# Patient Record
Sex: FEMALE | Race: WHITE | NOT HISPANIC OR LATINO | Employment: OTHER | URBAN - METROPOLITAN AREA
[De-identification: names, ages, dates, MRNs, and addresses within clinical notes are randomized per-mention and may not be internally consistent; named-entity substitution may affect disease eponyms.]

---

## 2018-08-22 ENCOUNTER — TRANSFERRED RECORDS (OUTPATIENT)
Dept: HEALTH INFORMATION MANAGEMENT | Facility: CLINIC | Age: 63
End: 2018-08-22

## 2019-05-30 ENCOUNTER — TRANSFERRED RECORDS (OUTPATIENT)
Dept: HEALTH INFORMATION MANAGEMENT | Facility: CLINIC | Age: 64
End: 2019-05-30

## 2020-01-06 ENCOUNTER — TRANSFERRED RECORDS (OUTPATIENT)
Dept: HEALTH INFORMATION MANAGEMENT | Facility: CLINIC | Age: 65
End: 2020-01-06

## 2020-01-06 LAB
CREAT SERPL-MCNC: 3.71 MG/DL (ref 0.55–1.02)
GFR SERPL CREATININE-BSD FRML MDRD: 12 ML/MIN/1.73M2
GLUCOSE SERPL-MCNC: 103 MG/DL (ref 70–100)
POTASSIUM SERPL-SCNC: 3.8 MMOL/L (ref 3.5–5.1)

## 2020-01-13 ENCOUNTER — TRANSFERRED RECORDS (OUTPATIENT)
Dept: HEALTH INFORMATION MANAGEMENT | Facility: CLINIC | Age: 65
End: 2020-01-13

## 2020-01-13 ENCOUNTER — MEDICAL CORRESPONDENCE (OUTPATIENT)
Dept: HEALTH INFORMATION MANAGEMENT | Facility: CLINIC | Age: 65
End: 2020-01-13

## 2020-01-22 PROBLEM — D63.1 ANEMIA DUE TO STAGE 5 CHRONIC KIDNEY DISEASE (H): Status: ACTIVE | Noted: 2020-01-22

## 2020-01-22 PROBLEM — Q61.3 POLYCYSTIC KIDNEY: Status: ACTIVE | Noted: 2020-01-22

## 2020-01-22 PROBLEM — I10 HYPERTENSION: Status: ACTIVE | Noted: 2020-01-22

## 2020-01-22 PROBLEM — M81.0 OSTEOPOROSIS: Status: RESOLVED | Noted: 2020-01-22 | Resolved: 2020-01-22

## 2020-01-22 PROBLEM — N18.5 CHRONIC KIDNEY DISEASE, STAGE 5, KIDNEY FAILURE (H): Status: ACTIVE | Noted: 2020-01-22

## 2020-01-22 PROBLEM — N18.5 ANEMIA DUE TO STAGE 5 CHRONIC KIDNEY DISEASE (H): Status: ACTIVE | Noted: 2020-01-22

## 2020-01-22 PROBLEM — M81.0 OSTEOPOROSIS: Status: ACTIVE | Noted: 2020-01-22

## 2020-02-04 PROBLEM — N18.5 CHRONIC KIDNEY DISEASE, STAGE 5, KIDNEY FAILURE (H): Status: RESOLVED | Noted: 2020-01-22 | Resolved: 2020-02-04

## 2020-02-04 PROBLEM — D63.1 ANEMIA DUE TO STAGE 5 CHRONIC KIDNEY DISEASE (H): Status: RESOLVED | Noted: 2020-01-22 | Resolved: 2020-02-04

## 2020-02-04 PROBLEM — N18.5 ANEMIA DUE TO STAGE 5 CHRONIC KIDNEY DISEASE (H): Status: RESOLVED | Noted: 2020-01-22 | Resolved: 2020-02-04

## 2020-04-13 ENCOUNTER — TELEPHONE (OUTPATIENT)
Dept: TRANSPLANT | Facility: CLINIC | Age: 65
End: 2020-04-13

## 2020-04-13 NOTE — TELEPHONE ENCOUNTER
Tried to reach Serene to get her scheduled for PKE and got her voice mail. I did tell her our scheduling team has been trying to reach her and if I do not hear back from her by 4/17/2020 I will assume she does not want a transplant and will close her referral.

## 2020-04-13 NOTE — Clinical Note
This pt had a virtual PKE appointment on 12/8 but was only seen by SW due to technical issues.  She does not have a smart phone and had problems w/ mycelt- might be wise to have her come to clinic to complete her PKE.  She is on dialysis.  Please let me know if you have questions.   Thanks, Kalpana

## 2020-07-02 ENCOUNTER — AMBULATORY - HEALTHEAST (OUTPATIENT)
Dept: PALLIATIVE MEDICINE | Facility: OTHER | Age: 65
End: 2020-07-02

## 2020-07-02 DIAGNOSIS — G89.4 CHRONIC PAIN SYNDROME: ICD-10-CM

## 2020-07-21 ENCOUNTER — TRANSFERRED RECORDS (OUTPATIENT)
Dept: HEALTH INFORMATION MANAGEMENT | Facility: CLINIC | Age: 65
End: 2020-07-21

## 2020-10-26 ENCOUNTER — TELEPHONE (OUTPATIENT)
Dept: TRANSPLANT | Facility: CLINIC | Age: 65
End: 2020-10-26

## 2020-10-26 NOTE — TELEPHONE ENCOUNTER
Patient called she is finally ready to schedule her eval, Connie could you call to offer dates.  Thank you

## 2020-11-07 ENCOUNTER — HEALTH MAINTENANCE LETTER (OUTPATIENT)
Age: 65
End: 2020-11-07

## 2020-11-23 ENCOUNTER — TELEPHONE (OUTPATIENT)
Dept: TRANSPLANT | Facility: CLINIC | Age: 65
End: 2020-11-23

## 2020-11-23 NOTE — TELEPHONE ENCOUNTER
Patient called [facility] to reschedule her kidney eval, please call her at [phone]

## 2020-11-23 NOTE — TELEPHONE ENCOUNTER
Patient called [facility] to reschedule her kidney eval, please call her at [phone]

## 2020-12-04 ENCOUNTER — TRANSFERRED RECORDS (OUTPATIENT)
Dept: HEALTH INFORMATION MANAGEMENT | Facility: CLINIC | Age: 65
End: 2020-12-04

## 2020-12-08 ENCOUNTER — ALLIED HEALTH/NURSE VISIT (OUTPATIENT)
Dept: TRANSPLANT | Facility: CLINIC | Age: 65
End: 2020-12-08
Attending: INTERNAL MEDICINE
Payer: COMMERCIAL

## 2020-12-08 DIAGNOSIS — Z76.82 ORGAN TRANSPLANT CANDIDATE: Primary | ICD-10-CM

## 2020-12-10 NOTE — PROGRESS NOTES
Psychosocial Assessment  Patient Name/ Age: Serene Tipton 65 year old   Medical Record #: [id number]  Duration of Interview:     30  min  Process:   Telephone Interview                (counseling < 50%)   Present at Appointment: Alvarez and her friend Paola        :CLARY Flores, [facility] Date:  2020        Type of transplant: Kidney    Donor type:   Alvarez indicated a friend has expressed interest in being a donor.   Cadaver and friend   Prior Transplants:    No Status of Transplant:       Current Living Situation    Location:   [address] [place] [address] [address]  [facility] [id number]  With Whom: Alone       Family/ Social Support:    Alvarez indicated she has an adopted sister but does not have any contact with her and she is not a part of her support system.  Her other sister is .    Alvarez indicated she has nieces and nephews are of assistance to her and live in [place], [place].   None available          Available, helpful   Committed relationship:   Single   Other supports:   Friends Available, helpful       Activities/ Functional Ability    Current level: Ambulatory and independent with ADL's     Transportation Drives self       Vocational/Employment/Financial     Employment   Disabled   Job Description      Income   SSDI   Insurance  BC/BS Medicare Advantage Plan Choice which has $3900 out-of-pocket max.  As of 2021 she will have BC/BS Medicare Advantage Complete but does not know of what her new OFP will be. Writer learn should be approximately $2700.    At this time, patient can afford medication costs:  Yes  Medicare       Medical Status    Current mode of treatment for ESRD Dialysis   Complications - Non diabetic        Behavioral    Tobacco Use No Chemical Dependency No   Alvarez indicated she quit smoking and her last cigarette was 2020. She was smoking a half a pack a day prior to quitting.   Alvarez indicated she may drink a couple of times a year.     Psychiatric  Impairment No  Alvarez indicated she is on an anti-depressant and has an anti-anxiety medication PRN. Alvarez indicated she is participating in therapy every three months at [facility].     Reading ability Good  Education Level: Bachelors Degree Recent Legal History No              Coping Style/Strategies: Alvarez indicated when under stress she will spend time with your pets, play computer games or read books.   Ability to Adhere to Complex Medical Regime: Yes     Adherence History: Alvarez indicated she will usually follow her physician's recommendations.        Education  _X_ Medicare  _X_ Rehabilitation  _X_ Donor issues  _X_ Community resources  _X_ Post discharge housing  _X_ Financial resources  _X_ Medical insurance options  _X_ Psych adjustment  _X_ Family adjustment  _X_ Health Care Directive - Yes, Will Provide at a later date, she is working on completing a Health Care Directive. Psychosocial Risks of Transplant Reviewed and Discussed:  _X_ Increased stress related to emotional,            family, social, employment or financial           situation  _X_ Affect on work and/or disability benefits  _X_ Affect on future life insurance  _X_ Transplant outcome expectations may           not be met  _X_ Mental Health Risks: anxiety,           depression, PTSD, guilt, grief and           chronic fatigue     Notable Items:   Alvarez indicated her sister  on dialysis on 2020 and she continues to work through her grief with her counselor. She indicated her  sister was going to be her primary support post transplant but her friends have indicated they will be of assistance to her.      Final Evaluation/Assessment   Patient seemed to process information well. Appeared well informed, motivated and able to follow post transplant requirements. Behavior was appropriate during interview. Has adequate income and insurance coverage. Adequate social support. No major contraindications noted for transplant.  At  this time patient appears to understand the risks and benefits of transplant.      Recommendation  Acceptable    Selection Criteria Met:  Plan for support Yes   Chemical Dependence Yes   Smoking Yes   Mental Health Yes   Adequate Finances Yes    Signature: CLARY Flores, [facility]   Title: Clinical

## 2020-12-11 ENCOUNTER — TRANSFERRED RECORDS (OUTPATIENT)
Dept: HEALTH INFORMATION MANAGEMENT | Facility: CLINIC | Age: 65
End: 2020-12-11

## 2020-12-11 ENCOUNTER — TELEPHONE (OUTPATIENT)
Dept: TRANSPLANT | Facility: CLINIC | Age: 65
End: 2020-12-11

## 2020-12-11 NOTE — TELEPHONE ENCOUNTER
Called pt's dialysis center, [facility] ([phone]) and requested pt's dialysis records and 2728 form get faxed to our office, ATTN: Katherine/Kalpana.  Provided our fax number, admin confirmed the records would get sent over.

## 2021-01-20 ENCOUNTER — TELEPHONE (OUTPATIENT)
Dept: TRANSPLANT | Facility: CLINIC | Age: 66
End: 2021-01-20

## 2021-01-20 NOTE — TELEPHONE ENCOUNTER
"Reached pt by phone today and she states that she is quite interested in being evaluated for a kidney transplant. She states that she seems to have been \"playig telephone tag\" with Connie.  She did state that she had one telephone clinic visit but could not complete any video visits because he computer screen went black.  We will contact the  to arrange for her kidney transplant evaluation.     "

## 2021-01-21 ENCOUNTER — MYC MEDICAL ADVICE (OUTPATIENT)
Dept: TRANSPLANT | Facility: CLINIC | Age: 66
End: 2021-01-21

## 2021-01-21 DIAGNOSIS — Z76.82 AWAITING ORGAN TRANSPLANT: Primary | ICD-10-CM

## 2021-01-21 NOTE — Clinical Note
See orders for cardiology, MRI/MRA brain, Ct a/p , pft's , cxr, ekg, echo and labs. Also needs in person surgeon visit for PKD belly assessment.

## 2021-01-25 NOTE — PROGRESS NOTES
Alvarez is a 65 year old who is being evaluated via a billable video visit.      How would you like to obtain your AVS? MyChart  If the video visit is dropped, the invitation should be resent by: Text to cell phone: [phone]  Will anyone else be joining your video visit? No      Video Start Time: 10:30 am   Video-Visit Details    Type of service:  Video Visit    Video End Time:11:!5 am     Originating Location (pt. Location): Home    Distant Location (provider location):  [facility]     Platform used for Video Visit: [facility]    TRANSPLANT NEPHROLOGY RECIPIENT EVALUATION NOTE    Assessment and Plan:  # Kidney Transplant Evaluation: Patient is a good candidate overall. Benefits of a living donor transplant were discussed.    #ESKD secondary to PKD: although doing OK on hemodialysis since 7/2020, she would likely benefit from a kidney transplant.  Her friend is interested in donating.    # PKD: will likely need non-contrast CT abdomen/pelvis given pain issues and to assess vascular targets.  Denies cyst rupture, stones, UTIs.  Will also need MRA brain without contrast given family history of aneurysms.    # Cardiac Risk: no known history of cardiac disease.  Given longstanding risk factors, she will need a cardiac risk assessment.    # Depression/anxiety: appears stable with current medication regime.  Follows regularly with a therapist and psychiatrist.  Appreciate social work input.       # Tobacco abuse: with a 20-30-pack-year history, currently smoking 1/2 ppd.  Strongly encouraged smoking cessation.  Will need low-dose CT chest with PCP and PFTs.    # Spinal stenosis, chronic back pain:  with several orthopedic surgeries including spinal fusion a few years ago. Working with physical therapy and managing with Norco 5-8 tabs daily.  Narcotics managed by PCP but will be starting pain clinic next month.  Recommend titrating down on narcotic use.    # Health Maintenance: Colonoscopy: Not up to  date, Mammogram: Not up to date, PAP: Not up to date and Dental: Not up to date    Discussed the risks and benefits of a transplant, including the risk of surgery and immunosuppression medications.  Patient's overall evaluation will be discussed in the Transplant Program's regular meeting with a final recommendation on the patients suitability for transplant to be made at that time.    Pending completion of the full evaluation, patient presently appears to be enough of an acceptable kidney transplant recipient candidate to have any potential kidney donors start the evaluation process.  Patient was seen in conjunction with Dr. Sung Posada as part of a shared visit.    Evaluation:  Serene Tipton was seen in consultation at the request of Dr. Vinay Leyva for evaluation as a potential kidney transplant recipient.    Reason for Visit:  Serene Tipton is a 65 year old female with ESKD from polycystic kidney disease (PKD), who presents for kidney transplant evaluation.    History of Present Illness:  Serene Tipton is a 65-year-old female with history of ESKD secondary to PKD. Her kidney disease was diagnosed in the late 1990s and she ultimately started HD in 7/2020.  Family history of PKD includes her mother, maternal grandmother and sister.  Her sister was being evaluated for transplant but never listed actively due to BMI issues.  She recently had a sudden death while on dialysis.  Her maternal grandmother did have known history of brain aneurysms. Ms. Tipton complains of some abdominal pain but denies history of cyst rupture, stones or recurrent UTI.  She does have a good friend that is interested in donating.  She lives alone in an apartment in [place].  She has nieces, nephews and friends available to help if needed.              Kidney Disease Hx:        Kidney Disease Dx: Polycystic kidney disease (PKD)       Biopsy Proven: No         On Dialysis: Yes, Date initiated: 7/2020   and [facility]       Primary Nephrologist: Dr. Rhoades        H/o Kidney Stones: No       H/o Recurrent/Frequent UTI: No         Diabetic Hx: None           Cardiac/Vascular Disease Risk Factors:        Cardiac Risk Factors: Hypertension, CKD and Smoking       Known CAD: No       Known PAD/Caludication Symptoms: No       Known Heart Failure: No       Arrhythmia: No       Pulmonary Hypertension: No       Valvular Disease: No       Other: None         Viral Serology Status       CMV IgG Antibody: Unknown       EBV IgG Antibody: Unknown         Volume Status/Weight:        Volume status: Not assessed       Weight:  Acceptable BMI       BMI: There is no height or weight on file to calculate BMI.         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 Active with physical therapy.  Limited to half a block, due to back/hip pain and fatigue.       Fatigue/Decreased Energy: [] No [x] Yes    Chest Pain or SOB with Exertion: [] No [x] Yes SOB    Significant Weight Change: [x] No [] Yes    Nausea, Vomiting or Diarrhea: [] No [x] Yes Occasional nausea, diarrhea once weekly since starting dialysis    Fever, Sweats or Chills:  [x] No [] Yes    Leg Swelling [x] No [] Yes        History of Cancer: None    Other Significant Medical Issues:   - Depression/anxiety:  managing with zoloft and prn ativan, therapist once monthly, psychiatrist once every 3 months.    -Tobacco abuse: with a 20-30-pack-year history currently smoking 1/2 ppd.   - Spinal stenosis, chronic back pain:  with several orthopedic surgeries including spinal fusion a few years ago , working with physical therapy and managing with Norco 5-8 tabs daily.  Narcotics managed by PCP, but will be starting pain clinic next month.  Recommend titrating down on narcotic use.    Review of Systems:  A comprehensive review of systems was obtained and negative, except as noted in the HPI or PMH.    Past Medical History:   Medical record  was reviewed and PMH was discussed with patient and noted below.  Past Medical History:   Diagnosis Date     Chronic low back pain      CKD (chronic kidney disease)      Depressive disorder 2013     GERD (gastroesophageal reflux disease)      Hyperlipidemia      Hypertension 1990's     Osteoporosis      PKD (polycystic kidney disease) 1990's     PTSD (post-traumatic stress disorder)        Past Social History:   Past Surgical History:   Procedure Laterality Date     BACK SURGERY      spinal fusion w/hardware     BIOPSY Left 1990's    Breast     BREAST LUMPECTOMY, RT/LT Left     Lumpectomy     BREAST SURGERY Left     Biopsy     CYSTECTOMY PILONIDAL  1981     EYE SURGERY  2008    lasik     ORTHOPEDIC SURGERY Right 2005    Shoulder     Personal history of bleeding or anesthesia problems: No    Family History:  No family history on file.    Personal History:   Social History     Socioeconomic History     Marital status: Single     Spouse name: Not on file     Number of children: Not on file     Years of education: Not on file     Highest education level: Not on file   Occupational History     Not on file   Social Needs     Financial resource strain: Not on file     Food insecurity     Worry: Not on file     Inability: Not on file     Transportation needs     Medical: Not on file     Non-medical: Not on file   Tobacco Use     Smoking status: Current Every Day Smoker     Smokeless tobacco: Never Used   Substance and Sexual Activity     Alcohol use: Not Currently     Drug use: Never     Sexual activity: Not on file   Lifestyle     Physical activity     Days per week: Not on file     Minutes per session: Not on file     Stress: Not on file   Relationships     Social connections     Talks on phone: Not on file     Gets together: Not on file     Attends Christianity service: Not on file     Active member of club or organization: Not on file     Attends meetings of clubs or organizations: Not on file     Relationship status: Not  on file     Intimate partner violence     Fear of current or ex partner: Not on file     Emotionally abused: Not on file     Physically abused: Not on file     Forced sexual activity: Not on file   Other Topics Concern     Parent/sibling w/ CABG, MI or angioplasty before 65F 55M? Not Asked   Social History Narrative     Not on file       Allergies:  Allergies   Allergen Reactions     Penicillins Other (See Comments) and Shortness Of Breath     Breathing problem.  breathing       Carvedilol GI Disturbance     Nausea and vomiting     Morphine Other (See Comments)     Vomiting     No Clinical Screening - See Comments      PN: LW CM1: Contrast Intercapsular - Nonionic Reaction :     Nsaids Other (See Comments)     Sulfa Drugs Itching       Medications:  Current Outpatient Medications   Medication Sig     amitriptyline (ELAVIL) 100 MG tablet 1 tab(s)     amitriptyline (ELAVIL) 100 MG tablet      calcium carbonate (TUMS) 500 MG chewable tablet Take 2 chew tab by mouth 2 times daily 1000MG WITH AND 500MG WITH SNACK     cloNIDine (CATAPRES-TTS2) 0.2 MG/24HR WK patch 1 tab(s)     HYDROcodone-acetaminophen (NORCO) 5-325 MG tablet 2 tab(s)     Krill Oil 1000 MG CAPS 1     lisinopril (ZESTRIL) 40 MG tablet 1 tab(s)     MELATONIN PO 30 MG @@ H.S     OSCO HIGH POTENCY B COMPLEX/C OR Take 1 tablet by mouth     rosuvastatin (CRESTOR) 10 MG tablet      rosuvastatin (EZALLOR) 10 MG sprinkle capsule 1 tab(s)     sertraline (ZOLOFT) 100 MG tablet Take 200 mg by mouth     vitamin E 400 units TABS Take 800 mg by mouth     zolpidem (AMBIEN) 5 MG tablet Take 5 mg by mouth     No current facility-administered medications for this visit.        Vitals:  There were no vitals taken for this visit.    Exam:  GENERAL APPEARANCE: alert and no distress  HENT: no obvious abnormalities on appearance  RESP: breathing appears unremarkable with normal rate, no audible wheezing or cough and no apparent shortness of breath with conversation  MS:  extremities normal - no gross deformities noted  SKIN: no apparent rash and normal skin tone  NEURO: speech is clear with no obvious neurological deficits  PSYCH: mentation appears normal and affect normal      Results:   No results found for this or any previous visit (from the past 336 hour(s)).

## 2021-01-26 ENCOUNTER — VIRTUAL VISIT (OUTPATIENT)
Dept: TRANSPLANT | Facility: CLINIC | Age: 66
End: 2021-01-26
Attending: PHYSICIAN ASSISTANT
Payer: COMMERCIAL

## 2021-01-26 ENCOUNTER — DOCUMENTATION ONLY (OUTPATIENT)
Dept: TRANSPLANT | Facility: CLINIC | Age: 66
End: 2021-01-26

## 2021-01-26 DIAGNOSIS — Z76.82 ORGAN TRANSPLANT CANDIDATE: Primary | ICD-10-CM

## 2021-01-26 DIAGNOSIS — Z76.82 ORGAN TRANSPLANT CANDIDATE: ICD-10-CM

## 2021-01-26 DIAGNOSIS — E78.2 MIXED HYPERLIPIDEMIA: ICD-10-CM

## 2021-01-26 DIAGNOSIS — Z72.0 TOBACCO ABUSE: ICD-10-CM

## 2021-01-26 DIAGNOSIS — Q61.2 POLYCYSTIC KIDNEY, ADULT TYPE: ICD-10-CM

## 2021-01-26 DIAGNOSIS — N18.6 END STAGE KIDNEY DISEASE (H): Primary | ICD-10-CM

## 2021-01-26 DIAGNOSIS — Q61.3 POLYCYSTIC KIDNEY: ICD-10-CM

## 2021-01-26 DIAGNOSIS — E55.9 VITAMIN D DEFICIENCY: ICD-10-CM

## 2021-01-26 PROCEDURE — 99205 OFFICE O/P NEW HI 60 MIN: CPT | Mod: 95

## 2021-01-26 PROCEDURE — 99214 OFFICE O/P EST MOD 30 MIN: CPT | Mod: 95 | Performed by: TRANSPLANT SURGERY

## 2021-01-26 RX ORDER — LABETALOL 200 MG/1
300 TABLET, FILM COATED ORAL
COMMUNITY
Start: 2020-12-04 | End: 2023-01-17

## 2021-01-26 RX ORDER — LORAZEPAM 1 MG/1
1 TABLET ORAL
Status: ON HOLD | COMMUNITY
Start: 2020-08-16 | End: 2023-07-26

## 2021-01-26 RX ORDER — AMLODIPINE BESYLATE 5 MG/1
5 TABLET ORAL AT BEDTIME
Status: ON HOLD | COMMUNITY
End: 2023-07-19

## 2021-01-26 NOTE — LETTER
1/26/2021      RE: Serene MEDLEY Danuta  [address]  [address]  [facility] [address]       Alvarez is a 65 year old who is being evaluated via a billable video visit.      How would you like to obtain your AVS? MyChart  If the video visit is dropped, the invitation should be resent by: Text to cell phone: [phone]  Will anyone else be joining your video visit? No      Video Start Time: 10:30 am   Video-Visit Details    Type of service:  Video Visit    Video End Time:11:!5 am     Originating Location (pt. Location): Home    Distant Location (provider location):  [facility]     Platform used for Video Visit: [facility]    TRANSPLANT NEPHROLOGY RECIPIENT EVALUATION NOTE    Assessment and Plan:  # Kidney Transplant Evaluation: Patient is a good candidate overall. Benefits of a living donor transplant were discussed.    #ESKD secondary to PKD: although doing OK on hemodialysis since 7/2020, she would likely benefit from a kidney transplant.  Her friend is interested in donating.    # PKD: will likely need non-contrast CT abdomen/pelvis given pain issues and to assess vascular targets.  Denies cyst rupture, stones, UTIs.  Will also need MRA brain without contrast given family history of aneurysms.    # Cardiac Risk: no known history of cardiac disease.  Given longstanding risk factors, she will need a cardiac risk assessment.    # Depression/anxiety: appears stable with current medication regime.  Follows regularly with a therapist and psychiatrist.  Appreciate social work input.       # Tobacco abuse: with a 20-30-pack-year history, currently smoking 1/2 ppd.  Strongly encouraged smoking cessation.  Will need low-dose CT chest with PCP and PFTs.    # Spinal stenosis, chronic back pain:  with several orthopedic surgeries including spinal fusion a few years ago. Working with physical therapy and managing with Norco 5-8 tabs daily.  Narcotics managed by PCP but will be starting pain clinic next  month.  Recommend titrating down on narcotic use.    # Health Maintenance: Colonoscopy: Not up to date, Mammogram: Not up to date, PAP: Not up to date and Dental: Not up to date    Discussed the risks and benefits of a transplant, including the risk of surgery and immunosuppression medications.  Patient's overall evaluation will be discussed in the Transplant Program's regular meeting with a final recommendation on the patients suitability for transplant to be made at that time.    Pending completion of the full evaluation, patient presently appears to be enough of an acceptable kidney transplant recipient candidate to have any potential kidney donors start the evaluation process.  Patient was seen in conjunction with Dr. Sung Posada as part of a shared visit.    Evaluation:  Serene Tipton was seen in consultation at the request of Dr. Vinay Leyva for evaluation as a potential kidney transplant recipient.    Reason for Visit:  Serene Tipton is a 65 year old female with ESKD from polycystic kidney disease (PKD), who presents for kidney transplant evaluation.    History of Present Illness:  Serene Tipton is a 65-year-old female with history of ESKD secondary to PKD. Her kidney disease was diagnosed in the late 1990s and she ultimately started HD in 7/2020.  Family history of PKD includes her mother, maternal grandmother and sister.  Her sister was being evaluated for transplant but never listed actively due to BMI issues.  She recently had a sudden death while on dialysis.  Her maternal grandmother did have known history of brain aneurysms. Ms. Tipton complains of some abdominal pain but denies history of cyst rupture, stones or recurrent UTI.  She does have a good friend that is interested in donating.  She lives alone in an apartment in [place].  She has nieces, nephews and friends available to help if needed.              Kidney Disease Hx:        Kidney Disease Dx: Polycystic kidney disease  (PKD)       Biopsy Proven: No         On Dialysis: Yes, Date initiated: 7/2020   and Dialysis Type: [facility];       Primary Nephrologist: Dr. Rhoades        H/o Kidney Stones: No       H/o Recurrent/Frequent UTI: No         Diabetic Hx: None           Cardiac/Vascular Disease Risk Factors:        Cardiac Risk Factors: Hypertension, CKD and Smoking       Known CAD: No       Known PAD/Caludication Symptoms: No       Known Heart Failure: No       Arrhythmia: No       Pulmonary Hypertension: No       Valvular Disease: No       Other: None         Viral Serology Status       CMV IgG Antibody: Unknown       EBV IgG Antibody: Unknown         Volume Status/Weight:        Volume status: Not assessed       Weight:  Acceptable BMI       BMI: There is no height or weight on file to calculate BMI.         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 Active with physical therapy.  Limited to half a block, due to back/hip pain and fatigue.       Fatigue/Decreased Energy: [] No [x] Yes    Chest Pain or SOB with Exertion: [] No [x] Yes SOB    Significant Weight Change: [x] No [] Yes    Nausea, Vomiting or Diarrhea: [] No [x] Yes Occasional nausea, diarrhea once weekly since starting dialysis    Fever, Sweats or Chills:  [x] No [] Yes    Leg Swelling [x] No [] Yes        History of Cancer: None    Other Significant Medical Issues:   - Depression/anxiety:  managing with zoloft and prn ativan, therapist once monthly, psychiatrist once every 3 months.    -Tobacco abuse: with a 20-30-pack-year history currently smoking 1/2 ppd.   - Spinal stenosis, chronic back pain:  with several orthopedic surgeries including spinal fusion a few years ago , working with physical therapy and managing with Norco 5-8 tabs daily.  Narcotics managed by PCP, but will be starting pain clinic next month.  Recommend titrating down on narcotic use.    Review of Systems:  A comprehensive review of systems was  obtained and negative, except as noted in the HPI or PMH.    Past Medical History:   Medical record was reviewed and PMH was discussed with patient and noted below.  Past Medical History:   Diagnosis Date     Chronic low back pain      CKD (chronic kidney disease)      Depressive disorder 2013     GERD (gastroesophageal reflux disease)      Hyperlipidemia      Hypertension 1990's     Osteoporosis      PKD (polycystic kidney disease) 1990's     PTSD (post-traumatic stress disorder)        Past Social History:   Past Surgical History:   Procedure Laterality Date     BACK SURGERY      spinal fusion w/hardware     BIOPSY Left 1990's    Breast     BREAST LUMPECTOMY, RT/LT Left     Lumpectomy     BREAST SURGERY Left     Biopsy     CYSTECTOMY PILONIDAL  1981     EYE SURGERY  2008    lasik     ORTHOPEDIC SURGERY Right 2005    Shoulder     Personal history of bleeding or anesthesia problems: No    Family History:  No family history on file.    Personal History:   Social History     Socioeconomic History     Marital status: Single     Spouse name: Not on file     Number of children: Not on file     Years of education: Not on file     Highest education level: Not on file   Occupational History     Not on file   Social Needs     Financial resource strain: Not on file     Food insecurity     Worry: Not on file     Inability: Not on file     Transportation needs     Medical: Not on file     Non-medical: Not on file   Tobacco Use     Smoking status: Current Every Day Smoker     Smokeless tobacco: Never Used   Substance and Sexual Activity     Alcohol use: Not Currently     Drug use: Never     Sexual activity: Not on file   Lifestyle     Physical activity     Days per week: Not on file     Minutes per session: Not on file     Stress: Not on file   Relationships     Social connections     Talks on phone: Not on file     Gets together: Not on file     Attends Jehovah's witness service: Not on file     Active member of club or organization:  Not on file     Attends meetings of clubs or organizations: Not on file     Relationship status: Not on file     Intimate partner violence     Fear of current or ex partner: Not on file     Emotionally abused: Not on file     Physically abused: Not on file     Forced sexual activity: Not on file   Other Topics Concern     Parent/sibling w/ CABG, MI or angioplasty before 65F 55M? Not Asked   Social History Narrative     Not on file       Allergies:  Allergies   Allergen Reactions     Penicillins Other (See Comments) and Shortness Of Breath     Breathing problem.  breathing       Carvedilol GI Disturbance     Nausea and vomiting     Morphine Other (See Comments)     Vomiting     No Clinical Screening - See Comments      PN: LW CM1: Contrast Intercapsular - Nonionic Reaction :     Nsaids Other (See Comments)     Sulfa Drugs Itching       Medications:  Current Outpatient Medications   Medication Sig     amitriptyline (ELAVIL) 100 MG tablet 1 tab(s)     amitriptyline (ELAVIL) 100 MG tablet      calcium carbonate (TUMS) 500 MG chewable tablet Take 2 chew tab by mouth 2 times daily 1000MG WITH AND 500MG WITH SNACK     cloNIDine (CATAPRES-TTS2) 0.2 MG/24HR WK patch 1 tab(s)     HYDROcodone-acetaminophen (NORCO) 5-325 MG tablet 2 tab(s)     Krill Oil 1000 MG CAPS 1     lisinopril (ZESTRIL) 40 MG tablet 1 tab(s)     MELATONIN PO 30 MG @@ H.S     OSCO HIGH POTENCY B COMPLEX/C OR Take 1 tablet by mouth     rosuvastatin (CRESTOR) 10 MG tablet      rosuvastatin (EZALLOR) 10 MG sprinkle capsule 1 tab(s)     sertraline (ZOLOFT) 100 MG tablet Take 200 mg by mouth     vitamin E 400 units TABS Take 800 mg by mouth     zolpidem (AMBIEN) 5 MG tablet Take 5 mg by mouth     No current facility-administered medications for this visit.        Vitals:  There were no vitals taken for this visit.    Exam:  GENERAL APPEARANCE: alert and no distress  HENT: no obvious abnormalities on appearance  RESP: breathing appears unremarkable with normal  rate, no audible wheezing or cough and no apparent shortness of breath with conversation  MS: extremities normal - no gross deformities noted  SKIN: no apparent rash and normal skin tone  NEURO: speech is clear with no obvious neurological deficits  PSYCH: mentation appears normal and affect normal      Results:   No results found for this or any previous visit (from the past 336 hour(s)).        Patient was seen by myself, Dr. Sung Posada, in conjunction with Wang Escalera NP as part of a shared visit.    I personally reviewed past medical and surgical history, vital signs, medications and labs.  Present and past medical history, along with significant physical exam findings were all reviewed with RAS.    My key findings:  Serene Tipton is 65 year old, who presents for Kidney transplant evaluation.    Key management decisions made by me and discussed with RAS:  1.  End-stage kidney disease secondary to polycystic kidney disease.  2.  Transplant candidacy evaluation.  3.  Need for cardiovascular risk stratification due to multiple risk factors including smoking, ESRD, family history and dyslipidemia.  4.  Need to update age-appropriate cancer screening patient is overdue.  5.  Nicotine use.  6.  Chronic pain syndrome due to lower back disorder status post fusions will be following with pain clinic starting February 2021.  7.  Formal psychosocial assessment.  8.  Need to screen for brain aneurysms on account of family history and the polycystic kidney disease.  Discussion:  Overall staff is a delightful 65-year-old lady with end-stage kidney disease on chronic dialysis.  She is currently dialyzing via catheter.  She has 1 potential donor.  She appears to be reasonable candidate pending the resolution of the above in the completion of her work-up.  She was highly encouraged to quit smoking and to find alternatives to chronic narcotic for her pain management.  Overall staff appears to be a reasonable candidate  and her case will be discussed during our multidisciplinary meeting for final candidacy decision.  On behalf of the [facility] transplant center would like to thank  for allowing us to participate in the care of Ms. Irisnathalybenny      LEONELA

## 2021-01-26 NOTE — PROGRESS NOTES
"Kidney Transplant Referral - 1/22/2020  Serene Tipton attended the pre-transplant patient education class today. The [facility] website pre-transplant modules were viewed; class participants were educated on using the site.     Content reviewed:    Living Donation and how to access that program    Paired exchange    Kidney Donor Profile Index (KDPI)    Waiting list issues (right to decline without penalty, high PHS risk donors, what to expect when called with an offer)    Hospital experience,  length of stay , need to stay locally post-discharge (2-4 weeks)    Surgical options (with pictures)                             Post-surgery lifting and driving restrictions    Post-transplant routines, frequency of lab work and clinic visits    Need to stay locally post-discharge (2-4 weeks)    Role of Transplant Coordinator    Participants were informed of the benefits of transplant as well as potential risks such as infection, cancer, and death.  The need for total adherence with immunosuppression medications and following transplant regimens was stressed.  The overall evaluation/approval/listing process was reviewed.        The patient was provided with the following documents:  What You Need to Know About a Kidney Transplant  Adult Kidney Transplant - A Guide for Patients  SRTR Data Sheet - Kidney  Brochure - Kidney Allocation  What Every Patient Needs to Know (UNOS)  UNOS Facts and Figures  Finding a Donor  [facility] - Quick Start Guide  KDPI Consent  Receipt of Information form    Serene Tipton signed the  Receipt of Information for Organ Transplant Recipient.\" She was provided Kavita Hernandez's business card and instructed to call with additional questions.      Summary    Team s concerns/comments:   Needs--  -cardiology   - establish with pain clinic   - cont with PT   - MRA brain w/o contrast   - likely needs CT a/p w/o contrast   - encouraged smoking cessation   - PFTs   - low dose CT " chest with PCP.     Candidacy category: Yellow    Action/Plan: continue with evaluation    Expected Selection Meeting Discussion: 2/3/2021    Called patient to review PKE. She stated she had no further questions, and that she had watched the MTP videos. Was encouraged by conversations with surgeon and nephrologist. Patient and writer discussed that her case would be presented at selection committee next week and her coordinator would call with an update after that.

## 2021-01-26 NOTE — PROGRESS NOTES
[facility] Solid Organ Transplant  Outpatient MNT: Kidney Transplant Evaluation    Current BMI: 24.4 (HT 60 in,  lbs/57 kg)- data from 12/8  BMI is within recommendation of <35 for kidney transplant      Time Spent: 15 minutes  Visit Type: Initial   Referring Physician: Autumn   Pt accompanied by: self     History of previous txp: none   Dialysis: yes    Dialysis Info: HD MWF 7/2020 645 am   Protein supplement: no     Nutrition Assessment  Monitoring Na and Phos in diet. Doesn't take binder at night with ice cream, as her binder reportedly interacts with pain meds. Has talked with Pharmacist and Dietitian about this to find another suitable option.     Appetite: 'never the greatest' at baseline     Vitamins, Supplements, Pertinent Meds: tums as binder (new, not taking with everything she eats but taking it more regular now), vit D, super B complex, krill oil, vit E  Herbal Medicines/Supplements: osteo-biflex (boswelia extract)    Edema: MARCELLUS fluctuates     Weight hx: overall stable     Food Security Survey  Question 1.  In the last 12 months: We worried food would run out before we had money to buy more. Never True    Question 2.  In the last 12 months: The food we bought just didn't last and we didn't have money to buy more. Never True    Did the patient answer Sometimes True or Often True to EITHER Question 1 or Question 2? 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  Reviewed wnl K level (pt reports it historically has even been low), with h/o higher phos levels. Discussed taking binders more regularly (pt reports increasing binder compliance). Pt knowledgeable about foods she needs to take binders with that are higher in phos.     Reviewed post txp diet guidelines in brief (will review in further detail post txp):  (1) Review of proper food safety measures d/t immunosuppressant therapy post-op and increased risk for food-borne illness    (2) Avoid the following post txp d/t risk for rejection, unknown effects on the organs, and/or potential interactions with immunosuppressants:  - Herbal, Chinese, holistic, chiropractic, natural, alternative medicines and supplements  - Detoxes and cleanses  - Weight loss pills  - Protein powders or other products with extracts or herbs (ie green tea extract)    (3) Med regimen and possible side effects    Patient Understanding: Pt verbalized understanding of education provided.  Expected Engagement: Good  Follow-Up Plans: PRN     Nutrition Goals  No nutrition goals identified at this time     Poppy Charles, RD, LD, CCTD    Pt evaluated via billable telephone visit d/t COVID-19 restrictions. Time spent: 15 min

## 2021-01-26 NOTE — LETTER
1/26/2021         RE: Serene Tipton  [address]  [address]  [facility] [address]        Dear Colleague,    Thank you for referring your patient, Serene Tipton, to the [facility]. Please see a copy of my visit note below.    Alvarez is a 65 year old who is being evaluated via a billable video visit.      How would you like to obtain your AVS? MyChart  If the video visit is dropped, the invitation should be resent by: Text to cell phone: [phone]  Will anyone else be joining your video visit? No  {If patient encounters technical issues they should call [phone] :[phone]}    Video Start Time: 10:30 am   Video-Visit Details    Type of service:  Video Visit    Video End Time:11:!5 am     Originating Location (pt. Location): Home    Distant Location (provider location):  [facility]     Platform used for Video Visit: [facility]    TRANSPLANT NEPHROLOGY RECIPIENT EVALUATION NOTE    Assessment and Plan:  # Kidney Transplant Evaluation: Patient is a good candidate overall. Benefits of a living donor transplant were discussed.    #ESKD secondary to PKD: although doing OK on hemodialysis since 7/2020, she would likely benefit from a kidney transplant.  Her friend is interested in donating.    # PKD: will likely need non-contrast CT abdomen/pelvis given pain issues and to assess vascular targets.  Denies cyst rupture, stones, UTIs.  Will also need MRA brain without contrast given family history of aneurysms.    # Cardiac Risk: no known history of cardiac disease.  Given longstanding risk factors, she will need a cardiac risk assessment.    # Depression/anxiety: appears stable with current medication regime.  Follows regularly with a therapist and psychiatrist.  Appreciate social work input.       # Tobacco abuse: with a 20-30-pack-year history, currently smoking 1/2 ppd.  Strongly encouraged smoking cessation.  Will need low-dose CT chest with PCP and  PFTs.    # Spinal stenosis, chronic back pain:  with several orthopedic surgeries including spinal fusion a few years ago. Working with physical therapy and managing with Norco 5-8 tabs daily.  Narcotics managed by PCP but will be starting pain clinic next month.  Recommend titrating down on narcotic use.    # Health Maintenance: Colonoscopy: Not up to date, Mammogram: Not up to date, PAP: Not up to date and Dental: Not up to date    Discussed the risks and benefits of a transplant, including the risk of surgery and immunosuppression medications.  Patient's overall evaluation will be discussed in the Transplant Program's regular meeting with a final recommendation on the patients suitability for transplant to be made at that time.    Pending completion of the full evaluation, patient presently appears to be enough of an acceptable kidney transplant recipient candidate to have any potential kidney donors start the evaluation process.  Patient was seen in conjunction with Dr. Sung Posada as part of a shared visit.    Evaluation:  Serene Tipton was seen in consultation at the request of Dr. Vinay Leyva for evaluation as a potential kidney transplant recipient.    Reason for Visit:  Serene Tipton is a 65 year old female with ESKD from polycystic kidney disease (PKD), who presents for kidney transplant evaluation.    History of Present Illness:  Serene Tipton is a 65-year-old female with history of ESKD secondary to PKD. Her kidney disease was diagnosed in the late 1990s and she ultimately started HD in 7/2020.  Family history of PKD includes her mother, maternal grandmother and sister.  Her sister was being evaluated for transplant but never listed actively due to BMI issues.  She recently had a sudden death while on dialysis.  Her maternal grandmother did have known history of brain aneurysms. Ms. Tipton complains of some abdominal pain but denies history of cyst rupture, stones or recurrent UTI.   She does have a good friend that is interested in donating.  She lives alone in an apartment in [place].  She has nieces, nephews and friends available to help if needed.              Kidney Disease Hx:        Kidney Disease Dx: Polycystic kidney disease (PKD)       Biopsy Proven: No         On Dialysis: Yes, Date initiated: 7/2020   and Dialysis Type: [facility];       Primary Nephrologist: Dr. Rhoades        H/o Kidney Stones: No       H/o Recurrent/Frequent UTI: No         Diabetic Hx: None           Cardiac/Vascular Disease Risk Factors:        Cardiac Risk Factors: Hypertension, CKD and Smoking       Known CAD: No       Known PAD/Caludication Symptoms: No       Known Heart Failure: No       Arrhythmia: No       Pulmonary Hypertension: No       Valvular Disease: No       Other: None         Viral Serology Status       CMV IgG Antibody: Unknown       EBV IgG Antibody: Unknown         Volume Status/Weight:        Volume status: Not assessed       Weight:  Acceptable BMI       BMI: There is no height or weight on file to calculate BMI.         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 Active with physical therapy.  Limited to half a block, due to back/hip pain and fatigue.       Fatigue/Decreased Energy: [] No [x] Yes    Chest Pain or SOB with Exertion: [] No [x] Yes SOB    Significant Weight Change: [x] No [] Yes    Nausea, Vomiting or Diarrhea: [] No [x] Yes Occasional nausea, diarrhea once weekly since starting dialysis    Fever, Sweats or Chills:  [x] No [] Yes    Leg Swelling [x] No [] Yes        History of Cancer: None    Other Significant Medical Issues:   - Depression/anxiety:  managing with zoloft and prn ativan, therapist once monthly, psychiatrist once every 3 months.    -Tobacco abuse: with a 20-30-pack-year history currently smoking 1/2 ppd.   - Spinal stenosis, chronic back pain:  with several orthopedic surgeries including spinal fusion a few  years ago , working with physical therapy and managing with Norco 5-8 tabs daily.  Narcotics managed by PCP, but will be starting pain clinic next month.  Recommend titrating down on narcotic use.    Review of Systems:  A comprehensive review of systems was obtained and negative, except as noted in the HPI or PMH.    Past Medical History:   Medical record was reviewed and PMH was discussed with patient and noted below.  Past Medical History:   Diagnosis Date     Chronic low back pain      CKD (chronic kidney disease)      Depressive disorder 2013     GERD (gastroesophageal reflux disease)      Hyperlipidemia      Hypertension 1990's     Osteoporosis      PKD (polycystic kidney disease) 1990's     PTSD (post-traumatic stress disorder)        Past Social History:   Past Surgical History:   Procedure Laterality Date     BACK SURGERY      spinal fusion w/hardware     BIOPSY Left 1990's    Breast     BREAST LUMPECTOMY, RT/LT Left     Lumpectomy     BREAST SURGERY Left     Biopsy     CYSTECTOMY PILONIDAL  1981     EYE SURGERY  2008    lasik     ORTHOPEDIC SURGERY Right 2005    Shoulder     Personal history of bleeding or anesthesia problems: No    Family History:  No family history on file.    Personal History:   Social History     Socioeconomic History     Marital status: Single     Spouse name: Not on file     Number of children: Not on file     Years of education: Not on file     Highest education level: Not on file   Occupational History     Not on file   Social Needs     Financial resource strain: Not on file     Food insecurity     Worry: Not on file     Inability: Not on file     Transportation needs     Medical: Not on file     Non-medical: Not on file   Tobacco Use     Smoking status: Current Every Day Smoker     Smokeless tobacco: Never Used   Substance and Sexual Activity     Alcohol use: Not Currently     Drug use: Never     Sexual activity: Not on file   Lifestyle     Physical activity     Days per week: Not  on file     Minutes per session: Not on file     Stress: Not on file   Relationships     Social connections     Talks on phone: Not on file     Gets together: Not on file     Attends Alevism service: Not on file     Active member of club or organization: Not on file     Attends meetings of clubs or organizations: Not on file     Relationship status: Not on file     Intimate partner violence     Fear of current or ex partner: Not on file     Emotionally abused: Not on file     Physically abused: Not on file     Forced sexual activity: Not on file   Other Topics Concern     Parent/sibling w/ CABG, MI or angioplasty before 65F 55M? Not Asked   Social History Narrative     Not on file       Allergies:  Allergies   Allergen Reactions     Penicillins Other (See Comments) and Shortness Of Breath     Breathing problem.  breathing       Carvedilol GI Disturbance     Nausea and vomiting     Morphine Other (See Comments)     Vomiting     No Clinical Screening - See Comments      PN: LW CM1: Contrast Intercapsular - Nonionic Reaction :     Nsaids Other (See Comments)     Sulfa Drugs Itching       Medications:  Current Outpatient Medications   Medication Sig     amitriptyline (ELAVIL) 100 MG tablet 1 tab(s)     amitriptyline (ELAVIL) 100 MG tablet      calcium carbonate (TUMS) 500 MG chewable tablet Take 2 chew tab by mouth 2 times daily 1000MG WITH AND 500MG WITH SNACK     cloNIDine (CATAPRES-TTS2) 0.2 MG/24HR WK patch 1 tab(s)     HYDROcodone-acetaminophen (NORCO) 5-325 MG tablet 2 tab(s)     Krill Oil 1000 MG CAPS 1     lisinopril (ZESTRIL) 40 MG tablet 1 tab(s)     MELATONIN PO 30 MG @@ H.S     OSCO HIGH POTENCY B COMPLEX/C OR Take 1 tablet by mouth     rosuvastatin (CRESTOR) 10 MG tablet      rosuvastatin (EZALLOR) 10 MG sprinkle capsule 1 tab(s)     sertraline (ZOLOFT) 100 MG tablet Take 200 mg by mouth     vitamin E 400 units TABS Take 800 mg by mouth     zolpidem (AMBIEN) 5 MG tablet Take 5 mg by mouth     No current  "facility-administered medications for this visit.        Vitals:  There were no vitals taken for this visit.    Exam:  GENERAL APPEARANCE: alert and no distress  HENT: no obvious abnormalities on appearance  RESP: breathing appears unremarkable with normal rate, no audible wheezing or cough and no apparent shortness of breath with conversation  MS: extremities normal - no gross deformities noted  SKIN: no apparent rash and normal skin tone  NEURO: speech is clear with no obvious neurological deficits  PSYCH: mentation appears normal and affect normal      Results:   No results found for this or any previous visit (from the past 336 hour(s)).        Alvarez is a 65 year old who is being evaluated via a billable video visit.      How would you like to obtain your AVS? MyChart  If the video visit is dropped, the invitation should be resent by: Text to cell phone: [phone]  Will anyone else be joining your video visit? No  {If patient encounters technical issues they should call [phone] :[phone]}    Video Start Time: {video visit start/end time for provider to select:458635}  Video-Visit Details    Type of service:  Video Visit    Video End Time:{video visit start/end time for provider to select:171289}    Originating Location (pt. Location): {video visit patient location:646218::\"Home\"}    Distant Location (provider location):  [facility]     Platform used for Video Visit: {Virtual Visit Platforms:563624::\"Only-apartments\"}      Patient was seen by myself, Dr. Sung Posada, in conjunction with Wang Escalera NP as part of a shared visit.    I personally reviewed past medical and surgical history, vital signs, medications and labs.  Present and past medical history, along with significant physical exam findings were all reviewed with RAS.    My key findings:  Serene Tipton is 65 year old, who presents for Kidney transplant evaluation.    Key management decisions made by me and discussed with " RAS:  1.  End-stage kidney disease secondary to polycystic kidney disease.  2.  Transplant candidacy evaluation.  3.  Need for cardiovascular risk stratification due to multiple risk factors including smoking, ESRD, family history and dyslipidemia.  4.  Need to update age-appropriate cancer screening patient is overdue.  5.  Nicotine use.  6.  Chronic pain syndrome due to lower back disorder status post fusions will be following with pain clinic starting February 2021.  7.  Formal psychosocial assessment.  8.  Need to screen for brain aneurysms on account of family history and the polycystic kidney disease.  Discussion:  Overall staff is a delightful 65-year-old lady with end-stage kidney disease on chronic dialysis.  She is currently dialyzing via catheter.  She has 1 potential donor.  She appears to be reasonable candidate pending the resolution of the above in the completion of her work-up.  She was highly encouraged to quit smoking and to find alternatives to chronic narcotic for her pain management.  Overall  appears to be a reasonable candidate and her case will be discussed during our multidisciplinary meeting for final candidacy decision.  On behalf of the [facility] transplant center would like to thank  for allowing us to participate in the care of Ms. Tipton      Again, thank you for allowing me to participate in the care of your patient.        Sincerely,        LEONELA

## 2021-01-26 NOTE — LETTER
1/26/2021         RE: Serene Tipton  [address]  [address]  [facility] [address]        Dear Colleague,    Thank you for referring your patient, Serene Tipton, to the [facility]. Please see a copy of my visit note below.    Start Time: 9:10  End Time: 9:40  Originating Location (pt. Location): home     Distant Location (provider location):  [facility]     Platform used for Video Visit: Phone      Transplant Surgery Consult Note     Medical record number: [id number]  YOB: 1955,   Consult requested by Dr. Rhoades for evaluation of kidney transplant candidacy.    Assessment and Recommendations:Ms. Tipton appears to be a good candidate for kidney transplantation and has a good understanding of the risks and benefits of this approach to the management of renal failure. The following issues should be addressed prior to finalizing her transplant candidacy:     Ms. Tipton has Chronic renal failure due to polycystic kidney disease (PKD) whose condition is not expected to resolve, is expected to progress, and is expected to continue to develop related comorbid conditions.  Cardiology consult for cardiac risk stratification to be ordered: Yes  Dietician consult ordered: Yes  Social work consult ordered: Yes  Transplant listing labs ordered to include HLA, ABOx2, Cr, etc.  Obtain past medical records  Up-to-date cancer screening    This was a phone consult for evaluation.  .  Prior to acceptance for transplantation, should be seen in clinic to specifically address: size of native kidney    The majority of our visit was spent in counselling, discussing the medical and surgical risks of kidney transplantation. We talked about the pros and cons of transplantation vs. dialysis.  We discussed the fact that it was important to think about the pros and cons of each treatment option and make an active decision.  We also discussed the fact that the  "two were interconnected and that if the transplant failed, it is possible that dialysis might be necessary before another transplant.       We also discussed the fact that if choosing to have a transplant, a second important decision was a living versus a  donor transplant.  We talked about the pros and cons of each option - the advantage of a  donor transplant being not asking someone to go through the living donor operation, the disadvantage, 5-6 years waiting; the advantage of a living donor transplant, much shorter time to transplant and significantly better long-term outcomes, the disadvantage being the risk to the donor.  Although I didn't express an opinion regarding transplantation or dialysis, I suggested that if opting for a transplant, we would strongly recommend a living donor transplant.       She stated she has a friend interested in donation but was concerned about lost income.  We spent some time discussing the current situation re: financial disincentives.      also discussed the new ( donor) kidney (KDPI) scoring system. We discussed the advantages and disadvantages of accepting an \"expanded criteria\" donor kidney, and the latest data as to who potentially benefits - those >45 and/or having diabetes a cause for ESKD - by receiving an expanded criteria donor kidney versus waiting longer for a standard criteria donor. I recommended she say \"yes\" to a high KDPI kidney.       I attempted to answer any remaining questions.  I also told her that should she have subsequent questions, she should feel free to contact us.  We would be glad to answer any questions either over the phone or at another clinic visit.  His transplant coordinator is Katherine Hernandez and may be reached at [phone].  Thank you for the opportunity to see him.     I spent 30 minutes with this patient.  Over 90% of that time was spent in counseling and coordination of care.      Thank you for the opportunity to " participate in the care of this patient.                                                                                                          Yours truly,                                                                                                       Vinay Leyva MD                                                                                                    Professor of Surgery                                                                                                    ([phone])      Total time: 30 minutes          Vinay Leyva MD  Surgical Director, Kidney Transplantation                                                                                                      ---------------------------------------------------------------------------------------------------    Past medical history includes:  Hypertension  Osteoporosis  GERD  Depression  Chronic back pain (S/P low back surgeries)  L breast lumpectomy in 2004  Rotator cuff surgery  Smoking: off and on  Blood tf: no  Alcohol: no  Recreational drugs: no       Past Medical History:   Diagnosis Date     Anemia in chronic kidney disease      Chronic low back pain      CKD (chronic kidney disease)      Depressive disorder 2013     End stage renal disease (H)      GERD (gastroesophageal reflux disease)      Hyperlipidemia      Hypertension 1990's     Osteoporosis      PKD (polycystic kidney disease) 1990's     PTSD (post-traumatic stress disorder)      Tobacco abuse      Vitamin D deficiency      Past Surgical History:   Procedure Laterality Date     BACK SURGERY      spinal fusion w/hardware     BIOPSY Left 1990's    Breast     BREAST LUMPECTOMY, RT/LT Left     Lumpectomy     BREAST SURGERY Left     Biopsy     CYSTECTOMY PILONIDAL  1981     EYE SURGERY  2008    lasik     ORTHOPEDIC SURGERY Right 2005    Shoulder     Family History   Problem Relation Age of Onset     Polycystic Kidney Diease Mother      Polycystic Kidney Diease  Sister      Cardiac Sudden Death Sister 52     Polycystic Kidney Diease Maternal Grandmother      Heart Disease Maternal Grandfather      Social History     Socioeconomic History     Marital status: Single     Spouse name: Not on file     Number of children: Not on file     Years of education: Not on file     Highest education level: Not on file   Occupational History     Not on file   Social Needs     Financial resource strain: Not on file     Food insecurity     Worry: Not on file     Inability: Not on file     Transportation needs     Medical: Not on file     Non-medical: Not on file   Tobacco Use     Smoking status: Current Every Day Smoker     Smokeless tobacco: Never Used   Substance and Sexual Activity     Alcohol use: Not Currently     Drug use: Never     Sexual activity: Not on file   Lifestyle     Physical activity     Days per week: Not on file     Minutes per session: Not on file     Stress: Not on file   Relationships     Social connections     Talks on phone: Not on file     Gets together: Not on file     Attends [employer] service: Not on file     Active member of club or organization: Not on file     Attends meetings of clubs or organizations: Not on file     Relationship status: Not on file     Intimate partner violence     Fear of current or ex partner: Not on file     Emotionally abused: Not on file     Physically abused: Not on file     Forced sexual activity: Not on file   Other Topics Concern     Parent/sibling w/ CABG, MI or angioplasty before 65F 55M? Not Asked   Social History Narrative     Not on file       ROS:   CONSTITUTIONAL:  No fevers or chills  EYES: negative for icterus  ENT:  negative for hearing loss, tinnitus and sore throat  RESPIRATORY:  negative for cough, sputum, dyspnea  CARDIOVASCULAR:  negative for chest pain  GASTROINTESTINAL:  negative for nausea, vomiting, diarrhea or constipation  GENITOURINARY:  negative for incontinence, dysuria, bladder emptying problems  HEME:  No  easy bruising  INTEGUMENT:  negative for rash and pruritus  NEURO:  Negative for headache, seizure disorder  Allergies:   Allergies   Allergen Reactions     Penicillins Other (See Comments) and Shortness Of Breath     Breathing problem.  breathing       Carvedilol GI Disturbance     Nausea and vomiting     Morphine Other (See Comments)     Vomiting     No Clinical Screening - See Comments      PN: LW CM1: Contrast Intercapsular - Nonionic Reaction :     Nsaids Other (See Comments)     Sulfa Drugs Itching     Medications:  Prescription Medications as of 2021       Rx Number Disp Refills Start End Last Dispensed Date Next Fill Date Owning Pharmacy    amitriptyline (ELAVIL) 100 MG tablet            Si tab(s)    Class: Historical    amitriptyline (ELAVIL) 100 MG tablet            Class: Historical    amLODIPine (NORVASC) 5 MG tablet        [facility]/pharmacy #[id number] - [place], [place] - [address]    Sig: Take 5 mg by mouth daily    Class: Historical    Route: Oral    calcium carbonate (TUMS) 500 MG chewable tablet        [facility]/pharmacy #[id number] - [place], [place] - [address]    Sig: Take 2 chew tab by mouth 2 times daily 1000MG WITH AND 500MG WITH SNACK    Class: Historical    Route: Oral    cholecalciferol (VITAMIN D3) 25 mcg (1000 units) capsule        [facility]/pharmacy #[id number] - [place], [place] - [address]    Sig: Take 1 capsule by mouth daily    Class: Historical    Route: Oral    cloNIDine (CATAPRES-TTS2) 0.2 MG/24HR WK patch            Si tab(s)    Class: Historical    HYDROcodone-acetaminophen (NORCO) 5-325 MG tablet    2020        Si tab(s)    Class: Historical    Earliest Fill Date: 2020    Krill Oil 1000 MG CAPS            Si    Class: Historical    labetalol (NORMODYNE) 200 MG tablet    2020    [facility]/pharmacy #[id number] - [place], [place] - [address]    Sig: Take 300 mg by mouth    Class: Historical    Route: Oral    lisinopril (ZESTRIL) 40 MG tablet             Si tab(s)    Class: Historical    LORazepam (ATIVAN) 1 MG tablet    2020    [id number] - [place], [place] - [address]    Sig: TAKE 0.5 1 TABLET BY MOUTH TWICE DAILY AS NEEDED. MUST LAST 30 DAYS    Class: Historical    MELATONIN PO        [id number] - [place], [place] - [address]    Si MG @@ H.S    Class: Historical    Route: Oral    OSCO HIGH POTENCY B COMPLEX/C OR            Sig: Take 1 tablet by mouth    Class: Historical    Route: Oral    rosuvastatin (CRESTOR) 10 MG tablet            Class: Historical    rosuvastatin (EZALLOR) 10 MG sprinkle capsule            Si tab(s)    Class: Historical    sertraline (ZOLOFT) 100 MG tablet            Sig: Take 200 mg by mouth    Class: Historical    Route: Oral    vitamin E 400 units TABS            Sig: Take 800 mg by mouth    Class: Historical    Route: Oral    zolpidem (AMBIEN) 5 MG tablet    2020        Sig: Take 5 mg by mouth    Class: Historical    Route: Oral        Exam:   Constitutional - A&O in NAD.   Eyes - no redness or discharge.  Sclera anicteric  Respiratory - no cough, no labored breathing  Musculoskeletal - range of motion normal  Skin - no discoloration, no jaundice  Neurological - no tremors.  No facial droop or dysarthria  Psychiatric - normal mood and affect  The rest of a comprehensive physical examination is deferred due to PHE (public health emergency) video visit restrictions     Diagnostics:   No results found for this or any previous visit (from the past 672 hour(s)).  No results found for: CPRA      Again, thank you for allowing me to participate in the care of your patient.        Sincerely,    LEONELA

## 2021-01-26 NOTE — PROGRESS NOTES
Patient was seen by myself, Dr. Sung Posada, in conjunction with Wang Escalera NP as part of a shared visit.    I personally reviewed past medical and surgical history, vital signs, medications and labs.  Present and past medical history, along with significant physical exam findings were all reviewed with RAS.    My key findings:  Serene Tipton is 65 year old, who presents for Kidney transplant evaluation.    Key management decisions made by me and discussed with RAS:  1.  End-stage kidney disease secondary to polycystic kidney disease.  2.  Transplant candidacy evaluation.  3.  Need for cardiovascular risk stratification due to multiple risk factors including smoking, ESRD, family history and dyslipidemia.  4.  Need to update age-appropriate cancer screening patient is overdue.  5.  Nicotine use.  6.  Chronic pain syndrome due to lower back disorder status post fusions will be following with pain clinic starting February 2021.  7.  Formal psychosocial assessment.  8.  Need to screen for brain aneurysms on account of family history and the polycystic kidney disease.  Discussion:  Overall staff is a delightful 65-year-old lady with end-stage kidney disease on chronic dialysis.  She is currently dialyzing via catheter.  She has 1 potential donor.  She appears to be reasonable candidate pending the resolution of the above in the completion of her work-up.  She was highly encouraged to quit smoking and to find alternatives to chronic narcotic for her pain management.  Overall staff appears to be a reasonable candidate and her case will be discussed during our multidisciplinary meeting for final candidacy decision.  On behalf of the [facility] transplant center would like to thank  for allowing us to participate in the care of Ms. Tipton

## 2021-01-26 NOTE — PROGRESS NOTES
"Alvarez is a 65 year old who is being evaluated via a billable video visit.      How would you like to obtain your AVS? MyChart  If the video visit is dropped, the invitation should be resent by: Text to cell phone: [phone]  Will anyone else be joining your video visit? No  {If patient encounters technical issues they should call [phone] :[id number]}    Video Start Time: {video visit start/end time for provider to select:152948}  Video-Visit Details    Type of service:  Video Visit    Video End Time:{video visit start/end time for provider to select:152948}    Originating Location (pt. Location): {video visit patient location:967384::\"Home\"}    Distant Location (provider location):  [facility]     Platform used for Video Visit: {Virtual Visit Platforms:459789::\"MaxxAthlete\"}    "

## 2021-02-01 NOTE — PROGRESS NOTES
Start Time: 9:10  End Time: 9:40  Originating Location (pt. Location): home     Distant Location (provider location):  [facility]     Platform used for Video Visit: Phone      Transplant Surgery Consult Note     Medical record number: [id number]  YOB: 1955,   Consult requested by Dr. Rhoades for evaluation of kidney transplant candidacy.    Assessment and Recommendations:Ms. Tipton appears to be a good candidate for kidney transplantation and has a good understanding of the risks and benefits of this approach to the management of renal failure. The following issues should be addressed prior to finalizing her transplant candidacy:     Ms. Tipton has Chronic renal failure due to polycystic kidney disease (PKD) whose condition is not expected to resolve, is expected to progress, and is expected to continue to develop related comorbid conditions.  Cardiology consult for cardiac risk stratification to be ordered: Yes  Dietician consult ordered: Yes  Social work consult ordered: Yes  Transplant listing labs ordered to include HLA, ABOx2, Cr, etc.  Obtain past medical records  Up-to-date cancer screening    This was a phone consult for evaluation.  .  Prior to acceptance for transplantation, should be seen in clinic to specifically address: size of native kidney    The majority of our visit was spent in counselling, discussing the medical and surgical risks of kidney transplantation. We talked about the pros and cons of transplantation vs. dialysis.  We discussed the fact that it was important to think about the pros and cons of each treatment option and make an active decision.  We also discussed the fact that the two were interconnected and that if the transplant failed, it is possible that dialysis might be necessary before another transplant.       We also discussed the fact that if choosing to have a transplant, a second important decision was a living versus a  donor  "transplant.  We talked about the pros and cons of each option - the advantage of a  donor transplant being not asking someone to go through the living donor operation, the disadvantage, 5-6 years waiting; the advantage of a living donor transplant, much shorter time to transplant and significantly better long-term outcomes, the disadvantage being the risk to the donor.  Although I didn't express an opinion regarding transplantation or dialysis, I suggested that if opting for a transplant, we would strongly recommend a living donor transplant.       She stated she has a friend interested in donation but was concerned about lost income.  We spent some time discussing the current situation re: financial disincentives.      also discussed the new ( donor) kidney (KDPI) scoring system. We discussed the advantages and disadvantages of accepting an \"expanded criteria\" donor kidney, and the latest data as to who potentially benefits - those >45 and/or having diabetes a cause for ESKD - by receiving an expanded criteria donor kidney versus waiting longer for a standard criteria donor. I recommended she say \"yes\" to a high KDPI kidney.       I attempted to answer any remaining questions.  I also told her that should she have subsequent questions, she should feel free to contact us.  We would be glad to answer any questions either over the phone or at another clinic visit.  His transplant coordinator is Katherine Hernandez and may be reached at [phone].  Thank you for the opportunity to see him.     I spent 30 minutes with this patient.  Over 90% of that time was spent in counseling and coordination of care.      Thank you for the opportunity to participate in the care of this patient.                                                                                                          Yours truly,                                                                                                       Vinay CABALLERO" MD [place]                                                                                                    Professor of Surgery                                                                                                    ([phone])      Total time: 30 minutes          Vinay Leyva MD  Surgical Director, Kidney Transplantation                                                                                                      ---------------------------------------------------------------------------------------------------    Past medical history includes:  Hypertension  Osteoporosis  GERD  Depression  Chronic back pain (S/P low back surgeries)  L breast lumpectomy in 2004  Rotator cuff surgery  Smoking: off and on  Blood tf: no  Alcohol: no  Recreational drugs: no       Past Medical History:   Diagnosis Date     Anemia in chronic kidney disease      Chronic low back pain      CKD (chronic kidney disease)      Depressive disorder 2013     End stage renal disease (H)      GERD (gastroesophageal reflux disease)      Hyperlipidemia      Hypertension 1990's     Osteoporosis      PKD (polycystic kidney disease) 1990's     PTSD (post-traumatic stress disorder)      Tobacco abuse      Vitamin D deficiency      Past Surgical History:   Procedure Laterality Date     BACK SURGERY      spinal fusion w/hardware     BIOPSY Left 1990's    Breast     BREAST LUMPECTOMY, RT/LT Left     Lumpectomy     BREAST SURGERY Left     Biopsy     CYSTECTOMY PILONIDAL  1981     EYE SURGERY  2008    lasik     ORTHOPEDIC SURGERY Right 2005    Shoulder     Family History   Problem Relation Age of Onset     Polycystic Kidney Diease Mother      Polycystic Kidney Diease Sister      Cardiac Sudden Death Sister 52     Polycystic Kidney Diease Maternal Grandmother      Heart Disease Maternal Grandfather      Social History     Socioeconomic History     Marital status: Single     Spouse name: Not on file     Number of children: Not on file      Years of education: Not on file     Highest education level: Not on file   Occupational History     Not on file   Social Needs     Financial resource strain: Not on file     Food insecurity     Worry: Not on file     Inability: Not on file     Transportation needs     Medical: Not on file     Non-medical: Not on file   Tobacco Use     Smoking status: Current Every Day Smoker     Smokeless tobacco: Never Used   Substance and Sexual Activity     Alcohol use: Not Currently     Drug use: Never     Sexual activity: Not on file   Lifestyle     Physical activity     Days per week: Not on file     Minutes per session: Not on file     Stress: Not on file   Relationships     Social connections     Talks on phone: Not on file     Gets together: Not on file     Attends Nondenominational service: Not on file     Active member of club or organization: Not on file     Attends meetings of clubs or organizations: Not on file     Relationship status: Not on file     Intimate partner violence     Fear of current or ex partner: Not on file     Emotionally abused: Not on file     Physically abused: Not on file     Forced sexual activity: Not on file   Other Topics Concern     Parent/sibling w/ CABG, MI or angioplasty before 65F 55M? Not Asked   Social History Narrative     Not on file       ROS:   CONSTITUTIONAL:  No fevers or chills  EYES: negative for icterus  ENT:  negative for hearing loss, tinnitus and sore throat  RESPIRATORY:  negative for cough, sputum, dyspnea  CARDIOVASCULAR:  negative for chest pain  GASTROINTESTINAL:  negative for nausea, vomiting, diarrhea or constipation  GENITOURINARY:  negative for incontinence, dysuria, bladder emptying problems  HEME:  No easy bruising  INTEGUMENT:  negative for rash and pruritus  NEURO:  Negative for headache, seizure disorder  Allergies:   Allergies   Allergen Reactions     Penicillins Other (See Comments) and Shortness Of Breath     Breathing problem.  breathing       Carvedilol GI  Disturbance     Nausea and vomiting     Morphine Other (See Comments)     Vomiting     No Clinical Screening - See Comments      PN: LW CM1: Contrast Intercapsular - Nonionic Reaction :     Nsaids Other (See Comments)     Sulfa Drugs Itching     Medications:  Prescription Medications as of 2021       Rx Number Disp Refills Start End Last Dispensed Date Next Fill Date Owning Pharmacy    amitriptyline (ELAVIL) 100 MG tablet            Si tab(s)    Class: Historical    amitriptyline (ELAVIL) 100 MG tablet            Class: Historical    amLODIPine (NORVASC) 5 MG tablet        [id number] - [place], [place] - [address]    Sig: Take 5 mg by mouth daily    Class: Historical    Route: Oral    calcium carbonate (TUMS) 500 MG chewable tablet        [id number] - [place], [place] - [address]    Sig: Take 2 chew tab by mouth 2 times daily 1000MG WITH AND 500MG WITH SNACK    Class: Historical    Route: Oral    cholecalciferol (VITAMIN D3) 25 mcg (1000 units) capsule        [id number] - [place], [place] - [address]    Sig: Take 1 capsule by mouth daily    Class: Historical    Route: Oral    cloNIDine (CATAPRES-TTS2) 0.2 MG/24HR WK patch            Si tab(s)    Class: Historical    HYDROcodone-acetaminophen (NORCO) 5-325 MG tablet    2020        Si tab(s)    Class: Historical    Earliest Fill Date: 2020    Krill Oil 1000 MG CAPS            Si    Class: Historical    labetalol (NORMODYNE) 200 MG tablet    2020    [id number] - [place], [place] - [address]    Sig: Take 300 mg by mouth    Class: Historical    Route: Oral    lisinopril (ZESTRIL) 40 MG tablet            Si tab(s)    Class: Historical    LORazepam (ATIVAN) 1 MG tablet    2020    [id number] - [place], [place] - [address]    Sig: TAKE 0.5 1 TABLET BY MOUTH TWICE DAILY AS NEEDED. MUST LAST 30 DAYS    Class: Historical    MELATONIN  PO        [id number] - [place], [place] - [address]    Si MG @@ H.S    Class: Historical    Route: Oral    OSCO HIGH POTENCY B COMPLEX/C OR            Sig: Take 1 tablet by mouth    Class: Historical    Route: Oral    rosuvastatin (CRESTOR) 10 MG tablet            Class: Historical    rosuvastatin (EZALLOR) 10 MG sprinkle capsule            Si tab(s)    Class: Historical    sertraline (ZOLOFT) 100 MG tablet            Sig: Take 200 mg by mouth    Class: Historical    Route: Oral    vitamin E 400 units TABS            Sig: Take 800 mg by mouth    Class: Historical    Route: Oral    zolpidem (AMBIEN) 5 MG tablet    2020        Sig: Take 5 mg by mouth    Class: Historical    Route: Oral        Exam:   Constitutional - A&O in NAD.   Eyes - no redness or discharge.  Sclera anicteric  Respiratory - no cough, no labored breathing  Musculoskeletal - range of motion normal  Skin - no discoloration, no jaundice  Neurological - no tremors.  No facial droop or dysarthria  Psychiatric - normal mood and affect  The rest of a comprehensive physical examination is deferred due to PHE (public health emergency) video visit restrictions     Diagnostics:   No results found for this or any previous visit (from the past 672 hour(s)).  No results found for: CPRA

## 2021-02-03 ENCOUNTER — COMMITTEE REVIEW (OUTPATIENT)
Dept: TRANSPLANT | Facility: CLINIC | Age: 66
End: 2021-02-03

## 2021-02-03 NOTE — COMMITTEE REVIEW
Abdominal Committee Review Note     Evaluation Date: 1/26/2021  Committee Review Date: 2/3/2021    Organ being evaluated for: Kidney    Transplant Phase: Evaluation  Transplant Status: Active    Transplant Coordinator: Kavita Hernandez  Transplant Surgeon:       Referring Physician:     Primary Diagnosis: Polycystic Kidneys  Secondary Diagnosis:     Committee Review Members:  Nephrology Jaskaran Hein MD, Wang Escalera, APRN CNP, Sung Posada MD   Nutrition Poppy Charles, RD   Pharmacy Laxmi De La Fuente, [facility]    - Clinical Blessing Venegas, [facility], Chen Youngblood, [facility]   Transplant Charity Casanova PA-C, Va Mclean, RN, Deshawn Weldon MD, Kalpana Wayne, RN, Nia Celaya, RN, Elly Camejo, RN, Enma Shaikh, RN, Kavita Hernandez, RN       Transplant Eligibility: Irreversible chronic kidney disease treated w/dialysis or expected need for dialysis    Committee Review Decision: Approved    Relative Contraindications: None    Absolute Contraindications: None    Committee Chair Deshawn Weldon MD verbally attested to the committee's decision.    Committee Discussion Details: Reviewed medical records and evaluation results to date. She is approved as a kidney transplant candidate pending the successful completion of her evaluation. She is on dialysis so will not be listed until ready for active status. She will need the following: cardiology, establish care with a pain clinic, MRI/MRA brain, CT a/p, strongly encouraged to stop smoking, PFT's low dose chest CT with PCP for cancer surveillance and see a surgeon in person for abdominal assessment. Needs cxr, ekg, echo and labs. She will also need PAP, mammogram, dental and colonoscopy.Patient will be called and transplant summary letter will be sent.

## 2021-02-03 NOTE — LETTER
February 3, 2021    Serene M Danuta  [address]  [address]  [facility] [address]      Dear Alvarez,    It was a pleasure to start working with you toward the goal of a kidney transplant. Your pre transplant evaluation began as a virtual visit on January 27, 2021 due to the pandemic. Your evaluation results were discussed at the Multidisciplinary Selection Committee on February 3, 2021. You are approved as a kidney transplant candidate pending the successful completion of your evaluation. We are asking for the following items:    1. We are asking you to see cardiology to make sure your heart is healthy enough to undergo the stress of a transplant. Our  will call you with this appointment.  2. Please establish care with a pain clinic. Let me know where you will be seen.  3. We are asking you to have a brain MRI/MRA to screen for aneurysms. This has been ordered by our providers so the  will call you with this appointment.  4. You will need an abdominal/pelvic CT scan to look at the size of your kidneys. Our  will call you with this appointment.  5. You are strongly encouraged to stop smoking for your overall health. Your primary care provider is a good resource to help stop smoking.  6. You will need PFT's (pulmonary function tests). Our  will call you with this appointment.  7. You will need to see a surgeon in clinic so your abdomen can be examined. Our  will call you with this appointment.  8. You will need a low dose chest CT for lung cancer surveillance. Your primary care provider can order this for you. Call me when complete so I can obtain the records.  9. You will need the following health maintenance items. They can be scheduled through your primary care provider. Call me when complete so I can obtain the records          A. PAP       B. Mammogram       C. Colonoscopy  10. We will need to finish your evaluation items of a chest xray, ekg, echocardiogram and  labs. Our  will call you with these appointments.  11. Please make an appointment with your dentist and have any recommended work done. Call me when complete.    Once the above has been successfully completed we will place you on the ACTIVE list meaning you will be eligible to receive organ offers. You wait time will start with the start date of your dialysis so you will not be disadvantaged. You will be notified by our office when you are placed on ACTIVE status.    Please have any potential live donors register now online with our program to initiate their evaluations at [email or web address] or call [phone]. You will be notified by our office in the event of an approved live donor.    If you have questions please call, e-mail or My Chart message me.             Sincerely,       Katherine Hernandez RN, BSN Transplant Coordinator  [facility] [address]  [address] [place] [id number]  Phone [phone] Fax [phone]  [email or web address]    CC:[facility]

## 2021-02-04 NOTE — TELEPHONE ENCOUNTER
Called Alvarez to discuss the outcome of the Selection Committee from yesterday 2/3/2021. She has been approved as a kidney transplant candidate and will be listed once she finishes her evaluation. She is on dialysis.She will need the following items: cardiology, establish care with a pain clinic, MRI/MRA brain, CT a/p, strongly encouraged to stop smoking, PFT's, low dose chest CT, see surgeon in clinic for abdominal exam, PAP, Mammogram, dental and colonoscopy. CXR,EKG,ECHO and listing labs will need to be done too. Transplant summary letter will be sent.

## 2021-02-16 ENCOUNTER — HOSPITAL ENCOUNTER (OUTPATIENT)
Dept: PALLIATIVE MEDICINE | Facility: OTHER | Age: 66
Discharge: HOME | End: 2021-02-16
Attending: NURSE PRACTITIONER

## 2021-02-16 DIAGNOSIS — M54.16 LUMBAR RADICULOPATHY: ICD-10-CM

## 2021-02-16 DIAGNOSIS — M54.6 MIDLINE THORACIC BACK PAIN, UNSPECIFIED CHRONICITY: ICD-10-CM

## 2021-02-16 DIAGNOSIS — G89.4 CHRONIC PAIN SYNDROME: ICD-10-CM

## 2021-02-21 LAB
6MAM UR QL: NOT DETECTED
7AMINOCLONAZEPAM UR QL: NOT DETECTED
A-OH ALPRAZ UR QL: NOT DETECTED
ALPHA-OH-MIDAZOLAM (CUTOFF 20 NG/ML) - HISTORICAL: NOT DETECTED
ALPRAZ UR QL: NOT DETECTED
AMPHET UR QL SCN: NOT DETECTED
BARBITURATES UR QL: NOT DETECTED
BUPRENORPHINE UR QL: NOT DETECTED
BZE UR QL: NOT DETECTED
CARBOXYTHC UR QL: NOT DETECTED
CARISOPRODOL UR QL: NOT DETECTED
CLONAZEPAM UR QL: NOT DETECTED
CODEINE UR QL: NOT DETECTED
CREAT UR-MCNC: 25.4 MG/DL (ref 20–400)
DIAZEPAM UR QL: NOT DETECTED
ETHYL GLUCURONIDE UR QL: NOT DETECTED
FENTANYL UR QL: NOT DETECTED
GABAPENTIN (CUTOFF 100 NG/ML) - HISTORICAL: NOT DETECTED
HYDROCODONE UR QL: PRESENT
HYDROMORPHONE UR QL: PRESENT
LORAZEPAM UR QL: NOT DETECTED
MDA UR QL: NOT DETECTED
MDEA UR QL: NOT DETECTED
MDMA UR QL: NOT DETECTED
ME-PHENIDATE UR QL: NOT DETECTED
MEPERIDINE UR QL: NOT DETECTED
METHADONE UR QL: NOT DETECTED
METHAMPHET UR QL: NOT DETECTED
MIDAZOLAM UR QL SCN: NOT DETECTED
MORPHINE UR QL: NOT DETECTED
NALOXONE (CUTOFF 100 NG/ML) - HISTORICAL: NOT DETECTED
NORBUPRENORPHINE UR QL CFM: NOT DETECTED
NORDIAZEPAM UR QL: NOT DETECTED
NORFENTANYL UR QL: NOT DETECTED
NORHYDROCODONE UR QL CFM: PRESENT
NOROXYCODONE UR QL CFM: NOT DETECTED
NOROXYMORPHONE UR QL SCN: NOT DETECTED
OXAZEPAM UR QL: NOT DETECTED
OXYCODONE UR QL: NOT DETECTED
OXYMORPHONE UR QL: NOT DETECTED
PATHOLOGY STUDY: NORMAL
PCP UR QL: NOT DETECTED
PHENTERMINE UR QL: NOT DETECTED
PREGABALIN (CUTOFF 100 NG/ML) - HISTORICAL: NOT DETECTED
SERVICE CMNT-IMP: NORMAL
TAPENTADOL UR QL SCN: NOT DETECTED
TAPENTADOL UR QL SCN: NOT DETECTED
TEMAZEPAM UR QL: NOT DETECTED
TRAMADOL UR QL: NOT DETECTED
ZOLPIDEM METABOLITE (CUTOFF 100 NG/ML) - HISTORICAL: NOT DETECTED
ZOLPIDEM UR QL: NOT DETECTED

## 2021-02-23 ENCOUNTER — TELEPHONE (OUTPATIENT)
Dept: TRANSPLANT | Facility: CLINIC | Age: 66
End: 2021-02-23

## 2021-02-23 NOTE — TELEPHONE ENCOUNTER
Spoke with the patient and confirmed Lab Draw, CXR, EKG, Echocardiogram, Pulmonary Function Tests, Transplant Surgeon, Vance MRI, & CT Abdomen and Pelvis appointments on 3/18/21 and Cardiology appointment on 3/22/21.  Informed patient an itinerary can be accessed on [employer].

## 2021-02-25 ENCOUNTER — HOSPITAL ENCOUNTER (OUTPATIENT)
Dept: PHARMACY | Facility: OTHER | Age: 66
Discharge: HOME | End: 2021-02-25
Attending: PHARMACIST

## 2021-02-25 DIAGNOSIS — N18.4 CKD (CHRONIC KIDNEY DISEASE) STAGE 4, GFR 15-29 ML/MIN (H): ICD-10-CM

## 2021-02-25 DIAGNOSIS — G89.29 OTHER CHRONIC PAIN: ICD-10-CM

## 2021-02-25 DIAGNOSIS — E78.5 HYPERLIPIDEMIA, UNSPECIFIED HYPERLIPIDEMIA TYPE: ICD-10-CM

## 2021-02-25 DIAGNOSIS — I15.0 HYPERTENSION, RENOVASCULAR: ICD-10-CM

## 2021-02-25 DIAGNOSIS — F33.9 RECURRENT MAJOR DEPRESSIVE DISORDER, REMISSION STATUS UNSPECIFIED (H): ICD-10-CM

## 2021-02-25 DIAGNOSIS — Z72.0 TOBACCO ABUSE: ICD-10-CM

## 2021-02-25 PROCEDURE — 99605 MTMS BY PHARM NP 15 MIN: CPT | Performed by: PHARMACIST

## 2021-02-25 PROCEDURE — 99607 MTMS BY PHARM ADDL 15 MIN: CPT | Performed by: PHARMACIST

## 2021-02-26 ENCOUNTER — HOSPITAL ENCOUNTER (OUTPATIENT)
Dept: MRI IMAGING | Facility: CLINIC | Age: 66
Discharge: HOME | End: 2021-02-26

## 2021-02-26 ENCOUNTER — HOSPITAL ENCOUNTER (OUTPATIENT)
Dept: RADIOLOGY | Facility: CLINIC | Age: 66
Discharge: HOME | End: 2021-02-26

## 2021-02-26 DIAGNOSIS — G89.4 CHRONIC PAIN SYNDROME: ICD-10-CM

## 2021-03-03 ENCOUNTER — COMMUNICATION - HEALTHEAST (OUTPATIENT)
Dept: PALLIATIVE MEDICINE | Facility: OTHER | Age: 66
End: 2021-03-03

## 2021-03-03 ENCOUNTER — DOCUMENTATION ONLY (OUTPATIENT)
Dept: CARE COORDINATION | Facility: CLINIC | Age: 66
End: 2021-03-03

## 2021-03-04 ENCOUNTER — HOSPITAL ENCOUNTER (OUTPATIENT)
Dept: PHARMACY | Facility: OTHER | Age: 66
Discharge: HOME | End: 2021-03-04
Attending: PHARMACIST

## 2021-03-04 DIAGNOSIS — G89.29 OTHER CHRONIC PAIN: ICD-10-CM

## 2021-03-05 ENCOUNTER — COMMUNICATION - HEALTHEAST (OUTPATIENT)
Dept: PALLIATIVE MEDICINE | Facility: OTHER | Age: 66
End: 2021-03-05

## 2021-03-09 ENCOUNTER — HOSPITAL ENCOUNTER (OUTPATIENT)
Dept: PALLIATIVE MEDICINE | Facility: OTHER | Age: 66
Discharge: HOME | End: 2021-03-09
Attending: PAIN MEDICINE | Admitting: PAIN MEDICINE

## 2021-03-09 DIAGNOSIS — M79.18 MYOFASCIAL PAIN SYNDROME: ICD-10-CM

## 2021-03-11 ENCOUNTER — HOSPITAL ENCOUNTER (OUTPATIENT)
Dept: PHARMACY | Facility: OTHER | Age: 66
Discharge: HOME | End: 2021-03-11
Attending: PHARMACIST

## 2021-03-11 DIAGNOSIS — E78.5 HYPERLIPIDEMIA, UNSPECIFIED HYPERLIPIDEMIA TYPE: ICD-10-CM

## 2021-03-11 DIAGNOSIS — N18.4 CKD (CHRONIC KIDNEY DISEASE) STAGE 4, GFR 15-29 ML/MIN (H): ICD-10-CM

## 2021-03-11 DIAGNOSIS — I15.0 HYPERTENSION, RENOVASCULAR: ICD-10-CM

## 2021-03-11 DIAGNOSIS — F33.9 RECURRENT MAJOR DEPRESSIVE DISORDER, REMISSION STATUS UNSPECIFIED (H): ICD-10-CM

## 2021-03-11 DIAGNOSIS — G89.29 OTHER CHRONIC PAIN: ICD-10-CM

## 2021-03-11 DIAGNOSIS — Z72.0 TOBACCO ABUSE: ICD-10-CM

## 2021-03-11 PROCEDURE — 99606 MTMS BY PHARM EST 15 MIN: CPT | Performed by: PHARMACIST

## 2021-03-11 PROCEDURE — 99607 MTMS BY PHARM ADDL 15 MIN: CPT | Performed by: PHARMACIST

## 2021-03-18 ENCOUNTER — TELEPHONE (OUTPATIENT)
Dept: TRANSPLANT | Facility: CLINIC | Age: 66
End: 2021-03-18

## 2021-03-18 NOTE — TELEPHONE ENCOUNTER
Orin left me a voice message that she was cancelling her appointments for today because she is feeling ill and has plans to see her PCP. I did return her call and got her voice mail. I asked her to call me when she was feeling better so I can help her get rescheduled.

## 2021-03-19 ENCOUNTER — RECORDS - HEALTHEAST (OUTPATIENT)
Dept: LAB | Facility: CLINIC | Age: 66
End: 2021-03-19

## 2021-03-22 ENCOUNTER — PRE VISIT (OUTPATIENT)
Dept: CARDIOLOGY | Facility: CLINIC | Age: 66
End: 2021-03-22

## 2021-03-22 LAB — BACTERIA SPEC CULT: ABNORMAL

## 2021-03-25 ENCOUNTER — HOSPITAL ENCOUNTER (OUTPATIENT)
Dept: PALLIATIVE MEDICINE | Facility: OTHER | Age: 66
Discharge: HOME | End: 2021-03-25
Attending: NURSE PRACTITIONER

## 2021-03-25 DIAGNOSIS — M12.88 OTHER SPECIFIC ARTHROPATHIES, NOT ELSEWHERE CLASSIFIED, OTHER SPECIFIED SITE: ICD-10-CM

## 2021-03-25 DIAGNOSIS — G89.4 CHRONIC PAIN SYNDROME: ICD-10-CM

## 2021-03-30 ENCOUNTER — TELEPHONE (OUTPATIENT)
Dept: TRANSPLANT | Facility: CLINIC | Age: 66
End: 2021-03-30

## 2021-04-21 DIAGNOSIS — Z76.82 AWAITING ORGAN TRANSPLANT: Primary | ICD-10-CM

## 2021-05-19 ENCOUNTER — ANCILLARY PROCEDURE (OUTPATIENT)
Dept: GENERAL RADIOLOGY | Facility: CLINIC | Age: 66
End: 2021-05-19
Payer: COMMERCIAL

## 2021-05-19 ENCOUNTER — ANCILLARY PROCEDURE (OUTPATIENT)
Dept: MRI IMAGING | Facility: CLINIC | Age: 66
End: 2021-05-19
Attending: NURSE PRACTITIONER
Payer: COMMERCIAL

## 2021-05-19 ENCOUNTER — ANCILLARY PROCEDURE (OUTPATIENT)
Dept: CT IMAGING | Facility: CLINIC | Age: 66
End: 2021-05-19
Attending: NURSE PRACTITIONER
Payer: COMMERCIAL

## 2021-05-19 ENCOUNTER — ANCILLARY PROCEDURE (OUTPATIENT)
Dept: CARDIOLOGY | Facility: CLINIC | Age: 66
End: 2021-05-19
Attending: PHYSICIAN ASSISTANT
Payer: COMMERCIAL

## 2021-05-19 DIAGNOSIS — Q61.3 POLYCYSTIC KIDNEY: ICD-10-CM

## 2021-05-19 DIAGNOSIS — N18.9 CHRONIC RENAL FAILURE: ICD-10-CM

## 2021-05-19 DIAGNOSIS — Z76.82 ORGAN TRANSPLANT CANDIDATE: ICD-10-CM

## 2021-05-19 DIAGNOSIS — D63.1 ANEMIA DUE TO STAGE 5 CHRONIC KIDNEY DISEASE (H): ICD-10-CM

## 2021-05-19 DIAGNOSIS — Z87.891 HISTORY OF TOBACCO USE: ICD-10-CM

## 2021-05-19 DIAGNOSIS — N18.5 CHRONIC KIDNEY DISEASE, STAGE 5, KIDNEY FAILURE (H): ICD-10-CM

## 2021-05-19 DIAGNOSIS — I10 ESSENTIAL HYPERTENSION: ICD-10-CM

## 2021-05-19 DIAGNOSIS — I10 HYPERTENSION: ICD-10-CM

## 2021-05-19 DIAGNOSIS — Z01.818 PRE-TRANSPLANT EVALUATION FOR KIDNEY TRANSPLANT: ICD-10-CM

## 2021-05-19 DIAGNOSIS — M81.0 OSTEOPOROSIS: ICD-10-CM

## 2021-05-19 DIAGNOSIS — Q61.2 POLYCYSTIC KIDNEY, ADULT TYPE: ICD-10-CM

## 2021-05-19 DIAGNOSIS — N18.6 END STAGE KIDNEY DISEASE (H): ICD-10-CM

## 2021-05-19 DIAGNOSIS — E78.5 HYPERLIPIDEMIA: ICD-10-CM

## 2021-05-19 DIAGNOSIS — N18.5 ANEMIA DUE TO STAGE 5 CHRONIC KIDNEY DISEASE (H): ICD-10-CM

## 2021-05-19 DIAGNOSIS — N18.6 END STAGE KIDNEY DISEASE (H): Primary | ICD-10-CM

## 2021-05-19 DIAGNOSIS — Z76.82 AWAITING ORGAN TRANSPLANT: ICD-10-CM

## 2021-05-19 LAB
ABO + RH BLD: NORMAL
ALBUMIN SERPL-MCNC: 4.2 G/DL (ref 3.4–5)
ALBUMIN UR-MCNC: 100 MG/DL
ALP SERPL-CCNC: 122 U/L (ref 40–150)
ALT SERPL W P-5'-P-CCNC: 18 U/L (ref 0–50)
ANION GAP SERPL CALCULATED.3IONS-SCNC: 7 MMOL/L (ref 3–14)
APPEARANCE UR: CLEAR
APTT PPP: 30 SEC (ref 22–37)
AST SERPL W P-5'-P-CCNC: 11 U/L (ref 0–45)
BACTERIA #/AREA URNS HPF: ABNORMAL /HPF
BASOPHILS # BLD AUTO: 0.1 10E9/L (ref 0–0.2)
BASOPHILS NFR BLD AUTO: 0.6 %
BILIRUB SERPL-MCNC: 0.5 MG/DL (ref 0.2–1.3)
BILIRUB UR QL STRIP: ABNORMAL
BLD GP AB SCN SERPL QL: NORMAL
BLOOD BANK CMNT PATIENT-IMP: NORMAL
BUN SERPL-MCNC: 27 MG/DL (ref 7–30)
CALCIUM SERPL-MCNC: 9.5 MG/DL (ref 8.5–10.1)
CHLORIDE SERPL-SCNC: 98 MMOL/L (ref 94–109)
CO2 SERPL-SCNC: 31 MMOL/L (ref 20–32)
COLOR UR AUTO: YELLOW
CREAT SERPL-MCNC: 3.43 MG/DL (ref 0.52–1.04)
DIFFERENTIAL METHOD BLD: ABNORMAL
EOSINOPHIL # BLD AUTO: 0.1 10E9/L (ref 0–0.7)
EOSINOPHIL NFR BLD AUTO: 1.1 %
ERYTHROCYTE [DISTWIDTH] IN BLOOD BY AUTOMATED COUNT: 14.7 % (ref 10–15)
GFR SERPL CREATININE-BSD FRML MDRD: 13 ML/MIN/{1.73_M2}
GLUCOSE SERPL-MCNC: 69 MG/DL (ref 70–99)
GLUCOSE UR STRIP-MCNC: NEGATIVE MG/DL
HCT VFR BLD AUTO: 39.6 % (ref 35–47)
HCV AB SERPL QL IA: NONREACTIVE
HGB BLD-MCNC: 13 G/DL (ref 11.7–15.7)
HGB UR QL STRIP: NEGATIVE
IMM GRANULOCYTES # BLD: 0.1 10E9/L (ref 0–0.4)
IMM GRANULOCYTES NFR BLD: 0.7 %
INR PPP: 1.02 (ref 0.86–1.14)
KETONES UR STRIP-MCNC: NEGATIVE MG/DL
LEUKOCYTE ESTERASE UR QL STRIP: ABNORMAL
LYMPHOCYTES # BLD AUTO: 1.5 10E9/L (ref 0.8–5.3)
LYMPHOCYTES NFR BLD AUTO: 14.7 %
MCH RBC QN AUTO: 33.2 PG (ref 26.5–33)
MCHC RBC AUTO-ENTMCNC: 32.8 G/DL (ref 31.5–36.5)
MCV RBC AUTO: 101 FL (ref 78–100)
MONOCYTES # BLD AUTO: 1.1 10E9/L (ref 0–1.3)
MONOCYTES NFR BLD AUTO: 10.3 %
MUCOUS THREADS #/AREA URNS LPF: PRESENT /LPF
NEUTROPHILS # BLD AUTO: 7.6 10E9/L (ref 1.6–8.3)
NEUTROPHILS NFR BLD AUTO: 72.6 %
NITRATE UR QL: NEGATIVE
NRBC # BLD AUTO: 0 10*3/UL
NRBC BLD AUTO-RTO: 0 /100
PH UR STRIP: 6.5 PH (ref 5–7)
PHOSPHATE SERPL-MCNC: 2.8 MG/DL (ref 2.5–4.5)
PLATELET # BLD AUTO: 365 10E9/L (ref 150–450)
POTASSIUM SERPL-SCNC: 3.9 MMOL/L (ref 3.4–5.3)
PROT SERPL-MCNC: 8.3 G/DL (ref 6.8–8.8)
RBC # BLD AUTO: 3.91 10E12/L (ref 3.8–5.2)
RBC #/AREA URNS AUTO: 2 /HPF (ref 0–2)
SODIUM SERPL-SCNC: 136 MMOL/L (ref 133–144)
SOURCE: ABNORMAL
SP GR UR STRIP: 1.02 (ref 1–1.03)
SPECIMEN EXP DATE BLD: NORMAL
SPECIMEN EXP DATE BLD: NORMAL
SQUAMOUS #/AREA URNS AUTO: 9 /HPF (ref 0–1)
T PALLIDUM AB SER QL: NONREACTIVE
UROBILINOGEN UR STRIP-MCNC: 0.2 MG/DL (ref 0–2)
WBC # BLD AUTO: 10.4 10E9/L (ref 4–11)
WBC #/AREA URNS AUTO: 6 /HPF (ref 0–5)

## 2021-05-19 PROCEDURE — 84100 ASSAY OF PHOSPHORUS: CPT | Performed by: PATHOLOGY

## 2021-05-19 PROCEDURE — 85670 THROMBIN TIME PLASMA: CPT | Mod: 90 | Performed by: PATHOLOGY

## 2021-05-19 PROCEDURE — 86481 TB AG RESPONSE T-CELL SUSP: CPT | Mod: 90 | Performed by: PATHOLOGY

## 2021-05-19 PROCEDURE — 71046 X-RAY EXAM CHEST 2 VIEWS: CPT | Mod: GC | Performed by: RADIOLOGY

## 2021-05-19 PROCEDURE — 94375 RESPIRATORY FLOW VOLUME LOOP: CPT | Performed by: INTERNAL MEDICINE

## 2021-05-19 PROCEDURE — 85610 PROTHROMBIN TIME: CPT | Performed by: PATHOLOGY

## 2021-05-19 PROCEDURE — 86900 BLOOD TYPING SEROLOGIC ABO: CPT | Performed by: PATHOLOGY

## 2021-05-19 PROCEDURE — 86704 HEP B CORE ANTIBODY TOTAL: CPT | Mod: 90 | Performed by: PATHOLOGY

## 2021-05-19 PROCEDURE — 85613 RUSSELL VIPER VENOM DILUTED: CPT | Mod: 90 | Performed by: PATHOLOGY

## 2021-05-19 PROCEDURE — 87340 HEPATITIS B SURFACE AG IA: CPT | Mod: 90 | Performed by: PATHOLOGY

## 2021-05-19 PROCEDURE — 36415 COLL VENOUS BLD VENIPUNCTURE: CPT | Performed by: PATHOLOGY

## 2021-05-19 PROCEDURE — 86905 BLOOD TYPING RBC ANTIGENS: CPT | Mod: 90 | Performed by: PATHOLOGY

## 2021-05-19 PROCEDURE — 86886 COOMBS TEST INDIRECT TITER: CPT | Mod: 90 | Performed by: PATHOLOGY

## 2021-05-19 PROCEDURE — 85390 FIBRINOLYSINS SCREEN I&R: CPT | Mod: 90 | Performed by: PATHOLOGY

## 2021-05-19 PROCEDURE — 86644 CMV ANTIBODY: CPT | Mod: 90 | Performed by: PATHOLOGY

## 2021-05-19 PROCEDURE — 74176 CT ABD & PELVIS W/O CONTRAST: CPT | Mod: GC | Performed by: RADIOLOGY

## 2021-05-19 PROCEDURE — 94726 PLETHYSMOGRAPHY LUNG VOLUMES: CPT | Performed by: INTERNAL MEDICINE

## 2021-05-19 PROCEDURE — 86147 CARDIOLIPIN ANTIBODY EA IG: CPT | Mod: 90 | Performed by: PATHOLOGY

## 2021-05-19 PROCEDURE — 86901 BLOOD TYPING SEROLOGIC RH(D): CPT | Performed by: PATHOLOGY

## 2021-05-19 PROCEDURE — 85025 COMPLETE CBC W/AUTO DIFF WBC: CPT | Performed by: PATHOLOGY

## 2021-05-19 PROCEDURE — 94729 DIFFUSING CAPACITY: CPT | Performed by: INTERNAL MEDICINE

## 2021-05-19 PROCEDURE — 70544 MR ANGIOGRAPHY HEAD W/O DYE: CPT | Performed by: STUDENT IN AN ORGANIZED HEALTH CARE EDUCATION/TRAINING PROGRAM

## 2021-05-19 PROCEDURE — 86850 RBC ANTIBODY SCREEN: CPT | Performed by: PATHOLOGY

## 2021-05-19 PROCEDURE — 81001 URINALYSIS AUTO W/SCOPE: CPT | Performed by: PATHOLOGY

## 2021-05-19 PROCEDURE — 85730 THROMBOPLASTIN TIME PARTIAL: CPT | Mod: 90 | Performed by: PATHOLOGY

## 2021-05-19 PROCEDURE — G0452 MOLECULAR PATHOLOGY INTERPR: HCPCS | Performed by: PATHOLOGY

## 2021-05-19 PROCEDURE — 86787 VARICELLA-ZOSTER ANTIBODY: CPT | Mod: 90 | Performed by: PATHOLOGY

## 2021-05-19 PROCEDURE — 93306 TTE W/DOPPLER COMPLETE: CPT | Performed by: INTERNAL MEDICINE

## 2021-05-19 PROCEDURE — 86803 HEPATITIS C AB TEST: CPT | Mod: 90 | Performed by: PATHOLOGY

## 2021-05-19 PROCEDURE — 86706 HEP B SURFACE ANTIBODY: CPT | Mod: 90 | Performed by: PATHOLOGY

## 2021-05-19 PROCEDURE — 99000 SPECIMEN HANDLING OFFICE-LAB: CPT | Performed by: PATHOLOGY

## 2021-05-19 PROCEDURE — 81241 F5 GENE: CPT | Performed by: PATHOLOGY

## 2021-05-19 PROCEDURE — 81240 F2 GENE: CPT | Mod: 90 | Performed by: PATHOLOGY

## 2021-05-19 PROCEDURE — 86780 TREPONEMA PALLIDUM: CPT | Mod: 90 | Performed by: PATHOLOGY

## 2021-05-19 PROCEDURE — 80053 COMPREHEN METABOLIC PANEL: CPT | Performed by: PATHOLOGY

## 2021-05-20 ENCOUNTER — VIRTUAL VISIT (OUTPATIENT)
Dept: CARDIOLOGY | Facility: CLINIC | Age: 66
End: 2021-05-20
Attending: NURSE PRACTITIONER
Payer: COMMERCIAL

## 2021-05-20 DIAGNOSIS — Q61.3 POLYCYSTIC KIDNEY: ICD-10-CM

## 2021-05-20 DIAGNOSIS — Z72.0 TOBACCO ABUSE: ICD-10-CM

## 2021-05-20 DIAGNOSIS — Z76.82 AWAITING ORGAN TRANSPLANT: Primary | ICD-10-CM

## 2021-05-20 DIAGNOSIS — I15.0 RENOVASCULAR HYPERTENSION: ICD-10-CM

## 2021-05-20 DIAGNOSIS — N18.6 END STAGE KIDNEY DISEASE (H): ICD-10-CM

## 2021-05-20 DIAGNOSIS — Z01.810 PRE-OPERATIVE CARDIOVASCULAR EXAMINATION: Primary | ICD-10-CM

## 2021-05-20 LAB
BLD GP AB SCN TITR SERPL: NORMAL {TITER}
BLOOD BANK CMNT PATIENT-IMP: NORMAL
CARDIOLIPIN ANTIBODY IGG: <1.6 GPL-U/ML (ref 0–19.9)
CARDIOLIPIN ANTIBODY IGM: <0.2 MPL-U/ML (ref 0–19.9)
CMV IGG SERPL QL IA: >8 AI (ref 0–0.8)
HBV SURFACE AB SERPL IA-ACNC: 83.38 M[IU]/ML
HBV SURFACE AG SERPL QL IA: NONREACTIVE
HIV 1+2 AB+HIV1 P24 AG SERPL QL IA: NONREACTIVE
INTERPRETATION ECG - MUSE: NORMAL
THROMBIN TIME: 16.4 SEC (ref 13–19)
VZV IGG SER QL IA: 4.3 AI (ref 0–0.8)

## 2021-05-20 PROCEDURE — 99204 OFFICE O/P NEW MOD 45 MIN: CPT | Mod: 95 | Performed by: INTERNAL MEDICINE

## 2021-05-20 RX ORDER — CLONIDINE HYDROCHLORIDE 0.1 MG/1
0.2 TABLET ORAL AT BEDTIME
COMMUNITY
End: 2023-07-13

## 2021-05-20 NOTE — PROGRESS NOTES
Alvarez is a 65 year old who is being evaluated via a billable video visit.      How would you like to obtain your AVS? MyChart  If the video visit is dropped, the invitation should be resent by: Send to e-mail at: [email or web address]  Will anyone else be joining your video visit? No    Vitals - Patient Reported  Weight (Patient Reported): 53.5 kg (118 lb)  Height (Patient Reported): 152.4 cm (5')  BMI (Based on Pt Reported Ht/Wt): 23.05  Pain Score: No Pain (0)  Pain Loc: Chest

## 2021-05-20 NOTE — PROGRESS NOTES
"Electrophysiology Clinic Video Virtual Visit    Serene Tipton is a 65 year old female who is being evaluated via a billable video visit.      The patient has been notified of following:     \"This video visit will be conducted via a call between you and your physician/provider. We have found that certain health care needs can be provided without the need for an in-person physical exam.  This service lets us provide the care you need with a video conversation.  If a prescription is necessary we can send it directly to your pharmacy.  If lab work is needed we can place an order for that and you can then stop by our lab to have the test done at a later time.    If during the course of the call the physician/provider feels a video visit is not appropriate, you will not be charged for this service.\"     Physician has received verbal consent for a Video Visit from the patient? Yes    Patient would like the video invitation sent by: Dapper    Video Start Time: 2:30 pm    Video Stop Time: 2:55 pm    Mode of Communication:  Video Conference via [employer]    Originating Location (pt. Location): Home    Distant Location (provider location): [facility]    Mode of Communication:  Video Conference via [employer]aloo      HPI:   64 yo referred for cardiovascular evaluation prior to possible kidney transplant.     She has polycystic kidney disease and has been on dialysis since July 2020.   She is single, lives alone in an apartment building. Activities limited by chronic back pain. Able to do her ADLs without chest discomfort or significant dyspnea. She has no orthopnea/PND, no palpitations or syncope.    Cardiac risk factors: +HTN, +HL, -DM, +smoking half a pk a day ongoing, no family h/o premature CAD       PAST MEDICAL HISTORY:  1. ESRD due to polycystic kidney disease, on dialysis since July 2020, MWF, left AV fistula. Still makes urine  2. Hypertension  3. Hyperlipidemia  4. Depression  5. GERD  6. Back " surgeries/fusion       CURRENT MEDICATIONS:  Amlodipine 5 mg every day  Clonidine 0.2 mg po every bedtime  Labetalol 300 mg bid  Lisinopril 20 bid  Rosuvastatin 10 every day    Sertraline 200 every day  Lorazepam prn  Hydrocodone prn  Vitamins  Amitriptyline    ALLERGIES:     Allergies   Allergen Reactions     Penicillins Other (See Comments) and Shortness Of Breath     Breathing problem.  breathing       Carvedilol GI Disturbance     Nausea and vomiting     Morphine Other (See Comments)     Vomiting     No Clinical Screening - See Comments      PN: LW CM1: Contrast Intercapsular - Nonionic Reaction :     Nsaids Other (See Comments)     Sulfa Drugs Itching       FAMILY HISTORY:  Family History   Problem Relation Age of Onset     Polycystic Kidney Diease Mother      Polycystic Kidney Diease Sister      Cardiac Sudden Death Sister 52     Polycystic Kidney Diease Maternal Grandmother      Heart Disease Maternal Grandfather        SOCIAL HISTORY:  Social History     Tobacco Use     Smoking status: Current Every Day Smoker     Smokeless tobacco: Never Used   Substance Use Topics     Alcohol use: Not Currently     Drug use: Never       ROS:  10 point ROS neg other than the symptoms noted above in the HPI.    Labs:  5/19/2021: K 3.9, cr 3.43, hgb 13, plt 365K, INR 1.02    Testing/Procedures:  ECHOCARDIOGRAM 5/19/2021: EF 60-65%, mild LVH, normal RV / IVC, VICKY 42.5    EKG 5/19/2021: sinus, normal intervals      Exam:  The rest of a comprehensive physical examination is deferred due to public health emergency video visit restrictions.  CONSITUTIONAL: no acute distress  HEENT: no icterus, no redness or discharge, neck supple  CV: no visible edema of visualized extremities. No JVD.   RESPIRATORY: respirations nonlabored, no cough  NEURO: AA&Ox3, speech fluent/appropriate, motor grossly nonfocal  PSYCH: cooperative, affect appropriate  DERM: no rashes on visualized face/neck/upper extremities      Assessment and Plan:    Cardiovascular evaluation prior to possible kidney transplant.  She has no prior cardiac history, no ischemia symptoms although activity is limited by orthopedic issues.  She does have risk factors of HTN, HL, and ongoing tobacco use. Dobutamine stress echo may not reach target heart rate with her meds and may cause very high BP.  Recommend nuclear lexiscan stress test.      In addition to video time documented above, I spent an additional 15 minutes on data review and documentation.    I have reviewed the note as documented above.  This accurately captures the substance of my virtual visit with the patient. The patient states understanding and is agreeable with the plan.     Juhi Samuel MD  Cardiology        CC  [facility]        Addendum:  Nuclear lexiscan stress test 6/7/2021: normal EF 70% at baseline, no inducible ischemia.  She is at acceptable cardiovascular risk to proceed with surgery.  YAAKOV  6/8/2021

## 2021-05-20 NOTE — PATIENT INSTRUCTIONS
"You were evaluated today in the Cardiovascular Telemedicine Clinic at the [facility].     Cardiology Provider during your visit: Dr. Juhi Samuel    Diagnosis: kidney transplant evaluation     Results: discussed with patient, EKG and echo normal    Orders:   None    Medication Changes:   None    Recommendations:   Nuclear stress test    Follow-up:   As needed  Please follow up with primary care provider for medication refills         Please feel free to call me with any questions or concerns.       Polina Benitez RN     Questions and [phone].   First press #1 for the Chat& (ChatAnd) and then press #3 for \"Medical Questions\" to reach us Cardiology Nurses.      On Call Cardiologist for after hours or on weekends: [phone]   option #4 and ask to speak to the on-call Cardiologist.          If you need a medication refill please contact your pharmacy.  Please allow 3 business days for your refill to be completed.     Prep for hillary-nuc stress test: Report to the  [facility] Waiting Room at the [facility]. The hospital is located at [address] [place] [address] [place]    You do not have to hold your beta blocker medication     This test can take up to 3 hours.    NPO 3 hours prior, Water is ok and encouraged  NO alcohol, smoking, caffeine, or decaf products 12 hours prior  TAKE ALL medications as prescribed, hold the following medications if they pertain to you:   If you take Aggrenox or dipyridamole (Vasodilator and blood thinners) (Persantine, Permole), stop taking it 48 hours before your test.   If you take Viagra, Cialis or Levitra (Vasodilator) , stop taking it 48 hours before your test.   If you take theophylline or aminophylline (Bronchodilator) , stop taking it 12 hours before your test.      For patients with diabetes:If you take insulin, call your diabetes care team. Ask if you should take a 1/2 dose the morning of your test.      If you take diabetes medicine by mouth, don t " take it on the morning of your test. Bring it with you to take after the test. (If you have questions, call your diabetes care team.)      Do not take nitrates on the day of your test. Do not wear your Nitro-Patch    If you have further questions, please utilize PowerPlan to contact us.   If your question concerns the above instructions, contact:  Gris Collins LPN  Nurse Care Coordinator- Heart Care    If your question concerns the schedule/appointment times, contact:  [phone]

## 2021-05-20 NOTE — LETTER
"5/20/2021      RE: Serene Tipton  [address]  [address]  [facility] [address]       Dear Colleague,    Thank you for the opportunity to participate in the care of your patient, Serene Tipton, at the [facility] [place] [facility]. Please see a copy of my visit note below.    Electrophysiology Clinic Video Virtual Visit    Serene Tipton is a 65 year old female who is being evaluated via a billable video visit.      The patient has been notified of following:     \"This video visit will be conducted via a call between you and your physician/provider. We have found that certain health care needs can be provided without the need for an in-person physical exam.  This service lets us provide the care you need with a video conversation.  If a prescription is necessary we can send it directly to your pharmacy.  If lab work is needed we can place an order for that and you can then stop by our lab to have the test done at a later time.    If during the course of the call the physician/provider feels a video visit is not appropriate, you will not be charged for this service.\"     Physician has received verbal consent for a Video Visit from the patient? Yes    Patient would like the video invitation sent by: [employer]    Video Start Time: 2:30 pm    Video Stop Time: 2:55 pm    Mode of Communication:  Video Conference via SocialWire    Originating Location (pt. Location): Home    Distant Location (provider location): [facility]    Mode of Communication:  Video Conference via SocialWire      HPI:   66 yo referred for cardiovascular evaluation prior to possible kidney transplant.     She has polycystic kidney disease and has been on dialysis since July 2020.   She is single, lives alone in an apartment building. Activities limited by chronic back pain. Able to do her ADLs without chest discomfort or significant dyspnea. She has no " orthopnea/PND, no palpitations or syncope.    Cardiac risk factors: +HTN, +HL, -DM, +smoking half a pk a day ongoing, no family h/o premature CAD       PAST MEDICAL HISTORY:  1. ESRD due to polycystic kidney disease, on dialysis since July 2020, MWF, left AV fistula. Still makes urine  2. Hypertension  3. Hyperlipidemia  4. Depression  5. GERD  6. Back surgeries/fusion       CURRENT MEDICATIONS:  Amlodipine 5 mg every day  Clonidine 0.2 mg po every bedtime  Labetalol 300 mg bid  Lisinopril 20 bid  Rosuvastatin 10 every day    Sertraline 200 every day  Lorazepam prn  Hydrocodone prn  Vitamins  Amitriptyline    ALLERGIES:     Allergies   Allergen Reactions     Penicillins Other (See Comments) and Shortness Of Breath     Breathing problem.  breathing       Carvedilol GI Disturbance     Nausea and vomiting     Morphine Other (See Comments)     Vomiting     No Clinical Screening - See Comments      PN: LW CM1: Contrast Intercapsular - Nonionic Reaction :     Nsaids Other (See Comments)     Sulfa Drugs Itching       FAMILY HISTORY:  Family History   Problem Relation Age of Onset     Polycystic Kidney Diease Mother      Polycystic Kidney Diease Sister      Cardiac Sudden Death Sister 52     Polycystic Kidney Diease Maternal Grandmother      Heart Disease Maternal Grandfather        SOCIAL HISTORY:  Social History     Tobacco Use     Smoking status: Current Every Day Smoker     Smokeless tobacco: Never Used   Substance Use Topics     Alcohol use: Not Currently     Drug use: Never       ROS:  10 point ROS neg other than the symptoms noted above in the HPI.    Labs:  5/19/2021: K 3.9, cr 3.43, hgb 13, plt 365K, INR 1.02    Testing/Procedures:  ECHOCARDIOGRAM 5/19/2021: EF 60-65%, mild LVH, normal RV / IVC, VICKY 42.5    EKG 5/19/2021: sinus, normal intervals      Exam:  The rest of a comprehensive physical examination is deferred due to public health emergency video visit restrictions.  CONSITUTIONAL: no acute distress  HEENT:  no icterus, no redness or discharge, neck supple  CV: no visible edema of visualized extremities. No JVD.   RESPIRATORY: respirations nonlabored, no cough  NEURO: AA&Ox3, speech fluent/appropriate, motor grossly nonfocal  PSYCH: cooperative, affect appropriate  DERM: no rashes on visualized face/neck/upper extremities      Assessment and Plan:   Cardiovascular evaluation prior to possible kidney transplant.  She has no prior cardiac history, no ischemia symptoms although activity is limited by orthopedic issues.  She does have risk factors of HTN, HL, and ongoing tobacco use. Dobutamine stress echo may not reach target heart rate with her meds and may cause very high BP.  Recommend nuclear lexiscan stress test.      In addition to video time documented above, I spent an additional 15 minutes on data review and documentation.    I have reviewed the note as documented above.  This accurately captures the substance of my virtual visit with the patient. The patient states understanding and is agreeable with the plan.     Juhi Samuel MD  Cardiology        CC  [facility]

## 2021-05-21 ENCOUNTER — TELEPHONE (OUTPATIENT)
Dept: TRANSPLANT | Facility: CLINIC | Age: 66
End: 2021-05-21

## 2021-05-21 ENCOUNTER — TELEPHONE (OUTPATIENT)
Dept: NEUROSURGERY | Facility: CLINIC | Age: 66
End: 2021-05-21

## 2021-05-21 ENCOUNTER — CARE COORDINATION (OUTPATIENT)
Dept: CARDIOLOGY | Facility: CLINIC | Age: 66
End: 2021-05-21

## 2021-05-21 DIAGNOSIS — Z76.82 AWAITING ORGAN TRANSPLANT: Primary | ICD-10-CM

## 2021-05-21 LAB
DLCOCOR-%PRED-PRE: 83 %
DLCOCOR-PRE: 14.51 ML/MIN/MMHG
DLCOUNC-%PRED-PRE: 82 %
DLCOUNC-PRE: 14.33 ML/MIN/MMHG
DLCOUNC-PRED: 17.37 ML/MIN/MMHG
ERV-%PRED-PRE: 102 %
ERV-PRE: 0.78 L
ERV-PRED: 0.76 L
EXPTIME-PRE: 7.09 SEC
FEF2575-%PRED-PRE: 69 %
FEF2575-PRE: 1.35 L/SEC
FEF2575-PRED: 1.96 L/SEC
FEFMAX-%PRED-PRE: 99 %
FEFMAX-PRE: 5.5 L/SEC
FEFMAX-PRED: 5.53 L/SEC
FEV1-%PRED-PRE: 92 %
FEV1-PRE: 1.94 L
FEV1FEV6-PRE: 74 %
FEV1FEV6-PRED: 80 %
FEV1FVC-PRE: 73 %
FEV1FVC-PRED: 80 %
FEV1SVC-PRE: 75 %
FEV1SVC-PRED: 78 %
FIFMAX-PRE: 4.01 L/SEC
FRCPLETH-%PRED-PRE: 101 %
FRCPLETH-PRE: 2.52 L
FRCPLETH-PRED: 2.48 L
FVC-%PRED-PRE: 100 %
FVC-PRE: 2.67 L
FVC-PRED: 2.66 L
GAMMA INTERFERON BACKGROUND BLD IA-ACNC: 0 IU/ML
HBV CORE AB SERPL QL IA: REACTIVE
IC-%PRED-PRE: 92 %
IC-PRE: 1.79 L
IC-PRED: 1.94 L
LA PPP-IMP: NEGATIVE
M TB IFN-G CD4+ BCKGRND COR BLD-ACNC: 10 IU/ML
M TB TUBERC IFN-G BLD QL: NEGATIVE
MITOGEN IGNF BCKGRD COR BLD-ACNC: 0.02 IU/ML
MITOGEN IGNF BCKGRD COR BLD-ACNC: 0.03 IU/ML
RVPLETH-%PRED-PRE: 98 %
RVPLETH-PRE: 1.74 L
RVPLETH-PRED: 1.77 L
TLCPLETH-%PRED-PRE: 101 %
TLCPLETH-PRE: 4.32 L
TLCPLETH-PRED: 4.27 L
VA-%PRED-PRE: 87 %
VA-PRE: 3.53 L
VC-%PRED-PRE: 95 %
VC-PRE: 2.58 L
VC-PRED: 2.7 L

## 2021-05-21 NOTE — TELEPHONE ENCOUNTER
Health Call Center    Phone Message    May a detailed message be left on voicemail: yes     Reason for Call: Appointment Intake    Referring Provider Name: Sung Posada MD in  [facility]  Diagnosis and/or Symptoms: Awaiting organ transplant [Z76.82]    Ronit calling from the Transplant office to schedule patient for Neurosurgery appointment.     Please review. Ronit asking for clinic to reach out to patient to schedule appointment    Action Taken: Other: [facility] NEUROSURGERY     Travel Screening: Not Applicable

## 2021-05-21 NOTE — TELEPHONE ENCOUNTER
Called [facility] to review the results of her brain MRA.   Impression: Two tiny saccular outpouching in the bilateral cavernous  internal carotid arteries, may represent aneurysms versus dilated  Infundibulum.    She will need to see neurosurgery.

## 2021-05-24 ENCOUNTER — OFFICE VISIT (OUTPATIENT)
Dept: TRANSPLANT | Facility: CLINIC | Age: 66
End: 2021-05-24
Attending: NURSE PRACTITIONER
Payer: COMMERCIAL

## 2021-05-24 VITALS
SYSTOLIC BLOOD PRESSURE: 140 MMHG | DIASTOLIC BLOOD PRESSURE: 75 MMHG | HEIGHT: 60 IN | BODY MASS INDEX: 23.34 KG/M2 | OXYGEN SATURATION: 97 % | WEIGHT: 118.9 LBS | HEART RATE: 92 BPM

## 2021-05-24 DIAGNOSIS — N18.6 END STAGE KIDNEY DISEASE (H): Primary | ICD-10-CM

## 2021-05-24 PROCEDURE — 99215 OFFICE O/P EST HI 40 MIN: CPT | Performed by: SURGERY

## 2021-05-24 ASSESSMENT — MIFFLIN-ST. JEOR: SCORE: 1013.34

## 2021-05-24 NOTE — PROGRESS NOTES
Transplant Surgery Consult Note     Medical record number: [id number]  YOB: 1955,   Consult requested  for evaluation of kidney transplant candidacy.    Assessment and Recommendations:Ms. Tipton appears to be a good candidate for kidney transplantation and has a good understanding of the risks and benefits of this approach to the management of renal failure. The following issues should be addressed prior to finalizing her transplant candidacy:     64 yo with PKD  Is working on getting a live donor  Has one in workup  Size of kidneys assessed  Definite room on left, maybe even on right  However, she has significant compression sx imcluding reflux, constipation, dull pain etc.   WIll plan on doing bilat nephrectomy 6-12 weeks prior to LRD  She is already on dialysis    Risks of the surgical procedure including but not limited to the rare risk of mortality discussed in detail. Patient verbalized good understanding and had several pertinent questions which were answered satisfactorily.     Immunosuppressive regimen, management and long term risks discussed in detail.       Transplant order: Ms. Tipton has End stage renal failure due to polycystic kidney disease (PKD) whose condition is not expected to resolve, is expected to progress, and is expected to continue to develop related comorbid conditions.  Recommend he be considered as a candidate for kidney transplant.  Cardiology consult for cardiac risk stratification to be ordered: No  CT abdomen and pelvis without contrast to be ordered for assessment of vascular targets: Yes  Transplant listing labs ordered to include HLA, ABOx2, Cr, etc.  Dietician consult ordered: Yes  Social work consult ordered: Yes  Imaging reports reviewed:  Yes  Radiology images reviewed:Yes  Recipient suitable to move forward with work up of living donors:  Yes      Needs pain clinic eval and treatment  Needs lexiscan-per cardiology.   Needs CT of chest due to smoking   Room  for kidney on left side.  Polycystic kidney on left ends at level of bifurcation. Patient has pressure symptoms from PKD.  Will plan to remove kidneys 6 weeks prior to living donor transplant. If she chooses to defer than also acceptable.        The majority of our visit was spent in counselling, discussing the medical and surgical risks of kidney transplantation. We discussed approximate wait time and how that is influenced by issues such as blood type and sensitization (PRA) and access to a living donor. I contrasted potential waiting time for living vs  donor kidneys from  normal (0-85%) or higher (%) kidney donor profile index (KDPI) donors and their associated outcomes. I would not recommend this individual to consider kidneys from high KDPI donors. The reason for this decision is best summarized as: multiple potential living donors. Potential surgical complications of kidney transplantation include bleeding, superficial or deep wound complications (infection, hernia, lymphocele), ureteral anastomotic failure (leak or stenosis), graft thrombosis, need for reoperation and other issues such as cardiac complications, pneumonia, deep venous thrombosis, pulmonary embolism, post transplant diabetes and death. The potential for recurrent disease or need for retransplantation was also addressed. We discussed the possible need for ureteral stent (and subsequent removal), and the utility of protocol biopsy and laboratory studies to evaluate for rejection or recurrent disease. We discussed the risk of graft rejection, our center's average graft and patient survival rates, immunosuppression protocols, as well as the potential opportunity to participate in clinical trials.  We also discussed the average length of stay, recovery process, and posttransplant lab and monitoring protocol.  I emphasized the need for strict immunosuppression medication adherence and the potential for complications of  immunosuppression such as skin cancer or lymphoma, as well as a very low but not zero risk of donor-derived disease transmission risks (infection, cancer). Ms. Tipton asked good questions and her candidacy will be reviewed at our Multidisciplinary Selection Committee. Thank you for the opportunity to participate in Ms. Tipton's care.      Mary Dowling CNP      Total time: 40 minutes  Counselling time: 25 minutes    .    ---------------------------------------------------------------------------------------------------    HPI: Ms. Tipton has End stage renal failure due to polycystic kidney disease (PKD). The patient is non-diabetic.       The patient is on dialysis.    Has potential kidney donors:  Yes .  Interested in participation in paired exchange if a donor is willing: Yes     The patient has the following pertinent history:       No    Yes  Dialysis:    []      [x] Via:7/2020       Blood Transfusion                  [x]      []  Number of units:   Most recently:  Pregnancy:    []      [x] Number:  one     Previous Transplant:  [x]      [] Details:    Cancer    [x]      [] Comment:   Kidney stones   []      [x] Comment:      Recurrent infections  [x]      []  Type:                  Bladder dysfunction  [x]      [] Cause:    Claudication   [x]      [] Distance:    Previous Amputation  [x]      [] Cause:     Chronic anticoagulation  [x]      [] Indication:   Alevism  [x]      []      Past Medical History:   Diagnosis Date     Anemia in chronic kidney disease      Chronic low back pain      CKD (chronic kidney disease)      Depressive disorder 2013     End stage renal disease (H)      GERD (gastroesophageal reflux disease)      Hyperlipidemia      Hypertension 1990's     Osteoporosis      PKD (polycystic kidney disease) 1990's     PTSD (post-traumatic stress disorder)      Tobacco abuse      Vitamin D deficiency      Past Surgical History:   Procedure Laterality Date     BACK SURGERY      spinal  fusion w/hardware     BIOPSY Left 1990's    Breast     BREAST LUMPECTOMY, RT/LT Left     Lumpectomy     BREAST SURGERY Left     Biopsy     CYSTECTOMY PILONIDAL  1981     EYE SURGERY  2008    lasik     ORTHOPEDIC SURGERY Right 2005    Shoulder     Family History   Problem Relation Age of Onset     Polycystic Kidney Diease Mother      Polycystic Kidney Diease Sister      Cardiac Sudden Death Sister 52     Polycystic Kidney Diease Maternal Grandmother      Heart Disease Maternal Grandfather      Social History     Socioeconomic History     Marital status: Single     Spouse name: Not on file     Number of children: Not on file     Years of education: Not on file     Highest education level: Not on file   Occupational History     Not on file   Social Needs     Financial resource strain: Not on file     Food insecurity     Worry: Not on file     Inability: Not on file     Transportation needs     Medical: Not on file     Non-medical: Not on file   Tobacco Use     Smoking status: Current Every Day Smoker     Smokeless tobacco: Never Used   Substance and Sexual Activity     Alcohol use: Not Currently     Drug use: Never     Sexual activity: Not on file   Lifestyle     Physical activity     Days per week: Not on file     Minutes per session: Not on file     Stress: Not on file   Relationships     Social connections     Talks on phone: Not on file     Gets together: Not on file     Attends Restoration service: Not on file     Active member of club or organization: Not on file     Attends meetings of clubs or organizations: Not on file     Relationship status: Not on file     Intimate partner violence     Fear of current or ex partner: Not on file     Emotionally abused: Not on file     Physically abused: Not on file     Forced sexual activity: Not on file   Other Topics Concern     Parent/sibling w/ CABG, MI or angioplasty before 65F 55M? Not Asked   Social History Narrative     Not on file       ROS:   CONSTITUTIONAL:  No  fevers or chills  EYES: negative for icterus  ENT:  negative for hearing loss, tinnitus and sore throat  RESPIRATORY:  negative for cough, sputum, dyspnea  CARDIOVASCULAR:  negative for chest pain. postive Fatigue  Renal Insufficiency  GASTROINTESTINAL:  negative for nausea, vomiting, diarrhea or constipation  GENITOURINARY:  negative for incontinence, dysuria, bladder emptying problems  HEME:  No easy bruising  INTEGUMENT:  negative for rash and pruritus  NEURO:  Negative for headache, seizure disorder  Allergies:   Allergies   Allergen Reactions     Penicillins Other (See Comments) and Shortness Of Breath     Breathing problem.  breathing       Carvedilol GI Disturbance     Nausea and vomiting     Ciprofloxacin Muscle Pain (Myalgia)     Morphine Other (See Comments)     Vomiting     No Clinical Screening - See Comments      PN: LW CM1: Contrast Intercapsular - Nonionic Reaction :     Nsaids Other (See Comments)     Sulfa Drugs Itching     Levofloxacin Muscle Pain (Myalgia) and Rash     Medications:  Prescription Medications as of 5/24/2021       Rx Number Disp Refills Start End Last Dispensed Date Next Fill Date Owning Pharmacy    amitriptyline (ELAVIL) 100 MG tablet            Class: Historical    amLODIPine (NORVASC) 5 MG tablet        [id number] - [place], [place] - [address]    Sig: Take 5 mg by mouth daily    Class: Historical    Route: Oral    calcium carbonate (TUMS) 500 MG chewable tablet        [id number] - [place], [place] - [address]    Sig: Take 2 chew tab by mouth 2 times daily 1000MG WITH AND 500MG WITH SNACK    Class: Historical    Route: Oral    cholecalciferol (VITAMIN D3) 25 mcg (1000 units) capsule        [id number] - [place], [place] - [address]    Sig: Take 1 capsule by mouth daily    Class: Historical    Route: Oral    cloNIDine (CATAPRES) 0.1 MG tablet        [id number] - [place], [place] - [address]    Sig: Take  "0.2 mg by mouth At Bedtime    Class: Historical    Route: Oral    HYDROcodone-acetaminophen (NORCO) 5-325 MG tablet    2020        Si tab(s)    Class: Historical    Earliest Fill Date: 2020    Krill Oil 1000 MG CAPS            Si    Class: Historical    labetalol (NORMODYNE) 200 MG tablet    2020    [id number] - [place], [place] - [address]    Sig: Take 300 mg by mouth    Class: Historical    Route: Oral    lisinopril (ZESTRIL) 40 MG tablet            Si tab(s)    Class: Historical    LORazepam (ATIVAN) 1 MG tablet    2020    [id number] - [place], [place] - [address]    Sig: TAKE 0.5 1 TABLET BY MOUTH TWICE DAILY AS NEEDED. MUST LAST 30 DAYS    Class: Historical    MELATONIN PO        [id number] - [place], [place] - [address]    Si MG @@ H.S    Class: Historical    Route: Oral    OSCO HIGH POTENCY B COMPLEX/C OR            Sig: Take 1 tablet by mouth    Class: Historical    Route: Oral    rosuvastatin (EZALLOR) 10 MG sprinkle capsule            Si tab(s)    Class: Historical    sertraline (ZOLOFT) 100 MG tablet            Sig: Take 200 mg by mouth    Class: Historical    Route: Oral    vitamin E 400 units TABS            Sig: Take 800 mg by mouth    Class: Historical    Route: Oral    amitriptyline (ELAVIL) 100 MG tablet            Si tab(s)    Class: Historical    rosuvastatin (CRESTOR) 10 MG tablet            Class: Historical    zolpidem (AMBIEN) 5 MG tablet    2020        Sig: Take 5 mg by mouth    Class: Historical    Route: Oral        Exam:     BP (!) 140/75   Pulse 92   Ht 1.52 m (4' 11.84\")   Wt 53.9 kg (118 lb 14.4 oz)   SpO2 97%   BMI 23.34 kg/m    Appearance: in no apparent distress.   Skin: normal  Eyes:  no redness or discharge.  Sclera anicteric  Head and Neck: Normal, no rashes or jaundice  Respiratory: easy respirations, no audible wheezing.  Abdomen: rounded, No distention "   Extremeties: femoral 2+/2+, Edema, trace  Neuro: without deficit   Psychiatric: Normal mood and affect    Diagnostics:   Recent Results (from the past 672 hour(s))   EKG 12-lead, tracing only [EKG1]    Collection Time: 05/19/21  1:37 PM   Result Value Ref Range    Interpretation ECG Click View Image link to view waveform and result    ABO Subtyping [[id number]]    Collection Time: 05/19/21  2:26 PM   Result Value Ref Range    Antigen Type A1 Positive     ABO type [OCN6555]    Collection Time: 05/19/21  2:26 PM   Result Value Ref Range    ABO A     RH(D) Neg     Specimen Expires 05/22/2021    Varicella Zoster Virus Antibody IgG [[id number]]    Collection Time: 05/19/21  3:07 PM   Result Value Ref Range    Varicella Zoster Virus Antibody IgG 4.3 (H) 0.0 - 0.8 AI   Treponema Abs w Reflex to RPR and Titer [[id number]]    Collection Time: 05/19/21  3:07 PM   Result Value Ref Range    Treponema Antibodies Nonreactive NR^Nonreactive   HIV Antigen Antibody Combo Pretransplant    Collection Time: 05/19/21  3:07 PM   Result Value Ref Range    HIV Antigen Antibody Combo Pretransplant Nonreactive NR^Nonreactive   Hepatitis C Screen Reflex to HCV RNA Quant and Genotype    Collection Time: 05/19/21  3:07 PM   Result Value Ref Range    Hepatitis C Antibody Nonreactive NR^Nonreactive   Hepatitis B surface antigen [HHV471]    Collection Time: 05/19/21  3:07 PM   Result Value Ref Range    Hep B Surface Agn Nonreactive NR^Nonreactive   Hepatitis B Surface Antibody [[id number]]    Collection Time: 05/19/21  3:07 PM   Result Value Ref Range    Hepatitis B Surface Antibody 83.38 (H) <8.00 m[IU]/mL   Hepatitis B core antibody [[id number]]    Collection Time: 05/19/21  3:07 PM   Result Value Ref Range    Hepatitis B Core Nguyen Reactive (AA) NR^Nonreactive   CMV Antibody IgG [MSX0100]    Collection Time: 05/19/21  3:07 PM   Result Value Ref Range    CMV Antibody IgG >8.0 (H) 0.0 - 0.8 AI   CBC with platelets differential [[id number]]    Collection Time:  05/19/21  3:07 PM   Result Value Ref Range    WBC 10.4 4.0 - 11.0 10e9/L    RBC Count 3.91 3.8 - 5.2 10e12/L    Hemoglobin 13.0 11.7 - 15.7 g/dL    Hematocrit 39.6 35.0 - 47.0 %     (H) 78 - 100 fl    MCH 33.2 (H) 26.5 - 33.0 pg    MCHC 32.8 31.5 - 36.5 g/dL    RDW 14.7 10.0 - 15.0 %    Platelet Count 365 150 - 450 10e9/L    Diff Method Automated Method     % Neutrophils 72.6 %    % Lymphocytes 14.7 %    % Monocytes 10.3 %    % Eosinophils 1.1 %    % Basophils 0.6 %    % Immature Granulocytes 0.7 %    Nucleated RBCs 0 0 /100    Absolute Neutrophil 7.6 1.6 - 8.3 10e9/L    Absolute Lymphocytes 1.5 0.8 - 5.3 10e9/L    Absolute Monocytes 1.1 0.0 - 1.3 10e9/L    Absolute Eosinophils 0.1 0.0 - 0.7 10e9/L    Absolute Basophils 0.1 0.0 - 0.2 10e9/L    Abs Immature Granulocytes 0.1 0 - 0.4 10e9/L    Absolute Nucleated RBC 0.0    Lupus Anticoagulant Panel (ECQ3402)    Collection Time: 05/19/21  3:07 PM   Result Value Ref Range    Lupus Result Negative NEG^Negative   Thrombin time [YOV889]    Collection Time: 05/19/21  3:07 PM   Result Value Ref Range    Thrombin Time 16.4 13.0 - 19.0 sec   Partial thromboplastin time [LAB56]    Collection Time: 05/19/21  3:07 PM   Result Value Ref Range    PTT 30 22 - 37 sec   INR [[id number]]    Collection Time: 05/19/21  3:07 PM   Result Value Ref Range    INR 1.02 0.86 - 1.14   Quantiferon TB Gold Plus    Collection Time: 05/19/21  3:07 PM    Specimen: Blood   Result Value Ref Range    MTB Quantiferon Result Negative NEG^Negative    TB1 Ag minus Nil 0.02 IU/mL    TB2 Ag minus Nil 0.03 IU/mL    Mitogen minus Nil 10.00 IU/mL    NIL Result 0.00 IU/mL   Cardiolipin Nguyen IgG and IgM [[id number]]    Collection Time: 05/19/21  3:07 PM   Result Value Ref Range    Cardiolipin Antibody IgG <1.6 0.0 - 19.9 GPL-U/mL    Cardiolipin Antibody IgM <0.2 0.0 - 19.9 MPL-U/mL   Phosphorus    Collection Time: 05/19/21  3:07 PM   Result Value Ref Range    Phosphorus 2.8 2.5 - 4.5 mg/dL   Comprehensive metabolic  panel [LAB17]    Collection Time: 05/19/21  3:07 PM   Result Value Ref Range    Sodium 136 133 - 144 mmol/L    Potassium 3.9 3.4 - 5.3 mmol/L    Chloride 98 94 - 109 mmol/L    Carbon Dioxide 31 20 - 32 mmol/L    Anion Gap 7 3 - 14 mmol/L    Glucose 69 (L) 70 - 99 mg/dL    Urea Nitrogen 27 7 - 30 mg/dL    Creatinine 3.43 (H) 0.52 - 1.04 mg/dL    GFR Estimate 13 (L) >60 mL/min/[1.73_m2]    GFR Estimate If Black 16 (L) >60 mL/min/[1.73_m2]    Calcium 9.5 8.5 - 10.1 mg/dL    Bilirubin Total 0.5 0.2 - 1.3 mg/dL    Albumin 4.2 3.4 - 5.0 g/dL    Protein Total 8.3 6.8 - 8.8 g/dL    Alkaline Phosphatase 122 40 - 150 U/L    ALT 18 0 - 50 U/L    AST 11 0 - 45 U/L   Antibody titer red cell [[id number]]    Collection Time: 05/19/21  3:08 PM   Result Value Ref Range    Antibody Titer ANTI B TITER IgM 4, IgG 4.    ABO/Rh type and screen [[id number]]    Collection Time: 05/19/21  3:08 PM   Result Value Ref Range    ABO A     RH(D) Neg     Antibody Screen Neg     Test Valid Only At          [facility]    Specimen Expires 05/22/2021    UA with Microscopic reflex to Culture ([place] [facility])    Collection Time: 05/19/21  3:20 PM    Specimen: Midstream Urine   Result Value Ref Range    Color Urine Yellow     Appearance Urine Clear     Glucose Urine Negative NEG^Negative mg/dL    Bilirubin Urine Small (A) NEG^Negative    Ketones Urine Negative NEG^Negative mg/dL    Specific Gravity Urine 1.020 1.003 - 1.035    Blood Urine Negative NEG^Negative    pH Urine 6.5 5.0 - 7.0 pH    Protein Albumin Urine 100 (A) NEG^Negative mg/dL    Urobilinogen mg/dL 0.2 0.0 - 2.0 mg/dL    Nitrite Urine Negative NEG^Negative    Leukocyte Esterase Urine Trace (A) NEG^Negative    Source Midstream Urine     WBC Urine 6 (H) 0 - 5 /HPF    RBC Urine 2 0 - 2 /HPF    Bacteria Urine Few (A) NEG^Negative /HPF    Squamous Epithelial /HPF Urine 9 (H) 0 - 1 /HPF    Mucous Urine Present (A) NEG^Negative /LPF   General PFT Lab  (Please always keep checked)    Collection Time: 05/19/21  4:06 PM   Result Value Ref Range    FVC-Pred 2.66 L    FVC-Pre 2.67 L    FVC-%Pred-Pre 100 %    FEV1-Pre 1.94 L    FEV1-%Pred-Pre 92 %    FEV1FVC-Pred 80 %    FEV1FVC-Pre 73 %    FEFMax-Pred 5.53 L/sec    FEFMax-Pre 5.50 L/sec    FEFMax-%Pred-Pre 99 %    FEF2575-Pred 1.96 L/sec    FEF2575-Pre 1.35 L/sec    DSX5297-%Pred-Pre 69 %    ExpTime-Pre 7.09 sec    FIFMax-Pre 4.01 L/sec    VC-Pred 2.70 L    VC-Pre 2.58 L    VC-%Pred-Pre 95 %    IC-Pred 1.94 L    IC-Pre 1.79 L    IC-%Pred-Pre 92 %    ERV-Pred 0.76 L    ERV-Pre 0.78 L    ERV-%Pred-Pre 102 %    FEV1FEV6-Pred 80 %    FEV1FEV6-Pre 74 %    FRCPleth-Pred 2.48 L    FRCPleth-Pre 2.52 L    FRCPleth-%Pred-Pre 101 %    RVPleth-Pred 1.77 L    RVPleth-Pre 1.74 L    RVPleth-%Pred-Pre 98 %    TLCPleth-Pred 4.27 L    TLCPleth-Pre 4.32 L    TLCPleth-%Pred-Pre 101 %    DLCOunc-Pred 17.37 ml/min/mmHg    DLCOunc-Pre 14.33 ml/min/mmHg    DLCOunc-%Pred-Pre 82 %    DLCOcor-Pre 14.51 ml/min/mmHg    DLCOcor-%Pred-Pre 83 %    VA-Pre 3.53 L    VA-%Pred-Pre 87 %    FEV1SVC-Pred 78 %    FEV1SVC-Pre 75 %     No results found for: CPRA

## 2021-05-24 NOTE — NURSING NOTE
"Chief Complaint   Patient presents with     Consult     PKE Eval     Blood pressure (!) 140/75, pulse 92, height 1.52 m (4' 11.84\"), weight 53.9 kg (118 lb 14.4 oz), SpO2 97 %, not currently breastfeeding.    Leah Huber on 5/24/2021 at 1:50 PM    "

## 2021-05-24 NOTE — LETTER
5/24/2021      RE: Serene Tipton  [address]  [place] [address]       Transplant Surgery Consult Note     Medical record number: [id number]  YOB: 1955,   Consult requested  for evaluation of kidney transplant candidacy.    Assessment and Recommendations:Ms. Tipton appears to be a good candidate for kidney transplantation and has a good understanding of the risks and benefits of this approach to the management of renal failure. The following issues should be addressed prior to finalizing her transplant candidacy:     64 yo with PKD  Is working on getting a live donor  Has one in workup  Size of kidneys assessed  Definite room on left, maybe even on right  However, she has significant compression sx imcluding reflux, constipation, dull pain etc.   WIll plan on doing bilat nephrectomy 6-12 weeks prior to LRD  She is already on dialysis    Risks of the surgical procedure including but not limited to the rare risk of mortality discussed in detail. Patient verbalized good understanding and had several pertinent questions which were answered satisfactorily.     Immunosuppressive regimen, management and long term risks discussed in detail.       Transplant order: Ms. Tipton has End stage renal failure due to polycystic kidney disease (PKD) whose condition is not expected to resolve, is expected to progress, and is expected to continue to develop related comorbid conditions.  Recommend he be considered as a candidate for kidney transplant.  Cardiology consult for cardiac risk stratification to be ordered: No  CT abdomen and pelvis without contrast to be ordered for assessment of vascular targets: Yes  Transplant listing labs ordered to include HLA, ABOx2, Cr, etc.  Dietician consult ordered: Yes  Social work consult ordered: Yes  Imaging reports reviewed:  Yes  Radiology images reviewed:Yes  Recipient suitable to move forward with work up of living donors:  Yes      Needs pain clinic  eval and treatment  Needs lexiscan-per cardiology.   Needs CT of chest due to smoking   Room for kidney on left side.  Polycystic kidney on left ends at level of bifurcation. Patient has pressure symptoms from PKD.  Will plan to remove kidneys 6 weeks prior to living donor transplant. If she chooses to defer than also acceptable.        The majority of our visit was spent in counselling, discussing the medical and surgical risks of kidney transplantation. We discussed approximate wait time and how that is influenced by issues such as blood type and sensitization (PRA) and access to a living donor. I contrasted potential waiting time for living vs  donor kidneys from  normal (0-85%) or higher (%) kidney donor profile index (KDPI) donors and their associated outcomes. I would not recommend this individual to consider kidneys from high KDPI donors. The reason for this decision is best summarized as: multiple potential living donors. Potential surgical complications of kidney transplantation include bleeding, superficial or deep wound complications (infection, hernia, lymphocele), ureteral anastomotic failure (leak or stenosis), graft thrombosis, need for reoperation and other issues such as cardiac complications, pneumonia, deep venous thrombosis, pulmonary embolism, post transplant diabetes and death. The potential for recurrent disease or need for retransplantation was also addressed. We discussed the possible need for ureteral stent (and subsequent removal), and the utility of protocol biopsy and laboratory studies to evaluate for rejection or recurrent disease. We discussed the risk of graft rejection, our center's average graft and patient survival rates, immunosuppression protocols, as well as the potential opportunity to participate in clinical trials.  We also discussed the average length of stay, recovery process, and posttransplant lab and monitoring protocol.  I emphasized the need for strict  immunosuppression medication adherence and the potential for complications of immunosuppression such as skin cancer or lymphoma, as well as a very low but not zero risk of donor-derived disease transmission risks (infection, cancer). Ms. Tipton asked good questions and her candidacy will be reviewed at our Multidisciplinary Selection Committee. Thank you for the opportunity to participate in Ms. Tipton's care.      Mary Dowling CNP      Total time: 40 minutes  Counselling time: 25 minutes    .    ---------------------------------------------------------------------------------------------------    HPI: Ms. Tipton has End stage renal failure due to polycystic kidney disease (PKD). The patient is non-diabetic.       The patient is on dialysis.    Has potential kidney donors:  Yes .  Interested in participation in paired exchange if a donor is willing: Yes     The patient has the following pertinent history:       No    Yes  Dialysis:    []      [x] Via:7/2020       Blood Transfusion                  [x]      []  Number of units:   Most recently:  Pregnancy:    []      [x] Number:  one     Previous Transplant:  [x]      [] Details:    Cancer    [x]      [] Comment:   Kidney stones   []      [x] Comment:      Recurrent infections  [x]      []  Type:                  Bladder dysfunction  [x]      [] Cause:    Claudication   [x]      [] Distance:    Previous Amputation  [x]      [] Cause:     Chronic anticoagulation  [x]      [] Indication:   Sikhism  [x]      []      Past Medical History:   Diagnosis Date     Anemia in chronic kidney disease      Chronic low back pain      CKD (chronic kidney disease)      Depressive disorder 2013     End stage renal disease (H)      GERD (gastroesophageal reflux disease)      Hyperlipidemia      Hypertension 1990's     Osteoporosis      PKD (polycystic kidney disease) 1990's     PTSD (post-traumatic stress disorder)      Tobacco abuse      Vitamin D deficiency      Past  Surgical History:   Procedure Laterality Date     BACK SURGERY      spinal fusion w/hardware     BIOPSY Left 1990's    Breast     BREAST LUMPECTOMY, RT/LT Left     Lumpectomy     BREAST SURGERY Left     Biopsy     CYSTECTOMY PILONIDAL  1981     EYE SURGERY  2008    lasik     ORTHOPEDIC SURGERY Right 2005    Shoulder     Family History   Problem Relation Age of Onset     Polycystic Kidney Diease Mother      Polycystic Kidney Diease Sister      Cardiac Sudden Death Sister 52     Polycystic Kidney Diease Maternal Grandmother      Heart Disease Maternal Grandfather      Social History     Socioeconomic History     Marital status: Single     Spouse name: Not on file     Number of children: Not on file     Years of education: Not on file     Highest education level: Not on file   Occupational History     Not on file   Social Needs     Financial resource strain: Not on file     Food insecurity     Worry: Not on file     Inability: Not on file     Transportation needs     Medical: Not on file     Non-medical: Not on file   Tobacco Use     Smoking status: Current Every Day Smoker     Smokeless tobacco: Never Used   Substance and Sexual Activity     Alcohol use: Not Currently     Drug use: Never     Sexual activity: Not on file   Lifestyle     Physical activity     Days per week: Not on file     Minutes per session: Not on file     Stress: Not on file   Relationships     Social connections     Talks on phone: Not on file     Gets together: Not on file     Attends Jain service: Not on file     Active member of club or organization: Not on file     Attends meetings of clubs or organizations: Not on file     Relationship status: Not on file     Intimate partner violence     Fear of current or ex partner: Not on file     Emotionally abused: Not on file     Physically abused: Not on file     Forced sexual activity: Not on file   Other Topics Concern     Parent/sibling w/ CABG, MI or angioplasty before 65F 55M? Not Asked    Social History Narrative     Not on file       ROS:   CONSTITUTIONAL:  No fevers or chills  EYES: negative for icterus  ENT:  negative for hearing loss, tinnitus and sore throat  RESPIRATORY:  negative for cough, sputum, dyspnea  CARDIOVASCULAR:  negative for chest pain. postive Fatigue  Renal Insufficiency  GASTROINTESTINAL:  negative for nausea, vomiting, diarrhea or constipation  GENITOURINARY:  negative for incontinence, dysuria, bladder emptying problems  HEME:  No easy bruising  INTEGUMENT:  negative for rash and pruritus  NEURO:  Negative for headache, seizure disorder  Allergies:   Allergies   Allergen Reactions     Penicillins Other (See Comments) and Shortness Of Breath     Breathing problem.  breathing       Carvedilol GI Disturbance     Nausea and vomiting     Ciprofloxacin Muscle Pain (Myalgia)     Morphine Other (See Comments)     Vomiting     No Clinical Screening - See Comments      PN: LW CM1: Contrast Intercapsular - Nonionic Reaction :     Nsaids Other (See Comments)     Sulfa Drugs Itching     Levofloxacin Muscle Pain (Myalgia) and Rash     Medications:  Prescription Medications as of 5/24/2021       Rx Number Disp Refills Start End Last Dispensed Date Next Fill Date Owning Pharmacy    amitriptyline (ELAVIL) 100 MG tablet            Class: Historical    amLODIPine (NORVASC) 5 MG tablet        [facility]/pharmacy #[id number] - [place], [place] - [address]    Sig: Take 5 mg by mouth daily    Class: Historical    Route: Oral    calcium carbonate (TUMS) 500 MG chewable tablet        [facility]/pharmacy #[id number] - [place], [place] - [address]    Sig: Take 2 chew tab by mouth 2 times daily 1000MG WITH AND 500MG WITH SNACK    Class: Historical    Route: Oral    cholecalciferol (VITAMIN D3) 25 mcg (1000 units) capsule        [facility]/pharmacy #[id number] - [place], [place] - [address]    Sig: Take 1 capsule by mouth daily    Class: Historical    Route: Oral    cloNIDine (CATAPRES) 0.1 MG tablet        " [facility]/pharmacy #[id number] - [place], [place] - [address]    Sig: Take 0.2 mg by mouth At Bedtime    Class: Historical    Route: Oral    HYDROcodone-acetaminophen (NORCO) 5-325 MG tablet    2020        Si tab(s)    Class: Historical    Earliest Fill Date: 2020    Krill Oil 1000 MG CAPS            Si    Class: Historical    labetalol (NORMODYNE) 200 MG tablet    2020    [facility]/pharmacy #[id number] - [place], [place] - [address]    Sig: Take 300 mg by mouth    Class: Historical    Route: Oral    lisinopril (ZESTRIL) 40 MG tablet            Si tab(s)    Class: Historical    LORazepam (ATIVAN) 1 MG tablet    2020    [facility]/pharmacy #[id number] - [place], [place] - [address]    Sig: TAKE 0.5 1 TABLET BY MOUTH TWICE DAILY AS NEEDED. MUST LAST 30 DAYS    Class: Historical    MELATONIN PO        [facility]/pharmacy #[id number] - [place], [place] - [address]    Si MG @@ H.S    Class: Historical    Route: Oral    OSCO HIGH POTENCY B COMPLEX/C OR            Sig: Take 1 tablet by mouth    Class: Historical    Route: Oral    rosuvastatin (EZALLOR) 10 MG sprinkle capsule            Si tab(s)    Class: Historical    sertraline (ZOLOFT) 100 MG tablet            Sig: Take 200 mg by mouth    Class: Historical    Route: Oral    vitamin E 400 units TABS            Sig: Take 800 mg by mouth    Class: Historical    Route: Oral    amitriptyline (ELAVIL) 100 MG tablet            Si tab(s)    Class: Historical    rosuvastatin (CRESTOR) 10 MG tablet            Class: Historical    zolpidem (AMBIEN) 5 MG tablet    2020        Sig: Take 5 mg by mouth    Class: Historical    Route: Oral        Exam:     BP (!) 140/75   Pulse 92   Ht 1.52 m (4' 11.84\")   Wt 53.9 kg (118 lb 14.4 oz)   SpO2 97%   BMI 23.34 kg/m    Appearance: in no apparent distress.   Skin: normal  Eyes:  no redness or discharge.  Sclera anicteric  Head and Neck: Normal, no rashes or jaundice  Respiratory: easy " respirations, no audible wheezing.  Abdomen: rounded, No distention   Extremeties: femoral 2+/2+, Edema, trace  Neuro: without deficit   Psychiatric: Normal mood and affect    Diagnostics:   Recent Results (from the past 672 hour(s))   EKG 12-lead, tracing only [EKG1]    Collection Time: 05/19/21  1:37 PM   Result Value Ref Range    Interpretation ECG Click View Image link to view waveform and result    ABO Subtyping [[id number]]    Collection Time: 05/19/21  2:26 PM   Result Value Ref Range    Antigen Type A1 Positive     ABO type [[id number]]    Collection Time: 05/19/21  2:26 PM   Result Value Ref Range    ABO A     RH(D) Neg     Specimen Expires 05/22/2021    Varicella Zoster Virus Antibody IgG [[id number]]    Collection Time: 05/19/21  3:07 PM   Result Value Ref Range    Varicella Zoster Virus Antibody IgG 4.3 (H) 0.0 - 0.8 AI   Treponema Abs w Reflex to RPR and Titer [[id number]]    Collection Time: 05/19/21  3:07 PM   Result Value Ref Range    Treponema Antibodies Nonreactive NR^Nonreactive   HIV Antigen Antibody Combo Pretransplant    Collection Time: 05/19/21  3:07 PM   Result Value Ref Range    HIV Antigen Antibody Combo Pretransplant Nonreactive NR^Nonreactive   Hepatitis C Screen Reflex to HCV RNA Quant and Genotype    Collection Time: 05/19/21  3:07 PM   Result Value Ref Range    Hepatitis C Antibody Nonreactive NR^Nonreactive   Hepatitis B surface antigen [[id number]]    Collection Time: 05/19/21  3:07 PM   Result Value Ref Range    Hep B Surface Agn Nonreactive NR^Nonreactive   Hepatitis B Surface Antibody [[id number]]    Collection Time: 05/19/21  3:07 PM   Result Value Ref Range    Hepatitis B Surface Antibody 83.38 (H) <8.00 m[IU]/mL   Hepatitis B core antibody [[id number]]    Collection Time: 05/19/21  3:07 PM   Result Value Ref Range    Hepatitis B Core Nguyen Reactive (AA) NR^Nonreactive   CMV Antibody IgG [[id number]]    Collection Time: 05/19/21  3:07 PM   Result Value Ref Range    CMV Antibody IgG >8.0 (H) 0.0 - 0.8 AI    CBC with platelets differential [[id number]]    Collection Time: 05/19/21  3:07 PM   Result Value Ref Range    WBC 10.4 4.0 - 11.0 10e9/L    RBC Count 3.91 3.8 - 5.2 10e12/L    Hemoglobin 13.0 11.7 - 15.7 g/dL    Hematocrit 39.6 35.0 - 47.0 %     (H) 78 - 100 fl    MCH 33.2 (H) 26.5 - 33.0 pg    MCHC 32.8 31.5 - 36.5 g/dL    RDW 14.7 10.0 - 15.0 %    Platelet Count 365 150 - 450 10e9/L    Diff Method Automated Method     % Neutrophils 72.6 %    % Lymphocytes 14.7 %    % Monocytes 10.3 %    % Eosinophils 1.1 %    % Basophils 0.6 %    % Immature Granulocytes 0.7 %    Nucleated RBCs 0 0 /100    Absolute Neutrophil 7.6 1.6 - 8.3 10e9/L    Absolute Lymphocytes 1.5 0.8 - 5.3 10e9/L    Absolute Monocytes 1.1 0.0 - 1.3 10e9/L    Absolute Eosinophils 0.1 0.0 - 0.7 10e9/L    Absolute Basophils 0.1 0.0 - 0.2 10e9/L    Abs Immature Granulocytes 0.1 0 - 0.4 10e9/L    Absolute Nucleated RBC 0.0    Lupus Anticoagulant Panel ([id number])    Collection Time: 05/19/21  3:07 PM   Result Value Ref Range    Lupus Result Negative NEG^Negative   Thrombin time [[id number]]    Collection Time: 05/19/21  3:07 PM   Result Value Ref Range    Thrombin Time 16.4 13.0 - 19.0 sec   Partial thromboplastin time [LAB56]    Collection Time: 05/19/21  3:07 PM   Result Value Ref Range    PTT 30 22 - 37 sec   INR [[id number]]    Collection Time: 05/19/21  3:07 PM   Result Value Ref Range    INR 1.02 0.86 - 1.14   Quantiferon TB Gold Plus    Collection Time: 05/19/21  3:07 PM    Specimen: Blood   Result Value Ref Range    MTB Quantiferon Result Negative NEG^Negative    TB1 Ag minus Nil 0.02 IU/mL    TB2 Ag minus Nil 0.03 IU/mL    Mitogen minus Nil 10.00 IU/mL    NIL Result 0.00 IU/mL   Cardiolipin Nguyen IgG and IgM [[id number]]    Collection Time: 05/19/21  3:07 PM   Result Value Ref Range    Cardiolipin Antibody IgG <1.6 0.0 - 19.9 GPL-U/mL    Cardiolipin Antibody IgM <0.2 0.0 - 19.9 MPL-U/mL   Phosphorus    Collection Time: 05/19/21  3:07 PM   Result Value Ref  Range    Phosphorus 2.8 2.5 - 4.5 mg/dL   Comprehensive metabolic panel [LAB17]    Collection Time: 05/19/21  3:07 PM   Result Value Ref Range    Sodium 136 133 - 144 mmol/L    Potassium 3.9 3.4 - 5.3 mmol/L    Chloride 98 94 - 109 mmol/L    Carbon Dioxide 31 20 - 32 mmol/L    Anion Gap 7 3 - 14 mmol/L    Glucose 69 (L) 70 - 99 mg/dL    Urea Nitrogen 27 7 - 30 mg/dL    Creatinine 3.43 (H) 0.52 - 1.04 mg/dL    GFR Estimate 13 (L) >60 mL/min/[1.73_m2]    GFR Estimate If Black 16 (L) >60 mL/min/[1.73_m2]    Calcium 9.5 8.5 - 10.1 mg/dL    Bilirubin Total 0.5 0.2 - 1.3 mg/dL    Albumin 4.2 3.4 - 5.0 g/dL    Protein Total 8.3 6.8 - 8.8 g/dL    Alkaline Phosphatase 122 40 - 150 U/L    ALT 18 0 - 50 U/L    AST 11 0 - 45 U/L   Antibody titer red cell [[id number]]    Collection Time: 05/19/21  3:08 PM   Result Value Ref Range    Antibody Titer ANTI B TITER IgM 4, IgG 4.    ABO/Rh type and screen [[id number]]    Collection Time: 05/19/21  3:08 PM   Result Value Ref Range    ABO A     RH(D) Neg     Antibody Screen Neg     Test Valid Only At          [facility]    Specimen Expires 05/22/2021    UA with Microscopic reflex to Culture ([facility])    Collection Time: 05/19/21  3:20 PM    Specimen: Midstream Urine   Result Value Ref Range    Color Urine Yellow     Appearance Urine Clear     Glucose Urine Negative NEG^Negative mg/dL    Bilirubin Urine Small (A) NEG^Negative    Ketones Urine Negative NEG^Negative mg/dL    Specific Gravity Urine 1.020 1.003 - 1.035    Blood Urine Negative NEG^Negative    pH Urine 6.5 5.0 - 7.0 pH    Protein Albumin Urine 100 (A) NEG^Negative mg/dL    Urobilinogen mg/dL 0.2 0.0 - 2.0 mg/dL    Nitrite Urine Negative NEG^Negative    Leukocyte Esterase Urine Trace (A) NEG^Negative    Source Midstream Urine     WBC Urine 6 (H) 0 - 5 /HPF    RBC Urine 2 0 - 2 /HPF    Bacteria Urine Few (A) NEG^Negative /HPF    Squamous Epithelial /HPF Urine 9 (H) 0 - 1 /HPF     Mucous Urine Present (A) NEG^Negative /LPF   General PFT Lab (Please always keep checked)    Collection Time: 05/19/21  4:06 PM   Result Value Ref Range    FVC-Pred 2.66 L    FVC-Pre 2.67 L    FVC-%Pred-Pre 100 %    FEV1-Pre 1.94 L    FEV1-%Pred-Pre 92 %    FEV1FVC-Pred 80 %    FEV1FVC-Pre 73 %    FEFMax-Pred 5.53 L/sec    FEFMax-Pre 5.50 L/sec    FEFMax-%Pred-Pre 99 %    FEF2575-Pred 1.96 L/sec    FEF2575-Pre 1.35 L/sec    YGW4098-%Pred-Pre 69 %    ExpTime-Pre 7.09 sec    FIFMax-Pre 4.01 L/sec    VC-Pred 2.70 L    VC-Pre 2.58 L    VC-%Pred-Pre 95 %    IC-Pred 1.94 L    IC-Pre 1.79 L    IC-%Pred-Pre 92 %    ERV-Pred 0.76 L    ERV-Pre 0.78 L    ERV-%Pred-Pre 102 %    FEV1FEV6-Pred 80 %    FEV1FEV6-Pre 74 %    FRCPleth-Pred 2.48 L    FRCPleth-Pre 2.52 L    FRCPleth-%Pred-Pre 101 %    RVPleth-Pred 1.77 L    RVPleth-Pre 1.74 L    RVPleth-%Pred-Pre 98 %    TLCPleth-Pred 4.27 L    TLCPleth-Pre 4.32 L    TLCPleth-%Pred-Pre 101 %    DLCOunc-Pred 17.37 ml/min/mmHg    DLCOunc-Pre 14.33 ml/min/mmHg    DLCOunc-%Pred-Pre 82 %    DLCOcor-Pre 14.51 ml/min/mmHg    DLCOcor-%Pred-Pre 83 %    VA-Pre 3.53 L    VA-%Pred-Pre 87 %    FEV1SVC-Pred 78 %    FEV1SVC-Pre 75 %     No results found for: CINDY Giang MD

## 2021-05-24 NOTE — LETTER
5/24/2021         RE: Serene Tipton  [address]  [address]  [facility] [address]        Dear Colleague,    Thank you for referring your patient, Serene Tipton, to the [facility]. Please see a copy of my visit note below.    Transplant Surgery Consult Note     Medical record number: [id number]  YOB: 1955,   Consult requested  for evaluation of kidney transplant candidacy.    Assessment and Recommendations:Ms. Tipton appears to be a good candidate for kidney transplantation and has a good understanding of the risks and benefits of this approach to the management of renal failure. The following issues should be addressed prior to finalizing her transplant candidacy:     64 yo with PKD  Is working on getting a live donor  Has one in workup  Size of kidneys assessed  Definite room on left, maybe even on right  However, she has significant compression sx imcluding reflux, constipation, dull pain etc.   WIll plan on doing bilat nephrectomy 6-12 weeks prior to LRD  She is already on dialysis    Risks of the surgical procedure including but not limited to the rare risk of mortality discussed in detail. Patient verbalized good understanding and had several pertinent questions which were answered satisfactorily.     Immunosuppressive regimen, management and long term risks discussed in detail.       Transplant order: Ms. Tipton has End stage renal failure due to polycystic kidney disease (PKD) whose condition is not expected to resolve, is expected to progress, and is expected to continue to develop related comorbid conditions.  Recommend he be considered as a candidate for kidney transplant.  Cardiology consult for cardiac risk stratification to be ordered: No  CT abdomen and pelvis without contrast to be ordered for assessment of vascular targets: Yes  Transplant listing labs ordered to include HLA, ABOx2, Cr, etc.  Dietician consult ordered: Yes  Social work  consult ordered: Yes  Imaging reports reviewed:  Yes  Radiology images reviewed:Yes  Recipient suitable to move forward with work up of living donors:  Yes      Needs pain clinic eval and treatment  Needs lexiscan-per cardiology.   Needs CT of chest due to smoking   Room for kidney on left side.  Polycystic kidney on left ends at level of bifurcation. Patient has pressure symptoms from PKD.  Will plan to remove kidneys 6 weeks prior to living donor transplant. If she chooses to defer than also acceptable.        The majority of our visit was spent in counselling, discussing the medical and surgical risks of kidney transplantation. We discussed approximate wait time and how that is influenced by issues such as blood type and sensitization (PRA) and access to a living donor. I contrasted potential waiting time for living vs  donor kidneys from  normal (0-85%) or higher (%) kidney donor profile index (KDPI) donors and their associated outcomes. I would not recommend this individual to consider kidneys from high KDPI donors. The reason for this decision is best summarized as: multiple potential living donors. Potential surgical complications of kidney transplantation include bleeding, superficial or deep wound complications (infection, hernia, lymphocele), ureteral anastomotic failure (leak or stenosis), graft thrombosis, need for reoperation and other issues such as cardiac complications, pneumonia, deep venous thrombosis, pulmonary embolism, post transplant diabetes and death. The potential for recurrent disease or need for retransplantation was also addressed. We discussed the possible need for ureteral stent (and subsequent removal), and the utility of protocol biopsy and laboratory studies to evaluate for rejection or recurrent disease. We discussed the risk of graft rejection, our center's average graft and patient survival rates, immunosuppression protocols, as well as the potential opportunity to  participate in clinical trials.  We also discussed the average length of stay, recovery process, and posttransplant lab and monitoring protocol.  I emphasized the need for strict immunosuppression medication adherence and the potential for complications of immunosuppression such as skin cancer or lymphoma, as well as a very low but not zero risk of donor-derived disease transmission risks (infection, cancer). Ms. Tipton asked good questions and her candidacy will be reviewed at our Multidisciplinary Selection Committee. Thank you for the opportunity to participate in Ms. Tipton's care.      Mary Dowling CNP      Total time: 40 minutes  Counselling time: 25 minutes    .    ---------------------------------------------------------------------------------------------------    HPI: Ms. Tipton has End stage renal failure due to polycystic kidney disease (PKD). The patient is non-diabetic.       The patient is on dialysis.    Has potential kidney donors:  Yes .  Interested in participation in paired exchange if a donor is willing: Yes     The patient has the following pertinent history:       No    Yes  Dialysis:    []      [x] Via:7/2020       Blood Transfusion                  [x]      []  Number of units:   Most recently:  Pregnancy:    []      [x] Number:  one     Previous Transplant:  [x]      [] Details:    Cancer    [x]      [] Comment:   Kidney stones   []      [x] Comment:      Recurrent infections  [x]      []  Type:                  Bladder dysfunction  [x]      [] Cause:    Claudication   [x]      [] Distance:    Previous Amputation  [x]      [] Cause:     Chronic anticoagulation  [x]      [] Indication:   Anglican  [x]      []      Past Medical History:   Diagnosis Date     Anemia in chronic kidney disease      Chronic low back pain      CKD (chronic kidney disease)      Depressive disorder 2013     End stage renal disease (H)      GERD (gastroesophageal reflux disease)      Hyperlipidemia       Hypertension 1990's     Osteoporosis      PKD (polycystic kidney disease) 1990's     PTSD (post-traumatic stress disorder)      Tobacco abuse      Vitamin D deficiency      Past Surgical History:   Procedure Laterality Date     BACK SURGERY      spinal fusion w/hardware     BIOPSY Left 1990's    Breast     BREAST LUMPECTOMY, RT/LT Left     Lumpectomy     BREAST SURGERY Left     Biopsy     CYSTECTOMY PILONIDAL  1981     EYE SURGERY  2008    lasik     ORTHOPEDIC SURGERY Right 2005    Shoulder     Family History   Problem Relation Age of Onset     Polycystic Kidney Diease Mother      Polycystic Kidney Diease Sister      Cardiac Sudden Death Sister 52     Polycystic Kidney Diease Maternal Grandmother      Heart Disease Maternal Grandfather      Social History     Socioeconomic History     Marital status: Single     Spouse name: Not on file     Number of children: Not on file     Years of education: Not on file     Highest education level: Not on file   Occupational History     Not on file   Social Needs     Financial resource strain: Not on file     Food insecurity     Worry: Not on file     Inability: Not on file     Transportation needs     Medical: Not on file     Non-medical: Not on file   Tobacco Use     Smoking status: Current Every Day Smoker     Smokeless tobacco: Never Used   Substance and Sexual Activity     Alcohol use: Not Currently     Drug use: Never     Sexual activity: Not on file   Lifestyle     Physical activity     Days per week: Not on file     Minutes per session: Not on file     Stress: Not on file   Relationships     Social connections     Talks on phone: Not on file     Gets together: Not on file     Attends Protestant service: Not on file     Active member of club or organization: Not on file     Attends meetings of clubs or organizations: Not on file     Relationship status: Not on file     Intimate partner violence     Fear of current or ex partner: Not on file     Emotionally abused: Not on  file     Physically abused: Not on file     Forced sexual activity: Not on file   Other Topics Concern     Parent/sibling w/ CABG, MI or angioplasty before 65F 55M? Not Asked   Social History Narrative     Not on file       ROS:   CONSTITUTIONAL:  No fevers or chills  EYES: negative for icterus  ENT:  negative for hearing loss, tinnitus and sore throat  RESPIRATORY:  negative for cough, sputum, dyspnea  CARDIOVASCULAR:  negative for chest pain. postive Fatigue  Renal Insufficiency  GASTROINTESTINAL:  negative for nausea, vomiting, diarrhea or constipation  GENITOURINARY:  negative for incontinence, dysuria, bladder emptying problems  HEME:  No easy bruising  INTEGUMENT:  negative for rash and pruritus  NEURO:  Negative for headache, seizure disorder  Allergies:   Allergies   Allergen Reactions     Penicillins Other (See Comments) and Shortness Of Breath     Breathing problem.  breathing       Carvedilol GI Disturbance     Nausea and vomiting     Ciprofloxacin Muscle Pain (Myalgia)     Morphine Other (See Comments)     Vomiting     No Clinical Screening - See Comments      PN: LW CM1: Contrast Intercapsular - Nonionic Reaction :     Nsaids Other (See Comments)     Sulfa Drugs Itching     Levofloxacin Muscle Pain (Myalgia) and Rash     Medications:  Prescription Medications as of 5/24/2021       Rx Number Disp Refills Start End Last Dispensed Date Next Fill Date Owning Pharmacy    amitriptyline (ELAVIL) 100 MG tablet            Class: Historical    amLODIPine (NORVASC) 5 MG tablet        [id number] - [place], [place] - [address]    Sig: Take 5 mg by mouth daily    Class: Historical    Route: Oral    calcium carbonate (TUMS) 500 MG chewable tablet        [id number] - [place], [place] - [address]    Sig: Take 2 chew tab by mouth 2 times daily 1000MG WITH AND 500MG WITH SNACK    Class: Historical    Route: Oral    cholecalciferol (VITAMIN D3) 25 mcg (1000 units) capsule         "[id number] - [place], [place] - [address]    Sig: Take 1 capsule by mouth daily    Class: Historical    Route: Oral    cloNIDine (CATAPRES) 0.1 MG tablet        [id number] - [place], [place] - [address]    Sig: Take 0.2 mg by mouth At Bedtime    Class: Historical    Route: Oral    HYDROcodone-acetaminophen (NORCO) 5-325 MG tablet    2020        Si tab(s)    Class: Historical    Earliest Fill Date: 2020    Krill Oil 1000 MG CAPS            Si    Class: Historical    labetalol (NORMODYNE) 200 MG tablet    2020    [id number] - [place], [place] - [address]    Sig: Take 300 mg by mouth    Class: Historical    Route: Oral    lisinopril (ZESTRIL) 40 MG tablet            Si tab(s)    Class: Historical    LORazepam (ATIVAN) 1 MG tablet    2020    [id number] - [place], [place] - [address]    Sig: TAKE 0.5 1 TABLET BY MOUTH TWICE DAILY AS NEEDED. MUST LAST 30 DAYS    Class: Historical    MELATONIN PO        [id number] - [place], [place] - [address]    Si MG @@ H.S    Class: Historical    Route: Oral    OSCO HIGH POTENCY B COMPLEX/C OR            Sig: Take 1 tablet by mouth    Class: Historical    Route: Oral    rosuvastatin (EZALLOR) 10 MG sprinkle capsule            Si tab(s)    Class: Historical    sertraline (ZOLOFT) 100 MG tablet            Sig: Take 200 mg by mouth    Class: Historical    Route: Oral    vitamin E 400 units TABS            Sig: Take 800 mg by mouth    Class: Historical    Route: Oral    amitriptyline (ELAVIL) 100 MG tablet            Si tab(s)    Class: Historical    rosuvastatin (CRESTOR) 10 MG tablet            Class: Historical    zolpidem (AMBIEN) 5 MG tablet    2020        Sig: Take 5 mg by mouth    Class: Historical    Route: Oral        Exam:     BP (!) 140/75   Pulse 92   Ht 1.52 m (4' 11.84\")   Wt 53.9 kg (118 lb 14.4 oz)   SpO2 97%   BMI " 23.34 kg/m    Appearance: in no apparent distress.   Skin: normal  Eyes:  no redness or discharge.  Sclera anicteric  Head and Neck: Normal, no rashes or jaundice  Respiratory: easy respirations, no audible wheezing.  Abdomen: rounded, No distention   Extremeties: femoral 2+/2+, Edema, trace  Neuro: without deficit   Psychiatric: Normal mood and affect    Diagnostics:   Recent Results (from the past 672 hour(s))   EKG 12-lead, tracing only [EKG1]    Collection Time: 05/19/21  1:37 PM   Result Value Ref Range    Interpretation ECG Click View Image link to view waveform and result    ABO Subtyping [FTD4479]    Collection Time: 05/19/21  2:26 PM   Result Value Ref Range    Antigen Type A1 Positive     ABO type [FOA6534]    Collection Time: 05/19/21  2:26 PM   Result Value Ref Range    ABO A     RH(D) Neg     Specimen Expires 05/22/2021    Varicella Zoster Virus Antibody IgG [[id number]]    Collection Time: 05/19/21  3:07 PM   Result Value Ref Range    Varicella Zoster Virus Antibody IgG 4.3 (H) 0.0 - 0.8 AI   Treponema Abs w Reflex to RPR and Titer [[id number]]    Collection Time: 05/19/21  3:07 PM   Result Value Ref Range    Treponema Antibodies Nonreactive NR^Nonreactive   HIV Antigen Antibody Combo Pretransplant    Collection Time: 05/19/21  3:07 PM   Result Value Ref Range    HIV Antigen Antibody Combo Pretransplant Nonreactive NR^Nonreactive   Hepatitis C Screen Reflex to HCV RNA Quant and Genotype    Collection Time: 05/19/21  3:07 PM   Result Value Ref Range    Hepatitis C Antibody Nonreactive NR^Nonreactive   Hepatitis B surface antigen [XOQ039]    Collection Time: 05/19/21  3:07 PM   Result Value Ref Range    Hep B Surface Agn Nonreactive NR^Nonreactive   Hepatitis B Surface Antibody [MIG7067]    Collection Time: 05/19/21  3:07 PM   Result Value Ref Range    Hepatitis B Surface Antibody 83.38 (H) <8.00 m[IU]/mL   Hepatitis B core antibody [[id number]]    Collection Time: 05/19/21  3:07 PM   Result Value Ref Range     Hepatitis B Core Nguyen Reactive (AA) NR^Nonreactive   CMV Antibody IgG [[id number]]    Collection Time: 05/19/21  3:07 PM   Result Value Ref Range    CMV Antibody IgG >8.0 (H) 0.0 - 0.8 AI   CBC with platelets differential [TXC286]    Collection Time: 05/19/21  3:07 PM   Result Value Ref Range    WBC 10.4 4.0 - 11.0 10e9/L    RBC Count 3.91 3.8 - 5.2 10e12/L    Hemoglobin 13.0 11.7 - 15.7 g/dL    Hematocrit 39.6 35.0 - 47.0 %     (H) 78 - 100 fl    MCH 33.2 (H) 26.5 - 33.0 pg    MCHC 32.8 31.5 - 36.5 g/dL    RDW 14.7 10.0 - 15.0 %    Platelet Count 365 150 - 450 10e9/L    Diff Method Automated Method     % Neutrophils 72.6 %    % Lymphocytes 14.7 %    % Monocytes 10.3 %    % Eosinophils 1.1 %    % Basophils 0.6 %    % Immature Granulocytes 0.7 %    Nucleated RBCs 0 0 /100    Absolute Neutrophil 7.6 1.6 - 8.3 10e9/L    Absolute Lymphocytes 1.5 0.8 - 5.3 10e9/L    Absolute Monocytes 1.1 0.0 - 1.3 10e9/L    Absolute Eosinophils 0.1 0.0 - 0.7 10e9/L    Absolute Basophils 0.1 0.0 - 0.2 10e9/L    Abs Immature Granulocytes 0.1 0 - 0.4 10e9/L    Absolute Nucleated RBC 0.0    Lupus Anticoagulant Panel (VGV6657)    Collection Time: 05/19/21  3:07 PM   Result Value Ref Range    Lupus Result Negative NEG^Negative   Thrombin time [SBT095]    Collection Time: 05/19/21  3:07 PM   Result Value Ref Range    Thrombin Time 16.4 13.0 - 19.0 sec   Partial thromboplastin time [LAB56]    Collection Time: 05/19/21  3:07 PM   Result Value Ref Range    PTT 30 22 - 37 sec   INR [[id number]]    Collection Time: 05/19/21  3:07 PM   Result Value Ref Range    INR 1.02 0.86 - 1.14   Quantiferon TB Gold Plus    Collection Time: 05/19/21  3:07 PM    Specimen: Blood   Result Value Ref Range    MTB Quantiferon Result Negative NEG^Negative    TB1 Ag minus Nil 0.02 IU/mL    TB2 Ag minus Nil 0.03 IU/mL    Mitogen minus Nil 10.00 IU/mL    NIL Result 0.00 IU/mL   Cardiolipin Nguyen IgG and IgM [[id number]]    Collection Time: 05/19/21  3:07 PM   Result Value Ref  Range    Cardiolipin Antibody IgG <1.6 0.0 - 19.9 GPL-U/mL    Cardiolipin Antibody IgM <0.2 0.0 - 19.9 MPL-U/mL   Phosphorus    Collection Time: 05/19/21  3:07 PM   Result Value Ref Range    Phosphorus 2.8 2.5 - 4.5 mg/dL   Comprehensive metabolic panel [LAB17]    Collection Time: 05/19/21  3:07 PM   Result Value Ref Range    Sodium 136 133 - 144 mmol/L    Potassium 3.9 3.4 - 5.3 mmol/L    Chloride 98 94 - 109 mmol/L    Carbon Dioxide 31 20 - 32 mmol/L    Anion Gap 7 3 - 14 mmol/L    Glucose 69 (L) 70 - 99 mg/dL    Urea Nitrogen 27 7 - 30 mg/dL    Creatinine 3.43 (H) 0.52 - 1.04 mg/dL    GFR Estimate 13 (L) >60 mL/min/[1.73_m2]    GFR Estimate If Black 16 (L) >60 mL/min/[1.73_m2]    Calcium 9.5 8.5 - 10.1 mg/dL    Bilirubin Total 0.5 0.2 - 1.3 mg/dL    Albumin 4.2 3.4 - 5.0 g/dL    Protein Total 8.3 6.8 - 8.8 g/dL    Alkaline Phosphatase 122 40 - 150 U/L    ALT 18 0 - 50 U/L    AST 11 0 - 45 U/L   Antibody titer red cell [[id number]]    Collection Time: 05/19/21  3:08 PM   Result Value Ref Range    Antibody Titer ANTI B TITER IgM 4, IgG 4.    ABO/Rh type and screen [WOC001]    Collection Time: 05/19/21  3:08 PM   Result Value Ref Range    ABO A     RH(D) Neg     Antibody Screen Neg     Test Valid Only At          [facility]    Specimen Expires 05/22/2021    UA with Microscopic reflex to Culture ([place] [facility])    Collection Time: 05/19/21  3:20 PM    Specimen: Midstream Urine   Result Value Ref Range    Color Urine Yellow     Appearance Urine Clear     Glucose Urine Negative NEG^Negative mg/dL    Bilirubin Urine Small (A) NEG^Negative    Ketones Urine Negative NEG^Negative mg/dL    Specific Gravity Urine 1.020 1.003 - 1.035    Blood Urine Negative NEG^Negative    pH Urine 6.5 5.0 - 7.0 pH    Protein Albumin Urine 100 (A) NEG^Negative mg/dL    Urobilinogen mg/dL 0.2 0.0 - 2.0 mg/dL    Nitrite Urine Negative NEG^Negative    Leukocyte Esterase Urine Trace (A)  NEG^Negative    Source Midstream Urine     WBC Urine 6 (H) 0 - 5 /HPF    RBC Urine 2 0 - 2 /HPF    Bacteria Urine Few (A) NEG^Negative /HPF    Squamous Epithelial /HPF Urine 9 (H) 0 - 1 /HPF    Mucous Urine Present (A) NEG^Negative /LPF   General PFT Lab (Please always keep checked)    Collection Time: 05/19/21  4:06 PM   Result Value Ref Range    FVC-Pred 2.66 L    FVC-Pre 2.67 L    FVC-%Pred-Pre 100 %    FEV1-Pre 1.94 L    FEV1-%Pred-Pre 92 %    FEV1FVC-Pred 80 %    FEV1FVC-Pre 73 %    FEFMax-Pred 5.53 L/sec    FEFMax-Pre 5.50 L/sec    FEFMax-%Pred-Pre 99 %    FEF2575-Pred 1.96 L/sec    FEF2575-Pre 1.35 L/sec    XWA3647-%Pred-Pre 69 %    ExpTime-Pre 7.09 sec    FIFMax-Pre 4.01 L/sec    VC-Pred 2.70 L    VC-Pre 2.58 L    VC-%Pred-Pre 95 %    IC-Pred 1.94 L    IC-Pre 1.79 L    IC-%Pred-Pre 92 %    ERV-Pred 0.76 L    ERV-Pre 0.78 L    ERV-%Pred-Pre 102 %    FEV1FEV6-Pred 80 %    FEV1FEV6-Pre 74 %    FRCPleth-Pred 2.48 L    FRCPleth-Pre 2.52 L    FRCPleth-%Pred-Pre 101 %    RVPleth-Pred 1.77 L    RVPleth-Pre 1.74 L    RVPleth-%Pred-Pre 98 %    TLCPleth-Pred 4.27 L    TLCPleth-Pre 4.32 L    TLCPleth-%Pred-Pre 101 %    DLCOunc-Pred 17.37 ml/min/mmHg    DLCOunc-Pre 14.33 ml/min/mmHg    DLCOunc-%Pred-Pre 82 %    DLCOcor-Pre 14.51 ml/min/mmHg    DLCOcor-%Pred-Pre 83 %    VA-Pre 3.53 L    VA-%Pred-Pre 87 %    FEV1SVC-Pred 78 %    FEV1SVC-Pre 75 %     No results found for: CINDY      Again, thank you for allowing me to participate in the care of your patient.        Sincerely,        Alana Giang MD

## 2021-05-25 LAB — COPATH REPORT: NORMAL

## 2021-05-27 LAB
A*: NORMAL
A*LOCUS SEROLOGIC EQUIVALENT: 3
A*LOCUS: NORMAL
A*SEROLOGIC EQUIVALENT: 29
AB TEST METHOD: NORMAL
B*: NORMAL
B*LOCUS SEROLOGIC EQUIVALENT: 7
B*LOCUS: NORMAL
B*SEROLOGIC EQUIVALENT: 44
BW-1: NORMAL
BW-2: NORMAL
C*: NORMAL
C*LOCUS SEROLOGIC EQUIVALENT: 7
C*LOCUS: NORMAL
C*SEROLOGIC EQUIVALENT: 16
DPA1*: NORMAL
DPA1*LOCUS: NORMAL
DPB1*: NORMAL
DPB1*LOCUS: NORMAL
DQA1*: NORMAL
DQA1*LOCUS: NORMAL
DQB1*: NORMAL
DQB1*LOCUS SEROLOGIC EQUIVALENT: 2
DQB1*LOCUS: NORMAL
DQB1*SEROLOGIC EQUIVALENT: 6
DRB1*: NORMAL
DRB1*LOCUS SEROLOGIC EQUIVALENT: 15
DRB1*LOCUS: NORMAL
DRB1*SEROLOGIC EQUIVALENT: 7
DRB4*LOCUS SEROLOGIC EQUIVALENT: 53
DRB4*LOCUS: NORMAL
DRB5*: NORMAL
DRB5*SEROLOGIC EQUIVALENT: 51
DRSSO TEST METHOD: NORMAL

## 2021-05-28 LAB
PROTOCOL CUTOFF: NORMAL
SA1 CELL: NORMAL
SA1 COMMENTS: NORMAL
SA1 HI RISK ABY: NORMAL
SA1 MOD RISK ABY: NORMAL
SA1 TEST METHOD: NORMAL
SA2 CELL: NORMAL
SA2 COMMENTS: NORMAL
SA2 HI RISK ABY UA: NORMAL
SA2 MOD RISK ABY: NORMAL
SA2 TEST METHOD: NORMAL
UNACCEPTABLE ANTIGEN: NORMAL
UNOS CPRA: 39

## 2021-06-01 ENCOUNTER — COMMUNICATION - HEALTHEAST (OUTPATIENT)
Dept: PALLIATIVE MEDICINE | Facility: OTHER | Age: 66
End: 2021-06-01

## 2021-06-01 DIAGNOSIS — M47.16 LUMBAR SPONDYLOSIS WITH MYELOPATHY: ICD-10-CM

## 2021-06-01 DIAGNOSIS — M47.816 LUMBAR FACET ARTHROPATHY: ICD-10-CM

## 2021-06-03 ENCOUNTER — TELEPHONE (OUTPATIENT)
Dept: NEUROSURGERY | Facility: CLINIC | Age: 66
End: 2021-06-03

## 2021-06-05 VITALS — BODY MASS INDEX: 25.39 KG/M2 | WEIGHT: 130 LBS

## 2021-06-07 ENCOUNTER — HOSPITAL ENCOUNTER (OUTPATIENT)
Dept: NUCLEAR MEDICINE | Facility: CLINIC | Age: 66
Setting detail: NUCLEAR MEDICINE
End: 2021-06-07
Attending: INTERNAL MEDICINE
Payer: COMMERCIAL

## 2021-06-07 ENCOUNTER — HOSPITAL ENCOUNTER (OUTPATIENT)
Dept: CARDIOLOGY | Facility: CLINIC | Age: 66
End: 2021-06-07
Attending: INTERNAL MEDICINE
Payer: COMMERCIAL

## 2021-06-07 VITALS — DIASTOLIC BLOOD PRESSURE: 78 MMHG | HEART RATE: 78 BPM | SYSTOLIC BLOOD PRESSURE: 152 MMHG

## 2021-06-07 DIAGNOSIS — I15.0 RENOVASCULAR HYPERTENSION: ICD-10-CM

## 2021-06-07 DIAGNOSIS — Z01.810 PRE-OPERATIVE CARDIOVASCULAR EXAMINATION: ICD-10-CM

## 2021-06-07 LAB
CV STRESS MAX HR HE: 92
RATE PRESSURE PRODUCT: NORMAL
STRESS ECHO BASELINE DIASTOLIC HE: 78
STRESS ECHO BASELINE HR: 78
STRESS ECHO BASELINE SYSTOLIC BP: 152
STRESS ECHO CALCULATED PERCENT HR: 59 %
STRESS ECHO LAST STRESS DIASTOLIC BP: 63
STRESS ECHO LAST STRESS SYSTOLIC BP: 122
STRESS ECHO TARGET HR: 157

## 2021-06-07 PROCEDURE — A9502 TC99M TETROFOSMIN: HCPCS | Performed by: INTERNAL MEDICINE

## 2021-06-07 PROCEDURE — 78452 HT MUSCLE IMAGE SPECT MULT: CPT | Mod: 26 | Performed by: INTERNAL MEDICINE

## 2021-06-07 PROCEDURE — 250N000011 HC RX IP 250 OP 636: Performed by: INTERNAL MEDICINE

## 2021-06-07 PROCEDURE — 93016 CV STRESS TEST SUPVJ ONLY: CPT | Performed by: STUDENT IN AN ORGANIZED HEALTH CARE EDUCATION/TRAINING PROGRAM

## 2021-06-07 PROCEDURE — 93018 CV STRESS TEST I&R ONLY: CPT | Performed by: INTERNAL MEDICINE

## 2021-06-07 PROCEDURE — 93017 CV STRESS TEST TRACING ONLY: CPT

## 2021-06-07 PROCEDURE — 343N000001 HC RX 343: Performed by: INTERNAL MEDICINE

## 2021-06-07 PROCEDURE — 78452 HT MUSCLE IMAGE SPECT MULT: CPT

## 2021-06-07 RX ORDER — ALBUTEROL SULFATE 90 UG/1
2 AEROSOL, METERED RESPIRATORY (INHALATION) EVERY 5 MIN PRN
Status: DISCONTINUED | OUTPATIENT
Start: 2021-06-07 | End: 2021-06-08 | Stop reason: HOSPADM

## 2021-06-07 RX ORDER — CAFFEINE CITRATE 20 MG/ML
60 SOLUTION INTRAVENOUS
Status: DISCONTINUED | OUTPATIENT
Start: 2021-06-07 | End: 2021-06-08 | Stop reason: HOSPADM

## 2021-06-07 RX ORDER — ACYCLOVIR 200 MG/1
0-1 CAPSULE ORAL
Status: DISCONTINUED | OUTPATIENT
Start: 2021-06-07 | End: 2021-06-08 | Stop reason: HOSPADM

## 2021-06-07 RX ORDER — REGADENOSON 0.08 MG/ML
0.4 INJECTION, SOLUTION INTRAVENOUS ONCE
Status: COMPLETED | OUTPATIENT
Start: 2021-06-07 | End: 2021-06-07

## 2021-06-07 RX ORDER — AMINOPHYLLINE 25 MG/ML
50-100 INJECTION, SOLUTION INTRAVENOUS
Status: COMPLETED | OUTPATIENT
Start: 2021-06-07 | End: 2021-06-07

## 2021-06-07 RX ADMIN — AMINOPHYLLINE 50 MG: 25 INJECTION, SOLUTION INTRAVENOUS at 16:33

## 2021-06-07 RX ADMIN — TETROFOSMIN 10.5 MCI.: 1.38 INJECTION, POWDER, LYOPHILIZED, FOR SOLUTION INTRAVENOUS at 14:03

## 2021-06-07 RX ADMIN — REGADENOSON 0.4 MG: 0.08 INJECTION, SOLUTION INTRAVENOUS at 15:29

## 2021-06-07 RX ADMIN — TETROFOSMIN 35.7 MCI.: 1.38 INJECTION, POWDER, LYOPHILIZED, FOR SOLUTION INTRAVENOUS at 15:30

## 2021-06-07 NOTE — PROGRESS NOTES
"Call received from Nuclear Medicine staff reporting that Alvarez was not feeling well yet post Lexiscan injection. Pt reported nausea, headache, and just feeling \"off.\" Per Dr. Quiros, ok to give 50mg IV Aminophylline and within minutes pt reported feeling much better, back to normal. Discharged to home.   "

## 2021-06-07 NOTE — PROGRESS NOTES
Pt here for Lexiscan nuclear stress test.  Medication and side effects reviewed with patient. Lung sounds clear to auscultation bilaterally.  Denied caffeine use.  Patient tolerated Lexiscan dose without any adverse reactions.  VSS.  Monitored post injection and then taken to  nuclear medicine for follow up imaging.

## 2021-06-10 ENCOUNTER — PRE VISIT (OUTPATIENT)
Dept: NEUROSURGERY | Facility: CLINIC | Age: 66
End: 2021-06-10

## 2021-06-10 NOTE — TELEPHONE ENCOUNTER
FUTURE VISIT INFORMATION      FUTURE VISIT INFORMATION:    Date: 6/16/2021    Time: 4pm    [place] [facility] [place]  REFERRAL INFORMATION:    Referring provider:      Referring providers clinic:      Reason for visit/diagnosis  Aneurysm    RECORDS REQUESTED FROM:       Clinic name Comments Records Status Imaging Status   Internal [facility] Brain-5/19/2021 Epic PACS

## 2021-06-15 ENCOUNTER — COMMUNICATION - HEALTHEAST (OUTPATIENT)
Dept: PALLIATIVE MEDICINE | Facility: OTHER | Age: 66
End: 2021-06-15

## 2021-06-15 NOTE — PROGRESS NOTES
New pt is being seen today by Janie Ulrich CNP, for consultation of back and lt hip pain.    Pain score 1   intermittent   What does your pain like feel during a flare? shooting  Does the pain interfere with:  Work ----- NA  Walking ------ yes  Sleep ------- yes  Daily activities ------yes  Relationships -------yes  F=7

## 2021-06-15 NOTE — PATIENT INSTRUCTIONS - HE
Plan Interventions recommended today:  Assessment tool-        Follow up in 3-4 weeks, we will discuss a pain treatment plan at that time, which may include narcotics and alternative therapies. OVL at the next visit.       Continue your current regimen of alleviating factors- recommend that Dr. Lowry continue cares for opioid management or consider transitioning to alternatives such as medical cannabis, buprenorphine or dilaudid due to kidney function. Patient reports that her PCP is comfortable prescribing.       Please see your current provider for any continued prescription, they may choose to provide you prescriptions of your narcotics until we have your urine screen back. They will continue to manage your general health and have requested you see the pain center for pain management. Please discuss any health concerns with your PCP       [place] Precautions are taken with every patient which includes a  report and drug screen. A UA will be taken today for baseline screening.       Behavioral Therapy- continue cares as you are.      Physical and Occupational Therapy- Referral to PT for kinetic pt for postural restoration- noted torsion in the thoracic spine or lateral bend.       Imaging- xray of the lumbar spine and MRI due to the ongoing pain in your low back and radicular symptoms.  Please go to radiology to get- they should call you for appointment, will review at the next appointment for possible injections.       Acupuncture- this is an option if you want to try and is available here at the pain center      Schedule trigger points for the right sacrum as needed with Dr. White here at the pain center       Discussed SCS- patient declines       MTM visit with the pharmacist      Non-opoid medical management includes-     Some folks want to use CBD oil for pain control- it is made from the Hemp plant, that is ok, however it is difficult to find a reputable source, currently [employer]  online is a good resource. If you are employed check with your employer prior to starting.     Education was provided to the patient today in regards to their specific diagnosis.    As a reminder- chronic pain is generally stable, if you experience a new injury or new different pain it is expected that you present to the ED or urgent care for evaluation. DO NOT use your chronic pain medications to treat any new or different pain other then what it is intended for. We do not replace any lost or stolen prescriptions or provide early refills for overtaking your medications.         Orders placed today   Orders Placed This Encounter   Procedures     MR Lumbar Spine Without Contrast     Standing Status:   Future     Standing Expiration Date:   2/16/2022     Order Specific Question:   Can the procedure be changed per Radiologist protocol?     Answer:   Yes     Order Specific Question:   If this is a diagnostic procedure, have the patient's age and recent imaging history been considered?     Answer:   Yes     Order Specific Question:   Is the patient claustrophobic and in need of sedation to complete their MR scan?     Answer:   No     XR Lumbar Spine 4 or More VWS     AP, Lateral, Flexion, Extension views     Standing Status:   Future     Standing Expiration Date:   2/16/2022     Order Specific Question:   Reason for Exam (Describe Symptoms):     Answer:   ongoing low back pain     Order Specific Question:   Can the procedure be changed per Radiologist protocol?     Answer:   Yes     Ambulatory referral to Physical Therapy     Referral Priority:   Routine     Referral Type:   Physical Therapy     Referral Reason:   Evaluation and Treatment     Requested Specialty:   Physical Therapy     Number of Visits Requested:   1       Patient reminders:   Diagnostics: UDT/SWAB collected 2/16/2021 and results are pending.  UDT/SWAB:  Patient required a random Urine Drug Testing, due to the need to comply with [facility] Model Policy  Guidelines and CDC Guideline for the use of any controlled substances. This is to ensure that patient is compliant with treatment, and monitor for risks such as diversion, abuse, or any other aberrant behaviors. Patient is either being considered for or taking a controlled substance. Unexpected findings will be discussed and treatment decision may be adjusted. Testing is being implemented across the board randomly w/o bias related to age, race, gender, socioeconomic status or Rastafari affiliation.     SAFETY REMINDERS  No alcohol while taking controlled substances. Alcohol is not an illegal substance, it is unsafe to use in combination. It is a build up of substances in the body that can be extremely hazardous and may cause respirations to slow to a dangerous rate resulting in hospitalization, brain damage, or death.    Opioid medications have been associated with sharp rise in unintentional overdose and death.  Overdose is a condition characterized by the consumption in excess of a particular drug causing adverse effects. This can happen b/c you are sick, accidentally or intentionally took an extra dose, are on multiple medication that can interact. Someone took your medication and they are not use to the medication.  Symptoms of overdose include:   !breathing slow and shallow, erratic or not at all  !pinpoint pupils, hallucinations  !confusion  !muscle jerks, slack muscles   !extreme sleepiness or loss of alertness   !awake but not able to talk   !face pale or clammy, vomiting, for lighter skinned people, the skin tone turns bluish purple, for darker skinned people, it turns grayish or ashen   If in a situation where overdose is a concern engage the emergency response system (dial 911).    In one study it was noted that 80% of unintentional overdoses occurred in people who were taking a combination of opioids and benzodiazepines.    Do not sell, loan, borrow or share your opioid medication with anyone. Deaths  have occurred as a result of this practice. It is illegal and patients are being prosecuted.     Prevent unexpected access/loss of medication: Keep medication locked. Only carry what you need with you.    The patient agrees to the plan and has no further questions, if questions arise the patient knows to call [phone].     11:12 AM        Thank you for this consult and opportunity to assist with this patients care.    Janie Ulrich, [facility]  [address]  [place] [address]  Ph: [phone]  Fax: [phone]

## 2021-06-15 NOTE — PROGRESS NOTES
Medication Therapy Management (MTM) Encounter                                                            Pt was called for MTM follow-up visit. Reports she had a difficult dialysis session yesterday and requested appt be rescheduled.     Appt rescheduled for next Thursday, 3/11.       Tanesha ShaferD  Medication Therapy Management (MTM) Pharmacist  [facility]

## 2021-06-15 NOTE — PATIENT INSTRUCTIONS - HE
POST PROCEDURE INSTRUCTIONS  PROCEDURE DONE TODAY: TRIGGER POINT INJECTIONS    Rest today. Resume activity tomorrow as able.  Patient demonstrates the ability to ambulate independently with a steady gait at the time of discharge.      It is not unusual to have a temporary increase in your pain after a procedure. You can ice the area 24-48 hrs. 15 min at a time.      You received a steroid injection. This medication can take 2-7 days to take effect. Some temporary side effects include:    Heartburn    Flushed-red face    Insomnia-trouble sleeping-jitters    Diabetics may see a rise in blood sugar for a few days    Low back or SI joint (sacroiliac) injection: you may experience numbness in one or both legs. Please walk with help. This is temporary and will wear off in a few hours. Do not drive if you are tingling, numbness or weakness in your hands/arms or legs/feet.    Headache:  If you experience a headache after a lumbar epidural injection, lie down and drink fluids (especially caffeine). The headache will go away gradually. If it persists notify our clinic.    Fever: call if fever 100 or over, redness/warmth/swelling over the injection site.    No public hot tubs/pools for a couple days    Physical therapy: check with pain center provider regarding resuming therapy.    Monitor your response to the injection and report this at your next visit.    If you have been referred for a procedure only, please call your referring provider for a follow up.    IF YOU PLAN TO GET A FLU SHOT YOU MUST WAIT 2 WEEKS AFTER THIS INJECTION.      CALL IF ANY QUESTIONS [phone]

## 2021-06-15 NOTE — PROGRESS NOTES
Injection - Other    Date/Time: 3/9/2021 2:10 PM  Performed by: Gucci White MD  Authorized by: Gucci White MD   Comments:     TRIGGER POINT INJECTION  Performed on: 3/9/2021    Ms. Tipton is a 65 year old woman with pain over the sacrum and coccyx area in the midline.  It is felt there is a significant component of myofascial pain and she is referred for a trial of trigger point injection.    Pre Procedure Diagnosis:  Myofascial pain  Vital Signs:  As per intra-procedure documentation    The procedure was discussed with Serene Tipton in detail along with the attendant risks, including but not limited to: nerve injury, infection, bleeding, allergic reaction, or worsening of pain.  Informed consent was obtained and patient elected to proceed.    After informed consent was obtained, the patient was seated in the examination chair.  The lower sacral area was prepped sterilely with alcohol.  A 1 1/4 inch #27 gauge needle was used.  There were injections made on either side of the coccyx and over the midline lower sacrum.  A total of 8 mL of 0.25% Marcaine with 10 mg of decadron was used in divided doses.     The patient tolerated the procedure well.  There were no complications.      Pre Procedure Pain Scale: 4  Pain Score:   4(right side sacrum)    If Serene Tipton has any questions or concerns after discharge, she was instructed to call us.

## 2021-06-15 NOTE — PATIENT INSTRUCTIONS - HE
Recommendations from today's MTM visit:                                                    MTM (medication therapy management) is a service provided by a clinical pharmacist designed to help you get the most of out of your medicines. and Today we reviewed what your medicines are for, how to know if they are working, that your medicines are safe and how to make your medicine regimen as easy as possible.     1. Recommend starting  Curcumin with Boswellia 400-500 mg tabs three times a day     2. I sent in a prescription for Nicotine 21 mg patch. Use 1 patch once daily.     3. Amitriptyline can cause dry mouth in older adults and those with lower kidney function. Recommend talking to your psychiatrist about switching to a similar medication called Nortriptyline.       It was great to speak with you today.  I value your experience and would be very thankful for your time with providing feedback on our clinic survey. You may receive a survey via email or text message in the next few days.     Next MTM visit: March 4 at 12:30 - by phone.     To schedule another MTM appointment, please call the clinic directly or you may call the MTM scheduling line at [phone] or toll-free at [phone].     My Clinical Pharmacist's contact information:                                                      It was a pleasure seeing you today!  Please feel free to contact me with any questions or concerns you have.      Chucky Damon, PharmD  Medication Therapy Management (MTM) Pharmacist  [facility]

## 2021-06-16 ENCOUNTER — RECORDS - HEALTHEAST (OUTPATIENT)
Dept: ADMINISTRATIVE | Facility: OTHER | Age: 66
End: 2021-06-16

## 2021-06-16 ENCOUNTER — VIRTUAL VISIT (OUTPATIENT)
Dept: NEUROSURGERY | Facility: CLINIC | Age: 66
End: 2021-06-16
Payer: COMMERCIAL

## 2021-06-16 ENCOUNTER — TELEPHONE (OUTPATIENT)
Dept: TRANSPLANT | Facility: CLINIC | Age: 66
End: 2021-06-16

## 2021-06-16 ENCOUNTER — HOSPITAL ENCOUNTER (OUTPATIENT)
Dept: PALLIATIVE MEDICINE | Facility: OTHER | Age: 66
Discharge: HOME | End: 2021-06-16
Attending: PAIN MEDICINE | Admitting: PAIN MEDICINE

## 2021-06-16 DIAGNOSIS — M47.16 LUMBAR SPONDYLOSIS WITH MYELOPATHY: ICD-10-CM

## 2021-06-16 DIAGNOSIS — M12.88 OTHER SPECIFIC ARTHROPATHIES, NOT ELSEWHERE CLASSIFIED, OTHER SPECIFIED SITE: ICD-10-CM

## 2021-06-16 DIAGNOSIS — M47.816 LUMBAR FACET ARTHROPATHY: ICD-10-CM

## 2021-06-16 DIAGNOSIS — I67.1 BRAIN ANEURYSM: Primary | ICD-10-CM

## 2021-06-16 PROCEDURE — 99203 OFFICE O/P NEW LOW 30 MIN: CPT | Mod: 95 | Performed by: NEUROLOGICAL SURGERY

## 2021-06-16 ASSESSMENT — MIFFLIN-ST. JEOR: SCORE: 1066.18

## 2021-06-16 NOTE — PROGRESS NOTES
"Serene Tipton is a 65 y.o. female who is being evaluated via a billable telephone visit.      What phone number would you like to be contacted at? [phone]  How would you like to obtain your AVS? AVS Preference: MyChart.    Pain score 5  intermittent   What does your pain like feel during a flare? \"it hurts, like electricity\"  Does the pain interfere with:  Work ----- NA  Walking ------ yes  Sleep ------- yes  Daily activities ------yes, cleaning  Relationships -------no  F=6    UDT 2/2021      "

## 2021-06-16 NOTE — LETTER
6/16/2021       RE: Serene Tipton  [address]  [place] [address]     Dear Colleague,    Thank you for referring your patient, Serene Tipton, to the [facility] NEUROSURGERY [facility]INIC [place] at [facility]. Please see a copy of my visit note below.    Unable to reach pt., LVM - June 16, 2021 - BB.        Alvarez is a 65 year old who is being evaluated via a billable video visit.        Video-Visit Details    Type of service:  Video Visit    Video Time:20 min        Distant Location (provider location):  [facility] NEUROSURGERY [facility]           Please see dictation for visit details.      Again, thank you for allowing me to participate in the care of your patient.      Sincerely,    Catalino Nice MD

## 2021-06-16 NOTE — PROGRESS NOTES
Service Date: 2021    Ramos Lowry MD  [facility]  [address]  [place] [address]    RE:      Serene Tipton  MRN:  [id number]  :   1955    Dear Dr. Lowry:    I had the pleasure to talk to Orin Tipton today during a neurosurgical video clinic visit.  Orin gave consent for this visit.    Briefly, Orin is a 65-year-old woman who lives in [place] and previously worked for 15 years at [employer].  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    Orin had an MR angiogram that was performed on 2021, which demonstrated a right and left aneurysm, both measuring about 1.5 mm in the cavernous segments of the right and left carotid arteries.  She was asymptomatic of these aneurysms and has been referred to me for evaluation and treatment.    I had a very enjoyable conversation with Orin.  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  We spent a lot of time talking about her brain aneurysms, natural history of aneurysms.  In particular, the location of the aneurysms is important in her case as these are located in the cavernous sinus.  As such, even if these were to rupture, the blood would be contained in the cavernous sinus and would not bleed into and around the brain.  There would be a risk of a CC fistula.  The aneurysms are 1.5 mm in diameter and at that size, there is very little risk that these would rupture and again if they were, they would be contained in the cavernous sinus.  As such, I have told her that I do not believe that there is any need for further evaluation or followup of these aneurysms, and she needs to only follow up with me as needed.  She was very relieved by this and agreeable to the plan.    Sincerely,    Catalino Nice MD        D:  2021   T: 2021   MT: al    Name:     MJ GODINEZ  MRN:      [id number]        Account:      [id number]   :      1955           Service Date: 2021       Document: [id number]

## 2021-06-16 NOTE — TELEPHONE ENCOUNTER
Returned Alvarez's call. She wanted to update me on her progress. She stopped smoking on 6/13/2021 and has started using the patches. She previously quit for 9 years. She is working with a pain clinic and will be getting steroid injections today. She also had questions about grants for a potential live donor. I told her I would message SW and ask them to call her.

## 2021-06-16 NOTE — PROGRESS NOTES
Unable to reach pt., LVM - June 16, 2021 - BB.        Alvarez is a 65 year old who is being evaluated via a billable video visit.        Video-Visit Details    Type of service:  Video Visit    Video Time:20 min        Distant Location (provider location):  [facility] NEUROSURGERY [facility]           Please see dictation for visit details.

## 2021-06-16 NOTE — PROGRESS NOTES
"Serene Tipton is a 65 y.o. female who is being evaluated with [facility] or [facility] services- via video       Start time- 10:22 am   End time- 10:57 am     Subjective-    I have reviewed and updated the patient's Past Medical History, Social History, Family History and Medication List as well as the Precharting workup by the [facility].     PAIN CLINIC FOLLOW UP PROGRESS NOTE    CC:  Chief Complaint   Patient presents with     Follow-up     back and lt leg pain       HPI  Serene Tipton is a 65 y.o. female who presents for evaluation   Chief Complaint   Patient presents with     Follow-up     back and lt leg pain    that is causing continued pain. Specific questions indicated the patient wanted addressed today include: ongoing chronic pain evalauation        Objective-  Major issues:  1. Chronic pain syndrome    2. Other specific arthropathies, not elsewhere classified, other specified site         Pain score 5  intermittent   What does your pain like feel during a flare? \"it hurts, like electricity\"  Does the pain interfere with:  Work ----- NA  Walking ------ yes  Sleep ------- yes  Daily activities ------yes, cleaning  Relationships -------no  F=6     UDT 2/2021       ALLERGIES  Morphine, Nsaids (non-steroidal anti-inflammatory drug), Penicillins, and Sulfa (sulfonamide antibiotics)    Previous Medical History  Social History     Substance and Sexual Activity   Alcohol Use None     Social History     Substance and Sexual Activity   Drug Use Not on file     Social History     Tobacco Use   Smoking Status Current Every Day Smoker     Packs/day: 0.50     Years: 14.00     Pack years: 7.00     Types: Cigarettes   Smokeless Tobacco Never Used       Pertinent Pain Medications/interventions:    She currently takes Hydrocodone 5/325 mg up to 8 per day    Historically she also used Darvocet.     Previously tried medications:    NSAIDs: none- CKD     Acetaminophen: in the norco     Antidepressants: elavil, clonidine, " lorazepam     Gabapentinoids: none at this time     Topicals: none at this time previously tried lyrica and gabapentin.     Supplements: B vitmains    Muscle Relaxants: previously tried cyclobenzaprine, tried lidocaine patches which didn't help either.       Medications    Current Outpatient Medications:      amitriptyline (ELAVIL) 100 MG tablet, TAKE ONE AND ONE HALF (1.5) TABLETS BY MOUTH DAILY, Disp: , Rfl:      amLODIPine (NORVASC) 5 MG tablet, Take 5 mg by mouth daily., Disp: , Rfl:      calcium, as carbonate, (TUMS) 200 mg calcium (500 mg) chewable tablet, 1000 mg with meals and 500 mg with snacks po., Disp: , Rfl:      cholecalciferol, vitamin D3, 25 mcg (1,000 unit) capsule, Take 1 capsule by mouth., Disp: , Rfl:      cloNIDine HCL (CATAPRES) 0.1 MG tablet, TAKE 2 TABLETS BY MOUTH DAILY AT BEDTIME. 1 HR PRIOR TO BEDTIME FOR SLEEP., Disp: , Rfl:      furosemide (LASIX) 20 MG tablet, TAKE 1 2 TABS BY MOUTH TWICE DAILY TO CONTROL LEG SWELLING., Disp: , Rfl:      HYDROcodone-acetaminophen (NORCO )  mg per tablet, , Disp: , Rfl:      labetaloL (NORMODYNE) 200 MG tablet, TAKE 1.5 TABLETS BY MOUTH TWO TIMES A DAY., Disp: , Rfl:      lisinopriL (PRINIVIL,ZESTRIL) 40 MG tablet, 1 tab(s), Disp: , Rfl:      rosuvastatin (CRESTOR) 10 MG tablet, Take 10 mg by mouth daily., Disp: , Rfl:      sertraline (ZOLOFT) 100 MG tablet, Take 200 mg by mouth daily., Disp: , Rfl:      vitamin E acid succinate (VITAMIN E SUCCINATE) 400 unit Tab, Take 800 mg by mouth., Disp: , Rfl:      sevelamer carbonate (RENVELA) 800 mg tablet, TAKE 1 TABLET BY MOUTH WITH EACH MEAL, Disp: , Rfl:       Lab:  Last UDS on 2/16/2021 had expected results. Last UDT on file was reviewed.    Imaging:  MRI of the low back   IMPRESSION:   1.  Moderate to severe degenerative facet changes at L4-L5 are progressed from previous examination. There are bilateral facet effusions and worsening moderate right and mild left neural foraminal stenoses.  2.   Marrow edema involving the bilateral L4 and L5 facets and bilateral L5 pedicles, favored to be degenerative/reactive. This may be a source of pain.    Xray of low back   Osteopenia. 5 nonrib-bearing lumbar type vertebral bodies. Mild rightward convex curvature of the thoracolumbar junction with the apex at L1. Exaggerated lumbar spine lordosis. Vertebral body heights are maintained. Grade 2 anterolisthesis of   L5 on S1 measuring 9 mm. Anterior fusion changes at L5-S1 with solid fusion across the disc space. Moderate degenerative facet changes at L4-L5. Mild degenerative facet changes at L3-L4. No dynamic instability with flexion or extension. Atherosclerotic   vascular calcifications in the abdomen.      Recent   Dated 3/25/2021 was reviewed.       Assessment:     Serene Tipton is a 65 y.o. female seen in clinic today for   Chief Complaint   Patient presents with     Follow-up     back and lt leg pain       New concerns today-ongoing chronic pain in the low back     Plan of care going forward for ongoing pain control- she did have the trigger point injections which did not provide her any relief, sometimes seams of clothes hurt if it is putting pressure on it. She did have the L4-5 foraminal stenosis bilaterally with radicular pain mostly on the left. She does have a L4-5 facet joint pain that is the worst level, however she also has facet hypertrophy at levels L2-3, L3-4, L4-5- patient would like to discuss with Dr. Lowry prior to getting.     Patient is very pleased with the postural restoration and feels that it is very beneficial- will recommend continuation    Education provided to patient in regards to the ongoing use of curcumin.     Patients current MME is 62- her pcp will continue to manage this.     Plan:   Interventions-    Follow up in 8 weeks or PRN  MTM referral recommendations-  -Recommend Curcumin with Boswellia three times a day - education was discussed with patient today. Smarter  nutrition is a good brand that is potent. 400 mg up to 3 times a day.     -Recommend switch from Amitriptyline to Nortriptyline (pt to discuss with psychiatry)     Continue the Kinetic PT as you are.     Follow up with PCP for calcifications noted in Aorta.     Will have staff call the patient to provide an IT number for My chart access.     Call the pain center if you would like to trial the lumbar MBB- medial branch blocks to proceed to the RFA if you are a candidate. Levels L2-L5- bilaterally       The patient understand todays plan and has their questions answered in regards to expectations and current treatment plan.     Prevent unexpected access/loss of medication: Keep medication locked. Only carry what you need with you    The plan of care will be adjusted to accommodate the issues discussed, discussing management of their care and follow up that is recommended. 3/25/2021         Janie Ulrich [facility]  [address]  [place] [address]  Ph: [phone]  Fax: [phone]

## 2021-06-16 NOTE — PATIENT INSTRUCTIONS - HE
Plan:   Interventions-    Follow up in 8 weeks or PRN  MTM referral recommendations-  -Recommend Curcumin with Boswellia three times a day - education was discussed with patient today. Smarter nutrition is a good brand that is potent. 400 mg up to 3 times a day.     -Recommend switch from Amitriptyline to Nortriptyline (pt to discuss with psychiatry)     Continue the Kinetic PT as you are.     Follow up with PCP for calcifications noted in Aorta.     Will have staff call the patient to provide an IT number for My chart access.     Call the pain center if you would like to trial the lumbar MBB- medial branch blocks to proceed to the RFA if you are a candidate. Levels L2-L5- bilaterally       The patient understand todays plan and has their questions answered in regards to expectations and current treatment plan.     Prevent unexpected access/loss of medication: Keep medication locked. Only carry what you need with you    The plan of care will be adjusted to accommodate the issues discussed, discussing management of their care and follow up that is recommended. 3/25/2021         Janie Ulrich CNP  [facility]  [address]  [place] [address]  Ph: [phone]  Fax: [phone]

## 2021-06-17 ENCOUNTER — COMMUNICATION - HEALTHEAST (OUTPATIENT)
Dept: PALLIATIVE MEDICINE | Facility: OTHER | Age: 66
End: 2021-06-17

## 2021-06-18 ENCOUNTER — TRANSFERRED RECORDS (OUTPATIENT)
Dept: HEALTH INFORMATION MANAGEMENT | Facility: CLINIC | Age: 66
End: 2021-06-18

## 2021-06-21 NOTE — LETTER
Letter by Chucky Damon PharmD at      Author: Chucky Damon, [place] Service: -- Author Type: --    Filed:  Date of Service:  Status: (Other)           2021    Serene Danuta  [address]   [place] [address]        Dear Serene Tipton     Thank you for talking with me on 2021 about your health and medications. [facility] MTM (Medication Therapy Management) program helps you understand your medications and use them safely.      This letter includes an action plan (Medication Action Plan) and medication list (Personal Medication List). The action plan has steps you should take to help you get the best results from your medications. The medication list will help you keep track of your medications and how to use them the right way.       Have your action plan and medication list with you when you talk with your doctors, pharmacists, and other healthcare providers in your care team.     Ask your doctors, pharmacists, and other healthcare providers to update the action plan and medication list at every visit.     Take your medication list with you if you go to the hospital or emergency room.     Give a copy of the action plan and medication list to your family or caregivers.     If you want to talk about this letter or any of the papers with it, please call   [phone].   We look forward to working with you, your doctors, and other healthcare providers to help you stay healthy through the [employer] [place] MTM program.    Sincerely,    Chucky Damon    Enclosed: Medication Action Plan and Personal Medication List  _  Medication Action Plan For Serene Tipton, : 1955     This action plan will help you get the best results from your medications if you:     1. Read What we talked about.   2. Take the steps listed in the What I need to do boxes.   3. Fill in What I did and when I did it.   4. Fill in My follow-up plan and Questions I want to ask.     Have this  action plan with you when you talk with your doctors, pharmacists, and other healthcare providers in your care team. Share this with your family or caregivers too.    Date Prepared: 2021      What we talked about: You cannot use NSAIDs due to your kidney function.  You may find a turmeric supplement beneficial to help reduce inflammation.                                                  What I need to do: I recommend trying turmeric with Boswellia 400-500 mg three times a day.  What I did and when I did it:                                              What we talked about: Your transplant team wants you to quit smoking to qualify for a kidney transplant.                                                  What I need to do: Start nicotine 21 mg patch once daily. What I did and when I did it:                                              What we talked about: Medicines like amitriptyline can cause more side effects, like dry mouth, and older adults and those with reduced kidney function.  You may benefit from switching to a similar medicine called nortriptyline.                                                  What I need to do: Talk to your psychiatrist about switching from amitriptyline to nortriptyline. What I did and when I did it:                                                My follow-up plan:                 Questions I want to ask:              If you have any questions about your action plan, call Chucky Damon, Phone: [phone] , Monday-Friday 8-4:30pm.           PERSONAL MEDICATION LIST FOR Serene MEDLEY Irisnathalybenny, ISAAC 1955     This medication list was made for you after we talked. We also used information from your doctor's chart.      Use blank rows to add new medications. Then fill in the dates you started using them.    Cross out medications when you no longer use them. Then write the date and why you stopped using them.    Ask your doctors, pharmacists, and other healthcare providers to update  this list at every visit. Keep this list up-to-date with:       Prescription medications    Over the counter drugs     Herbals    Vitamins    Minerals      If you go to the hospital or emergency room, take this list with you. Share this with your family or caregivers too.     DATE PREPARED: 2/25/2021    Allergies or side effects: morphine; nsaids (non-steroidal anti-inflammatory drug); penicillins; sulfa (sulfonamide antibiotics)      Medication: amitriptyline (ELAVIL) 100 MG tablet      How I use it: TAKE ONE AND ONE HALF (1.5) TABLETS BY MOUTH DAILY      Why I use it:     Prescriber: PROVIDER, HISTORICAL      Date I started using it:     Date I stopped using it:       Why I stopped using it:          Medication: amLODIPine (NORVASC) 5 MG tablet      How I use it: Take 5 mg by mouth daily.      Why I use it:     Prescriber: PROVIDER, HISTORICAL      Date I started using it:     Date I stopped using it:       Why I stopped using it:          Medication: calcium, as carbonate, (TUMS) 200 mg calcium (500 mg) chewable tablet      How I use it: 1000 mg with meals and 500 mg with snacks po.      Why I use it:     Prescriber: PROVIDER, HISTORICAL      Date I started using it:     Date I stopped using it:       Why I stopped using it:          Medication: cholecalciferol, vitamin D3, 25 mcg (1,000 unit) capsule      How I use it: Take 1 capsule by mouth.      Why I use it:     Prescriber: PROVIDER, HISTORICAL      Date I started using it:     Date I stopped using it:       Why I stopped using it:          Medication: cloNIDine HCL (CATAPRES) 0.1 MG tablet      How I use it: TAKE 2 TABLETS BY MOUTH DAILY AT BEDTIME. 1 HR PRIOR TO BEDTIME FOR SLEEP.      Why I use it:     Prescriber: PROVIDER, HISTORICAL      Date I started using it:     Date I stopped using it:       Why I stopped using it:          Medication: furosemide (LASIX) 20 MG tablet      How I use it: TAKE 1 2 TABS BY MOUTH TWICE DAILY TO CONTROL LEG SWELLING.       Why I use it:     Prescriber: PROVIDER, HISTORICAL      Date I started using it:     Date I stopped using it:       Why I stopped using it:          Medication: HYDROcodone-acetaminophen 5-325 mg per tablet      How I use it: TAKE 2 TABLETS BY MOUTH 4 TIMES A DAY      Why I use it:     Prescriber: PROVIDER, HISTORICAL      Date I started using it:     Date I stopped using it:       Why I stopped using it:          Medication: hydrOXYzine HCL (ATARAX) 25 MG tablet      How I use it:       Why I use it:     Prescriber: PROVIDER, HISTORICAL      Date I started using it:     Date I stopped using it:       Why I stopped using it:          Medication: labetalol (TRANDATE; NORMODYNE) 100 MG tablet      How I use it: Take 1 tablet (100 mg total) by mouth 2 (two) times a day.      Why I use it: Hypertension    Prescriber: Vance Mota MD      Date I started using it:     Date I stopped using it:       Why I stopped using it:          Medication: lisinopriL (PRINIVIL,ZESTRIL) 40 MG tablet      How I use it: 1 tab(s)      Why I use it:     Prescriber: PROVIDER, HISTORICAL      Date I started using it:     Date I stopped using it:       Why I stopped using it:          Medication: nicotine (NICODERM CQ) 21 mg/24 hr      How I use it: Place 1 patch on the skin daily.      Why I use it: Tobacco Abuse    Prescriber: Janie Ulrich CNP      Date I started using it:     Date I stopped using it:       Why I stopped using it:          Medication: rosuvastatin (CRESTOR) 10 MG tablet      How I use it: Take 10 mg by mouth daily.      Why I use it:     Prescriber: PROVIDER, HISTORICAL      Date I started using it:     Date I stopped using it:       Why I stopped using it:          Medication: sertraline (ZOLOFT) 100 MG tablet      How I use it: Take 200 mg by mouth daily.      Why I use it:     Prescriber: PROVIDER, HISTORICAL      Date I started using it:     Date I stopped using it:       Why I stopped using it:           Medication: vitamin E acid succinate (VITAMIN E SUCCINATE) 400 unit Tab      How I use it: Take 800 mg by mouth.      Why I use it:     Prescriber: PROVIDER, HISTORICAL      Date I started using it:     Date I stopped using it:       Why I stopped using it:          Medication:       How I use it:       Why I use it:     Prescriber:       Date I started using it:     Date I stopped using it:       Why I stopped using it:          Medication:       How I use it:       Why I use it:     Prescriber:       Date I started using it:     Date I stopped using it:       Why I stopped using it:          Medication:       How I use it:       Why I use it:     Prescriber:       Date I started using it:     Date I stopped using it:       Why I stopped using it:          Other Information:           If you have any questions about your medication list, call Chucky Damon, Phone: [phone] , Monday-Friday 8-4:30pm.    According to the Paperwork Reduction Act of 1995, no persons are required to respond to a collection of information unless it displays a valid OMB control number. The valid OMB number for this information collection is [id number]. The time required to complete this information collection is estimated to average 40 minutes per response, including the time to review instructions, searching existing data resources, gather the data needed, and complete and review the information collection. If you have any comments concerning the accuracy of the time estimate(s) or suggestions for improving this form, please write to: CMS, Attn: ANDREY Reports Clearance Officer, [address] [place] [phone].

## 2021-06-25 ENCOUNTER — TELEPHONE (OUTPATIENT)
Dept: TRANSPLANT | Facility: CLINIC | Age: 66
End: 2021-06-25

## 2021-06-25 NOTE — TELEPHONE ENCOUNTER
Transplant Social Work Services Phone Call      Data: Informed by transplant coordinator patient had questions regarding donor.  Intervention: Patient indicated friend of hers has offered to be a donor but is still working and wanted to know if there is any assistance with lost wages.    Assessment: Writer indicated there was and assistance would be explained to donor during their evaluation. Writer informed not knowing all the specific as only works with recipients.  Did explain even if donor is in perfect health and perfect match, would be declined if the donor indicated they could not afford to take off work for recovery if unable to receive assistance.  Patient indicated understanding and will have donor contact the transplant office to start the process.  Plan:  SW will remain available if further issues are identified.

## 2021-06-26 NOTE — PATIENT INSTRUCTIONS - HE
POST PROCEDURE          INITIAL BILATERAL L3 AND L4  MEDIAL BRANCH BLOCK        Please continue your regular activity and keep track of your pain for the next 5 hours on the pain sheet that is given to you. Please return this to Pain Center tomorrow or asap via fax, mail, or drop off at clinic.    This is a diagnostic test to determine if these are the correct nerves causing your pain.    The objective of this procedure is to have your pain decrease. However it will return again that same day or a few days later. Once your pain returns it may stay the same or get worse.    You may feel some numbness or tingling, which is from the local anesthetic and may last up to several hours.    If pain returns please document on the pain analog sheet when you took your pain medication.    Fever: call if fever 100 or over, redness/warmth/swelling over injection site.    No public hot tubs/pools for a couple of days.    Monitor your response to the injection and report this at your next visit.    IF YOUR PAIN SHEET DOES NOT DEMONSTRATE ENOUGH RELIEF FOR PROCEDURE WE WILL CALL YOU.      IF ANY QUESTIONS CALL  PAIN CENTER  [phone]

## 2021-06-29 ENCOUNTER — TRANSFERRED RECORDS (OUTPATIENT)
Dept: HEALTH INFORMATION MANAGEMENT | Facility: CLINIC | Age: 66
End: 2021-06-29

## 2021-06-30 NOTE — PROGRESS NOTES
Progress Notes by Chucky Damon, [place] at 3/11/2021 12:30 PM     Author: Chucky Damon PharmD Service: -- Author Type: Pharmacist    Filed: 3/11/2021  1:23 PM Date of Service: 3/11/2021 12:30 PM Status: Signed    : Chucky Damon PharmD (Pharmacist)       Medication Therapy Management (MTM) Encounter    Assessment:                                                        Medication Adherence/Access: Med list reconciled. Not able to address adherence due to time. Will review at follow-up.     Chronic Pain: Per chart review, consideration for butrans. Would likely be an appropriate option. Reviewed recommended dosing of Turmeric which was sent out to patient via AVS last time. Still okay to try supplementation. Will hold on other further changes to better assess affects of recent trigger points.        Depression/Insomnia: Has not yet followed-up with psychiatry to discuss previous recommendation to try nortriptyline in place of amitriptyline given dry mouth.       Hypertension/CKD: Last BP improved from prevoius but just above goal <130/80. Following with transplant team. Discussed working on smoking cessation (see below). Edema well managed.       Smoking Cessation: Motivated to quit so she can qualify for transplant but not yet ready to quit. Continue to ask and offer assistance.     Low Vitamin D: Last levels WNL       Hyperlipidemia: Last LDL at goal <100, but this was nearly 2 years ago. Pt was unable to find her recent recheck at time of call. Consider updating at follow-up.       Plan:                                                       -Recommend Curcumin with Boswellia three times a day   -Recommend switch from Amitriptyline to Nortriptyline (pt to discuss with psychiatry)     Follow Up  3/25 with Janie Ulrich, CNP    Schedule with PharmD as needed       Subjective & Objective                                                     Serene Tipton is a 65 y.o. female called for a follow up  "visit for Medication Therapy Management. She was referred to me from  Janie Ulrich CNP        Chief Complaint: Medication Management      Medication Adherence/Access: Med list reconciled. Sets up pills at night for the next day. Doesn't use a pill box. Once in a while will forget a morning dose of medications. Especially on dialysis days - puts meds on kitchen table so that she doesn't forget which has helped.       Chronic Pain: Prescribed amitriptyline 100 mg 1.5 tabs daily, hydrocodone-acetaminophen  mg 1 tab Q4H as needed. Established with Janie Ulrich CNP on 2/16/21.     Notes that PCP increased Yermo dose per tablet, so she would be taking less Acetaminophen. (Previously was using 2 tabs of 5-325 mg four times a day)      Also using Using Osteo BiFlex.     Fairly stable. Depends on activity levels. Pain usually at the end of the day and overnight.     Had Trigger points on 3/9 - that was one of the easiest injections she had. Area is very tender right now.     L5-S1- when if flares, runs down the side and back of the leg. At the fusion sight, it's like an electrocution.     Typically not using Norco during the day. 15-40 mg in a 24 hour period.     Does have quite a bit of dry mouth. No constipation.     Notes that NSAID were previously helpful, but cannot currently use do to CKD. \"Advil use to be my best friend\" Discussed trying Turmeric at last visit. Looked at getting online, noticed different strengths. Wondering what the dose would be.     Intake notes that gabapentin cause excessive sedation, but pt clarifies that it caused a mental fog. Also wasn't very helpful for pain.       Depression/Insomnia: Prescribed amitriptyline 100 mg 1.5 tabs daily, clonidine 0.1 mg 2 tabs at bedtime, hydroxyzine 25 mg 4 tabs at bedtime., lorazepam 0.5 mg twice daily as needed, sertraline 100 mg 2 tabs daily    Following with psychiatry at [facility] in [place].     Reports she's no longer " "using Lorazepam. Had a dose the morning pt saw Janie - she didn't know what to expect was anxious about the appointment.     Mood goes up and down and all around.   Still having occasional anxiety.     Overall sleep is \"crummy\" since last summer. Doesn't have trouble falling asleep. Has trouble staying asleep, but waking up every 1-3 hours. Not sure what's causing her to wake up. Not anxious, just wakes up. Sometimes it's from pain. Lately not waking up from pain as often. \"Feel like a zombie half the time\"     Using Hydroxyzine for sleep. It helped for about 4 hours.     Sees psychiatrist every 2-3 months, sooner as needed.   Follows with therapist every 4-6 weeks.     Also using Clonidine for sleep.       Hypertension/CKD: Prescribed amlodipine 5 mg daily, furosemide 20 mg 1-2 tabs twice daily, labetalol 100 mg 1.5 tabs twice daily, lisinopril 40 mg daily.  Being evaluated for kidney transplant.    Typically going to dialysis M/W/F.     BP Readings from Last 3 Encounters:   03/09/21 (!) 165/97   02/16/21 137/86   03/23/19 (!) 173/93     Pulse Readings from Last 3 Encounters:   03/09/21 (!) 107   02/16/21 98   03/23/19 75      Denies dizziness/lightheadedness. Does have edema. Using Furosemide maybe once or twice per month. Usually related to eating extra salt.     1/26 visit with transplant team:           Smoking Cessation: Prescribed Nicotine 21 mg patch. Insurance wouldn't cover brand name, so pt declined. Smoking half-pack per day. Quit fo 9 years, but started when she was \"having a bad spot in her life.\" Used the patch to quit before.     Patch didn't leave her with cravings in the past.     Previously declined the gum.     Doesn't feel like she's quite ready to quit yet. Knows that she needs to. Has the motivation, \"Just a matter of getting myself geared up\"       Low Vitamin D: Prescribed Vitamin D3 1000 units daily .           Hyperlipidemia: Prescribed rosuvastatin 10 mg daily    Cholesterol was " rechecked with nephrology office.             PMH: reviewed in EPIC   Allergies/ADRs: reviewed in EPIC   Alcohol:   Social History     Substance and Sexual Activity   Alcohol Use None        Tobacco:   Social History     Tobacco Use   Smoking Status Current Every Day Smoker   ? Packs/day: 0.50   ? Years: 14.00   ? Pack years: 7.00   ? Types: Cigarettes   Smokeless Tobacco Never Used     Today's Vitals: There were no vitals filed for this visit.  ----------------  The patient was given a CMS standardized format medication action plan    I spent 25 minutes with this patient today.   All changes were made via collaborative practice agreement with   Janie Ulrich CNP. A copy of the visit note was provided to the patient's provider.     Chucky Damon PharmD  Medication Therapy Management (MTM) Pharmacist  [facility]      Telemedicine Visit Details    Type of service:  Telephone     Start Time: 12:38  End Time: 1:03 PM     Originating Location (pt. Location): Home    Distant Location (provider location):  [facility]    Mode of Communication: Telephone      Current Outpatient Medications   Medication Sig Dispense Refill   ? amitriptyline (ELAVIL) 100 MG tablet TAKE ONE AND ONE HALF (1.5) TABLETS BY MOUTH DAILY     ? amLODIPine (NORVASC) 5 MG tablet Take 5 mg by mouth daily.     ? calcium, as carbonate, (TUMS) 200 mg calcium (500 mg) chewable tablet 1000 mg with meals and 500 mg with snacks po.     ? cholecalciferol, vitamin D3, 25 mcg (1,000 unit) capsule Take 1 capsule by mouth.     ? cloNIDine HCL (CATAPRES) 0.1 MG tablet TAKE 2 TABLETS BY MOUTH DAILY AT BEDTIME. 1 HR PRIOR TO BEDTIME FOR SLEEP.     ? furosemide (LASIX) 20 MG tablet TAKE 1 2 TABS BY MOUTH TWICE DAILY TO CONTROL LEG SWELLING.     ? HYDROcodone-acetaminophen 5-325 mg per tablet TAKE 2 TABLETS BY MOUTH 4 TIMES A DAY     ? hydrOXYzine HCL (ATARAX) 25 MG tablet      ? labetalol (TRANDATE;  NORMODYNE) 100 MG tablet Take 1 tablet (100 mg total) by mouth 2 (two) times a day. (Patient taking differently: Take 150 mg by mouth 2 (two) times a day. ) 10 tablet 0   ? lisinopriL (PRINIVIL,ZESTRIL) 40 MG tablet 1 tab(s)     ? nicotine (NICODERM CQ) 21 mg/24 hr Place 1 patch on the skin daily. 28 patch 1   ? rosuvastatin (CRESTOR) 10 MG tablet Take 10 mg by mouth daily.     ? sertraline (ZOLOFT) 100 MG tablet Take 200 mg by mouth daily.     ? vitamin E acid succinate (VITAMIN E SUCCINATE) 400 unit Tab Take 800 mg by mouth.       No current facility-administered medications for this encounter.         Medication Therapy Recommendations  No medication therapy recommendations to display

## 2021-06-30 NOTE — PROGRESS NOTES
Progress Notes by Chucky Damon PharmD at 2/25/2021  1:00 PM     Author: Chucky Damon PharmD Service: -- Author Type: Pharmacist    Filed: 2/25/2021  2:11 PM Date of Service: 2/25/2021  1:00 PM Status: Signed    : Chucky Damon PharmD (Pharmacist)       Medication Therapy Management (MTM) Encounter    Assessment:                                                        Medication Adherence/Access: Med list reconciled. Not able to address adherence due to time. Will review at follow-up.     Chronic Pain: Per chart review, consideration for butrans. Would likely be an appropriate option. Scheduled for injections next week. As pt unable to take NSAIDs but has found them helpful in the past, may benefit from curcumin supplements, which would be safe with current renal function.     Not discussed today, but per intake forms, tried gabapentin in the past but had excessive sedation. With current renal function, could use 200 mg at bedtime, with an additional 200 mg after each dialysis session. Could also be helpful for anxiety, and with once daily dosing at bedtime, could help with sleep which has not been good. Consider discussing at follow-up.       Depression/Insomnia: Mood has been improving. Pt reports she's no longer using Lorazepam, but just used on the 16th - will need to clarify use at follow-up. Reviewed that though amitriptyline doesn't typically require renal dose adjustments, can tend to have more adverse effects with renal impairment. Consider switch to Nortriptyline, which may reduce dry mouth. As noted above, addition of gabapentin for chronic pain, could improve poor sleep and reduce anxiety.       Hypertension/CKD: Last BP just above goal <130/80. Following with transplant team. Discussed working on smoking cessation (see below). Edema well managed. If BP continues to be above goal, could consider increased dose of Amlodipine. Review at follow-up.     Smoking Cessation: Motivated to quit so she  can qualify for transplant. Would like to use patch alone. Declines short acting agents for cravings. Given pt smoking ~10 cpd, will start with high dose patch use.       Low Vitamin D: Last levels WNL       Hyperlipidemia: Last LDL at goal <100, but this was nearly 2 years ago. Pt insists she had a more recent check, but not available in our records or Care Everywhere. She will look for records, otherwise would recommend repeat lipids.       Plan:                                                       -Recommend Curcumin with Boswellia three times a day   -Nicoderm 21 mg patch daily   -Recommend switch from Amitriptyline to Nortriptyline (pt to discuss with psychiatry)     Follow Up  Return in about 1 week (around 3/4/2021) for Medication Management Pharmacist, Via Phone.      Subjective & Objective                                                     Serene Tipton is a 65 y.o. female called for an initial visit for Medication Therapy Management. She was referred to me from  Janie Ulrich CNP      Of note, short visit today. Pt normally goes to dialysis on M/W/F, but had to reschedule yesterday's run to today and was not able to complete the full appt today.  Scheduled to complete the visit next week.     Chief Complaint: Medication Management      Medication Adherence/Access: Med list reconciled. Not able to address adherence due to time.     Chronic Pain: Prescribed amitriptyline 100 mg 1.5 tabs daily, hydrocodone-acetaminophen 5-325 mg 2 tabs 4 times daily. Established with Janie Ulrich CNP on 2/16/21.     Fairly stable. Depends on activity levels. Pain usually at the end of the day and overnight.     L5-S1- when if flares, runs down the side and back of the leg. At the fusion sight, it's like an electrocution.     Typically not using Norco during the day. 15-40 mg in a 24 hour period.     Does have quite a bit of dry mouth. No constipation.     Steroid injections, acupuncture, massage, rehab.  "    Notes that NSAID were previously helpful, but cannot currently use do to CKD. \"Advil use to be my best friend\" Hasn't tried turmeric.     Using Osteo BiFlex     Depression/Insomnia: Prescribed amitriptyline 100 mg 1.5 tabs daily, clonidine 0.1 mg 2 tabs at bedtime, hydroxyzine 25 mg 4 tabs at bedtime., lorazepam 0.5 mg twice daily as needed, sertraline 100 mg 2 tabs daily    Following with psychiatry at [facility] in [place].     Reports she's no longer using Lorazepam. Had a dose the morning pt saw Crystal - she didn't know what to expect was anxious about the appointment.     Overall mood is \"improving\"     Sees psychiatrist every 2-3 months, sooner as needed.   Follows with therapist every 4-6 weeks.     Younger sister passed in September.     Using Hydroxyzine for sleep. It worked for a few nights, then stopped working.     Also using Clonidine for sleep.     Overall sleep is \"crummy\" since last summer. Doesn't have trouble falling asleep. Has trouble staying asleep, but waking up every 1-3 hours. Not sure what's causing her to wake up. Not anxious, just wakes up. Sometimes it's from pain. Lately not waking up from pain as often.       Hypertension/CKD: Prescribed amlodipine 5 mg daily, furosemide 20 mg 1-2 tabs twice daily, labetalol 100 mg 1.5 tabs twice daily, lisinopril 40 mg daily.  Being evaluated for kidney transplant.    Typically going to dialysis M/W/F.     BP Readings from Last 3 Encounters:   02/16/21 137/86   03/23/19 (!) 173/93     Pulse Readings from Last 3 Encounters:   02/16/21 98   03/23/19 75      Denies dizziness/lightheadedness. Does have edema. Using Furosemide maybe once or twice per month. Usually related to eating extra salt.     1/26 visit with transplant team:           Smoking Cessation: Smoking half-pack per day. Quit fo 9 years, but started when she was \"having a bad spot in her life.\" Used the patch to quit before. Notes the generic patches don't stick as well, so " requesting the brand name.       Patch didn't leave her with cravings in the past. Declines short acting therapies.        Low Vitamin D: Prescribed Vitamin D3 1000 units daily .           Hyperlipidemia: Prescribed rosuvastatin 10 mg daily          PMH: reviewed in EPIC   Allergies/ADRs: reviewed in EPIC   Alcohol:   Social History     Substance and Sexual Activity   Alcohol Use None        Tobacco:   Social History     Tobacco Use   Smoking Status Current Every Day Smoker   ? Packs/day: 0.50   ? Years: 14.00   ? Pack years: 7.00   ? Types: Cigarettes   Smokeless Tobacco Never Used     Today's Vitals: There were no vitals filed for this visit.  ----------------  The patient was given a CMS standardized format medication action plan    I spent 26 minutes with this patient today.   All changes were made via collaborative practice agreement with   Janie Ulrich CNP. A copy of the visit note was provided to the patient's provider.     Chucky Damon PharmD  Medication Therapy Management (MTM) Pharmacist  [facility]      Telemedicine Visit Details    Type of service:  Telephone     Start Time: 1:07 PM  End Time: 1:33 PM    Originating Location (pt. Location): Home    Distant Location (provider location):  [facility]    Mode of Communication: Telephone      Current Outpatient Medications   Medication Sig Dispense Refill   ? amitriptyline (ELAVIL) 100 MG tablet TAKE ONE AND ONE HALF (1.5) TABLETS BY MOUTH DAILY     ? amLODIPine (NORVASC) 5 MG tablet Take 5 mg by mouth daily.     ? cholecalciferol, vitamin D3, 25 mcg (1,000 unit) capsule Take 1 capsule by mouth.     ? cloNIDine HCL (CATAPRES) 0.1 MG tablet TAKE 2 TABLETS BY MOUTH DAILY AT BEDTIME. 1 HR PRIOR TO BEDTIME FOR SLEEP.     ? furosemide (LASIX) 20 MG tablet TAKE 1 2 TABS BY MOUTH TWICE DAILY TO CONTROL LEG SWELLING.     ? HYDROcodone-acetaminophen 5-325 mg per tablet TAKE 2 TABLETS BY MOUTH 4  TIMES A DAY     ? hydrOXYzine HCL (ATARAX) 25 MG tablet      ? labetalol (TRANDATE; NORMODYNE) 100 MG tablet Take 1 tablet (100 mg total) by mouth 2 (two) times a day. (Patient taking differently: Take 150 mg by mouth 2 (two) times a day. ) 10 tablet 0   ? lisinopriL (PRINIVIL,ZESTRIL) 40 MG tablet 1 tab(s)     ? rosuvastatin (CRESTOR) 10 MG tablet Take 10 mg by mouth daily.     ? sertraline (ZOLOFT) 100 MG tablet Take 200 mg by mouth daily.     ? vitamin E acid succinate (VITAMIN E SUCCINATE) 400 unit Tab Take 800 mg by mouth.     ? calcium, as carbonate, (TUMS) 200 mg calcium (500 mg) chewable tablet 1000 mg with meals and 500 mg with snacks po.     ? nicotine (NICODERM CQ) 21 mg/24 hr Place 1 patch on the skin daily. 28 patch 1     No current facility-administered medications for this encounter.         Medication Therapy Recommendations  Major depression, recurrent (H)    Current Medication: amitriptyline (ELAVIL) 100 MG tablet   Rationale: Undesirable effect - Adverse medication event - Safety   Recommendation: Change Medication - nortriptyline 75 MG capsule   Status: Contact Provider - Awaiting Response         Other chronic pain    Current Medication: HYDROcodone-acetaminophen 5-325 mg per tablet   Rationale: Synergistic therapy - Needs additional medication therapy - Indication   Recommendation: Start Medication - Turmeric with boswellia 400 mg TID   Status: Accepted per CPA         Tobacco abuse    Rationale: Untreated condition - Needs additional medication therapy - Indication   Recommendation: Start Medication - nicotine 21 mg/24 hr - daily   Status: Accepted per CPA

## 2021-07-04 NOTE — ADDENDUM NOTE
Addendum Note by Karey Hernandes at 3/25/2021 10:00 AM     Author: Karey Hernandes Service: -- Author Type: --    Filed: 3/29/2021  5:59 AM Date of Service: 3/25/2021 10:00 AM Status: Signed    : Karey Hernandes    Encounter addended by: Karey Hernandes on: 3/29/2021  5:59 AM      Actions taken: Charge Capture section accepted

## 2021-07-06 ENCOUNTER — COMMUNICATION - HEALTHEAST (OUTPATIENT)
Dept: PALLIATIVE MEDICINE | Facility: OTHER | Age: 66
End: 2021-07-06

## 2021-07-06 VITALS — HEIGHT: 60 IN | WEIGHT: 130 LBS | BODY MASS INDEX: 25.52 KG/M2

## 2021-07-06 NOTE — TELEPHONE ENCOUNTER
"Telephone Encounter by Ibis Lundberg RN at 7/6/2021  9:41 AM     Author: Ibis Lundberg RN Service: -- Author Type: Registered Nurse    Filed: 7/6/2021  9:45 AM Encounter Date: 7/6/2021 Status: Signed    : Ibis Lundberg RN (Registered Nurse)       Pain Analog returned for initial bilateral L3 and L4 MBB and results were determined to be \"good\" by Dr. White. Results will be scanned into pt's EHR.       "

## 2021-07-08 ENCOUNTER — TRANSFERRED RECORDS (OUTPATIENT)
Dept: HEALTH INFORMATION MANAGEMENT | Facility: CLINIC | Age: 66
End: 2021-07-08

## 2021-07-09 ENCOUNTER — RECORDS - HEALTHEAST (OUTPATIENT)
Dept: LAB | Facility: CLINIC | Age: 66
End: 2021-07-09

## 2021-07-09 LAB
HPV SOURCE: ABNORMAL
HUMAN PAPILLOMA VIRUS 16 DNA: NEGATIVE
HUMAN PAPILLOMA VIRUS 18 DNA: NEGATIVE
HUMAN PAPILLOMA VIRUS FINAL DIAGNOSIS: ABNORMAL
HUMAN PAPILLOMA VIRUS OTHER HR: POSITIVE
SPECIMEN DESCRIPTION: ABNORMAL

## 2021-07-15 ENCOUNTER — ANCILLARY PROCEDURE (OUTPATIENT)
Dept: PALLIATIVE MEDICINE | Facility: OTHER | Age: 66
End: 2021-07-15
Payer: COMMERCIAL

## 2021-07-15 VITALS
DIASTOLIC BLOOD PRESSURE: 79 MMHG | TEMPERATURE: 97.3 F | SYSTOLIC BLOOD PRESSURE: 156 MMHG | BODY MASS INDEX: 25.13 KG/M2 | HEIGHT: 60 IN | WEIGHT: 128 LBS | RESPIRATION RATE: 16 BRPM | HEART RATE: 80 BPM

## 2021-07-15 DIAGNOSIS — M12.88 OTHER SPECIFIC ARTHROPATHIES, NOT ELSEWHERE CLASSIFIED, OTHER SPECIFIED SITE: ICD-10-CM

## 2021-07-15 DIAGNOSIS — M47.816 LUMBAR FACET ARTHROPATHY: ICD-10-CM

## 2021-07-15 LAB
BKR LAB AP ABNORMAL BLEEDING: NO
BKR LAB AP BIRTH CONTROL/HORMONES: NORMAL
BKR LAB AP CERVICAL APPEARANCE: NORMAL
BKR LAB AP GYN ADEQUACY: NORMAL
BKR LAB AP GYN INTERPRETATION: NORMAL
BKR LAB AP HPV REFLEX: NORMAL
BKR LAB AP LMP: 2006
BKR LAB AP PATIENT STATUS: NORMAL
BKR LAB AP PREVIOUS ABNORMAL: NORMAL
BKR LAB AP PREVIOUS NORMAL: 2016
HIGH RISK?: NO
PATH REPORT.COMMENTS IMP SPEC: NORMAL
RESULT FLAG (HE HISTORICAL CONVERSION): NORMAL

## 2021-07-15 PROCEDURE — 64493 INJ PARAVERT F JNT L/S 1 LEV: CPT | Mod: 50 | Performed by: PAIN MEDICINE

## 2021-07-15 PROCEDURE — 250N000009 HC RX 250: Performed by: PAIN MEDICINE

## 2021-07-15 RX ORDER — LIDOCAINE HYDROCHLORIDE 10 MG/ML
INJECTION, SOLUTION EPIDURAL; INFILTRATION; INTRACAUDAL; PERINEURAL
Status: COMPLETED | OUTPATIENT
Start: 2021-07-15 | End: 2021-07-15

## 2021-07-15 RX ORDER — FAMOTIDINE 20 MG/1
TABLET, FILM COATED ORAL
COMMUNITY
End: 2021-12-01

## 2021-07-15 RX ORDER — PREDNISONE 20 MG/1
TABLET ORAL
COMMUNITY
Start: 2020-10-23 | End: 2021-07-15

## 2021-07-15 RX ORDER — LIDOCAINE HYDROCHLORIDE 20 MG/ML
INJECTION, SOLUTION EPIDURAL; INFILTRATION; INTRACAUDAL; PERINEURAL
Status: COMPLETED | OUTPATIENT
Start: 2021-07-15 | End: 2021-07-15

## 2021-07-15 RX ORDER — SEVELAMER CARBONATE 800 MG/1
800-2400 TABLET, FILM COATED ORAL 3 TIMES DAILY
COMMUNITY
Start: 2021-07-01 | End: 2024-01-01

## 2021-07-15 RX ORDER — CITALOPRAM HYDROBROMIDE 20 MG/1
TABLET ORAL
COMMUNITY
End: 2023-01-17

## 2021-07-15 RX ORDER — FUROSEMIDE 20 MG
40 TABLET ORAL DAILY
Status: ON HOLD | COMMUNITY
Start: 2021-06-07 | End: 2023-02-05

## 2021-07-15 RX ADMIN — LIDOCAINE HYDROCHLORIDE 5 ML: 10 INJECTION, SOLUTION EPIDURAL; INFILTRATION; INTRACAUDAL; PERINEURAL at 15:09

## 2021-07-15 RX ADMIN — LIDOCAINE HYDROCHLORIDE 1 ML: 20 INJECTION, SOLUTION EPIDURAL; INFILTRATION; INTRACAUDAL; PERINEURAL at 15:11

## 2021-07-15 ASSESSMENT — MIFFLIN-ST. JEOR: SCORE: 1057.1

## 2021-07-15 ASSESSMENT — PAIN SCALES - GENERAL
PAINLEVEL: MODERATE PAIN (4)
PAINLEVEL: MODERATE PAIN (5)

## 2021-07-16 ENCOUNTER — TRANSFERRED RECORDS (OUTPATIENT)
Dept: HEALTH INFORMATION MANAGEMENT | Facility: CLINIC | Age: 66
End: 2021-07-16

## 2021-07-20 ENCOUNTER — TELEPHONE (OUTPATIENT)
Dept: PALLIATIVE MEDICINE | Facility: OTHER | Age: 66
End: 2021-07-20

## 2021-07-20 NOTE — TELEPHONE ENCOUNTER
"Pain Analog was returned for Confirmatory Bilateral L3 and L4 MBB and was determined \"not good\" by Dr White. Plan is for the pt to follow-up with Crystal. Nurse called and left  msg for pt stating information as above and asked that she call back to schedule the follow-up.   "

## 2021-08-13 ENCOUNTER — VIRTUAL VISIT (OUTPATIENT)
Dept: PALLIATIVE MEDICINE | Facility: OTHER | Age: 66
End: 2021-08-13
Payer: COMMERCIAL

## 2021-08-13 DIAGNOSIS — M47.819 ARTHROPATHY OF SPINAL FACET JOINT CONCURRENT WITH AND DUE TO EFFUSION: ICD-10-CM

## 2021-08-13 DIAGNOSIS — M47.816 FACET SYNDROME, LUMBAR: ICD-10-CM

## 2021-08-13 DIAGNOSIS — M47.897 OTHER SPONDYLOSIS, LUMBOSACRAL REGION: ICD-10-CM

## 2021-08-13 DIAGNOSIS — G89.4 CHRONIC PAIN SYNDROME: Primary | ICD-10-CM

## 2021-08-13 DIAGNOSIS — M25.40 ARTHROPATHY OF SPINAL FACET JOINT CONCURRENT WITH AND DUE TO EFFUSION: ICD-10-CM

## 2021-08-13 DIAGNOSIS — M70.62 TROCHANTERIC BURSITIS OF LEFT HIP: ICD-10-CM

## 2021-08-13 DIAGNOSIS — M46.1 SACROILIITIS (H): ICD-10-CM

## 2021-08-13 DIAGNOSIS — M47.816 FACET ARTHROPATHY, LUMBAR: ICD-10-CM

## 2021-08-13 PROCEDURE — 999N001193 HC VIDEO/TELEPHONE VISIT; NO CHARGE: Performed by: NURSE PRACTITIONER

## 2021-08-13 PROCEDURE — 99215 OFFICE O/P EST HI 40 MIN: CPT | Mod: 95 | Performed by: NURSE PRACTITIONER

## 2021-08-13 ASSESSMENT — PAIN SCALES - GENERAL: PAINLEVEL: NO PAIN (1)

## 2021-08-13 NOTE — PROGRESS NOTES
"Alvarez is a 66 year old who is being evaluated via a billable video visit.      How would you like to obtain your AVS? My chart  If the video visit is dropped, the invitation should be resent by: [phone] HUSSAIN  Will anyone else be joining your video visit? No    Pain score:   intermittent   What does your pain feel like: \"it hurts, like electricity\"  Does the pain interfere with:  Work ----- NA  Walking ------ yes  Sleep ------- yes  Daily activities ------yes, cleaning  Relationships -------no  F=6        "

## 2021-08-13 NOTE — PROGRESS NOTES
"Serene Tipton is a 66 year old female who is [facility] or [employer] services- via video       Start time- 10:59 pm   End time- 11:46 pm   Patient location- of site- home  Provider location- off site- virtual     Subjective-    I have reviewed and updated the patient's Past Medical History, Social History, Family History and Medication List as well as the Precharting workup by the [facility].     PAIN CLINIC FOLLOW UP PROGRESS NOTE    CC:chief complaint    HPI  Serene Tipton is a 66 year old female who presents for evaluation chief complaint that is causing continued pain. Specific questions indicated the patient wanted addressed today include: to follow up with her ongoing chronic pain as well as discuss her plan of care, she feels that her pain gets worse in the evening. In the low back around the fusion area- 7-9 pm is the most pain, laying down makes it her pain worse- seems like when she relaxes that is when it is the most painful, however during the day she is able to distracted, the pain medications helps with this during the day.  She is also getting dialyzed weekly.      Objective-  Major issues:  1. Chronic pain syndrome    2. Sacroiliitis (H)    3. Arthropathy of spinal facet joint concurrent with and due to effusion    4. Trochanteric bursitis of left hip    5. Other spondylosis, lumbosacral region     6. Facet arthropathy, lumbar    7. Facet syndrome, lumbar        Pain score: 1.5/10 but she has been taking opioids today.  Intermittent- worsens throughout the day.  What does your pain feel like: \"it hurts, like electricity\"  Does the pain interfere with:  Work ----- NA  Walking ------ yes  Sleep ------- yes  Daily activities ------yes, cleaning  Relationships -------no  F=6    ALLERGIES  Penicillins, Carvedilol, Ciprofloxacin, Morphine, No clinical screening - see comments, Nsaids, Sulfa drugs, Sulfamethoxazole-trimethoprim, and Levofloxacin    Previous Medical History  Social " History    Substance and Sexual Activity      Alcohol use: Not Currently    History   Drug Use Unknown     History   Smoking Status     Former Smoker     Packs/day: 0.50     Years: 14.00     Types: Cigarettes, Cigarettes     Quit date: 6/13/2021   Smokeless Tobacco     Never Used       Pertinent Pain Medications/interventions:    Patient notes that she is awaiting a steriod pack from her PCP.     She currently takes Hydrocodone 5/325 mg up to 8 per day    Historically she also used Darvocet.     Previously tried medications:    NSAIDs: none- CKD     Acetaminophen: in the norco     Antidepressants: elavil, clonidine, lorazepam     Gabapentinoids: none at this time     Topicals: none at this time previously tried lyrica and gabapentin.     Supplements: B vitmains    Muscle Relaxants: previously tried cyclobenzaprine, tried lidocaine patches which didn't help either.      Medications    Current Outpatient Medications:      amitriptyline (ELAVIL) 100 MG tablet, , Disp: , Rfl:      amLODIPine (NORVASC) 5 MG tablet, Take 5 mg by mouth daily, Disp: , Rfl:      calcium carbonate (TUMS) 500 MG chewable tablet, Take 2 chew tab by mouth 2 times daily 1000MG WITH AND 500MG WITH SNACK, Disp: , Rfl:      cholecalciferol (VITAMIN D3) 25 mcg (1000 units) capsule, Take 1 capsule by mouth daily, Disp: , Rfl:      citalopram (CELEXA) 20 MG tablet, 1 tablet, Disp: , Rfl:      cloNIDine (CATAPRES) 0.1 MG tablet, Take 0.2 mg by mouth At Bedtime, Disp: , Rfl:      famotidine (PEPCID) 20 MG tablet, 1 tablet at bedtime as needed, Disp: , Rfl:      furosemide (LASIX) 20 MG tablet, TAKE 2 TABLETS BY MOUTH EVERY DAY, Disp: , Rfl:      HYDROcodone-acetaminophen (NORCO) 5-325 MG tablet, 2 tab(s), Disp: , Rfl:      Krill Oil 1000 MG CAPS, 1, Disp: , Rfl:      labetalol (NORMODYNE) 200 MG tablet, Take 300 mg by mouth, Disp: , Rfl:      lisinopril (ZESTRIL) 40 MG tablet, 1 tab(s), Disp: , Rfl:      LORazepam (ATIVAN) 1 MG tablet, TAKE 0.5 1 TABLET  BY MOUTH TWICE DAILY AS NEEDED. MUST LAST 30 DAYS, Disp: , Rfl:      MELATONIN PO, 30 MG @@ H.S, Disp: , Rfl:      Misc Natural Products (GLUCOSAMINE CHOND MSM FORMULA) TABS, as directed, Disp: , Rfl:      OSCO HIGH POTENCY B COMPLEX/C OR, Take 1 tablet by mouth, Disp: , Rfl:      rosuvastatin (CRESTOR) 10 MG tablet, , Disp: , Rfl:      sertraline (ZOLOFT) 100 MG tablet, Take 200 mg by mouth, Disp: , Rfl:      sevelamer carbonate (RENVELA) 800 MG tablet, TAKE 1 TABLET BY MOUTH WITH EACH MEAL, Disp: , Rfl:      vitamin E 400 units TABS, Take 800 mg by mouth, Disp: , Rfl:       Lab:  Last UDS on 2/16/2021 had expected results. Last UDT on file was reviewed.    Imaging:  No new imaging reviewed with patient over the phone, no new orders placed       Recent   Dated 8/13/2021 was reviewed.       Assessment:     Serene Tipton is a 66 year old female seen in clinic today for chief complaint    New concerns today- ongoing low back pain that gets worse at night time. The MBB did not work.     Plan of care going forward for ongoing pain control- she is currently [facility] the [facility] for a kidney transplant. She is also joining a kidney support group. She is also struggling with depression and is working with a a new anti depressant she is working with her psychiatrist on this.      She notes that she has had facet joint injections which were helpful. L4-5 and L5-S1 facet joints with effusions on the last MRI    Trigger points did not seem to really help where they were completed she feels that the pinpointed pain spot was missed, she would like to repeat this as she has to sit in a chair for dialysis and  It hurts to sit there for 3 hours, she would like to repeat this as she reports it is worth a try.     Left hip bursitis that has flared up, will order a left bursa injection to assist with flare up of bursitis.     SI joint pain- left sided sacroiliitis is ongoing she reports that this is another  cause of pain for her and she has had injections in the past which is helpful, this can be ordered but will be utilized after the facet joints and the bursa injection.     Piraformis and muscle atrophy- weakness noted she is now able to walk up to 2 blocks and cross her legs. She will resume PT once her pain is reduced.     Patients current MME is 60 from her PCP.     Plan:   Interventions-    Follow up in 8-12 weeks prn      Agree with the plan of care with the care team- psychiatry, dialysis, PCP    Facet joint injections at L4-5, L5-S1 bilaterally with Dr. White will order L4-5 at this time. Will update the patient, currently she is symptomatic but it appears her insurance does not cover this procedure.     Left bursa injection- with Dr. White when able.     Trigger points when needed for myalgia type pain     Left SI joint injection after the bursa injection and facet joint injection.     Continue the cares with PCP for ongoing opioid managment      Orders placed today  Medications that were ordered today   No prescriptions requested or ordered in this encounter    Orders Placed This Encounter   Procedures     PAIN US Large Joint Injection Unilateral     XR Sacroiliac Therapeutic Injection Left         The patient understand todays plan and has their questions answered in regards to expectations and current treatment plan.     Prevent unexpected access/loss of medication: Keep medication locked. Only carry what you need with you    The plan of care will be adjusted to accommodate the issues discussed, discussing management of their care and follow up that is recommended. 8/13/2021         LIDIA [facility]  [address]  [place] [address]  Ph: [phone]  Fax: [phone]

## 2021-08-13 NOTE — LETTER
"    8/13/2021         RE: Serene Tipton  [address]  [address] [place] [address]        Dear Colleague,    Thank you for referring your patient, Serene Tipton, to the [facility]. Please see a copy of my visit note below.    Alvarez is a 66 year old who is being evaluated via a billable video visit.      How would you like to obtain your AVS? My chart  If the video visit is dropped, the invitation should be resent by: [phone] HUSSAIN  Will anyone else be joining your video visit? No    Pain score:   intermittent   What does your pain feel like: \"it hurts, like electricity\"  Does the pain interfere with:  Work ----- NA  Walking ------ yes  Sleep ------- yes  Daily activities ------yes, cleaning  Relationships -------no  F=6          Serene Tipton is a 66 year old female who is being evaluated with [employer] or [employer] services- via video       Start time- 10:59 pm   End time- 11:46 pm   Patient location- of site- home  Provider location- off site- virtual     Subjective-    I have reviewed and updated the patient's Past Medical History, Social History, Family History and Medication List as well as the Precharting workup by the [facility].     PAIN CLINIC FOLLOW UP PROGRESS NOTE    CC:chief complaint    HPI  Serene Tipton is a 66 year old female who presents for evaluation chief complaint that is causing continued pain. Specific questions indicated the patient wanted addressed today include: to follow up with her ongoing chronic pain as well as discuss her plan of care, she feels that her pain gets worse in the evening. In the low back around the fusion area- 7-9 pm is the most pain, laying down makes it her pain worse- seems like when she relaxes that is when it is the most painful, however during the day she is able to distracted, the pain medications helps with this during the day.  She is also getting dialyzed weekly.      Objective-  Major issues:  1. Chronic pain " "syndrome    2. Sacroiliitis (H)    3. Arthropathy of spinal facet joint concurrent with and due to effusion    4. Trochanteric bursitis of left hip    5. Other spondylosis, lumbosacral region     6. Facet arthropathy, lumbar    7. Facet syndrome, lumbar        Pain score: 1.5/10 but she has been taking opioids today.  Intermittent- worsens throughout the day.  What does your pain feel like: \"it hurts, like electricity\"  Does the pain interfere with:  Work ----- NA  Walking ------ yes  Sleep ------- yes  Daily activities ------yes, cleaning  Relationships -------no  F=6    ALLERGIES  Penicillins, Carvedilol, Ciprofloxacin, Morphine, No clinical screening - see comments, Nsaids, Sulfa drugs, Sulfamethoxazole-trimethoprim, and Levofloxacin    Previous Medical History  Social History    Substance and Sexual Activity      Alcohol use: Not Currently    History   Drug Use Unknown     History   Smoking Status     Former Smoker     Packs/day: 0.50     Years: 14.00     Types: Cigarettes, Cigarettes     Quit date: 6/13/2021   Smokeless Tobacco     Never Used       Pertinent Pain Medications/interventions:    Patient notes that she is awaiting a steriod pack from her PCP.     She currently takes Hydrocodone 5/325 mg up to 8 per day    Historically she also used Darvocet.     Previously tried medications:    NSAIDs: none- CKD     Acetaminophen: in the norco     Antidepressants: elavil, clonidine, lorazepam     Gabapentinoids: none at this time     Topicals: none at this time previously tried lyrica and gabapentin.     Supplements: B vitmains    Muscle Relaxants: previously tried cyclobenzaprine, tried lidocaine patches which didn't help either.      Medications    Current Outpatient Medications:      amitriptyline (ELAVIL) 100 MG tablet, , Disp: , Rfl:      amLODIPine (NORVASC) 5 MG tablet, Take 5 mg by mouth daily, Disp: , Rfl:      calcium carbonate (TUMS) 500 MG chewable tablet, Take 2 chew tab by mouth 2 times daily 1000MG " WITH AND 500MG WITH SNACK, Disp: , Rfl:      cholecalciferol (VITAMIN D3) 25 mcg (1000 units) capsule, Take 1 capsule by mouth daily, Disp: , Rfl:      citalopram (CELEXA) 20 MG tablet, 1 tablet, Disp: , Rfl:      cloNIDine (CATAPRES) 0.1 MG tablet, Take 0.2 mg by mouth At Bedtime, Disp: , Rfl:      famotidine (PEPCID) 20 MG tablet, 1 tablet at bedtime as needed, Disp: , Rfl:      furosemide (LASIX) 20 MG tablet, TAKE 2 TABLETS BY MOUTH EVERY DAY, Disp: , Rfl:      HYDROcodone-acetaminophen (NORCO) 5-325 MG tablet, 2 tab(s), Disp: , Rfl:      Krill Oil 1000 MG CAPS, 1, Disp: , Rfl:      labetalol (NORMODYNE) 200 MG tablet, Take 300 mg by mouth, Disp: , Rfl:      lisinopril (ZESTRIL) 40 MG tablet, 1 tab(s), Disp: , Rfl:      LORazepam (ATIVAN) 1 MG tablet, TAKE 0.5 1 TABLET BY MOUTH TWICE DAILY AS NEEDED. MUST LAST 30 DAYS, Disp: , Rfl:      MELATONIN PO, 30 MG @@ H.S, Disp: , Rfl:      Misc Natural Products (GLUCOSAMINE CHOND MSM FORMULA) TABS, as directed, Disp: , Rfl:      OSCO HIGH POTENCY B COMPLEX/C OR, Take 1 tablet by mouth, Disp: , Rfl:      rosuvastatin (CRESTOR) 10 MG tablet, , Disp: , Rfl:      sertraline (ZOLOFT) 100 MG tablet, Take 200 mg by mouth, Disp: , Rfl:      sevelamer carbonate (RENVELA) 800 MG tablet, TAKE 1 TABLET BY MOUTH WITH EACH MEAL, Disp: , Rfl:      vitamin E 400 units TABS, Take 800 mg by mouth, Disp: , Rfl:       Lab:  Last UDS on 2/16/2021 had expected results. Last UDT on file was reviewed.    Imaging:  No new imaging reviewed with patient over the phone, no new orders placed       Recent   Dated 8/13/2021 was reviewed.       Assessment:     Serene Tipton is a 66 year old female seen in clinic today for chief complaint    New concerns today- ongoing low back pain that gets worse at night time. The MBB did not work.     Plan of care going forward for ongoing pain control- she is currently on the transplant list at the [facility] for a kidney transplant. She is also joining a  kidney support group. She is also struggling with depression and is working with a a new anti depressant she is working with her psychiatrist on this.      She notes that she has had facet joint injections which were helpful. L4-5 and L5-S1 facet joints with effusions on the last MRI    Trigger points did not seem to really help where they were completed she feels that the pinpointed pain spot was missed, she would like to repeat this as she has to sit in a chair for dialysis and  It hurts to sit there for 3 hours, she would like to repeat this as she reports it is worth a try.     Left hip bursitis that has flared up, will order a left bursa injection to assist with flare up of bursitis.     SI joint pain- left sided sacroiliitis is ongoing she reports that this is another cause of pain for her and she has had injections in the past which is helpful, this can be ordered but will be utilized after the facet joints and the bursa injection.     Piraformis and muscle atrophy- weakness noted she is now able to walk up to 2 blocks and cross her legs. She will resume PT once her pain is reduced.     Patients current MME is 60 from her PCP.     Plan:   Interventions-    Follow up in 8-12 weeks prn      Agree with the plan of care with the care team- psychiatry, dialysis, PCP    Facet joint injections at L4-5, L5-S1 bilaterally with Dr. White will order L4-5 at this time. Will update the patient, currently she is symptomatic but it appears her insurance does not cover this procedure.     Left bursa injection- with Dr. White when able.     Trigger points when needed for myalgia type pain     Left SI joint injection after the bursa injection and facet joint injection.     Continue the cares with PCP for ongoing opioid managment      Orders placed today  Medications that were ordered today   No prescriptions requested or ordered in this encounter    Orders Placed This Encounter   Procedures     PAIN US Large Joint  Injection Unilateral     XR Sacroiliac Therapeutic Injection Left         The patient understand todays plan and has their questions answered in regards to expectations and current treatment plan.     Prevent unexpected access/loss of medication: Keep medication locked. Only carry what you need with you    The plan of care will be adjusted to accommodate the issues discussed, discussing management of their care and follow up that is recommended. 8/13/2021         LIDIA Moore CNP  [facility]  [address]  [place] [address]  Ph: [phone]  Fax: [phone]            Again, thank you for allowing me to participate in the care of your patient.        Sincerely,        LIDIA Moore CNP

## 2021-08-13 NOTE — PATIENT INSTRUCTIONS
Plan:   Interventions-    Follow up in 8-12 weeks prn      Agree with the plan of care with the care team- psychiatry, dialysis, PCP    Facet joint injections at L4-5, L5-S1 bilaterally with Dr. White will order L4-5 at this time.     Left bursa injection- with Dr. White when able.     Trigger points when needed for myalgia type pain     Left SI joint injection after the bursa injection and facet joint injection.     Continue the cares with PCP for ongoing opioid managment      Orders placed today  Medications that were ordered today   No prescriptions requested or ordered in this encounter    No orders of the defined types were placed in this encounter.        The patient understand todays plan and has their questions answered in regards to expectations and current treatment plan.     Prevent unexpected access/loss of medication: Keep medication locked. Only carry what you need with you    The plan of care will be adjusted to accommodate the issues discussed, discussing management of their care and follow up that is recommended. 8/13/2021         LIDIA Moore [facility]  [address]  [place] [address]  Ph: [phone]  Fax: [phone]

## 2021-08-24 ENCOUNTER — TRANSFERRED RECORDS (OUTPATIENT)
Dept: HEALTH INFORMATION MANAGEMENT | Facility: CLINIC | Age: 66
End: 2021-08-24

## 2021-09-03 ENCOUNTER — TELEPHONE (OUTPATIENT)
Dept: PALLIATIVE MEDICINE | Facility: OTHER | Age: 66
End: 2021-09-03

## 2021-09-03 DIAGNOSIS — M46.1 SACROILIITIS (H): ICD-10-CM

## 2021-09-03 DIAGNOSIS — M19.90 OSTEOARTHRITIS: ICD-10-CM

## 2021-09-03 DIAGNOSIS — M47.816 LUMBAR FACET ARTHROPATHY: ICD-10-CM

## 2021-09-03 DIAGNOSIS — M47.816 LUMBAR FACET JOINT SYNDROME: Primary | ICD-10-CM

## 2021-09-05 ENCOUNTER — HEALTH MAINTENANCE LETTER (OUTPATIENT)
Age: 66
End: 2021-09-05

## 2021-09-07 DIAGNOSIS — M46.1 SACROILIITIS (H): Primary | ICD-10-CM

## 2021-09-07 DIAGNOSIS — M47.816 ARTHROPATHY OF LUMBAR FACET JOINT: ICD-10-CM

## 2021-09-13 ENCOUNTER — TELEPHONE (OUTPATIENT)
Dept: PALLIATIVE MEDICINE | Facility: OTHER | Age: 66
End: 2021-09-13

## 2021-09-13 DIAGNOSIS — M16.9 OSTEOARTHRITIS OF HIP: Primary | ICD-10-CM

## 2021-09-17 ENCOUNTER — TRANSFERRED RECORDS (OUTPATIENT)
Dept: HEALTH INFORMATION MANAGEMENT | Facility: CLINIC | Age: 66
End: 2021-09-17

## 2021-09-22 DIAGNOSIS — M46.1 SACROILIITIS (H): Primary | ICD-10-CM

## 2021-09-23 ENCOUNTER — ANCILLARY PROCEDURE (OUTPATIENT)
Dept: PALLIATIVE MEDICINE | Facility: OTHER | Age: 66
End: 2021-09-23
Attending: PAIN MEDICINE
Payer: COMMERCIAL

## 2021-09-23 VITALS
DIASTOLIC BLOOD PRESSURE: 79 MMHG | TEMPERATURE: 96.9 F | SYSTOLIC BLOOD PRESSURE: 139 MMHG | RESPIRATION RATE: 16 BRPM | WEIGHT: 128 LBS | BODY MASS INDEX: 25 KG/M2 | HEART RATE: 86 BPM

## 2021-09-23 DIAGNOSIS — M46.1 SACROILIITIS (H): ICD-10-CM

## 2021-09-23 PROCEDURE — 27096 INJECT SACROILIAC JOINT: CPT | Mod: 50 | Performed by: PAIN MEDICINE

## 2021-09-23 PROCEDURE — 255N000002 HC RX 255 OP 636: Performed by: PAIN MEDICINE

## 2021-09-23 PROCEDURE — 250N000011 HC RX IP 250 OP 636: Performed by: PAIN MEDICINE

## 2021-09-23 PROCEDURE — 250N000009 HC RX 250: Performed by: PAIN MEDICINE

## 2021-09-23 RX ORDER — METHYLPREDNISOLONE ACETATE 80 MG/ML
INJECTION, SUSPENSION INTRA-ARTICULAR; INTRALESIONAL; INTRAMUSCULAR; SOFT TISSUE
Status: COMPLETED | OUTPATIENT
Start: 2021-09-23 | End: 2021-09-23

## 2021-09-23 RX ORDER — BUPIVACAINE HYDROCHLORIDE 2.5 MG/ML
INJECTION, SOLUTION EPIDURAL; INFILTRATION; INTRACAUDAL
Status: COMPLETED | OUTPATIENT
Start: 2021-09-23 | End: 2021-09-23

## 2021-09-23 RX ORDER — LIDOCAINE HYDROCHLORIDE 10 MG/ML
INJECTION, SOLUTION EPIDURAL; INFILTRATION; INTRACAUDAL; PERINEURAL
Status: COMPLETED | OUTPATIENT
Start: 2021-09-23 | End: 2021-09-23

## 2021-09-23 RX ADMIN — IOHEXOL 8 ML: 300 INJECTION, SOLUTION INTRAVENOUS at 15:13

## 2021-09-23 RX ADMIN — METHYLPREDNISOLONE ACETATE 80 MG: 80 INJECTION, SUSPENSION INTRA-ARTICULAR; INTRALESIONAL; INTRAMUSCULAR; SOFT TISSUE at 14:57

## 2021-09-23 RX ADMIN — BUPIVACAINE HYDROCHLORIDE 6 ML: 2.5 INJECTION, SOLUTION EPIDURAL; INFILTRATION; INTRACAUDAL at 14:56

## 2021-09-23 RX ADMIN — LIDOCAINE HYDROCHLORIDE 5 ML: 10 INJECTION, SOLUTION EPIDURAL; INFILTRATION; INTRACAUDAL; PERINEURAL at 14:55

## 2021-09-23 ASSESSMENT — PAIN SCALES - GENERAL
PAINLEVEL: NO PAIN (0)
PAINLEVEL: MODERATE PAIN (5)

## 2021-09-23 NOTE — PATIENT INSTRUCTIONS
POST PROCEDURE INSTRUCTIONS  PROCEDURE DONE TODAY: SACROILIAC JOINT INJECTION    Rest today. Resume activity tomorrow as able.  Patient demonstrates the ability to ambulate independently with a steady gait at the time of discharge.      It is not unusual to have a temporary increase in your pain after a procedure. You can ice the area 24-48 hrs. 15 min at a time.      You received a steroid injection. This medication can take 2-7 days to take effect. Some temporary side effects include:    Heartburn    Flushed-red face    Insomnia-trouble sleeping-jitters    Diabetics may see a rise in blood sugar for a few days    Low back or SI joint (sacroiliac) injection: you may experience numbness in one or both legs. Please walk with help. This is temporary and will wear off in a few hours. Do not drive if you are tingling, numbness or weakness in your hands/arms or legs/feet.    Headache:  If you experience a headache after a lumbar epidural injection, lie down and drink fluids (especially caffeine). The headache will go away gradually. If it persists notify our clinic.    Fever: call if fever 100 or over, redness/warmth/swelling over the injection site.    No public hot tubs/pools for a couple days    Physical therapy: check with pain center provider regarding resuming therapy.    Monitor your response to the injection and report this at your next visit.    If you have been referred for a procedure only, please call your referring provider for a follow up.    IF YOU PLAN TO GET A FLU SHOT YOU MUST WAIT 2 WEEKS AFTER THIS INJECTION.      CALL IF ANY QUESTIONS [phone]

## 2021-09-24 ENCOUNTER — TELEPHONE (OUTPATIENT)
Dept: PALLIATIVE MEDICINE | Facility: OTHER | Age: 66
End: 2021-09-24

## 2021-09-24 NOTE — TELEPHONE ENCOUNTER
Pt left vm msg stating insurance is being difficult and she's wondering what can be done to get injections approved.  Left [facility] for the pt stating if procedures are denied we can determine the next step, otherwise we have to wait for authorization. Asked that she call back if she has more questions or if clarification is needed.

## 2021-10-06 ENCOUNTER — TELEPHONE (OUTPATIENT)
Dept: TRANSPLANT | Facility: CLINIC | Age: 66
End: 2021-10-06

## 2021-10-06 NOTE — TELEPHONE ENCOUNTER
Patient Call: Voicemail  Date/Time: 10/5/2021  7115  Reason for call: Pt feels like she has completed all tests needs to get listed for transplant  Needs a call back

## 2021-10-07 NOTE — TELEPHONE ENCOUNTER
Returned call to Orin to review what is remaining. She had her mammogram,pap,colonoscopy and chest CT done. Will request images. She is working with her dentist and will let me know when complete.

## 2021-10-21 ENCOUNTER — ANCILLARY PROCEDURE (OUTPATIENT)
Dept: PALLIATIVE MEDICINE | Facility: OTHER | Age: 66
End: 2021-10-21
Attending: PAIN MEDICINE
Payer: COMMERCIAL

## 2021-10-21 VITALS — SYSTOLIC BLOOD PRESSURE: 127 MMHG | DIASTOLIC BLOOD PRESSURE: 75 MMHG | TEMPERATURE: 97.5 F | HEART RATE: 93 BPM

## 2021-10-21 DIAGNOSIS — M19.90 OSTEOARTHRITIS: ICD-10-CM

## 2021-10-21 DIAGNOSIS — M47.816 LUMBAR FACET ARTHROPATHY: Primary | ICD-10-CM

## 2021-10-21 PROCEDURE — 250N000011 HC RX IP 250 OP 636: Performed by: PAIN MEDICINE

## 2021-10-21 PROCEDURE — 77002 NEEDLE LOCALIZATION BY XRAY: CPT | Mod: 26 | Performed by: PAIN MEDICINE

## 2021-10-21 PROCEDURE — 20610 DRAIN/INJ JOINT/BURSA W/O US: CPT | Mod: LT | Performed by: PAIN MEDICINE

## 2021-10-21 PROCEDURE — 250N000009 HC RX 250: Performed by: PAIN MEDICINE

## 2021-10-21 PROCEDURE — 255N000002 HC RX 255 OP 636: Performed by: PAIN MEDICINE

## 2021-10-21 RX ORDER — BETAMETHASONE SODIUM PHOSPHATE AND BETAMETHASONE ACETATE 3; 3 MG/ML; MG/ML
INJECTION, SUSPENSION INTRA-ARTICULAR; INTRALESIONAL; INTRAMUSCULAR; SOFT TISSUE
Status: COMPLETED | OUTPATIENT
Start: 2021-10-21 | End: 2021-10-21

## 2021-10-21 RX ORDER — BUPIVACAINE HYDROCHLORIDE 2.5 MG/ML
INJECTION, SOLUTION EPIDURAL; INFILTRATION; INTRACAUDAL
Status: COMPLETED | OUTPATIENT
Start: 2021-10-21 | End: 2021-10-21

## 2021-10-21 RX ORDER — LIDOCAINE HYDROCHLORIDE 20 MG/ML
INJECTION, SOLUTION EPIDURAL; INFILTRATION; INTRACAUDAL; PERINEURAL
Status: COMPLETED | OUTPATIENT
Start: 2021-10-21 | End: 2021-10-21

## 2021-10-21 RX ADMIN — BUPIVACAINE HYDROCHLORIDE 3 ML: 2.5 INJECTION, SOLUTION EPIDURAL; INFILTRATION; INTRACAUDAL at 13:49

## 2021-10-21 RX ADMIN — BETAMETHASONE SODIUM PHOSPHATE AND BETAMETHASONE ACETATE 6 MG: 3; 3 INJECTION, SUSPENSION INTRA-ARTICULAR; INTRALESIONAL; INTRAMUSCULAR at 13:50

## 2021-10-21 RX ADMIN — IOHEXOL 4 ML: 300 INJECTION, SOLUTION INTRAVENOUS at 13:52

## 2021-10-21 RX ADMIN — LIDOCAINE HYDROCHLORIDE 3 ML: 20 INJECTION, SOLUTION EPIDURAL; INFILTRATION; INTRACAUDAL; PERINEURAL at 13:48

## 2021-10-21 ASSESSMENT — PAIN SCALES - GENERAL
PAINLEVEL: MILD PAIN (3)
PAINLEVEL: MILD PAIN (2)

## 2021-10-21 NOTE — PROGRESS NOTES
Patient here for left hip injection with Dr White, patient rating her p[ain a 3 at present with pain level increasing as the day progresses.

## 2021-10-21 NOTE — PATIENT INSTRUCTIONS
POST PROCEDURE INSTRUCTIONS  PROCEDURE DONE TODAY: LEFT HIP INJECTION    Rest today. Resume activity tomorrow as able.  Patient demonstrates the ability to ambulate independently with a steady gait at the time of discharge.      It is not unusual to have a temporary increase in your pain after a procedure. You can ice the area 24-48 hrs. 15 min at a time.      You received a steroid injection. This medication can take 2-7 days to take effect. Some temporary side effects include:    Heartburn    Flushed-red face    Insomnia-trouble sleeping-jitters    Diabetics may see a rise in blood sugar for a few days    Low back or SI joint (sacroiliac) injection: you may experience numbness in one or both legs. Please walk with help. This is temporary and will wear off in a few hours. Do not drive if you are tingling, numbness or weakness in your hands/arms or legs/feet.    Headache:  If you experience a headache after a lumbar epidural injection, lie down and drink fluids (especially caffeine). The headache will go away gradually. If it persists notify our clinic.    Fever: call if fever 100 or over, redness/warmth/swelling over the injection site.    No public hot tubs/pools for a couple days    Physical therapy: check with pain center provider regarding resuming therapy.    Monitor your response to the injection and report this at your next visit.    If you have been referred for a procedure only, please call your referring provider for a follow up.    IF YOU PLAN TO GET A FLU SHOT YOU MUST WAIT 2 WEEKS AFTER THIS INJECTION.      CALL IF ANY QUESTIONS [phone]

## 2021-10-27 ENCOUNTER — DOCUMENTATION ONLY (OUTPATIENT)
Dept: TRANSPLANT | Facility: CLINIC | Age: 66
End: 2021-10-27

## 2021-10-27 NOTE — PROGRESS NOTES
"Serene Tipton mailed in [employer] and [employer] signed forms from [employer].  I cosigned forms and sent to [employer] to be scanned.     [employer] is signed \" I am willing to accept organ offers >85%\"  [employer] educational materials SRTR date range 1/1/2018 to March 12, 2020.     madyson Hernandez and Payal Uribe   "

## 2021-11-03 ENCOUNTER — TELEPHONE (OUTPATIENT)
Dept: PALLIATIVE MEDICINE | Facility: OTHER | Age: 66
End: 2021-11-03

## 2021-11-16 ENCOUNTER — TELEPHONE (OUTPATIENT)
Dept: PALLIATIVE MEDICINE | Facility: OTHER | Age: 66
End: 2021-11-16
Payer: COMMERCIAL

## 2021-11-17 ENCOUNTER — ANCILLARY PROCEDURE (OUTPATIENT)
Dept: PALLIATIVE MEDICINE | Facility: OTHER | Age: 66
End: 2021-11-17
Attending: PAIN MEDICINE
Payer: COMMERCIAL

## 2021-11-17 VITALS
HEART RATE: 105 BPM | RESPIRATION RATE: 16 BRPM | DIASTOLIC BLOOD PRESSURE: 97 MMHG | SYSTOLIC BLOOD PRESSURE: 121 MMHG | HEIGHT: 60 IN | WEIGHT: 120 LBS | BODY MASS INDEX: 23.56 KG/M2 | TEMPERATURE: 96.6 F

## 2021-11-17 DIAGNOSIS — M47.816 LUMBAR FACET ARTHROPATHY: Primary | ICD-10-CM

## 2021-11-17 PROCEDURE — 64494 INJ PARAVERT F JNT L/S 2 LEV: CPT | Mod: 50 | Performed by: PAIN MEDICINE

## 2021-11-17 PROCEDURE — 64493 INJ PARAVERT F JNT L/S 1 LEV: CPT | Mod: 50 | Performed by: PAIN MEDICINE

## 2021-11-17 PROCEDURE — 250N000009 HC RX 250: Performed by: PAIN MEDICINE

## 2021-11-17 PROCEDURE — 250N000011 HC RX IP 250 OP 636: Performed by: PAIN MEDICINE

## 2021-11-17 RX ORDER — BUPIVACAINE HYDROCHLORIDE 7.5 MG/ML
INJECTION, SOLUTION EPIDURAL; RETROBULBAR
Status: COMPLETED | OUTPATIENT
Start: 2021-11-17 | End: 2021-11-17

## 2021-11-17 RX ORDER — LIDOCAINE HYDROCHLORIDE 10 MG/ML
INJECTION, SOLUTION EPIDURAL; INFILTRATION; INTRACAUDAL; PERINEURAL
Status: COMPLETED | OUTPATIENT
Start: 2021-11-17 | End: 2021-11-17

## 2021-11-17 RX ADMIN — BUPIVACAINE HYDROCHLORIDE 3 ML: 7.5 INJECTION, SOLUTION EPIDURAL; RETROBULBAR at 15:45

## 2021-11-17 RX ADMIN — LIDOCAINE HYDROCHLORIDE 5 ML: 10 INJECTION, SOLUTION EPIDURAL; INFILTRATION; INTRACAUDAL; PERINEURAL at 15:44

## 2021-11-17 ASSESSMENT — MIFFLIN-ST. JEOR: SCORE: 1015.82

## 2021-11-17 ASSESSMENT — PAIN SCALES - GENERAL: PAINLEVEL: SEVERE PAIN (6)

## 2021-11-17 NOTE — PATIENT INSTRUCTIONS
POST PROCEDURE       INITIAL L2, L3, L4 MEDIAL BRANCH BLOCK        Please continue your regular activity and keep track of your pain for the next 5 hours on the pain sheet that is given to you. Please return this to Pain Center tomorrow or asap via fax, mail, or drop off at clinic.    This is a diagnostic test to determine if these are the correct nerves causing your pain.    The objective of this procedure is to have your pain decrease. However it will return again that same day or a few days later. Once your pain returns it may stay the same or get worse.    You may feel some numbness or tingling, which is from the local anesthetic and may last up to several hours.    If pain returns please document on the pain analog sheet when you took your pain medication.    Fever: call if fever 100 or over, redness/warmth/swelling over injection site.    No public hot tubs/pools for a couple of days.    Monitor your response to the injection and report this at your next visit.    IF YOUR PAIN SHEET DOES NOT DEMONSTRATE ENOUGH RELIEF FOR PROCEDURE WE WILL CALL YOU TO HAVE YOU RETURN FOR REPEAT  MEDIAL BRANCH BLOCK.       IF ANY QUESTIONS CALL  PAIN CENTER  [phone]

## 2021-11-17 NOTE — LETTER
11/17/2021         RE: Serene Tipton  [address]  [place] [address]        Dear Colleague,    Thank you for referring your patient, Serene Tipton, to the [facility]. Please see a copy of my visit note below.    Pt. Here for lumbar MBBs      Again, thank you for allowing me to participate in the care of your patient.        Sincerely,        Gucci TURPIN MD

## 2021-11-30 ENCOUNTER — TELEPHONE (OUTPATIENT)
Dept: PALLIATIVE MEDICINE | Facility: OTHER | Age: 66
End: 2021-11-30
Payer: COMMERCIAL

## 2021-11-30 NOTE — TELEPHONE ENCOUNTER
"Pain Diary was returned for initial bilateral L2, L3, L4 MBB and results were determined \"good\" by Dr White. Pt will return for confirmatory bilateral LMBB.  "

## 2021-12-01 ENCOUNTER — ANCILLARY PROCEDURE (OUTPATIENT)
Dept: PALLIATIVE MEDICINE | Facility: OTHER | Age: 66
End: 2021-12-01
Attending: PAIN MEDICINE
Payer: COMMERCIAL

## 2021-12-01 VITALS — HEART RATE: 92 BPM | DIASTOLIC BLOOD PRESSURE: 77 MMHG | OXYGEN SATURATION: 99 % | SYSTOLIC BLOOD PRESSURE: 147 MMHG

## 2021-12-01 DIAGNOSIS — M47.816 LUMBAR FACET ARTHROPATHY: ICD-10-CM

## 2021-12-01 PROCEDURE — 250N000009 HC RX 250: Performed by: PAIN MEDICINE

## 2021-12-01 PROCEDURE — 64494 INJ PARAVERT F JNT L/S 2 LEV: CPT | Mod: 50 | Performed by: PAIN MEDICINE

## 2021-12-01 PROCEDURE — 64493 INJ PARAVERT F JNT L/S 1 LEV: CPT | Mod: 50 | Performed by: PAIN MEDICINE

## 2021-12-01 RX ORDER — LIDOCAINE HYDROCHLORIDE 10 MG/ML
INJECTION, SOLUTION EPIDURAL; INFILTRATION; INTRACAUDAL; PERINEURAL
Status: COMPLETED | OUTPATIENT
Start: 2021-12-01 | End: 2021-12-01

## 2021-12-01 RX ORDER — LIDOCAINE HYDROCHLORIDE 20 MG/ML
INJECTION, SOLUTION EPIDURAL; INFILTRATION; INTRACAUDAL; PERINEURAL
Status: COMPLETED | OUTPATIENT
Start: 2021-12-01 | End: 2021-12-01

## 2021-12-01 RX ADMIN — LIDOCAINE HYDROCHLORIDE 3 ML: 20 INJECTION, SOLUTION EPIDURAL; INFILTRATION; INTRACAUDAL; PERINEURAL at 13:38

## 2021-12-01 RX ADMIN — LIDOCAINE HYDROCHLORIDE 5 ML: 10 INJECTION, SOLUTION EPIDURAL; INFILTRATION; INTRACAUDAL; PERINEURAL at 13:37

## 2021-12-01 ASSESSMENT — PAIN SCALES - GENERAL: PAINLEVEL: NO PAIN (0)

## 2021-12-01 NOTE — PROGRESS NOTES
Pre-procedure Intake  If YES to any questions or NO to having a   Please complete laminated checklist and leave on the computer keyboard for Provider, verbally inform provider if able.    For SCS Trial, RFA's or any sedation procedure:  Have you been fasting? No     If yes, for how long?     Are you taking any any blood thinners such as Coumadin, Warfarin, Jantoven, Pradaxa Xarelto, Eliquis, Edoxaban, Enoxaparin, Lovenox, Heparin, Arixtra, Fondaparinux, or Fragmin? OR Antiplatelet medication such as Plavix, Brilinta, or Effient?   No     If yes, when did you take your last dose?     Do you take aspirin?  No    If cervical procedure, have you held aspirin for 6 days?   No     Do you have any allergies to contrast dye, iodine, steroid and/or numbing medications?  NO    Are you currently taking antibiotics or have an active infection?  NO    Have you had a fever/elevated temperature within the past week? NO    Are you currently taking oral steroids? NO    Do you have a ? No     Are you pregnant or breastfeeding?  NO    Have you received the COVID-19 vaccine? Yes    If yes, was it your 1st, 2nd or only dose needed? 02/22/2021,01/25/2021    Date of most recent vaccine: 02/22/2021    Notify provider and RNs if systolic BP >170, diastolic BP >100, P >100 or O2 sats < 90%

## 2021-12-01 NOTE — PATIENT INSTRUCTIONS
POST PROCEDURE        CONFIRMATORY BILATERAL L2, L3, L4 MEDIAL BRANCH BLOCK        Please continue your regular activity and keep track of your pain for the next 5 hours on the pain sheet that is given to you. Please return this to Pain Center tomorrow or asap via fax, mail, or drop off at clinic.    This is a diagnostic test to determine if these are the correct nerves causing your pain.    The objective of this procedure is to have your pain decrease. However it will return again that same day or a few days later. Once your pain returns it may stay the same or get worse.    You may feel some numbness or tingling, which is from the local anesthetic and may last up to several hours.    If pain returns please document on the pain analog sheet when you took your pain medication.    Fever: call if fever 100 or over, redness/warmth/swelling over injection site.    No public hot tubs/pools for a couple of days.    Monitor your response to the injection and report this at your next visit.    IF YOUR PAIN SHEET DOES NOT DEMONSTRATE ENOUGH RELIEF FOR PROCEDURE WE WILL CALL YOU.  IF YOU HAVE ENOUGH RELIEF WE WILL SUBMIT TO YOUR INSURANCE COMPANY FOR APPROVAL. ONCE APPROVED BY YOUR INSURANCE COMPANY THE STAFF WILL CALL YOU TO SCHEDULE RADIOFREQUENCY          IF ANY QUESTIONS CALL  PAIN CENTER  [phone]

## 2021-12-01 NOTE — LETTER
12/1/2021         RE: Serene Tipton  [address]  [place] [address]        Dear Colleague,    Thank you for referring your patient, Serene Tipton, to the [facility]. Please see a copy of my visit note below.    Pre-procedure Intake  If YES to any questions or NO to having a   Please complete laminated checklist and leave on the computer keyboard for Provider, verbally inform provider if able.    For SCS Trial, RFA's or any sedation procedure:  Have you been fasting? No     If yes, for how long?     Are you taking any any blood thinners such as Coumadin, Warfarin, Jantoven, Pradaxa Xarelto, Eliquis, Edoxaban, Enoxaparin, Lovenox, Heparin, Arixtra, Fondaparinux, or Fragmin? OR Antiplatelet medication such as Plavix, Brilinta, or Effient?   No     If yes, when did you take your last dose?     Do you take aspirin?  No    If cervical procedure, have you held aspirin for 6 days?   No     Do you have any allergies to contrast dye, iodine, steroid and/or numbing medications?  NO    Are you currently taking antibiotics or have an active infection?  NO    Have you had a fever/elevated temperature within the past week? NO    Are you currently taking oral steroids? NO    Do you have a ? No     Are you pregnant or breastfeeding?  NO    Have you received the COVID-19 vaccine? Yes    If yes, was it your 1st, 2nd or only dose needed? 02/22/2021,01/25/2021    Date of most recent vaccine: 02/22/2021    Notify provider and RNs if systolic BP >170, diastolic BP >100, P >100 or O2 sats < 90%            Again, thank you for allowing me to participate in the care of your patient.        Sincerely,        Gucci TURPIN MD

## 2021-12-07 ENCOUNTER — TELEPHONE (OUTPATIENT)
Dept: PALLIATIVE MEDICINE | Facility: OTHER | Age: 66
End: 2021-12-07
Payer: COMMERCIAL

## 2021-12-07 DIAGNOSIS — M47.816 LUMBAR FACET ARTHROPATHY: Primary | ICD-10-CM

## 2021-12-13 ENCOUNTER — TELEPHONE (OUTPATIENT)
Dept: PALLIATIVE MEDICINE | Facility: OTHER | Age: 66
End: 2021-12-13
Payer: COMMERCIAL

## 2021-12-13 NOTE — TELEPHONE ENCOUNTER
Patient is scheduled for a procedure on 12/30/21 and is requesting Valium for the procedure. RN does not see where this was ordered in the past for a procedure so will defer to provider.

## 2021-12-13 NOTE — TELEPHONE ENCOUNTER
Pt scheduled Bilateral Lumbar RF for 12-30-21 at 2:00pm.  Will need RX for Valium sent to the [facility] [address].

## 2021-12-26 ENCOUNTER — HEALTH MAINTENANCE LETTER (OUTPATIENT)
Age: 66
End: 2021-12-26

## 2021-12-27 ENCOUNTER — TELEPHONE (OUTPATIENT)
Dept: PALLIATIVE MEDICINE | Facility: OTHER | Age: 66
End: 2021-12-27
Payer: COMMERCIAL

## 2021-12-27 DIAGNOSIS — M47.816 LUMBAR FACET ARTHROPATHY: Primary | ICD-10-CM

## 2021-12-28 ENCOUNTER — TELEPHONE (OUTPATIENT)
Dept: PALLIATIVE MEDICINE | Facility: OTHER | Age: 66
End: 2021-12-28
Payer: COMMERCIAL

## 2021-12-28 DIAGNOSIS — M47.816 LUMBAR FACET ARTHROPATHY: Primary | ICD-10-CM

## 2021-12-28 RX ORDER — DIAZEPAM 5 MG
TABLET ORAL
Qty: 2 TABLET | Refills: 0 | Status: SHIPPED | OUTPATIENT
Start: 2021-12-28 | End: 2021-12-29

## 2021-12-28 NOTE — TELEPHONE ENCOUNTER
PA Initiation 12/28//2021    Medication: diazepam 5 mg tablet is non formulary  Insurance Company:  [facility]  Pharmacy Filling the Rx: [facility]     Filling Pharmacy Phone:  [phone]  Filling Pharmacy Fax:  [phone]  Start Date:  12/28/2021

## 2021-12-28 NOTE — TELEPHONE ENCOUNTER
Central Prior Authorization Team   Phone: [phone]      PA Initiation    Medication: diazepam 5 mg tablet is non formulary  Insurance Company: [employer] [place] - Phone [phone] Fax [phone]  Pharmacy Filling the Rx: [id number] [place], [place] - [address] [place] [address]  Filling Pharmacy Phone: [phone]  Filling Pharmacy Fax:    Start Date: 12/28/2021

## 2021-12-29 RX ORDER — DIAZEPAM 5 MG
5 TABLET ORAL EVERY 6 HOURS PRN
Qty: 2 TABLET | Refills: 0 | Status: SHIPPED | OUTPATIENT
Start: 2021-12-29 | End: 2023-01-17

## 2021-12-29 NOTE — TELEPHONE ENCOUNTER
Patient asking for this to be resent to new pharmacy    Pending Prescriptions:                       Disp   Refills    diazepam (VALIUM) 5 MG tablet             2 tabl*0            Sig: Take 1 tablet (5 mg) by mouth every 6 hours as           needed for anxiety    [facility]

## 2021-12-29 NOTE — TELEPHONE ENCOUNTER
PRIOR AUTHORIZATION DENIED    Medication: diazepam 5 mg tablet is non formulary-Denied     Denial Date: 12/28/2021    Denial Rational: Your Prescriber needs to tell us that it is appropriate for you to be taking an opioid along with a benzodiazepine. I called the patient's insurance and the request for confirmation was mailed instead of faxed, but chart notes can be sent in support that the provider is aware that the patient is taking both drug classes.        Appeal Information: If the provider would like to appeal this denial, please provide a letter of medical necessity. Please also include any therapies that the patient has tried and their outcomes. The patient's insurance company will also require the provider to address why the insurance preferred options are not appropriate in the patient's therapy.  The reason could be that the preferred options will harm the patient; either physically or mentally. They are contraindicated to the patient; or the patient has already been taking the requested medication and changing the therapy would change the outcome of their therapy.    Once it has been completed and placed in the patient's chart, notify the Central PA Team ([employer]) and the appeal can be initiated on behalf of the patient and provider.

## 2021-12-29 NOTE — TELEPHONE ENCOUNTER
Spoke to patient, she is aware this medication has been denied and is willing to pay out of pocket for it. Patient also states she would like this prescription re-sent to [facility] [address]

## 2021-12-30 ENCOUNTER — ANCILLARY PROCEDURE (OUTPATIENT)
Dept: PALLIATIVE MEDICINE | Facility: OTHER | Age: 66
End: 2021-12-30
Attending: PAIN MEDICINE
Payer: COMMERCIAL

## 2021-12-30 VITALS
DIASTOLIC BLOOD PRESSURE: 78 MMHG | SYSTOLIC BLOOD PRESSURE: 170 MMHG | TEMPERATURE: 97.3 F | HEIGHT: 60 IN | HEART RATE: 88 BPM | BODY MASS INDEX: 24.81 KG/M2 | WEIGHT: 126.4 LBS

## 2021-12-30 DIAGNOSIS — M47.816 LUMBAR FACET ARTHROPATHY: ICD-10-CM

## 2021-12-30 PROCEDURE — 64635 DESTROY LUMB/SAC FACET JNT: CPT | Mod: 50 | Performed by: PAIN MEDICINE

## 2021-12-30 PROCEDURE — 64636 DESTROY L/S FACET JNT ADDL: CPT | Mod: 50 | Performed by: PAIN MEDICINE

## 2021-12-30 PROCEDURE — 250N000009 HC RX 250: Performed by: PAIN MEDICINE

## 2021-12-30 RX ORDER — LIDOCAINE HYDROCHLORIDE 10 MG/ML
INJECTION, SOLUTION EPIDURAL; INFILTRATION; INTRACAUDAL; PERINEURAL
Status: COMPLETED | OUTPATIENT
Start: 2021-12-30 | End: 2021-12-30

## 2021-12-30 RX ADMIN — LIDOCAINE HYDROCHLORIDE 30 ML: 10 INJECTION, SOLUTION EPIDURAL; INFILTRATION; INTRACAUDAL; PERINEURAL at 14:50

## 2021-12-30 ASSESSMENT — PAIN SCALES - GENERAL
PAINLEVEL: MODERATE PAIN (5)
PAINLEVEL: MODERATE PAIN (5)

## 2021-12-30 ASSESSMENT — MIFFLIN-ST. JEOR: SCORE: 1044.85

## 2021-12-30 NOTE — PATIENT INSTRUCTIONS
[facility] [place]  Radiofrequency Ablation (RFA) Discharge Instructions     Today you saw:    Dr. White      You should anticipate procedural pain for up to 2 weeks.       You may receive a prescription for pain medication and you should take this as directed.       It may take up to 8 weeks to receive relief from the RFA       Follow up with your provider in 2 weeks.       If you received sedation before, during or after your procedure, for the next 24 hours you shall NOT:    -Drive    -Operate machinery    -Drink alcohol    -Sign any legal documents       You may resume your normal diet       You may resume your regular medications after the procedure       If you were holding your blood thinning medication, please restart taking it: N/A      Be cautious with walking. Numbness and/or weakness in the lower extremities may occur for up to 6-8 hours due to effect of local anesthetic      Avoid strenuous activity for the first 24 hours       You may resume your regular activities after 24 hours as tolerated      You may shower, however no swimming, tub baths or hot tubs for 24 hours following your procedure       You may use ice packs 10-15 minutes three to four times a day at the injection site for comfort       Do not use heat to painful areas for 6 to 8 hours. This will give the local anesthetic time to wear off and prevent you from accidentally burning your skin.       Unless you have been directed to avoid the use of anti-inflammatory medications (NSAIDS), you may use medications such as ibuprofen, Aleve or Tylenol for pain control if needed.       If you experience any of the following, call the Pain Clinic at [phone]:   -Fever over 100 degree F    -Swelling, bleeding, redness, drainage, warmth at the injection site    -Progressive weakness or numbness in your legs or arms    -Loss of bowel or bladder function    -Unusual headache that is not relieved by Tylenol     -Unusual new onset of pain that is not improving

## 2021-12-30 NOTE — LETTER
12/30/2021         RE: Serene Tipton  [address]  [address] [place] [address]        Dear Colleague,    Thank you for referring your patient, Serene Tipton, to the [facility]. Please see a copy of my visit note below.    Pre-procedure Intake  If YES to any questions or NO to having a   Please complete laminated checklist and leave on the computer keyboard for Provider, verbally inform provider if able.    For SCS Trial, RFA's or any sedation procedure:  Have you been fasting? No     If yes, for how long? NA    Are you taking any any blood thinners such as Coumadin, Warfarin, Jantoven, Pradaxa Xarelto, Eliquis, Edoxaban, Enoxaparin, Lovenox, Heparin, Arixtra, Fondaparinux, or Fragmin? OR Antiplatelet medication such as Plavix, Brilinta, or Effient?   No     If yes, when did you take your last dose? NA    Do you take aspirin?  No    If cervical procedure, have you held aspirin for 6 days?   NA    Do you have any allergies to contrast dye, iodine, steroid and/or numbing medications?  NO    Are you currently taking antibiotics or have an active infection?  NO    Have you had a fever/elevated temperature within the past week? NO    Are you currently taking oral steroids? NO    Do you have a ? Yes    Are you pregnant or breastfeeding?  Not Applicable    Have you received the COVID-19 vaccine? Yes    If yes, was it your 1st, 2nd or only dose needed? Second dose 2/2021    Date of most recent vaccine:     Booster 12/2021    Notify provider and RNs if systolic BP >170, diastolic BP >100, P >100 or O2 sats < 90%          Again, thank you for allowing me to participate in the care of your patient.        Sincerely,        Gucci TURPIN MD

## 2021-12-31 NOTE — TELEPHONE ENCOUNTER
MEDICATION APPEAL APPROVED    Medication: diazepam 5 mg tablet is non formulary-Denied -Appeal Approved   Authorization Effective Date: 10/1/2021  Authorization Expiration Date: 12/30/2022  Approved Dose/Quantity: Opioid-Benzodiazepine Safety Edit   Reference #:     Insurance Company: [employer] [place] - Phone [phone] Fax [phone]  Expected CoPay:       CoPay Card Available: No    Foundation Assistance Needed:    Which Pharmacy is filling the prescription (Not needed for infusion/clinic administered): [id number] - [place], [place] - [address] [place] [address]

## 2021-12-31 NOTE — TELEPHONE ENCOUNTER
Medication Appeal Initiation    We have initiated an appeal for the requested medication:  Medication: diazepam 5 mg tablet is non formulary-Denied -Appeal Approved   Appeal Start Date:  12/29/2021  Insurance Company: [employer] - Phone [phone] Fax [phone]  Comments:  for her upcomming MRI.    Faxed on 12/29/2021 to patient insurance with supplemental insurance since I did get the fax requesting that additional information be provided that the provider is aware that the patient is taking opioid.

## 2022-01-20 ENCOUNTER — TELEPHONE (OUTPATIENT)
Dept: PALLIATIVE MEDICINE | Facility: OTHER | Age: 67
End: 2022-01-20
Payer: COMMERCIAL

## 2022-01-20 DIAGNOSIS — M54.16 LUMBAR RADICULOPATHY: Primary | ICD-10-CM

## 2022-01-24 ENCOUNTER — TELEPHONE (OUTPATIENT)
Dept: PALLIATIVE MEDICINE | Facility: OTHER | Age: 67
End: 2022-01-24
Payer: COMMERCIAL

## 2022-01-24 NOTE — TELEPHONE ENCOUNTER
Patient is need to have injection reschedule on 1/2722 pt would like to go to the [place] location, please contact patient.  Thanks

## 2022-01-24 NOTE — TELEPHONE ENCOUNTER
Screening Questions for Radiology Injections:    Injection to be done at which interventional clinic site? [facility]    Remind [place] Pt's that Covid test is no longer required except for sedation procedure Pt's.    Instruct patient to arrive as directed prior to the scheduled appointment time:    [facility]: 30 minutes before      [place]: 30 minutes before; if IV needed 1 hour before     Procedure ordered by Laury    Procedure ordered? INTERLAMINAR ROBER      Transforaminal Cervical ROBER -  [place] does NOT have providers that do these- [facility] and [facility] do have providers that do    As a reminder, receiving steroids can decrease your body's ability to fight infection.   Would you still like to move forward with scheduling the injection?  Yes    What insurance would patient like us to bill for this procedure? BCBS Medicare      Worker's comp or MVA (motor vehicle accident) -Any injection DO NOT SCHEDULE and route to Megan Ly.      [employer] insurance - For SI joint injections, DO NOT SCHEDULE and route Megan Ly.       ALL BCBS, [employer] and [employer]-Route to Megan for review DO NOT SCHEDULE      IF SCHEDULING IN [place] AND NEEDS A PA, IT IS OKAY TO SCHEDULE. [place] HANDLES THEIR OWN PA'S AFTER THE PATIENT IS SCHEDULED. PLEASE SCHEDULE AT LEAST 1 WEEK OUT SO A PA CAN BE OBTAINED.    Any chance of pregnancy? NO   If YES, do NOT schedule and route to RN pool    Is an  needed? No     Patient has a drive home? (mandatory) YES: INFORMED    Is patient taking any blood thinners (That is: Coumadin, Warfarin, Jantoven, Pradaxa Xarelto, Eliquis, Edoxaban, Enoxaparin, Lovenox, Heparin, Arixtra, Fondaparinux, or Fragmin? OR Antiplatelet medication such as Plavix, Brilinta, or Effient? )? No   If hold needed, do NOT schedule, route to RN pool     Is patient taking any aspirin products (includes Excedrin and Fiorinal)? No     If more than 325mg/day, OK to schedule; Instruct pt to decrease  to less than 325 mg for 7 days AND route to RN pool    For CERVICAL procedures, hold all aspirin products for 6 days.     Tell pt that if aspirin product is not held for 6 days, the procedure WILL BE cancelled.      Does the patient have a bleeding or clotting disorder? No     If YES, okay to schedule AND route to RN nurse pool    For any patients with platelet count <100, must be forwarded to provider    Any allergies to contrast dye, iodine, shellfish, or numbing and steroid medications? Yes - CONTRAST DYE (pt has dialysis and is not supposed to have)    If YES, add allergy information to appointment notes AND route to the RN pool     If ROBER and Contrast Dye / Iodine Allergy? DO NOT SCHEDULE, route to RN pool    Allergies: Penicillins, Carvedilol, Ciprofloxacin, Morphine, No clinical screening - see comments, Nsaids, Sulfa drugs, Sulfamethoxazole-trimethoprim, and Levofloxacin     Is patient diabetic?  No  If YES, instruct them to bring their glucometer.    Does patient have an active infection or treated for one within the past week? No     Is patient currently taking any antibiotics?  No     For patients on chronic, preventative, or prophylactic antibiotics, procedures may be scheduled.     For patients on antibiotics for active or recent infection:antibiotic course must have been completed for 4 days    Is patient currently taking any steroid medications? (i.e. Prednisone, Medrol)  No     For patients on steroid medications, course must have been completed for 4 days    Is patient actively being treated for cancer or immunocompromised? No  If YES, do NOT schedule and route to RN pool     Are you able to get on and off an exam table with minimal or no assistance? Yes  If NO, do NOT schedule and route to RN pool    Are you able to roll over and lay on your stomach with minimal or no assistance? Yes  If NO, do NOT schedule and route to RN pool     Has the patient had a flu shot or any other vaccinations within 7  days before or after the procedure.  No     Have you recently had a COVID vaccine or have plans to get it in the near future? No    If yes, explain that for the vaccine to work best they need to:       wait 1 week before and 1 week after getting Vaccine #1    wait 1 week before and 2 weeks after getting Vaccine #2    wait 1 week before and 2 weeks after getting Vaccine BOOSTER    If patient has concerns about the timing, send to RN pool     Does patient have an MRI/CT?  YES: 2021  Check Procedure Scheduling Grid to see if required.      Was the MRI done within the last 3 years?  Yes    If yes, where was the MRI done i.e.[facility], [facility], [facility], [facility] etc? [facility]      If no, do not schedule and route to RN pool    If MRI was not done at [facility], [facility] or [facility] do NOT schedule and route to RN pool.      If pt has an imaging disc, the injection MAY be scheduled but pt has to bring disc to appt.     If they show up without the disc the injection cannot be done    Procedure Specific Instructions:      If celiac plexus block, informed patient NPO for 6 hours and that it is okay to take medications with sips of water, especially blood pressure medications  Not Applicable         If this is for a cervical procedure, informed patient that aspirin needs to be held for 6 days.   Not Applicable      If IV needed:    Do not schedule procedures requiring IV placement in the first appointment of the day or first appointment after lunch. Do NOT schedule at 0745, 0815 or 1245.     Instructed pt to arrive 30 minutes early for IV start if required. (Check Procedure Scheduling Grid)  Not Applicable    Reminders:      If you are started on any steroids or antibiotics between now and your appointment, you must contact us because the procedure may need to be cancelled.  Yes      For all procedures except radiofrequency ablations (RFAs) and spinal cord stimulator (SCS) trials, informed patient:    IV sedation is  not provided for this procedure.  If you feel that an oral anti-anxiety medication is needed, you can discuss this further with your referring provider or primary care provider.  The Pain Clinic provider will discuss specifics of what the procedure includes at your appointment.  Most procedures last 10-20 minutes.  We use numbing medications to help with any discomfort during the procedure.  Not Applicable      For patients 85 or older we recommend having an adult stay w/ them for the remainder of the day.       Does the patient have any questions?  NO  Enma Hanna  [facility]

## 2022-01-26 NOTE — TELEPHONE ENCOUNTER
PA pending additional information - please specify exact level        Megan BONILLA    [place] [facility]

## 2022-01-27 NOTE — TELEPHONE ENCOUNTER
Please call the patient and see what type of symptoms they are having that Dr. White ordered this procedure. I am not seeing any documentation in the records.    Of note they had lumbar RFA on 12/30/21 by Dr. White. If the epidural was ordered for back pain, then I would recommend waiting at least 6 weeks post RFA to see how their back pain responds.    Jonathan Orona,   [facility]

## 2022-01-27 NOTE — TELEPHONE ENCOUNTER
Received message from Ibis Lundberg stating the following:   Ibis Lundberg RN  Yeyo, MD Dr. Yeyo Das      Serene decided to have the L-ROBER on 2/23 at the [facility] instead of TPI that is scheduled as she is having difficulty finding a  who will drive to [place]. She reports that her pain radiates from the lower back into her left buttock and leg to the sole of her left foot. States she occasionally has left lateral leg pain that radiates to the top of her left foot.   Ibis Lundberg RN     Recommend a L4-5 lumbar interlaminar epidural steroid injection.    Jonathan Orona DO  [facility]

## 2022-01-28 NOTE — TELEPHONE ENCOUNTER
Per note below, it looks like patient is going to [place] for injection.      Megan BONILLA    [place] [facility]

## 2022-02-01 NOTE — TELEPHONE ENCOUNTER
Per the message I received from Ibis timmons, patient elected to go with an lumbar epidural steroid injection (L4-5 as recommended after imaging was reviewed) and is scheduled for 2/23 at [place]. This appointment should be for an lumbar epidural steroid injection instead of a TPI.     Will route to Ibis Timmons as well to confirm.      Jonathan Orona,   [facility]

## 2022-02-01 NOTE — TELEPHONE ENCOUNTER
That is correct, L-ROBER on 2/23 at the [facility].  Thanks,  Ibis Lundberg RN

## 2022-02-08 ENCOUNTER — TELEPHONE (OUTPATIENT)
Dept: PALLIATIVE MEDICINE | Facility: OTHER | Age: 67
End: 2022-02-08
Payer: COMMERCIAL

## 2022-02-09 NOTE — PROGRESS NOTES
[facility] - Procedure Note    Date of Visit: 2/9/2022    Procedure performed: Lumbar 3-4 interlaminar epidural steroid injection  Diagnosis: Lumbar spondylosis; Lumbar radiculitis/radiculopathy  : Jonathan Orona DO   Anesthesia: none    Indications: Serene Tipton is a 66 year old female who is seen at the request of Dr. White for a lumbar epidural steroid injection. The patient describes pain in the low back that radiates into the left lower extremity in an L4-5 distribution. The patient has been exhibiting symptoms consistent with lumbar intraspinal inflammation and radiculopathy. Symptoms have been persistent, disabling, and intermittently severe. The patient reports minimal improvement with conservative treatment, including medications and PT.    Lumbar MRI was done on 2/26/21 which showed      L3-L4: Normal disc height and signal. No herniation. Normal facets. No spinal canal or neural foraminal stenosis.     L4-L5: Disc desiccation and minimal loss of disc space height. Shallow left central disc protrusion. Moderate facet arthropathy with facet effusions. No spinal canal stenosis. Moderate right and mild left neural foraminal stenosis.     L5-S1: Solid interbody fusion. Mild hypertrophic changes of the facet joints. No spinal canal stenosis. Mild left down foraminal stenosis. No right neural foraminal stenosis. The degree of foraminal narrowing is unchanged from previous exam.     IMPRESSION:  1.  Moderate to severe degenerative facet changes at L4-L5 are progressed from previous examination. There are bilateral facet effusions and worsening moderate right and mild left neural foraminal stenoses.  2.  Marrow edema involving the bilateral L4 and L5 facets and bilateral L5 pedicles, favored to be degenerative/reactive. This may be a source of pain.    Allergies:      Allergies   Allergen Reactions     Penicillins Other (See Comments) and Shortness Of Breath      Breathing problem.  breathing       Carvedilol GI Disturbance     Nausea and vomiting     Ciprofloxacin Muscle Pain (Myalgia)     Morphine Other (See Comments)     Vomiting     No Clinical Screening - See Comments      PN: LW CM1: Contrast Intercapsular - Nonionic Reaction :     Nsaids Other (See Comments)     Sulfa Drugs Itching     Sulfamethoxazole-Trimethoprim      Other reaction(s): itching     Levofloxacin Muscle Pain (Myalgia) and Rash        Vitals:  There were no vitals taken for this visit.    Review of Systems: The patient denies recent fever, chills, illness, use of antibiotics or anticoagulants. All other 10-point review of systems negative.     Procedure: The procedure and risks were explained, and informed written consent was obtained from the patient. Risks include but are not limited to: infection, bleeding, increased pain, and damage to soft tissue, nerve, muscle, and vasculature structures. After getting informed consent, patient was brought into the procedure suite and was placed in a prone position on the procedure table. A Pause for the Cause was performed. Patient was prepped and draped in sterile fashion.     The L3-4 interspace was identified with use of fluoroscopy in AP view. A 25-gauge, 1.5 inch needle was used to anesthetize the skin and subcutaneous tissue entry site with a total of 2 ml of 1% lidocaine. Under fluoroscopic visualization, a 22-gauge, 4.5 inch Tuohy epidural needle was slowly advanced towards the epidural space a few millimeters left-sided of midline. The latter part of the needle advancement was guided with fluoroscopy in the lateral view. The epidural space was identified using loss of resistance technique. After negative aspiration for heme and cerebrospinal fluid, a total of 1 mL of non-ionic contrast was injected to confirm needle placement with 9 mL of contrast wasted. Epidurogram confirmed spread within the posterior epidural space. 1 ml of 40mg/ml of triamcinolone,  1 ml of 1% lidocaine, and 3 ml of preservative free saline was injected. The needle was removed.  Images were saved to PACS.    The patient tolerated the procedure well, and there was no evidence of procedural complications. No new sensory or motor deficits were noted following the procedure. The patient was stable and able to ambulate on discharge home. Post-procedure instructions were provided.     Pre-procedure pain score: 6/10 in the back, 6/10 in the leg  Post-procedure pain score: 2/10 in the back, 2/10 in the leg    Assessment/Plan: Serene Tipton is a 66 year old female s/p lumbar interlaminar epidural steroid injection today for lumbar spondylosis and radiculitis/radiculopathy.     1. Following today's procedure, the patient was advised to contact the [facility] for any of the following:   Fever, chills, or night sweats   New onset of pain, numbness, or weakness   Any questions/concerns regarding the procedure  If unable to contact the Pain Center, the patient was instructed to go to a local Emergency Room for any complications.   2. Follow-up with the referring provider in 2-4 weeks for post-procedure evaluation.        Jonathan Orona DO  [facility] [place]

## 2022-02-10 ENCOUNTER — RADIOLOGY INJECTION OFFICE VISIT (OUTPATIENT)
Dept: PALLIATIVE MEDICINE | Facility: OTHER | Age: 67
End: 2022-02-10
Attending: PAIN MEDICINE
Payer: COMMERCIAL

## 2022-02-10 VITALS — DIASTOLIC BLOOD PRESSURE: 86 MMHG | SYSTOLIC BLOOD PRESSURE: 157 MMHG | RESPIRATION RATE: 16 BRPM | HEART RATE: 89 BPM

## 2022-02-10 DIAGNOSIS — M54.16 LUMBAR RADICULOPATHY: ICD-10-CM

## 2022-02-10 PROCEDURE — 62323 NJX INTERLAMINAR LMBR/SAC: CPT | Performed by: PHYSICAL MEDICINE & REHABILITATION

## 2022-02-10 PROCEDURE — 250N000011 HC RX IP 250 OP 636: Performed by: PHYSICAL MEDICINE & REHABILITATION

## 2022-02-10 RX ORDER — TRIAMCINOLONE ACETONIDE 40 MG/ML
40 INJECTION, SUSPENSION INTRA-ARTICULAR; INTRAMUSCULAR ONCE
Status: COMPLETED | OUTPATIENT
Start: 2022-02-10 | End: 2022-02-10

## 2022-02-10 RX ADMIN — TRIAMCINOLONE ACETONIDE 40 MG: 40 INJECTION, SUSPENSION INTRA-ARTICULAR; INTRAMUSCULAR at 10:43

## 2022-02-10 ASSESSMENT — PAIN SCALES - GENERAL: PAINLEVEL: MILD PAIN (2)

## 2022-02-10 NOTE — PATIENT INSTRUCTIONS
[facility] [place]   Procedure Discharge Instructions    Today you saw:  Dr. Orona    You had an:  Lumbar epidural steroid injection      Medications used:  Lidocaine Omnipaque  Kenalog Normal saline          If you have received sedation before, during, or after your procedure, for the next 24 hours you shall NOT:   -Drive  -Operate machinery  -Drink alcohol  -Sign any legal documents        If you were holding your blood thinning medication, please restart taking it: N /A    Be cautious when walking. Numbness and/or weakness in the lower extremities may occur for up to 6-8 hours after the procedure due to effect of the local anesthetic    Do not drive for 6 hours. The effect of the local anesthetic could slow your reflexes.     You may resume your regular activities after 24 hours    Avoid strenuous activity for the first 24 hours    You may shower, however avoid swimming, tub baths or hot tubs for 24 hours following your procedure    You may have a mild to moderate increase in pain for several days following the injection.    It may take up to 14 days for the steroid medication to start working although you may feel the effect as early as a few days after the procedure.       You may use ice packs for 10-15 minutes, 3 to 4 times a day at the injection site for comfort    Do not use heat to painful areas for 6 to 8 hours. This will give the local anesthetic time to wear off and prevent you from accidentally burning your skin.     Unless you have been directed to avoid the use of anti-inflammatory medications (NSAIDS), you may use medications such as ibuprofen, Aleve or Tylenol for pain control if needed.     If you were fasting, you may resume your normal diet and medications after the procedure    If you have diabetes, check your blood sugar more frequently than usual as your blood sugar may be higher than normal for 10-14 days following a steroid injection. Contact your doctor  who manages your diabetes if your blood sugar is higher than usual    Possible side effects of steroids that you may experience include flushing, elevated blood pressure, increased appetite, mild headaches and restlessness.  All of these symptoms will get better with time.    If you experience any of the following, call the Pain Clinic at [phone]:  -Fever over 100 degree F  -Swelling, bleeding, redness, drainage, warmth at the injection site  -Progressive weakness or numbness in your legs or arms  -Loss of bowel or bladder function  -Unusual headache that is not relieved by Tylenol or other pain reliever  -Unusual new onset of pain that is not improving

## 2022-02-22 ENCOUNTER — TELEPHONE (OUTPATIENT)
Dept: PALLIATIVE MEDICINE | Facility: OTHER | Age: 67
End: 2022-02-22
Payer: COMMERCIAL

## 2022-02-22 NOTE — TELEPHONE ENCOUNTER
Pt calls requesting bilateral SI Joint Injection. States she had 100% pain relief after her SI  joint injection that lasted until January, 2022. She states she regularly does exercises at home that she learned in PT.

## 2022-02-25 NOTE — TELEPHONE ENCOUNTER
Discussed with Dr Orona. Pt should get a referral from her PMD for the bilateral SI Joint Injection. Called to advise the pt. She agrees with the plan.

## 2022-03-03 ENCOUNTER — TELEPHONE (OUTPATIENT)
Dept: PALLIATIVE MEDICINE | Facility: CLINIC | Age: 67
End: 2022-03-03
Payer: COMMERCIAL

## 2022-03-03 DIAGNOSIS — M46.1 SACROILIITIS (H): Primary | ICD-10-CM

## 2022-03-03 NOTE — TELEPHONE ENCOUNTER
Pre-screening Questions for Clinic Based Injections, Shoulder/Knee, ONB, TPI Injections and Bursa Injection:    Botox- no questions needed  *of note, it is possible to do occipital nerve blocks and trigger point injections without steroid, so if they feel they need to schedule, we can do that and leave out steroid.      Location:    [place]:     Only schedule on Wednesdays (ultrasound machine NOT available Tuesday and Thursday)    If need to double book, use the 3:30 pm slot     [place]:  Ultrasound appointments NOT available at this time    [place]:      [place]:  Please send to RN pool after scheduling to verify U/S availability     Does patient have an active infection or treated for one within the past week? No  (If YES, do NOT schedule and route to RN)     Is patient currently taking any antibiotics?  No  (If YES, do NOT schedule and route to RN)  For patients on chronic, preventative or prophylactic antibiotics, procedures can be scheduled.    Torrey Andino Snitzer-antibiotic course must have been completed for 4 days    Is patient taking any aspirin products? No     No need to hold non-aspirin anticoagulants.     If taking > 325mg/day of aspirin, limit aspirin to 81-325mg/day x 1 week.     No hold required day of procedure.    Has the patient had a flu shot or any other vaccinations within 7 days before or after the procedure.  No     Have you recently had a COVID vaccine or have plans to get it in the near future? No    If yes, explain that for the vaccine to work best they need to:       wait 1 week before and 1 week after getting Vaccine #1    wait 1 week before and 2 weeks after getting Vaccine #2    If patient has concerns about the timing, send to CEDRIC JOHN    [facility]

## 2022-03-04 NOTE — TELEPHONE ENCOUNTER
How Severe Is Your Skin Irritation?: mild LVM to go over pre screening questions for bilateral sacroiliac joint injections until we get the order.      Rikki Ceja    [facility] [place]

## 2022-03-04 NOTE — TELEPHONE ENCOUNTER
Patient requesting bilateral sacroiliac joint injections. Please facilitate scheduling. Will route to RN pool as well as they may need an order from their PCP.    Jonathan Orona DO  [facility]       Airway patent, nasal mucosa clear, R ear has crusting at the edge and exudate in the canal- can't see TM.

## 2022-03-06 NOTE — TELEPHONE ENCOUNTER
Patient noted at our prior visit they had improvement with sacroiliac joint injections previously. Sacroiliac joint injections were ordered. I would highly recommend they establish with one of our pain providers for ongoing care or follow-up with their PCP for future injection orders.    They are currently scheduled for TPI on 3/14, please change this to sacroiliac joint injections and review questions with Arianna Orona [facility]

## 2022-03-07 NOTE — TELEPHONE ENCOUNTER
Screening Questions for Radiology Injections:    Injection to be done at which interventional clinic site? [facility]    Remind [place] Pt's that Covid test is no longer required except for sedation procedure Pt's.    Instruct patient to arrive as directed prior to the scheduled appointment time:    [facility]      [place]: 30 minutes before; if IV needed 1 hour before     Procedure ordered by     Procedure ordered? SI joint injection       Transforaminal Cervical ROBER -  [place] does NOT have providers that do these- [facility] and [facility] do have providers that do    As a reminder, receiving steroids can decrease your body's ability to fight infection.   Would you still like to move forward with scheduling the injection?  Yes    What insurance would patient like us to bill for this procedure? BC       Worker's comp or MVA (motor vehicle accident) -Any injection DO NOT SCHEDULE and route to Rikki Ceja.      [employer] insurance - For SI joint injections, DO NOT SCHEDULE and route Megan Ly.       ALL BCBS, [employer] and [employer]-Route to Megan for review DO NOT SCHEDULE      IF SCHEDULING IN [place] AND NEEDS A PA, IT IS OKAY TO SCHEDULE. [place] HANDLES THEIR OWN PA'S AFTER THE PATIENT IS SCHEDULED. PLEASE SCHEDULE AT LEAST 1 WEEK OUT SO A PA CAN BE OBTAINED.    Any chance of pregnancy? NO   If YES, do NOT schedule and route to RN pool    Is an  needed? No     Patient has a drive home? (mandatory) YES: Informed     Is patient taking any blood thinners (That is: Coumadin, Warfarin, Jantoven, Pradaxa Xarelto, Eliquis, Edoxaban, Enoxaparin, Lovenox, Heparin, Arixtra, Fondaparinux, or Fragmin? OR Antiplatelet medication such as Plavix, Brilinta, or Effient? )? No   If hold needed, do NOT schedule, route to RN pool     Is patient taking any aspirin products (includes Excedrin and Fiorinal)? No     If more than 325mg/day, OK to schedule; Instruct pt to decrease to less  than 325 mg for 7 days AND route to RN pool    For CERVICAL procedures, hold all aspirin products for 6 days.     Tell pt that if aspirin product is not held for 6 days, the procedure WILL BE cancelled.      Does the patient have a bleeding or clotting disorder? No     If YES, okay to schedule AND route to RN nurse pool    For any patients with platelet count <100, must be forwarded to provider    Any allergies to contrast dye, iodine, shellfish, or numbing and steroid medications? No    If YES, add allergy information to appointment notes AND route to the RN pool     If ROBER and Contrast Dye / Iodine Allergy? DO NOT SCHEDULE, route to RN pool    Allergies: Penicillins, Carvedilol, Ciprofloxacin, Morphine, No clinical screening - see comments, Nsaids, Sulfa drugs, Sulfamethoxazole-trimethoprim, and Levofloxacin     Is patient diabetic?  No  If YES, instruct them to bring their glucometer.    Does patient have an active infection or treated for one within the past week? No     Is patient currently taking any antibiotics?  No     For patients on chronic, preventative, or prophylactic antibiotics, procedures may be scheduled.     For patients on antibiotics for active or recent infection:antibiotic course must have been completed for 4 days    Is patient currently taking any steroid medications? (i.e. Prednisone, Medrol)  No     For patients on steroid medications, course must have been completed for 4 days    Is patient actively being treated for cancer or immunocompromised? No  If YES, do NOT schedule and route to RN pool     Are you able to get on and off an exam table with minimal or no assistance? Yes  If NO, do NOT schedule and route to RN pool    Are you able to roll over and lay on your stomach with minimal or no assistance? Yes  If NO, do NOT schedule and route to RN pool     Has the patient had a flu shot or any other vaccinations within 7 days before or after the procedure.  No     Have you recently had a  COVID vaccine or have plans to get it in the near future? No    If yes, explain that for the vaccine to work best they need to:       wait 1 week before and 1 week after getting Vaccine #1    wait 1 week before and 2 weeks after getting Vaccine #2    wait 1 week before and 2 weeks after getting Vaccine BOOSTER    If patient has concerns about the timing, send to RN pool     Does patient have an MRI/CT?  Not Applicable  Check Procedure Scheduling Grid to see if required.      Was the MRI done within the last 3 years?  NA    If yes, where was the MRI done i.e.[facility], [facility], [facility], [facility] etc?       If no, do not schedule and route to RN pool    If MRI was not done at [place], [facility] or [facility] do NOT schedule and route to RN pool.      If pt has an imaging disc, the injection MAY be scheduled but pt has to bring disc to appt.     If they show up without the disc the injection cannot be done    Procedure Specific Instructions:      If celiac plexus block, informed patient NPO for 6 hours and that it is okay to take medications with sips of water, especially blood pressure medications  Not Applicable         If this is for a cervical procedure, informed patient that aspirin needs to be held for 6 days.   Not Applicable      If IV needed:    Do not schedule procedures requiring IV placement in the first appointment of the day or first appointment after lunch. Do NOT schedule at 0745, 0815 or 1245.     Instructed pt to arrive 30 minutes early for IV start if required. (Check Procedure Scheduling Grid)  Not Applicable    Reminders:      If you are started on any steroids or antibiotics between now and your appointment, you must contact us because the procedure may need to be cancelled.  No      For all procedures except radiofrequency ablations (RFAs) and spinal cord stimulator (SCS) trials, informed patient:    IV sedation is not provided for this procedure.  If you feel that an oral  anti-anxiety medication is needed, you can discuss this further with your referring provider or primary care provider.  The Pain Clinic provider will discuss specifics of what the procedure includes at your appointment.  Most procedures last 10-20 minutes.  We use numbing medications to help with any discomfort during the procedure.  Not Applicable      For patients 85 or older we recommend having an adult stay w/ them for the remainder of the day.       Does the patient have any questions?  NO  Jocelyn Auguste  [facility]

## 2022-03-09 NOTE — TELEPHONE ENCOUNTER
Authorization Number: [id number]  Review Date: 3/9/2022 10:59:43 AM  Expiration Date: 9/5/2022  Status: Your case has been Approved.      Okay to schedule Bilateral SI joint injections with Dr Yeyo BONILLA    [facility]

## 2022-04-06 ENCOUNTER — TELEPHONE (OUTPATIENT)
Dept: PALLIATIVE MEDICINE | Facility: CLINIC | Age: 67
End: 2022-04-06
Payer: COMMERCIAL

## 2022-04-06 NOTE — TELEPHONE ENCOUNTER
LVM, reminded patient of date, time and location of appointment.     Asked patient if they have received anti-biotics or vaccines in the past 7 days?     Reminded patient to eat and drink as usual.    Remained to hold any blood thinners or pain medications that they were asked to hold per nurse.     Reminded patient they will need a  for this appointment.      Reminded patient that both patient and  must wear a mask during their visit.        Stephanie Arreola MA  [facility]

## 2022-04-07 ENCOUNTER — RADIOLOGY INJECTION OFFICE VISIT (OUTPATIENT)
Dept: PALLIATIVE MEDICINE | Facility: CLINIC | Age: 67
End: 2022-04-07
Payer: COMMERCIAL

## 2022-04-07 ENCOUNTER — LAB REQUISITION (OUTPATIENT)
Dept: LAB | Facility: CLINIC | Age: 67
End: 2022-04-07

## 2022-04-07 VITALS — DIASTOLIC BLOOD PRESSURE: 86 MMHG | OXYGEN SATURATION: 98 % | HEART RATE: 89 BPM | SYSTOLIC BLOOD PRESSURE: 172 MMHG

## 2022-04-07 DIAGNOSIS — M53.3 SACROILIAC JOINT PAIN: ICD-10-CM

## 2022-04-07 DIAGNOSIS — F32.4 MAJOR DEPRESSIVE DISORDER, SINGLE EPISODE, IN PARTIAL REMISSION (H): ICD-10-CM

## 2022-04-07 PROCEDURE — 80335 ANTIDEPRESSANT TRICYCLIC 1/2: CPT | Performed by: FAMILY MEDICINE

## 2022-04-07 PROCEDURE — 27096 INJECT SACROILIAC JOINT: CPT | Mod: 50 | Performed by: PHYSICAL MEDICINE & REHABILITATION

## 2022-04-07 RX ORDER — TRIAMCINOLONE ACETONIDE 40 MG/ML
60 INJECTION, SUSPENSION INTRA-ARTICULAR; INTRAMUSCULAR ONCE
Status: COMPLETED | OUTPATIENT
Start: 2022-04-07 | End: 2022-04-07

## 2022-04-07 RX ADMIN — TRIAMCINOLONE ACETONIDE 60 MG: 40 INJECTION, SUSPENSION INTRA-ARTICULAR; INTRAMUSCULAR at 09:14

## 2022-04-07 NOTE — NURSING NOTE
Pre-procedure Intake  If YES to any questions or NO to having a   Please complete laminated checklist and leave on the computer keyboard for Provider, verbally inform provider if able.    For SCS Trial, RFA's or any sedation procedure:  Have you been fasting? NA    If yes, for how long?     Are you taking any any blood thinners such as Coumadin, Warfarin, Jantoven, Pradaxa Xarelto, Eliquis, Edoxaban, Enoxaparin, Lovenox, Heparin, Arixtra, Fondaparinux, or Fragmin? OR Antiplatelet medication such as Plavix, Brilinta, or Effient?   No     If yes, when did you take your last dose?     Do you take aspirin?  No    If cervical procedure, have you held aspirin for 6 days?   NA    Do you have any allergies to contrast dye, iodine, steroid and/or numbing medications?  NO    Are you currently taking antibiotics or have an active infection?  NO    Have you had a fever/elevated temperature within the past week? NO    Are you currently taking oral steroids? NO    Do you have a ? Yes    Are you pregnant or breastfeeding?  Not Applicable    Have you received the COVID-19 vaccine? Yes    If yes, was it your 1st, 2nd or only dose needed? 3rd    Date of most recent vaccine: 12/20/21    Vitals: B/P 174/85    Notify provider and RNs if systolic BP >170, diastolic BP >100, P >100 or O2 sats < 90%      Stephanie Arreola MA  [facility] [place]

## 2022-04-07 NOTE — NURSING NOTE
Discharge Information    IV Discontiued Time:  NA    Amount of Fluid Infused:  NA    Discharge Criteria = When patient returns to baseline or as per MD order    Consciousness:  Pt is fully awake    Circulation:  BP +/- 20% of pre-procedure level    Respiration:  Patient is able to breathe deeply    O2 Sat:  Patient is able to maintain O2 Sat >92% on room air    Activity:  Moves 4 extremities on command    Ambulation:  Patient is able to stand and walk or stand and pivot into wheelchair    Dressing:  Clean/dry or No Dressing    Notes:   Discharge instructions and AVS given to patient    Patient meets criteria for discharge?  YES    Admitted to PCU?  No    Responsible adult present to accompany patient home?  Yes    Signature/Title:    Faith Tom RN  RN Care Coordinator  [facility] [place]

## 2022-04-07 NOTE — PATIENT INSTRUCTIONS
[facility] Procedure Discharge Instructions    Today you saw:    Dr. Jonathan Orona    Your procedure:   Sacro-iliac joint injection       Medications used:  Lidocaine (anesthetic)  Bupivacaine (anesthetic)  Kenalog (steroid)  Omnipaque (contrast)         Be cautious when walking as numbness and/or weakness in the legs may occur up to 6-8 hours after the procedure due to effect of the local anesthetic    Do not drive for 6 hours. The effect of the local anesthetic could slow your reflexes.     Avoid strenuous activity for the first 24 hours. You may resume your regular activities after that.     You may shower, however avoid swimming, tub baths or hot tubs for 24 hours following your procedure    You may have a mild to moderate increase in pain for several days following the injection.      You may use ice packs for 10-15 minutes, 3 to 4 times a day at the injection site for comfort    Do not use heat to painful areas for 6 to 8 hours. This will give the local anesthetic time to wear off and prevent you from accidentally burning your skin.    Unless you have been directed to avoid the use of anti-inflammatory medications (NSAIDS-ibuprofen, Aleve, Motrin), you may use these medications or Tylenol for pain control if needed.     With diabetes, check your blood sugar more frequently than usual as your blood sugar may be higher than normal for 10-14 days following a steroid injection. Contact your doctor who manages your diabetes if your blood sugar is higher than usual    Possible side effects of steroids that you may experience include flushing, elevated blood pressure, increased appetite, mild headaches and restlessness.  All of these symptoms will get better with time.    It may take up to 14 days for the steroid medication to start working although you may feel the effect as early as a few days after the procedure.     Follow up with your referring provider in 2-3 weeks      If you experience  any of the following, call the pain center line during work hours at [phone] or on-call physician after hours at [phone]:  -Fever over 100 degree F  -Swelling, bleeding, redness, drainage, warmth at the injection site  -Progressive weakness or numbness in your legs  -Loss of bowel or bladder function  -Unusual headache that is not relieved by Tylenol or your regular headache medication  -Unusual new onset of pain that is not improving

## 2022-04-07 NOTE — PROGRESS NOTES
[facility] - Procedure Note    Date of Service: 4/7/2022  Procedure performed: Bilateral sacroiliac joint injection  Diagnosis: Sacroiliac joint pain  : Jonathan Orona DO   Anesthesia: none    Indications: Serene Tipton is a 66 year old female who is seen for bilatearl sacroiliac joint injections. The patient describes chronic bilateral low back and buttock pain with pain radiating down the left leg. Exam shows full strength and sensation in both lower extremities, tenderness of the sacroiliac (SI) joint bilaterally. The patient reports minimal improvement with conservative treatment, including medications and PT. They had sacroiliac joint injections by Dr. White in September 2021 with significant pain relief.      Allergies:      Allergies   Allergen Reactions     Penicillins Other (See Comments) and Shortness Of Breath     Breathing problem.  breathing       Carvedilol GI Disturbance     Nausea and vomiting     Ciprofloxacin Muscle Pain (Myalgia)     Morphine Other (See Comments)     Vomiting     No Clinical Screening - See Comments      PN: LW CM1: Contrast Intercapsular - Nonionic Reaction :     Nsaids Other (See Comments)     Sulfa Drugs Itching     Sulfamethoxazole-Trimethoprim      Other reaction(s): itching     Levofloxacin Muscle Pain (Myalgia) and Rash        Vitals:  BP (!) 172/86   Pulse 89   SpO2 98%     Options/alternatives, benefits and risks were discussed with the patient including bleeding, infection, tissue trauma, exposure to radiation, reaction to medications including seizure, nerve injury, weakness, and numbness.  Questions were answered to her satisfaction and she agrees to proceed. Voluntary informed consent was obtained and signed.     Procedure:  After getting informed consent, patient was brought into the procedure suite and was placed in a prone position on the procedure table.   A Pause for the Cause was performed.  Patient was  prepped and draped in sterile fashion.     After identifying the right SI joint, the C-arm was rotated to a obliquely to obtain the best view of the inferior angle of the joint.  A total of 2 ml of Lidocaine 1%  was used to anesthetize the skin at a skin entry site coaxial with the fluoroscopy beam at this location.  A 22 gauge 3.5 inch needle was advanced under intermittent fluoroscopy until it was felt to enter the SI joint.    A total of 1 ml of Omnipaque-300 was injected, confirming appropriate position, with spread into the intraarticular space, with no intravascular uptake noted.  9 ml of Omnipaque-300 was wasted. Location was verified in lateral view.    2 ml of 0.5% bupivacaine with 30 mg of kenalog was injected.  The needle was removed. Hemostasis was achieved, the area was cleaned, and bandaids were placed when appropriate.    The exact procedure was repeated on the left.    The patient tolerated the procedure well, and was taken to the recovery room.    Images were saved to PACS.    Pre-procedure pain score: 6/10  Post-procedure pain score: 6/10    Assessment/Plan: Serene Tipton is a 66 year old female s/p Bilatearl SIJ injection due to low back/buttock pain.    1. Following today's procedure, the patient was advised to contact the [facility] for any of the following:   Fever, chills, or night sweats   New onset of pain, numbness, or weakness   Any questions/concerns regarding the procedure  If unable to contact the Pain Center, the patient was instructed to go to a local Emergency Room for any complications.   2. The patient should follow-up with the referring provider in 2-4 weeks for post-procedure evaluation.        Jonathan Orona DO  [facility] [place]

## 2022-04-12 LAB
AMITRIP SERPL-MCNC: 56 NG/ML
AMITRIP+NOR SERPL-MCNC: 169 NG/ML
NORTRIP SERPL-MCNC: 113 NG/ML

## 2022-06-17 ENCOUNTER — TRANSFERRED RECORDS (OUTPATIENT)
Dept: HEALTH INFORMATION MANAGEMENT | Facility: CLINIC | Age: 67
End: 2022-06-17

## 2022-06-17 LAB — PHQ9 SCORE: 16

## 2022-06-20 ENCOUNTER — MEDICAL CORRESPONDENCE (OUTPATIENT)
Dept: HEALTH INFORMATION MANAGEMENT | Facility: CLINIC | Age: 67
End: 2022-06-20

## 2022-06-21 ENCOUNTER — TRANSCRIBE ORDERS (OUTPATIENT)
Dept: OTHER | Age: 67
End: 2022-06-21
Payer: COMMERCIAL

## 2022-06-21 DIAGNOSIS — R22.2 SUBCUTANEOUS NODULE OF BACK: Primary | ICD-10-CM

## 2022-08-18 ENCOUNTER — LAB REQUISITION (OUTPATIENT)
Dept: LAB | Facility: CLINIC | Age: 67
End: 2022-08-18

## 2022-08-18 DIAGNOSIS — R63.4 ABNORMAL WEIGHT LOSS: ICD-10-CM

## 2022-08-18 PROCEDURE — 84443 ASSAY THYROID STIM HORMONE: CPT | Performed by: STUDENT IN AN ORGANIZED HEALTH CARE EDUCATION/TRAINING PROGRAM

## 2022-08-18 PROCEDURE — 82607 VITAMIN B-12: CPT | Performed by: STUDENT IN AN ORGANIZED HEALTH CARE EDUCATION/TRAINING PROGRAM

## 2022-08-18 PROCEDURE — 80076 HEPATIC FUNCTION PANEL: CPT | Performed by: STUDENT IN AN ORGANIZED HEALTH CARE EDUCATION/TRAINING PROGRAM

## 2022-08-18 PROCEDURE — 85004 AUTOMATED DIFF WBC COUNT: CPT | Performed by: STUDENT IN AN ORGANIZED HEALTH CARE EDUCATION/TRAINING PROGRAM

## 2022-08-18 PROCEDURE — 86140 C-REACTIVE PROTEIN: CPT | Performed by: STUDENT IN AN ORGANIZED HEALTH CARE EDUCATION/TRAINING PROGRAM

## 2022-08-18 PROCEDURE — 84439 ASSAY OF FREE THYROXINE: CPT | Performed by: STUDENT IN AN ORGANIZED HEALTH CARE EDUCATION/TRAINING PROGRAM

## 2022-08-18 PROCEDURE — 82465 ASSAY BLD/SERUM CHOLESTEROL: CPT | Performed by: STUDENT IN AN ORGANIZED HEALTH CARE EDUCATION/TRAINING PROGRAM

## 2022-08-19 LAB
ALBUMIN SERPL BCG-MCNC: 4.5 G/DL (ref 3.5–5.2)
ALP SERPL-CCNC: 128 U/L (ref 35–104)
ALT SERPL W P-5'-P-CCNC: 11 U/L (ref 10–35)
AST SERPL W P-5'-P-CCNC: 16 U/L (ref 10–35)
BASOPHILS # BLD AUTO: 0 10E3/UL (ref 0–0.2)
BASOPHILS NFR BLD AUTO: 1 %
BILIRUB DIRECT SERPL-MCNC: <0.2 MG/DL (ref 0–0.3)
BILIRUB SERPL-MCNC: 0.3 MG/DL
CHOLEST SERPL-MCNC: 144 MG/DL
CRP SERPL-MCNC: <3 MG/L
EOSINOPHIL # BLD AUTO: 0.2 10E3/UL (ref 0–0.7)
EOSINOPHIL NFR BLD AUTO: 2 %
ERYTHROCYTE [DISTWIDTH] IN BLOOD BY AUTOMATED COUNT: 13.2 % (ref 10–15)
HCT VFR BLD AUTO: 35.6 % (ref 35–47)
HGB BLD-MCNC: 11.2 G/DL (ref 11.7–15.7)
IMM GRANULOCYTES # BLD: 0 10E3/UL
IMM GRANULOCYTES NFR BLD: 0 %
LYMPHOCYTES # BLD AUTO: 1.5 10E3/UL (ref 0.8–5.3)
LYMPHOCYTES NFR BLD AUTO: 17 %
MCH RBC QN AUTO: 33.8 PG (ref 26.5–33)
MCHC RBC AUTO-ENTMCNC: 31.5 G/DL (ref 31.5–36.5)
MCV RBC AUTO: 108 FL (ref 78–100)
MONOCYTES # BLD AUTO: 1.1 10E3/UL (ref 0–1.3)
MONOCYTES NFR BLD AUTO: 13 %
NEUTROPHILS # BLD AUTO: 5.7 10E3/UL (ref 1.6–8.3)
NEUTROPHILS NFR BLD AUTO: 67 %
NRBC # BLD AUTO: 0 10E3/UL
NRBC BLD AUTO-RTO: 0 /100
PLATELET # BLD AUTO: 287 10E3/UL (ref 150–450)
PROT SERPL-MCNC: 6.4 G/DL (ref 6.4–8.3)
RBC # BLD AUTO: 3.31 10E6/UL (ref 3.8–5.2)
T4 FREE SERPL-MCNC: 0.72 NG/DL (ref 0.9–1.7)
TSH SERPL DL<=0.005 MIU/L-ACNC: 4.49 UIU/ML (ref 0.3–4.2)
VIT B12 SERPL-MCNC: 785 PG/ML (ref 232–1245)
WBC # BLD AUTO: 8.5 10E3/UL (ref 4–11)

## 2022-08-30 ENCOUNTER — TRANSFERRED RECORDS (OUTPATIENT)
Dept: HEALTH INFORMATION MANAGEMENT | Facility: CLINIC | Age: 67
End: 2022-08-30

## 2022-10-04 ENCOUNTER — LAB REQUISITION (OUTPATIENT)
Dept: LAB | Facility: CLINIC | Age: 67
End: 2022-10-04

## 2022-10-04 DIAGNOSIS — E03.9 HYPOTHYROIDISM, UNSPECIFIED: ICD-10-CM

## 2022-10-04 LAB — TSH SERPL DL<=0.005 MIU/L-ACNC: 3.28 UIU/ML (ref 0.3–4.2)

## 2022-10-04 PROCEDURE — 84443 ASSAY THYROID STIM HORMONE: CPT | Performed by: FAMILY MEDICINE

## 2022-10-06 ENCOUNTER — TELEPHONE (OUTPATIENT)
Dept: PALLIATIVE MEDICINE | Facility: OTHER | Age: 67
End: 2022-10-06

## 2022-10-06 DIAGNOSIS — M47.817 SPONDYLOSIS OF LUMBOSACRAL REGION WITHOUT MYELOPATHY OR RADICULOPATHY: Primary | ICD-10-CM

## 2022-10-23 ENCOUNTER — HEALTH MAINTENANCE LETTER (OUTPATIENT)
Age: 67
End: 2022-10-23

## 2022-10-24 ENCOUNTER — TRANSCRIBE ORDERS (OUTPATIENT)
Dept: PALLIATIVE MEDICINE | Facility: CLINIC | Age: 67
End: 2022-10-24

## 2022-10-25 ENCOUNTER — TELEPHONE (OUTPATIENT)
Dept: PALLIATIVE MEDICINE | Facility: OTHER | Age: 67
End: 2022-10-25

## 2022-10-25 NOTE — TELEPHONE ENCOUNTER
Called pt for insurance authorization about her response to the Lumbar RF done 12/2021.      Pt reports 99% pain relief for 9 months post- RFA. States it was easier to shop and clean her house after the procedure. Any activity that involved pushing and pulling was easier to accomplish. Symptoms now are a shooting pain, especially down the left lower back with intermittent shock-type pains.  Pt's response routed to PA team.

## 2022-11-21 ENCOUNTER — TELEPHONE (OUTPATIENT)
Dept: TRANSPLANT | Facility: CLINIC | Age: 67
End: 2022-11-21

## 2022-11-21 NOTE — TELEPHONE ENCOUNTER
Per Alvarez Rhoades would like to discuss about possible nephrectomy.   Stated her kidneys' are enlarged and protruding    Please call Alvarez

## 2022-11-28 NOTE — TELEPHONE ENCOUNTER
Spoke to Alvarez. She is unable to eat solids, very uncomfortable bulging kidney. Her and Dr Rhoades are concerned that she may need a nephrectomy now rather than wait for a LD then plan nephrectomy.     CT was done at [facility], will request image and report to review with Dr. Giang. Appt made with Dr Giang 12/19.    Alvarez has my direct phone number and will reach out with any questions. She is still finishing up Dental.

## 2022-12-14 ENCOUNTER — TELEPHONE (OUTPATIENT)
Dept: PALLIATIVE MEDICINE | Facility: OTHER | Age: 67
End: 2022-12-14

## 2022-12-14 DIAGNOSIS — M47.817 SPONDYLOSIS OF LUMBOSACRAL REGION WITHOUT MYELOPATHY OR RADICULOPATHY: Primary | ICD-10-CM

## 2022-12-19 ENCOUNTER — OFFICE VISIT (OUTPATIENT)
Dept: TRANSPLANT | Facility: CLINIC | Age: 67
End: 2022-12-19
Attending: SURGERY
Payer: COMMERCIAL

## 2022-12-19 VITALS
HEART RATE: 94 BPM | WEIGHT: 103.7 LBS | TEMPERATURE: 98.5 F | SYSTOLIC BLOOD PRESSURE: 159 MMHG | BODY MASS INDEX: 20.91 KG/M2 | DIASTOLIC BLOOD PRESSURE: 89 MMHG | HEIGHT: 59 IN | OXYGEN SATURATION: 97 %

## 2022-12-19 DIAGNOSIS — Q61.3 POLYCYSTIC KIDNEY: Primary | ICD-10-CM

## 2022-12-19 PROCEDURE — 99214 OFFICE O/P EST MOD 30 MIN: CPT | Mod: GC | Performed by: SURGERY

## 2022-12-19 PROCEDURE — G0463 HOSPITAL OUTPT CLINIC VISIT: HCPCS

## 2022-12-19 RX ORDER — LEVOTHYROXINE SODIUM 50 UG/1
50 TABLET ORAL AT BEDTIME
COMMUNITY
Start: 2022-11-15 | End: 2024-01-01

## 2022-12-19 RX ORDER — OXYCODONE HYDROCHLORIDE 10 MG/1
10 TABLET ORAL EVERY 4 HOURS PRN
Status: ON HOLD | COMMUNITY
Start: 2022-11-22 | End: 2023-02-05

## 2022-12-19 RX ORDER — NORTRIPTYLINE HYDROCHLORIDE 75 MG/1
75 CAPSULE ORAL AT BEDTIME
COMMUNITY
Start: 2022-11-21

## 2022-12-19 ASSESSMENT — PAIN SCALES - GENERAL: PAINLEVEL: MODERATE PAIN (5)

## 2022-12-19 NOTE — LETTER
12/19/2022         RE: Serene Tipton  [address]  [place] [address]        Dear Colleague,    Thank you for referring your patient, Serene Tipton, to the [facility]. Please see a copy of my visit note below.    Transplant Surgery Consult Note     Medical record number: [id number]  YOB: 1955,       Assessment and Recommendations:  Ms. 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 She is in the process of kidney evaluation. However, she continues to have progressive symptoms from PKD (bilateral abdominal pain, intolerance of solid food, issues with constipation). She would like to pursue bilateral nephrectomies.   - We will evaluate her recent CT A/P from [facility].   - We discussed the risks and benefits of bilateral nephrectomies. Since she is currently on dialysis and she has progressive PKD-related symptoms, we recommend for bilateral nephrectomies.   - Cardiac clearance: her last nuc med stress test 2021 - negative for any ischemia  - She will need to be seen by Pre-anesthesia before surgery    Herve Rojas MD   Transplant Surgery Fellow     I have reviewed history, examined patient and discussed plan with the fellow/resident/RAS.  I concur with the findings in this note.    Risks of the surgical procedure including but not limited to the rare risk of mortality discussed in detail. Patient verbalized good understanding and had several pertinent questions which were answered satisfactorily.     Total time: 30 min  Counseling time: 20 min                 ---------------------------------------------------------------------------------------------------    HPI: Ms. Tipton has End stage renal failure due to polycystic kidney disease (PKD). The patient is non-diabetic.     Since the last visit, patient had intermittent abdominal pain (bilateral, with compression). She admits to some issues with tolerating solid food. She feels like the  kidneys have grown in size.     The patient is on dialysis.   Unfortunately, she does not have option for living donor.    The patient has the following pertinent history:       No    Yes  Dialysis:    []      [x] Via:7/2020       Blood Transfusion                  [x]      []  Number of units:   Most recently:  Pregnancy:    []      [x] Number:  one     Previous Transplant:  [x]      [] Details:    Cancer    [x]      [] Comment:   Kidney stones   []      [x] Comment:      Recurrent infections  [x]      []  Type:                  Bladder dysfunction  [x]      [] Cause:    Claudication   [x]      [] Distance:    Previous Amputation  [x]      [] Cause:     Chronic anticoagulation  [x]      [] Indication:   Sikh  [x]      []      Past Medical History:   Diagnosis Date     Anemia in chronic kidney disease      Anxiety      Arthritis      Chronic kidney disease      Chronic low back pain      CKD (chronic kidney disease)      Depression      Depressive disorder 2013     Disease of thyroid gland      End stage renal disease (H)      GERD (gastroesophageal reflux disease)      GERD (gastroesophageal reflux disease)      Hyperlipidemia      Hyperlipidemia      Hypertension 1990's     Hypertension      Neuromuscular disorder (H)      Osteoporosis      Osteoporosis      PKD (polycystic kidney disease) 1990's     PTSD (post-traumatic stress disorder)      Tobacco abuse      Vitamin D deficiency      Past Surgical History:   Procedure Laterality Date     BACK SURGERY      spinal fusion w/hardware     BIOPSY Left 1990's    Breast     BREAST LUMPECTOMY, RT/LT Left     Lumpectomy     BREAST SURGERY Left     Biopsy     BREAST SURGERY       CYST REMOVAL Right     rt wrist     CYSTECTOMY PILONIDAL  1981     EYE SURGERY  2008    lasik     EYE SURGERY       FRACTURE SURGERY       ORTHOPEDIC SURGERY Right 2005    Shoulder     OTHER SURGICAL HISTORY      carpal tunnel repair     PILONIDAL CYST / SINUS EXCISION       SPINE  SURGERY       Family History   Problem Relation Age of Onset     Polycystic Kidney Diease Mother      Polycystic Kidney Diease Sister      Cardiac Sudden Death Sister 52     Polycystic Kidney Diease Maternal Grandmother      Heart Disease Maternal Grandfather      Hypertension Mother      Kidney Disease Mother      Cerebrovascular Disease Mother      Chronic Obstructive Pulmonary Disease Father      Multiple Sclerosis Father      Hypertension Sister      Kidney Disease Sister      Hypertension Maternal Grandmother      Kidney Disease Maternal Grandmother      Cerebrovascular Disease Maternal Grandmother      Hyperlipidemia Paternal Grandmother      Cerebrovascular Disease Paternal Grandmother      Coronary Artery Disease Paternal Grandfather      Social History     Socioeconomic History     Marital status: Single     Spouse name: Not on file     Number of children: Not on file     Years of education: Not on file     Highest education level: Not on file   Occupational History     Not on file   Tobacco Use     Smoking status: Former     Packs/day: 0.50     Years: 14.00     Pack years: 7.00     Types: Cigarettes     Quit date: 2021     Years since quittin.5     Smokeless tobacco: Never   Substance and Sexual Activity     Alcohol use: Not Currently     Drug use: Never     Sexual activity: Not on file   Other Topics Concern     Parent/sibling w/ CABG, MI or angioplasty before 65F 55M? Not Asked   Social History Narrative     Not on file     Social Determinants of Health     Financial Resource Strain: Not on file   Food Insecurity: Not on file   Transportation Needs: Not on file   Physical Activity: Not on file   Stress: Not on file   Social Connections: Not on file   Intimate Partner Violence: Not on file   Housing Stability: Not on file       Allergies:   Allergies   Allergen Reactions     Penicillins Other (See Comments) and Shortness Of Breath     Breathing problem.  breathing       Carvedilol GI Disturbance      Nausea and vomiting     Ciprofloxacin Muscle Pain (Myalgia)     Morphine Other (See Comments)     Vomiting     No Clinical Screening - See Comments      PN: ISELA CM1: Contrast Intercapsular - Nonionic Reaction :     Nsaids Other (See Comments)     Sulfa Drugs Itching     Sulfamethoxazole-Trimethoprim      Other reaction(s): itching     Levofloxacin Muscle Pain (Myalgia) and Rash     Medications:  Prescription Medications as of 2022       Rx Number Disp Refills Start End Last Dispensed Date Next Fill Date Owning Pharmacy    amitriptyline (ELAVIL) 100 MG tablet            Class: Historical    amLODIPine (NORVASC) 5 MG tablet        [id number] [place], [place] - [address]    Sig: Take 5 mg by mouth daily    Class: Historical    Route: Oral    cholecalciferol (VITAMIN D3) 25 mcg (1000 units) capsule        [id number] - [place], [place] - [address]    Sig: Take 1 capsule by mouth daily    Class: Historical    Route: Oral    citalopram (CELEXA) 20 MG tablet            Si tablet    Class: Historical    cloNIDine (CATAPRES) 0.1 MG tablet        [id number] - [place], [place] - [address]    Sig: Take 0.2 mg by mouth At Bedtime    Class: Historical    Route: Oral    diazepam (VALIUM) 5 MG tablet  2 tablet 0 2021    [facility] [address] [place] [address] AT [facility]    Sig: Take 1 tablet (5 mg) by mouth every 6 hours as needed for anxiety    Class: E-Prescribe    Notes to Pharmacy: For procedure 21    Route: Oral    furosemide (LASIX) 20 MG tablet    2021        Sig: TAKE 2 TABLETS BY MOUTH EVERY DAY    Class: Historical    HYDROcodone-acetaminophen (NORCO) 5-325 MG tablet    2020        Si tab(s)    Class: Historical    Earliest Fill Date: 2020    Krill Oil 1000 MG CAPS            Si    Class: Historical    labetalol (NORMODYNE) 200 MG tablet    2020    [id number]  "- [place], [address]    Sig: Take 300 mg by mouth    Class: Historical    Route: Oral    levothyroxine (SYNTHROID/LEVOTHROID) 50 MCG tablet    11/15/2022        Si mcg    Class: Historical    lisinopril (ZESTRIL) 40 MG tablet            Si tab(s)    Class: Historical    LORazepam (ATIVAN) 1 MG tablet    2020    [facility]/pharmacy [id number] - [place], [address]    Sig: TAKE 0.5 1 TABLET BY MOUTH TWICE DAILY AS NEEDED. MUST LAST 30 DAYS    Class: Historical    MELATONIN PO        [id number] - [place], [address]    Si MG @@ H.S    Class: Historical    Route: Oral    Misc Natural Products (GLUCOSAMINE CHOND MSM FORMULA) TABS            Sig: as directed    Class: Historical    nortriptyline (PAMELOR) 75 MG capsule    2022        Sig: Take 75 mg by mouth daily    Class: Historical    Route: Oral    omeprazole (PRILOSEC) 20 MG DR capsule        [id number] - [place], [address]    Si capsule 30 minutes before morning meal    Class: Historical    OSCO HIGH POTENCY B COMPLEX/C OR            Sig: Take 1 tablet by mouth    Class: Historical    Route: Oral    oxyCODONE IR (ROXICODONE) 10 MG tablet    2022        Sig: 10 mg    Class: Historical    rosuvastatin (CRESTOR) 10 MG tablet            Class: Historical    sertraline (ZOLOFT) 100 MG tablet            Sig: Take 200 mg by mouth    Class: Historical    Route: Oral    sevelamer carbonate (RENVELA) 800 MG tablet    2021        Sig: TAKE 1 TABLET BY MOUTH WITH EACH MEAL    Class: Historical    vitamin E 400 units TABS            Sig: Take 800 mg by mouth    Class: Historical    Route: Oral        Exam:     BP (!) 159/89   Pulse 94   Temp 98.5  F (36.9  C)   Ht 1.499 m (4' 11\")   Wt 47 kg (103 lb 11.2 oz)   SpO2 97%   BMI 20.94 kg/m    Appearance: in no apparent distress.   Skin: normal  Eyes:  no redness or discharge.  Sclera anicteric  Head and " Neck: Normal, no rashes or jaundice  Respiratory: easy respirations, no audible wheezing.  Abdomen: rounded, No distention; with palpation the right kidney extends down to the top of pelvis  Extremeties: femoral 2+/2+, Edema, trace  Neuro: without deficit   Psychiatric: Normal mood and affect    Diagnostics:   No results found for this or any previous visit (from the past 672 hour(s)).  UNOS cPRA   Date Value Ref Range Status   05/19/2021 39  Final       Again, thank you for allowing me to participate in the care of your patient.        Sincerely,        Alana Giang MD     Hemostasis: Electrocautery

## 2022-12-19 NOTE — NURSING NOTE
"Chief Complaint   Patient presents with     RECHECK     Return visit.     Blood pressure (!) 159/89, pulse 94, temperature 98.5  F (36.9  C), height 1.499 m (4' 11\"), weight 47 kg (103 lb 11.2 oz), SpO2 97 %, not currently breastfeeding.    YONI BETHEA    "

## 2022-12-19 NOTE — PROGRESS NOTES
Transplant Surgery Consult Note     Medical record number: [id number]  YOB: 1955,       Assessment and Recommendations:  Ms. 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 She is in the process of kidney evaluation. However, she continues to have progressive symptoms from PKD (bilateral abdominal pain, intolerance of solid food, issues with constipation). She would like to pursue bilateral nephrectomies.   - We will evaluate her recent CT A/P from [facility].   - We discussed the risks and benefits of bilateral nephrectomies. Since she is currently on dialysis and she has progressive PKD-related symptoms, we recommend for bilateral nephrectomies.   - Cardiac clearance: her last nuc med stress test 2021 - negative for any ischemia  - She will need to be seen by Pre-anesthesia before surgery    Herve Rojas MD   Transplant Surgery Fellow     I have reviewed history, examined patient and discussed plan with the fellow/resident/RAS.  I concur with the findings in this note.    Risks of the surgical procedure including but not limited to the rare risk of mortality discussed in detail. Patient verbalized good understanding and had several pertinent questions which were answered satisfactorily.     Total time: 30 min  Counseling time: 20 min                 ---------------------------------------------------------------------------------------------------    HPI: Ms. Tipton has End stage renal failure due to polycystic kidney disease (PKD). The patient is non-diabetic.     Since the last visit, patient had intermittent abdominal pain (bilateral, with compression). She admits to some issues with tolerating solid food. She feels like the kidneys have grown in size.     The patient is on dialysis.   Unfortunately, she does not have option for living donor.    The patient has the following pertinent history:       No    Yes  Dialysis:    []      [x] Via:7/2020       Blood Transfusion                  [x]       []  Number of units:   Most recently:  Pregnancy:    []      [x] Number:  one     Previous Transplant:  [x]      [] Details:    Cancer    [x]      [] Comment:   Kidney stones   []      [x] Comment:      Recurrent infections  [x]      []  Type:                  Bladder dysfunction  [x]      [] Cause:    Claudication   [x]      [] Distance:    Previous Amputation  [x]      [] Cause:     Chronic anticoagulation  [x]      [] Indication:   Protestant  [x]      []      Past Medical History:   Diagnosis Date     Anemia in chronic kidney disease      Anxiety      Arthritis      Chronic kidney disease      Chronic low back pain      CKD (chronic kidney disease)      Depression      Depressive disorder 2013     Disease of thyroid gland      End stage renal disease (H)      GERD (gastroesophageal reflux disease)      GERD (gastroesophageal reflux disease)      Hyperlipidemia      Hyperlipidemia      Hypertension 1990's     Hypertension      Neuromuscular disorder (H)      Osteoporosis      Osteoporosis      PKD (polycystic kidney disease) 1990's     PTSD (post-traumatic stress disorder)      Tobacco abuse      Vitamin D deficiency      Past Surgical History:   Procedure Laterality Date     BACK SURGERY      spinal fusion w/hardware     BIOPSY Left 1990's    Breast     BREAST LUMPECTOMY, RT/LT Left     Lumpectomy     BREAST SURGERY Left     Biopsy     BREAST SURGERY       CYST REMOVAL Right     rt wrist     CYSTECTOMY PILONIDAL  1981     EYE SURGERY  2008    lasik     EYE SURGERY       FRACTURE SURGERY       ORTHOPEDIC SURGERY Right 2005    Shoulder     OTHER SURGICAL HISTORY      carpal tunnel repair     PILONIDAL CYST / SINUS EXCISION       SPINE SURGERY       Family History   Problem Relation Age of Onset     Polycystic Kidney Diease Mother      Polycystic Kidney Diease Sister      Cardiac Sudden Death Sister 52     Polycystic Kidney Diease Maternal Grandmother      Heart Disease Maternal Grandfather      Hypertension  Mother      Kidney Disease Mother      Cerebrovascular Disease Mother      Chronic Obstructive Pulmonary Disease Father      Multiple Sclerosis Father      Hypertension Sister      Kidney Disease Sister      Hypertension Maternal Grandmother      Kidney Disease Maternal Grandmother      Cerebrovascular Disease Maternal Grandmother      Hyperlipidemia Paternal Grandmother      Cerebrovascular Disease Paternal Grandmother      Coronary Artery Disease Paternal Grandfather      Social History     Socioeconomic History     Marital status: Single     Spouse name: Not on file     Number of children: Not on file     Years of education: Not on file     Highest education level: Not on file   Occupational History     Not on file   Tobacco Use     Smoking status: Former     Packs/day: 0.50     Years: 14.00     Pack years: 7.00     Types: Cigarettes     Quit date: 2021     Years since quittin.5     Smokeless tobacco: Never   Substance and Sexual Activity     Alcohol use: Not Currently     Drug use: Never     Sexual activity: Not on file   Other Topics Concern     Parent/sibling w/ CABG, MI or angioplasty before 65F 55M? Not Asked   Social History Narrative     Not on file     Social Determinants of Health     Financial Resource Strain: Not on file   Food Insecurity: Not on file   Transportation Needs: Not on file   Physical Activity: Not on file   Stress: Not on file   Social Connections: Not on file   Intimate Partner Violence: Not on file   Housing Stability: Not on file       Allergies:   Allergies   Allergen Reactions     Penicillins Other (See Comments) and Shortness Of Breath     Breathing problem.  breathing       Carvedilol GI Disturbance     Nausea and vomiting     Ciprofloxacin Muscle Pain (Myalgia)     Morphine Other (See Comments)     Vomiting     No Clinical Screening - See Comments      PN: LW CM1: Contrast Intercapsular - Nonionic Reaction :     Nsaids Other (See Comments)     Sulfa Drugs Itching      Sulfamethoxazole-Trimethoprim      Other reaction(s): itching     Levofloxacin Muscle Pain (Myalgia) and Rash     Medications:  Prescription Medications as of 2022       Rx Number Disp Refills Start End Last Dispensed Date Next Fill Date Owning Pharmacy    amitriptyline (ELAVIL) 100 MG tablet            Class: Historical    amLODIPine (NORVASC) 5 MG tablet        [id number] - [place], [place] - [address]    Sig: Take 5 mg by mouth daily    Class: Historical    Route: Oral    cholecalciferol (VITAMIN D3) 25 mcg (1000 units) capsule        [id number] [place], [place] - [address]    Sig: Take 1 capsule by mouth daily    Class: Historical    Route: Oral    citalopram (CELEXA) 20 MG tablet            Si tablet    Class: Historical    cloNIDine (CATAPRES) 0.1 MG tablet        [facility]pharmacy [id number] - [place], [place] - [address]    Sig: Take 0.2 mg by mouth At Bedtime    Class: Historical    Route: Oral    diazepam (VALIUM) 5 MG tablet  2 tablet 0 2021    [id number] - [place] [id number] [facility] AT [facility]    Sig: Take 1 tablet (5 mg) by mouth every 6 hours as needed for anxiety    Class: E-Prescribe    Notes to Pharmacy: For procedure 21    Route: Oral    furosemide (LASIX) 20 MG tablet    2021        Sig: TAKE 2 TABLETS BY MOUTH EVERY DAY    Class: Historical    HYDROcodone-acetaminophen (NORCO) 5-325 MG tablet    2020        Si tab(s)    Class: Historical    Earliest Fill Date: 2020    Krill Oil 1000 MG CAPS            Si    Class: Historical    labetalol (NORMODYNE) 200 MG tablet    2020    [id number] - [place], [place] - [address]    Sig: Take 300 mg by mouth    Class: Historical    Route: Oral    levothyroxine (SYNTHROID/LEVOTHROID) 50 MCG tablet    11/15/2022        Si mcg    Class: Historical    lisinopril (ZESTRIL) 40 MG tablet            Si  "tab(s)    Class: Historical    LORazepam (ATIVAN) 1 MG tablet    2020    [id number] - [place], [place] - [address]    Sig: TAKE 0.5 1 TABLET BY MOUTH TWICE DAILY AS NEEDED. MUST LAST 30 DAYS    Class: Historical    MELATONIN PO        [facility]/pharmacy #[id number] - [place], [place] - [address]    Si MG @@ H.S    Class: Historical    Route: Oral    Misc Natural Products (GLUCOSAMINE CHOND MSM FORMULA) TABS            Sig: as directed    Class: Historical    nortriptyline (PAMELOR) 75 MG capsule    2022        Sig: Take 75 mg by mouth daily    Class: Historical    Route: Oral    omeprazole (PRILOSEC) 20 MG DR capsule        [id number] - [place] - [address]    Si capsule 30 minutes before morning meal    Class: Historical    OSCO HIGH POTENCY B COMPLEX/C OR            Sig: Take 1 tablet by mouth    Class: Historical    Route: Oral    oxyCODONE IR (ROXICODONE) 10 MG tablet    2022        Sig: 10 mg    Class: Historical    rosuvastatin (CRESTOR) 10 MG tablet            Class: Historical    sertraline (ZOLOFT) 100 MG tablet            Sig: Take 200 mg by mouth    Class: Historical    Route: Oral    sevelamer carbonate (RENVELA) 800 MG tablet    2021        Sig: TAKE 1 TABLET BY MOUTH WITH EACH MEAL    Class: Historical    vitamin E 400 units TABS            Sig: Take 800 mg by mouth    Class: Historical    Route: Oral        Exam:     BP (!) 159/89   Pulse 94   Temp 98.5  F (36.9  C)   Ht 1.499 m (4' 11\")   Wt 47 kg (103 lb 11.2 oz)   SpO2 97%   BMI 20.94 kg/m    Appearance: in no apparent distress.   Skin: normal  Eyes:  no redness or discharge.  Sclera anicteric  Head and Neck: Normal, no rashes or jaundice  Respiratory: easy respirations, no audible wheezing.  Abdomen: rounded, No distention; with palpation the right kidney extends down to the top of pelvis  Extremeties: femoral 2+/2+, Edema, trace  Neuro: without deficit   Psychiatric: " Normal mood and affect    Diagnostics:   No results found for this or any previous visit (from the past 672 hour(s)).  UNOS cPRA   Date Value Ref Range Status   05/19/2021 39  Final

## 2023-01-01 ENCOUNTER — TELEPHONE (OUTPATIENT)
Dept: TRANSPLANT | Facility: CLINIC | Age: 68
End: 2023-01-01
Payer: COMMERCIAL

## 2023-01-01 ENCOUNTER — ALLIED HEALTH/NURSE VISIT (OUTPATIENT)
Dept: TRANSPLANT | Facility: CLINIC | Age: 68
End: 2023-01-01
Attending: SURGERY
Payer: COMMERCIAL

## 2023-01-01 ENCOUNTER — APPOINTMENT (OUTPATIENT)
Dept: RADIOLOGY | Facility: CLINIC | Age: 68
DRG: 521 | End: 2023-01-01
Attending: PHYSICIAN ASSISTANT
Payer: COMMERCIAL

## 2023-01-01 ENCOUNTER — APPOINTMENT (OUTPATIENT)
Dept: OCCUPATIONAL THERAPY | Facility: CLINIC | Age: 68
DRG: 521 | End: 2023-01-01
Payer: COMMERCIAL

## 2023-01-01 ENCOUNTER — ANESTHESIA (OUTPATIENT)
Dept: SURGERY | Facility: CLINIC | Age: 68
DRG: 521 | End: 2023-01-01
Payer: COMMERCIAL

## 2023-01-01 ENCOUNTER — APPOINTMENT (OUTPATIENT)
Dept: PHYSICAL THERAPY | Facility: CLINIC | Age: 68
DRG: 521 | End: 2023-01-01
Attending: PHYSICIAN ASSISTANT
Payer: COMMERCIAL

## 2023-01-01 ENCOUNTER — TRANSFERRED RECORDS (OUTPATIENT)
Dept: HEALTH INFORMATION MANAGEMENT | Facility: CLINIC | Age: 68
End: 2023-01-01

## 2023-01-01 ENCOUNTER — LAB REQUISITION (OUTPATIENT)
Dept: LAB | Facility: CLINIC | Age: 68
End: 2023-01-01
Payer: COMMERCIAL

## 2023-01-01 ENCOUNTER — TELEPHONE (OUTPATIENT)
Dept: TRANSPLANT | Facility: CLINIC | Age: 68
End: 2023-01-01

## 2023-01-01 ENCOUNTER — APPOINTMENT (OUTPATIENT)
Dept: RADIOLOGY | Facility: CLINIC | Age: 68
DRG: 521 | End: 2023-01-01
Attending: EMERGENCY MEDICINE
Payer: COMMERCIAL

## 2023-01-01 ENCOUNTER — ANESTHESIA EVENT (OUTPATIENT)
Dept: SURGERY | Facility: CLINIC | Age: 68
DRG: 521 | End: 2023-01-01
Payer: COMMERCIAL

## 2023-01-01 ENCOUNTER — HEALTH MAINTENANCE LETTER (OUTPATIENT)
Age: 68
End: 2023-01-01

## 2023-01-01 ENCOUNTER — APPOINTMENT (OUTPATIENT)
Dept: PHYSICAL THERAPY | Facility: CLINIC | Age: 68
DRG: 521 | End: 2023-01-01
Payer: COMMERCIAL

## 2023-01-01 ENCOUNTER — LAB REQUISITION (OUTPATIENT)
Dept: LAB | Facility: CLINIC | Age: 68
End: 2023-01-01

## 2023-01-01 ENCOUNTER — APPOINTMENT (OUTPATIENT)
Dept: CT IMAGING | Facility: CLINIC | Age: 68
DRG: 521 | End: 2023-01-01
Attending: EMERGENCY MEDICINE
Payer: COMMERCIAL

## 2023-01-01 ENCOUNTER — APPOINTMENT (OUTPATIENT)
Dept: OCCUPATIONAL THERAPY | Facility: CLINIC | Age: 68
DRG: 521 | End: 2023-01-01
Attending: PHYSICIAN ASSISTANT
Payer: COMMERCIAL

## 2023-01-01 ENCOUNTER — OFFICE VISIT (OUTPATIENT)
Dept: PODIATRY | Facility: CLINIC | Age: 68
End: 2023-01-01
Payer: COMMERCIAL

## 2023-01-01 ENCOUNTER — LAB (OUTPATIENT)
Dept: LAB | Facility: CLINIC | Age: 68
End: 2023-01-01
Payer: COMMERCIAL

## 2023-01-01 ENCOUNTER — HOSPITAL ENCOUNTER (INPATIENT)
Facility: CLINIC | Age: 68
LOS: 5 days | Discharge: NURSING, REHAB OR LONG-TERM CARE | DRG: 521 | End: 2023-11-14
Attending: EMERGENCY MEDICINE | Admitting: INTERNAL MEDICINE
Payer: COMMERCIAL

## 2023-01-01 ENCOUNTER — OFFICE VISIT (OUTPATIENT)
Dept: TRANSPLANT | Facility: CLINIC | Age: 68
End: 2023-01-01
Attending: PHYSICIAN ASSISTANT
Payer: COMMERCIAL

## 2023-01-01 VITALS
HEIGHT: 59 IN | DIASTOLIC BLOOD PRESSURE: 98 MMHG | HEART RATE: 106 BPM | SYSTOLIC BLOOD PRESSURE: 179 MMHG | DIASTOLIC BLOOD PRESSURE: 98 MMHG | WEIGHT: 73.6 LBS | BODY MASS INDEX: 14.26 KG/M2 | TEMPERATURE: 97.8 F | HEART RATE: 106 BPM | BODY MASS INDEX: 14.84 KG/M2 | WEIGHT: 73 LBS | SYSTOLIC BLOOD PRESSURE: 176 MMHG

## 2023-01-01 VITALS
BODY MASS INDEX: 16.13 KG/M2 | OXYGEN SATURATION: 98 % | HEART RATE: 101 BPM | HEIGHT: 59 IN | SYSTOLIC BLOOD PRESSURE: 141 MMHG | DIASTOLIC BLOOD PRESSURE: 83 MMHG | RESPIRATION RATE: 16 BRPM | WEIGHT: 80 LBS | TEMPERATURE: 98.1 F

## 2023-01-01 VITALS — DIASTOLIC BLOOD PRESSURE: 78 MMHG | SYSTOLIC BLOOD PRESSURE: 122 MMHG | BODY MASS INDEX: 14.84 KG/M2 | WEIGHT: 76 LBS

## 2023-01-01 DIAGNOSIS — I25.10 CARDIOVASCULAR DISEASE: ICD-10-CM

## 2023-01-01 DIAGNOSIS — Z13.220 ENCOUNTER FOR SCREENING FOR LIPOID DISORDERS: ICD-10-CM

## 2023-01-01 DIAGNOSIS — M25.552 PAIN OF LEFT HIP: Primary | ICD-10-CM

## 2023-01-01 DIAGNOSIS — Z01.818 PRE-TRANSPLANT EVALUATION FOR KIDNEY TRANSPLANT: ICD-10-CM

## 2023-01-01 DIAGNOSIS — I10 ESSENTIAL HYPERTENSION: ICD-10-CM

## 2023-01-01 DIAGNOSIS — Z76.82 ORGAN TRANSPLANT CANDIDATE: ICD-10-CM

## 2023-01-01 DIAGNOSIS — Z01.818 PRE-TRANSPLANT EVALUATION FOR KIDNEY TRANSPLANT: Primary | ICD-10-CM

## 2023-01-01 DIAGNOSIS — N18.6 END STAGE RENAL DISEASE (H): ICD-10-CM

## 2023-01-01 DIAGNOSIS — Q61.3 POLYCYSTIC KIDNEY DISEASE: ICD-10-CM

## 2023-01-01 DIAGNOSIS — L84 CALLUS: Primary | ICD-10-CM

## 2023-01-01 DIAGNOSIS — S72.002A HIP FRACTURE, LEFT, CLOSED, INITIAL ENCOUNTER (H): ICD-10-CM

## 2023-01-01 DIAGNOSIS — F41.9 ANXIETY: ICD-10-CM

## 2023-01-01 DIAGNOSIS — Z87.891 HISTORY OF TOBACCO USE: ICD-10-CM

## 2023-01-01 DIAGNOSIS — S92.301A CLOSED NONDISPLACED FRACTURE OF METATARSAL BONE OF RIGHT FOOT, UNSPECIFIED METATARSAL, INITIAL ENCOUNTER: ICD-10-CM

## 2023-01-01 DIAGNOSIS — I15.0 RENOVASCULAR HYPERTENSION: ICD-10-CM

## 2023-01-01 DIAGNOSIS — E03.9 HYPOTHYROIDISM, UNSPECIFIED: ICD-10-CM

## 2023-01-01 DIAGNOSIS — E78.5 HYPERLIPIDEMIA: ICD-10-CM

## 2023-01-01 LAB
ANION GAP SERPL CALCULATED.3IONS-SCNC: 13 MMOL/L (ref 7–15)
ANION GAP SERPL CALCULATED.3IONS-SCNC: 14 MMOL/L (ref 7–15)
ANION GAP SERPL CALCULATED.3IONS-SCNC: 15 MMOL/L (ref 7–15)
ANION GAP SERPL CALCULATED.3IONS-SCNC: 9 MMOL/L (ref 7–15)
ATRIAL RATE - MUSE: 105 BPM
ATRIAL RATE - MUSE: 97 BPM
BUN SERPL-MCNC: 21.1 MG/DL (ref 8–23)
BUN SERPL-MCNC: 27.3 MG/DL (ref 8–23)
BUN SERPL-MCNC: 36.3 MG/DL (ref 8–23)
BUN SERPL-MCNC: 43.9 MG/DL (ref 8–23)
BUN SERPL-MCNC: 48.5 MG/DL (ref 8–23)
BUN SERPL-MCNC: 50.9 MG/DL (ref 8–23)
CALCIUM SERPL-MCNC: 8.8 MG/DL (ref 8.8–10.2)
CALCIUM SERPL-MCNC: 9.1 MG/DL (ref 8.8–10.2)
CALCIUM SERPL-MCNC: 9.1 MG/DL (ref 8.8–10.2)
CALCIUM SERPL-MCNC: 9.5 MG/DL (ref 8.8–10.2)
CALCIUM SERPL-MCNC: 9.7 MG/DL (ref 8.8–10.2)
CALCIUM SERPL-MCNC: 9.8 MG/DL (ref 8.8–10.2)
CHLORIDE SERPL-SCNC: 91 MMOL/L (ref 98–107)
CHLORIDE SERPL-SCNC: 91 MMOL/L (ref 98–107)
CHLORIDE SERPL-SCNC: 94 MMOL/L (ref 98–107)
CHLORIDE SERPL-SCNC: 94 MMOL/L (ref 98–107)
CHLORIDE SERPL-SCNC: 96 MMOL/L (ref 98–107)
CHLORIDE SERPL-SCNC: 97 MMOL/L (ref 98–107)
CHOLEST SERPL-MCNC: 162 MG/DL
CREAT SERPL-MCNC: 3.59 MG/DL (ref 0.51–0.95)
CREAT SERPL-MCNC: 4.8 MG/DL (ref 0.51–0.95)
CREAT SERPL-MCNC: 4.88 MG/DL (ref 0.51–0.95)
CREAT SERPL-MCNC: 6.26 MG/DL (ref 0.51–0.95)
CREAT SERPL-MCNC: 6.86 MG/DL (ref 0.51–0.95)
CREAT SERPL-MCNC: 7.6 MG/DL (ref 0.51–0.95)
DEPRECATED HCO3 PLAS-SCNC: 21 MMOL/L (ref 22–29)
DEPRECATED HCO3 PLAS-SCNC: 25 MMOL/L (ref 22–29)
DEPRECATED HCO3 PLAS-SCNC: 26 MMOL/L (ref 22–29)
DEPRECATED HCO3 PLAS-SCNC: 28 MMOL/L (ref 22–29)
DIASTOLIC BLOOD PRESSURE - MUSE: 100 MMHG
DIASTOLIC BLOOD PRESSURE - MUSE: 75 MMHG
EGFRCR SERPLBLD CKD-EPI 2021: 13 ML/MIN/1.73M2
EGFRCR SERPLBLD CKD-EPI 2021: 5 ML/MIN/1.73M2
EGFRCR SERPLBLD CKD-EPI 2021: 6 ML/MIN/1.73M2
EGFRCR SERPLBLD CKD-EPI 2021: 7 ML/MIN/1.73M2
EGFRCR SERPLBLD CKD-EPI 2021: 9 ML/MIN/1.73M2
EGFRCR SERPLBLD CKD-EPI 2021: 9 ML/MIN/1.73M2
ERYTHROCYTE [DISTWIDTH] IN BLOOD BY AUTOMATED COUNT: 13.2 % (ref 10–15)
GLUCOSE SERPL-MCNC: 125 MG/DL (ref 70–99)
GLUCOSE SERPL-MCNC: 126 MG/DL (ref 70–99)
GLUCOSE SERPL-MCNC: 133 MG/DL (ref 70–99)
GLUCOSE SERPL-MCNC: 89 MG/DL (ref 70–99)
GLUCOSE SERPL-MCNC: 92 MG/DL (ref 70–99)
GLUCOSE SERPL-MCNC: 93 MG/DL (ref 70–99)
GLUCOSE SERPL-MCNC: 94 MG/DL (ref 70–99)
HCT VFR BLD AUTO: 34.2 % (ref 35–47)
HDLC SERPL-MCNC: 75 MG/DL
HGB BLD-MCNC: 11 G/DL (ref 11.7–15.7)
HGB BLD-MCNC: 11.6 G/DL (ref 11.7–15.7)
HGB BLD-MCNC: 9.9 G/DL (ref 11.7–15.7)
HOLD SPECIMEN: NORMAL
INTERPRETATION ECG - MUSE: NORMAL
INTERPRETATION ECG - MUSE: NORMAL
LDLC SERPL CALC-MCNC: 65 MG/DL
MCH RBC QN AUTO: 33.2 PG (ref 26.5–33)
MCHC RBC AUTO-ENTMCNC: 32.2 G/DL (ref 31.5–36.5)
MCV RBC AUTO: 103 FL (ref 78–100)
NONHDLC SERPL-MCNC: 87 MG/DL
P AXIS - MUSE: 71 DEGREES
P AXIS - MUSE: 76 DEGREES
PLATELET # BLD AUTO: 290 10E3/UL (ref 150–450)
POTASSIUM SERPL-SCNC: 4 MMOL/L (ref 3.4–5.3)
POTASSIUM SERPL-SCNC: 4.7 MMOL/L (ref 3.4–5.3)
POTASSIUM SERPL-SCNC: 4.8 MMOL/L (ref 3.4–5.3)
POTASSIUM SERPL-SCNC: 5 MMOL/L (ref 3.4–5.3)
POTASSIUM SERPL-SCNC: 5.1 MMOL/L (ref 3.4–5.3)
POTASSIUM SERPL-SCNC: 5.1 MMOL/L (ref 3.4–5.3)
POTASSIUM SERPL-SCNC: 6 MMOL/L (ref 3.4–5.3)
PR INTERVAL - MUSE: 188 MS
PR INTERVAL - MUSE: 192 MS
QRS DURATION - MUSE: 84 MS
QRS DURATION - MUSE: 90 MS
QT - MUSE: 378 MS
QT - MUSE: 414 MS
QTC - MUSE: 499 MS
QTC - MUSE: 525 MS
R AXIS - MUSE: 49 DEGREES
R AXIS - MUSE: 58 DEGREES
RBC # BLD AUTO: 3.31 10E6/UL (ref 3.8–5.2)
SODIUM SERPL-SCNC: 130 MMOL/L (ref 135–145)
SODIUM SERPL-SCNC: 131 MMOL/L (ref 135–145)
SODIUM SERPL-SCNC: 132 MMOL/L (ref 135–145)
SODIUM SERPL-SCNC: 132 MMOL/L (ref 135–145)
SODIUM SERPL-SCNC: 134 MMOL/L (ref 135–145)
SODIUM SERPL-SCNC: 135 MMOL/L (ref 135–145)
SYSTOLIC BLOOD PRESSURE - MUSE: 149 MMHG
SYSTOLIC BLOOD PRESSURE - MUSE: 197 MMHG
T AXIS - MUSE: 16 DEGREES
T AXIS - MUSE: 36 DEGREES
TRIGL SERPL-MCNC: 112 MG/DL
TSH SERPL DL<=0.005 MIU/L-ACNC: 3.96 UIU/ML (ref 0.3–4.2)
VENTRICULAR RATE- MUSE: 105 BPM
VENTRICULAR RATE- MUSE: 97 BPM
WBC # BLD AUTO: 8.8 10E3/UL (ref 4–11)

## 2023-01-01 PROCEDURE — 258N000003 HC RX IP 258 OP 636: Performed by: NURSE PRACTITIONER

## 2023-01-01 PROCEDURE — C1713 ANCHOR/SCREW BN/BN,TIS/BN: HCPCS | Performed by: ORTHOPAEDIC SURGERY

## 2023-01-01 PROCEDURE — 250N000011 HC RX IP 250 OP 636: Mod: JZ | Performed by: INTERNAL MEDICINE

## 2023-01-01 PROCEDURE — 370N000017 HC ANESTHESIA TECHNICAL FEE, PER MIN: Performed by: ORTHOPAEDIC SURGERY

## 2023-01-01 PROCEDURE — 36415 COLL VENOUS BLD VENIPUNCTURE: CPT | Performed by: PHYSICIAN ASSISTANT

## 2023-01-01 PROCEDURE — 97116 GAIT TRAINING THERAPY: CPT | Mod: GP

## 2023-01-01 PROCEDURE — 99232 SBSQ HOSP IP/OBS MODERATE 35: CPT

## 2023-01-01 PROCEDURE — 250N000011 HC RX IP 250 OP 636: Performed by: NURSE PRACTITIONER

## 2023-01-01 PROCEDURE — 250N000011 HC RX IP 250 OP 636: Mod: JZ | Performed by: NURSE ANESTHETIST, CERTIFIED REGISTERED

## 2023-01-01 PROCEDURE — 250N000013 HC RX MED GY IP 250 OP 250 PS 637: Performed by: EMERGENCY MEDICINE

## 2023-01-01 PROCEDURE — 250N000011 HC RX IP 250 OP 636: Performed by: ANESTHESIOLOGY

## 2023-01-01 PROCEDURE — 250N000013 HC RX MED GY IP 250 OP 250 PS 637: Performed by: HOSPITALIST

## 2023-01-01 PROCEDURE — 99232 SBSQ HOSP IP/OBS MODERATE 35: CPT | Performed by: EMERGENCY MEDICINE

## 2023-01-01 PROCEDURE — 99214 OFFICE O/P EST MOD 30 MIN: CPT | Performed by: SURGERY

## 2023-01-01 PROCEDURE — 250N000011 HC RX IP 250 OP 636: Performed by: HOSPITALIST

## 2023-01-01 PROCEDURE — 97530 THERAPEUTIC ACTIVITIES: CPT | Mod: GP

## 2023-01-01 PROCEDURE — 73700 CT LOWER EXTREMITY W/O DYE: CPT | Mod: LT

## 2023-01-01 PROCEDURE — 120N000001 HC R&B MED SURG/OB

## 2023-01-01 PROCEDURE — 82947 ASSAY GLUCOSE BLOOD QUANT: CPT | Performed by: PHYSICIAN ASSISTANT

## 2023-01-01 PROCEDURE — 99285 EMERGENCY DEPT VISIT HI MDM: CPT | Mod: 25

## 2023-01-01 PROCEDURE — 90935 HEMODIALYSIS ONE EVALUATION: CPT

## 2023-01-01 PROCEDURE — 73630 X-RAY EXAM OF FOOT: CPT | Mod: RT

## 2023-01-01 PROCEDURE — 99207 PR NO BILLABLE SERVICE THIS VISIT: CPT | Performed by: EMERGENCY MEDICINE

## 2023-01-01 PROCEDURE — 250N000011 HC RX IP 250 OP 636: Performed by: EMERGENCY MEDICINE

## 2023-01-01 PROCEDURE — 36415 COLL VENOUS BLD VENIPUNCTURE: CPT | Performed by: EMERGENCY MEDICINE

## 2023-01-01 PROCEDURE — 82310 ASSAY OF CALCIUM: CPT | Performed by: EMERGENCY MEDICINE

## 2023-01-01 PROCEDURE — 80048 BASIC METABOLIC PNL TOTAL CA: CPT | Performed by: EMERGENCY MEDICINE

## 2023-01-01 PROCEDURE — 250N000013 HC RX MED GY IP 250 OP 250 PS 637: Performed by: NURSE PRACTITIONER

## 2023-01-01 PROCEDURE — 360N000077 HC SURGERY LEVEL 4, PER MIN: Performed by: ORTHOPAEDIC SURGERY

## 2023-01-01 PROCEDURE — 250N000011 HC RX IP 250 OP 636: Performed by: NURSE ANESTHETIST, CERTIFIED REGISTERED

## 2023-01-01 PROCEDURE — 99203 OFFICE O/P NEW LOW 30 MIN: CPT | Performed by: PODIATRIST

## 2023-01-01 PROCEDURE — 84132 ASSAY OF SERUM POTASSIUM: CPT | Performed by: INTERNAL MEDICINE

## 2023-01-01 PROCEDURE — 250N000011 HC RX IP 250 OP 636: Mod: JZ | Performed by: PHYSICIAN ASSISTANT

## 2023-01-01 PROCEDURE — 84443 ASSAY THYROID STIM HORMONE: CPT | Performed by: NURSE PRACTITIONER

## 2023-01-01 PROCEDURE — 82947 ASSAY GLUCOSE BLOOD QUANT: CPT | Performed by: EMERGENCY MEDICINE

## 2023-01-01 PROCEDURE — 93005 ELECTROCARDIOGRAM TRACING: CPT | Performed by: EMERGENCY MEDICINE

## 2023-01-01 PROCEDURE — 73562 X-RAY EXAM OF KNEE 3: CPT | Mod: LT

## 2023-01-01 PROCEDURE — 86832 HLA CLASS I HIGH DEFIN QUAL: CPT

## 2023-01-01 PROCEDURE — 250N000013 HC RX MED GY IP 250 OP 250 PS 637: Performed by: PHYSICIAN ASSISTANT

## 2023-01-01 PROCEDURE — 250N000011 HC RX IP 250 OP 636: Mod: JZ | Performed by: EMERGENCY MEDICINE

## 2023-01-01 PROCEDURE — 258N000003 HC RX IP 258 OP 636: Performed by: PHYSICIAN ASSISTANT

## 2023-01-01 PROCEDURE — 258N000001 HC RX 258: Performed by: ORTHOPAEDIC SURGERY

## 2023-01-01 PROCEDURE — 250N000013 HC RX MED GY IP 250 OP 250 PS 637: Performed by: INTERNAL MEDICINE

## 2023-01-01 PROCEDURE — 99239 HOSP IP/OBS DSCHRG MGMT >30: CPT | Performed by: INTERNAL MEDICINE

## 2023-01-01 PROCEDURE — 73502 X-RAY EXAM HIP UNI 2-3 VIEWS: CPT

## 2023-01-01 PROCEDURE — 99222 1ST HOSP IP/OBS MODERATE 55: CPT | Performed by: EMERGENCY MEDICINE

## 2023-01-01 PROCEDURE — G0463 HOSPITAL OUTPT CLINIC VISIT: HCPCS | Performed by: SURGERY

## 2023-01-01 PROCEDURE — 80061 LIPID PANEL: CPT | Performed by: NURSE PRACTITIONER

## 2023-01-01 PROCEDURE — 86833 HLA CLASS II HIGH DEFIN QUAL: CPT

## 2023-01-01 PROCEDURE — 99233 SBSQ HOSP IP/OBS HIGH 50: CPT | Performed by: INTERNAL MEDICINE

## 2023-01-01 PROCEDURE — 5A1D70Z PERFORMANCE OF URINARY FILTRATION, INTERMITTENT, LESS THAN 6 HOURS PER DAY: ICD-10-PCS | Performed by: NURSE PRACTITIONER

## 2023-01-01 PROCEDURE — 99222 1ST HOSP IP/OBS MODERATE 55: CPT | Performed by: NURSE PRACTITIONER

## 2023-01-01 PROCEDURE — 99232 SBSQ HOSP IP/OBS MODERATE 35: CPT | Performed by: PHYSICIAN ASSISTANT

## 2023-01-01 PROCEDURE — 97162 PT EVAL MOD COMPLEX 30 MIN: CPT | Mod: GP

## 2023-01-01 PROCEDURE — 71101 X-RAY EXAM UNILAT RIBS/CHEST: CPT | Mod: LT

## 2023-01-01 PROCEDURE — 250N000009 HC RX 250: Performed by: NURSE PRACTITIONER

## 2023-01-01 PROCEDURE — 250N000009 HC RX 250: Performed by: NURSE ANESTHETIST, CERTIFIED REGISTERED

## 2023-01-01 PROCEDURE — 97110 THERAPEUTIC EXERCISES: CPT | Mod: GP

## 2023-01-01 PROCEDURE — 85018 HEMOGLOBIN: CPT | Performed by: PHYSICIAN ASSISTANT

## 2023-01-01 PROCEDURE — 0SRS019 REPLACEMENT OF LEFT HIP JOINT, FEMORAL SURFACE WITH METAL SYNTHETIC SUBSTITUTE, CEMENTED, OPEN APPROACH: ICD-10-PCS | Performed by: ORTHOPAEDIC SURGERY

## 2023-01-01 PROCEDURE — 258N000003 HC RX IP 258 OP 636: Performed by: NURSE ANESTHETIST, CERTIFIED REGISTERED

## 2023-01-01 PROCEDURE — C1776 JOINT DEVICE (IMPLANTABLE): HCPCS | Performed by: ORTHOPAEDIC SURGERY

## 2023-01-01 PROCEDURE — 250N000011 HC RX IP 250 OP 636: Performed by: ORTHOPAEDIC SURGERY

## 2023-01-01 PROCEDURE — 710N000010 HC RECOVERY PHASE 1, LEVEL 2, PER MIN: Performed by: ORTHOPAEDIC SURGERY

## 2023-01-01 PROCEDURE — 272N000001 HC OR GENERAL SUPPLY STERILE: Performed by: ORTHOPAEDIC SURGERY

## 2023-01-01 PROCEDURE — 97535 SELF CARE MNGMENT TRAINING: CPT | Mod: GO

## 2023-01-01 PROCEDURE — 97166 OT EVAL MOD COMPLEX 45 MIN: CPT | Mod: GO

## 2023-01-01 PROCEDURE — 36415 COLL VENOUS BLD VENIPUNCTURE: CPT | Performed by: INTERNAL MEDICINE

## 2023-01-01 PROCEDURE — 999N000141 HC STATISTIC PRE-PROCEDURE NURSING ASSESSMENT: Performed by: ORTHOPAEDIC SURGERY

## 2023-01-01 PROCEDURE — 250N000011 HC RX IP 250 OP 636: Mod: JW | Performed by: ANESTHESIOLOGY

## 2023-01-01 PROCEDURE — 99232 SBSQ HOSP IP/OBS MODERATE 35: CPT | Performed by: INTERNAL MEDICINE

## 2023-01-01 PROCEDURE — 96374 THER/PROPH/DIAG INJ IV PUSH: CPT

## 2023-01-01 PROCEDURE — 85014 HEMATOCRIT: CPT | Performed by: EMERGENCY MEDICINE

## 2023-01-01 DEVICE — FULL DOSE BONE CEMENT, 10 PACK CATALOG NUMBER IS 6191-1-010
Type: IMPLANTABLE DEVICE | Site: HIP | Status: FUNCTIONAL
Brand: SIMPLEX

## 2023-01-01 DEVICE — SUMMIT FEMORAL STEM 12/14 TAPER CEMENTED SIZE 3 STD 108MM
Type: IMPLANTABLE DEVICE | Site: HIP | Status: FUNCTIONAL
Brand: SUMMIT

## 2023-01-01 DEVICE — 25MM BUCK CEMENT PLUG WITH                                    DISPOSABLE INSERTER
Type: IMPLANTABLE DEVICE | Site: HIP | Status: FUNCTIONAL
Brand: BUCK

## 2023-01-01 DEVICE — ARTICUL/EZE FEMORAL HEAD DIAMETER 28MM +1.5 12/14 TAPER
Type: IMPLANTABLE DEVICE | Site: HIP | Status: FUNCTIONAL
Brand: ARTICUL/EZE

## 2023-01-01 DEVICE — CEMENTRALIZER STEM CENTRALIZER 8.5MM CEMENTED
Type: IMPLANTABLE DEVICE | Site: HIP | Status: FUNCTIONAL
Brand: CEMENTRALIZER

## 2023-01-01 DEVICE — SELF CENTERING BI-POLAR HEAD 28MM ID 42MM OD
Type: IMPLANTABLE DEVICE | Site: HIP | Status: FUNCTIONAL
Brand: SELF CENTERING

## 2023-01-01 RX ORDER — CARVEDILOL 3.12 MG/1
3.12 TABLET ORAL 2 TIMES DAILY WITH MEALS
Status: DISCONTINUED | OUTPATIENT
Start: 2023-01-01 | End: 2023-01-01

## 2023-01-01 RX ORDER — LACTULOSE 10 G/15ML
10 SOLUTION ORAL 2 TIMES DAILY PRN
Status: DISCONTINUED | OUTPATIENT
Start: 2023-01-01 | End: 2023-01-01

## 2023-01-01 RX ORDER — HYDROMORPHONE HCL IN WATER/PF 6 MG/30 ML
0.2 PATIENT CONTROLLED ANALGESIA SYRINGE INTRAVENOUS
Status: DISCONTINUED | OUTPATIENT
Start: 2023-01-01 | End: 2023-01-01

## 2023-01-01 RX ORDER — HYDROMORPHONE HYDROCHLORIDE 1 MG/ML
0.5 INJECTION, SOLUTION INTRAMUSCULAR; INTRAVENOUS; SUBCUTANEOUS EVERY 4 HOURS PRN
Status: DISCONTINUED | OUTPATIENT
Start: 2023-01-01 | End: 2023-01-01 | Stop reason: HOSPADM

## 2023-01-01 RX ORDER — CEFAZOLIN SODIUM 2 G/100ML
2 INJECTION, SOLUTION INTRAVENOUS
Status: CANCELLED | OUTPATIENT
Start: 2023-01-01

## 2023-01-01 RX ORDER — PROPOFOL 10 MG/ML
INJECTION, EMULSION INTRAVENOUS CONTINUOUS PRN
Status: DISCONTINUED | OUTPATIENT
Start: 2023-01-01 | End: 2023-01-01

## 2023-01-01 RX ORDER — HYDROMORPHONE HYDROCHLORIDE 1 MG/ML
0.5 INJECTION, SOLUTION INTRAMUSCULAR; INTRAVENOUS; SUBCUTANEOUS
Status: DISCONTINUED | OUTPATIENT
Start: 2023-01-01 | End: 2023-01-01

## 2023-01-01 RX ORDER — HYDRALAZINE HYDROCHLORIDE 50 MG/1
50 TABLET, FILM COATED ORAL EVERY 8 HOURS
Qty: 90 TABLET | Refills: 0 | Status: SHIPPED | OUTPATIENT
Start: 2023-01-01 | End: 2024-01-01

## 2023-01-01 RX ORDER — ROSUVASTATIN CALCIUM 10 MG/1
10 TABLET, COATED ORAL AT BEDTIME
Status: DISCONTINUED | OUTPATIENT
Start: 2023-01-01 | End: 2023-01-01 | Stop reason: HOSPADM

## 2023-01-01 RX ORDER — HYDROMORPHONE HCL IN WATER/PF 6 MG/30 ML
0.4 PATIENT CONTROLLED ANALGESIA SYRINGE INTRAVENOUS EVERY 5 MIN PRN
Status: CANCELLED | OUTPATIENT
Start: 2023-01-01

## 2023-01-01 RX ORDER — LACTULOSE 10 G/15ML
10 SOLUTION ORAL 2 TIMES DAILY
Status: DISCONTINUED | OUTPATIENT
Start: 2023-01-01 | End: 2023-01-01 | Stop reason: HOSPADM

## 2023-01-01 RX ORDER — SODIUM CHLORIDE 9 MG/ML
INJECTION, SOLUTION INTRAVENOUS CONTINUOUS PRN
Status: DISCONTINUED | OUTPATIENT
Start: 2023-01-01 | End: 2023-01-01

## 2023-01-01 RX ORDER — SODIUM CHLORIDE, SODIUM LACTATE, POTASSIUM CHLORIDE, CALCIUM CHLORIDE 600; 310; 30; 20 MG/100ML; MG/100ML; MG/100ML; MG/100ML
INJECTION, SOLUTION INTRAVENOUS CONTINUOUS
Status: CANCELLED | OUTPATIENT
Start: 2023-01-01

## 2023-01-01 RX ORDER — SALIVA STIMULANT COMB. NO.3
2 SPRAY, NON-AEROSOL (ML) MUCOUS MEMBRANE
Status: DISCONTINUED | OUTPATIENT
Start: 2023-01-01 | End: 2023-01-01

## 2023-01-01 RX ORDER — PROCHLORPERAZINE MALEATE 5 MG
5 TABLET ORAL EVERY 6 HOURS PRN
Status: DISCONTINUED | OUTPATIENT
Start: 2023-01-01 | End: 2023-01-01 | Stop reason: HOSPADM

## 2023-01-01 RX ORDER — OXYCODONE HYDROCHLORIDE 5 MG/1
10 TABLET ORAL EVERY 4 HOURS PRN
Status: DISCONTINUED | OUTPATIENT
Start: 2023-01-01 | End: 2023-01-01

## 2023-01-01 RX ORDER — SODIUM CHLORIDE, SODIUM LACTATE, POTASSIUM CHLORIDE, CALCIUM CHLORIDE 600; 310; 30; 20 MG/100ML; MG/100ML; MG/100ML; MG/100ML
INJECTION, SOLUTION INTRAVENOUS CONTINUOUS
Status: DISCONTINUED | OUTPATIENT
Start: 2023-01-01 | End: 2023-01-01 | Stop reason: HOSPADM

## 2023-01-01 RX ORDER — AMOXICILLIN 250 MG
2 CAPSULE ORAL 2 TIMES DAILY
Status: DISCONTINUED | OUTPATIENT
Start: 2023-01-01 | End: 2023-01-01 | Stop reason: HOSPADM

## 2023-01-01 RX ORDER — CEFAZOLIN SODIUM 1 G/3ML
1 INJECTION, POWDER, FOR SOLUTION INTRAMUSCULAR; INTRAVENOUS ONCE
Status: COMPLETED | OUTPATIENT
Start: 2023-01-01 | End: 2023-01-01

## 2023-01-01 RX ORDER — HYDROMORPHONE HCL IN WATER/PF 6 MG/30 ML
0.4 PATIENT CONTROLLED ANALGESIA SYRINGE INTRAVENOUS EVERY 5 MIN PRN
Status: DISCONTINUED | OUTPATIENT
Start: 2023-01-01 | End: 2023-01-01 | Stop reason: HOSPADM

## 2023-01-01 RX ORDER — HYDROMORPHONE HCL IN WATER/PF 6 MG/30 ML
0.2 PATIENT CONTROLLED ANALGESIA SYRINGE INTRAVENOUS EVERY 5 MIN PRN
Status: DISCONTINUED | OUTPATIENT
Start: 2023-01-01 | End: 2023-01-01 | Stop reason: HOSPADM

## 2023-01-01 RX ORDER — ONDANSETRON 4 MG/1
4 TABLET, ORALLY DISINTEGRATING ORAL EVERY 6 HOURS PRN
Status: DISCONTINUED | OUTPATIENT
Start: 2023-01-01 | End: 2023-01-01

## 2023-01-01 RX ORDER — NALOXONE HYDROCHLORIDE 0.4 MG/ML
0.2 INJECTION, SOLUTION INTRAMUSCULAR; INTRAVENOUS; SUBCUTANEOUS
Status: DISCONTINUED | OUTPATIENT
Start: 2023-01-01 | End: 2023-01-01 | Stop reason: HOSPADM

## 2023-01-01 RX ORDER — ONDANSETRON 4 MG/1
4 TABLET, ORALLY DISINTEGRATING ORAL EVERY 6 HOURS PRN
Status: DISCONTINUED | OUTPATIENT
Start: 2023-01-01 | End: 2023-01-01 | Stop reason: HOSPADM

## 2023-01-01 RX ORDER — AMOXICILLIN 250 MG
1 CAPSULE ORAL 2 TIMES DAILY
Status: DISCONTINUED | OUTPATIENT
Start: 2023-01-01 | End: 2023-01-01

## 2023-01-01 RX ORDER — OXYCODONE HYDROCHLORIDE 5 MG/1
5-10 TABLET ORAL EVERY 4 HOURS PRN
Qty: 30 TABLET | Refills: 0 | Status: SHIPPED | OUTPATIENT
Start: 2023-01-01 | End: 2024-01-01

## 2023-01-01 RX ORDER — ASPIRIN 81 MG/1
81 TABLET ORAL DAILY
Status: DISCONTINUED | OUTPATIENT
Start: 2023-01-01 | End: 2023-01-01

## 2023-01-01 RX ORDER — HYDROMORPHONE HCL IN WATER/PF 6 MG/30 ML
0.2 PATIENT CONTROLLED ANALGESIA SYRINGE INTRAVENOUS EVERY 5 MIN PRN
Status: CANCELLED | OUTPATIENT
Start: 2023-01-01

## 2023-01-01 RX ORDER — POLYETHYLENE GLYCOL 3350 17 G/17G
17 POWDER, FOR SOLUTION ORAL 2 TIMES DAILY
Status: DISCONTINUED | OUTPATIENT
Start: 2023-01-01 | End: 2023-01-01 | Stop reason: HOSPADM

## 2023-01-01 RX ORDER — CEFAZOLIN SODIUM/WATER 2 G/20 ML
2 SYRINGE (ML) INTRAVENOUS
Status: COMPLETED | OUTPATIENT
Start: 2023-01-01 | End: 2023-01-01

## 2023-01-01 RX ORDER — FENTANYL CITRATE 50 UG/ML
50 INJECTION, SOLUTION INTRAMUSCULAR; INTRAVENOUS ONCE
Status: COMPLETED | OUTPATIENT
Start: 2023-01-01 | End: 2023-01-01

## 2023-01-01 RX ORDER — HYDROMORPHONE HYDROCHLORIDE 1 MG/ML
.3-.5 INJECTION, SOLUTION INTRAMUSCULAR; INTRAVENOUS; SUBCUTANEOUS
Status: DISCONTINUED | OUTPATIENT
Start: 2023-01-01 | End: 2023-01-01

## 2023-01-01 RX ORDER — GABAPENTIN 100 MG/1
200 CAPSULE ORAL
Status: DISCONTINUED | OUTPATIENT
Start: 2023-01-01 | End: 2023-01-01

## 2023-01-01 RX ORDER — CEFAZOLIN SODIUM 2 G/100ML
2 INJECTION, SOLUTION INTRAVENOUS SEE ADMIN INSTRUCTIONS
Status: CANCELLED | OUTPATIENT
Start: 2023-01-01

## 2023-01-01 RX ORDER — HYDRALAZINE HYDROCHLORIDE 10 MG/1
10 TABLET, FILM COATED ORAL 3 TIMES DAILY
Status: DISCONTINUED | OUTPATIENT
Start: 2023-01-01 | End: 2023-01-01

## 2023-01-01 RX ORDER — SALIVA STIMULANT COMB. NO.3
2 SPRAY, NON-AEROSOL (ML) MUCOUS MEMBRANE
Status: DISCONTINUED | OUTPATIENT
Start: 2023-01-01 | End: 2023-01-01 | Stop reason: HOSPADM

## 2023-01-01 RX ORDER — LABETALOL 100 MG/1
100 TABLET, FILM COATED ORAL 2 TIMES DAILY
COMMUNITY
End: 2024-01-01

## 2023-01-01 RX ORDER — OXYCODONE HYDROCHLORIDE 5 MG/1
5 TABLET ORAL ONCE
Status: COMPLETED | OUTPATIENT
Start: 2023-01-01 | End: 2023-01-01

## 2023-01-01 RX ORDER — LIDOCAINE HYDROCHLORIDE 10 MG/ML
INJECTION, SOLUTION INFILTRATION; PERINEURAL PRN
Status: DISCONTINUED | OUTPATIENT
Start: 2023-01-01 | End: 2023-01-01

## 2023-01-01 RX ORDER — ONDANSETRON 4 MG/1
4 TABLET, ORALLY DISINTEGRATING ORAL EVERY 30 MIN PRN
Status: DISCONTINUED | OUTPATIENT
Start: 2023-01-01 | End: 2023-01-01 | Stop reason: HOSPADM

## 2023-01-01 RX ORDER — OXYCODONE HYDROCHLORIDE 5 MG/1
5 TABLET ORAL
Status: CANCELLED | OUTPATIENT
Start: 2023-01-01

## 2023-01-01 RX ORDER — POLYETHYLENE GLYCOL 3350 17 G/17G
17 POWDER, FOR SOLUTION ORAL DAILY
Status: DISCONTINUED | OUTPATIENT
Start: 2023-01-01 | End: 2023-01-01

## 2023-01-01 RX ORDER — VITAMIN B COMPLEX
25 TABLET ORAL DAILY
Status: DISCONTINUED | OUTPATIENT
Start: 2023-01-01 | End: 2023-01-01 | Stop reason: HOSPADM

## 2023-01-01 RX ORDER — BUPIVACAINE HYDROCHLORIDE 5 MG/ML
INJECTION, SOLUTION EPIDURAL; INTRACAUDAL PRN
Status: DISCONTINUED | OUTPATIENT
Start: 2023-01-01 | End: 2023-01-01

## 2023-01-01 RX ORDER — TRANEXAMIC ACID 650 MG/1
1950 TABLET ORAL ONCE
Status: DISCONTINUED | OUTPATIENT
Start: 2023-01-01 | End: 2023-01-01 | Stop reason: HOSPADM

## 2023-01-01 RX ORDER — NORTRIPTYLINE HCL 25 MG
75 CAPSULE ORAL AT BEDTIME
Status: DISCONTINUED | OUTPATIENT
Start: 2023-01-01 | End: 2023-01-01 | Stop reason: HOSPADM

## 2023-01-01 RX ORDER — DEXAMETHASONE SODIUM PHOSPHATE 4 MG/ML
INJECTION, SOLUTION INTRA-ARTICULAR; INTRALESIONAL; INTRAMUSCULAR; INTRAVENOUS; SOFT TISSUE PRN
Status: DISCONTINUED | OUTPATIENT
Start: 2023-01-01 | End: 2023-01-01

## 2023-01-01 RX ORDER — LABETALOL 100 MG/1
100 TABLET, FILM COATED ORAL AT BEDTIME
Qty: 30 TABLET | Refills: 0 | OUTPATIENT
Start: 2023-01-01

## 2023-01-01 RX ORDER — NALOXONE HYDROCHLORIDE 0.4 MG/ML
0.4 INJECTION, SOLUTION INTRAMUSCULAR; INTRAVENOUS; SUBCUTANEOUS
Status: DISCONTINUED | OUTPATIENT
Start: 2023-01-01 | End: 2023-01-01 | Stop reason: HOSPADM

## 2023-01-01 RX ORDER — SALIVA STIMULANT COMB. NO.3
2 SPRAY, NON-AEROSOL (ML) MUCOUS MEMBRANE PRN
COMMUNITY

## 2023-01-01 RX ORDER — LIDOCAINE 40 MG/G
CREAM TOPICAL
Status: DISCONTINUED | OUTPATIENT
Start: 2023-01-01 | End: 2023-01-01 | Stop reason: HOSPADM

## 2023-01-01 RX ORDER — OXYCODONE HYDROCHLORIDE 5 MG/1
5 TABLET ORAL EVERY 4 HOURS PRN
Status: DISCONTINUED | OUTPATIENT
Start: 2023-01-01 | End: 2023-01-01

## 2023-01-01 RX ORDER — LORAZEPAM 2 MG/ML
0.5 INJECTION INTRAMUSCULAR EVERY 4 HOURS PRN
Status: DISCONTINUED | OUTPATIENT
Start: 2023-01-01 | End: 2023-01-01 | Stop reason: HOSPADM

## 2023-01-01 RX ORDER — LIDOCAINE 4 G/G
2 PATCH TOPICAL
Status: DISCONTINUED | OUTPATIENT
Start: 2023-01-01 | End: 2023-01-01 | Stop reason: HOSPADM

## 2023-01-01 RX ORDER — PROPOFOL 10 MG/ML
INJECTION, EMULSION INTRAVENOUS PRN
Status: DISCONTINUED | OUTPATIENT
Start: 2023-01-01 | End: 2023-01-01

## 2023-01-01 RX ORDER — ONDANSETRON 2 MG/ML
4 INJECTION INTRAMUSCULAR; INTRAVENOUS EVERY 30 MIN PRN
Status: CANCELLED | OUTPATIENT
Start: 2023-01-01

## 2023-01-01 RX ORDER — LEVOTHYROXINE SODIUM 50 UG/1
50 TABLET ORAL AT BEDTIME
Status: DISCONTINUED | OUTPATIENT
Start: 2023-01-01 | End: 2023-01-01 | Stop reason: HOSPADM

## 2023-01-01 RX ORDER — LORAZEPAM 1 MG/1
1 TABLET ORAL
Status: DISCONTINUED | OUTPATIENT
Start: 2023-01-01 | End: 2023-01-01 | Stop reason: HOSPADM

## 2023-01-01 RX ORDER — LANOLIN ALCOHOL/MO/W.PET/CERES
3 CREAM (GRAM) TOPICAL
Status: DISCONTINUED | OUTPATIENT
Start: 2023-01-01 | End: 2023-01-01 | Stop reason: HOSPADM

## 2023-01-01 RX ORDER — FENTANYL CITRATE 50 UG/ML
50 INJECTION, SOLUTION INTRAMUSCULAR; INTRAVENOUS EVERY 5 MIN PRN
Status: DISCONTINUED | OUTPATIENT
Start: 2023-01-01 | End: 2023-01-01 | Stop reason: HOSPADM

## 2023-01-01 RX ORDER — CYCLOBENZAPRINE HCL 5 MG
5 TABLET ORAL 2 TIMES DAILY PRN
COMMUNITY

## 2023-01-01 RX ORDER — LORAZEPAM 1 MG/1
1 TABLET ORAL
Qty: 3 TABLET | Refills: 0 | Status: SHIPPED | OUTPATIENT
Start: 2023-01-01 | End: 2024-01-01

## 2023-01-01 RX ORDER — ASPIRIN 81 MG/1
81 TABLET ORAL DAILY
COMMUNITY
Start: 2023-01-01 | End: 2024-01-01

## 2023-01-01 RX ORDER — OXYCODONE HYDROCHLORIDE 5 MG/1
5-10 TABLET ORAL EVERY 4 HOURS PRN
Status: DISCONTINUED | OUTPATIENT
Start: 2023-01-01 | End: 2023-01-01 | Stop reason: HOSPADM

## 2023-01-01 RX ORDER — HYDROMORPHONE HCL IN WATER/PF 6 MG/30 ML
0.1 PATIENT CONTROLLED ANALGESIA SYRINGE INTRAVENOUS
Status: DISCONTINUED | OUTPATIENT
Start: 2023-01-01 | End: 2023-01-01

## 2023-01-01 RX ORDER — SODIUM PHOSPHATE,MONO-DIBASIC 19G-7G/118
2 ENEMA (ML) RECTAL
COMMUNITY
End: 2024-01-01

## 2023-01-01 RX ORDER — PANTOPRAZOLE SODIUM 40 MG/1
40 TABLET, DELAYED RELEASE ORAL DAILY
Status: DISCONTINUED | OUTPATIENT
Start: 2023-01-01 | End: 2023-01-01 | Stop reason: HOSPADM

## 2023-01-01 RX ORDER — ACETAMINOPHEN 325 MG/1
975 TABLET ORAL EVERY 8 HOURS
COMMUNITY
Start: 2023-01-01 | End: 2024-01-01

## 2023-01-01 RX ORDER — ONDANSETRON 2 MG/ML
4 INJECTION INTRAMUSCULAR; INTRAVENOUS EVERY 6 HOURS PRN
Status: DISCONTINUED | OUTPATIENT
Start: 2023-01-01 | End: 2023-01-01

## 2023-01-01 RX ORDER — KETAMINE HYDROCHLORIDE 10 MG/ML
INJECTION INTRAMUSCULAR; INTRAVENOUS PRN
Status: DISCONTINUED | OUTPATIENT
Start: 2023-01-01 | End: 2023-01-01

## 2023-01-01 RX ORDER — ONDANSETRON 4 MG/1
4 TABLET, ORALLY DISINTEGRATING ORAL EVERY 30 MIN PRN
Status: CANCELLED | OUTPATIENT
Start: 2023-01-01

## 2023-01-01 RX ORDER — LIDOCAINE 50 MG/G
OINTMENT TOPICAL 4 TIMES DAILY
Status: DISCONTINUED | OUTPATIENT
Start: 2023-01-01 | End: 2023-01-01 | Stop reason: HOSPADM

## 2023-01-01 RX ORDER — ACETAMINOPHEN 325 MG/1
650 TABLET ORAL EVERY 4 HOURS PRN
Status: DISCONTINUED | OUTPATIENT
Start: 2023-01-01 | End: 2023-01-01 | Stop reason: HOSPADM

## 2023-01-01 RX ORDER — LABETALOL 100 MG/1
100 TABLET, FILM COATED ORAL 2 TIMES DAILY
Status: DISCONTINUED | OUTPATIENT
Start: 2023-01-01 | End: 2023-01-01 | Stop reason: HOSPADM

## 2023-01-01 RX ORDER — FENTANYL CITRATE 50 UG/ML
25 INJECTION, SOLUTION INTRAMUSCULAR; INTRAVENOUS EVERY 5 MIN PRN
Status: DISCONTINUED | OUTPATIENT
Start: 2023-01-01 | End: 2023-01-01 | Stop reason: HOSPADM

## 2023-01-01 RX ORDER — SEVELAMER CARBONATE 800 MG/1
800-2400 TABLET, FILM COATED ORAL
Status: DISCONTINUED | OUTPATIENT
Start: 2023-01-01 | End: 2023-01-01 | Stop reason: HOSPADM

## 2023-01-01 RX ORDER — ONDANSETRON 2 MG/ML
4 INJECTION INTRAMUSCULAR; INTRAVENOUS EVERY 30 MIN PRN
Status: DISCONTINUED | OUTPATIENT
Start: 2023-01-01 | End: 2023-01-01 | Stop reason: HOSPADM

## 2023-01-01 RX ORDER — NORTRIPTYLINE HCL 25 MG
75 CAPSULE ORAL AT BEDTIME
Status: DISCONTINUED | OUTPATIENT
Start: 2023-01-01 | End: 2023-01-01

## 2023-01-01 RX ORDER — CALCIUM CARBONATE 500 MG/1
1000 TABLET, CHEWABLE ORAL 3 TIMES DAILY PRN
Status: DISCONTINUED | OUTPATIENT
Start: 2023-01-01 | End: 2023-01-01 | Stop reason: HOSPADM

## 2023-01-01 RX ORDER — CYCLOBENZAPRINE HCL 5 MG
5 TABLET ORAL 2 TIMES DAILY PRN
Status: DISCONTINUED | OUTPATIENT
Start: 2023-01-01 | End: 2023-01-01 | Stop reason: HOSPADM

## 2023-01-01 RX ORDER — ONDANSETRON 2 MG/ML
INJECTION INTRAMUSCULAR; INTRAVENOUS PRN
Status: DISCONTINUED | OUTPATIENT
Start: 2023-01-01 | End: 2023-01-01

## 2023-01-01 RX ORDER — TRANEXAMIC ACID 650 MG/1
1950 TABLET ORAL ONCE
Status: CANCELLED | OUTPATIENT
Start: 2023-01-01 | End: 2023-01-01

## 2023-01-01 RX ORDER — ACETAMINOPHEN 325 MG/1
975 TABLET ORAL EVERY 8 HOURS
Status: COMPLETED | OUTPATIENT
Start: 2023-01-01 | End: 2023-01-01

## 2023-01-01 RX ORDER — CYCLOBENZAPRINE HCL 10 MG
10 TABLET ORAL ONCE
Status: COMPLETED | OUTPATIENT
Start: 2023-01-01 | End: 2023-01-01

## 2023-01-01 RX ORDER — BUPIVACAINE HYDROCHLORIDE 2.5 MG/ML
INJECTION, SOLUTION EPIDURAL; INFILTRATION; INTRACAUDAL PRN
Status: DISCONTINUED | OUTPATIENT
Start: 2023-01-01 | End: 2023-01-01

## 2023-01-01 RX ORDER — HYDRALAZINE HYDROCHLORIDE 25 MG/1
25 TABLET, FILM COATED ORAL EVERY 8 HOURS SCHEDULED
Status: DISCONTINUED | OUTPATIENT
Start: 2023-01-01 | End: 2023-01-01

## 2023-01-01 RX ORDER — FENTANYL CITRATE 50 UG/ML
100 INJECTION, SOLUTION INTRAMUSCULAR; INTRAVENOUS
Status: COMPLETED | OUTPATIENT
Start: 2023-01-01 | End: 2023-01-01

## 2023-01-01 RX ORDER — CEFAZOLIN SODIUM 1 G/3ML
1 INJECTION, POWDER, FOR SOLUTION INTRAMUSCULAR; INTRAVENOUS ONCE
Qty: 5 ML | Refills: 0 | Status: DISCONTINUED | OUTPATIENT
Start: 2023-01-01 | End: 2023-01-01

## 2023-01-01 RX ORDER — POLYETHYLENE GLYCOL 3350 17 G/17G
17 POWDER, FOR SOLUTION ORAL DAILY
COMMUNITY
Start: 2023-01-01 | End: 2024-01-01

## 2023-01-01 RX ORDER — HYDRALAZINE HYDROCHLORIDE 25 MG/1
50 TABLET, FILM COATED ORAL EVERY 8 HOURS SCHEDULED
Status: DISCONTINUED | OUTPATIENT
Start: 2023-01-01 | End: 2023-01-01 | Stop reason: HOSPADM

## 2023-01-01 RX ORDER — BISACODYL 10 MG
10 SUPPOSITORY, RECTAL RECTAL DAILY PRN
Status: DISCONTINUED | OUTPATIENT
Start: 2023-01-01 | End: 2023-01-01 | Stop reason: HOSPADM

## 2023-01-01 RX ORDER — HEPARIN SODIUM 5000 [USP'U]/.5ML
5000 INJECTION, SOLUTION INTRAVENOUS; SUBCUTANEOUS EVERY 12 HOURS
Status: DISCONTINUED | OUTPATIENT
Start: 2023-01-01 | End: 2023-01-01 | Stop reason: HOSPADM

## 2023-01-01 RX ORDER — HYDRALAZINE HYDROCHLORIDE 20 MG/ML
10 INJECTION INTRAMUSCULAR; INTRAVENOUS EVERY 6 HOURS PRN
Status: DISCONTINUED | OUTPATIENT
Start: 2023-01-01 | End: 2023-01-01 | Stop reason: HOSPADM

## 2023-01-01 RX ORDER — CEFAZOLIN SODIUM/WATER 2 G/20 ML
2 SYRINGE (ML) INTRAVENOUS SEE ADMIN INSTRUCTIONS
Status: DISCONTINUED | OUTPATIENT
Start: 2023-01-01 | End: 2023-01-01 | Stop reason: HOSPADM

## 2023-01-01 RX ORDER — SODIUM CHLORIDE, SODIUM LACTATE, POTASSIUM CHLORIDE, CALCIUM CHLORIDE 600; 310; 30; 20 MG/100ML; MG/100ML; MG/100ML; MG/100ML
INJECTION, SOLUTION INTRAVENOUS CONTINUOUS
Status: DISCONTINUED | OUTPATIENT
Start: 2023-01-01 | End: 2023-01-01

## 2023-01-01 RX ORDER — ONDANSETRON 2 MG/ML
4 INJECTION INTRAMUSCULAR; INTRAVENOUS EVERY 6 HOURS PRN
Status: DISCONTINUED | OUTPATIENT
Start: 2023-01-01 | End: 2023-01-01 | Stop reason: HOSPADM

## 2023-01-01 RX ORDER — OXYCODONE HYDROCHLORIDE 5 MG/1
10 TABLET ORAL
Status: CANCELLED | OUTPATIENT
Start: 2023-01-01

## 2023-01-01 RX ADMIN — SENNOSIDES AND DOCUSATE SODIUM 2 TABLET: 8.6; 5 TABLET ORAL at 08:28

## 2023-01-01 RX ADMIN — FENTANYL CITRATE 50 MCG: 50 INJECTION, SOLUTION INTRAMUSCULAR; INTRAVENOUS at 18:47

## 2023-01-01 RX ADMIN — Medication 2 SPRAY: at 04:22

## 2023-01-01 RX ADMIN — SENNOSIDES AND DOCUSATE SODIUM 2 TABLET: 8.6; 5 TABLET ORAL at 09:29

## 2023-01-01 RX ADMIN — HYDROMORPHONE HYDROCHLORIDE 0.5 MG: 1 INJECTION, SOLUTION INTRAMUSCULAR; INTRAVENOUS; SUBCUTANEOUS at 08:34

## 2023-01-01 RX ADMIN — LIDOCAINE HYDROCHLORIDE 10 MG: 10 INJECTION, SOLUTION INFILTRATION; PERINEURAL at 19:34

## 2023-01-01 RX ADMIN — SODIUM CHLORIDE 150 ML: 9 INJECTION, SOLUTION INTRAVENOUS at 11:56

## 2023-01-01 RX ADMIN — LABETALOL HYDROCHLORIDE 100 MG: 100 TABLET, FILM COATED ORAL at 21:32

## 2023-01-01 RX ADMIN — OXYCODONE HYDROCHLORIDE 10 MG: 5 TABLET ORAL at 08:37

## 2023-01-01 RX ADMIN — TRANEXAMIC ACID 1 G: 1 INJECTION, SOLUTION INTRAVENOUS at 19:41

## 2023-01-01 RX ADMIN — HEPARIN SODIUM 5000 UNITS: 5000 INJECTION, SOLUTION INTRAVENOUS; SUBCUTANEOUS at 22:38

## 2023-01-01 RX ADMIN — HYDROMORPHONE HYDROCHLORIDE 0.2 MG: 0.2 INJECTION, SOLUTION INTRAMUSCULAR; INTRAVENOUS; SUBCUTANEOUS at 00:54

## 2023-01-01 RX ADMIN — LORAZEPAM 1 MG: 1 TABLET ORAL at 12:10

## 2023-01-01 RX ADMIN — HYDROMORPHONE HYDROCHLORIDE 0.5 MG: 1 INJECTION, SOLUTION INTRAMUSCULAR; INTRAVENOUS; SUBCUTANEOUS at 00:41

## 2023-01-01 RX ADMIN — OXYCODONE HYDROCHLORIDE 10 MG: 5 TABLET ORAL at 20:09

## 2023-01-01 RX ADMIN — HYDROMORPHONE HYDROCHLORIDE 0.5 MG: 1 INJECTION, SOLUTION INTRAMUSCULAR; INTRAVENOUS; SUBCUTANEOUS at 06:02

## 2023-01-01 RX ADMIN — LACTULOSE 10 G: 10 SOLUTION ORAL at 00:51

## 2023-01-01 RX ADMIN — LABETALOL HYDROCHLORIDE 100 MG: 100 TABLET, FILM COATED ORAL at 09:38

## 2023-01-01 RX ADMIN — ROSUVASTATIN CALCIUM 10 MG: 10 TABLET, FILM COATED ORAL at 22:14

## 2023-01-01 RX ADMIN — SODIUM CHLORIDE: 9 INJECTION, SOLUTION INTRAVENOUS at 19:33

## 2023-01-01 RX ADMIN — HYDROMORPHONE HYDROCHLORIDE 0.5 MG: 1 INJECTION, SOLUTION INTRAMUSCULAR; INTRAVENOUS; SUBCUTANEOUS at 10:41

## 2023-01-01 RX ADMIN — CEFAZOLIN 1 G: 1 INJECTION, POWDER, FOR SOLUTION INTRAMUSCULAR; INTRAVENOUS at 16:34

## 2023-01-01 RX ADMIN — SERTRALINE 150 MG: 100 TABLET, FILM COATED ORAL at 22:38

## 2023-01-01 RX ADMIN — SEVELAMER CARBONATE 800 MG: 800 TABLET, FILM COATED ORAL at 08:38

## 2023-01-01 RX ADMIN — LACTULOSE 10 G: 10 SOLUTION ORAL at 20:41

## 2023-01-01 RX ADMIN — Medication 25 MCG: at 09:24

## 2023-01-01 RX ADMIN — SODIUM CHLORIDE 300 ML: 9 INJECTION, SOLUTION INTRAVENOUS at 11:25

## 2023-01-01 RX ADMIN — LABETALOL HYDROCHLORIDE 100 MG: 100 TABLET, FILM COATED ORAL at 20:41

## 2023-01-01 RX ADMIN — SERTRALINE 150 MG: 100 TABLET, FILM COATED ORAL at 21:32

## 2023-01-01 RX ADMIN — LIDOCAINE: 50 OINTMENT TOPICAL at 18:25

## 2023-01-01 RX ADMIN — SENNOSIDES AND DOCUSATE SODIUM 2 TABLET: 8.6; 5 TABLET ORAL at 20:40

## 2023-01-01 RX ADMIN — HYDROMORPHONE HYDROCHLORIDE 0.5 MG: 1 INJECTION, SOLUTION INTRAMUSCULAR; INTRAVENOUS; SUBCUTANEOUS at 22:52

## 2023-01-01 RX ADMIN — SEVELAMER CARBONATE 2400 MG: 800 TABLET, FILM COATED ORAL at 18:05

## 2023-01-01 RX ADMIN — LORAZEPAM 1 MG: 1 TABLET ORAL at 13:05

## 2023-01-01 RX ADMIN — HYDROMORPHONE HYDROCHLORIDE 0.5 MG: 1 INJECTION, SOLUTION INTRAMUSCULAR; INTRAVENOUS; SUBCUTANEOUS at 11:34

## 2023-01-01 RX ADMIN — OXYCODONE HYDROCHLORIDE 10 MG: 5 TABLET ORAL at 05:21

## 2023-01-01 RX ADMIN — HYDRALAZINE HYDROCHLORIDE 10 MG: 10 TABLET ORAL at 09:37

## 2023-01-01 RX ADMIN — CYCLOBENZAPRINE HYDROCHLORIDE 5 MG: 5 TABLET, FILM COATED ORAL at 14:49

## 2023-01-01 RX ADMIN — LORAZEPAM 0.5 MG: 2 INJECTION INTRAMUSCULAR; INTRAVENOUS at 08:28

## 2023-01-01 RX ADMIN — CYCLOBENZAPRINE HYDROCHLORIDE 5 MG: 5 TABLET, FILM COATED ORAL at 17:03

## 2023-01-01 RX ADMIN — HYDRALAZINE HYDROCHLORIDE 10 MG: 20 INJECTION, SOLUTION INTRAMUSCULAR; INTRAVENOUS at 22:59

## 2023-01-01 RX ADMIN — HYDROMORPHONE HYDROCHLORIDE 0.5 MG: 1 INJECTION, SOLUTION INTRAMUSCULAR; INTRAVENOUS; SUBCUTANEOUS at 03:03

## 2023-01-01 RX ADMIN — ASPIRIN 81 MG: 81 TABLET, COATED ORAL at 09:26

## 2023-01-01 RX ADMIN — HYDRALAZINE HYDROCHLORIDE 10 MG: 20 INJECTION, SOLUTION INTRAMUSCULAR; INTRAVENOUS at 00:47

## 2023-01-01 RX ADMIN — KETAMINE HYDROCHLORIDE 20 MG: 10 INJECTION INTRAMUSCULAR; INTRAVENOUS at 19:34

## 2023-01-01 RX ADMIN — SENNOSIDES AND DOCUSATE SODIUM 2 TABLET: 8.6; 5 TABLET ORAL at 22:13

## 2023-01-01 RX ADMIN — HYDROMORPHONE HYDROCHLORIDE 0.5 MG: 1 INJECTION, SOLUTION INTRAMUSCULAR; INTRAVENOUS; SUBCUTANEOUS at 15:00

## 2023-01-01 RX ADMIN — CYCLOBENZAPRINE HYDROCHLORIDE 5 MG: 5 TABLET, FILM COATED ORAL at 01:21

## 2023-01-01 RX ADMIN — HYDRALAZINE HYDROCHLORIDE 25 MG: 25 TABLET ORAL at 05:02

## 2023-01-01 RX ADMIN — LIDOCAINE: 50 OINTMENT TOPICAL at 20:10

## 2023-01-01 RX ADMIN — CALCIUM CARBONATE (ANTACID) CHEW TAB 500 MG 1000 MG: 500 CHEW TAB at 08:49

## 2023-01-01 RX ADMIN — PROPOFOL 30 MCG/KG/MIN: 10 INJECTION, EMULSION INTRAVENOUS at 19:41

## 2023-01-01 RX ADMIN — HYDRALAZINE HYDROCHLORIDE 10 MG: 20 INJECTION, SOLUTION INTRAMUSCULAR; INTRAVENOUS at 00:05

## 2023-01-01 RX ADMIN — LIDOCAINE 2 PATCH: 4 PATCH TOPICAL at 14:51

## 2023-01-01 RX ADMIN — OXYCODONE HYDROCHLORIDE 10 MG: 5 TABLET ORAL at 15:04

## 2023-01-01 RX ADMIN — HEPARIN SODIUM 5000 UNITS: 5000 INJECTION, SOLUTION INTRAVENOUS; SUBCUTANEOUS at 22:14

## 2023-01-01 RX ADMIN — LORAZEPAM 0.5 MG: 2 INJECTION INTRAMUSCULAR; INTRAVENOUS at 07:54

## 2023-01-01 RX ADMIN — OXYCODONE HYDROCHLORIDE 10 MG: 5 TABLET ORAL at 08:28

## 2023-01-01 RX ADMIN — SEVELAMER CARBONATE 2400 MG: 800 TABLET, FILM COATED ORAL at 09:36

## 2023-01-01 RX ADMIN — OXYCODONE HYDROCHLORIDE 10 MG: 5 TABLET ORAL at 09:35

## 2023-01-01 RX ADMIN — LABETALOL HYDROCHLORIDE 100 MG: 100 TABLET, FILM COATED ORAL at 20:09

## 2023-01-01 RX ADMIN — OXYCODONE HYDROCHLORIDE 10 MG: 5 TABLET ORAL at 00:13

## 2023-01-01 RX ADMIN — LIDOCAINE: 50 OINTMENT TOPICAL at 14:51

## 2023-01-01 RX ADMIN — PANTOPRAZOLE SODIUM 40 MG: 40 TABLET, DELAYED RELEASE ORAL at 09:37

## 2023-01-01 RX ADMIN — OXYCODONE HYDROCHLORIDE 10 MG: 5 TABLET ORAL at 11:41

## 2023-01-01 RX ADMIN — SERTRALINE 150 MG: 100 TABLET, FILM COATED ORAL at 22:13

## 2023-01-01 RX ADMIN — NORTRIPTYLINE HYDROCHLORIDE 75 MG: 25 CAPSULE ORAL at 21:32

## 2023-01-01 RX ADMIN — LABETALOL HYDROCHLORIDE 100 MG: 100 TABLET, FILM COATED ORAL at 08:38

## 2023-01-01 RX ADMIN — SERTRALINE 150 MG: 100 TABLET, FILM COATED ORAL at 22:50

## 2023-01-01 RX ADMIN — LABETALOL HYDROCHLORIDE 100 MG: 100 TABLET, FILM COATED ORAL at 15:31

## 2023-01-01 RX ADMIN — LIDOCAINE: 50 OINTMENT TOPICAL at 08:41

## 2023-01-01 RX ADMIN — NORTRIPTYLINE HYDROCHLORIDE 75 MG: 25 CAPSULE ORAL at 20:42

## 2023-01-01 RX ADMIN — SENNOSIDES AND DOCUSATE SODIUM 2 TABLET: 8.6; 5 TABLET ORAL at 21:33

## 2023-01-01 RX ADMIN — CYCLOBENZAPRINE 10 MG: 10 TABLET, FILM COATED ORAL at 16:43

## 2023-01-01 RX ADMIN — NORTRIPTYLINE HYDROCHLORIDE 75 MG: 25 CAPSULE ORAL at 00:54

## 2023-01-01 RX ADMIN — LEVOTHYROXINE SODIUM 50 MCG: 0.05 TABLET ORAL at 22:38

## 2023-01-01 RX ADMIN — PANTOPRAZOLE SODIUM 40 MG: 40 TABLET, DELAYED RELEASE ORAL at 09:24

## 2023-01-01 RX ADMIN — HYDROMORPHONE HYDROCHLORIDE 0.5 MG: 1 INJECTION, SOLUTION INTRAMUSCULAR; INTRAVENOUS; SUBCUTANEOUS at 06:17

## 2023-01-01 RX ADMIN — PANTOPRAZOLE SODIUM 40 MG: 40 TABLET, DELAYED RELEASE ORAL at 08:37

## 2023-01-01 RX ADMIN — SODIUM CHLORIDE, POTASSIUM CHLORIDE, SODIUM LACTATE AND CALCIUM CHLORIDE: 600; 310; 30; 20 INJECTION, SOLUTION INTRAVENOUS at 22:45

## 2023-01-01 RX ADMIN — LACTULOSE 10 G: 10 SOLUTION ORAL at 14:49

## 2023-01-01 RX ADMIN — Medication 25 MCG: at 08:37

## 2023-01-01 RX ADMIN — LEVOTHYROXINE SODIUM 50 MCG: 0.05 TABLET ORAL at 22:12

## 2023-01-01 RX ADMIN — HYDROMORPHONE HYDROCHLORIDE 0.5 MG: 1 INJECTION, SOLUTION INTRAMUSCULAR; INTRAVENOUS; SUBCUTANEOUS at 12:49

## 2023-01-01 RX ADMIN — SENNOSIDES AND DOCUSATE SODIUM 2 TABLET: 8.6; 5 TABLET ORAL at 20:09

## 2023-01-01 RX ADMIN — Medication 2 G: at 19:32

## 2023-01-01 RX ADMIN — LIDOCAINE 2 PATCH: 4 PATCH TOPICAL at 09:39

## 2023-01-01 RX ADMIN — LABETALOL HYDROCHLORIDE 100 MG: 100 TABLET, FILM COATED ORAL at 22:13

## 2023-01-01 RX ADMIN — FENTANYL CITRATE 50 MCG: 50 INJECTION, SOLUTION INTRAMUSCULAR; INTRAVENOUS at 19:11

## 2023-01-01 RX ADMIN — Medication 25 MCG: at 09:37

## 2023-01-01 RX ADMIN — Medication 25 MCG: at 08:29

## 2023-01-01 RX ADMIN — CYCLOBENZAPRINE HYDROCHLORIDE 5 MG: 5 TABLET, FILM COATED ORAL at 02:28

## 2023-01-01 RX ADMIN — BUPIVACAINE HYDROCHLORIDE 2.5 ML: 5 INJECTION, SOLUTION EPIDURAL; INTRACAUDAL; PERINEURAL at 19:45

## 2023-01-01 RX ADMIN — HEPARIN SODIUM 5000 UNITS: 5000 INJECTION, SOLUTION INTRAVENOUS; SUBCUTANEOUS at 13:05

## 2023-01-01 RX ADMIN — LORAZEPAM 0.5 MG: 2 INJECTION INTRAMUSCULAR; INTRAVENOUS at 22:38

## 2023-01-01 RX ADMIN — LEVOTHYROXINE SODIUM 50 MCG: 0.05 TABLET ORAL at 21:33

## 2023-01-01 RX ADMIN — SEVELAMER CARBONATE 2400 MG: 800 TABLET, FILM COATED ORAL at 09:29

## 2023-01-01 RX ADMIN — SEVELAMER CARBONATE 2400 MG: 800 TABLET, FILM COATED ORAL at 16:34

## 2023-01-01 RX ADMIN — NORTRIPTYLINE HYDROCHLORIDE 75 MG: 25 CAPSULE ORAL at 20:09

## 2023-01-01 RX ADMIN — LABETALOL HYDROCHLORIDE 100 MG: 100 TABLET, FILM COATED ORAL at 09:24

## 2023-01-01 RX ADMIN — LABETALOL HYDROCHLORIDE 100 MG: 100 TABLET, FILM COATED ORAL at 08:28

## 2023-01-01 RX ADMIN — SEVELAMER CARBONATE 2400 MG: 800 TABLET, FILM COATED ORAL at 18:25

## 2023-01-01 RX ADMIN — OXYCODONE HYDROCHLORIDE 10 MG: 5 TABLET ORAL at 15:39

## 2023-01-01 RX ADMIN — SERTRALINE 150 MG: 100 TABLET, FILM COATED ORAL at 00:27

## 2023-01-01 RX ADMIN — HYDROMORPHONE HYDROCHLORIDE 0.5 MG: 1 INJECTION, SOLUTION INTRAMUSCULAR; INTRAVENOUS; SUBCUTANEOUS at 02:42

## 2023-01-01 RX ADMIN — LEVOTHYROXINE SODIUM 50 MCG: 0.05 TABLET ORAL at 22:50

## 2023-01-01 RX ADMIN — SENNOSIDES AND DOCUSATE SODIUM 2 TABLET: 8.6; 5 TABLET ORAL at 08:37

## 2023-01-01 RX ADMIN — ONDANSETRON 4 MG: 2 INJECTION INTRAMUSCULAR; INTRAVENOUS at 19:34

## 2023-01-01 RX ADMIN — CALCIUM CARBONATE (ANTACID) CHEW TAB 500 MG 1000 MG: 500 CHEW TAB at 00:39

## 2023-01-01 RX ADMIN — HYDROMORPHONE HYDROCHLORIDE 0.5 MG: 1 INJECTION, SOLUTION INTRAMUSCULAR; INTRAVENOUS; SUBCUTANEOUS at 08:13

## 2023-01-01 RX ADMIN — SENNOSIDES AND DOCUSATE SODIUM 2 TABLET: 8.6; 5 TABLET ORAL at 09:37

## 2023-01-01 RX ADMIN — SEVELAMER CARBONATE 2400 MG: 800 TABLET, FILM COATED ORAL at 08:28

## 2023-01-01 RX ADMIN — ACETAMINOPHEN 975 MG: 325 TABLET ORAL at 13:05

## 2023-01-01 RX ADMIN — HYDROMORPHONE HYDROCHLORIDE 0.5 MG: 1 INJECTION, SOLUTION INTRAMUSCULAR; INTRAVENOUS; SUBCUTANEOUS at 12:41

## 2023-01-01 RX ADMIN — ROSUVASTATIN CALCIUM 10 MG: 10 TABLET, FILM COATED ORAL at 20:40

## 2023-01-01 RX ADMIN — OXYCODONE HYDROCHLORIDE 5 MG: 5 TABLET ORAL at 22:38

## 2023-01-01 RX ADMIN — LORAZEPAM 1 MG: 1 TABLET ORAL at 12:49

## 2023-01-01 RX ADMIN — LIDOCAINE: 50 OINTMENT TOPICAL at 09:42

## 2023-01-01 RX ADMIN — HYDROMORPHONE HYDROCHLORIDE 0.5 MG: 1 INJECTION, SOLUTION INTRAMUSCULAR; INTRAVENOUS; SUBCUTANEOUS at 03:59

## 2023-01-01 RX ADMIN — HYDRALAZINE HYDROCHLORIDE 50 MG: 50 TABLET, FILM COATED ORAL at 05:47

## 2023-01-01 RX ADMIN — HYDROMORPHONE HYDROCHLORIDE 0.5 MG: 1 INJECTION, SOLUTION INTRAMUSCULAR; INTRAVENOUS; SUBCUTANEOUS at 05:47

## 2023-01-01 RX ADMIN — NORTRIPTYLINE HYDROCHLORIDE 75 MG: 25 CAPSULE ORAL at 22:13

## 2023-01-01 RX ADMIN — OXYCODONE HYDROCHLORIDE 5 MG: 5 TABLET ORAL at 21:38

## 2023-01-01 RX ADMIN — SODIUM CHLORIDE 250 ML: 9 INJECTION, SOLUTION INTRAVENOUS at 11:25

## 2023-01-01 RX ADMIN — PANTOPRAZOLE SODIUM 40 MG: 40 TABLET, DELAYED RELEASE ORAL at 08:28

## 2023-01-01 RX ADMIN — OXYCODONE HYDROCHLORIDE 10 MG: 5 TABLET ORAL at 00:55

## 2023-01-01 RX ADMIN — OXYCODONE HYDROCHLORIDE 10 MG: 5 TABLET ORAL at 03:22

## 2023-01-01 RX ADMIN — LIDOCAINE: 50 OINTMENT TOPICAL at 08:29

## 2023-01-01 RX ADMIN — HYDRALAZINE HYDROCHLORIDE 50 MG: 50 TABLET, FILM COATED ORAL at 13:08

## 2023-01-01 RX ADMIN — HYDRALAZINE HYDROCHLORIDE 10 MG: 10 TABLET ORAL at 15:43

## 2023-01-01 RX ADMIN — HYDRALAZINE HYDROCHLORIDE 50 MG: 50 TABLET, FILM COATED ORAL at 22:38

## 2023-01-01 RX ADMIN — OXYCODONE HYDROCHLORIDE 10 MG: 5 TABLET ORAL at 18:25

## 2023-01-01 RX ADMIN — HYDROMORPHONE HYDROCHLORIDE 0.5 MG: 1 INJECTION, SOLUTION INTRAMUSCULAR; INTRAVENOUS; SUBCUTANEOUS at 17:59

## 2023-01-01 RX ADMIN — PROPOFOL 20 MG: 10 INJECTION, EMULSION INTRAVENOUS at 19:34

## 2023-01-01 RX ADMIN — DEXAMETHASONE SODIUM PHOSPHATE 4 MG: 4 INJECTION, SOLUTION INTRA-ARTICULAR; INTRALESIONAL; INTRAMUSCULAR; INTRAVENOUS; SOFT TISSUE at 19:47

## 2023-01-01 RX ADMIN — OXYCODONE HYDROCHLORIDE 5 MG: 5 TABLET ORAL at 16:43

## 2023-01-01 RX ADMIN — MIDAZOLAM HYDROCHLORIDE 1 MG: 1 INJECTION, SOLUTION INTRAMUSCULAR; INTRAVENOUS at 19:12

## 2023-01-01 RX ADMIN — HEPARIN SODIUM 5000 UNITS: 5000 INJECTION, SOLUTION INTRAVENOUS; SUBCUTANEOUS at 11:36

## 2023-01-01 RX ADMIN — LORAZEPAM 1 MG: 1 TABLET ORAL at 15:31

## 2023-01-01 RX ADMIN — LIDOCAINE: 50 OINTMENT TOPICAL at 17:05

## 2023-01-01 RX ADMIN — HYDRALAZINE HYDROCHLORIDE 10 MG: 10 TABLET ORAL at 20:09

## 2023-01-01 RX ADMIN — LIDOCAINE: 50 OINTMENT TOPICAL at 20:41

## 2023-01-01 RX ADMIN — HYDRALAZINE HYDROCHLORIDE 10 MG: 10 TABLET ORAL at 14:49

## 2023-01-01 RX ADMIN — LORAZEPAM 1 MG: 1 TABLET ORAL at 11:36

## 2023-01-01 RX ADMIN — SODIUM CHLORIDE: 9 INJECTION, SOLUTION INTRAVENOUS at 19:20

## 2023-01-01 RX ADMIN — BUPIVACAINE HYDROCHLORIDE 25 ML: 2.5 INJECTION, SOLUTION EPIDURAL; INFILTRATION; INTRACAUDAL at 19:13

## 2023-01-01 RX ADMIN — LORAZEPAM 0.5 MG: 2 INJECTION INTRAMUSCULAR; INTRAVENOUS at 03:59

## 2023-01-01 RX ADMIN — SEVELAMER CARBONATE 2400 MG: 800 TABLET, FILM COATED ORAL at 13:06

## 2023-01-01 RX ADMIN — CYCLOBENZAPRINE HYDROCHLORIDE 5 MG: 5 TABLET, FILM COATED ORAL at 08:39

## 2023-01-01 RX ADMIN — HYDROMORPHONE HYDROCHLORIDE 0.5 MG: 1 INJECTION, SOLUTION INTRAMUSCULAR; INTRAVENOUS; SUBCUTANEOUS at 13:58

## 2023-01-01 RX ADMIN — SEVELAMER CARBONATE 2400 MG: 800 TABLET, FILM COATED ORAL at 16:14

## 2023-01-01 RX ADMIN — LIDOCAINE: 50 OINTMENT TOPICAL at 12:45

## 2023-01-01 RX ADMIN — LACTULOSE 10 G: 10 SOLUTION ORAL at 20:19

## 2023-01-01 RX ADMIN — HYDROMORPHONE HYDROCHLORIDE 0.5 MG: 1 INJECTION, SOLUTION INTRAMUSCULAR; INTRAVENOUS; SUBCUTANEOUS at 06:54

## 2023-01-01 ASSESSMENT — ACTIVITIES OF DAILY LIVING (ADL)
ADLS_ACUITY_SCORE: 22
ADLS_ACUITY_SCORE: 39
ADLS_ACUITY_SCORE: 25
ADLS_ACUITY_SCORE: 19
ADLS_ACUITY_SCORE: 25
ADLS_ACUITY_SCORE: 22
ADLS_ACUITY_SCORE: 39
ADLS_ACUITY_SCORE: 25
ADLS_ACUITY_SCORE: 25
ADLS_ACUITY_SCORE: 22
ADLS_ACUITY_SCORE: 21
ADLS_ACUITY_SCORE: 19
ADLS_ACUITY_SCORE: 39
ADLS_ACUITY_SCORE: 25
DEPENDENT_IADLS:: INDEPENDENT
ADLS_ACUITY_SCORE: 39
ADLS_ACUITY_SCORE: 19
ADLS_ACUITY_SCORE: 19
ADLS_ACUITY_SCORE: 39
ADLS_ACUITY_SCORE: 19
ADLS_ACUITY_SCORE: 21
ADLS_ACUITY_SCORE: 19
ADLS_ACUITY_SCORE: 25
ADLS_ACUITY_SCORE: 39
ADLS_ACUITY_SCORE: 25
ADLS_ACUITY_SCORE: 19
ADLS_ACUITY_SCORE: 39
ADLS_ACUITY_SCORE: 21
ADLS_ACUITY_SCORE: 19
ADLS_ACUITY_SCORE: 19
ADLS_ACUITY_SCORE: 21
ADLS_ACUITY_SCORE: 19
ADLS_ACUITY_SCORE: 25
ADLS_ACUITY_SCORE: 39
ADLS_ACUITY_SCORE: 35
ADLS_ACUITY_SCORE: 25
ADLS_ACUITY_SCORE: 19
ADLS_ACUITY_SCORE: 19
ADLS_ACUITY_SCORE: 22
ADLS_ACUITY_SCORE: 39
ADLS_ACUITY_SCORE: 19
ADLS_ACUITY_SCORE: 39
ADLS_ACUITY_SCORE: 25
ADLS_ACUITY_SCORE: 35
ADLS_ACUITY_SCORE: 25
ADLS_ACUITY_SCORE: 25
ADLS_ACUITY_SCORE: 39
ADLS_ACUITY_SCORE: 21
ADLS_ACUITY_SCORE: 39
ADLS_ACUITY_SCORE: 21
ADLS_ACUITY_SCORE: 25
ADLS_ACUITY_SCORE: 25
ADLS_ACUITY_SCORE: 39
ADLS_ACUITY_SCORE: 22
ADLS_ACUITY_SCORE: 19
ADLS_ACUITY_SCORE: 22
ADLS_ACUITY_SCORE: 19
ADLS_ACUITY_SCORE: 19
ADLS_ACUITY_SCORE: 35
ADLS_ACUITY_SCORE: 19

## 2023-01-01 ASSESSMENT — PAIN SCALES - GENERAL: PAINLEVEL: NO PAIN (0)

## 2023-01-06 NOTE — TELEPHONE ENCOUNTER
FUTURE VISIT INFORMATION      SURGERY INFORMATION:    Date: TBD- Bilateral Nephrectomy    Consult: ov     RECORDS REQUESTED FROM:       Primary Care Provider: [facility]    Pertinent Medical History: hypertension    Most recent EKG+ Tracin21    Most recent ECHO: 21    Most recent Cardiac Stress Test: 21    Most recent Coronary Angiogram:    Most recent PFT's: 21

## 2023-01-11 ENCOUNTER — DOCUMENTATION ONLY (OUTPATIENT)
Dept: TRANSPLANT | Facility: CLINIC | Age: 68
End: 2023-01-11

## 2023-01-11 ENCOUNTER — PREP FOR PROCEDURE (OUTPATIENT)
Dept: TRANSPLANT | Facility: CLINIC | Age: 68
End: 2023-01-11

## 2023-01-11 DIAGNOSIS — Q61.3 PKD (POLYCYSTIC KIDNEY DISEASE): Primary | ICD-10-CM

## 2023-01-11 DIAGNOSIS — N18.6 ESRD (END STAGE RENAL DISEASE) (H): ICD-10-CM

## 2023-01-11 DIAGNOSIS — Z76.82 ORGAN TRANSPLANT CANDIDATE: ICD-10-CM

## 2023-01-17 RX ORDER — LABETALOL 300 MG/1
600 TABLET, FILM COATED ORAL AT BEDTIME
Status: ON HOLD | COMMUNITY
End: 2023-07-19

## 2023-01-17 RX ORDER — CALCITRIOL 0.5 UG/1
0.5 CAPSULE, LIQUID FILLED ORAL
COMMUNITY
End: 2023-08-02

## 2023-01-18 ENCOUNTER — PRE VISIT (OUTPATIENT)
Dept: SURGERY | Facility: CLINIC | Age: 68
End: 2023-01-18

## 2023-01-18 ENCOUNTER — VIRTUAL VISIT (OUTPATIENT)
Dept: SURGERY | Facility: CLINIC | Age: 68
End: 2023-01-18
Payer: COMMERCIAL

## 2023-01-18 ENCOUNTER — ANESTHESIA EVENT (OUTPATIENT)
Dept: SURGERY | Facility: CLINIC | Age: 68
DRG: 660 | End: 2023-01-18
Payer: COMMERCIAL

## 2023-01-18 DIAGNOSIS — Q61.3 PKD (POLYCYSTIC KIDNEY DISEASE): ICD-10-CM

## 2023-01-18 DIAGNOSIS — Z01.818 PRE-OP EXAMINATION: Primary | ICD-10-CM

## 2023-01-18 PROCEDURE — 99204 OFFICE O/P NEW MOD 45 MIN: CPT | Mod: 95 | Performed by: PHYSICIAN ASSISTANT

## 2023-01-18 ASSESSMENT — ENCOUNTER SYMPTOMS: SEIZURES: 0

## 2023-01-18 ASSESSMENT — LIFESTYLE VARIABLES: TOBACCO_USE: 1

## 2023-01-18 NOTE — PROGRESS NOTES
Alvarez is a 67 year old who is being evaluated via a billable video visit.      How would you like to obtain your AVS? Kassy      Subjective   Alvarez is a 67 year old; presenting for the following health issues:  Pre-Op Exam (/)      HPI         Review of Systems     Physical Exam         EMILIANA Heller LPN

## 2023-01-18 NOTE — H&P
Pre-Operative H & P     CC:  Preoperative exam to assess for increased cardiopulmonary risk while undergoing surgery and anesthesia.    Date of Encounter: 1/18/2023  Primary Care Physician:  Ramos Lowry     Reason for visit:   Encounter Diagnoses   Name Primary?     Pre-op examination Yes     PKD (polycystic kidney disease)        HPI  Serene Tipton is a 67 year old female who presents for pre-operative H & P in preparation for  Procedure Information     Case: [id number] Date/Time: 02/01/23 0730    Procedure: bilateral native nephrectomy (Bilateral: Flank)    Anesthesia type: General    Diagnosis: PKD (polycystic kidney disease) [Q61.3]    Pre-op diagnosis: PKD (polycystic kidney disease) [Q61.3]    Location:  OR [address] [place] [address] OR    Providers: Alana Giang MD          The patient is a 67-year-old woman with a past medical history significant for hypertension, hyperlipidemia, former smoker, anemia, 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  The patient is being worked up to be listed for transplant but is continue to have abdominal constipation secondary to her polycystic kidneys.  She was seen by Dr. Giang on 12/19/2022 and counseled for the procedure as above    History is obtained from the patient and chart review    Hx of abnormal bleeding or anti-platelet use: none    Menstrual history: No LMP recorded. Patient is postmenopausal.:      Past Medical History  Past Medical History:   Diagnosis Date     Anemia in chronic kidney disease      Anxiety      Arthritis      Chronic low back pain      CKD (chronic kidney disease)      Depressive disorder 2013     Disease of thyroid gland      End stage renal disease (H)      GERD (gastroesophageal reflux disease)      Hepatic lesion      Hyperlipidemia      Hypertension       Neuromuscular disorder (H)      Osteoporosis      PKD (polycystic kidney disease) 09/01/1990     PTSD (post-traumatic stress disorder)      Tobacco abuse      Vitamin D deficiency        Past Surgical History  Past Surgical History:   Procedure Laterality Date     BACK SURGERY      spinal fusion w/hardware     BIOPSY Left 09/01/1990    Breast     BREAST LUMPECTOMY, RT/LT Left     Lumpectomy     CYST REMOVAL Right     rt wrist     CYSTECTOMY PILONIDAL  1981     EYE SURGERY  2008    lasik     FORMATION ARTERIOVENOUS FISTULA    07/28/2020     FRACTURE SURGERY       ORTHOPEDIC SURGERY Right 2005    Shoulder     OTHER SURGICAL HISTORY      carpal tunnel repair     SPINE SURGERY         Prior to Admission Medications  Current Outpatient Medications   Medication Sig Dispense Refill     amLODIPine (NORVASC) 5 MG tablet Take 5 mg by mouth At Bedtime       B Complex-C (SUPER B COMPLEX PO) Take 1 tablet by mouth daily       calcitRIOL (ROCALTROL) 0.5 MCG capsule Take 0.5 mcg by mouth three times a week Given at HD.       cholecalciferol (VITAMIN D3) 25 mcg (1000 units) capsule Take 1 capsule by mouth daily       cloNIDine (CATAPRES) 0.1 MG tablet Take 0.2 mg by mouth At Bedtime       Epoetin Adam (EPOGEN IJ) Inject 1,000 Units into the vein three times a week At dialysis.       furosemide (LASIX) 20 MG tablet Take 40 mg by mouth daily       Iron Sucrose (VENOFER IV) Inject 100 mg into the vein once a week At dialysis       labetalol (NORMODYNE) 300 MG tablet Take 450 mg (1.5 tablet) in the mid morning and take 600 mg (2 tablets) at bedtime.       levothyroxine (SYNTHROID/LEVOTHROID) 50 MCG tablet Take 50 mcg by mouth At Bedtime Takes in the middle of the night.       lisinopril (ZESTRIL) 40 MG tablet Take 20 mg by mouth 2 times daily       LORazepam (ATIVAN) 1 MG tablet Take 0.5-1 mg by mouth daily as needed       nortriptyline (PAMELOR) 75 MG capsule Take 75 mg by mouth daily       omeprazole (PRILOSEC) 20 MG DR capsule Take  20 mg by mouth daily       oxyCODONE IR (ROXICODONE) 10 MG tablet Take 10 mg by mouth every 4 hours as needed       rosuvastatin (CRESTOR) 10 MG tablet Take 10 mg by mouth At Bedtime       sertraline (ZOLOFT) 100 MG tablet Take 200 mg by mouth At Bedtime       sevelamer carbonate (RENVELA) 800 MG tablet Take 800 mg by mouth 3 times daily as needed (with meals when eating) When eating       Krill Oil 1000 MG CAPS 1 (Patient not taking: Reported on 2023)       Misc Natural Products (GLUCOSAMINE CHOND MSM FORMULA) TABS as directed (Patient not taking: Reported on 2023)       vitamin E 400 units TABS Take 800 mg by mouth (Patient not taking: Reported on 2023)         Allergies  Allergies   Allergen Reactions     Penicillins Other (See Comments) and Shortness Of Breath     Breathing problem.  breathing       Carvedilol GI Disturbance     Nausea and vomiting     Ciprofloxacin Muscle Pain (Myalgia)     Morphine Other (See Comments)     Vomiting     No Clinical Screening - See Comments      PN: LW CM1: Contrast Intercapsular - Nonionic Reaction :     Nsaids Other (See Comments)     Sulfa Drugs Itching     Sulfamethoxazole-Trimethoprim      Other reaction(s): itching     Levofloxacin Muscle Pain (Myalgia) and Rash       Social History  Social History     Socioeconomic History     Marital status: Single     Spouse name: Not on file     Number of children: Not on file     Years of education: Not on file     Highest education level: Not on file   Occupational History     Not on file   Tobacco Use     Smoking status: Former     Packs/day: 0.50     Years: 14.00     Pack years: 7.00     Types: Cigarettes     Quit date: 2021     Years since quittin.6     Smokeless tobacco: Never   Substance and Sexual Activity     Alcohol use: Not Currently     Drug use: Never     Sexual activity: Not on file   Other Topics Concern     Parent/sibling w/ CABG, MI or angioplasty before 65F 55M? Not Asked   Social History  Narrative     Not on file     Social Determinants of Health     Financial Resource Strain: Not on file   Food Insecurity: Not on file   Transportation Needs: Not on file   Physical Activity: Not on file   Stress: Not on file   Social Connections: Not on file   Intimate Partner Violence: Not on file   Housing Stability: Not on file       Family History  Family History   Problem Relation Age of Onset     Polycystic Kidney Diease Mother      Polycystic Kidney Diease Sister      Cardiac Sudden Death Sister 52     Polycystic Kidney Diease Maternal Grandmother      Heart Disease Maternal Grandfather      Hypertension Mother      Kidney Disease Mother      Cerebrovascular Disease Mother      Chronic Obstructive Pulmonary Disease Father      Multiple Sclerosis Father      Hypertension Sister      Kidney Disease Sister      Hypertension Maternal Grandmother      Kidney Disease Maternal Grandmother      Cerebrovascular Disease Maternal Grandmother      Hyperlipidemia Paternal Grandmother      Cerebrovascular Disease Paternal Grandmother      Coronary Artery Disease Paternal Grandfather        Review of Systems  The complete review of systems is negative other than noted in the HPI or here.   Anesthesia Evaluation   Pt has had prior anesthetic. Type: General.        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  o LVEDv 126ml. LVESv 38ml. LV EF70%.    ECG Reviewed:  Date: 7/28/20 Results:  Sinus rhythm, possible left atrial enlargement   Cath:  Date: Results:      METS/Exercise Tolerance: 3 - Able to walk 1-2  blocks without stopping    Hematologic:     (+) anemia,     Musculoskeletal:       GI/Hepatic: Comment: Hepatic lesion    Abdominal pain/ constipation     (+) GERD, Asymptomatic on medication,     Renal/Genitourinary:     (+) renal disease, type: ESRD, Pt requires dialysis, type: Hemodialysis,     Endo:     (+) thyroid problem, hypothyroidism,     Psychiatric/Substance Use:     (+) psychiatric history anxiety and other (comment) (PTSD) H/O chronic opiod use .     Infectious Disease:  - neg infectious disease ROS     Malignancy:  - neg malignancy ROS     Other:  - neg other ROS    (+) , H/O Chronic Pain,        Virtual visit -  No vitals were obtained    Physical Exam  Constitutional: Awake, alert, cooperative, no apparent distress, and appears stated age.  Eyes: Pupils equal  HENT: Normocephalic  Respiratory: non labored breathing   Neurologic: Awake, alert, oriented to name, place and time.   Neuropsychiatric: Calm, cooperative. Normal affect.      Prior Labs/Diagnostic Studies   All labs and imaging personally reviewed    Latest Reference Range & Units 08/18/22 17:31   WBC 4.0 - 11.0 10e3/uL 8.5   Hemoglobin 11.7 - 15.7 g/dL 11.2 (L)   Hematocrit 35.0 - 47.0 % 35.6   Platelet Count 150 - 450 10e3/uL 287   RBC Count 3.80 - 5.20 10e6/uL 3.31 (L)   MCV 78 - 100 fL 108 (H)   MCH 26.5 - 33.0 pg 33.8 (H)   MCHC 31.5 - 36.5 g/dL 31.5   RDW 10.0 - 15.0 % 13.2   % Neutrophils % 67   % Lymphocytes % 17   % Monocytes % 13   % Eosinophils % 2   % Basophils % 1   Absolute Basophils 0.0 - 0.2 10e3/uL 0.0   Absolute Eosinophils 0.0 - 0.7 10e3/uL 0.2   Absolute Immature Granulocytes <=0.4 10e3/uL 0.0   Absolute Lymphocytes 0.8 - 5.3 10e3/uL 1.5   Absolute Monocytes 0.0 - 1.3 10e3/uL 1.1   % Immature Granulocytes % 0   Absolute Neutrophils 1.6 - 8.3 10e3/uL 5.7   Absolute NRBCs 10e3/uL 0.0   NRBCs per 100 WBC <1 /100 0      Latest Reference Range & Units 05/19/21 15:07   Sodium 133 - 144 mmol/L 136   Potassium 3.4 - 5.3 mmol/L 3.9    Chloride 94 - 109 mmol/L 98   Carbon Dioxide 20 - 32 mmol/L 31   Urea Nitrogen 7 - 30 mg/dL 27   Creatinine 0.52 - 1.04 mg/dL 3.43 (H)   GFR Estimate >60 mL/min/1.73_m2 13 (L)   GFR Estimate If Black >60 mL/min/1.73_m2 16 (L)   Calcium 8.5 - 10.1 mg/dL 9.5   Anion Gap 3 - 14 mmol/L 7   Phosphorus 2.5 - 4.5 mg/dL 2.8   Albumin 3.4 - 5.0 g/dL 4.2   Protein Total 6.8 - 8.8 g/dL 8.3   Alkaline Phosphatase 40 - 150 U/L 122   ALT 0 - 50 U/L 18   AST 0 - 45 U/L 11   Bilirubin Total 0.2 - 1.3 mg/dL 0.5       EKG 5/19/21  Sinus rhythm, possible left atrial enlargement     Echo 5/19/21  Interpretation Summary  Global and regional left ventricular function is normal with an EF of 60-65%.  Mild concentric wall thickening consistent with left ventricular hypertrophy  is present.  Right ventricular function, chamber size, wall motion, and thickness are  normal.  No significant valvular abnormalities were noted.  Previous study not available for comparison.    NM stress test 6/7/21  Result Text        The nuclear stress test is negative for inducible myocardial ischemia or infarction.     LVEDv 126ml. LVESv 38ml. LV EF70%.    Brain MRA 5/19/21  Impression: Two tiny saccular outpouching in the bilateral cavernous  internal carotid arteries, may represent aneurysms versus dilated  infundibulum.      PFT Latest Ref Rng & Units 5/19/2021   FVC L 2.67   FEV1 L 1.94   FVC% % 100   FEV1% % 92         The patient's records and results personally reviewed by this provider.     Outside records reviewed from: Care Everywhere      Assessment      Serene Tipton is a 67 year old female seen as a PAC referral for risk assessment and optimization for anesthesia.    Plan/Recommendations  Pt will be optimized for the proposed procedure.  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  - Chronic Pain  On chronic opiates, morphine equivilant = 90 MME/Day   Please access the PAC pharmacist's note for full details.  -Post Op delirium risk factors:  High co-morbid index    ENT  - No current airway concerns.  Will need to be reassessed day of surgery.  Mallampati: Unable to assess  TM: Unable to assess    CARDIAC  - Hypertension  Well controlled  - Hyperlipidemia  Well controlled on home regimen  - METS (Metabolic Equivalents)  Patient CANNOT perform 4 METS exercise without symptoms            Total Score: 1    Functional Capacity: Unable to complete 4 METS      RCRI-Moderate risk: Class 3  6.6% complication rate            Total Score: 2    RCRI: High Risk Surgery    RCRI: Elevated Creatinine    ~The patient is not active due to her chronic pain and was seen by cardiology on 5/20/2021.  As the patient had no symptoms but did have some cardiac risk factors she underwent a stress test on 6/7/2021 which was negative for ischemia.  Dr. Samuel felt that the patient was fine to proceed with future transplant.   ~The patient does report that since her previous cardiac testing she has undergone some radiofrequency ablation which has significantly helped her pain and she is more active than she was prior.  She denies any new cardiac symptoms.    PULMONARY  - Obstructive Sleep Apnea  No current risk of obstructive sleep apnea   INDIGO Low Risk            Total Score: 2    INDIGO: Hypertension    INDIGO: Over 50 ys old      - Denies asthma or inhaler use  - Tobacco History      History   Smoking Status     Former     Packs/day: 0.50     Years: 14.00     Types: Cigarettes, Cigarettes     Quit date: 6/13/2021   Smokeless Tobacco     Never       GI  - GERD  Controlled on medications: Proton Pump Inhibitor  PONV High Risk  Total Score: 3           1 AN PONV: Pt is Female    1 AN PONV: Patient is not  "a current smoker    1 AN PONV: Intended Post Op Opioids    ~Abdominal pain and constipation  ~Hepatic lesion    /RENAL  - Baseline Creatinine  3.34  ~ ESRD 2/2 PKD on HD through [facility].  The patient does hemodialysis Monday Wednesday and Friday and surgery is currently scheduled for Wednesday.  We discussed in the week prior she is going to switch for dialysis today to do dialysis on Saturday and the Tuesday before surgery.  ~ PKD -procedure as above    ENDOCRINE    - BMI: Estimated body mass index is 20.94 kg/m  as calculated from the following:    Height as of 12/19/22: 1.499 m (4' 11\").    Weight as of 12/19/22: 47 kg (103 lb 11.2 oz).  Healthy Weight (BMI 18.5-24.9)  - Thyroid disorder  Continue home replacement while hospitalized.    HEME  VTE Low Risk 0.26%            Total Score: 1    VTE: Greater than 59 yrs old      - No history of abnormal bleeding or antiplatelet use.  - Chronic anemia 2/2 ESRD -the patient receives epoetin with dialysis and venofer once a week. The patient will check a type and screen and CBC before surgery.   Recommend perioperative use of blood conservation techniques intraoperatively and close monitoring for postoperative bleeding.  A type and screen has been ordered for this patient    MSK  ~ The patient has had multiple prior orthopedic surgeries which is a cause of her chronic pain. She is followed by pain and palliative care. Consideration for careful positioning to minimize discomfort.     PSYCH  -PTSD\anxiety -continue medications    ANESTHESIA  ~ The patient reports she hasn't had any issues with anesthesia but after her orthopedic surgeries due to pain medications did develop nausea and vomiting.     Different anesthesia methods/types have been discussed with the patient, but they are aware that the final plan will be decided by the assigned anesthesia provider on the date of service.    The patient is optimized for their procedure. AVS with information on surgery time/arrival " time, meds and NPO status given by nursing staff. No further diagnostic testing indicated.    Please refer to the physical examination documented by the anesthesiologist in the anesthesia record on the day of surgery.    Video-Visit Details    Type of service:  Video Visit    Provider received verbal consent for a Video Visit from the patient? Yes   Video Start Time: 3:05  Video End Time:3:21    Originating Location (pt. Location): Home    Distant Location (provider location):  Off-site  Mode of Communication:  Video Conference via [employer]  On the day of service:     Prep time: 21 minutes  Visit time: 17 minutes  Documentation time: 13 minutes  ------------------------------------------  Total time: 51 minutes      Briana Polanco PA-C  Preoperative Assessment Center  [facility]  Clinic and Surgery Center  Phone: [phone]  Fax: [phone]

## 2023-01-18 NOTE — PATIENT INSTRUCTIONS
Preparing for Your Surgery      Name:  Serene Tipton   MRN:  [id number]   :  1955   Today's Date:  2023       Arriving for surgery:  Surgery date:  23  Arrival time:  5:30AM     Surgeries and procedures: Adult patients can have 2 visitors all through the surgery process.     Visiting hours: 8 a.m. to 8:30 p.m.     Hospital: Adult patients and children under age 18 can have 4 visitor at a time     No visitors under the age of 5 are allowed for hospital patients.  Double occupancy rooms: Patients can have only two visitors at a time.     Patients with disabilities: Can have a support person with them (family member, service provider     Or someone well informed about their needs) plus the allowed number of visitors     Patients confirmed or suspected to have symptoms of COVID 19 or flu:     No visitors allowed for adult patients.   Children (under age 18) can have 1 named visitor.     People who are sick or showing symptoms of COVID 19 or flu:    Are not allowed to visit patients--we can only make exceptions in special situations.       Please follow these guidelines for your visit:   Arrive wearing a mask over your mouth and nose; we will give you a medical mask to wear    If you arrive wearing a cloth mask.   Keep it on during your entire visit, even when in patient's room.   If you don't wear a mask we'll ask you to leave.     Clean your hands with alcohol hand . Do this when you arrive at and leave the building and patient room,    And again after you touch your mask or anything in the room.     You can t visit if you have a fever, cough, shortness of breath, muscle aches, headaches, sore throat    Or diarrhea      Stay 6 feet away from others during your visit and between visits     Go directly to and from the room you are visiting.     Stay in the patient s room during your visit. Limit going to other places in the hospital as much as possible     Leave bags and jackets at home or  in the car.     For everyone s health, please don t come and go during your visit. That includes for smoking   during your visit.     Please come to:     [facility] [address]  [address] [place]  [address]    - ? parking is available in front of the hospital      -   Parking is available in the Patient Visitor Ramp on [address] and [facility].     -   When entering the hospital you will be asked COVID screening questions, you will then be directed to Registration.  Registration will direct you to the 3rd floor Surgery waiting room.     -   Please ask if you need an escort or a wheelchair to the Surgery Waiting Room.  Preop number- [phone] ?     What can I eat or drink?  -  You may eat and drink normally up to 8 hours prior to arrival time. (Until 1/31/23, 9:30PM)  -  You may have clear liquids until 2 hours prior to arrival time. (Until 2/1/23, 3:30AM)    Examples of clear liquids:  Water  Clear broth  Juices (apple, white grape, white cranberry  and cider) without pulp  Noncarbonated, powder based beverages  (lemonade and Maurice-Aid)  Sodas (Sprite, 7-Up, ginger ale and seltzer)  Coffee or tea (without milk or cream)  Gatorade    -  No Alcohol for at least 24 hours before surgery.     Which medicines can I take?    Hold Aspirin for 7 days before surgery.   Hold Multivitamins for 7 days before surgery.  Hold Supplements, Krill Oil and Vitamin E for 7 days before surgery.  Hold Ibuprofen (Advil, Motrin) for 1 day before surgery--unless otherwise directed by surgeon.  Hold Naproxen (Aleve) for 4 days before surgery.    -  DO NOT take these medications the day of surgery:    B Complex   Calcitriol    Vitamin D   Furosemide(Lasix)    Lisinopril   Sevelamer(Renvela)    -  PLEASE TAKE these medications the day of surgery:    Labetalol(Normodyne)    Lorazepam(Ativan) as  needed    Nortriptyline(Pamelor)    Omeprazole(Prilosec)    Oxycodone(Roxicodone) as needed    How do I prepare myself?  - Please take 2 showers before surgery using Scrubcare or Hibiclens soap.    Use this soap only from the neck to your toes.     Leave the soap on your skin for one minute--then rinse thoroughly.      You may use your own shampoo and conditioner. No other hair products.   - Please remove all jewelry and body piercings.  - No lotions, deodorants or fragrance.  - No makeup or fingernail polish.   - Bring your ID and insurance card.    -If you have a Deep Brain Stimulator, Spinal Cord Stimulator, or any Neuro Stimulator device---you must bring the remote control to the hospital.      Covid testing policy as of 12/06/2022  Your surgeon will notify and schedule you for a COVID test if one is needed before surgery--please direct any questions or COVID symptoms to your surgeon      Questions or Concerns:    - For any questions regarding the day of surgery or your hospital stay, please contact the Pre Admission Nursing Office at [phone].       - If you have health changes between today and your surgery, please call your surgeon.       - For questions after surgery, please call your surgeons office.

## 2023-01-25 LAB
ABO/RH(D): NORMAL
ANTIBODY SCREEN: NEGATIVE
SPECIMEN EXPIRATION DATE: NORMAL

## 2023-01-26 ENCOUNTER — LAB (OUTPATIENT)
Dept: LAB | Facility: CLINIC | Age: 68
End: 2023-01-26
Payer: COMMERCIAL

## 2023-01-26 DIAGNOSIS — Z01.818 PRE-OP EXAMINATION: ICD-10-CM

## 2023-01-26 DIAGNOSIS — Q61.3 PKD (POLYCYSTIC KIDNEY DISEASE): ICD-10-CM

## 2023-01-26 LAB
ERYTHROCYTE [DISTWIDTH] IN BLOOD BY AUTOMATED COUNT: 14.4 % (ref 10–15)
FERRITIN SERPL-MCNC: 1083 NG/ML (ref 11–328)
HCT VFR BLD AUTO: 36 % (ref 35–47)
HGB BLD-MCNC: 11.5 G/DL (ref 11.7–15.7)
HOLD SPECIMEN: NORMAL
IRON BINDING CAPACITY (ROCHE): 249 UG/DL (ref 240–430)
IRON SATN MFR SERPL: 12 % (ref 15–46)
IRON SERPL-MCNC: 30 UG/DL (ref 37–145)
MCH RBC QN AUTO: 34.2 PG (ref 26.5–33)
MCHC RBC AUTO-ENTMCNC: 31.9 G/DL (ref 31.5–36.5)
MCV RBC AUTO: 107 FL (ref 78–100)
PLATELET # BLD AUTO: 229 10E3/UL (ref 150–450)
RBC # BLD AUTO: 3.36 10E6/UL (ref 3.8–5.2)
WBC # BLD AUTO: 9 10E3/UL (ref 4–11)

## 2023-01-26 PROCEDURE — 36415 COLL VENOUS BLD VENIPUNCTURE: CPT

## 2023-01-26 PROCEDURE — 83540 ASSAY OF IRON: CPT

## 2023-01-26 PROCEDURE — 86901 BLOOD TYPING SEROLOGIC RH(D): CPT

## 2023-01-26 PROCEDURE — 83550 IRON BINDING TEST: CPT

## 2023-01-26 PROCEDURE — 86900 BLOOD TYPING SEROLOGIC ABO: CPT

## 2023-01-26 PROCEDURE — 86850 RBC ANTIBODY SCREEN: CPT

## 2023-01-26 PROCEDURE — 82728 ASSAY OF FERRITIN: CPT

## 2023-01-26 PROCEDURE — 85027 COMPLETE CBC AUTOMATED: CPT

## 2023-01-31 ASSESSMENT — ENCOUNTER SYMPTOMS: SEIZURES: 0

## 2023-01-31 ASSESSMENT — LIFESTYLE VARIABLES: TOBACCO_USE: 1

## 2023-01-31 NOTE — ANESTHESIA PREPROCEDURE EVALUATION
Anesthesia Pre-Procedure Evaluation    Patient: Serene Tipton   MRN: [id number] : 1955        Procedure : Procedure(s):  bilateral native nephrectomy          Past Medical History:   Diagnosis Date     Anemia in chronic kidney disease      Anxiety      Arthritis      Chronic low back pain      CKD (chronic kidney disease)      Depressive disorder      Disease of thyroid gland      End stage renal disease (H)      GERD (gastroesophageal reflux disease)      Hepatic lesion      Hyperlipidemia      Hypertension      Neuromuscular disorder (H)      Osteoporosis      PKD (polycystic kidney disease) 1990     PTSD (post-traumatic stress disorder)      Tobacco abuse      Vitamin D deficiency       Past Surgical History:   Procedure Laterality Date     BACK SURGERY      spinal fusion w/hardware     BIOPSY Left 1990    Breast     BREAST LUMPECTOMY, RT/LT Left     Lumpectomy     CYST REMOVAL Right     rt wrist     CYSTECTOMY PILONIDAL  1981     EYE SURGERY  2008    lasik     FORMATION ARTERIOVENOUS FISTULA    2020     FRACTURE SURGERY       ORTHOPEDIC SURGERY Right 2005    Shoulder     OTHER SURGICAL HISTORY      carpal tunnel repair     SPINE SURGERY        Allergies   Allergen Reactions     Penicillins Other (See Comments) and Shortness Of Breath     Breathing problem.  breathing       Carvedilol GI Disturbance     Nausea and vomiting     Ciprofloxacin Muscle Pain (Myalgia)     Morphine Other (See Comments)     Vomiting     No Clinical Screening - See Comments      PN: LW CM1: Contrast Intercapsular - Nonionic Reaction :     Nsaids Other (See Comments)     Sulfa Drugs Itching     Sulfamethoxazole-Trimethoprim      Other reaction(s): itching     Levofloxacin Muscle Pain (Myalgia) and Rash      Social History     Tobacco Use     Smoking status: Former     Packs/day: 0.50     Years: 14.00     Pack years: 7.00     Types: Cigarettes     Quit date: 2021     Years since quittin.6      Smokeless tobacco: Never   Substance Use Topics     Alcohol use: Not Currently      Wt Readings from Last 1 Encounters:   12/19/22 47 kg (103 lb 11.2 oz)        Anesthesia Evaluation   Pt has had prior anesthetic. Type: General.        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  o LVEDv 126ml. LVESv 38ml. LV EF70%.    ECG Reviewed:  Date: 7/28/20 Results:  Sinus rhythm, possible left atrial enlargement   Cath:  Date: Results:      METS/Exercise Tolerance: 3 - Able to walk 1-2 blocks without stopping    Hematologic:     (+) anemia,     Musculoskeletal:       GI/Hepatic: Comment: Hepatic lesion    Abdominal pain/ constipation     (+) GERD,     Renal/Genitourinary:     (+) renal disease, type: ESRD, Pt requires dialysis, type: Hemodialysis,     Endo:     (+) thyroid problem, hypothyroidism,     Psychiatric/Substance Use:     (+) psychiatric history anxiety and other (comment) (PTSD) H/O chronic opiod use .     Infectious Disease:  - neg infectious disease ROS     Malignancy:  - neg malignancy ROS     Other:  - neg other ROS    (+) , H/O Chronic Pain,           OUTSIDE LABS:  CBC:   Lab Results   Component Value Date    WBC 9.0 01/26/2023    WBC 8.5 08/18/2022    HGB 11.5 (L) 01/26/2023    HGB 11.2 (L) 08/18/2022    HCT 36.0 01/26/2023    HCT 35.6 08/18/2022     01/26/2023     08/18/2022     BMP:   Lab Results   Component Value Date     05/19/2021     (L) 03/23/2019    POTASSIUM 3.9 05/19/2021    POTASSIUM 3.8 01/06/2020    CHLORIDE 98 05/19/2021    CHLORIDE 101 03/23/2019     CO2 31 05/19/2021    CO2 21 (L) 03/23/2019    BUN 27 05/19/2021    BUN 38 (H) 03/23/2019    CR 3.43 (H) 05/19/2021    CR 3.71 (A) 01/06/2020    GLC 69 (L) 05/19/2021     (A) 01/06/2020     COAGS:   Lab Results   Component Value Date    PTT 30 05/19/2021    INR 1.02 05/19/2021     POC: No results found for: BGM, HCG, HCGS  HEPATIC:   Lab Results   Component Value Date    ALBUMIN 4.5 08/18/2022    PROTTOTAL 6.4 08/18/2022    ALT 11 08/18/2022    AST 16 08/18/2022    ALKPHOS 128 (H) 08/18/2022    BILITOTAL 0.3 08/18/2022     OTHER:   Lab Results   Component Value Date    GAVIN 9.5 05/19/2021    PHOS 2.8 05/19/2021    TSH 3.28 10/04/2022    T4 0.72 (L) 08/18/2022       Anesthesia Plan    ASA Status:  3   NPO Status:  ELEVATED Aspiration Risk/Unknown    Anesthesia Type: General.     - Airway: ETT   Induction: Intravenous, Propofol, RSI.   Maintenance: Balanced.   Techniques and Equipment:     - Lines/Monitors: 2nd IV ([place])     Consents    Anesthesia Plan(s) and associated risks, benefits, and realistic alternatives discussed. Questions answered and patient/representative(s) expressed understanding.    - Discussed:     - Discussed with:  Patient         Postoperative Care    Pain management: IV analgesics, Multi-modal analgesia, Oral pain medications, Neuraxial analgesia.   PONV prophylaxis: Ondansetron (or other 5HT-3), Dexamethasone or Solumedrol     Comments:    Other Comments: - Clear sight for goal-directed fluid management and hemodynamic monitoring             Damien Diaz MD

## 2023-02-01 ENCOUNTER — HOSPITAL ENCOUNTER (INPATIENT)
Facility: CLINIC | Age: 68
LOS: 5 days | Discharge: HOME | DRG: 660 | End: 2023-02-06
Attending: SURGERY | Admitting: SURGERY
Payer: COMMERCIAL

## 2023-02-01 ENCOUNTER — ANESTHESIA (OUTPATIENT)
Dept: SURGERY | Facility: CLINIC | Age: 68
DRG: 660 | End: 2023-02-01
Payer: COMMERCIAL

## 2023-02-01 DIAGNOSIS — G89.18 ACUTE POST-OPERATIVE PAIN: Primary | ICD-10-CM

## 2023-02-01 PROBLEM — N28.81: Status: ACTIVE | Noted: 2023-02-01

## 2023-02-01 PROBLEM — D62 POSTOPERATIVE ANEMIA DUE TO ACUTE BLOOD LOSS: Status: ACTIVE | Noted: 2023-02-01

## 2023-02-01 PROBLEM — Q61.3 POLYCYSTIC KIDNEY DISEASE: Status: ACTIVE | Noted: 2023-02-01

## 2023-02-01 LAB
ABO/RH(D): NORMAL
ANION GAP SERPL CALCULATED.3IONS-SCNC: 12 MMOL/L (ref 7–15)
ANTIBODY SCREEN: NEGATIVE
BASE EXCESS BLDV CALC-SCNC: -1.9 MMOL/L (ref -8.1–1.9)
BASOPHILS # BLD AUTO: 0 10E3/UL (ref 0–0.2)
BASOPHILS NFR BLD AUTO: 0 %
BUN SERPL-MCNC: 27.9 MG/DL (ref 8–23)
CA-I BLD-MCNC: 3.9 MG/DL (ref 4.4–5.2)
CALCIUM SERPL-MCNC: 8.4 MG/DL (ref 8.8–10.2)
CHLORIDE SERPL-SCNC: 98 MMOL/L (ref 98–107)
CREAT SERPL-MCNC: 3.88 MG/DL (ref 0.51–0.95)
DEPRECATED HCO3 PLAS-SCNC: 25 MMOL/L (ref 22–29)
EOSINOPHIL # BLD AUTO: 0 10E3/UL (ref 0–0.7)
EOSINOPHIL NFR BLD AUTO: 0 %
ERYTHROCYTE [DISTWIDTH] IN BLOOD BY AUTOMATED COUNT: 15.1 % (ref 10–15)
GFR SERPL CREATININE-BSD FRML MDRD: 12 ML/MIN/1.73M2
GLUCOSE BLD-MCNC: 128 MG/DL (ref 70–99)
GLUCOSE BLDC GLUCOMTR-MCNC: 106 MG/DL (ref 70–99)
GLUCOSE SERPL-MCNC: 162 MG/DL (ref 70–99)
HCO3 BLDV-SCNC: 23 MMOL/L (ref 21–28)
HCT VFR BLD AUTO: 32.6 % (ref 35–47)
HGB BLD-MCNC: 10.1 G/DL (ref 11.7–15.7)
HGB BLD-MCNC: 8.8 G/DL (ref 11.7–15.7)
IMM GRANULOCYTES # BLD: 0 10E3/UL
IMM GRANULOCYTES NFR BLD: 0 %
LACTATE BLD-SCNC: 1.7 MMOL/L
LYMPHOCYTES # BLD AUTO: 0.5 10E3/UL (ref 0.8–5.3)
LYMPHOCYTES NFR BLD AUTO: 5 %
MAGNESIUM SERPL-MCNC: 1.9 MG/DL (ref 1.7–2.3)
MCH RBC QN AUTO: 34.5 PG (ref 26.5–33)
MCHC RBC AUTO-ENTMCNC: 31 G/DL (ref 31.5–36.5)
MCV RBC AUTO: 111 FL (ref 78–100)
MONOCYTES # BLD AUTO: 0.9 10E3/UL (ref 0–1.3)
MONOCYTES NFR BLD AUTO: 8 %
NEUTROPHILS # BLD AUTO: 9.4 10E3/UL (ref 1.6–8.3)
NEUTROPHILS NFR BLD AUTO: 87 %
NRBC # BLD AUTO: 0 10E3/UL
NRBC BLD AUTO-RTO: 0 /100
O2/TOTAL GAS SETTING VFR VENT: 63 %
PCO2 BLDV: 38 MM HG (ref 40–50)
PH BLDV: 7.39 [PH] (ref 7.32–7.43)
PHOSPHATE SERPL-MCNC: 3.1 MG/DL (ref 2.5–4.5)
PLATELET # BLD AUTO: 272 10E3/UL (ref 150–450)
PO2 BLDV: 39 MM HG (ref 25–47)
POTASSIUM BLD-SCNC: 4.4 MMOL/L (ref 3.5–5)
POTASSIUM SERPL-SCNC: 4.8 MMOL/L (ref 3.4–5.3)
RBC # BLD AUTO: 2.93 10E6/UL (ref 3.8–5.2)
SARS-COV-2 RNA RESP QL NAA+PROBE: NEGATIVE
SODIUM BLD-SCNC: 130 MMOL/L (ref 133–144)
SODIUM SERPL-SCNC: 135 MMOL/L (ref 136–145)
SPECIMEN EXPIRATION DATE: NORMAL
WBC # BLD AUTO: 10.8 10E3/UL (ref 4–11)

## 2023-02-01 PROCEDURE — 250N000011 HC RX IP 250 OP 636: Performed by: STUDENT IN AN ORGANIZED HEALTH CARE EDUCATION/TRAINING PROGRAM

## 2023-02-01 PROCEDURE — 36415 COLL VENOUS BLD VENIPUNCTURE: CPT | Performed by: STUDENT IN AN ORGANIZED HEALTH CARE EDUCATION/TRAINING PROGRAM

## 2023-02-01 PROCEDURE — 258N000003 HC RX IP 258 OP 636: Performed by: STUDENT IN AN ORGANIZED HEALTH CARE EDUCATION/TRAINING PROGRAM

## 2023-02-01 PROCEDURE — 85025 COMPLETE CBC W/AUTO DIFF WBC: CPT | Performed by: SURGERY

## 2023-02-01 PROCEDURE — 50340 RECIPIENT NEPHRECTOMY: CPT | Mod: 50 | Performed by: SURGERY

## 2023-02-01 PROCEDURE — 86901 BLOOD TYPING SEROLOGIC RH(D): CPT | Performed by: STUDENT IN AN ORGANIZED HEALTH CARE EDUCATION/TRAINING PROGRAM

## 2023-02-01 PROCEDURE — 88307 TISSUE EXAM BY PATHOLOGIST: CPT | Mod: TC | Performed by: SURGERY

## 2023-02-01 PROCEDURE — 360N000077 HC SURGERY LEVEL 4, PER MIN: Performed by: SURGERY

## 2023-02-01 PROCEDURE — 258N000003 HC RX IP 258 OP 636: Performed by: ANESTHESIOLOGY

## 2023-02-01 PROCEDURE — 272N000001 HC OR GENERAL SUPPLY STERILE: Performed by: SURGERY

## 2023-02-01 PROCEDURE — 250N000009 HC RX 250: Performed by: NURSE PRACTITIONER

## 2023-02-01 PROCEDURE — 83735 ASSAY OF MAGNESIUM: CPT | Performed by: SURGERY

## 2023-02-01 PROCEDURE — 0TT20ZZ RESECTION OF BILATERAL KIDNEYS, OPEN APPROACH: ICD-10-PCS | Performed by: SURGERY

## 2023-02-01 PROCEDURE — 88307 TISSUE EXAM BY PATHOLOGIST: CPT | Mod: 26 | Performed by: PATHOLOGY

## 2023-02-01 PROCEDURE — 250N000011 HC RX IP 250 OP 636: Performed by: SURGERY

## 2023-02-01 PROCEDURE — 250N000013 HC RX MED GY IP 250 OP 250 PS 637: Performed by: SURGERY

## 2023-02-01 PROCEDURE — 120N000011 HC R&B TRANSPLANT UMMC

## 2023-02-01 PROCEDURE — 710N000010 HC RECOVERY PHASE 1, LEVEL 2, PER MIN: Performed by: SURGERY

## 2023-02-01 PROCEDURE — 86850 RBC ANTIBODY SCREEN: CPT | Performed by: STUDENT IN AN ORGANIZED HEALTH CARE EDUCATION/TRAINING PROGRAM

## 2023-02-01 PROCEDURE — 36415 COLL VENOUS BLD VENIPUNCTURE: CPT | Performed by: SURGERY

## 2023-02-01 PROCEDURE — 84132 ASSAY OF SERUM POTASSIUM: CPT | Performed by: SURGERY

## 2023-02-01 PROCEDURE — 250N000009 HC RX 250: Performed by: STUDENT IN AN ORGANIZED HEALTH CARE EDUCATION/TRAINING PROGRAM

## 2023-02-01 PROCEDURE — 999N000141 HC STATISTIC PRE-PROCEDURE NURSING ASSESSMENT: Performed by: SURGERY

## 2023-02-01 PROCEDURE — 250N000011 HC RX IP 250 OP 636: Performed by: ANESTHESIOLOGY

## 2023-02-01 PROCEDURE — 370N000017 HC ANESTHESIA TECHNICAL FEE, PER MIN: Performed by: SURGERY

## 2023-02-01 PROCEDURE — 250N000013 HC RX MED GY IP 250 OP 250 PS 637: Performed by: STUDENT IN AN ORGANIZED HEALTH CARE EDUCATION/TRAINING PROGRAM

## 2023-02-01 PROCEDURE — 99223 1ST HOSP IP/OBS HIGH 75: CPT | Performed by: INTERNAL MEDICINE

## 2023-02-01 PROCEDURE — 84100 ASSAY OF PHOSPHORUS: CPT | Performed by: SURGERY

## 2023-02-01 PROCEDURE — 84132 ASSAY OF SERUM POTASSIUM: CPT

## 2023-02-01 PROCEDURE — 82330 ASSAY OF CALCIUM: CPT

## 2023-02-01 PROCEDURE — U0005 INFEC AGEN DETEC AMPLI PROBE: HCPCS | Performed by: ANESTHESIOLOGY

## 2023-02-01 PROCEDURE — 82803 BLOOD GASES ANY COMBINATION: CPT

## 2023-02-01 PROCEDURE — 250N000025 HC SEVOFLURANE, PER MIN: Performed by: SURGERY

## 2023-02-01 RX ORDER — PROPOFOL 10 MG/ML
INJECTION, EMULSION INTRAVENOUS CONTINUOUS PRN
Status: DISCONTINUED | OUTPATIENT
Start: 2023-02-01 | End: 2023-02-01

## 2023-02-01 RX ORDER — VASOPRESSIN IN 0.9 % NACL 2 UNIT/2ML
SYRINGE (ML) INTRAVENOUS PRN
Status: DISCONTINUED | OUTPATIENT
Start: 2023-02-01 | End: 2023-02-01

## 2023-02-01 RX ORDER — ONDANSETRON 4 MG/1
4 TABLET, ORALLY DISINTEGRATING ORAL EVERY 6 HOURS PRN
Status: DISCONTINUED | OUTPATIENT
Start: 2023-02-01 | End: 2023-02-06 | Stop reason: HOSPADM

## 2023-02-01 RX ORDER — NALBUPHINE HYDROCHLORIDE 10 MG/ML
2.5-5 INJECTION, SOLUTION INTRAMUSCULAR; INTRAVENOUS; SUBCUTANEOUS EVERY 6 HOURS PRN
Status: DISCONTINUED | OUTPATIENT
Start: 2023-02-01 | End: 2023-02-01

## 2023-02-01 RX ORDER — SODIUM CHLORIDE, SODIUM GLUCONATE, SODIUM ACETATE, POTASSIUM CHLORIDE AND MAGNESIUM CHLORIDE 526; 502; 368; 37; 30 MG/100ML; MG/100ML; MG/100ML; MG/100ML; MG/100ML
INJECTION, SOLUTION INTRAVENOUS CONTINUOUS PRN
Status: DISCONTINUED | OUTPATIENT
Start: 2023-02-01 | End: 2023-02-01

## 2023-02-01 RX ORDER — ONDANSETRON 2 MG/ML
4 INJECTION INTRAMUSCULAR; INTRAVENOUS EVERY 30 MIN PRN
Status: DISCONTINUED | OUTPATIENT
Start: 2023-02-01 | End: 2023-02-01 | Stop reason: HOSPADM

## 2023-02-01 RX ORDER — HYDROMORPHONE HCL IN WATER/PF 6 MG/30 ML
0.2 PATIENT CONTROLLED ANALGESIA SYRINGE INTRAVENOUS EVERY 5 MIN PRN
Status: DISCONTINUED | OUTPATIENT
Start: 2023-02-01 | End: 2023-02-01 | Stop reason: HOSPADM

## 2023-02-01 RX ORDER — DEXTROSE, SODIUM CHLORIDE, SODIUM LACTATE, POTASSIUM CHLORIDE, AND CALCIUM CHLORIDE 5; .6; .31; .03; .02 G/100ML; G/100ML; G/100ML; G/100ML; G/100ML
INJECTION, SOLUTION INTRAVENOUS CONTINUOUS
Status: DISCONTINUED | OUTPATIENT
Start: 2023-02-01 | End: 2023-02-03

## 2023-02-01 RX ORDER — ACETAMINOPHEN 325 MG/1
975 TABLET ORAL EVERY 8 HOURS
Status: DISCONTINUED | OUTPATIENT
Start: 2023-02-01 | End: 2023-02-01

## 2023-02-01 RX ORDER — FENTANYL CITRATE 50 UG/ML
50 INJECTION, SOLUTION INTRAMUSCULAR; INTRAVENOUS EVERY 5 MIN PRN
Status: DISCONTINUED | OUTPATIENT
Start: 2023-02-01 | End: 2023-02-01 | Stop reason: HOSPADM

## 2023-02-01 RX ORDER — OXYCODONE HYDROCHLORIDE 5 MG/1
5 TABLET ORAL EVERY 4 HOURS PRN
Status: DISCONTINUED | OUTPATIENT
Start: 2023-02-01 | End: 2023-02-02

## 2023-02-01 RX ORDER — BISACODYL 10 MG
10 SUPPOSITORY, RECTAL RECTAL DAILY PRN
Status: DISCONTINUED | OUTPATIENT
Start: 2023-02-01 | End: 2023-02-06 | Stop reason: HOSPADM

## 2023-02-01 RX ORDER — HYDROMORPHONE HCL IN WATER/PF 6 MG/30 ML
0.4 PATIENT CONTROLLED ANALGESIA SYRINGE INTRAVENOUS EVERY 5 MIN PRN
Status: DISCONTINUED | OUTPATIENT
Start: 2023-02-01 | End: 2023-02-01 | Stop reason: HOSPADM

## 2023-02-01 RX ORDER — ONDANSETRON 2 MG/ML
INJECTION INTRAMUSCULAR; INTRAVENOUS PRN
Status: DISCONTINUED | OUTPATIENT
Start: 2023-02-01 | End: 2023-02-01

## 2023-02-01 RX ORDER — OXYCODONE HYDROCHLORIDE 5 MG/1
5 TABLET ORAL EVERY 4 HOURS PRN
Status: DISCONTINUED | OUTPATIENT
Start: 2023-02-01 | End: 2023-02-01 | Stop reason: HOSPADM

## 2023-02-01 RX ORDER — FLUMAZENIL 0.1 MG/ML
0.2 INJECTION, SOLUTION INTRAVENOUS
Status: DISCONTINUED | OUTPATIENT
Start: 2023-02-01 | End: 2023-02-01 | Stop reason: HOSPADM

## 2023-02-01 RX ORDER — FENTANYL CITRATE 50 UG/ML
25-50 INJECTION, SOLUTION INTRAMUSCULAR; INTRAVENOUS
Status: DISCONTINUED | OUTPATIENT
Start: 2023-02-01 | End: 2023-02-01 | Stop reason: HOSPADM

## 2023-02-01 RX ORDER — OXYCODONE HYDROCHLORIDE 10 MG/1
10 TABLET ORAL EVERY 4 HOURS PRN
Status: DISCONTINUED | OUTPATIENT
Start: 2023-02-01 | End: 2023-02-01 | Stop reason: HOSPADM

## 2023-02-01 RX ORDER — NALOXONE HYDROCHLORIDE 0.4 MG/ML
0.2 INJECTION, SOLUTION INTRAMUSCULAR; INTRAVENOUS; SUBCUTANEOUS
Status: DISCONTINUED | OUTPATIENT
Start: 2023-02-01 | End: 2023-02-06 | Stop reason: HOSPADM

## 2023-02-01 RX ORDER — LIDOCAINE 40 MG/G
CREAM TOPICAL
Status: DISCONTINUED | OUTPATIENT
Start: 2023-02-01 | End: 2023-02-01 | Stop reason: HOSPADM

## 2023-02-01 RX ORDER — ACETAMINOPHEN 325 MG/1
650 TABLET ORAL EVERY 4 HOURS PRN
Status: DISCONTINUED | OUTPATIENT
Start: 2023-02-04 | End: 2023-02-01

## 2023-02-01 RX ORDER — NALOXONE HYDROCHLORIDE 0.4 MG/ML
0.4 INJECTION, SOLUTION INTRAMUSCULAR; INTRAVENOUS; SUBCUTANEOUS
Status: DISCONTINUED | OUTPATIENT
Start: 2023-02-01 | End: 2023-02-06 | Stop reason: HOSPADM

## 2023-02-01 RX ORDER — AMOXICILLIN 250 MG
1 CAPSULE ORAL 2 TIMES DAILY
Status: DISCONTINUED | OUTPATIENT
Start: 2023-02-01 | End: 2023-02-01

## 2023-02-01 RX ORDER — HYDRALAZINE HYDROCHLORIDE 20 MG/ML
2.5-5 INJECTION INTRAMUSCULAR; INTRAVENOUS EVERY 10 MIN PRN
Status: DISCONTINUED | OUTPATIENT
Start: 2023-02-01 | End: 2023-02-01 | Stop reason: HOSPADM

## 2023-02-01 RX ORDER — LABETALOL HYDROCHLORIDE 5 MG/ML
10 INJECTION, SOLUTION INTRAVENOUS
Status: DISCONTINUED | OUTPATIENT
Start: 2023-02-01 | End: 2023-02-01 | Stop reason: HOSPADM

## 2023-02-01 RX ORDER — ACETAMINOPHEN 325 MG/1
975 TABLET ORAL ONCE
Status: COMPLETED | OUTPATIENT
Start: 2023-02-01 | End: 2023-02-01

## 2023-02-01 RX ORDER — FENTANYL CITRATE 50 UG/ML
25 INJECTION, SOLUTION INTRAMUSCULAR; INTRAVENOUS EVERY 5 MIN PRN
Status: DISCONTINUED | OUTPATIENT
Start: 2023-02-01 | End: 2023-02-01 | Stop reason: HOSPADM

## 2023-02-01 RX ORDER — CEFUROXIME SODIUM 1.5 G/16ML
INJECTION, POWDER, FOR SOLUTION INTRAVENOUS PRN
Status: DISCONTINUED | OUTPATIENT
Start: 2023-02-01 | End: 2023-02-01

## 2023-02-01 RX ORDER — GLYCOPYRROLATE 0.2 MG/ML
INJECTION, SOLUTION INTRAMUSCULAR; INTRAVENOUS PRN
Status: DISCONTINUED | OUTPATIENT
Start: 2023-02-01 | End: 2023-02-01

## 2023-02-01 RX ORDER — HYDROMORPHONE HCL IN WATER/PF 6 MG/30 ML
0.4 PATIENT CONTROLLED ANALGESIA SYRINGE INTRAVENOUS
Status: DISCONTINUED | OUTPATIENT
Start: 2023-02-01 | End: 2023-02-02

## 2023-02-01 RX ORDER — KETAMINE HYDROCHLORIDE 10 MG/ML
2.5 INJECTION, SOLUTION INTRAMUSCULAR; INTRAVENOUS EVERY 4 HOURS PRN
Status: DISCONTINUED | OUTPATIENT
Start: 2023-02-01 | End: 2023-02-02

## 2023-02-01 RX ORDER — ONDANSETRON 4 MG/1
4 TABLET, ORALLY DISINTEGRATING ORAL EVERY 30 MIN PRN
Status: DISCONTINUED | OUTPATIENT
Start: 2023-02-01 | End: 2023-02-01 | Stop reason: HOSPADM

## 2023-02-01 RX ORDER — NEOSTIGMINE METHYLSULFATE 1 MG/ML
VIAL (ML) INJECTION PRN
Status: DISCONTINUED | OUTPATIENT
Start: 2023-02-01 | End: 2023-02-01

## 2023-02-01 RX ORDER — HALOPERIDOL 5 MG/ML
1 INJECTION INTRAMUSCULAR
Status: DISCONTINUED | OUTPATIENT
Start: 2023-02-01 | End: 2023-02-01 | Stop reason: HOSPADM

## 2023-02-01 RX ORDER — POLYETHYLENE GLYCOL 3350 17 G/17G
17 POWDER, FOR SOLUTION ORAL DAILY
Status: DISCONTINUED | OUTPATIENT
Start: 2023-02-02 | End: 2023-02-06 | Stop reason: HOSPADM

## 2023-02-01 RX ORDER — OXYCODONE HYDROCHLORIDE 10 MG/1
10 TABLET ORAL EVERY 4 HOURS PRN
Status: DISCONTINUED | OUTPATIENT
Start: 2023-02-01 | End: 2023-02-02

## 2023-02-01 RX ORDER — ONDANSETRON 2 MG/ML
4 INJECTION INTRAMUSCULAR; INTRAVENOUS EVERY 6 HOURS PRN
Status: DISCONTINUED | OUTPATIENT
Start: 2023-02-01 | End: 2023-02-06 | Stop reason: HOSPADM

## 2023-02-01 RX ORDER — ACETAMINOPHEN 325 MG/10.15ML
650 LIQUID ORAL EVERY 4 HOURS PRN
Status: DISCONTINUED | OUTPATIENT
Start: 2023-02-04 | End: 2023-02-06 | Stop reason: HOSPADM

## 2023-02-01 RX ORDER — SODIUM CHLORIDE, SODIUM LACTATE, POTASSIUM CHLORIDE, CALCIUM CHLORIDE 600; 310; 30; 20 MG/100ML; MG/100ML; MG/100ML; MG/100ML
INJECTION, SOLUTION INTRAVENOUS CONTINUOUS
Status: DISCONTINUED | OUTPATIENT
Start: 2023-02-01 | End: 2023-02-01 | Stop reason: HOSPADM

## 2023-02-01 RX ORDER — ACETAMINOPHEN 325 MG/10.15ML
975 LIQUID ORAL EVERY 8 HOURS
Status: COMPLETED | OUTPATIENT
Start: 2023-02-01 | End: 2023-02-04

## 2023-02-01 RX ORDER — PROCHLORPERAZINE MALEATE 5 MG
5 TABLET ORAL EVERY 6 HOURS PRN
Status: DISCONTINUED | OUTPATIENT
Start: 2023-02-01 | End: 2023-02-06 | Stop reason: HOSPADM

## 2023-02-01 RX ORDER — SODIUM CHLORIDE, SODIUM LACTATE, POTASSIUM CHLORIDE, CALCIUM CHLORIDE 600; 310; 30; 20 MG/100ML; MG/100ML; MG/100ML; MG/100ML
INJECTION, SOLUTION INTRAVENOUS CONTINUOUS PRN
Status: DISCONTINUED | OUTPATIENT
Start: 2023-02-01 | End: 2023-02-01

## 2023-02-01 RX ORDER — HYDROMORPHONE HCL IN WATER/PF 6 MG/30 ML
0.2 PATIENT CONTROLLED ANALGESIA SYRINGE INTRAVENOUS
Status: DISCONTINUED | OUTPATIENT
Start: 2023-02-01 | End: 2023-02-02

## 2023-02-01 RX ORDER — DEXAMETHASONE SODIUM PHOSPHATE 4 MG/ML
INJECTION, SOLUTION INTRA-ARTICULAR; INTRALESIONAL; INTRAMUSCULAR; INTRAVENOUS; SOFT TISSUE PRN
Status: DISCONTINUED | OUTPATIENT
Start: 2023-02-01 | End: 2023-02-01

## 2023-02-01 RX ORDER — LIDOCAINE HYDROCHLORIDE 20 MG/ML
INJECTION, SOLUTION INFILTRATION; PERINEURAL PRN
Status: DISCONTINUED | OUTPATIENT
Start: 2023-02-01 | End: 2023-02-01

## 2023-02-01 RX ADMIN — PHENYLEPHRINE HYDROCHLORIDE 100 MCG: 10 INJECTION INTRAVENOUS at 10:50

## 2023-02-01 RX ADMIN — BUPIVACAINE HYDROCHLORIDE 2 ML/HR: 7.5 INJECTION, SOLUTION EPIDURAL; RETROBULBAR at 09:45

## 2023-02-01 RX ADMIN — SODIUM CHLORIDE, SODIUM GLUCONATE, SODIUM ACETATE, POTASSIUM CHLORIDE AND MAGNESIUM CHLORIDE: 526; 502; 368; 37; 30 INJECTION, SOLUTION INTRAVENOUS at 07:45

## 2023-02-01 RX ADMIN — PHENYLEPHRINE HYDROCHLORIDE 100 MCG: 10 INJECTION INTRAVENOUS at 10:38

## 2023-02-01 RX ADMIN — Medication 50 MG: at 07:38

## 2023-02-01 RX ADMIN — FENTANYL CITRATE 50 MCG: 50 INJECTION, SOLUTION INTRAMUSCULAR; INTRAVENOUS at 07:01

## 2023-02-01 RX ADMIN — CEFUROXIME 1.5 G: 1.5 INJECTION, POWDER, FOR SOLUTION INTRAVENOUS at 08:08

## 2023-02-01 RX ADMIN — DEXAMETHASONE SODIUM PHOSPHATE 4 MG: 4 INJECTION, SOLUTION INTRA-ARTICULAR; INTRALESIONAL; INTRAMUSCULAR; INTRAVENOUS; SOFT TISSUE at 07:39

## 2023-02-01 RX ADMIN — FENTANYL CITRATE 50 MCG: 50 INJECTION, SOLUTION INTRAMUSCULAR; INTRAVENOUS at 13:18

## 2023-02-01 RX ADMIN — FENTANYL CITRATE 50 MCG: 50 INJECTION, SOLUTION INTRAMUSCULAR; INTRAVENOUS at 07:38

## 2023-02-01 RX ADMIN — SODIUM CHLORIDE, POTASSIUM CHLORIDE, SODIUM LACTATE AND CALCIUM CHLORIDE: 600; 310; 30; 20 INJECTION, SOLUTION INTRAVENOUS at 11:43

## 2023-02-01 RX ADMIN — SODIUM CHLORIDE, POTASSIUM CHLORIDE, SODIUM LACTATE AND CALCIUM CHLORIDE: 600; 310; 30; 20 INJECTION, SOLUTION INTRAVENOUS at 07:33

## 2023-02-01 RX ADMIN — KETAMINE HYDROCHLORIDE 2.5 MG: 10 INJECTION INTRAMUSCULAR; INTRAVENOUS at 20:58

## 2023-02-01 RX ADMIN — PHENYLEPHRINE HYDROCHLORIDE 100 MCG: 10 INJECTION INTRAVENOUS at 10:52

## 2023-02-01 RX ADMIN — Medication 3 ML: at 08:23

## 2023-02-01 RX ADMIN — PROPOFOL 15 MCG/KG/MIN: 10 INJECTION, EMULSION INTRAVENOUS at 08:09

## 2023-02-01 RX ADMIN — PROPOFOL 100 MCG/KG/MIN: 10 INJECTION, EMULSION INTRAVENOUS at 07:38

## 2023-02-01 RX ADMIN — HYDROMORPHONE HYDROCHLORIDE 0.2 MG: 0.2 INJECTION, SOLUTION INTRAMUSCULAR; INTRAVENOUS; SUBCUTANEOUS at 19:26

## 2023-02-01 RX ADMIN — SENNOSIDES 5 ML: 8.8 LIQUID ORAL at 19:33

## 2023-02-01 RX ADMIN — PHENYLEPHRINE HYDROCHLORIDE 100 MCG: 10 INJECTION INTRAVENOUS at 10:37

## 2023-02-01 RX ADMIN — PHENYLEPHRINE HYDROCHLORIDE 100 MCG: 10 INJECTION INTRAVENOUS at 10:27

## 2023-02-01 RX ADMIN — CISATRACURIUM BESYLATE 1 MG: 2 INJECTION INTRAVENOUS at 08:55

## 2023-02-01 RX ADMIN — PHENYLEPHRINE HYDROCHLORIDE 200 MCG: 10 INJECTION INTRAVENOUS at 10:39

## 2023-02-01 RX ADMIN — CISATRACURIUM BESYLATE 1 MG: 2 INJECTION INTRAVENOUS at 09:45

## 2023-02-01 RX ADMIN — ACETAMINOPHEN 975 MG: 325 TABLET, FILM COATED ORAL at 06:39

## 2023-02-01 RX ADMIN — SODIUM CHLORIDE, SODIUM LACTATE, POTASSIUM CHLORIDE, CALCIUM CHLORIDE AND DEXTROSE MONOHYDRATE: 5; 600; 310; 30; 20 INJECTION, SOLUTION INTRAVENOUS at 12:42

## 2023-02-01 RX ADMIN — HYDROMORPHONE HYDROCHLORIDE 0.4 MG: 0.2 INJECTION, SOLUTION INTRAMUSCULAR; INTRAVENOUS; SUBCUTANEOUS at 22:19

## 2023-02-01 RX ADMIN — DOCUSATE SODIUM LIQUID 50 MG: 100 LIQUID ORAL at 19:31

## 2023-02-01 RX ADMIN — Medication 1 UNITS: at 10:56

## 2023-02-01 RX ADMIN — CISATRACURIUM BESYLATE 1 MG: 2 INJECTION INTRAVENOUS at 10:22

## 2023-02-01 RX ADMIN — NEOSTIGMINE METHYLSULFATE 5 MG: 1 INJECTION, SOLUTION INTRAVENOUS at 11:35

## 2023-02-01 RX ADMIN — PHENYLEPHRINE HYDROCHLORIDE 100 MCG: 10 INJECTION INTRAVENOUS at 10:47

## 2023-02-01 RX ADMIN — GLYCOPYRROLATE 0.8 MG: 0.2 INJECTION, SOLUTION INTRAMUSCULAR; INTRAVENOUS at 11:35

## 2023-02-01 RX ADMIN — LIDOCAINE HYDROCHLORIDE 60 MG: 20 INJECTION, SOLUTION INFILTRATION; PERINEURAL at 07:38

## 2023-02-01 RX ADMIN — ONDANSETRON 4 MG: 2 INJECTION INTRAMUSCULAR; INTRAVENOUS at 11:21

## 2023-02-01 RX ADMIN — ACETAMINOPHEN 975 MG: 325 SUSPENSION ORAL at 17:05

## 2023-02-01 RX ADMIN — HYDROMORPHONE HYDROCHLORIDE 0.2 MG: 0.2 INJECTION, SOLUTION INTRAMUSCULAR; INTRAVENOUS; SUBCUTANEOUS at 16:58

## 2023-02-01 RX ADMIN — HYDROMORPHONE HYDROCHLORIDE 0.2 MG: 0.2 INJECTION, SOLUTION INTRAMUSCULAR; INTRAVENOUS; SUBCUTANEOUS at 14:28

## 2023-02-01 RX ADMIN — FENTANYL CITRATE 25 MCG: 50 INJECTION, SOLUTION INTRAMUSCULAR; INTRAVENOUS at 12:25

## 2023-02-01 RX ADMIN — PHENYLEPHRINE HYDROCHLORIDE 100 MCG: 10 INJECTION INTRAVENOUS at 10:44

## 2023-02-01 RX ADMIN — PHENYLEPHRINE HYDROCHLORIDE 100 MCG: 10 INJECTION INTRAVENOUS at 10:41

## 2023-02-01 ASSESSMENT — ACTIVITIES OF DAILY LIVING (ADL)
ADLS_ACUITY_SCORE: 18

## 2023-02-01 NOTE — ANESTHESIA CARE TRANSFER NOTE
Patient: Serene Tipton    Procedure: Procedure(s):  bilateral native nephrectomy       Diagnosis: PKD (polycystic kidney disease) [Q61.3]  Diagnosis Additional Information: No value filed.    Anesthesia Type:   General     Note:    Oropharynx: oropharynx clear of all foreign objects  Level of Consciousness: awake  Oxygen Supplementation: face mask  Level of Supplemental Oxygen (L/min / FiO2): 5  Independent Airway: airway patency satisfactory and stable  Dentition: dentition unchanged  Vital Signs Stable: post-procedure vital signs reviewed and stable  Report to RN Given: handoff report given  Patient transferred to: PACU  Comments: VSS. Breathing spontaneously at a regular rate with adequate tidal volumes and maintaining O2 sats. Denies nausea or pain. No apparent complications from anesthesia. Good level of analgesia from epidural.    Damien Diaz MD   Anesthesia CA-2    Handoff Report: Identifed the Patient, Identified the Reponsible Provider, Reviewed the pertinent medical history, Discussed the surgical course, Reviewed Intra-OP anesthesia mangement and issues during anesthesia, Set expectations for post-procedure period and Allowed opportunity for questions and acknowledgement of understanding      Vitals:  Vitals Value Taken Time   /73 02/01/23 1200   Temp     Pulse 79 02/01/23 1201   Resp     SpO2 100 % 02/01/23 1202   Vitals shown include unvalidated device data.    Electronically Signed By: Damien Diaz MD  February 1, 2023  12:04 PM

## 2023-02-01 NOTE — ANESTHESIA PROCEDURE NOTES
"Epidural catheter Procedure Note    Pre-Procedure   Staff -        Anesthesiologist:  Yang Fuentes MD       Resident/Fellow: Vitaliy Painting MD       Performed By: anesthesiologist and resident       Location: pre-op       Procedure Start/Stop Times: 2/1/2023 7:20 AM and 2/1/2023 7:25 AM       Pre-Anesthestic Checklist: patient identified, IV checked, risks and benefits discussed, informed consent, monitors and equipment checked, pre-op evaluation, at physician/surgeon's request and post-op pain management  Timeout:       Correct Patient: Yes        Correct Procedure: Yes        Correct Site: Yes        Correct Position: Yes   Procedure Documentation  Procedure: epidural catheter       Diagnosis: acute postoperative pain       Patient Position: sitting       Skin prep: Chloraprep       Insertion Site: T8-9. (left paramedian approach).       Technique: LORT saline        NATALIA at 4 cm.       Needle Type: Emairy needle       Needle Gauge: 17.        Needle Length (Inches): 5        Catheter: 19 G.          Catheter threaded easily.             # of attempts: 2 and  # of redirects:  2    Assessment/Narrative         Paresthesias: No.       Test dose of 2 mL lidocaine 1.5% w/ 1:200,000 epinephrine at.         Test dose negative, 3 minutes after injection, for signs of intravascular, subdural, or intrathecal injection.       Insertion/Infusion Method: LORT saline       Aspiration negative for Heme or CSF via Epidural Catheter.    Medication(s) Administered   Medication Administration Time: 2/1/2023 7:20 AM     Comments:  Initial L paramedian approach at T7/8 complicated by dural puncture. Moved 1 level down to T8/9 and successful epidural placement using L paramedian approach. Negative aspiration for CSF and heme. Test dose negative. Patient tolerated well. No further complications or concerns.      FOR [facility] ([facility]) ONLY:   Pain Team Contact information: please page the Pain Team Via [employer]. Search \"Pain\". " During daytime hours, please page the attending first. At night please page the resident first.

## 2023-02-01 NOTE — CONSULTS
Nephrology Initial Consult  February 1, 2023    Serene Tipton MRN: [id number] YOB: 1955  Date of Admission: 2/1/2023  Primary care provider: Ramos Lowry  Requesting physician: Alaan Giang MD    ASSESSMENT AND RECOMMENDATIONS:  ESRD on HD  ESRD 2/2 PKD, has been on dialysis since July 2020. Dialyzes at [facility] under the care of Aaliyah Rhoades for 180 min via LUE AVF. Patient still makes some urine per chart.  - No acute indication for HD today, will plan for tomorrow    Electrolytes/Acid-base - acceptable    Recommendations were communicated to primary team via note    Discussed with Dr. Cesia Tavears MD  Division of Renal Disease and Hypertension  [facility]  myairmail  Vocera Web Console    REASON FOR CONSULT: ESKD    HISTORY OF PRESENT ILLNESS:  Admitting provider and nursing notes reviewed  Serene Tipton is a 67 year old with PMH ESRD 2/2 PKD, HTN, HLD, MDD, GERD who is admitted after bilateral nephrectomy due to significant abdominal symptoms from PKD. Patient reporting abdominal pain during interview, interaction otherwise limited. No nausea, SOB, chest pain.    PAST MEDICAL HISTORY:  Reviewed with patient on 02/01/2023   Past Medical History:   Diagnosis Date     Anemia in chronic kidney disease      Anxiety      Arthritis      Chronic low back pain      CKD (chronic kidney disease)      Depressive disorder 2013     Disease of thyroid gland      End stage renal disease (H)      GERD (gastroesophageal reflux disease)      Hepatic lesion      Hyperlipidemia      Hypertension      Neuromuscular disorder (H)      Osteoporosis      PKD (polycystic kidney disease) 09/01/1990     PTSD (post-traumatic stress disorder)      Tobacco abuse      Vitamin D deficiency        Past Surgical History:   Procedure Laterality Date     BACK SURGERY      spinal fusion w/hardware     BIOPSY Left 09/01/1990    Breast     BREAST LUMPECTOMY, RT/LT Left     Lumpectomy     CYST  REMOVAL Right     rt wrist     CYSTECTOMY PILONIDAL  1981     EYE SURGERY  2008    lasik     FORMATION ARTERIOVENOUS FISTULA    07/28/2020     FRACTURE SURGERY       ORTHOPEDIC SURGERY Right 2005    Shoulder     OTHER SURGICAL HISTORY      carpal tunnel repair     SPINE SURGERY        MEDICATIONS:  PTA Meds  Prior to Admission medications    Medication Sig Last Dose Taking? Auth Provider Long Term End Date   amLODIPine (NORVASC) 5 MG tablet Take 5 mg by mouth At Bedtime 1/31/2023 at 2300 Yes Reported, Patient Yes    B Complex-C (SUPER B COMPLEX PO) Take 1 tablet by mouth daily 1/31/2023 at 0900 Yes Unknown, Entered By History     calcitRIOL (ROCALTROL) 0.5 MCG capsule Take 0.5 mcg by mouth three times a week Given at HD. 1/31/2023 at 0900 Yes Unknown, Entered By History Yes    cholecalciferol (VITAMIN D3) 25 mcg (1000 units) capsule Take 1 capsule by mouth daily Past Week Yes Reported, Patient     cloNIDine (CATAPRES) 0.1 MG tablet Take 0.2 mg by mouth At Bedtime 1/31/2023 at 2300 Yes Reported, Patient Yes    Epoetin Adam (EPOGEN IJ) Inject 1,000 Units into the vein three times a week At dialysis. 1/31/2023 Yes Unknown, Entered By History     furosemide (LASIX) 20 MG tablet Take 40 mg by mouth daily 1/31/2023 Yes Reported, Patient Yes    Iron Sucrose (VENOFER IV) Inject 100 mg into the vein once a week At dialysis 1/31/2023 Yes Unknown, Entered By History     labetalol (NORMODYNE) 300 MG tablet Take 450 mg (1.5 tablet) in the mid morning and take 600 mg (2 tablets) at bedtime. 2/1/2023 at 0400 Yes Unknown, Entered By History Yes    levothyroxine (SYNTHROID/LEVOTHROID) 50 MCG tablet Take 50 mcg by mouth At Bedtime Takes in the middle of the night. 2/1/2023 at 0100 Yes Reported, Patient Yes    lisinopril (ZESTRIL) 40 MG tablet Take 20 mg by mouth 2 times daily 1/31/2023 at 0900 Yes Reported, Patient Yes    LORazepam (ATIVAN) 1 MG tablet Take 0.5-1 mg by mouth daily as needed 2/1/2023 at 0400 Yes Reported, Patient      nortriptyline (PAMELOR) 75 MG capsule Take 75 mg by mouth daily 2/1/2023 Yes Reported, Patient Yes    omeprazole (PRILOSEC) 20 MG DR capsule Take 20 mg by mouth daily 2/1/2023 at 0400 Yes Reported, Patient     oxyCODONE IR (ROXICODONE) 10 MG tablet Take 10 mg by mouth every 4 hours as needed 2/1/2023 Yes Reported, Patient     rosuvastatin (CRESTOR) 10 MG tablet Take 10 mg by mouth At Bedtime 1/31/2023 at 2100 Yes Reported, Patient Yes    sertraline (ZOLOFT) 100 MG tablet Take 200 mg by mouth At Bedtime 2/1/2023 at 0400 Yes Reported, Patient Yes    sevelamer carbonate (RENVELA) 800 MG tablet Take 800 mg by mouth 3 times daily as needed (with meals when eating) When eating 1/31/2023 at 0600 Yes Reported, Patient        Current Meds    acetaminophen  975 mg Oral Q8H     sennosides  5 mL Oral BID    And     docusate  50 mg Oral BID     [START ON 2/2/2023] polyethylene glycol  17 g Oral Daily     sodium chloride (PF)  3 mL Intravenous Q8H     Infusion Meds    EPIDURAL INFUSION       dextrose 5% lactated ringers 50 mL/hr at 02/01/23 1242     ALLERGIES:  Allergies   Allergen Reactions     Penicillins Other (See Comments) and Shortness Of Breath     Breathing problem.  breathing       Carvedilol GI Disturbance     Nausea and vomiting     Ciprofloxacin Muscle Pain (Myalgia)     Morphine Other (See Comments)     Vomiting     No Clinical Screening - See Comments      PN: LW CM1: Contrast Intercapsular - Nonionic Reaction :     Nsaids Other (See Comments)     Sulfa Drugs Itching     Sulfamethoxazole-Trimethoprim      Other reaction(s): itching     Levofloxacin Muscle Pain (Myalgia) and Rash     REVIEW OF SYSTEMS:  Limited ROS performed due to patient condition    SOCIAL HISTORY:   Social History     Socioeconomic History     Marital status: Single     Spouse name: Not on file     Number of children: Not on file     Years of education: Not on file     Highest education level: Not on file   Occupational History     Not on file    Tobacco Use     Smoking status: Former     Packs/day: 0.50     Years: 14.00     Pack years: 7.00     Types: Cigarettes     Quit date: 2021     Years since quittin.6     Smokeless tobacco: Never   Substance and Sexual Activity     Alcohol use: Not Currently     Drug use: Never     Sexual activity: Not on file   Other Topics Concern     Parent/sibling w/ CABG, MI or angioplasty before 65F 55M? Not Asked   Social History Narrative     Not on file     Social Determinants of Health     Financial Resource Strain: Not on file   Food Insecurity: Not on file   Transportation Needs: Not on file   Physical Activity: Not on file   Stress: Not on file   Social Connections: Not on file   Intimate Partner Violence: Not on file   Housing Stability: Not on file     FAMILY MEDICAL HISTORY:   Family History   Problem Relation Age of Onset     Polycystic Kidney Diease Mother      Hypertension Mother      Kidney Disease Mother      Cerebrovascular Disease Mother      Chronic Obstructive Pulmonary Disease Father      Multiple Sclerosis Father      Polycystic Kidney Diease Sister      Cardiac Sudden Death Sister 52     Hypertension Sister      Kidney Disease Sister      Polycystic Kidney Diease Maternal Grandmother      Hypertension Maternal Grandmother      Kidney Disease Maternal Grandmother      Cerebrovascular Disease Maternal Grandmother      Heart Disease Maternal Grandfather      Hyperlipidemia Paternal Grandmother      Cerebrovascular Disease Paternal Grandmother      Coronary Artery Disease Paternal Grandfather      Pulmonary Embolism Paternal Grandfather      Anesthesia Reaction No family hx of      PHYSICAL EXAM:   Temp  Av  F (36.7  C)  Min: 97.5  F (36.4  C)  Max: 98.4  F (36.9  C)      Pulse  Av.6  Min: 72  Max: 86 Resp  Av.9  Min: 16  Max: 27  SpO2  Av.4 %  Min: 94 %  Max: 100 %       BP (!) 154/76   Pulse 82   Temp 97.5  F (36.4  C) (Oral)   Resp 22   Ht 1.524 m (5')   Wt 48.3 kg (106 lb  7.7 oz)   SpO2 95%   BMI 20.80 kg/m     Date 02/01/23 0700 - 02/02/23 0659   Shift 0363-6741 3257-5639 4252-5505 24 Hour Total   INTAKE   I.V. 2200   2200   IV Piggyback 16.67   16.67   Shift Total(mL/kg) 2216.67(45.89)   2216.67(45.89)   OUTPUT   Urine 30   30   Blood 200   200   Shift Total(mL/kg) 230(4.76)   230(4.76)   Weight (kg) 48.3 48.3 48.3 48.3     Admit Weight: 48.3 kg (106 lb 7.7 oz)    GENERAL APPEARANCE: mild distress, awake  EYES: no scleral icterus  Pulmonary: breathing non-labored  CV: regular rhythm, normal rate   - Edema trace  GI: soft, diffusely tender  MS: no overt evidence of inflammation in joints  : no keith  SKIN: no rash on exposed surfaces  NEURO: face symmetric, following commands    LABS:  I have reviewed the following labs:  CMP  Recent Labs   Lab 02/01/23  1320 02/01/23  1103 02/01/23  0622   * 130*  --    POTASSIUM 4.8 4.4  --    CHLORIDE 98  --   --    CO2 25  --   --    ANIONGAP 12  --   --    * 128* 106*   BUN 27.9*  --   --    CR 3.88*  --   --    GFRESTIMATED 12*  --   --    GAVIN 8.4*  --   --    MAG 1.9  --   --    PHOS 3.1  --   --      CBC  Recent Labs   Lab 02/01/23  1320 02/01/23  1103 01/26/23  1422   HGB 10.1* 8.8* 11.5*   WBC 10.8  --  9.0   RBC 2.93*  --  3.36*   HCT 32.6*  --  36.0   *  --  107*   MCH 34.5*  --  34.2*   MCHC 31.0*  --  31.9   RDW 15.1*  --  14.4     --  229     INRNo lab results found in last 7 days.  ABG  Recent Labs   Lab 02/01/23  1103   O2PER 63.0     URINE STUDIES  Recent Labs   Lab Test 05/19/21  1520   COLOR Yellow   APPEARANCE Clear   URINEGLC Negative   URINEBILI Small*   URINEKETONE Negative   SG 1.020   UBLD Negative   URINEPH 6.5   PROTEIN 100*   NITRITE Negative   LEUKEST Trace*   RBCU 2   WBCU 6*     No lab results found.  PTH  No lab results found.  IRON STUDIES  Recent Labs   Lab Test 01/26/23  1422   IRON 30*      IRONSAT 12*   WILMER 1,083*     IMAGING:  All imaging studies reviewed by me.     Maria Victoria  MD [place]

## 2023-02-01 NOTE — ANESTHESIA PROCEDURE NOTES
Airway       Patient location during procedure: OR       Procedure Start/Stop Times: 2/1/2023 7:41 AM  Staff -        Anesthesiologist:  Leonardo Sheikh MD       Resident/Fellow: Damien Diaz MD       Performed By: resident  Consent for Airway        Urgency: elective  Indications and Patient Condition       Indications for airway management: abdiaziz-procedural       Induction type:intravenous       Mask difficulty assessment: 1 - vent by mask    Final Airway Details       Final airway type: endotracheal airway       Successful airway: ETT - single and Oral  Endotracheal Airway Details        ETT size (mm): 7.0       Cuffed: yes       Successful intubation technique: direct laryngoscopy       DL Blade Type: MAC 3       Grade View of Cords: 1       Adjucts: stylet       Position: Right       Measured from: lips       Secured at (cm): 20       Bite block used: None    Post intubation assessment        Placement verified by: capnometry, equal breath sounds and chest rise        Number of attempts at approach: 1       Number of other approaches attempted: 0       Secured with: pink tape       Ease of procedure: easy       Dentition: Intact (minor lower lip abrasion. No bleeding or hematoma.)    Medication(s) Administered   Medication Administration Time: 2/1/2023 7:41 AM

## 2023-02-01 NOTE — OP NOTE
Transplant Surgery  Operative Note    Preop Dx:  PKD  Postop Dx: same  Procedure: Bilateral native nephrectomy  Surgeon: Alana Giang M.D.  ASSISTANT:  Jaskaran Moreland fellow. Herve Miranda fellow. There was no qualified general surgery resident available to assist during this procedure.   Anesthesia: General  EBL: 200 ml  Fluids: 1800 mL  UO: 30 mL  Drains: none  Specimen: bilateral polycystic kidneys.  Complications: None  Findings:  See specimen  Complications: None.    Indication: The patient has PKD with cystic kidneys causing compressive symptoms.  After discussing the risks and benefits of surgery and potential complications, the patient provided informed consent.     DETAILS OF PROCEDURE:  The patient was brought to the operating room, placed in a supine position.  Perioperative prophylactic IV antibiotics were given.  Anesthesia was adminisitered. The abdomen was prepped and draped in the usual sterile fashion.  Time out was performed.    A midline abdiaziz-umbilical laparotomy incision was made and fascia entered with electrocautery. The right colon was mobilized from its lateral attachments and Kocherization of the duodenum performed to expose the IVC and renal vasculature and ureter. An omni retractor was placed to help with exposure. The ureter was exposed and ligated with clips. The gonadal vein was spared. The renal vein and artery were exposed and staple ligated independently. The adrenal gland was then dissected off the superior pole of the kidney and the kidney was dissected free of all lateral and medial attachments with Ligasure and electrocautery. The right kidney was passed from the field. We then turned our attention to the left kidney and in similar fashion mobilized the sigmoid colon, spleen, and tail of the pancreas to expose our renal structures. The ureter was again clip ligated. The renal vein and artery were separately divided with a stapler (vascular load) as above. The adrenal and  gonadal veins were spared. The kidney was then dissected free and passed from the field. Hemostasis was attained with combination electrocautery, Vistaseal, and hemostatic agents for both renal fossa. A small splenic decapsulization was treated with vistaseal and snow hemastatic agent. Comfortable with hemostasis, the placement of the NG tube was confirmed in the stomach and the abdomen closed with running 0 looped PDS. The subcutaneous tissue and skin were then closed with 3-0 vicryl and 4-0 monocryl respectively. Dermabond was applied to the incision.    All needle, sponge, and instrument counts were accurate.  The patient tolerated the procedure well without apparent complications and was trasfered to the PACU in good condition.  Faculty was present for critical portions of the procedure.    I was present during the key portions of the procedure, and I was immediately available for the entire procedure. There was no qualified resident available to assist with the entire procedure. The Gretel Moreland  noted above participated as the first assistant in all parts of the dictated procedure and Dr Maier was the primary in the opening and closure with assistance from me as needed.

## 2023-02-01 NOTE — BRIEF OP NOTE
[facility]    Brief Operative Note    Pre-operative diagnosis: PKD (polycystic kidney disease) [Q61.3]  Post-operative diagnosis Same as pre-operative diagnosis    Procedure: Procedure(s):  bilateral native nephrectomy  Surgeon: Surgeon(s) and Role:     * Alana Giang MD - Primary     * Jaskaran Moreland MD - Fellow - Assisting     * Herve Rojas MD - Fellow - Assisting  Anesthesia: General with Block   Estimated Blood Loss: 200 ml    Drains: None  Specimens:   ID Type Source Tests Collected by Time Destination   1 : Left kidney Tissue Kidney, Left SURGICAL PATHOLOGY EXAM, RESEARCH SPECIMEN FOR BIONET TESTING Alana Giang MD 2/1/2023 10:48 AM    2 : Right kidney Tissue Kidney, Right SURGICAL PATHOLOGY EXAM, RESEARCH SPECIMEN FOR BIONET TESTING Alana Giang MD 2/1/2023 10:50 AM      Findings:   bilateral polycystic kidneys.  Complications: None.  Implants: * No implants in log *

## 2023-02-01 NOTE — PROGRESS NOTES
Transplant Surgery Post-Op Check    S: Abdominal pain 6/10, no nausea, no dyspnea    O: Vital signs:  Temp: 97.5  F (36.4  C) Temp src: Oral BP: (!) 154/76 Pulse: 82       Gen: Uncomfortable  Skin: Pink  CV: Well perfused  Resp: Unlabored, 1L O2  GI: Soft, flat, incision intact  : No keith  Ext: No edema  Neuro: Wakes easily to voice.     A/P: POD #0 bilateral native nephrectomy    -VS, capnography, oximetry  -Epidural, requested bolus from Anesthesia  -Add ketamine 2.5mg q4H PRN  -NG to sxn  -OOB later today  -IS    Sarah Betancourt NP

## 2023-02-01 NOTE — PHARMACY-ADMISSION MEDICATION HISTORY
Admission Medication History Completed by Pharmacy    See [facility] Admission Navigator for allergy information, preferred outpatient pharmacy, prior to admission medications and immunization status.     Medication History Sources:     Patient reported last doses to RN. Confirmed dosing with dispense history and chart review    Changes made to PTA medication list (reason):    Added:  All medications added this admission    Deleted: None    Changed: None    Additional Information:    None    Prior to Admission medications    Medication Sig Last Dose Taking? Auth Provider Long Term End Date   amLODIPine (NORVASC) 5 MG tablet Take 5 mg by mouth At Bedtime 1/31/2023 at 2300 Yes Reported, Patient Yes    B Complex-C (SUPER B COMPLEX PO) Take 1 tablet by mouth daily 1/31/2023 at 0900 Yes Unknown, Entered By History     calcitRIOL (ROCALTROL) 0.5 MCG capsule Take 0.5 mcg by mouth three times a week Given at HD. 1/31/2023 at 0900 Yes Unknown, Entered By History Yes    cholecalciferol (VITAMIN D3) 25 mcg (1000 units) capsule Take 1 capsule by mouth daily Past Week Yes Reported, Patient     cloNIDine (CATAPRES) 0.1 MG tablet Take 0.2 mg by mouth At Bedtime 1/31/2023 at 2300 Yes Reported, Patient Yes    Epoetin Adam (EPOGEN IJ) Inject 1,000 Units into the vein three times a week At dialysis. 1/31/2023 Yes Unknown, Entered By History     furosemide (LASIX) 20 MG tablet Take 40 mg by mouth daily 1/31/2023 Yes Reported, Patient Yes    Iron Sucrose (VENOFER IV) Inject 100 mg into the vein once a week At dialysis 1/31/2023 Yes Unknown, Entered By History     labetalol (NORMODYNE) 300 MG tablet Take 450 mg (1.5 tablet) in the mid morning and take 600 mg (2 tablets) at bedtime. 2/1/2023 at 0400 Yes Unknown, Entered By History Yes    levothyroxine (SYNTHROID/LEVOTHROID) 50 MCG tablet Take 50 mcg by mouth At Bedtime Takes in the middle of the night. 2/1/2023 at 0100 Yes Reported, Patient Yes    lisinopril (ZESTRIL) 40 MG tablet Take 20 mg  by mouth 2 times daily 1/31/2023 at 0900 Yes Reported, Patient Yes    LORazepam (ATIVAN) 1 MG tablet Take 0.5-1 mg by mouth daily as needed 2/1/2023 at 0400 Yes Reported, Patient     nortriptyline (PAMELOR) 75 MG capsule Take 75 mg by mouth daily 2/1/2023 Yes Reported, Patient Yes    omeprazole (PRILOSEC) 20 MG DR capsule Take 20 mg by mouth daily 2/1/2023 at 0400 Yes Reported, Patient     oxyCODONE IR (ROXICODONE) 10 MG tablet Take 10 mg by mouth every 4 hours as needed 2/1/2023 Yes Reported, Patient     rosuvastatin (CRESTOR) 10 MG tablet Take 10 mg by mouth At Bedtime 1/31/2023 at 2100 Yes Reported, Patient Yes    sertraline (ZOLOFT) 100 MG tablet Take 200 mg by mouth At Bedtime 2/1/2023 at 0400 Yes Reported, Patient Yes    sevelamer carbonate (RENVELA) 800 MG tablet Take 800 mg by mouth 3 times daily as needed (with meals when eating) When eating 1/31/2023 at 0600 Yes Reported, Patient         Date completed: 02/01/23    Medication history completed by: Garrison Sawyer [facility]

## 2023-02-01 NOTE — ANESTHESIA POSTPROCEDURE EVALUATION
Patient: Serene Tipton    Procedure: Procedure(s):  bilateral native nephrectomy       Anesthesia Type:  General    Note:  Disposition: Admission   Postop Pain Control: Uneventful            Sign Out: Well controlled pain   PONV: No   Neuro/Psych: Uneventful            Sign Out: Acceptable/Baseline neuro status   Airway/Respiratory: Uneventful            Sign Out: Acceptable/Baseline resp. status   CV/Hemodynamics: Uneventful            Sign Out: Acceptable CV status; No obvious hypovolemia; No obvious fluid overload   Other NRE: NONE   DID A NON-ROUTINE EVENT OCCUR? No           Last vitals:  Vitals Value Taken Time   /68 02/01/23 1340   Temp 36.9  C (98.4  F) 02/01/23 1155   Pulse 84 02/01/23 1342   Resp 20 02/01/23 1342   SpO2 95 % 02/01/23 1342   Vitals shown include unvalidated device data.    Electronically Signed By: Efren Welch MD  February 1, 2023  1:44 PM

## 2023-02-02 PROBLEM — G89.18 ACUTE POST-OPERATIVE PAIN: Status: ACTIVE | Noted: 2023-02-02

## 2023-02-02 PROBLEM — F11.90 CHRONIC, CONTINUOUS USE OF OPIOIDS: Status: ACTIVE | Noted: 2023-02-02

## 2023-02-02 LAB
ANION GAP SERPL CALCULATED.3IONS-SCNC: 14 MMOL/L (ref 7–15)
BUN SERPL-MCNC: 30.1 MG/DL (ref 8–23)
CALCIUM SERPL-MCNC: 8.8 MG/DL (ref 8.8–10.2)
CHLORIDE SERPL-SCNC: 100 MMOL/L (ref 98–107)
CREAT SERPL-MCNC: 4.35 MG/DL (ref 0.51–0.95)
DEPRECATED HCO3 PLAS-SCNC: 22 MMOL/L (ref 22–29)
ERYTHROCYTE [DISTWIDTH] IN BLOOD BY AUTOMATED COUNT: 15.3 % (ref 10–15)
GFR SERPL CREATININE-BSD FRML MDRD: 11 ML/MIN/1.73M2
GLUCOSE BLDC GLUCOMTR-MCNC: 110 MG/DL (ref 70–99)
GLUCOSE SERPL-MCNC: 654 MG/DL (ref 70–99)
HBV SURFACE AB SERPL IA-ACNC: 36.19 M[IU]/ML
HBV SURFACE AB SERPL IA-ACNC: REACTIVE M[IU]/ML
HBV SURFACE AG SERPL QL IA: NONREACTIVE
HCT VFR BLD AUTO: 33.3 % (ref 35–47)
HGB BLD-MCNC: 10.4 G/DL (ref 11.7–15.7)
MAGNESIUM SERPL-MCNC: 1.7 MG/DL (ref 1.7–2.3)
MCH RBC QN AUTO: 34.7 PG (ref 26.5–33)
MCHC RBC AUTO-ENTMCNC: 31.2 G/DL (ref 31.5–36.5)
MCV RBC AUTO: 111 FL (ref 78–100)
PHOSPHATE SERPL-MCNC: 4.2 MG/DL (ref 2.5–4.5)
PLATELET # BLD AUTO: 275 10E3/UL (ref 150–450)
POTASSIUM SERPL-SCNC: 4.9 MMOL/L (ref 3.4–5.3)
RBC # BLD AUTO: 3 10E6/UL (ref 3.8–5.2)
SODIUM SERPL-SCNC: 136 MMOL/L (ref 136–145)
WBC # BLD AUTO: 9.7 10E3/UL (ref 4–11)

## 2023-02-02 PROCEDURE — 84100 ASSAY OF PHOSPHORUS: CPT | Performed by: SURGERY

## 2023-02-02 PROCEDURE — 250N000013 HC RX MED GY IP 250 OP 250 PS 637: Performed by: SURGERY

## 2023-02-02 PROCEDURE — 250N000011 HC RX IP 250 OP 636: Performed by: SURGERY

## 2023-02-02 PROCEDURE — 36415 COLL VENOUS BLD VENIPUNCTURE: CPT | Performed by: SURGERY

## 2023-02-02 PROCEDURE — 120N000011 HC R&B TRANSPLANT UMMC

## 2023-02-02 PROCEDURE — 250N000013 HC RX MED GY IP 250 OP 250 PS 637: Performed by: NURSE PRACTITIONER

## 2023-02-02 PROCEDURE — 250N000011 HC RX IP 250 OP 636: Performed by: NURSE PRACTITIONER

## 2023-02-02 PROCEDURE — 90937 HEMODIALYSIS REPEATED EVAL: CPT

## 2023-02-02 PROCEDURE — 250N000009 HC RX 250: Performed by: NURSE PRACTITIONER

## 2023-02-02 PROCEDURE — 258N000003 HC RX IP 258 OP 636: Performed by: INTERNAL MEDICINE

## 2023-02-02 PROCEDURE — 86706 HEP B SURFACE ANTIBODY: CPT | Performed by: INTERNAL MEDICINE

## 2023-02-02 PROCEDURE — 5A1D70Z PERFORMANCE OF URINARY FILTRATION, INTERMITTENT, LESS THAN 6 HOURS PER DAY: ICD-10-PCS | Performed by: INTERNAL MEDICINE

## 2023-02-02 PROCEDURE — 87340 HEPATITIS B SURFACE AG IA: CPT | Performed by: INTERNAL MEDICINE

## 2023-02-02 PROCEDURE — 85027 COMPLETE CBC AUTOMATED: CPT | Performed by: SURGERY

## 2023-02-02 PROCEDURE — 83735 ASSAY OF MAGNESIUM: CPT | Performed by: SURGERY

## 2023-02-02 PROCEDURE — 99233 SBSQ HOSP IP/OBS HIGH 50: CPT | Mod: FS | Performed by: CLINICAL NURSE SPECIALIST

## 2023-02-02 PROCEDURE — 01996 DLY HOSP MGMT EDRL RX ADMIN: CPT | Mod: AA | Performed by: NURSE PRACTITIONER

## 2023-02-02 PROCEDURE — 258N000003 HC RX IP 258 OP 636: Performed by: NURSE PRACTITIONER

## 2023-02-02 PROCEDURE — 80048 BASIC METABOLIC PNL TOTAL CA: CPT | Performed by: SURGERY

## 2023-02-02 RX ORDER — OXYCODONE HCL 5 MG/5 ML
10 SOLUTION, ORAL ORAL EVERY 4 HOURS PRN
Status: DISCONTINUED | OUTPATIENT
Start: 2023-02-02 | End: 2023-02-02

## 2023-02-02 RX ORDER — CLONIDINE HYDROCHLORIDE 0.1 MG/1
0.1 TABLET ORAL EVERY 8 HOURS PRN
Status: DISCONTINUED | OUTPATIENT
Start: 2023-02-02 | End: 2023-02-03

## 2023-02-02 RX ORDER — LEVOTHYROXINE SODIUM 50 UG/1
50 TABLET ORAL AT BEDTIME
Status: DISCONTINUED | OUTPATIENT
Start: 2023-02-02 | End: 2023-02-06 | Stop reason: HOSPADM

## 2023-02-02 RX ORDER — LABETALOL HYDROCHLORIDE 5 MG/ML
10 INJECTION, SOLUTION INTRAVENOUS EVERY 4 HOURS PRN
Status: DISCONTINUED | OUTPATIENT
Start: 2023-02-02 | End: 2023-02-02

## 2023-02-02 RX ORDER — OXYCODONE HCL 5 MG/5 ML
5 SOLUTION, ORAL ORAL EVERY 4 HOURS PRN
Status: DISCONTINUED | OUTPATIENT
Start: 2023-02-02 | End: 2023-02-02

## 2023-02-02 RX ORDER — SERTRALINE HYDROCHLORIDE 20 MG/ML
200 SOLUTION ORAL AT BEDTIME
Status: DISCONTINUED | OUTPATIENT
Start: 2023-02-02 | End: 2023-02-05

## 2023-02-02 RX ADMIN — Medication 40 MG: at 11:21

## 2023-02-02 RX ADMIN — KETAMINE HYDROCHLORIDE 2.5 MG: 10 INJECTION INTRAMUSCULAR; INTRAVENOUS at 03:30

## 2023-02-02 RX ADMIN — SENNOSIDES 5 ML: 8.8 LIQUID ORAL at 07:38

## 2023-02-02 RX ADMIN — OXYCODONE HYDROCHLORIDE 5 MG: 5 SOLUTION ORAL at 07:38

## 2023-02-02 RX ADMIN — HYDROMORPHONE HYDROCHLORIDE 0.4 MG: 0.2 INJECTION, SOLUTION INTRAMUSCULAR; INTRAVENOUS; SUBCUTANEOUS at 01:51

## 2023-02-02 RX ADMIN — KETAMINE HYDROCHLORIDE 3 MG/HR: 100 INJECTION, SOLUTION, CONCENTRATE INTRAMUSCULAR; INTRAVENOUS at 11:18

## 2023-02-02 RX ADMIN — AMLODIPINE 5 MG: 1 SUSPENSION ORAL at 17:16

## 2023-02-02 RX ADMIN — ACETAMINOPHEN 975 MG: 325 SUSPENSION ORAL at 03:18

## 2023-02-02 RX ADMIN — POLYETHYLENE GLYCOL 3350 17 G: 17 POWDER, FOR SOLUTION ORAL at 07:38

## 2023-02-02 RX ADMIN — Medication: at 11:05

## 2023-02-02 RX ADMIN — CLONIDINE HYDROCHLORIDE 0.1 MG: 0.1 TABLET ORAL at 19:26

## 2023-02-02 RX ADMIN — ACETAMINOPHEN 975 MG: 325 SUSPENSION ORAL at 11:23

## 2023-02-02 RX ADMIN — ACETAMINOPHEN 975 MG: 325 SUSPENSION ORAL at 19:26

## 2023-02-02 RX ADMIN — SODIUM CHLORIDE 250 ML: 9 INJECTION, SOLUTION INTRAVENOUS at 08:38

## 2023-02-02 RX ADMIN — HYDROMORPHONE HYDROCHLORIDE 0.4 MG: 0.2 INJECTION, SOLUTION INTRAMUSCULAR; INTRAVENOUS; SUBCUTANEOUS at 08:23

## 2023-02-02 RX ADMIN — LEVOTHYROXINE SODIUM 50 MCG: 50 TABLET ORAL at 23:12

## 2023-02-02 RX ADMIN — OXYCODONE HYDROCHLORIDE 5 MG: 5 SOLUTION ORAL at 07:39

## 2023-02-02 RX ADMIN — SENNOSIDES 10 ML: 8.8 LIQUID ORAL at 19:26

## 2023-02-02 RX ADMIN — DOCUSATE SODIUM LIQUID 50 MG: 100 LIQUID ORAL at 19:27

## 2023-02-02 RX ADMIN — SERTRALINE HYDROCHLORIDE 200 MG: 20 SOLUTION ORAL at 23:12

## 2023-02-02 RX ADMIN — SODIUM CHLORIDE 300 ML: 9 INJECTION, SOLUTION INTRAVENOUS at 08:30

## 2023-02-02 RX ADMIN — DOCUSATE SODIUM LIQUID 50 MG: 100 LIQUID ORAL at 07:38

## 2023-02-02 ASSESSMENT — ACTIVITIES OF DAILY LIVING (ADL)
ADLS_ACUITY_SCORE: 24
ADLS_ACUITY_SCORE: 18
ADLS_ACUITY_SCORE: 24
ADLS_ACUITY_SCORE: 18
ADLS_ACUITY_SCORE: 24

## 2023-02-02 NOTE — PROGRESS NOTES
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 Patient reporting abdominal pain during interview, interaction otherwise limited. No nausea, SOB, chest pain.    Interval History :   Mrs Tipton was seen on HD this am, pulled 1L of UF without issue.  Main issue is pain control POD#1, starting PCA and ketamine gtt.  Would restart her home clonidine (0.2mg daily) so she doesn't have rebound HTN, can restart amlodipine today (5mg) and if still hypertensive with better pain control can add back her home dose of lisinopril tomorrow am.  Long term planning on pursuing kidney transplant so needed nephrectomy to accommodate transplanted kidney.      Assessment & Recommendations:   ESRD-Due to PKD, on HD since 2020 at [facility] under the care of Dr Rhoades.  3h runs, LUE AVF.  Had bilateral nephrectomy B/O ongoing symptoms including weight loss from difficulty eating.  Listed since  for  donor.    -HD today, likely every other day while recovering from surgery.     -Working on pain control    HBP- Hypertensive now.  Home BP meds include clonidine, amlodipine , losartan.  Difficult to predict what she will need post op   -  Will need IV meds for BP control until she can take po   -  would use labelolol to avoid clonidine withdrawal     Volume status-No edema, pulled 1L on run today.     Electrolytes/pH-K 4.9, bicarb 22, Na 136, no issues.     Ca/phos/pth-Ca 8.8, Mg and Phos WNL.      Anemia-Hgb 10.4, on epo 1k with runs.     Nutrition-will be evaluated.    Polycystic Disease: pt has ESKD from PCKD, as well as numerous cysts in the liver.  She also has bilateral carotid aneurysms not amenable to repair.    Time spent: 30 minutes on this date of encounter for chart review, physical exam, medical decision making and co-ordination of care.      Seen and discussed with Dr Deluna    Recommendations were communicated to primary team via verbal communication.     LIDIA Whittaker CNS  Clinical Nurse Specialist  [phone]      Review of Systems:   I reviewed the following systems:  Gen: + incisional pain, starting IV pain meds.   CV: No CP at rest  Resp: No SOB at rest  GI: No N/V      Physical Exam:   I/O last 3 completed shifts:  In: 3130.84 [I.V.:3114.17; IV Piggyback:16.67]  Out: 530 [Urine:30; Emesis/NG output:300; Blood:200]   BP (!) 165/82 (BP Location: Right arm)   Pulse 96   Temp 98  F (36.7  C) (Oral)   Resp 16   Ht 1.524 m (5')   Wt 50.3 kg (110 lb 14.3 oz)   SpO2 94%   BMI 21.66 kg/m       GENERAL APPEARANCE: mild distress, +pain, awake  EYES: no scleral icterus  Pulmonary: breathing non-labored  CV: regular rhythm, normal rate   - Edema trace  GI: soft, diffusely tender. NG tube in place  MS: no overt evidence of inflammation in joints  : no keith  SKIN: no rash on exposed surfaces  NEURO: face symmetric, following commands    Labs:   All labs reviewed by me  Electrolytes/Renal -   Recent Labs   Lab Test 02/02/23  0723 02/02/23  0612 02/01/23  1320 02/01/23  1103 02/01/23  0622 05/19/21  1507   NA  --  136 135* 130*  --  136   POTASSIUM  --  4.9 4.8 4.4  --  3.9   CHLORIDE  --  100 98  --   --  98   CO2  --  22 25  --   --  31   BUN  --  30.1* 27.9*  --   --  27   CR  --  4.35* 3.88*  --   --  3.43*   * 654* 162* 128*   < > 69*   GAVIN  --  8.8 8.4*  --   --  9.5   MAG  --  1.7 1.9  --   --   --    PHOS  --  4.2 3.1  --   --  2.8    < > = values in this interval not displayed.       CBC -   Recent Labs   Lab Test 02/02/23  0612 02/01/23  1320 02/01/23  1103 01/26/23  1422   WBC 9.7 10.8  --  9.0   HGB 10.4* 10.1* 8.8* 11.5*    272  --  229       LFTs -   Recent Labs   Lab Test 08/18/22  1731 05/19/21  1507   ALKPHOS 128* 122   BILITOTAL 0.3 0.5   ALT 11 18   AST 16 11   PROTTOTAL 6.4 8.3   ALBUMIN 4.5 4.2       Iron  Panel -   Recent Labs   Lab Test 01/26/23  1422   IRON 30*   IRONSAT 12*   WILMER 1,083*           Current Medications:    acetaminophen  975 mg Oral Q8H     amlodipine  5 mg Oral Daily     sennosides  10 mL Oral BID    And     docusate  50 mg Oral BID     levothyroxine  50 mcg Oral or NG Tube At Bedtime     pantoprazole  40 mg Oral or NG Tube QAM AC     polyethylene glycol  17 g Oral Daily     sertraline  200 mg Oral or NG Tube At Bedtime     sodium chloride (PF)  3 mL Intravenous Q8H       EPIDURAL INFUSION 6 mL/hr at 02/01/23 1732     dextrose 5% lactated ringers 10 mL/hr at 02/02/23 0957     HYDROmorphone       ketamine 2 mg/mL ADULT 3 mg/hr (02/02/23 1118)       I have seen and evaluated this patient with the RAS as part of a shared visit.  I personally reviewed the vital signs, medications and labs, and interviewed the patient.  I have participated in the medical decision making as outlined in our joint note.    Assessment and recommendations:      68 yo F with ESKD from PCKD adm for nephrectomy.  Stable post op but has severe pain    Pt seen on HD run.     ESKD: plan HD q o d while recovering from surgery  Volume: appears euvolemic  HBP: will need IV meds until ileus resolves    Beth Deluna MD

## 2023-02-02 NOTE — DISCHARGE SUMMARY
[facility]    Discharge Summary  Transplant Surgery    Date of Admission:  2/1/2023  Date of Discharge:  2/6/2023  Discharging Provider: Deshawn Weldon MD / Sarah Betancourt NP     Discharge Diagnoses   Principal Problem:    Polycystic kidney disease  Active Problems:    Hypertension    Chronic low back pain    Postoperative anemia due to acute blood loss    Enlarged kidney as indication for native nephrectomy    Acute post-operative pain    Chronic, continuous use of opioids      History of Present Illness   Serene Tipton is an 67 year old female with history of PKD with ESRD on hemodialysis, HTN, GERD, PTSD, anxiety, and chronic low back pain on long-term opioids. S/p bilateral native nephrectomy on 2/1/2023.    Hospital Course   Bilateral nephrectomy for PKD: 2/1/23. Able to ambulate and tolerate oral diet prior to discharge. Bowel function returned.    Neuro:  Acute on chronic pain: Uses ~50mg oxycodone daily at home. Pain Management consulted. Patient received an epidural for shellie-op pain control. Controlled post-op with a ketamine gtt and hydromorphone PCA. Will discharge on acetaminophen, lidoderm, and oxycodone 10mg every 3 hours as needed for 2 days and then return to home dose of oxycodone 10mg every 4 hours as needed. Last oxycodone fill #150 10mg tabs on 1/27/23. Due to time hospital, patient should have adequate supply at home to cover 2 days of higher dosing.    Renal:  ESRD: Nephrology consulted for dialysis. Resume normal dialysis schedule on discharge.  Hyponatremia, mild: Na 133. Likely secondary to volume.    Hematology:  Acute blood loss anemia, anemia of ESRD: Shellie-op blood loss. Hgb 8.7, stable at time of discharge.      Significant Results and Procedures   2/1/23  Preop Dx:  PKD  Postop Dx: same  Procedure: Bilateral native nephrectomy  Surgeon: Alana Giang M.D.    Pending Results   Unresulted Labs Ordered in the Past 30 Days of this Admission      Date and Time Order Name Status Description    2/1/2023 10:50 AM Surgical Pathology Exam In process           Code Status   Full    Primary Care Physician   Ramos Lowry    Physical Exam   Temp: 98  F (36.7  C) Temp src: Oral BP: (!) 142/72 Pulse: 84   Resp: 18 SpO2: 96 % O2 Device: None (Room air)    Vitals:    02/04/23 0306 02/05/23 0600 02/06/23 0550   Weight: 44.9 kg (99 lb) 43.4 kg (95 lb 11.2 oz) 44.9 kg (99 lb)     Vital Signs with Ranges  Temp:  [98  F (36.7  C)-98.7  F (37.1  C)] 98  F (36.7  C)  Pulse:  [84-95] 84  Resp:  [16-18] 18  BP: (131-160)/() 142/72  SpO2:  [95 %-96 %] 96 %  I/O last 3 completed shifts:  In: 481.17 [P.O.:356; I.V.:125.17]  Out: -     General Appearance: in no apparent distress.   Skin: Warm, perfused  Heart: Perfused  Lungs: Room air, unlabored  Abdomen: The abdomen is appropriately tender, ND, soft. Midline incision is present c/d/i with glue in place.  Extremities: edema: trace, strength 5/5  Neurologic: alert and oriented x4.     Time Spent on this Encounter   ISarah APRN CNP, personally saw the patient today and spent 35 minutes discharging this patient.    Discharge Disposition   Discharged to home  Condition at discharge: Stable    Consultations This Hospital Stay   NEPHROLOGY KIDNEY/PANCREAS TRANSPLANT ADULT IP CONSULT  OCCUPATIONAL THERAPY ADULT IP CONSULT    Discharge Orders       Reason for your hospital stay    Patient underwent bilateral nephrectomy.     Activity    Walk at least four times a day, lift no greater than 10 pounds for 6-8 weeks from the time of surgery.  No driving while taking narcotics or 3 weeks after surgery.     Monitor and record    Blood pressure daily  Monitor for fever     Wound care and dressings    Instructions to care for your wound at home:   OK to shower. Gently wash around your incisions with soap and water.   Don't bathe/submerge incisions until follow up with surgeon.  Wear loose-fitting clothes. This will help  you be more comfortable and cause less irritation around your incisions.     Follow Up ([facility])    Follow up with Dr. Giang in Transplant Clinic in ~2 weeks.     When to contact your care team    WHEN TO CONTACT YOUR SURGEON:   Notify your surgeon if you have increased abdominal pain, increased redness or drainage from your incision, fever greater than 100.5F.   Please call the hospital switchboard at [phone] and ask to have the kidney transplant surgery fellow paged for urgent medical questions, or present to the emergency department.     Diet    Follow this diet upon discharge: Regular diet, follow up with nephrologist for any diet restrictions     Discharge Medications    Current Discharge Medication List      START taking these medications    Details   acetaminophen (TYLENOL) 325 MG tablet Take 1-2 tablets (325-650 mg) by mouth every 4 hours as needed for pain  Qty: 100 tablet, Refills: 0    Associated Diagnoses: Acute post-operative pain      Lidocaine (LIDOCARE) 4 % Patch Place 2 patches onto the skin every 24 hours Place on either side of incision for 12 hours per day. To prevent lidocaine toxicity, patient should be patch free for 12 hrs daily.  Qty: 10 patch, Refills: 0    Associated Diagnoses: Acute post-operative pain      polyethylene glycol (MIRALAX) 17 GM/Dose powder Take 17 g by mouth daily  Qty: 510 g, Refills: 3    Associated Diagnoses: Acute post-operative pain      senna-docusate (SENOKOT-S/PERICOLACE) 8.6-50 MG tablet Take 1-2 tablets by mouth daily  Qty: 60 tablet, Refills: 3    Associated Diagnoses: Acute post-operative pain         CONTINUE these medications which have CHANGED    Details   oxyCODONE (ROXICODONE) 10 MG tablet Use oxycodone 10mg every 3 hours as needed for pain on 2/6 & 2/7. Then return to usual home dose.  Refills: 0    Associated Diagnoses: Acute post-operative pain         CONTINUE these medications which have NOT CHANGED    Details   amLODIPine (NORVASC) 5 MG  tablet Take 5 mg by mouth At Bedtime      B Complex-C (SUPER B COMPLEX PO) Take 1 tablet by mouth daily      calcitRIOL (ROCALTROL) 0.5 MCG capsule Take 0.5 mcg by mouth three times a week Given at HD.      cholecalciferol (VITAMIN D3) 25 mcg (1000 units) capsule Take 1 capsule by mouth daily      cloNIDine (CATAPRES) 0.1 MG tablet Take 0.2 mg by mouth At Bedtime      Epoetin Adam (EPOGEN IJ) Inject 1,000 Units into the vein three times a week At dialysis.      Iron Sucrose (VENOFER IV) Inject 100 mg into the vein once a week At dialysis      labetalol (NORMODYNE) 300 MG tablet Take 450 mg (1.5 tablet) in the mid morning and take 600 mg (2 tablets) at bedtime.      levothyroxine (SYNTHROID/LEVOTHROID) 50 MCG tablet Take 50 mcg by mouth At Bedtime Takes in the middle of the night.      lisinopril (ZESTRIL) 40 MG tablet Take 20 mg by mouth 2 times daily      LORazepam (ATIVAN) 1 MG tablet Take 0.5-1 mg by mouth daily as needed      nortriptyline (PAMELOR) 75 MG capsule Take 75 mg by mouth daily      omeprazole (PRILOSEC) 20 MG DR capsule Take 20 mg by mouth daily      rosuvastatin (CRESTOR) 10 MG tablet Take 10 mg by mouth At Bedtime      sertraline (ZOLOFT) 100 MG tablet Take 200 mg by mouth At Bedtime      sevelamer carbonate (RENVELA) 800 MG tablet Take 800 mg by mouth 3 times daily as needed (with meals when eating) When eating         STOP taking these medications       furosemide (LASIX) 20 MG tablet Comments:   Reason for Stopping:             Allergies   Allergies   Allergen Reactions     Penicillins Other (See Comments) and Shortness Of Breath     Breathing problem.  breathing       Carvedilol GI Disturbance     Nausea and vomiting     Ciprofloxacin Muscle Pain (Myalgia)     Morphine Other (See Comments)     Vomiting     No Clinical Screening - See Comments      PN: LW CM1: Contrast Intercapsular - Nonionic Reaction :     Nsaids Other (See Comments)     Sulfa Drugs Itching     Sulfamethoxazole-Trimethoprim       Other reaction(s): itching     Levofloxacin Muscle Pain (Myalgia) and Rash     Data   Most Recent 3 CBC's:Recent Labs   Lab Test 02/06/23  0542 02/05/23  0439 02/04/23  0538   WBC 10.3 9.9 10.7   HGB 8.7* 9.1* 8.6*   * 111* 110*    275 239     Most Recent 3 BMP's:Recent Labs   Lab Test 02/06/23  0542 02/05/23  0439 02/04/23  0538   * 135* 136   POTASSIUM 4.1 4.1 3.9   CHLORIDE 95* 96* 96*   CO2 25 26 25   BUN 30.1* 17.0 32.2*   CR 4.56* 3.20* 4.96*   ANIONGAP 13 13 15   GAVIN 8.7* 8.6* 9.1   GLC 94 88 90

## 2023-02-02 NOTE — PROGRESS NOTES
BP (!) 144/67 (BP Location: Right arm)   Pulse 86   Temp 97.8  F (36.6  C) (Oral)   Resp 20   Ht 1.524 m (5')   Wt 48.3 kg (106 lb 7.7 oz)   SpO2 95%   BMI 20.80 kg/m      Shift: 0240-9436  Isolation Status: none  VS: slightly hypertensive on 1L NC, afebrile  Neuro: Aox4, sleepy  Behaviors: calm  BG: none  Labs: reviewed  Respiratory: On 1L NC, denies SOB  Pain/Nausea: 8/9 pain all evening, denies nausea  PRN: PRN dilaudid x3, Ketamine x1  Diet: NPO except sips with meds  IV Access: 2 right PIV, one saline locked, the other is infusing D5 in LR at 50 mL/hr  Infusion(s): D5 in LR at 50 mL/hr  GI/: No BM, pt unsure if she was passing flatus  Skin: Midline incision with liquid bandage, open to air, some redness around site. Marked the redness.  Mobility: Not out of bed yet due to pain  Events/Education: Pain has been difficult to manage this shift. Anesthesia gave her a bolus for her epidural at the beginning of the shift. Her pain level did not go below an 8, despite PRNs.  Plan: Continue plan of care

## 2023-02-02 NOTE — PROVIDER NOTIFICATION
Dr. Louis Pan notified that pt's epidural site is leaking and that pt is requesting a bolus. Pt is to be seen by anesthesiology this morning.

## 2023-02-02 NOTE — PLAN OF CARE
BP (!) 146/72 (BP Location: Right arm)   Pulse 90   Temp 98.1  F (36.7  C) (Oral)   Resp 16   Ht 1.524 m (5')   Wt 48.3 kg (106 lb 7.7 oz)   SpO2 95%   BMI 20.80 kg/m   AVSS on 1L/NC. Patient continues to have incisional & back pain. Epidural running. Epidural site appears to be leaking. Dr. Louis Pan notified. Also asked for bolus. Pt is to be seen by anesthesiology this morning. Prn IV Dilaudid 0.4 mg x 1 and prn Ketamine 2.5 mg IV x 1 with some relief.  Patient denies nausea. Urine Output - anuric. Bowel Function - BS +. NGT to LCS. NGT with 300 ml out. Nutrition - sips with meds only. Activity - has not been OOB since surgery d/t pain.

## 2023-02-02 NOTE — PROGRESS NOTES
Admitted/transferred from: PACU  Time of arrival on unit 1630  2 RN full  skin assessment completed by Ashia Hancock RN, Carly ESPINO RN  Skin assessment finding: issues found scar on right leg, incision open to air   Interventions/actions: other no interventions needed     Will continue to monitor.

## 2023-02-02 NOTE — PLAN OF CARE
Goal Outcome Evaluation: [id number] - Alvarez has been afebrile w BPs 170-160s systolic this shift. Waiting for scheduled and PRN meds from pharmacy to treat, on 1L nasal canula w 02 sats around 95%. Given oxycodone and IV dilaudid this morning before her regimen changed to continuous ketamine infusing and dilaudid PCA at 0.2mg q 10mins. Epidural continues at 6mL/hr. BG this morning came back 650, however bedside recheck was 110. Pt went to dialysis. Anuric, no gas or BM this shift. NG tube to low continuous suction, clamped throughout dialysis without any nausea. Pt refused OOB activity due to her pain but has orders for PT evaluation for tomorrow morning. Pt given IS and instructed on use and importance, pt verbalized understanding. Will continue to monitor and notify team w concerns.

## 2023-02-02 NOTE — PROGRESS NOTES
HEMODIALYSIS TREATMENT NOTE    Date: 2/2/2023  Time: 10:21 AM    Data:  Pre Wt: 50.3 kg    Desired Wt: less than 0.5 kg - 1 kg   Post Wt:  49.3 kg  Weight change:  1 kg  Ultrafiltration - Post Run Net Total Removed (mL):  1000 ml  Vascular Access Status: patent  Dialyzer Rinse:  Light streaked  Total Blood Volume Processed: 75.29 Liters  Total Dialysis (Treatment) Time:  3 Hours  Run with K2Ca2.5 bath via AVF, left upper arm, used 15g. , .  Both lines are functioning well, no issues.     Lab:   Yes    Assessment:/ Interventions:  Received pt A & O x 4,able to make her needs known.     Meds given: none    See MAR and flowsheet for more info.     Completed and tolerated the tx well.     Bleeding time: arterial for 5 mins and venous line for 5 mins.     Handoff report given to ASHLEY Brito RN. Sent back the pt to her room with VSS.         Plan:    Per renal team

## 2023-02-02 NOTE — PROGRESS NOTES
"CLINICAL NUTRITION SERVICES - ASSESSMENT NOTE     Nutrition Prescription    RECOMMENDATIONS FOR MDs/PROVIDERS TO ORDER:  - Recommend ordering a renal MVI  - Order vanilla Nepro BID  - Daily fluid adjustments per MD    Malnutrition Status:    - Unable to determine due to pt requested to see RD at different time    Recommendations already ordered by Registered Dietitian (RD):  - None at this time    Future/Additional Recommendations:  - Monitor tolerance of supplement, possible need to change flavor  - Monitor PO intakes, labs, wt trends     REASON FOR ASSESSMENT  Serene Tipton is a/an 67 year old female assessed by the dietitian for Admission Nutrition Risk Screen for recently losing weight without trying and decreased appetite.    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 HTN, HLD, osteoporosis, Vitamin D deficiency, MDD, GERD, and admitted after bilateral nephrectomy d/t significant abdominal symptoms from PKD.    NUTRITION HISTORY  Was seen by RD on 1/26/2021 for a kidney transplant evaluation. RD provided the following education, \"Discussed sodium intake (low sodium foods and drinks, seasoning food without salt and tips for low sodium diet). Reviewed wnl K level (pt reports it historically has even been low), with h/o higher phos levels. Discussed taking binders more regularly (pt reports increasing binder compliance). Pt knowledgeable about foods she needs to take binders with that are higher in phos.\"    Unable to receive further nutrition history at this time d/t pt requesting to see RD at a different time.    CURRENT NUTRITION ORDERS  Diet: NPO  Intake/Tolerance:    Per flowsheet, pt is consuming Nepro nutritional supplements every day    LABS  BUN 30.1 (H), Cr 4.35 (H), GFR 11 (L)    MEDICATIONS  Miralax, Senokot, Colace, D5W in lactated ringers infusion, levothyroxine, protonix    ANTHROPOMETRICS  Height: 152.4 cm (5' 0\")  Most Recent Weight: 48.3 kg (106 lb 7.7 oz)    IBW: 45.5 kg  BMI: " Normal BMI  Weight History: wt fluctuations likely d/t fluid status changes. 15.7% wt loss in 1 year.  Wt Readings from Last 10 Encounters:   02/02/23 50.3 kg (110 lb 14.3 oz)   02/01/23 48.3 kg (106 lb 7.7 oz)   01/10/23 46.8 kg (103 lbs)   12/19/22 47 kg (103 lb 11.2 oz)   11/09/22 48.1 kg per Care Everywhere   08/31/22 48.2 kg per Care Everywhere   12/30/21 57.3 kg (126 lb 6.4 oz)   11/17/21 54.4 kg (120 lb)   09/23/21 58.1 kg (128 lb)   07/15/21 58.1 kg (128 lb)   05/24/21 53.9 kg (118 lb 14.4 oz)   12/08/20 56.6 kg (124 lb 12.5 oz)   01/24/20 59 kg (130 lb)     Dosing Weight: 48.3 kg - lowest wt upon admission    ASSESSED NUTRITION NEEDS  Estimated Energy Needs: 9312-9510 kcals/day (30 - 35 kcals/kg )  Justification: Increased needs s/p surgery, dialysis  Estimated Protein Needs: 63-87 grams protein/day (1.3 - 1.8 grams of pro/kg)  Justification: Increased needs s/p surgery, dialysis  Estimated Fluid Needs: Per provider pending fluid status    PHYSICAL FINDINGS  See malnutrition section below.    MALNUTRITION  % Intake: Unable to assess d/t limited data/unable to verbally get nutrition hx  % Weight Loss: Wt trending down, however weight loss does not meet criteria. Suspect fluid status shifts.  Subcutaneous Fat Loss: unable to assess - pt requested to see RD at different time  Muscle Loss: unable to assess - pt requested to see RD at different time  Fluid Accumulation/Edema: None noted  Malnutrition Diagnosis: Unable to determine due to pt requested to see RD at different time    NUTRITION DIAGNOSIS  Inadequate oral intake related to increased needs d/t dialysis as evidenced by current NPO status.    INTERVENTIONS  Implementation  Nutrition Education: Patient declined   Collaboration with other providers - discussed pt in rounds    Goals  Patient to consume % of nutritionally adequate meal trays TID, or the equivalent with supplements/snacks.     Monitoring/Evaluation  Progress toward goals will be  monitored and evaluated per protocol.    Irma Abad  Dietetic Intern    I have read the above note by the dietetic intern and agree with the assessment  Minna Panchal, MS/RD/LD  7A/Obs RD pager: [phone]  Weekend/Holiday RD pager: [phone]

## 2023-02-02 NOTE — PROGRESS NOTES
"Pain Service Progress Note  [facility]  Date: 02/02/2023       Patient Name: Serene Tipton  MRN: [id number]  Age: 67 year old  Sex: female      Assessment:  Serene Tipton is a 67 year old female with PMH significant for HTN, HLD, former smoker, anemia of chronic kidney disease, 2 tiny out-pouchings of the bilateral cavernous internal carotid arteries, hypothyroidsim, GERD, hepatic lesion, chronic pain, history of spinal fusion with hardware, anxiety, and polycystic kidney disease causing end-stage renal disease on HD,     Acute postoperative pain    History of chronic low back pain, opioid tolerance. Outpatient takes oxycodone 10 mg tab. Rx for max 5 tabs per day, patient averages between 0-5 tabs per day.      Procedure: s/p bilateral nephrectomy    Date of Surgery: 2/1/23     Date of Catheter Placement: 2/1/23     Plan/Recommendations:  1. Regional Anesthesia/Analgesia  -Continuous Catheter Type/Site:  Epidural T8-9  Infusate: Bupivacaine 0.125%  Continuous Infusion at 6 mL/hr  Plan to maintain catheter, max of 5 days    2. Anticoagulation  None ordered  -Please contact Inpatient Pain Service before ordering or making any anticoagulation changes       3. Multimodal Analgesia  Discussed with primary service:   - Recommendation stop oxycodone since NG in place  - Start Dilaudid PCA doses 0.2-0.3 mg Q 10 min lockout interval. NO BASAL RATE  - Switch ketamine from IV bolus PRN to infusion 3mg/hr intial and based on effect and side effect may increase to 5mg/hr based on weight based dosing 0.1mg/kg/hr    When NG removed and tolerating oral intake:  Stop PCA and resume PRN oxycodone  Then discontinue ketamine infusion when tolerating oral oxycodone  Pain service will evaluate when to pause epidural infusion.        Pain Service will continue to follow.    Discussed with attending anesthesiologist    Please Page the Pain Team Via [employer]: \"PAIN MANAGEMENT ACUTE INPATIENT/ [facility] " "BANK\"    Adina Lane, APRN CNP  02/02/2023     Overnight Events: No acute events    Tubes/Drains: Yes  NG tube    Subjective:  I'm in a lot of pain right now, I'd say that it's about 8 of 10.   Patient reports awake with pain overnight, ketamine IV prn seemed to help, but then worsened pain when analgesic meds weren't available because of order changes made this morning.  She is now feeling better than she has since surgery, diffuse aching abdominal pain, occassional twinges. Declined sensory assessment during today's visit.     Serene reports history of chronic low back pain with sciatica, average daily pain (ADP) after spine surgery x 2 was 7/10, then much better following radiofrequency ablation last year, ADP 4/10, and continues to have sciatica. Discussed with patient who agrees with plan is to utilize IV Dilaudid PCA and ketamine infusion while NPO with NG, and when tolerating oral intake will transition back to oxycodone PRN, then off ketamine and epidural prior to discharge    Nausea: No  Symptoms of LAST: No    Pain Location:  abdomen    Pain Intensity:    Pain at Rest: 7/10   Comfort Goal: 4/10   Baseline Pain: 4/10   Satisfied with your level of pain control: Yes    Diet: NPO for Medical/Clinical Reasons Except for: Meds    Relevant Labs:  Recent Labs   Lab Test 02/02/23  0612 08/18/22  1731 05/19/21  1507   INR  --   --  1.02      < > 365   PTT  --   --  30   BUN 30.1*   < > 27    < > = values in this interval not displayed.       Physical Exam:  Vitals: BP (!) 160/81   Pulse 87   Temp 97.8  F (36.6  C) (Axillary)   Resp 16   Ht 1.524 m (5')   Wt 50.3 kg (110 lb 14.3 oz)   SpO2 98%   BMI 21.66 kg/m      Physical Exam:   Orientation:  Alert, oriented, and in no acute distress: Yes  Sedation: No    Motor Examination:  5/5 Strength in lower extremities: Yes    Sensory Level:   Decrease in sensation: Not assessed, patient declined    Catheter Site:   Catheter entry site is " clean/dry/intact: Yes    Tender: No      Relevant Medications:  Current Pain Medications:  Medications related to Pain Management (From now, onward)    Start     Dose/Rate Route Frequency Ordered Stop    02/04/23 0000  acetaminophen (TYLENOL) solution 650 mg        Note to Pharmacy: Pharmacist change per scope of practice: switch to liquid    650 mg Oral EVERY 4 HOURS PRN 02/01/23 1646      02/02/23 0930  ketamine (KETALAR) 2 mg/mL in sodium chloride 0.9 % 50 mL ANALGESIA infusion         3 mg/hr  1.5 mL/hr  Intravenous CONTINUOUS 02/02/23 0907      02/02/23 0930  HYDROmorphone (DILAUDID) PCA 0.2 mg/mL OPIOID NAIVE          Intravenous CONTINUOUS 02/02/23 0909      02/02/23 0800  polyethylene glycol (MIRALAX) Packet 17 g         17 g Oral DAILY 02/01/23 1526      02/02/23 0621  lidocaine 1 % 0.5 mL         0.5 mL Intradermal ONCE PRN 02/02/23 0621      02/02/23 0621  lidocaine 1 % 0.5 mL         0.5 mL Intradermal ONCE PRN 02/02/23 0621      02/01/23 2000  sennosides (SENOKOT) syrup 5 mL        Note to Pharmacy: Pharmacist change per scope of practice: switch to liquid from senna/docusate 1 tab BID   See Hyperspace for full Linked Orders Report.    5 mL Oral 2 TIMES DAILY 02/01/23 1650      02/01/23 2000  docusate (COLACE) 50 MG/5ML liquid 50 mg        Note to Pharmacy: Pharmacist change per scope of practice: switch to liquid from senna/docusate 1 tab BID   See Hyperspace for full Linked Orders Report.    50 mg Oral 2 TIMES DAILY 02/01/23 1650      02/01/23 1700  acetaminophen (TYLENOL) solution 975 mg        Note to Pharmacy: Pharmacist change per scope of practice: switch to liquid    975 mg Oral EVERY 8 HOURS 02/01/23 1646 02/04/23 1959    02/01/23 1600  bupivacaine (MARCAINE) 0.125 % in sodium chloride 0.9 % 250 mL EPIDURAL Infusion         6 mL/hr  EPIDURAL CONTINUOUS 02/01/23 1539 02/05/23 1559    02/01/23 1526  magnesium hydroxide (MILK OF MAGNESIA) suspension 30 mL         30 mL Oral DAILY PRN 02/01/23 1521       02/01/23 1526  bisacodyl (DULCOLAX) suppository 10 mg         10 mg Rectal DAILY PRN 02/01/23 1526            Primary Service Contacted with Recommendations? Yes      Please see A&P for additional details of medical decision making.

## 2023-02-03 ENCOUNTER — APPOINTMENT (OUTPATIENT)
Dept: OCCUPATIONAL THERAPY | Facility: CLINIC | Age: 68
DRG: 660 | End: 2023-02-03
Attending: SURGERY
Payer: COMMERCIAL

## 2023-02-03 ENCOUNTER — APPOINTMENT (OUTPATIENT)
Dept: GENERAL RADIOLOGY | Facility: CLINIC | Age: 68
DRG: 660 | End: 2023-02-03
Attending: NURSE PRACTITIONER
Payer: COMMERCIAL

## 2023-02-03 LAB
ANION GAP SERPL CALCULATED.3IONS-SCNC: 14 MMOL/L (ref 7–15)
BUN SERPL-MCNC: 20.6 MG/DL (ref 8–23)
CALCIUM SERPL-MCNC: 9.1 MG/DL (ref 8.8–10.2)
CHLORIDE SERPL-SCNC: 95 MMOL/L (ref 98–107)
CREAT SERPL-MCNC: 3.4 MG/DL (ref 0.51–0.95)
DEPRECATED HCO3 PLAS-SCNC: 27 MMOL/L (ref 22–29)
ERYTHROCYTE [DISTWIDTH] IN BLOOD BY AUTOMATED COUNT: 15.4 % (ref 10–15)
GFR SERPL CREATININE-BSD FRML MDRD: 14 ML/MIN/1.73M2
GLUCOSE SERPL-MCNC: 116 MG/DL (ref 70–99)
HCT VFR BLD AUTO: 31.7 % (ref 35–47)
HGB BLD-MCNC: 9.9 G/DL (ref 11.7–15.7)
MAGNESIUM SERPL-MCNC: 1.8 MG/DL (ref 1.7–2.3)
MCH RBC QN AUTO: 34.1 PG (ref 26.5–33)
MCHC RBC AUTO-ENTMCNC: 31.2 G/DL (ref 31.5–36.5)
MCV RBC AUTO: 109 FL (ref 78–100)
PHOSPHATE SERPL-MCNC: 3.7 MG/DL (ref 2.5–4.5)
PLATELET # BLD AUTO: 269 10E3/UL (ref 150–450)
POTASSIUM SERPL-SCNC: 3.9 MMOL/L (ref 3.4–5.3)
RBC # BLD AUTO: 2.9 10E6/UL (ref 3.8–5.2)
SODIUM SERPL-SCNC: 136 MMOL/L (ref 136–145)
WBC # BLD AUTO: 14.7 10E3/UL (ref 4–11)

## 2023-02-03 PROCEDURE — 97535 SELF CARE MNGMENT TRAINING: CPT | Mod: GO | Performed by: OCCUPATIONAL THERAPIST

## 2023-02-03 PROCEDURE — 99024 POSTOP FOLLOW-UP VISIT: CPT | Performed by: NURSE PRACTITIONER

## 2023-02-03 PROCEDURE — 250N000013 HC RX MED GY IP 250 OP 250 PS 637: Performed by: SURGERY

## 2023-02-03 PROCEDURE — 85014 HEMATOCRIT: CPT | Performed by: SURGERY

## 2023-02-03 PROCEDURE — 120N000011 HC R&B TRANSPLANT UMMC

## 2023-02-03 PROCEDURE — 250N000013 HC RX MED GY IP 250 OP 250 PS 637: Performed by: NURSE PRACTITIONER

## 2023-02-03 PROCEDURE — 74018 RADEX ABDOMEN 1 VIEW: CPT | Mod: 26 | Performed by: RADIOLOGY

## 2023-02-03 PROCEDURE — 999N000065 XR ABDOMEN PORT 1 VIEW

## 2023-02-03 PROCEDURE — 250N000009 HC RX 250: Performed by: NURSE PRACTITIONER

## 2023-02-03 PROCEDURE — 74018 RADEX ABDOMEN 1 VIEW: CPT

## 2023-02-03 PROCEDURE — 01996 DLY HOSP MGMT EDRL RX ADMIN: CPT | Performed by: PHYSICIAN ASSISTANT

## 2023-02-03 PROCEDURE — 258N000003 HC RX IP 258 OP 636

## 2023-02-03 PROCEDURE — 258N000003 HC RX IP 258 OP 636: Performed by: NURSE PRACTITIONER

## 2023-02-03 PROCEDURE — 83735 ASSAY OF MAGNESIUM: CPT | Performed by: SURGERY

## 2023-02-03 PROCEDURE — 97165 OT EVAL LOW COMPLEX 30 MIN: CPT | Mod: GO | Performed by: OCCUPATIONAL THERAPIST

## 2023-02-03 PROCEDURE — 84100 ASSAY OF PHOSPHORUS: CPT | Performed by: SURGERY

## 2023-02-03 PROCEDURE — 99232 SBSQ HOSP IP/OBS MODERATE 35: CPT | Mod: FS | Performed by: CLINICAL NURSE SPECIALIST

## 2023-02-03 PROCEDURE — 250N000011 HC RX IP 250 OP 636

## 2023-02-03 PROCEDURE — 36415 COLL VENOUS BLD VENIPUNCTURE: CPT | Performed by: SURGERY

## 2023-02-03 PROCEDURE — 999N000127 HC STATISTIC PERIPHERAL IV START W US GUIDANCE

## 2023-02-03 PROCEDURE — 250N000011 HC RX IP 250 OP 636: Performed by: NURSE PRACTITIONER

## 2023-02-03 PROCEDURE — 97530 THERAPEUTIC ACTIVITIES: CPT | Mod: GO | Performed by: OCCUPATIONAL THERAPIST

## 2023-02-03 PROCEDURE — 82310 ASSAY OF CALCIUM: CPT | Performed by: SURGERY

## 2023-02-03 RX ORDER — LISINOPRIL 10 MG/1
20 TABLET ORAL 2 TIMES DAILY
Status: DISCONTINUED | OUTPATIENT
Start: 2023-02-03 | End: 2023-02-06 | Stop reason: HOSPADM

## 2023-02-03 RX ORDER — LABETALOL 300 MG/1
600 TABLET, FILM COATED ORAL EVERY EVENING
Status: DISCONTINUED | OUTPATIENT
Start: 2023-02-03 | End: 2023-02-06 | Stop reason: HOSPADM

## 2023-02-03 RX ORDER — SODIUM CHLORIDE 9 MG/ML
INJECTION, SOLUTION INTRAVENOUS CONTINUOUS
Status: DISCONTINUED | OUTPATIENT
Start: 2023-02-03 | End: 2023-02-04

## 2023-02-03 RX ORDER — LORAZEPAM 0.5 MG/1
0.5 TABLET ORAL DAILY PRN
Status: DISCONTINUED | OUTPATIENT
Start: 2023-02-03 | End: 2023-02-06 | Stop reason: HOSPADM

## 2023-02-03 RX ORDER — CLONIDINE HYDROCHLORIDE 0.2 MG/1
0.2 TABLET ORAL EVERY EVENING
Status: DISCONTINUED | OUTPATIENT
Start: 2023-02-03 | End: 2023-02-06 | Stop reason: HOSPADM

## 2023-02-03 RX ORDER — BISACODYL 10 MG
10 SUPPOSITORY, RECTAL RECTAL ONCE
Status: COMPLETED | OUTPATIENT
Start: 2023-02-03 | End: 2023-02-03

## 2023-02-03 RX ADMIN — Medication 450 MG: at 13:36

## 2023-02-03 RX ADMIN — Medication: at 02:31

## 2023-02-03 RX ADMIN — LORAZEPAM 0.5 MG: 0.5 TABLET ORAL at 13:41

## 2023-02-03 RX ADMIN — SODIUM CHLORIDE: 9 INJECTION, SOLUTION INTRAVENOUS at 13:54

## 2023-02-03 RX ADMIN — SERTRALINE HYDROCHLORIDE 200 MG: 20 SOLUTION ORAL at 22:25

## 2023-02-03 RX ADMIN — AMLODIPINE 5 MG: 1 SUSPENSION ORAL at 22:25

## 2023-02-03 RX ADMIN — ACETAMINOPHEN 975 MG: 325 SUSPENSION ORAL at 13:37

## 2023-02-03 RX ADMIN — LISINOPRIL 20 MG: 10 TABLET ORAL at 13:36

## 2023-02-03 RX ADMIN — KETAMINE HYDROCHLORIDE 5 MG/HR: 100 INJECTION, SOLUTION, CONCENTRATE INTRAMUSCULAR; INTRAVENOUS at 20:02

## 2023-02-03 RX ADMIN — LABETALOL HYDROCHLORIDE 600 MG: 300 TABLET ORAL at 20:16

## 2023-02-03 RX ADMIN — ACETAMINOPHEN 975 MG: 325 SUSPENSION ORAL at 20:15

## 2023-02-03 RX ADMIN — POLYETHYLENE GLYCOL 3350 17 G: 17 POWDER, FOR SOLUTION ORAL at 09:28

## 2023-02-03 RX ADMIN — SENNOSIDES 10 ML: 8.8 LIQUID ORAL at 20:16

## 2023-02-03 RX ADMIN — DOCUSATE SODIUM LIQUID 50 MG: 100 LIQUID ORAL at 20:15

## 2023-02-03 RX ADMIN — Medication 40 MG: at 09:30

## 2023-02-03 RX ADMIN — BUPIVACAINE HYDROCHLORIDE: 7.5 INJECTION, SOLUTION EPIDURAL; RETROBULBAR at 02:59

## 2023-02-03 RX ADMIN — LISINOPRIL 20 MG: 10 TABLET ORAL at 20:17

## 2023-02-03 RX ADMIN — SENNOSIDES 10 ML: 8.8 LIQUID ORAL at 09:28

## 2023-02-03 RX ADMIN — Medication 10 MG: at 16:40

## 2023-02-03 RX ADMIN — CLONIDINE HYDROCHLORIDE 0.2 MG: 0.2 TABLET ORAL at 20:16

## 2023-02-03 RX ADMIN — LEVOTHYROXINE SODIUM 50 MCG: 50 TABLET ORAL at 22:25

## 2023-02-03 RX ADMIN — DOCUSATE SODIUM LIQUID 50 MG: 100 LIQUID ORAL at 09:28

## 2023-02-03 ASSESSMENT — ACTIVITIES OF DAILY LIVING (ADL)
ADLS_ACUITY_SCORE: 28
ADLS_ACUITY_SCORE: 23
ADLS_ACUITY_SCORE: 24
ADLS_ACUITY_SCORE: 28
PREVIOUS_RESPONSIBILITIES: MEAL PREP;HOUSEKEEPING;LAUNDRY;SHOPPING;MEDICATION MANAGEMENT;FINANCES
ADLS_ACUITY_SCORE: 28
ADLS_ACUITY_SCORE: 24

## 2023-02-03 NOTE — PLAN OF CARE
VS: BP (!) 156/82 (BP Location: Right arm, Patient Position: Right side, Cuff Size: Adult Regular)   Pulse 95   Temp 97.9  F (36.6  C) (Oral)   Resp 12   Ht 1.524 m (5')   Wt 50.3 kg (110 lb 14.3 oz)   SpO2 92%   BMI 21.66 kg/m      Cares: 2300 - 0730     Neuro: AOX4   Cardio: hypertensive 150/80s   Respiratory: on 2L via NC (capno)  GI/: anuric (on HD), no BM since surgery   Skin: abd incision - dermabond SERGIO  Diet: NPO except with meds   Labs: waiting on morning lab results   BG: none   LDA: NG - LIS, x2 R PIVs, epidural  Mobility: Ax1/2  Pain/Nausea: denies nausea, 6/10 pain  PRN medications: none given   Plan of Care: continue with current POC and update MD with any changes

## 2023-02-03 NOTE — PROGRESS NOTES
02/03/23 1400   Appointment Info   Signing Clinician's Name / Credentials (OT) leatha nayla ot/l   Living Environment   People in Home alone   Current Living Arrangements apartment   Home Accessibility no concerns   Transportation Anticipated family or friend will provide   Self-Care   Usual Activity Tolerance moderate   Current Activity Tolerance fair   Regular Exercise No   Equipment Currently Used at Home none   Instrumental Activities of Daily Living (IADL)   Previous Responsibilities meal prep;housekeeping;laundry;shopping;medication management;finances   IADL Comments pt cares for her pet bird   General Information   Referring Physician Jaskaran Moreland   Patient/Family Therapy Goal Statement (OT) return to PLOF   Additional Occupational Profile Info/Pertinent History of Current Problem Serene Tipton is a 67 year old with PMH ESRD 2/2 PKD, HTN, HLD, MDD, GERD who is admitted after bilateral nephrectomy due to significant abdominal symptoms from PKD. Patient reporting abdominal pain during interview, interaction otherwise limited. No nausea, SOB, chest pain   Existing Precautions/Restrictions abdominal   General Observations and Info pt with hisptroy of spinal surgery and back pain, knows her body well along with her limitations. pt motivated in therapy.   Cognitive Status Examination   Orientation Status orientation to person, place and time   Cognitive Status Comments no concerns.   Visual Perception   Visual Impairment/Limitations WFL   Sensory   Sensory Quick Adds sensation intact   Range of Motion Comprehensive   General Range of Motion other (see comments)   Comment, General Range of Motion pt at baseline, WFL   Strength Comprehensive (MMT)   General Manual Muscle Testing (MMT) Assessment other (see comments)   Comment, General Manual Muscle Testing (MMT) Assessment overall deconditioning   Coordination   Upper Extremity Coordination No deficits were identified   Bed Mobility   Bed Mobility other (see  comments)   Comment (Bed Mobility) education required.   Transfers   Transfers sit-stand transfer;bed-chair transfer   Transfer Skill: Bed to Chair/Chair to Bed   Bed-Chair [place] (Transfers) minimum assist (75% patient effort)   Sit-Stand Transfer   Sit-Stand [place] (Transfers) contact guard   Balance   Balance Assessment standing balance: dynamic   Balance Comments good   Activities of Daily Living   BADL Assessment/Intervention lower body dressing   Lower Body Dressing Assessment/Training   Comment, (Lower Body Dressing) education required.   [place] Level (Lower Body Dressing) minimum assist (75% patient effort)   Clinical Impression   Criteria for Skilled Therapeutic Interventions Met (OT) Yes, treatment indicated   OT Diagnosis decreased ADL I   Assessment of Occupational Performance 5 or more Performance Deficits   Identified Performance Deficits dressing, bathing, toileting, G/H, home making, leisure.   Planned Therapy Interventions (OT) ADL retraining;IADL retraining;bed mobility training;strengthening;transfer training;home program guidelines;progressive activity/exercise;risk factor education   Clinical Decision Making Complexity (OT) low complexity   Risk & Benefits of therapy have been explained evaluation/treatment results reviewed;care plan/treatment goals reviewed;risks/benefits reviewed;current/potential barriers reviewed;participants voiced agreement with care plan;participants included;patient   Clinical Impression Comments pt presents to OT with post surgical precautions and overall deconditioning leading  to decreased ADL I.   OT Total Evaluation Time   OT Eval, Low Complexity Minutes (65525) 10   OT Goals   Therapy Frequency (OT) 5 times/wk   OT Predicted Duration/Target Date for Goal Attainment 02/15/23   OT Goals Upper Body Dressing;Lower Body Dressing;Bed Mobility;Toilet Transfer/Toileting;Hygiene/Grooming   OT: Hygiene/Grooming modified independent;while standing   OT:  Upper Body Dressing Modified independent;within precautions;including set-up/clothing retrieval   OT: Lower Body Dressing Modified independent;within precautions;including set-up/clothing retrieval   OT: Bed Mobility Modified independent;supine to/from sitting;within precautions   OT: Toilet Transfer/Toileting Modified independent;toilet transfer;cleaning and garment management;within precautions   OT Discharge Planning   OT Plan ADL at sink, dressing, bathing as able.   OT Discharge Recommendation (DC Rec) home with assist;home with home care occupational therapy   OT Rationale for DC Rec pt progressing well today, pt may benefit from home safety evaluation for potential AE needs   OT Brief overview of current status Pt very slow moving today, progressing to CGA bed mobility and ambualting in room.

## 2023-02-03 NOTE — PROGRESS NOTES
Transplant Surgery  Inpatient Daily Progress Note  02/03/2023    Assessment & Plan: Serene is a 67 year old female who has a past medical history of Anemia in chronic kidney disease, Anxiety, Arthritis, Brain aneurysm, Chronic low back pain, Depressive disorder (2013), Disease of thyroid gland, ESRD (end stage renal disease) on dialysis (H) (07/2020), GERD (gastroesophageal reflux disease), Hepatic lesion, Hyperlipidemia, Hypertension, Nephrolithiasis, Neuromuscular disorder (H), Osteoporosis, PKD (polycystic kidney disease) (09/01/1990), PTSD (post-traumatic stress disorder), Tobacco abuse, and Vitamin D deficiency. He is now s/p bilateral native nephrectomy with Dr. Alana Giang.      Hematology:  Anemia of chronic disease: Last Hgb: 2/3/2023: 9.9 g/dL. Continue to monitor with daily labs.  Leukocytosis: WBC 15 (from 10), afebrile, monitor.     Neurology:  Chronic and Acute pain management: Epidural, Ketamine analgesia drip (increased), APAP Q8H + Q4H PRN, Hydromorphone PCA. PTA Ativan PRN restarted. Management per pain team, appreciate recs.     Cardiac:  Hypertension: Restart home antihypertensives as able.    Respiratory:  Hypoxia: 2 LPM / Device: Nasal cannula   - Wean O2 as able  - Encourage good pulmonary hygiene: IS use Q1H, Deep breathing and coughing    GI/Nutrition:  Diet: NPO with NGT to LIS with 500 mL green output overnight. AXR with NGT in stomach.   Bowel regimen: Senna BID, PEG daily, suppository today    Endocrine:   Hypothyroidism: PTA levothyroxine.     Fluid/Electrolytes/Neph: MIVF: NS 50 mL/hr  ESRD 2/2 PKD: Nephrology consulted. Last HD 2/2 via LUE AVF.     Infectious disease: No issues.     Prophylaxis: Fall, DVT (mechanical), GI (PPI)    Disposition: OT consulted. Plan for discharge home once bowel function has returned and pain is well controlled on PO regimen.     Medical Decision Making: Medium  Subsequent visit 82200 (moderate level decision making)    RAS/Fellow/Resident  "Provider: Dilcia Torres, NP [id number]    Faculty: Catalino Bishop MD PhD  _________________________________________________________________  Transplant History:   N/A, Postoperative day:      Interval History: \"History is obtained from the patient\"  Overnight events: No acute events overnight. Pain is better controlled. She feels hungry but NG output remains bile colored with 500 mL output overnight.     ROS:   A 10-point review of systems was negative except as noted above.    Meds:    acetaminophen  975 mg Oral Q8H     amlodipine  5 mg Oral Daily     sennosides  10 mL Oral BID    And     docusate  50 mg Oral BID     levothyroxine  50 mcg Oral or NG Tube At Bedtime     pantoprazole  40 mg Oral or NG Tube QAM AC     polyethylene glycol  17 g Oral Daily     sertraline  200 mg Oral or NG Tube At Bedtime     sodium chloride (PF)  3 mL Intravenous Q8H       Physical Exam:     Admit Weight: 48.3 kg (106 lb 7.7 oz)    Current vitals:   BP (!) 156/82 (BP Location: Right arm, Patient Position: Right side, Cuff Size: Adult Regular)   Pulse 95   Temp 97.9  F (36.6  C) (Oral)   Resp 12   Ht 1.524 m (5')   Wt 50.3 kg (110 lb 14.3 oz)   SpO2 92%   BMI 21.66 kg/m      Vital sign ranges:    Temp:  [97.9  F (36.6  C)-98.2  F (36.8  C)] 97.9  F (36.6  C)  Pulse:  [] 95  Resp:  [12-16] 12  BP: (150-179)/(79-93) 156/82  SpO2:  [92 %-99 %] 92 %    General Appearance: in no apparent distress.   Skin: Warm, perfused  Heart: Slightly Hypertensive, Normocardic.  Lungs: NWOB on 2 LPM / Device: Nasal cannula  Abdomen: The abdomen is appropriately tender, ND, soft. Midline incision is present c/d/i with glue in place.  Extremities: edema: trace, strength 5/5  Neurologic: alert and oriented. Tremor absent.    Data:   CMP  Recent Labs   Lab 02/03/23  0442 02/02/23  0723 02/02/23  0612 02/01/23  1320 02/01/23  1103     --  136   < > 130*   POTASSIUM 3.9  --  4.9   < > 4.4   CHLORIDE 95*  --  100   < >  --    CO2 27  --  22   < >  --  "   * 110* 654*   < > 128*   BUN 20.6  --  30.1*   < >  --    CR 3.40*  --  4.35*   < >  --    GFRESTIMATED 14*  --  11*   < >  --    GAVIN 9.1  --  8.8   < >  --    ICAW  --   --   --   --  3.9*   MAG 1.8  --  1.7   < >  --    PHOS 3.7  --  4.2   < >  --     < > = values in this interval not displayed.     CBC  Recent Labs   Lab 02/03/23  0442 02/02/23  0612   HGB 9.9* 10.4*   WBC 14.7* 9.7    961

## 2023-02-03 NOTE — PROGRESS NOTES
BP (!) 158/80 (BP Location: Right arm)   Pulse 95   Temp 97.9  F (36.6  C) (Oral)   Resp 16   Ht 1.524 m (5')   Wt 50.3 kg (110 lb 14.3 oz)   SpO2 97%   BMI 21.66 kg/m      Shift: 3737-9996  Isolation Status: none  VS: hypertensive on 1L NC, afebrile  Neuro: Aox4  Behaviors: calm and pleasent  BG: none  Labs: reviewed  Respiratory: On 1 L NC  Pain/Nausea: Reported 6/10 pain throughout shift  PRN: Clonadine x1 for 168/81 BP  Diet: NPO  IV Access: 2 Right PIV infusing  Infusion(s): Continuous Ketamine 2mg/mL, diluadid PCA at 0.2mg q 10 mins, Epidural 6mL/hr  Lines/Drains: NG to low continuous suction  GI/: No BM this shift, Anuric  Mobility: Turn and pivot to the commode with assist of two  Events/Education: Pain better managed on infusions  Plan: Continue Plan of care

## 2023-02-03 NOTE — PROGRESS NOTES
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 Patient reporting abdominal pain during interview, interaction otherwise limited. No nausea, SOB, chest pain.    Interval History :   Mrs Tipton had HD yesterday without issue, labs reasonable today, working with PT and pain is better controlled.  Plan for run tomorrow, can restart her oral meds for HTN via G-tube.      Assessment & Recommendations:   ESRD-Due to PKD, on HD since 2020 at [facility] under the care of Dr Rhoades.  3h runs, LUE AVF.  Had bilateral nephrectomy B/O ongoing symptoms including weight loss from difficulty eating.  Listed since  for  donor.    -Holding on run today, will order run for tomorrow.  Euvolemic on exam but BP's stable so will try to UF a bit to challenge.    -Working on pain control, improved today.    -No need for new consent as she has been on HD as outpt since .      HBP- Hypertensive now.  Home BP meds include clonidine, amlodipine , losartan.  Difficult to predict what she will need post op   -  Will need IV meds for BP control until she can take po   -  would use labelolol to avoid clonidine withdrawal     Volume status-No edema, pulled 1L on run today.     Electrolytes/pH-K 3.9, bicarb 27, Na 136, no issues.     Ca/phos/pth-Ca 9.1, Mg and Phos WNL.      Anemia-Hgb 9.9, on epo 1k with runs.     Nutrition-will be evaluated.    Polycystic Disease: pt has ESKD from PCKD, as well as numerous cysts in the liver.  She also has bilateral carotid aneurysms not amenable to repair.    Time spent: 30 minutes on this date of encounter for chart review, physical exam, medical decision making and co-ordination of care.     Seen and discussed with Dr Deluna    Recommendations were communicated to primary team via verbal communication.     Taiwo Marks,  LIDIA CNS  Clinical Nurse Specialist  [phone]      Review of Systems:   I reviewed the following systems:  Gen: + incisional pain, starting IV pain meds.   CV: No CP at rest  Resp: No SOB at rest  GI: No N/V      Physical Exam:   I/O last 3 completed shifts:  In: 447 [P.O.:360; I.V.:87]  Out: 1500 [Emesis/NG output:500; Other:1000]   BP (!) 163/85   Pulse 99   Temp 98.6  F (37  C) (Oral)   Resp 17   Ht 1.524 m (5')   Wt 50.3 kg (110 lb 14.3 oz)   SpO2 92%   BMI 21.66 kg/m       GENERAL APPEARANCE: mild distress, +pain, awake  EYES: no scleral icterus  Pulmonary: breathing non-labored  CV: regular rhythm, normal rate   - Edema trace  GI: soft, diffusely tender S/P nephrectomy. NG tube in place  MS: no overt evidence of inflammation in joints  : no keith  SKIN: no rash on exposed surfaces  NEURO: face symmetric, following commands    Labs:   All labs reviewed by me  Electrolytes/Renal -   Recent Labs   Lab Test 02/03/23  0442 02/02/23  0723 02/02/23  0612 02/01/23  1320     --  136 135*   POTASSIUM 3.9  --  4.9 4.8   CHLORIDE 95*  --  100 98   CO2 27  --  22 25   BUN 20.6  --  30.1* 27.9*   CR 3.40*  --  4.35* 3.88*   * 110* 654* 162*   GAVIN 9.1  --  8.8 8.4*   MAG 1.8  --  1.7 1.9   PHOS 3.7  --  4.2 3.1       CBC -   Recent Labs   Lab Test 02/03/23  0442 02/02/23  0612 02/01/23  1320   WBC 14.7* 9.7 10.8   HGB 9.9* 10.4* 10.1*    275 272       LFTs -   Recent Labs   Lab Test 08/18/22  1731 05/19/21  1507   ALKPHOS 128* 122   BILITOTAL 0.3 0.5   ALT 11 18   AST 16 11   PROTTOTAL 6.4 8.3   ALBUMIN 4.5 4.2       Iron Panel -   Recent Labs   Lab Test 01/26/23  1422   IRON 30*   IRONSAT 12*   WILMER 1,083*           Current Medications:   acetaminophen  975 mg Oral Q8H    amlodipine  5 mg Oral Daily    bisacodyl  10 mg Rectal Once    sennosides  10 mL Oral BID    And    docusate  50 mg Oral BID    levothyroxine  50 mcg Oral or NG Tube At Bedtime    pantoprazole  40 mg Oral or NG Tube [facility] AC     polyethylene glycol  17 g Oral Daily    sertraline  200 mg Oral or NG Tube At Bedtime    sodium chloride (PF)  3 mL Intravenous Q8H      EPIDURAL INFUSION 6 mL/hr at 02/03/23 0655    dextrose 5% lactated ringers 10 mL/hr at 02/02/23 1557    HYDROmorphone      ketamine 2 mg/mL ADULT 3 mg/hr (02/02/23 3200)       I have seen and evaluated this patient with the RAS as part of a shared visit.  I personally reviewed the vital signs, medications and labs, and interviewed the patient.  I have participated in the medical decision making as outlined in our joint note.    68 yo F with ESKD from PCKD adm for nephrectomy.  Stable post op but has severe pain.  Not passing gas and has vomited meds    Assessment and recommendations:      # ESKD: plan HD q o d while recovering from surgery  # Volume: appears euvolemic  # HBP: will need IV meds until ileus resolves.  Would use IV labetolol to avoid clonidine withdrawal.    Beth Deluna MD

## 2023-02-03 NOTE — PROGRESS NOTES
"Pain Service Progress Note  [facility]  Date: 02/03/2023       Patient Name: Serene Tipton  MRN: [id number]  Age: 67 year old  Sex: female      Assessment:  Serene Tipton is a 67 year old female with PMH significant for HTN, HLD, former smoker, anemia of chronic kidney disease, 2 tiny out-pouchings of the bilateral cavernous internal carotid arteries, hypothyroidsim, GERD, hepatic lesion, chronic pain, history of spinal fusion with hardware, anxiety, and polycystic kidney disease causing end-stage renal disease on HD,      Acute postoperative pain     History of chronic low back pain, opioid tolerance. Outpatient takes oxycodone 10 mg tab. Rx for max 5 tabs per day, patient averages between 0-5 tabs per day.       Procedure: s/p bilateral nephrectomy     Date of Surgery: 2/1/23      Date of Catheter Placement: 2/1/23      Plan/Recommendations:  1. Regional Anesthesia/Analgesia  -Continuous Catheter Type/Site:  Epidural T8-9  Infusate: Bupivacaine 0.125%  Continuous Infusion at 6 mL/hr  Plan to maintain catheter, max of 5 days     2. Anticoagulation  None ordered  -Please contact Inpatient Pain Service before ordering or making any anticoagulation changes        3. Multimodal Analgesia  Discussed with primary service:     - continue Dilaudid PCA doses 0.2mg Q 10 min lockout interval. NO BASAL RATE.  -  ketamine infusion, may increase to 5mg/hr. 50kg, discussed with pharmacy, okay to proceed.     When NG removed and tolerating oral intake:  Stop PCA and resume PRN oxycodone (would recommend oxycdone 5-10mg PO Q 3 hours PRN and IV dilaudid 0.2-0.4mg IV Q 2 hours PRN).    Then discontinue ketamine infusion when tolerating oral oxycodone.    Pain service will evaluate when to pause epidural infusion.          Pain Service will continue to follow.     Discussed with attending anesthesiologist     Please Page the Pain Team Via [employer]: \"PAIN MANAGEMENT ACUTE INPATIENT/ [facility] " "BANK\"  Clarice Almanza PA-C  02/03/2023     Overnight Events: felt she had good pain control overnight when she was \"left alone.\"    Tubes/Drains: Yes      Subjective: Feels pain is better today than yesterday 6/10 vs 8.5/10    Symptoms of LAST: No    Pain Location:  Whole abdomen.    Pain Intensity:    Pain at Rest: 6/10   Pain with Activity: 7/10  Comfort Goal: 3/10   Baseline Pain: 0-5/10 from chronic lower back for which she takes 0-50mg/day of oxycodone.        Diet: NPO for Medical/Clinical Reasons Except for: Meds    Relevant Labs:  Recent Labs   Lab Test 02/03/23  0442 08/18/22  1731 05/19/21  1507   INR  --   --  1.02      < > 365   PTT  --   --  30   BUN 20.6   < > 27    < > = values in this interval not displayed.       Physical Exam:  Vitals: BP (!) 158/80 (BP Location: Right arm, Patient Position: Supine, Cuff Size: Adult Small)   Pulse 107   Temp 97.6  F (36.4  C) (Oral)   Resp 16   Ht 1.524 m (5')   Wt 44.6 kg (98 lb 5.2 oz)   SpO2 93%   BMI 19.20 kg/m      Physical Exam:   Orientation:  Alert, oriented, and in no acute distress: Yes  Sedation: No    Motor Examination:  5/5 Strength in lower extremities: Yes. Walked to bathroom.  Sensory Level:   Decrease in sensation: Yes especially on the right side as she is lying on the right side.    Catheter Site:   Catheter entry site is clean/dry/intact: Yes    Tender: No      Relevant Medications:  Current Pain Medications:  Medications related to Pain Management (From now, onward)    Start     Dose/Rate Route Frequency Ordered Stop    02/04/23 0000  acetaminophen (TYLENOL) solution 650 mg        Note to Pharmacy: Pharmacist change per scope of practice: switch to liquid    650 mg Oral EVERY 4 HOURS PRN 02/01/23 1646      02/03/23 1223  LORazepam (ATIVAN) tablet 0.5 mg         0.5 mg Oral DAILY PRN 02/03/23 1224      02/03/23 0930  bisacodyl (DULCOLAX) suppository 10 mg         10 mg Rectal ONCE 02/03/23 0914      02/02/23 2000  sennosides " (SENOKOT) syrup 10 mL        Note to Pharmacy: Pharmacist change per scope of practice: switch to liquid from senna/docusate 1 tab BID   See Hyperspace for full Linked Orders Report.    10 mL Oral 2 TIMES DAILY 02/02/23 1303      02/02/23 2000  docusate (COLACE) 50 MG/5ML liquid 50 mg        Note to Pharmacy: Pharmacist change per scope of practice: switch to liquid from senna/docusate 1 tab BID   See Hyperspace for full Linked Orders Report.    50 mg Oral 2 TIMES DAILY 02/02/23 1303      02/02/23 0930  ketamine (KETALAR) 2 mg/mL in sodium chloride 0.9 % 50 mL ANALGESIA infusion         5 mg/hr  2.5 mL/hr  Intravenous CONTINUOUS 02/02/23 0907      02/02/23 0930  HYDROmorphone (DILAUDID) PCA 0.2 mg/mL OPIOID NAIVE          Intravenous CONTINUOUS 02/02/23 0909      02/02/23 0800  polyethylene glycol (MIRALAX) Packet 17 g         17 g Oral DAILY 02/01/23 1526      02/01/23 1700  acetaminophen (TYLENOL) solution 975 mg        Note to Pharmacy: Pharmacist change per scope of practice: switch to liquid    975 mg Oral EVERY 8 HOURS 02/01/23 1646 02/04/23 1959    02/01/23 1600  bupivacaine (MARCAINE) 0.125 % in sodium chloride 0.9 % 250 mL EPIDURAL Infusion         6 mL/hr  EPIDURAL CONTINUOUS 02/01/23 1539 02/05/23 1559    02/01/23 1526  magnesium hydroxide (MILK OF MAGNESIA) suspension 30 mL         30 mL Oral DAILY PRN 02/01/23 1526      02/01/23 1526  bisacodyl (DULCOLAX) suppository 10 mg         10 mg Rectal DAILY PRN 02/01/23 1526            Primary Service Contacted with Recommendations? Yes

## 2023-02-04 LAB
ANION GAP SERPL CALCULATED.3IONS-SCNC: 15 MMOL/L (ref 7–15)
BUN SERPL-MCNC: 32.2 MG/DL (ref 8–23)
CALCIUM SERPL-MCNC: 9.1 MG/DL (ref 8.8–10.2)
CHLORIDE SERPL-SCNC: 96 MMOL/L (ref 98–107)
CREAT SERPL-MCNC: 4.96 MG/DL (ref 0.51–0.95)
DEPRECATED HCO3 PLAS-SCNC: 25 MMOL/L (ref 22–29)
ERYTHROCYTE [DISTWIDTH] IN BLOOD BY AUTOMATED COUNT: 15.2 % (ref 10–15)
GFR SERPL CREATININE-BSD FRML MDRD: 9 ML/MIN/1.73M2
GLUCOSE SERPL-MCNC: 90 MG/DL (ref 70–99)
HCT VFR BLD AUTO: 27.9 % (ref 35–47)
HGB BLD-MCNC: 8.6 G/DL (ref 11.7–15.7)
MAGNESIUM SERPL-MCNC: 1.8 MG/DL (ref 1.7–2.3)
MCH RBC QN AUTO: 34 PG (ref 26.5–33)
MCHC RBC AUTO-ENTMCNC: 30.8 G/DL (ref 31.5–36.5)
MCV RBC AUTO: 110 FL (ref 78–100)
PHOSPHATE SERPL-MCNC: 4.7 MG/DL (ref 2.5–4.5)
PLATELET # BLD AUTO: 239 10E3/UL (ref 150–450)
POTASSIUM SERPL-SCNC: 3.9 MMOL/L (ref 3.4–5.3)
RBC # BLD AUTO: 2.53 10E6/UL (ref 3.8–5.2)
SODIUM SERPL-SCNC: 136 MMOL/L (ref 136–145)
WBC # BLD AUTO: 10.7 10E3/UL (ref 4–11)

## 2023-02-04 PROCEDURE — 250N000011 HC RX IP 250 OP 636: Performed by: PHYSICIAN ASSISTANT

## 2023-02-04 PROCEDURE — 120N000011 HC R&B TRANSPLANT UMMC

## 2023-02-04 PROCEDURE — 250N000013 HC RX MED GY IP 250 OP 250 PS 637: Performed by: NURSE PRACTITIONER

## 2023-02-04 PROCEDURE — 258N000003 HC RX IP 258 OP 636: Performed by: PHYSICIAN ASSISTANT

## 2023-02-04 PROCEDURE — 634N000001 HC RX 634: Performed by: CLINICAL NURSE SPECIALIST

## 2023-02-04 PROCEDURE — 36415 COLL VENOUS BLD VENIPUNCTURE: CPT | Performed by: SURGERY

## 2023-02-04 PROCEDURE — 85027 COMPLETE CBC AUTOMATED: CPT | Performed by: SURGERY

## 2023-02-04 PROCEDURE — 258N000003 HC RX IP 258 OP 636: Performed by: CLINICAL NURSE SPECIALIST

## 2023-02-04 PROCEDURE — 83735 ASSAY OF MAGNESIUM: CPT | Performed by: SURGERY

## 2023-02-04 PROCEDURE — 258N000003 HC RX IP 258 OP 636: Performed by: NURSE PRACTITIONER

## 2023-02-04 PROCEDURE — 250N000013 HC RX MED GY IP 250 OP 250 PS 637: Performed by: PHYSICIAN ASSISTANT

## 2023-02-04 PROCEDURE — 90937 HEMODIALYSIS REPEATED EVAL: CPT

## 2023-02-04 PROCEDURE — 90935 HEMODIALYSIS ONE EVALUATION: CPT | Performed by: INTERNAL MEDICINE

## 2023-02-04 PROCEDURE — 84100 ASSAY OF PHOSPHORUS: CPT | Performed by: SURGERY

## 2023-02-04 PROCEDURE — 250N000013 HC RX MED GY IP 250 OP 250 PS 637: Performed by: STUDENT IN AN ORGANIZED HEALTH CARE EDUCATION/TRAINING PROGRAM

## 2023-02-04 PROCEDURE — 250N000013 HC RX MED GY IP 250 OP 250 PS 637: Performed by: SURGERY

## 2023-02-04 PROCEDURE — 250N000011 HC RX IP 250 OP 636: Performed by: SURGERY

## 2023-02-04 PROCEDURE — 250N000009 HC RX 250: Performed by: NURSE PRACTITIONER

## 2023-02-04 PROCEDURE — 90935 HEMODIALYSIS ONE EVALUATION: CPT

## 2023-02-04 PROCEDURE — 80048 BASIC METABOLIC PNL TOTAL CA: CPT | Performed by: SURGERY

## 2023-02-04 RX ORDER — SODIUM CHLORIDE 9 MG/ML
INJECTION, SOLUTION INTRAVENOUS CONTINUOUS
Status: DISCONTINUED | OUTPATIENT
Start: 2023-02-04 | End: 2023-02-05

## 2023-02-04 RX ORDER — CALCIUM CARBONATE 500 MG/1
500 TABLET, CHEWABLE ORAL DAILY PRN
Status: DISCONTINUED | OUTPATIENT
Start: 2023-02-04 | End: 2023-02-06 | Stop reason: HOSPADM

## 2023-02-04 RX ORDER — NORTRIPTYLINE HYDROCHLORIDE 75 MG/1
75 CAPSULE ORAL EVERY MORNING
Status: DISCONTINUED | OUTPATIENT
Start: 2023-02-05 | End: 2023-02-06 | Stop reason: HOSPADM

## 2023-02-04 RX ORDER — HYDROMORPHONE HCL IN WATER/PF 6 MG/30 ML
.2-.4 PATIENT CONTROLLED ANALGESIA SYRINGE INTRAVENOUS
Status: DISPENSED | OUTPATIENT
Start: 2023-02-04 | End: 2023-02-05

## 2023-02-04 RX ORDER — OXYCODONE HYDROCHLORIDE 5 MG/1
5-10 TABLET ORAL
Status: DISCONTINUED | OUTPATIENT
Start: 2023-02-04 | End: 2023-02-05

## 2023-02-04 RX ADMIN — CLONIDINE HYDROCHLORIDE 0.2 MG: 0.2 TABLET ORAL at 19:54

## 2023-02-04 RX ADMIN — DOCUSATE SODIUM LIQUID 50 MG: 100 LIQUID ORAL at 19:55

## 2023-02-04 RX ADMIN — LISINOPRIL 20 MG: 10 TABLET ORAL at 19:54

## 2023-02-04 RX ADMIN — HYDROMORPHONE HYDROCHLORIDE 0.4 MG: 0.2 INJECTION, SOLUTION INTRAMUSCULAR; INTRAVENOUS; SUBCUTANEOUS at 22:46

## 2023-02-04 RX ADMIN — AMLODIPINE 5 MG: 1 SUSPENSION ORAL at 21:35

## 2023-02-04 RX ADMIN — DOCUSATE SODIUM LIQUID 50 MG: 100 LIQUID ORAL at 12:48

## 2023-02-04 RX ADMIN — OXYCODONE HYDROCHLORIDE 10 MG: 5 TABLET ORAL at 12:44

## 2023-02-04 RX ADMIN — POLYETHYLENE GLYCOL 3350 17 G: 17 POWDER, FOR SOLUTION ORAL at 12:48

## 2023-02-04 RX ADMIN — ONDANSETRON 4 MG: 2 INJECTION INTRAMUSCULAR; INTRAVENOUS at 22:46

## 2023-02-04 RX ADMIN — SODIUM CHLORIDE: 9 INJECTION, SOLUTION INTRAVENOUS at 17:13

## 2023-02-04 RX ADMIN — KETAMINE HYDROCHLORIDE 5 MG/HR: 100 INJECTION, SOLUTION, CONCENTRATE INTRAMUSCULAR; INTRAVENOUS at 17:07

## 2023-02-04 RX ADMIN — HYDROMORPHONE HYDROCHLORIDE 0.4 MG: 0.2 INJECTION, SOLUTION INTRAMUSCULAR; INTRAVENOUS; SUBCUTANEOUS at 20:36

## 2023-02-04 RX ADMIN — LABETALOL HYDROCHLORIDE 600 MG: 300 TABLET ORAL at 19:54

## 2023-02-04 RX ADMIN — OXYCODONE HYDROCHLORIDE 10 MG: 5 TABLET ORAL at 19:54

## 2023-02-04 RX ADMIN — LISINOPRIL 20 MG: 10 TABLET ORAL at 12:48

## 2023-02-04 RX ADMIN — LEVOTHYROXINE SODIUM 50 MCG: 50 TABLET ORAL at 21:35

## 2023-02-04 RX ADMIN — EPOETIN ALFA-EPBX 4000 UNITS: 10000 INJECTION, SOLUTION INTRAVENOUS; SUBCUTANEOUS at 10:05

## 2023-02-04 RX ADMIN — SENNOSIDES 10 ML: 8.8 LIQUID ORAL at 19:54

## 2023-02-04 RX ADMIN — Medication 450 MG: at 12:47

## 2023-02-04 RX ADMIN — HYDROMORPHONE HYDROCHLORIDE 0.2 MG: 0.2 INJECTION, SOLUTION INTRAMUSCULAR; INTRAVENOUS; SUBCUTANEOUS at 11:24

## 2023-02-04 RX ADMIN — SODIUM CHLORIDE: 9 INJECTION, SOLUTION INTRAVENOUS at 13:55

## 2023-02-04 RX ADMIN — SENNOSIDES 10 ML: 8.8 LIQUID ORAL at 12:48

## 2023-02-04 RX ADMIN — SODIUM CHLORIDE 300 ML: 9 INJECTION, SOLUTION INTRAVENOUS at 08:33

## 2023-02-04 RX ADMIN — HYDROMORPHONE HYDROCHLORIDE 0.4 MG: 0.2 INJECTION, SOLUTION INTRAMUSCULAR; INTRAVENOUS; SUBCUTANEOUS at 18:19

## 2023-02-04 RX ADMIN — HYDROMORPHONE HYDROCHLORIDE 0.4 MG: 0.2 INJECTION, SOLUTION INTRAMUSCULAR; INTRAVENOUS; SUBCUTANEOUS at 15:42

## 2023-02-04 RX ADMIN — SODIUM CHLORIDE 250 ML: 9 INJECTION, SOLUTION INTRAVENOUS at 08:33

## 2023-02-04 RX ADMIN — OXYCODONE HYDROCHLORIDE 10 MG: 5 TABLET ORAL at 15:42

## 2023-02-04 RX ADMIN — SERTRALINE HYDROCHLORIDE 200 MG: 20 SOLUTION ORAL at 21:35

## 2023-02-04 RX ADMIN — CALCIUM CARBONATE (ANTACID) CHEW TAB 500 MG 500 MG: 500 CHEW TAB at 18:19

## 2023-02-04 RX ADMIN — Medication: at 08:34

## 2023-02-04 RX ADMIN — HYDROMORPHONE HYDROCHLORIDE 0.4 MG: 0.2 INJECTION, SOLUTION INTRAMUSCULAR; INTRAVENOUS; SUBCUTANEOUS at 13:40

## 2023-02-04 ASSESSMENT — ACTIVITIES OF DAILY LIVING (ADL)
ADLS_ACUITY_SCORE: 23

## 2023-02-04 NOTE — PROGRESS NOTES
HEMODIALYSIS TREATMENT NOTE    Date: 2/4/2023  Time: 11:48 AM    Data:  Pre Wt:   44.9 kg   Desired Wt:   kg   Post Wt:    Weight change:   kg  Ultrafiltration - Post Run Net Total Removed (mL): 2000 mL  Vascular Access Status: patent  Dialyzer Rinse: Streaked  Total Blood Volume Processed: 70.62 L Liters  Total Dialysis (Treatment) Time: 3.0 Hours    Lab:   no    Interventions:  Repositioned x2, pt otherwise independent with moving. Pt had clear liquid diet tray.    Assessment:  Pt alert and oriented. She tolerates dialysis 3 hr treatment without any issue in VS or discomfort related to dialysis. Had UF removal of 2L. Left fistula patent and reached hemostasis with 10 min and dressings applied. Report to Evelina.      Plan:    Per renal team

## 2023-02-04 NOTE — PROGRESS NOTES
Transplant Surgery  Inpatient Daily Progress Note  02/04/2023    Assessment & Plan: Serene is a 67 year old female who has a past medical history of Anemia in chronic kidney disease, Anxiety, Arthritis, Brain aneurysm, Chronic low back pain, Depressive disorder (2013), Disease of thyroid gland, ESRD (end stage renal disease) on dialysis (H) (07/2020), GERD (gastroesophageal reflux disease), Hepatic lesion, Hyperlipidemia, Hypertension, Nephrolithiasis, Neuromuscular disorder (H), Osteoporosis, PKD (polycystic kidney disease) (09/01/1990), PTSD (post-traumatic stress disorder), Tobacco abuse, and Vitamin D deficiency. He is now s/p bilateral native nephrectomy with Dr. Alana Giang.      Hematology:  Stable  Anemia of chronic disease: Last Hgb: 2/4/2023: 8.6 g/dL. Continue to monitor with daily labs.  Leukocytosis: WBC 15 (from 10), afebrile, monitor.     Neurology:   Epidural out, weaning PCA and ketamine today  Chronic and Acute pain management: Epidural, Ketamine analgesia drip (increased), APAP Q8H + Q4H PRN, Hydromorphone PCA. PTA Ativan PRN restarted. Management per pain team, appreciate recs.     Cardiac:  Stable  Hypertension: Restart home antihypertensives as able.    Respiratory:  Stable, wean O2 by cannula  Hypoxia: 2.5 LPM / Device: Nasal cannula   - Wean O2 as able  - Encourage good pulmonary hygiene: IS use Q1H, Deep breathing and coughing    GI/Nutrition:  NG out, clear liquid advance as tolerated  Diet: NPO with NGT to LIS with 500 mL green output overnight. AXR with NGT in stomach.   Bowel regimen: Senna BID, PEG daily, suppository today    Endocrine:   Continue  Hypothyroidism: PTA levothyroxine.     Fluid/Electrolytes/Neph:   SLIV    MIVF: NS 50 mL/hr  ESRD 2/2 PKD: Nephrology consulted. Last HD 2/2 via LUE AVF.     Infectious disease: No issues.     Prophylaxis: Fall, DVT (mechanical), GI (PPI)    Disposition: OT consulted. Plan for discharge home once bowel function has returned and pain is  "well controlled on PO regimen.     Plan for discharge Sunday/Monday pending PO intake and narcotic wean    Medical Decision Making: Medium  Subsequent visit 20892 (moderate level decision making)    RAS/Fellow/Resident Provider: Jaskaran Moreland MD [id number]    Faculty: Catalino Bishop MD PhD  _________________________________________________________________  Transplant History:   N/A, Postoperative day:      Interval History: \"History is obtained from the patient\"  Overnight events: No issues overnight  ROS:   A 10-point review of systems was negative except as noted above.    Meds:    sodium chloride 0.9%  250 mL Intravenous Once in dialysis/CRRT     sodium chloride 0.9%  300 mL Hemodialysis Machine Once     acetaminophen  975 mg Oral Q8H     amlodipine  5 mg Oral At Bedtime     cloNIDine  0.2 mg Oral QPM     sennosides  10 mL Oral BID    And     docusate  50 mg Oral BID     epoetin deysi-epbx  4,000 Units Intravenous Once in dialysis/CRRT     labetalol  450 mg Oral QAM     labetalol  600 mg Oral QPM     levothyroxine  50 mcg Oral or NG Tube At Bedtime     lisinopril  20 mg Oral BID     - MEDICATION INSTRUCTIONS -   Does not apply Once     pantoprazole  40 mg Oral or NG Tube QAM AC     polyethylene glycol  17 g Oral Daily     sertraline  200 mg Oral or NG Tube At Bedtime     sodium chloride (PF)  3 mL Intravenous Q8H       Physical Exam:     Admit Weight: 48.3 kg (106 lb 7.7 oz)    Current vitals:   /65 (BP Location: Right arm)   Pulse 85   Temp 98.2  F (36.8  C) (Oral)   Resp 18   Ht 1.524 m (5')   Wt 44.9 kg (99 lb)   SpO2 95%   BMI 19.33 kg/m      Vital sign ranges:    Temp:  [97.6  F (36.4  C)-98.6  F (37  C)] 98.2  F (36.8  C)  Pulse:  [] 85  Resp:  [16-18] 18  BP: (114-197)/(63-85) 138/65  SpO2:  [93 %-95 %] 95 %    General Appearance: in no apparent distress.   HEENT: NG with gastric appearing output in tubing  Skin: Warm, perfused  Heart: RRR  Lungs: NWOB on 2.5 LPM / Device: Nasal cannula  Abdomen: " The abdomen is appropriately tender, ND, soft. Midline incision is present c/d/i with glue in place.  Extremities: edema: trace, strength 5/5  Neurologic: alert and oriented. Tremor absent.    Data:   CMP  Recent Labs   Lab 02/04/23  0538 02/03/23  0442 02/01/23  1320 02/01/23  1103    136   < > 130*   POTASSIUM 3.9 3.9   < > 4.4   CHLORIDE 96* 95*   < >  --    CO2 25 27   < >  --    GLC 90 116*   < > 128*   BUN 32.2* 20.6   < >  --    CR 4.96* 3.40*   < >  --    GFRESTIMATED 9* 14*   < >  --    GAVIN 9.1 9.1   < >  --    ICAW  --   --   --  3.9*   MAG 1.8 1.8   < >  --    PHOS 4.7* 3.7   < >  --     < > = values in this interval not displayed.     CBC  Recent Labs   Lab 02/04/23 0538 02/03/23 0442   HGB 8.6* 9.9*   WBC 10.7 14.7*    269

## 2023-02-04 NOTE — PLAN OF CARE
BP (!) 153/84 (BP Location: Right arm)   Pulse 97   Temp 98.3  F (36.8  C) (Oral)   Resp 18   Ht 1.524 m (5')   Wt 44.6 kg (98 lb 5.2 oz)   SpO2 94%   BMI 19.20 kg/m      Shift: 8433-2826  Isolation Status: none  VS: stable on 2.5L O2 NC, afebrile  Neuro: Aox4  Behaviors: calm, friendly, resistant to interventions/education such as: walking, inclining HOB, IS use. Pt refused these interventions and highest incline was approximately 10 degrees.   BG: none  Labs:-  Respiratory: WDL  Cardiac: WDL  Pain/Nausea: high pain levels, denies nausea.  PRN: refused tylenol and aqua-pad  Diet: NPO  IV Access: PIV x2  Infusion(s): bupivacaine, ketamine, hydromorphone,  NS@50  Lines/Drains: NG tube to low intermittent suction, epidural, PIVx2, oxygen, capno, pulse ox  GI/: no BM today. Miralax, senna, colace and suppository given. anuric  Skin: midline abd incision, durmabonded, SERGIO  Mobility: SBA  Events/Education: encouraged movement and sitting up and explained importance.   Plan:  continue POC

## 2023-02-04 NOTE — PROGRESS NOTES
Obtained verbal order from Daysi TELLO to discontinue IV fluids NS. Plan to keep PCA meds until pt done with dialysis. Reduced NS rate from 50ml/hr to 10ml per hour during dialysis. Report given to CARMEN Hoyt.

## 2023-02-04 NOTE — PLAN OF CARE
/63 (BP Location: Right arm)   Pulse 88   Temp 98  F (36.7  C) (Oral)   Resp 18   Ht 1.524 m (5')   Wt 44.6 kg (98 lb 5.2 oz)   SpO2 94%   BMI 19.20 kg/m      Shift: 2792-3908  VS: Stable on 2.5L via NC d/t desatting, afebrile  Cardiac: WDL  Neuro: Aox4, Calm and cooperative w/ cares   Respiratory: infrequent cough, clear lung sounds   Pain/Nausea/PRN: denies nausea. Pt took a shower, epidural dressing was partly undone. Noted some drainage from dressing, anesthesia came to assess at bedside stated it was completely undone and removed it. Stated will reassess with team in morning. Ketamine infusing into R PIV along with PCA dilaudid and NS @ 50 ml/hr.  Diet: npo   LDA: NG -LIS, R PIV X2, L AVF  GI/: 1 bm, anuric   Skin: dermabonded midline incision SERGIO no drainage   Mobility: SBA  Plan: continue plan of care     Will give report to oncoming nurse. Pt left in stable condition, care relinquished at this time.

## 2023-02-04 NOTE — PROGRESS NOTES
Nephrology dialysis note    This patient was seen and examined while on dialysis. Laboratory results and nurses' notes were reviewed.    No changes to management of volume, anemia, BMD, acidosis, or electrolytes.    Diagnosis - ESKD s/p nephrectomy. Next HD likely Tuesday.    Leonides Rodriguez MD  [phone]

## 2023-02-05 ENCOUNTER — APPOINTMENT (OUTPATIENT)
Dept: OCCUPATIONAL THERAPY | Facility: CLINIC | Age: 68
DRG: 660 | End: 2023-02-05
Attending: SURGERY
Payer: COMMERCIAL

## 2023-02-05 LAB
ANION GAP SERPL CALCULATED.3IONS-SCNC: 13 MMOL/L (ref 7–15)
BUN SERPL-MCNC: 17 MG/DL (ref 8–23)
CALCIUM SERPL-MCNC: 8.6 MG/DL (ref 8.8–10.2)
CHLORIDE SERPL-SCNC: 96 MMOL/L (ref 98–107)
CREAT SERPL-MCNC: 3.2 MG/DL (ref 0.51–0.95)
DEPRECATED HCO3 PLAS-SCNC: 26 MMOL/L (ref 22–29)
ERYTHROCYTE [DISTWIDTH] IN BLOOD BY AUTOMATED COUNT: 15.1 % (ref 10–15)
GFR SERPL CREATININE-BSD FRML MDRD: 15 ML/MIN/1.73M2
GLUCOSE SERPL-MCNC: 88 MG/DL (ref 70–99)
HCT VFR BLD AUTO: 29.9 % (ref 35–47)
HGB BLD-MCNC: 9.1 G/DL (ref 11.7–15.7)
MAGNESIUM SERPL-MCNC: 1.8 MG/DL (ref 1.7–2.3)
MCH RBC QN AUTO: 33.8 PG (ref 26.5–33)
MCHC RBC AUTO-ENTMCNC: 30.4 G/DL (ref 31.5–36.5)
MCV RBC AUTO: 111 FL (ref 78–100)
PHOSPHATE SERPL-MCNC: 3.2 MG/DL (ref 2.5–4.5)
PLATELET # BLD AUTO: 275 10E3/UL (ref 150–450)
POTASSIUM SERPL-SCNC: 4.1 MMOL/L (ref 3.4–5.3)
RBC # BLD AUTO: 2.69 10E6/UL (ref 3.8–5.2)
SODIUM SERPL-SCNC: 135 MMOL/L (ref 136–145)
WBC # BLD AUTO: 9.9 10E3/UL (ref 4–11)

## 2023-02-05 PROCEDURE — 250N000013 HC RX MED GY IP 250 OP 250 PS 637: Performed by: NURSE PRACTITIONER

## 2023-02-05 PROCEDURE — 250N000011 HC RX IP 250 OP 636: Performed by: PHYSICIAN ASSISTANT

## 2023-02-05 PROCEDURE — 120N000011 HC R&B TRANSPLANT UMMC

## 2023-02-05 PROCEDURE — 84100 ASSAY OF PHOSPHORUS: CPT | Performed by: SURGERY

## 2023-02-05 PROCEDURE — 250N000013 HC RX MED GY IP 250 OP 250 PS 637: Performed by: STUDENT IN AN ORGANIZED HEALTH CARE EDUCATION/TRAINING PROGRAM

## 2023-02-05 PROCEDURE — 250N000013 HC RX MED GY IP 250 OP 250 PS 637: Performed by: SURGERY

## 2023-02-05 PROCEDURE — 250N000013 HC RX MED GY IP 250 OP 250 PS 637: Performed by: PHYSICIAN ASSISTANT

## 2023-02-05 PROCEDURE — 97535 SELF CARE MNGMENT TRAINING: CPT | Mod: GO | Performed by: OCCUPATIONAL THERAPIST

## 2023-02-05 PROCEDURE — 82310 ASSAY OF CALCIUM: CPT | Performed by: SURGERY

## 2023-02-05 PROCEDURE — 36415 COLL VENOUS BLD VENIPUNCTURE: CPT | Performed by: SURGERY

## 2023-02-05 PROCEDURE — 97530 THERAPEUTIC ACTIVITIES: CPT | Mod: GO | Performed by: OCCUPATIONAL THERAPIST

## 2023-02-05 PROCEDURE — 85027 COMPLETE CBC AUTOMATED: CPT | Performed by: SURGERY

## 2023-02-05 PROCEDURE — 83735 ASSAY OF MAGNESIUM: CPT | Performed by: SURGERY

## 2023-02-05 RX ORDER — AMOXICILLIN 250 MG
1 CAPSULE ORAL 2 TIMES DAILY
Status: DISCONTINUED | OUTPATIENT
Start: 2023-02-05 | End: 2023-02-06 | Stop reason: HOSPADM

## 2023-02-05 RX ORDER — PANTOPRAZOLE SODIUM 40 MG/1
40 TABLET, DELAYED RELEASE ORAL
Status: DISCONTINUED | OUTPATIENT
Start: 2023-02-05 | End: 2023-02-06 | Stop reason: HOSPADM

## 2023-02-05 RX ORDER — ACETAMINOPHEN 325 MG/1
975 TABLET ORAL EVERY 8 HOURS
Status: DISCONTINUED | OUTPATIENT
Start: 2023-02-05 | End: 2023-02-06 | Stop reason: HOSPADM

## 2023-02-05 RX ORDER — ACETAMINOPHEN 325 MG/10.15ML
975 LIQUID ORAL EVERY 8 HOURS
Status: DISCONTINUED | OUTPATIENT
Start: 2023-02-05 | End: 2023-02-05

## 2023-02-05 RX ORDER — ACETAMINOPHEN 325 MG/10.15ML
650 LIQUID ORAL EVERY 4 HOURS PRN
COMMUNITY
Start: 2023-02-05 | End: 2023-02-06

## 2023-02-05 RX ORDER — OXYCODONE HYDROCHLORIDE 5 MG/1
5 TABLET ORAL ONCE
Status: COMPLETED | OUTPATIENT
Start: 2023-02-05 | End: 2023-02-05

## 2023-02-05 RX ORDER — AMLODIPINE BESYLATE 5 MG/1
5 TABLET ORAL DAILY
Status: DISCONTINUED | OUTPATIENT
Start: 2023-02-05 | End: 2023-02-06 | Stop reason: HOSPADM

## 2023-02-05 RX ORDER — POLYETHYLENE GLYCOL 3350 17 G/17G
17 POWDER, FOR SOLUTION ORAL DAILY
Qty: 510 G | Refills: 3 | Status: SHIPPED | OUTPATIENT
Start: 2023-02-05 | End: 2023-02-25

## 2023-02-05 RX ORDER — AMOXICILLIN 250 MG
1-2 CAPSULE ORAL DAILY
Qty: 60 TABLET | Refills: 3 | Status: ON HOLD | OUTPATIENT
Start: 2023-02-05 | End: 2023-07-05 | Stop reason: DRUGHIGH

## 2023-02-05 RX ORDER — OXYCODONE HYDROCHLORIDE 5 MG/1
10-15 TABLET ORAL
Status: DISCONTINUED | OUTPATIENT
Start: 2023-02-05 | End: 2023-02-06 | Stop reason: HOSPADM

## 2023-02-05 RX ADMIN — OXYCODONE HYDROCHLORIDE 15 MG: 5 TABLET ORAL at 19:53

## 2023-02-05 RX ADMIN — OXYCODONE HYDROCHLORIDE 5 MG: 5 TABLET ORAL at 04:14

## 2023-02-05 RX ADMIN — OXYCODONE HYDROCHLORIDE 15 MG: 5 TABLET ORAL at 23:20

## 2023-02-05 RX ADMIN — OXYCODONE HYDROCHLORIDE 10 MG: 5 TABLET ORAL at 15:57

## 2023-02-05 RX ADMIN — LISINOPRIL 20 MG: 10 TABLET ORAL at 19:56

## 2023-02-05 RX ADMIN — LABETALOL HYDROCHLORIDE 600 MG: 300 TABLET ORAL at 19:55

## 2023-02-05 RX ADMIN — OXYCODONE HYDROCHLORIDE 10 MG: 5 TABLET ORAL at 11:21

## 2023-02-05 RX ADMIN — AMLODIPINE BESYLATE 5 MG: 5 TABLET ORAL at 20:00

## 2023-02-05 RX ADMIN — OXYCODONE HYDROCHLORIDE 5 MG: 5 TABLET ORAL at 18:07

## 2023-02-05 RX ADMIN — LORAZEPAM 0.5 MG: 0.5 TABLET ORAL at 07:37

## 2023-02-05 RX ADMIN — LEVOTHYROXINE SODIUM 50 MCG: 50 TABLET ORAL at 21:14

## 2023-02-05 RX ADMIN — OXYCODONE HYDROCHLORIDE 10 MG: 5 TABLET ORAL at 00:30

## 2023-02-05 RX ADMIN — Medication 450 MG: at 07:38

## 2023-02-05 RX ADMIN — LISINOPRIL 20 MG: 10 TABLET ORAL at 07:38

## 2023-02-05 RX ADMIN — CLONIDINE HYDROCHLORIDE 0.2 MG: 0.2 TABLET ORAL at 19:55

## 2023-02-05 RX ADMIN — PANTOPRAZOLE SODIUM 40 MG: 40 TABLET, DELAYED RELEASE ORAL at 09:02

## 2023-02-05 RX ADMIN — ACETAMINOPHEN 975 MG: 325 TABLET ORAL at 23:20

## 2023-02-05 RX ADMIN — SERTRALINE HYDROCHLORIDE 200 MG: 50 TABLET ORAL at 19:55

## 2023-02-05 RX ADMIN — HYDROMORPHONE HYDROCHLORIDE 0.4 MG: 0.2 INJECTION, SOLUTION INTRAMUSCULAR; INTRAVENOUS; SUBCUTANEOUS at 02:09

## 2023-02-05 RX ADMIN — OXYCODONE HYDROCHLORIDE 10 MG: 5 TABLET ORAL at 07:37

## 2023-02-05 RX ADMIN — NORTRIPTYLINE HYDROCHLORIDE 75 MG: 75 CAPSULE ORAL at 07:38

## 2023-02-05 ASSESSMENT — ACTIVITIES OF DAILY LIVING (ADL)
ADLS_ACUITY_SCORE: 23

## 2023-02-05 NOTE — PLAN OF CARE
BP (!) 158/73 (BP Location: Right arm)   Pulse 94   Temp 99  F (37.2  C) (Oral)   Resp 18   Ht 1.524 m (5')   Wt 44.9 kg (99 lb)   SpO2 95%   BMI 19.33 kg/m      Shift: 1388-7134  Isolation Status: none  VS: stable on room air, afebrile  Neuro: Aox4, forgetful  Behaviors: calm, friendly, resistant to interventions such as: walking, inclining HOB, IS use.   BG: none  Labs: creat=4.96  Respiratory: WDL  Cardiac: WDL  Pain/Nausea: high pain levels, denies nausea.  PRN: refused tylenol and aqua-pad. IV dilaudid Q2, PO oxycodone Q3  Diet: regular  IV Access: PIV x2  Infusion(s): ketamine, NS@10  Lines/Drains: NG tube removed today  GI/: no BM today. Miralax, senna, colace. anuric  Skin: midline abd incision, durmabonded, SERGIO  Mobility: SBA  Events/Education: encouraged movement and sitting up  Plan:  Possible discharge tomorrow or Monday. Ketamine gtt through night with PRN oxy and IV dilaudid. Pain team to evaluate plan tomorrow.

## 2023-02-05 NOTE — PROGRESS NOTES
Transplant Surgery  Inpatient Daily Progress Note  02/05/2023    Assessment & Plan: Serene is a 67 year old female who has a past medical history of Anemia in chronic kidney disease, Anxiety, Arthritis, Brain aneurysm, Chronic low back pain, Depressive disorder (2013), Disease of thyroid gland, ESRD (end stage renal disease) on dialysis (H) (07/2020), GERD (gastroesophageal reflux disease), Hepatic lesion, Hyperlipidemia, Hypertension, Nephrolithiasis, Neuromuscular disorder (H), Osteoporosis, PKD (polycystic kidney disease) (09/01/1990), PTSD (post-traumatic stress disorder), Tobacco abuse, and Vitamin D deficiency. He is now s/p bilateral native nephrectomy with Dr. Alana Giang.      Hematology:  Stable  Anemia of chronic disease: Last Hgb: 2/5/2023: 9.1 g/dL. Continue to monitor with daily labs.  Leukocytosis: WBC 15 (from 10), afebrile, monitor.     Neurology:   Discontinue ketamine and IV dilaudid, Pain management consult, robaxin today, oxy  Chronic and Acute pain management: Epidural, Ketamine analgesia drip (increased), APAP Q8H + Q4H PRN, Hydromorphone PCA. PTA Ativan PRN restarted. Management per pain team, appreciate recs.     Cardiac:  Stable  Hypertension: Restart home antihypertensives as able.    Respiratory:  Stable, off O2  Hypoxia: 2 LPM / Device: None (Room air)   - Wean O2 as able  - Encourage good pulmonary hygiene: IS use Q1H, Deep breathing and coughing    GI/Nutrition:  RD  Diet: NPO with NGT to LIS with 500 mL green output overnight. AXR with NGT in stomach.   Bowel regimen: Senna BID, PEG daily, suppository today    Endocrine:   Continue  Hypothyroidism: PTA levothyroxine.     Fluid/Electrolytes/Neph:   SLIV    MIVF: NS 50 mL/hr  ESRD 2/2 PKD: Nephrology consulted. Last HD 2/2 via LUE AVF.     Infectious disease: No issues.     Prophylaxis: Fall, DVT (mechanical), GI (PPI)    Disposition: OT consulted. Plan for discharge home once bowel function has returned and pain is well  "controlled on PO regimen.     Plan for discharge Monday    Medical Decision Making: Medium  Subsequent visit 54389 (moderate level decision making)    RAS/Fellow/Resident Provider: Jaskaran Moreland MD [id number]    Faculty: Catalino Bishop MD PhD  _________________________________________________________________  Transplant History:   N/A, Postoperative day:      Interval History: \"History is obtained from the patient\"  Overnight events: No issues overnight, IV dilaudid overnight  ROS:   A 10-point review of systems was negative except as noted above.    Meds:    acetaminophen  975 mg Oral Q8H     amLODIPine  5 mg Oral Daily     cloNIDine  0.2 mg Oral QPM     labetalol  450 mg Oral QAM     labetalol  600 mg Oral QPM     levothyroxine  50 mcg Oral or NG Tube At Bedtime     lisinopril  20 mg Oral BID     nortriptyline  75 mg Oral QAM     pantoprazole  40 mg Oral QAM AC     polyethylene glycol  17 g Oral Daily     senna-docusate  1 tablet Oral BID     sertraline  200 mg Oral Daily     sodium chloride (PF)  3 mL Intravenous Q8H       Physical Exam:     Admit Weight: 48.3 kg (106 lb 7.7 oz)    Current vitals:   BP (!) 149/70 (BP Location: Right arm)   Pulse 90   Temp 98.2  F (36.8  C) (Oral)   Resp 16   Ht 1.524 m (5')   Wt 43.4 kg (95 lb 11.2 oz)   SpO2 92%   BMI 18.69 kg/m      Vital sign ranges:    Temp:  [98  F (36.7  C)-99  F (37.2  C)] 98.2  F (36.8  C)  Pulse:  [85-96] 90  Resp:  [11-32] 16  BP: (126-190)/() 149/70  Cuff Mean (mmHg):  [112-115] 112  SpO2:  [90 %-97 %] 92 %    General Appearance: in no apparent distress.   HEENT: NG with gastric appearing output in tubing  Skin: Warm, perfused  Heart: RRR  Lungs: Room air  Abdomen: The abdomen is appropriately tender, ND, soft. Midline incision is present c/d/i with glue in place.  Extremities: edema: trace, strength 5/5  Neurologic: alert and oriented. Tremor absent.        Data:   CMP  Recent Labs   Lab 02/05/23  0439 02/04/23  0538 02/01/23  1320 02/01/23  1103 "   * 136   < > 130*   POTASSIUM 4.1 3.9   < > 4.4   CHLORIDE 96* 96*   < >  --    CO2 26 25   < >  --    GLC 88 90   < > 128*   BUN 17.0 32.2*   < >  --    CR 3.20* 4.96*   < >  --    GFRESTIMATED 15* 9*   < >  --    GAVIN 8.6* 9.1   < >  --    ICAW  --   --   --  3.9*   MAG 1.8 1.8   < >  --    PHOS 3.2 4.7*   < >  --     < > = values in this interval not displayed.     CBC  Recent Labs   Lab 02/05/23  0439 02/04/23  0538   HGB 9.1* 8.6*   WBC 9.9 10.7    239

## 2023-02-05 NOTE — PROGRESS NOTES
[id number]    Reason for Admission: Bilat nephrectomy s/t polycystic kidney disease  Neuro:A/Ox4; makes needs know  Behavior: appropriate  Activity: SBA  Vital: BP (!) 140/72 (BP Location: Right arm)   Pulse 89   Temp 98.1  F (36.7  C) (Oral)   Resp 16   Ht 1.524 m (5')   Wt 44.9 kg (99 lb)   SpO2 90%   BMI 19.33 kg/m    LDAs: 2 PIV  Cardiac: hx HTN  Respiratory: WDL  GI/: commode at bedside; loose BM 2/4; anuric   Skin: midline abdominal incision SERGIO   Pain: high pain levels. PCA, PRN oxy and dilaudid  Diet: Regular diet, decreased appetite    Plan: Ketamine gtt through the night, discharge when pain is controlled, able to compete ADLs/ambulate independently.

## 2023-02-06 VITALS
OXYGEN SATURATION: 96 % | BODY MASS INDEX: 19.44 KG/M2 | RESPIRATION RATE: 18 BRPM | HEART RATE: 84 BPM | HEIGHT: 60 IN | WEIGHT: 99 LBS | DIASTOLIC BLOOD PRESSURE: 72 MMHG | SYSTOLIC BLOOD PRESSURE: 142 MMHG | TEMPERATURE: 98 F

## 2023-02-06 LAB
ANION GAP SERPL CALCULATED.3IONS-SCNC: 13 MMOL/L (ref 7–15)
BUN SERPL-MCNC: 30.1 MG/DL (ref 8–23)
CALCIUM SERPL-MCNC: 8.7 MG/DL (ref 8.8–10.2)
CHLORIDE SERPL-SCNC: 95 MMOL/L (ref 98–107)
CREAT SERPL-MCNC: 4.56 MG/DL (ref 0.51–0.95)
DEPRECATED HCO3 PLAS-SCNC: 25 MMOL/L (ref 22–29)
ERYTHROCYTE [DISTWIDTH] IN BLOOD BY AUTOMATED COUNT: 14.6 % (ref 10–15)
GFR SERPL CREATININE-BSD FRML MDRD: 10 ML/MIN/1.73M2
GLUCOSE SERPL-MCNC: 94 MG/DL (ref 70–99)
HCT VFR BLD AUTO: 27.8 % (ref 35–47)
HGB BLD-MCNC: 8.7 G/DL (ref 11.7–15.7)
MAGNESIUM SERPL-MCNC: 1.8 MG/DL (ref 1.7–2.3)
MCH RBC QN AUTO: 33.9 PG (ref 26.5–33)
MCHC RBC AUTO-ENTMCNC: 31.3 G/DL (ref 31.5–36.5)
MCV RBC AUTO: 108 FL (ref 78–100)
PATH REPORT.COMMENTS IMP SPEC: NORMAL
PATH REPORT.FINAL DX SPEC: NORMAL
PATH REPORT.GROSS SPEC: NORMAL
PATH REPORT.MICROSCOPIC SPEC OTHER STN: NORMAL
PATH REPORT.RELEVANT HX SPEC: NORMAL
PHOSPHATE SERPL-MCNC: 3.6 MG/DL (ref 2.5–4.5)
PHOTO IMAGE: NORMAL
PLATELET # BLD AUTO: 278 10E3/UL (ref 150–450)
POTASSIUM SERPL-SCNC: 4.1 MMOL/L (ref 3.4–5.3)
RBC # BLD AUTO: 2.57 10E6/UL (ref 3.8–5.2)
SODIUM SERPL-SCNC: 133 MMOL/L (ref 136–145)
WBC # BLD AUTO: 10.3 10E3/UL (ref 4–11)

## 2023-02-06 PROCEDURE — 250N000013 HC RX MED GY IP 250 OP 250 PS 637: Performed by: PHYSICIAN ASSISTANT

## 2023-02-06 PROCEDURE — 250N000013 HC RX MED GY IP 250 OP 250 PS 637: Performed by: NURSE PRACTITIONER

## 2023-02-06 PROCEDURE — 85027 COMPLETE CBC AUTOMATED: CPT | Performed by: SURGERY

## 2023-02-06 PROCEDURE — 36415 COLL VENOUS BLD VENIPUNCTURE: CPT | Performed by: SURGERY

## 2023-02-06 PROCEDURE — 82310 ASSAY OF CALCIUM: CPT | Performed by: SURGERY

## 2023-02-06 PROCEDURE — 83735 ASSAY OF MAGNESIUM: CPT | Performed by: SURGERY

## 2023-02-06 PROCEDURE — 84100 ASSAY OF PHOSPHORUS: CPT | Performed by: SURGERY

## 2023-02-06 RX ORDER — OXYCODONE HYDROCHLORIDE 10 MG/1
TABLET ORAL
Refills: 0 | Status: ON HOLD
Start: 2023-02-06 | End: 2023-07-22

## 2023-02-06 RX ORDER — ACETAMINOPHEN 325 MG/1
325-650 TABLET ORAL EVERY 4 HOURS PRN
Qty: 100 TABLET | Refills: 0 | Status: SHIPPED | OUTPATIENT
Start: 2023-02-06 | End: 2023-02-25

## 2023-02-06 RX ORDER — SEVELAMER CARBONATE 800 MG/1
800 TABLET, FILM COATED ORAL 3 TIMES DAILY PRN
Status: DISCONTINUED | OUTPATIENT
Start: 2023-02-06 | End: 2023-02-06 | Stop reason: HOSPADM

## 2023-02-06 RX ORDER — LIDOCAINE 4 G/G
2 PATCH TOPICAL EVERY 24 HOURS
Qty: 10 PATCH | Refills: 0 | Status: SHIPPED | OUTPATIENT
Start: 2023-02-06 | End: 2023-02-25

## 2023-02-06 RX ADMIN — ACETAMINOPHEN 975 MG: 325 TABLET ORAL at 07:48

## 2023-02-06 RX ADMIN — NORTRIPTYLINE HYDROCHLORIDE 75 MG: 75 CAPSULE ORAL at 07:48

## 2023-02-06 RX ADMIN — LISINOPRIL 20 MG: 10 TABLET ORAL at 07:48

## 2023-02-06 RX ADMIN — Medication 450 MG: at 07:47

## 2023-02-06 RX ADMIN — OXYCODONE HYDROCHLORIDE 10 MG: 5 TABLET ORAL at 07:48

## 2023-02-06 RX ADMIN — PANTOPRAZOLE SODIUM 40 MG: 40 TABLET, DELAYED RELEASE ORAL at 07:48

## 2023-02-06 RX ADMIN — OXYCODONE HYDROCHLORIDE 15 MG: 5 TABLET ORAL at 02:23

## 2023-02-06 ASSESSMENT — ACTIVITIES OF DAILY LIVING (ADL)
ADLS_ACUITY_SCORE: 23

## 2023-02-06 NOTE — PLAN OF CARE
VS: BP (!) 142/72 (BP Location: Right arm)   Pulse 84   Temp 98  F (36.7  C) (Oral)   Resp 18   Ht 1.524 m (5')   Wt 44.9 kg (99 lb)   SpO2 96%   BMI 19.33 kg/m      Cares: 1900 - 0730     Neuro: Aox4   Cardio: hypertensive   Respiratory: WDL on RA   GI/: anuric on HD, LBM 2-4   Skin: abdominal incision - dermabond SERGIO   Diet: Regular   Labs: waiting on morning lab results   BG: none  LDA: Left AV fistula. Right PIV SL   Mobility: Ind.   Pain/Nausea: abdominal pain, denies nausea    PRN medications: oxy Q3H  Plan of Care: plan for discharge today, continue with current POC and update MD with any changes

## 2023-02-06 NOTE — PLAN OF CARE
Vital signs:  Temp: 98.3  F (36.8  C) Temp src: Oral BP: (!) 149/76 Pulse: 93   Resp: 18 SpO2: 95 % O2 Device: None (Room air) Oxygen Delivery: 2 LPM Height: 152.4 cm (5') Weight: 43.4 kg (95 lb 11.2 oz)  Estimated body mass index is 18.69 kg/m  as calculated from the following:    Height as of this encounter: 1.524 m (5').    Weight as of this encounter: 43.4 kg (95 lb 11.2 oz).    7a-7p: VSS, IV pain meds discontinued. Oxy increased. Pain controlled with current regimen. Tolerating small amount of PO intake (juice, cracker, toast). No BM this shift, no void, pt is on HD.     Problem: Plan of Care - These are the overarching goals to be used throughout the patient stay.    Goal: Plan of Care Review  Description: The Plan of Care Review/Shift note should be completed every shift.  The Outcome Evaluation is a brief statement about your assessment that the patient is improving, declining, or no change.  This information will be displayed automatically on your shift note.  Outcome: Progressing  Flowsheets (Taken 2/5/2023 1809)  Plan of Care Reviewed With: patient  Overall Patient Progress: improving   Goal Outcome Evaluation:      Plan of Care Reviewed With: patient    Overall Patient Progress: improvingOverall Patient Progress: improving

## 2023-02-06 NOTE — PROGRESS NOTES
"Pain Service Progress Note  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 Outpatient takes oxycodone 10 mg tab. Rx for max 5 tabs per day, patient averages between 0-5 tabs per day.       Procedure: s/p bilateral nephrectomy     Date of Surgery: 2/1/23      Date of Catheter Placement: 2/1/23      Plan/Recommendations:  1. Regional Anesthesia/Analgesia  -Catheter Type/Site:  Epidural T8-9 s/p removal     2. Multimodal Analgesia  Discussed with primary service:   - APAP 975 mg TID  - Start Oxycodone 10-15 mg q3h prn  - Change IV dilaudid to 0.2 q4-6h prn and attempt to avoid  - Pain team will evaluate pain after increase in oxycodone and determine whether patient will need additional pain medication requirements above her chronic regimen at discharge (likely 2/6)    Pain Service will continue to follow.     Discussed with attending anesthesiologist     Please Page the Pain Team Via [employer]: \"PAIN MANAGEMENT ACUTE INPATIENT/ [facility]\"  Alex Bolanos MD  PGY-3 Anesthesia  02/05/2023     Overnight Events: No acute events overnig    Subjective: Feels pain is okay but would like a little better control.     Symptoms of LAST: No    Pain Location:  Entire abdomen.    Pain Intensity:    Pain at Rest: 7/10   Pain with Activity: 10/10  Comfort Goal: 3/10   Baseline Pain: 0-5/10 from chronic lower back for which she takes 0-50mg/day of oxycodone. States " understands will continue to have chronic low back pain and is okay with that but would like better control of her abdominal pain.         Diet: Advance Diet as Tolerated: Regular Diet Adult; Regular Diet Adult  Diet    Relevant Labs:  Recent Labs   Lab Test 02/03/23  0442 08/18/22  1731 05/19/21  1507   INR  --   --  1.02      < > 365   PTT  --   --  30   BUN 20.6   < > 27    < > = values in this interval not displayed.       Physical Exam:  Vitals: BP (!) 149/76 (BP Location: Right arm)   Pulse 93   Temp 36.8  C (98.3  F) (Oral)   Resp 18   Ht 1.524 m (5')   Wt 43.4 kg (95 lb 11.2 oz)   SpO2 95%   BMI 18.69 kg/m      Physical Exam:   Orientation:  Alert and oriented. Answering questions appropriately.  Room air, non-labored breathing  Abdomen ttp, no peritoneal signs  Spontaneous movement of extremities    Relevant Medications:  Current Pain Medications:  Medications related to Pain Management (From now, onward)    Start     Dose/Rate Route Frequency Ordered Stop    02/04/23 0000  acetaminophen (TYLENOL) solution 650 mg        Note to Pharmacy: Pharmacist change per scope of practice: switch to liquid    650 mg Oral EVERY 4 HOURS PRN 02/01/23 1646      02/03/23 1223  LORazepam (ATIVAN) tablet 0.5 mg         0.5 mg Oral DAILY PRN 02/03/23 1224      02/03/23 0930  bisacodyl (DULCOLAX) suppository 10 mg         10 mg Rectal ONCE 02/03/23 0914      02/02/23 2000  sennosides (SENOKOT) syrup 10 mL        Note to Pharmacy: Pharmacist change per scope of practice: switch to liquid from senna/docusate 1 tab BID   See Hyperspace for full Linked Orders Report.    10 mL Oral 2 TIMES DAILY 02/02/23 1303      02/02/23 2000  docusate (COLACE) 50 MG/5ML liquid 50 mg        Note to Pharmacy: Pharmacist change per scope of practice: switch to liquid from senna/docusate 1 tab BID   See Hyperspace for full Linked Orders Report.    50 mg Oral 2 TIMES DAILY 02/02/23 1303      02/02/23 0930  ketamine (KETALAR) 2 mg/mL in  sodium chloride 0.9 % 50 mL ANALGESIA infusion         5 mg/hr  2.5 mL/hr  Intravenous CONTINUOUS 02/02/23 0907      02/02/23 0930  HYDROmorphone (DILAUDID) PCA 0.2 mg/mL OPIOID NAIVE          Intravenous CONTINUOUS 02/02/23 0909      02/02/23 0800  polyethylene glycol (MIRALAX) Packet 17 g         17 g Oral DAILY 02/01/23 1526      02/01/23 1700  acetaminophen (TYLENOL) solution 975 mg        Note to Pharmacy: Pharmacist change per scope of practice: switch to liquid    975 mg Oral EVERY 8 HOURS 02/01/23 1646 02/04/23 1959    02/01/23 1600  bupivacaine (MARCAINE) 0.125 % in sodium chloride 0.9 % 250 mL EPIDURAL Infusion         6 mL/hr  EPIDURAL CONTINUOUS 02/01/23 1539 02/05/23 1559    02/01/23 1526  magnesium hydroxide (MILK OF MAGNESIA) suspension 30 mL         30 mL Oral DAILY PRN 02/01/23 1526      02/01/23 1526  bisacodyl (DULCOLAX) suppository 10 mg         10 mg Rectal DAILY PRN 02/01/23 1526            Primary Service Contacted with Recommendations? Yes

## 2023-02-06 NOTE — PLAN OF CARE
Occupational Therapy Discharge Summary    Reason for therapy discharge:    Discharged to home.    Progress towards therapy goal(s). See goals on Care Plan in [facility] electronic health record for goal details.  Goals partially met.  Barriers to achieving goals:   discharge from facility.    Therapy recommendation(s):    No further therapy is recommended.

## 2023-02-08 ENCOUNTER — TELEPHONE (OUTPATIENT)
Dept: TRANSPLANT | Facility: CLINIC | Age: 68
End: 2023-02-08

## 2023-02-20 ENCOUNTER — TELEPHONE (OUTPATIENT)
Dept: TRANSPLANT | Facility: CLINIC | Age: 68
End: 2023-02-20

## 2023-02-25 ENCOUNTER — HOSPITAL ENCOUNTER (INPATIENT)
Facility: CLINIC | Age: 68
LOS: 1 days | Discharge: HOME | DRG: 682 | End: 2023-02-28
Attending: FAMILY MEDICINE | Admitting: INTERNAL MEDICINE
Payer: COMMERCIAL

## 2023-02-25 ENCOUNTER — NURSE TRIAGE (OUTPATIENT)
Dept: NURSING | Facility: CLINIC | Age: 68
End: 2023-02-25
Payer: COMMERCIAL

## 2023-02-25 ENCOUNTER — APPOINTMENT (OUTPATIENT)
Dept: RADIOLOGY | Facility: CLINIC | Age: 68
DRG: 682 | End: 2023-02-25
Attending: FAMILY MEDICINE
Payer: COMMERCIAL

## 2023-02-25 DIAGNOSIS — Z90.5 S/P NEPHRECTOMY: ICD-10-CM

## 2023-02-25 DIAGNOSIS — R06.02 SHORTNESS OF BREATH: ICD-10-CM

## 2023-02-25 DIAGNOSIS — Z99.2 DIALYSIS PATIENT (H): ICD-10-CM

## 2023-02-25 DIAGNOSIS — J81.1 PULMONARY EDEMA, NONCARDIAC: ICD-10-CM

## 2023-02-25 LAB
ANION GAP SERPL CALCULATED.3IONS-SCNC: 14 MMOL/L (ref 5–18)
ATRIAL RATE - MUSE: 89 BPM
BASE EXCESS BLDV CALC-SCNC: 9.7 MMOL/L
BASOPHILS # BLD AUTO: 0.1 10E3/UL (ref 0–0.2)
BASOPHILS NFR BLD AUTO: 1 %
BNP SERPL-MCNC: >5000 PG/ML (ref 0–106)
BUN SERPL-MCNC: 24 MG/DL (ref 8–22)
CALCIUM SERPL-MCNC: 9.5 MG/DL (ref 8.5–10.5)
CHLORIDE BLD-SCNC: 94 MMOL/L (ref 98–107)
CO2 SERPL-SCNC: 28 MMOL/L (ref 22–31)
CREAT SERPL-MCNC: 4.86 MG/DL (ref 0.6–1.1)
DIASTOLIC BLOOD PRESSURE - MUSE: 73 MMHG
EOSINOPHIL # BLD AUTO: 0.1 10E3/UL (ref 0–0.7)
EOSINOPHIL NFR BLD AUTO: 1 %
ERYTHROCYTE [DISTWIDTH] IN BLOOD BY AUTOMATED COUNT: 14.8 % (ref 10–15)
GFR SERPL CREATININE-BSD FRML MDRD: 9 ML/MIN/1.73M2
GLUCOSE BLD-MCNC: 112 MG/DL (ref 70–125)
HCO3 BLDV-SCNC: 33 MMOL/L (ref 24–30)
HCT VFR BLD AUTO: 31 % (ref 35–47)
HGB BLD-MCNC: 9.8 G/DL (ref 11.7–15.7)
IMM GRANULOCYTES # BLD: 0 10E3/UL
IMM GRANULOCYTES NFR BLD: 1 %
INTERPRETATION ECG - MUSE: NORMAL
LYMPHOCYTES # BLD AUTO: 0.9 10E3/UL (ref 0.8–5.3)
LYMPHOCYTES NFR BLD AUTO: 10 %
MAGNESIUM SERPL-MCNC: 2.1 MG/DL (ref 1.8–2.6)
MCH RBC QN AUTO: 33.4 PG (ref 26.5–33)
MCHC RBC AUTO-ENTMCNC: 31.6 G/DL (ref 31.5–36.5)
MCV RBC AUTO: 106 FL (ref 78–100)
MONOCYTES # BLD AUTO: 0.8 10E3/UL (ref 0–1.3)
MONOCYTES NFR BLD AUTO: 9 %
NEUTROPHILS # BLD AUTO: 6.8 10E3/UL (ref 1.6–8.3)
NEUTROPHILS NFR BLD AUTO: 78 %
NRBC # BLD AUTO: 0 10E3/UL
NRBC BLD AUTO-RTO: 0 /100
OXYHGB MFR BLDV: 76.5 % (ref 70–75)
P AXIS - MUSE: 56 DEGREES
PCO2 BLDV: 46 MM HG (ref 35–50)
PH BLDV: 7.47 [PH] (ref 7.35–7.45)
PHOSPHATE SERPL-MCNC: 3.3 MG/DL (ref 2.5–4.5)
PLATELET # BLD AUTO: 260 10E3/UL (ref 150–450)
PO2 BLDV: 43 MM HG (ref 25–47)
POTASSIUM BLD-SCNC: 5.5 MMOL/L (ref 3.5–5)
PR INTERVAL - MUSE: 196 MS
PROCALCITONIN SERPL IA-MCNC: 0.21 NG/ML
QRS DURATION - MUSE: 86 MS
QT - MUSE: 394 MS
QTC - MUSE: 479 MS
R AXIS - MUSE: 21 DEGREES
RBC # BLD AUTO: 2.93 10E6/UL (ref 3.8–5.2)
SAO2 % BLDV: 82.2 % (ref 70–75)
SODIUM SERPL-SCNC: 136 MMOL/L (ref 136–145)
SYSTOLIC BLOOD PRESSURE - MUSE: 147 MMHG
T AXIS - MUSE: -85 DEGREES
TROPONIN I SERPL-MCNC: 0.04 NG/ML (ref 0–0.29)
TSH SERPL DL<=0.005 MIU/L-ACNC: 4.88 UIU/ML (ref 0.3–5)
VENTRICULAR RATE- MUSE: 89 BPM
WBC # BLD AUTO: 8.5 10E3/UL (ref 4–11)

## 2023-02-25 PROCEDURE — 82805 BLOOD GASES W/O2 SATURATION: CPT | Performed by: FAMILY MEDICINE

## 2023-02-25 PROCEDURE — G0378 HOSPITAL OBSERVATION PER HR: HCPCS

## 2023-02-25 PROCEDURE — 71046 X-RAY EXAM CHEST 2 VIEWS: CPT

## 2023-02-25 PROCEDURE — 80048 BASIC METABOLIC PNL TOTAL CA: CPT | Performed by: FAMILY MEDICINE

## 2023-02-25 PROCEDURE — 250N000013 HC RX MED GY IP 250 OP 250 PS 637: Performed by: INTERNAL MEDICINE

## 2023-02-25 PROCEDURE — 84484 ASSAY OF TROPONIN QUANT: CPT | Performed by: FAMILY MEDICINE

## 2023-02-25 PROCEDURE — 99285 EMERGENCY DEPT VISIT HI MDM: CPT | Mod: 25

## 2023-02-25 PROCEDURE — 96372 THER/PROPH/DIAG INJ SC/IM: CPT | Performed by: INTERNAL MEDICINE

## 2023-02-25 PROCEDURE — 84100 ASSAY OF PHOSPHORUS: CPT | Performed by: FAMILY MEDICINE

## 2023-02-25 PROCEDURE — 99222 1ST HOSP IP/OBS MODERATE 55: CPT | Performed by: INTERNAL MEDICINE

## 2023-02-25 PROCEDURE — 36415 COLL VENOUS BLD VENIPUNCTURE: CPT | Performed by: FAMILY MEDICINE

## 2023-02-25 PROCEDURE — 90935 HEMODIALYSIS ONE EVALUATION: CPT | Performed by: INTERNAL MEDICINE

## 2023-02-25 PROCEDURE — 83880 ASSAY OF NATRIURETIC PEPTIDE: CPT | Performed by: FAMILY MEDICINE

## 2023-02-25 PROCEDURE — 93005 ELECTROCARDIOGRAM TRACING: CPT | Performed by: EMERGENCY MEDICINE

## 2023-02-25 PROCEDURE — 84145 PROCALCITONIN (PCT): CPT | Performed by: INTERNAL MEDICINE

## 2023-02-25 PROCEDURE — 85014 HEMATOCRIT: CPT | Performed by: FAMILY MEDICINE

## 2023-02-25 PROCEDURE — 250N000011 HC RX IP 250 OP 636: Performed by: INTERNAL MEDICINE

## 2023-02-25 PROCEDURE — 99222 1ST HOSP IP/OBS MODERATE 55: CPT | Mod: 25

## 2023-02-25 PROCEDURE — 90937 HEMODIALYSIS REPEATED EVAL: CPT

## 2023-02-25 PROCEDURE — 5A1D70Z PERFORMANCE OF URINARY FILTRATION, INTERMITTENT, LESS THAN 6 HOURS PER DAY: ICD-10-PCS | Performed by: INTERNAL MEDICINE

## 2023-02-25 PROCEDURE — 84443 ASSAY THYROID STIM HORMONE: CPT | Performed by: INTERNAL MEDICINE

## 2023-02-25 PROCEDURE — 258N000003 HC RX IP 258 OP 636: Performed by: INTERNAL MEDICINE

## 2023-02-25 PROCEDURE — 83735 ASSAY OF MAGNESIUM: CPT | Performed by: FAMILY MEDICINE

## 2023-02-25 RX ORDER — AMOXICILLIN 250 MG
1 CAPSULE ORAL 2 TIMES DAILY
Status: DISCONTINUED | OUTPATIENT
Start: 2023-02-25 | End: 2023-02-28 | Stop reason: HOSPADM

## 2023-02-25 RX ORDER — SEVELAMER CARBONATE 800 MG/1
800 TABLET, FILM COATED ORAL 3 TIMES DAILY PRN
Status: DISCONTINUED | OUTPATIENT
Start: 2023-02-25 | End: 2023-02-28 | Stop reason: HOSPADM

## 2023-02-25 RX ORDER — NORTRIPTYLINE HCL 25 MG
75 CAPSULE ORAL ONCE
Status: COMPLETED | OUTPATIENT
Start: 2023-02-25 | End: 2023-02-25

## 2023-02-25 RX ORDER — ACETAMINOPHEN 325 MG/1
650 TABLET ORAL EVERY 6 HOURS PRN
Status: DISCONTINUED | OUTPATIENT
Start: 2023-02-25 | End: 2023-02-28 | Stop reason: HOSPADM

## 2023-02-25 RX ORDER — ONDANSETRON 2 MG/ML
4 INJECTION INTRAMUSCULAR; INTRAVENOUS EVERY 6 HOURS PRN
Status: DISCONTINUED | OUTPATIENT
Start: 2023-02-25 | End: 2023-02-28 | Stop reason: HOSPADM

## 2023-02-25 RX ORDER — SERTRALINE HYDROCHLORIDE 100 MG/1
200 TABLET, FILM COATED ORAL AT BEDTIME
Status: DISCONTINUED | OUTPATIENT
Start: 2023-02-25 | End: 2023-02-28 | Stop reason: HOSPADM

## 2023-02-25 RX ORDER — AMOXICILLIN 250 MG
2 CAPSULE ORAL 2 TIMES DAILY
Status: DISCONTINUED | OUTPATIENT
Start: 2023-02-25 | End: 2023-02-28 | Stop reason: HOSPADM

## 2023-02-25 RX ORDER — LIDOCAINE 4 G/G
1 PATCH TOPICAL
Status: DISCONTINUED | OUTPATIENT
Start: 2023-02-25 | End: 2023-02-28 | Stop reason: HOSPADM

## 2023-02-25 RX ORDER — NORTRIPTYLINE HCL 25 MG
75 CAPSULE ORAL DAILY
Status: DISCONTINUED | OUTPATIENT
Start: 2023-02-25 | End: 2023-02-28 | Stop reason: HOSPADM

## 2023-02-25 RX ORDER — AMLODIPINE BESYLATE 5 MG/1
5 TABLET ORAL AT BEDTIME
Status: DISCONTINUED | OUTPATIENT
Start: 2023-02-25 | End: 2023-02-28 | Stop reason: HOSPADM

## 2023-02-25 RX ORDER — HEPARIN SODIUM 5000 [USP'U]/.5ML
5000 INJECTION, SOLUTION INTRAVENOUS; SUBCUTANEOUS EVERY 12 HOURS
Status: DISCONTINUED | OUTPATIENT
Start: 2023-02-25 | End: 2023-02-28 | Stop reason: HOSPADM

## 2023-02-25 RX ORDER — LEVOTHYROXINE SODIUM 50 UG/1
50 TABLET ORAL AT BEDTIME
Status: DISCONTINUED | OUTPATIENT
Start: 2023-02-25 | End: 2023-02-28 | Stop reason: HOSPADM

## 2023-02-25 RX ORDER — ACETAMINOPHEN 650 MG/1
650 SUPPOSITORY RECTAL EVERY 6 HOURS PRN
Status: DISCONTINUED | OUTPATIENT
Start: 2023-02-25 | End: 2023-02-28 | Stop reason: HOSPADM

## 2023-02-25 RX ORDER — ROSUVASTATIN CALCIUM 10 MG/1
10 TABLET, COATED ORAL AT BEDTIME
Status: DISCONTINUED | OUTPATIENT
Start: 2023-02-25 | End: 2023-02-28 | Stop reason: HOSPADM

## 2023-02-25 RX ORDER — CLONIDINE HYDROCHLORIDE 0.1 MG/1
0.2 TABLET ORAL AT BEDTIME
Status: DISCONTINUED | OUTPATIENT
Start: 2023-02-25 | End: 2023-02-25

## 2023-02-25 RX ORDER — ALBUMIN (HUMAN) 12.5 G/50ML
50 SOLUTION INTRAVENOUS
Status: DISCONTINUED | OUTPATIENT
Start: 2023-02-25 | End: 2023-02-28 | Stop reason: HOSPADM

## 2023-02-25 RX ORDER — OXYCODONE HYDROCHLORIDE 5 MG/1
10 TABLET ORAL EVERY 6 HOURS PRN
Status: DISCONTINUED | OUTPATIENT
Start: 2023-02-25 | End: 2023-02-28 | Stop reason: HOSPADM

## 2023-02-25 RX ORDER — LORAZEPAM 1 MG/1
1 TABLET ORAL DAILY PRN
Status: DISCONTINUED | OUTPATIENT
Start: 2023-02-25 | End: 2023-02-28 | Stop reason: HOSPADM

## 2023-02-25 RX ORDER — ONDANSETRON 4 MG/1
4 TABLET, ORALLY DISINTEGRATING ORAL EVERY 6 HOURS PRN
Status: DISCONTINUED | OUTPATIENT
Start: 2023-02-25 | End: 2023-02-28 | Stop reason: HOSPADM

## 2023-02-25 RX ORDER — CALCITRIOL 0.25 UG/1
0.5 CAPSULE, LIQUID FILLED ORAL
Status: DISCONTINUED | OUTPATIENT
Start: 2023-02-27 | End: 2023-02-28 | Stop reason: HOSPADM

## 2023-02-25 RX ADMIN — SERTRALINE 200 MG: 100 TABLET, FILM COATED ORAL at 20:56

## 2023-02-25 RX ADMIN — SODIUM CHLORIDE 250 ML: 9 INJECTION, SOLUTION INTRAVENOUS at 17:29

## 2023-02-25 RX ADMIN — NORTRIPTYLINE HYDROCHLORIDE 75 MG: 25 CAPSULE ORAL at 20:32

## 2023-02-25 RX ADMIN — SENNOSIDES AND DOCUSATE SODIUM 2 TABLET: 50; 8.6 TABLET ORAL at 21:02

## 2023-02-25 RX ADMIN — SODIUM CHLORIDE 300 ML: 9 INJECTION, SOLUTION INTRAVENOUS at 17:29

## 2023-02-25 RX ADMIN — HEPARIN SODIUM 5000 UNITS: 5000 INJECTION, SOLUTION INTRAVENOUS; SUBCUTANEOUS at 12:48

## 2023-02-25 RX ADMIN — OXYCODONE HYDROCHLORIDE 10 MG: 5 TABLET ORAL at 20:55

## 2023-02-25 RX ADMIN — LEVOTHYROXINE SODIUM 50 MCG: 0.05 TABLET ORAL at 20:57

## 2023-02-25 RX ADMIN — AMLODIPINE BESYLATE 5 MG: 5 TABLET ORAL at 20:57

## 2023-02-25 RX ADMIN — ROSUVASTATIN CALCIUM 10 MG: 10 TABLET, FILM COATED ORAL at 20:57

## 2023-02-25 RX ADMIN — Medication: at 17:30

## 2023-02-25 ASSESSMENT — ACTIVITIES OF DAILY LIVING (ADL)
ADLS_ACUITY_SCORE: 33
ADLS_ACUITY_SCORE: 35
ADLS_ACUITY_SCORE: 35
DEPENDENT_IADLS:: INDEPENDENT
ADLS_ACUITY_SCORE: 35
ADLS_ACUITY_SCORE: 33

## 2023-02-25 ASSESSMENT — ENCOUNTER SYMPTOMS
VOMITING: 0
SHORTNESS OF BREATH: 1
COUGH: 0
DIARRHEA: 0
FEVER: 0

## 2023-02-25 NOTE — PROCEDURES
HEMODIALYSIS NOTE: Dr. Warren here and discussed goals of treatment today.  Will attempt to UF 4 L with treatment    ASSESSMENT: Patient is alert and orientated x4, SOB on exertion and at rest.Periorbital edema present.  Patient changed HOB to sit  Up more with treatment to aid in air exchange    TREATMENT TIME:3 hours    DIALYZER : 160   RINSE:   clear     UF TOTAL(net) :  4500  ml    BVP: 49.8    Pre weight       44.9        kgs   Post wt               kgs     ACCESS PRE:  Well healed left upper arm AVF with good thrill and bruit present.  16 g needles placed without difficulty    HEPATITIS STATUS: immune 2/7/23  Chronic Unit: [facility]  Hbsag:neg  HBSab:+ 66      RUN SUMMARY: Dr. Warren was here at bedside just as setting up. Attempted 4  Kg of fluid removal with treatment.  Half way through treatment, patient stated that  She was breathing easier. Treatment was interrupted for 7 min for patient to use the rest room.  Patient was rinsed back and then 300 ml was added to goal when she restarted. Goal was now 4.5 kg and was able to remove without any complications.  Patient stated that she was breathing better and even felt hungry.  Treatment ended without complications.  Blood pressures remained slightly higher than ideal but patient requested blood pressures meds from Primary RN.,  Weight was bed scale and Dr. Schroeder was questioning if it was correct.  Requested floor to please weigh post treatment, if possible by standing scale.  Report given to CEDRIC Cox          ACCESS POST: hemostasis achieved at bedside by direct pressure in 15 min.  Clean gauze and tape applied.    INTERVENTIONS:Monitor VS Q 15 minutes and PRNGoal adjustment per critline and hemodynamics.Critline used for fluid monitoring/management.    PLAN:  Per Renal Team

## 2023-02-25 NOTE — PHARMACY-ADMISSION MEDICATION HISTORY
Pharmacy Note - Admission Medication History    Pertinent Provider Information: none     ______________________________________________________________________    Prior To Admission (PTA) med list completed and updated in EMR.       PTA Med List   Medication Sig Last Dose     amLODIPine (NORVASC) 5 MG tablet Take 5 mg by mouth At Bedtime 2/24/2023     B Complex-C (SUPER B COMPLEX PO) Take 1 tablet by mouth daily 2/24/2023     calcitRIOL (ROCALTROL) 0.5 MCG capsule Take 0.5 mcg by mouth three times a week Given at HD. Past Week     cholecalciferol (VITAMIN D3) 25 mcg (1000 units) capsule Take 1 capsule by mouth daily 2/24/2023     cloNIDine (CATAPRES) 0.1 MG tablet Take 0.2 mg by mouth At Bedtime 2/24/2023     Epoetin Adam (EPOGEN IJ) Inject 1,000 Units into the vein three times a week At dialysis. Past Week     Iron Sucrose (VENOFER IV) Inject 100 mg into the vein once a week At dialysis Past Week     labetalol (NORMODYNE) 300 MG tablet Take 450 mg (1.5 tablet) in the mid morning and take 600 mg (2 tablets) at bedtime. 2/24/2023     levothyroxine (SYNTHROID/LEVOTHROID) 50 MCG tablet Take 50 mcg by mouth At Bedtime Takes in the middle of the night. 2/24/2023     lisinopril (ZESTRIL) 40 MG tablet Take 20 mg by mouth 2 times daily 2/24/2023     LORazepam (ATIVAN) 1 MG tablet Take 1 mg by mouth daily as needed 2/24/2023     nortriptyline (PAMELOR) 75 MG capsule Take 75 mg by mouth daily 2/24/2023     oxyCODONE (ROXICODONE) 10 MG tablet Use oxycodone 10mg every 3 hours as needed for pain on 2/6 & 2/7. Then return to usual home dose. 2/25/2023 at 0600     rosuvastatin (CRESTOR) 10 MG tablet Take 10 mg by mouth At Bedtime 2/24/2023     senna-docusate (SENOKOT-S/PERICOLACE) 8.6-50 MG tablet Take 1-2 tablets by mouth daily 2/24/2023     sertraline (ZOLOFT) 100 MG tablet Take 200 mg by mouth At Bedtime 2/24/2023     sevelamer carbonate (RENVELA) 800 MG tablet Take 800 mg by mouth 3 times daily as needed (with meals when  eating) When eating Past Week       Information source(s): [facility] and [facility]  Method of interview communication: in-person    Summary of Changes to PTA Med List  New: none  Discontinued: Tylenol, lidocaine patch, omeprazole, Miralax  Changed: none    Patient was asked about OTC/herbal products specifically.  PTA med list reflects this.    In the past week, patient estimated taking medication this percent of the time:  greater than 90%.    Medication Affordability:  Not including over the counter (OTC) medications, was there a time in the past 12 months when you did not take your medications as prescribed because of cost?: No    Allergies were reviewed, assessed, and updated with the patient.      Patient does not use any multi-dose medications prior to admission.    The information provided in this note is only as accurate as the sources available at the time of the update(s).    Thank you for the opportunity to participate in the care of this patient.    Lynn Ngo  2/25/2023 11:55 AM

## 2023-02-25 NOTE — CONSULTS
"Care Management Initial Consult    General Information  Assessment completed with: Patient, Family, en Quispe via phone  Type of CM/SW Visit: Initial Assessment    Primary Care Provider verified and updated as needed: Yes   Readmission within the last 30 days: unable to assess (2/1-2/6 at [facility])         Advance Care Planning: Advance Care Planning Reviewed:  (has HCD she is waiting to finalize at home; verbally states would want \"my en Quispe\" to make medical decisions for her IF she were not able to make them for herself.)          Communication Assessment  Patient's communication style: spoken language (English or Bilingual)    Hearing Difficulty or Deaf: no      Cognitive  Cognitive/Neuro/Behavioral: WDL                      Living Environment:   People in home: alone     Current living Arrangements: apartment      Able to return to prior arrangements: yes     Family/Social Support:  Care provided by: self  Provides care for: no one  Marital Status: Single (never )   (nieces, nephew, neighbors. Never . No children.)          Description of Support System: Supportive, Involved       Current Resources:   Patient receiving home care services: No  Community Resources:  (Transplant MD at [facility], OP Dialysis [facility] T/Th/Sa, OP )  Equipment currently used at home: none  Supplies currently used at home:      Employment/Financial:  Employment Status: retired, disabled     Employment/ Comments: no  or [employer]  Financial Concerns: No concerns identified   Referral to Financial Worker: No     Lifestyle & Psychosocial Needs:  Social Determinants of Health     Tobacco Use: Medium Risk     Smoking Tobacco Use: Former     Smokeless Tobacco Use: Never     Passive Exposure: Not on file   Alcohol Use: Not on file   Financial Resource Strain: Not on file   Food Insecurity: Not on file   Transportation Needs: Not on file   Physical Activity: Not on file   Stress: Not on file   Social " Connections: Not on file   Intimate Partner Violence: Not on file   Depression: Not at risk     PHQ-2 Score: 2   Housing Stability: Not on file     Functional Status:  Prior to admission patient needed assistance:   Dependent ADLs:: Independent  Dependent IADLs:: Independent     Mental Health Status:  Mental Health Status: No Current Concerns (hx of depression and anxiety)  Mental Health Management: Medication, Psychiatrist (had therapist but recently left)    Chemical Dependency Status:  Chemical Dependency Status: No Current Concerns           Values/Beliefs:  Spiritual, Cultural Beliefs, Gnosticist Practices, Values that affect care: no             Additional Information:  Chart reviewed. Discharged from [facility] on 2/6.     CM provided PEREZ notice and education to patient (also reviewed with en Quispe via phone per patient request). Has BCBS Medicare Advantage Plan. She verbalized understanding and signed. Original in chart and copy given to patient.     Patient lives alone. She gets OP HD at [facility] in [place] on T/Th/Sa schedule (had been going to [facility] location prior to recent hospital admit). Has transplant MD at [facility]. Has OP MD providers. PCP confirmed. No HC services.     Anticipate return home and resume OP HD; possible 02 needs (currently requiring and not on at home/baseline). Niece or neighbor to transport.     Patient would like en Quispe to included/updated on her care and discharge plans.     Evelina Luna RN

## 2023-02-25 NOTE — ED TRIAGE NOTES
Pt arrives to ED via EMS with c/o shortness of breath, bilateral edema, ascites and overall fluid retention for the past couple of days. Upon arrival EMS states oxygen was at 90% on room air with lower lobe crackles. Applied 2L of nasal cannula. Pt now on 3L. Pt is a dialysis pt (last run on Thursday) with fistula on left side. Pt had a bilateral nephrectomy at the  on 2/1/23 due to PKD.      Triage Assessment       Row Name 02/25/23 1010       Triage Assessment (Adult)    Airway WDL WDL       Respiratory WDL    Respiratory WDL X;rhythm/pattern    Rhythm/Pattern, Respiratory shortness of breath;dyspnea on exertion       Skin Circulation/Temperature WDL    Skin Circulation/Temperature WDL WDL       Cardiac WDL    Cardiac WDL WDL       Peripheral/Neurovascular WDL    Peripheral Neurovascular WDL WDL       Cognitive/Neuro/Behavioral WDL    Cognitive/Neuro/Behavioral WDL WDL

## 2023-02-25 NOTE — H&P
Admission History and Physical   Serene Tipton,  1955, MRN [id number]    Dialysis patient (H) [Z99.2]    PCP: Ramos Lowry, [address] [place]  [place] [address], [phone]   Code status:  Prior       Extended Emergency Contact Information  Primary Emergency Contact: Brittaney Brunson  Home Phone: [phone]  Mobile Phone: [phone]  Relation: Niece  Secondary Emergency Contact: Bing Cristhiansotero  Mobile Phone: [phone]  Relation: Niece   needed? No       Assessment and Plan   Dyspnea.  I suspect need for HD.  -Presenting with complaints of SOB in the setting of weight gain, orthopnea, PND and bilateral lower extremity edema.  -Clinical fluid overload.  Currently on 2L O2 NC with sats noted at 95%.  Will wean as tolerated.  -Chest x-ray concerning for minimal effusions.  BNP greater than 5K.  Check procalcitonin.  -Check 2D echocardiogram. ? Heart failure, in the setting of elevated BNP  -Supplemental O2 therapy/respiratory updrafts as needed  -Fluid restriction/daily weight  -Nephrology consulted.  Hopefully will undergo HD.  -Monitor closely    End-stage renal disease on HD ()  -Presenting with the need for HD, as stated above.  -Creatinine noted at 4.86.   -Last HD on Thursday (2023)  -Nephrology on consult.  Awaiting HD  -Resume home calcitriol/sevelamer    Hyperkalemia  -Potassium noted at 5.5, with no EKG changes.  -We will give x1 Lokelma, pending dialysis  DVT Prophylaxis: Subcutaneous Lovenox    Elevated BNP.  This is in the setting of dyspnea  -BNP recorded as greater than 5K  -Check echocardiogram    Hypertension  -BP noted at 149/87  -We will resume home amlodipine.  Hold order BP medications given the patient will undergo HD.  -Anticipate gradually restarting order home antihypertensives (lisinopril/clonidine)    Hypothyroidism  -Check TSH with reflex to free T4  -Resume home levothyroxine    Mood disorder  -Resume home  Pamelor/Ativan    Chronic pain syndrome  -Home oxycodone    Recent history of bilateral nephrectomy 2/2 PKD  -This appears to be stable.  -Midline scar inspected, healing appropriately.    DVT: Subcutaneous heparin  Disposition: Admit to observation.  Follow nephrology.  CODE STATUS: Full code.  Medical proxy is patient's niece (Brittaney Brunson [phone])       Chief Complaint:  Shortness of breath     HPI:    Serene Tipton is a 67 year old old female with past medical history significant for chronic pain issues, hyperlipidemia, hypertension, end-stage renal disease on hemodialysis Tuesdays, Thursdays and Saturdays which she has been doing for the last 3 years, recent bilateral nephrectomy (2/23) 2/2PKD Mom BIBEMS on account of progressively worsening shortness of breath over the last couple of days.  She also endorsed progressive weight gain, associated with bilateral lower extremity pedal edema.  Dyspnea is at rest, worse with exertion, associated with orthopnea and paroxysmal nocturnal dyspnea.  Lab work notable for creatinine of 4.86, potassium of 5.5 and BNP of >5000.  Chest imaging showing bibasilar atelectasis and/or infiltrates concerning for minimal effusions.  Otherwise she denies any fever or chills, cough or sputum production, chest pain, nausea or vomiting, abdominal pains, palpitations, headaches, dizziness, diarrhea or any other constitutional symptoms.   History is provided by patient and review of EMR       Medical History  Past Medical History:   Diagnosis Date     Anemia in chronic kidney disease      Anxiety      Arthritis      Brain aneurysm     Cavernous segment of the right & left carotid arteries. See Neurosurg note 6/16/21.     Chronic low back pain     Managed by Pain Clinic     Depressive disorder 2013     Disease of thyroid gland      ESRD (end stage renal disease) on dialysis (H) 07/2020     GERD (gastroesophageal reflux disease)      Hepatic lesion      Hyperlipidemia       Hypertension      Nephrolithiasis      Neuromuscular disorder (H)      Osteoporosis      PKD (polycystic kidney disease) 1990     PTSD (post-traumatic stress disorder)      Tobacco abuse      Vitamin D deficiency         Surgical History  She  has a past surgical history that includes back surgery; biopsy (Left, 1990); Eye surgery (); orthopedic surgery (Right, ); breast lumpectomy, rt/lt (Left); Cystectomy pilonidal (); fracture surgery; Spine surgery; other surgical history; Cyst Removal (Right); FORMATION ARTERIOVENOUS FISTULA   (2020); and Nephrectomy bilateral (Bilateral, 2023).       Social History  Reviewed, and she  reports that she quit smoking about 20 months ago. Her smoking use included cigarettes and cigarettes. She has a 7.00 pack-year smoking history. She has never used smokeless tobacco. She reports that she does not currently use alcohol. She reports that she does not use drugs.   Social History     Tobacco Use     Smoking status: Former     Packs/day: 0.50     Years: 14.00     Pack years: 7.00     Types: Cigarettes     Quit date: 2021     Years since quittin.7     Smokeless tobacco: Never   Substance Use Topics     Alcohol use: Not Currently          Allergies  Allergies   Allergen Reactions     Penicillins Other (See Comments) and Shortness Of Breath     Breathing problem.  breathing       Carvedilol GI Disturbance     Nausea and vomiting     Ciprofloxacin Muscle Pain (Myalgia)     Morphine Other (See Comments)     Vomiting     No Clinical Screening - See Comments      PN: LW CM1: Contrast Intercapsular - Nonionic Reaction :     Nsaids Other (See Comments)     Sulfa Drugs Itching     Sulfamethoxazole-Trimethoprim      Other reaction(s): itching     Levofloxacin Muscle Pain (Myalgia) and Rash    Family History  Reviewed, and   Family History   Problem Relation Age of Onset     Polycystic Kidney Diease Mother      Hypertension Mother      Kidney Disease  Mother      Cerebrovascular Disease Mother      Chronic Obstructive Pulmonary Disease Father      Multiple Sclerosis Father      Polycystic Kidney Diease Sister      Cardiac Sudden Death Sister 52     Hypertension Sister      Kidney Disease Sister      Polycystic Kidney Diease Maternal Grandmother      Hypertension Maternal Grandmother      Kidney Disease Maternal Grandmother      Cerebrovascular Disease Maternal Grandmother      Heart Disease Maternal Grandfather      Hyperlipidemia Paternal Grandmother      Cerebrovascular Disease Paternal Grandmother      Coronary Artery Disease Paternal Grandfather      Pulmonary Embolism Paternal Grandfather      Anesthesia Reaction No family hx of              Prior to Admission Medications   (Not in a hospital admission)         Review of Systems:  A 12 point comprehensive review of systems was negative except as noted in the HPI. Physical Exam:  Temp:  [99.1  F (37.3  C)] 99.1  F (37.3  C)  Pulse:  [86-88] 86  Resp:  [19-23] 22  BP: (143-150)/(73-82) 144/82  SpO2:  [95 %-97 %] 97 %    BP (!) 144/82   Pulse 86   Temp 99.1  F (37.3  C) (Oral)   Resp 22   SpO2 97%     General Appearance:   Awake, alert and oriented x3.  No obvious distress   Head:    Normocephalic, without obvious abnormality, atraumatic   Eyes:    PERRL, conjunctiva/corneas clear, EOM's intact,both eyes    Ears:    Normal external ear canals no drainage or erythema bilat.   Nose:   Nares normal by gross inspection,  mucosa normal, no drainage or sinus tenderness   Throat:   Lips, mucosa, and tongue normal; teeth and gums normal   Neck:   Supple, symmetrical, trachea midline, no adenopathy;        thyroid:  No enlargement/tenderness/nodules   Back:     Symmetric, no curvature, ROM normal, no CVA tenderness   Lungs:    Poor air movement.  Bibasilar crackles.   Chest wall:    No tenderness or deformity   Heart:    Regular rate and rhythm, S1 and S2 normal, I/VI systolic murmur, no rubs, no JVD, no edema    Abdomen:     Soft, non-tender, bowel sounds active all four quadrants,     no masses, no hepatosplenomegaly   Musculoskeletal:   Extremities are warm and non-tender, atraumatic, no joint swelling or tenderness   Pulses:   2+ and symmetric all extremities   Skin:   Skin color, texture, turgor normal, no rashes or lesions on exposed areas, please see nursing assessment for full skin assessment   Neurologic:  CN II to XII WNL.  No focal neurodeficits.        Pertinent Labs  Lab Results: personally reviewed.   Recent Labs   Lab 02/25/23  1022      CO2 28   BUN 24*     Recent Labs   Lab 02/25/23  1022   WBC 8.5   HGB 9.8*   HCT 31.0*        Recent Labs   Lab 02/25/23  1022   TROPONINI 0.04       MOST RECENT A1c, Iron, TIBC, Coags, TFTs  Lab Results   Component Value Date    INR 1.02 05/19/2021    PTT 30 05/19/2021     Lab Results   Component Value Date    IRON 30 (L) 01/26/2023     Lab Results   Component Value Date    TSH 3.28 10/04/2022         Pertinent Radiology  Radiology Results: I personally reviewed.  Results for orders placed or performed during the hospital encounter of 02/25/23   Chest XR,  PA & LAT    Impression    IMPRESSION: Bilateral lower lobe atelectasis and/or infiltrate best seen on lateral view. Probable minimal effusions.       EKG Results: Sinus rhythm at 89    Advanced Care Planning  Treatment and discharge Planning discussed with patient  Anticipated Length of Stay in midnights (including a midnight in the Emergency Department after triage if applicable): Less than 2 midnight stay for evaluation and treatment of dyspnea    I spent a total of 58 minutes for this encounter with history, physical exam, home medication review and reconciliation.    Keanu Miller DO  Internal Medicine Hospitalist  2/25/2023

## 2023-02-25 NOTE — CONSULTS
RENAL CONSULT NOTE    I have independently seen and examined the patient today.    I agree with the assessment and plan.  Plan for HD today with UF, may need additional UF on Monday if still inhouse       Flaco Warren MD  [facility]  [phone]      REQUESTING PHYSICIAN: Keanu Miller, DO    REASON FOR CONSULT: ESRD on HD/Hypervolemia    ASSESSMENT/PLAN:  ESRD on HD: 2/2 PKD, on HD sine 2020 at [facility] under care of Dr. Rhoades, tells me today she transferred to [facility] upon [facility] discharge to be closer to home. Average UF PTA with dialysis 2.5-2.7. With TTS schedule.  3hr runs, JESSI  AVF. Listed on transplant list since 2021 for  donor. S/P bilateral nephrectomy 2023 at [facility] 23 ongoing difficulty eating/wt loss/PKD. Plan for HD today. TTS shceule while here. Will assess need for more UF/HD tomorrow. Will challenge fluid removal with HD. Fluid restriction 1500 ml.    HTN: resume PTA meds(on clonidine, amlodipine, labetolol, lisinopril    Hyperkalemia: monitor with HD.     Volume:SOB on admit, 2L NC, challenge UF with HD.     CKD MBD: Resume calcitriol, and renvela  800 TID with meals    Anemia: PTA on PETE 1K three times weekly with HD.       HPI: Alvarez is a 68 y/o F with a PMH of HTN, HLD, MDD, GERD ESRD 2/2 PKD (s/p Bilateral Nephrectomy 2023 at [facility]) on dialysis. Presented to ED with SOB/Dypnia via EMS for C/O SOB, wt gain, edema and elevated BNP, Renal consulted for ESRD status.     Last HD Thursday  Hypervolemic on exam, SOB, MCBRIDE, +edema, CXR minimal effusions  02 helping with SOB  Echo pending, BNP elevated  Discussed plan for HD today, answered all questions.       REVIEW OF SYSTEMS:  Complete 12 point review of systems was negative other than those noted in the HPI      Past Medical History:   Diagnosis Date     Anemia in chronic kidney disease      Anxiety      Arthritis      Brain aneurysm     Cavernous segment of the right & left carotid arteries.  See Neurosurg note 6/16/21.     Chronic low back pain     Managed by Pain Clinic     Depressive disorder 2013     Disease of thyroid gland      ESRD (end stage renal disease) on dialysis (H) 07/2020     GERD (gastroesophageal reflux disease)      Hepatic lesion      Hyperlipidemia      Hypertension      Nephrolithiasis      Neuromuscular disorder (H)      Osteoporosis      PKD (polycystic kidney disease) 09/01/1990     PTSD (post-traumatic stress disorder)      Tobacco abuse      Vitamin D deficiency        No current facility-administered medications on file prior to encounter.  amLODIPine (NORVASC) 5 MG tablet, Take 5 mg by mouth At Bedtime  B Complex-C (SUPER B COMPLEX PO), Take 1 tablet by mouth daily  calcitRIOL (ROCALTROL) 0.5 MCG capsule, Take 0.5 mcg by mouth three times a week Given at HD.  cholecalciferol (VITAMIN D3) 25 mcg (1000 units) capsule, Take 1 capsule by mouth daily  cloNIDine (CATAPRES) 0.1 MG tablet, Take 0.2 mg by mouth At Bedtime  Epoetin Adam (EPOGEN IJ), Inject 1,000 Units into the vein three times a week At dialysis.  Iron Sucrose (VENOFER IV), Inject 100 mg into the vein once a week At dialysis  labetalol (NORMODYNE) 300 MG tablet, Take 450 mg (1.5 tablet) in the mid morning and take 600 mg (2 tablets) at bedtime.  levothyroxine (SYNTHROID/LEVOTHROID) 50 MCG tablet, Take 50 mcg by mouth At Bedtime Takes in the middle of the night.  lisinopril (ZESTRIL) 40 MG tablet, Take 20 mg by mouth 2 times daily  LORazepam (ATIVAN) 1 MG tablet, Take 1 mg by mouth daily as needed  nortriptyline (PAMELOR) 75 MG capsule, Take 75 mg by mouth daily  oxyCODONE (ROXICODONE) 10 MG tablet, Use oxycodone 10mg every 3 hours as needed for pain on 2/6 & 2/7. Then return to usual home dose.  rosuvastatin (CRESTOR) 10 MG tablet, Take 10 mg by mouth At Bedtime  senna-docusate (SENOKOT-S/PERICOLACE) 8.6-50 MG tablet, Take 1-2 tablets by mouth daily  sertraline (ZOLOFT) 100 MG tablet, Take 200 mg by mouth At  Bedtime  sevelamer carbonate (RENVELA) 800 MG tablet, Take 800 mg by mouth 3 times daily as needed (with meals when eating) When eating        amLODIPine (NORVASC) 5 MG tablet  B Complex-C (SUPER B COMPLEX PO)  calcitRIOL (ROCALTROL) 0.5 MCG capsule  cholecalciferol (VITAMIN D3) 25 mcg (1000 units) capsule  cloNIDine (CATAPRES) 0.1 MG tablet  Epoetin Adam (EPOGEN IJ)  Iron Sucrose (VENOFER IV)  labetalol (NORMODYNE) 300 MG tablet  levothyroxine (SYNTHROID/LEVOTHROID) 50 MCG tablet  lisinopril (ZESTRIL) 40 MG tablet  LORazepam (ATIVAN) 1 MG tablet  nortriptyline (PAMELOR) 75 MG capsule  oxyCODONE (ROXICODONE) 10 MG tablet  rosuvastatin (CRESTOR) 10 MG tablet  senna-docusate (SENOKOT-S/PERICOLACE) 8.6-50 MG tablet  sertraline (ZOLOFT) 100 MG tablet  sevelamer carbonate (RENVELA) 800 MG tablet        ALLERGIES/SENSITIVITIES:  Allergies   Allergen Reactions     Penicillins Other (See Comments) and Shortness Of Breath     Breathing problem.  breathing       Carvedilol GI Disturbance     Nausea and vomiting     Ciprofloxacin Muscle Pain (Myalgia)     Morphine Other (See Comments)     Vomiting     No Clinical Screening - See Comments      PN: LW CM1: Contrast Intercapsular - Nonionic Reaction :     Nsaids Other (See Comments)     Sulfa Drugs Itching     Sulfamethoxazole-Trimethoprim      Other reaction(s): itching     Levofloxacin Muscle Pain (Myalgia) and Rash     Social History     Tobacco Use     Smoking status: Former     Packs/day: 0.50     Years: 14.00     Pack years: 7.00     Types: Cigarettes     Quit date: 2021     Years since quittin.7     Smokeless tobacco: Never   Substance Use Topics     Alcohol use: Not Currently     Drug use: Never     I have reviewed this patient's family history and updated it with pertinent information if needed.  Family History   Problem Relation Age of Onset     Polycystic Kidney Diease Mother      Hypertension Mother      Kidney Disease Mother      Cerebrovascular Disease  Mother      Chronic Obstructive Pulmonary Disease Father      Multiple Sclerosis Father      Polycystic Kidney Diease Sister      Cardiac Sudden Death Sister 52     Hypertension Sister      Kidney Disease Sister      Polycystic Kidney Diease Maternal Grandmother      Hypertension Maternal Grandmother      Kidney Disease Maternal Grandmother      Cerebrovascular Disease Maternal Grandmother      Heart Disease Maternal Grandfather      Hyperlipidemia Paternal Grandmother      Cerebrovascular Disease Paternal Grandmother      Coronary Artery Disease Paternal Grandfather      Pulmonary Embolism Paternal Grandfather      Anesthesia Reaction No family hx of          PHYSICAL EXAM:  Physical Exam   Temp: 99.1  F (37.3  C) Temp src: Oral BP: (!) 149/87 Pulse: 87   Resp: 24 SpO2: 95 % O2 Device: Nasal cannula Oxygen Delivery: 2 LPM  There were no vitals filed for this visit.  Vital Signs with Ranges  Temp:  [99.1  F (37.3  C)] 99.1  F (37.3  C)  Pulse:  [86-88] 87  Resp:  [19-24] 24  BP: (143-150)/(73-87) 149/87  SpO2:  [95 %-97 %] 95 %  No intake/output data recorded.      No data found.    GEN: NAD  CV: RRR  Lung: crackels bases bilat, on 3L NC, SOB  Ab: soft and NT  Ext: ++ LLE and well perfused  Skin: no rash  Access: L AVF    Laboratory:     Recent Labs   Lab 02/25/23  1022   WBC 8.5   RBC 2.93*   HGB 9.8*   HCT 31.0*          Basic Metabolic Panel:  Recent Labs   Lab 02/25/23  1022      POTASSIUM 5.5*   CHLORIDE 94*   CO2 28   BUN 24*   CR 4.86*      GAVIN 9.5       INRNo lab results found in last 7 days.    Recent Labs   Lab Test 02/25/23  1022 02/06/23  0542   POTASSIUM 5.5* 4.1   CHLORIDE 94* 95*   BUN 24* 30.1*      Recent Labs   Lab Test 08/18/22  1731 05/19/21  1520 05/19/21  1507   ALBUMIN 4.5  --  4.2   BILITOTAL 0.3  --  0.5   ALT 11  --  18   AST 16  --  11   PROTEIN  --  100*  --        Personally reviewed today's laboratory studies      Thank you for involving us in the care of this  patient. We will continue to follow along with you.      Mitzi Boogie [facility]  [facility]  [phone]

## 2023-02-25 NOTE — PLAN OF CARE
Problem: Plan of Care - These are the overarching goals to be used throughout the patient stay.    Goal: Optimal Comfort and Wellbeing  Outcome: Progressing   Goal Outcome Evaluation:  OUTPATIENT/OBSERVATION GOALS TO BE MET BEFORE DISCHARGE:  1. ADLs back to baseline: No needs assist, dyspneic on exertion    2. Activity and level of assistance: Up with standby assistance.    3. Pain status: Pain free.     4. Return to near baseline physical activity: Yes ambulates SBA   5.   Pt on 2-3L 02 via NC , maintaining oxygen saturations but feels SOB with activity, denies chest pain. Dialysis taking place with pt currently. SBP's 160's on dialysis run, edematous and anuric. PIV saline locked. Fistula LUE. Alarms on for safety, calls with needs.

## 2023-02-25 NOTE — TELEPHONE ENCOUNTER
Pt is on dialysis and states that she is filling up with water and is having trouble breathing      Pt has had both of her kidneys removed     Per disposition: Go to ED Now    Care advice given per protocol and when to call back. Pt verbalized understanding and agrees to plan of care.    Sofia Arias RN  [facility] Nurse Advisor  8:54 AM 2/25/2023        Reason for Disposition    [1] MODERATE difficulty breathing (e.g., speaks in phrases, SOB even at rest, pulse 100-120) AND [2] NEW-onset or WORSE than normal    Additional Information    Negative: SEVERE difficulty breathing (e.g., struggling for each breath, speaks in single words)    Negative: [1] Breathing stopped AND [2] hasn't returned    Negative: Choking on something    Negative: Bluish (or gray) lips or face now    Negative: Difficult to awaken or acting confused (e.g., disoriented, slurred speech)    Negative: Passed out (i.e., lost consciousness, collapsed and was not responding)    Negative: Wheezing started suddenly after medicine, an allergic food or bee sting    Negative: Stridor    Negative: Slow, shallow and weak breathing    Negative: Sounds like a life-threatening emergency to the triager    Negative: Chest pain    Negative: [1] Wheezing (high pitched whistling sound) AND [2] previous asthma attacks or use of asthma medicines    Negative: [1] Difficulty breathing AND [2] only present when coughing    Negative: [1] Difficulty breathing AND [2] only from stuffy or runny nose    Negative: [1] Difficulty breathing AND [2] within 14 days of COVID-19 Exposure    Protocols used: BREATHING DIFFICULTY-A-AH

## 2023-02-25 NOTE — PROGRESS NOTES
Seen at beginning of HD, discussed with RN to set for 4L UF and will reattempt UF on Monday to challenge wt down , significant work of breathing and edema  Flaco Warren MD  [facility]  [phone]

## 2023-02-25 NOTE — ED PROVIDER NOTES
EMERGENCY DEPARTMENT ENCOUNTER      NAME: Serene Tipton  AGE: 67 year old female  YOB: 1955  MRN: [id number]  EVALUATION DATE & TIME: 2/25/2023 10:08 AM    PCP: Ramos Lowry    ED PROVIDER: Vance Malin M.D.    Chief Complaint   Patient presents with     Shortness of Breath       FINAL IMPRESSION:  1. Shortness of breath    2. Pulmonary edema, noncardiac    3. S/p nephrectomy    4. Dialysis patient (H)        ED COURSE & MEDICAL DECISION MAKING:    Pertinent Labs & Imaging studies independently interpreted by me. (See chart for details)  10:09 AM Patient seen and examined, including recent admission for bilateral nephrectomy on February 1.  Differential diagnosis includes but not limited to COPD, asthma, CHF, pneumonia, bronchitis, pneumothorax, myocardial infarction, pulmonary embolism, anxiety.  Patient presents today with increased shortness of breath for the last couple of days, last dialysis was 2 days ago.  She denies cough, fever, chills, does say her breathing is worse laying down.  On exam here, oxygen saturation 97% on 3 L, bilateral crackles but no respiratory distress.  Consider CT PE protocol but patient has no chest pain and considering progressive shortness of breath and crackles on exam, symptoms are more likely due to pulmonary edema and fluid overload, pneumonia also possible but less likely.  If chest x-ray does not reveal fluid or infiltrate, will reconsider PE study at that time  10:42 AM chest x-ray independently interpreted by me demonstrates bibasilar infiltrates and small effusions.  10:50 AM I spoke with [facility], Dr. Mary Gomez. Recommends dialysis but feels the patient does not need to be transferred [facility].  11:12 AM I spoke with Nephrology, Dr. Warren who will arrange for dialysis.  11:27 AM I spoke with the hospitalist, Dr. Miller, who accepts the patient for admission.    At the conclusion of the encounter I discussed the results  of all of the tests and the disposition. The questions were answered. The patient or family acknowledged understanding and was agreeable with the care plan.     Medical Decision Making    History:    Supplemental history from: EMS    External Record(s) reviewed: Documented in chart, if applicable.    Work Up:    Chart documentation includes differential considered and any EKGs or imaging independently interpreted by provider, where specified.    In additional to work up documented, I considered the following work up: Documented in chart, if applicable.    External consultation:    Discussion of management with another provider: Documented in chart, if applicable    Complicating factors:    Care impacted by chronic illness: Chronic Kidney Disease    Care affected by social determinants of health: N/A    Disposition considerations: Admit.        PROCEDURES:       MEDICATIONS GIVEN IN THE EMERGENCY:  Medications - No data to display    NEW PRESCRIPTIONS STARTED AT TODAY'S ER VISIT  New Prescriptions    No medications on file       =================================================================    HPI    Patient information was obtained from: patient and EMS      Serene Tipton is a 67 year old female with a pertinent history of bilateral nephrectomy (on dialysis), spine surgery, hypertension, and hyperlipidemia who presents to this ED via EMS for evaluation of shortness of breath.     Per chart review, patient presented to [facility] on 2/1/23 and discharged 2/6/23 for evaluation of polycystic kidney disease. Patient underwent a bilateral nephrectomy for PKD on 2/1/2023. She was able to ambulate and tolerate an oral diet prior to discharge. Patient to resume normal dialysis schedule. Patient with abdiaziz-op pain. Discharged on acetaminophen, Lidoderm, and oxycodone 10 mg every 3 hours as needed for 2 days and then return to 10 mg every 4 hours as needed. Sustained abdiaziz-op blood loss, Hgb  "8.7. Stable at time of discharge.    Per EMS, patient presents with increased shortness of breath. Upon arrival to see patient, she was noted to be 90% on room air. Crackles at lower base were heard, bilateral pitting edema and ascites was noted. Patient has had both kidney's removed and is on dialysis. Her last dialysis was on 2/23/2023.     Patient reports of increased shortness of breath that has been present for the last several days as well as \"fluid filling her up,\" referring to her bilateral leg swelling. Her shortness of breath provoked with lying down. She is not on supplemental O2 at home.     Patient has been on dialysis for the past 3 years. She had both kidneys removed (at the [facility]) on 2/1/2023 due to polycystic kidneys. Prior to removal, patient was unable to eat or drink anything. Future plan is for kidney transplant. Patient reports her target weight was 47 kg. Patient denies cough, fever, diarrhea, or vomiting. Patient does not report of any other medical concerns or complaints at this time.     REVIEW OF SYSTEMS   Review of Systems   Constitutional: Negative for fever.   Respiratory: Positive for shortness of breath. Negative for cough.    Cardiovascular: Positive for leg swelling (bilateral).   Gastrointestinal: Negative for diarrhea and vomiting.      All other systems reviewed and negative    PAST MEDICAL HISTORY:  Past Medical History:   Diagnosis Date     Anemia in chronic kidney disease      Anxiety      Arthritis      Brain aneurysm     Cavernous segment of the right & left carotid arteries. See Neurosurg note 6/16/21.     Chronic low back pain     Managed by Pain Clinic     Depressive disorder 2013     Disease of thyroid gland      ESRD (end stage renal disease) on dialysis (H) 07/2020     GERD (gastroesophageal reflux disease)      Hepatic lesion      Hyperlipidemia      Hypertension      Nephrolithiasis      Neuromuscular disorder (H)      Osteoporosis      PKD " (polycystic kidney disease) 09/01/1990     PTSD (post-traumatic stress disorder)      Tobacco abuse      Vitamin D deficiency        PAST SURGICAL HISTORY:  Past Surgical History:   Procedure Laterality Date     BACK SURGERY      spinal fusion w/hardware     BIOPSY Left 09/01/1990    Breast     BREAST LUMPECTOMY, RT/LT Left     Lumpectomy     CYST REMOVAL Right     rt wrist     CYSTECTOMY PILONIDAL  1981     EYE SURGERY  2008    lasik     FORMATION ARTERIOVENOUS FISTULA    07/28/2020     FRACTURE SURGERY       NEPHRECTOMY BILATERAL Bilateral 2/1/2023    Procedure: bilateral native nephrectomy;  Surgeon: Alana Giang MD;  Location: [facility] OR     ORTHOPEDIC SURGERY Right 2005    Shoulder     OTHER SURGICAL HISTORY      carpal tunnel repair     SPINE SURGERY         CURRENT MEDICATIONS:    No current facility-administered medications for this encounter.     Current Outpatient Medications   Medication     amLODIPine (NORVASC) 5 MG tablet     B Complex-C (SUPER B COMPLEX PO)     calcitRIOL (ROCALTROL) 0.5 MCG capsule     cholecalciferol (VITAMIN D3) 25 mcg (1000 units) capsule     cloNIDine (CATAPRES) 0.1 MG tablet     Epoetin Adam (EPOGEN IJ)     Iron Sucrose (VENOFER IV)     labetalol (NORMODYNE) 300 MG tablet     levothyroxine (SYNTHROID/LEVOTHROID) 50 MCG tablet     lisinopril (ZESTRIL) 40 MG tablet     LORazepam (ATIVAN) 1 MG tablet     nortriptyline (PAMELOR) 75 MG capsule     oxyCODONE (ROXICODONE) 10 MG tablet     rosuvastatin (CRESTOR) 10 MG tablet     senna-docusate (SENOKOT-S/PERICOLACE) 8.6-50 MG tablet     sertraline (ZOLOFT) 100 MG tablet     sevelamer carbonate (RENVELA) 800 MG tablet       ALLERGIES:  Allergies   Allergen Reactions     Penicillins Other (See Comments) and Shortness Of Breath     Breathing problem.  breathing       Carvedilol GI Disturbance     Nausea and vomiting     Ciprofloxacin Muscle Pain (Myalgia)     Morphine Other (See Comments)     Vomiting     No Clinical Screening - See  Comments      PN: LW CM1: Contrast Intercapsular - Nonionic Reaction :     Nsaids Other (See Comments)     Sulfa Drugs Itching     Sulfamethoxazole-Trimethoprim      Other reaction(s): itching     Levofloxacin Muscle Pain (Myalgia) and Rash       FAMILY HISTORY:  Family History   Problem Relation Age of Onset     Polycystic Kidney Diease Mother      Hypertension Mother      Kidney Disease Mother      Cerebrovascular Disease Mother      Chronic Obstructive Pulmonary Disease Father      Multiple Sclerosis Father      Polycystic Kidney Diease Sister      Cardiac Sudden Death Sister 52     Hypertension Sister      Kidney Disease Sister      Polycystic Kidney Diease Maternal Grandmother      Hypertension Maternal Grandmother      Kidney Disease Maternal Grandmother      Cerebrovascular Disease Maternal Grandmother      Heart Disease Maternal Grandfather      Hyperlipidemia Paternal Grandmother      Cerebrovascular Disease Paternal Grandmother      Coronary Artery Disease Paternal Grandfather      Pulmonary Embolism Paternal Grandfather      Anesthesia Reaction No family hx of        SOCIAL HISTORY:   Social History     Socioeconomic History     Marital status: Single   Tobacco Use     Smoking status: Former     Packs/day: 0.50     Years: 14.00     Pack years: 7.00     Types: Cigarettes     Quit date: 2021     Years since quittin.7     Smokeless tobacco: Never   Substance and Sexual Activity     Alcohol use: Not Currently     Drug use: Never       VITALS:  BP (!) 144/82   Pulse 86   Temp 99.1  F (37.3  C) (Oral)   Resp 22   SpO2 97%     PHYSICAL EXAM:  Physical Exam  Vitals and nursing note reviewed.   Constitutional:       Appearance: Normal appearance.   HENT:      Head: Normocephalic and atraumatic.      Right Ear: External ear normal.      Left Ear: External ear normal.      Nose: Nose normal.      Mouth/Throat:      Mouth: Mucous membranes are moist.   Eyes:      Extraocular Movements: Extraocular  movements intact.      Conjunctiva/sclera: Conjunctivae normal.      Pupils: Pupils are equal, round, and reactive to light.   Cardiovascular:      Rate and Rhythm: Normal rate and regular rhythm.      Comments: Moderate lower extremity edema bilaterally.  Pulmonary:      Effort: Pulmonary effort is normal.      Breath sounds: No wheezing or rales.      Comments: Bilateral crackles.  Abdominal:      General: Abdomen is flat. There is no distension.      Palpations: Abdomen is soft.      Tenderness: There is no abdominal tenderness. There is no guarding.   Musculoskeletal:         General: Normal range of motion.      Cervical back: Normal range of motion and neck supple.      Comments: Fistula on left with palpable thrill.   Lymphadenopathy:      Cervical: No cervical adenopathy.   Skin:     General: Skin is warm and dry.   Neurological:      General: No focal deficit present.      Mental Status: She is alert and oriented to person, place, and time. Mental status is at baseline.      Comments: No gross focal neurologic deficits   Psychiatric:         Mood and Affect: Mood normal.         Behavior: Behavior normal.         Thought Content: Thought content normal.        LAB:  All pertinent labs reviewed and interpreted.  Results for orders placed or performed during the hospital encounter of 02/25/23   Chest XR,  PA & LAT    Impression    IMPRESSION: Bilateral lower lobe atelectasis and/or infiltrate best seen on lateral view. Probable minimal effusions.   Basic metabolic panel   Result Value Ref Range    Sodium 136 136 - 145 mmol/L    Potassium 5.5 (H) 3.5 - 5.0 mmol/L    Chloride 94 (L) 98 - 107 mmol/L    Carbon Dioxide (CO2) 28 22 - 31 mmol/L    Anion Gap 14 5 - 18 mmol/L    Urea Nitrogen 24 (H) 8 - 22 mg/dL    Creatinine 4.86 (H) 0.60 - 1.10 mg/dL    Calcium 9.5 8.5 - 10.5 mg/dL    Glucose 112 70 - 125 mg/dL    GFR Estimate 9 (L) >60 mL/min/1.73m2   Result Value Ref Range    Magnesium 2.1 1.8 - 2.6 mg/dL   Result  Value Ref Range    Troponin I 0.04 0.00 - 0.29 ng/mL   Blood gas venous   Result Value Ref Range    pH Venous 7.47 (H) 7.35 - 7.45    pCO2 Venous 46 35 - 50 mm Hg    pO2 Venous 43 25 - 47 mm Hg    Bicarbonate Venous 33 (H) 24 - 30 mmol/L    Base Excess/Deficit (+/-) 9.7   mmol/L    Oxyhemoglobin Venous 76.5 (H) 70.0 - 75.0 %    O2 Sat, Venous 82.2 (H) 70.0 - 75.0 %   B-Type Natriuretic Peptide ([facility] Only)   Result Value Ref Range    BNP >5,000 (H) 0 - 106 pg/mL   Result Value Ref Range    Phosphorus 3.3 2.5 - 4.5 mg/dL   CBC with platelets and differential   Result Value Ref Range    WBC Count 8.5 4.0 - 11.0 10e3/uL    RBC Count 2.93 (L) 3.80 - 5.20 10e6/uL    Hemoglobin 9.8 (L) 11.7 - 15.7 g/dL    Hematocrit 31.0 (L) 35.0 - 47.0 %     (H) 78 - 100 fL    MCH 33.4 (H) 26.5 - 33.0 pg    MCHC 31.6 31.5 - 36.5 g/dL    RDW 14.8 10.0 - 15.0 %    Platelet Count 260 150 - 450 10e3/uL    % Neutrophils 78 %    % Lymphocytes 10 %    % Monocytes 9 %    % Eosinophils 1 %    % Basophils 1 %    % Immature Granulocytes 1 %    NRBCs per 100 WBC 0 <1 /100    Absolute Neutrophils 6.8 1.6 - 8.3 10e3/uL    Absolute Lymphocytes 0.9 0.8 - 5.3 10e3/uL    Absolute Monocytes 0.8 0.0 - 1.3 10e3/uL    Absolute Eosinophils 0.1 0.0 - 0.7 10e3/uL    Absolute Basophils 0.1 0.0 - 0.2 10e3/uL    Absolute Immature Granulocytes 0.0 <=0.4 10e3/uL    Absolute NRBCs 0.0 10e3/uL   ECG 12-LEAD WITH MUSE ([facility])   Result Value Ref Range    Systolic Blood Pressure 147 mmHg    Diastolic Blood Pressure 73 mmHg    Ventricular Rate 89 BPM    Atrial Rate 89 BPM    AR Interval 196 ms    QRS Duration 86 ms     ms    QTc 479 ms    P Axis 56 degrees    R AXIS 21 degrees    T Axis -85 degrees    Interpretation ECG       Sinus rhythm  Possible Left atrial enlargement  Left ventricular hypertrophy with repolarization abnormality  Abnormal ECG  When compared with ECG of 19-MAY-2021 13:37,  Nonspecific T wave abnormality, worse in Inferior leads  T wave  inversion now evident in Lateral leads  Confirmed by SEE ED PROVIDER NOTE FOR, ECG INTERPRETATION ([id number]),  IRWIN STUBBS ([id number]) on 2/25/2023 11:45:00 AM         EKG:  10:40 AM independently interpreted by me demonstrates sinus rhythm rate 89, no acute ST elevations or depressions, normal axis, normal intervals, ST depression in V5 and V6.  Compared to prior of May 2021, ST depressions are new.    RADIOLOGY:  Reviewed all pertinent imaging. Please see official radiology report.  Chest XR,  PA & LAT   Final Result   IMPRESSION: Bilateral lower lobe atelectasis and/or infiltrate best seen on lateral view. Probable minimal effusions.          I, Shaun Kerns, am serving as a scribe to document services personally performed by Dr. Malin based on my observation and the provider's statements to me. I, Vance Malin MD attest that Shaun Kerns is acting in a scribe capacity, has observed my performance of the services and has documented them in accordance with my direction.    Vance Malin M.D.  Emergency Medicine  [facility] EMERGENCY ROOM  [address] [facility] [id number] [phone]  Dept: [phone]     Vance Malin MD  02/25/23 [id number]

## 2023-02-25 NOTE — ED NOTES
PT ENDORSES TO INCREASED SHORTNESS OF BREATH SINCE EATING LUNCH. OXYGEN STILL AT 2L AND PT ABLE TO MAINTAIN 95%.

## 2023-02-26 ENCOUNTER — APPOINTMENT (OUTPATIENT)
Dept: CT IMAGING | Facility: CLINIC | Age: 68
DRG: 682 | End: 2023-02-26
Attending: INTERNAL MEDICINE
Payer: COMMERCIAL

## 2023-02-26 LAB
ALBUMIN SERPL-MCNC: 2.9 G/DL (ref 3.5–5)
ALP SERPL-CCNC: 111 U/L (ref 45–120)
ALT SERPL W P-5'-P-CCNC: <9 U/L (ref 0–45)
ANION GAP SERPL CALCULATED.3IONS-SCNC: 12 MMOL/L (ref 5–18)
AST SERPL W P-5'-P-CCNC: 14 U/L (ref 0–40)
BASOPHILS # BLD AUTO: 0 10E3/UL (ref 0–0.2)
BASOPHILS NFR BLD AUTO: 0 %
BILIRUB SERPL-MCNC: 0.6 MG/DL (ref 0–1)
BUN SERPL-MCNC: 13 MG/DL (ref 8–22)
CALCIUM SERPL-MCNC: 9.5 MG/DL (ref 8.5–10.5)
CHLORIDE BLD-SCNC: 93 MMOL/L (ref 98–107)
CO2 SERPL-SCNC: 30 MMOL/L (ref 22–31)
CREAT SERPL-MCNC: 3.7 MG/DL (ref 0.6–1.1)
EOSINOPHIL # BLD AUTO: 0.1 10E3/UL (ref 0–0.7)
EOSINOPHIL NFR BLD AUTO: 1 %
ERYTHROCYTE [DISTWIDTH] IN BLOOD BY AUTOMATED COUNT: 14.8 % (ref 10–15)
GFR SERPL CREATININE-BSD FRML MDRD: 13 ML/MIN/1.73M2
GLUCOSE BLD-MCNC: 106 MG/DL (ref 70–125)
HCT VFR BLD AUTO: 37.1 % (ref 35–47)
HGB BLD-MCNC: 11.6 G/DL (ref 11.7–15.7)
IMM GRANULOCYTES # BLD: 0 10E3/UL
IMM GRANULOCYTES NFR BLD: 1 %
LYMPHOCYTES # BLD AUTO: 0.9 10E3/UL (ref 0.8–5.3)
LYMPHOCYTES NFR BLD AUTO: 11 %
MCH RBC QN AUTO: 33.5 PG (ref 26.5–33)
MCHC RBC AUTO-ENTMCNC: 31.3 G/DL (ref 31.5–36.5)
MCV RBC AUTO: 107 FL (ref 78–100)
MONOCYTES # BLD AUTO: 0.7 10E3/UL (ref 0–1.3)
MONOCYTES NFR BLD AUTO: 9 %
NEUTROPHILS # BLD AUTO: 6.6 10E3/UL (ref 1.6–8.3)
NEUTROPHILS NFR BLD AUTO: 78 %
NRBC # BLD AUTO: 0 10E3/UL
NRBC BLD AUTO-RTO: 0 /100
PLATELET # BLD AUTO: 296 10E3/UL (ref 150–450)
POTASSIUM BLD-SCNC: 4.4 MMOL/L (ref 3.5–5)
PROT SERPL-MCNC: 6.1 G/DL (ref 6–8)
RBC # BLD AUTO: 3.46 10E6/UL (ref 3.8–5.2)
SODIUM SERPL-SCNC: 135 MMOL/L (ref 136–145)
WBC # BLD AUTO: 8.4 10E3/UL (ref 4–11)

## 2023-02-26 PROCEDURE — 99233 SBSQ HOSP IP/OBS HIGH 50: CPT | Performed by: INTERNAL MEDICINE

## 2023-02-26 PROCEDURE — 99232 SBSQ HOSP IP/OBS MODERATE 35: CPT

## 2023-02-26 PROCEDURE — 250N000011 HC RX IP 250 OP 636: Performed by: INTERNAL MEDICINE

## 2023-02-26 PROCEDURE — 85004 AUTOMATED DIFF WBC COUNT: CPT | Performed by: INTERNAL MEDICINE

## 2023-02-26 PROCEDURE — 71250 CT THORAX DX C-: CPT

## 2023-02-26 PROCEDURE — G0378 HOSPITAL OBSERVATION PER HR: HCPCS

## 2023-02-26 PROCEDURE — 80053 COMPREHEN METABOLIC PANEL: CPT | Performed by: INTERNAL MEDICINE

## 2023-02-26 PROCEDURE — 96372 THER/PROPH/DIAG INJ SC/IM: CPT | Performed by: INTERNAL MEDICINE

## 2023-02-26 PROCEDURE — 250N000013 HC RX MED GY IP 250 OP 250 PS 637: Performed by: INTERNAL MEDICINE

## 2023-02-26 PROCEDURE — 36415 COLL VENOUS BLD VENIPUNCTURE: CPT | Performed by: INTERNAL MEDICINE

## 2023-02-26 RX ORDER — LISINOPRIL 20 MG/1
20 TABLET ORAL 2 TIMES DAILY
Status: DISCONTINUED | OUTPATIENT
Start: 2023-02-26 | End: 2023-02-28 | Stop reason: HOSPADM

## 2023-02-26 RX ADMIN — OXYCODONE HYDROCHLORIDE 10 MG: 5 TABLET ORAL at 10:16

## 2023-02-26 RX ADMIN — HEPARIN SODIUM 5000 UNITS: 5000 INJECTION, SOLUTION INTRAVENOUS; SUBCUTANEOUS at 00:55

## 2023-02-26 RX ADMIN — LIDOCAINE 1 PATCH: 246 PATCH TOPICAL at 20:14

## 2023-02-26 RX ADMIN — LEVOTHYROXINE SODIUM 50 MCG: 0.05 TABLET ORAL at 22:02

## 2023-02-26 RX ADMIN — AMLODIPINE BESYLATE 5 MG: 5 TABLET ORAL at 22:01

## 2023-02-26 RX ADMIN — LISINOPRIL 20 MG: 20 TABLET ORAL at 08:34

## 2023-02-26 RX ADMIN — SERTRALINE 200 MG: 100 TABLET, FILM COATED ORAL at 22:08

## 2023-02-26 RX ADMIN — LIDOCAINE 1 PATCH: 246 PATCH TOPICAL at 00:52

## 2023-02-26 RX ADMIN — ACETAMINOPHEN 650 MG: 325 TABLET ORAL at 08:24

## 2023-02-26 RX ADMIN — HEPARIN SODIUM 5000 UNITS: 5000 INJECTION, SOLUTION INTRAVENOUS; SUBCUTANEOUS at 12:28

## 2023-02-26 RX ADMIN — OXYCODONE HYDROCHLORIDE 10 MG: 5 TABLET ORAL at 22:01

## 2023-02-26 RX ADMIN — LISINOPRIL 20 MG: 20 TABLET ORAL at 20:14

## 2023-02-26 RX ADMIN — SENNOSIDES AND DOCUSATE SODIUM 2 TABLET: 50; 8.6 TABLET ORAL at 08:24

## 2023-02-26 RX ADMIN — ROSUVASTATIN CALCIUM 10 MG: 10 TABLET, FILM COATED ORAL at 22:02

## 2023-02-26 RX ADMIN — LORAZEPAM 1 MG: 1 TABLET ORAL at 12:28

## 2023-02-26 RX ADMIN — NORTRIPTYLINE HYDROCHLORIDE 75 MG: 25 CAPSULE ORAL at 08:24

## 2023-02-26 ASSESSMENT — ACTIVITIES OF DAILY LIVING (ADL)
ADLS_ACUITY_SCORE: 33

## 2023-02-26 NOTE — PLAN OF CARE
Problem: Plan of Care - These are the overarching goals to be used throughout the patient stay.    Goal: Optimal Comfort and Wellbeing  Outcome: Progressing    OUTPATIENT/OBSERVATION GOALS TO BE MET BEFORE DISCHARGE:  1. ADLs back to baseline: Yes    2. Activity and level of assistance: Up with standby assistance.    3. Pain status: Improved-controlled with oral pain medications.    4. Return to near baseline physical activity: Yes     Goal Outcome Evaluation:  A/O x4; SOB persists; declines chest pain. attempts to titrate oxygen; pt requests to stay at 2.5 NC for comfort. PRN PO tylenol for generalized pain, PRN PO oxycodone admin prior to CT scan premedicated activity. Compliant with 1500mL fluid restriction. AVF WDL, bruit/thrill present. PIV saline locked. Alarms on for safety, calls with needs.

## 2023-02-26 NOTE — PROGRESS NOTES
RENAL PROGRESS NOTE    CC:  ESRD on HD.Hypervolemia    ASSESSMENT & PLAN:     ESRD on HD:  PKD, on HD sine 2020 at [facility] under care of Dr. Rhoades, tells me today she transferred to [facility] upon [facility] discharge to be closer to home. Average [facility] PTA with dialysis 2.5-2.7. With TTS schedule.  3hr runs, JESSI  AVF. Listed on transplant list since 2021 for  donor. S/P bilateral nephrectomy 2023 at [facility] 23 ongoing difficulty eating/wt loss/PKD. S/p HD , resp improved. Fluid restriction 1500 ml. Will Plan for [facility] run , then TTS schedule thereafter.    HTN: resume PTA meds(on clonidine, amlodipine, labetolol, lisinopril), fluid removal with HD.     Hyperkalemia: monitor with HD.     Volume:SOB on admit, 2L NC, challenge UF with HD.     CKD MBD: Resume calcitriol, and renvela  800 TID with meals    Anemia: PTA on PETE 1K three times weekly with HD.     SUBJECTIVE:     NC 4Ldown to 2.5 L s/p HD  HD yesterday, able to UF 4L, breathing improved s/p Tx  ADLs back to baseline  Pt reports ongoing edema and SOB, but better after HD yesterday  UF run on Monday is pt still here  HD RN aware.    OBJECTIVE:  Physical Exam   Temp: 98  F (36.7  C) Temp src: Oral BP: (!) 156/81 Pulse: 89   Resp: 18 SpO2: 95 % O2 Device: Nasal cannula Oxygen Delivery: 2.5 LPM  There were no vitals filed for this visit.  Vital Signs with Ranges  Temp:  [97.1  F (36.2  C)-98.4  F (36.9  C)] 98  F (36.7  C)  Pulse:  [86-99] 89  Resp:  [18-24] 18  BP: (143-171)/(73-88) 156/81  Cuff Mean (mmHg):  [109-119] 116  SpO2:  [93 %-98 %] 95 %  I/O last 3 completed shifts:  In: 360 [P.O.:360]  Out: -     No data found.    Intake/Output Summary (Last 24 hours) at 2023 1011  Last data filed at 2023 0500  Gross per 24 hour   Intake 360 ml   Output --   Net 360 ml       PHYSICAL EXAM:  GEN: NAD  CV: RRR  Lung: crackels bases bilat, on 2.5L NC  Ab: soft and NT  Ext: ++ LLE and well perfused  Skin: no rash  Access: L  AVF    LABORATORY STUDIES:     Recent Labs   Lab 02/26/23  0630 02/25/23  1022   WBC 8.4 8.5   RBC 3.46* 2.93*   HGB 11.6* 9.8*   HCT 37.1 31.0*    260       Basic Metabolic Panel:  Recent Labs   Lab 02/26/23  0630 02/25/23  1022   * 136   POTASSIUM 4.4 5.5*   CHLORIDE 93* 94*   CO2 30 28   BUN 13 24*   CR 3.70* 4.86*    112   GAVIN 9.5 9.5       INRNo lab results found in last 7 days.     Recent Labs   Lab Test 02/26/23  0630 02/25/23  1022 08/18/22  1731 05/19/21  1507   INR  --   --   --  1.02   WBC 8.4 8.5   < > 10.4   HGB 11.6* 9.8*   < > 13.0    260   < > 365    < > = values in this interval not displayed.       Personally reviewed current labs    Mitzi PALMA-BC  [facility]  [phone]

## 2023-02-26 NOTE — PLAN OF CARE
Problem: Plan of Care - These are the overarching goals to be used throughout the patient stay.    Goal: Optimal Comfort and Wellbeing  Outcome: Progressing    OUTPATIENT/OBSERVATION GOALS TO BE MET BEFORE DISCHARGE:  1. ADLs back to baseline: Yes    2. Activity and level of assistance: Up with standby assistance.    3. Pain status: Improved-controlled with oral pain medications.    4. Return to near baseline physical activity: Yes     Goal Outcome Evaluation:  A/O x4; SOB persists; declines chest pain. attempts to titrate oxygen; pt requests to stay at 2.5 NC for comfort. PRN PO tylenol for generalized pain, PRN PO oxycodone admin prior to CT scan premedicated activity; provider updated with results. Compliant with 1500mL fluid restriction. Ativan admin by request for anxious tendencies. AVF WDL, bruit/thrill present. PIV saline locked. Alarms on for safety, calls with needs.

## 2023-02-26 NOTE — PLAN OF CARE
"PRIMARY DIAGNOSIS: \"GENERIC\" NURSING  OUTPATIENT/OBSERVATION GOALS TO BE MET BEFORE DISCHARGE:  ADLs back to baseline: Yes    Activity and level of assistance: Up with standby assistance.    Pain status: Pain free.    Return to near baseline physical activity: Yes     Discharge Planner Nurse   Safe discharge environment identified: Yes  Barriers to discharge: No       Entered by: Brooke Hart RN 02/25/2023 10:47 PM     Please review provider order for any additional goals.   Nurse to notify provider when observation goals have been met and patient is ready for discharge.Goal Outcome Evaluation:  Patient tolerated HD well, vitally stable. Pain medication administered for chronic back pain per request with effective results.   Alert and oriented.   Brooke Hart RN                          "

## 2023-02-26 NOTE — PLAN OF CARE
"PRIMARY DIAGNOSIS: \"GENERIC\" NURSING  OUTPATIENT/OBSERVATION GOALS TO BE MET BEFORE DISCHARGE:  1. ADLs back to baseline: Yes    2. Activity and level of assistance: Up with standby assistance.    3. Pain status: Improved with use of alternative comfort measures i.e.: positioning    4. Return to near baseline physical activity: Yes     Discharge Planner Nurse   Safe discharge environment identified: Yes  Barriers to discharge: Yes, plan per nephrology       Entered by: Shivani Germain RN 02/26/2023 5:48 AM     Please review provider order for any additional goals.   Nurse to notify provider when observation goals have been met and patient is ready for discharge.      Pt A&Ox 4. VSS on 4 L overnight, attempted to wean but patient requested to stay at 4 L for comfort, education provided. CMS intact except baseline neuropathy. Dressing to fistula c/d/i. Bruit and thrill present. Up with sba. Pain managed with repositioning. IV SL. 1500 fluid restriction. +2 edema to bilateral feet/ankles. Fine crackles BLL. Discharge pending.    "

## 2023-02-26 NOTE — PROGRESS NOTES
Hospitalist Progress Note    Assessment/Plan  Dyspnea.  I suspect need for HD.  -Presenting with complaints of SOB in the setting of weight gain, orthopnea, PND and bilateral lower extremity edema.  -Clinical fluid overload.  Currently on 2L O2 NC with sats noted at 95%.  Will wean as tolerated.  -Chest x-ray concerning for minimal effusions.  BNP greater than 5K.  Check procalcitonin.  -Check 2D echocardiogram. ? Heart failure, in the setting of elevated BNP  -Supplemental O2 therapy/respiratory updrafts as needed  -Fluid restriction/daily weight  -Nephrology consulted.  Hopefully will undergo HD.  -Monitor closely  2/26  -Per nephrology notes, anticipating repeat HD Raman (2/27)  -Wean O2 as tolerated.  -Echo noted with EF of 60 to 65% with normal global and regional left ventricular function.  Overall echo normal.  -Check CT chest.  TSH normal.    2:10 pm  -CT chest results reviewed:.  Small to moderate bilateral pleural effusions with associated basilar volume loss and atelectasis. Septal thickening and groundglass suggests pulmonary edema. Moderate airway thickening, likely from edema. Mild cardiomegaly. Moderate to severe coronary calcifications.  -We will allow for volume control with HD.  -Other management as stated above.     End-stage renal disease on HD (T/TH/SA)  -Presenting with the need for HD, as stated above.  -Creatinine noted at 4.86.   -Last HD on Thursday (2/23/2023)  -Nephrology on consult.  Awaiting HD  -Resume home calcitriol/sevelamer  2/26  -Creatinine improved to 3.7 from 4.8  -Had HD yesterday (2/25).  Anticipating repeat tomorrow (2/26)  -Continue calcitriol/sevelamer     Hyperkalemia  -Potassium noted at 5.5, with no EKG changes.  -We will give x1 Lokelma, pending dialysis  2/26  -Potassium noted at 4.4  -Monitor and replete as needed    Elevated BNP.  This is in the setting of dyspnea  -BNP recorded as greater than 5K  -echocardiogram as stated above: Normal     Hypertension  -BP noted  at 149/87  -We will resume home amlodipine.  Hold order BP medications given the patient will undergo HD.  -Anticipate gradually restarting order home antihypertensives (lisinopril/clonidine)  2/26  -Slight improvement in BP, however not at goal.  -Add home lisinopril.  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  -Midline scar inspected, healing appropriately.     DVT: Subcutaneous heparin  Disposition: Continue to monitor closely.  Follow CT chest.  Hopefully will discharge tomorrow post HD.      Subjective  Continues to endorse shortness of breath.  Noted on 4L O2 NC with sats at 97%.  Had HD yesterday (2/25).  Otherwise no new issues.  No acute events overnight.    Objective    Vital signs in last 24 hours  Temp:  [97.1  F (36.2  C)-99.1  F (37.3  C)] 98.2  F (36.8  C)  Pulse:  [86-99] 88  Resp:  [18-24] 18  BP: (143-171)/(73-88) 165/79  Cuff Mean (mmHg):  [109-119] 116  SpO2:  [93 %-98 %] 95 % @LASTSAO2(12)@ O2 Device: Nasal cannula    Weight:   Wt Readings from Last 3 Encounters:   02/06/23 44.9 kg (99 lb)   12/19/22 47 kg (103 lb 11.2 oz)   12/30/21 57.3 kg (126 lb 6.4 oz)      Weight change:     Intake/Output last 3 shifts  I/O last 3 completed shifts:  In: 360 [P.O.:360]  Out: -   There is no height or weight on file to calculate BMI.    Physical Exam    General Appearance:    Alert, cooperative, no distress, appears stated age   Lungs:     Clear bilaterally    Cardiovascular:    Regular rate ands rhythm.  Normal S1, S2.  No murmur, rub or gallop.  No edema   Abdomen:     Soft, non-tender, bowel sounds active all four quadrants,     no masses, no organomegaly   Neurologic:   Awake, alert, oriented x 3.  Grossly nonfocal      Pertinent Labs   Lab  Results: personally reviewed.   Recent Labs   Lab 02/26/23  0630 02/25/23  1022   * 136   CO2 30 28   BUN 13 24*   ALBUMIN 2.9*  --    ALKPHOS 111  --    ALT <9  --    AST 14  --      Recent Labs   Lab 02/26/23  0630 02/25/23  1022   WBC 8.4 8.5   HGB 11.6* 9.8*   HCT 37.1 31.0*    260     Recent Labs   Lab 02/25/23  1022   TROPONINI 0.04     Invalid input(s): POCGLUFGR    Medications  Current Facility-Administered Medications   Medication     0.9% sodium chloride BOLUS     acetaminophen (TYLENOL) tablet 650 mg    Or     acetaminophen (TYLENOL) Suppository 650 mg     albumin human 25 % injection 50 mL     amLODIPine (NORVASC) tablet 5 mg     [START ON 2/27/2023] calcitRIOL (ROCALTROL) capsule 0.5 mcg     heparin ANTICOAGULANT injection 5,000 Units     levothyroxine (SYNTHROID/LEVOTHROID) tablet 50 mcg     Lidocaine (LIDOCARE) 4 % Patch 1 patch    And     lidocaine patch in PLACE     LORazepam (ATIVAN) tablet 1 mg     melatonin tablet 1 mg     nortriptyline (PAMELOR) capsule 75 mg     ondansetron (ZOFRAN ODT) ODT tab 4 mg    Or     ondansetron (ZOFRAN) injection 4 mg     oxyCODONE (ROXICODONE) tablet 10 mg     rosuvastatin (CRESTOR) tablet 10 mg     senna-docusate (SENOKOT-S/PERICOLACE) 8.6-50 MG per tablet 1 tablet    Or     senna-docusate (SENOKOT-S/PERICOLACE) 8.6-50 MG per tablet 2 tablet     sertraline (ZOLOFT) tablet 200 mg     sevelamer carbonate (RENVELA) tablet 800 mg     sodium zirconium cyclosilicate (LOKELMA) packet 10 g       Pertinent Radiology   Radiology Results: Personally reviewed   Results for orders placed or performed during the hospital encounter of 02/25/23   Chest XR,  PA & LAT    Impression    IMPRESSION: Bilateral lower lobe atelectasis and/or infiltrate best seen on lateral view. Probable minimal effusions.       I spent a total of 44 minutes for this encounter with interval history, physical exam, in patient medication review and reconciliation.      Keanu Miller DO  Internal  Medicine [facility]  2/26/2023

## 2023-02-27 PROBLEM — Z90.5 S/P NEPHRECTOMY: Status: ACTIVE | Noted: 2023-02-27

## 2023-02-27 PROBLEM — Z99.2 DIALYSIS PATIENT (H): Status: ACTIVE | Noted: 2023-02-27

## 2023-02-27 PROBLEM — J81.1 PULMONARY EDEMA, NONCARDIAC: Status: ACTIVE | Noted: 2023-02-27

## 2023-02-27 PROBLEM — R06.02 SHORTNESS OF BREATH: Status: ACTIVE | Noted: 2023-02-27

## 2023-02-27 LAB
ANION GAP SERPL CALCULATED.3IONS-SCNC: 16 MMOL/L (ref 5–18)
BUN SERPL-MCNC: 31 MG/DL (ref 8–22)
CALCIUM SERPL-MCNC: 10.1 MG/DL (ref 8.5–10.5)
CHLORIDE BLD-SCNC: 95 MMOL/L (ref 98–107)
CO2 SERPL-SCNC: 23 MMOL/L (ref 22–31)
CREAT SERPL-MCNC: 5.03 MG/DL (ref 0.6–1.1)
GFR SERPL CREATININE-BSD FRML MDRD: 9 ML/MIN/1.73M2
GLUCOSE BLD-MCNC: 118 MG/DL (ref 70–125)
HOLD SPECIMEN: NORMAL
MAGNESIUM SERPL-MCNC: 2.4 MG/DL (ref 1.8–2.6)
PLATELET # BLD AUTO: 328 10E3/UL (ref 150–450)
POTASSIUM BLD-SCNC: 5.4 MMOL/L (ref 3.5–5)
SODIUM SERPL-SCNC: 134 MMOL/L (ref 136–145)

## 2023-02-27 PROCEDURE — 99232 SBSQ HOSP IP/OBS MODERATE 35: CPT | Performed by: NURSE PRACTITIONER

## 2023-02-27 PROCEDURE — 250N000013 HC RX MED GY IP 250 OP 250 PS 637: Performed by: INTERNAL MEDICINE

## 2023-02-27 PROCEDURE — 80048 BASIC METABOLIC PNL TOTAL CA: CPT | Performed by: INTERNAL MEDICINE

## 2023-02-27 PROCEDURE — 120N000001 HC R&B MED SURG/OB

## 2023-02-27 PROCEDURE — G0378 HOSPITAL OBSERVATION PER HR: HCPCS

## 2023-02-27 PROCEDURE — 99232 SBSQ HOSP IP/OBS MODERATE 35: CPT | Performed by: INTERNAL MEDICINE

## 2023-02-27 PROCEDURE — 250N000011 HC RX IP 250 OP 636: Performed by: INTERNAL MEDICINE

## 2023-02-27 PROCEDURE — 85049 AUTOMATED PLATELET COUNT: CPT | Performed by: INTERNAL MEDICINE

## 2023-02-27 PROCEDURE — 90935 HEMODIALYSIS ONE EVALUATION: CPT

## 2023-02-27 PROCEDURE — 83735 ASSAY OF MAGNESIUM: CPT | Performed by: INTERNAL MEDICINE

## 2023-02-27 PROCEDURE — 250N000013 HC RX MED GY IP 250 OP 250 PS 637: Performed by: NURSE PRACTITIONER

## 2023-02-27 PROCEDURE — 96372 THER/PROPH/DIAG INJ SC/IM: CPT | Performed by: INTERNAL MEDICINE

## 2023-02-27 PROCEDURE — 36415 COLL VENOUS BLD VENIPUNCTURE: CPT | Performed by: INTERNAL MEDICINE

## 2023-02-27 RX ORDER — CLONIDINE HYDROCHLORIDE 0.1 MG/1
0.2 TABLET ORAL AT BEDTIME
Status: DISCONTINUED | OUTPATIENT
Start: 2023-02-27 | End: 2023-02-28 | Stop reason: HOSPADM

## 2023-02-27 RX ORDER — SIMETHICONE 80 MG
80 TABLET,CHEWABLE ORAL EVERY 6 HOURS PRN
Status: DISCONTINUED | OUTPATIENT
Start: 2023-02-27 | End: 2023-02-28 | Stop reason: HOSPADM

## 2023-02-27 RX ADMIN — SENNOSIDES AND DOCUSATE SODIUM 1 TABLET: 50; 8.6 TABLET ORAL at 20:27

## 2023-02-27 RX ADMIN — ROSUVASTATIN CALCIUM 10 MG: 10 TABLET, FILM COATED ORAL at 21:12

## 2023-02-27 RX ADMIN — LIDOCAINE 1 PATCH: 246 PATCH TOPICAL at 20:27

## 2023-02-27 RX ADMIN — NORTRIPTYLINE HYDROCHLORIDE 75 MG: 25 CAPSULE ORAL at 13:10

## 2023-02-27 RX ADMIN — LISINOPRIL 20 MG: 20 TABLET ORAL at 20:26

## 2023-02-27 RX ADMIN — SIMETHICONE 80 MG: 80 TABLET, CHEWABLE ORAL at 18:20

## 2023-02-27 RX ADMIN — HEPARIN SODIUM 5000 UNITS: 5000 INJECTION, SOLUTION INTRAVENOUS; SUBCUTANEOUS at 18:20

## 2023-02-27 RX ADMIN — LEVOTHYROXINE SODIUM 50 MCG: 0.05 TABLET ORAL at 21:12

## 2023-02-27 RX ADMIN — AMLODIPINE BESYLATE 5 MG: 5 TABLET ORAL at 21:11

## 2023-02-27 RX ADMIN — SERTRALINE 200 MG: 100 TABLET, FILM COATED ORAL at 21:12

## 2023-02-27 RX ADMIN — SODIUM ZIRCONIUM CYCLOSILICATE 5 G: 5 POWDER, FOR SUSPENSION ORAL at 13:10

## 2023-02-27 RX ADMIN — SIMETHICONE 80 MG: 80 TABLET, CHEWABLE ORAL at 08:49

## 2023-02-27 RX ADMIN — CALCITRIOL 0.5 MCG: 0.25 CAPSULE, LIQUID FILLED ORAL at 13:09

## 2023-02-27 RX ADMIN — CLONIDINE HYDROCHLORIDE 0.2 MG: 0.1 TABLET ORAL at 20:26

## 2023-02-27 RX ADMIN — LISINOPRIL 20 MG: 20 TABLET ORAL at 13:10

## 2023-02-27 RX ADMIN — SENNOSIDES AND DOCUSATE SODIUM 1 TABLET: 50; 8.6 TABLET ORAL at 13:10

## 2023-02-27 RX ADMIN — HEPARIN SODIUM 5000 UNITS: 5000 INJECTION, SOLUTION INTRAVENOUS; SUBCUTANEOUS at 03:37

## 2023-02-27 RX ADMIN — LORAZEPAM 1 MG: 1 TABLET ORAL at 08:27

## 2023-02-27 RX ADMIN — OXYCODONE HYDROCHLORIDE 10 MG: 5 TABLET ORAL at 13:24

## 2023-02-27 ASSESSMENT — ACTIVITIES OF DAILY LIVING (ADL)
ADLS_ACUITY_SCORE: 33

## 2023-02-27 NOTE — PROGRESS NOTES
"PRIMARY DIAGNOSIS: \"GENERIC\" NURSING  OUTPATIENT/OBSERVATION GOALS TO BE MET BEFORE DISCHARGE:  1. ADLs back to baseline: Yes    2. Activity and level of assistance: Up with standby assistance.    3. Pain status: Improved-controlled with oral pain medications.    4. Return to near baseline physical activity: Yes     Discharge Planner Nurse   Safe discharge environment identified: Yes  Barriers to discharge: Yes       Entered by: Haylee Jones RN 02/26/2023 10:27 PM     Please review provider order for any additional goals.   Nurse to notify provider when observation goals have been met and patient is ready for discharge.  "

## 2023-02-27 NOTE — PROGRESS NOTES
Care Management Follow Up    Length of Stay (days): 0    Expected Discharge Date: 03/01/2023     Concerns to be Addressed:     Discharge dispo    Additional Information:  CM following along. Patient requiring oxygen, not on at baseline. Still sob with mobility. Is a HD patient, and had dialysis today. Cm will continue to be avail if needs arise. Anticipate she will go home at discharge. Family or friend to give ride at discharge.       Stephanie Dixon RN

## 2023-02-27 NOTE — PROCEDURES
HEMODIALYSIS NOTE:    ASSESSMENT:    LOC: A/O x 3. Denies chest pain.   Lungs:Pt sitting up on side of bed, O2 @ 4 lpm via Mask.C/O slight SOB this am- states she was SOB during the night. Crackles Posteriorly in mid to lower lobes.   Edema/fluid status: 2+ non pitting edema in lower extremities..right>L.     TREATMENT TIME: 3  Hrs- UF only    DIALYZER :[id number]    RINSE: Clear       UF TOTAL(net) : 4500   ml    BVP: 0.1  ( UF only run)     Pre weight        46.8 kgs   Post wt - estimated         42.6     kgs     ACCESS PRE:  SHANA AVF with + thrill/bruitAccessed with  15  ga  Needles.  [id number]    HEPATITIS STATUS:  Chronic Unit: [facility]  Hbsag:Negative on 2/7/23  HBSab: Positive/Immune  (36.1) on 2/ 7//23      RUN SUMMARY:  3 hr UF only run via LAVF. Pt.  c/o SOB at onset of run and did not tolerate lying down. Pt. Sitting up on side of bed, with writer present and  in reach. UF goal set at max UF rate of 2000ml/hr for first 60 minutes.  After 45 minutes, pt stated she was able to breathe better, and was assisted to lie down in bed. Goal reduced to 4.8L after first hour. Reduced to 4.5L during last hour for decreasing BP  Seen by Serene Ga NP on dialysis- agrees with set goal.   See HD flow sheet for all data.  Report given to Yue Gregory RN.     ACCESS POST:  Needles d/c'd. Dressing applied and secured once hemostasis obtained-  10 Minutes.     INTERVENTIONS:  Monitor VS Q 15 minutes and PRN  Goal adjustment per critline and hemodynamics.    PLAN:  HD tomorrow : Tu Thurs Sat. Or per renal team.

## 2023-02-27 NOTE — PROGRESS NOTES
"PRIMARY DIAGNOSIS: \"GENERIC\" NURSING  OUTPATIENT/OBSERVATION GOALS TO BE MET BEFORE DISCHARGE:  1. ADLs back to baseline: Yes    2. Activity and level of assistance: Up with standby assistance.    3. Pain status: Improved-controlled with oral pain medications.    4. Return to near baseline physical activity: Yes     Discharge Planner Nurse   Safe discharge environment identified: Yes  Barriers to discharge: No       Entered by: Haylee Jones RN 02/27/2023 1:31 AM     Please review provider order for any additional goals.   Nurse to notify provider when observation goals have been met and patient is ready for discharge.  "

## 2023-02-27 NOTE — UTILIZATION REVIEW
Admission Status; Secondary Review Determination   Under the authority of the Utilization Management Committee, the utilization review process indicated a secondary review on Serene Tipton. The review outcome is based on review of the medical records, discussions with staff, and applying clinical experience noted on the date of the review.   (x) Inpatient Status Appropriate - This patient's medical care is consistent with medical management for inpatient care and reasonable inpatient medical practice.     RATIONALE FOR DETERMINATION   Serene Tipton is a 67 yr old female with ESRD on HD T/TH/SA who presented on 2/25/23 with progressive dyspnea.  She had not missed any sessions of HD.  She received treatment on 2/25/23 with additional UF in attempt to get dry weight down and lessen the significant work of breathing and edema.  Extra UF run today on 2/27/23.  Was requiring 4L NC down now to 3L NC.  Failed trial 2L NC with drop to 87%.  Not medically stable with hypoxia oxygen needs as not typically oxygen dependent at baseline.      At the time of admission with the information available to the attending physician more than 2 nights Hospital complex care was anticipated, based on patient risk of adverse outcome if treated as outpatient and complex care required. Inpatient admission is appropriate based on the Medicare guidelines.   The information on this document is developed by the utilization review team in order for the business office to ensure compliance. This only denotes the appropriateness of proper admission status and does not reflect the quality of care rendered.   The definitions of Inpatient Status and Observation Status used in making the determination above are those provided in the CMS Coverage Manual, Chapter 1 and Chapter 6, section 70.4.   Sincerely,   Megan Urias MD  Utilization Review  Physician Advisor  [facility]

## 2023-02-27 NOTE — PLAN OF CARE
"PRIMARY DIAGNOSIS: \"GENERIC\" NURSING  OUTPATIENT/OBSERVATION GOALS TO BE MET BEFORE DISCHARGE:  ADLs back to baseline: Yes    Activity and level of assistance: Up with standby assistance.    Pain status: Pain free.    Return to near baseline physical activity: Yes     Discharge Planner Nurse   Safe discharge environment identified: Yes  Barriers to discharge: Yes pending clinical progression       Entered by: Yue Valentino RN 02/27/2023 12:06 PM  Pt A/O x 4. Denies pain. VSS on 4L oxymask. Pt states feeling anxious using prn meds with good relief. Pt receiving dialysis this morning. Resting comfortably in bed with dialysis nurse at bedside.       Please review provider order for any additional goals.   Nurse to notify provider when observation goals have been met and patient is ready for discharge.  "

## 2023-02-27 NOTE — PROGRESS NOTES
Hospitalist Progress Note    Assessment/Plan  Dyspnea.  I suspect need for HD.  -Presenting with complaints of SOB in the setting of weight gain, orthopnea, PND and bilateral lower extremity edema.  -Clinical fluid overload.  Currently on 2L O2 NC with sats noted at 95%.  Will wean as tolerated.  -Chest x-ray concerning for minimal effusions.  BNP greater than 5K.  Check procalcitonin.  -Check 2D echocardiogram. ? Heart failure, in the setting of elevated BNP  -Supplemental O2 therapy/respiratory updrafts as needed  -Fluid restriction/daily weight  -Nephrology consulted.  Hopefully will undergo HD.  -Monitor closely  2/26  -Per nephrology notes, anticipating repeat HD Raman (2/27)  -Wean O2 as tolerated.  -Echo noted with EF of 60 to 65% with normal global and regional left ventricular function.  Overall echo normal.  -Check CT chest.  TSH normal.    2:10 pm  -CT chest results reviewed:.  Small to moderate bilateral pleural effusions with associated basilar volume loss and atelectasis. Septal thickening and groundglass suggests pulmonary edema. Moderate airway thickening, likely from edema. Mild cardiomegaly. Moderate to severe coronary calcifications.  -We will allow for volume control with HD.  -Other management as stated above.  2/27  -Had HD today.  -Still requiring O2.  Noted at 3 L NC with sats at 94%.  Continue to wean as tolerated.     End-stage renal disease on HD (T/TH/SA)  -Presenting with the need for HD, as stated above.  -Creatinine noted at 4.86.   -Last HD on Thursday (2/23/2023)  -Nephrology on consult.  Awaiting HD  -Resume home calcitriol/sevelamer  2/26  -Creatinine improved to 3.7 from 4.8  -Had HD yesterday (2/25).  Anticipating repeat tomorrow (2/26)  -Continue calcitriol/sevelamer  2/27  -We will start regular routine HD (T/TH/SA) from tomorrow.     Hyperkalemia  -Potassium noted at 5.5, with no EKG changes.  -We will give x1 Lokelma, pending dialysis  2/26  -Potassium noted at 4.4  -Monitor  and replete as needed    Elevated BNP.  This is in the setting of dyspnea  -BNP recorded as greater than 5K  -echocardiogram as stated above: Normal     Hypertension  -BP noted at 149/87  -We will resume home amlodipine.  Hold order BP medications given the patient will undergo HD.  -Anticipate gradually restarting order home antihypertensives (lisinopril/clonidine)  2/26  -Slight improvement in BP, however not at goal.  -Add home lisinopril.  Will continue clonidine if indicated  2/27  -BP still not at goal  -Start home clonidine therapy.  Continue lisinopril/amlodipine.  I will hold off on labetalol for now and watch progress.     Hypothyroidism  -Check TSH with reflex to free T4  -Resume home levothyroxine   2/26  -Normal TSH     Mood disorder  -Resume home Pamelor/Ativan     Chronic pain syndrome  -Home oxycodone     Recent history of bilateral nephrectomy 2/2 PKD  -This appears to be stable.  -Midline scar inspected, healing appropriately.     DVT: Subcutaneous heparin  Disposition: Wean O2.      Subjective  Currently undergoing HD.  Denies any new complaints.  Still on oxygen.  No acute events overnight.    Objective    Vital signs in last 24 hours  Temp:  [97.5  F (36.4  C)-98.3  F (36.8  C)] 98.3  F (36.8  C)  Pulse:  [] 94  Resp:  [16-20] 18  BP: (129-179)/(72-94) 177/84  Cuff Mean (mmHg):  [] 119  SpO2:  [87 %-94 %] 94 % @LASTSAO2(12)@ O2 Device: Nasal cannula    Weight:   Wt Readings from Last 3 Encounters:   02/27/23 42.6 kg (93 lb 14.7 oz)   02/06/23 44.9 kg (99 lb)   12/19/22 47 kg (103 lb 11.2 oz)      Weight change:     Intake/Output last 3 shifts  I/O last 3 completed shifts:  In: 240 [P.O.:240]  Out: 4200 [Other:4200]  Body mass index is 18.34 kg/m .    Physical Exam    General Appearance:    Alert, cooperative, no distress, appears stated age   Lungs:     Clear bilaterally    Cardiovascular:    Regular rate ands rhythm.  Normal S1, S2.  No murmur, rub or gallop.  No edema   Abdomen:      Soft, non-tender, bowel sounds active all four quadrants,     no masses, no organomegaly   Neurologic:   Awake, alert, oriented x 3.  Grossly nonfocal      Pertinent Labs   Lab Results: personally reviewed.   Recent Labs   Lab 02/27/23  0846 02/26/23  0630 02/25/23  1022   * 135* 136   CO2 23 30 28   BUN 31* 13 24*   ALBUMIN  --  2.9*  --    ALKPHOS  --  111  --    ALT  --  <9  --    AST  --  14  --      Recent Labs   Lab 02/27/23  0846 02/26/23  0630 02/25/23  1022   WBC  --  8.4 8.5   HGB  --  11.6* 9.8*   HCT  --  37.1 31.0*    296 260     Recent Labs   Lab 02/25/23  1022   TROPONINI 0.04     Invalid input(s): POCGLUFGR    Medications  Current Facility-Administered Medications   Medication     0.9% sodium chloride BOLUS     0.9% sodium chloride BOLUS     0.9% sodium chloride BOLUS     0.9% sodium chloride BOLUS     acetaminophen (TYLENOL) tablet 650 mg    Or     acetaminophen (TYLENOL) Suppository 650 mg     albumin human 25 % injection 50 mL     amLODIPine (NORVASC) tablet 5 mg     calcitRIOL (ROCALTROL) capsule 0.5 mcg     cloNIDine (CATAPRES) tablet 0.2 mg     heparin ANTICOAGULANT injection 5,000 Units     levothyroxine (SYNTHROID/LEVOTHROID) tablet 50 mcg     Lidocaine (LIDOCARE) 4 % Patch 1 patch    And     lidocaine patch in PLACE     lisinopril (ZESTRIL) tablet 20 mg     LORazepam (ATIVAN) tablet 1 mg     melatonin tablet 1 mg     No heparin via hemodialysis machine     nortriptyline (PAMELOR) capsule 75 mg     ondansetron (ZOFRAN ODT) ODT tab 4 mg    Or     ondansetron (ZOFRAN) injection 4 mg     oxyCODONE (ROXICODONE) tablet 10 mg     rosuvastatin (CRESTOR) tablet 10 mg     senna-docusate (SENOKOT-S/PERICOLACE) 8.6-50 MG per tablet 1 tablet    Or     senna-docusate (SENOKOT-S/PERICOLACE) 8.6-50 MG per tablet 2 tablet     sertraline (ZOLOFT) tablet 200 mg     sevelamer carbonate (RENVELA) tablet 800 mg     simethicone (MYLICON) chewable tablet 80 mg     sodium zirconium cyclosilicate  (LOKELMA) packet 10 g       Pertinent Radiology   Radiology Results: Personally reviewed   Results for orders placed or performed during the hospital encounter of 02/25/23   Chest XR,  PA & LAT    Impression    IMPRESSION: Bilateral lower lobe atelectasis and/or infiltrate best seen on lateral view. Probable minimal effusions.       I spent a total of 44 minutes for this encounter with interval history, physical exam, in patient medication review and reconciliation.      Keanu Miller DO  Internal Medicine Hospitalist  2/26/2023

## 2023-02-27 NOTE — PROGRESS NOTES
RENAL PROGRESS NOTE    CC:  ESRD on HD.Hypervolemia    ASSESSMENT & PLAN:     ESRD on HD:  PKD, on HD sine 2020 at [facility] under care of Dr. Rhoades, tells me today she transferred to [facility] upon [facility] discharge to be closer to home.   Outpt schedule TTS.  3hr runs, JESSI  AVF. Listed on transplant list since 2021 for  donor. S/P bilateral nephrectomy 2023 at [facility] 23 ongoing difficulty eating/wt loss/PKD.   S/p HD   Remains volume up.  --Plan for UF only treatment today  --Regularly scheduled treatment tomorrow  --Fluid restriction 1500 ml.     HTN: resume PTA meds(on clonidine, amlodipine, labetolol, lisinopril), fluid removal with HD.     Hyperkalemia: K mildly elevated again today at 5.4.  Briefly discussed need to monitor this closely following bilateral nephrectomy.  May need to tighten up potassium restriction.  -Lokelma x 1 today  -low potassium diet  -dialysis tomorrow.    Volume: Ongoing SOB, already improving with fluid removal on dialysis this morning per Alvarez.  -UF only treatment today.  -Continue FR.  Discussed need to strictly follow FR now that s/p bilateral nephrectomy.  -UF with dialysis again tomorrow.    CKD MBD: calcitriol and renvela  800 TID with meals as outpt.  Phos here 3.3.  Hold renvela for now.    Anemia: hgb stable 11.8.  PETE at outpatient HD unit    SUBJECTIVE:   Seen on dialysis.  Very SOB this morning.  Already feeling better with dialysis initiation.  No CP.  No dizziness.  No pain.  Appetite starting to get a bit better.  Reviewed high K and advised to watch potassium intake.  No access issues.       OBJECTIVE:  Physical Exam   Temp: 97.5  F (36.4  C) Temp src: Oral BP: (!) 171/87 Pulse: 97   Resp: 20 SpO2: 93 % O2 Device: Oxymask Oxygen Delivery: 3 LPM  Vitals:    23 0333   Weight: 46.8 kg (103 lb 3.2 oz)     Vital Signs with Ranges  Temp:  [97.5  F (36.4  C)-98.2  F (36.8  C)] 97.5  F (36.4  C)  Pulse:  [] 97  Resp:  [18-20]  20  BP: (163-179)/(81-88) 171/87  Cuff Mean (mmHg):  [1221] 1221  SpO2:  [87 %-96 %] 93 %  I/O last 3 completed shifts:  In: 240 [P.O.:240]  Out: -     Patient Vitals for the past 72 hrs:   Weight   02/27/23 0333 46.8 kg (103 lb 3.2 oz)       Intake/Output Summary (Last 24 hours) at 2/26/2023 1011  Last data filed at 2/26/2023 0500  Gross per 24 hour   Intake 360 ml   Output --   Net 360 ml       PHYSICAL EXAM:  GEN: NAD  CV: RRR  Lung: Clear ant, mild dyspnea, O2 per oxymask  Ab: soft   Ext: mild lower leg edema  Skin: no rash  Psych: calm, cooperative.  A&OX3.  Access: L AVF functional      LABORATORY STUDIES:     Recent Labs   Lab 02/26/23  0630 02/25/23  1022   WBC 8.4 8.5   RBC 3.46* 2.93*   HGB 11.6* 9.8*   HCT 37.1 31.0*    260       Basic Metabolic Panel:  Recent Labs   Lab 02/26/23  0630 02/25/23  1022   * 136   POTASSIUM 4.4 5.5*   CHLORIDE 93* 94*   CO2 30 28   BUN 13 24*   CR 3.70* 4.86*    112   GAVIN 9.5 9.5       INRNo lab results found in last 7 days.     Recent Labs   Lab Test 02/26/23  0630 02/25/23  1022 08/18/22  1731 05/19/21  1507   INR  --   --   --  1.02   WBC 8.4 8.5   < > 10.4   HGB 11.6* 9.8*   < > 13.0    260   < > 365    < > = values in this interval not displayed.       Personally reviewed current labs    Serene Ga NP  [facility]  [phone]

## 2023-02-27 NOTE — PLAN OF CARE
Problem: Plan of Care - These are the overarching goals to be used throughout the patient stay.    Goal: Optimal Comfort and Wellbeing  Intervention: Monitor Pain and Promote Comfort  Recent Flowsheet Documentation  Taken 2/26/2023 2246 by Haylee Jones RN  Pain Management Interventions: medication (see MAR)  Taken 2/26/2023 2201 by Haylee Jones RN  Pain Management Interventions: medication (see MAR)   Goal Outcome Evaluation:    Pt A&O and VSS except O2 sats sit at 90 w/ 4L of O2 via O2 mask. Pt. Has crackles in the lungs and a congestive cough. Pt used incentive spirometer and mariee coughing showing little improvement. Pt. On dialysis and will have it today. Discharge home pending on status improvement.

## 2023-02-28 VITALS
WEIGHT: 89.2 LBS | HEART RATE: 96 BPM | RESPIRATION RATE: 18 BRPM | OXYGEN SATURATION: 95 % | TEMPERATURE: 99.1 F | SYSTOLIC BLOOD PRESSURE: 155 MMHG | DIASTOLIC BLOOD PRESSURE: 93 MMHG | BODY MASS INDEX: 17.42 KG/M2

## 2023-02-28 PROCEDURE — 90935 HEMODIALYSIS ONE EVALUATION: CPT

## 2023-02-28 PROCEDURE — 99232 SBSQ HOSP IP/OBS MODERATE 35: CPT | Performed by: NURSE PRACTITIONER

## 2023-02-28 PROCEDURE — 99239 HOSP IP/OBS DSCHRG MGMT >30: CPT | Performed by: INTERNAL MEDICINE

## 2023-02-28 PROCEDURE — 250N000013 HC RX MED GY IP 250 OP 250 PS 637: Performed by: INTERNAL MEDICINE

## 2023-02-28 PROCEDURE — 250N000011 HC RX IP 250 OP 636: Performed by: INTERNAL MEDICINE

## 2023-02-28 PROCEDURE — 99207 PR APP CREDIT; MD BILLING SHARED VISIT: CPT | Performed by: INTERNAL MEDICINE

## 2023-02-28 RX ORDER — LABETALOL 100 MG/1
100 TABLET, FILM COATED ORAL EVERY 12 HOURS SCHEDULED
Status: CANCELLED | OUTPATIENT
Start: 2023-02-28

## 2023-02-28 RX ADMIN — SENNOSIDES AND DOCUSATE SODIUM 2 TABLET: 50; 8.6 TABLET ORAL at 11:23

## 2023-02-28 RX ADMIN — NORTRIPTYLINE HYDROCHLORIDE 75 MG: 25 CAPSULE ORAL at 11:23

## 2023-02-28 RX ADMIN — OXYCODONE HYDROCHLORIDE 10 MG: 5 TABLET ORAL at 08:26

## 2023-02-28 RX ADMIN — OXYCODONE HYDROCHLORIDE 10 MG: 5 TABLET ORAL at 00:17

## 2023-02-28 RX ADMIN — OXYCODONE HYDROCHLORIDE 10 MG: 5 TABLET ORAL at 16:29

## 2023-02-28 RX ADMIN — SEVELAMER CARBONATE 800 MG: 800 TABLET, FILM COATED ORAL at 03:38

## 2023-02-28 RX ADMIN — SIMETHICONE 80 MG: 80 TABLET, CHEWABLE ORAL at 16:29

## 2023-02-28 RX ADMIN — LISINOPRIL 20 MG: 20 TABLET ORAL at 11:23

## 2023-02-28 RX ADMIN — HEPARIN SODIUM 5000 UNITS: 5000 INJECTION, SOLUTION INTRAVENOUS; SUBCUTANEOUS at 14:49

## 2023-02-28 RX ADMIN — HEPARIN SODIUM 5000 UNITS: 5000 INJECTION, SOLUTION INTRAVENOUS; SUBCUTANEOUS at 03:36

## 2023-02-28 ASSESSMENT — ACTIVITIES OF DAILY LIVING (ADL)
ADLS_ACUITY_SCORE: 33

## 2023-02-28 NOTE — PROGRESS NOTES
Patient has been assessed for Home Oxygen needs.     Pulse oximetry (SpO2) and Oxygen flow readings:    SpO2 = 95% on room air at rest while awake.    SpO2 improved to  % on   liters/minute at rest.     SpO2 = 91 (with 50 ft. walk in the chou)% on room air during activity/with exercise.    *SpO2 improved to  % on   liters/minute during activity/with exercise.    Pt did not require any supplemental oxygen while ambulating or while at rest.     Result was paged to Dr. Miller.

## 2023-02-28 NOTE — PROGRESS NOTES
Hemodialysis Treatment Note      Fluid Removed: 3L    Vascular Access Status:  Left arm AVF, +bruit/thrill, easily cannulated with 15g needles, no bfr issues. Post HD needles removed 10 minutesmanual pressure to obtain hemostasis. Gauze bandage applied and secured with tape.       Dialyzer Rinse: Good    Total Blood Volume Processed: 61.9L    Total Dialysis Treatment Time: 3hrs    Run Summary  Post HD wt 40.5Kg. Pt. was hemodynamically stable during dialysis.    Interventions  Vital signs Q15 minutes and PRN. Critline used for fluid monitoring/management.    Plan:  per renal team    Ethan Schaefer, RN  [facility]

## 2023-02-28 NOTE — PROVIDER NOTIFICATION
Pt maintaining O2 Stats above 93% on RA. Pt has made statements that she would like to go home today.     Text page sent to Dr. Miller stating:  [phone] - [phone]  S.F.   Room 356  Pt maintaining O2 Above 93% on RA. Would pt be able to discharge today?     Received call back from Dr. Miller. Ordered home O2 on pt. Will text page once complete and results are in epic

## 2023-02-28 NOTE — PLAN OF CARE
Problem: Plan of Care - These are the overarching goals to be used throughout the patient stay.    Goal: Optimal Comfort and Wellbeing  2/27/2023 1944 by Yue Valentino, RN  Outcome: Progressing  2/27/2023 1205 by Yue Valentino, RN  Outcome: Progressing     Pt A/O x 4. VSS on 3L oxymask with attempts to wean down. Hypertensive scheduled meds given report given to oncoming nurse will continue to monitor. Pt did complain of back pain prn meds given with good relief. Pt tolerated Dialysis today will have another session again tomorrow as pt regular schedule is Tues, Thurs, Sat. Pt reports feeling better than this morning before dialysis but feeling fatigue. Discharge pending clinical improvement.

## 2023-02-28 NOTE — PLAN OF CARE
Problem: Plan of Care - These are the overarching goals to be used throughout the patient stay.    Goal: Optimal Comfort and Wellbeing  Intervention: Monitor Pain and Promote Comfort  Recent Flowsheet Documentation  Taken 2/28/2023 0102 by Haylee Jones RN  Pain Management Interventions: medication (see MAR)  Taken 2/28/2023 0017 by Haylee Jones RN  Pain Management Interventions: medication (see MAR)  Taken 2/28/2023 0000 by Haylee Jones RN  Pain Management Interventions: medication (see MAR)   Goal Outcome Evaluation:  A&OX4. VSS except slightly elevated. PIV SL. Fine crackles in lower lungs. +1 edema in feet. Fluid restriction 1500 ml. SBA. Antacids given for upset stomach. Daily weight. Q4 vitals. Dialysis today. Pt on 1L O2 w/ 95 sats and weaning off. Pt. discharge pending.

## 2023-02-28 NOTE — PROGRESS NOTES
Hospitalist Progress Note    Assessment/Plan  Dyspnea.  I suspect need for HD.  -Presenting with complaints of SOB in the setting of weight gain, orthopnea, PND and bilateral lower extremity edema.  -Clinical fluid overload.  Currently on 2L O2 NC with sats noted at 95%.  Will wean as tolerated.  -Chest x-ray concerning for minimal effusions.  BNP greater than 5K.  Check procalcitonin.  -Check 2D echocardiogram. ? Heart failure, in the setting of elevated BNP  -Supplemental O2 therapy/respiratory updrafts as needed  -Fluid restriction/daily weight  -Nephrology consulted.  Hopefully will undergo HD.  -Monitor closely  2/26  -Per nephrology notes, anticipating repeat HD Raman (2/27)  -Wean O2 as tolerated.  -Echo noted with EF of 60 to 65% with normal global and regional left ventricular function.  Overall echo normal.  -Check CT chest.  TSH normal.    2:10 pm  -CT chest results reviewed:.  Small to moderate bilateral pleural effusions with associated basilar volume loss and atelectasis. Septal thickening and groundglass suggests pulmonary edema. Moderate airway thickening, likely from edema. Mild cardiomegaly. Moderate to severe coronary calcifications.  -We will allow for volume control with HD.  -Other management as stated above.  2/27  -Had HD today.  -Still requiring O2.  Noted at 3 L NC with sats at 94%.  Continue to wean as tolerated.  28  -Undergoing HD today.  -Noted on 4L O2 NC with sats at 92%.  Hopefully HD will improve O2 requirements.  To wean as tolerated.  -If able to wean, will do home O2 assessment test.     End-stage renal disease on HD (T/TH/SA)  -Presenting with the need for HD, as stated above.  -Creatinine noted at 4.86.   -Last HD on Thursday (2/23/2023)  -Nephrology on consult.  Awaiting HD  -Resume home calcitriol/sevelamer  2/26  -Creatinine improved to 3.7 from 4.8  -Had HD yesterday (2/25).  Anticipating repeat tomorrow (2/26)  -Continue calcitriol/sevelamer  2/27  -We will start regular  routine HD (T/TH/SA) from tomorrow.     Hyperkalemia  -Potassium noted at 5.5, with no EKG changes.  -We will give x1 Lokelma, pending dialysis  2/26  -Potassium noted at 4.4  -Monitor and replete as needed    Elevated BNP.  This is in the setting of dyspnea  -BNP recorded as greater than 5K  -echocardiogram as stated above: Normal     Hypertension  -BP noted at 149/87  -We will resume home amlodipine.  Hold order BP medications given the patient will undergo HD.  -Anticipate gradually restarting order home antihypertensives (lisinopril/clonidine)  2/26  -Slight improvement in BP, however not at goal.  -Add home lisinopril.  Will continue clonidine if indicated  2/27  -BP still not at goal  -Start home clonidine therapy.  Continue lisinopril/amlodipine.  I will hold off on labetalol for now and watch progress.  2/28  -Resume home labetalol, in addition to lisinopril/amlodipine/clonidine.  Adjust dose to optimize control.     Hypothyroidism  -Check TSH with reflex to free T4  -Resume home levothyroxine   2/26  -Normal TSH     Mood disorder  -Resume home Pamelor/Ativan     Chronic pain syndrome  -Home oxycodone     Recent history of bilateral nephrectomy 2/2 PKD  -This appears to be stable.  -Midline scar inspected, healing appropriately.     DVT: Subcutaneous heparin  Disposition: Wean O2.  Hopefully should discharge tomorrow, if able to wean O2 and pass walk test.  Optimize BP control.      Subjective  Currently undergoing HD.  Denies any new complaints.  Still on oxygen.  No acute events overnight.    Objective    Vital signs in last 24 hours  Temp:  [97.4  F (36.3  C)-98.3  F (36.8  C)] 98.3  F (36.8  C)  Pulse:  [] 89  Resp:  [16-20] 16  BP: (129-177)/(70-98) 160/86  Cuff Mean (mmHg):  [] 118  SpO2:  [87 %-96 %] 93 % @LASTSAO2(12)@ O2 Device: Nasal cannula    Weight:   Wt Readings from Last 3 Encounters:   02/28/23 44.5 kg (98 lb 1.7 oz)   02/06/23 44.9 kg (99 lb)   12/19/22 47 kg (103 lb 11.2 oz)       Weight change: -4.211 kg (-9 lb 4.5 oz)    Intake/Output last 3 shifts  I/O last 3 completed shifts:  In: 280 [P.O.:280]  Out: 4200 [Other:4200]  Body mass index is 19.16 kg/m .    Physical Exam    General Appearance:    Alert, cooperative, no distress, appears stated age   Lungs:     Clear bilaterally    Cardiovascular:    Regular rate ands rhythm.  Normal S1, S2.  No murmur, rub or gallop.  No edema   Abdomen:     Soft, non-tender, bowel sounds active all four quadrants,     no masses, no organomegaly   Neurologic:   Awake, alert, oriented x 3.  Grossly nonfocal      Pertinent Labs   Lab Results: personally reviewed.   Recent Labs   Lab 02/27/23  0846 02/26/23  0630 02/25/23  1022   * 135* 136   CO2 23 30 28   BUN 31* 13 24*   ALBUMIN  --  2.9*  --    ALKPHOS  --  111  --    ALT  --  <9  --    AST  --  14  --      Recent Labs   Lab 02/27/23  0846 02/26/23  0630 02/25/23  1022   WBC  --  8.4 8.5   HGB  --  11.6* 9.8*   HCT  --  37.1 31.0*    296 260     Recent Labs   Lab 02/25/23  1022   TROPONINI 0.04     Invalid input(s): POCGLUFGR    Medications  Current Facility-Administered Medications   Medication     0.9% sodium chloride BOLUS     0.9% sodium chloride BOLUS     0.9% sodium chloride BOLUS     0.9% sodium chloride BOLUS     acetaminophen (TYLENOL) tablet 650 mg    Or     acetaminophen (TYLENOL) Suppository 650 mg     albumin human 25 % injection 50 mL     amLODIPine (NORVASC) tablet 5 mg     calcitRIOL (ROCALTROL) capsule 0.5 mcg     cloNIDine (CATAPRES) tablet 0.2 mg     heparin ANTICOAGULANT injection 5,000 Units     levothyroxine (SYNTHROID/LEVOTHROID) tablet 50 mcg     Lidocaine (LIDOCARE) 4 % Patch 1 patch    And     lidocaine patch in PLACE     lisinopril (ZESTRIL) tablet 20 mg     LORazepam (ATIVAN) tablet 1 mg     melatonin tablet 1 mg     No heparin via hemodialysis machine     nortriptyline (PAMELOR) capsule 75 mg     ondansetron (ZOFRAN ODT) ODT tab 4 mg    Or     ondansetron  (ZOFRAN) injection 4 mg     oxyCODONE (ROXICODONE) tablet 10 mg     rosuvastatin (CRESTOR) tablet 10 mg     senna-docusate (SENOKOT-S/PERICOLACE) 8.6-50 MG per tablet 1 tablet    Or     senna-docusate (SENOKOT-S/PERICOLACE) 8.6-50 MG per tablet 2 tablet     sertraline (ZOLOFT) tablet 200 mg     sevelamer carbonate (RENVELA) tablet 800 mg     simethicone (MYLICON) chewable tablet 80 mg     sodium zirconium cyclosilicate (LOKELMA) packet 10 g       Pertinent Radiology   Radiology Results: Personally reviewed   Results for orders placed or performed during the hospital encounter of 02/25/23   Chest XR,  PA & LAT    Impression    IMPRESSION: Bilateral lower lobe atelectasis and/or infiltrate best seen on lateral view. Probable minimal effusions.       I spent a total of 44 minutes for this encounter with interval history, physical exam, in patient medication review and reconciliation.      Keanu Miller DO  Internal Medicine Hospitalist  2/26/2023

## 2023-02-28 NOTE — PLAN OF CARE
Problem: Plan of Care - These are the overarching goals to be used throughout the patient stay.    Goal: Optimal Comfort and Wellbeing  Outcome: Met  Intervention: Monitor Pain and Promote Comfort    Problem: Plan of Care - These are the overarching goals to be used throughout the patient stay.    Goal: Absence of Hospital-Acquired Illness or Injury  Outcome: Met  Intervention: Identify and Manage Fall Risk       A&OX4. VSS, maintaining stats above 93% on RA. Home O2 Evaluation complete.  PIV SL. Diminished lung sounds. +1 edema in feet. Fluid restriction 1500 ml drank 480 this shift. SBA. Pt received dialysis this morning. Pain managed with PRN Oxycodone. Discharge hopefully this afternoon.

## 2023-02-28 NOTE — DISCHARGE SUMMARY
[facility] MEDICINE  DISCHARGE SUMMARY     Primary Care Physician: Ramos Lowry  Admission Date: 2/25/2023   Discharge Provider: Alissa Lane MD Discharge Date: 2/28/2023   Diet: Orders Placed This Encounter      Regular Diet Adult      Diet      Code Status: Full Code   Activity: activity as tolerated   [facility]      Condition at Discharge: Stable      REASON FOR PRESENTATION(See Admission Note for Details)   Shortness of breath    PRINCIPAL & ACTIVE DISCHARGE DIAGNOSES     Principal Problem:  Volume overload/acute pulmonary edema  ESRD on Hemodialysis   Dyspnea secondary to above  Acute hypoxemic respiratory failure resolved  Hyperkalemia resolved  Hypertension  History of bilateral nephrectomy 2/1/2023 due to polycystic kidney disease      SIGNIFICANT FINDINGS (Imaging, labs):     Most Recent 3 CBC's:Recent Labs   Lab Test 02/27/23  0846 02/26/23  0630 02/25/23  1022 02/06/23  0542   WBC  --  8.4 8.5 10.3   HGB  --  11.6* 9.8* 8.7*   MCV  --  107* 106* 108*    296 260 278      Most Recent 3 BMP's:  Recent Labs   Lab Test 02/27/23  0846 02/26/23  0630 02/25/23  1022   * 135* 136   POTASSIUM 5.4* 4.4 5.5*   CHLORIDE 95* 93* 94*   CO2 23 30 28   BUN 31* 13 24*   CR 5.03* 3.70* 4.86*   ANIONGAP 16 12 14   GAVIN 10.1 9.5 9.5    106 112     Most Recent 2 LFT's:  Recent Labs   Lab Test 02/26/23  0630 08/18/22  1731   AST 14 16   ALT <9 11   ALKPHOS 111 128*   BILITOTAL 0.6 0.3     Most Recent TSH, T4 and A1c Labs:  Recent Labs   Lab Test 02/25/23  1022 10/04/22  1414 08/18/22  1731   TSH 4.88   < > 4.49*   T4  --   --  0.72*    < > = values in this interval not displayed.     Results for orders placed or performed during the hospital encounter of 02/25/23   Chest XR,  PA & LAT    Narrative    EXAM: XR CHEST 2 VIEWS  LOCATION: [facility]  DATE/TIME: 2/25/2023 10:32 AM    INDICATION: Dyspnea  COMPARISON: 5/19/2021      Impression     IMPRESSION: Bilateral lower lobe atelectasis and/or infiltrate best seen on lateral view. Probable minimal effusions.   CT Chest w/o Contrast    Narrative    EXAM: CT CHEST W/O CONTRAST  LOCATION: [facility]  DATE/TIME: 2/26/2023 10:35 AM    INDICATION: Dyspnea  COMPARISON: Chest radiograph 02/25/2023. CT abdomen and pelvis 11/11/2022.  TECHNIQUE: CT chest without IV contrast. Multiplanar reformats were obtained. Dose reduction techniques were used.  CONTRAST: None.    FINDINGS:   LUNGS AND PLEURA: Small to moderate bilateral pleural effusions with associated basilar volume loss and atelectasis. Mild septal thickening. Moderate airway thickening. Mild emphysema. No pneumothorax.    MEDIASTINUM/AXILLAE: Upper normal nodes, likely reactive. Mild cardiomegaly. No pericardial effusion. Normal caliber aorta and pulmonary trunk.    CORONARY ARTERY CALCIFICATION: Moderate to severe, pronounced in the RCA.    UPPER ABDOMEN: Hepatic hypodensities again identified, incompletely characterized. Small amount of ascites.    MUSCULOSKELETAL: Nothing acute.      Impression    IMPRESSION:     1.  Small to moderate bilateral pleural effusions with associated basilar volume loss and atelectasis.    2.  Septal thickening and groundglass suggests pulmonary edema.    3.  Moderate airway thickening, likely from edema.    4.  Mild cardiomegaly. Moderate to severe coronary calcifications.             PENDING LABS         PROCEDURES ( this hospitalization only)          RECOMMENDATION FOR F/U VISIT       DISPOSITION     Home    SUMMARY OF HOSPITAL COURSE:    67-year-old lady with past medical history significant for recent bilateral nephrectomy for polycystic kidney disease on 2/1/2023, ESRD on hemodialysis TTS schedule, hypertension dyslipidemia presented to the emergency room with complaints of shortness of breath.  She was found to be volume overloaded.  Imaging showed pulmonary edema.  Nephrology consulted.  She  underwent dialysis with extra ultrafiltration.  Initially was requiring oxygen but later on weaned off of it.  She is discharging home in stable condition.  She will follow-up with nephrology for her regular dialysis.    Discharge Medications with Med changes:        Review of your medicines      CONTINUE these medicines which have NOT CHANGED      Dose / Directions   amLODIPine 5 MG tablet  Commonly known as: NORVASC      Dose: 5 mg  Take 5 mg by mouth At Bedtime  Refills: 0     calcitRIOL 0.5 MCG capsule  Commonly known as: ROCALTROL      Dose: 0.5 mcg  Take 0.5 mcg by mouth three times a week Given at HD.  Refills: 0     cholecalciferol 25 mcg (1000 units) capsule  Commonly known as: VITAMIN D3      Dose: 1 capsule  Take 1 capsule by mouth daily  Refills: 0     cloNIDine 0.1 MG tablet  Commonly known as: CATAPRES      Dose: 0.2 mg  Take 0.2 mg by mouth At Bedtime  Refills: 0     EPOGEN IJ      Dose: 1,000 Units  Inject 1,000 Units into the vein three times a week At dialysis.  Refills: 0     labetalol 300 MG tablet  Commonly known as: NORMODYNE      Take 450 mg (1.5 tablet) in the mid morning and take 600 mg (2 tablets) at bedtime.  Refills: 0     levothyroxine 50 MCG tablet  Commonly known as: SYNTHROID/LEVOTHROID      Dose: 50 mcg  Take 50 mcg by mouth At Bedtime Takes in the middle of the night.  Refills: 0     lisinopril 40 MG tablet  Commonly known as: ZESTRIL      Dose: 20 mg  Take 20 mg by mouth 2 times daily  Refills: 0     LORazepam 1 MG tablet  Commonly known as: ATIVAN      Dose: 1 mg  Take 1 mg by mouth daily as needed  Refills: 0     nortriptyline 75 MG capsule  Commonly known as: PAMELOR      Dose: 75 mg  Take 75 mg by mouth daily  Refills: 0     oxyCODONE IR 10 MG tablet  Commonly known as: ROXICODONE  Used for: Acute post-operative pain      Use oxycodone 10mg every 3 hours as needed for pain on 2/6 & 2/7. Then return to usual home dose.  Refills: 0     rosuvastatin 10 MG tablet  Commonly known  as: CRESTOR      Dose: 10 mg  Take 10 mg by mouth At Bedtime  Refills: 0     senna-docusate 8.6-50 MG tablet  Commonly known as: SENOKOT-S/PERICOLACE  Used for: Acute post-operative pain      Dose: 1-2 tablet  Take 1-2 tablets by mouth daily  Quantity: 60 tablet  Refills: 3     sertraline 100 MG tablet  Commonly known as: ZOLOFT      Dose: 200 mg  Take 200 mg by mouth At Bedtime  Refills: 0     sevelamer carbonate 800 MG tablet  Commonly known as: RENVELA      Dose: 800 mg  Take 800 mg by mouth 3 times daily as needed (with meals when eating) When eating  Refills: 0     SUPER B COMPLEX PO      Dose: 1 tablet  Take 1 tablet by mouth daily  Refills: 0     VENOFER IV      Dose: 100 mg  Inject 100 mg into the vein once a week At dialysis  Refills: 0                 Rationale for medication changes:            Consults   Nephrology      Immunizations given this encounter         Discharge Procedure Orders   Reason for your hospital stay   Order Comments: ESRD on dilaysis, fluid retention     Follow-up and recommended labs and tests   Order Comments: Follow up with primary care provider, Ramos Lowry, within 7 days for hospital follow- up.  Follow up with nephrology for dialysis on 3/2/23     Activity   Order Comments: Your activity upon discharge: activity as tolerated     Order Specific Question Answer Comments   Is discharge order? Yes      Diet   Order Comments: Follow this diet upon discharge: Orders Placed This Encounter      Fluid restriction 1500 ML FLUID      Regular Diet Adult     Order Specific Question Answer Comments   Is discharge order? Yes      Examination     Vital Signs in last 24 hours:   Vital signs:  Temp: 99.1  F (37.3  C) Temp src: Oral BP: (!) 155/93 Pulse: 96   Resp: 18 SpO2: 95 % O2 Device: None (Room air) Oxygen Delivery: 3 LPM   Weight: 40.5 kg (89 lb 3.2 oz) (Taken immediately after dialysis)  Estimated body mass index is 17.42 kg/m  as calculated from the following:    Height as of 2/1/23:  1.524 m (5').    Weight as of this encounter: 40.5 kg (89 lb 3.2 oz).       Pertinent positives on exam:  Heart S1-S2 heard regular rate and rhythm  Lungs: Clear to auscultation bilaterally.    Please see EMR for more detailed significant labs, imaging, consultant notes etc.  Total time spent on discharge: > 35 minutes    Alissa Lane MD   [facility] Hospitalist Service: Ph:[phone]    CC:Ramos Lowry

## 2023-02-28 NOTE — PROGRESS NOTES
RENAL PROGRESS NOTE    CC:  ESRD on HD.Hypervolemia    ASSESSMENT & PLAN:     ESRD on HD: 2/ PKD, on HD sine 2020 at [facility] under care of Dr. Rhoades, reportedly transferred to [facility] upon [facility] discharge to be closer to home.   Outpt schedule TTS.  3hr runs, JESSI  AVF. Listed on transplant list since 2021 for  donor. S/P bilateral nephrectomy 2023 at [facility] 23 ongoing difficulty eating/wt loss/PKD.   UF only treatment  with UF 4.2L  --Dialysis today to keep on TTS schedule->another 3L removed.  Post-treatment weight today 40.5kg.  Called new TW of 40kg to [facility].  --Fluid restriction 1500 ml reviewed again today.  --daily weight  --No objection to discharge today.     HTN: BP on the high side, but improving with UF.  --continue home meds  --fluid removal with HD.     Hyperkalemia: K mildly elevated.  Discussed need to monitor this closely following bilateral nephrectomy.  May need to tighten up potassium restriction.  Lokelma x 1   -low potassium diet  -dialysis today    Volume: Volume up.  UF only treatment  with UF 4.2L  -Continue FR.  Discussed need to strictly follow FR now that s/p bilateral nephrectomy.  -UF with dialysis again today.  Call new TW to outpatient HD unit. .    CKD MBD: calcitriol and renvela  800 TID with meals as outpt.  Phos here 3.3.  Hold renvela for now.    Anemia: hgb stable 11.8.  PETE at outpatient HD unit    SUBJECTIVE:   Breathing continues to improve with dialysis.  Anxious to go home.  Asking appropriate questions on how to avoid fluid overload again in the future.  Discussed fluid restriction and adjusting TW.        OBJECTIVE:  Physical Exam   Temp: 98.3  F (36.8  C) Temp src: Oral BP: (!) 160/90 Pulse: 89   Resp: 16 SpO2: 93 % O2 Device: Oxymask Oxygen Delivery: 3 LPM  Vitals:    23 0333 23 1218 23 0440   Weight: 46.8 kg (103 lb 3.2 oz) 42.6 kg (93 lb 14.7 oz) 44.5 kg (98 lb 1.7 oz)     Vital Signs with Ranges  Temp:   [97.4  F (36.3  C)-98.3  F (36.8  C)] 98.3  F (36.8  C)  Pulse:  [] 89  Resp:  [16-18] 16  BP: (129-177)/(70-98) 160/90  Cuff Mean (mmHg):  [] 116  SpO2:  [87 %-96 %] 93 %  I/O last 3 completed shifts:  In: 280 [P.O.:280]  Out: 4200 [Other:4200]    Patient Vitals for the past 72 hrs:   Weight   02/28/23 0440 44.5 kg (98 lb 1.7 oz)   02/27/23 1218 42.6 kg (93 lb 14.7 oz)   02/27/23 0333 46.8 kg (103 lb 3.2 oz)       Intake/Output Summary (Last 24 hours) at 2/26/2023 1011  Last data filed at 2/26/2023 0500  Gross per 24 hour   Intake 360 ml   Output --   Net 360 ml       PHYSICAL EXAM:  GEN: NAD  CV: RRR  Lung: Clear, normal effort, room air sats 95%  Ab: soft   Ext: no leg edema  Skin: no rash  Psych: calm, cooperative.  A&OX3.  Access: L AVF functional    LABORATORY STUDIES:     Recent Labs   Lab 02/27/23  0846 02/26/23  0630 02/25/23  1022   WBC  --  8.4 8.5   RBC  --  3.46* 2.93*   HGB  --  11.6* 9.8*   HCT  --  37.1 31.0*    296 260       Basic Metabolic Panel:  Recent Labs   Lab 02/27/23  0846 02/26/23  0630 02/25/23  1022   * 135* 136   POTASSIUM 5.4* 4.4 5.5*   CHLORIDE 95* 93* 94*   CO2 23 30 28   BUN 31* 13 24*   CR 5.03* 3.70* 4.86*    106 112   GAVIN 10.1 9.5 9.5       INRNo lab results found in last 7 days.     Recent Labs   Lab Test 02/27/23  0846 02/26/23  0630 02/25/23  1022 08/18/22  1731 05/19/21  1507   INR  --   --   --   --  1.02   WBC  --  8.4 8.5   < > 10.4   HGB  --  11.6* 9.8*   < > 13.0    296 260   < > 365    < > = values in this interval not displayed.       Personally reviewed current labs    Serene Ga NP  [facility]  [phone]

## 2023-03-01 DIAGNOSIS — N18.6 ESRD (END STAGE RENAL DISEASE) (H): ICD-10-CM

## 2023-03-01 DIAGNOSIS — Z76.82 ORGAN TRANSPLANT CANDIDATE: ICD-10-CM

## 2023-03-01 NOTE — DISCHARGE SUMMARY
Pt requested to go home after passing home O2 eval and being off oxygen all day. Provider was paged by AM RN, ALAN RN x2, and Charge RN with no response. Lead MD paged and was advised to page Dr. Lane, which then came and discharged Pt. Pt discharged home with friend for transport. Discharge instructions given. Pt was brought down to front by wheelchair, pt requested to walk remainder of way to vehicle.

## 2023-04-28 ENCOUNTER — TRANSFERRED RECORDS (OUTPATIENT)
Dept: HEALTH INFORMATION MANAGEMENT | Facility: CLINIC | Age: 68
End: 2023-04-28

## 2023-05-01 ENCOUNTER — OFFICE VISIT (OUTPATIENT)
Dept: TRANSPLANT | Facility: CLINIC | Age: 68
End: 2023-05-01
Attending: SURGERY
Payer: COMMERCIAL

## 2023-05-01 VITALS
HEART RATE: 90 BPM | BODY MASS INDEX: 18.49 KG/M2 | SYSTOLIC BLOOD PRESSURE: 156 MMHG | DIASTOLIC BLOOD PRESSURE: 77 MMHG | OXYGEN SATURATION: 95 % | WEIGHT: 94.7 LBS

## 2023-05-01 DIAGNOSIS — Q61.3 PKD (POLYCYSTIC KIDNEY DISEASE): Primary | ICD-10-CM

## 2023-05-01 PROCEDURE — 99024 POSTOP FOLLOW-UP VISIT: CPT | Performed by: SURGERY

## 2023-05-01 PROCEDURE — G0463 HOSPITAL OUTPT CLINIC VISIT: HCPCS | Performed by: SURGERY

## 2023-05-01 ASSESSMENT — PAIN SCALES - GENERAL: PAINLEVEL: NO PAIN (0)

## 2023-05-01 NOTE — PROGRESS NOTES
S/P bilat nephrectomy Feb 1st  In prep for tx  Sx improved  Incision healed well, no hernia    Can be active on list once rest of eval complete    Patient was counseled on complications of incision and short and long term care to minimize infection/hernia risk.    Total time: 20 min  Counseling time: 10 min

## 2023-05-01 NOTE — LETTER
5/1/2023         RE: Serene Tipton  [address]  [place] [address]        Dear Colleague,    Thank you for referring your patient, Serene Tipton, to the [facility]. Please see a copy of my visit note below.    S/P bilat nephrectomy Feb 1st  In prep for tx  Sx improved  Incision healed well, no hernia    Can be active on list once rest of eval complete    Patient was counseled on complications of incision and short and long term care to minimize infection/hernia risk.    Total time: 20 min  Counseling time: 10 min

## 2023-05-05 ENCOUNTER — TELEPHONE (OUTPATIENT)
Dept: TRANSPLANT | Facility: CLINIC | Age: 68
End: 2023-05-05

## 2023-05-05 ENCOUNTER — TRANSFERRED RECORDS (OUTPATIENT)
Dept: HEALTH INFORMATION MANAGEMENT | Facility: CLINIC | Age: 68
End: 2023-05-05

## 2023-05-05 NOTE — TELEPHONE ENCOUNTER
Pt unaware of Coordinator change  She has left messages on Katherine's line for past few months   Just wonders what she needs to complet2 to get listed

## 2023-05-08 ENCOUNTER — TELEPHONE (OUTPATIENT)
Dept: TRANSPLANT | Facility: CLINIC | Age: 68
End: 2023-05-08
Payer: COMMERCIAL

## 2023-05-08 NOTE — TELEPHONE ENCOUNTER
Received patient call stating that patient is wondering what she has left to do for her evaluation.  She has apparently been trying to call Katherine and leaving messages on her line for the past couple of months.  Unable to reach patient but did leave a message with my direct line for call back.

## 2023-05-12 ENCOUNTER — TRANSFERRED RECORDS (OUTPATIENT)
Dept: HEALTH INFORMATION MANAGEMENT | Facility: CLINIC | Age: 68
End: 2023-05-12

## 2023-05-18 ENCOUNTER — TELEPHONE (OUTPATIENT)
Dept: TRANSPLANT | Facility: CLINIC | Age: 68
End: 2023-05-18
Payer: COMMERCIAL

## 2023-05-18 NOTE — TELEPHONE ENCOUNTER
Returned patient call.  Discussed what is left of her evaluation.  She needs to see the dentist.  She states that she was having a hard time eating prior to her nephrectomy and feels she is still under weight. Per Dr. Giang's post-op note, patient ok to be active on the list once eval items are complete. She has missed dental appointments in the past due to feeling ill with her PKD and other medical appointments, so her dental office will not let her schedule an apt in the future. For now, she is needing to call every day to try and get a cancellation spot.  I offered to call them and let them know this is the last thing she needs for a transplant.  She states she has already told them this and they still will not let her schedule ahead of time.  Told her to let me know when she gets an appointment and I will get the clearance letter from them. She expressed good understanding of this.

## 2023-05-18 NOTE — TELEPHONE ENCOUNTER
Called Serene to discuss the issue of a new coordinator since my [facility] and return as casual coordinator. I was only able to leave her a message. I told her she has been reassigned to a full time coordinator Trinidad Devine [phone]. I will be working on a limited basis and she may call me back at [phone]. Trinidad is aware Serene has been leaving me messages on my voice mail that was not answered in my absence.

## 2023-05-25 ENCOUNTER — TELEPHONE (OUTPATIENT)
Dept: TRANSPLANT | Facility: CLINIC | Age: 68
End: 2023-05-25
Payer: COMMERCIAL

## 2023-05-25 NOTE — TELEPHONE ENCOUNTER
Returned patient call.  She is requesting my help getting her dental records requested because her dental office is not getting back to he or calling her back.  I told her I would call them and ask about the process regarding how to get the records to either myself at the  or to the dentist she chooses to go and see.  Mansi, the  was able to help me.  She sent a HIPAA/SANTO form to her email and instructed me to let Alvarez know to fill that in with whoever she wants the records to be released to and that they will be sent.  Will call Alvarez with this info.

## 2023-06-16 ENCOUNTER — TRANSFERRED RECORDS (OUTPATIENT)
Dept: HEALTH INFORMATION MANAGEMENT | Facility: CLINIC | Age: 68
End: 2023-06-16

## 2023-06-27 ENCOUNTER — TELEPHONE (OUTPATIENT)
Dept: TRANSPLANT | Facility: CLINIC | Age: 68
End: 2023-06-27
Payer: COMMERCIAL

## 2023-06-28 ENCOUNTER — TELEPHONE (OUTPATIENT)
Dept: TRANSPLANT | Facility: CLINIC | Age: 68
End: 2023-06-28
Payer: COMMERCIAL

## 2023-06-28 ENCOUNTER — COMMITTEE REVIEW (OUTPATIENT)
Dept: TRANSPLANT | Facility: CLINIC | Age: 68
End: 2023-06-28
Payer: COMMERCIAL

## 2023-06-28 DIAGNOSIS — Q61.3 POLYCYSTIC KIDNEY DISEASE: ICD-10-CM

## 2023-06-28 DIAGNOSIS — Z01.818 PRE-TRANSPLANT EVALUATION FOR KIDNEY TRANSPLANT: ICD-10-CM

## 2023-06-28 DIAGNOSIS — Z87.891 HISTORY OF TOBACCO USE: ICD-10-CM

## 2023-06-28 DIAGNOSIS — I10 ESSENTIAL HYPERTENSION: ICD-10-CM

## 2023-06-28 DIAGNOSIS — Z76.82 ORGAN TRANSPLANT CANDIDATE: ICD-10-CM

## 2023-06-28 DIAGNOSIS — E78.5 HYPERLIPIDEMIA: ICD-10-CM

## 2023-06-28 DIAGNOSIS — N18.9 CHRONIC RENAL FAILURE: ICD-10-CM

## 2023-06-28 DIAGNOSIS — I25.10 CARDIOVASCULAR DISEASE: ICD-10-CM

## 2023-06-28 NOTE — LETTER
Serene Tipton  [address]  [place] [address]                June 28, 2023    Dear Dental Provider,     You may or may not know that the above patient in your care is in evaluation for an organ transplant. In order to be a candidate for transplant, our center requests that the patient provide a letter of clearance from their dentist stating that they are free from disease or infections. At your earliest convenience, please fax this information to [phone]. Your help is greatly appreciated.    Sincerely,     Trinidad GATES RN   Pre-Kidney/Pancreas Transplant Coordinator  Direct line: [phone]

## 2023-06-28 NOTE — COMMITTEE REVIEW
Abdominal Committee Review Note     Evaluation Date: 1/26/2021  Committee Review Date: 6/28/2023    Organ being evaluated for: Kidney    Transplant Phase: Evaluation  Transplant Status: Active    Transplant Coordinator: Trinidad Devine  Transplant Surgeon: Dr. Giang       Referring Physician:     Primary Diagnosis: Polycystic Kidneys  Secondary Diagnosis:     Committee Review Members:  Nephrology Charity Casanova PA-C, Wang Escalera, APRN CNP, Derrell Calvert MD   Nutrition Poppy Charles,    Pharmacist Juan Maldonado, [facility], Lynn Daugherty, [facility]   Pharmacy Eyad Angulo, [facility]    - Clinical Chen Rita Youngblood, [facility], Amirah Marin, [facility]   Transplant JD TALAMANTES, RN, Va Mclean, CEDRIC, Deshawn Weldon MD, Kalpana Wayne, RN, Amirah Salazar, RN, Mary Dowling, NP, Mary Brito, RN, Trinidad Resendez, RN, Elly Camejo RN       Transplant Eligibility: Irreversible chronic kidney disease treated w/dialysis or expected need for dialysis    Committee Review Decision: Approved    Relative Contraindications: None    Absolute Contraindications: None    Committee Chair Deshawn Weldon MD verbally attested to the committee's decision.    Committee Discussion Details: Reviewed patient's medical history and evaluation results to date.  Team wants patient to repeat CT chest due to pleural effusions found on last CT A/P. Per physicians, if CT is stable or shows improvement, patient ok to be listed active status. Patient is on dialysis so she will not be listed at this time.

## 2023-06-28 NOTE — LETTER
06/28/23        Serene Tipton  [address]  [place] [address]        Dear Serene,    It was a pleasure to see you recently for consideration of kidney transplantation. Your pre-transplant evaluation results were reviewed at our Multidisciplinary Selection Committee on 06/28/2023. The committee is requesting the following items are completed as part of your evaluation:    1. The transplant team is requesting a repeat CT scan of your abdomen and pelvis to assess for previous pleural effusions and ensure that is no longer fluid around your lungs. As we discussed on the phone earlier, the imaging can be scheduled at [facility].    For any questions, please contact the Transplant Office at [phone].      Sincerely,  CEDRIC Abdi   Pre-Kidney/Pancreas Transplant Coordinator  Direct line: [phone]    Solid Organ Transplant  [facility], [facility]

## 2023-06-28 NOTE — TELEPHONE ENCOUNTER
Called patient regarding the results of the Selection Committee today. Let her know that the team wants her to have a follow up CT scan due to there being a pleural effusion noted on her last CT A/P.  She expressed good understanding of this.  She would prefer that this be done at [facility] as this is easier for her to get to.  Let her know that I can send the orders there. She also informed me that she has an interested live donor who is not a direct match to her but who would be interested in paired exchange.  Will alert donor team.

## 2023-06-29 ENCOUNTER — TRANSFERRED RECORDS (OUTPATIENT)
Dept: HEALTH INFORMATION MANAGEMENT | Facility: CLINIC | Age: 68
End: 2023-06-29

## 2023-06-29 ENCOUNTER — MEDICAL CORRESPONDENCE (OUTPATIENT)
Dept: HEALTH INFORMATION MANAGEMENT | Facility: CLINIC | Age: 68
End: 2023-06-29
Payer: COMMERCIAL

## 2023-06-30 ENCOUNTER — LAB REQUISITION (OUTPATIENT)
Dept: LAB | Facility: CLINIC | Age: 68
End: 2023-06-30

## 2023-06-30 DIAGNOSIS — R10.13 EPIGASTRIC PAIN: ICD-10-CM

## 2023-06-30 PROCEDURE — 82248 BILIRUBIN DIRECT: CPT | Performed by: PHYSICIAN ASSISTANT

## 2023-06-30 PROCEDURE — 82310 ASSAY OF CALCIUM: CPT | Performed by: PHYSICIAN ASSISTANT

## 2023-07-01 LAB
ALBUMIN SERPL BCG-MCNC: 3.4 G/DL (ref 3.5–5.2)
ALP SERPL-CCNC: 104 U/L (ref 35–104)
ALT SERPL W P-5'-P-CCNC: 9 U/L (ref 0–50)
ANION GAP SERPL CALCULATED.3IONS-SCNC: 16 MMOL/L (ref 7–15)
AST SERPL W P-5'-P-CCNC: 15 U/L (ref 0–45)
BILIRUB DIRECT SERPL-MCNC: 0.22 MG/DL (ref 0–0.3)
BILIRUB SERPL-MCNC: 0.4 MG/DL
BUN SERPL-MCNC: 54.9 MG/DL (ref 8–23)
CALCIUM SERPL-MCNC: 9.3 MG/DL (ref 8.8–10.2)
CHLORIDE SERPL-SCNC: 90 MMOL/L (ref 98–107)
CREAT SERPL-MCNC: 5.86 MG/DL (ref 0.51–0.95)
DEPRECATED HCO3 PLAS-SCNC: 24 MMOL/L (ref 22–29)
GFR SERPL CREATININE-BSD FRML MDRD: 7 ML/MIN/1.73M2
GLUCOSE SERPL-MCNC: 110 MG/DL (ref 70–99)
POTASSIUM SERPL-SCNC: 4.6 MMOL/L (ref 3.4–5.3)
PROT SERPL-MCNC: 5.3 G/DL (ref 6.4–8.3)
SODIUM SERPL-SCNC: 130 MMOL/L (ref 136–145)

## 2023-07-03 ENCOUNTER — TELEPHONE (OUTPATIENT)
Dept: TRANSPLANT | Facility: CLINIC | Age: 68
End: 2023-07-03
Payer: COMMERCIAL

## 2023-07-03 NOTE — LETTER
PHYSICIAN ORDERS      DATE & TIME ISSUED: July 3, 2023 12:47 PM  PATIENT NAME: Serene Tipton   : 1955     [facility] MR# [if applicable]: [id number]     DIAGNOSIS:  Awaiting organ transplant   ICD-10 CODE: Z76.82    As part of this patient's transplant evaluation, we are requesting that the following be completed:   CT chest abdomen and pelvis without contrast  Aorto/Iliac/IVC duplex ultrasound     Any questions please call: CEDRIC Abdi [phone]    Please fax results to: [phone].    .  Gena Sanders in Immunology and Transplantation  Surgical Director, Kidney & Pancreas Transplant Programs  Medical Director, Solid Organ Transplant Unit

## 2023-07-03 NOTE — TELEPHONE ENCOUNTER
Alvarez returning my phone call.  She wants the images to be done through [facility] as this is close to her home.  I faxed orders for both CT A/P without contrast and aorto/iliac/IVC duplex complete.  Verified it was received by [facility].

## 2023-07-05 ENCOUNTER — APPOINTMENT (OUTPATIENT)
Dept: CT IMAGING | Facility: CLINIC | Age: 68
End: 2023-07-05
Attending: EMERGENCY MEDICINE
Payer: COMMERCIAL

## 2023-07-05 ENCOUNTER — APPOINTMENT (OUTPATIENT)
Dept: RADIOLOGY | Facility: CLINIC | Age: 68
End: 2023-07-05
Attending: EMERGENCY MEDICINE
Payer: COMMERCIAL

## 2023-07-05 ENCOUNTER — HOSPITAL ENCOUNTER (OUTPATIENT)
Facility: CLINIC | Age: 68
Setting detail: OBSERVATION
Discharge: HOME | End: 2023-07-06
Attending: EMERGENCY MEDICINE | Admitting: FAMILY MEDICINE
Payer: COMMERCIAL

## 2023-07-05 DIAGNOSIS — K56.609 SBO (SMALL BOWEL OBSTRUCTION) (H): ICD-10-CM

## 2023-07-05 DIAGNOSIS — K59.00 CONSTIPATION, UNSPECIFIED CONSTIPATION TYPE: Primary | ICD-10-CM

## 2023-07-05 DIAGNOSIS — E87.5 HYPERKALEMIA: ICD-10-CM

## 2023-07-05 DIAGNOSIS — N18.6 ESRD (END STAGE RENAL DISEASE) ON DIALYSIS (H): ICD-10-CM

## 2023-07-05 DIAGNOSIS — Z99.2 ESRD (END STAGE RENAL DISEASE) ON DIALYSIS (H): ICD-10-CM

## 2023-07-05 LAB
ALBUMIN SERPL-MCNC: 2.6 G/DL (ref 3.5–5)
ALP SERPL-CCNC: 99 U/L (ref 45–120)
ALT SERPL W P-5'-P-CCNC: 17 U/L (ref 0–45)
ANION GAP SERPL CALCULATED.3IONS-SCNC: 19 MMOL/L (ref 5–18)
ANION GAP SERPL CALCULATED.3IONS-SCNC: 21 MMOL/L (ref 5–18)
AST SERPL W P-5'-P-CCNC: 35 U/L (ref 0–40)
ATRIAL RATE - MUSE: 88 BPM
BASOPHILS # BLD AUTO: 0.1 10E3/UL (ref 0–0.2)
BASOPHILS NFR BLD AUTO: 0 %
BILIRUB SERPL-MCNC: 0.8 MG/DL (ref 0–1)
BUN SERPL-MCNC: 95 MG/DL (ref 8–22)
BUN SERPL-MCNC: 99 MG/DL (ref 8–22)
CALCIUM SERPL-MCNC: 8.7 MG/DL (ref 8.5–10.5)
CALCIUM SERPL-MCNC: 9 MG/DL (ref 8.5–10.5)
CHLORIDE BLD-SCNC: 86 MMOL/L (ref 98–107)
CHLORIDE BLD-SCNC: 86 MMOL/L (ref 98–107)
CO2 SERPL-SCNC: 21 MMOL/L (ref 22–31)
CO2 SERPL-SCNC: 23 MMOL/L (ref 22–31)
CREAT SERPL-MCNC: 8.04 MG/DL (ref 0.6–1.1)
CREAT SERPL-MCNC: 8.07 MG/DL (ref 0.6–1.1)
DIASTOLIC BLOOD PRESSURE - MUSE: NORMAL MMHG
EOSINOPHIL # BLD AUTO: 0 10E3/UL (ref 0–0.7)
EOSINOPHIL NFR BLD AUTO: 0 %
ERYTHROCYTE [DISTWIDTH] IN BLOOD BY AUTOMATED COUNT: 14 % (ref 10–15)
ERYTHROCYTE [DISTWIDTH] IN BLOOD BY AUTOMATED COUNT: 14.1 % (ref 10–15)
ERYTHROCYTE [DISTWIDTH] IN BLOOD BY AUTOMATED COUNT: 14.1 % (ref 10–15)
GFR SERPL CREATININE-BSD FRML MDRD: 5 ML/MIN/1.73M2
GFR SERPL CREATININE-BSD FRML MDRD: 5 ML/MIN/1.73M2
GLUCOSE BLD-MCNC: 57 MG/DL (ref 70–125)
GLUCOSE BLD-MCNC: 74 MG/DL (ref 70–125)
GLUCOSE BLDC GLUCOMTR-MCNC: 120 MG/DL (ref 70–99)
GLUCOSE BLDC GLUCOMTR-MCNC: 60 MG/DL (ref 70–99)
GLUCOSE BLDC GLUCOMTR-MCNC: 96 MG/DL (ref 70–99)
HCT VFR BLD AUTO: 32.8 % (ref 35–47)
HCT VFR BLD AUTO: 33 % (ref 35–47)
HCT VFR BLD AUTO: 33.4 % (ref 35–47)
HGB BLD-MCNC: 11 G/DL (ref 11.7–15.7)
HGB BLD-MCNC: 11 G/DL (ref 11.7–15.7)
HGB BLD-MCNC: 11.1 G/DL (ref 11.7–15.7)
IMM GRANULOCYTES # BLD: 0 10E3/UL
IMM GRANULOCYTES NFR BLD: 0 %
INTERPRETATION ECG - MUSE: NORMAL
LACTATE SERPL-SCNC: 0.7 MMOL/L (ref 0.7–2)
LIPASE SERPL-CCNC: <9 U/L (ref 0–52)
LYMPHOCYTES # BLD AUTO: 1 10E3/UL (ref 0.8–5.3)
LYMPHOCYTES NFR BLD AUTO: 8 %
MAGNESIUM SERPL-MCNC: 1.7 MG/DL (ref 1.8–2.6)
MCH RBC QN AUTO: 33.4 PG (ref 26.5–33)
MCH RBC QN AUTO: 33.6 PG (ref 26.5–33)
MCH RBC QN AUTO: 33.8 PG (ref 26.5–33)
MCHC RBC AUTO-ENTMCNC: 33.2 G/DL (ref 31.5–36.5)
MCHC RBC AUTO-ENTMCNC: 33.3 G/DL (ref 31.5–36.5)
MCHC RBC AUTO-ENTMCNC: 33.5 G/DL (ref 31.5–36.5)
MCV RBC AUTO: 100 FL (ref 78–100)
MCV RBC AUTO: 101 FL (ref 78–100)
MCV RBC AUTO: 101 FL (ref 78–100)
MONOCYTES # BLD AUTO: 1.3 10E3/UL (ref 0–1.3)
MONOCYTES NFR BLD AUTO: 10 %
NEUTROPHILS # BLD AUTO: 10.2 10E3/UL (ref 1.6–8.3)
NEUTROPHILS NFR BLD AUTO: 82 %
NRBC # BLD AUTO: 0 10E3/UL
NRBC BLD AUTO-RTO: 0 /100
P AXIS - MUSE: 70 DEGREES
PHOSPHATE SERPL-MCNC: 6.8 MG/DL (ref 2.5–4.5)
PLATELET # BLD AUTO: 311 10E3/UL (ref 150–450)
PLATELET # BLD AUTO: 328 10E3/UL (ref 150–450)
PLATELET # BLD AUTO: 328 10E3/UL (ref 150–450)
POTASSIUM BLD-SCNC: 6.3 MMOL/L (ref 3.5–5)
POTASSIUM BLD-SCNC: 6.5 MMOL/L (ref 3.5–5)
PR INTERVAL - MUSE: 210 MS
PROT SERPL-MCNC: 5.4 G/DL (ref 6–8)
QRS DURATION - MUSE: 114 MS
QT - MUSE: 428 MS
QTC - MUSE: 517 MS
R AXIS - MUSE: 1 DEGREES
RBC # BLD AUTO: 3.25 10E6/UL (ref 3.8–5.2)
RBC # BLD AUTO: 3.29 10E6/UL (ref 3.8–5.2)
RBC # BLD AUTO: 3.3 10E6/UL (ref 3.8–5.2)
SODIUM SERPL-SCNC: 128 MMOL/L (ref 136–145)
SODIUM SERPL-SCNC: 128 MMOL/L (ref 136–145)
SYSTOLIC BLOOD PRESSURE - MUSE: NORMAL MMHG
T AXIS - MUSE: 82 DEGREES
VENTRICULAR RATE- MUSE: 88 BPM
WBC # BLD AUTO: 11.8 10E3/UL (ref 4–11)
WBC # BLD AUTO: 12.6 10E3/UL (ref 4–11)
WBC # BLD AUTO: 13.6 10E3/UL (ref 4–11)

## 2023-07-05 PROCEDURE — 93005 ELECTROCARDIOGRAM TRACING: CPT | Performed by: EMERGENCY MEDICINE

## 2023-07-05 PROCEDURE — 83735 ASSAY OF MAGNESIUM: CPT | Performed by: STUDENT IN AN ORGANIZED HEALTH CARE EDUCATION/TRAINING PROGRAM

## 2023-07-05 PROCEDURE — 258N000001 HC RX 258: Performed by: STUDENT IN AN ORGANIZED HEALTH CARE EDUCATION/TRAINING PROGRAM

## 2023-07-05 PROCEDURE — 96375 TX/PRO/DX INJ NEW DRUG ADDON: CPT

## 2023-07-05 PROCEDURE — 999N000157 HC STATISTIC RCP TIME EA 10 MIN

## 2023-07-05 PROCEDURE — 250N000013 HC RX MED GY IP 250 OP 250 PS 637

## 2023-07-05 PROCEDURE — 250N000011 HC RX IP 250 OP 636: Mod: JZ | Performed by: STUDENT IN AN ORGANIZED HEALTH CARE EDUCATION/TRAINING PROGRAM

## 2023-07-05 PROCEDURE — 250N000011 HC RX IP 250 OP 636: Performed by: INTERNAL MEDICINE

## 2023-07-05 PROCEDURE — 85027 COMPLETE CBC AUTOMATED: CPT | Mod: 91

## 2023-07-05 PROCEDURE — 36415 COLL VENOUS BLD VENIPUNCTURE: CPT | Performed by: EMERGENCY MEDICINE

## 2023-07-05 PROCEDURE — 258N000003 HC RX IP 258 OP 636: Performed by: INTERNAL MEDICINE

## 2023-07-05 PROCEDURE — 84100 ASSAY OF PHOSPHORUS: CPT | Performed by: STUDENT IN AN ORGANIZED HEALTH CARE EDUCATION/TRAINING PROGRAM

## 2023-07-05 PROCEDURE — 85027 COMPLETE CBC AUTOMATED: CPT | Mod: 91 | Performed by: STUDENT IN AN ORGANIZED HEALTH CARE EDUCATION/TRAINING PROGRAM

## 2023-07-05 PROCEDURE — 83690 ASSAY OF LIPASE: CPT | Performed by: EMERGENCY MEDICINE

## 2023-07-05 PROCEDURE — 99222 1ST HOSP IP/OBS MODERATE 55: CPT | Mod: GC | Performed by: STUDENT IN AN ORGANIZED HEALTH CARE EDUCATION/TRAINING PROGRAM

## 2023-07-05 PROCEDURE — 83605 ASSAY OF LACTIC ACID: CPT | Performed by: EMERGENCY MEDICINE

## 2023-07-05 PROCEDURE — 36415 COLL VENOUS BLD VENIPUNCTURE: CPT | Performed by: STUDENT IN AN ORGANIZED HEALTH CARE EDUCATION/TRAINING PROGRAM

## 2023-07-05 PROCEDURE — 96375 TX/PRO/DX INJ NEW DRUG ADDON: CPT | Mod: XU

## 2023-07-05 PROCEDURE — 250N000011 HC RX IP 250 OP 636: Mod: JZ | Performed by: EMERGENCY MEDICINE

## 2023-07-05 PROCEDURE — 96376 TX/PRO/DX INJ SAME DRUG ADON: CPT

## 2023-07-05 PROCEDURE — 80053 COMPREHEN METABOLIC PANEL: CPT | Performed by: EMERGENCY MEDICINE

## 2023-07-05 PROCEDURE — 99285 EMERGENCY DEPT VISIT HI MDM: CPT | Mod: 25

## 2023-07-05 PROCEDURE — P9047 ALBUMIN (HUMAN), 25%, 50ML: HCPCS | Performed by: INTERNAL MEDICINE

## 2023-07-05 PROCEDURE — 36415 COLL VENOUS BLD VENIPUNCTURE: CPT

## 2023-07-05 PROCEDURE — 71046 X-RAY EXAM CHEST 2 VIEWS: CPT

## 2023-07-05 PROCEDURE — 250N000009 HC RX 250: Performed by: STUDENT IN AN ORGANIZED HEALTH CARE EDUCATION/TRAINING PROGRAM

## 2023-07-05 PROCEDURE — 96376 TX/PRO/DX INJ SAME DRUG ADON: CPT | Mod: XU

## 2023-07-05 PROCEDURE — 85014 HEMATOCRIT: CPT | Performed by: EMERGENCY MEDICINE

## 2023-07-05 PROCEDURE — 96374 THER/PROPH/DIAG INJ IV PUSH: CPT

## 2023-07-05 PROCEDURE — 90935 HEMODIALYSIS ONE EVALUATION: CPT

## 2023-07-05 PROCEDURE — 74176 CT ABD & PELVIS W/O CONTRAST: CPT

## 2023-07-05 PROCEDURE — 99222 1ST HOSP IP/OBS MODERATE 55: CPT | Performed by: PHYSICIAN ASSISTANT

## 2023-07-05 PROCEDURE — 250N000013 HC RX MED GY IP 250 OP 250 PS 637: Performed by: STUDENT IN AN ORGANIZED HEALTH CARE EDUCATION/TRAINING PROGRAM

## 2023-07-05 PROCEDURE — 120N000004 HC R&B MS OVERFLOW

## 2023-07-05 PROCEDURE — 96372 THER/PROPH/DIAG INJ SC/IM: CPT | Mod: XU | Performed by: STUDENT IN AN ORGANIZED HEALTH CARE EDUCATION/TRAINING PROGRAM

## 2023-07-05 PROCEDURE — 250N000011 HC RX IP 250 OP 636: Mod: JZ

## 2023-07-05 PROCEDURE — 94640 AIRWAY INHALATION TREATMENT: CPT

## 2023-07-05 RX ORDER — ALBUMIN (HUMAN) 12.5 G/50ML
50 SOLUTION INTRAVENOUS
Status: DISCONTINUED | OUTPATIENT
Start: 2023-07-05 | End: 2023-07-05

## 2023-07-05 RX ORDER — NALOXONE HYDROCHLORIDE 0.4 MG/ML
0.2 INJECTION, SOLUTION INTRAMUSCULAR; INTRAVENOUS; SUBCUTANEOUS
Status: DISCONTINUED | OUTPATIENT
Start: 2023-07-05 | End: 2023-07-06 | Stop reason: HOSPADM

## 2023-07-05 RX ORDER — CLONIDINE HYDROCHLORIDE 0.1 MG/1
0.2 TABLET ORAL AT BEDTIME
Status: DISCONTINUED | OUTPATIENT
Start: 2023-07-05 | End: 2023-07-06 | Stop reason: HOSPADM

## 2023-07-05 RX ORDER — AMOXICILLIN 250 MG
2 CAPSULE ORAL 2 TIMES DAILY
Status: ON HOLD | COMMUNITY
End: 2023-07-26

## 2023-07-05 RX ORDER — ACETAMINOPHEN 325 MG/1
650 TABLET ORAL EVERY 4 HOURS PRN
Status: DISCONTINUED | OUTPATIENT
Start: 2023-07-05 | End: 2023-07-06 | Stop reason: HOSPADM

## 2023-07-05 RX ORDER — AMLODIPINE BESYLATE 5 MG/1
5 TABLET ORAL AT BEDTIME
Status: DISCONTINUED | OUTPATIENT
Start: 2023-07-05 | End: 2023-07-06 | Stop reason: HOSPADM

## 2023-07-05 RX ORDER — LIDOCAINE 40 MG/G
CREAM TOPICAL
Status: DISCONTINUED | OUTPATIENT
Start: 2023-07-05 | End: 2023-07-06 | Stop reason: HOSPADM

## 2023-07-05 RX ORDER — OXYCODONE HYDROCHLORIDE 5 MG/1
5 TABLET ORAL EVERY 4 HOURS PRN
Status: DISCONTINUED | OUTPATIENT
Start: 2023-07-05 | End: 2023-07-06 | Stop reason: HOSPADM

## 2023-07-05 RX ORDER — HYDROMORPHONE HCL IN WATER/PF 6 MG/30 ML
0.2 PATIENT CONTROLLED ANALGESIA SYRINGE INTRAVENOUS
Status: DISCONTINUED | OUTPATIENT
Start: 2023-07-05 | End: 2023-07-06 | Stop reason: HOSPADM

## 2023-07-05 RX ORDER — POLYETHYLENE GLYCOL 3350 17 G/17G
17 POWDER, FOR SOLUTION ORAL DAILY
Status: DISCONTINUED | OUTPATIENT
Start: 2023-07-05 | End: 2023-07-05

## 2023-07-05 RX ORDER — LABETALOL HYDROCHLORIDE 300 MG/1
450 TABLET, FILM COATED ORAL AT BEDTIME
Status: DISCONTINUED | OUTPATIENT
Start: 2023-07-05 | End: 2023-07-05

## 2023-07-05 RX ORDER — ONDANSETRON 2 MG/ML
4 INJECTION INTRAMUSCULAR; INTRAVENOUS EVERY 30 MIN PRN
Status: DISCONTINUED | OUTPATIENT
Start: 2023-07-05 | End: 2023-07-06 | Stop reason: HOSPADM

## 2023-07-05 RX ORDER — LABETALOL 100 MG/1
600 TABLET, FILM COATED ORAL EVERY MORNING
Status: DISCONTINUED | OUTPATIENT
Start: 2023-07-05 | End: 2023-07-06 | Stop reason: HOSPADM

## 2023-07-05 RX ORDER — LABETALOL 300 MG/1
450 TABLET, FILM COATED ORAL EVERY MORNING
Status: ON HOLD | COMMUNITY
End: 2023-07-19

## 2023-07-05 RX ORDER — LABETALOL 100 MG/1
400 TABLET, FILM COATED ORAL AT BEDTIME
Status: DISCONTINUED | OUTPATIENT
Start: 2023-07-05 | End: 2023-07-06 | Stop reason: HOSPADM

## 2023-07-05 RX ORDER — ONDANSETRON 2 MG/ML
4 INJECTION INTRAMUSCULAR; INTRAVENOUS EVERY 6 HOURS PRN
Status: DISCONTINUED | OUTPATIENT
Start: 2023-07-05 | End: 2023-07-06 | Stop reason: HOSPADM

## 2023-07-05 RX ORDER — NALOXONE HYDROCHLORIDE 0.4 MG/ML
0.4 INJECTION, SOLUTION INTRAMUSCULAR; INTRAVENOUS; SUBCUTANEOUS
Status: DISCONTINUED | OUTPATIENT
Start: 2023-07-05 | End: 2023-07-06 | Stop reason: HOSPADM

## 2023-07-05 RX ORDER — HEPARIN SODIUM 5000 [USP'U]/.5ML
5000 INJECTION, SOLUTION INTRAVENOUS; SUBCUTANEOUS EVERY 12 HOURS
Status: DISCONTINUED | OUTPATIENT
Start: 2023-07-05 | End: 2023-07-06 | Stop reason: HOSPADM

## 2023-07-05 RX ORDER — NORTRIPTYLINE HCL 25 MG
75 CAPSULE ORAL AT BEDTIME
Status: DISCONTINUED | OUTPATIENT
Start: 2023-07-05 | End: 2023-07-06 | Stop reason: HOSPADM

## 2023-07-05 RX ORDER — LORAZEPAM 1 MG/1
1 TABLET ORAL
Status: DISCONTINUED | OUTPATIENT
Start: 2023-07-06 | End: 2023-07-06 | Stop reason: HOSPADM

## 2023-07-05 RX ORDER — DEXTROSE MONOHYDRATE 25 G/50ML
25-50 INJECTION, SOLUTION INTRAVENOUS
Status: DISCONTINUED | OUTPATIENT
Start: 2023-07-05 | End: 2023-07-06 | Stop reason: HOSPADM

## 2023-07-05 RX ORDER — AMOXICILLIN 250 MG
2 CAPSULE ORAL 2 TIMES DAILY
Status: DISCONTINUED | OUTPATIENT
Start: 2023-07-05 | End: 2023-07-06 | Stop reason: HOSPADM

## 2023-07-05 RX ORDER — HEPARIN SODIUM 1000 [USP'U]/ML
500 INJECTION, SOLUTION INTRAVENOUS; SUBCUTANEOUS CONTINUOUS
Status: DISCONTINUED | OUTPATIENT
Start: 2023-07-05 | End: 2023-07-06 | Stop reason: HOSPADM

## 2023-07-05 RX ORDER — HYDROMORPHONE HYDROCHLORIDE 1 MG/ML
0.5 INJECTION, SOLUTION INTRAMUSCULAR; INTRAVENOUS; SUBCUTANEOUS EVERY 30 MIN PRN
Status: COMPLETED | OUTPATIENT
Start: 2023-07-05 | End: 2023-07-05

## 2023-07-05 RX ORDER — CALCITRIOL 0.25 UG/1
0.5 CAPSULE, LIQUID FILLED ORAL
Status: DISCONTINUED | OUTPATIENT
Start: 2023-07-06 | End: 2023-07-06 | Stop reason: HOSPADM

## 2023-07-05 RX ORDER — LEVOTHYROXINE SODIUM 50 UG/1
50 TABLET ORAL AT BEDTIME
Status: DISCONTINUED | OUTPATIENT
Start: 2023-07-05 | End: 2023-07-06 | Stop reason: HOSPADM

## 2023-07-05 RX ORDER — ONDANSETRON 4 MG/1
4 TABLET, ORALLY DISINTEGRATING ORAL EVERY 6 HOURS PRN
Status: DISCONTINUED | OUTPATIENT
Start: 2023-07-05 | End: 2023-07-06 | Stop reason: HOSPADM

## 2023-07-05 RX ORDER — NICOTINE POLACRILEX 4 MG
15-30 LOZENGE BUCCAL
Status: DISCONTINUED | OUTPATIENT
Start: 2023-07-05 | End: 2023-07-06 | Stop reason: HOSPADM

## 2023-07-05 RX ORDER — VITAMIN B COMPLEX
25 TABLET ORAL DAILY
Status: DISCONTINUED | OUTPATIENT
Start: 2023-07-06 | End: 2023-07-06 | Stop reason: HOSPADM

## 2023-07-05 RX ORDER — SEVELAMER CARBONATE 800 MG/1
800-2400 TABLET, FILM COATED ORAL 3 TIMES DAILY
Status: DISCONTINUED | OUTPATIENT
Start: 2023-07-05 | End: 2023-07-06 | Stop reason: HOSPADM

## 2023-07-05 RX ORDER — ALBUTEROL SULFATE 5 MG/ML
20 SOLUTION RESPIRATORY (INHALATION) ONCE
Status: COMPLETED | OUTPATIENT
Start: 2023-07-05 | End: 2023-07-05

## 2023-07-05 RX ORDER — POLYETHYLENE GLYCOL 3350 17 G/17G
17 POWDER, FOR SOLUTION ORAL 2 TIMES DAILY
Status: DISCONTINUED | OUTPATIENT
Start: 2023-07-05 | End: 2023-07-06 | Stop reason: HOSPADM

## 2023-07-05 RX ORDER — ROSUVASTATIN CALCIUM 10 MG/1
10 TABLET, COATED ORAL AT BEDTIME
Status: DISCONTINUED | OUTPATIENT
Start: 2023-07-05 | End: 2023-07-06 | Stop reason: HOSPADM

## 2023-07-05 RX ADMIN — OXYCODONE HYDROCHLORIDE 5 MG: 5 TABLET ORAL at 15:09

## 2023-07-05 RX ADMIN — ALBUTEROL SULFATE 20 MG: 2.5 SOLUTION RESPIRATORY (INHALATION) at 04:59

## 2023-07-05 RX ADMIN — LEVOTHYROXINE SODIUM 50 MCG: 0.05 TABLET ORAL at 21:02

## 2023-07-05 RX ADMIN — HEPARIN SODIUM 5000 UNITS: 5000 INJECTION, SOLUTION INTRAVENOUS; SUBCUTANEOUS at 05:22

## 2023-07-05 RX ADMIN — LABETALOL HYDROCHLORIDE 600 MG: 100 TABLET, FILM COATED ORAL at 16:00

## 2023-07-05 RX ADMIN — ONDANSETRON 4 MG: 2 INJECTION INTRAMUSCULAR; INTRAVENOUS at 01:48

## 2023-07-05 RX ADMIN — HYDROMORPHONE HYDROCHLORIDE 0.5 MG: 1 INJECTION, SOLUTION INTRAMUSCULAR; INTRAVENOUS; SUBCUTANEOUS at 01:48

## 2023-07-05 RX ADMIN — ROSUVASTATIN CALCIUM 10 MG: 10 TABLET, FILM COATED ORAL at 21:01

## 2023-07-05 RX ADMIN — ONDANSETRON 4 MG: 2 INJECTION INTRAMUSCULAR; INTRAVENOUS at 00:56

## 2023-07-05 RX ADMIN — SEVELAMER CARBONATE 800 MG: 800 TABLET, FILM COATED ORAL at 21:02

## 2023-07-05 RX ADMIN — SERTRALINE HYDROCHLORIDE 150 MG: 50 TABLET, FILM COATED ORAL at 20:59

## 2023-07-05 RX ADMIN — SODIUM CHLORIDE 300 ML: 9 INJECTION, SOLUTION INTRAVENOUS at 11:37

## 2023-07-05 RX ADMIN — SENNOSIDES AND DOCUSATE SODIUM 2 TABLET: 50; 8.6 TABLET ORAL at 20:58

## 2023-07-05 RX ADMIN — NORTRIPTYLINE HYDROCHLORIDE 75 MG: 25 CAPSULE ORAL at 21:19

## 2023-07-05 RX ADMIN — HEPARIN SODIUM 5000 UNITS: 5000 INJECTION, SOLUTION INTRAVENOUS; SUBCUTANEOUS at 16:01

## 2023-07-05 RX ADMIN — ALBUMIN HUMAN 50 ML: 0.25 SOLUTION INTRAVENOUS at 12:04

## 2023-07-05 RX ADMIN — AMLODIPINE BESYLATE 5 MG: 5 TABLET ORAL at 21:02

## 2023-07-05 RX ADMIN — POLYETHYLENE GLYCOL 3350 17 G: 17 POWDER, FOR SOLUTION ORAL at 08:02

## 2023-07-05 RX ADMIN — HYDROMORPHONE HYDROCHLORIDE 0.5 MG: 1 INJECTION, SOLUTION INTRAMUSCULAR; INTRAVENOUS; SUBCUTANEOUS at 05:41

## 2023-07-05 RX ADMIN — HYDROMORPHONE HYDROCHLORIDE 0.5 MG: 1 INJECTION, SOLUTION INTRAMUSCULAR; INTRAVENOUS; SUBCUTANEOUS at 00:56

## 2023-07-05 RX ADMIN — POLYETHYLENE GLYCOL 3350 17 G: 17 POWDER, FOR SOLUTION ORAL at 20:58

## 2023-07-05 RX ADMIN — OXYCODONE HYDROCHLORIDE 5 MG: 5 TABLET ORAL at 21:03

## 2023-07-05 RX ADMIN — Medication: at 11:38

## 2023-07-05 RX ADMIN — SODIUM CHLORIDE 250 ML: 9 INJECTION, SOLUTION INTRAVENOUS at 11:37

## 2023-07-05 RX ADMIN — HYDROMORPHONE HYDROCHLORIDE 0.2 MG: 0.2 INJECTION, SOLUTION INTRAMUSCULAR; INTRAVENOUS; SUBCUTANEOUS at 13:05

## 2023-07-05 RX ADMIN — DEXTROSE MONOHYDRATE 25 ML: 25 INJECTION, SOLUTION INTRAVENOUS at 06:56

## 2023-07-05 RX ADMIN — CLONIDINE HYDROCHLORIDE 0.2 MG: 0.1 TABLET ORAL at 20:59

## 2023-07-05 ASSESSMENT — ENCOUNTER SYMPTOMS
FEVER: 0
ABDOMINAL PAIN: 1
VOMITING: 1
COUGH: 1
NAUSEA: 1

## 2023-07-05 ASSESSMENT — ACTIVITIES OF DAILY LIVING (ADL)
ADLS_ACUITY_SCORE: 20
DRESSING/BATHING_DIFFICULTY: NO
DIFFICULTY_EATING/SWALLOWING: NO
FALL_HISTORY_WITHIN_LAST_SIX_MONTHS: NO
TOILETING_ISSUES: NO
CHANGE_IN_FUNCTIONAL_STATUS_SINCE_ONSET_OF_CURRENT_ILLNESS/INJURY: NO
ADLS_ACUITY_SCORE: 20
ADLS_ACUITY_SCORE: 33
ADLS_ACUITY_SCORE: 37
ADLS_ACUITY_SCORE: 35
ADLS_ACUITY_SCORE: 20
CONCENTRATING,_REMEMBERING_OR_MAKING_DECISIONS_DIFFICULTY: NO
ADLS_ACUITY_SCORE: 20
WALKING_OR_CLIMBING_STAIRS_DIFFICULTY: NO
ADLS_ACUITY_SCORE: 20
ADLS_ACUITY_SCORE: 20
WEAR_GLASSES_OR_BLIND: NO
ADLS_ACUITY_SCORE: 37
DOING_ERRANDS_INDEPENDENTLY_DIFFICULTY: NO
ADLS_ACUITY_SCORE: 33
DEPENDENT_IADLS:: INDEPENDENT
ADLS_ACUITY_SCORE: 20

## 2023-07-05 NOTE — H&P
[facility]    History and Physical - Hospitalist Service       Date of Admission:  7/5/2023    Assessment & Plan      Serene Tipton is a 67 year old female who has a history of PKD s/p bilateral nephrectomy, ESRD on HD TTS schedule, active transplant evaluation, Anemia,  HTN, HLD, Hypothyroidism, active tobacco use, chronic pain who presented due to abdominal pain, N/V found to have partial distal SBO, hyperkalemia and worsening kidney function.     Pending medication reconciliation to initiate meds     Partial small bowel obstruction  Patient coming in after 2 weeks of intermittent abdominal pain, nausea and vomiting.  She had prior abdominal surgery secondary to PKD.  Vitals on arrival to the ED stable.  Lactic acid, lipase unremarkable.  CT abdomen pelvis showed multiple loops of mildly dilated/fluid-filled loops of small bowel suggestive of partial small bowel bowel obstruction, adynamic ileus also possible and moderate volume of stool throughout the colon.    Keep NPO for now    Bowel regimen with MiraLAX . Can consider enema if not having adequate bowel movements with MiraLAX.    Consider surgery consult if not improving.    Avoid IV fluids due to concern for fluid overload due to missing HD session    Pain: Tylenol, minimize narcotics    ESRD on HD  Hyperkalemia  Hyponatremia   Patient missed HD session yesterday due to being weak secondary to N/V and abdominal pain.  Creatinine on admission 8.04 (baseline 3-5), potassium 6.3. and Na 128 No EKG changes.     Nephrology consulted, plan for HD this morning      Cardiac tele     Avoided insulin due to low blood sugars since not eating much    Avoided Kayexalate and Lokelma due to partial SBO and constipation     Albuterol 20mg Neb x1    Follow up BMP, mag and phos     If K continues to increase will obtain repeat EKG, give Ca gluconate and insulin with dextrose     Chronic conditions  Hypertension: PTA amlodipine, labetalol,  clonidine, hold PTA lisinopril given worsening kidney function  Hypothyroidism: PTA Synthroid  Anxiety/depression: PTA sertraline, PRN ativan   Chronic pain: PTA nortriptyline, hold PTA Oxycodone  Hyperlipidemia: PTA rosuvastatin  Anemia: PTA EPO and Venofer with HD  Tobacco use: Patient currently declines nicotine replacement       Diet: NPO for Medical/Clinical Reasons Except for: Meds  DVT Prophylaxis: Heparin SQ  Pool Catheter: Not present  Fluids: No IVF  Lines: None     Cardiac Monitoring: ACTIVE order. Indication: Electrolyte Imbalance (24 hours)- Magnesium <1.3 mg/ml; Potassium < =2.8 or > 5.5 mg/ml  Code Status: Full Code    Clinically Significant Risk Factors Present on Admission        # Hyperkalemia: Highest K = 6.3 mmol/L in last 2 days, will monitor as appropriate  # Hyponatremia: Lowest Na = 128 mmol/L in last 2 days, will monitor as appropriate   # Hypercalcemia: corrected calcium is >10.1, will monitor as appropriate   # Anion Gap Metabolic Acidosis: Highest Anion Gap = 19 mmol/L in last 2 days, will monitor and treat as appropriate  # Hypoalbuminemia: Lowest albumin = 2.6 g/dL at 7/5/2023 12:58 AM, will monitor as appropriate     # Hypertension: Noted on problem list      # Cachexia: Estimated body mass index is 16.8 kg/m  as calculated from the following:    Height as of this encounter: 1.524 m (5').    Weight as of this encounter: 39 kg (86 lb).            Disposition Plan      Expected Discharge Date: 07/07/2023                The patient's care was discussed with the Attending Physician, Dr. Garcia. Patient to be seen by Dr. Earl .      Rosmery Aj MD  Hospitalist Service  [facility]  Securely message with [employer] (more info)  Text page via [facility] Paging/Directory   ______________________________________________________________________    Chief Complaint   Abdominal pain, N/V and fatigue     History is obtained from the patient    History of Present Illness    Serene Tipton is a 67 year old female who has a history of PKD s/p bilateral nephrectomy, ESRD on HD TTS schedule, active transplant evaluation, Anemia HTN, HLD, Hypothyroidism, active tobacco use, chronic pain who presented due to abdominal pain, N/V found to have partial distal SBO, hyperkalemia and worsening kidney function.     Patient reports that she has been having intermittent abdominal pain for the past 2 weeks associated with nausea and vomiting.  She states that since her nephrectomy surgery in February 2023 she has been having some GI issues.  However the past 2 weeks she has been noticing increased abdominal pain.  She has been having small and hard bowel movements for which she is started to take some stool softeners.  Patient denies any sick contacts, fevers or chills, difficulty breathing, chest pain or palpitations, urinary symptoms.  She felt weak secondary to the nausea and vomiting.  Patient states that this morning as she was getting up she fell and landed on her left side.  Denied loss of consciousness or hitting her head.  Does have chronic bilateral shoulder pain that is unchanged after the fall.  She missed her dialysis yesterday.    She had a recent admission in February at [facility] secondary to volume overload and pulmonary edema requiring dialysis.    In the ED. Vitals were stable. She later developed hypoxia to 89% and placed on 2L NC. Labs were notable for Na 128, K6.3, BUN 95, Cr 8.4, WBC 12.6, Hgb 11.1. Otherwise lactic acid and lipase were within normal. CXR was unremarkable. CTAP showed multiple loops of mildly dilated air/fluid-filled loops of small bowel most suggestive of partial distal SBO. Adynamic ileus also possible. Moderate volume of stool throughout colon but colon otherwise unremarkable. EKG did not show T wave changes. She received diluadid 0.5mg x2, zofran 4mg x2. Dr. Marquez spoke with nephrology with plans to do HD today.         Past Medical History    Past  Medical History:   Diagnosis Date     Anemia in chronic kidney disease      Anxiety      Arthritis      Brain aneurysm     Cavernous segment of the right & left carotid arteries. See Neurosurg note 6/16/21.     Chronic low back pain     Managed by Pain Clinic     Depressive disorder 2013     Disease of thyroid gland      ESRD (end stage renal disease) on dialysis (H) 07/2020     GERD (gastroesophageal reflux disease)      Hepatic lesion      Hyperlipidemia      Hypertension      Nephrolithiasis      Neuromuscular disorder (H)      Osteoporosis      PKD (polycystic kidney disease) 09/01/1990     PTSD (post-traumatic stress disorder)      Tobacco abuse      Vitamin D deficiency        Past Surgical History   Past Surgical History:   Procedure Laterality Date     BACK SURGERY      spinal fusion w/hardware     BIOPSY Left 09/01/1990    Breast     BREAST LUMPECTOMY, RT/LT Left     Lumpectomy     CYST REMOVAL Right     rt wrist     CYSTECTOMY PILONIDAL  1981     EYE SURGERY  2008    lasik     FORMATION ARTERIOVENOUS FISTULA    07/28/2020     FRACTURE SURGERY       NEPHRECTOMY BILATERAL Bilateral 2/1/2023    Procedure: bilateral native nephrectomy;  Surgeon: Alana Giang MD;  Location: [facility] OR     ORTHOPEDIC SURGERY Right 2005    Shoulder     OTHER SURGICAL HISTORY      carpal tunnel repair     SPINE SURGERY         Prior to Admission Medications   Prior to Admission Medications   Prescriptions Last Dose Informant Patient Reported? Taking?   B Complex-C (SUPER B COMPLEX PO)   Yes No   Sig: Take 1 tablet by mouth daily   Epoetin Adam (EPOGEN IJ)   Yes No   Sig: Inject 1,000 Units into the vein three times a week At dialysis.   Iron Sucrose (VENOFER IV)   Yes No   Sig: Inject 100 mg into the vein once a week At dialysis   LORazepam (ATIVAN) 1 MG tablet   Yes No   Sig: Take 1 mg by mouth daily as needed   amLODIPine (NORVASC) 5 MG tablet   Yes No   Sig: Take 5 mg by mouth At Bedtime   calcitRIOL (ROCALTROL) 0.5 MCG capsule    Yes No   Sig: Take 0.5 mcg by mouth three times a week Given at HD.   cholecalciferol (VITAMIN D3) 25 mcg (1000 units) capsule   Yes No   Sig: Take 1 capsule by mouth daily   cloNIDine (CATAPRES) 0.1 MG tablet   Yes No   Sig: Take 0.2 mg by mouth At Bedtime   labetalol (NORMODYNE) 300 MG tablet   Yes No   Sig: Take 450 mg (1.5 tablet) in the mid morning and take 600 mg (2 tablets) at bedtime.   levothyroxine (SYNTHROID/LEVOTHROID) 50 MCG tablet   Yes No   Sig: Take 50 mcg by mouth At Bedtime Takes in the middle of the night.   lisinopril (ZESTRIL) 40 MG tablet   Yes No   Sig: Take 20 mg by mouth 2 times daily   nortriptyline (PAMELOR) 75 MG capsule   Yes No   Sig: Take 75 mg by mouth daily   oxyCODONE (ROXICODONE) 10 MG tablet   No No   Sig: Use oxycodone 10mg every 3 hours as needed for pain on 2/6 & 2/7. Then return to usual home dose.   rosuvastatin (CRESTOR) 10 MG tablet   Yes No   Sig: Take 10 mg by mouth At Bedtime   senna-docusate (SENOKOT-S/PERICOLACE) 8.6-50 MG tablet   No No   Sig: Take 1-2 tablets by mouth daily   sertraline (ZOLOFT) 100 MG tablet   Yes No   Sig: Take 200 mg by mouth At Bedtime   sevelamer carbonate (RENVELA) 800 MG tablet   Yes No   Sig: Take 800 mg by mouth 3 times daily as needed (with meals when eating) When eating      Facility-Administered Medications: None        Review of Systems    The 10 point Review of Systems is negative other than noted in the HPI or here.      Physical Exam   Vital Signs: Temp: 98.1  F (36.7  C) Temp src: Oral BP: 137/66 Pulse: 88   Resp: 20 SpO2: 95 % O2 Device: None (Room air) Oxygen Delivery: 2 LPM  Weight: 86 lbs 0 oz    Constitutional: awake, alert, cooperative, no apparent distress, and appears stated age  Respiratory:  On 2L NC, no increased work of breathing, good air exchange, clear to auscultation bilaterally, no crackles or wheezing  Cardiovascular: Systolic murmur, impulse, regular rate and rhythm, normal S1 and S2, no S3 or S4  GI: Old  vertical incision scar, normal bowel sounds, mildly firm, mildly-distended, tender to light palpation  Ext trace bilateral lower extremity edema.  LUE fistula with palpable thrill and pulsating   Neurologic: Awake, alert, oriented to name, place and time.    Skin: Scraping over the left shoulder.    Medical Decision Making       Please see A&P for additional details of medical decision making.      Data     I have personally reviewed the following data over the past 24 hrs:    12.6 (H)  \   11.1 (L)   / 328     128 (L) 86 (L) 95 (H) /  74   6.3 (HH) 23 8.04 (HH) \       ALT: 17 AST: 35 AP: 99 TBILI: 0.8   ALB: 2.6 (L) TOT PROTEIN: 5.4 (L) LIPASE: <9       Procal: N/A CRP: N/A Lactic Acid: 0.7         Imaging results reviewed over the past 24 hrs:   Recent Results (from the past 24 hour(s))   CT Abdomen Pelvis w/o Contrast    Narrative    EXAM: CT ABDOMEN PELVIS W/O CONTRAST  LOCATION: [facility]  DATE: 7/5/2023    INDICATION: abd pain.  COMPARISON: 11/11/2022. Chest CT 02/26/2023  TECHNIQUE: CT scan of the abdomen and pelvis was performed without IV contrast. Multiplanar reformats were obtained. Dose reduction techniques were used.  CONTRAST: None.    FINDINGS:   LOWER CHEST: Resolution of pleural effusions that were present on most recent exam. Severe atelectasis right middle lobe partially visualized on this exam.    HEPATOBILIARY: Multiple hepatic cysts again noted. Distended gallbladder now but no stones or inflammatory changes.    PANCREAS: Mild duct dilatation unchanged.    SPLEEN: Normal size. Benign calcification.    ADRENAL GLANDS: No adrenal lesions evident.    KIDNEYS/BLADDER: Interval bilateral nephrectomies.    BOWEL: Multiple loops of mildly dilated air and fluid-filled small bowel are present new compared to previous study and most suggestive of partial distal small bowel obstruction. Transition point not clearly identified. Moderate amount of stool seen   throughout colon.  Trace volume ascites.    LYMPH NODES: Normal.    VASCULATURE: Moderate diffuse atherosclerotic disease.    PELVIC ORGANS: Minimal ascites. No pelvic mass.    MUSCULOSKELETAL: Anterior fusion at lumbosacral junction unchanged. L5 pars defects.      Impression    IMPRESSION:   1.  Multiple loops of mildly dilated air/fluid-filled loops of small bowel most suggestive of partial distal SBO. Adynamic ileus also possible.  2.  Moderate volume of stool throughout colon but colon otherwise unremarkable.  3.  Interval bilateral nephrectomies.  4.  Tiny volume ascites.  5.  Significant atelectasis right middle lobe.     XR Chest 2 Views    Narrative    EXAM: XR CHEST 2 VIEWS  LOCATION: [facility]  DATE: 7/5/2023    INDICATION: Cough.  COMPARISON: Chest x-ray 2 views 02/25/2023 at 1030 hours.      Impression    IMPRESSION: Platelike atelectasis right base, more apparent today. Resolved left basilar opacities seen previously. Resolved small bilateral pleural effusions seen on prior study. Mild cardiac enlargement. Venous hypertension has resolved.   Atherosclerotic thoracic aorta. Degenerative changes both shoulders and the spine. Cholecystectomy clips. Previously seen monitoring leads have been removed.

## 2023-07-05 NOTE — PROGRESS NOTES
Consult request received.   I reviewed patient's chart including VS, labs and imaging.  This is a 67 year old female with ESRD on HD TTS scheduled presented with abdominal pain found to have SBO. She missed HD on 07/04. Moderate hyperkalemia of 6.3 on labs.  We will plan HD this am.     Discussed with .    Kathy Hernandez MD  [facility]  [address] [place] [address], [address]  [place] [address]  Phone# [phone]  Fax# [phone]

## 2023-07-05 NOTE — PHARMACY-ADMISSION MEDICATION HISTORY
Pharmacist Admission Medication History    Admission medication history is complete. The information provided in this note is only as accurate as the sources available at the time of the update.    Medication reconciliation/reorder completed by provider prior to medication history? No    Information Source(s): Patient and CareEverywhere/SureScripts via in-person    Pertinent Information: dialysis Tuesdays, Thursdays, Saturdays    Changes made to PTA medication list:    Added: None    Deleted: None    Changed: sertraline dosing, renvela dosing, senna dosing, labetalol dosing, ativan to scheduled    Medication Affordability:  Not including over the counter (OTC) medications, was there a time in the past 3 months when you did not take your medications as prescribed because of cost?: No    Allergies reviewed with patient and updates made in EHR: yes    Medication History Completed By: Connie Lombardo [facility] 7/5/2023 8:22 AM    Prior to Admission medications    Medication Sig Last Dose Taking? Auth Provider Long Term End Date   amLODIPine (NORVASC) 5 MG tablet Take 5 mg by mouth At Bedtime 7/3/2023 at pm Yes Reported, Patient Yes    B Complex-C (SUPER B COMPLEX PO) Take 1 tablet by mouth daily Past Week at am Yes Unknown, Entered By History     calcitRIOL (ROCALTROL) 0.5 MCG capsule Take 0.5 mcg by mouth three times a week Given at HD. Tues, Thurs, Sat Past Week at am Yes Unknown, Entered By History Yes    cholecalciferol (VITAMIN D3) 25 mcg (1000 units) capsule Take 1 capsule by mouth daily Past Week at am Yes Reported, Patient     cloNIDine (CATAPRES) 0.1 MG tablet Take 0.2 mg by mouth At Bedtime 7/3/2023 at hs Yes Reported, Patient Yes    Epoetin Adam (EPOGEN IJ) Inject 1,000 Units into the vein three times a week At dialysis. Past Week Yes Unknown, Entered By History     Iron Sucrose (VENOFER IV) Inject 100 mg into the vein once a week At dialysis Unknown Yes Unknown, Entered By History     labetalol (NORMODYNE) 300 MG  tablet Take 450 mg by mouth At Bedtime 7/4/2023 at hs Yes Unknown, Entered By History Yes    labetalol (NORMODYNE) 300 MG tablet Take 600 mg by mouth every morning 7/4/2023 at am Yes Unknown, Entered By History Yes    levothyroxine (SYNTHROID/LEVOTHROID) 50 MCG tablet Take 50 mcg by mouth At Bedtime Takes in the middle of the night. 7/3/2023 at hs Yes Reported, Patient Yes    lisinopril (ZESTRIL) 40 MG tablet Take 20 mg by mouth 2 times daily 7/4/2023 at hs Yes Reported, Patient Yes    LORazepam (ATIVAN) 1 MG tablet Take 1 mg by mouth daily (before lunch) 7/4/2023 at am Yes Reported, Patient     nortriptyline (PAMELOR) 75 MG capsule Take 75 mg by mouth At Bedtime 7/4/2023 at hs Yes Reported, Patient Yes    oxyCODONE (ROXICODONE) 10 MG tablet Use oxycodone 10mg every 3 hours as needed for pain on 2/6 & 2/7. Then return to usual home dose. 7/3/2023 at prn Yes Serafin, aSrah Haro, APRN CNP No    rosuvastatin (CRESTOR) 10 MG tablet Take 10 mg by mouth At Bedtime 7/3/2023 at hs Yes Reported, Patient Yes    senna-docusate (SENOKOT-S/PERICOLACE) 8.6-50 MG tablet Take 2 tablets by mouth 2 times daily 7/4/2023 at pm Yes Unknown, Entered By History     sertraline (ZOLOFT) 100 MG tablet Take 150 mg by mouth At Bedtime 7/4/2023 at hs Yes Reported, Patient Yes    sevelamer carbonate (RENVELA) 800 MG tablet Take 800-2,400 mg by mouth 3 times daily When eating; range of 1-3 tabs depending on meal size 7/4/2023 at am Yes Reported, Patient

## 2023-07-05 NOTE — ED TRIAGE NOTES
Patient presents to Ed via EMS for evaluation of generalized weakness, fatigue, and N/V. Pt states these S/s started after her bilateral nephrectomy surgery on February 1st and has recently significantly become worse. Denies chest pain, SOB, or headaches. Pain 4/10 in her abdomen. Vitally stable during triage.      Triage Assessment     Row Name 07/05/23 0029       Triage Assessment (Adult)    Airway WDL WDL       Respiratory WDL    Respiratory WDL WDL       Skin Circulation/Temperature WDL    Skin Circulation/Temperature WDL WDL       Cardiac WDL    Cardiac WDL WDL       Peripheral/Neurovascular WDL    Peripheral Neurovascular WDL WDL       Cognitive/Neuro/Behavioral WDL    Cognitive/Neuro/Behavioral WDL WDL

## 2023-07-05 NOTE — ED PROVIDER NOTES
EMERGENCY DEPARTMENT ENCOUNTER      NAME: Serene Tipton  AGE: 67 year old female  YOB: 1955  MRN: [id number]  EVALUATION DATE & TIME: 7/5/2023 12:25 AM    PCP: Ramos Lowry    ED PROVIDER: Leonarod Marquez M.D.      Chief Complaint   Patient presents with     Fatigue     Nausea & Vomiting     Abdominal Pain         FINAL IMPRESSION:  1. SBO (small bowel obstruction) (H)    2. ESRD (end stage renal disease) on dialysis (H)    3. Hyperkalemia          ED COURSE & MEDICAL DECISION MAKING:    Pertinent Labs & Imaging studies reviewed. (See chart for details)  67 year old female presents to the Emergency Department for evaluation of presents for abdominal pain.  Patient has complex history including end-stage renal disease on dialysis due to polycystic kidney disease status post bilateral nephrectomy who presents with worsening abdominal pain.  Has been having nausea and vomiting.  Has not been having bowel movements.  Did do a CT scan.  Does show an obstruction.  No signs of perforation.  Will admit for this.  Potassium is mildly elevated 6.2 but no EKG changes.  Discussed with nephrology and they will dialyze this morning.  Discussed findings with the patient.  States understanding.  Discussed with resident excepted the patient.    12:26 AM I met with the patient to gather history and to perform my initial exam. I discussed the plan for care while in the Emergency Department. PPE: gloves.  2:54 AM I spoke with Dr. Hernandez, [facility].   3:03 AM I discussed case with Dr. Aj, resident, who accepts the patient for admission.    At the conclusion of the encounter I discussed the results of all of the tests and the disposition. The questions were answered. The patient or family acknowledged understanding and was agreeable with the care plan.     Medical Decision Making    History:    Supplemental history from: Documented in chart, if applicable    External Record(s) reviewed:  Documented in chart, if applicable. and Inpatient Record: [facility] Admission on 2/25-2/28/23 and [facility] Admission on 2/1-2/6/23    Work Up:    Chart documentation includes differential considered and any EKGs or imaging independently interpreted by provider, where specified.    In additional to work up documented, I considered the following work up: Documented in chart, if applicable.    External consultation:    Discussion of management with another provider: Documented in chart, if applicable    Complicating factors:    Care impacted by chronic illness: Chronic Kidney Disease, Hyperlipidemia, Hypertension, Smoking / Nicotine Use and Other: Polycystic Kidney Disease and bilateral nephrectomy    Care affected by social determinants of health: Access to Medical Care    Disposition considerations: Admit.        0 minutes of critical care time     MEDICATIONS GIVEN IN THE EMERGENCY:  Medications   ondansetron (ZOFRAN) injection 4 mg (4 mg Intravenous $Given 7/5/23 6701)   lidocaine 1 % 0.1-1 mL (has no administration in time range)   lidocaine (LMX4) cream (has no administration in time range)   sodium chloride (PF) 0.9% PF flush 3 mL (3 mLs Intracatheter $Given 7/5/23 0580)   sodium chloride (PF) 0.9% PF flush 3 mL (has no administration in time range)   melatonin tablet 1 mg (has no administration in time range)   heparin ANTICOAGULANT injection 5,000 Units (5,000 Units Subcutaneous $Given 7/5/23 0594)   ondansetron (ZOFRAN ODT) ODT tab 4 mg (has no administration in time range)     Or   ondansetron (ZOFRAN) injection 4 mg (has no administration in time range)   polyethylene glycol (MIRALAX) Packet 17 g (has no administration in time range)   acetaminophen (TYLENOL) tablet 650 mg (has no administration in time range)   HYDROmorphone (PF) (DILAUDID) injection 0.5 mg (0.5 mg Intravenous $Given 7/5/23 0541)   albuterol (PROVENTIL) neb solution 20 mg (20 mg Nebulization $Given  7/5/23 0459)       NEW PRESCRIPTIONS STARTED AT TODAY'S ER VISIT  Current Discharge Medication List             =================================================================    HPI    Patient information was obtained from: Patient    Use of : N/A         Serene Tipton is a 67 year old female with a pertinent history of HTN, hyperlipidemia, GERD, CKD, ESRD on hemodialysis, PKD, s/p nephrectomy, hypothyroidism, nicotine dependence, uncomplicated opioid dependence, tobacco use disorder, who presents to this ED via EMS for evaluation of abdominal pain, nausea, and vomiting.    Patient reports she had her kidney removed for polycystic kidney disease and was placed on the transplant list afterwards. Over the last 1.5 days, she states she has felt nauseous and vomited with eating. She also notes post-tussive emesis as well. Patient reports a cough but attributes this to her quitting smoking recently. Patient also reports associating abdominal pain and states that symptoms slightly improve with passing gas. She has been taking Oxycodone, 10 mg, every few hours. Of note, patient reports she dialyzes every Tuesday, Thursday, and Saturday. She missed her last dialysis session secondary to her symptoms. She was rescheduled for dialysis in five hours. Patient has been having bowel movements. She mentions she had a small one yesterday. Otherwise, she denies any fevers. No recent sick contacts. Patient denies any history of liver issues. Of note, patient mentions she got a lipid panel done recently but states that the results are pending. No other complaints at this time.    Per chart review, patient was seen at [facility] on 2/25-2/28/23 for volume overload/acute pulmonary edema. Patient presented to the ED with complaints of shortness of breath. She was found to be volume overloaded. Imaging showed pulmonary edema. Nephrology was consulted. Patient underwent dialysis with extra ultrafiltration.  Initially, patient required oxygen, but later weaned off of it. Patient discharged home and advised to follow up with nephrology for regular dialysis.     Per chart review, patient was seen at [facility] on 2/1-2/6/23 for polycystic kidney disease. Patient underwent bilateral native nephrectomy on 2/1/23. Nephrology consulted for dialysis. Patient to resume normal dialysis schedule on discharge. Pain management consulted. Patient received an epidural for abdiaziz-op pain control. Controlled post-op with a ketamine gtt and hydromorphone PCA. Patient discharged on acetaminophen, Lidoderm, and oxycodone 10 mg.     REVIEW OF SYSTEMS   Review of Systems   Constitutional: Negative for fever.   Respiratory: Positive for cough.    Gastrointestinal: Positive for abdominal pain, nausea and vomiting.   All other systems reviewed and are negative.     PAST MEDICAL HISTORY:  Past Medical History:   Diagnosis Date     Anemia in chronic kidney disease      Anxiety      Arthritis      Brain aneurysm     Cavernous segment of the right & left carotid arteries. See Neurosurg note 6/16/21.     Chronic low back pain     Managed by Pain Clinic     Depressive disorder 2013     Disease of thyroid gland      ESRD (end stage renal disease) on dialysis (H) 07/2020     GERD (gastroesophageal reflux disease)      Hepatic lesion      Hyperlipidemia      Hypertension      Nephrolithiasis      Neuromuscular disorder (H)      Osteoporosis      PKD (polycystic kidney disease) 09/01/1990     PTSD (post-traumatic stress disorder)      Tobacco abuse      Vitamin D deficiency        PAST SURGICAL HISTORY:  Past Surgical History:   Procedure Laterality Date     BACK SURGERY      spinal fusion w/hardware     BIOPSY Left 09/01/1990    Breast     BREAST LUMPECTOMY, RT/LT Left     Lumpectomy     CYST REMOVAL Right     rt wrist     CYSTECTOMY PILONIDAL  1981     EYE SURGERY  2008    lasik     FORMATION ARTERIOVENOUS FISTULA     07/28/2020     FRACTURE SURGERY       NEPHRECTOMY BILATERAL Bilateral 2/1/2023    Procedure: bilateral native nephrectomy;  Surgeon: Alana Giang MD;  Location: [facility] OR     ORTHOPEDIC SURGERY Right 2005    Shoulder     OTHER SURGICAL HISTORY      carpal tunnel repair     SPINE SURGERY             CURRENT MEDICATIONS:    Current Facility-Administered Medications   Medication     acetaminophen (TYLENOL) tablet 650 mg     heparin ANTICOAGULANT injection 5,000 Units     lidocaine (LMX4) cream     lidocaine 1 % 0.1-1 mL     melatonin tablet 1 mg     ondansetron (ZOFRAN ODT) ODT tab 4 mg    Or     ondansetron (ZOFRAN) injection 4 mg     ondansetron (ZOFRAN) injection 4 mg     polyethylene glycol (MIRALAX) Packet 17 g     sodium chloride (PF) 0.9% PF flush 3 mL     sodium chloride (PF) 0.9% PF flush 3 mL         ALLERGIES:  Allergies   Allergen Reactions     Penicillins Other (See Comments) and Shortness Of Breath     Breathing problem.  breathing       Carvedilol GI Disturbance     Nausea and vomiting     Ciprofloxacin Muscle Pain (Myalgia)     Morphine Other (See Comments)     Vomiting     No Clinical Screening - See Comments      PN: LW CM1: Contrast Intercapsular - Nonionic Reaction :     Nsaids Other (See Comments)     Sulfa Antibiotics Itching     Sulfamethoxazole-Trimethoprim      Other reaction(s): itching     Levofloxacin Muscle Pain (Myalgia) and Rash       FAMILY HISTORY:  Family History   Problem Relation Age of Onset     Polycystic Kidney Diease Mother      Hypertension Mother      Kidney Disease Mother      Cerebrovascular Disease Mother      Chronic Obstructive Pulmonary Disease Father      Multiple Sclerosis Father      Polycystic Kidney Diease Sister      Cardiac Sudden Death Sister 52     Hypertension Sister      Kidney Disease Sister      Polycystic Kidney Diease Maternal Grandmother      Hypertension Maternal Grandmother      Kidney Disease Maternal Grandmother      Cerebrovascular Disease Maternal  Grandmother      Heart Disease Maternal Grandfather      Hyperlipidemia Paternal Grandmother      Cerebrovascular Disease Paternal Grandmother      Coronary Artery Disease Paternal Grandfather      Pulmonary Embolism Paternal Grandfather      Anesthesia Reaction No family hx of        SOCIAL HISTORY:   Social History     Socioeconomic History     Marital status: Single   Tobacco Use     Smoking status: Former     Packs/day: 0.50     Years: 14.00     Pack years: 7.00     Types: Cigarettes     Quit date: 2021     Years since quittin.0     Smokeless tobacco: Never   Substance and Sexual Activity     Alcohol use: Not Currently     Drug use: Never       VITALS:  /66 (BP Location: Right arm)   Pulse 88   Temp 98.1  F (36.7  C) (Oral)   Resp 20   Ht 1.524 m (5')   Wt 42.4 kg (93 lb 8 oz)   SpO2 95%   BMI 18.26 kg/m      PHYSICAL EXAM    Physical Exam  Vitals and nursing note reviewed.   Constitutional:       General: She is not in acute distress.     Appearance: She is not diaphoretic.   HENT:      Head: Atraumatic.      Mouth/Throat:      Pharynx: No oropharyngeal exudate.   Eyes:      General: No scleral icterus.     Pupils: Pupils are equal, round, and reactive to light.   Cardiovascular:      Rate and Rhythm: Normal rate and regular rhythm.      Heart sounds: Normal heart sounds.   Pulmonary:      Effort: No respiratory distress.      Breath sounds: Normal breath sounds.   Abdominal:      Palpations: Abdomen is soft.      Tenderness: There is generalized abdominal tenderness. There is no guarding or rebound. Negative signs include Maradiaga's sign.   Musculoskeletal:         General: No tenderness.   Skin:     General: Skin is warm.      Findings: No rash.   Neurological:      General: No focal deficit present.      Mental Status: She is alert.           LAB:  All pertinent labs reviewed and interpreted.  Labs Ordered and Resulted from Time of ED Arrival to Time of ED Departure   COMPREHENSIVE  METABOLIC PANEL - Abnormal       Result Value    Sodium 128 (*)     Potassium 6.3 (*)     Chloride 86 (*)     Carbon Dioxide (CO2) 23      Anion Gap 19 (*)     Urea Nitrogen 95 (*)     Creatinine 8.04 (*)     Calcium 9.0      Glucose 74      Alkaline Phosphatase 99      AST 35      ALT 17      Protein Total 5.4 (*)     Albumin 2.6 (*)     Bilirubin Total 0.8      GFR Estimate 5 (*)    CBC WITH PLATELETS AND DIFFERENTIAL - Abnormal    WBC Count 12.6 (*)     RBC Count 3.30 (*)     Hemoglobin 11.1 (*)     Hematocrit 33.4 (*)      (*)     MCH 33.6 (*)     MCHC 33.2      RDW 14.1      Platelet Count 328      % Neutrophils 82      % Lymphocytes 8      % Monocytes 10      % Eosinophils 0      % Basophils 0      % Immature Granulocytes 0      NRBCs per 100 WBC 0      Absolute Neutrophils 10.2 (*)     Absolute Lymphocytes 1.0      Absolute Monocytes 1.3      Absolute Eosinophils 0.0      Absolute Basophils 0.1      Absolute Immature Granulocytes 0.0      Absolute NRBCs 0.0     LIPASE - Normal    Lipase <9     LACTIC ACID WHOLE BLOOD - Normal    Lactic Acid 0.7     BASIC METABOLIC PANEL       RADIOLOGY:  Reviewed all pertinent imaging. Please see official radiology report.  XR Chest 2 Views   Final Result   IMPRESSION: Platelike atelectasis right base, more apparent today. Resolved left basilar opacities seen previously. Resolved small bilateral pleural effusions seen on prior study. Mild cardiac enlargement. Venous hypertension has resolved.    Atherosclerotic thoracic aorta. Degenerative changes both shoulders and the spine. Cholecystectomy clips. Previously seen monitoring leads have been removed.      CT Abdomen Pelvis w/o Contrast   Final Result   IMPRESSION:    1.  Multiple loops of mildly dilated air/fluid-filled loops of small bowel most suggestive of partial distal SBO. Adynamic ileus also possible.   2.  Moderate volume of stool throughout colon but colon otherwise unremarkable.   3.  Interval bilateral  nephrectomies.   4.  Tiny volume ascites.   5.  Significant atelectasis right middle lobe.             EKG:   Time:  148  Impression: Sinus rhythm with first-degree AV block.  Nonstick ST changes.  When compared to previous dated February 25, 2023 no significant change.  Sinus rhythm with a rate 88.  .  .  QTc 517.  Above EKG is reviewed interpreted by myself      I, Dilcia Gee, am serving as a scribe to document services personally performed by Dr. Leonardo Marquez, based on my observation and the provider's statements to me. I, Leonardo Marquez MD attest that Dilcia Gee is acting in a scribe capacity, has observed my performance of the services and has documented them in accordance with my direction.    Leonardo Marquez M.D.  Emergency Medicine  [facility] EMERGENCY ROOM  [address] [address]  [phone]  Dept: [phone]     Leonardo Marquez MD  07/05/23 0606

## 2023-07-05 NOTE — PROGRESS NOTES
HEMODIALYSIS TREATMENT NOTE    Date: 7/5/2023  Time: 11:43 AM    Data:  Pre Wt:  42.5 kg  Desired Wt:   To be established  Post Wt:  40.5 kg (estimated)  Weight change: -2 kg  Ultrafiltration - Post Run Net Total Removed (mL): 2000 ml  Vascular Access Status: Fistula patent  Dialyzer Rinse:  Light  Total Blood Volume Processed: 73.2 L   Total Dialysis (Treatment) Time:   3 Hrs  Dialysate Bath: K 2, Ca 2.5  Heparin: Heparin: None     Lab:   No     Interventions:  Dialysis done through left AV Fistula using 15 gauge needles  UF set to 2 Liters, accommodating  priming and rinse back volumes  ,   Albumin administered per MAR  CritLine showed a stable Profile B throughout the run, tolerating UF pull  Glucose checks managed by PCN  Decannulation done post HD, hemostasis is achieved in 10 minutes  Pressure dressing is applied, to be removed after 4-6 hours  Report given to PCN, left in 302 room in stable condition     Assessment:  A/O x 4, calm and cooperative, c/o abdomen pain, last BM yesterday morning. No obvious respiratory distress on RA. BP normotensive to start, BP decreased 1/3 of way into tx, albumin administered, normotensive throughout.  Left arm AV Fistula has good thrill and bruit, cannulated without difficulty. Pt tolerated UF goal well.                Plan:    Per Renal team        MARQUES ElizaldeN, RN  Acute Dialysis RN  [facility]

## 2023-07-05 NOTE — PLAN OF CARE
Problem: Electrolyte Imbalance  Goal: Electrolyte Imbalance: Plan of Care  Outcome: Progressing     Problem: Pain Acute  Goal: Optimal Pain Control and Function  Outcome: Progressing    Pt A&Ox4, able to make needs known. Pt reporting pain 4/10, pt requesting dilaudid and reported she normally takes oxycodone at home. PRN dilaudid given with relief. Pt SBA to toilet, no BM this shift. Blood sugar critically low (57), Potassium critically high (6.5), Creatinine critically high (8.07), resident notified, hypoglycemic protocol ordered and D50% 25ml injection given per protocol. No other acute changes noted this shift.     Ngoc Landis, RN  [phone]

## 2023-07-05 NOTE — PROVIDER NOTIFICATION
7383: Paged senior resident re: BG 57 on morning labs. Fingerstick BG to be obtained. Asked resident for PRN D50 order as pt is NPO. Awaiting response. Primary RN Ngoc notified.

## 2023-07-05 NOTE — PROGRESS NOTES
[facility]    Progress Note - Hospitalist Service       Date of Admission:  7/5/2023    Assessment & Plan   Serene Tipton is a 67 year old female admitted on 7/5/2023. She has a history of PKD s/p bilateral nephrectomy in 2/20/2023, ESRD on HD TTS schedule, active transplant evaluation, anemia, HTN, HLD, hypothyroidism, active tobacco use, chronic pain and is admitted for partial SBO, N/V, hyperkalemia and worsening kidney function.    Partial small bowel obstruction  Patient coming in after 2 weeks of intermittent abdominal pain, nausea and vomiting.  She had prior abdominal surgery secondary to PKD.  Vitals on arrival to the ED stable.  Lactic acid, lipase unremarkable.  CT abdomen pelvis showed multiple loops of mildly dilated/fluid-filled loops of small bowel suggestive of partial small bowel bowel obstruction, adynamic ileus also possible and moderate volume of stool throughout the colon.    Bowel regimen with MiraLAX . Can consider enema if not having adequate bowel movements with MiraLAX.    Avoid IV fluids due to concern for fluid overload due to missing HD session    Pain: Tylenol 650 mg Q4 PRN, Oxycodone 5 mg Q4 PRN. Low threshold to increase dose of Oxycodone given pt's home dosage.    Surgery consulted, appreciate recommendations.    ESRD on HD  Hyperkalemia  Hyponatremia   Patient missed HD session yesterday due to being weak secondary to N/V and abdominal pain.  Creatinine on admission 8.04 (baseline 3-5), potassium 6.3. and Na 128 No EKG changes.     Nephrology consulted, plan for HD afternoon 7/5/2023     Cardiac tele     Avoided insulin due to low blood sugars since not eating much    Avoided Kayexalate and Lokelma due to partial SBO and constipation     Follow up BMP, mag and phos     If K continues to increase will obtain repeat EKG, give Ca gluconate and insulin with dextrose      Chronic conditions  Hypertension: PTA amlodipine, labetalol, clonidine, hold PTA  lisinopril given worsening kidney function  Hypothyroidism: PTA Synthroid  Anxiety/depression: PTA sertraline, PRN ativan   Chronic pain: PTA nortriptyline, Oxycodone as above  Hyperlipidemia: PTA rosuvastatin  Anemia: PTA EPO and Venofer with HD  Tobacco use: Patient currently declines nicotine replacement      Diet: NPO for Medical/Clinical Reasons Except for: Meds    DVT Prophylaxis: Pneumatic Compression Devices  Pool Catheter: Not present  Fluids: Will re-evaluate post dialysis, possibly PO  Lines: None     Cardiac Monitoring: ACTIVE order. Indication: Electrolyte Imbalance (24 hours)- Magnesium <1.3 mg/ml; Potassium < =2.8 or > 5.5 mg/ml  Code Status: Full Code      Clinically Significant Risk Factors Present on Admission        # Hyperkalemia: Highest K = 6.5 mmol/L in last 2 days, will monitor as appropriate  # Hyponatremia: Lowest Na = 128 mmol/L in last 2 days, will monitor as appropriate   # Hypercalcemia: corrected calcium is >10.1, will monitor as appropriate   # Anion Gap Metabolic Acidosis: Highest Anion Gap = 21 mmol/L in last 2 days, will monitor and treat as appropriate  # Hypoalbuminemia: Lowest albumin = 2.6 g/dL at 7/5/2023 12:58 AM, will monitor as appropriate     # Hypertension: Noted on problem list               Disposition Plan      Expected Discharge Date: 07/07/2023      Destination: home          The patient's care was discussed with the Attending Physician, Dr. Kolton Earl.    Nicola Cavazos DO  Hospitalist Service  [facility]  Securely message with [employer] (more info)  Text page via [employer] Paging/Directory   ______________________________________________________________________    Interval History   Patient evaluated at bedside, reporting abdominal pain.  Patient endorses passing gas and a small bowel movement yesterday.  Patient states that she is hungry and would like to eat if possible. Scheduled for dialysis afternoon 7/5/2023.    Physical Exam   Vital  Signs: Temp: 98.1  F (36.7  C) Temp src: Oral BP: 136/70 Pulse: 88   Resp: 20 SpO2: 95 % O2 Device: None (Room air) Oxygen Delivery: 2 LPM  Weight: 93 lbs 8 oz    Constitutional: awake, cooperative, uncomfortable lying on her side in bed  Eyes: Lids and lashes normal, pupils equal, sclera clear, conjunctiva normal  ENT: Normocephalic, without obvious abnormality, atraumatic, external ears without lesions, oral pharynx with moist mucous membranes.  Respiratory: No increased work of breathing, good air exchange, clear to auscultation bilaterally, no crackles or wheezing  Cardiovascular: Regular rate and rhythm. Left arm AV fistula present with good thrill.  GI: No scars, normal bowel sounds, soft, non-distended, non-tender, no masses palpated, no hepatosplenomegaly. Bowel sounds present.  Skin: no bruising or bleeding and normal skin color, texture, turgor.  Psych: Appropriate mood and affect.      Medical Decision Making     Please see A&P for additional details of medical decision making.      Data     I have personally reviewed the following data over the past 24 hrs:    13.6 (H)  \   11.0 (L)   / 328     128 (L) 86 (L) 99 (H) /  120 (H)   6.5 (HH) 21 (L) 8.07 (HH) \       ALT: 17 AST: 35 AP: 99 TBILI: 0.8   ALB: 2.6 (L) TOT PROTEIN: 5.4 (L) LIPASE: <9       Procal: N/A CRP: N/A Lactic Acid: 0.7         Imaging results reviewed over the past 24 hrs:   Recent Results (from the past 24 hour(s))   CT Abdomen Pelvis w/o Contrast    Narrative    EXAM: CT ABDOMEN PELVIS W/O CONTRAST  LOCATION: [facility]  DATE: 7/5/2023    INDICATION: abd pain.  COMPARISON: 11/11/2022. Chest CT 02/26/2023  TECHNIQUE: CT scan of the abdomen and pelvis was performed without IV contrast. Multiplanar reformats were obtained. Dose reduction techniques were used.  CONTRAST: None.    FINDINGS:   LOWER CHEST: Resolution of pleural effusions that were present on most recent exam. Severe atelectasis right middle lobe  partially visualized on this exam.    HEPATOBILIARY: Multiple hepatic cysts again noted. Distended gallbladder now but no stones or inflammatory changes.    PANCREAS: Mild duct dilatation unchanged.    SPLEEN: Normal size. Benign calcification.    ADRENAL GLANDS: No adrenal lesions evident.    KIDNEYS/BLADDER: Interval bilateral nephrectomies.    BOWEL: Multiple loops of mildly dilated air and fluid-filled small bowel are present new compared to previous study and most suggestive of partial distal small bowel obstruction. Transition point not clearly identified. Moderate amount of stool seen   throughout colon. Trace volume ascites.    LYMPH NODES: Normal.    VASCULATURE: Moderate diffuse atherosclerotic disease.    PELVIC ORGANS: Minimal ascites. No pelvic mass.    MUSCULOSKELETAL: Anterior fusion at lumbosacral junction unchanged. L5 pars defects.      Impression    IMPRESSION:   1.  Multiple loops of mildly dilated air/fluid-filled loops of small bowel most suggestive of partial distal SBO. Adynamic ileus also possible.  2.  Moderate volume of stool throughout colon but colon otherwise unremarkable.  3.  Interval bilateral nephrectomies.  4.  Tiny volume ascites.  5.  Significant atelectasis right middle lobe.     XR Chest 2 Views    Narrative    EXAM: XR CHEST 2 VIEWS  LOCATION: [facility]  DATE: 7/5/2023    INDICATION: Cough.  COMPARISON: Chest x-ray 2 views 02/25/2023 at 1030 hours.      Impression    IMPRESSION: Platelike atelectasis right base, more apparent today. Resolved left basilar opacities seen previously. Resolved small bilateral pleural effusions seen on prior study. Mild cardiac enlargement. Venous hypertension has resolved.   Atherosclerotic thoracic aorta. Degenerative changes both shoulders and the spine. Cholecystectomy clips. Previously seen monitoring leads have been removed.

## 2023-07-05 NOTE — CONSULTS
Care Management Initial Consult    General Information  Assessment completed with: Patient,    Type of CM/SW Visit: Initial Assessment    Primary Care Provider verified and updated as needed: Yes   Readmission within the last 30 days:        Reason for Consult: discharge planning    Communication Assessment  Patient's communication style: spoken language (English or Bilingual)    Hearing Difficulty or Deaf: no   Wear Glasses or Blind: no    Cognitive  Cognitive/Neuro/Behavioral: WDL                      Living Environment:   People in home: alone     Current living Arrangements: apartment      Able to return to prior arrangements:         Family/Social Support:  Care provided by: self  Provides care for: no one     Other (specify) (Family/friends)          Description of Support System: Supportive, Involved         Current Resources:   Patient receiving home care services: No     Community Resources: Dialysis Services  Equipment currently used at home: none  Supplies currently used at home: None      Lifestyle & Psychosocial Needs:  Social Determinants of Health     Tobacco Use: Medium Risk (7/5/2023)    Patient History      Smoking Tobacco Use: Former      Smokeless Tobacco Use: Never      Passive Exposure: Not on file   Alcohol Use: Not on file   Financial Resource Strain: Not on file   Food Insecurity: Not on file   Transportation Needs: Not on file   Physical Activity: Not on file   Stress: Not on file   Social Connections: Not on file   Intimate Partner Violence: Not on file   Depression: Not at risk (1/26/2021)    PHQ-2      PHQ-2 Score: 2   Housing Stability: Not on file       Functional Status:  Prior to admission patient needed assistance:   Dependent ADLs:: Independent  Dependent IADLs:: Independent         Additional Information:  11:16 AM  Assessed.  Lives in an apartment alone.  Independent at baseline.  Attends Dialysis at [facility] in [facility] on T, TH, S.  Friend to transport at discharge.      Enma ISLAS  ANNE Hood

## 2023-07-05 NOTE — PLAN OF CARE
Goal Outcome Evaluation: Pt to complete 3 hour HD and 2-3 liter UF as tolerated, SBP>90.

## 2023-07-05 NOTE — PLAN OF CARE
"Pt A&Ox4, able to make needs known. RA, denies SOB. Pt reports abdominal pain at beginning of shift, PRN dilaudid given x1, PRN oxy given x1, see MAR. Pt received HD at bedside this shift. Ax1 with ambulation. Clear liquid diet. No BM this shift, pt reports passing flatus. Abdomen remains firm but per pt \"not nearly as tender as it was\". Pt appears to be sleeping in room at end of shift.    Problem: Pain Acute  Goal: Optimal Pain Control and Function  Outcome: Progressing  Intervention: Develop Pain Management Plan  Recent Flowsheet Documentation  Taken 7/5/2023 1602 by Spencer Oliva RN  Pain Management Interventions: medication (see MAR)  Taken 7/5/2023 1331 by Spencer Oliva RN  Pain Management Interventions: medication (see MAR)  Intervention: Prevent or Manage Pain  Recent Flowsheet Documentation  Taken 7/5/2023 1530 by Spencer Oliva, RN  Medication Review/Management: medications reviewed  Taken 7/5/2023 0800 by Spencer Oliva RN  Medication Review/Management: medications reviewed     Spencer Oliva RN on 7/5/2023 at 6:27 PM    "

## 2023-07-05 NOTE — ED NOTES
Bed: [facility]ED-07  Expected date: 7/5/23  Expected time: 12:17 AM  Means of arrival: Ambulance  Comments:  [facility]

## 2023-07-05 NOTE — PROGRESS NOTES
Surgery    Consult received will try to see later today or AM  This patient has a partial sbo or more likely obstipation/constipation. One should treat with bowel decompression and small bowel obstruction protocol with Gastrogaffin.         Ulisses Guzman MD  General Surgery [phone]  Vascular Surgery [phone]

## 2023-07-05 NOTE — CONSULTS
RENAL CONSULT NOTE    REQUESTING PHYSICIAN: Dr. Rosmery Aj    REASON FOR CONSULT: Patient with ESKD presents with partial SBO, missed dialysis, hyperkalemia    ASSESSMENT/PLAN:  ESKD - On chronic in-center hemodialysis. Dialyzes as outpatient at [facility] [place] on TTS schedule (3 hour runs, JESSI AVF). Underlying disease is polycystic kidney disease and she is s/p bilateral nephrectomy. Will dialyze today then resume usual schedule tomorrow    Hyperkalemia - In the setting of ESKD, missed dialysis. Unable to give Kayexalate or Loelma due to SBO. Will dialyze today then resume usual schedule tomorrow    Partial small bowel obstruction - CT abdomen/pelvis showed multiple loops of mildly dilated/fluid-filled loops of small bowel suggestive of partial small bowel bowel obstruction, adynamic ileus also possible and moderate volume of stool throughout the colon. NPO and receiving bowel regimen. Surgery consult if no improvement    Hypertension - Prior to admission on amlodipine, labetalol, clonidine, lisinopril. Lisinopril currently on hold    Hypothyroidism - On replacement    Anxiety/depression - On sertraline, uses ativan    Chronic pain - On nortriptyline, oxycodone (on hold with SBO, constipation)    Hyperlipidemia - On statin    Anemia - Receives PETE and iron with dialysis    Tobacco use           HPI:   The patient is a 67 year old woman with a past medical history significant for polycystic kidney disease s/p bilateral nephrectomy, ESKD on chronic hemodialysis on TTS schedule, anemia, hypertension, hyperlipidemia, hypothyroidism and chronic pain. She presented to the emergency department with complaint of abdominal pain, nausea and vomiting. She was found to have a partial distal small bowel obstruction with hyperkalemia.     She notes that she has been feeling poorly for the past 2 weeks. She continues to have abdominal pain and some nausea. She denies significant shortness of breath but does feels she  has extra fluid on board. She last dialyzed on Saturday and she reports that she cut her treatment short by 30min due to machine issues prolonging her run. She denies issues with her fistula. She denies muscle cramping.    REVIEW OF SYSTEMS:  Comprehensive review of systems negative except as noted in HPI      Past Medical History:   Diagnosis Date     Anemia in chronic kidney disease      Anxiety      Arthritis      Brain aneurysm     Cavernous segment of the right & left carotid arteries. See Neurosurg note 6/16/21.     Chronic low back pain     Managed by Pain Clinic     Depressive disorder 2013     Disease of thyroid gland      ESRD (end stage renal disease) on dialysis (H) 07/2020     GERD (gastroesophageal reflux disease)      Hepatic lesion      Hyperlipidemia      Hypertension      Nephrolithiasis      Neuromuscular disorder (H)      Osteoporosis      PKD (polycystic kidney disease) 09/01/1990     PTSD (post-traumatic stress disorder)      Tobacco abuse      Vitamin D deficiency        No current facility-administered medications on file prior to encounter.  amLODIPine (NORVASC) 5 MG tablet, Take 5 mg by mouth At Bedtime  B Complex-C (SUPER B COMPLEX PO), Take 1 tablet by mouth daily  calcitRIOL (ROCALTROL) 0.5 MCG capsule, Take 0.5 mcg by mouth three times a week Given at HD. Tues, Thurs, Sat  cholecalciferol (VITAMIN D3) 25 mcg (1000 units) capsule, Take 1 capsule by mouth daily  cloNIDine (CATAPRES) 0.1 MG tablet, Take 0.2 mg by mouth At Bedtime  Epoetin Adam (EPOGEN IJ), Inject 1,000 Units into the vein three times a week At dialysis.  Iron Sucrose (VENOFER IV), Inject 100 mg into the vein once a week At dialysis  labetalol (NORMODYNE) 300 MG tablet, Take 450 mg by mouth At Bedtime  labetalol (NORMODYNE) 300 MG tablet, Take 600 mg by mouth every morning  levothyroxine (SYNTHROID/LEVOTHROID) 50 MCG tablet, Take 50 mcg by mouth At Bedtime Takes in the middle of the night.  lisinopril (ZESTRIL) 40 MG  tablet, Take 20 mg by mouth 2 times daily  LORazepam (ATIVAN) 1 MG tablet, Take 1 mg by mouth daily (before lunch)  nortriptyline (PAMELOR) 75 MG capsule, Take 75 mg by mouth At Bedtime  oxyCODONE (ROXICODONE) 10 MG tablet, Use oxycodone 10mg every 3 hours as needed for pain on  & . Then return to usual home dose.  rosuvastatin (CRESTOR) 10 MG tablet, Take 10 mg by mouth At Bedtime  senna-docusate (SENOKOT-S/PERICOLACE) 8.6-50 MG tablet, Take 2 tablets by mouth 2 times daily  sertraline (ZOLOFT) 100 MG tablet, Take 150 mg by mouth At Bedtime  sevelamer carbonate (RENVELA) 800 MG tablet, Take 800-2,400 mg by mouth 3 times daily When eating; range of 1-3 tabs depending on meal size        No current outpatient medications on file.      ALLERGIES/SENSITIVITIES:  Allergies   Allergen Reactions     Penicillins Other (See Comments) and Shortness Of Breath     Breathing problem.  breathing       Carvedilol GI Disturbance     Nausea and vomiting     Ciprofloxacin Muscle Pain (Myalgia)     Morphine Other (See Comments)     Vomiting     No Clinical Screening - See Comments      PN: LW CM1: Contrast Intercapsular - Nonionic Reaction :     Nsaids Other (See Comments)     Sulfa Antibiotics Itching     Sulfamethoxazole-Trimethoprim      Other reaction(s): itching     Levofloxacin Muscle Pain (Myalgia) and Rash     Social History     Tobacco Use     Smoking status: Former     Packs/day: 0.50     Years: 14.00     Pack years: 7.00     Types: Cigarettes     Quit date: 2021     Years since quittin.0     Smokeless tobacco: Never   Substance Use Topics     Alcohol use: Not Currently     Drug use: Never     I have reviewed this patient's family history and updated it with pertinent information if needed.  Family History   Problem Relation Age of Onset     Polycystic Kidney Diease Mother      Hypertension Mother      Kidney Disease Mother      Cerebrovascular Disease Mother      Chronic Obstructive Pulmonary Disease Father       Multiple Sclerosis Father      Polycystic Kidney Diease Sister      Cardiac Sudden Death Sister 52     Hypertension Sister      Kidney Disease Sister      Polycystic Kidney Diease Maternal Grandmother      Hypertension Maternal Grandmother      Kidney Disease Maternal Grandmother      Cerebrovascular Disease Maternal Grandmother      Heart Disease Maternal Grandfather      Hyperlipidemia Paternal Grandmother      Cerebrovascular Disease Paternal Grandmother      Coronary Artery Disease Paternal Grandfather      Pulmonary Embolism Paternal Grandfather      Anesthesia Reaction No family hx of          PHYSICAL EXAM:  Physical Exam   Temp: 98.1  F (36.7  C) Temp src: Oral BP: 138/63 Pulse: 86   Resp: 20 SpO2: 95 % O2 Device: None (Room air) Oxygen Delivery: 2 LPM  Vitals:    07/05/23 0028 07/05/23 0424   Weight: 39 kg (86 lb) 42.4 kg (93 lb 8 oz)     Vital Signs with Ranges  Temp:  [98.1  F (36.7  C)-98.7  F (37.1  C)] 98.1  F (36.7  C)  Pulse:  [86-93] 86  Resp:  [19-35] 20  BP: (127-141)/(62-70) 138/63  SpO2:  [89 %-99 %] 95 %  No intake/output data recorded.      Patient Vitals for the past 72 hrs:   Weight   07/05/23 0424 42.4 kg (93 lb 8 oz)   07/05/23 0028 39 kg (86 lb)       General - Alert and no apparent distress. Resting flat in bed  HEENT - Normocephalic, atraumatic  Neck - Supple. No JVD  Respiratory - Clear to auscultation bilaterally, no crackles or wheezes  Cardiovascular - Normal S1S2, no rub  Abdomen - Mildly firm and distended, tenderness to light palpation  Extremities - Well perfused, trace lower extremity edema. Left upper arm AVF  Integumentary - Warm, dry, no rash  Neurologic - Grossly intact; no focal deficits noted    Laboratory:     Recent Labs   Lab 07/05/23 0539 07/05/23 0058   WBC 13.6* 12.6*   RBC 3.29* 3.30*   HGB 11.0* 11.1*   HCT 33.0* 33.4*    328       Basic Metabolic Panel:  Recent Labs   Lab 07/05/23  0717 07/05/23  0644 07/05/23  0539 07/05/23 0058 06/30/23 2014   NA   --   --  128* 128* 130*   POTASSIUM  --   --  6.5* 6.3* 4.6   CHLORIDE  --   --  86* 86* 90*   CO2  --   --  21* 23 24   BUN  --   --  99* 95* 54.9*   CR  --   --  8.07* 8.04* 5.86*   * 60* 57* 74 110*   GAVIN  --   --  8.7 9.0 9.3       INRNo lab results found in last 7 days.    Recent Labs   Lab Test 07/05/23  0539 07/05/23 0058   POTASSIUM 6.5* 6.3*   CHLORIDE 86* 86*   BUN 99* 95*      Recent Labs   Lab Test 07/05/23  0058 06/30/23 2014 08/18/22  1731 05/19/21  1520   ALBUMIN 2.6* 3.4*   < >  --    BILITOTAL 0.8 0.4   < >  --    ALT 17 9   < >  --    AST 35 15   < >  --    PROTEIN  --   --   --  100*    < > = values in this interval not displayed.       Personally reviewed today's laboratory studies            Thank you for involving us in the care of this patient. We will continue to follow along with you.      Stephanie Brito PA-C  [facility]  [phone]

## 2023-07-06 ENCOUNTER — DOCUMENTATION ONLY (OUTPATIENT)
Dept: OTHER | Facility: CLINIC | Age: 68
End: 2023-07-06
Payer: COMMERCIAL

## 2023-07-06 ENCOUNTER — APPOINTMENT (OUTPATIENT)
Dept: PHYSICAL THERAPY | Facility: CLINIC | Age: 68
End: 2023-07-06
Attending: STUDENT IN AN ORGANIZED HEALTH CARE EDUCATION/TRAINING PROGRAM
Payer: COMMERCIAL

## 2023-07-06 VITALS
DIASTOLIC BLOOD PRESSURE: 76 MMHG | TEMPERATURE: 98.2 F | HEIGHT: 60 IN | HEART RATE: 102 BPM | SYSTOLIC BLOOD PRESSURE: 158 MMHG | RESPIRATION RATE: 16 BRPM | OXYGEN SATURATION: 98 % | BODY MASS INDEX: 17.18 KG/M2 | WEIGHT: 87.5 LBS

## 2023-07-06 PROBLEM — K59.00 CONSTIPATION: Status: ACTIVE | Noted: 2023-07-06

## 2023-07-06 PROBLEM — K56.609 SBO (SMALL BOWEL OBSTRUCTION) (H): Status: RESOLVED | Noted: 2023-07-05 | Resolved: 2023-07-06

## 2023-07-06 LAB
ANION GAP SERPL CALCULATED.3IONS-SCNC: 14 MMOL/L (ref 5–18)
BUN SERPL-MCNC: 38 MG/DL (ref 8–22)
CALCIUM SERPL-MCNC: 8.9 MG/DL (ref 8.5–10.5)
CHLORIDE BLD-SCNC: 95 MMOL/L (ref 98–107)
CO2 SERPL-SCNC: 27 MMOL/L (ref 22–31)
CREAT SERPL-MCNC: 4.46 MG/DL (ref 0.6–1.1)
GFR SERPL CREATININE-BSD FRML MDRD: 10 ML/MIN/1.73M2
GLUCOSE BLD-MCNC: 90 MG/DL (ref 70–125)
GLUCOSE BLDC GLUCOMTR-MCNC: 87 MG/DL (ref 70–99)
MAGNESIUM SERPL-MCNC: 1.6 MG/DL (ref 1.8–2.6)
PHOSPHATE SERPL-MCNC: 4.9 MG/DL (ref 2.5–4.5)
POTASSIUM BLD-SCNC: 4.5 MMOL/L (ref 3.5–5)
SODIUM SERPL-SCNC: 136 MMOL/L (ref 136–145)

## 2023-07-06 PROCEDURE — 250N000011 HC RX IP 250 OP 636: Performed by: PHYSICIAN ASSISTANT

## 2023-07-06 PROCEDURE — 99213 OFFICE O/P EST LOW 20 MIN: CPT | Performed by: NURSE PRACTITIONER

## 2023-07-06 PROCEDURE — 83735 ASSAY OF MAGNESIUM: CPT

## 2023-07-06 PROCEDURE — G0378 HOSPITAL OBSERVATION PER HR: HCPCS

## 2023-07-06 PROCEDURE — 84100 ASSAY OF PHOSPHORUS: CPT

## 2023-07-06 PROCEDURE — 90935 HEMODIALYSIS ONE EVALUATION: CPT

## 2023-07-06 PROCEDURE — 99232 SBSQ HOSP IP/OBS MODERATE 35: CPT | Mod: GC

## 2023-07-06 PROCEDURE — 99213 OFFICE O/P EST LOW 20 MIN: CPT | Performed by: PHYSICIAN ASSISTANT

## 2023-07-06 PROCEDURE — 96375 TX/PRO/DX INJ NEW DRUG ADDON: CPT

## 2023-07-06 PROCEDURE — 97161 PT EVAL LOW COMPLEX 20 MIN: CPT | Mod: GP

## 2023-07-06 PROCEDURE — 250N000011 HC RX IP 250 OP 636: Mod: JZ | Performed by: STUDENT IN AN ORGANIZED HEALTH CARE EDUCATION/TRAINING PROGRAM

## 2023-07-06 PROCEDURE — 82947 ASSAY GLUCOSE BLOOD QUANT: CPT

## 2023-07-06 PROCEDURE — 97530 THERAPEUTIC ACTIVITIES: CPT | Mod: GP

## 2023-07-06 PROCEDURE — 250N000013 HC RX MED GY IP 250 OP 250 PS 637

## 2023-07-06 PROCEDURE — 96372 THER/PROPH/DIAG INJ SC/IM: CPT | Performed by: STUDENT IN AN ORGANIZED HEALTH CARE EDUCATION/TRAINING PROGRAM

## 2023-07-06 PROCEDURE — 36415 COLL VENOUS BLD VENIPUNCTURE: CPT

## 2023-07-06 RX ORDER — AMLODIPINE BESYLATE 5 MG/1
5 TABLET ORAL AT BEDTIME
Status: CANCELLED | OUTPATIENT
Start: 2023-07-06

## 2023-07-06 RX ORDER — LABETALOL 100 MG/1
400 TABLET, FILM COATED ORAL AT BEDTIME
Status: CANCELLED | OUTPATIENT
Start: 2023-07-06

## 2023-07-06 RX ADMIN — HEPARIN SODIUM 5000 UNITS: 5000 INJECTION, SOLUTION INTRAVENOUS; SUBCUTANEOUS at 04:53

## 2023-07-06 RX ADMIN — HEPARIN SODIUM 500 UNITS: 1000 INJECTION INTRAVENOUS; SUBCUTANEOUS at 09:42

## 2023-07-06 RX ADMIN — LORAZEPAM 1 MG: 1 TABLET ORAL at 12:59

## 2023-07-06 RX ADMIN — OXYCODONE HYDROCHLORIDE 5 MG: 5 TABLET ORAL at 12:59

## 2023-07-06 RX ADMIN — HEPARIN SODIUM 500 UNITS/HR: 1000 INJECTION INTRAVENOUS; SUBCUTANEOUS at 09:42

## 2023-07-06 RX ADMIN — Medication 25 MCG: at 12:59

## 2023-07-06 RX ADMIN — SEVELAMER CARBONATE 1600 MG: 800 TABLET, FILM COATED ORAL at 15:01

## 2023-07-06 RX ADMIN — OXYCODONE HYDROCHLORIDE 5 MG: 5 TABLET ORAL at 08:12

## 2023-07-06 RX ADMIN — CALCITRIOL 0.5 MCG: 0.25 CAPSULE ORAL at 12:59

## 2023-07-06 ASSESSMENT — ACTIVITIES OF DAILY LIVING (ADL)
ADLS_ACUITY_SCORE: 20
ADLS_ACUITY_SCORE: 20
ADLS_ACUITY_SCORE: 22
ADLS_ACUITY_SCORE: 20
ADLS_ACUITY_SCORE: 22
ADLS_ACUITY_SCORE: 20
ADLS_ACUITY_SCORE: 22

## 2023-07-06 NOTE — PLAN OF CARE
Problem: Malnutrition  Goal: Improved Nutritional Intake  Outcome: Progressing  Intervention: Promote and Optimize Oral Intake  Recent Flowsheet Documentation  Taken 7/6/2023 1215 by Rivera Barbour RD  Nutrition Interventions: food preferences provided   Goal Outcome Evaluation:  Pt's diet advanced to renal. See 7/6 RD note for detailed assessment.

## 2023-07-06 NOTE — PROGRESS NOTES
"CLINICAL NUTRITION SERVICES - ASSESSMENT NOTE     Nutrition Prescription    RECOMMENDATIONS FOR MDs/PROVIDERS TO ORDER:  None at this time    Malnutrition Status:    Severe malnutrition in the context of acute on chronic illness    Future/Additional Recommendations:  Monitor po intake/tolerance, BMs, wts, labs     REASON FOR ASSESSMENT  Serene Tipton is a/an 67 year old female assessed by the dietitian for Admission Nutrition Risk Screen for positive - Lost 14-23 lbs, decreased appetite    NUTRITION HISTORY  Dx: Partial SBO and possible adynamic ileus, hyperkalemia, 2 weeks of intermittent abdominal pain  PMH: bilateral nephrectomy this winter, on HD (TTS), tobacco use, anemia, HTN, HLD, hyperthyroidism    Met with pt in room this afternoon. Pt confirmed significant abdominal pain for 2 weeks, and reports she'd been eating <50% normal intake during that time. Pt also reports her intake has been low for at least the past year 2/2 GI issues related to her bilateral nephrectomy. Pt reports drinking Nepro at home, which she mixes with chocolate syrup to make it palatable. Pt declined getting ONS while in hospital, as she believe she's going home tomorrow, and will only drink them if she can mix chocolate syrup with them.     Pt's diet advanced this afternoon. Writer encouraged her to order room service when she's ready, and instructed her on which items would be appropriate for her to order per diet guidelines.    HD: Pt dialyzed yesterday, missed Tuesday HD 2/2 N/V    CURRENT NUTRITION ORDERS  Diet: Clear Liquid  Intake/Tolerance: Pt ordered several trays of clear liquid items, consumed 25% of them    LABS  Labs reviewed  BUN: 38 (H)  Cr: 4.46 (H)  M.6 (L)  Phos: 4.9 (H)    MEDICATIONS  Medications reviewed  Senna   Renvela  Zofran    GI:   1x BM on   2x BM on     ANTHROPOMETRICS  Height: 152.4 cm (5' 0\")  Most Recent Weight: 39.7 kg (87 lb 8 oz)    IBW: 45.4 kg (100 lb)  BMI: Underweight BMI " Discharge Call Back    Attempted to contact patient with no answer. Message left.     <18.5  Weight History:   07/06/23  39.7 kg (87 lb 8 oz) Standing scale  07/05/23  42.4 kg (93 lb 8 oz) Standing scale  05/01/23 43 kg (94 lb 11.2 oz)  02/28/23 40.5 kg (89 lb 3.2 oz)  02/06/23 44.9 kg (99 lb)  12/19/22 47 kg (103 lb 11.2 oz)  18% wt loss in 7 months  8% wt loss in 2 months  Pt down 2 L from HD on 7/5 - this indicates that pt's wt likely fluctuates ~2 kg regularly 2/2 HD treatments.    Dosing Weight: 39.7 kg actual body wt based on driest admit wt    ASSESSED NUTRITION NEEDS  Estimated Energy Needs: 4903-9979 kcals/day (30 - 35 kcals/kg )  Justification: Underweight  Estimated Protein Needs: 40-48 grams protein/day (1 - 1.2 grams of pro/kg)  Justification: Repletion  Estimated Fluid Needs: mL/day (25 - 30 mL/kg)   Justification: Per provider pending fluid status    PHYSICAL FINDINGS  See malnutrition section below.    MALNUTRITION  % Intake: </=75% for >/= 1 month (severe)  % Weight Loss: > 10% in 6 months (severe)  Subcutaneous Fat Loss: Facial region:  moderate, Upper arm:  moderate and Thoracic/intercostal:  moderate  Muscle Loss: Temporal:  moderate, Scapular bone:  severe, Upper arm (bicep, tricep):  moderate, Lower arm  (forearm):  moderate, Dorsal hand:  severe and Posterior calf:  moderate  Fluid Accumulation/Edema: None noted  Malnutrition Diagnosis: Severe malnutrition in the context of acute on chronic illness    NUTRITION DIAGNOSIS  Malnutrition related to poor appetite secondary to bilateral nephrectomy as evidenced by > 10% wt loss in 6 months, moderate-severe muscle/fat loss    Malnutrition related to altered GI function as evidenced by > 10% wt loss in 6 months, moderate-severe muscle/fat loss, </=75% for >/= 1 month (severe)    INTERVENTIONS  Implementation  Nutrition Education: instruction for renal diet guidelines  Encourage PO intake    Goals  Patient to consume % of nutritionally adequate meal trays TID, or the equivalent with supplements/snacks.      Monitoring/Evaluation  Progress toward goals will be monitored and evaluated per protocol.

## 2023-07-06 NOTE — UTILIZATION REVIEW
"Inpatient to Observation note:    Admission Status; Secondary Review Determination     Under the authority of the Utilization Management Committee, the utilization review process indicated a secondary review on the above patient.  The review outcome is based on review of the medical records, discussions with staff, and applying clinical experience noted on the date of the review.          (x) Observation Status Appropriate - This patient does not meet hospital inpatient criteria and is placed in observation status. If this patient's primary payer is Medicare and was admitted as an inpatient, Condition Code 44 should be used and patient status changed to \"observation\".     RATIONALE FOR DETERMINATION     67 year old female history of PKD s/p bilateral nephrectomy in 2/20/2023, ESRD on HD TTS schedule, active transplant evaluation, anemia, HTN, HLD, hypothyroidism, active tobacco use, chronic pain and is admitted for partial SBO, N/V, hyperkalemia and worsening kidney function.  Patient missed her dialysis session.  Nephrology is consulted and resume patient dialysis as scheduled.  Surgery evaluated patient and does not think patient has small bowel obstruction.  Symptoms likely from obstipation/constipation.       The severity of illness, intensity of service provided, expected LOS and risk for adverse outcome make the care appropriate for further observation; however, doesn't meet criteria for hospital inpatient admission. Dr Cavazos  notified of this determination.        The information on this document is developed by the utilization review team in order for the business office to ensure compliance.  This only denotes the appropriateness of proper admission status and does not reflect the quality of care rendered.         The definitions of Inpatient Status and Observation Status used in making the determination above are those provided in the CMS Coverage Manual, Chapter 1 and Chapter 6, section 70.4.    "   Sincerely,  Pola Saenz MD  Utilization Review  Physician Advisor  [facility].

## 2023-07-06 NOTE — PROGRESS NOTES
Care Management Discharge Note    Discharge Date: 07/06/2023       Discharge Disposition: Home    Discharge Services: None    Discharge DME:  none    Discharge Transportation: family or friend will provide    Private pay costs discussed: Not applicable    Education Provided on the Discharge Plan:  Yes, per bedside RN  Persons Notified of Discharge Plans: pt  Patient/Family in Agreement with the Plan: yes    Handoff Referral Completed: Yes    Additional Information:  2:29 PM  Pt medically ready for discharge home via family transport.  No discharge needs.    Advanced health care directive received and emailed to hyun simmons.  Hard copy placed in pt's chart.        ANNE Lomax         [General Appearance - In No Acute Distress] : in no acute distress [] : no respiratory distress [Respiration, Rhythm And Depth] : normal respiratory rhythm and effort [Auscultation Breath Sounds / Voice Sounds] : lungs were clear to auscultation bilaterally [Heart Rate And Rhythm] : heart rate was normal and rhythm regular [Affect] : the affect was normal [Mood] : the mood was normal

## 2023-07-06 NOTE — PLAN OF CARE
Pt A&Ox4, able to make needs known. RA, denies SOB. Pt reports generalized pain, PRN oxy given x2 this shift, effective. Pt denies N/V, CP. Advanced to Renal Diet, medications whole. NS on telle. Pt plan to discharge home today via family transport. Left unit with belognings around 1815 with all belongings.    Problem: Plan of Care - These are the overarching goals to be used throughout the patient stay.    Goal: Readiness for Transition of Care  Outcome: Progressing     Spencer Oliva RN on 7/6/2023 at 6:08 PM

## 2023-07-06 NOTE — PLAN OF CARE
Problem: Pain Acute  Goal: Optimal Pain Control and Function  Outcome: Progressing     Problem: Electrolyte Imbalance  Goal: Electrolyte Imbalance: Plan of Care  Outcome: Progressing     Pt A&O4, able to make needs known. Pt reported some pain 4/10 at the beginning of shift, prn oxycodone given and pt has denied pain the remainder of the shift. Pt had multiple soft/loose BM's this shift after receiving schedule stool softeners. Potassium this shift 4.5, phos 4.9, other labs improving. Pt also had an episode of emesis this shift, PRN antiemetic offered, but pt declined. Pt sleeping between cares. NSR on tele. Pt up SBA to toilet.    Ngoc Landis, RN  [phone]

## 2023-07-06 NOTE — PROGRESS NOTES
RENAL PROGRESS NOTE    HPI: This is a 66yo F with PMH significant for polycystic kidney disease s/p bilateral nephrectomy, ESKD on chronic hemodialysis on TTS schedule, anemia, HTN, HLD, hypothyroidism and chronic pain. Presented to the emergency department with complaint of abdominal pain, nausea and vomiting. She was found to have a partial distal small bowel obstruction with hyperkalemia.  Resolved.     ASSESSMENT/PLAN:  ESKD - On chronic in-center hemodialysis. Dialyzes as outpatient at [facility] on TTS schedule (3 hour runs, JESSI AVF). Underlying disease is polycystic kidney disease and she is s/p bilateral nephrectomy. Will dialyze on normal schedule today. Pt had HD run yesterday, Wed 6/5 and per RN note tolerated well.     Hyperkalemia - At presentation to ER K at 6.3; had missed HD session. Was unable to give Kayexalate or Loelma due to SBO. HD session lat evening, and morning labs show K to 4.5.    Partial small bowel obstruction -   CT abdomen/pelvis showed multiple loops of mildly dilated/fluid-filled loops of small bowel suggestive of partial small bowel bowel obstruction, adynamic ileus also possible and moderate volume of stool throughout the colon.   On clear liquids and receiving bowel regimen.   improving    Hypertension - Prior to admission on amlodipine, labetalol, clonidine,  Lisinopril has been on hold with HoTN on HD    Hypothyroidism - On replacement    Anxiety/depression - On sertraline, uses ativan    Chronic pain - On nortriptyline, oxycodone (on hold with SBO, constipation)    Hyperlipidemia - On statin    Anemia - Receives PETE and iron with dialysis  HGB in 11s    Tobacco use         Subjective:  Pt doing ok on tx, BP trnding a bit low, asymptomatic, so backing off UF today.  No abd pain.  Hopin to go home today.     REVIEW OF SYSTEMS:  Comprehensive review of systems negative except as noted in HPI      Past Medical History:   Diagnosis Date     Anemia in chronic kidney  disease      Anxiety      Arthritis      Brain aneurysm     Cavernous segment of the right & left carotid arteries. See Neurosurg note 6/16/21.     Chronic low back pain     Managed by Pain Clinic     Depressive disorder 2013     Disease of thyroid gland      ESRD (end stage renal disease) on dialysis (H) 07/2020     GERD (gastroesophageal reflux disease)      Hepatic lesion      Hyperlipidemia      Hypertension      Nephrolithiasis      Neuromuscular disorder (H)      Osteoporosis      PKD (polycystic kidney disease) 09/01/1990     PTSD (post-traumatic stress disorder)      Tobacco abuse      Vitamin D deficiency        No current facility-administered medications on file prior to encounter.  amLODIPine (NORVASC) 5 MG tablet, Take 5 mg by mouth At Bedtime  B Complex-C (SUPER B COMPLEX PO), Take 1 tablet by mouth daily  calcitRIOL (ROCALTROL) 0.5 MCG capsule, Take 0.5 mcg by mouth three times a week Given at HD. Abbi Noland, Sat  cholecalciferol (VITAMIN D3) 25 mcg (1000 units) capsule, Take 1 capsule by mouth daily  cloNIDine (CATAPRES) 0.1 MG tablet, Take 0.2 mg by mouth At Bedtime  Epoetin Adam (EPOGEN IJ), Inject 1,000 Units into the vein three times a week At dialysis.  Iron Sucrose (VENOFER IV), Inject 100 mg into the vein once a week At dialysis  labetalol (NORMODYNE) 300 MG tablet, Take 450 mg by mouth At Bedtime  labetalol (NORMODYNE) 300 MG tablet, Take 600 mg by mouth every morning  levothyroxine (SYNTHROID/LEVOTHROID) 50 MCG tablet, Take 50 mcg by mouth At Bedtime Takes in the middle of the night.  lisinopril (ZESTRIL) 40 MG tablet, Take 20 mg by mouth 2 times daily  LORazepam (ATIVAN) 1 MG tablet, Take 1 mg by mouth daily (before lunch)  nortriptyline (PAMELOR) 75 MG capsule, Take 75 mg by mouth At Bedtime  oxyCODONE (ROXICODONE) 10 MG tablet, Use oxycodone 10mg every 3 hours as needed for pain on 2/6 & 2/7. Then return to usual home dose.  rosuvastatin (CRESTOR) 10 MG tablet, Take 10 mg by mouth At  Bedtime  senna-docusate (SENOKOT-S/PERICOLACE) 8.6-50 MG tablet, Take 2 tablets by mouth 2 times daily  sertraline (ZOLOFT) 100 MG tablet, Take 150 mg by mouth At Bedtime  sevelamer carbonate (RENVELA) 800 MG tablet, Take 800-2,400 mg by mouth 3 times daily When eating; range of 1-3 tabs depending on meal size        No current outpatient medications on file.      ALLERGIES/SENSITIVITIES:  Allergies   Allergen Reactions     Penicillins Other (See Comments) and Shortness Of Breath     Breathing problem.  breathing       Carvedilol GI Disturbance     Nausea and vomiting     Ciprofloxacin Muscle Pain (Myalgia)     Morphine Other (See Comments)     Vomiting     No Clinical Screening - See Comments      PN: LW CM1: Contrast Intercapsular - Nonionic Reaction :     Nsaids Other (See Comments)     Sulfa Antibiotics Itching     Sulfamethoxazole-Trimethoprim      Other reaction(s): itching     Levofloxacin Muscle Pain (Myalgia) and Rash     Social History     Tobacco Use     Smoking status: Former     Packs/day: 0.50     Years: 14.00     Pack years: 7.00     Types: Cigarettes     Quit date: 2021     Years since quittin.0     Smokeless tobacco: Never   Substance Use Topics     Alcohol use: Not Currently     Drug use: Never     I have reviewed this patient's family history and updated it with pertinent information if needed.  Family History   Problem Relation Age of Onset     Polycystic Kidney Diease Mother      Hypertension Mother      Kidney Disease Mother      Cerebrovascular Disease Mother      Chronic Obstructive Pulmonary Disease Father      Multiple Sclerosis Father      Polycystic Kidney Diease Sister      Cardiac Sudden Death Sister 52     Hypertension Sister      Kidney Disease Sister      Polycystic Kidney Diease Maternal Grandmother      Hypertension Maternal Grandmother      Kidney Disease Maternal Grandmother      Cerebrovascular Disease Maternal Grandmother      Heart Disease Maternal Grandfather       Hyperlipidemia Paternal Grandmother      Cerebrovascular Disease Paternal Grandmother      Coronary Artery Disease Paternal Grandfather      Pulmonary Embolism Paternal Grandfather      Anesthesia Reaction No family hx of          PHYSICAL EXAM:  Physical Exam   Temp: 99  F (37.2  C) Temp src: Oral BP: (!) 140/67 Pulse: 87   Resp: 13 SpO2: 93 % O2 Device: None (Room air)    Vitals:    07/05/23 0028 07/05/23 0424 07/06/23 0016   Weight: 39 kg (86 lb) 42.4 kg (93 lb 8 oz) 39.7 kg (87 lb 8 oz)     Vital Signs with Ranges  Temp:  [98.1  F (36.7  C)-99.6  F (37.6  C)] 99  F (37.2  C)  Pulse:  [86-96] 87  Resp:  [13-20] 13  BP: (109-170)/(60-82) 140/67  SpO2:  [93 %-98 %] 93 %  I/O last 3 completed shifts:  In: 480 [P.O.:480]  Out: 2000 [Other:2000]      Patient Vitals for the past 72 hrs:   Weight   07/06/23 0016 39.7 kg (87 lb 8 oz)   07/05/23 0424 42.4 kg (93 lb 8 oz)   07/05/23 0028 39 kg (86 lb)       General - Alert and no apparent distress. Resting flat in bed, seen while on HD  HEENT - Normocephalic, atraumatic  Respiratory - sats good on RA  Cardiovascular - BP soft 80's ,backing off UF   Extremities - Well perfused, trace lower extremity edema. Left upper arm AVF  Integumentary - Warm, dry, no rash  Neurologic - Grossly intact; no focal deficits noted    Laboratory:     Recent Labs   Lab 07/05/23  1557 07/05/23  0539 07/05/23  0058   WBC 11.8* 13.6* 12.6*   RBC 3.25* 3.29* 3.30*   HGB 11.0* 11.0* 11.1*   HCT 32.8* 33.0* 33.4*    328 328       Basic Metabolic Panel:  Recent Labs   Lab 07/06/23  0521 07/06/23  0336 07/05/23  2152 07/05/23  0717 07/05/23  0644 07/05/23  0539 07/05/23  0058 07/05/23  0058 06/30/23 2014     --   --   --   --  128*  --  128* 130*   POTASSIUM 4.5  --   --   --   --  6.5*  --  6.3* 4.6   CHLORIDE 95*  --   --   --   --  86*  --  86* 90*   CO2 27  --   --   --   --  21*  --  23 24   BUN 38*  --   --   --   --  99*  --  95* 54.9*   CR 4.46*  --   --   --   --  8.07*  --   8.04* 5.86*   GLC 90 87 96 120* 60* 57*   < > 74 110*   GAVIN 8.9  --   --   --   --  8.7  --  9.0 9.3    < > = values in this interval not displayed.       INRNo lab results found in last 7 days.    Recent Labs   Lab Test 07/05/23  0539 07/05/23  0058   POTASSIUM 6.5* 6.3*   CHLORIDE 86* 86*   BUN 99* 95*      Recent Labs   Lab Test 07/05/23  0058 06/30/23 2014 08/18/22  1731 05/19/21  1520   ALBUMIN 2.6* 3.4*   < >  --    BILITOTAL 0.8 0.4   < >  --    ALT 17 9   < >  --    AST 35 15   < >  --    PROTEIN  --   --   --  100*    < > = values in this interval not displayed.       Personally reviewed today's laboratory studies            Thank you for involving us in the care of this patient. We will continue to follow along with you.      Savanah Dixon PA-C  [facility]  [phone]

## 2023-07-06 NOTE — DISCHARGE SUMMARY
[facility]  Discharge Summary - Medicine & Pediatrics       Date of Admission:  7/5/2023  Date of Discharge:  7/6/2023  Discharging Provider: Dr. Kolton Earl  Discharge Service: Hospitalist Service    Discharge Diagnoses   Principal Problem:    Constipation  Active Problems:    Hyperkalemia    ESRD (end stage renal disease) on dialysis (H)      Clinically Significant Risk Factors     # Cachexia: Estimated body mass index is 17.09 kg/m  as calculated from the following:    Height as of this encounter: 1.524 m (5').    Weight as of this encounter: 39.7 kg (87 lb 8 oz).       Discharge Disposition   Discharged to home  Condition at discharge: Stable    Hospital Course   Serene Tipton is a 67 year old female with a past medical history of history of PKD s/p bilateral nephrectomy in 2/20/2023, ESRD on HD TTS schedule, active transplant evaluation, anemia, HTN, HLD, hypothyroidism, active tobacco use, chronic pain and is admitted for partial SBO, N/V, hyperkalemia and worsening kidney function was admitted on 7/5/2023 for constipation and hyperkalemia. Patient arrived after 2 weeks of intermittent abdominal pain, nausea and vomiting. Vitals on arrival to the ED stable.  Lactic acid, lipase unremarkable. Patient missed HD session 7/4/2023 due to being weak secondary to N/V and abdominal pain. HD session arranged inpatient on 7/5/2023 as creatinine on admission 8.04 (baseline 3-5), potassium 6.3, and Na 128. There were no EKG changes. CT abdomen pelvis showed multiple loops of mildly dilated/fluid-filled loops of small bowel suggestive of partial small bowel bowel obstruction vs adynamic ileus and moderate volume of stool throughout the colon. Due to surgical history and concern for SBO, surgery was consulted, believed constipation to be more likely. MiraLAX bowel regimen started, patient began passing gas and shortly thereafter had multiple loose bowel movements.  Patient dialyzed 7/6/2023  prior to discharge to get her back on TTS schedule.    Consultations This Hospital Stay   PHYSICAL THERAPY ADULT IP CONSULT  OCCUPATIONAL THERAPY ADULT IP CONSULT  NEPHROLOGY IP CONSULT  CARE MANAGEMENT / SOCIAL WORK IP CONSULT  SURGERY GENERAL IP CONSULT    Code Status   Full Code       The patient was discussed with Dr. Kolton Earl.    Nicola Cavazos [facility]  [address] [address]  Phone: [phone]  Fax: [phone]  ______________________________________________________________________    Physical Exam   Vital Signs: Temp: 97.7  F (36.5  C) Temp src: Oral BP: (!) 150/72 Pulse: 91   Resp: 16 SpO2: 99 % O2 Device: None (Room air)    Weight: 87 lbs 8 oz  Constitutional: awake, alert, cooperative, no apparent distress, and appears stated age. More animated  Eyes: Lids and lashes normal, pupils equal, sclera clear, conjunctiva normal  ENT: Normocephalic, without obvious abnormality, atraumatic, external ears without lesions, oral pharynx with moist mucous membranes.  Respiratory: No increased work of breathing, good air exchange, clear to auscultation bilaterally, no crackles or wheezing  Cardiovascular: Regular rate and rhythm, normal S1 and S2.  GI: No scars, hyperactive bowel sounds, soft, non-distended, non-tender, no masses palpated, no hepatosplenomegaly. Mild tenderness to palpation on left and right sides.  Skin: no bruising or bleeding and normal skin color, texture, turgor.  Psych: Appropriate mood and affect. Notably more cheerful today.        Primary Care Physician   Ramos Lowry    Discharge Orders   No discharge procedures on file.    Significant Results and Procedures   Most Recent 3 CBC's:Recent Labs   Lab Test 07/05/23  1557 07/05/23  0539 07/05/23  0058   WBC 11.8* 13.6* 12.6*   HGB 11.0* 11.0* 11.1*   * 100 101*    328 328     Most Recent 3 BMP's:Recent Labs   Lab Test 07/06/23  0521 07/06/23  0337  07/05/23  2152 07/05/23  0644 07/05/23  0539 07/05/23  0058     --   --   --  128* 128*   POTASSIUM 4.5  --   --   --  6.5* 6.3*   CHLORIDE 95*  --   --   --  86* 86*   CO2 27  --   --   --  21* 23   BUN 38*  --   --   --  99* 95*   CR 4.46*  --   --   --  8.07* 8.04*   ANIONGAP 14  --   --   --  21* 19*   GAVIN 8.9  --   --   --  8.7 9.0   GLC 90 87 96   < > 57* 74    < > = values in this interval not displayed.   ,   Results for orders placed or performed during the hospital encounter of 07/05/23   CT Abdomen Pelvis w/o Contrast    Narrative    EXAM: CT ABDOMEN PELVIS W/O CONTRAST  LOCATION: [facility]  DATE: 7/5/2023    INDICATION: abd pain.  COMPARISON: 11/11/2022. Chest CT 02/26/2023  TECHNIQUE: CT scan of the abdomen and pelvis was performed without IV contrast. Multiplanar reformats were obtained. Dose reduction techniques were used.  CONTRAST: None.    FINDINGS:   LOWER CHEST: Resolution of pleural effusions that were present on most recent exam. Severe atelectasis right middle lobe partially visualized on this exam.    HEPATOBILIARY: Multiple hepatic cysts again noted. Distended gallbladder now but no stones or inflammatory changes.    PANCREAS: Mild duct dilatation unchanged.    SPLEEN: Normal size. Benign calcification.    ADRENAL GLANDS: No adrenal lesions evident.    KIDNEYS/BLADDER: Interval bilateral nephrectomies.    BOWEL: Multiple loops of mildly dilated air and fluid-filled small bowel are present new compared to previous study and most suggestive of partial distal small bowel obstruction. Transition point not clearly identified. Moderate amount of stool seen   throughout colon. Trace volume ascites.    LYMPH NODES: Normal.    VASCULATURE: Moderate diffuse atherosclerotic disease.    PELVIC ORGANS: Minimal ascites. No pelvic mass.    MUSCULOSKELETAL: Anterior fusion at lumbosacral junction unchanged. L5 pars defects.      Impression    IMPRESSION:   1.  Multiple loops  of mildly dilated air/fluid-filled loops of small bowel most suggestive of partial distal SBO. Adynamic ileus also possible.  2.  Moderate volume of stool throughout colon but colon otherwise unremarkable.  3.  Interval bilateral nephrectomies.  4.  Tiny volume ascites.  5.  Significant atelectasis right middle lobe.     XR Chest 2 Views    Narrative    EXAM: XR CHEST 2 VIEWS  LOCATION: [facility]  DATE: 7/5/2023    INDICATION: Cough.  COMPARISON: Chest x-ray 2 views 02/25/2023 at 1030 hours.      Impression    IMPRESSION: Platelike atelectasis right base, more apparent today. Resolved left basilar opacities seen previously. Resolved small bilateral pleural effusions seen on prior study. Mild cardiac enlargement. Venous hypertension has resolved.   Atherosclerotic thoracic aorta. Degenerative changes both shoulders and the spine. Cholecystectomy clips. Previously seen monitoring leads have been removed.       Discharge Medications   Current Discharge Medication List      CONTINUE these medications which have NOT CHANGED    Details   amLODIPine (NORVASC) 5 MG tablet Take 5 mg by mouth At Bedtime      B Complex-C (SUPER B COMPLEX PO) Take 1 tablet by mouth daily      calcitRIOL (ROCALTROL) 0.5 MCG capsule Take 0.5 mcg by mouth three times a week Given at HD. Tues, Thurs, Sat      cholecalciferol (VITAMIN D3) 25 mcg (1000 units) capsule Take 1 capsule by mouth daily      cloNIDine (CATAPRES) 0.1 MG tablet Take 0.2 mg by mouth At Bedtime      Epoetin Adam (EPOGEN IJ) Inject 1,000 Units into the vein three times a week At dialysis.      Iron Sucrose (VENOFER IV) Inject 100 mg into the vein once a week At dialysis      !! labetalol (NORMODYNE) 300 MG tablet Take 450 mg by mouth At Bedtime      !! labetalol (NORMODYNE) 300 MG tablet Take 600 mg by mouth every morning      levothyroxine (SYNTHROID/LEVOTHROID) 50 MCG tablet Take 50 mcg by mouth At Bedtime Takes in the middle of the night.       lisinopril (ZESTRIL) 40 MG tablet Take 20 mg by mouth 2 times daily      LORazepam (ATIVAN) 1 MG tablet Take 1 mg by mouth daily (before lunch)      nortriptyline (PAMELOR) 75 MG capsule Take 75 mg by mouth At Bedtime      oxyCODONE (ROXICODONE) 10 MG tablet Use oxycodone 10mg every 3 hours as needed for pain on 2/6 & 2/7. Then return to usual home dose.  Refills: 0    Associated Diagnoses: Acute post-operative pain      rosuvastatin (CRESTOR) 10 MG tablet Take 10 mg by mouth At Bedtime      senna-docusate (SENOKOT-S/PERICOLACE) 8.6-50 MG tablet Take 2 tablets by mouth 2 times daily      sertraline (ZOLOFT) 100 MG tablet Take 150 mg by mouth At Bedtime      sevelamer carbonate (RENVELA) 800 MG tablet Take 800-2,400 mg by mouth 3 times daily When eating; range of 1-3 tabs depending on meal size       !! - Potential duplicate medications found. Please discuss with provider.        Allergies   Allergies   Allergen Reactions     Penicillins Other (See Comments) and Shortness Of Breath     Breathing problem.  breathing       Carvedilol GI Disturbance     Nausea and vomiting     Ciprofloxacin Muscle Pain (Myalgia)     Morphine Other (See Comments)     Vomiting     No Clinical Screening - See Comments      PN: LW CM1: Contrast Intercapsular - Nonionic Reaction :     Nsaids Other (See Comments)     Sulfa Antibiotics Itching     Sulfamethoxazole-Trimethoprim      Other reaction(s): itching     Levofloxacin Muscle Pain (Myalgia) and Rash

## 2023-07-06 NOTE — CONSULTS
General Surgery Consultation  Serene Tipton MRN# [id number]   Age/Sex: 67 year old female YOB: 1955     Reason for consult: 1. SBO (small bowel obstruction) (H)    2. ESRD (end stage renal disease) on dialysis (H)    3. Hyperkalemia                                Assessment and Plan:   Assessment:  Abdominal pain with nausea and vomiting with most likely obstipation/constipation and less likely small bowel obstruction- now having bowel movements and minimal pain    Plan:  -Okay advance his diet as tolerated  -We will sign off.  Please call if there is any questions concerns.          Chief Complaint:     Chief Complaint   Patient presents with     Fatigue     Nausea & Vomiting     Abdominal Pain        History is obtained from the patient    HPI:   Serene Tipton is a 67 year old female with history of bilateral nephrectomy, anterior approach spinal surgery, ESRD on HD, hypertension, chronic pain who presents to the emergency room with worsening abdominal pain associated nausea and vomiting over the past couple of weeks.  Since being admitted she has had multiple bowel movements and feeling much better.  Has very minimal pain.  Tolerating clear liquid diet and is very hungry..          Past Medical History:     Past Medical History:   Diagnosis Date     Anemia in chronic kidney disease      Anxiety      Arthritis      Brain aneurysm     Cavernous segment of the right & left carotid arteries. See Neurosurg note 6/16/21.     Chronic low back pain     Managed by Pain Clinic     Depressive disorder 2013     Disease of thyroid gland      ESRD (end stage renal disease) on dialysis (H) 07/2020     GERD (gastroesophageal reflux disease)      Hepatic lesion      Hyperlipidemia      Hypertension      Nephrolithiasis      Neuromuscular disorder (H)      Osteoporosis      PKD (polycystic kidney disease) 09/01/1990     PTSD (post-traumatic stress disorder)      Tobacco abuse      Vitamin D deficiency                Past Surgical History:     Past Surgical History:   Procedure Laterality Date     BACK SURGERY      spinal fusion w/hardware     BIOPSY Left 09/01/1990    Breast     BREAST LUMPECTOMY, RT/LT Left     Lumpectomy     CYST REMOVAL Right     rt wrist     CYSTECTOMY PILONIDAL  1981     EYE SURGERY  2008    lasik     FORMATION ARTERIOVENOUS FISTULA    07/28/2020     FRACTURE SURGERY       NEPHRECTOMY BILATERAL Bilateral 2/1/2023    Procedure: bilateral native nephrectomy;  Surgeon: Alana Giang MD;  Location: [facility] OR     ORTHOPEDIC SURGERY Right 2005    Shoulder     OTHER SURGICAL HISTORY      carpal tunnel repair     SPINE SURGERY               Social History:    reports that she quit smoking about 2 years ago. Her smoking use included cigarettes. She has a 7.00 pack-year smoking history. She has never used smokeless tobacco. She reports that she does not currently use alcohol. She reports that she does not use drugs.           Family History:     Family History   Problem Relation Age of Onset     Polycystic Kidney Diease Mother      Hypertension Mother      Kidney Disease Mother      Cerebrovascular Disease Mother      Chronic Obstructive Pulmonary Disease Father      Multiple Sclerosis Father      Polycystic Kidney Diease Sister      Cardiac Sudden Death Sister 52     Hypertension Sister      Kidney Disease Sister      Polycystic Kidney Diease Maternal Grandmother      Hypertension Maternal Grandmother      Kidney Disease Maternal Grandmother      Cerebrovascular Disease Maternal Grandmother      Heart Disease Maternal Grandfather      Hyperlipidemia Paternal Grandmother      Cerebrovascular Disease Paternal Grandmother      Coronary Artery Disease Paternal Grandfather      Pulmonary Embolism Paternal Grandfather      Anesthesia Reaction No family hx of               Allergies:     Allergies   Allergen Reactions     Penicillins Other (See Comments) and Shortness Of Breath     Breathing  problem.  breathing       Carvedilol GI Disturbance     Nausea and vomiting     Ciprofloxacin Muscle Pain (Myalgia)     Morphine Other (See Comments)     Vomiting     No Clinical Screening - See Comments      PN: LW CM1: Contrast Intercapsular - Nonionic Reaction :     Nsaids Other (See Comments)     Sulfa Antibiotics Itching     Sulfamethoxazole-Trimethoprim      Other reaction(s): itching     Levofloxacin Muscle Pain (Myalgia) and Rash              Medications:     Prior to Admission medications    Medication Sig Start Date End Date Taking? Authorizing Provider   amLODIPine (NORVASC) 5 MG tablet Take 5 mg by mouth At Bedtime   Yes Reported, Patient   B Complex-C (SUPER B COMPLEX PO) Take 1 tablet by mouth daily   Yes Unknown, Entered By History   calcitRIOL (ROCALTROL) 0.5 MCG capsule Take 0.5 mcg by mouth three times a week Given at HD. Tues, Thurs, Sat   Yes Unknown, Entered By History   cholecalciferol (VITAMIN D3) 25 mcg (1000 units) capsule Take 1 capsule by mouth daily   Yes Reported, Patient   cloNIDine (CATAPRES) 0.1 MG tablet Take 0.2 mg by mouth At Bedtime   Yes Reported, Patient   Epoetin Adam (EPOGEN IJ) Inject 1,000 Units into the vein three times a week At dialysis.   Yes Unknown, Entered By History   Iron Sucrose (VENOFER IV) Inject 100 mg into the vein once a week At dialysis   Yes Unknown, Entered By History   labetalol (NORMODYNE) 300 MG tablet Take 450 mg by mouth At Bedtime   Yes Unknown, Entered By History   labetalol (NORMODYNE) 300 MG tablet Take 600 mg by mouth every morning   Yes Unknown, Entered By History   levothyroxine (SYNTHROID/LEVOTHROID) 50 MCG tablet Take 50 mcg by mouth At Bedtime Takes in the middle of the night. 11/15/22  Yes Reported, Patient   lisinopril (ZESTRIL) 40 MG tablet Take 20 mg by mouth 2 times daily   Yes Reported, Patient   LORazepam (ATIVAN) 1 MG tablet Take 1 mg by mouth daily (before lunch) 8/16/20  Yes Reported, Patient   nortriptyline (PAMELOR) 75 MG capsule  Take 75 mg by mouth At Bedtime 11/21/22  Yes Reported, Patient   oxyCODONE (ROXICODONE) 10 MG tablet Use oxycodone 10mg every 3 hours as needed for pain on 2/6 & 2/7. Then return to usual home dose. 2/6/23  Yes Sarah Betancourt APRN CNP   rosuvastatin (CRESTOR) 10 MG tablet Take 10 mg by mouth At Bedtime   Yes Reported, Patient   senna-docusate (SENOKOT-S/PERICOLACE) 8.6-50 MG tablet Take 2 tablets by mouth 2 times daily   Yes Unknown, Entered By History   sertraline (ZOLOFT) 100 MG tablet Take 150 mg by mouth At Bedtime   Yes Reported, Patient   sevelamer carbonate (RENVELA) 800 MG tablet Take 800-2,400 mg by mouth 3 times daily When eating; range of 1-3 tabs depending on meal size 7/1/21  Yes Reported, Patient              Review of Systems:   The Review of Systems is negative other than noted in the HPI            Physical Exam:     Patient Vitals for the past 24 hrs:   BP Temp Temp src Pulse Resp SpO2 Weight   07/06/23 1015 130/66 -- -- 92 -- 98 % --   07/06/23 0942 (!) 144/70 98.2  F (36.8  C) Oral 94 16 96 % --   07/06/23 0720 (!) 140/67 99  F (37.2  C) Oral 87 13 93 % --   07/06/23 0333 (!) 143/68 99.6  F (37.6  C) Oral 89 18 95 % --   07/06/23 0016 -- -- -- -- -- -- 39.7 kg (87 lb 8 oz)   07/05/23 2353 124/60 99.3  F (37.4  C) Oral 88 20 96 % --   07/05/23 2102 131/66 -- -- -- -- -- --   07/05/23 2100 131/66 98.5  F (36.9  C) Oral 86 -- 98 % --   07/05/23 2059 131/66 -- -- -- -- -- --   07/05/23 1524 (!) 162/74 99.2  F (37.3  C) Oral 96 14 95 % --   07/05/23 1500 -- -- -- 95 -- -- --   07/05/23 1423 (!) 167/80 98.4  F (36.9  C) -- 94 -- -- --   07/05/23 1415 (!) 170/82 -- -- 93 -- -- --   07/05/23 1400 (!) 160/77 -- -- 93 -- -- --   07/05/23 1345 (!) 154/72 -- -- 91 -- -- --   07/05/23 1330 (!) 164/81 -- -- 91 -- -- --   07/05/23 1315 (!) 166/79 -- -- 90 -- -- --   07/05/23 1300 (!) 155/80 -- -- 88 -- -- --   07/05/23 1245 136/70 -- -- 88 -- -- --   07/05/23 1230 137/73 -- -- 90 -- -- --   07/05/23 1215  (!) 140/71 -- -- 89 -- -- --   07/05/23 1200 109/68 -- -- 88 -- -- --   07/05/23 1145 (!) 142/71 -- -- 90 -- -- --   07/05/23 1130 (!) 145/72 -- -- 88 -- -- --   07/05/23 1121 137/74 98.1  F (36.7  C) -- 89 -- -- --   07/05/23 1100 139/69 -- -- 89 -- -- --          Intake/Output Summary (Last 24 hours) at 7/6/2023 1028  Last data filed at 7/6/2023 0800  Gross per 24 hour   Intake 480 ml   Output 2000 ml   Net -1520 ml      Constitutional:   awake, alert, cooperative, no apparent distress, and appears stated age             Abdomen:   Soft and minimal tenderness no rebound or peritoneal signs present.                   Data:         All imaging studies reviewed by me.  Reviewed images as well as radiology report    Results for orders placed or performed during the hospital encounter of 07/05/23 (from the past 24 hour(s))   CBC with platelets   Result Value Ref Range    WBC Count 11.8 (H) 4.0 - 11.0 10e3/uL    RBC Count 3.25 (L) 3.80 - 5.20 10e6/uL    Hemoglobin 11.0 (L) 11.7 - 15.7 g/dL    Hematocrit 32.8 (L) 35.0 - 47.0 %     (H) 78 - 100 fL    MCH 33.8 (H) 26.5 - 33.0 pg    MCHC 33.5 31.5 - 36.5 g/dL    RDW 14.0 10.0 - 15.0 %    Platelet Count 311 150 - 450 10e3/uL   Glucose by meter   Result Value Ref Range    GLUCOSE BY METER POCT 96 70 - 99 mg/dL   Glucose by meter   Result Value Ref Range    GLUCOSE BY METER POCT 87 70 - 99 mg/dL   Basic metabolic panel   Result Value Ref Range    Sodium 136 136 - 145 mmol/L    Potassium 4.5 3.5 - 5.0 mmol/L    Chloride 95 (L) 98 - 107 mmol/L    Carbon Dioxide (CO2) 27 22 - 31 mmol/L    Anion Gap 14 5 - 18 mmol/L    Urea Nitrogen 38 (H) 8 - 22 mg/dL    Creatinine 4.46 (H) 0.60 - 1.10 mg/dL    Calcium 8.9 8.5 - 10.5 mg/dL    Glucose 90 70 - 125 mg/dL    GFR Estimate 10 (L) >60 mL/min/1.73m2   Magnesium   Result Value Ref Range    Magnesium 1.6 (L) 1.8 - 2.6 mg/dL   Phosphorus   Result Value Ref Range    Phosphorus 4.9 (H) 2.5 - 4.5 mg/dL        Dane Bullock PA-C          Dane MONTIEL  [facility]  [address] [address]  Phone- [phone]  Fax- [phone]

## 2023-07-06 NOTE — PROGRESS NOTES
HEMODIALYSIS TREATMENT NOTE    Date: 7/6/2023  Time: 12:36 AM    Data:  Pre Wt: 39.7kg (7/6)  Desired Wt:  To be determined  Post Wt: Not weighed post - scale not available  Weight change: 2  kg  Ultrafiltration - Post Run Net Total Removed (mL): 2000 mL  Vascular Access Status: patent  Dialyzer Rinse: Streaked  Total Blood Volume Processed:  Liters  Total Dialysis (Treatment) Time: 3 Hours    Lab:   No labs drawn during HD     PRE HD Interventions/Assessment:  Arrived to pts room 302 to initiate 3hr HD Tx. Report received from CARMEN Palmer. VSS pre. Denying any pain/discomfort. Aiming to remove 3kg as tolerated (Tx plan orders 1-3kg). +2 edema to bilat feet/ankles - pt reports she hasn't been as strict with fluid intake since hospitalization. AVF cannulated with no difficulty. HD initiated with no issues. Heparin bolus via HD machine given at start. Continuous Heparin dose started. No complaints/concerns voiced.     INTRA HD:  Tolerating HD well. x1 episode of asymptomatic hypotension - resolved with UF off for 15 mins. Had first meal. Family at bedside visiting.    UF off with 30 mins remaining of HD tx per FNP Jie Gaviria. SBP 89/58, pt asymptomatic.    POST HD:   Tolerated HD well. Tx stopped 7 mins early as pt needed bathroom for BM. VSS post. Denying any pain/discomfort.  kg removed. AVF de-accessed, pressure applied for 10 mins for both sites, new dressings applied. No complaints/concerns voiced by pt. Report given to CARMEN Palmer.     Plan:    As per Nephrology team.

## 2023-07-06 NOTE — PROGRESS NOTES
"PRIMARY DIAGNOSIS: \"GENERIC\" NURSING  OUTPATIENT/OBSERVATION GOALS TO BE MET BEFORE DISCHARGE:  1. ADLs back to baseline: Yes    2. Activity and level of assistance: Up with standby assistance.    3. Pain status: Improved-controlled with oral pain medications.    4. Return to near baseline physical activity: Yes     Discharge Planner Nurse   Safe discharge environment identified: Yes  Barriers to discharge: No       Entered by: Spencer Oliva RN 07/06/2023 12:15 PM     Please review provider order for any additional goals.   Nurse to notify provider when observation goals have been met and patient is ready for discharge.    Spencer Oliva RN on 7/6/2023 at 12:15 PM    "

## 2023-07-06 NOTE — PROGRESS NOTES
07/06/23 1635   Appointment Info   Signing Clinician's Name / Credentials (PT) Polo Keating, PT, DPT   Quick Adds   Quick Adds Certification   Living Environment   People in Home alone   Current Living Arrangements apartment   Home Accessibility no concerns   Self-Care   Usual Activity Tolerance good   Current Activity Tolerance moderate   Equipment Currently Used at Home none   Fall history within last six months yes   Number of times patient has fallen within last six months 5   Activity/Exercise/Self-Care Comment Pt reports falling 5 times this past Tuesday before coming in to the hospital   General Information   Onset of Illness/Injury or Date of Surgery 07/05/23   Referring Physician Dr. Earl   Patient/Family Therapy Goals Statement (PT) Increase strength   Pertinent History of Current Problem (include personal factors and/or comorbidities that impact the POC) Hyperkalemia, ESRD, Constipation   Existing Precautions/Restrictions fall   Weight-Bearing Status - LLE full weight-bearing   Weight-Bearing Status - RLE full weight-bearing   Range of Motion (ROM)   ROM Comment WFL   Strength (Manual Muscle Testing)   Strength Comments WFL   Bed Mobility   Bed Mobility supine-sit   Supine-Sit [place] (Bed Mobility) supervision   Transfers   Transfers sit-stand transfer   Sit-Stand Transfer   Sit-Stand [place] (Transfers) supervision   Gait/Stairs (Locomotion)   [place] Level (Gait) supervision   Distance in Feet 10'   Distance in Feet (Gait) 200'   Pattern (Gait) step-through   Deviations/Abnormal Patterns (Gait) [place] decreased;stride length decreased   Clinical Impression   Criteria for Skilled Therapeutic Intervention Yes, treatment indicated   PT Diagnosis (PT) Impaired functional mobility   Influenced by the following impairments Weakness   Functional limitations due to impairments Transfers, gait   Clinical Presentation (PT Evaluation Complexity) Stable/Uncomplicated   Clinical Presentation  Rationale Pt presents as medically diagnosed   Clinical Decision Making (Complexity) low complexity   Planned Therapy Interventions (PT) gait training;patient/family education;strengthening;transfer training   Risk & Benefits of therapy have been explained care plan/treatment goals reviewed;patient   PT Total Evaluation Time   PT Eval, Low Complexity Minutes (70308) 10   Therapy Certification   Start of care date 07/06/23   Certification date from 07/06/23   Certification date to 08/06/23   Medical Diagnosis ESRD, SBO   Physical Therapy Goals   PT Frequency Daily   PT Predicted Duration/Target Date for Goal Attainment 07/07/23   PT Goals Transfers;Gait;PT Goal 1   PT: Transfers Supervision/stand-by assist;Sit to/from stand   PT: Gait Supervision/stand-by assist;Greater than 200 feet   PT: Goal 1 I with LE strengthening program   Interventions   Interventions Quick Adds Gait Training;Therapeutic Activity   Therapeutic Activity   Therapeutic Activities: dynamic activities to improve functional performance Minutes (80366) 10   Symptoms Noted During/After Treatment None   Treatment Detail/Skilled Intervention Supine to sit Mod I with HOB elevated, SBA at EOB, left here at end of session. Increase time for set up. Sit<>stand SBA. No concerns in this area, pt does not feel she has any OT concerns at this time.   Gait Training   Gait Training Minutes (87040) 6   Symptoms Noted During/After Treatment (Gait Training) fatigue   Treatment Detail/Skilled Intervention Pt amb moderately well in the halls SBA with no AD, moving slowly but no LOB or adverse s/s, reports feeling more fatigued and weak than normal, educated on importance of amb and to do this with nursing staff as able until d/c. Cues for navigation and safety.   [place] Level (Gait Training) contact guard   Physical Assistance Level (Gait Training) verbal cues;supervision   Weight Bearing (Gait Training) full weight-bearing   Pattern Analysis (Gait Training)  swing-through gait   Gait Analysis Deviations decreased [place];decreased step length   PT Discharge Planning   PT Plan Amb, LE strength program   PT Discharge Recommendation (DC Rec) (S)  home with assist   PT Rationale for DC Rec Pt amb well household distances, no stairs, transfers SBA. talked about setting up services at home to help with IADLs and other activities as needed.   PT Brief overview of current status amb no ' and SBA for mobility   [facility]  OUTPATIENT PHYSICAL THERAPY EVALUATION  PLAN OF TREATMENT FOR OUTPATIENT REHABILITATION  (COMPLETE FOR INITIAL CLAIMS ONLY)  Patient's Last Name, First Name, M.I.  YOB: 1955  Serene Tipton                        Provider's Name  [facility] Medical Record No.  [id number]                             Onset Date:  (P) 07/05/23   Start of Care Date:  (P) 07/06/23   Type:     _X_PT   ___OT   ___SLP Medical Diagnosis:  (P) ESRD, SBO              PT Diagnosis:  (P) Impaired functional mobility Visits from SOC:  1     See note for plan of treatment, functional goals and certification details    I CERTIFY THE NEED FOR THESE SERVICES FURNISHED UNDER        THIS PLAN OF TREATMENT AND WHILE UNDER MY CARE     (Physician co-signature of this document indicates review and certification of the therapy plan).

## 2023-07-10 ENCOUNTER — TELEPHONE (OUTPATIENT)
Dept: PHARMACY | Facility: OTHER | Age: 68
End: 2023-07-10
Payer: COMMERCIAL

## 2023-07-10 ENCOUNTER — TELEPHONE (OUTPATIENT)
Dept: TRANSPLANT | Facility: CLINIC | Age: 68
End: 2023-07-10
Payer: COMMERCIAL

## 2023-07-10 NOTE — TELEPHONE ENCOUNTER
MTM referral from: Transitions of Care (recent hospital discharge or ED visit)    MT referral outreach attempt #2 on July 10, 2023 at 10:01 AM      Outcome: Patient not reachable after several attempts, will route to [facility] Pharmacist/Provider as an FYI.  [facility] scheduling number is [phone].  Thank you for the referral.    Use [employer] for the carrier/Plan on the flowsheet    Raj Syed [facility]

## 2023-07-10 NOTE — TELEPHONE ENCOUNTER
Returned patient's call.  Let her know that the images that [facility] is calling to scheduling were what we talked about on the phone after selection committee. They will have to be done prior to being brought back to the committee.  She was recently hospitalized for SBO/significant stool burden and did not remember having a chest xray or ct abdomen.  She felt that these were uploaded to her chart in error although her name is on them and the images match the dianosis of SBO and show her history of bilateral nephrectomies.  Patient states she may have just been exhausted.  She will call [facility] back and get her imaging scheduled so we can move forward with transplant. She will call me when these are complete so I can ensure I have access to the records.

## 2023-07-10 NOTE — TELEPHONE ENCOUNTER
Returned patient call.  Unable to leave a VM as it was not set up and continued to ring.  Will try at a later time.

## 2023-07-11 ENCOUNTER — APPOINTMENT (OUTPATIENT)
Dept: CT IMAGING | Facility: CLINIC | Age: 68
End: 2023-07-11
Attending: EMERGENCY MEDICINE
Payer: COMMERCIAL

## 2023-07-11 ENCOUNTER — HOSPITAL ENCOUNTER (EMERGENCY)
Facility: CLINIC | Age: 68
Discharge: HOME | End: 2023-07-11
Attending: EMERGENCY MEDICINE | Admitting: EMERGENCY MEDICINE
Payer: COMMERCIAL

## 2023-07-11 VITALS
RESPIRATION RATE: 23 BRPM | HEIGHT: 60 IN | DIASTOLIC BLOOD PRESSURE: 56 MMHG | BODY MASS INDEX: 17.18 KG/M2 | WEIGHT: 87.52 LBS | HEART RATE: 88 BPM | SYSTOLIC BLOOD PRESSURE: 112 MMHG | OXYGEN SATURATION: 96 % | TEMPERATURE: 98.3 F

## 2023-07-11 DIAGNOSIS — S09.90XA CLOSED HEAD INJURY, INITIAL ENCOUNTER: ICD-10-CM

## 2023-07-11 DIAGNOSIS — K59.00 CONSTIPATION, UNSPECIFIED CONSTIPATION TYPE: ICD-10-CM
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
## 2023-07-11 LAB
ABO/RH(D): NORMAL
ALBUMIN SERPL-MCNC: 2.3 G/DL (ref 3.5–5)
ALP SERPL-CCNC: 86 U/L (ref 45–120)
ALT SERPL W P-5'-P-CCNC: 26 U/L (ref 0–45)
ANION GAP SERPL CALCULATED.3IONS-SCNC: 12 MMOL/L (ref 5–18)
ANTIBODY SCREEN: NEGATIVE
APTT PPP: 37 SECONDS (ref 22–38)
AST SERPL W P-5'-P-CCNC: 34 U/L (ref 0–40)
ATRIAL RATE - MUSE: 76 BPM
BASOPHILS # BLD AUTO: 0.1 10E3/UL (ref 0–0.2)
BASOPHILS NFR BLD AUTO: 1 %
BILIRUB SERPL-MCNC: 0.8 MG/DL (ref 0–1)
BUN SERPL-MCNC: 57 MG/DL (ref 8–22)
CALCIUM SERPL-MCNC: 8.3 MG/DL (ref 8.5–10.5)
CHLORIDE BLD-SCNC: 92 MMOL/L (ref 98–107)
CO2 SERPL-SCNC: 26 MMOL/L (ref 22–31)
CREAT SERPL-MCNC: 4.48 MG/DL (ref 0.6–1.1)
DIASTOLIC BLOOD PRESSURE - MUSE: 59 MMHG
EOSINOPHIL # BLD AUTO: 0 10E3/UL (ref 0–0.7)
EOSINOPHIL NFR BLD AUTO: 0 %
ERYTHROCYTE [DISTWIDTH] IN BLOOD BY AUTOMATED COUNT: 14.5 % (ref 10–15)
GFR SERPL CREATININE-BSD FRML MDRD: 10 ML/MIN/1.73M2
GLUCOSE BLD-MCNC: 80 MG/DL (ref 70–125)
HCT VFR BLD AUTO: 26.9 % (ref 35–47)
HGB BLD-MCNC: 9 G/DL (ref 11.7–15.7)
IMM GRANULOCYTES # BLD: 0.1 10E3/UL
IMM GRANULOCYTES NFR BLD: 1 %
INR PPP: 1.4 (ref 0.85–1.15)
INTERPRETATION ECG - MUSE: NORMAL
LYMPHOCYTES # BLD AUTO: 0.8 10E3/UL (ref 0.8–5.3)
LYMPHOCYTES NFR BLD AUTO: 7 %
MCH RBC QN AUTO: 34.4 PG (ref 26.5–33)
MCHC RBC AUTO-ENTMCNC: 33.5 G/DL (ref 31.5–36.5)
MCV RBC AUTO: 103 FL (ref 78–100)
MONOCYTES # BLD AUTO: 1.2 10E3/UL (ref 0–1.3)
MONOCYTES NFR BLD AUTO: 11 %
NEUTROPHILS # BLD AUTO: 9.4 10E3/UL (ref 1.6–8.3)
NEUTROPHILS NFR BLD AUTO: 80 %
NRBC # BLD AUTO: 0 10E3/UL
NRBC BLD AUTO-RTO: 0 /100
P AXIS - MUSE: 57 DEGREES
PLATELET # BLD AUTO: 288 10E3/UL (ref 150–450)
POTASSIUM BLD-SCNC: 4.4 MMOL/L (ref 3.5–5)
PR INTERVAL - MUSE: 182 MS
PROT SERPL-MCNC: 4.8 G/DL (ref 6–8)
QRS DURATION - MUSE: 92 MS
QT - MUSE: 416 MS
QTC - MUSE: 468 MS
R AXIS - MUSE: 20 DEGREES
RBC # BLD AUTO: 2.62 10E6/UL (ref 3.8–5.2)
SODIUM SERPL-SCNC: 130 MMOL/L (ref 136–145)
SPECIMEN EXPIRATION DATE: NORMAL
SYSTOLIC BLOOD PRESSURE - MUSE: 109 MMHG
T AXIS - MUSE: 51 DEGREES
VENTRICULAR RATE- MUSE: 76 BPM
WBC # BLD AUTO: 11.5 10E3/UL (ref 4–11)

## 2023-07-11 PROCEDURE — 80053 COMPREHEN METABOLIC PANEL: CPT | Performed by: EMERGENCY MEDICINE

## 2023-07-11 PROCEDURE — 86850 RBC ANTIBODY SCREEN: CPT | Performed by: EMERGENCY MEDICINE

## 2023-07-11 PROCEDURE — 86901 BLOOD TYPING SEROLOGIC RH(D): CPT | Performed by: EMERGENCY MEDICINE

## 2023-07-11 PROCEDURE — 99285 EMERGENCY DEPT VISIT HI MDM: CPT | Mod: 25

## 2023-07-11 PROCEDURE — 36415 COLL VENOUS BLD VENIPUNCTURE: CPT | Performed by: EMERGENCY MEDICINE

## 2023-07-11 PROCEDURE — 85610 PROTHROMBIN TIME: CPT | Performed by: EMERGENCY MEDICINE

## 2023-07-11 PROCEDURE — 93005 ELECTROCARDIOGRAM TRACING: CPT | Performed by: EMERGENCY MEDICINE

## 2023-07-11 PROCEDURE — 70450 CT HEAD/BRAIN W/O DYE: CPT

## 2023-07-11 PROCEDURE — 85730 THROMBOPLASTIN TIME PARTIAL: CPT | Performed by: EMERGENCY MEDICINE

## 2023-07-11 PROCEDURE — 74176 CT ABD & PELVIS W/O CONTRAST: CPT

## 2023-07-11 PROCEDURE — 72125 CT NECK SPINE W/O DYE: CPT

## 2023-07-11 PROCEDURE — 85014 HEMATOCRIT: CPT | Performed by: EMERGENCY MEDICINE

## 2023-07-11 ASSESSMENT — ACTIVITIES OF DAILY LIVING (ADL)
ADLS_ACUITY_SCORE: 35
ADLS_ACUITY_SCORE: 35
ADLS_ACUITY_SCORE: 33

## 2023-07-11 NOTE — ED PROVIDER NOTES
EMERGENCY DEPARTMENT ENCOUNTER      NAME: Serene Tipton  AGE: 67 year old female  YOB: 1955  MRN: [id number]  EVALUATION DATE & TIME: 7/11/2023 11:00 AM    PCP: Ramos Lowry    ED PROVIDER: Jenn Mariano MD      Chief Complaint   Patient presents with     Fatigue     Fall     Head Injury         FINAL IMPRESSION:  1. Fall, initial encounter    2. Closed head injury, initial encounter    3. Constipation, unspecified constipation type          ED COURSE & MEDICAL DECISION MAKING:    Pertinent Labs & Imaging studies reviewed. (See chart for details)    11:30 AM I introduced myself to the patient, obtained patient history, performed a physical exam, and discussed plan for ED workup including potential diagnostic laboratory/imaging studies and interventions.  1:25 PM I rechecked on patient and spoke with them about the plan going forward.     67 year old female with a pertinent history of polycystic kidney disease s/p bilateral nephrectomy, hypertension, ESRD on dialysis T, Th, Sat, hyperlipidemia, tobacco abuse who presents to this ED for evaluation after a fall at dialysis.     Presents from dialysis after getting up to use the restroom and tripping on a rug and falling and striking her head.  She has a small area of swelling with slight ecchymosis near her right temple periorbital area.  She has no vision changes or eye pain.  She is not significantly tender over this.  No overlying laceration.  She denies any significant pain to the head or neck pain.  She states that dialysis insisted that she come be evaluated Due to striking her head.  She is not anticoagulated.  She is on dialysis due to polycystic kidney disease and having previous bilateral nephrectomies.  She only received about 20 minutes of her dialysis run.  She states she is already spoken to [facility] and they plan to do her run tomorrow.  We will obtain CT of the head as well as the cervical spine to evaluate for any signs of  injury.  She otherwise has no complaints of pain from the fall.  She did not lose consciousness.  She has no focal neuro deficits.  She does report she has been having diarrhea since her admission where she had constipation noticed with partial small bowel obstruction.  She has been using MiraLAX twice a day.  She states she would not of come to the ER to be evaluated for this as she was expecting this with the MiraLAX she was using.  She does states she stopped the MiraLAX over the last day due to the diarrhea.  On exam of her abdomen she does have tenderness that is generalized and is slightly distended so did discuss with the patient obtaining a repeat CT scan without contrast of the abdomen to ensure no sign of recurrent obstruction or worsening.  She is in agreement with this.  She does reiterate that she would not of presented for this complaint and really only came because dialysis wanted her evaluated after the fall.  On presentation her initial blood pressure was 78/50 however do question the validity of this as a repeat was completely normal and she has remained normal here throughout multiple hours of her stay.  She denies any lightheadedness or prodromal symptoms prior to the fall or any loss of consciousness.  Possible this blood pressure was an error.  Also possible she could be slightly volume down related to the diarrhea however at the same time she does have evidence of volume overload with leg swelling and did not get her full dialysis run today thus I do not want to give her fluids at this point as her blood pressure has now normalized and stayed normal for multiple hours.  She denies any infectious symptoms other than the diarrhea which would be expected with the MiraLAX.  Overall very low suspicion for infectious etiology and it is possible this was just slightly low after dialysis and improved spontaneously.  Did obtain an EKG due to the lack of full dialysis which does not show any signs of  acute ischemia or signs of hyperkalemic changes.  Obtain laboratory studies as well.  CMP reveals a creatinine of 4.48 with a BUN of 57 which is near her baseline.  Potassium is normal at 4.4.  She does question if she is potentially slightly dehydrated overall with the diarrhea.  She has mild leg swelling bilaterally that she states is baseline.  Her INR is 1.4.  White blood cell count 11.5 which is near her recent baseline.  Hemoglobin of 9.  She is not having any melena, hematochezia, or hematemesis.  At this point she does not have any indication for emergent dialysis and has plans to have her dialysis made up tomorrow already.  She declines any need for pain medication.  CT of the head and cervical spine  did not reveal any acute pathology.    CT of the abdomen and pelvis without contrast revealed mild air and fluid dilatation of the small intestine with possible decompression of the distalmost small bowel and no visible transition point are all unchanged from prior.  Moderate to large amount of stool seen throughout the colon unchanged.  Trace ascites and generalized mesenteric edema unchanged.  Overall no significant change.  Patient is feeling well on reevaluation.  Discussed that she should continue the MiraLAX but could potentially try just 1 capful a day to reduce the diarrhea but to continue cleaning out her colon as she does still have constipation.  She was in agreement with this plan.  She very much would like to go home and was requesting discharge.  Discussed importance of following up tomorrow as planned for make-up dialysis.  Also discussed following up with her primary care doctor.  She voiced understanding.  She was given indications for return emergency department.  She was in agreement.  She discharged home in stable condition.         At the conclusion of the encounter I discussed the results of all of the tests and the disposition. The questions were answered. The patient or family  acknowledged understanding and was agreeable with the care plan.         Medical Decision Making    History:    Supplemental history from: Documented in chart, if applicable    External Record(s) reviewed: Other: 7/5/23 [facility] admission    Work Up:    Chart documentation includes differential considered and any EKGs or imaging independently interpreted by provider.    In additional to work up documented, I considered the following work up: Documented in chart, if applicable.    External consultation:    Discussion of management with another provider: Documented in chart, if applicable    Complicating factors:    Care impacted by chronic illness: Chronic Kidney Disease, Hyperlipidemia, Hypertension and Other: ESRD    Care affected by social determinants of health: Access to Medical Care    Disposition considerations: Discharge. I recommended the patient continue their current prescription strength medication(s): miralax. See documentation for any additional details.      MEDICATIONS GIVEN IN THE EMERGENCY:  Medications - No data to display    NEW PRESCRIPTIONS STARTED AT TODAY'S ER VISIT  Discharge Medication List as of 7/11/2023  4:18 PM             =================================================================    HPI    Patient information was obtained from: Patient    Use of : N/A      Serene Tipton is a 67 year old female with a pertinent history of polycystic kidney disease s/p bilateral nephrectomy, hypertension, ESRD on dialysis T, Th, Sat, hyperlipidemia, tobacco abuse who presents to this ED for evaluation after a fall at dialysis.     Per Chart Review: Patient was admitted at [facility] on 7/5/23-7/6/23 for evaluation of a partial SBO and constipation. CT abdomen and pelvis showed multiple loops of mildly dilated/fluid filled loops of small bowel suggestive of partial SBO. Surgery consulted, said it was constipation. Discharged on MiraLax.     Patient was at dialysis today (runs are  "Tue/Thurs/Sat), when she had a witnessed fall and did hit her head. Patient has a \"goose egg\" on her right forehead with a slight headache. She reports that she was trying to get to use the bathroom when she tripped on a rug. No preceding light headedness or dizziness. No loss of consciousness. She denies any extremity pain, neck pain, back pain, chest pain, shortness of breath. She is not on any anticoagulation. She only received 20 minutes of her dialysis. Last dialysis was Saturday. Is planning on returning to [facility] tomorrow to finish dialysis.     Around a week ago, patient states that she was hospitalized for a partial SBO and was discharged on MiraLax 3x daily and is also on fluid restrictions. She reports she feels slightly dehydrated and stopped the miralax yesterday due to diarrhea. She states she is having mild abdominal pain due to the diarrhea but would not have come to the ER for evaluation of this. She states the diarrhea is due to her miralax regimen. She denies any melena, hematochezia, hematemesis.     Denies any loss of consciousness, dizziness, vertigo, numbness, tingling, weakness, nausea, vomiting, fever, or other complaints.       REVIEW OF SYSTEMS   Pertinent positives and negatives are documented in the HPI. All other systems reviewed and are negative.      PAST MEDICAL HISTORY:  Past Medical History:   Diagnosis Date     Anemia in chronic kidney disease      Anxiety      Arthritis      Brain aneurysm     Cavernous segment of the right & left carotid arteries. See Neurosurg note 6/16/21.     Chronic low back pain     Managed by Pain Clinic     Depressive disorder 2013     Disease of thyroid gland      ESRD (end stage renal disease) on dialysis (H) 07/2020     GERD (gastroesophageal reflux disease)      Hepatic lesion      Hyperlipidemia      Hypertension      Nephrolithiasis      Neuromuscular disorder (H)      Osteoporosis      PKD (polycystic kidney disease) 09/01/1990     PTSD " (post-traumatic stress disorder)      Tobacco abuse      Vitamin D deficiency        PAST SURGICAL HISTORY:  Past Surgical History:   Procedure Laterality Date     BACK SURGERY      spinal fusion w/hardware     BIOPSY Left 09/01/1990    Breast     BREAST LUMPECTOMY, RT/LT Left     Lumpectomy     CYST REMOVAL Right     rt wrist     CYSTECTOMY PILONIDAL  1981     EYE SURGERY  2008    lasik     FORMATION ARTERIOVENOUS FISTULA    07/28/2020     FRACTURE SURGERY       NEPHRECTOMY BILATERAL Bilateral 2/1/2023    Procedure: bilateral native nephrectomy;  Surgeon: Alana Giang MD;  Location: [facility] OR     ORTHOPEDIC SURGERY Right 2005    Shoulder     OTHER SURGICAL HISTORY      carpal tunnel repair     SPINE SURGERY             CURRENT MEDICATIONS:    amLODIPine (NORVASC) 5 MG tablet  B Complex-C (SUPER B COMPLEX PO)  calcitRIOL (ROCALTROL) 0.5 MCG capsule  cholecalciferol (VITAMIN D3) 25 mcg (1000 units) capsule  cloNIDine (CATAPRES) 0.1 MG tablet  Epoetin Adam (EPOGEN IJ)  Iron Sucrose (VENOFER IV)  labetalol (NORMODYNE) 300 MG tablet  labetalol (NORMODYNE) 300 MG tablet  levothyroxine (SYNTHROID/LEVOTHROID) 50 MCG tablet  lisinopril (ZESTRIL) 40 MG tablet  LORazepam (ATIVAN) 1 MG tablet  nortriptyline (PAMELOR) 75 MG capsule  oxyCODONE (ROXICODONE) 10 MG tablet  rosuvastatin (CRESTOR) 10 MG tablet  senna-docusate (SENOKOT-S/PERICOLACE) 8.6-50 MG tablet  sertraline (ZOLOFT) 100 MG tablet  sevelamer carbonate (RENVELA) 800 MG tablet        ALLERGIES:  Allergies   Allergen Reactions     Penicillins Other (See Comments) and Shortness Of Breath     Breathing problem.  breathing       Carvedilol GI Disturbance     Nausea and vomiting     Ciprofloxacin Muscle Pain (Myalgia)     Morphine Other (See Comments)     Vomiting     No Clinical Screening - See Comments      PN: LW CM1: Contrast Intercapsular - Nonionic Reaction :     Nsaids Other (See Comments)     Sulfa Antibiotics Itching     Sulfamethoxazole-Trimethoprim      Other  reaction(s): itching     Levofloxacin Muscle Pain (Myalgia) and Rash       FAMILY HISTORY:  Family History   Problem Relation Age of Onset     Polycystic Kidney Diease Mother      Hypertension Mother      Kidney Disease Mother      Cerebrovascular Disease Mother      Chronic Obstructive Pulmonary Disease Father      Multiple Sclerosis Father      Polycystic Kidney Diease Sister      Cardiac Sudden Death Sister 52     Hypertension Sister      Kidney Disease Sister      Polycystic Kidney Diease Maternal Grandmother      Hypertension Maternal Grandmother      Kidney Disease Maternal Grandmother      Cerebrovascular Disease Maternal Grandmother      Heart Disease Maternal Grandfather      Hyperlipidemia Paternal Grandmother      Cerebrovascular Disease Paternal Grandmother      Coronary Artery Disease Paternal Grandfather      Pulmonary Embolism Paternal Grandfather      Anesthesia Reaction No family hx of        SOCIAL HISTORY:   Social History     Socioeconomic History     Marital status: Single   Tobacco Use     Smoking status: Former     Packs/day: 0.50     Years: 14.00     Pack years: 7.00     Types: Cigarettes     Quit date: 2021     Years since quittin.0     Smokeless tobacco: Never   Substance and Sexual Activity     Alcohol use: Not Currently     Drug use: Never       VITALS:  /56   Pulse 88   Temp 98.3  F (36.8  C) (Temporal)   Resp 23   Ht 1.524 m (5')   Wt 39.7 kg (87 lb 8.4 oz)   SpO2 96%   BMI 17.09 kg/m      PHYSICAL EXAM    Physical Exam  Constitutional: Well developed, Well nourished, NAD, GCS 15  HENT: Normocephalic, normal hematoma to the right temple area just lateral to the right periorbital area with some mild ecchymosis.  No overlying laceration.  No significant tenderness over this.  She has full extraocular movements of her eyes.  Midface is stable and no tenderness palpation of the facial bones, no otorrhea or rhinorrhea, no septal hematoma or hemotympanum bilaterally,  Bilateral external ears normal, Oropharynx normal, mucous membranes moist, Nose normal. Neck- Normal range of motion, No midline cervical tenderness, Supple, No stridor.    Eyes: PERRL, EOMI, Conjunctiva normal, No discharge.   Respiratory: Normal breath sounds, No respiratory distress, No wheezing or crackles, Speaks in full sentences easily.   Cardiovascular: Normal heart rate, Regular rhythm, No murmurs, No rubs, No gallops. 2+ radial pulses bilaterally. No reproducible chest tenderness.    GI: Bowel sounds normal, Soft, mild generalized abdominal tenderness, slightly distended, no masses, No rebound or guarding.   Musculoskeletal: 2+ DP pulses.  1+ bilateral lower extremity edema.  Good range of motion in all major joints. No tenderness to palpation or major deformities noted. No midline tenderness of the CTLS spine.    Integument: Warm, Dry  Neurologic: Alert & oriented x 3, 5/5 strength in all 4 extremities bilaterally. Sensation intact to light touch in all 4 extremities and the face bilaterally. No focal deficits noted. Normal gait.  Cranial nerves II through XII are intact.  Visual fields intact and vision grossly normal.  Psychiatric: Affect normal, Judgment normal, Mood normal. Cooperative.      LAB:  All pertinent labs reviewed and interpreted.  Results for orders placed or performed during the hospital encounter of 07/11/23   Head CT w/o contrast    Impression    IMPRESSION:    1.  No evidence of acute intracranial hemorrhage or mass effect.  2.  Mild nonspecific white matter changes.  3.  Mild brain parenchymal volume loss.   Cervical spine CT w/o contrast    Impression    IMPRESSION:  1.  No evidence of acute fracture or subluxation of the cervical spine by CT imaging.  2.  Degenerative cervical spondylosis as described above.   CT Abdomen Pelvis w/o Contrast    Impression    IMPRESSION:   1.  Mild air and fluid dilatation of the small intestine with possible decompression of the distalmost small  bowel and no visible transition point are all unchanged.   2.  Moderate to large amount of stool seen throughout the colon unchanged.   3.  Trace ascites and generalized mesenteric edema unchanged.  4.  Overall no significant change.  5.  Multiple stable incidental findings as detailed above.     Comprehensive metabolic panel   Result Value Ref Range    Sodium 130 (L) 136 - 145 mmol/L    Potassium 4.4 3.5 - 5.0 mmol/L    Chloride 92 (L) 98 - 107 mmol/L    Carbon Dioxide (CO2) 26 22 - 31 mmol/L    Anion Gap 12 5 - 18 mmol/L    Urea Nitrogen 57 (H) 8 - 22 mg/dL    Creatinine 4.48 (H) 0.60 - 1.10 mg/dL    Calcium 8.3 (L) 8.5 - 10.5 mg/dL    Glucose 80 70 - 125 mg/dL    Alkaline Phosphatase 86 45 - 120 U/L    AST 34 0 - 40 U/L    ALT 26 0 - 45 U/L    Protein Total 4.8 (L) 6.0 - 8.0 g/dL    Albumin 2.3 (L) 3.5 - 5.0 g/dL    Bilirubin Total 0.8 0.0 - 1.0 mg/dL    GFR Estimate 10 (L) >60 mL/min/1.73m2   Result Value Ref Range    INR 1.40 (H) 0.85 - 1.15   Partial thromboplastin time   Result Value Ref Range    aPTT 37 22 - 38 Seconds   CBC with platelets and differential   Result Value Ref Range    WBC Count 11.5 (H) 4.0 - 11.0 10e3/uL    RBC Count 2.62 (L) 3.80 - 5.20 10e6/uL    Hemoglobin 9.0 (L) 11.7 - 15.7 g/dL    Hematocrit 26.9 (L) 35.0 - 47.0 %     (H) 78 - 100 fL    MCH 34.4 (H) 26.5 - 33.0 pg    MCHC 33.5 31.5 - 36.5 g/dL    RDW 14.5 10.0 - 15.0 %    Platelet Count 288 150 - 450 10e3/uL    % Neutrophils 80 %    % Lymphocytes 7 %    % Monocytes 11 %    % Eosinophils 0 %    % Basophils 1 %    % Immature Granulocytes 1 %    NRBCs per 100 WBC 0 <1 /100    Absolute Neutrophils 9.4 (H) 1.6 - 8.3 10e3/uL    Absolute Lymphocytes 0.8 0.8 - 5.3 10e3/uL    Absolute Monocytes 1.2 0.0 - 1.3 10e3/uL    Absolute Eosinophils 0.0 0.0 - 0.7 10e3/uL    Absolute Basophils 0.1 0.0 - 0.2 10e3/uL    Absolute Immature Granulocytes 0.1 <=0.4 10e3/uL    Absolute NRBCs 0.0 10e3/uL   ECG 12-LEAD WITH MUSE (LHE)   Result Value Ref  Range    Systolic Blood Pressure 109 mmHg    Diastolic Blood Pressure 59 mmHg    Ventricular Rate 76 BPM    Atrial Rate 76 BPM    DC Interval 182 ms    QRS Duration 92 ms     ms    QTc 468 ms    P Axis 57 degrees    R AXIS 20 degrees    T Axis 51 degrees    Interpretation ECG       Sinus rhythm with Premature atrial complexes  ST & T wave abnormality, consider anterolateral ischemia  Abnormal ECG  When compared with ECG of 05-JUL-2023 01:48,  Premature atrial complexes are now Present  QRS duration has decreased  Confirmed by SEE ED PROVIDER NOTE FOR, ECG INTERPRETATION ([id number]),  Charline Cruz ([id number]) on 7/11/2023 4:44:16 PM     Adult Type and Screen   Result Value Ref Range    ABO/RH(D) A NEG     Antibody Screen Negative Negative    SPECIMEN EXPIRATION DATE [id number]        RADIOLOGY:  Reviewed all pertinent imaging. Please see official radiology report.  CT Abdomen Pelvis w/o Contrast   Final Result   IMPRESSION:    1.  Mild air and fluid dilatation of the small intestine with possible decompression of the distalmost small bowel and no visible transition point are all unchanged.    2.  Moderate to large amount of stool seen throughout the colon unchanged.    3.  Trace ascites and generalized mesenteric edema unchanged.   4.  Overall no significant change.   5.  Multiple stable incidental findings as detailed above.         Cervical spine CT w/o contrast   Final Result   IMPRESSION:   1.  No evidence of acute fracture or subluxation of the cervical spine by CT imaging.   2.  Degenerative cervical spondylosis as described above.      Head CT w/o contrast   Final Result   IMPRESSION:     1.  No evidence of acute intracranial hemorrhage or mass effect.   2.  Mild nonspecific white matter changes.   3.  Mild brain parenchymal volume loss.          EKG:    Performed at: 11:22 am    Impression: Sinus rhythm with PACs. No sign of acute ischemia or hyperkalemic changes.     Rate: 76  Rhythm: Sinus  with PAC  Axis: Normal  CO Interval: 182  QRS Interval: 92  QTc Interval: 468  ST Changes: Non specific  Comparison: Unchanged from 7/5/23    I have independently reviewed and interpreted the EKG(s) documented above.    PROCEDURES:   None      [employer] Documentation:   CMS Diagnoses:               I, Estrellita Coyle, am serving as a scribe to document services personally performed by Jenn Mariano MD based on my observation and the provider's statements to me. I, Jenn Mariano MD, attest that Estrellita Coyle is acting in a scribe capacity, has observed my performance of the services and has documented them in accordance with my direction.    Jenn Mariano MD  [facility] EMERGENCY ROOM  Counts include 234 beds at the [facility] [id number] [phone]       Jenn Mariano MD  07/13/23 1789

## 2023-07-11 NOTE — ED TRIAGE NOTES
Pt presents via EMS from dialysis where patient had a fall after trying to ambulate to the restroom to hafe a bowel movement. Pt tripped on a rug and hit her head. Was only 20 minutes into her run. Has no kidneys and gets dialysis 3x weekly. Pt had partial bowel obstruction earlier in the week and was placed on Senna and Miralax.      Triage Assessment     Row Name 07/11/23 1043       Triage Assessment (Adult)    Airway WDL WDL       Respiratory WDL    Respiratory WDL WDL       Skin Circulation/Temperature WDL    Skin Circulation/Temperature WDL WDL       Cardiac WDL    Cardiac WDL WDL       Peripheral/Neurovascular WDL    Peripheral Neurovascular WDL WDL       Cognitive/Neuro/Behavioral WDL    Cognitive/Neuro/Behavioral WDL WDL

## 2023-07-12 ENCOUNTER — TRANSFERRED RECORDS (OUTPATIENT)
Dept: HEALTH INFORMATION MANAGEMENT | Facility: CLINIC | Age: 68
End: 2023-07-12
Payer: COMMERCIAL

## 2023-07-12 ENCOUNTER — TEAM CONFERENCE (OUTPATIENT)
Dept: TRANSPLANT | Facility: CLINIC | Age: 68
End: 2023-07-12
Payer: COMMERCIAL

## 2023-07-12 ENCOUNTER — TELEPHONE (OUTPATIENT)
Dept: TRANSPLANT | Facility: CLINIC | Age: 68
End: 2023-07-12
Payer: COMMERCIAL

## 2023-07-12 DIAGNOSIS — Z76.82 ORGAN TRANSPLANT CANDIDATE: ICD-10-CM

## 2023-07-12 DIAGNOSIS — Z01.818 PRE-TRANSPLANT EVALUATION FOR KIDNEY TRANSPLANT: ICD-10-CM

## 2023-07-12 NOTE — LETTER
PHYSICIAN ORDER   ALA/PRA BLOOD    DATE & TIME ISSUED: 2023 9:52 AM  PATIENT NAME: Serene Tipton   : 1955     [facility] MR# [if applicable]: [id number]     DIAGNOSIS/ICD-10 CODE: Awaiting Organ transplant [Z76.82}  EXPIRES: (1 YEAR AFTER DATE ISSUED)  Every 3 months: First draw due upon receipt of this order and PRA kit, please.     Please draw 20ml of blood in red top (plain) tube for Antileukocyte Antibody (ALA or PRA).  Label tubes with the patient s name, complete lab slip.    Mailers, lab slips with instructions are sent to patient separately.  Call the Outreach Lab at [phone] to reorder mailers.  Mail blood to (this address is also on the mailers):    IMMUNOLOGY LABORATORY   [facility]    [address]  [place] [address]      .  Gena Sanders in Immunology and Transplantation  Surgical Director, Kidney & Pancreas Transplant Programs  Medical Director, Solid Organ Transplant Unit

## 2023-07-12 NOTE — TELEPHONE ENCOUNTER
Discussed with the team today whether or not the patient will need frailty testing. She told me in a phone conversation that she now only weighs 85 lbs which puts her at a BMI of 17. Had been seen by dietician virtually and no documented frailty testing has been done in the past, but patient did undergo bilateral native nephrectomy with Dr. Giang on February 1, 2023.  For this reason, team requesting Dr. Giang's opinion on her need for this testing. Will follow up with him. Of note, her repeat imaging was not back yet at the time of the Selection Committee so unable to review this with the team and will bring images to imaging review on Tuesday.

## 2023-07-12 NOTE — TELEPHONE ENCOUNTER
Patient called to update me on her scans.  She has done her iliacs and he CT A/P at [facility] [place] and is just leaving the appointment.  I let her know that I will keep an eye out for these scans to come through today before Committee otherwise I will bring them to the team next week.  She expressed good understanding of this.

## 2023-07-13 ENCOUNTER — APPOINTMENT (OUTPATIENT)
Dept: CT IMAGING | Facility: CLINIC | Age: 68
DRG: 388 | End: 2023-07-13
Payer: COMMERCIAL

## 2023-07-13 ENCOUNTER — HOSPITAL ENCOUNTER (INPATIENT)
Facility: CLINIC | Age: 68
LOS: 6 days | Discharge: HOME | DRG: 388 | End: 2023-07-19
Attending: EMERGENCY MEDICINE | Admitting: FAMILY MEDICINE
Payer: COMMERCIAL

## 2023-07-13 ENCOUNTER — APPOINTMENT (OUTPATIENT)
Dept: CT IMAGING | Facility: CLINIC | Age: 68
DRG: 388 | End: 2023-07-13
Attending: EMERGENCY MEDICINE
Payer: COMMERCIAL

## 2023-07-13 ENCOUNTER — APPOINTMENT (OUTPATIENT)
Dept: RADIOLOGY | Facility: CLINIC | Age: 68
DRG: 388 | End: 2023-07-13
Attending: EMERGENCY MEDICINE
Payer: COMMERCIAL
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
## 2023-07-13 DIAGNOSIS — E16.2 HYPOGLYCEMIA: ICD-10-CM

## 2023-07-13 DIAGNOSIS — I15.0 RENOVASCULAR HYPERTENSION: Primary | ICD-10-CM

## 2023-07-13 DIAGNOSIS — E87.5 HYPERKALEMIA: ICD-10-CM

## 2023-07-13 DIAGNOSIS — K56.609 SMALL BOWEL OBSTRUCTION (H): ICD-10-CM

## 2023-07-13 LAB
ALBUMIN SERPL-MCNC: 2.2 G/DL (ref 3.5–5)
ALP SERPL-CCNC: 90 U/L (ref 45–120)
ALT SERPL W P-5'-P-CCNC: 28 U/L (ref 0–45)
ANION GAP SERPL CALCULATED.3IONS-SCNC: 16 MMOL/L (ref 5–18)
ANION GAP SERPL CALCULATED.3IONS-SCNC: 20 MMOL/L (ref 5–18)
AST SERPL W P-5'-P-CCNC: 31 U/L (ref 0–40)
ATRIAL RATE - MUSE: 78 BPM
BASOPHILS # BLD AUTO: 0.1 10E3/UL (ref 0–0.2)
BASOPHILS NFR BLD AUTO: 1 %
BILIRUB SERPL-MCNC: 1.1 MG/DL (ref 0–1)
BUN SERPL-MCNC: 113 MG/DL (ref 8–22)
BUN SERPL-MCNC: 68 MG/DL (ref 8–22)
CALCIUM SERPL-MCNC: 7.3 MG/DL (ref 8.5–10.5)
CALCIUM SERPL-MCNC: 8.3 MG/DL (ref 8.5–10.5)
CHLORIDE BLD-SCNC: 91 MMOL/L (ref 98–107)
CHLORIDE BLD-SCNC: 96 MMOL/L (ref 98–107)
CO2 SERPL-SCNC: 19 MMOL/L (ref 22–31)
CO2 SERPL-SCNC: 23 MMOL/L (ref 22–31)
CREAT SERPL-MCNC: 4.11 MG/DL (ref 0.6–1.1)
CREAT SERPL-MCNC: 6.44 MG/DL (ref 0.6–1.1)
DIASTOLIC BLOOD PRESSURE - MUSE: 58 MMHG
EOSINOPHIL # BLD AUTO: 0 10E3/UL (ref 0–0.7)
EOSINOPHIL NFR BLD AUTO: 0 %
ERYTHROCYTE [DISTWIDTH] IN BLOOD BY AUTOMATED COUNT: 14.9 % (ref 10–15)
FERRITIN SERPL-MCNC: 1003 NG/ML (ref 11–328)
GFR SERPL CREATININE-BSD FRML MDRD: 11 ML/MIN/1.73M2
GFR SERPL CREATININE-BSD FRML MDRD: 7 ML/MIN/1.73M2
GLUCOSE BLD-MCNC: 120 MG/DL (ref 70–125)
GLUCOSE BLD-MCNC: 60 MG/DL (ref 70–125)
GLUCOSE BLDC GLUCOMTR-MCNC: 169 MG/DL (ref 70–99)
GLUCOSE BLDC GLUCOMTR-MCNC: 72 MG/DL (ref 70–99)
GLUCOSE BLDC GLUCOMTR-MCNC: 84 MG/DL (ref 70–99)
GLUCOSE BLDC GLUCOMTR-MCNC: 94 MG/DL (ref 70–99)
GLUCOSE BLDC GLUCOMTR-MCNC: 97 MG/DL (ref 70–99)
HCT VFR BLD AUTO: 25.8 % (ref 35–47)
HGB BLD-MCNC: 8.4 G/DL (ref 11.7–15.7)
IMM GRANULOCYTES # BLD: 0.1 10E3/UL
IMM GRANULOCYTES NFR BLD: 1 %
INR PPP: 1.7 (ref 0.85–1.15)
INTERPRETATION ECG - MUSE: NORMAL
IRON BINDING CAPACITY (ROCHE): 101 UG/DL (ref 240–430)
IRON SATN MFR SERPL: 43 % (ref 15–46)
IRON SERPL-MCNC: 43 UG/DL (ref 37–145)
LYMPHOCYTES # BLD AUTO: 0.6 10E3/UL (ref 0.8–5.3)
LYMPHOCYTES NFR BLD AUTO: 5 %
MAGNESIUM SERPL-MCNC: 1.4 MG/DL (ref 1.8–2.6)
MCH RBC QN AUTO: 33.5 PG (ref 26.5–33)
MCHC RBC AUTO-ENTMCNC: 32.6 G/DL (ref 31.5–36.5)
MCV RBC AUTO: 103 FL (ref 78–100)
MONOCYTES # BLD AUTO: 1.1 10E3/UL (ref 0–1.3)
MONOCYTES NFR BLD AUTO: 8 %
NEUTROPHILS # BLD AUTO: 11.1 10E3/UL (ref 1.6–8.3)
NEUTROPHILS NFR BLD AUTO: 85 %
NRBC # BLD AUTO: 0 10E3/UL
NRBC BLD AUTO-RTO: 0 /100
P AXIS - MUSE: 66 DEGREES
PHOSPHATE SERPL-MCNC: 3.4 MG/DL (ref 2.5–4.5)
PLATELET # BLD AUTO: 320 10E3/UL (ref 150–450)
POTASSIUM BLD-SCNC: 4.1 MMOL/L (ref 3.5–5)
POTASSIUM BLD-SCNC: 6.1 MMOL/L (ref 3.5–5)
PR INTERVAL - MUSE: 214 MS
PROT SERPL-MCNC: 4.5 G/DL (ref 6–8)
QRS DURATION - MUSE: 114 MS
QT - MUSE: 458 MS
QTC - MUSE: 522 MS
R AXIS - MUSE: 16 DEGREES
RBC # BLD AUTO: 2.51 10E6/UL (ref 3.8–5.2)
SODIUM SERPL-SCNC: 130 MMOL/L (ref 136–145)
SODIUM SERPL-SCNC: 135 MMOL/L (ref 136–145)
SYSTOLIC BLOOD PRESSURE - MUSE: 115 MMHG
T AXIS - MUSE: 18 DEGREES
TSH SERPL DL<=0.005 MIU/L-ACNC: 3.9 UIU/ML (ref 0.3–5)
VENTRICULAR RATE- MUSE: 78 BPM
WBC # BLD AUTO: 13 10E3/UL (ref 4–11)

## 2023-07-13 PROCEDURE — 634N000001 HC RX 634: Performed by: PHYSICIAN ASSISTANT

## 2023-07-13 PROCEDURE — 85610 PROTHROMBIN TIME: CPT | Performed by: EMERGENCY MEDICINE

## 2023-07-13 PROCEDURE — 250N000011 HC RX IP 250 OP 636: Mod: JZ

## 2023-07-13 PROCEDURE — 82728 ASSAY OF FERRITIN: CPT | Performed by: PHYSICIAN ASSISTANT

## 2023-07-13 PROCEDURE — 84100 ASSAY OF PHOSPHORUS: CPT

## 2023-07-13 PROCEDURE — 85025 COMPLETE CBC W/AUTO DIFF WBC: CPT | Performed by: EMERGENCY MEDICINE

## 2023-07-13 PROCEDURE — 83735 ASSAY OF MAGNESIUM: CPT

## 2023-07-13 PROCEDURE — 71046 X-RAY EXAM CHEST 2 VIEWS: CPT

## 2023-07-13 PROCEDURE — 96374 THER/PROPH/DIAG INJ IV PUSH: CPT

## 2023-07-13 PROCEDURE — 83550 IRON BINDING TEST: CPT | Performed by: PHYSICIAN ASSISTANT

## 2023-07-13 PROCEDURE — 36415 COLL VENOUS BLD VENIPUNCTURE: CPT

## 2023-07-13 PROCEDURE — 82962 GLUCOSE BLOOD TEST: CPT

## 2023-07-13 PROCEDURE — 84443 ASSAY THYROID STIM HORMONE: CPT

## 2023-07-13 PROCEDURE — 99223 1ST HOSP IP/OBS HIGH 75: CPT | Mod: AI | Performed by: FAMILY MEDICINE

## 2023-07-13 PROCEDURE — 120N000004 HC R&B MS OVERFLOW

## 2023-07-13 PROCEDURE — 250N000013 HC RX MED GY IP 250 OP 250 PS 637

## 2023-07-13 PROCEDURE — 74019 RADEX ABDOMEN 2 VIEWS: CPT

## 2023-07-13 PROCEDURE — 258N000003 HC RX IP 258 OP 636: Performed by: PHYSICIAN ASSISTANT

## 2023-07-13 PROCEDURE — 5A1D70Z PERFORMANCE OF URINARY FILTRATION, INTERMITTENT, LESS THAN 6 HOURS PER DAY: ICD-10-PCS | Performed by: PHYSICIAN ASSISTANT

## 2023-07-13 PROCEDURE — 90935 HEMODIALYSIS ONE EVALUATION: CPT

## 2023-07-13 PROCEDURE — 36415 COLL VENOUS BLD VENIPUNCTURE: CPT | Performed by: EMERGENCY MEDICINE

## 2023-07-13 PROCEDURE — 93005 ELECTROCARDIOGRAM TRACING: CPT | Performed by: EMERGENCY MEDICINE

## 2023-07-13 PROCEDURE — 258N000001 HC RX 258

## 2023-07-13 PROCEDURE — 70450 CT HEAD/BRAIN W/O DYE: CPT

## 2023-07-13 PROCEDURE — 99223 1ST HOSP IP/OBS HIGH 75: CPT | Performed by: PHYSICIAN ASSISTANT

## 2023-07-13 PROCEDURE — 99285 EMERGENCY DEPT VISIT HI MDM: CPT | Mod: 25

## 2023-07-13 PROCEDURE — 80053 COMPREHEN METABOLIC PANEL: CPT | Performed by: EMERGENCY MEDICINE

## 2023-07-13 PROCEDURE — 258N000003 HC RX IP 258 OP 636

## 2023-07-13 PROCEDURE — 74176 CT ABD & PELVIS W/O CONTRAST: CPT

## 2023-07-13 RX ORDER — NALOXONE HYDROCHLORIDE 0.4 MG/ML
0.2 INJECTION, SOLUTION INTRAMUSCULAR; INTRAVENOUS; SUBCUTANEOUS
Status: DISCONTINUED | OUTPATIENT
Start: 2023-07-13 | End: 2023-07-19 | Stop reason: HOSPADM

## 2023-07-13 RX ORDER — NALOXONE HYDROCHLORIDE 0.4 MG/ML
0.4 INJECTION, SOLUTION INTRAMUSCULAR; INTRAVENOUS; SUBCUTANEOUS
Status: DISCONTINUED | OUTPATIENT
Start: 2023-07-13 | End: 2023-07-19 | Stop reason: HOSPADM

## 2023-07-13 RX ORDER — DEXTROSE MONOHYDRATE 25 G/50ML
25-50 INJECTION, SOLUTION INTRAVENOUS
Status: DISCONTINUED | OUTPATIENT
Start: 2023-07-13 | End: 2023-07-19 | Stop reason: HOSPADM

## 2023-07-13 RX ORDER — MIDODRINE HYDROCHLORIDE 5 MG/1
5 TABLET ORAL
Status: DISCONTINUED | OUTPATIENT
Start: 2023-07-13 | End: 2023-07-19 | Stop reason: HOSPADM

## 2023-07-13 RX ORDER — NICOTINE POLACRILEX 4 MG
15-30 LOZENGE BUCCAL
Status: DISCONTINUED | OUTPATIENT
Start: 2023-07-13 | End: 2023-07-19 | Stop reason: HOSPADM

## 2023-07-13 RX ORDER — ALBUMIN (HUMAN) 12.5 G/50ML
50 SOLUTION INTRAVENOUS
Status: DISCONTINUED | OUTPATIENT
Start: 2023-07-13 | End: 2023-07-19 | Stop reason: HOSPADM

## 2023-07-13 RX ORDER — ROSUVASTATIN CALCIUM 10 MG/1
10 TABLET, COATED ORAL AT BEDTIME
Status: DISCONTINUED | OUTPATIENT
Start: 2023-07-13 | End: 2023-07-19 | Stop reason: HOSPADM

## 2023-07-13 RX ORDER — HYDROMORPHONE HCL IN WATER/PF 6 MG/30 ML
0.2 PATIENT CONTROLLED ANALGESIA SYRINGE INTRAVENOUS EVERY 4 HOURS PRN
Status: DISCONTINUED | OUTPATIENT
Start: 2023-07-13 | End: 2023-07-14

## 2023-07-13 RX ORDER — SEVELAMER CARBONATE 800 MG/1
800-2400 TABLET, FILM COATED ORAL
Status: DISCONTINUED | OUTPATIENT
Start: 2023-07-13 | End: 2023-07-19 | Stop reason: HOSPADM

## 2023-07-13 RX ORDER — HEPARIN SODIUM 5000 [USP'U]/.5ML
5000 INJECTION, SOLUTION INTRAVENOUS; SUBCUTANEOUS EVERY 12 HOURS SCHEDULED
Status: DISCONTINUED | OUTPATIENT
Start: 2023-07-13 | End: 2023-07-19 | Stop reason: HOSPADM

## 2023-07-13 RX ORDER — ACETAMINOPHEN 325 MG/1
650 TABLET ORAL EVERY 6 HOURS PRN
Status: DISCONTINUED | OUTPATIENT
Start: 2023-07-13 | End: 2023-07-19 | Stop reason: HOSPADM

## 2023-07-13 RX ORDER — LEVOTHYROXINE SODIUM 50 UG/1
50 TABLET ORAL AT BEDTIME
Status: DISCONTINUED | OUTPATIENT
Start: 2023-07-13 | End: 2023-07-19 | Stop reason: HOSPADM

## 2023-07-13 RX ORDER — PROCHLORPERAZINE MALEATE 5 MG
5 TABLET ORAL EVERY 6 HOURS PRN
Status: DISCONTINUED | OUTPATIENT
Start: 2023-07-13 | End: 2023-07-19 | Stop reason: HOSPADM

## 2023-07-13 RX ORDER — AMOXICILLIN 250 MG
2 CAPSULE ORAL 2 TIMES DAILY
Status: DISCONTINUED | OUTPATIENT
Start: 2023-07-13 | End: 2023-07-19 | Stop reason: HOSPADM

## 2023-07-13 RX ORDER — ACETAMINOPHEN 650 MG/1
650 SUPPOSITORY RECTAL EVERY 6 HOURS PRN
Status: DISCONTINUED | OUTPATIENT
Start: 2023-07-13 | End: 2023-07-19 | Stop reason: HOSPADM

## 2023-07-13 RX ORDER — LORAZEPAM 1 MG/1
1 TABLET ORAL
Status: DISCONTINUED | OUTPATIENT
Start: 2023-07-14 | End: 2023-07-19 | Stop reason: HOSPADM

## 2023-07-13 RX ORDER — PROCHLORPERAZINE 25 MG
12.5 SUPPOSITORY, RECTAL RECTAL EVERY 12 HOURS PRN
Status: DISCONTINUED | OUTPATIENT
Start: 2023-07-13 | End: 2023-07-19 | Stop reason: HOSPADM

## 2023-07-13 RX ORDER — VITAMIN B COMPLEX
25 TABLET ORAL AT BEDTIME
Status: DISCONTINUED | OUTPATIENT
Start: 2023-07-13 | End: 2023-07-19 | Stop reason: HOSPADM

## 2023-07-13 RX ORDER — NORTRIPTYLINE HCL 25 MG
75 CAPSULE ORAL AT BEDTIME
Status: DISCONTINUED | OUTPATIENT
Start: 2023-07-13 | End: 2023-07-19 | Stop reason: HOSPADM

## 2023-07-13 RX ORDER — LIDOCAINE 40 MG/G
CREAM TOPICAL
Status: DISCONTINUED | OUTPATIENT
Start: 2023-07-13 | End: 2023-07-19 | Stop reason: HOSPADM

## 2023-07-13 RX ADMIN — HEPARIN SODIUM 5000 UNITS: 5000 INJECTION, SOLUTION INTRAVENOUS; SUBCUTANEOUS at 21:00

## 2023-07-13 RX ADMIN — ROSUVASTATIN CALCIUM 10 MG: 10 TABLET, FILM COATED ORAL at 23:48

## 2023-07-13 RX ADMIN — SODIUM CHLORIDE 250 ML: 9 INJECTION, SOLUTION INTRAVENOUS at 16:44

## 2023-07-13 RX ADMIN — LEVOTHYROXINE SODIUM 50 MCG: 0.05 TABLET ORAL at 23:47

## 2023-07-13 RX ADMIN — NORTRIPTYLINE HYDROCHLORIDE 75 MG: 25 CAPSULE ORAL at 23:47

## 2023-07-13 RX ADMIN — DEXTROSE AND SODIUM CHLORIDE: 5; .9 INJECTION, SOLUTION INTRAVENOUS at 18:57

## 2023-07-13 RX ADMIN — DEXTROSE MONOHYDRATE 25 ML: 25 INJECTION, SOLUTION INTRAVENOUS at 15:48

## 2023-07-13 RX ADMIN — SERTRALINE 150 MG: 100 TABLET, FILM COATED ORAL at 23:47

## 2023-07-13 RX ADMIN — SODIUM CHLORIDE 300 ML: 9 INJECTION, SOLUTION INTRAVENOUS at 16:44

## 2023-07-13 RX ADMIN — PROCHLORPERAZINE EDISYLATE 5 MG: 5 INJECTION INTRAMUSCULAR; INTRAVENOUS at 21:01

## 2023-07-13 RX ADMIN — Medication 25 MCG: at 23:48

## 2023-07-13 RX ADMIN — EPOETIN ALFA-EPBX 20000 UNITS: 20000 INJECTION, SOLUTION INTRAVENOUS; SUBCUTANEOUS at 21:01

## 2023-07-13 RX ADMIN — HYDROMORPHONE HYDROCHLORIDE 0.2 MG: 0.2 INJECTION, SOLUTION INTRAMUSCULAR; INTRAVENOUS; SUBCUTANEOUS at 22:36

## 2023-07-13 ASSESSMENT — ENCOUNTER SYMPTOMS
COUGH: 0
SHORTNESS OF BREATH: 0
BACK PAIN: 0
WEAKNESS: 1
NAUSEA: 0
FEVER: 0
NECK PAIN: 0
HEADACHES: 1
VOMITING: 0
CHILLS: 0
ABDOMINAL PAIN: 1
DIARRHEA: 0

## 2023-07-13 ASSESSMENT — ACTIVITIES OF DAILY LIVING (ADL)
ADLS_ACUITY_SCORE: 39
ADLS_ACUITY_SCORE: 39
ADLS_ACUITY_SCORE: 43
ADLS_ACUITY_SCORE: 37
ADLS_ACUITY_SCORE: 35
ADLS_ACUITY_SCORE: 35

## 2023-07-13 NOTE — PROGRESS NOTES
Physician Attestation   I personally examined and evaluated this patient.  I discussed the patient with the resident/fellow and care team, and agree with the assessment and plan of care as documented in the note.     Key findings: Vital signs reviewed.  Patient generally appears pale, frail, and cachectic.  Chest clear to auscultation cardiac regular rate and rhythm with a soft systolic murmur.  Abdomen mildly distended infrequent bowel sounds soft and diffusely tender.  Extremities have 1+ pitting edema in the ankles.  Mouth is dry.    Please see A&P for additional details of medical decision making.    I have personally reviewed the following data over the past 24 hrs:    13.0 (H)  \   8.4 (L)   / 320     130 (L) 91 (L) 113 (H) /  169 (H)   6.1 (HH) 19 (L) 6.44 (HH) \       ALT: 28 AST: 31 AP: 90 TBILI: 1.1 (H)   ALB: 2.2 (L) TOT PROTEIN: 4.5 (L) LIPASE: N/A       INR:  1.70 (H) PTT:  N/A   D-dimer:  N/A Fibrinogen:  N/A             Andrea Albright MD  Date of Service (when I saw the patient): 07/13/23

## 2023-07-13 NOTE — PHARMACY-ADMISSION MEDICATION HISTORY
Pharmacist Admission Medication History    Admission medication history is complete. The information provided in this note is only as accurate as the sources available at the time of the update.    Medication reconciliation/reorder completed by provider prior to medication history? No    Information Source(s): Patient, Hospital records and CareEverywhere/SureScripts via in-person    Pertinent Information:     Changes made to PTA medication list:    Added: None    Deleted: clonidine (ineffective for insomnia)    Changed: None    Medication Affordability:       Allergies reviewed with patient and updates made in EHR: yes    Medication History Completed By: Abebe Douglass RPH 7/13/2023 3:18 PM    Prior to Admission medications    Medication Sig Last Dose Taking? Auth Provider Long Term End Date   amLODIPine (NORVASC) 5 MG tablet Take 5 mg by mouth At Bedtime 7/12/2023 Yes Reported, Patient Yes    B Complex-C (SUPER B COMPLEX PO) Take 1 tablet by mouth At Bedtime 7/12/2023 Yes Unknown, Entered By History     calcitRIOL (ROCALTROL) 0.5 MCG capsule Take 0.5 mcg by mouth three times a week Given at HD. Abbi Noland, Sat 7/12/2023 at given at HD Yes Unknown, Entered By History Yes    cholecalciferol (VITAMIN D3) 25 mcg (1000 units) capsule Take 1 capsule by mouth At Bedtime 7/12/2023 Yes Reported, Patient     Epoetin Adam (EPOGEN IJ) Inject 1,000 Units into the vein three times a week At dialysis. Past Week Yes Unknown, Entered By History     Iron Sucrose (VENOFER IV) Inject 100 mg into the vein once a week At dialysis Past Week Yes Unknown, Entered By History     labetalol (NORMODYNE) 300 MG tablet Take 450 mg by mouth every morning 7/13/2023 Yes Unknown, Entered By History Yes    labetalol (NORMODYNE) 300 MG tablet Take 600 mg by mouth At Bedtime 7/12/2023 Yes Unknown, Entered By History Yes    levothyroxine (SYNTHROID/LEVOTHROID) 50 MCG tablet Take 50 mcg by mouth At Bedtime Takes in the middle of the night.  7/12/2023 Yes Reported, Patient Yes    lisinopril (ZESTRIL) 40 MG tablet Take 20 mg by mouth 2 times daily 7/13/2023 at am dose Yes Reported, Patient Yes    LORazepam (ATIVAN) 1 MG tablet Take 1 mg by mouth daily (before lunch) 7/13/2023 Yes Reported, Patient     nortriptyline (PAMELOR) 75 MG capsule Take 75 mg by mouth At Bedtime 7/12/2023 Yes Reported, Patient Yes    oxyCODONE (ROXICODONE) 10 MG tablet Use oxycodone 10mg every 3 hours as needed for pain on 2/6 & 2/7. Then return to usual home dose. 7/12/2023 Yes Sarah Betancourt, APRN CNP No    rosuvastatin (CRESTOR) 10 MG tablet Take 10 mg by mouth At Bedtime 7/12/2023 Yes Reported, Patient Yes    senna-docusate (SENOKOT-S/PERICOLACE) 8.6-50 MG tablet Take 2 tablets by mouth 2 times daily 7/12/2023 Yes Unknown, Entered By History     sertraline (ZOLOFT) 100 MG tablet Take 150 mg by mouth At Bedtime 7/12/2023 Yes Reported, Patient Yes    sevelamer carbonate (RENVELA) 800 MG tablet Take 800-2,400 mg by mouth 3 times daily When eating; range of 1-3 tabs depending on meal size 7/13/2023 Yes Reported, Patient

## 2023-07-13 NOTE — ED TRIAGE NOTES
Pt presenting via EMS with c/o fall at 1130 this morning. Per pt she reports she was going to sit on a stool and she didn't sit completely on the stool causing her to fall. Pt reports she fell on her buttock and hit the back of her head on the floor after. Per EMS pts initial BP: 87/60 500 mL of NS given. B EMS gave PO glucose. 20g in right AC. Pt denies pain at this time. BG recheck 97. BP: 115/58.     Of note pt was seen this past Tuesday related to a fall. Hx: CKD stage 5 last run of dialysis was on Tuesday and they were only able to do one run. She was scheduled to have dialysis again today.

## 2023-07-13 NOTE — PLAN OF CARE
Major Shift Events:  Arrived to  from ED approx 1615. Pt A/Ox4. VSS. RA. Denies N/V/ pain. Large BM.Dilaysis run tonight.  Plan: Treatment for SBO/dilaysis  For vital signs and complete assessments, please see documentation flowsheets.   Goal Outcome Evaluation:

## 2023-07-13 NOTE — ED PROVIDER NOTES
EMERGENCY DEPARTMENT ENCOUNTER      NAME: Serene Tipton  AGE: 67 year old female  YOB: 1955  MRN: [id number]  EVALUATION DATE & TIME: 7/13/2023 12:00 PM    PCP: Ramos Lowry    ED PROVIDER: Adina Bunn DO      Chief Complaint   Patient presents with     Fall         FINAL IMPRESSION:  1. Fall, initial encounter    2. Hypoglycemia    3. Hyperkalemia    4. Small bowel obstruction (H)          ED COURSE & MEDICAL DECISION MAKING:    Pertinent Labs & Imaging studies reviewed. (See chart for details)  12:23 PM I met the patient and performed my initial interview and exam.  1:04 PM Spoke with lab who reports that patient has Potassium of 6.1 and creatinine of 6.44.   2:21 PM Spoke with Nurse Practitioner of Nephorology who reccomends admission for the patient to get dialysis.   2:37 PM Updated the patient about imaging and lab results. We discussed plans for admission.   3:07 PM Discussed admission with Residency Service    67 year old female presents to the Emergency Department for evaluation of a fall.  She tells me that she was going to go sit on a chair and sat down awkwardly, landing on the floor.  She admits to a mild hit to her head.  She was hypoglycemic per EMS and given oral glucose.  Patient with end-stage kidney disease status post bilateral nephrectomy, on Tuesday Thursday Saturday dialysis schedule.  She did not do a full run on Tuesday after a fall in dialysis and hypotension.  Of note, patient admitted about a week and a half ago after similar fall.  Noted to be constipated and had an ileus.  She was sent home on MiraLAX.  She attributes this to not getting good dialysis runs.  She reports it gave her diarrhea and has since stopped that.  She denies any fevers or chills.  She is chronically ill-appearing.  She continues to have abdominal pain to the left lower quadrant, she has had 2 CTs over the past 2 weeks which were fairly unchanged.  Her exam seems similar to prior.  Given  that, will obtain an x-ray to evaluate for any signs of small bowel obstruction or free air.  EKG obtained shows nonspecific ST abnormalities, mild depressions laterally.  Worse than prior EKG.  QTc is mildly prolonged.  Without any chest pain currently or history of recent chest pain to suggest ACS.  Labs do show hyperkalemia and elevated creatinine consistent with her end-stage renal disease.  Head CT without evidence of intracranial hemorrhage or fracture.  Her glucose improved with some juice.  Nephrology did stop down and recommend admission for dialysis this evening.  Patient is agreeable.  Chest x-ray with small bilateral pleural effusions which I am guessing is also causing the edema in her right side.  X-ray of the abdomen fortunately does show concerns for small bowel obstruction recommending a CT which I will order as well.  Patient admitted to the residency service.    At the conclusion of the encounter I discussed the results of all of the tests and the disposition. The questions were answered. The patient or family acknowledged understanding and was agreeable with the care plan.     Medical Decision Making    History:    Supplemental history from: Documented in chart, if applicable    External Record(s) reviewed: Documented in chart, if applicable. and Other: Review Admission at [facility] 7/5/23-7/6/23 and  ED visit on 7/11/23    Work Up:    Chart documentation includes differential considered and any EKGs or imaging independently interpreted by provider, where specified.    In additional to work up documented, I considered the following work up: Documented in chart, if applicable.    External consultation:    Discussion of management with another provider: Documented in chart, if applicable and Hospitalist and Other: Nephrology    Complicating factors:    Care impacted by chronic illness: Other: polycystic kidney, hypertension, ESRD, hyperlipidemia, tobacco abuse, nephrectomy, and dialysis     Care  affected by social determinants of health: N/A    Disposition considerations: Admit.      MEDICATIONS GIVEN IN THE EMERGENCY:  Medications   0.9% sodium chloride BOLUS (has no administration in time range)   0.9% sodium chloride BOLUS (has no administration in time range)   0.9% sodium chloride BOLUS (has no administration in time range)   No heparin via hemodialysis machine (has no administration in time range)   albumin human 25 % injection 50 mL (has no administration in time range)   sodium chloride (PF) 0.9% PF flush 9 mL (has no administration in time range)   sodium chloride (PF) 0.9% PF flush 9 mL (has no administration in time range)   sodium chloride (PF) 0.9% PF flush 10 mL (has no administration in time range)   sodium chloride (PF) 0.9% PF flush 10 mL (has no administration in time range)   alteplase (CATHFLO ACTIVASE) injection 2 mg (has no administration in time range)   alteplase (CATHFLO ACTIVASE) injection 2 mg (has no administration in time range)   sodium chloride 0.9% DIALYSIS Cath LOCK - RED Lumen (has no administration in time range)     Followed by   heparin 1000 unit/mL DIALYSIS Cath LOCK - RED Lumen (has no administration in time range)   sodium chloride 0.9% DIALYSIS Cath LOCK - BLUE Lumen (has no administration in time range)     Followed by   heparin 1000 unit/mL DIALYSIS Cath LOCK -BLUE Lumen (has no administration in time range)   epoetin deysi-epbx (RETACRIT) injection 20,000 Units (has no administration in time range)   midodrine (PROAMATINE) tablet 5 mg (has no administration in time range)       NEW PRESCRIPTIONS STARTED AT TODAY'S ER VISIT  New Prescriptions    No medications on file          =================================================================    HPI    Patient information was obtained from: EMS and Patient     Use of : N/A       Serene Tipton is a 67 year old female with a pertinent history of polycystic kidney, hypertension, ESRD, hyperlipidemia,  "tobacco abuse, nephrectomy, and dialysis who presents to this ED via EMS for evaluation of fall.     Per Chart Review: Patient was admitted at [facility] on 7/5/23-7/6/23 for evaluation of a SBO. CT abdoment pelvis showed multiple loops of mildly dilated/fluid filled loops of small bowel suggestive of partial SBO. Surgery consulted, said it was constipation. Discharged on MiraLax.   On 7/13/23, the patient presented to [facility] ED for fall and fatigue. She was at dialysis on 7/11/23 where she had a witnessed fall and did hit her head. Slight head to right forehead.     Per EMS, Around 11:30 today 7/13/23, the patient had a fall when she was at diaysis (runs Tuesday, Thursday, and Saturday). She was sitting on a stool chair and did not completely sit on the stool causing her to fall. She did hit her head but no loss of conscious. She reports landing on her buttock. Her initial blood pressure en route was 87/60, she received 500 mL of NS en route. Blood sugar was 97 in ED. Repeat blood pressure was 115/58.     Per patient, she states that she \"miscalculated where she sat\" on the stool and fell. She did hit the back of her head. Currently, she endorses generalized weakness, and left lower quadrant abdominal pain, and a headache. She notes swelling to right upper and lower extremity because she stand more on her right side. She denies back pain, neck pain, nausea, vomiting, diarrhea, shortness of breath, fever, chills, or any other medical concerns.       REVIEW OF SYSTEMS   Review of Systems   Constitutional: Negative for chills and fever.   Respiratory: Negative for cough and shortness of breath.    Cardiovascular: Negative for chest pain.   Gastrointestinal: Positive for abdominal pain (LLQ). Negative for diarrhea, nausea and vomiting.   Musculoskeletal: Negative for back pain and neck pain.   Neurological: Positive for weakness (generalized) and headaches.   All other systems reviewed and are negative.       PAST MEDICAL " HISTORY:  Past Medical History:   Diagnosis Date     Anemia in chronic kidney disease      Anxiety      Arthritis      Brain aneurysm     Cavernous segment of the right & left carotid arteries. See Neurosurg note 6/16/21.     Chronic low back pain     Managed by Pain Clinic     Depressive disorder 2013     Disease of thyroid gland      ESRD (end stage renal disease) on dialysis (H) 07/2020     GERD (gastroesophageal reflux disease)      Hepatic lesion      Hyperlipidemia      Hypertension      Nephrolithiasis      Neuromuscular disorder (H)      Osteoporosis      PKD (polycystic kidney disease) 09/01/1990     PTSD (post-traumatic stress disorder)      Tobacco abuse      Vitamin D deficiency        PAST SURGICAL HISTORY:  Past Surgical History:   Procedure Laterality Date     BACK SURGERY      spinal fusion w/hardware     BIOPSY Left 09/01/1990    Breast     BREAST LUMPECTOMY, RT/LT Left     Lumpectomy     CYST REMOVAL Right     rt wrist     CYSTECTOMY PILONIDAL  1981     EYE SURGERY  2008    lasik     FORMATION ARTERIOVENOUS FISTULA    07/28/2020     FRACTURE SURGERY       NEPHRECTOMY BILATERAL Bilateral 2/1/2023    Procedure: bilateral native nephrectomy;  Surgeon: Alana Giang MD;  Location: [facility] OR     ORTHOPEDIC SURGERY Right 2005    Shoulder     OTHER SURGICAL HISTORY      carpal tunnel repair     SPINE SURGERY             CURRENT MEDICATIONS:    amLODIPine (NORVASC) 5 MG tablet  B Complex-C (SUPER B COMPLEX PO)  calcitRIOL (ROCALTROL) 0.5 MCG capsule  cholecalciferol (VITAMIN D3) 25 mcg (1000 units) capsule  cloNIDine (CATAPRES) 0.1 MG tablet  Epoetin Adam (EPOGEN IJ)  Iron Sucrose (VENOFER IV)  labetalol (NORMODYNE) 300 MG tablet  labetalol (NORMODYNE) 300 MG tablet  levothyroxine (SYNTHROID/LEVOTHROID) 50 MCG tablet  lisinopril (ZESTRIL) 40 MG tablet  LORazepam (ATIVAN) 1 MG tablet  nortriptyline (PAMELOR) 75 MG capsule  oxyCODONE (ROXICODONE) 10 MG tablet  rosuvastatin (CRESTOR) 10 MG  tablet  senna-docusate (SENOKOT-S/PERICOLACE) 8.6-50 MG tablet  sertraline (ZOLOFT) 100 MG tablet  sevelamer carbonate (RENVELA) 800 MG tablet         ALLERGIES:  Allergies   Allergen Reactions     Penicillins Other (See Comments) and Shortness Of Breath     Breathing problem.  breathing       Carvedilol GI Disturbance     Nausea and vomiting     Ciprofloxacin Muscle Pain (Myalgia)     Morphine Other (See Comments)     Vomiting     No Clinical Screening - See Comments      PN: LW CM1: Contrast Intercapsular - Nonionic Reaction :     Nsaids Other (See Comments)     Sulfa Antibiotics Itching     Sulfamethoxazole-Trimethoprim      Other reaction(s): itching     Levofloxacin Muscle Pain (Myalgia) and Rash       FAMILY HISTORY:  Family History   Problem Relation Age of Onset     Polycystic Kidney Diease Mother      Hypertension Mother      Kidney Disease Mother      Cerebrovascular Disease Mother      Chronic Obstructive Pulmonary Disease Father      Multiple Sclerosis Father      Polycystic Kidney Diease Sister      Cardiac Sudden Death Sister 52     Hypertension Sister      Kidney Disease Sister      Polycystic Kidney Diease Maternal Grandmother      Hypertension Maternal Grandmother      Kidney Disease Maternal Grandmother      Cerebrovascular Disease Maternal Grandmother      Heart Disease Maternal Grandfather      Hyperlipidemia Paternal Grandmother      Cerebrovascular Disease Paternal Grandmother      Coronary Artery Disease Paternal Grandfather      Pulmonary Embolism Paternal Grandfather      Anesthesia Reaction No family hx of        SOCIAL HISTORY:   Social History     Socioeconomic History     Marital status: Single   Tobacco Use     Smoking status: Former     Packs/day: 0.50     Years: 14.00     Pack years: 7.00     Types: Cigarettes     Quit date: 2021     Years since quittin.0     Smokeless tobacco: Never   Substance and Sexual Activity     Alcohol use: Not Currently     Drug use: Never        VITALS:  /59   Pulse 78   Temp 98.1  F (36.7  C) (Oral)   Resp 19   Ht 1.524 m (5')   Wt 41.7 kg (92 lb)   SpO2 96%   BMI 17.97 kg/m      PHYSICAL EXAM    Physical Exam  Constitutional:       General: She is not in acute distress.     Appearance: She is ill-appearing.   HENT:      Head: Normocephalic and atraumatic.      Mouth/Throat:      Pharynx: Oropharynx is clear.   Eyes:      Pupils: Pupils are equal, round, and reactive to light.   Cardiovascular:      Rate and Rhythm: Normal rate and regular rhythm.      Pulses: Normal pulses.      Heart sounds: Normal heart sounds.   Pulmonary:      Effort: Pulmonary effort is normal.      Breath sounds: Normal breath sounds.   Abdominal:      General: Abdomen is flat. Bowel sounds are decreased.      Palpations: Abdomen is soft.      Tenderness: There is abdominal tenderness in the left lower quadrant.   Musculoskeletal:         General: Normal range of motion.      Right lower leg: Pitting Edema (and right upper extremity) present.   Skin:     General: Skin is warm and dry.      Capillary Refill: Capillary refill takes less than 2 seconds.      Comments: Fistula to left upper extremity   Neurological:      General: No focal deficit present.      Mental Status: She is alert and oriented to person, place, and time.          LAB:  All pertinent labs reviewed and interpreted.  Labs Ordered and Resulted from Time of ED Arrival to Time of ED Departure   COMPREHENSIVE METABOLIC PANEL - Abnormal       Result Value    Sodium 130 (*)     Potassium 6.1 (*)     Chloride 91 (*)     Carbon Dioxide (CO2) 19 (*)     Anion Gap 20 (*)     Urea Nitrogen 113 (*)     Creatinine 6.44 (*)     Calcium 7.3 (*)     Glucose 60 (*)     Alkaline Phosphatase 90      AST 31      ALT 28      Protein Total 4.5 (*)     Albumin 2.2 (*)     Bilirubin Total 1.1 (*)     GFR Estimate 7 (*)    INR - Abnormal    INR 1.70 (*)    CBC WITH PLATELETS AND DIFFERENTIAL - Abnormal    WBC Count 13.0  (*)     RBC Count 2.51 (*)     Hemoglobin 8.4 (*)     Hematocrit 25.8 (*)      (*)     MCH 33.5 (*)     MCHC 32.6      RDW 14.9      Platelet Count 320      % Neutrophils 85      % Lymphocytes 5      % Monocytes 8      % Eosinophils 0      % Basophils 1      % Immature Granulocytes 1      NRBCs per 100 WBC 0      Absolute Neutrophils 11.1 (*)     Absolute Lymphocytes 0.6 (*)     Absolute Monocytes 1.1      Absolute Eosinophils 0.0      Absolute Basophils 0.1      Absolute Immature Granulocytes 0.1      Absolute NRBCs 0.0     GLUCOSE BY METER - Normal    GLUCOSE BY METER POCT 97     GLUCOSE BY METER - Normal    GLUCOSE BY METER POCT 84     IRON AND IRON BINDING CAPACITY   FERRITIN       RADIOLOGY:  Reviewed all pertinent imaging. Please see official radiology report.  Abdomen XR, 2 vw, flat and upright   Final Result   IMPRESSION:       Chest:   No focal consolidation. Bilateral small pleural effusion.      ABDOMEN:   Multiple air-fluid levels are seen throughout the abdomen, predominantly in the left hemiabdomen. Differential includes small bowel obstruction. Further evaluation with CT is recommended.      Chest XR,  PA & LAT   Final Result   IMPRESSION:       Chest:   No focal consolidation. Bilateral small pleural effusion.      ABDOMEN:   Multiple air-fluid levels are seen throughout the abdomen, predominantly in the left hemiabdomen. Differential includes small bowel obstruction. Further evaluation with CT is recommended.      Head CT w/o contrast   Preliminary Result   IMPRESSION: No acute intracranial abnormality.          EKG:    Performed at: 13-JUL-2023 11:59    Impression: Sinus rhythm with 1st degree A-V block. Incomplete left bundle branch.     Rate: 78 BPM  Rhythm: Normal sinus  Axis: 16   KS Interval: 214 ms  QRS Interval: 114 ms  QTc Interval: 522 ms  ST Changes: Nonspecific ST and T wave abnormality.   Comparison: When compared to ECG of 11-JUL-2023 11:22, significant changes has occur.     I  have independently reviewed and interpreted the EKG(s) documented above.    I, Jaimie Caba, am serving as a scribe to document services personally performed by Dr. Adina Bunn based on my observation and the provider's statements to me. I, Adina Bunn, DO attest that Jaimie Caba is acting in a scribe capacity, has observed my performance of the services and has documented them in accordance with my direction.    Adina Bunn DO  Emergency Medicine  [facility] EMERGENCY ROOM  [address] [facility] [address]  [phone]  Dept: [phone]        Adina Bunn DO  07/13/23 1505

## 2023-07-13 NOTE — CONSULTS
RENAL CONSULT NOTE    REQUESTING PHYSICIAN: ED provider     REASON FOR CONSULT: ESRD, hyperkalemia     ASSESSMENT/PLAN:    ESKD - On chronic in-center hemodialysis. Dialyzes as outpatient at [facility] [place] on TTS schedule (3 hour runs, JESSI AVF) under the care of Dr. Cheung. Underlying disease is polycystic kidney disease and she is s/p bilateral nephrectomy. Planning to dialyze today in the ED given hyperkalemia.      Hyperkalemia - Some intermittent hyperkalemia with missed dialysis treatments; today K+ 6.1 and avoiding potassium binders with recent SBO. Planning to dialyze urgently today.      History of hypertension - Has been persistently hypotensive on HD recently and hypotensive with recent ED visits as well; hypotension has responded to fluid boluses. BP 87/60 upon arrival to ED and would hold all PTA antihypertensives at this time. Will make midodrine/albumin available with HD.      Hypothyroidism - On replacement     Anxiety/depression - On sertraline, uses ativan     Chronic pain - On nortriptyline, oxycodone (on hold with SBO, constipation)     Hyperlipidemia - On statin     Anemia - Receives PETE and iron with dialysis. Hgb 8.4 here. Will check iron studies and given EPO x 1.     Secondary renal hyperparathyroidism - on sevelamer for phos binders, calcium low here, check albumin for correction      Tobacco use     FTT - Patient reports doing poorly at home since her recent discharge; very weak and unable to care for herself. Today she was not open to the idea of TCU or LTC facility but is open to meeting with SW to discuss possible option of HHC. SW consult requested.     HPI: Serene Tipton is a 66 yo F with PMH significant for polycystic kidney disease s/p bilateral nephrectomy, ESKD on chronic hemodialysis on TTS schedule, anemia, HTN, HLD, hypothyroidism and chronic pain. Presented to the emergency department after sustaining a fall yesterday afternoon trying to close her  and  reportedly took her 5 hours to get up from the fall; then today when trying to make her way to dialysis she fell down the stairs and so was brought to the ED for further evaluation. She last dialyzed 7/11/2023 and today K+ is up to 6.1. I discussed with charge RN at her CDU who notes dialysis staff have had concerns regarding patient's safety at home and ability to care for herself there; she does live independently.     Today Alvarez denies any trouble with her breathing but overall feels extremely fatigued with malaise and some nausea. She has not been eating well since she was admitted with SBO earlier this month. She does notice her ankles and arms are puffy. She says she can tell that she is late for receiving her dialysis. She overall reports not doing well at home since she was discharged earlier this month for SBO. Having a hard time getting around her home independently and has not been able to eat regularly though unclear to me if this is lack of food or inability to prepare food for herself of lack of appetite. She denies any dizziness or lightheadedness at the time of her falls but says dialysis staff have been noticing lower BP's during treatments. She does not feel comfortable with the idea of TCU but would be open to C. Discussed plan to dialyze today with patient and Dr. Bunn as well as HD charge RN.     REVIEW OF SYSTEMS:  Comprehensive ROS negative except per HPI     ALLERGIES/SENSITIVITIES:  Allergies   Allergen Reactions     Penicillins Other (See Comments) and Shortness Of Breath     Breathing problem.  breathing       Carvedilol GI Disturbance     Nausea and vomiting     Ciprofloxacin Muscle Pain (Myalgia)     Morphine Other (See Comments)     Vomiting     No Clinical Screening - See Comments      PN: LW CM1: Contrast Intercapsular - Nonionic Reaction :     Nsaids Other (See Comments)     Sulfa Antibiotics Itching     Sulfamethoxazole-Trimethoprim      Other reaction(s): itching     Levofloxacin  Muscle Pain (Myalgia) and Rash     Social History     Tobacco Use     Smoking status: Former     Packs/day: 0.50     Years: 14.00     Pack years: 7.00     Types: Cigarettes     Quit date: 2021     Years since quittin.0     Smokeless tobacco: Never   Substance Use Topics     Alcohol use: Not Currently     Drug use: Never     I have reviewed this patient's family history and updated it with pertinent information if needed.  Family History   Problem Relation Age of Onset     Polycystic Kidney Diease Mother      Hypertension Mother      Kidney Disease Mother      Cerebrovascular Disease Mother      Chronic Obstructive Pulmonary Disease Father      Multiple Sclerosis Father      Polycystic Kidney Diease Sister      Cardiac Sudden Death Sister 52     Hypertension Sister      Kidney Disease Sister      Polycystic Kidney Diease Maternal Grandmother      Hypertension Maternal Grandmother      Kidney Disease Maternal Grandmother      Cerebrovascular Disease Maternal Grandmother      Heart Disease Maternal Grandfather      Hyperlipidemia Paternal Grandmother      Cerebrovascular Disease Paternal Grandmother      Coronary Artery Disease Paternal Grandfather      Pulmonary Embolism Paternal Grandfather      Anesthesia Reaction No family hx of          PHYSICAL EXAM:  Physical Exam   Temp: 98.1  F (36.7  C) Temp src: Oral BP: 119/59 Pulse: 78   Resp: 19 SpO2: 96 % O2 Device: None (Room air)    Vitals:    23 1202   Weight: 41.7 kg (92 lb)     Vital Signs with Ranges  Temp:  [98.1  F (36.7  C)] 98.1  F (36.7  C)  Pulse:  [76-78] 78  Resp:  [19-25] 19  BP: (115-119)/(58-59) 119/59  SpO2:  [96 %-100 %] 96 %  No intake/output data recorded.    @TMAXR(24)@    Patient Vitals for the past 72 hrs:   Weight   23 1202 41.7 kg (92 lb)       General - A & O x 3, NAD, frail and appears older than stated age   HEENT - PERRLA, no scleral icterus  Neck - no carotid bruits, no JVD  Respiratory - Lungs CTA bilat without wheeze,  rhonchi, rales  Cardiovascular - AP RRR with murmur  Abdomen - soft, BS present, no bruits, no fluid wave  Extremities - well perfused, ++ ankle edema bilaterally   Integumentary - intact, good turgor, no rash/lesions  Neurologic - grossly intact  Psych:  Judgement intact, affect WNL  Access: LUE AVF, + bruit and thrill     Laboratory:     Recent Labs   Lab 07/13/23  1239 07/11/23  1131   WBC 13.0* 11.5*   RBC 2.51* 2.62*   HGB 8.4* 9.0*   HCT 25.8* 26.9*    288       Basic Metabolic Panel:  Recent Labs   Lab 07/13/23  1325 07/13/23  1239 07/13/23  1203 07/11/23  1131   NA  --  130*  --  130*   POTASSIUM  --  6.1*  --  4.4   CHLORIDE  --  91*  --  92*   CO2  --  19*  --  26   BUN  --  113*  --  57*   CR  --  6.44*  --  4.48*   GLC 84 60* 97 80   GAVIN  --  7.3*  --  8.3*       INR  Recent Labs   Lab 07/13/23  1239 07/11/23  1131   INR 1.70* 1.40*       Recent Labs   Lab Test 07/13/23  1239 07/11/23  1131   POTASSIUM 6.1* 4.4   CHLORIDE 91* 92*   * 57*      Recent Labs   Lab Test 07/13/23  1239 07/11/23  1131 08/18/22  1731 05/19/21  1520   ALBUMIN 2.2* 2.3*   < >  --    BILITOTAL 1.1* 0.8   < >  --    ALT 28 26   < >  --    AST 31 34   < >  --    PROTEIN  --   --   --  100*    < > = values in this interval not displayed.       Personally reviewed today's laboratory studies      Thank you for involving us in the care of this patient. We will continue to follow along with you.    Yue Mcfarland PA-C   [facility]  [phone]

## 2023-07-14 ENCOUNTER — APPOINTMENT (OUTPATIENT)
Dept: PHYSICAL THERAPY | Facility: CLINIC | Age: 68
DRG: 388 | End: 2023-07-14
Payer: COMMERCIAL

## 2023-07-14 LAB
ALBUMIN SERPL-MCNC: 2.1 G/DL (ref 3.5–5)
ANION GAP SERPL CALCULATED.3IONS-SCNC: 14 MMOL/L (ref 5–18)
BUN SERPL-MCNC: 42 MG/DL (ref 8–22)
CALCIUM SERPL-MCNC: 8.3 MG/DL (ref 8.5–10.5)
CHLORIDE BLD-SCNC: 99 MMOL/L (ref 98–107)
CO2 SERPL-SCNC: 26 MMOL/L (ref 22–31)
CORTIS SERPL-MCNC: 0.2 UG/DL
CREAT SERPL-MCNC: 3.56 MG/DL (ref 0.6–1.1)
ERYTHROCYTE [DISTWIDTH] IN BLOOD BY AUTOMATED COUNT: 15 % (ref 10–15)
GFR SERPL CREATININE-BSD FRML MDRD: 14 ML/MIN/1.73M2
GLUCOSE BLD-MCNC: 90 MG/DL (ref 70–125)
GLUCOSE BLDC GLUCOMTR-MCNC: 89 MG/DL (ref 70–99)
GLUCOSE BLDC GLUCOMTR-MCNC: 96 MG/DL (ref 70–99)
HCT VFR BLD AUTO: 27.4 % (ref 35–47)
HGB BLD-MCNC: 9 G/DL (ref 11.7–15.7)
MCH RBC QN AUTO: 34 PG (ref 26.5–33)
MCHC RBC AUTO-ENTMCNC: 32.8 G/DL (ref 31.5–36.5)
MCV RBC AUTO: 103 FL (ref 78–100)
PHOSPHATE SERPL-MCNC: 3.4 MG/DL (ref 2.5–4.5)
PLATELET # BLD AUTO: 322 10E3/UL (ref 150–450)
POTASSIUM BLD-SCNC: 3.7 MMOL/L (ref 3.5–5)
RBC # BLD AUTO: 2.65 10E6/UL (ref 3.8–5.2)
SODIUM SERPL-SCNC: 139 MMOL/L (ref 136–145)
WBC # BLD AUTO: 13.4 10E3/UL (ref 4–11)

## 2023-07-14 PROCEDURE — 99232 SBSQ HOSP IP/OBS MODERATE 35: CPT | Mod: GC | Performed by: FAMILY MEDICINE

## 2023-07-14 PROCEDURE — 82310 ASSAY OF CALCIUM: CPT

## 2023-07-14 PROCEDURE — 250N000013 HC RX MED GY IP 250 OP 250 PS 637

## 2023-07-14 PROCEDURE — 82533 TOTAL CORTISOL: CPT

## 2023-07-14 PROCEDURE — 85027 COMPLETE CBC AUTOMATED: CPT

## 2023-07-14 PROCEDURE — 120N000004 HC R&B MS OVERFLOW

## 2023-07-14 PROCEDURE — 99232 SBSQ HOSP IP/OBS MODERATE 35: CPT | Performed by: PHYSICIAN ASSISTANT

## 2023-07-14 PROCEDURE — 250N000013 HC RX MED GY IP 250 OP 250 PS 637: Performed by: FAMILY MEDICINE

## 2023-07-14 PROCEDURE — 250N000011 HC RX IP 250 OP 636: Mod: JZ

## 2023-07-14 PROCEDURE — 36415 COLL VENOUS BLD VENIPUNCTURE: CPT

## 2023-07-14 PROCEDURE — 97161 PT EVAL LOW COMPLEX 20 MIN: CPT | Mod: GP

## 2023-07-14 RX ORDER — OXYCODONE HYDROCHLORIDE 5 MG/1
10 TABLET ORAL EVERY 6 HOURS PRN
Status: DISCONTINUED | OUTPATIENT
Start: 2023-07-14 | End: 2023-07-19 | Stop reason: HOSPADM

## 2023-07-14 RX ORDER — OXYCODONE HYDROCHLORIDE 5 MG/1
5 TABLET ORAL EVERY 6 HOURS PRN
Status: DISCONTINUED | OUTPATIENT
Start: 2023-07-14 | End: 2023-07-14

## 2023-07-14 RX ORDER — COSYNTROPIN 0.25 MG/ML
250 INJECTION, POWDER, FOR SOLUTION INTRAMUSCULAR; INTRAVENOUS ONCE
Status: COMPLETED | OUTPATIENT
Start: 2023-07-15 | End: 2023-07-16

## 2023-07-14 RX ORDER — VIT B COMP NO.3/FOLIC/C/BIOTIN 1 MG-60 MG
1 TABLET ORAL DAILY
Status: DISCONTINUED | OUTPATIENT
Start: 2023-07-14 | End: 2023-07-19 | Stop reason: HOSPADM

## 2023-07-14 RX ORDER — OXYCODONE HYDROCHLORIDE 5 MG/1
5 TABLET ORAL
Status: COMPLETED | OUTPATIENT
Start: 2023-07-14 | End: 2023-07-14

## 2023-07-14 RX ADMIN — HYDROMORPHONE HYDROCHLORIDE 0.2 MG: 0.2 INJECTION, SOLUTION INTRAMUSCULAR; INTRAVENOUS; SUBCUTANEOUS at 11:39

## 2023-07-14 RX ADMIN — OXYCODONE HYDROCHLORIDE 5 MG: 5 TABLET ORAL at 20:40

## 2023-07-14 RX ADMIN — B-COMPLEX W/ C & FOLIC ACID TAB 1 MG 1 TABLET: 1 TAB at 17:32

## 2023-07-14 RX ADMIN — OXYCODONE HYDROCHLORIDE 5 MG: 5 TABLET ORAL at 21:36

## 2023-07-14 RX ADMIN — ROSUVASTATIN CALCIUM 10 MG: 10 TABLET, FILM COATED ORAL at 20:40

## 2023-07-14 RX ADMIN — SEVELAMER CARBONATE 800 MG: 800 TABLET, FILM COATED ORAL at 17:32

## 2023-07-14 RX ADMIN — LORAZEPAM 1 MG: 1 TABLET ORAL at 11:38

## 2023-07-14 RX ADMIN — HEPARIN SODIUM 5000 UNITS: 5000 INJECTION, SOLUTION INTRAVENOUS; SUBCUTANEOUS at 20:39

## 2023-07-14 RX ADMIN — HEPARIN SODIUM 5000 UNITS: 5000 INJECTION, SOLUTION INTRAVENOUS; SUBCUTANEOUS at 08:32

## 2023-07-14 RX ADMIN — NORTRIPTYLINE HYDROCHLORIDE 75 MG: 25 CAPSULE ORAL at 20:39

## 2023-07-14 RX ADMIN — SERTRALINE 150 MG: 100 TABLET, FILM COATED ORAL at 20:39

## 2023-07-14 RX ADMIN — SEVELAMER CARBONATE 800 MG: 800 TABLET, FILM COATED ORAL at 11:37

## 2023-07-14 RX ADMIN — Medication 25 MCG: at 20:40

## 2023-07-14 ASSESSMENT — ACTIVITIES OF DAILY LIVING (ADL)
ADLS_ACUITY_SCORE: 26
ADLS_ACUITY_SCORE: 22
ADLS_ACUITY_SCORE: 26
ADLS_ACUITY_SCORE: 26
ADLS_ACUITY_SCORE: 20
ADLS_ACUITY_SCORE: 26

## 2023-07-14 NOTE — PROGRESS NOTES
[facility]    Progress Note - Hospitalist Service       Date of Admission:  7/13/2023    Assessment & Plan   Serene Tipton is a 67 year old female admitted on 7/13/2023, after presenting with abdominal pain, recent history of falls, and found to have hyperkalemia 2/2 ESRD & small bowel obstruction. She has a PMHx of PKD s/p bilateral nephrectomy, ESRD on HD TTS schedule, active transplant evaluation, anemia, HTN, HLD, hypothyroidism, active tobacco use, and chronic pain.     ESRD on HD  Hyperkalemia, RESOLVED  Hyponatremia, RESOLVED  On Tuesday, pt experienced hypotension & a fall during dialysis, cutting HD session short. Cr reduced to 3.56 from 6.44 (baseline 3-5) on admission as well as reduced K+ 6.1 and Na+ 130. No ECG changes specific for hyperkalemia.   - Nephrology consulted, appreciate recommendations   - Dialysis occurred on 7/13  - Avoiding potassium binders with SBO  - Monitor BMP and electrolytes   - Discontinue telemetry   - Discontinue IV-fluids to reduce risk of fluid overload  - If K+ increases, repeat ECG, give Ca gluconate & insulin with dextrose   - Avoid nephrotoxic medications    Hypoglycemia, RESOLVED  Poor appetite  Failure to thrive  Received dextrose from EMS due to hypoglycemia, 60. Experiences weight loss, fatigue, poor appetite, & lives alone in apartment. Appetite has improved overnight and may have been further influenced by SBO. Cortisol significantly decreased at 0.2, raising concern for adrenal insuffiency.   - PT/OT  - Nutrition consult  - ACTH testing in AM 7/15  - Defer on mirtazapine   - Maintain hypoglycemia protocol with dextrose    Small bowel obstruction  Constipation  Nausea  Abdominal pain  L-sided abdominal pain remains. Last admission (7/5-7/6) evaluated concern for SBP & surgery consulted with constipation identified as the likely source. MiraLAX bowel regimen initiated in response, resulting in multiple loose bowel movements. Recent  imaging showed moderate to large amount of stool throughout the colon, thus recommended to continue bowel regimen. 7/14 abdominal XR showed multiple air-fluid levels throughout the abdomen. 7/14 abdominal CT showed increased fluid distention of small bowel loops with decompressed distal small bowel, compatible with small bowel obstruction. TSH normal at 3.9. Bowel rest and compazine overnight. On 7/14, pt expressed improvement and requested food.   - Diet advanced as tolerated  - Compazine PRN  - Discontinue IV dilaudid   - Restart oxycodone 5 mg PRN    Hypotension  Hx of hypertension  Initial BP 87/60 and rec'd 500 mL NS with improvement to SBP 110s. Pt has multiple anti-hypertensives; however, pt has had increased hypotensive episodes, including during dialysis. Pt also has recent history of mechanical fall. Decreased cortisol may also be contributing to these episodes.   - Hold PTA amlodipine, labetalol, clonidine, lisinopril  - Midodrine during dialysis per nephrology  - ACTH testing as noted above    Small bilateral pleural effusions  LE edema   Likely secondary to fluid overload status due to need for dialysis. Hypoalbuminemia also likely contributing with albumin at 2.1.   - Maintain dialysis schedule  - Discontinue IV fluids     Non-specific ST changes, unchanged  Prolonged QT  Mild non-specific ST-depressions that previously appeared on EKGs. No chest pain. QTc 522.  - Avoid QT prolonging meds  - Troponin & EKG if chest pain develops    Chronic Conditions:  Hypothyroidism: PTA synthroid  Anxiety/depression: PTA sertraline, PRN ativan  Chronic pain: PTA nortriptyline, reinitiated oxycodone 5 mg   Hyperlipidemia: PTA rosuvastatin  Anemia: PTA EPO & Venofer with HD  Tobacco use: declines nicotine replacement     Diet: Renal Diet (dialysis)    DVT Prophylaxis: Heparin SQ  Pool Catheter: Not present  Fluids: None  Lines: None     Cardiac Monitoring: ACTIVE order. Indication: Electrolyte Imbalance (24 hours)-  Magnesium <1.3 mg/ml; Potassium < =2.8 or > 5.5 mg/ml  Code Status: Full Code      Clinically Significant Risk Factors Present on Admission        # Hyperkalemia: Highest K = 6.1 mmol/L in last 2 days, will monitor as appropriate     # Hypomagnesemia: Lowest Mg = 1.4 mg/dL in last 2 days, will replace as needed  # Anion Gap Metabolic Acidosis: Highest Anion Gap = 20 mmol/L in last 2 days, will monitor and treat as appropriate  # Hypoalbuminemia: Lowest albumin = 2.1 g/dL at 7/14/2023  5:03 AM, will monitor as appropriate  # Coagulation Defect: INR = 1.70 (Ref range: 0.85 - 1.15) and/or PTT = 37 Seconds (Ref range: 22 - 38 Seconds), will monitor for bleeding    # Hypertension: Noted on problem list      # Cachexia: Estimated body mass index is 17.15 kg/m  as calculated from the following:    Height as of this encounter: 1.524 m (5').    Weight as of this encounter: 39.8 kg (87 lb 12.8 oz).            Disposition Plan      Expected Discharge Date: 07/15/2023      Destination: home with help/services          The patient's care was discussed with the Attending Physician, Dr. Andrea Albright.    NURYS BANSAL MD  Hospitalist Service  [facility]  Securely message with [employer] (more info)  Text page via [facility] Paging/Directory   ______________________________________________________________________    Interval History   Admitted late afternoon 7/13 and substantially improved with dialysis yesterday. Pt having decreased discomfort from SBO and thus, wanted to advance diet.     Physical Exam   Vital Signs: Temp: 98.4  F (36.9  C) Temp src: Oral BP: 136/70 Pulse: 91   Resp: 16 SpO2: 98 % O2 Device: None (Room air)    Weight: 87 lbs 12.8 oz    Constitutional: awake, alert, cooperative, no apparent distress, and appears stated age  Respiratory: No increased work of breathing, good air exchange, clear to auscultation bilaterally, no crackles or wheezing  Cardiovascular: Regular rate and  rhythm and no murmur noted  GI: Soft, non-distended, non-tender, no masses palpated, no hepatosplenomegally  Skin: Normal skin color, texture, turgor, no rashes and no lesions on visible skin  Musculoskeletal: Slight lower extremity pitting edema (+1) present  Neurologic: Awake, alert, oriented to name, place and time.   Neuropsychiatric: General: normal, calm and normal eye contact    Medical Decision Making       Please see A&P for additional details of medical decision making.      Data     I have personally reviewed the following data over the past 24 hrs:    13.4 (H)  \   9.0 (L)   / 322     139 99 42 (H) /  96   3.7 26 3.56 (H) \       ALT: N/A AST: N/A AP: N/A TBILI: N/A   ALB: 2.1 (L) TOT PROTEIN: N/A LIPASE: N/A       TSH: 3.90 T4: N/A A1C: N/A       Ferritin:  N/A % Retic:  N/A LDH:  N/A       Imaging results reviewed over the past 24 hrs:   Recent Results (from the past 24 hour(s))   Chest XR,  PA & LAT    Narrative    EXAM: XR CHEST 2 VIEWS, XR ABDOMEN 2 VIEWS  LOCATION: [facility]  DATE: 7/13/2023    INDICATION: Chest pain, abdominal pain  COMPARISON: 07/05/2023      Impression    IMPRESSION:     Chest:  No focal consolidation. Bilateral small pleural effusion.    ABDOMEN:  Multiple air-fluid levels are seen throughout the abdomen, predominantly in the left hemiabdomen. Differential includes small bowel obstruction. Further evaluation with CT is recommended.   Abdomen XR, 2 vw, flat and upright    Narrative    EXAM: XR CHEST 2 VIEWS, XR ABDOMEN 2 VIEWS  LOCATION: [facility]  DATE: 7/13/2023    INDICATION: Chest pain, abdominal pain  COMPARISON: 07/05/2023      Impression    IMPRESSION:     Chest:  No focal consolidation. Bilateral small pleural effusion.    ABDOMEN:  Multiple air-fluid levels are seen throughout the abdomen, predominantly in the left hemiabdomen. Differential includes small bowel obstruction. Further evaluation with CT is recommended.    CT Abdomen Pelvis w/o Contrast    Narrative    EXAM: CT ABDOMEN PELVIS W/O CONTRAST  LOCATION: [facility]  DATE: 7/13/2023    INDICATION: concern for SBO on CT  COMPARISON: 07/11/2023  TECHNIQUE: CT scan of the abdomen and pelvis was performed without IV contrast. Multiplanar reformats were obtained. Dose reduction techniques were used.  CONTRAST: None.    FINDINGS: Evaluation is limited due to lack of intravenous contrast and generalized decreased contrast resolution due to diffuse body wall edema.    LOWER CHEST: Redemonstration of moderate cardiomegaly with trace bilateral pleural effusions. Mild atelectasis again seen in the lung bases. No confluent consolidations. Cardiac blood pool is again decreased.    HEPATOBILIARY: Multiple cysts again seen within the liver in keeping with disease spectrum of polycystic kidney disease. Gallbladder is normal.    PANCREAS: Normal.    SPLEEN: Normal.    ADRENAL GLANDS: Normal.    KIDNEYS/BLADDER: Bilateral kidneys appear removed. Urinary bladder is decompressed.    BOWEL: Moderate fluid distention of multiple small bowel loops in the abdomen has increased from the prior study. The most distal small bowel is decompressed. Small amount of stool is seen in the colon which is mostly decompressed as well. A transition   point is difficult to locate due to decreased contrast resolution but may be located in the midabdomen on series 3 image 42. Trace free fluid in the abdomen. No free air.    LYMPH NODES: Normal.    VASCULATURE: The iliac atherosclerotic calcifications with mild ectasia of the infrarenal abdominal aorta measuring 2.4 cm, unchanged. No abdominal aortic aneurysm.    PELVIC ORGANS: Uterus is not seen, likely removed.    MUSCULOSKELETAL: Status post anterior fusion documentation at L5/S1 with intervertebral disc spacer. Mild anterolisthesis of L5 on S1 is unchanged. No suspicious osseous lesions or acute fractures.        Impression     IMPRESSION:     1.  Increased fluid distention of small bowel loops with decompressed distal small bowel, compatible with small bowel obstruction. Transition point is difficult to pinpoint due to lack of intravenous contrast and generalized decreased contrast   resolution, but may be within the mid abdominal mesentery and likely secondary to adhesion.    2.  Moderate cardiomegaly with trace bilateral pleural effusion, small volume ascites and generalized body wall edema.    3.  Anemia.

## 2023-07-14 NOTE — CONSULTS
Care Management Initial Consult      General Information  Assessment completed with: Patient,    Type of CM/SW Visit: Initial Assessment  Primary Care Provider verified and updated as needed: Yes   Readmission within the last 30 days: current reason for admission unrelated to previous admission   Return Category: New Diagnosis  Reason for Consult: discharge planning, health care directive, transportation  Advance Care Planning: Advance Care Planning Reviewed: no concerns identified, present on chart        Communication Assessment  Patient's communication style: spoken language (English or Bilingual)    Hearing Difficulty or Deaf: no   Wear Glasses or Blind: no    Cognitive  Cognitive/Neuro/Behavioral: WDL  Level of Consciousness: alert                   Living Environment:   People in home: alone     Current living Arrangements: apartment      Able to return to prior arrangements: yes     Family/Social Support:  Care provided by: friend, other (see comments) (Friends and family assist as needed.)  Provides care for: no one, unable/limited ability to care for self  Marital Status: Single  Other (specify) (Friends and extended family - [place] and a Nephew.)          Description of Support System: Supportive, Involved (As needed/able.)    Support Assessment: Caregiver difficulty providing physical care/safety, Adequate social supports (Pt acknowledges she would benefit from in-home services.)    Current Resources:   Patient receiving home care services: No  Community Resources: Dialysis Services ([facility] on Tu, Thurs, and Sat.)  Equipment currently used at home: none (None identified.)  Supplies currently used at home: None (None identified.)    Employment/Financial:  Employment Status: retired     Financial Concerns: No concerns identified   Referral to Financial Worker: No     Does the patient's insurance plan have a 3 day qualifying hospital stay waiver?  No    Lifestyle & Psychosocial Needs:  Social  "Determinants of Health     Tobacco Use: Medium Risk (7/5/2023)    Patient History      Smoking Tobacco Use: Former      Smokeless Tobacco Use: Never      Passive Exposure: Not on file   Alcohol Use: Not on file   Financial Resource Strain: Not on file   Food Insecurity: Not on file   Transportation Needs: Not on file   Physical Activity: Not on file   Stress: Not on file   Social Connections: Not on file   Intimate Partner Violence: Not on file   Depression: Not at risk (1/26/2021)    PHQ-2      PHQ-2 Score: 2   Housing Stability: Not on file     Functional Status:  Prior to admission patient needed assistance:   Dependent ADLs::  (Pt reports independence yet struggling at baseline.)  Dependent IADLs::  (Pt reports independence yet struggling at baseline.)  Assessment of Functional Status: Not at  functional baseline    Mental Health Status:  Mental Health Status: No Current Concerns  Mental Health Management: Medication    Chemical Dependency Status:  Chemical Dependency Status: No Current Concerns           Values/Beliefs:  Spiritual, Cultural Beliefs, Roman [employer] Practices, Values that affect care: no (None identified)             Additional Information:  Writer met with pt to introduce Care Management(CM) and obtain an initial assessment. Pt resides in an apartment and states typical independence with I/ADL when at baseline. Friends and niece/nephew support as needed/desired. Pt is requesting a referral to In-home care services - PT/OT consults pend. Pt/family was provided with the Medicare Compare list for Transitional Care services. Discussed associated Medicare star ratings to assist with choice for referrals/discharge planning. Education was given to pt/family that star ratings are updated/maintained by Medicare and can be reviewed by visiting www.medicare.gov - Yes. Pt declined the list/website and states having no preferences in vendor.    7/13 per ASHLEY Gautam-\"67 year old female presents to the Emergency " "Department for evaluation of a fall.  She tells me that she was going to go sit on a chair and sat down awkwardly, landing on the floor.  She admits to a mild hit to her head.  She was hypoglycemic per EMS and given oral glucose.  Patient with end-stage kidney disease status post bilateral nephrectomy, on Tuesday Thursday Saturday dialysis schedule.  She did not do a full run on Tuesday after a fall in dialysis and hypotension.  Of note, patient admitted about a week and a half ago after similar fall.  Noted to be constipated and had an ileus.  She was sent home on MiraLAX.  She attributes this to not getting good dialysis runs.  She reports it gave her diarrhea and has since stopped that.  She denies any fevers or chills.  She is chronically ill-appearing.  She continues to have abdominal pain to the left lower quadrant, she has had 2 CTs over the past 2 weeks which were fairly unchanged.  Her exam seems similar to prior.  Given that, will obtain an x-ray to evaluate for any signs of small bowel obstruction or free air.  EKG obtained shows nonspecific ST abnormalities, mild depressions laterally.  Worse than prior EKG.  QTc is mildly prolonged.  Without any chest pain currently or history of recent chest pain to suggest ACS.  Labs do show hyperkalemia and elevated creatinine consistent with her end-stage renal disease.  Head CT without evidence of intracranial hemorrhage or fracture.  Her glucose improved with some juice.  Nephrology did stop down and recommend admission for dialysis this evening.  Patient is agreeable.  Chest x-ray with small bilateral pleural effusions which I am guessing is also causing the edema in her right side.  X-ray of the abdomen fortunately does show concerns for small bowel obstruction recommending a CT which I will order as well.\"    PT&OT Consults pending. Anticipate return to home with the addition of in-home services at time of discharge. CM to follow-up on consults and assist with " service coordination needs as indicated. Friends/family to transport.    Sushant Miller RN

## 2023-07-14 NOTE — PROVIDER NOTIFICATION
Notified Resident at 2215 PM regarding new orders.      Spoke with: Resident    Orders were not obtained. No new orders. Continue with PRN dilaudid for pain.     Comments: Pt requesting PTA prn oxycodone.

## 2023-07-14 NOTE — PROGRESS NOTES
HEMODIALYSIS TREATMENT NOTE    Date: 7/13/2023  Time: 7:54 PM    Data:  Pre Wt:   43.6 kg  Desired Wt: 41.6  kg   Post Wt:  41.6 kg  Ultrafiltration - Post Run Net Total Removed (mL):  2000ml  Vascular Access Status: Fistula  patent  Dialyzer Rinse:  clear  Total Blood Volume Processed:   68.1L  Total Dialysis (Treatment) Time:   3hrs  Dialysate Bath: K 2, Ca 3   Heparin: None  Medication: None    Lab:   No    Assessment/Interventions:  HD treatment completed via AVF on right upper arm with blood flow rate 400ml/min. Patient is cannulated with 15g needle without difficult. Net fluid removal 2kg. Patient is tolerated HD run well. No issue related to dialysis. No prolonged bleeding at post dialysis. See flow sheet for  Further information. Post dialysis hand off report to primary floor RN.      Plan:     Next HD run per renal team.

## 2023-07-14 NOTE — PROGRESS NOTES
07/14/23 1350   Appointment Info   Signing Clinician's Name / Credentials (PT) Estefania Glass, PT, DPT   Living Environment   People in Home alone   Current Living Arrangements apartment   Home Accessibility no concerns   Transportation Anticipated family or friend will provide   Self-Care   Usual Activity Tolerance good   Current Activity Tolerance moderate   Equipment Currently Used at Home   (Has FWW and cane at home)   Fall history within last six months yes   Number of times patient has fallen within last six months 3  (Within the last few days)   Activity/Exercise/Self-Care Comment Patient reports previous independence with mobility, ADLs, cooking, cleaning, and laundry.   General Information   Onset of Illness/Injury or Date of Surgery 07/13/23   Referring Physician Nigel Awad MD   Patient/Family Therapy Goals Statement (PT) To go home   Pertinent History of Current Problem (include personal factors and/or comorbidities that impact the POC) Serene Tipton is a 67 year old female who has a history of PKD s/p bilateral nephrectomy, ESRD on HD TTS schedule, active transplant evaluation, Anemia,  HTN, HLD, Hypothyroidism, active tobacco use, chronic pain who presented due to abdominal pain and fall and was found to have hyperkalemia 2/2 ESRD as well as SBO.   Existing Precautions/Restrictions fall   Weight-Bearing Status - LLE full weight-bearing   Weight-Bearing Status - RLE full weight-bearing   Cognition   Affect/Mental Status (Cognition) WNL   Orientation Status (Cognition) oriented x 3   Follows Commands (Cognition) WNL   Range of Motion (ROM)   Range of Motion ROM is WFL   Strength (Manual Muscle Testing)   Strength (Manual Muscle Testing) strength is WFL   Bed Mobility   Bed Mobility supine-sit   Supine-Sit [place] (Bed Mobility) supervision   Assistive Device (Bed Mobility) bed rails   Transfers   Transfers sit-stand transfer   Sit-Stand Transfer   Sit-Stand [place] (Transfers)  contact guard;1 person to manage equipment   Assistive Device (Sit-Stand Transfers) walker, front-wheeled   Gait/Stairs (Locomotion)   Comment, (Gait/Stairs) Patient declined gait due to fatigue   Clinical Impression   Criteria for Skilled Therapeutic Intervention Yes, treatment indicated   PT Diagnosis (PT) Impaired functional mobility   Influenced by the following impairments Weakness   Functional limitations due to impairments Bed mobility, transfers, gait   Clinical Presentation (PT Evaluation Complexity) Evolving/Changing   Clinical Presentation Rationale Patient presents as medically diagnosed   Clinical Decision Making (Complexity) low complexity   Planned Therapy Interventions (PT) bed mobility training;gait training;home exercise program;neuromuscular re-education;patient/family education;strengthening;transfer training   Anticipated Equipment Needs at Discharge (PT) walker, rolling   Risk & Benefits of therapy have been explained evaluation/treatment results reviewed;care plan/treatment goals reviewed;participants voiced agreement with care plan;participants included;patient   PT Total Evaluation Time   PT Eval, Low Complexity Minutes (23998) 13   Therapy Certification   Start of care date 07/14/23   Certification date from 07/14/23   Certification date to 07/21/23   Medical Diagnosis Small bowl obstruction   Physical Therapy Goals   PT Frequency Daily   PT Predicted Duration/Target Date for Goal Attainment 07/21/23   PT Goals Transfers   PT: Transfers Supervision/stand-by assist;Sit to/from stand   PT Discharge Planning   PT Plan Bed mobility, transfers, gait, LE ex   PT Discharge Recommendation (DC Rec) home;home with home care physical therapy   PT Rationale for DC Rec Patient currently requires assist of 1 for bed mobility and transfers. She lives alone in an apartment and reports that she has friends who would be able to stay with her and assist her if needed. She has access to a walker and cane at  home but does not use them at baseline. Anticipate patient will be safe to discharge home once medically cleared to do so. Pending progress with therapy, patient may benefit from home PT in order to improve strength and overall independence with mobility.   PT Brief overview of current status Assist of 1 for bed mobility and transfers, declined gait today   Total Session Time   Total Session Time (sum of timed and untimed services) 13     Estefania Glass, PT, DPT

## 2023-07-14 NOTE — PROVIDER NOTIFICATION
Notified Resident at 2145 PM regarding order clarification.      Spoke with: Resident    Orders were not obtained. No  Ne orders. Renal panel was drawn during dialysis, potassium came down. Okay to recheck in the AM    Comments: Per Dr. Awad note, redraw renal panel once dialysis run completed.

## 2023-07-14 NOTE — CONSULTS
"CLINICAL NUTRITION SERVICES - ASSESSMENT NOTE     Nutrition Prescription    RECOMMENDATIONS FOR MDs/PROVIDERS TO ORDER:  None at this time    Malnutrition Status:    Severe malnutrition in the context of acute on chronic illness    Recommendations already ordered by Registered Dietitian (RD):  - Medical food supplement therapy - Ensure Clear daily between meals at 10am (berry flavor)  - Multi-trace element supplement therapy - Renal MVI  - Order metabolic panel    Future/Additional Recommendations:  Monitor po intake, ONS intake/preference, wts, labs     REASON FOR ASSESSMENT  Serene Tipton is a/an 67 year old female assessed by the dietitian for Provider Order - poor appetite, weight loss    NUTRITION HISTORY  Dx: admitted for   PMH: Bilateral nephrectomy, ESKD on HD, recent SBO, constipation    Met with pt in room today. Pt reports that her intake has been variable 2/2 frequent hospital stays (pt was here a week ago), and recovering from illness. Pt reports drinking Nepro at home, but doesn't want it here unless it's mixed with Dumas's chocolate syrup. Her friends had brought her meals when she discharged last week, things like [place] rice and sausage. When she eats, she reports feeling full quickly. Writer recommended trying to eat small frequent meals to boost intake, and to not shy away from ordering more than 3 meals/day from room service. Pt requested trying Ensure Clear ONS, and see how she likes it.    - Pt dialyzed yesterday.    CURRENT NUTRITION ORDERS  Diet: Clear liquid, advance as tolerated  Intake/Tolerance: Pt ate all of her oatmeal and a few bites of eggs earlier today.    LABS  Labs reviewed  BUN: 42 (H)  Cr: 3.56 (H)  Ma.4 (L) on     MEDICATIONS  Medications reviewed  Vitamin D3 25 mcg  D5 in NS @50 mL/hr  Renvela    GI:  No BM yet since admit    ANTHROPOMETRICS  Height: 152.4 cm (5' 0\")  Most Recent Weight: 39.8 kg (87 lb 12.8 oz)    IBW: 45 kg (100 lb)  BMI: Underweight BMI " <18.5  Weight History:   07/14/23          39.8 kg (87 lb 12.8 oz) - post dialysis wt  07/13/23  43.6 kg (96 lb 1.6 oz) - predialysis wt  07/11/23          39.7 kg (87 lb 8.4 oz)  07/06/23          39.7 kg (87 lb 8 oz)    Standing scale  07/05/23          42.4 kg (93 lb 8 oz)     Standing scale  05/01/23          43 kg (94 lb 11.2 oz)  02/28/23          40.5 kg (89 lb 3.2 oz)  02/06/23          44.9 kg (99 lb)  12/19/22          47 kg (103 lb 11.2 oz)  Unable to assess wt loss 2/2 frequent HD causing wt fluctuations    Dosing Weight: 39.7 kg actual body wt based on driest admit wt    ASSESSED NUTRITION NEEDS  Estimated Energy Needs: 3154-2077 kcals/day (30 - 35 kcals/kg )  Justification: Repletion  Estimated Protein Needs: 44-56 grams protein/day (1.1 - 1.4 grams of pro/kg)  Justification: Repletion  Estimated Fluid Needs: mL/day (25 - 30 mL/kg)   Justification: Per provider pending fluid status    PHYSICAL FINDINGS  See malnutrition section below.  Bilateral pleural effusions    MALNUTRITION  % Intake: </=75% for >/= 1 month (severe)  % Weight Loss: Unable to assess  Subcutaneous Fat Loss: Facial region:  moderate, Upper arm:  moderate and Thoracic/intercostal:  moderate  Muscle Loss: Temporal:  moderate, Scapular bone:  severe, Upper arm (bicep, tricep):  moderate, Lower arm  (forearm):  moderate, Dorsal hand:  severe and Posterior calf:  moderate  Fluid Accumulation/Edema: Mild in both ankles, both feet  Malnutrition Diagnosis: Severe malnutrition in the context of acute on chronic illness    NUTRITION DIAGNOSIS  Malnutrition related to poor appetite secondary to bilateral nephrectomy as evidenced by moderate-severe muscle/fat loss    INTERVENTIONS  Implementation  - Nutrition education for nutrition relationship to health/disease   - Encourage po intake and liberal use of room service  - Medical food supplement therapy - Ensure Clear daily between meals at 10am (berry flavor)  - Multi-trace element supplement  therapy - Renal MVI    Goals  - Patient to consume % of nutritionally adequate meal trays TID, or the equivalent with supplements/snacks.  - Drink 1 Ensure/day     Monitoring/Evaluation  Progress toward goals will be monitored and evaluated per protocol.

## 2023-07-14 NOTE — PROGRESS NOTES
RENAL PROGRESS NOTE    CC:  Fall     ASSESSMENT & PLAN:     ESKD - On chronic in-center hemodialysis. Dialyzes as outpatient at [facility] on TTS schedule (3 hour runs, JESSI AVF) under the care of Dr. Cheung. Underlying disease is polycystic kidney disease and she is s/p bilateral nephrectomy. Underwent urgent HD 7/13 in ED for hyperkalemia and planning to follow her typical TTS schedule going forward.      Hyperkalemia - Resolved with HD     Volume - evidence of volume overload on imaging and exam. Patient is anephric, anuric. Will discontinue IVF as she is not currently NPO.     SBO - Admitted earlier this month with partial SBO and evidence of ongoing obstruction on CT this admit.      History of hypertension - Has been persistently hypotensive on HD recently and hypotensive with recent ED visits as well; hypotension has responded to fluid boluses. BP 87/60 upon arrival to ED and would hold all PTA antihypertensives at this time. Will make midodrine/albumin available with HD.      Hypothyroidism - On replacement     Anxiety/depression - On sertraline, uses ativan     Chronic pain - Management per primary service      Hyperlipidemia - On statin     Anemia - Receives PETE and iron with dialysis. Hgb 8.4 here. Iron levels replete and received EPO 20K x 1 7/13/23.      Secondary renal hyperparathyroidism - on sevelamer for phos binders, calcium low here, check albumin for correction      Tobacco use      FTT - Patient reports doing poorly at home since her recent discharge; very weak and unable to care for herself. Today she was not open to the idea of TCU or LTC facility but is open to meeting with SW to discuss possible option of HHC. SW consult requested. PT/OT to see.     SUBJECTIVE:  HD yesterday uneventful, 2L UF. Alvarez reports feeling much better today. No breathing concerns. Was able to eat a good breakfast and is having loose stools.     OBJECTIVE:  Physical Exam   Temp: 97.6  F (36.4  C) Temp src:  Oral BP: 134/64 Pulse: 83   Resp: 16 SpO2: 95 % O2 Device: None (Room air)    Vitals:    07/13/23 1202 07/13/23 1620 07/14/23 0557   Weight: 41.7 kg (92 lb) 43.6 kg (96 lb 1.6 oz) 39.8 kg (87 lb 12.8 oz)     Vital Signs with Ranges  Temp:  [97.6  F (36.4  C)-98.3  F (36.8  C)] 97.6  F (36.4  C)  Pulse:  [] 83  Resp:  [16-44] 16  BP: (115-158)/(57-80) 134/64  SpO2:  [92 %-100 %] 95 %  I/O last 3 completed shifts:  In: 552.5 [I.V.:552.5]  Out: 2200 [Other:2000; Stool:200]    @TMAXR(24)@    Patient Vitals for the past 72 hrs:   Weight   07/14/23 0557 39.8 kg (87 lb 12.8 oz)   07/13/23 1620 43.6 kg (96 lb 1.6 oz)   07/13/23 1202 41.7 kg (92 lb)       Intake/Output Summary (Last 24 hours) at 7/14/2023 1116  Last data filed at 7/14/2023 1000  Gross per 24 hour   Intake 602.5 ml   Output 2200 ml   Net -1597.5 ml       PHYSICAL EXAM:  General - Alert and oriented x3, appears comfortable, NAD, frail appearing   Cardiovascular - Regular rate and rhythm, no rub  Respiratory - Clear to auscultation bilaterally, no crackles or wheezes  Abd: BS present, no guarding or pain with palpation, no ascites  Extremities - + pedal and ankle edema   Skin: dry, intact, no rash, good turgor  Neuro:  Grossly intact, no focal deficits  MSK:  Grossly intact  Psych:  Normal affect  Access: LUE AVF, + bruit and thrill     LABORATORY STUDIES:     Recent Labs   Lab 07/14/23  0503 07/13/23  1239 07/11/23  1131   WBC 13.4* 13.0* 11.5*   RBC 2.65* 2.51* 2.62*   HGB 9.0* 8.4* 9.0*   HCT 27.4* 25.8* 26.9*    320 288       Basic Metabolic Panel:  Recent Labs   Lab 07/14/23  0841 07/14/23  0503 07/13/23  2146 07/13/23  1718 07/13/23  1652 07/13/23  1538 07/13/23  1325 07/13/23  1239 07/13/23  1203 07/11/23  1131   NA  --  139  --  135*  --   --   --  130*  --  130*   POTASSIUM  --  3.7  --  4.1  --   --   --  6.1*  --  4.4   CHLORIDE  --  99  --  96*  --   --   --  91*  --  92*   CO2  --  26  --  23  --   --   --  19*  --  26   BUN  --  42*   --  68*  --   --   --  113*  --  57*   CR  --  3.56*  --  4.11*  --   --   --  6.44*  --  4.48*   GLC 96 90 94 120 169* 72   < > 60*   < > 80   GAVIN  --  8.3*  --  8.3*  --   --   --  7.3*  --  8.3*    < > = values in this interval not displayed.       INR  Recent Labs   Lab 07/13/23  1239 07/11/23  1131   INR 1.70* 1.40*        Recent Labs   Lab Test 07/14/23  0503 07/13/23  1239 07/11/23  1131   INR  --  1.70* 1.40*   WBC 13.4* 13.0* 11.5*   HGB 9.0* 8.4* 9.0*    320 288       Personally reviewed current labs      Yue Mcfarland PA-C   [facility]  [phone]

## 2023-07-14 NOTE — PLAN OF CARE
8034-3096: A/ox4. VSS on RA. C/o back pain-gave prn dilaudid x1-effective. Had dialysis run during the evening. C/o of mild nausea during evening-gave prn compazine x1. Pt had five loose stools total on shift.  Turn/repo as pt tolerates to promote skin integrity. Pt makes needs known. No acute events on shift.      Problem: Plan of Care - These are the overarching goals to be used throughout the patient stay.    Goal: Optimal Comfort and Wellbeing  Outcome: Progressing     Problem: Pain Acute  Goal: Optimal Pain Control and Function  Outcome: Progressing       Problem: Chronic Kidney Disease  Goal: Electrolyte Balance  Outcome: Progressing

## 2023-07-14 NOTE — PLAN OF CARE
Patient call light appropriate and vitally stable during shift. Up to commode and bathroom in room multiple times today and had watery stools. Patient has started to eat and is tolerating diet increase. Educated completed regarding possibility of patient having a partial bowel obstruction which causes only liquid to get through as she was confused how she could still have a bowel obstruction with so much diarrhea. Plans are for patient to run dialysis tomorrow before potential discharge home. Family and friends supporting patient and ordering needs such as a shower chair and walker for when she gets home. Patient open to home care services moving forward.     Problem: Plan of Care - These are the overarching goals to be used throughout the patient stay.    Goal: Optimal Comfort and Wellbeing  Intervention: Monitor Pain and Promote Comfort  Recent Flowsheet Documentation  Taken 7/14/2023 1600 by Dilcia Larson RN  Pain Management Interventions: repositioned  Taken 7/14/2023 1200 by Dilcia Larson RN  Pain Management Interventions: repositioned  Taken 7/14/2023 0800 by Dilcia Larson RN  Pain Management Interventions: repositioned

## 2023-07-15 ENCOUNTER — APPOINTMENT (OUTPATIENT)
Dept: OCCUPATIONAL THERAPY | Facility: CLINIC | Age: 68
DRG: 388 | End: 2023-07-15
Payer: COMMERCIAL

## 2023-07-15 ENCOUNTER — APPOINTMENT (OUTPATIENT)
Dept: CT IMAGING | Facility: CLINIC | Age: 68
DRG: 388 | End: 2023-07-15
Payer: COMMERCIAL

## 2023-07-15 LAB
ANION GAP SERPL CALCULATED.3IONS-SCNC: 14 MMOL/L (ref 5–18)
BUN SERPL-MCNC: 55 MG/DL (ref 8–22)
CALCIUM SERPL-MCNC: 8 MG/DL (ref 8.5–10.5)
CHLORIDE BLD-SCNC: 96 MMOL/L (ref 98–107)
CO2 SERPL-SCNC: 26 MMOL/L (ref 22–31)
CREAT SERPL-MCNC: 4.71 MG/DL (ref 0.6–1.1)
ERYTHROCYTE [DISTWIDTH] IN BLOOD BY AUTOMATED COUNT: 14.7 % (ref 10–15)
GFR SERPL CREATININE-BSD FRML MDRD: 10 ML/MIN/1.73M2
GLUCOSE BLD-MCNC: 107 MG/DL (ref 70–125)
GLUCOSE BLDC GLUCOMTR-MCNC: 102 MG/DL (ref 70–99)
GLUCOSE BLDC GLUCOMTR-MCNC: 104 MG/DL (ref 70–99)
GLUCOSE BLDC GLUCOMTR-MCNC: 76 MG/DL (ref 70–99)
GLUCOSE BLDC GLUCOMTR-MCNC: 79 MG/DL (ref 70–99)
GLUCOSE BLDC GLUCOMTR-MCNC: 84 MG/DL (ref 70–99)
HCT VFR BLD AUTO: 29.4 % (ref 35–47)
HGB BLD-MCNC: 9.5 G/DL (ref 11.7–15.7)
MCH RBC QN AUTO: 33.3 PG (ref 26.5–33)
MCHC RBC AUTO-ENTMCNC: 32.3 G/DL (ref 31.5–36.5)
MCV RBC AUTO: 103 FL (ref 78–100)
PLATELET # BLD AUTO: 365 10E3/UL (ref 150–450)
POTASSIUM BLD-SCNC: 3.7 MMOL/L (ref 3.5–5)
RBC # BLD AUTO: 2.85 10E6/UL (ref 3.8–5.2)
SODIUM SERPL-SCNC: 136 MMOL/L (ref 136–145)
WBC # BLD AUTO: 11.9 10E3/UL (ref 4–11)

## 2023-07-15 PROCEDURE — 97166 OT EVAL MOD COMPLEX 45 MIN: CPT | Mod: GO

## 2023-07-15 PROCEDURE — 97535 SELF CARE MNGMENT TRAINING: CPT | Mod: GO

## 2023-07-15 PROCEDURE — 99231 SBSQ HOSP IP/OBS SF/LOW 25: CPT

## 2023-07-15 PROCEDURE — 250N000013 HC RX MED GY IP 250 OP 250 PS 637

## 2023-07-15 PROCEDURE — 99232 SBSQ HOSP IP/OBS MODERATE 35: CPT | Mod: GC | Performed by: FAMILY MEDICINE

## 2023-07-15 PROCEDURE — 120N000004 HC R&B MS OVERFLOW

## 2023-07-15 PROCEDURE — 80048 BASIC METABOLIC PNL TOTAL CA: CPT

## 2023-07-15 PROCEDURE — 210N000002 HC R&B HEART CARE

## 2023-07-15 PROCEDURE — 90935 HEMODIALYSIS ONE EVALUATION: CPT

## 2023-07-15 PROCEDURE — 36415 COLL VENOUS BLD VENIPUNCTURE: CPT

## 2023-07-15 PROCEDURE — 85014 HEMATOCRIT: CPT

## 2023-07-15 PROCEDURE — 250N000011 HC RX IP 250 OP 636: Mod: JZ

## 2023-07-15 PROCEDURE — 74176 CT ABD & PELVIS W/O CONTRAST: CPT

## 2023-07-15 RX ORDER — HYDROMORPHONE HCL IN WATER/PF 6 MG/30 ML
0.2 PATIENT CONTROLLED ANALGESIA SYRINGE INTRAVENOUS EVERY 4 HOURS PRN
Status: DISCONTINUED | OUTPATIENT
Start: 2023-07-15 | End: 2023-07-19 | Stop reason: HOSPADM

## 2023-07-15 RX ORDER — HYDROMORPHONE HCL IN WATER/PF 6 MG/30 ML
0.2 PATIENT CONTROLLED ANALGESIA SYRINGE INTRAVENOUS EVERY 4 HOURS PRN
Status: DISCONTINUED | OUTPATIENT
Start: 2023-07-15 | End: 2023-07-15

## 2023-07-15 RX ADMIN — SERTRALINE 150 MG: 100 TABLET, FILM COATED ORAL at 21:05

## 2023-07-15 RX ADMIN — HEPARIN SODIUM 5000 UNITS: 5000 INJECTION, SOLUTION INTRAVENOUS; SUBCUTANEOUS at 21:05

## 2023-07-15 RX ADMIN — HYDROMORPHONE HYDROCHLORIDE 0.2 MG: 0.2 INJECTION, SOLUTION INTRAMUSCULAR; INTRAVENOUS; SUBCUTANEOUS at 23:33

## 2023-07-15 RX ADMIN — LEVOTHYROXINE SODIUM 50 MCG: 0.05 TABLET ORAL at 00:24

## 2023-07-15 RX ADMIN — HYDROMORPHONE HYDROCHLORIDE 0.2 MG: 0.2 INJECTION, SOLUTION INTRAMUSCULAR; INTRAVENOUS; SUBCUTANEOUS at 13:08

## 2023-07-15 RX ADMIN — SENNOSIDES AND DOCUSATE SODIUM 2 TABLET: 50; 8.6 TABLET ORAL at 21:06

## 2023-07-15 RX ADMIN — NORTRIPTYLINE HYDROCHLORIDE 75 MG: 25 CAPSULE ORAL at 21:06

## 2023-07-15 RX ADMIN — Medication 25 MCG: at 21:06

## 2023-07-15 RX ADMIN — LEVOTHYROXINE SODIUM 50 MCG: 0.05 TABLET ORAL at 23:33

## 2023-07-15 RX ADMIN — OXYCODONE HYDROCHLORIDE 10 MG: 5 TABLET ORAL at 20:11

## 2023-07-15 RX ADMIN — LORAZEPAM 1 MG: 1 TABLET ORAL at 12:55

## 2023-07-15 RX ADMIN — OXYCODONE HYDROCHLORIDE 10 MG: 5 TABLET ORAL at 03:39

## 2023-07-15 RX ADMIN — PROCHLORPERAZINE EDISYLATE 5 MG: 5 INJECTION INTRAMUSCULAR; INTRAVENOUS at 06:04

## 2023-07-15 RX ADMIN — OXYCODONE HYDROCHLORIDE 10 MG: 5 TABLET ORAL at 10:45

## 2023-07-15 RX ADMIN — ROSUVASTATIN CALCIUM 10 MG: 10 TABLET, FILM COATED ORAL at 21:06

## 2023-07-15 ASSESSMENT — ACTIVITIES OF DAILY LIVING (ADL)
ADLS_ACUITY_SCORE: 20
ADLS_ACUITY_SCORE: 21
ADLS_ACUITY_SCORE: 20
ADLS_ACUITY_SCORE: 20
ADLS_ACUITY_SCORE: 21
PREVIOUS_RESPONSIBILITIES: MEAL PREP;HOUSEKEEPING;SHOPPING;MEDICATION MANAGEMENT;FINANCES;DRIVING
ADLS_ACUITY_SCORE: 20

## 2023-07-15 NOTE — PROGRESS NOTES
[facility]    Progress Note - Hospitalist Service       Date of Admission:  7/13/2023    Assessment & Plan   Serene Tipton is a 67 year old female with a PMHx of PKD s/p bilateral nephrectomy, ESRD on HD TTS schedule, active transplant evaluation, anemia, HTN, HLD, hypothyroidism, active tobacco use, and chronic pain. She was admitted on 7/13/2023, after presenting with abdominal pain, recent history of falls, and found to have hyperkalemia 2/2 ESRD & small bowel obstruction. Abdominal pain worsening after advancing diet, plan to re-image abdomen today.    Small bowel obstruction  Constipation  Nausea  Abdominal pain  L-sided abdominal pain remains. Last admission (7/5-7/6) evaluated concern for SBO & surgery consulted with constipation identified as the likely source. MiraLAX bowel regimen initiated in response, resulting in multiple loose bowel movements. Recent imaging showed moderate to large amount of stool throughout the colon, thus recommended to continue bowel regimen. Abdominal CT on admission showed findings compatible with small bowel obstruction. On 7/14, abdominal pain improved and patient had multiple loose stools therefore diet advanced. Today, episode of emesis with worsening abdominal pain.   - NPO  - Repeat CT abd/pelvis non-contrast  - Compazine PRN  - Continue PTA oxycodone 10 mg PRN  - Consider restarting IV dilaudid if needed     ESRD on HD  Hyperkalemia, RESOLVED  Hyponatremia, RESOLVED  On 7/11, pt experienced hypotension & a fall during dialysis, cutting HD session short. Cr reduced to 3.56 from 6.44 (baseline 3-5) on admission as well as reduced K+ 6.1 and Na+ 130. No ECG changes specific for hyperkalemia. Electrolytes have since been stable. Dialysis TTS.  - Nephrology consulted, appreciate recommendations   - Dialysis today  - Avoiding potassium binders with SBO  - Monitor BMP and electrolytes   - Fluid restriction   - If K+ increases, repeat ECG, give  Ca gluconate & insulin with dextrose   - Avoid nephrotoxic medications    Hypoglycemia, RESOLVED  Poor appetite  Failure to thrive  Received dextrose from EMS due to hypoglycemia, 60. Experiences weight loss, fatigue, poor appetite, & lives alone in apartment. Cortisol significantly decreased at 0.2, raising concern for adrenal insuffiency.   - PT/OT  - Nutrition consult  - ACTH testing tomorrow due to dialysis this morning  - Continue hypoglycemia protocol with dextrose    Hypotension  Hx of hypertension  Initial BP 87/60 and rec'd 500 mL NS with improvement to SBP 110s. Pt has multiple anti-hypertensives; however, pt has had increased hypotensive episodes, including during dialysis. Pt also has recent history of mechanical fall. Decreased cortisol may also be contributing to hypotension  - Hold PTA amlodipine, labetalol, clonidine, lisinopril  - Midodrine during dialysis per nephrology  - ACTH testing as noted above    Small bilateral pleural effusions  LE edema   Likely secondary to fluid overload status due to need for dialysis. Hypoalbuminemia also likely contributing with albumin at 2.1.   - Maintain dialysis schedule  - limit IVF/fluid restriction    Non-specific ST changes, unchanged  Prolonged QT  Mild non-specific ST-depressions that previously appeared on EKGs. No chest pain. QTc 522.  - Avoid QT prolonging meds  - Troponin & EKG if chest pain develops    Chronic Conditions:  Hypothyroidism: PTA synthroid  Anxiety/depression: PTA sertraline, PRN ativan  Chronic pain: PTA nortriptyline, reinitiated oxycodone 10 mg   Hyperlipidemia: PTA rosuvastatin  Anemia: PTA EPO & Venofer with HD  Tobacco use: declines nicotine replacement     Diet: Snacks/Supplements Adult: Ensure Clear; Between Meals  NPO for Medical/Clinical Reasons Except for: Meds, Ice Chips    DVT Prophylaxis: Heparin SQ  Pool Catheter: Not present  Fluids: None  Lines: None     Cardiac Monitoring: ACTIVE order. Indication: Electrolyte Imbalance  (24 hours)- Magnesium <1.3 mg/ml; Potassium < =2.8 or > 5.5 mg/ml  Code Status: Full Code      Clinically Significant Risk Factors            # Hypomagnesemia: Lowest Mg = 1.4 mg/dL in last 2 days, will replace as needed   # Hypoalbuminemia: Lowest albumin = 2.1 g/dL at 7/14/2023  5:03 AM, will monitor as appropriate  # Coagulation Defect: INR = 1.70 (Ref range: 0.85 - 1.15) and/or PTT = 37 Seconds (Ref range: 22 - 38 Seconds), will monitor for bleeding    # Hypertension: Noted on problem list        # Cachexia: Estimated body mass index is 17.57 kg/m  as calculated from the following:    Height as of this encounter: 1.524 m (5').    Weight as of this encounter: 40.8 kg (89 lb 15.2 oz)., PRESENT ON ADMISSION  # Severe Malnutrition: based on nutrition assessment, PRESENT ON ADMISSION        Disposition Plan      Expected Discharge Date: 07/16/2023,  3:00 PM    Destination: home with help/services  Discharge Comments: ? Home with home services after Dialysis 7/15  7/15 vomiting, no DC        The patient's care was discussed with the Attending Physician, Dr. Andrea Albright.    Nigel Awad MD PGY3  Hospitalist Service  [facility]  Securely message with [employer] (more info)  Text page via [facility] Paging/Directory   ______________________________________________________________________    Interval History   No acute events overnight. One episode of NBNB emesis this morning, 800 ml. Reports worsening abdominal pain and distention despite oxycodone. She advanced her diet yesterday and that is when the abdominal pain re-started. She reports no BM since yesterday. She is currently undergoing dialysis. She feels tired.     She is open to home PT/OT.     Physical Exam   Vital Signs: Temp: 97.5  F (36.4  C) Temp src: Oral BP: 139/66 Pulse: 100   Resp: 16 SpO2: 97 % O2 Device: None (Room air)    Weight: 89 lbs 15.16 oz    Constitutional: awake, alert, cooperative, no apparent distress, pale,  thin  Respiratory: No increased work of breathing, good air exchange, clear to auscultation bilaterally, no crackles or wheezing  Cardiovascular: Regular rate and rhythm and systolic murmur noted  GI: Soft, mild distention, left sided abdominal tenderness to moderate palpation, no rebound or guarding, bowel sounds present though slightly hypoactive, no masses palpated  Skin: Grey skin color, dry.   Musculoskeletal: Lower extremity pitting edema (+1) present to ankles  Neurologic: Awake, alert, oriented to name, place and time.   Neuropsychiatric: General: normal, calm and normal eye contact      Data     I have personally reviewed the following data over the past 24 hrs:    11.9 (H)  \   9.5 (L)   / 365     136 96 (L) 55 (H) /  104 (H)   3.7 26 4.71 (H) \       Imaging results reviewed over the past 24 hrs:   No results found for this or any previous visit (from the past 24 hour(s)).

## 2023-07-15 NOTE — PLAN OF CARE
Goal Outcome Evaluation: ongoing.       Plan of Care Reviewed With: patient    Overall Patient Progress: improvingOverall Patient Progress: improving    Pt alert and oriented. Denies chest pain or SOB. Able to make needs known. Tele reads NSR around . Pt up to bedside commode for loose stools. BLE edema- pt will possibly have dialysis run today then possibly discharge today. Had BM last night per other nurse, this am given compazine and had 800mL emesis. Will continue to monitor.       Problem: Malnutrition  Goal: Improved Nutritional Intake  Outcome: Progressing     Problem: Chronic Kidney Disease  Goal: Optimal Coping with Chronic Illness  Outcome: Progressing  Goal: Electrolyte Balance  Outcome: Progressing  Goal: Fluid Balance  Outcome: Progressing  Goal: Optimal Functional Ability  Outcome: Progressing  Intervention: Optimize Functional Ability  Recent Flowsheet Documentation  Taken 7/15/2023 0347 by Bing Kwan RN  Activity Management: activity adjusted per tolerance  Taken 7/15/2023 0200 by Bing Kwan RN  Activity Management: activity adjusted per tolerance  Goal: Absence of Anemia Signs and Symptoms  Outcome: Progressing  Goal: Optimal Oral Intake  Outcome: Progressing  Goal: Acceptable Pain Control  Outcome: Progressing  Intervention: Prevent or Manage Pain  Recent Flowsheet Documentation  Taken 7/15/2023 0418 by Bing Kwan RN  Pain Management Interventions: rest  Taken 7/15/2023 0339 by Bing Kwan RN  Pain Management Interventions: medication (see MAR)  Taken 7/15/2023 0200 by Bing Kwan RN  Pain Management Interventions: heat applied  Goal: Minimize Renal Failure Effects  Outcome: Progressing  Intervention: Monitor and Support Renal Function  Recent Flowsheet Documentation  Taken 7/15/2023 0347 by Bing Kwan RN  Medication Review/Management: medications reviewed  Taken 7/15/2023 0200 by Bing Kwan RN  Medication Review/Management:  medications reviewed     Problem: Pain Acute  Goal: Optimal Pain Control and Function  Outcome: Progressing  Intervention: Develop Pain Management Plan  Recent Flowsheet Documentation  Taken 7/15/2023 0418 by Bing Kwan RN  Pain Management Interventions: rest  Taken 7/15/2023 0339 by Bing Kwan, RN  Pain Management Interventions: medication (see MAR)  Taken 7/15/2023 0200 by Bing Kwan, RN  Pain Management Interventions: heat applied  Intervention: Prevent or Manage Pain  Recent Flowsheet Documentation  Taken 7/15/2023 0347 by Bing Kwan, RN  Medication Review/Management: medications reviewed  Taken 7/15/2023 0200 by Bing Kwan, RN  Medication Review/Management: medications reviewed

## 2023-07-15 NOTE — PROGRESS NOTES
07/15/23 1010   Appointment Info   Signing Clinician's Name / Credentials (OT) Jazz Quintero OTR/L   Living Environment   People in Home alone   Current Living Arrangements apartment   Home Accessibility no concerns   Transportation Anticipated family or friend will provide   Living Environment Comments Pt reported standard set up at home and independent at baseline.   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   General Information   Onset of Illness/Injury or Date of Surgery 07/13/23   Referring Physician Dr. Andrea Albright   Patient/Family Therapy Goal Statement (OT) return home   Additional Occupational Profile Info/Pertinent History of Current Problem Pt is s/p fall and SBO; PMH: end-stage kidney disease status post bilateral nephrectomy, on Tuesday Thursday Saturday dialysis schedule.   Existing Precautions/Restrictions no known precautions/restrictions   Cognitive Status Examination   Orientation Status orientation to person, place and time   Visual Perception   Visual Impairment/Limitations WFL   Sensory   Sensory Quick Adds sensation intact   Pain Assessment   Patient Currently in Pain Yes, see Vital Sign flowsheet   Range of Motion Comprehensive   General Range of Motion bilateral upper extremity ROM WFL   Comment, General Range of Motion Note B shoulder flexion is a difficult   Strength Comprehensive (MMT)   General Manual Muscle Testing (MMT) Assessment upper extremity strength deficits identified   Upper Extremity (Manual Muscle Testing)   Upper Extremity: Manual Muscle Testing (MMT) left shoulder strength deficit;right shoulder strength deficit   Left Shoulder (Manual Muscle  Testing)   Left Shoulder Manual Muscle Testing (MMT) flexion   Comment, MMT: Left Shoulder 3/5   Right Shoulder (Manual Muscle Testing)   Comment, MMT: Right Shoulder 3/5   Right Shoulder Manual Muscle Testing (MMT) flexion   Muscle Tone Assessment   Muscle Tone Quick Adds No deficits were identified   Coordination   Upper Extremity Coordination No deficits were identified   Bed Mobility   Bed Mobility scooting/bridging   Scooting/Bridging [place] (Bed Mobility) supervision   Assistive Device (Bed Mobility) bed rails   Transfers   Transfer Comments DNT at this time d/t dialysis   Balance   Balance Comments DNT at this time d/t dialysis   Activities of Daily Living   BADL Assessment/Intervention lower body dressing   Lower Body Dressing Assessment/Training   Position (Lower Body Dressing) long sitting   [place] Level (Lower Body Dressing) moderate assist (50% patient effort)   Clinical Impression   Criteria for Skilled Therapeutic Interventions Met (OT) Yes, treatment indicated   OT Diagnosis decreased fxl ind   OT Problem List-Impairments impacting ADL activity tolerance impaired;strength;pain   Assessment of Occupational Performance 3-5 Performance Deficits   Identified Performance Deficits dressing, bathing, toileting, fxl mob   Planned Therapy Interventions (OT) ADL retraining;strengthening;transfer training   Clinical Decision Making Complexity (OT) moderate complexity   Anticipated Equipment Needs Upon Discharge (OT)   (grab bars near toilet)   Risk & Benefits of therapy have been explained evaluation/treatment results reviewed;care plan/treatment goals reviewed;current/potential barriers reviewed;participants voiced agreement with care plan;participants included;patient   OT Total Evaluation Time   OT Eval, Moderate Complexity Minutes (06714) 10   OT Goals   Therapy Frequency (OT) Daily   OT Predicted Duration/Target Date for Goal Attainment 07/20/23   OT Goals Lower Body Dressing;Toilet  Transfer/Toileting   OT: Lower Body Dressing Modified independent   OT: Toilet Transfer/Toileting Modified independent   Interventions   Interventions Quick Adds Self-Care/Home Management   Self-Care/Home Management   Self-Care/Home Mgmt/ADL, Compensatory, Meal Prep Minutes (02954) 13   Symptoms Noted During/After Treatment (Meal Preparation/Planning Training) fatigue   Treatment Detail/Skilled Intervention Pt completed bed mob and LB dressing with S - MOD A; and verbalized understanding of OT edu regarding safe home set up. Pt in dialysis so fxl tasks were limited this session.   OT Discharge Planning   OT Plan 7/20: adls, fxl txr, act mark anthony   OT Discharge Recommendation (DC Rec) (S)  home with home care occupational therapy   OT Rationale for DC Rec Pt is below baseline and would benefit from home OT to promote a safe home set up and d/c   Total Session Time   Timed Code Treatment Minutes 13   Total Session Time (sum of timed and untimed services) 23

## 2023-07-15 NOTE — PROGRESS NOTES
HEMODIALYSIS TREATMENT NOTE    Date: 7/15/2023  Time: 12:40 PM    Data:  Pre Wt:   40.8 kg  Desired Wt: 38.8 kg   Post Wt:  49.3 kg  Weight change:   kg  Ultrafiltration - Post Run Net Total Removed (mL): 1500 mL  Vascular Access Status: CVC  patent  Dialyzer Rinse: Clear  Total Blood Volume Processed: 68.8 L   Total Dialysis (Treatment) Time: 3hrs   Dialysate Bath: K 3, Ca 3  Heparin: None  Medications: None  Lab:   No    Assessment/Interventions:  Patient ran through AVF on left upper with 15g needle and BFR of 400ml/min. UF goal reduced due to  Asymptomatic low BP episodes. Net fluid removal 1.5L. Nephrologist is notified the net fluid removal. No other issue related to dialysis noted. Post dialysis hand off report to primary floor RN.      Plan:    Next HD run per renal team.

## 2023-07-15 NOTE — PROGRESS NOTES
RENAL PROGRESS NOTE      ASSESSMENT & PLAN:     ESKD - On chronic in-center hemodialysis. Dialyzes as outpatient at [facility] on TTS schedule (3 hour runs, JESSI AVF) under the care of Dr. Cheung. Underlying disease is polycystic kidney disease and she is s/p bilateral nephrectomy. Underwent urgent HD 7/13 in ED for hyperkalemia and planning to follow her typical TTS schedule going forward.      Hyperkalemia - Resolved with HD      Volume - evidence of volume overload on imaging and exam. Patient is anephric, anuric. Will discontinue IVF as she is not currently NPO.      SBO - Admitted earlier this month with partial SBO and evidence of ongoing obstruction on CT this admit.      History of HTN - Has been persistently hypotensive on HD recently and hypotensive with recent ED visits as well; hypotension has responded to fluid boluses. BP 87/60 upon arrival to ED and would hold all PTA antihypertensives at this time. Will make midodrine/albumin available with HD.      Hypothyroidism - On replacement     Anxiety/depression - On sertraline, uses ativan     Chronic pain - Management per primary service      HLD - On statin     Anemia - Receives PETE and iron with dialysis. Hgb 8.4 here. Iron levels replete and received EPO 20K x 1 7/13/23.      Secondary renal hyperparathyroidism - on sevelamer for phos binders, calcium low here, check albumin for correction      Tobacco use      FTT - Patient reports doing poorly at home since her recent discharge; very weak and unable to care for herself. Today she was not open to the idea of TCU or LTC facility but is open to meeting with SW to discuss possible option of HHC. SW consult requested. PT/OT to see.     SUBJECTIVE:    HD today went well  Tolerating HD therapy well. 2L UF thursday  Pt tells me that she is wanting to switch to an ANC Nephrologist from her Nephrology for continuity of care and will discuss this with her out-patient [facility] Dialysis staff I let her  know that Dr. Armstrong rounds at the [facility].   Pain controlled.     OBJECTIVE:  Physical Exam   Temp: 97.5  F (36.4  C) Temp src: Oral BP: 139/66 Pulse: 100   Resp: 16 SpO2: 97 % O2 Device: None (Room air)    Vitals:    07/13/23 1620 07/14/23 0557 07/15/23 0449   Weight: 43.6 kg (96 lb 1.6 oz) 39.8 kg (87 lb 12.8 oz) 40.8 kg (89 lb 15.2 oz)     Vital Signs with Ranges  Temp:  [97.5  F (36.4  C)-98.2  F (36.8  C)] 97.5  F (36.4  C)  Pulse:  [] 100  Resp:  [16-18] 16  BP: ()/(56-84) 139/66  SpO2:  [93 %-98 %] 97 %  I/O last 3 completed shifts:  In: 610 [P.O.:460; I.V.:150]  Out: 800 [Emesis/NG output:800]    Patient Vitals for the past 72 hrs:   Weight   07/15/23 0449 40.8 kg (89 lb 15.2 oz)   07/14/23 0557 39.8 kg (87 lb 12.8 oz)   07/13/23 1620 43.6 kg (96 lb 1.6 oz)   07/13/23 1202 41.7 kg (92 lb)       Intake/Output Summary (Last 24 hours) at 7/15/2023 1325  Last data filed at 7/15/2023 1220  Gross per 24 hour   Intake 360 ml   Output 2300 ml   Net -1940 ml       PHYSICAL EXAM:  GEN: NAD, frail  CV: RRR   Lung: clear and equal  Ab: soft and NT  Ext: + Pedal edema BL and well perfused  Skin: no rash  Access : JESSI aVF +t/b      LABORATORY STUDIES:     Recent Labs   Lab 07/15/23  0459 07/14/23  0503 07/13/23  1239 07/11/23  1131   WBC 11.9* 13.4* 13.0* 11.5*   RBC 2.85* 2.65* 2.51* 2.62*   HGB 9.5* 9.0* 8.4* 9.0*   HCT 29.4* 27.4* 25.8* 26.9*    322 320 288       Basic Metabolic Panel:  Recent Labs   Lab 07/15/23  0814 07/15/23  0459 07/15/23  0055 07/14/23  1749 07/14/23  0841 07/14/23  0503 07/13/23  2146 07/13/23  1718 07/13/23  1325 07/13/23  1239 07/13/23  1203 07/11/23  1131   NA  --  136  --   --   --  139  --  135*  --  130*  --  130*   POTASSIUM  --  3.7  --   --   --  3.7  --  4.1  --  6.1*  --  4.4   CHLORIDE  --  96*  --   --   --  99  --  96*  --  91*  --  92*   CO2  --  26  --   --   --  26  --  23  --  19*  --  26   BUN  --  55*  --   --   --  42*  --  68*  --  113*   --  57*   CR  --  4.71*  --   --   --  3.56*  --  4.11*  --  6.44*  --  4.48*   * 107 102* 89 96 90   < > 120   < > 60*   < > 80   GAVIN  --  8.0*  --   --   --  8.3*  --  8.3*  --  7.3*  --  8.3*    < > = values in this interval not displayed.       INR  Recent Labs   Lab 07/13/23  1239 07/11/23  1131   INR 1.70* 1.40*        Recent Labs   Lab Test 07/15/23  0459 07/14/23  0503 07/13/23  1239 07/11/23  1131   INR  --   --  1.70* 1.40*   WBC 11.9* 13.4* 13.0* 11.5*   HGB 9.5* 9.0* 8.4* 9.0*    322 320 288       Personally reviewed current labs    Mitzi PALMA-BC  [facility]  [phone]

## 2023-07-15 NOTE — PROGRESS NOTES
Patient received dialysis during shift and tolerated well. Reports increased cramping, abdominal pain, and firmness in abdomen. Notified resident team and treated with PRN pain medication. Patient went down for CT scan which showed a paralytic ileus. Spoke with Dr. Awad regarding the patient concerning management moving forward and requested a GI consult be placed to assist with diagnosis and treatment, no GI consult placed. At this time planning bowel rest with only an exception to ice chips. Patient up in chair for afternoon and walking to the bathroom. One bowel movement today-watery. Vital signs stable. Blood sugar assessed Q4H when placed NPO.

## 2023-07-16 ENCOUNTER — APPOINTMENT (OUTPATIENT)
Dept: PHYSICAL THERAPY | Facility: CLINIC | Age: 68
DRG: 388 | End: 2023-07-16
Payer: COMMERCIAL

## 2023-07-16 LAB
ANION GAP SERPL CALCULATED.3IONS-SCNC: 11 MMOL/L (ref 5–18)
BUN SERPL-MCNC: 33 MG/DL (ref 8–22)
CALCIUM SERPL-MCNC: 8.5 MG/DL (ref 8.5–10.5)
CHLORIDE BLD-SCNC: 97 MMOL/L (ref 98–107)
CO2 SERPL-SCNC: 25 MMOL/L (ref 22–31)
CREAT SERPL-MCNC: 3.35 MG/DL (ref 0.6–1.1)
ERYTHROCYTE [DISTWIDTH] IN BLOOD BY AUTOMATED COUNT: 14.8 % (ref 10–15)
GFR SERPL CREATININE-BSD FRML MDRD: 15 ML/MIN/1.73M2
GLUCOSE BLD-MCNC: 79 MG/DL (ref 70–125)
GLUCOSE BLDC GLUCOMTR-MCNC: 74 MG/DL (ref 70–99)
GLUCOSE BLDC GLUCOMTR-MCNC: 81 MG/DL (ref 70–99)
GLUCOSE BLDC GLUCOMTR-MCNC: 83 MG/DL (ref 70–99)
GLUCOSE BLDC GLUCOMTR-MCNC: 88 MG/DL (ref 70–99)
GLUCOSE BLDC GLUCOMTR-MCNC: 90 MG/DL (ref 70–99)
HBV SURFACE AB SERPL IA-ACNC: 25.55 M[IU]/ML
HBV SURFACE AB SERPL IA-ACNC: REACTIVE M[IU]/ML
HBV SURFACE AG SERPL QL IA: NONREACTIVE
HCT VFR BLD AUTO: 33.1 % (ref 35–47)
HGB BLD-MCNC: 10.2 G/DL (ref 11.7–15.7)
MCH RBC QN AUTO: 32.7 PG (ref 26.5–33)
MCHC RBC AUTO-ENTMCNC: 30.8 G/DL (ref 31.5–36.5)
MCV RBC AUTO: 106 FL (ref 78–100)
PLATELET # BLD AUTO: 348 10E3/UL (ref 150–450)
POTASSIUM BLD-SCNC: 3.8 MMOL/L (ref 3.5–5)
RBC # BLD AUTO: 3.12 10E6/UL (ref 3.8–5.2)
SODIUM SERPL-SCNC: 133 MMOL/L (ref 136–145)
WBC # BLD AUTO: 12.1 10E3/UL (ref 4–11)

## 2023-07-16 PROCEDURE — 85027 COMPLETE CBC AUTOMATED: CPT

## 2023-07-16 PROCEDURE — 250N000013 HC RX MED GY IP 250 OP 250 PS 637

## 2023-07-16 PROCEDURE — 82533 TOTAL CORTISOL: CPT | Performed by: FAMILY MEDICINE

## 2023-07-16 PROCEDURE — 80048 BASIC METABOLIC PNL TOTAL CA: CPT

## 2023-07-16 PROCEDURE — 210N000002 HC R&B HEART CARE

## 2023-07-16 PROCEDURE — 36415 COLL VENOUS BLD VENIPUNCTURE: CPT

## 2023-07-16 PROCEDURE — 97530 THERAPEUTIC ACTIVITIES: CPT | Mod: GP

## 2023-07-16 PROCEDURE — 82024 ASSAY OF ACTH: CPT

## 2023-07-16 PROCEDURE — 36415 COLL VENOUS BLD VENIPUNCTURE: CPT | Performed by: FAMILY MEDICINE

## 2023-07-16 PROCEDURE — 250N000011 HC RX IP 250 OP 636: Mod: JZ

## 2023-07-16 PROCEDURE — 99232 SBSQ HOSP IP/OBS MODERATE 35: CPT | Mod: GC | Performed by: FAMILY MEDICINE

## 2023-07-16 PROCEDURE — 97116 GAIT TRAINING THERAPY: CPT | Mod: GP

## 2023-07-16 PROCEDURE — 87340 HEPATITIS B SURFACE AG IA: CPT | Performed by: PHYSICIAN ASSISTANT

## 2023-07-16 PROCEDURE — 86706 HEP B SURFACE ANTIBODY: CPT | Performed by: PHYSICIAN ASSISTANT

## 2023-07-16 PROCEDURE — 99232 SBSQ HOSP IP/OBS MODERATE 35: CPT

## 2023-07-16 PROCEDURE — 120N000004 HC R&B MS OVERFLOW

## 2023-07-16 RX ORDER — HYDROMORPHONE HCL IN WATER/PF 6 MG/30 ML
0.2 PATIENT CONTROLLED ANALGESIA SYRINGE INTRAVENOUS
Status: COMPLETED | OUTPATIENT
Start: 2023-07-16 | End: 2023-07-16

## 2023-07-16 RX ORDER — COSYNTROPIN 0.25 MG/ML
250 INJECTION, POWDER, FOR SOLUTION INTRAMUSCULAR; INTRAVENOUS ONCE
Status: COMPLETED | OUTPATIENT
Start: 2023-07-17 | End: 2023-07-17

## 2023-07-16 RX ADMIN — OXYCODONE HYDROCHLORIDE 10 MG: 5 TABLET ORAL at 23:26

## 2023-07-16 RX ADMIN — OXYCODONE HYDROCHLORIDE 10 MG: 5 TABLET ORAL at 02:11

## 2023-07-16 RX ADMIN — COSYNTROPIN 250 MCG: 0.25 INJECTION, POWDER, LYOPHILIZED, FOR SOLUTION INTRAVENOUS at 08:28

## 2023-07-16 RX ADMIN — HYDROMORPHONE HYDROCHLORIDE 0.2 MG: 0.2 INJECTION, SOLUTION INTRAMUSCULAR; INTRAVENOUS; SUBCUTANEOUS at 03:42

## 2023-07-16 RX ADMIN — HEPARIN SODIUM 5000 UNITS: 5000 INJECTION, SOLUTION INTRAVENOUS; SUBCUTANEOUS at 20:39

## 2023-07-16 RX ADMIN — SERTRALINE 150 MG: 100 TABLET, FILM COATED ORAL at 20:38

## 2023-07-16 RX ADMIN — Medication 25 MCG: at 20:38

## 2023-07-16 RX ADMIN — ROSUVASTATIN CALCIUM 10 MG: 10 TABLET, FILM COATED ORAL at 20:38

## 2023-07-16 RX ADMIN — SENNOSIDES AND DOCUSATE SODIUM 2 TABLET: 50; 8.6 TABLET ORAL at 20:38

## 2023-07-16 RX ADMIN — LORAZEPAM 1 MG: 1 TABLET ORAL at 11:53

## 2023-07-16 RX ADMIN — SENNOSIDES AND DOCUSATE SODIUM 2 TABLET: 50; 8.6 TABLET ORAL at 08:28

## 2023-07-16 RX ADMIN — HYDROMORPHONE HYDROCHLORIDE 0.2 MG: 0.2 INJECTION, SOLUTION INTRAMUSCULAR; INTRAVENOUS; SUBCUTANEOUS at 06:07

## 2023-07-16 RX ADMIN — HEPARIN SODIUM 5000 UNITS: 5000 INJECTION, SOLUTION INTRAVENOUS; SUBCUTANEOUS at 08:28

## 2023-07-16 RX ADMIN — LEVOTHYROXINE SODIUM 50 MCG: 0.05 TABLET ORAL at 23:26

## 2023-07-16 RX ADMIN — NORTRIPTYLINE HYDROCHLORIDE 75 MG: 25 CAPSULE ORAL at 20:38

## 2023-07-16 ASSESSMENT — ACTIVITIES OF DAILY LIVING (ADL)
ADLS_ACUITY_SCORE: 23
ADLS_ACUITY_SCORE: 21
ADLS_ACUITY_SCORE: 23
ADLS_ACUITY_SCORE: 21
ADLS_ACUITY_SCORE: 23
ADLS_ACUITY_SCORE: 21

## 2023-07-16 NOTE — PROGRESS NOTES
[facility]    Progress Note - Hospitalist Service       Date of Admission:  7/13/2023    Assessment & Plan   Serene Tipton is a 67 year old female with a PMHx of PKD s/p bilateral nephrectomy, ESRD on HD TTS schedule, active transplant evaluation, anemia, HTN, HLD, hypothyroidism, active tobacco use, and chronic pain. She was admitted on 7/13/2023, after presenting with abdominal pain, recent history of falls, and found to have hyperkalemia 2/2 ESRD & small bowel obstruction. Abdominal pain worsening after advancing diet on 7/14/2023, with updated imaging showing a likely adynamic ileus.    Adynamic ileus  Small bowel obstruction  Constipation  Nausea  Abdominal pain  L-sided abdominal pain remains. Last admission (7/5-7/6) evaluated by surgery for SBO that identified constipation as the likely cause. MiraLAX bowel regimen produced multiple loose bowel movements. Continued bowel regimen recommended after imaging showed moderate to large amount of stool throughout the colon. Abdominal CT on admission consistent with small bowel obstruction. On 7/14, abdominal pain improved & pt passed multiple loose stools; thus, diet advanced. On 7/15, episode of emesis with worsening abdominal pain with imaging showing a likely adynamic ileus, 3rd spacing of fluids w/ trace ascites, & diffuse subcutaneous edema. Bowel rest and pain control reinitiated. On 7/16, diet advanced to clear fluids due to active bowel sounds & passing gas.  - Advance to clear fluids, as tolerated  - Compazine PRN  - Continue PTA oxycodone 10 mg PRN  - PRN dilaudid 0.2 mg for severe pain  - Defer on GI consult      ESRD on HD  Hyperkalemia, RESOLVED  Hyponatremia, Improved  On 7/11, hypotensive episode & fall during dialysis, cutting HD session short. As of 7/16, Cr reduced to 3.35 (from 6.44; baseline 3-5), K+ at 3.8, and Na+ 133--electrolytes remain stable. No ECG changes specific for hyperkalemia. Dialysis TTS.   -  Nephrology consulted, appreciate recommendations   - Avoiding potassium binders with SBO  - Monitor BMP and electrolytes   - Fluid restriction   - If K+ increases, repeat ECG, give Ca gluconate & insulin with dextrose   - Avoid nephrotoxic medications    Hypoglycemia, RESOLVED  Poor appetite  Failure to thrive  Received dextrose from EMS due to hypoglycemia, 60. Experiences weight loss, fatigue, poor appetite, & lives alone in apartment. Cortisol significantly decreased at 0.2, raising concern for adrenal insuffiency. ACTH testing initiated.   - PT/OT  - Nutrition consult  - ACTH testing, 7/17 AM  - Continue hypoglycemia protocol with dextrose    Hypotension  Hx of hypertension  Initial BP 87/60 and rec'd 500 mL NS with improvement to SBP 110s. Pt has multiple anti-hypertensives; however, increased hypotensive episodes, including during dialysis and recent mechanical falls. Decreased cortisol may also be contributing to hypotension  - Hold PTA amlodipine, labetalol, clonidine, lisinopril  - Midodrine during dialysis per nephrology  - Restart labetalol if tachycardic   - ACTH testing as noted above    Small bilateral pleural effusions  LE edema   Likely secondary to fluid overload status due to need for dialysis. Hypoalbuminemia also likely contributing with albumin at 2.1.   - Maintain dialysis schedule  - Limit IVF/fluid restriction    Non-specific ST changes, unchanged  Prolonged QT  Mild non-specific ST-depressions that previously appeared on EKGs. No chest pain. QTc 522.  - Avoid QT prolonging meds  - Troponin & EKG if chest pain develops    Chronic Conditions:  Hypothyroidism: PTA synthroid  Anxiety/depression: PTA sertraline, PRN ativan  Chronic pain: PTA nortriptyline, reinitiated oxycodone 10 mg   Hyperlipidemia: PTA rosuvastatin  Anemia: PTA EPO & Venofer with HD  Tobacco use: declines nicotine replacement     Diet: Snacks/Supplements Adult: Ensure Clear; Between Meals  Clear Liquid Diet    DVT Prophylaxis:  Heparin SQ  Pool Catheter: Not present  Fluids: None  Lines: None     Cardiac Monitoring: ACTIVE order. Indication: Electrolyte Imbalance (24 hours)- Magnesium <1.3 mg/ml; Potassium < =2.8 or > 5.5 mg/ml  Code Status: Full Code      Clinically Significant Risk Factors              # Hypoalbuminemia: Lowest albumin = 2.1 g/dL at 7/14/2023  5:03 AM, will monitor as appropriate     # Hypertension: Noted on problem list        # Cachexia: Estimated body mass index is 16.37 kg/m  as calculated from the following:    Height as of this encounter: 1.524 m (5').    Weight as of this encounter: 38 kg (83 lb 12.8 oz)., PRESENT ON ADMISSION  # Severe Malnutrition: based on nutrition assessment, PRESENT ON ADMISSION        Disposition Plan      Expected Discharge Date: 07/17/2023,  3:00 PM    Destination: home with help/services  Discharge Comments: pending ileus resolution and advancement of diet  Home with home PT/OT        The patient's care was discussed with the Attending Physician, Dr. Andrea Albright.    NURYS BANSAL MD, ROGER  Resident Physician, PGY-1   Hospitalist Service  [facility]  Securely message with [employer] (more info)  Text page via [facility] Paging/Directory   ______________________________________________________________________    Interval History   Increased abdominal pain overnight and given Dilaudid. Diet advanced to clear liquids.     Physical Exam   Vital Signs: Temp: 97.9  F (36.6  C) Temp src: Oral BP: (!) 150/73 (RN Notified) Pulse: 98   Resp: 15 SpO2: 95 % O2 Device: None (Room air)    Weight: 83 lbs 12.8 oz    Constitutional: Awake, alert, cooperative, no apparent distress, pale, thin  Respiratory: No increased work of breathing, good air exchange, clear to auscultation bilaterally, no crackles or wheezing  Cardiovascular: Regular rate and rhythm and systolic murmur noted  GI: Soft, mild distention, left-sided abdominal tenderness to moderate palpation, no rebound  or guarding, faint bowel sounds, no masses palpated  Skin: Grey skin color, dry.   Musculoskeletal: Lower extremity pitting edema (+1) present to ankles  Neurologic: Awake, alert, oriented to name, place and time.   Neuropsychiatric: General: normal, calm and normal eye contact      Data     I have personally reviewed the following data over the past 24 hrs:    12.1 (H)  \   10.2 (L)   / 348     133 (L) 97 (L) 33 (H) /  81   3.8 25 3.35 (H) \       Imaging results reviewed over the past 24 hrs:   Recent Results (from the past 24 hour(s))   CT Abdomen Pelvis w/o Contrast    Narrative    EXAM: CT ABDOMEN AND PELVIS WITHOUT CONTRAST  LOCATION: [facility]  DATE: 07/15/2023    INDICATION: Small bowel obstruction, increasing pain.  COMPARISON: 07/13/2023.  TECHNIQUE: CT scan of the abdomen and pelvis was performed without IV contrast. Multiplanar reformats were obtained. Dose reduction techniques were used.  CONTRAST: None.    FINDINGS:   LOWER CHEST: Mild infiltrate or atelectasis in both lung bases, similar to previous.    HEPATOBILIARY: Multiple hepatic cysts. Stable mild bile duct dilation.    PANCREAS: Normal.    SPLEEN: Normal.    ADRENAL GLANDS: Normal.    KIDNEYS/BLADDER: Bilateral nephrectomies.    BOWEL: Fluid-filled mildly dilated loops of small bowel again seen without decompressed distal loops. The colon is decompressed.    LYMPH NODES: Normal.    VASCULATURE: Diffuse atherosclerotic calcification.    PELVIC ORGANS: Normal.    MUSCULOSKELETAL: Diffuse soft tissue edema. Trace ascites.      Impression    IMPRESSION:   1.  Small bowel is diffusely dilated without a transition point to decompressed distal small bowel. Findings most likely represent an adynamic ileus.  2.  Third spacing of fluids with trace ascites and diffuse subcutaneous edema.

## 2023-07-16 NOTE — PROGRESS NOTES
RENAL PROGRESS NOTE      ASSESSMENT & PLAN:     ESKD - On chronic in-center hemodialysis. Dialyzes as outpatient at [facility] on TTS schedule (3 hour runs, JESSI AVF) under the care of Dr. Cheung. Underlying disease is polycystic kidney disease and she is s/p bilateral nephrectomy. Underwent urgent HD 7/13 in ED for hyperkalemia and planning to follow her typical TTS schedule going forward. Will plan for 2 hour UF ONLY run tomorrow with ++LLE/hypervolemic     Hyperkalemia - Resolved with HD      Volume - evidence of volume overload on imaging and exam. Patient is anephric, anuric. Will discontinue IVF as she is not currently NPO.      SBO - Admitted earlier this month with partial SBO and evidence of ongoing obstruction on CT this admit.      History of HTN - Has been persistently hypotensive on HD recently and hypotensive with recent ED visits as well; hypotension has responded to fluid boluses. BP 87/60 upon arrival to ED and would hold all PTA antihypertensives at this time. Will make midodrine/albumin available with HD.      Hypothyroidism - On replacement     Anxiety/depression - On sertraline, uses ativan     Chronic pain - Management per primary service      HLD - On statin     Anemia - Receives PETE and iron with dialysis. Hgb 8.4 here. Iron levels replete and received EPO 20K x 1 7/13/23.      Secondary renal hyperparathyroidism - on sevelamer for phos binders, calcium low here, check albumin for correction      Tobacco use      FTT - Patient reports doing poorly at home since her recent discharge; very weak and unable to care for herself. Today she was not open to the idea of TCU or LTC facility but is open to meeting with SW to discuss possible option of HHC. SW consult requested. PT/OT to see.     SUBJECTIVE:    HD today went well yesterday  Tolerating HD therapy well. 2L UF Thursday  Pt c/o edema/fluid overload, will plan for extra 2 hour UF run tomorrow  Pt tells me that she is wanting to  switch to an ANC Nephrologist from [facility] for continuity of care and will discuss this with her out-patient [facility] staff I let her know that Dr. Armstrong rounds at the [facility].   Ongoing abdominal pain controlled with meds, no BM yet, passing gas.     OBJECTIVE:  Physical Exam   Temp: 97.9  F (36.6  C) Temp src: Oral BP: (!) 150/73 (RN Notified) Pulse: 98   Resp: 15 SpO2: 95 % O2 Device: None (Room air)    Vitals:    07/14/23 0557 07/15/23 0449 07/16/23 0007   Weight: 39.8 kg (87 lb 12.8 oz) 40.8 kg (89 lb 15.2 oz) 38 kg (83 lb 12.8 oz)     Vital Signs with Ranges  Temp:  [97.5  F (36.4  C)-98.7  F (37.1  C)] 97.9  F (36.6  C)  Pulse:  [] 98  Resp:  [15-20] 15  BP: (104-150)/(64-85) 150/73  SpO2:  [95 %-100 %] 95 %  I/O last 3 completed shifts:  In: 0   Out: 1500 [Other:1500]    Patient Vitals for the past 72 hrs:   Weight   07/16/23 0007 38 kg (83 lb 12.8 oz)   07/15/23 0449 40.8 kg (89 lb 15.2 oz)   07/14/23 0557 39.8 kg (87 lb 12.8 oz)   07/13/23 1620 43.6 kg (96 lb 1.6 oz)   07/13/23 1202 41.7 kg (92 lb)       Intake/Output Summary (Last 24 hours) at 7/15/2023 1325  Last data filed at 7/15/2023 1220  Gross per 24 hour   Intake 360 ml   Output 2300 ml   Net -1940 ml       PHYSICAL EXAM:  GEN: NAD, frail  CV: RRR   Lung: clear and equal  Ab: soft and NT  Ext: + Pedal edema BL and well perfused  Skin: no rash  Access : JESSI aVF +t/b      LABORATORY STUDIES:     Recent Labs   Lab 07/16/23  0655 07/15/23  0459 07/14/23  0503 07/13/23  1239 07/11/23  1131   WBC 12.1* 11.9* 13.4* 13.0* 11.5*   RBC 3.12* 2.85* 2.65* 2.51* 2.62*   HGB 10.2* 9.5* 9.0* 8.4* 9.0*   HCT 33.1* 29.4* 27.4* 25.8* 26.9*    365 322 320 288       Basic Metabolic Panel:  Recent Labs   Lab 07/16/23  0744 07/16/23  0655 07/16/23  0342 07/15/23  2358 07/15/23  2211 07/15/23  1755 07/15/23  0814 07/15/23  0459 07/14/23  0841 07/14/23  0503 07/13/23  2146 07/13/23  1718 07/13/23  1325 07/13/23  1239  07/13/23  1203 07/11/23  1131   NA  --  133*  --   --   --   --   --  136  --  139  --  135*  --  130*  --  130*   POTASSIUM  --  3.8  --   --   --   --   --  3.7  --  3.7  --  4.1  --  6.1*  --  4.4   CHLORIDE  --  97*  --   --   --   --   --  96*  --  99  --  96*  --  91*  --  92*   CO2  --  25  --   --   --   --   --  26  --  26  --  23  --  19*  --  26   BUN  --  33*  --   --   --   --   --  55*  --  42*  --  68*  --  113*  --  57*   CR  --  3.35*  --   --   --   --   --  4.71*  --  3.56*  --  4.11*  --  6.44*  --  4.48*   GLC 81 79 83 90 76 84   < > 107   < > 90   < > 120   < > 60*   < > 80   GAVIN  --  8.5  --   --   --   --   --  8.0*  --  8.3*  --  8.3*  --  7.3*  --  8.3*    < > = values in this interval not displayed.       INR  Recent Labs   Lab 07/13/23  1239 07/11/23  1131   INR 1.70* 1.40*        Recent Labs   Lab Test 07/16/23  0655 07/15/23  0459 07/14/23  0503 07/13/23  1239 07/11/23  1131   INR  --   --   --  1.70* 1.40*   WBC 12.1* 11.9*   < > 13.0* 11.5*   HGB 10.2* 9.5*   < > 8.4* 9.0*    365   < > 320 288    < > = values in this interval not displayed.       Personally reviewed current labs    Mitzi PALMA-BC  [facility]  [phone]

## 2023-07-16 NOTE — PROGRESS NOTES
Care Management Follow Up    Length of Stay (days): 3    Expected Discharge Date: 07/17/2023     Concerns to be Addressed: care coordination/care conferences, discharge planning     Patient plan of care discussed at interdisciplinary rounds: Yes    Anticipated Discharge Disposition: Home Care, Dialysis Services     Anticipated Discharge Services: None  Anticipated Discharge DME: None    Patient/family educated on Medicare website which has current facility and service quality ratings: yes  Education Provided on the Discharge Plan: Yes (Assessment and service coordination process discussed.)  Patient/Family in Agreement with the Plan: unable to assess    Referrals Placed by CM/SW:  (Not yet indicated.)  Private pay costs discussed: Not applicable    Additional Information:  SW met with pt to discuss PT/OT recommendations for home care. Pt notes uncertainty and asks what that would provide. SW provided initial education when a visitor came to pts room. Pt requested SW leave and come back at a later time to discuss home care.      Montse Alegre [facility]    Addendum: SW revisited with the pt. Discussed home care PT/OT again. After discussion about this, pt declines to have SW arrange home care. She reports that she feels she is well set up at home. She reports that her support system including her nieces and friends are all working on providing additional support to her after this hospitalization. She reports that they have a group chat so they can figure out what her needs are and assist. Pt reports that a friend is going to stay with her after discharge until her friend feels she is managing well enough on her own. She reports that others have been preparing food for her and are available to assist with cleaning. Discussed that pt can reach out to her PCP for home care orders if she decides they are needed after discharge. Also discussed that SW remains available while pt is hospitalized for further needs or  questions. Pt reports understanding and agreement.  3:22 PM

## 2023-07-16 NOTE — PLAN OF CARE
Goal Outcome Evaluation: ongoing.       Plan of Care Reviewed With: patient    Overall Patient Progress: improvingOverall Patient Progress: improving     Pt alert and oriented. Denies chest pain or SOB. Able to make needs known. Continues to have abd pain- prn medication given. Tele reads NSR to ST. Pt NPO but okay for meds and ice chips. BS checks every 4 hours. Pt upset about not being able to eat, explained that she is on bowel rest. Pt continues to rate pain high around 6-7- no additional prn medication available- paged [facility] and got a one time dose of dilaudid, pt is passing gas no BM for my shift. Will continue to monitor.       Problem: Malnutrition  Goal: Improved Nutritional Intake  Outcome: Progressing     Problem: Chronic Kidney Disease  Goal: Optimal Coping with Chronic Illness  Outcome: Progressing  Goal: Electrolyte Balance  Outcome: Progressing  Goal: Fluid Balance  Outcome: Progressing  Goal: Optimal Functional Ability  Outcome: Progressing  Intervention: Optimize Functional Ability  Recent Flowsheet Documentation  Taken 7/15/2023 2333 by Bing Kwan RN  Activity Management: activity adjusted per tolerance  Goal: Absence of Anemia Signs and Symptoms  Outcome: Progressing  Goal: Optimal Oral Intake  Outcome: Progressing  Goal: Acceptable Pain Control  Outcome: Progressing  Intervention: Prevent or Manage Pain  Recent Flowsheet Documentation  Taken 7/16/2023 0255 by Bing Kwan RN  Pain Management Interventions: rest  Taken 7/16/2023 0211 by Bing Kwan RN  Pain Management Interventions: medication (see MAR)  Taken 7/15/2023 2348 by Bing Kwan RN  Pain Management Interventions: rest  Taken 7/15/2023 2333 by Bing Kwan RN  Pain Management Interventions: medication (see MAR)  Goal: Minimize Renal Failure Effects  Outcome: Progressing  Intervention: Monitor and Support Renal Function  Recent Flowsheet Documentation  Taken 7/15/2023 2333 by Bing Kwan  RN  Medication Review/Management: medications reviewed     Problem: Pain Acute  Goal: Optimal Pain Control and Function  Outcome: Progressing  Intervention: Develop Pain Management Plan  Recent Flowsheet Documentation  Taken 7/16/2023 0255 by Bing Kwan RN  Pain Management Interventions: rest  Taken 7/16/2023 0211 by Bing Kwan, RN  Pain Management Interventions: medication (see MAR)  Taken 7/15/2023 2348 by Bing Kwan RN  Pain Management Interventions: rest  Taken 7/15/2023 2333 by Bing Kwan, RN  Pain Management Interventions: medication (see MAR)  Intervention: Prevent or Manage Pain  Recent Flowsheet Documentation  Taken 7/15/2023 2333 by Bing Kwan, RN  Medication Review/Management: medications reviewed

## 2023-07-17 ENCOUNTER — APPOINTMENT (OUTPATIENT)
Dept: OCCUPATIONAL THERAPY | Facility: CLINIC | Age: 68
DRG: 388 | End: 2023-07-17
Payer: COMMERCIAL

## 2023-07-17 LAB
ANION GAP SERPL CALCULATED.3IONS-SCNC: 14 MMOL/L (ref 5–18)
BUN SERPL-MCNC: 48 MG/DL (ref 8–22)
CALCIUM SERPL-MCNC: 8.5 MG/DL (ref 8.5–10.5)
CHLORIDE BLD-SCNC: 96 MMOL/L (ref 98–107)
CO2 SERPL-SCNC: 24 MMOL/L (ref 22–31)
CORTICOSTER 1H P 250 UG ACTH SERPL-SCNC: 60.3 UG/DL
CORTICOSTER 30M P 250 UG ACTH SERPL-SCNC: 39.5 UG/DL
CORTICOSTER 30M P 250 UG ACTH SERPL-SCNC: 45.4 UG/DL
CORTICOSTER SERPL-MCNC: 31.8 UG/DL (ref 4–22)
CORTIS SERPL-MCNC: 27.6 UG/DL
CREAT SERPL-MCNC: 4.41 MG/DL (ref 0.6–1.1)
ERYTHROCYTE [DISTWIDTH] IN BLOOD BY AUTOMATED COUNT: 15 % (ref 10–15)
GFR SERPL CREATININE-BSD FRML MDRD: 10 ML/MIN/1.73M2
GLUCOSE BLD-MCNC: 78 MG/DL (ref 70–125)
GLUCOSE BLDC GLUCOMTR-MCNC: 67 MG/DL (ref 70–99)
GLUCOSE BLDC GLUCOMTR-MCNC: 83 MG/DL (ref 70–99)
GLUCOSE BLDC GLUCOMTR-MCNC: 84 MG/DL (ref 70–99)
GLUCOSE BLDC GLUCOMTR-MCNC: 93 MG/DL (ref 70–99)
GLUCOSE BLDC GLUCOMTR-MCNC: 97 MG/DL (ref 70–99)
HCT VFR BLD AUTO: 32.8 % (ref 35–47)
HGB BLD-MCNC: 10.4 G/DL (ref 11.7–15.7)
MCH RBC QN AUTO: 33.4 PG (ref 26.5–33)
MCHC RBC AUTO-ENTMCNC: 31.7 G/DL (ref 31.5–36.5)
MCV RBC AUTO: 106 FL (ref 78–100)
PLATELET # BLD AUTO: 400 10E3/UL (ref 150–450)
POTASSIUM BLD-SCNC: 4 MMOL/L (ref 3.5–5)
RBC # BLD AUTO: 3.11 10E6/UL (ref 3.8–5.2)
SODIUM SERPL-SCNC: 134 MMOL/L (ref 136–145)
TIME OF COSYNTROPIN INJECTION: NORMAL
WBC # BLD AUTO: 12.8 10E3/UL (ref 4–11)

## 2023-07-17 PROCEDURE — 99232 SBSQ HOSP IP/OBS MODERATE 35: CPT | Performed by: NURSE PRACTITIONER

## 2023-07-17 PROCEDURE — 36415 COLL VENOUS BLD VENIPUNCTURE: CPT | Performed by: FAMILY MEDICINE

## 2023-07-17 PROCEDURE — 210N000002 HC R&B HEART CARE

## 2023-07-17 PROCEDURE — 99232 SBSQ HOSP IP/OBS MODERATE 35: CPT | Mod: GC

## 2023-07-17 PROCEDURE — 85027 COMPLETE CBC AUTOMATED: CPT

## 2023-07-17 PROCEDURE — 120N000004 HC R&B MS OVERFLOW

## 2023-07-17 PROCEDURE — 36415 COLL VENOUS BLD VENIPUNCTURE: CPT

## 2023-07-17 PROCEDURE — 82533 TOTAL CORTISOL: CPT

## 2023-07-17 PROCEDURE — 250N000011 HC RX IP 250 OP 636: Mod: JZ

## 2023-07-17 PROCEDURE — 258N000003 HC RX IP 258 OP 636

## 2023-07-17 PROCEDURE — 82533 TOTAL CORTISOL: CPT | Performed by: FAMILY MEDICINE

## 2023-07-17 PROCEDURE — 80048 BASIC METABOLIC PNL TOTAL CA: CPT

## 2023-07-17 PROCEDURE — 250N000013 HC RX MED GY IP 250 OP 250 PS 637

## 2023-07-17 PROCEDURE — 250N000013 HC RX MED GY IP 250 OP 250 PS 637: Performed by: FAMILY MEDICINE

## 2023-07-17 PROCEDURE — 90935 HEMODIALYSIS ONE EVALUATION: CPT

## 2023-07-17 PROCEDURE — 97535 SELF CARE MNGMENT TRAINING: CPT | Mod: GO

## 2023-07-17 RX ORDER — CALCIUM CARBONATE 500 MG/1
500 TABLET, CHEWABLE ORAL DAILY PRN
Status: DISCONTINUED | OUTPATIENT
Start: 2023-07-17 | End: 2023-07-19 | Stop reason: HOSPADM

## 2023-07-17 RX ADMIN — Medication: at 11:21

## 2023-07-17 RX ADMIN — SENNOSIDES AND DOCUSATE SODIUM 2 TABLET: 50; 8.6 TABLET ORAL at 09:48

## 2023-07-17 RX ADMIN — SODIUM CHLORIDE 300 ML: 9 INJECTION, SOLUTION INTRAVENOUS at 11:21

## 2023-07-17 RX ADMIN — LORAZEPAM 1 MG: 1 TABLET ORAL at 16:34

## 2023-07-17 RX ADMIN — HYDROMORPHONE HYDROCHLORIDE 0.2 MG: 0.2 INJECTION, SOLUTION INTRAMUSCULAR; INTRAVENOUS; SUBCUTANEOUS at 04:38

## 2023-07-17 RX ADMIN — HEPARIN SODIUM 5000 UNITS: 5000 INJECTION, SOLUTION INTRAVENOUS; SUBCUTANEOUS at 09:48

## 2023-07-17 RX ADMIN — HYDROMORPHONE HYDROCHLORIDE 0.2 MG: 0.2 INJECTION, SOLUTION INTRAMUSCULAR; INTRAVENOUS; SUBCUTANEOUS at 16:34

## 2023-07-17 RX ADMIN — COSYNTROPIN 250 MCG: 0.25 INJECTION, POWDER, LYOPHILIZED, FOR SOLUTION INTRAVENOUS at 05:07

## 2023-07-17 RX ADMIN — HYDROMORPHONE HYDROCHLORIDE 0.2 MG: 0.2 INJECTION, SOLUTION INTRAMUSCULAR; INTRAVENOUS; SUBCUTANEOUS at 11:52

## 2023-07-17 RX ADMIN — CALCIUM CARBONATE (ANTACID) CHEW TAB 500 MG 500 MG: 500 CHEW TAB at 16:34

## 2023-07-17 RX ADMIN — SODIUM CHLORIDE 250 ML: 9 INJECTION, SOLUTION INTRAVENOUS at 11:21

## 2023-07-17 RX ADMIN — SEVELAMER CARBONATE 800 MG: 800 TABLET, FILM COATED ORAL at 09:51

## 2023-07-17 RX ADMIN — HEPARIN SODIUM 5000 UNITS: 5000 INJECTION, SOLUTION INTRAVENOUS; SUBCUTANEOUS at 20:44

## 2023-07-17 RX ADMIN — B-COMPLEX W/ C & FOLIC ACID TAB 1 MG 1 TABLET: 1 TAB at 09:48

## 2023-07-17 ASSESSMENT — ACTIVITIES OF DAILY LIVING (ADL)
ADLS_ACUITY_SCORE: 23

## 2023-07-17 NOTE — PLAN OF CARE
Goal Outcome Evaluation:         Pt getting Ensure Clear daily, but not taking due to her being told from dialysis she can't have more than 48oz fluid daily. I encouraged her to take supplements first as it has protein in addition to calories. She said it was ok to keep sending, but she is on the fence if she will drink it. She is also wanting a rotation of Nepro and Ensure Clear. Diet now advances to FL (see todays progress note for more details)

## 2023-07-17 NOTE — PROGRESS NOTES
[facility]    Progress Note - Hospitalist Service       Date of Admission:  7/13/2023    Assessment & Plan   Serene Tipton is a 67 year old female with a PMHx of PKD s/p bilateral nephrectomy, ESRD on HD TTS schedule, active transplant evaluation, anemia, HTN, HLD, hypothyroidism, active tobacco use, and chronic pain. She was admitted on 7/13/2023, after presenting with abdominal pain, recent history of falls, and found to have hyperkalemia 2/2 ESRD & small bowel obstruction. Abdominal pain worsened after advancing diet on 7/14/2023, with updated imaging showing a likely adynamic ileus.     Adynamic ileus  Small bowel obstruction  Constipation  Nausea  Abdominal pain  L-sided abdominal pain improving but increased at night. Last admission (7/5-7/6) evaluated by surgery for SBO that identified constipation as the likely cause. MiraLAX bowel regimen produced multiple loose bowel movements and recommended to continue after imaging showed moderate to large amount of stool in the colon. Abdominal CT on admission consistent with small bowel obstruction. On 7/14, abdominal pain improved & pt passed multiple loose stools; thus, diet advanced. On 7/15, episode of emesis with worsening abdominal pain with imaging showing a likely adynamic ileus, 3rd spacing of fluids w/ trace ascites, & diffuse subcutaneous edema. Bowel rest and pain control reinitiated. Due to active bowel sounds & passing gas, diet advanced to clear fluids and tolerated overnight. In past 24 hrs, pt passed 2 small bowel movements, continues to pass gas, & maintains active bowel sounds   - Advance to full liquid diet, as tolerated  - Compazine PRN  - Continue PTA oxycodone 10 mg PRN  - PRN dilaudid 0.2 mg for severe pain  - Defer on GI consult      ESRD on HD  Hyponatremia, Improved  Hyperkalemia, RESOLVED  On 7/11, hypotensive episode & fall during dialysis, cutting HD session short. Cr remains within baseline of 3-5 and  electrolytes stable. No ECG changes specific for hyperkalemia. Dialysis TTS.   - Nephrology consulted, appreciate recommendations   - Avoiding potassium binders with SBO  - Monitor BMP and electrolytes   - Fluid restriction   - If K+ increases, repeat ECG, give Ca gluconate & insulin with dextrose   - Avoid nephrotoxic medications    Hypoglycemia, RESOLVED  Poor appetite  Failure to thrive  Received dextrose from EMS due to hypoglycemia, 60. Experiences weight loss, fatigue, poor appetite, & lives alone in apartment. Cortisol significantly decreased at 0.2, raising concern for adrenal insuffiency. Normal ACTH stimulation test ruled out primary adrenal insufficiency.   - PT/OT  - Nutrition consult, appreciate recs  - Continue hypoglycemia protocol with dextrose    Hypotension  Hx of hypertension  Initial BP 87/60 and rec'd 500 mL NS with improvement to SBP 110s. Pt has multiple anti-hypertensives; however, increased hypotensive episodes, including during dialysis and recent mechanical falls. Overnight BP readings become elevated, coinciding with increased pain, with return to mildly elevated readings by morning. Normal ACTH stimulation test ruled out primary adrenal insufficiency as a contributory cause to hypotensive events; however, chronic illness combined with present GI problems may have led to a transient adrenal suppression.    - Hold PTA amlodipine, labetalol, clonidine, lisinopril  - Midodrine during dialysis per nephrology  - Restart labetalol if tachycardic     Small bilateral pleural effusions  LE edema   Likely secondary to fluid overload status due to need for dialysis. Hypoalbuminemia also likely contributing with albumin at 2.1.   - Additional dialysis session on 7/17  - Maintain dialysis schedule  - Limit IVF/fluid restriction    Non-specific ST changes, unchanged  Prolonged QT  Mild non-specific ST-depressions that previously appeared on EKGs. No chest pain. QTc 522.  - Avoid QT prolonging meds  -  Troponin & EKG if chest pain develops    Chronic Conditions:  Hypothyroidism: PTA synthroid  Anxiety/depression: PTA sertraline, PRN ativan  Chronic pain: PTA nortriptyline, reinitiated oxycodone 10 mg   Hyperlipidemia: PTA rosuvastatin  Anemia: PTA EPO & Venofer with HD  Tobacco use: declines nicotine replacement     Diet: Snacks/Supplements Adult: Ensure Clear; Between Meals  Clear Liquid Diet    DVT Prophylaxis: Heparin SQ  Pool Catheter: Not present  Fluids: None  Lines: None     Cardiac Monitoring: None  Code Status: Full Code      Clinically Significant Risk Factors              # Hypoalbuminemia: Lowest albumin = 2.1 g/dL at 7/14/2023  5:03 AM, will monitor as appropriate     # Hypertension: Noted on problem list        # Cachexia: Estimated body mass index is 16.7 kg/m  as calculated from the following:    Height as of this encounter: 1.524 m (5').    Weight as of this encounter: 38.8 kg (85 lb 8 oz)., PRESENT ON ADMISSION  # Severe Malnutrition: based on nutrition assessment, PRESENT ON ADMISSION        Disposition Plan      Expected Discharge Date: 07/18/2023,  3:00 PM    Destination: home with help/services  Discharge Comments: pending ileus resolution and advancement of diet  Dialysis run 7/17  Home with assist from friends/family        The patient's care was discussed with the Attending Physician, Dr. Estrellita Quiroz.    NURYS BANSAL MD, ROGER  Resident Physician, PGY-1   Hospitalist Service  [facility]  Securely message with [employer] (more info)  Text page via [employer] Paging/Directory   ______________________________________________________________________    Interval History   Continues to have increased abdominal pain overnight, requiring PRN dose of dilaudid. Diet advanced to full liquid diet, as clear liquids tolerated overnight.    Physical Exam   Vital Signs: Temp: 98.6  F (37  C) Temp src: Oral BP: (!) 146/72 Pulse: 98   Resp: 18 SpO2: 96 % O2 Device: None (Room  air)    Weight: 85 lbs 8 oz    Constitutional: Awake, alert, cooperative, no apparent distress, pale, thin  Respiratory: No increased work of breathing, good air exchange, clear to auscultation bilaterally, no crackles or wheezing  Cardiovascular: Regular rate and rhythm and systolic murmur noted  GI: Soft, mild distention, left-sided abdominal tenderness to moderate palpation, no rebound or guarding, faint bowel sounds, no masses palpated  Skin: Grey skin color, dry.   Musculoskeletal: Lower extremity pitting edema (1+) present to mid-calf bilaterally  Neurologic: Awake, alert, oriented to name, place and time.   Neuropsychiatric: General: normal, calm and normal eye contact      Data     I have personally reviewed the following data over the past 24 hrs:    12.8 (H)  \   10.4 (L)   / 400     134 (L) 96 (L) 48 (H) /  78   4.0 24 4.41 (H) \       Imaging results reviewed over the past 24 hrs:   No results found for this or any previous visit (from the past 24 hour(s)).

## 2023-07-17 NOTE — PROGRESS NOTES
Date: 7/17/2023  Time:1300     Data:  Pre Wt: 38.8 kg  Desired Wt:  To be established  Post Wt: 36.5 kg (estimated)  Weight change: 2.3 kg  Ultrafiltration - Post Run Net Total Removed (mL): 2300 ml  Vascular Access Status: Fistula patent  Dialyzer Rinse:  Light  Total Blood Volume Processed: 0 L   Total Dialysis (Treatment) Time:  2 Hrs  Dialysate Bath: SEQ UF  Heparin: Heparin: None     Lab:   No     Interventions:  Pt.dialyzed for 2 hours via  left AV Fistula using 15 gauge needles  UF set to 2.5 Liters, UF only,  accommodating  priming and rinse back volumes  , No lab drawn  Decannulation done post HD, hemostasis is achieved in 10 minutes  Pressure dressing is applied, to be removed after 4-6 hours  Report given to PCN in stable condition     Assessment:  A/O x 4, calm and cooperative, denies pain  (+2) Bipedal edema   left arm AV Fistula has good thrill and bruit                Plan:    Per Renal team    Jens Landis BSN, RN  Acute Dialysis RN  [facility]

## 2023-07-17 NOTE — PROGRESS NOTES
RENAL PROGRESS NOTE    PLAN:  UF only tx today for volume  Dialysis tomorrow on TTS schedule, possible discharge after? Challenge UF as able   Swap to Nepro protein supp (high protein count/volume)    ASSESSMENT:     ESKD - On chronic in-center hemodialysis. Dialyzes as outpatient at [facility] on TTS schedule (3 hour runs, JESSI AVF) under the care of Dr. Cheung. Underlying disease is polycystic kidney disease and she is s/p bilateral nephrectomy. issues with volume overload and hyperK, ongoing education on the need to control both in diet     Hyperkalemia - Resolved with HD      Volume - ongoing hypervol issues, again educated on the need to reduce fluid intake, Patient is anephric, anuric.       SBO - Admitted earlier this month with partial SBO and evidence of ongoing obstruction on CT this admit.      History of HTN - Has been persistently hypotensive on HD recently and hypotensive with recent ED, had isolated low cortisol level on 7/14, but normal to high on repeat, stim test in progress with [facility] team,  PTA antihypertensives at this time midodrine/albumin available with HD.       Hypothyroidism - On replacement     Anxiety/depression - On sertraline, uses ativan     Chronic pain - Management per primary service      HLD - On statin     Anemia - Receives PETE and iron with dialysis. Hgb 8.4 here. Iron levels replete and received EPO 20K x 1 7/13/23.      Secondary renal hyperparathyroidism - on sevelamer for phos binders, calcium low here, check albumin for correction      Tobacco use      FTT - Patient reports doing poorly at home since her recent discharge; very weak and unable to care for herself. Today she was not open to the idea of TCU or LTC facility but is open to meeting with SW to discuss possible option of HHC. SW consult requested. PT/OT    SUBJECTIVE:  No new issues today, asking many q's about supplements, BP meds, wanting new nephrologist outpt d/t personality issues and perceived care  problems.  Enc pt to comply with FR and lower K diet in addition to protein supp daily.      OBJECTIVE:  Physical Exam   Temp: 98  F (36.7  C) Temp src: Oral BP: (!) 151/74 Pulse: 97   Resp: 16 SpO2: 96 % O2 Device: None (Room air)    Vitals:    07/15/23 0449 07/16/23 0007 07/17/23 0602   Weight: 40.8 kg (89 lb 15.2 oz) 38 kg (83 lb 12.8 oz) 38.8 kg (85 lb 8 oz)     Vital Signs with Ranges  Temp:  [98  F (36.7  C)-98.6  F (37  C)] 98  F (36.7  C)  Pulse:  [] 97  Resp:  [16-18] 16  BP: (138-173)/() 151/74  SpO2:  [95 %-97 %] 96 %  I/O last 3 completed shifts:  In: 540 [P.O.:540]  Out: -     Patient Vitals for the past 72 hrs:   Weight   07/17/23 0602 38.8 kg (85 lb 8 oz)   07/16/23 0007 38 kg (83 lb 12.8 oz)   07/15/23 0449 40.8 kg (89 lb 15.2 oz)       Intake/Output Summary (Last 24 hours) at 7/15/2023 1325  Last data filed at 7/15/2023 1220  Gross per 24 hour   Intake 360 ml   Output 2300 ml   Net -1940 ml       PHYSICAL EXAM:  GEN: NAD, frail, supine in bed, HD RN in room setting up UF run   CV:SBP and rate controlled   Lung: sats good on RA   Ext: +++  Pedal edema BL to thighs  Skin: no rash  Access : JESSI aVF +t/b      LABORATORY STUDIES:     Recent Labs   Lab 07/17/23  0507 07/16/23  0655 07/15/23  0459 07/14/23  0503 07/13/23  1239 07/11/23  1131   WBC 12.8* 12.1* 11.9* 13.4* 13.0* 11.5*   RBC 3.11* 3.12* 2.85* 2.65* 2.51* 2.62*   HGB 10.4* 10.2* 9.5* 9.0* 8.4* 9.0*   HCT 32.8* 33.1* 29.4* 27.4* 25.8* 26.9*    348 365 322 320 288       Basic Metabolic Panel:  Recent Labs   Lab 07/17/23  0955 07/17/23  0507 07/17/23  0506 07/17/23  0431 07/16/23 2020 07/16/23  1139 07/16/23  0744 07/16/23  0655 07/15/23  0814 07/15/23  0459 07/14/23  0841 07/14/23  0503 07/13/23  2146 07/13/23  1718 07/13/23  1325 07/13/23  1239   NA  --  134*  --   --   --   --   --  133*  --  136  --  139  --  135*  --  130*   POTASSIUM  --  4.0  --   --   --   --   --  3.8  --  3.7  --  3.7  --  4.1  --  6.1*   CHLORIDE  --   96*  --   --   --   --   --  97*  --  96*  --  99  --  96*  --  91*   CO2  --  24  --   --   --   --   --  25  --  26  --  26  --  23  --  19*   BUN  --  48*  --   --   --   --   --  33*  --  55*  --  42*  --  68*  --  113*   CR  --  4.41*  --   --   --   --   --  3.35*  --  4.71*  --  3.56*  --  4.11*  --  6.44*   GLC 93 78 83 67* 88 74   < > 79   < > 107   < > 90   < > 120   < > 60*   GAVIN  --  8.5  --   --   --   --   --  8.5  --  8.0*  --  8.3*  --  8.3*  --  7.3*    < > = values in this interval not displayed.       INR  Recent Labs   Lab 07/13/23  1239 07/11/23  1131   INR 1.70* 1.40*        Recent Labs   Lab Test 07/17/23  0507 07/16/23  0655 07/14/23  0503 07/13/23  1239 07/11/23  1131   INR  --   --   --  1.70* 1.40*   WBC 12.8* 12.1*   < > 13.0* 11.5*   HGB 10.4* 10.2*   < > 8.4* 9.0*    348   < > 320 288    < > = values in this interval not displayed.       Personally reviewed current labs    PAMELA Almonte  [facility]  [address]  [place] [address]    Office:  [phone]  Fax:  [phone]

## 2023-07-17 NOTE — PLAN OF CARE
Goal Outcome Evaluation: ongoing.       Plan of Care Reviewed With: patient    Overall Patient Progress: improvingOverall Patient Progress: improving       Pt alert and oriented. Denies chest pain or SOB. Able to make needs known. Continues to have abd pain- prn medication given oxy given x1 and dilaudid given x1 overnight. Tele reads NSR to ST. Tolerated clears overnight.  Pt had loose BM overnight. Will possibly have dialysis run today. ACTH blood work completed this am. Will continue to monitor.         Problem: Malnutrition  Goal: Improved Nutritional Intake  Outcome: Progressing     Problem: Chronic Kidney Disease  Goal: Optimal Coping with Chronic Illness  Outcome: Progressing  Goal: Electrolyte Balance  Outcome: Progressing  Goal: Fluid Balance  Outcome: Progressing  Goal: Optimal Functional Ability  Outcome: Progressing  Intervention: Optimize Functional Ability  Recent Flowsheet Documentation  Taken 7/17/2023 0417 by Bing Kwan RN  Activity Management: activity adjusted per tolerance  Taken 7/17/2023 0018 by Bing Kwan RN  Activity Management: activity adjusted per tolerance  Taken 7/16/2023 2010 by Bing Kwan RN  Activity Management: activity adjusted per tolerance  Goal: Absence of Anemia Signs and Symptoms  Outcome: Progressing  Goal: Optimal Oral Intake  Outcome: Progressing  Goal: Acceptable Pain Control  Outcome: Progressing  Intervention: Prevent or Manage Pain  Recent Flowsheet Documentation  Taken 7/17/2023 0450 by Bing Kwan RN  Pain Management Interventions: rest  Taken 7/17/2023 0011 by Bing Kwan, RN  Pain Management Interventions: rest  Taken 7/16/2023 2331 by Bing Kwan, RN  Pain Management Interventions:    repositioned    rest  Goal: Minimize Renal Failure Effects  Outcome: Progressing  Intervention: Monitor and Support Renal Function  Recent Flowsheet Documentation  Taken 7/17/2023 0417 by Bing Kwan, CEDRIC  Medication  Review/Management: medications reviewed  Taken 7/17/2023 0018 by Bing Kwan, RN  Medication Review/Management: medications reviewed  Taken 7/16/2023 2010 by Bing Kwan, RN  Medication Review/Management: medications reviewed

## 2023-07-17 NOTE — PROGRESS NOTES
CLINICAL NUTRITION SERVICES - REASSESSMENT NOTE     Nutrition Prescription    RECOMMENDATIONS FOR MDs/PROVIDERS TO ORDER:  Per Pt she is on a 48oz fluid restriction at home. Not sure if you want to add that to the diet order. This is approx 1400ml   Also recommend a renal (dialysis) diet as diet advances     Malnutrition Status:    Severe malnutrition previously noted     Recommendations already ordered by Registered Dietitian (RD):  Offer Ensure Clear or Nepro daily, she is unsure if she will drink it daily      Future/Additional Recommendations:  Will monitor po, wt, labs      EVALUATION OF THE PROGRESS TOWARD GOALS   Diet: Clear Liquid with Ensure Clear daily -diet now advanced to FL   Intake: 100% of broth and jello yesterday noted      NEW FINDINGS   She is concerned about her home 48oz fluid restriction and supplements. She says she probably won't drink the supplements due to this, but to send them. I let her know she was not on the fluid restriction here and she was bothered by this.     She is open to having a rotation of nepro one day and Ensure Clar the other day, she is just unsure if she will always drink it depending on her fluid intake     ANTHROPOMETRICS  07/17/23 0602 38.8 kg (85 lb 8 oz) Standing scale   07/16/23 0007 38 kg (83 lb 12.8 oz) Standing scale   07/15/23 0449 40.8 kg (89 lb 15.2 oz) Bed scale   07/14/23 0557 39.8 kg (87 lb 12.8 oz) --   07/13/23 1620 43.6 kg (96 lb 1.6 oz) Bed scale   07/13/23 1202 41.7 kg (92 lb) --   WT is down, no dry wt       PHYSICAL FINDINGS  Per flowsheet:   Edema- +1-+2 bilateral LE   GI- hypoactive BS, last BM on 7/16  Skin- no skin breakdown noted     LABS  Reviewed, Na-134 (improving), BUN-48 (up), Cr-4.41 (up)    MEDICATIONS  Reviewed, Synthroid, renal MVI, senokot, Vitamin D3      NUTRITION DIAGNOSIS  Malnutrition related to poor appetite secondary to bilateral nephrectomy as evidenced by moderate-severe muscle/fat loss  -evaluation: ongoing    Goals:  -  Patient to consume % of nutritionally adequate meal trays TID, or the equivalent with supplements/snacks. -not met due to pt back on CL   - Drink 1 Ensure Clear/day - not yet drinking, she is concerned about her fluid volume daily   -Promote appropriate wt gain - new     INTERVENTIONS  Implementation  Ok to offer Ensure Clear daily or Nepro daily depending on what she would like. I encouraged her to drink supplements before other liquids as it has added protein.   Recommend a renal (dialysis) diet as diet advances     Monitoring/Evaluation  Will monitor po, wt, labs

## 2023-07-17 NOTE — PROVIDER NOTIFICATION
Writer paged BFM that pts BP is elevated, pt is asymptomatic. BFM paged back stating to see what BP is at 0400 and if above 180 or if pt symptomatic to page again.

## 2023-07-18 LAB
ACTH PLAS-MCNC: 25 PG/ML
ANION GAP SERPL CALCULATED.3IONS-SCNC: 12 MMOL/L (ref 5–18)
BUN SERPL-MCNC: 57 MG/DL (ref 8–22)
CALCIUM SERPL-MCNC: 8.1 MG/DL (ref 8.5–10.5)
CHLORIDE BLD-SCNC: 96 MMOL/L (ref 98–107)
CO2 SERPL-SCNC: 23 MMOL/L (ref 22–31)
CREAT SERPL-MCNC: 5.05 MG/DL (ref 0.6–1.1)
ERYTHROCYTE [DISTWIDTH] IN BLOOD BY AUTOMATED COUNT: 15 % (ref 10–15)
GFR SERPL CREATININE-BSD FRML MDRD: 9 ML/MIN/1.73M2
GLUCOSE BLD-MCNC: 58 MG/DL (ref 70–125)
GLUCOSE BLDC GLUCOMTR-MCNC: 155 MG/DL (ref 70–99)
GLUCOSE BLDC GLUCOMTR-MCNC: 69 MG/DL (ref 70–99)
GLUCOSE BLDC GLUCOMTR-MCNC: 76 MG/DL (ref 70–99)
GLUCOSE BLDC GLUCOMTR-MCNC: 79 MG/DL (ref 70–99)
HCT VFR BLD AUTO: 28 % (ref 35–47)
HGB BLD-MCNC: 9.1 G/DL (ref 11.7–15.7)
MCH RBC QN AUTO: 33.8 PG (ref 26.5–33)
MCHC RBC AUTO-ENTMCNC: 32.5 G/DL (ref 31.5–36.5)
MCV RBC AUTO: 104 FL (ref 78–100)
PLATELET # BLD AUTO: 333 10E3/UL (ref 150–450)
POTASSIUM BLD-SCNC: 4.3 MMOL/L (ref 3.5–5)
RBC # BLD AUTO: 2.69 10E6/UL (ref 3.8–5.2)
SODIUM SERPL-SCNC: 131 MMOL/L (ref 136–145)
WBC # BLD AUTO: 11.7 10E3/UL (ref 4–11)

## 2023-07-18 PROCEDURE — 250N000013 HC RX MED GY IP 250 OP 250 PS 637

## 2023-07-18 PROCEDURE — 250N000013 HC RX MED GY IP 250 OP 250 PS 637: Performed by: FAMILY MEDICINE

## 2023-07-18 PROCEDURE — 90935 HEMODIALYSIS ONE EVALUATION: CPT

## 2023-07-18 PROCEDURE — 99232 SBSQ HOSP IP/OBS MODERATE 35: CPT | Performed by: NURSE PRACTITIONER

## 2023-07-18 PROCEDURE — 250N000011 HC RX IP 250 OP 636: Mod: JZ

## 2023-07-18 PROCEDURE — 80048 BASIC METABOLIC PNL TOTAL CA: CPT

## 2023-07-18 PROCEDURE — 99232 SBSQ HOSP IP/OBS MODERATE 35: CPT | Mod: GC

## 2023-07-18 PROCEDURE — 85027 COMPLETE CBC AUTOMATED: CPT

## 2023-07-18 PROCEDURE — 36415 COLL VENOUS BLD VENIPUNCTURE: CPT

## 2023-07-18 PROCEDURE — 210N000002 HC R&B HEART CARE

## 2023-07-18 PROCEDURE — 258N000001 HC RX 258

## 2023-07-18 PROCEDURE — 120N000004 HC R&B MS OVERFLOW

## 2023-07-18 RX ORDER — LABETALOL 100 MG/1
300 TABLET, FILM COATED ORAL EVERY 12 HOURS SCHEDULED
Status: DISCONTINUED | OUTPATIENT
Start: 2023-07-18 | End: 2023-07-19 | Stop reason: HOSPADM

## 2023-07-18 RX ADMIN — DIATRIZOATE MEGLUMINE AND DIATRIZOATE SODIUM 75 ML: 660; 100 SOLUTION ORAL; RECTAL at 16:20

## 2023-07-18 RX ADMIN — HEPARIN SODIUM 5000 UNITS: 5000 INJECTION, SOLUTION INTRAVENOUS; SUBCUTANEOUS at 08:01

## 2023-07-18 RX ADMIN — SENNOSIDES AND DOCUSATE SODIUM 2 TABLET: 50; 8.6 TABLET ORAL at 07:08

## 2023-07-18 RX ADMIN — LEVOTHYROXINE SODIUM 50 MCG: 0.05 TABLET ORAL at 00:23

## 2023-07-18 RX ADMIN — LABETALOL HYDROCHLORIDE 300 MG: 100 TABLET, FILM COATED ORAL at 15:02

## 2023-07-18 RX ADMIN — HEPARIN SODIUM 5000 UNITS: 5000 INJECTION, SOLUTION INTRAVENOUS; SUBCUTANEOUS at 21:13

## 2023-07-18 RX ADMIN — Medication 25 MCG: at 21:14

## 2023-07-18 RX ADMIN — HYDROMORPHONE HYDROCHLORIDE 0.2 MG: 0.2 INJECTION, SOLUTION INTRAMUSCULAR; INTRAVENOUS; SUBCUTANEOUS at 00:23

## 2023-07-18 RX ADMIN — NORTRIPTYLINE HYDROCHLORIDE 75 MG: 25 CAPSULE ORAL at 21:14

## 2023-07-18 RX ADMIN — HYDROMORPHONE HYDROCHLORIDE 0.2 MG: 0.2 INJECTION, SOLUTION INTRAMUSCULAR; INTRAVENOUS; SUBCUTANEOUS at 21:14

## 2023-07-18 RX ADMIN — OXYCODONE HYDROCHLORIDE 10 MG: 5 TABLET ORAL at 17:34

## 2023-07-18 RX ADMIN — OXYCODONE HYDROCHLORIDE 10 MG: 5 TABLET ORAL at 09:53

## 2023-07-18 RX ADMIN — DEXTROSE 15 G: 15 GEL ORAL at 23:35

## 2023-07-18 RX ADMIN — DEXTROSE MONOHYDRATE 25 ML: 25 INJECTION, SOLUTION INTRAVENOUS at 05:35

## 2023-07-18 RX ADMIN — Medication: at 09:30

## 2023-07-18 RX ADMIN — SERTRALINE 150 MG: 100 TABLET, FILM COATED ORAL at 21:14

## 2023-07-18 RX ADMIN — B-COMPLEX W/ C & FOLIC ACID TAB 1 MG 1 TABLET: 1 TAB at 08:01

## 2023-07-18 RX ADMIN — LORAZEPAM 1 MG: 1 TABLET ORAL at 11:02

## 2023-07-18 RX ADMIN — ROSUVASTATIN CALCIUM 10 MG: 10 TABLET, FILM COATED ORAL at 21:14

## 2023-07-18 ASSESSMENT — ACTIVITIES OF DAILY LIVING (ADL)
ADLS_ACUITY_SCORE: 23
ADLS_ACUITY_SCORE: 22
ADLS_ACUITY_SCORE: 23

## 2023-07-18 NOTE — PLAN OF CARE
Goal Outcome Evaluation:      Plan of Care Reviewed With: patient    Overall Patient Progress: improvingOverall Patient Progress: improving    Outcome Evaluation: VSS on RA. Dialyzed today for 2.5L. 1L of emesis, coffee-ground/bile mix. Attempted NG placement without success. Had a broken nose 2 years ago. No surgery. high resistance in both nostrils. No further emesis, abdominal pain. Passing flatus. Feels constipated. PRN IV dilaudid for sciatic nerve pain.      Problem: Pain Acute  Goal: Optimal Pain Control and Function  Intervention: Prevent or Manage Pain    Problem: Chronic Kidney Disease  Goal: Electrolyte Balance  Outcome: Progressing     Problem: Chronic Kidney Disease  Goal: Fluid Balance  Outcome: Progressing

## 2023-07-18 NOTE — PROGRESS NOTES
[facility]    Progress Note - Hospitalist Service       Date of Admission:  7/13/2023    Assessment & Plan   Serene Tipton is a 67 year old female with a PMHx of PKD s/p bilateral nephrectomy, ESRD on HD TTS schedule, active transplant evaluation, anemia, HTN, HLD, hypothyroidism, active tobacco use, and chronic pain. She was admitted after presenting with abdominal pain, recent fall history, and found to be hyperkalemic 2/2 having ESRD & small bowel obstruction. Updated imaging showing a likely adynamic ileus. Advancing diet two separate times has failed after bowel rest with NPO.    Adynamic ileus  Small bowel obstruction  Constipation  Nausea  Abdominal pain  Abdominal pain improving, confined to epigastric area with mild L-sided tenderness, but increases at night. Last admission (7/5-7/6) SBO attributed to constipation with MiraLAX bowel regimen providing some relief. Abdominal CT on admission consistent with SBO. Diet advanced on 7/14, due to improved abdominal pain & passing multiple loose stools. Bowel rest and pain control reinitiated on 7/15, due to emesis with worsening abdominal pain; updated imaging showed a likely adynamic ileus, 3rd spacing of fluids w/ trace ascites, & diffuse subcutaneous edema. Active bowel sounds & passing gas supported restarting clear fluid diet on 7/16 and due to overnight toleration, advanced to full liquid diet on 7/17. Subsequently, pt returned to bowel rest with NPO, and GI consult sought. Unsuccessful attempt to place NG tube overnight.     - Bowel rest with NPO  - Advance diet as tolerated  - Compazine PRN  - Continue PTA oxycodone 10 mg PRN  - PRN dilaudid 0.2 mg for severe pain  - GI consult requested      ESRD on HD  Hyponatremia, Improved  Hyperkalemia, RESOLVED  On 7/11, hypotensive episode & fall during dialysis, cutting HD session short. Cr remains within baseline of 3-5 and electrolytes stable. No ECG changes specific for  hyperkalemia. Dialysis TTS.   - Nephrology consulted, appreciate recommendations   - Avoiding potassium binders with SBO  - Monitor BMP and electrolytes   - Fluid restriction   - If K+ increases, repeat ECG, give Ca gluconate & insulin with dextrose   - Avoid nephrotoxic medications    Hypoglycemia, RESOLVED  Poor appetite  Failure to thrive  Received dextrose from EMS due to hypoglycemia, 60. Experiences weight loss, fatigue, poor appetite, & lives alone in apartment. Cortisol significantly decreased at 0.2, raising concern for adrenal insuffiency. Normal ACTH stimulation test ruled out primary adrenal insufficiency.   - PT/OT  - Nutrition consult, appreciate recs  - Continue hypoglycemia protocol with dextrose    Hypotension  Hx of hypertension  Initial BP 87/60 and rec'd 500 mL NS with improvement to SBP 110s. Pt has multiple anti-hypertensives; however, increased hypotensive episodes, including during dialysis and recent mechanical falls. Elevated BP readings continue. Normal ACTH stimulation test ruled out primary adrenal insufficiency as a contributory cause to hypotensive events; however, chronic illness combined with present GI problems may have led to a transient adrenal suppression.    - Hold PTA amlodipine, clonidine, lisinopril  - Midodrine during dialysis per nephrology  - Restart labetalol 300 mg twice daily     Small bilateral pleural effusions  LE edema   Likely secondary to fluid overload status due to need for dialysis. Hypoalbuminemia also likely contributing with albumin at 2.1.   - Rec'd dialysis 7/18  - Maintain dialysis schedule  - Limit IVF/fluid restriction    Non-specific ST changes, unchanged  Prolonged QT  Mild non-specific ST-depressions that previously appeared on EKGs. No chest pain. QTc 522.  - Avoid QT prolonging meds  - Troponin & EKG if chest pain develops    Chronic Conditions:  Hypothyroidism: PTA synthroid  Anxiety/depression: PTA sertraline, PRN ativan  Chronic pain: PTA  nortriptyline, reinitiated oxycodone 10 mg   Hyperlipidemia: PTA rosuvastatin  Anemia: PTA EPO & Venofer with HD  Tobacco use: declines nicotine replacement     Diet: Fluid restriction 1200 ML FLUID  Snacks/Supplements Adult: Nepro Oral Supplement; Between Meals  NPO for Medical/Clinical Reasons Except for: Meds, Ice Chips    DVT Prophylaxis: Heparin SQ  Pool Catheter: Not present  Fluids: None  Lines: None     Cardiac Monitoring: None  Code Status: Full Code      Clinically Significant Risk Factors              # Hypoalbuminemia: Lowest albumin = 2.1 g/dL at 7/14/2023  5:03 AM, will monitor as appropriate     # Hypertension: Noted on problem list        # Cachexia: Estimated body mass index is 15.51 kg/m  as calculated from the following:    Height as of this encounter: 1.524 m (5').    Weight as of this encounter: 36 kg (79 lb 6.4 oz).   # Severe Malnutrition: based on nutrition assessment         Disposition Plan      Expected Discharge Date: 07/19/2023,  3:00 PM    Destination: home with help/services  Discharge Comments: pending ileus resolution and advancement of diet  Dialysis run 7/17  Home with assist from friends/family        The patient's care was discussed with the Attending Physician, Dr. Estrellita Quiroz.    NURYS BANSAL MD, ROGER  Resident Physician, PGY-1   Hospitalist Service  [facility]  Securely message with [employer] (more info)  Text page via [facility] Paging/Directory   ______________________________________________________________________    Interval History   Required overnight dose of dilaudid due to sciatic pain. Pt returned to bowel rest and NPO after full liquid diet initiated with pt having 1L of emesis, described as coffee-ground/bile mix. Overnight, unsuccessful attempt to place NG tube due to inability to pass it through nasal cavity. No further emesis. Pt would like to eat.     Physical Exam   Vital Signs: Temp: 97.8  F (36.6  C) Temp src: Oral BP: (!)  154/79 Pulse: 99   Resp: 18 SpO2: 96 % O2 Device: None (Room air)    Weight: 79 lbs 6.4 oz    Constitutional: Awake, alert, cooperative, no apparent distress, pale, thin  Respiratory: No increased work of breathing, good air exchange, clear to auscultation bilaterally, no crackles or wheezing  Cardiovascular: Regular rate and rhythm and systolic murmur noted  GI: Soft, mild distention, mild epigastric and left-sided abdominal tenderness to moderate palpation, no rebound or guarding, active bowel sounds, no masses palpated  Skin: Grey skin color, dry.   Musculoskeletal: Lower extremity pitting edema (1+) present to mid-calf bilaterally  Neurologic: Awake, alert, oriented to name, place and time.   Neuropsychiatric: General: normal, calm and normal eye contact      Data     I have personally reviewed the following data over the past 24 hrs:    11.7 (H)  \   9.1 (L)   / 333     131 (L) 96 (L) 57 (H) /  155 (H)   4.3 23 5.05 (H) \       Imaging results reviewed over the past 24 hrs:   No results found for this or any previous visit (from the past 24 hour(s)).

## 2023-07-18 NOTE — PROGRESS NOTES
HEMODIALYSIS TREATMENT NOTE    Date: 7/18/2023  Time: 1:18 PM    Data:  Pre Wt: 36 kg  Desired Wt: 38.8 kg   Post Wt: 33.9 kg  Weight change: 2.1 kg  Ultrafiltration - Post Run Net Total Removed (mL): 2100 mL  Vascular Access Status: patent  Dialyzer Rinse: Clear  Total Blood Volume Processed: 57.5 L Liters  Total Dialysis (Treatment) Time: 3 Hours    Lab:   No labs drawn during HD.    HD Interventions/Assessment:  Arrived to pts room to initiate 3hr HD tx. Slightly hypertensive, SBP in 150's, asymptomatic. Remainder VSS. On RA. AOx4. Reporting 5/10 pain of back, analgesics given by PCN. Pre weight 36kg. +2-3 bilat leg edema noted. Jie Gaviria NP made aware regarding UF goal of 2kg - okay to remove. Pt denying SOB, chest pain, N/V. AVF cannulated with no issues. BF stable at 350. On K3Ca3 bath per algorithm. Tx initiated and well tolerated by pt. VSS post. Post weight 33.9kg. 2.1kg removed. AVF deaccessed, sites held for 5-10 mins until hemostasis achieved and new dressings applied. Report given to PCN. No complaints/concerns voiced by pt. Call light within reach, resting in bed.      Plan:    As per Nephrology team.

## 2023-07-18 NOTE — CONSULTS
GI CONSULT NOTE      Name: Serene Tipton    Medical Record #: [id number]    YOB: 1955    Date: 7/18/2023    CC: We were consulted by Chris Masters MD to see Serene Tipton in regards to unresolved SBP/ileus and failed diet advancement x2 in the past.     HPI: This is a 67 year old female with a history of PCKD s/p bilateral nephrectomy in 2/20/2023, ESRD on HD, active transplant evaluation, anemia, HTN, HLD, hypothyroidism, active tobacco use, chronic pain who presented after a fall and had difficulty getting up from the floor.  Noted to have hyperkalemia to 6.1 on admission now resolved.  Contrast CT scan 7/13/2023 was done for increasing abdominal pain and showed diffusely dilated small bowel but no transition point with findings representing ileus.  CT scan 7/5 had show similar findings as well as moderate volume of stool throughout the colon.  Rep her bowel habits changed after her nephrectomy earlier this year.  However, still has a bowel movement most days of the week with formation to it.  Reports 30 pound weight loss in the past year.  Currently on Dilaudid - three doses yesterday, one very early this morning and oxycodone, receiving 10mg this morning.  Also on fluid restriction.  Receiving Senna-S twice daily.  She is hungry today and would really like to eat.  She is passing gas.  Last bowel movement was 2 days ago.    Past medical history  Past Medical History:   Diagnosis Date     Anemia in chronic kidney disease      Anxiety      Arthritis      Brain aneurysm     Cavernous segment of the right & left carotid arteries. See Neurosurg note 6/16/21.     Chronic low back pain     Managed by Pain Clinic     Depressive disorder 2013     Disease of thyroid gland      ESRD (end stage renal disease) on dialysis (H) 07/2020     GERD (gastroesophageal reflux disease)      Hepatic lesion      Hyperlipidemia      Hypertension      Nephrolithiasis      Neuromuscular disorder (H)       Osteoporosis      PKD (polycystic kidney disease) 1990     PTSD (post-traumatic stress disorder)      Tobacco abuse      Vitamin D deficiency         Family history  Family History   Problem Relation Age of Onset     Polycystic Kidney Diease Mother      Hypertension Mother      Kidney Disease Mother      Cerebrovascular Disease Mother      Chronic Obstructive Pulmonary Disease Father      Multiple Sclerosis Father      Polycystic Kidney Diease Sister      Cardiac Sudden Death Sister 52     Hypertension Sister      Kidney Disease Sister      Polycystic Kidney Diease Maternal Grandmother      Hypertension Maternal Grandmother      Kidney Disease Maternal Grandmother      Cerebrovascular Disease Maternal Grandmother      Heart Disease Maternal Grandfather      Hyperlipidemia Paternal Grandmother      Cerebrovascular Disease Paternal Grandmother      Coronary Artery Disease Paternal Grandfather      Pulmonary Embolism Paternal Grandfather      Anesthesia Reaction No family hx of         Social history  Social History     Tobacco Use     Smoking status: Former     Packs/day: 0.50     Years: 14.00     Pack years: 7.00     Types: Cigarettes     Quit date: 2021     Years since quittin.0     Smokeless tobacco: Never   Substance Use Topics     Alcohol use: Not Currently     Drug use: Never       Medications:   Medications reviewed on outpatient list.  Notable for Venofer, Synthroid, Ativan, oxycodone, senna S.    Allergies:    Allergies   Allergen Reactions     Penicillins Other (See Comments) and Shortness Of Breath     Breathing problem.  breathing       Carvedilol GI Disturbance     Nausea and vomiting     Ciprofloxacin Muscle Pain (Myalgia)     Morphine Other (See Comments)     Vomiting     No Clinical Screening - See Comments      PN: LW CM1: Contrast Intercapsular - Nonionic Reaction :     Nsaids Other (See Comments)     Sulfa Antibiotics Itching     Sulfamethoxazole-Trimethoprim      Other  reaction(s): itching     Levofloxacin Muscle Pain (Myalgia) and Rash          REVIEW OF SYSTEMS (ROS): Review of systems is as per HPI.  Remainder of complete review of systems is negative.      PHYSICAL EXAMINATION:      /77   Pulse 112   Temp 97.7  F (36.5  C) (Oral)   Resp 18   Ht 1.524 m (5')   Wt 33.9 kg (74 lb 12.8 oz)   SpO2 100%   BMI 14.61 kg/m    GEN: Well developed, well nourished 67 year old female in no acute distress.  HEENT: sclera anicteric, moist mucous membranes, neck soft and supple.  LYMPH: No cervical lymphadenopathy  PULM: lungs clear to auscultation bilaterally.  CARDIO: Regular rate and rhythm  GI: Non-distended.  Bowel sounds positive.  Soft.  Non-tender to palpation.  No guarding.  EXT: warm, no lower extremity edema  NEURO: Alert and oriented.  Speech fluid.    PSYCH: Mental status appropriate, mood and affect normal.      LABS and IMAGING  Recent Labs   Lab 07/18/23  0452 07/17/23  0507 07/16/23  0655   WBC 11.7* 12.8* 12.1*   RBC 2.69* 3.11* 3.12*   HGB 9.1* 10.4* 10.2*   HCT 28.0* 32.8* 33.1*   * 106* 106*   MCH 33.8* 33.4* 32.7   MCHC 32.5 31.7 30.8*   RDW 15.0 15.0 14.8    400 348      Recent Labs   Lab 07/18/23  0452 07/17/23  0507 07/16/23  0655   * 134* 133*   CO2 23 24 25   BUN 57* 48* 33*     Recent Labs   Lab 07/13/23  1239   ALKPHOS 90   AST 31   ALT 28     Lab Results   Component Value Date    INR 1.70 (H) 07/13/2023    INR 1.40 (H) 07/11/2023    INR 1.02 05/19/2021     IMAGING  CT 7/15/23  IMPRESSION:   1.  Small bowel is diffusely dilated without a transition point to decompressed distal small bowel. Findings most likely represent an adynamic ileus.  2.  Third spacing of fluids with trace ascites and diffuse subcutaneous edema.    ASSESSMENT  This is a 67 year old female with a history of PCKD s/p bilateral nephrectomy in 2/20/2023, ESRD on HD, active transplant evaluation, anemia, HTN, HLD, hypothyroidism, active tobacco use, chronic pain  who presented after a fall. CT showing diffusely dilated small bowel.    Diffuse small bowel dilation.  Patient is not having obstructive symptoms such as vomiting, abdominal distention or abdominal pain.  Her risk factors for ileus include decreased mobility, chronic narcotic use, electrolyte abnormalities (which have significantly improved).  Currently passing gas and asking to advance diet; reporting that she is very hungry.     Patient Active Problem List   Diagnosis     Polycystic kidney     Hypertension     End stage kidney disease (H)     Depressive disorder     Chronic low back pain     Hyperlipidemia     Tobacco abuse     Vitamin D deficiency     Polycystic kidney disease     Postoperative anemia due to acute blood loss     Enlarged kidney as indication for native nephrectomy     Acute post-operative pain     Chronic, continuous use of opioids     Shortness of breath     S/p nephrectomy     Dialysis patient (H)     Pulmonary edema, noncardiac     Hyperkalemia     ESRD (end stage renal disease) on dialysis (H)     Constipation     Small bowel obstruction (H)     Hypoglycemia     Fall, initial encounter       PLAN  1.  Work on spending the least amount of time possible in hospital bed.    2.  Ambulate as much as safely possible.    3.  When in bed, frequently reposition self between right, left side.  4.  Depending on course, could consider Gastrografin upper GI study for second therapeutic purposes.    A total of 40 minutes was spent on today's care.  This included chart review, evaluation and examination of the patient, discussion with Dr. Nye, formulation of assessment and plan.    Thank you for asking to consult on this patient.    Yann Lombardo PA-C   7/18/2023 1:43 PM  [facility]  [phone]       ADDENDUM  Discussed further with Dr. Nye.  Will obtain Gastrografin upper GI study.    Yann Lombardo PA-C   7/18/2023 1:43 PM  [facility]  [phone]       GI ATTENDING BRIEF  ADDENDUM:  The patient was seen and examined.  Discussed with Yann Lombardo PA-C.  Agree with findings and plan as above with the following addendum.     66 yo F with h/o polycystic kidney disease s/p bilateral nephrectomy ESRD on IHD, chronic pain (on opiates) admitted after fall at dialysis and at home. CT with findings of ileus vs. SBO which had been seen on previous CTs on recent prior hospital admission as well. Noted 2 attempts at advancing diet led to nausea/vomiting but patient reports it was due to GERD and in general she has had some GI upset since nephrectomy in February.    She reports passing gas and having BMs regularly throughout which would argue against SBO. Currently asymptomatic without nausea, vomiting, abdominal pain and is hungry. Suspect ileus with slow resolution due to electrolyte abnormalities, immobility, opiate use.    Recommend gastrograffin small bowel follow through to further evaluate. Start clear liquid diet and slowly advancing to full liquid and then GI soft/low fiber diet. Minimize narcotics, encourage ambulation, correct electrolytes. Continue bowel regimen.    Will follow.    FOREST Martin  [facility]    Total visit time: 36 minutes

## 2023-07-18 NOTE — PROGRESS NOTES
Care Management Follow Up    Length of Stay (days): 5    Expected Discharge Date: 07/19/2023     Concerns to be Addressed: medical progression, diet advancement  Patient plan of care discussed at interdisciplinary rounds: Yes    Anticipated Discharge Disposition: Home Care, Dialysis Services     Anticipated Discharge Services: None  Anticipated Discharge DME: None    Patient/family educated on Medicare website which has current facility and service quality ratings: yes  Education Provided on the Discharge Plan: Yes (Assessment and service coordination process discussed.)  Patient/Family in Agreement with the Plan: unable to assess    Referrals Placed by CM/SW:  (Not yet indicated.)  Private pay costs discussed: Not applicable    Additional Information:  SW reviewed chart.  Patient plans to have a friend stay with her upon discharge from the hospital.  Pt declines home care services.  Pt is on a [facility] dialysis schedule, plan to resume in the community after hospitalization.      CM following care progression to assist as needed with discharge planning and follow up on team recommendations.        EMILIE Tirado

## 2023-07-18 NOTE — PROGRESS NOTES
RENAL PROGRESS NOTE    PLAN:  Dialysis today on TTS schedule, will Adjust EDW for outpt unit to based on post tx weight today.   No other renal related changes today.   Consider resuming Labetolol soon if needed and SBP>150 (HR in the 10's), await to see where BP settls our after UF with HD today.    ASSESSMENT:     ESKD - On chronic in-center hemodialysis. Dialyzes as outpatient at [facility] on TTS schedule (3 hour runs, JESSI AVF) under the care of Dr. Ramos. Underlying disease is polycystic kidney disease, s/p bilateral nephrectomy/anephric. issues with volume overload and hyperK outpt, ongoing education on the need to control both in diet    Hyperkalemia - Resolved with HD , on lower K bath and rec renal diet     Volume - ongoing hypervol issues, again educated on the need to reduce fluid intake, sliding scale s/p addtl UF runs while inpt, last 7/17, Patient is anephric, anuric.  As above, coached on FR      SBO - Admitted earlier this month with partial SBO and evidence of ongoing obstruction on CT this admit.      HTN - BP trending back up and may need to consider adding BB back in with mild tachy rate    Had been persistently hypotensive on HD recently and hypotensive with recent ED, had isolated low cortisol level on 7/14, but normal to high on repeat x 2, stim test normal, r/o AI BFP team (was she on steroid therapy last admit, and had brief glucocorticoid withdrawal??), regardless, she has rebounded,  PTA Lisinopril 20 BID, Amlod 5mg HS, Labetolol       Hypothyroidism - On replacement     Anxiety/depression - On sertraline, uses ativan     Chronic pain - Management per primary service      HLD - On statin     Anemia - Receives PETE and iron with dialysis. hgb 9-10s, Iron levels replete and received EPO 20K x 1 7/13/23.      Secondary renal hyperparathyroidism - on sevelamer for phos binders, Jakob corrects normal with low albumin    GERD: n/v last night, says r/t eating tomato soup and cranberry  juice, doesn't appear to be on H2B or PPI PTA, defer to GI to manage     Tobacco use      FTT - Patient reports doing poorly at home since her recent discharge; very weak and unable to care for herself. Today she was not open to the idea of TCU or LTC facility but is open to meeting with SW to discuss possible option of HHC. SW consult requested. PT/OT    SUBJECTIVE:  Had episodic n/v last night, reportedly failed attempts to pass NG, no n/v this a.m., pt states it was related to tomato soup and cranberry juice upsetting her GERD, she is no on prophylactic meds at this time. Defer  To GI team to discuss.  UF tx 7/17 went well, no HoTN, goal 2.5 UF today, BP ok, AI w/u negative.  ? If she was on steroids after last admit and brief glucocorticoid withdrawal as a result with isolated low cortisol level and HoTN on admit, seems to have recoverd.      OBJECTIVE:  Physical Exam   Temp: 97.7  F (36.5  C) Temp src: Oral BP: (!) 154/77 Pulse: 98   Resp: 18 SpO2: 100 % O2 Device: None (Room air)    Vitals:    07/16/23 0007 07/17/23 0602 07/18/23 0552   Weight: 38 kg (83 lb 12.8 oz) 38.8 kg (85 lb 8 oz) 36 kg (79 lb 6.4 oz)     Vital Signs with Ranges  Temp:  [97.4  F (36.3  C)-98.4  F (36.9  C)] 97.7  F (36.5  C)  Pulse:  [] 98  Resp:  [18-22] 18  BP: ()/(69-97) 154/77  SpO2:  [94 %-100 %] 100 %  I/O last 3 completed shifts:  In: 129 [P.O.:120; I.V.:9]  Out: 3500 [Emesis/NG output:1000; Other:2500]    Patient Vitals for the past 72 hrs:   Weight   07/18/23 0552 36 kg (79 lb 6.4 oz)   07/17/23 0602 38.8 kg (85 lb 8 oz)   07/16/23 0007 38 kg (83 lb 12.8 oz)       Intake/Output Summary (Last 24 hours) at 7/15/2023 1325  Last data filed at 7/15/2023 1220  Gross per 24 hour   Intake 360 ml   Output 2300 ml   Net -1940 ml       PHYSICAL EXAM:  GEN: NAD, frail, supine in bed, HD RN iin room, pt on tx, tolerating, supine in bed  CV:SBP and rate sl tachy 100's   Lung: sats good on RA   Ext: +  Pedal/shinedema  Skin: no  rash  Access : JESSI AVF working well.   Ongoing down drift in wt post UP, now 36 kg pre HD.  (Outpt EDW in the 39-40 range.       LABORATORY STUDIES:     Recent Labs   Lab 07/18/23  0452 07/17/23  0507 07/16/23  0655 07/15/23  0459 07/14/23  0503 07/13/23  1239   WBC 11.7* 12.8* 12.1* 11.9* 13.4* 13.0*   RBC 2.69* 3.11* 3.12* 2.85* 2.65* 2.51*   HGB 9.1* 10.4* 10.2* 9.5* 9.0* 8.4*   HCT 28.0* 32.8* 33.1* 29.4* 27.4* 25.8*    400 348 365 322 320       Basic Metabolic Panel:  Recent Labs   Lab 07/18/23  0601 07/18/23  0452 07/17/23 2126 07/17/23  1732 07/17/23  0955 07/17/23  0507 07/16/23  0744 07/16/23  0655 07/15/23  0814 07/15/23  0459 07/14/23  0841 07/14/23  0503 07/13/23  2146 07/13/23  1718   NA  --  131*  --   --   --  134*  --  133*  --  136  --  139  --  135*   POTASSIUM  --  4.3  --   --   --  4.0  --  3.8  --  3.7  --  3.7  --  4.1   CHLORIDE  --  96*  --   --   --  96*  --  97*  --  96*  --  99  --  96*   CO2  --  23  --   --   --  24  --  25  --  26  --  26  --  23   BUN  --  57*  --   --   --  48*  --  33*  --  55*  --  42*  --  68*   CR  --  5.05*  --   --   --  4.41*  --  3.35*  --  4.71*  --  3.56*  --  4.11*   * 58* 97 84 93 78   < > 79   < > 107   < > 90   < > 120   GAVIN  --  8.1*  --   --   --  8.5  --  8.5  --  8.0*  --  8.3*  --  8.3*    < > = values in this interval not displayed.       INR  Recent Labs   Lab 07/13/23  1239 07/11/23  1131   INR 1.70* 1.40*        Recent Labs   Lab Test 07/18/23  0452 07/17/23  0507 07/14/23  0503 07/13/23  1239 07/11/23  1131   INR  --   --   --  1.70* 1.40*   WBC 11.7* 12.8*   < > 13.0* 11.5*   HGB 9.1* 10.4*   < > 8.4* 9.0*    400   < > 320 288    < > = values in this interval not displayed.       Personally reviewed current labs    MINERVA Almonte-BC  [facility]  [address]  [place] [address]    Office:  [phone]  Fax:  [phone]

## 2023-07-18 NOTE — CONSULTS
GI CONSULT NOTE      Name: Serene Tipton    Medical Record #: [id number]    YOB: 1955    Date: 7/18/2023    CC: We were consulted by Chris Masters MD to see Serene Tipton in regards to unresolved SBP/ileus and failed diet advancement x2 in the past.     HPI: This is a 67 year old female with a history of PCKD s/p bilateral nephrectomy in 2/20/2023, ESRD on HD, active transplant evaluation, anemia, HTN, HLD, hypothyroidism, active tobacco use, chronic pain who presented after a fall and had difficulty getting up from the floor.  Noted to have hyperkalemia to 6.1 on admission now resolved.  Contrast CT scan 7/13/2023 was done for increasing abdominal pain and showed diffusely dilated small bowel but no transition point with findings representing ileus.  CT scan 7/5 had show similar findings as well as moderate volume of stool throughout the colon.  Rep her bowel habits changed after her nephrectomy earlier this year.  However, still has a bowel movement most days of the week with formation to it.  Reports 30 pound weight loss in the past year.  Currently on Dilaudid - three doses yesterday, one very early this morning and oxycodone, receiving 10mg this morning.  Also on fluid restriction.  Receiving Senna-S twice daily.  She is hungry today and would really like to eat.  She is passing gas.  Last bowel movement was 2 days ago.    Past medical history  Past Medical History:   Diagnosis Date     Anemia in chronic kidney disease      Anxiety      Arthritis      Brain aneurysm     Cavernous segment of the right & left carotid arteries. See Neurosurg note 6/16/21.     Chronic low back pain     Managed by Pain Clinic     Depressive disorder 2013     Disease of thyroid gland      ESRD (end stage renal disease) on dialysis (H) 07/2020     GERD (gastroesophageal reflux disease)      Hepatic lesion      Hyperlipidemia      Hypertension      Nephrolithiasis      Neuromuscular disorder (H)       Osteoporosis      PKD (polycystic kidney disease) 1990     PTSD (post-traumatic stress disorder)      Tobacco abuse      Vitamin D deficiency         Family history  Family History   Problem Relation Age of Onset     Polycystic Kidney Diease Mother      Hypertension Mother      Kidney Disease Mother      Cerebrovascular Disease Mother      Chronic Obstructive Pulmonary Disease Father      Multiple Sclerosis Father      Polycystic Kidney Diease Sister      Cardiac Sudden Death Sister 52     Hypertension Sister      Kidney Disease Sister      Polycystic Kidney Diease Maternal Grandmother      Hypertension Maternal Grandmother      Kidney Disease Maternal Grandmother      Cerebrovascular Disease Maternal Grandmother      Heart Disease Maternal Grandfather      Hyperlipidemia Paternal Grandmother      Cerebrovascular Disease Paternal Grandmother      Coronary Artery Disease Paternal Grandfather      Pulmonary Embolism Paternal Grandfather      Anesthesia Reaction No family hx of         Social history  Social History     Tobacco Use     Smoking status: Former     Packs/day: 0.50     Years: 14.00     Pack years: 7.00     Types: Cigarettes     Quit date: 2021     Years since quittin.0     Smokeless tobacco: Never   Substance Use Topics     Alcohol use: Not Currently     Drug use: Never       Medications:   Medications reviewed on outpatient list.  Notable for Venofer, Synthroid, Ativan, oxycodone, senna S.    Allergies:    Allergies   Allergen Reactions     Penicillins Other (See Comments) and Shortness Of Breath     Breathing problem.  breathing       Carvedilol GI Disturbance     Nausea and vomiting     Ciprofloxacin Muscle Pain (Myalgia)     Morphine Other (See Comments)     Vomiting     No Clinical Screening - See Comments      PN: LW CM1: Contrast Intercapsular - Nonionic Reaction :     Nsaids Other (See Comments)     Sulfa Antibiotics Itching     Sulfamethoxazole-Trimethoprim      Other  reaction(s): itching     Levofloxacin Muscle Pain (Myalgia) and Rash          REVIEW OF SYSTEMS (ROS): Review of systems is as per HPI.  Remainder of complete review of systems is negative.      PHYSICAL EXAMINATION:      /77   Pulse 112   Temp 97.7  F (36.5  C) (Oral)   Resp 18   Ht 1.524 m (5')   Wt 33.9 kg (74 lb 12.8 oz)   SpO2 100%   BMI 14.61 kg/m    GEN: Well developed, well nourished 67 year old female in no acute distress.  HEENT: sclera anicteric, moist mucous membranes, neck soft and supple.  LYMPH: No cervical lymphadenopathy  PULM: lungs clear to auscultation bilaterally.  CARDIO: Regular rate and rhythm  GI: Non-distended.  Bowel sounds positive.  Soft.  Non-tender to palpation.  No guarding.  EXT: warm, no lower extremity edema  NEURO: Alert and oriented.  Speech fluid.    PSYCH: Mental status appropriate, mood and affect normal.      LABS and IMAGING  Recent Labs   Lab 07/18/23  0452 07/17/23  0507 07/16/23  0655   WBC 11.7* 12.8* 12.1*   RBC 2.69* 3.11* 3.12*   HGB 9.1* 10.4* 10.2*   HCT 28.0* 32.8* 33.1*   * 106* 106*   MCH 33.8* 33.4* 32.7   MCHC 32.5 31.7 30.8*   RDW 15.0 15.0 14.8    400 348      Recent Labs   Lab 07/18/23  0452 07/17/23  0507 07/16/23  0655   * 134* 133*   CO2 23 24 25   BUN 57* 48* 33*     Recent Labs   Lab 07/13/23  1239   ALKPHOS 90   AST 31   ALT 28     Lab Results   Component Value Date    INR 1.70 (H) 07/13/2023    INR 1.40 (H) 07/11/2023    INR 1.02 05/19/2021     IMAGING  CT 7/15/23  IMPRESSION:   1.  Small bowel is diffusely dilated without a transition point to decompressed distal small bowel. Findings most likely represent an adynamic ileus.  2.  Third spacing of fluids with trace ascites and diffuse subcutaneous edema.    ASSESSMENT  This is a 67 year old female with a history of PCKD s/p bilateral nephrectomy in 2/20/2023, ESRD on HD, active transplant evaluation, anemia, HTN, HLD, hypothyroidism, active tobacco use, chronic pain  who presented after a fall. CT showing diffusely dilated small bowel.    Diffuse small bowel dilation.  Patient is not having obstructive symptoms such as vomiting, abdominal distention or abdominal pain.  Her risk factors for ileus include decreased mobility, chronic narcotic use, electrolyte abnormalities (which have significantly improved).  Currently passing gas and asking to advance diet; reporting that she is very hungry.     Patient Active Problem List   Diagnosis     Polycystic kidney     Hypertension     End stage kidney disease (H)     Depressive disorder     Chronic low back pain     Hyperlipidemia     Tobacco abuse     Vitamin D deficiency     Polycystic kidney disease     Postoperative anemia due to acute blood loss     Enlarged kidney as indication for native nephrectomy     Acute post-operative pain     Chronic, continuous use of opioids     Shortness of breath     S/p nephrectomy     Dialysis patient (H)     Pulmonary edema, noncardiac     Hyperkalemia     ESRD (end stage renal disease) on dialysis (H)     Constipation     Small bowel obstruction (H)     Hypoglycemia     Fall, initial encounter       PLAN  1.  Work on spending the least amount of time possible in hospital bed.    2.  Ambulate as much as safely possible.    3.  When in bed, frequently reposition self between right, left side.  4.  Depending on course, could consider Gastrografin upper GI study for second therapeutic purposes.    A total of 40 minutes was spent on today's care.  This included chart review, evaluation and examination of the patient, discussion with Dr. Nye, formulation of assessment and plan.    Thank you for asking to consult on this patient.    Yann Lombardo PA-C   7/18/2023 1:43 PM  [facility]  [phone]

## 2023-07-19 ENCOUNTER — APPOINTMENT (OUTPATIENT)
Dept: RADIOLOGY | Facility: CLINIC | Age: 68
DRG: 388 | End: 2023-07-19
Attending: PHYSICIAN ASSISTANT
Payer: COMMERCIAL

## 2023-07-19 VITALS
HEART RATE: 85 BPM | OXYGEN SATURATION: 100 % | SYSTOLIC BLOOD PRESSURE: 123 MMHG | RESPIRATION RATE: 18 BRPM | DIASTOLIC BLOOD PRESSURE: 68 MMHG | BODY MASS INDEX: 14.49 KG/M2 | TEMPERATURE: 98.1 F | HEIGHT: 60 IN | WEIGHT: 73.8 LBS

## 2023-07-19 LAB
ANION GAP SERPL CALCULATED.3IONS-SCNC: 10 MMOL/L (ref 5–18)
BUN SERPL-MCNC: 28 MG/DL (ref 8–22)
CALCIUM SERPL-MCNC: 8.4 MG/DL (ref 8.5–10.5)
CHLORIDE BLD-SCNC: 99 MMOL/L (ref 98–107)
CO2 SERPL-SCNC: 28 MMOL/L (ref 22–31)
CREAT SERPL-MCNC: 3.78 MG/DL (ref 0.6–1.1)
ERYTHROCYTE [DISTWIDTH] IN BLOOD BY AUTOMATED COUNT: 14.8 % (ref 10–15)
GFR SERPL CREATININE-BSD FRML MDRD: 13 ML/MIN/1.73M2
GLUCOSE BLD-MCNC: 86 MG/DL (ref 70–125)
GLUCOSE BLDC GLUCOMTR-MCNC: 131 MG/DL (ref 70–99)
GLUCOSE BLDC GLUCOMTR-MCNC: 64 MG/DL (ref 70–99)
GLUCOSE BLDC GLUCOMTR-MCNC: 74 MG/DL (ref 70–99)
HCT VFR BLD AUTO: 30.4 % (ref 35–47)
HGB BLD-MCNC: 9.6 G/DL (ref 11.7–15.7)
MCH RBC QN AUTO: 33.2 PG (ref 26.5–33)
MCHC RBC AUTO-ENTMCNC: 31.6 G/DL (ref 31.5–36.5)
MCV RBC AUTO: 105 FL (ref 78–100)
PLATELET # BLD AUTO: 323 10E3/UL (ref 150–450)
POTASSIUM BLD-SCNC: 3.6 MMOL/L (ref 3.5–5)
RBC # BLD AUTO: 2.89 10E6/UL (ref 3.8–5.2)
SODIUM SERPL-SCNC: 137 MMOL/L (ref 136–145)
WBC # BLD AUTO: 9.8 10E3/UL (ref 4–11)

## 2023-07-19 PROCEDURE — 250N000011 HC RX IP 250 OP 636: Mod: JZ

## 2023-07-19 PROCEDURE — 99238 HOSP IP/OBS DSCHRG MGMT 30/<: CPT | Mod: GC

## 2023-07-19 PROCEDURE — 74019 RADEX ABDOMEN 2 VIEWS: CPT

## 2023-07-19 PROCEDURE — 250N000013 HC RX MED GY IP 250 OP 250 PS 637: Performed by: FAMILY MEDICINE

## 2023-07-19 PROCEDURE — 80048 BASIC METABOLIC PNL TOTAL CA: CPT

## 2023-07-19 PROCEDURE — 250N000013 HC RX MED GY IP 250 OP 250 PS 637

## 2023-07-19 PROCEDURE — 36415 COLL VENOUS BLD VENIPUNCTURE: CPT

## 2023-07-19 PROCEDURE — 85027 COMPLETE CBC AUTOMATED: CPT

## 2023-07-19 PROCEDURE — 99232 SBSQ HOSP IP/OBS MODERATE 35: CPT | Performed by: NURSE PRACTITIONER

## 2023-07-19 PROCEDURE — 74018 RADEX ABDOMEN 1 VIEW: CPT

## 2023-07-19 RX ORDER — LABETALOL 100 MG/1
100 TABLET, FILM COATED ORAL AT BEDTIME
Qty: 30 TABLET | Refills: 0 | Status: ON HOLD | OUTPATIENT
Start: 2023-07-19 | End: 2023-07-26

## 2023-07-19 RX ORDER — LABETALOL 100 MG/1
100 TABLET, FILM COATED ORAL EVERY MORNING
Qty: 30 TABLET | Refills: 0 | Status: ON HOLD | OUTPATIENT
Start: 2023-07-19 | End: 2023-07-26

## 2023-07-19 RX ADMIN — LABETALOL HYDROCHLORIDE 300 MG: 100 TABLET, FILM COATED ORAL at 10:10

## 2023-07-19 RX ADMIN — HEPARIN SODIUM 5000 UNITS: 5000 INJECTION, SOLUTION INTRAVENOUS; SUBCUTANEOUS at 10:10

## 2023-07-19 RX ADMIN — B-COMPLEX W/ C & FOLIC ACID TAB 1 MG 1 TABLET: 1 TAB at 10:10

## 2023-07-19 RX ADMIN — SEVELAMER CARBONATE 1600 MG: 800 TABLET, FILM COATED ORAL at 10:13

## 2023-07-19 RX ADMIN — LEVOTHYROXINE SODIUM 50 MCG: 0.05 TABLET ORAL at 01:07

## 2023-07-19 RX ADMIN — LORAZEPAM 1 MG: 1 TABLET ORAL at 12:38

## 2023-07-19 RX ADMIN — SENNOSIDES AND DOCUSATE SODIUM 2 TABLET: 50; 8.6 TABLET ORAL at 10:10

## 2023-07-19 RX ADMIN — OXYCODONE HYDROCHLORIDE 10 MG: 5 TABLET ORAL at 06:49

## 2023-07-19 RX ADMIN — HYDROMORPHONE HYDROCHLORIDE 0.2 MG: 0.2 INJECTION, SOLUTION INTRAMUSCULAR; INTRAVENOUS; SUBCUTANEOUS at 02:05

## 2023-07-19 ASSESSMENT — ACTIVITIES OF DAILY LIVING (ADL)
ADLS_ACUITY_SCORE: 22
ADLS_ACUITY_SCORE: 23
ADLS_ACUITY_SCORE: 22

## 2023-07-19 NOTE — PROGRESS NOTES
Physical Therapy Discharge Summary    Reason for therapy discharge:    Discharged to home.    Progress towards therapy goal(s). See goals on Care Plan in [facility] electronic health record for goal details.  Goals partially met.  Barriers to achieving goals:   discharge from facility.    Therapy recommendation(s):    Pt declined home PT.

## 2023-07-19 NOTE — PLAN OF CARE
Patient A&O. Able to make needs known. Patient c/o pain with PRN given. Patient to have XR gastrografin tonight. Frequent bowel movements at the end of the shift. Tele showing sinus rhythm. VSS. Will continue to monitor.

## 2023-07-19 NOTE — PROGRESS NOTES
"GI Progress Note  Serene Tipton  -33     Subjective:   Reviewed SBFT from yesterday.  She had significant stool after the GG SBFT.  \"I'm starving\".  Really hoping on being able to go home today.   Says her 'insides' feel the best they have felt in quite a while now.      Objective:   /60 (BP Location: Right arm)   Pulse 91   Temp 97.6  F (36.4  C) (Oral)   Resp 18   Ht 1.524 m (5')   Wt 33.5 kg (73 lb 12.8 oz)   SpO2 97%   BMI 14.41 kg/m    Body mass index is 14.41 kg/m .   Gen: No acute distress  Cardio: RRR - mild murmur.  GI: Non-distended, BS positive, soft, non-tender. No guarding.    Laboratory  Recent Labs   Lab 07/19/23  0446 07/18/23  0452 07/17/23  0507   WBC 9.8 11.7* 12.8*   RBC 2.89* 2.69* 3.11*   HGB 9.6* 9.1* 10.4*   HCT 30.4* 28.0* 32.8*   * 104* 106*   MCH 33.2* 33.8* 33.4*   MCHC 31.6 32.5 31.7   RDW 14.8 15.0 15.0    333 400      Recent Labs   Lab 07/19/23  0446 07/18/23  0452 07/17/23  0507    131* 134*   CO2 28 23 24   BUN 28* 57* 48*     Recent Labs   Lab 07/13/23  1239   ALKPHOS 90   AST 31   ALT 28     Lab Results   Component Value Date    INR 1.70 (H) 07/13/2023    INR 1.40 (H) 07/11/2023    INR 1.02 05/19/2021     IMAGING  CT 7/15/23  IMPRESSION:   1.  Small bowel is diffusely dilated without a transition point to decompressed distal small bowel. Findings most likely represent an adynamic ileus.  2.  Third spacing of fluids with trace ascites and diffuse subcutaneous edema.    XR Gastrografin Challenge    Result Date: 7/19/2023  EXAM: XR GASTROGRAFIN CHALLENGE LOCATION: [facility] DATE: 7/19/2023 INDICATION: Small Bowel Obstruction COMPARISON: CT abdomen/pelvis 07/15/2023 TECHNIQUE: Routine water soluble contrast follow-through challenge. FINDINGS: FLUOROSCOPIC TIME: 0 minutes NUMBER OF IMAGES: 0 fluoroscopic images. One radiographic image Enteric contrast within the distal small bowel. Small amount of enteric contrast " throughout the colon. Persistent mildly dilated small bowel loops measuring up to 3.3 cm. Spinal fusion hardware at the lumbosacral junction. Surgical clips project over the abdomen.     IMPRESSION: Small amount of enteric contrast in the colon, excluding complete small bowel obstruction.     Assessment:   This is a 67 year old female with a history of PCKD s/p bilateral nephrectomy in 2/20/2023, ESRD on HD, active transplant evaluation, anemia, HTN, HLD, hypothyroidism, active tobacco use, chronic pain who presented after a fall. CT showing diffusely dilated small bowel.     Diffuse small bowel dilation.  GG SBFT yesterday neg for obstruction.  Pt with significant stools afterwards.  She could have some chronic GI dysmotility - decreased mobility, chronic narcotic use, fluid restriction, electrolyte abnormalities (which have significantly improved) are risk factors.  However, this should not exclude her from her potential renal transplant.   -Currently very hungry and looking forward to eating.     Patient Active Problem List   Diagnosis     Polycystic kidney     Hypertension     End stage kidney disease (H)     Depressive disorder     Chronic low back pain     Hyperlipidemia     Tobacco abuse     Vitamin D deficiency     Polycystic kidney disease     Postoperative anemia due to acute blood loss     Enlarged kidney as indication for native nephrectomy     Acute post-operative pain     Chronic, continuous use of opioids     Shortness of breath     S/p nephrectomy     Dialysis patient (H)     Pulmonary edema, noncardiac     Hyperkalemia     ESRD (end stage renal disease) on dialysis (H)     Constipation     Small bowel obstruction (H)     Hypoglycemia     Fall, initial encounter      Plan:   1) Advance to low fiber diet.   2) Check abd x-ray for follow-up of small bowel distention.  3) Encourage activity as tolerated.    4) Keep bowels moving - uses Senna (Miralax has not sat well with her in the past).    OK to  discharge from GI perspective.  Thank you for the opportunity to participate in her care.   Thank you.  Yann Lombardo PA-C  [facility]  [phone]

## 2023-07-19 NOTE — PROGRESS NOTES
Care Management Follow Up    Length of Stay (days): 6    Expected Discharge Date: 07/20/2023     Concerns to be Addressed: care coordination, discharge planning     Patient plan of care discussed at interdisciplinary rounds: No    Anticipated Discharge Disposition: Home, Resumption of OP Dialysis Services     Anticipated Discharge Services: None  Anticipated Discharge DME: None    Patient/family educated on Medicare website which has current facility and service quality ratings: yes  Education Provided on the Discharge Plan: Yes (Assessment and service coordination process discussed.)  Patient/Family in Agreement with the Plan: yes    Referrals Placed by CM/SW:  None, pt declines home care services  Private pay costs discussed: Not applicable    Additional Information:  Chart reviewed and plan of care reviewed with resident physician.  Possible discharge today.    Call placed to [facility] [place] [facility], [phone], and spoke with Maria Teresa.  She confirms pt received OP dialysis at their location.  She states they are set to resume dialysis services tomorrow at pt's usual time of 700 if she discharges today.  Updated labs and notes faxed to [facility] at  [phone].    Attempted to meet with pt to discuss discharge planning, but she was not in her room.  Pt has previously declined home PT and OT services.    2:21 PM  Spoke with resident physician and was updated on plan to discharge home today.      Met with pt to discuss discharge planning. Pt continues to decline Home PT and OT.   Provided update on resumption of OP dialysis tomorrow that was confirmed with Maria Teresa at the [facility] location.  Pt states she is excited to be going home and plans to call Maria Teresa herself since she knows her very well.  Pt's long-time friend will be staying with pt temporarily after discharge to provide assistance and same friend will be providing transport home.    Daysi Davis RN

## 2023-07-19 NOTE — PROGRESS NOTES
RENAL PROGRESS NOTE    PLAN:  Ok to discharge  No other renal related changes today.   Would hold all PTA antihypertensives except Labetolol, and we can trend the need to reintroduce outpt.  I have updated her outpt neph teams and dialysis unit of med changes and updated lower target wt.  She will be following with Dr Megan Armstrong moving forward.     ASSESSMENT:     ESKD - On chronic in-center hemodialysis. Dialyzes as outpatient at [facility] on TTS schedule (3 hour runs, JESSI AVF) under the care of Dr. Ramos. Underlying disease is polycystic kidney disease, s/p bilateral nephrectomy/anephric. issues with volume overload and hyperK outpt, ongoing education on the need to control both in diet    Hyperkalemia - Resolved with HD , on lower K bath and rec renal diet     Volume - ongoing hypervol issues, again educated on the need to reduce fluid intake, sliding scale s/p addtl UF runs while inpt, last 7/17, Patient is anephric, anuric.  As above, coached on FR      SBO - Admitted earlier this month with partial SBO and evidence of ongoing obstruction on CT this admit.      HTN - BP trending back up and may need to consider adding BB back in with mild tachy rate    Had been persistently hypotensive on HD recently and hypotensive with recent ED, had isolated low cortisol level on 7/14, but normal to high on repeat x 2, stim test normal, r/o AI BFP team (was she on steroid therapy last admit, and had brief glucocorticoid withdrawal??), regardless, she has rebounded,  PTA Lisinopril 20 BID, Amlod 5mg HS, Labetolol       Hypothyroidism - On replacement     Anxiety/depression - On sertraline, uses ativan     Chronic pain - Management per primary service      HLD - On statin     Anemia - Receives PETE and iron with dialysis. hgb 9-10s, Iron levels replete and received EPO 20K x 1 7/13/23.      Secondary renal hyperparathyroidism - on sevelamer for phos binders, Jakob corrects normal with low albumin    GERD: n/v  last night, says r/t eating tomato soup and cranberry juice, doesn't appear to be on H2B or PPI PTA, defer to GI to manage     Tobacco use      FTT - Patient reports doing poorly at home since her recent discharge; very weak and unable to care for herself. She is going home with family and friends who will help support her, sister coming to cook and has friends who will ck on her and bring meals (per pt report)     SUBJECTIVE:  Feeling much better, no further n/v. Tolerated HD and doing well at new lower target wt.  Called her [facility] unit about changes since hosp and lower EDW.  Also messaged her outpt neph MD and PA about pt plans to swap care to Dr Armstrong to streamline her care given that she only goes to  and they are  and do not service this site.      OBJECTIVE:  Physical Exam   Temp: 98.1  F (36.7  C) Temp src: Oral BP: 123/68 Pulse: 85   Resp: 18 SpO2: 100 % O2 Device: None (Room air)    Vitals:    07/18/23 0552 07/18/23 1315 07/19/23 0005   Weight: 36 kg (79 lb 6.4 oz) 33.9 kg (74 lb 12.8 oz) 33.5 kg (73 lb 12.8 oz)     Vital Signs with Ranges  Temp:  [97.6  F (36.4  C)-98.2  F (36.8  C)] 98.1  F (36.7  C)  Pulse:  [] 85  Resp:  [18] 18  BP: ()/(58-82) 123/68  SpO2:  [96 %-100 %] 100 %  I/O last 3 completed shifts:  In: 393 [P.O.:390; I.V.:3]  Out: 2000 [Other:2000]    Patient Vitals for the past 72 hrs:   Weight   07/19/23 0005 33.5 kg (73 lb 12.8 oz)   07/18/23 1315 33.9 kg (74 lb 12.8 oz)   07/18/23 0552 36 kg (79 lb 6.4 oz)   07/17/23 0602 38.8 kg (85 lb 8 oz)       Intake/Output Summary (Last 24 hours) at 7/15/2023 1325  Last data filed at 7/15/2023 1220  Gross per 24 hour   Intake 360 ml   Output 2300 ml   Net -1940 ml       PHYSICAL EXAM:  GEN: NAD, frail,  CV:SBP and rate controlld   Lung: sats good on RA   Ext: - Pedal/shinedema  Skin: no rash  Access : JESSI AVF dressed   Ongoing down drift in wt post UP, now 34 (sig less than outpt 39kg)       LABORATORY STUDIES:     Recent Labs   Lab  07/19/23  0446 07/18/23  0452 07/17/23  0507 07/16/23  0655 07/15/23  0459 07/14/23  0503   WBC 9.8 11.7* 12.8* 12.1* 11.9* 13.4*   RBC 2.89* 2.69* 3.11* 3.12* 2.85* 2.65*   HGB 9.6* 9.1* 10.4* 10.2* 9.5* 9.0*   HCT 30.4* 28.0* 32.8* 33.1* 29.4* 27.4*    333 400 348 365 322       Basic Metabolic Panel:  Recent Labs   Lab 07/19/23  1003 07/19/23  0945 07/19/23  0446 07/19/23  0003 07/18/23  2254 07/18/23 2004 07/18/23  0601 07/18/23  0452 07/17/23  0955 07/17/23  0507 07/16/23  0744 07/16/23  0655 07/15/23  0814 07/15/23  0459 07/14/23  0841 07/14/23  0503   NA  --   --  137  --   --   --   --  131*  --  134*  --  133*  --  136  --  139   POTASSIUM  --   --  3.6  --   --   --   --  4.3  --  4.0  --  3.8  --  3.7  --  3.7   CHLORIDE  --   --  99  --   --   --   --  96*  --  96*  --  97*  --  96*  --  99   CO2  --   --  28  --   --   --   --  23  --  24  --  25  --  26  --  26   BUN  --   --  28*  --   --   --   --  57*  --  48*  --  33*  --  55*  --  42*   CR  --   --  3.78*  --   --   --   --  5.05*  --  4.41*  --  3.35*  --  4.71*  --  3.56*   GLC 74 64* 86 131* 69* 76   < > 58*   < > 78   < > 79   < > 107   < > 90   GAVIN  --   --  8.4*  --   --   --   --  8.1*  --  8.5  --  8.5  --  8.0*  --  8.3*    < > = values in this interval not displayed.       INR  Recent Labs   Lab 07/13/23  1239   INR 1.70*        Recent Labs   Lab Test 07/19/23  0446 07/18/23  0452 07/14/23  0503 07/13/23  1239 07/11/23  1131   INR  --   --   --  1.70* 1.40*   WBC 9.8 11.7*   < > 13.0* 11.5*   HGB 9.6* 9.1*   < > 8.4* 9.0*    333   < > 320 288    < > = values in this interval not displayed.       Personally reviewed current labs    PAMELA Almonte  [facility]  [address]  [place] [address]    Office:  [phone]  Fax:  [phone]

## 2023-07-19 NOTE — PROGRESS NOTES
Occupational Therapy Discharge Summary    Reason for therapy discharge:    Discharged to home with home therapy.    Progress towards therapy goal(s). See goals on Care Plan in [facility] electronic health record for goal details.  Discharge from facility.     Therapy recommendation(s):    Continued therapy is recommended.  Rationale/Recommendations:  Ensure safety at home.

## 2023-07-19 NOTE — DISCHARGE SUMMARY
[facility]  Discharge Summary - Medicine & Pediatrics       Date of Admission:  7/13/2023  Date of Discharge:  7/19/2023  Discharging Provider: Estrellita Quiroz MD  Discharge Service: Hospitalist Service    Discharge Diagnoses   Adynamic ileus  Small bowel obstruction  Constipation  Nausea   Abdominal pain    ESRD on HD  Hyponatremia  Hyperkalemia    Hypoglycemia  Poor appetite  Failure to thrive     Hypotension  Hx of hypertension    Small bilateral pleural effusions  LE edema    Non-specific ST changes  Prolonged QT    Chronic Conditions:  Hypothyroidism   Anxiety/depression  Chronic pain  Hyperlipidemia  Anemia  Tobacco use    Clinically Significant Risk Factors     # Cachexia: Estimated body mass index is 14.41 kg/m  as calculated from the following:    Height as of this encounter: 1.524 m (5').    Weight as of this encounter: 33.5 kg (73 lb 12.8 oz).  # Severe Malnutrition: based on nutrition assessment      Follow-ups Needed After Discharge   Follow-up Appointments     Follow-up and recommended labs and tests       Follow up with primary care provider, Ramos Lowry, within 7 days for   hospital follow- up.  The following labs/tests are recommended: BP check   with medication titration.          Unresulted Labs Ordered in the Past 30 Days of this Admission     No orders found from 6/13/2023 to 7/14/2023.        Discharge Disposition   Discharged to home based on pt's preference   Condition at discharge: Stable    Hospital Course   Serene Tipton was admitted on 7/13/2023, after presenting with abdominal pain, recent fall history, and found to be hyperkalemic 2/2 having ESRD & small bowel obstruction. The following problems were addressed during her hospitalization:    Adynamic ileus  Small bowel obstruction  Constipation  Nausea   Abdominal pain  Prior to this admission, pt found to have small bowel obstruction attributed to constipation with MiraLAX bowel regimen providing  some relief. Abdominal CT on admission consistent with SBO. Pt placed on bowel rest with NPO and diet slowly reinitiated on two separate occasions due to episodes of emesis and increased abdominal pain. Later imaging revealed a likely adynamic ileus, 3rd spacing of fluids w/ trace ascites and diffuse subcutaneous edema. GI consult assisted in performing a XR gastrograffin challenge, finding no complete small bowel obstruction. Prior to discharge, another abdominal XR showed no abnormally dilated loops of bowel and minimal stool in the colon. It was recommended that pt continue to increase physical movement, as tolerated, bowel regimen to reduce constipation, and decrease reliance on opioid narcotics due to their slowing effect of the gut. At discharge, pt maintaining oral liquids. Discharged with home oxycodone regimen.     ESRD on HD  Hyponatremia  Hyperkalemia  On 7/11/2023, pt experienced a hypotensive episode and fall during dialysis, ending her HD session early. No ECG changes specific for hyperkalemia. Cr returned to baseline range of 3-5 and electrolytes stabilized. Nephrology followed pt during stay and she received dialysis, including additional sessions.     Hypoglycemia  Poor appetite  Failure to thrive   Pt received dextrose from EMS due to hypoglycemia. She has a history of weight loss, fatigue, poor appetite while living along in her apartment. She had a significant decrease in cortisol at 0.2, that raised concern for adrenal insufficiency. Normal ACTH stimulation test ruled out primary adrenal insufficiency. Nutrition services recommended meal supplements to boost calories and nutrients.     Hypotension  Hx of hypertension  Normal saline 500 mL bolus provided to pt due to low blood pressure, 87/60. Pt arrived with multiple anti-hypertensive medications in the setting of increased hypotensive episodes, including dialysis and recent mechanical falls. As noted above, a normal ACTH stimulation resulted  with a multifactorial influence from chronic illness and present GI problems possibly leading to a transient adrenal suppression. Pt restarted on labetalol 100 mg twice daily and other anti-hypertensives (amlodipine, clonidine, lisinopril) held. Midodrine maintained during dialysis per nephrology's recommendation.     Small bilateral pleural effusions  LE edema  These issues likely secondary to fluid overload status due to need for dialysis. LE edema improved after dialysis sessions. IVF and PO fluids limited/restricted. Pt will resume regular dialysis schedule on Thursday, 7/20.    Non-specific ST changes  Prolonged QT  Mild non-specific ST-depressions that previously appeared on EKGs without chest pain. QTc increased to 522. Avoided QT prolonging medications for pt.     Chronic Conditions:  Hypothyroidism   Anxiety/depression  Chronic pain  Hyperlipidemia  Anemia  Tobacco use  Resumed on home medications on discharge.    Consultations This Hospital Stay   CARE MANAGEMENT / SOCIAL WORK IP CONSULT  PHYSICAL THERAPY ADULT IP CONSULT  OCCUPATIONAL THERAPY ADULT IP CONSULT  NEPHROLOGY IP CONSULT  NUTRITION SERVICES ADULT IP CONSULT  GASTROENTEROLOGY IP CONSULT    Code Status   Full Code       The patient was discussed with attending physician, Estrellita Quiroz.    NURYS BANSAL MD, [facility]  [address] [place] [id number]  Phone: [phone]  Fax: [phone]  ______________________________________________________________________    Physical Exam   Vital Signs: Temp: 98.1  F (36.7  C) Temp src: Oral BP: 123/68 Pulse: 85   Resp: 18 SpO2: 100 % O2 Device: None (Room air)    Weight: 73 lbs 12.8 oz  Constitutional: Awake, alert, cooperative, no apparent distress, and appears stated age  Eyes: Lids and lashes normal, extra ocular muscles intact, sclera clear, conjunctiva normal  Respiratory: No increased work of breathing, good air  exchange, clear to auscultation bilaterally, no crackles or wheezing  Cardiovascular: Regular rate and rhythm and no murmur noted  GI: Normal bowel sounds, soft, non-distended, non-tender, no masses palpated, no hepatosplenomegally  Skin: Grey skin color, dull and dry  Musculoskeletal: No lower extremity pitting edema present  Neurologic: Awake, alert, oriented to name, place and time.       Primary Care Physician   Ramos Lowry    Discharge Orders      Reason for your hospital stay    Small bowel obstruction     Follow-up and recommended labs and tests     Follow up with primary care provider, Ramos Lowry, within 7 days for hospital follow- up.  The following labs/tests are recommended: BP check with medication titration.     Activity    Your activity upon discharge: activity as tolerated     Bath Seat/Shower Chair Order    DME Documentation:   Describe the reason for need to support medical necessity: ensure safety with ADL.     I, the undersigned, certify that the above prescribed supplies are medically necessary for this patient and is both reasonable and necessary in reference to accepted standards of medical and necessary in reference to accepted standards of medical practice in the treatment of this patient's condition and is not prescribed as a convenience.     Safety Bar Order    Safety Bar Documentation:   Describe the reason for need: Ensure safety for ADL.     I, the undersigned, certify that the above prescribed supplies are medically necessary for this patient and is both reasonable and necessary in reference to accepted standards of medical and necessary in reference to accepted standards of medical practice in the treatment of this patient's condition and is not prescribed as a convenience.     Diet    Follow this diet upon discharge: Orders Placed This Encounter      Fluid restriction 1200 ML FLUID      Snacks/Supplements Adult: Nepro Oral Supplement; Between Meals      Low Fiber Diet        Significant Results and Procedures   Most Recent 3 CBC's:Recent Labs   Lab Test 07/19/23  0446 07/18/23  0452 07/17/23  0507   WBC 9.8 11.7* 12.8*   HGB 9.6* 9.1* 10.4*   * 104* 106*    333 400     Most Recent 3 BMP's:Recent Labs   Lab Test 07/19/23  1003 07/19/23  0945 07/19/23  0446 07/18/23  0601 07/18/23  0452 07/17/23  0955 07/17/23  0507   NA  --   --  137  --  131*  --  134*   POTASSIUM  --   --  3.6  --  4.3  --  4.0   CHLORIDE  --   --  99  --  96*  --  96*   CO2  --   --  28  --  23  --  24   BUN  --   --  28*  --  57*  --  48*   CR  --   --  3.78*  --  5.05*  --  4.41*   ANIONGAP  --   --  10  --  12  --  14   GAVIN  --   --  8.4*  --  8.1*  --  8.5   GLC 74 64* 86   < > 58*   < > 78    < > = values in this interval not displayed.   ,   Results for orders placed or performed during the hospital encounter of 07/13/23   Abdomen XR, 2 vw, flat and upright    Narrative    EXAM: XR CHEST 2 VIEWS, XR ABDOMEN 2 VIEWS  LOCATION: [facility]  DATE: 7/13/2023    INDICATION: Chest pain, abdominal pain  COMPARISON: 07/05/2023      Impression    IMPRESSION:     Chest:  No focal consolidation. Bilateral small pleural effusion.    ABDOMEN:  Multiple air-fluid levels are seen throughout the abdomen, predominantly in the left hemiabdomen. Differential includes small bowel obstruction. Further evaluation with CT is recommended.   Chest XR,  PA & LAT    Narrative    EXAM: XR CHEST 2 VIEWS, XR ABDOMEN 2 VIEWS  LOCATION: [facility]  DATE: 7/13/2023    INDICATION: Chest pain, abdominal pain  COMPARISON: 07/05/2023      Impression    IMPRESSION:     Chest:  No focal consolidation. Bilateral small pleural effusion.    ABDOMEN:  Multiple air-fluid levels are seen throughout the abdomen, predominantly in the left hemiabdomen. Differential includes small bowel obstruction. Further evaluation with CT is recommended.   Head CT w/o contrast    Narrative    EXAM: CT  HEAD W/O CONTRAST  LOCATION: [facility]  DATE: 7/13/2023    INDICATION: Fall, head injury.  COMPARISON: None.  TECHNIQUE: Routine CT Head without IV contrast. Multiplanar reformats. Dose reduction techniques were used.    FINDINGS:  No evidence of hemorrhage, mass, or hydrocephalus. Volume loss and patchy/confluent white matter hypoattenuation which likely represents advanced chronic small vessel ischemic change. Multiple presumed chronic basal ganglia lacunar infarcts, similar   compared to the prior. No acute osseous abnormality.      Impression    IMPRESSION: No acute intracranial abnormality.   CT Abdomen Pelvis w/o Contrast    Narrative    EXAM: CT ABDOMEN PELVIS W/O CONTRAST  LOCATION: [facility]  DATE: 7/13/2023    INDICATION: concern for SBO on CT  COMPARISON: 07/11/2023  TECHNIQUE: CT scan of the abdomen and pelvis was performed without IV contrast. Multiplanar reformats were obtained. Dose reduction techniques were used.  CONTRAST: None.    FINDINGS: Evaluation is limited due to lack of intravenous contrast and generalized decreased contrast resolution due to diffuse body wall edema.    LOWER CHEST: Redemonstration of moderate cardiomegaly with trace bilateral pleural effusions. Mild atelectasis again seen in the lung bases. No confluent consolidations. Cardiac blood pool is again decreased.    HEPATOBILIARY: Multiple cysts again seen within the liver in keeping with disease spectrum of polycystic kidney disease. Gallbladder is normal.    PANCREAS: Normal.    SPLEEN: Normal.    ADRENAL GLANDS: Normal.    KIDNEYS/BLADDER: Bilateral kidneys appear removed. Urinary bladder is decompressed.    BOWEL: Moderate fluid distention of multiple small bowel loops in the abdomen has increased from the prior study. The most distal small bowel is decompressed. Small amount of stool is seen in the colon which is mostly decompressed as well. A transition   point is  difficult to locate due to decreased contrast resolution but may be located in the midabdomen on series 3 image 42. Trace free fluid in the abdomen. No free air.    LYMPH NODES: Normal.    VASCULATURE: The iliac atherosclerotic calcifications with mild ectasia of the infrarenal abdominal aorta measuring 2.4 cm, unchanged. No abdominal aortic aneurysm.    PELVIC ORGANS: Uterus is not seen, likely removed.    MUSCULOSKELETAL: Status post anterior fusion documentation at L5/S1 with intervertebral disc spacer. Mild anterolisthesis of L5 on S1 is unchanged. No suspicious osseous lesions or acute fractures.        Impression    IMPRESSION:     1.  Increased fluid distention of small bowel loops with decompressed distal small bowel, compatible with small bowel obstruction. Transition point is difficult to pinpoint due to lack of intravenous contrast and generalized decreased contrast   resolution, but may be within the mid abdominal mesentery and likely secondary to adhesion.    2.  Moderate cardiomegaly with trace bilateral pleural effusion, small volume ascites and generalized body wall edema.    3.  Anemia.     CT Abdomen Pelvis w/o Contrast    Narrative    EXAM: CT ABDOMEN AND PELVIS WITHOUT CONTRAST  LOCATION: [facility]  DATE: 07/15/2023    INDICATION: Small bowel obstruction, increasing pain.  COMPARISON: 07/13/2023.  TECHNIQUE: CT scan of the abdomen and pelvis was performed without IV contrast. Multiplanar reformats were obtained. Dose reduction techniques were used.  CONTRAST: None.    FINDINGS:   LOWER CHEST: Mild infiltrate or atelectasis in both lung bases, similar to previous.    HEPATOBILIARY: Multiple hepatic cysts. Stable mild bile duct dilation.    PANCREAS: Normal.    SPLEEN: Normal.    ADRENAL GLANDS: Normal.    KIDNEYS/BLADDER: Bilateral nephrectomies.    BOWEL: Fluid-filled mildly dilated loops of small bowel again seen without decompressed distal loops. The colon is  decompressed.    LYMPH NODES: Normal.    VASCULATURE: Diffuse atherosclerotic calcification.    PELVIC ORGANS: Normal.    MUSCULOSKELETAL: Diffuse soft tissue edema. Trace ascites.      Impression    IMPRESSION:   1.  Small bowel is diffusely dilated without a transition point to decompressed distal small bowel. Findings most likely represent an adynamic ileus.  2.  Third spacing of fluids with trace ascites and diffuse subcutaneous edema.     XR Gastrografin Challenge    Narrative    EXAM: XR GASTROGRAFIN CHALLENGE  LOCATION: [facility]  DATE: 7/19/2023    INDICATION: Small Bowel Obstruction  COMPARISON: CT abdomen/pelvis 07/15/2023  TECHNIQUE: Routine water soluble contrast follow-through challenge.    FINDINGS:  FLUOROSCOPIC TIME: 0 minutes  NUMBER OF IMAGES: 0 fluoroscopic images. One radiographic image    Enteric contrast within the distal small bowel. Small amount of enteric contrast throughout the colon. Persistent mildly dilated small bowel loops measuring up to 3.3 cm.    Spinal fusion hardware at the lumbosacral junction. Surgical clips project over the abdomen.      Impression    IMPRESSION:    Small amount of enteric contrast in the colon, excluding complete small bowel obstruction.   XR Abdomen 2 Views    Narrative    EXAM: XR ABDOMEN 2 VIEWS  LOCATION: [facility]  DATE: 7/19/2023    INDICATION: small bowel dilation   please assess for any improvement change since yesterday. Thank you  COMPARISON: Gastrografin challenge 07/19/2023, CT 07/15/2023      Impression    IMPRESSION: Lung bases are clear.    Contrast has passed into the rectosigmoid. No abnormally dilated loops of bowel. Minimal stool in the colon. Postsurgical changes from prior lumbosacral fusion.       Discharge Medications   Discharge Medication List as of 7/19/2023  3:21 PM      CONTINUE these medications which have CHANGED    Details   !! labetalol (NORMODYNE) 100 MG tablet Take 1 tablet  (100 mg) by mouth At Bedtime, Disp-30 tablet, R-0, E-Prescribe      !! labetalol (NORMODYNE) 100 MG tablet Take 1 tablet (100 mg) by mouth every morning, Disp-30 tablet, R-0, E-Prescribe       !! - Potential duplicate medications found. Please discuss with provider.      CONTINUE these medications which have NOT CHANGED    Details   B Complex-C (SUPER B COMPLEX PO) Take 1 tablet by mouth At Bedtime, Historical      calcitRIOL (ROCALTROL) 0.5 MCG capsule Take 0.5 mcg by mouth three times a week Given at HD. Tues, Thurs, Sat, Historical      cholecalciferol (VITAMIN D3) 25 mcg (1000 units) capsule Take 1 capsule by mouth At Bedtime, Historical      Epoetin Adam (EPOGEN IJ) Inject 1,000 Units into the vein three times a week At dialysis., Historical      Iron Sucrose (VENOFER IV) Inject 100 mg into the vein once a week At dialysis, Historical      levothyroxine (SYNTHROID/LEVOTHROID) 50 MCG tablet Take 50 mcg by mouth At Bedtime Takes in the middle of the night., Historical      LORazepam (ATIVAN) 1 MG tablet Take 1 mg by mouth daily (before lunch), Historical      nortriptyline (PAMELOR) 75 MG capsule Take 75 mg by mouth At Bedtime, Historical      oxyCODONE (ROXICODONE) 10 MG tablet Use oxycodone 10mg every 3 hours as needed for pain on 2/6 & 2/7. Then return to usual home dose., R-0, No Print Out      rosuvastatin (CRESTOR) 10 MG tablet Take 10 mg by mouth At Bedtime, Historical      senna-docusate (SENOKOT-S/PERICOLACE) 8.6-50 MG tablet Take 2 tablets by mouth 2 times daily, Historical      sertraline (ZOLOFT) 100 MG tablet Take 150 mg by mouth At Bedtime, Historical      sevelamer carbonate (RENVELA) 800 MG tablet Take 800-2,400 mg by mouth 3 times daily When eating; range of 1-3 tabs depending on meal size, Historical         STOP taking these medications       amLODIPine (NORVASC) 5 MG tablet Comments:   Reason for Stopping:         lisinopril (ZESTRIL) 40 MG tablet Comments:   Reason for Stopping:              Allergies   Allergies   Allergen Reactions     Penicillins Other (See Comments) and Shortness Of Breath     Breathing problem.  breathing       Carvedilol GI Disturbance     Nausea and vomiting     Ciprofloxacin Muscle Pain (Myalgia)     Morphine Other (See Comments)     Vomiting     No Clinical Screening - See Comments      PN: LW CM1: Contrast Intercapsular - Nonionic Reaction :     Nsaids Other (See Comments)     Sulfa Antibiotics Itching     Sulfamethoxazole-Trimethoprim      Other reaction(s): itching     Levofloxacin Muscle Pain (Myalgia) and Rash

## 2023-07-19 NOTE — PLAN OF CARE
Problem: Chronic Kidney Disease  Goal: Fluid Balance  Outcome: Met     Problem: Chronic Kidney Disease  Goal: Optimal Coping with Chronic Illness  Outcome: Met     Problem: Pain Acute  Goal: Optimal Pain Control and Function  Outcome: Met   Goal Outcome Evaluation:      Plan of Care Reviewed With: patient    Overall Patient Progress: improvingOverall Patient Progress: improving    Outcome Evaluation: Stable for discharge home by private car.

## 2023-07-20 ENCOUNTER — TRANSFERRED RECORDS (OUTPATIENT)
Dept: HEALTH INFORMATION MANAGEMENT | Facility: CLINIC | Age: 68
End: 2023-07-20
Payer: COMMERCIAL

## 2023-07-20 ENCOUNTER — TEAM CONFERENCE (OUTPATIENT)
Dept: TRANSPLANT | Facility: CLINIC | Age: 68
End: 2023-07-20
Payer: COMMERCIAL

## 2023-07-20 NOTE — TELEPHONE ENCOUNTER
Image Review Meeting    ATTENDEES: Dr. Perez     IMAGES REVIEWED: CT A/P and iliacs from 07/12/2023 in PACS    DECISION: Vessels acceptable for transplant.  Per provider, would go left side only with kidney.     INCIDENTALS: No

## 2023-07-21 ENCOUNTER — APPOINTMENT (OUTPATIENT)
Dept: CT IMAGING | Facility: CLINIC | Age: 68
DRG: 388 | End: 2023-07-21
Attending: EMERGENCY MEDICINE
Payer: COMMERCIAL

## 2023-07-21 ENCOUNTER — HOSPITAL ENCOUNTER (INPATIENT)
Facility: CLINIC | Age: 68
LOS: 5 days | Discharge: NURSING, REHAB OR LONG-TERM CARE | DRG: 388 | End: 2023-07-26
Attending: EMERGENCY MEDICINE | Admitting: FAMILY MEDICINE
Payer: COMMERCIAL

## 2023-07-21 DIAGNOSIS — M54.40 CHRONIC BILATERAL LOW BACK PAIN WITH SCIATICA, SCIATICA LATERALITY UNSPECIFIED: ICD-10-CM

## 2023-07-21 DIAGNOSIS — G89.29 CHRONIC BILATERAL LOW BACK PAIN WITH SCIATICA, SCIATICA LATERALITY UNSPECIFIED: ICD-10-CM

## 2023-07-21 DIAGNOSIS — K59.00 CONSTIPATION, UNSPECIFIED CONSTIPATION TYPE: Primary | ICD-10-CM

## 2023-07-21 DIAGNOSIS — I15.0 RENOVASCULAR HYPERTENSION: ICD-10-CM

## 2023-07-21 DIAGNOSIS — R10.84 DIFFUSE ABDOMINAL PAIN: ICD-10-CM

## 2023-07-21 DIAGNOSIS — N18.9 CHRONIC RENAL FAILURE, UNSPECIFIED CKD STAGE: ICD-10-CM

## 2023-07-21 DIAGNOSIS — E83.42 HYPOMAGNESEMIA: ICD-10-CM

## 2023-07-21 DIAGNOSIS — F41.9 ANXIETY: ICD-10-CM

## 2023-07-21 LAB
ALBUMIN SERPL-MCNC: 2.4 G/DL (ref 3.5–5)
ALP SERPL-CCNC: 151 U/L (ref 45–120)
ALT SERPL W P-5'-P-CCNC: 40 U/L (ref 0–45)
ANION GAP SERPL CALCULATED.3IONS-SCNC: 13 MMOL/L (ref 5–18)
AST SERPL W P-5'-P-CCNC: 38 U/L (ref 0–40)
ATRIAL RATE - MUSE: 102 BPM
BASOPHILS # BLD AUTO: 0 10E3/UL (ref 0–0.2)
BASOPHILS NFR BLD AUTO: 0 %
BILIRUB DIRECT SERPL-MCNC: 0.2 MG/DL
BILIRUB SERPL-MCNC: 0.7 MG/DL (ref 0–1)
BUN SERPL-MCNC: 24 MG/DL (ref 8–22)
CALCIUM SERPL-MCNC: 9.1 MG/DL (ref 8.5–10.5)
CHLORIDE BLD-SCNC: 94 MMOL/L (ref 98–107)
CO2 SERPL-SCNC: 27 MMOL/L (ref 22–31)
CREAT SERPL-MCNC: 3.82 MG/DL (ref 0.6–1.1)
DIASTOLIC BLOOD PRESSURE - MUSE: 64 MMHG
EOSINOPHIL # BLD AUTO: 0 10E3/UL (ref 0–0.7)
EOSINOPHIL NFR BLD AUTO: 0 %
ERYTHROCYTE [DISTWIDTH] IN BLOOD BY AUTOMATED COUNT: 16.4 % (ref 10–15)
GFR SERPL CREATININE-BSD FRML MDRD: 12 ML/MIN/1.73M2
GLUCOSE BLD-MCNC: 77 MG/DL (ref 70–125)
HCT VFR BLD AUTO: 32.4 % (ref 35–47)
HGB BLD-MCNC: 10.3 G/DL (ref 11.7–15.7)
HOLD SPECIMEN: NORMAL
IMM GRANULOCYTES # BLD: 0.1 10E3/UL
IMM GRANULOCYTES NFR BLD: 1 %
INTERPRETATION ECG - MUSE: NORMAL
LACTATE SERPL-SCNC: 1.2 MMOL/L (ref 0.7–2)
LIPASE SERPL-CCNC: 11 U/L (ref 0–52)
LYMPHOCYTES # BLD AUTO: 1 10E3/UL (ref 0.8–5.3)
LYMPHOCYTES NFR BLD AUTO: 5 %
MAGNESIUM SERPL-MCNC: 1.6 MG/DL (ref 1.8–2.6)
MCH RBC QN AUTO: 33.4 PG (ref 26.5–33)
MCHC RBC AUTO-ENTMCNC: 31.8 G/DL (ref 31.5–36.5)
MCV RBC AUTO: 105 FL (ref 78–100)
MONOCYTES # BLD AUTO: 0.9 10E3/UL (ref 0–1.3)
MONOCYTES NFR BLD AUTO: 5 %
NEUTROPHILS # BLD AUTO: 16.9 10E3/UL (ref 1.6–8.3)
NEUTROPHILS NFR BLD AUTO: 89 %
NRBC # BLD AUTO: 0 10E3/UL
NRBC BLD AUTO-RTO: 0 /100
P AXIS - MUSE: 73 DEGREES
PLATELET # BLD AUTO: 343 10E3/UL (ref 150–450)
POTASSIUM BLD-SCNC: 4.2 MMOL/L (ref 3.5–5)
PR INTERVAL - MUSE: 170 MS
PROT SERPL-MCNC: 5.1 G/DL (ref 6–8)
QRS DURATION - MUSE: 80 MS
QT - MUSE: 358 MS
QTC - MUSE: 466 MS
R AXIS - MUSE: 48 DEGREES
RBC # BLD AUTO: 3.08 10E6/UL (ref 3.8–5.2)
SODIUM SERPL-SCNC: 134 MMOL/L (ref 136–145)
SYSTOLIC BLOOD PRESSURE - MUSE: 123 MMHG
T AXIS - MUSE: 78 DEGREES
TROPONIN I SERPL-MCNC: 0.03 NG/ML (ref 0–0.29)
VENTRICULAR RATE- MUSE: 102 BPM
WBC # BLD AUTO: 18.9 10E3/UL (ref 4–11)

## 2023-07-21 PROCEDURE — 83605 ASSAY OF LACTIC ACID: CPT

## 2023-07-21 PROCEDURE — 80053 COMPREHEN METABOLIC PANEL: CPT | Performed by: EMERGENCY MEDICINE

## 2023-07-21 PROCEDURE — 85004 AUTOMATED DIFF WBC COUNT: CPT | Performed by: EMERGENCY MEDICINE

## 2023-07-21 PROCEDURE — 93005 ELECTROCARDIOGRAM TRACING: CPT | Performed by: EMERGENCY MEDICINE

## 2023-07-21 PROCEDURE — 36415 COLL VENOUS BLD VENIPUNCTURE: CPT | Performed by: EMERGENCY MEDICINE

## 2023-07-21 PROCEDURE — 36415 COLL VENOUS BLD VENIPUNCTURE: CPT

## 2023-07-21 PROCEDURE — 84484 ASSAY OF TROPONIN QUANT: CPT | Performed by: EMERGENCY MEDICINE

## 2023-07-21 PROCEDURE — 250N000011 HC RX IP 250 OP 636: Mod: JZ | Performed by: EMERGENCY MEDICINE

## 2023-07-21 PROCEDURE — 96361 HYDRATE IV INFUSION ADD-ON: CPT

## 2023-07-21 PROCEDURE — 82248 BILIRUBIN DIRECT: CPT | Performed by: EMERGENCY MEDICINE

## 2023-07-21 PROCEDURE — 258N000003 HC RX IP 258 OP 636: Performed by: EMERGENCY MEDICINE

## 2023-07-21 PROCEDURE — 87040 BLOOD CULTURE FOR BACTERIA: CPT

## 2023-07-21 PROCEDURE — 74176 CT ABD & PELVIS W/O CONTRAST: CPT

## 2023-07-21 PROCEDURE — 250N000011 HC RX IP 250 OP 636

## 2023-07-21 PROCEDURE — 99222 1ST HOSP IP/OBS MODERATE 55: CPT | Mod: 25 | Performed by: PHYSICIAN ASSISTANT

## 2023-07-21 PROCEDURE — 99285 EMERGENCY DEPT VISIT HI MDM: CPT | Mod: 25

## 2023-07-21 PROCEDURE — 83690 ASSAY OF LIPASE: CPT | Performed by: EMERGENCY MEDICINE

## 2023-07-21 PROCEDURE — 250N000011 HC RX IP 250 OP 636: Mod: JZ

## 2023-07-21 PROCEDURE — 99223 1ST HOSP IP/OBS HIGH 75: CPT | Mod: AI

## 2023-07-21 PROCEDURE — 250N000013 HC RX MED GY IP 250 OP 250 PS 637

## 2023-07-21 PROCEDURE — 96365 THER/PROPH/DIAG IV INF INIT: CPT

## 2023-07-21 PROCEDURE — 120N000001 HC R&B MED SURG/OB

## 2023-07-21 PROCEDURE — 96375 TX/PRO/DX INJ NEW DRUG ADDON: CPT

## 2023-07-21 PROCEDURE — 83735 ASSAY OF MAGNESIUM: CPT | Performed by: EMERGENCY MEDICINE

## 2023-07-21 PROCEDURE — 96376 TX/PRO/DX INJ SAME DRUG ADON: CPT

## 2023-07-21 RX ORDER — MAGNESIUM SULFATE HEPTAHYDRATE 40 MG/ML
2 INJECTION, SOLUTION INTRAVENOUS ONCE
Status: DISCONTINUED | OUTPATIENT
Start: 2023-07-21 | End: 2023-07-26 | Stop reason: HOSPADM

## 2023-07-21 RX ORDER — ONDANSETRON 2 MG/ML
4 INJECTION INTRAMUSCULAR; INTRAVENOUS EVERY 6 HOURS PRN
Status: DISCONTINUED | OUTPATIENT
Start: 2023-07-21 | End: 2023-07-26 | Stop reason: HOSPADM

## 2023-07-21 RX ORDER — LABETALOL 100 MG/1
100 TABLET, FILM COATED ORAL EVERY MORNING
Status: DISCONTINUED | OUTPATIENT
Start: 2023-07-22 | End: 2023-07-26 | Stop reason: HOSPADM

## 2023-07-21 RX ORDER — NALOXONE HYDROCHLORIDE 0.4 MG/ML
0.2 INJECTION, SOLUTION INTRAMUSCULAR; INTRAVENOUS; SUBCUTANEOUS
Status: DISCONTINUED | OUTPATIENT
Start: 2023-07-21 | End: 2023-07-26 | Stop reason: HOSPADM

## 2023-07-21 RX ORDER — HYDROMORPHONE HYDROCHLORIDE 1 MG/ML
0.5 INJECTION, SOLUTION INTRAMUSCULAR; INTRAVENOUS; SUBCUTANEOUS ONCE
Status: COMPLETED | OUTPATIENT
Start: 2023-07-21 | End: 2023-07-21

## 2023-07-21 RX ORDER — MAGNESIUM SULFATE HEPTAHYDRATE 40 MG/ML
2 INJECTION, SOLUTION INTRAVENOUS ONCE
Status: DISCONTINUED | OUTPATIENT
Start: 2023-07-21 | End: 2023-07-21

## 2023-07-21 RX ORDER — ACETAMINOPHEN 325 MG/1
650 TABLET ORAL EVERY 4 HOURS PRN
Status: DISCONTINUED | OUTPATIENT
Start: 2023-07-21 | End: 2023-07-26 | Stop reason: HOSPADM

## 2023-07-21 RX ORDER — AMOXICILLIN 250 MG
2 CAPSULE ORAL 2 TIMES DAILY
Status: DISCONTINUED | OUTPATIENT
Start: 2023-07-21 | End: 2023-07-26 | Stop reason: HOSPADM

## 2023-07-21 RX ORDER — HEPARIN SODIUM 5000 [USP'U]/.5ML
5000 INJECTION, SOLUTION INTRAVENOUS; SUBCUTANEOUS EVERY 12 HOURS
Status: DISCONTINUED | OUTPATIENT
Start: 2023-07-21 | End: 2023-07-26 | Stop reason: HOSPADM

## 2023-07-21 RX ORDER — HYDROMORPHONE HCL IN WATER/PF 6 MG/30 ML
.2-.5 PATIENT CONTROLLED ANALGESIA SYRINGE INTRAVENOUS EVERY 4 HOURS PRN
Status: DISCONTINUED | OUTPATIENT
Start: 2023-07-21 | End: 2023-07-22

## 2023-07-21 RX ORDER — ONDANSETRON 4 MG/1
4 TABLET, ORALLY DISINTEGRATING ORAL EVERY 6 HOURS PRN
Status: DISCONTINUED | OUTPATIENT
Start: 2023-07-21 | End: 2023-07-26 | Stop reason: HOSPADM

## 2023-07-21 RX ORDER — LABETALOL 100 MG/1
100 TABLET, FILM COATED ORAL AT BEDTIME
Status: DISCONTINUED | OUTPATIENT
Start: 2023-07-21 | End: 2023-07-26 | Stop reason: HOSPADM

## 2023-07-21 RX ORDER — SODIUM CHLORIDE 9 MG/ML
INJECTION, SOLUTION INTRAVENOUS CONTINUOUS
Status: DISCONTINUED | OUTPATIENT
Start: 2023-07-21 | End: 2023-07-21

## 2023-07-21 RX ORDER — ONDANSETRON 2 MG/ML
4 INJECTION INTRAMUSCULAR; INTRAVENOUS ONCE
Status: COMPLETED | OUTPATIENT
Start: 2023-07-21 | End: 2023-07-21

## 2023-07-21 RX ORDER — MIDODRINE HYDROCHLORIDE 5 MG/1
5 TABLET ORAL
Status: DISCONTINUED | OUTPATIENT
Start: 2023-07-21 | End: 2023-07-26 | Stop reason: HOSPADM

## 2023-07-21 RX ORDER — ROSUVASTATIN CALCIUM 10 MG/1
10 TABLET, COATED ORAL AT BEDTIME
Status: DISCONTINUED | OUTPATIENT
Start: 2023-07-21 | End: 2023-07-26 | Stop reason: HOSPADM

## 2023-07-21 RX ORDER — DOCUSATE SODIUM 100 MG/1
100 CAPSULE, LIQUID FILLED ORAL 2 TIMES DAILY
Status: DISCONTINUED | OUTPATIENT
Start: 2023-07-21 | End: 2023-07-23

## 2023-07-21 RX ORDER — NALOXONE HYDROCHLORIDE 0.4 MG/ML
0.4 INJECTION, SOLUTION INTRAMUSCULAR; INTRAVENOUS; SUBCUTANEOUS
Status: DISCONTINUED | OUTPATIENT
Start: 2023-07-21 | End: 2023-07-26 | Stop reason: HOSPADM

## 2023-07-21 RX ORDER — SEVELAMER CARBONATE 800 MG/1
800-2400 TABLET, FILM COATED ORAL 3 TIMES DAILY
Status: DISCONTINUED | OUTPATIENT
Start: 2023-07-22 | End: 2023-07-26 | Stop reason: HOSPADM

## 2023-07-21 RX ORDER — NORTRIPTYLINE HCL 25 MG
75 CAPSULE ORAL AT BEDTIME
Status: DISCONTINUED | OUTPATIENT
Start: 2023-07-21 | End: 2023-07-26 | Stop reason: HOSPADM

## 2023-07-21 RX ORDER — HYDRALAZINE HYDROCHLORIDE 20 MG/ML
10 INJECTION INTRAMUSCULAR; INTRAVENOUS EVERY 6 HOURS PRN
Status: DISCONTINUED | OUTPATIENT
Start: 2023-07-21 | End: 2023-07-26 | Stop reason: HOSPADM

## 2023-07-21 RX ORDER — SALIVA STIMULANT COMB. NO.3
1 SPRAY, NON-AEROSOL (ML) MUCOUS MEMBRANE
Status: DISCONTINUED | OUTPATIENT
Start: 2023-07-21 | End: 2023-07-26 | Stop reason: HOSPADM

## 2023-07-21 RX ORDER — LEVOTHYROXINE SODIUM 50 UG/1
50 TABLET ORAL AT BEDTIME
Status: DISCONTINUED | OUTPATIENT
Start: 2023-07-21 | End: 2023-07-26 | Stop reason: HOSPADM

## 2023-07-21 RX ORDER — LORAZEPAM 1 MG/1
1 TABLET ORAL
Status: DISCONTINUED | OUTPATIENT
Start: 2023-07-22 | End: 2023-07-26 | Stop reason: HOSPADM

## 2023-07-21 RX ORDER — LIDOCAINE 40 MG/G
CREAM TOPICAL
Status: DISCONTINUED | OUTPATIENT
Start: 2023-07-21 | End: 2023-07-26 | Stop reason: HOSPADM

## 2023-07-21 RX ORDER — PROCHLORPERAZINE MALEATE 5 MG
5 TABLET ORAL EVERY 6 HOURS PRN
Status: DISCONTINUED | OUTPATIENT
Start: 2023-07-21 | End: 2023-07-26 | Stop reason: HOSPADM

## 2023-07-21 RX ORDER — PROCHLORPERAZINE 25 MG
12.5 SUPPOSITORY, RECTAL RECTAL EVERY 12 HOURS PRN
Status: DISCONTINUED | OUTPATIENT
Start: 2023-07-21 | End: 2023-07-26 | Stop reason: HOSPADM

## 2023-07-21 RX ADMIN — ONDANSETRON 4 MG: 2 INJECTION INTRAMUSCULAR; INTRAVENOUS at 13:27

## 2023-07-21 RX ADMIN — MAGNESIUM SULFATE HEPTAHYDRATE 2 G: 40 INJECTION, SOLUTION INTRAVENOUS at 14:16

## 2023-07-21 RX ADMIN — LEVOTHYROXINE SODIUM 50 MCG: 0.05 TABLET ORAL at 22:26

## 2023-07-21 RX ADMIN — HYDROMORPHONE HYDROCHLORIDE 0.5 MG: 1 INJECTION, SOLUTION INTRAMUSCULAR; INTRAVENOUS; SUBCUTANEOUS at 16:31

## 2023-07-21 RX ADMIN — NORTRIPTYLINE HYDROCHLORIDE 75 MG: 25 CAPSULE ORAL at 22:26

## 2023-07-21 RX ADMIN — HYDROMORPHONE HYDROCHLORIDE 0.5 MG: 0.2 INJECTION, SOLUTION INTRAMUSCULAR; INTRAVENOUS; SUBCUTANEOUS at 17:58

## 2023-07-21 RX ADMIN — LABETALOL HYDROCHLORIDE 100 MG: 100 TABLET, FILM COATED ORAL at 22:26

## 2023-07-21 RX ADMIN — ROSUVASTATIN CALCIUM 10 MG: 10 TABLET, FILM COATED ORAL at 22:26

## 2023-07-21 RX ADMIN — ONDANSETRON 4 MG: 2 INJECTION INTRAMUSCULAR; INTRAVENOUS at 14:52

## 2023-07-21 RX ADMIN — HEPARIN SODIUM 5000 UNITS: 5000 INJECTION, SOLUTION INTRAVENOUS; SUBCUTANEOUS at 20:57

## 2023-07-21 RX ADMIN — DOCUSATE SODIUM 100 MG: 100 CAPSULE, LIQUID FILLED ORAL at 20:58

## 2023-07-21 RX ADMIN — SODIUM CHLORIDE 500 ML: 9 INJECTION, SOLUTION INTRAVENOUS at 14:49

## 2023-07-21 RX ADMIN — SERTRALINE 150 MG: 100 TABLET, FILM COATED ORAL at 22:27

## 2023-07-21 RX ADMIN — HYDROMORPHONE HYDROCHLORIDE 0.5 MG: 1 INJECTION, SOLUTION INTRAMUSCULAR; INTRAVENOUS; SUBCUTANEOUS at 22:27

## 2023-07-21 RX ADMIN — SENNOSIDES AND DOCUSATE SODIUM 2 TABLET: 50; 8.6 TABLET ORAL at 20:57

## 2023-07-21 RX ADMIN — FAMOTIDINE 20 MG: 10 INJECTION, SOLUTION INTRAVENOUS at 20:58

## 2023-07-21 ASSESSMENT — ACTIVITIES OF DAILY LIVING (ADL)
FALL_HISTORY_WITHIN_LAST_SIX_MONTHS: YES
ADLS_ACUITY_SCORE: 35
ADLS_ACUITY_SCORE: 35
DEPENDENT_IADLS:: INDEPENDENT
NUMBER_OF_TIMES_PATIENT_HAS_FALLEN_WITHIN_LAST_SIX_MONTHS: 3
ADLS_ACUITY_SCORE: 35
DRESSING/BATHING_DIFFICULTY: NO
ADLS_ACUITY_SCORE: 20
DOING_ERRANDS_INDEPENDENTLY_DIFFICULTY: NO
CHANGE_IN_FUNCTIONAL_STATUS_SINCE_ONSET_OF_CURRENT_ILLNESS/INJURY: NO
ADLS_ACUITY_SCORE: 37
WALKING_OR_CLIMBING_STAIRS_DIFFICULTY: NO
TOILETING_ISSUES: NO
WEAR_GLASSES_OR_BLIND: NO
CONCENTRATING,_REMEMBERING_OR_MAKING_DECISIONS_DIFFICULTY: NO
ADLS_ACUITY_SCORE: 35
DIFFICULTY_EATING/SWALLOWING: NO

## 2023-07-21 ASSESSMENT — ENCOUNTER SYMPTOMS
SHORTNESS OF BREATH: 0
ABDOMINAL PAIN: 1
NAUSEA: 1
DYSURIA: 0
DIARRHEA: 0
COUGH: 0
VOMITING: 1
FEVER: 0
CONSTIPATION: 1

## 2023-07-21 NOTE — H&P
[facility]    History and Physical - Hospitalist Service       Date of Admission:  7/21/2023    Assessment & Plan      Serene Tipton is a 67 year old female who has a history of PKD s/p bilateral nephrectomy, ESRD on HD TTS schedule, active transplant evaluation, Anemia,  HTN, HLD, Hypothyroidism, active tobacco use, chronic pain, multiple recent admissions for SBO vs ileus, most recently 7/13-7/19 who presented due to abdominal pain with CT findings suggestive of ileus and large amount of stool.       Ileus  Constipation  Nausea  Abdominal pain  Continues to have left sided abdominal pain. Concern for SBO during admission (7/5-7/6) therefore surgery was consulted, believed constipation to be more likely. At that time, MiraLAX bowel regimen started and patient had multiple loose bowel movements. Patient has since had 3 abdominal CT scans with findings more suggestive of ileus than SBO. GI consulted last admission 7/13-7/19. Gastrografin study 7/18 excluded SBO. Since discharge 7/19, patient has been having increasing abdominal pain with NBNB emesis. Last BM 7/20. NG placement difficult due to hx nose fracture. Bowel sounds present, less concerned for SBO. Received 500ml in ED.  -NPO w/ ice chips  -Zofran and Compazine PRN  -IV Pepcid   -IV Dilaudid PRN while NPO, though try to use sparingly with constipation   -Continue PTA Senna 2 tablets BID  -Add colace BID  -Consider enema or suppository, or gastrografin  -Manual disimpaction   -Consider surgery consult if no improvement    Failure to thrive  Hypoalbuminemia   Weight loss, fatigue, poor appetite. Likely secondary to chronic disease. Passed ACTH stim test last admission. Lives alone in her apartment.  Glucose 77 on admission. Declined TCU and HHC last admission (7/13-7/19). Multiple recent admissions.  -Glucose checks BID  -Consider nutrition consult when PO    ESRD on HD  Mild hyponatremia  Mild hyponatremia 134, normal K.  ESRD on dialysis TTS.   - Nephrology consulted, TTS dialysis   - Monitor BMP, mag and phos   - Avoid nephrotoxic medications   - limit IVF to avoid fluid overload     Hypertension   Last admission had hypotension. Changed medications, now only on labetalol 100 twice daily. BP appears much improved.   - continue PTA labetalol   - PRN hydralazine     Hypomagnesemia   Slightly low on admission, 1.6.  -RN replacement protocol    Leukocytosis   No clear source of infection. Meets SIRS criteria with WBC 19 and elevated pulse.   -Lactate  -Blood cultures    Tachycardia   Likely related to pain. EKG with sinus tachycardia.    -monitor        Chronic conditions:  Hypothyroidism: PTA Synthroid  Anxiety/depression: PTA sertraline, PRN ativan   Chronic pain: PTA nortriptyline, hold PTA Oxycodone  Hyperlipidemia: PTA rosuvastatin  Anemia: PTA EPO and Venofer with HD  Tobacco use: declines nicotine replacement     Diet: NPO for Medical/Clinical Reasons Except for: No Exceptions  DVT Prophylaxis: heparin  Pool Catheter: Not present  Fluids: NS IV @50ml/hr  Lines: None     Cardiac Monitoring: None  Code Status: Full Code    Clinically Significant Risk Factors Present on Admission           # Hypercalcemia: corrected calcium is >10.1, will monitor as appropriate  # Hypomagnesemia: Lowest Mg = 1.6 mg/dL in last 2 days, will replace as needed   # Hypoalbuminemia: Lowest albumin = 2.4 g/dL at 7/21/2023  1:23 PM, will monitor as appropriate     # Hypertension: Noted on problem list      # Cachexia: Estimated body mass index is 14.41 kg/m  as calculated from the following:    Height as of 7/13/23: 1.524 m (5').    Weight as of 7/19/23: 33.5 kg (73 lb 12.8 oz).            Disposition Plan      Expected Discharge Date: 07/23/2023                The patient's care was discussed with the Attending Physician, Dr. Quiroz.      Nigel Awad MD PGY3  Hospitalist Service  [facility]  Securely message with [employer]  (more info)  Text page via [facility] Paging/Directory   ______________________________________________________________________    Chief Complaint   Abdominal pain     History is obtained from the patient    History of Present Illness   Serene Tipton is a 67 year old female who has a history of PKD s/p bilateral nephrectomy, ESRD on HD TTS schedule, active transplant evaluation, Anemia,  HTN, HLD, Hypothyroidism, active tobacco use, chronic pain, multiple recent admissions for SBO vs ileus, most recently 7/13-7/19 who presented due to abdominal pain.    Abdominal pain returned evening of 7/20 and has been present since. Pain is diffuse in abdomen. Has only had one small, hard BM since discharge on evening of 7/20. Last vomited this morning. NBNB emesis.     Patient had dialysis 7/20 per normal outpatient treatment regimen. She does not feel significantly fluid overloaded.     Patient denies fever or chills. No new respiratory symptoms. No skin changes.     Past Medical History    Past Medical History:   Diagnosis Date     Anemia in chronic kidney disease      Anxiety      Arthritis      Brain aneurysm     Cavernous segment of the right & left carotid arteries. See Neurosurg note 6/16/21.     Chronic low back pain     Managed by Pain Clinic     Depressive disorder 2013     Disease of thyroid gland      ESRD (end stage renal disease) on dialysis (H) 07/2020     GERD (gastroesophageal reflux disease)      Hepatic lesion      Hyperlipidemia      Hypertension      Nephrolithiasis      Neuromuscular disorder (H)      Osteoporosis      PKD (polycystic kidney disease) 09/01/1990     PTSD (post-traumatic stress disorder)      Tobacco abuse      Vitamin D deficiency        Past Surgical History   Past Surgical History:   Procedure Laterality Date     BACK SURGERY      spinal fusion w/hardware     BIOPSY Left 09/01/1990    Breast     BREAST LUMPECTOMY, RT/LT Left     Lumpectomy     CYST REMOVAL Right     rt wrist      CYSTECTOMY PILONIDAL  1981     EYE SURGERY  2008    lasik     FORMATION ARTERIOVENOUS FISTULA    07/28/2020     FRACTURE SURGERY       NEPHRECTOMY BILATERAL Bilateral 2/1/2023    Procedure: bilateral native nephrectomy;  Surgeon: Alana Giang MD;  Location: [facility] OR     ORTHOPEDIC SURGERY Right 2005    Shoulder     OTHER SURGICAL HISTORY      carpal tunnel repair     SPINE SURGERY         Prior to Admission Medications   Prior to Admission Medications   Prescriptions Last Dose Informant Patient Reported? Taking?   B Complex-C (SUPER B COMPLEX PO) 7/20/2023  Yes Yes   Sig: Take 1 tablet by mouth At Bedtime   Epoetin Adam (EPOGEN IJ) 7/20/2023  Yes Yes   Sig: Inject 1,000 Units into the vein three times a week At dialysis.   Iron Sucrose (VENOFER IV) Unknown  Yes Yes   Sig: Inject 100 mg into the vein once a week At dialysis   LORazepam (ATIVAN) 1 MG tablet 7/20/2023  Yes Yes   Sig: Take 1 mg by mouth daily (before lunch)   calcitRIOL (ROCALTROL) 0.5 MCG capsule 7/20/2023  Yes Yes   Sig: Take 0.5 mcg by mouth three times a week Given at HD. Tues, Thurs, Sat   cholecalciferol (VITAMIN D3) 25 mcg (1000 units) capsule 7/20/2023  Yes Yes   Sig: Take 1 capsule by mouth At Bedtime   labetalol (NORMODYNE) 100 MG tablet 7/20/2023  No Yes   Sig: Take 1 tablet (100 mg) by mouth At Bedtime   labetalol (NORMODYNE) 100 MG tablet 7/20/2023  No Yes   Sig: Take 1 tablet (100 mg) by mouth every morning   levothyroxine (SYNTHROID/LEVOTHROID) 50 MCG tablet 7/20/2023  Yes Yes   Sig: Take 50 mcg by mouth At Bedtime Takes in the middle of the night.   nortriptyline (PAMELOR) 75 MG capsule 7/20/2023  Yes Yes   Sig: Take 75 mg by mouth At Bedtime   omeprazole (PRILOSEC) 20 MG DR capsule 7/21/2023  Yes Yes   Sig: Take 20 mg by mouth daily   oxyCODONE (ROXICODONE) 10 MG tablet Unknown  No Yes   Sig: Use oxycodone 10mg every 3 hours as needed for pain on 2/6 & 2/7. Then return to usual home dose.   rosuvastatin (CRESTOR) 10 MG tablet  2023  Yes Yes   Sig: Take 10 mg by mouth At Bedtime   senna-docusate (SENOKOT-S/PERICOLACE) 8.6-50 MG tablet 2023  Yes Yes   Sig: Take 2 tablets by mouth 2 times daily   sertraline (ZOLOFT) 100 MG tablet 2023  Yes Yes   Sig: Take 150 mg by mouth At Bedtime   sevelamer carbonate (RENVELA) 800 MG tablet 2023  Yes Yes   Sig: Take 800-2,400 mg by mouth 3 times daily When eating; range of 1-3 tabs depending on meal size      Facility-Administered Medications: None        Review of Systems    The 10 point Review of Systems is negative other than noted in the HPI or here.     Social History   I have reviewed this patient's social history and updated it with pertinent information if needed.  Social History     Tobacco Use     Smoking status: Former     Packs/day: 0.50     Years: 14.00     Pack years: 7.00     Types: Cigarettes     Quit date: 2021     Years since quittin.1     Smokeless tobacco: Never   Substance Use Topics     Alcohol use: Not Currently     Drug use: Never       Family History   I have reviewed this patient's family history and updated it with pertinent information if needed.  Family History   Problem Relation Age of Onset     Polycystic Kidney Diease Mother      Hypertension Mother      Kidney Disease Mother      Cerebrovascular Disease Mother      Chronic Obstructive Pulmonary Disease Father      Multiple Sclerosis Father      Polycystic Kidney Diease Sister      Cardiac Sudden Death Sister 52     Hypertension Sister      Kidney Disease Sister      Polycystic Kidney Diease Maternal Grandmother      Hypertension Maternal Grandmother      Kidney Disease Maternal Grandmother      Cerebrovascular Disease Maternal Grandmother      Heart Disease Maternal Grandfather      Hyperlipidemia Paternal Grandmother      Cerebrovascular Disease Paternal Grandmother      Coronary Artery Disease Paternal Grandfather      Pulmonary Embolism Paternal Grandfather      Anesthesia Reaction No family  hx of        Allergies   Allergies   Allergen Reactions     Penicillins Other (See Comments) and Shortness Of Breath     Breathing problem.  breathing       Carvedilol GI Disturbance     Nausea and vomiting     Ciprofloxacin Muscle Pain (Myalgia)     Morphine Other (See Comments)     Vomiting     No Clinical Screening - See Comments      PN: LW CM1: Contrast Intercapsular - Nonionic Reaction :     Nsaids Other (See Comments)     Sulfa Antibiotics Itching     Sulfamethoxazole-Trimethoprim      Other reaction(s): itching     Levofloxacin Muscle Pain (Myalgia) and Rash        Physical Exam   Vital Signs: Temp: 97.6  F (36.4  C) Temp src: Oral BP: 131/74 Pulse: 104   Resp: 13 SpO2: 96 % O2 Device: None (Room air)    Weight: 0 lbs 0 oz    Constitutional: awake but appears tired, cooperative, no apparent distress, thin, ill-appearing  Eyes: Lids and lashes normal, pupils equal, round and reactive to light, extra ocular muscles intact, sclera clear, conjunctiva normal  ENT: Normocephalic, without obvious abnormality, atraumatic, oral pharynx with moist mucous membranes  Respiratory: No increased work of breathing, good air exchange, clear to auscultation bilaterally, no crackles or wheezing  Cardiovascular: Regular rate and rhythm, normal S1 and S2, no S3 or S4, and systolic murmur noted  GI: Healed scars, normoactive bowel sounds, firm, mildly distended, diffuse moderate abdominal tenderness  Skin: Gray, pale. Dry.  Musculoskeletal: mild bilateral lower extremity edema  Neurologic: A&O x4.   Neuropsychiatric: General: normal, calm and normal eye contact  Level of consciousness:        Data   ------------------------- PAST 24 HR DATA REVIEWED -----------------------------------------------    I have personally reviewed the following data over the past 24 hrs:    18.9 (H)  \   10.3 (L)   / 343     134 (L) 94 (L) 24 (H) /  77   4.2 27 3.82 (H) \       ALT: 40 AST: 38 AP: 151 (H) TBILI: 0.7   ALB: 2.4 (L) TOT PROTEIN: 5.1 (L)  LIPASE: 11       Imaging results reviewed over the past 24 hrs:   Recent Results (from the past 24 hour(s))   CT Abdomen Pelvis w/o Contrast    Narrative    EXAM: CT ABDOMEN PELVIS W/O CONTRAST  LOCATION: [facility]  DATE: 7/21/2023    INDICATION: incr abd pain, s p bilateral nephrectomy, ?SBO  COMPARISON: CT of the abdomen and pelvis 07/15/2023, 07/13/2023  TECHNIQUE: CT scan of the abdomen and pelvis was performed without IV contrast. Multiplanar reformats were obtained. Dose reduction techniques were used.  CONTRAST: None.    FINDINGS:   LOWER CHEST: Attenuation of the left ventricular myocardium is higher than the cardiac chambers consistent with anemia or hypoproteinemia. Paucity of mediastinal fat.    HEPATOBILIARY: Unchanged mild distention of the gallbladder but no gallbladder wall thickening or calcified gallstones. Internal and external bile ducts are also unchanged. There are numerous hepatic cysts. No new hepatic parenchymal lesions.    PANCREAS: Unchanged mild diffuse dilation of the pancreas duct, which tapers towards the pancreas tail.    SPLEEN: Normal.    ADRENAL GLANDS: Normal.    KIDNEYS/BLADDER: Status post bilateral nephrectomies.    BOWEL: Gas and fluid distended small bowel in the left upper quadrant. Positive enteric contrast is present with in more decompressed distal small bowel in the right abdomen. There is positive enteric contrast in the terminal ileum and ascending colon   from recent Gastrografin challenge, arguing against a high-grade mechanical small bowel obstruction. Large amount stool in the rectum and sigmoid colon.    LYMPH NODES: Mild ascites and diffuse increased attenuation of the intra-abdominal mesenteric and omental fat consistent with soft tissue edema. No discrete enlarged lymph nodes.    VASCULATURE: Severe, diffuse aortoiliac atheromatous calcifications.    PELVIC ORGANS: Age concordant uterine atrophy.    MUSCULOSKELETAL: Healed fracture  deformity of the right inferior pubic ramus. Status post anterior fusion at L5-S1 with grade 1 anterolisthesis of L5 on S1. Lower thoracic and other lumbar vertebra are maintained in height and shape.      Impression    IMPRESSION:     1.  Proximal small bowel is mildly gas and fluid distended. The small bowel in the right lower quadrant is less distended but contains positive contrast from recent Gastrografin challenge. As previously described, the pattern is more suggestive of ileus   than a mechanical small bowel obstruction.  2.  Large amount of stool in the sigmoid colon and rectum.  3.  Anemia/hypoproteinemia, mild ascites and anasarca.

## 2023-07-21 NOTE — ED NOTES
"EMERGENCY DEPARTMENT SIGN OUT NOTE        ED COURSE AND MEDICAL DECISION MAKING  Patient was signed out to me by Dr Stephanie Wayne at 2:15PM.   3:49 PM I spoke with resident hospitalist who accepts patient for admission.     In brief, Serene Tipton is a 67 year old female who initially presented with generalized weakness. Her abdomen hurts, specifically \"hardness\" around her belly button. She vomited three times today.  Patient takes blood pressure medications. Today, her blood pressure is 80 stostolic. She last underwent dialysis on 7/20 that she reports without issue    At time of sign out, disposition was pending CT scan and disposition, likely admission.     4:29 PM  I conducted some additional history with the patient after assuming care.  She reported having passed stool at the time of discharge from the hospital but since getting home and passed only 1 tiny small stool.  Progressive development abdominal pain very similar to when she was admitted to the hospital.  CT scan per radiology report similar to when she was admitted to the hospital.  Read more of an ileus as opposed to mechanical small bowel obstruction.  Her abdomen here in the emergency department was quite tender diffusely and firm to palpation.  I do think there may be benefit to trialing a NG tube with suction to see if if this alleviates some of patient's symptoms.  She is very nauseous.  Given the ongoing pain and the struggles that the patient is experiencing along with her leukocytosis I think admission to the hospital for further care management and interventions as indicated.  Discussed this with the hospitalist service.  Magnesium replacement initiated from the emergency department.  Currently stable from her renal issues.    FINAL IMPRESSION    1. Diffuse abdominal pain    2. Hypomagnesemia    3. Chronic renal failure, unspecified CKD stage        ED MEDS  Medications   prochlorperazine (COMPAZINE) injection 10 mg (has no " administration in time range)   magnesium sulfate 2 g in 50 mL sterile water intermittent infusion ( Intravenous Canceled Entry 7/21/23 1537)   HYDROmorphone (PF) (DILAUDID) injection 0.5 mg (has no administration in time range)   lidocaine 1 % 0.1-1 mL (has no administration in time range)   lidocaine (LMX4) cream (has no administration in time range)   sodium chloride (PF) 0.9% PF flush 3 mL (has no administration in time range)   sodium chloride (PF) 0.9% PF flush 3 mL (has no administration in time range)   melatonin tablet 1 mg (has no administration in time range)   heparin ANTICOAGULANT injection 5,000 Units (has no administration in time range)   sodium chloride 0.9% infusion (has no administration in time range)   acetaminophen (TYLENOL) tablet 650 mg (has no administration in time range)   ondansetron (ZOFRAN ODT) ODT tab 4 mg (has no administration in time range)     Or   ondansetron (ZOFRAN) injection 4 mg (has no administration in time range)   prochlorperazine (COMPAZINE) injection 5 mg (has no administration in time range)     Or   prochlorperazine (COMPAZINE) tablet 5 mg (has no administration in time range)     Or   prochlorperazine (COMPAZINE) suppository 12.5 mg (has no administration in time range)   famotidine (PEPCID) injection 20 mg (has no administration in time range)   ondansetron (ZOFRAN) injection 4 mg (4 mg Intravenous $Given 7/21/23 1327)   ondansetron (ZOFRAN) injection 4 mg (4 mg Intravenous $Given 7/21/23 1452)   0.9% sodium chloride BOLUS (500 mLs Intravenous $New Bag 7/21/23 1449)       LAB  Labs Ordered and Resulted from Time of ED Arrival to Time of ED Departure   BASIC METABOLIC PANEL - Abnormal       Result Value    Sodium 134 (*)     Potassium 4.2      Chloride 94 (*)     Carbon Dioxide (CO2) 27      Anion Gap 13      Urea Nitrogen 24 (*)     Creatinine 3.82 (*)     Calcium 9.1      Glucose 77      GFR Estimate 12 (*)    HEPATIC FUNCTION PANEL - Abnormal    Bilirubin Total 0.7       Bilirubin Direct 0.2      Protein Total 5.1 (*)     Albumin 2.4 (*)     Alkaline Phosphatase 151 (*)     AST 38      ALT 40     CBC WITH PLATELETS AND DIFFERENTIAL - Abnormal    WBC Count 18.9 (*)     RBC Count 3.08 (*)     Hemoglobin 10.3 (*)     Hematocrit 32.4 (*)      (*)     MCH 33.4 (*)     MCHC 31.8      RDW 16.4 (*)     Platelet Count 343      % Neutrophils 89      % Lymphocytes 5      % Monocytes 5      % Eosinophils 0      % Basophils 0      % Immature Granulocytes 1      NRBCs per 100 WBC 0      Absolute Neutrophils 16.9 (*)     Absolute Lymphocytes 1.0      Absolute Monocytes 0.9      Absolute Eosinophils 0.0      Absolute Basophils 0.0      Absolute Immature Granulocytes 0.1      Absolute NRBCs 0.0     MAGNESIUM - Abnormal    Magnesium 1.6 (*)    TROPONIN I - Normal    Troponin I 0.03     LIPASE - Normal    Lipase 11         RADIOLOGY    CT Abdomen Pelvis w/o Contrast   Final Result   IMPRESSION:       1.  Proximal small bowel is mildly gas and fluid distended. The small bowel in the right lower quadrant is less distended but contains positive contrast from recent Gastrografin challenge. As previously described, the pattern is more suggestive of ileus    than a mechanical small bowel obstruction.   2.  Large amount of stool in the sigmoid colon and rectum.   3.  Anemia/hypoproteinemia, mild ascites and anasarca.             DISCHARGE MEDS  New Prescriptions    No medications on file       Lucas Genao MD  [facility] EMERGENCY ROOM  [address] [facility] [address] [phone]     Lucas Genao MD  07/21/23 3343       Lucas Genao MD  07/21/23 9934

## 2023-07-21 NOTE — PHARMACY-ADMISSION MEDICATION HISTORY
Pharmacist Admission Medication History    Admission medication history is complete. The information provided in this note is only as accurate as the sources available at the time of the update.    Medication reconciliation/reorder completed by provider prior to medication history? No    Information Source(s): Patient, CareEverywhere/SureScripts and discharge-readmit orders from 7/13/23-7/19/23 via in-person    Pertinent Information: Patient gets HD Tues, Thurs and Sat and doesn't know if iron sucrose and Epoetin Adam were given during dialysis.     Changes made to PTA medication list:    Added: Omeprazole     Deleted: None    Changed: None    Medication Affordability:       Allergies reviewed with patient and updates made in EHR: yes    Medication History Completed By: Lazara Pop PharmD 7/21/2023 2:35 PM    Prior to Admission medications    Medication Sig Last Dose Taking? Auth Provider Long Term End Date   B Complex-C (SUPER B COMPLEX PO) Take 1 tablet by mouth At Bedtime 7/20/2023 Yes Unknown, Entered By History     calcitRIOL (ROCALTROL) 0.5 MCG capsule Take 0.5 mcg by mouth three times a week Given at HD. Tues, Thurs, Sat 7/20/2023 Yes Unknown, Entered By History Yes    cholecalciferol (VITAMIN D3) 25 mcg (1000 units) capsule Take 1 capsule by mouth At Bedtime 7/20/2023 Yes Reported, Patient     Epoetin Adam (EPOGEN IJ) Inject 1,000 Units into the vein three times a week At dialysis. 7/20/2023 Yes Unknown, Entered By History     Iron Sucrose (VENOFER IV) Inject 100 mg into the vein once a week At dialysis Unknown Yes Unknown, Entered By History     labetalol (NORMODYNE) 100 MG tablet Take 1 tablet (100 mg) by mouth At Bedtime 7/20/2023 Yes Soraya Amaya, DO Yes    labetalol (NORMODYNE) 100 MG tablet Take 1 tablet (100 mg) by mouth every morning 7/20/2023 Yes Soraya Amaya, DO Yes    levothyroxine (SYNTHROID/LEVOTHROID) 50 MCG tablet Take 50 mcg by mouth At Bedtime Takes in the middle of the night.  7/20/2023 Yes Reported, Patient Yes    LORazepam (ATIVAN) 1 MG tablet Take 1 mg by mouth daily (before lunch) 7/20/2023 Yes Reported, Patient              nortriptyline (PAMELOR) 75 MG capsule Take 75 mg by mouth At Bedtime 7/20/2023 Yes Reported, Patient Yes    Omeprazole 20 mg  Take 20 mg by mouth daily 7/21/23 YES      oxyCODONE (ROXICODONE) 10 MG tablet Use oxycodone 10mg every 3 hours as needed for pain on 2/6 & 2/7. Then return to usual home dose. Unknown Yes Sarah Betancourt, APRN CNP No    rosuvastatin (CRESTOR) 10 MG tablet Take 10 mg by mouth At Bedtime 7/20/2023 Yes Reported, Patient Yes    senna-docusate (SENOKOT-S/PERICOLACE) 8.6-50 MG tablet Take 2 tablets by mouth 2 times daily 7/20/2023 Yes Unknown, Entered By History     sertraline (ZOLOFT) 100 MG tablet Take 150 mg by mouth At Bedtime 7/20/2023 Yes Reported, Patient Yes    sevelamer carbonate (RENVELA) 800 MG tablet Take 800-2,400 mg by mouth 3 times daily When eating; range of 1-3 tabs depending on meal size 7/20/2023 Yes Reported, Patient

## 2023-07-21 NOTE — PROGRESS NOTES
Consult noted  HD ordered for tomorrow  On TTS schedule    Will see in am  plz call with questions    Flaco Warren MD  [facility]  [phone]

## 2023-07-21 NOTE — ED TRIAGE NOTES
Pt arrives by EMS with c/o weakness, abdominal pain, and dizziness. BP 84/p, P 119 per EMS. . Pt recently admitted here for about a week for same symptoms. Both kidneys removed last February. Pt receives dialysis. Last dialysis yesterday with not issues reported.      Triage Assessment     Row Name 07/21/23 1211       Triage Assessment (Adult)    Airway WDL WDL       Respiratory WDL    Respiratory WDL WDL       Skin Circulation/Temperature WDL    Skin Circulation/Temperature WDL WDL       Cardiac WDL    Cardiac WDL WDL       Peripheral/Neurovascular WDL    Peripheral Neurovascular WDL WDL       Cognitive/Neuro/Behavioral WDL    Cognitive/Neuro/Behavioral WDL WDL

## 2023-07-21 NOTE — CONSULTS
RENAL CONSULT NOTE    REQUESTING PHYSICIAN: Dr Awad    REASON FOR CONSULT: ESRD on HD     ASSESSMENT/PLAN:  ESRD: 2/2 polycystic kidney disease s/p bilateral nephrectomies.  On in center hemodialysis [facility].  Runs on a TTS schedule.  Last dialysis treatment was inpatient on 7/20/2023.  She runs 3 hours.  Has a left upper extremity aVF.  Labs reviewed, electrolytes are stable, euvolemic on exam, no acute indication for dialysis today as scheduled.  We will plan for normal scheduled dialysis treatment tomorrow while inpatient.    Hypothyroidism: On replacement    Anemia of chronic disease: Receives PETE and parenteral iron with outpatient dialysis.  Hemoglobin 10.3.  Follow need to dose PETE while inpatient.    Hyponatremia: Secondary to ESRD.  UF with HD.    Hyperlipidemia: Statin    Secondary renal hyperparathyroidism: Sevelamer 3 times daily AC.    History of hypertension: Last admit was mildly hypotensive s/p DC several antihypertensives, now on monotherapy with labetalol.  Has midodrine available with HD for intradialytic hypotension is poor blood pressure with UF.  She is normotensive this admit thus far.    Ileus, constipation, nausea, abdominal pain:  Admit 7/13/2023 through 7/19/2023 with similar complaints.  Surgery was consulted during that admit and they believed symptoms were most consistent with constipation.  At that time she was placed on a bowel regiment.  Imaging more suggestive of ileus versus SBO. Continues to have left-sided abdominal pain. Management per primary service.        HPI:   Serene is a 67-year-old female past medical history of polycystic kidney disease s/p bilateral nephrectomies, end-stage renal disease on insulin hemodialysis TTS schedule [facility], anemia of chronic disease, hypertension, hypothyroidism, and hyperlipidemia.  She was recently admitted at [facility] for SBO versus ileus during admit 7/13/2023 through 7/19/2023.  Now she  presents back to the [facility] emergency department for evaluation of recurrent abdominal pain    Her last hemodialysis treatment was 7/20/2023 while inpatient  She mentions that her abdominal pain is very severe, wishing for additional analgesics.  She has no HD related complaints for me.  She denies any shortness of breath  No issues with increased edema  Denies any access related concerns  Has a fistula left upper arm that has been working well  All questions answered  Discussed plans for dialysis tomorrow morning and maintaining normal TTS schedule while inpatient once other acute needs arise in the meantime    REVIEW OF SYSTEMS:  Complete 12 point review of systems was negative other than those noted in the HPI      Past Medical History:   Diagnosis Date     Anemia in chronic kidney disease      Anxiety      Arthritis      Brain aneurysm     Cavernous segment of the right & left carotid arteries. See Neurosurg note 6/16/21.     Chronic low back pain     Managed by Pain Clinic     Depressive disorder 2013     Disease of thyroid gland      ESRD (end stage renal disease) on dialysis (H) 07/2020     GERD (gastroesophageal reflux disease)      Hepatic lesion      Hyperlipidemia      Hypertension      Nephrolithiasis      Neuromuscular disorder (H)      Osteoporosis      PKD (polycystic kidney disease) 09/01/1990     PTSD (post-traumatic stress disorder)      Tobacco abuse      Vitamin D deficiency        No current facility-administered medications on file prior to encounter.  B Complex-C (SUPER B COMPLEX PO), Take 1 tablet by mouth At Bedtime  calcitRIOL (ROCALTROL) 0.5 MCG capsule, Take 0.5 mcg by mouth three times a week Given at HD. Tues, Beatrizurs, Sat  cholecalciferol (VITAMIN D3) 25 mcg (1000 units) capsule, Take 1 capsule by mouth At Bedtime  Epoetin Adam (EPOGEN IJ), Inject 1,000 Units into the vein three times a week At dialysis.  Iron Sucrose (VENOFER IV), Inject 100 mg into the vein once a week At  dialysis  labetalol (NORMODYNE) 100 MG tablet, Take 1 tablet (100 mg) by mouth At Bedtime  labetalol (NORMODYNE) 100 MG tablet, Take 1 tablet (100 mg) by mouth every morning  levothyroxine (SYNTHROID/LEVOTHROID) 50 MCG tablet, Take 50 mcg by mouth At Bedtime Takes in the middle of the night.  LORazepam (ATIVAN) 1 MG tablet, Take 1 mg by mouth daily (before lunch)  nortriptyline (PAMELOR) 75 MG capsule, Take 75 mg by mouth At Bedtime  omeprazole (PRILOSEC) 20 MG DR capsule, Take 20 mg by mouth daily  oxyCODONE (ROXICODONE) 10 MG tablet, Use oxycodone 10mg every 3 hours as needed for pain on 2/6 & 2/7. Then return to usual home dose.  rosuvastatin (CRESTOR) 10 MG tablet, Take 10 mg by mouth At Bedtime  senna-docusate (SENOKOT-S/PERICOLACE) 8.6-50 MG tablet, Take 2 tablets by mouth 2 times daily  sertraline (ZOLOFT) 100 MG tablet, Take 150 mg by mouth At Bedtime  sevelamer carbonate (RENVELA) 800 MG tablet, Take 800-2,400 mg by mouth 3 times daily When eating; range of 1-3 tabs depending on meal size        B Complex-C (SUPER B COMPLEX PO)  calcitRIOL (ROCALTROL) 0.5 MCG capsule  cholecalciferol (VITAMIN D3) 25 mcg (1000 units) capsule  Epoetin Adam (EPOGEN IJ)  Iron Sucrose (VENOFER IV)  labetalol (NORMODYNE) 100 MG tablet  labetalol (NORMODYNE) 100 MG tablet  levothyroxine (SYNTHROID/LEVOTHROID) 50 MCG tablet  LORazepam (ATIVAN) 1 MG tablet  nortriptyline (PAMELOR) 75 MG capsule  omeprazole (PRILOSEC) 20 MG DR capsule  oxyCODONE (ROXICODONE) 10 MG tablet  rosuvastatin (CRESTOR) 10 MG tablet  senna-docusate (SENOKOT-S/PERICOLACE) 8.6-50 MG tablet  sertraline (ZOLOFT) 100 MG tablet  sevelamer carbonate (RENVELA) 800 MG tablet        ALLERGIES/SENSITIVITIES:  Allergies   Allergen Reactions     Penicillins Other (See Comments) and Shortness Of Breath     Breathing problem.  breathing       Carvedilol GI Disturbance     Nausea and vomiting     Ciprofloxacin Muscle Pain (Myalgia)     Morphine Other (See Comments)      Vomiting     No Clinical Screening - See Comments      PN: LW CM1: Contrast Intercapsular - Nonionic Reaction :     Nsaids Other (See Comments)     Sulfa Antibiotics Itching     Sulfamethoxazole-Trimethoprim      Other reaction(s): itching     Levofloxacin Muscle Pain (Myalgia) and Rash     Social History     Tobacco Use     Smoking status: Former     Packs/day: 0.50     Years: 14.00     Pack years: 7.00     Types: Cigarettes     Quit date: 2021     Years since quittin.1     Smokeless tobacco: Never   Substance Use Topics     Alcohol use: Not Currently     Drug use: Never     I have reviewed this patient's family history and updated it with pertinent information if needed.  Family History   Problem Relation Age of Onset     Polycystic Kidney Diease Mother      Hypertension Mother      Kidney Disease Mother      Cerebrovascular Disease Mother      Chronic Obstructive Pulmonary Disease Father      Multiple Sclerosis Father      Polycystic Kidney Diease Sister      Cardiac Sudden Death Sister 52     Hypertension Sister      Kidney Disease Sister      Polycystic Kidney Diease Maternal Grandmother      Hypertension Maternal Grandmother      Kidney Disease Maternal Grandmother      Cerebrovascular Disease Maternal Grandmother      Heart Disease Maternal Grandfather      Hyperlipidemia Paternal Grandmother      Cerebrovascular Disease Paternal Grandmother      Coronary Artery Disease Paternal Grandfather      Pulmonary Embolism Paternal Grandfather      Anesthesia Reaction No family hx of          PHYSICAL EXAM:  Physical Exam   Temp: 97.6  F (36.4  C) Temp src: Oral BP: 131/74 Pulse: 104   Resp: 13 SpO2: 96 % O2 Device: None (Room air)    There were no vitals filed for this visit.  Vital Signs with Ranges  Temp:  [97.6  F (36.4  C)] 97.6  F (36.4  C)  Pulse:  [] 104  Resp:  [13-18] 13  BP: (123-154)/(64-79) 131/74  SpO2:  [95 %-98 %] 96 %  No intake/output data recorded.    @TMAXR(24)@    No data  found.    General: Alert, NAD  HENT: Supple, Non-tender, no obvious JVD  Eyes: No scleral icterus  Cardiovascular: RRR, no rub, gallop, or murmur.   Extremities: Trace edema bilateral ankles   Respiratory: CTAB, non-labored  Gastrointestinal: Abdomen is tender, nondistended.  Musculoskeletal: Grossly normal   Integumentary: Warm, dry, no rash  Neurologic: Non focal   Psychiatric: Cooperative  Access: Left upper AVF, aneurysmal expansion, good thrill and bruit  Laboratory:     Recent Labs   Lab 07/21/23  1323 07/19/23  0446 07/18/23  0452 07/17/23  0507 07/16/23  0655 07/15/23  0459   WBC 18.9* 9.8 11.7* 12.8* 12.1* 11.9*   RBC 3.08* 2.89* 2.69* 3.11* 3.12* 2.85*   HGB 10.3* 9.6* 9.1* 10.4* 10.2* 9.5*   HCT 32.4* 30.4* 28.0* 32.8* 33.1* 29.4*    323 333 400 348 365       Basic Metabolic Panel:  Recent Labs   Lab 07/21/23  1323 07/19/23  1003 07/19/23  0945 07/19/23  0446 07/19/23  0003 07/18/23  2254 07/18/23  0601 07/18/23  0452 07/17/23  0955 07/17/23  0507 07/16/23  0744 07/16/23  0655 07/15/23  0814 07/15/23  0459   *  --   --  137  --   --   --  131*  --  134*  --  133*  --  136   POTASSIUM 4.2  --   --  3.6  --   --   --  4.3  --  4.0  --  3.8  --  3.7   CHLORIDE 94*  --   --  99  --   --   --  96*  --  96*  --  97*  --  96*   CO2 27  --   --  28  --   --   --  23  --  24  --  25  --  26   BUN 24*  --   --  28*  --   --   --  57*  --  48*  --  33*  --  55*   CR 3.82*  --   --  3.78*  --   --   --  5.05*  --  4.41*  --  3.35*  --  4.71*   GLC 77 74 64* 86 131* 69*   < > 58*   < > 78   < > 79   < > 107   GAVIN 9.1  --   --  8.4*  --   --   --  8.1*  --  8.5  --  8.5  --  8.0*    < > = values in this interval not displayed.       INRNo lab results found in last 7 days.    Recent Labs   Lab Test 07/21/23  1323 07/19/23  0446   POTASSIUM 4.2 3.6   CHLORIDE 94* 99   BUN 24* 28*      Recent Labs   Lab Test 07/21/23  1323 07/14/23  0503 07/13/23  1239 08/18/22  1731 05/19/21  1520   ALBUMIN 2.4* 2.1* 2.2*    < >  --    BILITOTAL 0.7  --  1.1*   < >  --    ALT 40  --  28   < >  --    AST 38  --  31   < >  --    PROTEIN  --   --   --   --  100*    < > = values in this interval not displayed.       Personally reviewed today's laboratory studies    This note was dictated using voice recognition       Thank you for involving us in the care of this patient. We will continue to follow along with you.      Ever Hayes PA-C  [facility]  [phone]

## 2023-07-22 ENCOUNTER — APPOINTMENT (OUTPATIENT)
Dept: OCCUPATIONAL THERAPY | Facility: CLINIC | Age: 68
DRG: 388 | End: 2023-07-22
Payer: COMMERCIAL

## 2023-07-22 LAB
ANION GAP SERPL CALCULATED.3IONS-SCNC: 12 MMOL/L (ref 5–18)
BUN SERPL-MCNC: 36 MG/DL (ref 8–22)
CALCIUM SERPL-MCNC: 8.4 MG/DL (ref 8.5–10.5)
CHLORIDE BLD-SCNC: 95 MMOL/L (ref 98–107)
CO2 SERPL-SCNC: 25 MMOL/L (ref 22–31)
CREAT SERPL-MCNC: 4.35 MG/DL (ref 0.6–1.1)
ERYTHROCYTE [DISTWIDTH] IN BLOOD BY AUTOMATED COUNT: 17.2 % (ref 10–15)
GFR SERPL CREATININE-BSD FRML MDRD: 11 ML/MIN/1.73M2
GLUCOSE BLD-MCNC: 58 MG/DL (ref 70–125)
GLUCOSE BLDC GLUCOMTR-MCNC: 40 MG/DL (ref 70–99)
GLUCOSE BLDC GLUCOMTR-MCNC: 59 MG/DL (ref 70–99)
GLUCOSE BLDC GLUCOMTR-MCNC: 93 MG/DL (ref 70–99)
HCT VFR BLD AUTO: 31.3 % (ref 35–47)
HGB BLD-MCNC: 10 G/DL (ref 11.7–15.7)
MAGNESIUM SERPL-MCNC: 2.3 MG/DL (ref 1.8–2.6)
MCH RBC QN AUTO: 34.1 PG (ref 26.5–33)
MCHC RBC AUTO-ENTMCNC: 31.9 G/DL (ref 31.5–36.5)
MCV RBC AUTO: 107 FL (ref 78–100)
PLATELET # BLD AUTO: 318 10E3/UL (ref 150–450)
POTASSIUM BLD-SCNC: 4.8 MMOL/L (ref 3.5–5)
RBC # BLD AUTO: 2.93 10E6/UL (ref 3.8–5.2)
SODIUM SERPL-SCNC: 132 MMOL/L (ref 136–145)
WBC # BLD AUTO: 17.4 10E3/UL (ref 4–11)

## 2023-07-22 PROCEDURE — 250N000011 HC RX IP 250 OP 636: Mod: JZ

## 2023-07-22 PROCEDURE — 85027 COMPLETE CBC AUTOMATED: CPT

## 2023-07-22 PROCEDURE — 120N000001 HC R&B MED SURG/OB

## 2023-07-22 PROCEDURE — 80048 BASIC METABOLIC PNL TOTAL CA: CPT

## 2023-07-22 PROCEDURE — 97535 SELF CARE MNGMENT TRAINING: CPT | Mod: GO

## 2023-07-22 PROCEDURE — 250N000013 HC RX MED GY IP 250 OP 250 PS 637

## 2023-07-22 PROCEDURE — 90935 HEMODIALYSIS ONE EVALUATION: CPT

## 2023-07-22 PROCEDURE — 99231 SBSQ HOSP IP/OBS SF/LOW 25: CPT | Performed by: SPECIALIST

## 2023-07-22 PROCEDURE — 36415 COLL VENOUS BLD VENIPUNCTURE: CPT

## 2023-07-22 PROCEDURE — 5A1D70Z PERFORMANCE OF URINARY FILTRATION, INTERMITTENT, LESS THAN 6 HOURS PER DAY: ICD-10-PCS | Performed by: STUDENT IN AN ORGANIZED HEALTH CARE EDUCATION/TRAINING PROGRAM

## 2023-07-22 PROCEDURE — 99232 SBSQ HOSP IP/OBS MODERATE 35: CPT | Performed by: INTERNAL MEDICINE

## 2023-07-22 PROCEDURE — 250N000013 HC RX MED GY IP 250 OP 250 PS 637: Performed by: SPECIALIST

## 2023-07-22 PROCEDURE — 258N000003 HC RX IP 258 OP 636: Performed by: INTERNAL MEDICINE

## 2023-07-22 PROCEDURE — 250N000013 HC RX MED GY IP 250 OP 250 PS 637: Performed by: FAMILY MEDICINE

## 2023-07-22 PROCEDURE — 83735 ASSAY OF MAGNESIUM: CPT | Performed by: FAMILY MEDICINE

## 2023-07-22 PROCEDURE — 97166 OT EVAL MOD COMPLEX 45 MIN: CPT | Mod: GO

## 2023-07-22 PROCEDURE — 99232 SBSQ HOSP IP/OBS MODERATE 35: CPT | Mod: GC

## 2023-07-22 RX ORDER — OXYCODONE HYDROCHLORIDE 10 MG/1
10 TABLET ORAL EVERY 4 HOURS PRN
Status: ON HOLD | COMMUNITY
End: 2023-07-26

## 2023-07-22 RX ORDER — OXYCODONE HYDROCHLORIDE 5 MG/1
5 TABLET ORAL ONCE
Status: COMPLETED | OUTPATIENT
Start: 2023-07-22 | End: 2023-07-22

## 2023-07-22 RX ORDER — BISACODYL 10 MG
10 SUPPOSITORY, RECTAL RECTAL DAILY
Status: DISCONTINUED | OUTPATIENT
Start: 2023-07-22 | End: 2023-07-23

## 2023-07-22 RX ORDER — VIT B COMP NO.3/FOLIC/C/BIOTIN 1 MG-60 MG
1 TABLET ORAL DAILY
Status: DISCONTINUED | OUTPATIENT
Start: 2023-07-22 | End: 2023-07-26 | Stop reason: HOSPADM

## 2023-07-22 RX ORDER — OXYCODONE HYDROCHLORIDE 5 MG/1
10 TABLET ORAL EVERY 4 HOURS PRN
Status: DISCONTINUED | OUTPATIENT
Start: 2023-07-22 | End: 2023-07-26 | Stop reason: HOSPADM

## 2023-07-22 RX ORDER — LACTULOSE 10 G/15ML
10 SOLUTION ORAL 2 TIMES DAILY
Status: DISCONTINUED | OUTPATIENT
Start: 2023-07-22 | End: 2023-07-25

## 2023-07-22 RX ORDER — CALCIUM CARBONATE 500 MG/1
500 TABLET, CHEWABLE ORAL DAILY PRN
Status: DISCONTINUED | OUTPATIENT
Start: 2023-07-22 | End: 2023-07-26 | Stop reason: HOSPADM

## 2023-07-22 RX ADMIN — B-COMPLEX W/ C & FOLIC ACID TAB 1 MG 1 TABLET: 1 TAB at 17:50

## 2023-07-22 RX ADMIN — ROSUVASTATIN CALCIUM 10 MG: 10 TABLET, FILM COATED ORAL at 21:33

## 2023-07-22 RX ADMIN — DOCUSATE SODIUM 100 MG: 100 CAPSULE, LIQUID FILLED ORAL at 20:12

## 2023-07-22 RX ADMIN — OXYCODONE HYDROCHLORIDE 5 MG: 5 TABLET ORAL at 23:34

## 2023-07-22 RX ADMIN — HEPARIN SODIUM 5000 UNITS: 5000 INJECTION, SOLUTION INTRAVENOUS; SUBCUTANEOUS at 20:12

## 2023-07-22 RX ADMIN — SENNOSIDES AND DOCUSATE SODIUM 2 TABLET: 50; 8.6 TABLET ORAL at 09:09

## 2023-07-22 RX ADMIN — OXYCODONE HYDROCHLORIDE 5 MG: 5 TABLET ORAL at 02:52

## 2023-07-22 RX ADMIN — SODIUM CHLORIDE 300 ML: 9 INJECTION, SOLUTION INTRAVENOUS at 13:21

## 2023-07-22 RX ADMIN — HEPARIN SODIUM 5000 UNITS: 5000 INJECTION, SOLUTION INTRAVENOUS; SUBCUTANEOUS at 09:10

## 2023-07-22 RX ADMIN — CALCIUM CARBONATE (ANTACID) CHEW TAB 500 MG 500 MG: 500 CHEW TAB at 22:25

## 2023-07-22 RX ADMIN — LORAZEPAM 1 MG: 1 TABLET ORAL at 11:10

## 2023-07-22 RX ADMIN — HYDROMORPHONE HYDROCHLORIDE 0.5 MG: 0.2 INJECTION, SOLUTION INTRAMUSCULAR; INTRAVENOUS; SUBCUTANEOUS at 00:33

## 2023-07-22 RX ADMIN — HYDROMORPHONE HYDROCHLORIDE 0.5 MG: 0.2 INJECTION, SOLUTION INTRAMUSCULAR; INTRAVENOUS; SUBCUTANEOUS at 04:34

## 2023-07-22 RX ADMIN — OXYCODONE HYDROCHLORIDE 10 MG: 5 TABLET ORAL at 11:44

## 2023-07-22 RX ADMIN — LABETALOL HYDROCHLORIDE 100 MG: 100 TABLET, FILM COATED ORAL at 09:09

## 2023-07-22 RX ADMIN — NORTRIPTYLINE HYDROCHLORIDE 75 MG: 25 CAPSULE ORAL at 21:33

## 2023-07-22 RX ADMIN — SODIUM CHLORIDE 250 ML: 9 INJECTION, SOLUTION INTRAVENOUS at 13:23

## 2023-07-22 RX ADMIN — OXYCODONE HYDROCHLORIDE 10 MG: 5 TABLET ORAL at 21:32

## 2023-07-22 RX ADMIN — LEVOTHYROXINE SODIUM 50 MCG: 0.05 TABLET ORAL at 21:33

## 2023-07-22 RX ADMIN — SEVELAMER CARBONATE 1600 MG: 800 TABLET, FILM COATED ORAL at 15:41

## 2023-07-22 RX ADMIN — LACTULOSE 10 G: 10 SOLUTION ORAL at 11:32

## 2023-07-22 RX ADMIN — BISACODYL 10 MG: 10 SUPPOSITORY RECTAL at 09:09

## 2023-07-22 RX ADMIN — SERTRALINE 150 MG: 100 TABLET, FILM COATED ORAL at 21:33

## 2023-07-22 RX ADMIN — DOCUSATE SODIUM 100 MG: 100 CAPSULE, LIQUID FILLED ORAL at 09:09

## 2023-07-22 ASSESSMENT — ACTIVITIES OF DAILY LIVING (ADL)
ADLS_ACUITY_SCORE: 20
ADLS_ACUITY_SCORE: 22
ADLS_ACUITY_SCORE: 20
ADLS_ACUITY_SCORE: 22
ADLS_ACUITY_SCORE: 22
ADLS_ACUITY_SCORE: 20
ADLS_ACUITY_SCORE: 22
ADLS_ACUITY_SCORE: 22

## 2023-07-22 NOTE — PLAN OF CARE
Problem: Plan of Care - These are the overarching goals to be used throughout the patient stay.    Goal: Readiness for Transition of Care  Outcome: Progressing  Intervention: Mutually Develop Transition Plan  Recent Flowsheet Documentation  Taken 7/21/2023 2200 by Connie Ho, RN  Equipment Currently Used at Home: none     Problem: Hemodialysis  Goal: Effective Tissue Perfusion  Outcome: Progressing   Pt oriented x4. Endorsing cramping abdominal pain that comes in waves. No BM on shift, unable to pass flatus. Says nausea is a bit better, no emesis on shift but lots of belching. Bowel sounds hyperactive, abdomen very firm. VSS. Dialysis on 7/22. Anuric as pt does not have kidneys. Ambulating with standby assist, VSS. Pain very uncontrolled at this point, IV dilaudid not giving her enough coverage.

## 2023-07-22 NOTE — PROGRESS NOTES
07/22/23 1120   Appointment Info   Signing Clinician's Name / Credentials (OT) Ladan Mcmillan OTR/L   Rehab Comments (OT) OT Evaluation   Living Environment   People in Home alone   Current Living Arrangements apartment   Living Environment Comments Standard toilet, tub/shower-plans to purchase shower chair   Self-Care   Current Activity Tolerance poor   Equipment Currently Used at Home   (reacher, sock aid, LHSH)   General Information   Onset of Illness/Injury or Date of Surgery 07/21/23   Referring Physician Estrellita Quiroz   Patient/Family Therapy Goal Statement (OT) I need to get strong enough so to get myself up on my own off the floor and safely pick things up off the floor.   Existing Precautions/Restrictions   (clear liquids/fluids restriction, No BP in L UE)   General Observations and Info Pt recieves dialysis 3x/week   Cognitive Status Examination   Orientation Status orientation to person, place and time   Cognitive Status Comments Noted to repeat information throughout session. Follows directions but prefers to do tasks her way   Sensory   Sensory Quick Adds sensation intact   Pain Assessment   Patient Currently in Pain Yes, see Vital Sign flowsheet   Posture   Posture not impaired   Range of Motion Comprehensive   Comment, General Range of Motion Limited B UE shoulder flexion-unable to raise above gravity   Strength Comprehensive (MMT)   Comment, General Manual Muscle Testing (MMT) Assessment Generalized weakness   Coordination   Fine Motor Coordination decreased-unable to open packaging for socks   Bed Mobility   Bed Mobility supine-sit;sit-supine   Supine-Sit [place] (Bed Mobility) supervision   Sit-Supine [place] (Bed Mobility) supervision   Comment (Bed Mobility) Use of Bed rail   Transfers   Transfers sit-stand transfer   Sit-Stand Transfer   Sit-Stand [place] (Transfers) supervision   Balance   Balance Assessment standing balance: dynamic   Balance Comments CGA   Activities of  "Daily Living   BADL Assessment/Intervention lower body dressing   Lower Body Dressing Assessment/Training   [place] Level (Lower Body Dressing) supervision;verbal cues   Clinical Impression   Criteria for Skilled Therapeutic Interventions Met (OT) Yes, treatment indicated   OT Diagnosis Decreased ADL independence   Influenced by the following impairments Per chart \"Pt arrives by EMS with c/o weakness, abdominal pain, and dizziness. BP 84/p, P 119 per EMS. . Pt recently admitted here for about a week for same symptoms. Both kidneys removed last February. Pt receives dialysis.\" Determining Ileus versus partial small bowel obstruction at this time.   OT Problem List-Impairments impacting ADL activity tolerance impaired;balance;mobility;range of motion (ROM);strength   Assessment of Occupational Performance 5 or more Performance Deficits   Identified Performance Deficits standing ADLs, fx mobility, toileting, dressing, home mgmt   Planned Therapy Interventions (OT) ADL retraining;strengthening;transfer training;progressive activity/exercise   Clinical Decision Making Complexity (OT) moderate complexity   Anticipated Equipment Needs Upon Discharge (OT) shower chair   Risk & Benefits of therapy have been explained care plan/treatment goals reviewed   OT Total Evaluation Time   OT Eval, Moderate Complexity Minutes (83422) 10   OT Goals   Therapy Frequency (OT) Daily   OT Predicted Duration/Target Date for Goal Attainment 07/28/23   OT Goals Hygiene/Grooming;Lower Body Dressing;Toilet Transfer/Toileting   OT: Hygiene/Grooming supervision/stand-by assist   OT: Lower Body Dressing Supervision/stand-by assist   OT: Toilet Transfer/Toileting Supervision/stand-by assist   Interventions   Interventions Quick Adds Self-Care/Home Management   Self-Care/Home Management   Self-Care/Home Mgmt/ADL, Compensatory, Meal Prep Minutes (43731) 23   Symptoms Noted During/After Treatment (Meal Preparation/Planning Training) fatigue "   Treatment Detail/Skilled Intervention Pt sitting at EOB upon arrival agreeable to OT session, though needing increased encouragement to engage in activities due to suppository and drinking juice for glucose. Pt able to demo good sitting balance at EOB. Reports activity tolerance is much lower than it has been and feels likely due to weakness from being in the hospital. Pt completed bed mob w/mod I while pacing self between tasks. SBA for STS, CGA for ambulation for mild unsteadiness around the bed. Ed pt and demo FWW  to conserve energy, pt reports aware but not truly wanting to us FWW at this time.   OT Discharge Planning   OT Plan 7/28-standing ADLs, G/h, activity tolerance, SLUMs prn   OT Discharge Recommendation (DC Rec) Transitional Care Facility   OT Rationale for DC Rec Pt returning to the hospital after being discharged 7/19, pt expressing increased weakness and fatigue from baseline when at home and reports needing increased support for IADLs of family/friends and demonstrating weakness and decreased activity tolerance at this time. Pt would benefit from TCU to build strength and endurance with ADLs.   OT Brief overview of current status SBA for ADLs, CGA w/fx mobility/ambulation   Total Session Time   Timed Code Treatment Minutes 23   Total Session Time (sum of timed and untimed services) 33

## 2023-07-22 NOTE — PROGRESS NOTES
Renal progress note  CC:ESRD  Assessment and Plan:  67-year-old female past medical history of polycystic kidney disease s/p bilateral nephrectomies, end-stage renal disease on insulin hemodialysis TTS schedule [facility], anemia of chronic disease, hypertension, hypothyroidism, and hyperlipidemia.  She was recently admitted at [facility] for SBO versus ileus during admit 7/13/2023 through 7/19/2023  Presented with similar issues and abd pain concerning for SBO/Ileus  Nehrology consulted for ESRd management    ESRD: 2/2 polycystic kidney disease s/p bilateral nephrectomies.  On in center hemodialysis [facility].  Runs on a TTS schedule.  Last dialysis treatment was inpatient on 7/20/2023.  She runs 3 hours.  Has a left upper extremity aVF.  Labs reviewed, electrolytes are stable, euvolemic on exam, no acute indication for dialysis today as scheduled.  --> We will dialyze on TTS schedule, while inpatient     Anemia of chronic disease: Receives PETE and parenteral iron with outpatient dialysis.  Hemoglobin 10.3.  Follow need to dose PETE while inpatient.     Hyponatremia: Secondary to ESRD.  UF with HD.     Secondary renal hyperparathyroidism: Sevelamer 3 times daily AC.     History of hypertension: Last admit was mildly hypotensive s/p DC several antihypertensives, now on monotherapy with labetalol.  Has midodrine available with HD for intradialytic hypotension is poor blood pressure with UF.  She is normotensive this admit thus far.  Hypervolemia  Lower extremity edema  Will attempt UF with dialysis limited by lower blood pressures     Ileus, constipation, nausea, abdominal pain:  Admit 7/13/2023 through 7/19/2023 with similar complaints.  Surgery was consulted during that admit and they believed symptoms were most consistent with constipation.  At that time she was placed on a bowel regiment.  Imaging more suggestive of ileus versus SBO. Continues to have left-sided abdominal pain.    Management per primary service.     History of hypothyroidism on replacement    HLD on statin    Thank you for the consultation we will follow  Flaco Warren MD  [facility]  [phone]      Subjective  Seen at bedside is due for dialysis today  Discussed overall plan for dialysis with 1 to 2 L of UF mostly third spaced localized edema in ankles and feet  She has been readmitted with possible ileus/small bowel obstruction, with mild symptoms attempting some regimen to relieve constipation  No other events    Objective    Vital signs in last 24 hours  Temp:  [97.6  F (36.4  C)-98.2  F (36.8  C)] 98.2  F (36.8  C)  Pulse:  [] 92  Resp:  [13-18] 18  BP: (121-175)/(64-81) 137/76  SpO2:  [95 %-98 %] 98 %  Weight:   [unfilled]    Intake/Output last 3 shifts  No intake/output data recorded.  Intake/Output this shift:  No intake/output data recorded.    Physical Exam  Alert/oriented x 3, awake, NAD  CV: RRR without murmur or rub  Lung: clear and equal; no extra sounds  Ab: soft and NT; not distended; normal bs  Ext: no edema and well perfused  Skin; no rash    Pertinent Labs   Lab Results   Component Value Date    WBC 17.4 (H) 07/22/2023    HGB 10.0 (L) 07/22/2023    HCT 31.3 (L) 07/22/2023     (H) 07/22/2023     07/22/2023     Lab Results   Component Value Date    BUN 24 (H) 07/21/2023     (L) 07/21/2023    CO2 27 07/21/2023       Lab Results   Component Value Date    ALBUMIN 2.4 (L) 07/21/2023     Lab Results   Component Value Date    PHOS 3.4 07/14/2023     I reviewed all lab results  Flaco Warren MD

## 2023-07-22 NOTE — CONSULTS
Care Management Initial Consult      General Information  Assessment completed with: Patient,    Type of CM/SW Visit: Initial Assessment  Primary Care Provider verified and updated as needed: Yes   Readmission within the last 30 days: previous discharge plan unsuccessful   Return Category: Exacerbation of disease  Reason for Consult: discharge planning, health care directive, transportation  Advance Care Planning: Advance Care Planning Reviewed: no concerns identified, present on chart        Communication Assessment  Patient's communication style: spoken language (English or Bilingual)        Cognitive  Cognitive/Neuro/Behavioral: WDL                      Living Environment:   People in home: alone     Current living Arrangements: apartment      Able to return to prior arrangements: other (see comments) (Pt is seeking TCU placement at this time.)     Family/Social Support:  Care provided by: self, friend, other (see comments) (Family and friends assist as needed. A friend came from [facility] and care for pt after last recent hospitalization.)  Provides care for: no one, unable/limited ability to care for self  Marital Status: Single  Other (specify) (Extended family - Nieces and a nephew. Friends assist as needed/able as well.)          Description of Support System: Supportive, Involved, Other (see comments) (As needed/able.)    Support Assessment: Caregiver difficulty providing physical care/safety, Lacks adequate physical care    Current Resources:   Patient receiving home care services: No  Community Resources: Dialysis Services  Equipment currently used at home:    Supplies currently used at home: None    Employment/Financial:  Employment Status: retired     Financial Concerns: No concerns identified   Referral to Financial Worker: No     Does the patient's insurance plan have a 3 day qualifying hospital stay waiver?  No    Lifestyle & Psychosocial Needs:  Social Determinants of Health     Tobacco Use:  Medium Risk (7/5/2023)    Patient History      Smoking Tobacco Use: Former      Smokeless Tobacco Use: Never      Passive Exposure: Not on file   Alcohol Use: Not on file   Financial Resource Strain: Not on file   Food Insecurity: Not on file   Transportation Needs: Not on file   Physical Activity: Not on file   Stress: Not on file   Social Connections: Not on file   Intimate Partner Violence: Not on file   Depression: Not at risk (1/26/2021)    PHQ-2      PHQ-2 Score: 2   Housing Stability: Not on file     Functional Status:  Prior to admission patient needed assistance:   Dependent ADLs:: Independent (When at baseline.)  Dependent IADLs:: Independent (When at baseline.)  Assessment of Functional Status: Not at  functional baseline    Mental Health Status:  Mental Health Status: No Current Concerns       Chemical Dependency Status:  Chemical Dependency Status: No Current Concerns           Values/Beliefs:  Spiritual, Cultural Beliefs, Sikh Practices, Values that affect care: no (None identified.)             Additional Information:  Writer met with pt to introduce Care Management(CM) and obtain an initial assessment. Pt resides in an apartment and states typical independence with I/ADL when at baseline. Friends and niece/nephew support as needed/desired. No in-home services or DME use. A friend from somewhere north of here is currently staying with the pt to provide care as needed. Pt acknowledges that getting appropriate care, nutrition, and medical monitoring in-home has been ineffective. Pt is now seeking placement in a TCU upon discharge.     Pt has Dialysis on Tuesday, Thursday, and Saturday at [facility] in [facility]. Pt will need dialysis transport squared-away with TCU prior to placement.    Pt/family was provided with the Medicare Compare list for Transitional Care services. Discussed associated Medicare star ratings to assist with choice for referrals/discharge planning. Education was given to pt/family  "that star ratings are updated/maintained by Medicare and can be reviewed by visiting www.medicare.gov - Yes. Pt declined as she is not interested in traversing the Internet at this time to determine a desired facility. Pt preliminarily states she would be accepting of TCU placement in [facility] or [facility]. Pt is open to other suggestions. No referrals sent as of yet - therapy consults have been requested/not yet ordered.    7/21 per JEANETH Carlos.-\"67 year old female who initially presented with generalized weakness. Her abdomen hurts, specifically \"hardness\" around her belly button. She vomited three times today.  Patient takes blood pressure medications. Today, her blood pressure is 80 stostolic. She last underwent dialysis on 7/20 that she reports without issue. At time of sign out, disposition was pending CT scan and disposition, likely admission.    4:29 PM -I conducted some additional history with the patient after assuming care.  She reported having passed stool at the time of discharge from the hospital but since getting home and passed only 1 tiny small stool.  Progressive development abdominal pain very similar to when she was admitted to the hospital.  CT scan per radiology report similar to when she was admitted to the hospital.  Read more of an ileus as opposed to mechanical small bowel obstruction.  Her abdomen here in the emergency department was quite tender diffusely and firm to palpation.  I do think there may be benefit to trialing a NG tube with suction to see if if this alleviates some of patient's symptoms.  She is very nauseous.  Given the ongoing pain and the struggles that the patient is experiencing along with her leukocytosis I think admission to the hospital for further care management and interventions as indicated.  Discussed this with the hospitalist service.  Magnesium replacement initiated from the emergency department.  Currently stable from her " "renal issues.\"     PT&OT Consults have been requested. Anticipate TCU placement at time of discharge, unless hospital course dictates otherwise. Again, Dialysis transport will need to be addressed with an accepting TCU. CM to follow and assist with service coordination needs as indicated. Transport per fam/friend if appropriate.    Sushant Miller RN        "

## 2023-07-22 NOTE — PROGRESS NOTES
Morro KANG per pt request, she has had 2x bowel movements and is requesting her diet be changed so she can order some food.      Per Dr. Awad she will upgrade pt's diet to renal.

## 2023-07-22 NOTE — PROGRESS NOTES
[facility]    Progress Note - Hospitalist Service       Date of Admission:  7/21/2023    Assessment & Plan   Serene Tipton is a 67 year old female who has a history of PKD s/p bilateral nephrectomy, ESRD on HD TTS schedule, active transplant evaluation, Anemia,  HTN, HLD, Hypothyroidism, active tobacco use, chronic pain, multiple recent admissions for SBO vs ileus, most recently 7/13-7/19 who presented due to abdominal pain with CT findings suggestive of ileus and large amount of stool. Abdominal pain improving.     Ileus  Constipation  Nausea  Abdominal pain, improving  Continues to have left sided abdominal pain. Concern for SBO during admission (7/5-7/6) therefore surgery was consulted, believed constipation to be more likely. At that time, MiraLAX bowel regimen started and patient had multiple loose bowel movements. Patient has since had 3 abdominal CT scans with findings more suggestive of ileus than SBO. GI consulted last admission 7/13-7/19. Gastrografin study 7/18 excluded SBO. Since discharge 7/19, patient has been having increasing abdominal pain with NBNB emesis. Last BM 7/20. NG placement difficult due to hx nose fracture. Bowel sounds present, less concerned for SBO. Abdominal pain improving. Constipation in setting of chronic opioid use.  -Surgery consulted, appreciate recs   -Obstipation, bowel regimen recommended    -Daily suppository  -Clear liquid diet   -Zofran and Compazine PRN  -IV Pepcid   -Discontinue IV dilaudid   -Restart PTA oxycodone  -Continue PTA senna-docusate 2 tablets twice daily   -Add lactulose 10 mg twice daily     Failure to thrive  Hypoalbuminemia   Generalized weakness  Weight loss, fatigue, poor appetite. Likely secondary to chronic disease. Passed ACTH stim test last admission. Lives alone in her apartment.  Glucose 77 on admission. Declined TCU and HHC last admission (7/13-7/19), though desires TCU this admission.   -Glucose checks  BID  -Nutrition consult   -PT/OT     ESRD on HD  Mild hyponatremia, chronic  Mild hyponatremia but stable. Normal K. ESRD on dialysis TTS.   - Nephrology consulted, TTS dialysis   - Monitor BMP, mag and phos   - Avoid nephrotoxic medications   - limit IVF to avoid fluid overload      Hypertension   Last admission had hypotension. Changed medications, now only on labetalol 100 twice daily. BP appears much improved.   - continue PTA labetalol   - PRN hydralazine      Hypomagnesemia   Slightly low on admission, 1.6.  -RN replacement protocol     Leukocytosis, improving  No clear source of infection. Meets SIRS criteria with WBC 19 and elevated pulse. Improving without abx. Blood cx NGTD. Lactate 1.2  -Daily CBC     Tachycardia, improving   Likely related to pain. EKG with sinus tachycardia.    -monitor          Diet: Clear Liquid Diet  Snacks/Supplements Adult: Ensure Clear; With Meals    DVT Prophylaxis: heparin  Pool Catheter: Not present  Fluids: PO  Lines: None     Cardiac Monitoring: None  Code Status: Full Code      Clinically Significant Risk Factors Present on Admission           # Hypercalcemia: corrected calcium is >10.1, will monitor as appropriate  # Hypomagnesemia: Lowest Mg = 1.6 mg/dL in last 2 days, will replace as needed   # Hypoalbuminemia: Lowest albumin = 2.4 g/dL at 7/21/2023  1:23 PM, will monitor as appropriate     # Hypertension: Noted on problem list      # Cachexia: Estimated body mass index is 15.47 kg/m  as calculated from the following:    Height as of 7/13/23: 1.524 m (5').    Weight as of this encounter: 35.9 kg (79 lb 3.2 oz).            Disposition Plan      Expected Discharge Date: 07/24/2023      Destination: nursing home;other (comment) (For Transitional Care services.)          The patient's care was discussed with the Attending Physician, Dr. Quiroz.    Nigel Awad MD PGY3  Hospitalist Service  [facility]  Securely message with Vocera (more  info)  Text page via [facility] Paging/Directory   ______________________________________________________________________    Interval History   No overnight events. Slept well overnight. Reports no abdominal pain this morning. Thinks she passed flatus overnight. No BM during admission. Desires suppository. She would like to try chicken noodle soup this morning. No worsening leg swelling. Interested in TCU at discharge as she has been feeling weak.     Physical Exam   Vital Signs: Temp: 97.7  F (36.5  C) Temp src: Oral BP: 118/73 Pulse: 73   Resp: 16 SpO2: 97 % O2 Device: None (Room air)    Weight: 79 lbs 3.2 oz    Constitutional: awake but appears tired, cooperative, no apparent distress, thin, ill-appearing  Eyes: Lids and lashes normal, pupils equal, round and reactive to light, extra ocular muscles intact, sclera clear, conjunctiva normal  ENT: Normocephalic, without obvious abnormality, atraumatic, oral pharynx with tachy mucous membranes  Respiratory: No increased work of breathing, good air exchange, clear to auscultation bilaterally, no crackles or wheezing  Cardiovascular: Regular rate and rhythm, normal S1 and S2, no S3 or S4, and systolic murmur noted  GI: Healed scars, normoactive bowel sounds, softer, mildly distended, mild abdominal tenderness  Skin: Gray, pale. Dry.  Musculoskeletal: mild bilateral lower extremity edema  Neurologic: A&O x4.   Neuropsychiatric: General: normal, calm and normal eye contact  Level of consciousness:        Data   ------------------------- PAST 24 HR DATA REVIEWED -----------------------------------------------    I have personally reviewed the following data over the past 24 hrs:    17.4 (H)  \   10.0 (L)   / 318     132 (L) 95 (L) 36 (H) /  59 (L)   4.8 25 4.35 (H) \       ALT: 40 AST: 38 AP: 151 (H) TBILI: 0.7   ALB: 2.4 (L) TOT PROTEIN: 5.1 (L) LIPASE: 11       Procal: N/A CRP: N/A Lactic Acid: 1.2         Imaging results reviewed over the past 24 hrs:   Recent Results (from  the past 24 hour(s))   CT Abdomen Pelvis w/o Contrast    Narrative    EXAM: CT ABDOMEN PELVIS W/O CONTRAST  LOCATION: [facility]  DATE: 7/21/2023    INDICATION: incr abd pain, s p bilateral nephrectomy, ?SBO  COMPARISON: CT of the abdomen and pelvis 07/15/2023, 07/13/2023  TECHNIQUE: CT scan of the abdomen and pelvis was performed without IV contrast. Multiplanar reformats were obtained. Dose reduction techniques were used.  CONTRAST: None.    FINDINGS:   LOWER CHEST: Attenuation of the left ventricular myocardium is higher than the cardiac chambers consistent with anemia or hypoproteinemia. Paucity of mediastinal fat.    HEPATOBILIARY: Unchanged mild distention of the gallbladder but no gallbladder wall thickening or calcified gallstones. Internal and external bile ducts are also unchanged. There are numerous hepatic cysts. No new hepatic parenchymal lesions.    PANCREAS: Unchanged mild diffuse dilation of the pancreas duct, which tapers towards the pancreas tail.    SPLEEN: Normal.    ADRENAL GLANDS: Normal.    KIDNEYS/BLADDER: Status post bilateral nephrectomies.    BOWEL: Gas and fluid distended small bowel in the left upper quadrant. Positive enteric contrast is present with in more decompressed distal small bowel in the right abdomen. There is positive enteric contrast in the terminal ileum and ascending colon   from recent Gastrografin challenge, arguing against a high-grade mechanical small bowel obstruction. Large amount stool in the rectum and sigmoid colon.    LYMPH NODES: Mild ascites and diffuse increased attenuation of the intra-abdominal mesenteric and omental fat consistent with soft tissue edema. No discrete enlarged lymph nodes.    VASCULATURE: Severe, diffuse aortoiliac atheromatous calcifications.    PELVIC ORGANS: Age concordant uterine atrophy.    MUSCULOSKELETAL: Healed fracture deformity of the right inferior pubic ramus. Status post anterior fusion at L5-S1 with  grade 1 anterolisthesis of L5 on S1. Lower thoracic and other lumbar vertebra are maintained in height and shape.      Impression    IMPRESSION:     1.  Proximal small bowel is mildly gas and fluid distended. The small bowel in the right lower quadrant is less distended but contains positive contrast from recent Gastrografin challenge. As previously described, the pattern is more suggestive of ileus   than a mechanical small bowel obstruction.  2.  Large amount of stool in the sigmoid colon and rectum.  3.  Anemia/hypoproteinemia, mild ascites and anasarca.

## 2023-07-22 NOTE — PLAN OF CARE
Problem: Intestinal Obstruction  Goal: Optimal Bowel Function  Outcome: Progressing   Goal Outcome Evaluation:  Pt pleasant and cooperative throughout this shift.  She had multiple large bowel movements.  Received dialysis today for 3hrs.  PIV removed because it was leaking, will look at restarting it after her dinner.  Diet upgraded to Renal.  Pt received 1x 10mg Oxycodone prior to dialysis.

## 2023-07-22 NOTE — PROGRESS NOTES
CLINICAL NUTRITION SERVICES - ASSESSMENT NOTE     Nutrition Prescription    RECOMMENDATIONS FOR MDs/PROVIDERS TO ORDER:    Malnutrition Status:    Severe malnutrition  In Context of:  Acute illness or injury  Chronic illness or disease    Recommendations already ordered by Registered Dietitian (RD):  Renal MVI with poor intakes.   Nepro Vanilla daily at Breakfast- once diet advanced  Ensure clear Apple daily at Lunch     Future/Additional Recommendations:  - Monitor weight, po intake, electrolytes, BG, supplement mark anthony.   - NFPE to assess muscle/fat stores      REASON FOR ASSESSMENT  Serene Tipton is a/an 67 year old female assessed by the dietitian for Admission Nutrition Risk Screen for positive    HPI: Pt w/ hx of PKD s/p bilateral nephrectomy, ESRD on HD TTS schedule, active transplant evaluation, Anemia,  HTN, HLD, Hypothyroidism, active tobacco use, chronic pain, multiple recent admissions for SBO vs ileus, most recently 7/13-7/19 who presented due to abdominal pain with CT findings suggestive of ileus and large amount of stool.     NUTRITION HISTORY  Attempted to meet with pt at bedside x3. Pt with hx of severe malnutrition. Noted significant wt loss since May. Pt advanced to clear liquids this AM from NPO. No po intakes documented. Per chart review- pt with hx abdominal pain and hospitalization, Nepro at baseline mixed with chocolate syrup, early satiety noted last admit. Pt last seen by RD 7/17 with continued decreased appetite and inadequate po intakes.   NKFA    CURRENT NUTRITION ORDERS  Diet: Clear Liquids   Intake/Tolerance: Unclear d/t limited documented/unable to meet with pt.     LABS  Reviewed- Na 132(L). BUN 36(H), Creat 4.35(H), Glucose 58(L)     MEDICATIONS   Reviewed- Duclolax, colace, pepcid, normodyne, synthroid, Mg sulfate, proamatine, crestor, senna, zoloft, renvela     ANTHROPOMETRICS  Height: 0 cm (Data Unavailable)  Most Recent Weight: 35.9 kg (79 lb 3.2 oz)    IBW: 45.5 kg  BMI:  Underweight BMI <18.5  Weight History: 16.5% wt loss in 2-3 months, clinically significant wt loss   Wt Readings from Last 10 Encounters:   07/21/23 35.9 kg (79 lb 3.2 oz)   07/19/23 33.5 kg (73 lb 12.8 oz)   07/11/23 39.7 kg (87 lb 8.4 oz)   07/06/23 39.7 kg (87 lb 8 oz)   05/01/23 43 kg (94 lb 11.2 oz)   02/28/23 40.5 kg (89 lb 3.2 oz)   02/06/23 44.9 kg (99 lb)   12/19/22 47 kg (103 lb 11.2 oz)   12/30/21 57.3 kg (126 lb 6.4 oz)   11/17/21 54.4 kg (120 lb)        Dosing Weight: 35.9 kg    ASSESSED NUTRITION NEEDS  Estimated Energy Needs: 8666-0533 kcals/day (30 - 35 kcals/kg )  Justification: CKD and Underweight  Estimated Protein Needs: 39-50 grams protein/day (1.1 - 1.4 grams of pro/kg)  Justification: CKD and Dialysis  Estimated Fluid Needs: 25-30 ml/kg   Justification: Per provider pending fluid status    PHYSICAL FINDINGS  See malnutrition section below.   Per Flowsheets:   No I/Os documented   Trace/mild edema BLE  Distended abdomen, hypoactive bowel sounds, abdominal discomfort noted   Last BM noted 7/19     MALNUTRITION:  % Weight Loss:  > 10% in 6 months (severe malnutrition)  % Intake:  </= 75% for >/= 1 month (severe malnutrition)  Subcutaneous Fat Loss:  Unable to assess d/t need for physical exam   Muscle Loss:  Unable to assess d/t need for physical exam   Fluid Retention:  Mild BLE    Malnutrition Diagnosis: Severe malnutrition  In Context of:  Acute illness or injury  Chronic illness or disease    NUTRITION DIAGNOSIS  Malnutrition related to poor appetite as evidenced by significant wt loss, early satiety, likely fat/muscle loss.       INTERVENTIONS  Implementation  Renal MVI   Renal diet once advanced     Nepro Vanilla daily at Breakfast   Ensure clear Apple daily at Lunch     Goals  Pt to meet >75% nutritional needs   Electrolytes WNL   BG    BM regularity      Monitoring/Evaluation  Weight, po intake, electrolytes, BG, supplement mark anthony.

## 2023-07-22 NOTE — CONSULTS
Consultation - Surgery  Serene Tipton,  1955, MRN [id number]    Admitting Dx: Hypomagnesemia [E83.42]  Diffuse abdominal pain [R10.84]  Chronic renal failure, unspecified CKD stage [N18.9]    PCP: Ramos Lowry, [phone]   Code status:  Full Code       Extended Emergency Contact Information  Primary Emergency Contact: Brittaney Brunson  Home Phone: [phone]  Mobile Phone: [phone]  Relation: Niece  Secondary Emergency Contact: Bing Russell  Mobile Phone: [phone]  Relation: Niece   needed? No       Assessment and Plan   Impression:  Ileus versus partial small bowel obstruction.  Her history is most consistent with chronic ileus.  Recent Gastrografin study showed contrast going through into the colon.  Patient gets intermittently bloated but has a large stool burden in the colon consistent with obstipation and ileus.      Plan:  No plans for any surgery at this time.  I would not even recommend another small bowel follow-through.  She needs good bowel regimen.  Would recommend a suppository to try to get things going through and also MiraLAX. (Is this contraindicated with her renal failure?)  I would at least let her have clear liquids.           Chief Complaint Chronic renal failure, unspecified CKD stage       HPI    We have been requested by Dr. Awad to evaluate Serene Tipton for a small bowel obstruction.  This is a 67 year old year old female who has had several admissions for possible bowel obstructions recently.  Had a Gastrografin study done 4 days ago that showed no evidence of obstruction.  She also has problems with obstipation.  This all started after she had her kidneys removed for polycystic kidney disease.  She has had chronic weight loss over the last several months.  Came in again with bloating.  CT scan again shows multiple scattered loops of dilated loops of small bowel.  She has a large stool burden in the sigmoid colon and rectum.  She  feels better already this morning although she has not had a bowel movement since admission.       Medical History  Patient Active Problem List   Diagnosis     Polycystic kidney     Hypertension     End stage kidney disease (H)     Depressive disorder     Chronic low back pain     Hyperlipidemia     Tobacco abuse     Vitamin D deficiency     Polycystic kidney disease     Postoperative anemia due to acute blood loss     Enlarged kidney as indication for native nephrectomy     Acute post-operative pain     Chronic, continuous use of opioids     Shortness of breath     S/p nephrectomy     Dialysis patient (H)     Pulmonary edema, noncardiac     Hyperkalemia     ESRD (end stage renal disease) on dialysis (H)     Constipation     Small bowel obstruction (H)     Hypoglycemia     Fall, initial encounter     Hypomagnesemia     Diffuse abdominal pain     Chronic renal failure, unspecified CKD stage       [unfilled] Surgical History  Past Surgical History:   Procedure Laterality Date     BACK SURGERY      spinal fusion w/hardware     BIOPSY Left 1990    Breast     BREAST LUMPECTOMY, RT/LT Left     Lumpectomy     CYST REMOVAL Right     rt wrist     CYSTECTOMY PILONIDAL  1981     EYE SURGERY  2008    lasik     FORMATION ARTERIOVENOUS FISTULA    2020     FRACTURE SURGERY       NEPHRECTOMY BILATERAL Bilateral 2023    Procedure: bilateral native nephrectomy;  Surgeon: Alana Giang MD;  Location: [facility] OR     ORTHOPEDIC SURGERY Right     Shoulder     OTHER SURGICAL HISTORY      carpal tunnel repair     SPINE SURGERY          Social History  Social History     Tobacco Use     Smoking status: Former     Packs/day: 0.50     Years: 14.00     Pack years: 7.00     Types: Cigarettes     Quit date: 2021     Years since quittin.1     Smokeless tobacco: Never   Substance Use Topics     Alcohol use: Not Currently     Drug use: Never       Allergies  Allergies   Allergen Reactions     Penicillins Other (See  Comments) and Shortness Of Breath     Breathing problem.  breathing       Carvedilol GI Disturbance     Nausea and vomiting     Ciprofloxacin Muscle Pain (Myalgia)     Morphine Other (See Comments)     Vomiting     No Clinical Screening - See Comments      PN: LW CM1: Contrast Intercapsular - Nonionic Reaction :     Nsaids Other (See Comments)     Sulfa Antibiotics Itching     Sulfamethoxazole-Trimethoprim      Other reaction(s): itching     Levofloxacin Muscle Pain (Myalgia) and Rash    Family History  Reviewed, and family history includes Cardiac Sudden Death (age of onset: 52) in her sister; Cerebrovascular Disease in her maternal grandmother, mother, and paternal grandmother; Chronic Obstructive Pulmonary Disease in her father; Coronary Artery Disease in her paternal grandfather; Heart Disease in her maternal grandfather; Hyperlipidemia in her paternal grandmother; Hypertension in her maternal grandmother, mother, and sister; Kidney Disease in her maternal grandmother, mother, and sister; Multiple Sclerosis in her father; Polycystic Kidney Diease in her maternal grandmother, mother, and sister; Pulmonary Embolism in her paternal grandfather.  The Family history is not pertinent to the patients chief complaint. Psychosocial Needs  Social History     Social History Narrative     Not on file     Additional psychosocial needs reviewed per nursing assessment.     Prior to Admission Medications   Current Outpatient Medications   Medication Instructions     B Complex-C (SUPER B COMPLEX PO) 1 tablet, Oral, AT BEDTIME     calcitRIOL (ROCALTROL) 0.5 mcg, Oral, THREE TIMES WEEKLY, Given at HD. Tues, Thurs, Sat     cholecalciferol (VITAMIN D3) 25 mcg (1000 units) capsule 1 capsule, Oral, AT BEDTIME     Epoetin Adam (EPOGEN IJ) 1,000 Units, Intravenous, THREE TIMES WEEKLY, At dialysis.     Iron Sucrose (VENOFER IV) 100 mg, Intravenous, WEEKLY, At dialysis     labetalol (NORMODYNE) 100 mg, Oral, AT BEDTIME     labetalol  (NORMODYNE) 100 mg, Oral, EVERY MORNING     levothyroxine (SYNTHROID/LEVOTHROID) 50 mcg, Oral, AT BEDTIME, Takes in the middle of the night.     LORazepam (ATIVAN) 1 mg, Oral, DAILY BEFORE LUNCH     nortriptyline (PAMELOR) 75 mg, Oral, AT BEDTIME     omeprazole (PRILOSEC) 20 mg, Oral, DAILY     oxyCODONE (ROXICODONE) 10 MG tablet Use oxycodone 10mg every 3 hours as needed for pain on 2/6 & 2/7. Then return to usual home dose.     rosuvastatin (CRESTOR) 10 mg, Oral, AT BEDTIME     senna-docusate (SENOKOT-S/PERICOLACE) 8.6-50 MG tablet 2 tablets, Oral, 2 TIMES DAILY     sertraline (ZOLOFT) 150 mg, Oral, AT BEDTIME     sevelamer carbonate (RENVELA) 800-2,400 mg, Oral, 3 TIMES DAILY, When eating; range of 1-3 tabs depending on meal size           Review of Systems:  Pertinent items are noted in HPI. Physical Exam:  Temp:  [97.6  F (36.4  C)-98.2  F (36.8  C)] 98.2  F (36.8  C)  Pulse:  [] 92  Resp:  [13-18] 18  BP: (121-175)/(64-81) 137/76  SpO2:  [95 %-98 %] 98 %    General appearance: alert, appears stated age and cachectic.  Very pleasant  Eyes: no abnormalities detected    Abdomen: Soft, flat, nontender.  Well-healed midline incision.         Pertinent Labs  Lab Results: personally reviewed.   Lab Results   Component Value Date    WBC 17.4 07/22/2023    WBC 10.4 05/19/2021    HGB 10.0 07/22/2023    HGB 13.0 05/19/2021    HCT 31.3 07/22/2023    HCT 39.6 05/19/2021     07/22/2023     05/19/2021     07/22/2023     05/19/2021        Pertinent Radiology  Radiology Results: Personally reviewed image/s and Personally reviewed impression/s  EKG Results: not reviewed.

## 2023-07-22 NOTE — PROGRESS NOTES
HEMODIALYSIS TREATMENT NOTE    Date: 7/22/2023  Time: 5:08 PM    Data:  Pre Wt:   36 kg  Desired Wt:35.5 kg   Ultrafiltration - Post Run Net Total Removed (mL): 600 mL  Vascular Access Status: Fistula  patent  Dialyzer Rinse: Clear  Total Blood Volume Processed: 59.8 L   Total Dialysis (Treatment) Time: 3hrs   Dialysate Bath: K 2, Ca 3  Heparin: None    Lab:   No    Assessment/Interventions:  HD treatment completed through AVF with BFR 350ml/min.  Net fluid removal 0.6kg. stable HD run. Hand off report given floor RN.      Plan:    Next HD run per renal team.

## 2023-07-23 ENCOUNTER — APPOINTMENT (OUTPATIENT)
Dept: OCCUPATIONAL THERAPY | Facility: CLINIC | Age: 68
DRG: 388 | End: 2023-07-23
Payer: COMMERCIAL

## 2023-07-23 ENCOUNTER — APPOINTMENT (OUTPATIENT)
Dept: PHYSICAL THERAPY | Facility: CLINIC | Age: 68
DRG: 388 | End: 2023-07-23
Payer: COMMERCIAL

## 2023-07-23 LAB
ANION GAP SERPL CALCULATED.3IONS-SCNC: 10 MMOL/L (ref 5–18)
BUN SERPL-MCNC: 27 MG/DL (ref 8–22)
CALCIUM SERPL-MCNC: 8.4 MG/DL (ref 8.5–10.5)
CHLORIDE BLD-SCNC: 97 MMOL/L (ref 98–107)
CO2 SERPL-SCNC: 27 MMOL/L (ref 22–31)
CREAT SERPL-MCNC: 3.12 MG/DL (ref 0.6–1.1)
ERYTHROCYTE [DISTWIDTH] IN BLOOD BY AUTOMATED COUNT: 17.4 % (ref 10–15)
GFR SERPL CREATININE-BSD FRML MDRD: 16 ML/MIN/1.73M2
GLUCOSE BLD-MCNC: 81 MG/DL (ref 70–125)
GLUCOSE BLDC GLUCOMTR-MCNC: 104 MG/DL (ref 70–99)
GLUCOSE BLDC GLUCOMTR-MCNC: 88 MG/DL (ref 70–99)
HCT VFR BLD AUTO: 28 % (ref 35–47)
HGB BLD-MCNC: 8.8 G/DL (ref 11.7–15.7)
HGB BLD-MCNC: 9.5 G/DL (ref 11.7–15.7)
MAGNESIUM SERPL-MCNC: 1.9 MG/DL (ref 1.8–2.6)
MCH RBC QN AUTO: 33.6 PG (ref 26.5–33)
MCHC RBC AUTO-ENTMCNC: 31.4 G/DL (ref 31.5–36.5)
MCV RBC AUTO: 107 FL (ref 78–100)
PLATELET # BLD AUTO: 270 10E3/UL (ref 150–450)
POTASSIUM BLD-SCNC: 4 MMOL/L (ref 3.5–5)
RBC # BLD AUTO: 2.62 10E6/UL (ref 3.8–5.2)
SODIUM SERPL-SCNC: 134 MMOL/L (ref 136–145)
WBC # BLD AUTO: 13.7 10E3/UL (ref 4–11)

## 2023-07-23 PROCEDURE — 99231 SBSQ HOSP IP/OBS SF/LOW 25: CPT | Performed by: PHYSICIAN ASSISTANT

## 2023-07-23 PROCEDURE — 97116 GAIT TRAINING THERAPY: CPT | Mod: GP

## 2023-07-23 PROCEDURE — 83735 ASSAY OF MAGNESIUM: CPT | Performed by: FAMILY MEDICINE

## 2023-07-23 PROCEDURE — 250N000013 HC RX MED GY IP 250 OP 250 PS 637: Performed by: FAMILY MEDICINE

## 2023-07-23 PROCEDURE — 97535 SELF CARE MNGMENT TRAINING: CPT | Mod: GO

## 2023-07-23 PROCEDURE — 120N000001 HC R&B MED SURG/OB

## 2023-07-23 PROCEDURE — 250N000013 HC RX MED GY IP 250 OP 250 PS 637

## 2023-07-23 PROCEDURE — 97110 THERAPEUTIC EXERCISES: CPT | Mod: GP

## 2023-07-23 PROCEDURE — 99232 SBSQ HOSP IP/OBS MODERATE 35: CPT | Performed by: INTERNAL MEDICINE

## 2023-07-23 PROCEDURE — 85027 COMPLETE CBC AUTOMATED: CPT

## 2023-07-23 PROCEDURE — 250N000011 HC RX IP 250 OP 636: Mod: JZ

## 2023-07-23 PROCEDURE — 85018 HEMOGLOBIN: CPT | Performed by: INTERNAL MEDICINE

## 2023-07-23 PROCEDURE — 36415 COLL VENOUS BLD VENIPUNCTURE: CPT | Performed by: INTERNAL MEDICINE

## 2023-07-23 PROCEDURE — 97162 PT EVAL MOD COMPLEX 30 MIN: CPT | Mod: GP

## 2023-07-23 PROCEDURE — 99232 SBSQ HOSP IP/OBS MODERATE 35: CPT | Mod: GC

## 2023-07-23 PROCEDURE — 80048 BASIC METABOLIC PNL TOTAL CA: CPT

## 2023-07-23 PROCEDURE — 634N000001 HC RX 634: Mod: JZ | Performed by: INTERNAL MEDICINE

## 2023-07-23 PROCEDURE — 36415 COLL VENOUS BLD VENIPUNCTURE: CPT

## 2023-07-23 RX ORDER — PANTOPRAZOLE SODIUM 40 MG/1
40 TABLET, DELAYED RELEASE ORAL
Status: DISCONTINUED | OUTPATIENT
Start: 2023-07-23 | End: 2023-07-26 | Stop reason: HOSPADM

## 2023-07-23 RX ADMIN — ROSUVASTATIN CALCIUM 10 MG: 10 TABLET, FILM COATED ORAL at 21:10

## 2023-07-23 RX ADMIN — SEVELAMER CARBONATE 800 MG: 800 TABLET, FILM COATED ORAL at 15:26

## 2023-07-23 RX ADMIN — SENNOSIDES AND DOCUSATE SODIUM 2 TABLET: 50; 8.6 TABLET ORAL at 21:11

## 2023-07-23 RX ADMIN — OXYCODONE HYDROCHLORIDE 10 MG: 5 TABLET ORAL at 02:29

## 2023-07-23 RX ADMIN — SEVELAMER CARBONATE 1600 MG: 800 TABLET, FILM COATED ORAL at 11:02

## 2023-07-23 RX ADMIN — EPOETIN ALFA-EPBX 10000 UNITS: 10000 INJECTION, SOLUTION INTRAVENOUS; SUBCUTANEOUS at 13:16

## 2023-07-23 RX ADMIN — HEPARIN SODIUM 5000 UNITS: 5000 INJECTION, SOLUTION INTRAVENOUS; SUBCUTANEOUS at 08:43

## 2023-07-23 RX ADMIN — LEVOTHYROXINE SODIUM 50 MCG: 0.05 TABLET ORAL at 21:29

## 2023-07-23 RX ADMIN — PANTOPRAZOLE SODIUM 40 MG: 40 TABLET, DELAYED RELEASE ORAL at 13:16

## 2023-07-23 RX ADMIN — LABETALOL HYDROCHLORIDE 100 MG: 100 TABLET, FILM COATED ORAL at 08:53

## 2023-07-23 RX ADMIN — NORTRIPTYLINE HYDROCHLORIDE 75 MG: 25 CAPSULE ORAL at 21:09

## 2023-07-23 RX ADMIN — OXYCODONE HYDROCHLORIDE 10 MG: 5 TABLET ORAL at 21:17

## 2023-07-23 RX ADMIN — B-COMPLEX W/ C & FOLIC ACID TAB 1 MG 1 TABLET: 1 TAB at 11:02

## 2023-07-23 RX ADMIN — DOCUSATE SODIUM 100 MG: 100 CAPSULE, LIQUID FILLED ORAL at 08:43

## 2023-07-23 RX ADMIN — SENNOSIDES AND DOCUSATE SODIUM 2 TABLET: 50; 8.6 TABLET ORAL at 08:43

## 2023-07-23 RX ADMIN — HEPARIN SODIUM 5000 UNITS: 5000 INJECTION, SOLUTION INTRAVENOUS; SUBCUTANEOUS at 21:11

## 2023-07-23 RX ADMIN — LORAZEPAM 1 MG: 1 TABLET ORAL at 11:02

## 2023-07-23 RX ADMIN — SERTRALINE 150 MG: 100 TABLET, FILM COATED ORAL at 21:09

## 2023-07-23 RX ADMIN — SEVELAMER CARBONATE 2400 MG: 800 TABLET, FILM COATED ORAL at 17:29

## 2023-07-23 ASSESSMENT — ACTIVITIES OF DAILY LIVING (ADL)
ADLS_ACUITY_SCORE: 23
ADLS_ACUITY_SCORE: 22
ADLS_ACUITY_SCORE: 23
ADLS_ACUITY_SCORE: 22
ADLS_ACUITY_SCORE: 23
ADLS_ACUITY_SCORE: 22
ADLS_ACUITY_SCORE: 23
ADLS_ACUITY_SCORE: 22
ADLS_ACUITY_SCORE: 22

## 2023-07-23 NOTE — PROGRESS NOTES
General Surgery Progress Note:    Hospital Day # 2    ASSESSMENT:   1. Diffuse abdominal pain    2. Hypomagnesemia    3. Chronic renal failure, unspecified CKD stage        Serene Tipton is a 67 year old female with ileus and obstipation. Having bms.     PLAN:   Discussed regular diet. Go slow. Easier foods. Small amounts. She may have intermittent pains and hardness. No surgical intervention planned. We will sign off. Please call if any questions or concerns.       SUBJECTIVE:   Serene Tipton is feeling well. Having bms. Last night did have some hardness of abdomen and pains. Today she feels well.     Patient Vitals for the past 24 hrs:   BP Temp Temp src Pulse Resp SpO2 Weight   07/23/23 0747 136/72 97.8  F (36.6  C) Oral 98 16 96 % --   07/23/23 0231 -- -- -- -- -- -- 34.7 kg (76 lb 9.6 oz)   07/22/23 2300 135/71 97.9  F (36.6  C) Oral 94 18 100 % --   07/22/23 2203 109/60 -- -- -- -- -- --   07/22/23 1657 106/60 -- Oral 89 18 -- --   07/22/23 1627 96/59 -- -- 97 -- -- --   07/22/23 1615 96/60 -- -- -- -- -- --   07/22/23 1600 96/58 -- -- 87 -- -- --   07/22/23 1545 99/57 -- -- 101 -- -- --   07/22/23 1530 106/57 -- -- 97 -- -- --   07/22/23 1515 100/57 -- -- 93 -- -- --   07/22/23 1500 106/60 -- -- 87 -- -- --   07/22/23 1445 105/63 -- -- 86 -- -- --   07/22/23 1430 91/55 -- -- 87 -- -- --   07/22/23 1400 96/53 -- -- 95 -- -- --   07/22/23 1345 96/53 -- -- 87 -- -- --   07/22/23 1330 112/86 -- -- 85 -- -- --   07/22/23 1315 112/80 -- -- 80 -- -- --   07/22/23 1230 118/73 -- -- 73 16 97 % --   07/22/23 0903 120/65 97.7  F (36.5  C) Oral 91 16 97 % --       Physical Exam:  General: NAD, pleasant  CV:RRR  LUNGS:CTA bilaterally  ABD: soft. Minimal tenderness  EXT:no CCE    Admission on 07/21/2023   Component Date Value     Systolic Blood Pressure 07/21/2023 123      Diastolic Blood Pressure 07/21/2023 64      Ventricular Rate 07/21/2023 102      Atrial Rate 07/21/2023 102      AR Interval 07/21/2023 170       QRS Duration 07/21/2023 80      QT 07/21/2023 358      QTc 07/21/2023 466      P Axis 07/21/2023 73      R AXIS 07/21/2023 48      T Axis 07/21/2023 78      Interpretation ECG 07/21/2023                      Value:Sinus tachycardia  Right atrial enlargement  Left ventricular hypertrophy with repolarization abnormality  Abnormal ECG  When compared with ECG of 13-JUL-2023 11:59,  ID interval has decreased  Incomplete left bundle branch block is no longer Present  Nonspecific T wave abnormality no longer evident in Inferior leads  QT has shortened  Confirmed by SEE ED PROVIDER NOTE FOR, ECG INTERPRETATION ([id number]),  GABRIELA PELAEZ ([id number]) on 7/21/2023 3:03:29 PM       Sodium 07/21/2023 134 (L)      Potassium 07/21/2023 4.2      Chloride 07/21/2023 94 (L)      Carbon Dioxide (CO2) 07/21/2023 27      Anion Gap 07/21/2023 13      Urea Nitrogen 07/21/2023 24 (H)      Creatinine 07/21/2023 3.82 (H)      Calcium 07/21/2023 9.1      Glucose 07/21/2023 77      GFR Estimate 07/21/2023 12 (L)      Troponin I 07/21/2023 0.03      Bilirubin Total 07/21/2023 0.7      Bilirubin Direct 07/21/2023 0.2      Protein Total 07/21/2023 5.1 (L)      Albumin 07/21/2023 2.4 (L)      Alkaline Phosphatase 07/21/2023 151 (H)      AST 07/21/2023 38      ALT 07/21/2023 40      Lipase 07/21/2023 11      Hold Specimen 07/21/2023 JIC      Hold Specimen 07/21/2023 JIC      Hold Specimen 07/21/2023 JIC      Hold Specimen 07/21/2023 JIC      WBC Count 07/21/2023 18.9 (H)      RBC Count 07/21/2023 3.08 (L)      Hemoglobin 07/21/2023 10.3 (L)      Hematocrit 07/21/2023 32.4 (L)      MCV 07/21/2023 105 (H)      MCH 07/21/2023 33.4 (H)      MCHC 07/21/2023 31.8      RDW 07/21/2023 16.4 (H)      Platelet Count 07/21/2023 343      % Neutrophils 07/21/2023 89      % Lymphocytes 07/21/2023 5      % Monocytes 07/21/2023 5      % Eosinophils 07/21/2023 0      % Basophils 07/21/2023 0      % Immature Granulocytes 07/21/2023 1      NRBCs per 100 WBC  07/21/2023 0      Absolute Neutrophils 07/21/2023 16.9 (H)      Absolute Lymphocytes 07/21/2023 1.0      Absolute Monocytes 07/21/2023 0.9      Absolute Eosinophils 07/21/2023 0.0      Absolute Basophils 07/21/2023 0.0      Absolute Immature Granul* 07/21/2023 0.1      Absolute NRBCs 07/21/2023 0.0      Magnesium 07/21/2023 1.6 (L)      Lactic Acid 07/21/2023 1.2      Culture 07/21/2023 No growth after 1 day      Sodium 07/22/2023 132 (L)      Potassium 07/22/2023 4.8      Chloride 07/22/2023 95 (L)      Carbon Dioxide (CO2) 07/22/2023 25      Anion Gap 07/22/2023 12      Urea Nitrogen 07/22/2023 36 (H)      Creatinine 07/22/2023 4.35 (H)      Calcium 07/22/2023 8.4 (L)      Glucose 07/22/2023 58 (LL)      GFR Estimate 07/22/2023 11 (L)      WBC Count 07/22/2023 17.4 (H)      RBC Count 07/22/2023 2.93 (L)      Hemoglobin 07/22/2023 10.0 (L)      Hematocrit 07/22/2023 31.3 (L)      MCV 07/22/2023 107 (H)      MCH 07/22/2023 34.1 (H)      MCHC 07/22/2023 31.9      RDW 07/22/2023 17.2 (H)      Platelet Count 07/22/2023 318      Magnesium 07/22/2023 2.3      GLUCOSE BY METER POCT 07/22/2023 40 (LL)      GLUCOSE BY METER POCT 07/22/2023 59 (L)      GLUCOSE BY METER POCT 07/22/2023 93      Magnesium 07/23/2023 1.9      Sodium 07/23/2023 134 (L)      Potassium 07/23/2023 4.0      Chloride 07/23/2023 97 (L)      Carbon Dioxide (CO2) 07/23/2023 27      Anion Gap 07/23/2023 10      Urea Nitrogen 07/23/2023 27 (H)      Creatinine 07/23/2023 3.12 (H)      Calcium 07/23/2023 8.4 (L)      Glucose 07/23/2023 81      GFR Estimate 07/23/2023 16 (L)      WBC Count 07/23/2023 13.7 (H)      RBC Count 07/23/2023 2.62 (L)      Hemoglobin 07/23/2023 8.8 (L)      Hematocrit 07/23/2023 28.0 (L)      MCV 07/23/2023 107 (H)      MCH 07/23/2023 33.6 (H)      MCHC 07/23/2023 31.4 (L)      RDW 07/23/2023 17.4 (H)      Platelet Count 07/23/2023 270      GLUCOSE BY METER POCT 07/23/2023 88         ERICKA Petersen  [facility]  [address] [address]  Phone- [phone]  Fax- [phone]

## 2023-07-23 NOTE — PROVIDER NOTIFICATION
10:16 PM patient declined labetalol 100mg for BP of 109/60. That is her highest pressure since dialysis. Can we have hold parameters? She is also requesting Tums     Please see new orders placed by Dr Park

## 2023-07-23 NOTE — PROGRESS NOTES
Renal progress note  CC:ESRD  Assessment and Plan:  67-year-old female past medical history of polycystic kidney disease s/p bilateral nephrectomies, end-stage renal disease on insulin hemodialysis TTS schedule [facility], anemia of chronic disease, hypertension, hypothyroidism, and hyperlipidemia.  She was recently admitted at [facility] for SBO versus ileus during admit 7/13/2023 through 7/19/2023  Presented with similar issues and abd pain concerning for SBO/Ileus  Nehrology consulted for ESRd management    ESRD: 2/2 polycystic kidney disease s/p bilateral nephrectomies.  On in center hemodialysis [facility].  Runs on a TTS schedule.  Last dialysis treatment was inpatient on 7/20/2023.  She runs 3 hours.  Has a left upper extremity aVF.  Labs reviewed, electrolytes are stable, euvolemic on exam, no acute indication for dialysis today as scheduled.  --> We will dialyze on TTS schedule, while inpatient  Much more fluid overloaded then yesterday with minimal UF   Will need to consider UF treatment tomorrow     Anemia of chronic disease: Receives PETE and parenteral iron with outpatient dialysis.  Hemoglobin 10s.  Follow need to dose PETE while inpatient.  Hb drop to 8.8  --> recheck at 1400  --> CBC tomorrow     Hyponatremia: Secondary to ESRD.  UF with HD.     Secondary renal hyperparathyroidism: Sevelamer 3 times daily AC.     History of hypertension: Last admit was mildly hypotensive s/p DC several antihypertensives, now on monotherapy with labetalol.  Has midodrine available with HD for intradialytic hypotension is poor blood pressure with UF.  She is normotensive this admit thus far.  Hypervolemia  Lower extremity edema  Will attempt UF with dialysis limited by lower blood pressures     Ileus, constipation, nausea, abdominal pain:  Admit 7/13/2023 through 7/19/2023 with similar complaints.  Surgery was consulted during that admit and they believed symptoms were most consistent  with constipation.  At that time she was placed on a bowel regiment.  Imaging more suggestive of ileus versus SBO. Continues to have left-sided abdominal pain.   Management per primary service.     History of hypothyroidism on replacement    HLD on statin    Thank you for the consultation we will follow  Flaco Warren MD  [facility]  [phone]      Subjective  Seen at bedside  Had a lot of BM, no bleed  Hb lower and she appears more edematous  No breathing issues  Will likely need to attempt UF treatment tomorrow  Trend HB  No other events    Objective    Vital signs in last 24 hours  Temp:  [97.7  F (36.5  C)-97.9  F (36.6  C)] 97.9  F (36.6  C)  Pulse:  [] 94  Resp:  [16-18] 18  BP: ()/(53-86) 135/71  SpO2:  [97 %-100 %] 100 %  Weight:   [unfilled]    Intake/Output last 3 shifts  I/O last 3 completed shifts:  In: 0   Out: 600 [Other:600]  Intake/Output this shift:  No intake/output data recorded.    Physical Exam  Alert/oriented x 3, awake, NAD  CV: RRR without murmur or rub  Lung: clear and equal; no extra sounds  Ab: soft and NT; not distended; normal bs  Ext: no edema and well perfused  Skin; no rash    Pertinent Labs   Lab Results   Component Value Date    WBC 13.7 (H) 07/23/2023    HGB 8.8 (L) 07/23/2023    HCT 28.0 (L) 07/23/2023     (H) 07/23/2023     07/23/2023     Lab Results   Component Value Date    BUN 36 (H) 07/22/2023     (L) 07/22/2023    CO2 25 07/22/2023       Lab Results   Component Value Date    ALBUMIN 2.4 (L) 07/21/2023     Lab Results   Component Value Date    PHOS 3.4 07/14/2023     I reviewed all lab results  Flaco Warren MD

## 2023-07-23 NOTE — PLAN OF CARE
Problem: Malnutrition  Goal: Improved Nutritional Intake  Outcome: Progressing   Goal Outcome Evaluation:  Pt pleasant and cooperative throughout this shift.  She is A&Ox4, she is able to make her needs known.  Nephrology placed an order for a water run for dialysis tomorrow (Monday 07.24), pt is aware this will take place.  Glucose monitoring today was stable, 88 at 0800 and 104 at 1800.  Pt passing gas today, 1x BM today, HELD lactulose due to multiple BM's yesterday, she did received a colace and senna this morning, but since then the colace has been discontinued as well as the suppository.  Pt's appetite has been healthy, she said she is eating more like she usually does.

## 2023-07-23 NOTE — PROGRESS NOTES
07/23/23 1000   Appointment Info   Signing Clinician's Name / Credentials (PT) Mic Emanuel DPT   Living Environment   People in Home alone   Current Living Arrangements apartment   Home Accessibility no concerns   Transportation Anticipated family or friend will provide   Self-Care   Usual Activity Tolerance good   Current Activity Tolerance fair   Regular Exercise Yes   Fall history within last six months yes   Number of times patient has fallen within last six months 3   General Information   Onset of Illness/Injury or Date of Surgery 07/21/23   Referring Physician Estrellita Quiroz MD   Patient/Family Therapy Goals Statement (PT) get back home   Pertinent History of Current Problem (include personal factors and/or comorbidities that impact the POC) Serene Tipton is a 67 year old female who has a history of PKD s/p bilateral nephrectomy, ESRD on HD TTS schedule, active transplant evaluation, Anemia,  HTN, HLD, Hypothyroidism, active tobacco use, chronic pain, multiple recent admissions for SBO vs ileus, most recently 7/13-7/19 who presented due to abdominal pain with CT findings suggestive of ileus and large amount of stool   Existing Precautions/Restrictions fall   Cognition   Affect/Mental Status (Cognition) WNL   Orientation Status (Cognition) oriented x 3   Range of Motion (ROM)   Range of Motion ROM is WFL   Strength (Manual Muscle Testing)   Strength Comments does demonstrate reduced LE and UE strength with AROM. able to perform LAQ and SLR, shoulder AROM reduced   Bed Mobility   Bed Mobility supine-sit   Supine-Sit [place] (Bed Mobility) supervision   Transfers   Transfers sit-stand transfer   Sit-Stand Transfer   Sit-Stand [place] (Transfers) contact guard   Assistive Device (Sit-Stand Transfers) walker, front-wheeled   Gait/Stairs (Locomotion)   [place] Level (Gait) contact guard   Assistive Device (Gait)   (none)   Distance in Feet 250'   Distance in Feet (Gait) 250'    Pattern (Gait) step-through   Deviations/Abnormal Patterns (Gait) [place] decreased;stride length decreased;gait speed decreased   Comment, (Gait/Stairs) gait speed 0.61 m/s.   Balance   Balance other (describe)   Balance Comments DGI-4 8/12 (Moderate fall risk, 2,2,2,2)   Clinical Impression   Criteria for Skilled Therapeutic Intervention Yes, treatment indicated   PT Diagnosis (PT) impaired functional mobility   Influenced by the following impairments weakness, fatigue   Functional limitations due to impairments transfers, gait   Clinical Presentation (PT Evaluation Complexity) Evolving/Changing   Clinical Presentation Rationale Patient presents as medically diagnosed   Clinical Decision Making (Complexity) moderate complexity   Planned Therapy Interventions (PT) bed mobility training;gait training;home exercise program;neuromuscular re-education;patient/family education;strengthening;transfer training   Anticipated Equipment Needs at Discharge (PT) walker, rolling   Risk & Benefits of therapy have been explained evaluation/treatment results reviewed;care plan/treatment goals reviewed;participants voiced agreement with care plan;participants included;patient   PT Total Evaluation Time   PT Eval, Moderate Complexity Minutes (35694) 15   Physical Therapy Goals   PT Frequency Daily   PT Predicted Duration/Target Date for Goal Attainment 07/30/23   PT: Transfers Independent;Sit to/from stand   PT: Gait Straight cane;Greater than 200 feet;Modified independent   Interventions   Interventions Quick Adds Therapeutic Procedure   Therapeutic Procedure/Exercise   Ther. Procedure: strength, endurance, ROM, flexibillity Minutes (40149) 10   Symptoms Noted During/After Treatment fatigue   Treatment Detail/Skilled Intervention Seated ex x 10 each: AP, LAQ, March, cues for pacing activity and resting at fatigue   Gait Training   Gait Training Minutes (33675) 20   Symptoms Noted During/After Treatment (Gait Training) fatigue    Treatment Detail/Skilled Intervention Pt amb to the bathroom and then moderately well in the halls CGA with no AD, moving slowly with lateral path deviations but no adverse s/s. Cues for navigation and safety.   [place] Level (Gait Training) contact guard   Physical Assistance Level (Gait Training) verbal cues;supervision   Pattern Analysis (Gait Training) swing-through gait   Gait Analysis Deviations decreased orville;decreased step length   Impairments (Gait Analysis/Training) strength decreased   PT Discharge Planning   PT Plan Bed mobility, transfers, gait, LE ex   PT Discharge Recommendation (DC Rec) (S)  Transitional Care Facility;home with home care physical therapy   PT Rationale for DC Rec pt currently requires assist for gait for safety   PT Brief overview of current status ambulating CGA x 250' lateral path deviations anf fatigue   Total Session Time   Timed Code Treatment Minutes 30   Total Session Time (sum of timed and untimed services) 45

## 2023-07-23 NOTE — PROGRESS NOTES
[facility]    Progress Note - Hospitalist Service       Date of Admission:  7/21/2023    Assessment & Plan   Serene Tipton is a 67 year old female who has a history of PKD s/p bilateral nephrectomy, ESRD on HD TTS schedule, active transplant evaluation, Anemia,  HTN, HLD, Hypothyroidism, active tobacco use, chronic pain, multiple recent admissions for SBO vs ileus, most recently 7/13-7/19 who presented due to abdominal pain with CT findings suggestive of ileus and large amount of stool. Abdominal pain improving. Medically stable, awaiting TCU placement.       Ileus  Constipation  Nausea  Abdominal pain, improving  Continues to have left sided abdominal pain. Concern for SBO during admission (7/5-7/6) therefore surgery was consulted, believed constipation to be more likely. MiraLAX bowel regimen started and patient had multiple loose bowel movements. Patient has since had 3 abdominal CT scans with findings more suggestive of ileus than SBO. GI consulted last admission 7/13-7/19. Gastrografin study 7/18 excluded SBO. Since discharge 7/19, patient had been having increasing abdominal pain with NBNB emesis. Was placed on aggressive bowel regimen with BID senna docusate, daily enemas, and lactulose in addition to PTA BID senna. Has now had several BM's during this admission. Bowel sounds present, abdominal pain improving. Constipation in setting of chronic opioid use.  -Surgery consulted, appreciate recs   -Obstipation, bowel regimen recommended   -Regular diet   -Zofran and Compazine PRN  -IV pepcid and continue PTA omeprazole   -Discontinued IV dilaudid on 7/22   -Restarted PTA oxycodone, please give additional dose of this as first line PRN   -Continue senna-docusate 2 tablets twice daily and lactulose 10 mg twice daily   - Will hold PTA senna and daily enemas in setting of multiple BM's       Failure to thrive  Hypoalbuminemia   Generalized weakness  Weight loss, fatigue, poor  appetite. Likely secondary to chronic disease. Passed ACTH stim test last admission. Lives alone in her apartment.  Glucose 77 on admission. Declined TCU and HHC last admission (7/13-7/19), though desires TCU this admission.   -Glucose checks BID  -Nutrition consult   -PT/OT     ESRD on HD  Mild hyponatremia, chronic  Mild hyponatremia but stable. Normal K. ESRD on dialysis TTS.   - Nephrology consulted, TTS dialysis   - Monitor BMP, mag and phos   - Avoid nephrotoxic medications   - limit IVF to avoid fluid overload      Hypertension   Last admission had hypotension. Changed medications, now only on labetalol 100 twice daily. BP appears much improved.   - continue PTA labetalol   - PRN hydralazine      Hypomagnesemia   Slightly low on admission, 1.6.  -RN replacement protocol     Leukocytosis, improving  No clear source of infection. Meets SIRS criteria with WBC 19 and elevated pulse. Improving without abx. Blood cx NGTD. Lactate 1.2  -Daily CBC     Tachycardia, improving   Likely related to pain. EKG with sinus tachycardia.    -monitor          Diet: Snacks/Supplements Adult: Ensure Clear; With Meals  Renal Diet (dialysis)    DVT Prophylaxis: heparin  Pool Catheter: Not present  Fluids: PO  Lines: None     Cardiac Monitoring: None  Code Status: Full Code      Clinically Significant Risk Factors           # Hypercalcemia: corrected calcium is >10.1, will monitor as appropriate  # Hypomagnesemia: Lowest Mg = 1.6 mg/dL in last 2 days, will replace as needed   # Hypoalbuminemia: Lowest albumin = 2.4 g/dL at 7/21/2023  1:23 PM, will monitor as appropriate     # Hypertension: Noted on problem list        # Cachexia: Estimated body mass index is 14.96 kg/m  as calculated from the following:    Height as of 7/13/23: 1.524 m (5').    Weight as of this encounter: 34.7 kg (76 lb 9.6 oz)., PRESENT ON ADMISSION          Disposition Plan      Expected Discharge Date: 07/25/2023      Destination: nursing home;other (comment)  (For Transitional Care services.)          The patient's care was discussed with the Attending Physician, Dr. Quiroz and Patient.    Soraya Amaya DO PGY2  Hospitalist Service  [facility]  Securely message with [employer] (more info)  Text page via [employer] Paging/Directory   ______________________________________________________________________    Interval History   No overnight events. Slept well. Patient continues to endorse resolution of abdominal pain but does endorse rectal pain likely related to multiple BM's over past day. Desires less aggressive bowel regimen moving forward. Endorses appetite and is tolerating po food and hydration well. States that weakness is unchanged, interested in TCU at discharge for this.       Physical Exam   Vital Signs: Temp: 97.8  F (36.6  C) Temp src: Oral BP: 136/72 Pulse: 98   Resp: 16 SpO2: 96 % O2 Device: None (Room air)    Weight: 76 lbs 9.6 oz    Constitutional: awake but appears tired, cooperative, no apparent distress, thin, ill-appearing  Eyes: Lids and lashes normal, pupils equal, round and reactive to light, extra ocular muscles intact, sclera clear, conjunctiva normal  ENT: Normocephalic, without obvious abnormality, atraumatic, oral pharynx with tacky mucous membranes  Respiratory: No increased work of breathing, good air exchange, clear to auscultation bilaterally, no crackles or wheezing  Cardiovascular: Regular rate and rhythm, normal S1 and S2, no S3 or S4, and systolic murmur noted  GI: Healed scars, normoactive bowel sounds, soft, mildly distended, mild abdominal tenderness on right side  Skin: Gray, pale. Dry.  Musculoskeletal: mild bilateral lower extremity edema  Neurologic: A&O x4.   Neuropsychiatric: General: normal, calm and normal eye contact  Level of consciousness:        Data   ------------------------- PAST 24 HR DATA REVIEWED -----------------------------------------------    I have personally reviewed the following data  over the past 24 hrs:    13.7 (H)  \   8.8 (L)   / 270     134 (L) 97 (L) 27 (H) /  88   4.0 27 3.12 (H) \       Imaging results reviewed over the past 24 hrs:   No results found for this or any previous visit (from the past 24 hour(s)).

## 2023-07-23 NOTE — PLAN OF CARE
"  Problem: Hemodialysis  Goal: Safe, Effective Therapy Delivery  Outcome: Progressing     Problem: Intestinal Obstruction  Goal: Absence of Infection Signs and Symptoms  Outcome: Progressing   Pt oriented x4. Still complaining of cramping abdominal pain but did have a few large loose bowel movements. Pt did refuse some bowel meds in PM, saying that she was stooling \"too much.\" Pt received one extra dose of oxycodone overnight for breakthrough pain related to pt eating a slice of pound cake. Pt received new IV. Pending TCU placement. Pt put back on alarms today for some dizziness.   "

## 2023-07-24 LAB
ERYTHROCYTE [DISTWIDTH] IN BLOOD BY AUTOMATED COUNT: 18.3 % (ref 10–15)
GLUCOSE BLDC GLUCOMTR-MCNC: 100 MG/DL (ref 70–99)
GLUCOSE BLDC GLUCOMTR-MCNC: 64 MG/DL (ref 70–99)
HCT VFR BLD AUTO: 31.9 % (ref 35–47)
HGB BLD-MCNC: 10 G/DL (ref 11.7–15.7)
MAGNESIUM SERPL-MCNC: 1.8 MG/DL (ref 1.8–2.6)
MCH RBC QN AUTO: 34 PG (ref 26.5–33)
MCHC RBC AUTO-ENTMCNC: 31.3 G/DL (ref 31.5–36.5)
MCV RBC AUTO: 109 FL (ref 78–100)
PLATELET # BLD AUTO: 290 10E3/UL (ref 150–450)
RBC # BLD AUTO: 2.94 10E6/UL (ref 3.8–5.2)
WBC # BLD AUTO: 13 10E3/UL (ref 4–11)

## 2023-07-24 PROCEDURE — 90935 HEMODIALYSIS ONE EVALUATION: CPT

## 2023-07-24 PROCEDURE — 83735 ASSAY OF MAGNESIUM: CPT | Performed by: FAMILY MEDICINE

## 2023-07-24 PROCEDURE — 250N000011 HC RX IP 250 OP 636: Mod: JZ

## 2023-07-24 PROCEDURE — 258N000003 HC RX IP 258 OP 636: Performed by: INTERNAL MEDICINE

## 2023-07-24 PROCEDURE — 250N000013 HC RX MED GY IP 250 OP 250 PS 637: Performed by: FAMILY MEDICINE

## 2023-07-24 PROCEDURE — 120N000001 HC R&B MED SURG/OB

## 2023-07-24 PROCEDURE — 250N000013 HC RX MED GY IP 250 OP 250 PS 637

## 2023-07-24 PROCEDURE — 36415 COLL VENOUS BLD VENIPUNCTURE: CPT | Performed by: INTERNAL MEDICINE

## 2023-07-24 PROCEDURE — P9047 ALBUMIN (HUMAN), 25%, 50ML: HCPCS | Performed by: INTERNAL MEDICINE

## 2023-07-24 PROCEDURE — 99232 SBSQ HOSP IP/OBS MODERATE 35: CPT | Mod: GC

## 2023-07-24 PROCEDURE — 85027 COMPLETE CBC AUTOMATED: CPT | Performed by: INTERNAL MEDICINE

## 2023-07-24 PROCEDURE — 250N000011 HC RX IP 250 OP 636: Performed by: INTERNAL MEDICINE

## 2023-07-24 RX ORDER — ALBUMIN (HUMAN) 12.5 G/50ML
50 SOLUTION INTRAVENOUS
Status: DISCONTINUED | OUTPATIENT
Start: 2023-07-24 | End: 2023-07-26 | Stop reason: HOSPADM

## 2023-07-24 RX ADMIN — SENNOSIDES AND DOCUSATE SODIUM 2 TABLET: 50; 8.6 TABLET ORAL at 09:47

## 2023-07-24 RX ADMIN — SENNOSIDES AND DOCUSATE SODIUM 2 TABLET: 50; 8.6 TABLET ORAL at 21:01

## 2023-07-24 RX ADMIN — LABETALOL HYDROCHLORIDE 100 MG: 100 TABLET, FILM COATED ORAL at 09:46

## 2023-07-24 RX ADMIN — LORAZEPAM 1 MG: 1 TABLET ORAL at 11:37

## 2023-07-24 RX ADMIN — HEPARIN SODIUM 5000 UNITS: 5000 INJECTION, SOLUTION INTRAVENOUS; SUBCUTANEOUS at 09:47

## 2023-07-24 RX ADMIN — Medication: at 11:34

## 2023-07-24 RX ADMIN — SEVELAMER CARBONATE 2400 MG: 800 TABLET, FILM COATED ORAL at 18:30

## 2023-07-24 RX ADMIN — OXYCODONE HYDROCHLORIDE 10 MG: 5 TABLET ORAL at 11:37

## 2023-07-24 RX ADMIN — SODIUM CHLORIDE 250 ML: 9 INJECTION, SOLUTION INTRAVENOUS at 11:33

## 2023-07-24 RX ADMIN — ALBUMIN HUMAN 50 ML: 0.25 SOLUTION INTRAVENOUS at 12:29

## 2023-07-24 RX ADMIN — HEPARIN SODIUM 5000 UNITS: 5000 INJECTION, SOLUTION INTRAVENOUS; SUBCUTANEOUS at 20:57

## 2023-07-24 RX ADMIN — B-COMPLEX W/ C & FOLIC ACID TAB 1 MG 1 TABLET: 1 TAB at 09:46

## 2023-07-24 RX ADMIN — NORTRIPTYLINE HYDROCHLORIDE 75 MG: 25 CAPSULE ORAL at 21:01

## 2023-07-24 RX ADMIN — SEVELAMER CARBONATE 2400 MG: 800 TABLET, FILM COATED ORAL at 13:25

## 2023-07-24 RX ADMIN — LABETALOL HYDROCHLORIDE 100 MG: 100 TABLET, FILM COATED ORAL at 21:02

## 2023-07-24 RX ADMIN — ALBUMIN HUMAN 50 ML: 0.25 SOLUTION INTRAVENOUS at 11:34

## 2023-07-24 RX ADMIN — SERTRALINE 150 MG: 100 TABLET, FILM COATED ORAL at 21:02

## 2023-07-24 RX ADMIN — PANTOPRAZOLE SODIUM 40 MG: 40 TABLET, DELAYED RELEASE ORAL at 09:46

## 2023-07-24 RX ADMIN — LEVOTHYROXINE SODIUM 50 MCG: 0.05 TABLET ORAL at 21:01

## 2023-07-24 RX ADMIN — OXYCODONE HYDROCHLORIDE 10 MG: 5 TABLET ORAL at 21:02

## 2023-07-24 RX ADMIN — OXYCODONE HYDROCHLORIDE 10 MG: 5 TABLET ORAL at 06:55

## 2023-07-24 RX ADMIN — ROSUVASTATIN CALCIUM 10 MG: 10 TABLET, FILM COATED ORAL at 21:02

## 2023-07-24 RX ADMIN — SODIUM CHLORIDE 300 ML: 9 INJECTION, SOLUTION INTRAVENOUS at 11:33

## 2023-07-24 RX ADMIN — SEVELAMER CARBONATE 2400 MG: 800 TABLET, FILM COATED ORAL at 09:46

## 2023-07-24 ASSESSMENT — ACTIVITIES OF DAILY LIVING (ADL)
ADLS_ACUITY_SCORE: 23
ADLS_ACUITY_SCORE: 23
ADLS_ACUITY_SCORE: 21
ADLS_ACUITY_SCORE: 23
ADLS_ACUITY_SCORE: 21
ADLS_ACUITY_SCORE: 23
ADLS_ACUITY_SCORE: 23

## 2023-07-24 NOTE — PROGRESS NOTES
Nutrition Therapy       Nutrition History   Briefly met with pt to finish malnutrition physical assessment and get a nutrition history. I am familiar with pt. She likes nepro with Brusly Syrup. Technically Sahra Syrup does not meet her renal guidelines but she appears to choose to follow a regular diet at home with a fluid restriction. She knows her diets well. She just prefers to eat what she likes.     She states she probably won't drink a supplement here since we don't add Brusly syrup so she asked that we don't bother sending or if we do she won't drink it and just take it home with her.     I tried to discuss her chronic severe malnutrition and the importance of nutrition and she stated she would rather just eat real food and she is really hungry.       MALNUTRITION:  % Weight Loss:  > 10% in 6 months (severe malnutrition)  % Intake:  </= 75% for >/= 1 month (severe malnutrition)  Subcutaneous Fat Loss:  Orbital region severe depletion, Upper arm region severe depletion, Thoracic region severe depletion, and Lumbar region severe depletion  Muscle Loss:  Temporal region severe depletion, Clavicle bone region severe depletion, Acromion bone region severe depletion, Scapular bone region severe depletion, and Dorsal hand region severe depletion  Fluid Retention:  Mild BLE     Malnutrition Diagnosis: as previously noted pt has severe malnutrition. Just completed the physical assessment as well as previous RD was unable to meet with pt.     Interventions:  I will keep sending the nepro daily. She most likely won't drink it, but we can try to encourage it. She seems to make up her own mind on her nutrition and what she is willing to do. If she was open to it I would suggest a feeding tube to promote wt gain due to her chronic VLBW, wt loss, and poor po. Will stop the Ensure Clear as she won't drink. Please encourage intake.

## 2023-07-24 NOTE — PROGRESS NOTES
RENAL PROGRESS NOTE    67-year-old female past medical history of polycystic kidney disease s/p bilateral nephrectomies, end-stage renal disease on insulin hemodialysis TTS schedule [facility], anemia of chronic disease, hypertension, hypothyroidism, and hyperlipidemia.  She was recently admitted at [facility] for SBO versus ileus during admit 7/13/2023 through 7/19/2023. Presented with similar issues and abd pain concerning for SBO/Ileus  Nehrology consulted for ESRd management    ASSESSMENT & PLAN:     ESRD on HD: 2/2 PKD s/p bilateral nephrectomy.  On in-center HD @  [facility].  Runs on a TTS schedule.  + JESSI VF.  We will dialyze on TTS schedule, while inpatient. HD Today, UF only run for hypervolemia.     Anemia of chronic disease: Receives PETE and parenteral iron with outpatient dialysis.  Hemoglobin 10s.  Follow need to dose PETE while inpatient.    Hyponatremia: Secondary to ESRD.  UF with HD.     Secondary renal hyperparathyroidism: Sevelamer 3 times daily AC.     History of HTN: Last admit was mildly hypotensive s/p DC several antihypertensives, now on monotherapy with labetalol.  Has midodrine available with HD for intradialytic hypotension is poor blood pressure with UF.  She is normotensive this admit thus far.Hypervolemia, Will attempt UF with dialysis limited by lower blood pressures     Ileus, constipation, nausea, abdominal pain:  Admit 7/13/2023 through 7/19/2023 with similar complaints.  Surgery was consulted during that admit and they believed symptoms were most consistent with constipation.  At that time she was placed on a bowel regiment.  Imaging more suggestive of ileus versus SBO. Continues to have left-sided abdominal pain.   Management per primary service.     History of hypothyroidism on replacement     HLD on statin    SUBJECTIVE:    Saw pt on dialysis, tolerating well  UF HD run today.   Pt reports BM, abd pain better.   Denies n, v, c, d, sob, fever, rash,  edema  Discussed plan, answered all questions.     OBJECTIVE:  Physical Exam   Temp: 97.6  F (36.4  C) Temp src: Oral BP: (!) 147/75 Pulse: 101   Resp: 16 SpO2: 93 % O2 Device: None (Room air)    Vitals:    07/21/23 2043 07/23/23 0231 07/24/23 0630   Weight: 35.9 kg (79 lb 3.2 oz) 34.7 kg (76 lb 9.6 oz) 35.7 kg (78 lb 12.8 oz)     Vital Signs with Ranges  Temp:  [97.4  F (36.3  C)-97.8  F (36.6  C)] 97.6  F (36.4  C)  Pulse:  [] 101  Resp:  [16-18] 16  BP: (132-148)/(64-82) 147/75  SpO2:  [93 %-99 %] 93 %  I/O last 3 completed shifts:  In: 480 [P.O.:480]  Out: -     Patient Vitals for the past 72 hrs:   Weight   07/24/23 0630 35.7 kg (78 lb 12.8 oz)   07/23/23 0231 34.7 kg (76 lb 9.6 oz)   07/21/23 2043 35.9 kg (79 lb 3.2 oz)     Intake/Output Summary (Last 24 hours) at 7/24/2023 1111  Last data filed at 7/24/2023 0900  Gross per 24 hour   Intake 720 ml   Output --   Net 720 ml       PHYSICAL EXAM:  GEN: NAD, thin  CV: RRR   Lung: clear and equal  Ab: soft and NT  Ext: no edema and well perfused  Skin: no rash      LABORATORY STUDIES:     Recent Labs   Lab 07/24/23  0727 07/23/23  1522 07/23/23  0651 07/22/23  0656 07/21/23  1323 07/19/23  0446 07/18/23  0452   WBC 13.0*  --  13.7* 17.4* 18.9* 9.8 11.7*   RBC 2.94*  --  2.62* 2.93* 3.08* 2.89* 2.69*   HGB 10.0* 9.5* 8.8* 10.0* 10.3* 9.6* 9.1*   HCT 31.9*  --  28.0* 31.3* 32.4* 30.4* 28.0*     --  270 318 343 323 333       Basic Metabolic Panel:  Recent Labs   Lab 07/24/23  0937 07/23/23  1811 07/23/23  0749 07/23/23  0651 07/22/23  1805 07/22/23  1135 07/22/23  1100 07/22/23  0656 07/21/23  1323 07/19/23  0945 07/19/23  0446 07/18/23  0601 07/18/23  0452   NA  --   --   --  134*  --   --   --  132* 134*  --  137  --  131*   POTASSIUM  --   --   --  4.0  --   --   --  4.8 4.2  --  3.6  --  4.3   CHLORIDE  --   --   --  97*  --   --   --  95* 94*  --  99  --  96*   CO2  --   --   --  27  --   --   --  25 27  --  28  --  23   BUN  --   --   --  27*  --    --   --  36* 24*  --  28*  --  57*   CR  --   --   --  3.12*  --   --   --  4.35* 3.82*  --  3.78*  --  5.05*   GLC 64* 104* 88 81 93 59*   < > 58* 77   < > 86   < > 58*   GAVIN  --   --   --  8.4*  --   --   --  8.4* 9.1  --  8.4*  --  8.1*    < > = values in this interval not displayed.       INRNo lab results found in last 7 days.     Recent Labs   Lab Test 07/24/23  0727 07/23/23  1522 07/23/23  0651 07/14/23  0503 07/13/23  1239 07/11/23  1131   INR  --   --   --   --  1.70* 1.40*   WBC 13.0*  --  13.7*   < > 13.0* 11.5*   HGB 10.0* 9.5* 8.8*   < > 8.4* 9.0*     --  270   < > 320 288    < > = values in this interval not displayed.       Personally reviewed current labs    Mitzi PALMA-BC  [facility]  [phone]

## 2023-07-24 NOTE — PLAN OF CARE
Problem: Plan of Care - These are the overarching goals to be used throughout the patient stay.    Goal: Optimal Comfort and Wellbeing  Outcome: Progressing     Problem: Malnutrition  Goal: Improved Nutritional Intake  Outcome: Progressing   Goal Outcome Evaluation:    A&Ox4, VSS and afebrile. Pt got hemodialysis today and removed 3L. Received Oxy at 1135am for pain 5/10 in her back. BS today was 64, asymptomatic and has an appetite and eating well. Lactulose held due to having loose BM and patient refused. Mg protocol, recheck tomorrow. Renal diet, alarms off, calls appropriately, and awaiting TCU placement for discharge.

## 2023-07-24 NOTE — PROGRESS NOTES
[facility]    Progress Note - Hospitalist Service       Date of Admission:  7/21/2023    Assessment & Plan   Serene Tipton is a 67 year old female who has a history of PKD s/p bilateral nephrectomy, ESRD on HD TTS schedule, active transplant evaluation, Anemia,  HTN, HLD, Hypothyroidism, active tobacco use, chronic pain, multiple recent admissions for SBO vs ileus, most recently 7/13-7/19 who presented due to abdominal pain with CT findings suggestive of ileus and large amount of stool. Now with return of normal BMs and abdominal pain improving. Medically stable, awaiting TCU placement.       Ileus  Constipation  Nausea  Abdominal pain, improving  Constipation in setting of chronic opioid use. Continues to have left sided abdominal pain. Concern for SBO during admission (7/5-7/6) therefore surgery was consulted, believed constipation to be more likely. MiraLAX bowel regimen started and patient had multiple loose bowel movements. Patient has since had 3 abdominal CT scans with findings more suggestive of ileus than SBO. GI consulted last admission 7/13-7/19. Gastrografin study 7/18 excluded SBO. Since discharge 7/19, patient had been having increasing abdominal pain with NBNB emesis. Was placed on aggressive bowel regimen with BID senna docusate, daily enemas, and lactulose in addition to PTA BID senna. Has now had several BM's during this admission. Bowel sounds present, abdominal pain improving.   -Surgery signed off   -Regular diet   -Zofran and Compazine PRN  -IV pepcid and continue PTA omeprazole   - PTA oxycodone, please give additional dose of this as first line PRN   -Continue senna-docusate 2 tablets twice daily and lactulose 10 mg twice daily      Failure to thrive  Hypoalbuminemia   Generalized weakness  Weight loss, fatigue, poor appetite. Likely secondary to chronic disease. Passed ACTH stim test last admission. Lives alone in her apartment.  Glucose 77 on admission.  Declined TCU and HHC last admission (7/13-7/19), though desires TCU this admission.   -Glucose checks BID  -Nutrition consult   -PT/OT     ESRD on HD  Mild hyponatremia, chronic  Mild hyponatremia but stable. Normal K. ESRD on dialysis TTS.   - Nephrology consulted, TTS dialysis   - AM renal panel  - Avoid nephrotoxic medications   - limit IVF to avoid fluid overload      Hypertension   Last admission had hypotension. Changed medications, now only on labetalol 100 twice daily. BP appears much improved.   - continue PTA labetalol   - PRN hydralazine      Hypomagnesemia   Slightly low on admission, 1.6.  -RN replacement protocol     Leukocytosis, improving  No clear source of infection. Meets SIRS criteria with WBC 19 and elevated pulse. Improving without abx. Blood cx NGTD. Lactate 1.2  -Daily CBC     Tachycardia, improving   Likely related to pain. EKG with sinus tachycardia.    -monitor          Diet: Renal Diet (dialysis)  Snacks/Supplements Adult: Nepro Oral Supplement; With Meals    DVT Prophylaxis: heparin  Pool Catheter: Not present  Fluids: PO  Lines: None     Cardiac Monitoring: None  Code Status: Full Code        Disposition Plan      Expected Discharge Date: 07/25/2023      Destination: nursing home;other (comment) (For Transitional Care services.)          The patient's care was discussed with the Attending Physician, Dr. Quiroz and Patient.    Jarad Raymond MD PGY3  Hospitalist Service  [facility]  Securely message with [employer] (more info)  Text page via [facility] Paging/Directory   ______________________________________________________________________    Interval History   Reports that she is still having normal amount of BMs. The lactulose caused lots of increased BM's and patient reported being on the toilet for 12 hours. We discussed titration of her lactulose and making it PRN which she was agreeable to.        Physical Exam   Vital Signs: Temp: 97  F (36.1  C) Temp src:  Oral BP: 97/69 Pulse: 109   Resp: 16 SpO2: 93 % O2 Device: None (Room air)    Weight: 78 lbs 12.8 oz    Constitutional: awake but appears tired, cooperative, no apparent distress, thin, ill-appearing  Eyes: Lids and lashes normal, pupils equal, extra ocular muscles intact, sclera clear, conjunctiva normal  ENT: Normocephalic, without obvious abnormality, atraumatic, oral pharynx with tacky mucous membranes  Respiratory: No increased work of breathing, good air exchange, clear to auscultation bilaterally, no crackles or wheezing  Cardiovascular: Regular rate and rhythm, normal S1 and S2, no S3 or S4, and grade 3/6 systolic murmur noted  GI:  Normoactive bowel sounds, soft, mildly distended, mild abdominal tenderness on left side and umbilicus.  Neurologic: A&O x4.   Neuropsychiatric: General: normal, calm and normal eye contact  Level of consciousness: alert       Data   ------------------------- PAST 24 HR DATA REVIEWED -----------------------------------------------    I have personally reviewed the following data over the past 24 hrs:    13.0 (H)  \   10.0 (L)   / 290     N/A N/A N/A /  64 (L)   N/A N/A N/A \       Imaging results reviewed over the past 24 hrs:   No results found for this or any previous visit (from the past 24 hour(s)).

## 2023-07-24 NOTE — PROGRESS NOTES
HEMODIALYSIS TREATMENT NOTE    Date: 7/24/2023  Time: 11:55 AM    Data:  Pre Wt: 35.7 kg  7/24/23  Desired Wt: to be established  Post Wt:  32.7 kg (estimated)  Weight change: -3  kg  Ultrafiltration - Post Run Net Total Removed (mL): 3000 mL  Vascular Access Status: +T/B  Dialyzer Rinse: Clear  Total Blood Volume Processed: 0 Liters  Total Dialysis (Treatment) Time: 2 Hours UF    Lab:   No    Interventions:  2 hour ultrafiltration via left AVF at 350 BFR using 16 gauge needles. 2-3 liter UF as tolerated maintaining SBP>90. 50ml Albumin bolus Q1 hour x2. Pressure dressing applied to left AVF post treatment and 10 minutes until hemostasis achieved.     Assessment:  Pt calm and cooperative,  left AVF cannulated easily and good pressures at 350 BFR. +2  pitting edema in LE bilateral. Lung sounds clear. Tolerated UF well with interventions. Left in room in stable condition and report given to PCN.      Plan:    Per Renal Team

## 2023-07-24 NOTE — PLAN OF CARE
Problem: Hemodialysis  Goal: Safe, Effective Therapy Delivery  Outcome: Progressing     Problem: Malnutrition  Goal: Improved Nutritional Intake  Outcome: Progressing   Pt oriented x4. Abdominal pain significantly improved. Hemodialysis on 7/24. Pending TCU referrals. Ambulating independently.

## 2023-07-24 NOTE — PROGRESS NOTES
Care Management Follow Up    Length of Stay (days): 3    Expected Discharge Date: 07/25/2023     Concerns to be Addressed: care coordination/care conferences, discharge planning     Patient plan of care discussed at interdisciplinary rounds: No    Anticipated Discharge Disposition: Transitional Care, Skilled Nursing Facility, still looking.     Anticipated Discharge Services: None  Anticipated Discharge DME: None (No new DME anticipated)    Patient/family educated on Medicare website which has current facility and service quality ratings: yes (Pt declined.)  Education Provided on the Discharge Plan: Yes (Assessment and service coordination process explained.)  Patient/Family in Agreement with the Plan: yes      Additional Information:  GRISELDA auth submitted today. Will need to update once find a facility to take patient. Have talked to both [facility] and [facility], no answer yet. Will need dialysis rides arranged, maybe family will provide if they can if TCU is the plan for discharge.   Stephanie Dixon RN           334pm:Griselda approved [id number] Auth#

## 2023-07-25 ENCOUNTER — APPOINTMENT (OUTPATIENT)
Dept: PHYSICAL THERAPY | Facility: CLINIC | Age: 68
DRG: 388 | End: 2023-07-25
Payer: COMMERCIAL

## 2023-07-25 ENCOUNTER — APPOINTMENT (OUTPATIENT)
Dept: OCCUPATIONAL THERAPY | Facility: CLINIC | Age: 68
DRG: 388 | End: 2023-07-25
Payer: COMMERCIAL

## 2023-07-25 LAB
ALBUMIN SERPL-MCNC: 2.7 G/DL (ref 3.5–5)
ANION GAP SERPL CALCULATED.3IONS-SCNC: 12 MMOL/L (ref 5–18)
BUN SERPL-MCNC: 27 MG/DL (ref 8–22)
BUN SERPL-MCNC: 59 MG/DL (ref 8–22)
CALCIUM SERPL-MCNC: 8.9 MG/DL (ref 8.5–10.5)
CHLORIDE BLD-SCNC: 96 MMOL/L (ref 98–107)
CO2 SERPL-SCNC: 24 MMOL/L (ref 22–31)
CREAT SERPL-MCNC: 3.07 MG/DL (ref 0.6–1.1)
ERYTHROCYTE [DISTWIDTH] IN BLOOD BY AUTOMATED COUNT: 18.6 % (ref 10–15)
GFR SERPL CREATININE-BSD FRML MDRD: 16 ML/MIN/1.73M2
GLUCOSE BLD-MCNC: 116 MG/DL (ref 70–125)
GLUCOSE BLDC GLUCOMTR-MCNC: 167 MG/DL (ref 70–99)
GLUCOSE BLDC GLUCOMTR-MCNC: 168 MG/DL (ref 70–99)
HCT VFR BLD AUTO: 32.5 % (ref 35–47)
HGB BLD-MCNC: 10.2 G/DL (ref 11.7–15.7)
MAGNESIUM SERPL-MCNC: 1.7 MG/DL (ref 1.8–2.6)
MCH RBC QN AUTO: 34.1 PG (ref 26.5–33)
MCHC RBC AUTO-ENTMCNC: 31.4 G/DL (ref 31.5–36.5)
MCV RBC AUTO: 109 FL (ref 78–100)
PHOSPHATE SERPL-MCNC: 1.7 MG/DL (ref 2.5–4.5)
PLATELET # BLD AUTO: 305 10E3/UL (ref 150–450)
POTASSIUM BLD-SCNC: 4.7 MMOL/L (ref 3.5–5)
RBC # BLD AUTO: 2.99 10E6/UL (ref 3.8–5.2)
SODIUM SERPL-SCNC: 132 MMOL/L (ref 136–145)
WBC # BLD AUTO: 11 10E3/UL (ref 4–11)

## 2023-07-25 PROCEDURE — 250N000013 HC RX MED GY IP 250 OP 250 PS 637

## 2023-07-25 PROCEDURE — 90935 HEMODIALYSIS ONE EVALUATION: CPT

## 2023-07-25 PROCEDURE — 250N000011 HC RX IP 250 OP 636: Mod: JZ

## 2023-07-25 PROCEDURE — 36415 COLL VENOUS BLD VENIPUNCTURE: CPT

## 2023-07-25 PROCEDURE — 84520 ASSAY OF UREA NITROGEN: CPT

## 2023-07-25 PROCEDURE — 83735 ASSAY OF MAGNESIUM: CPT | Performed by: FAMILY MEDICINE

## 2023-07-25 PROCEDURE — 97535 SELF CARE MNGMENT TRAINING: CPT | Mod: GO

## 2023-07-25 PROCEDURE — 84520 ASSAY OF UREA NITROGEN: CPT | Performed by: FAMILY MEDICINE

## 2023-07-25 PROCEDURE — 85027 COMPLETE CBC AUTOMATED: CPT | Performed by: FAMILY MEDICINE

## 2023-07-25 PROCEDURE — 120N000001 HC R&B MED SURG/OB

## 2023-07-25 PROCEDURE — 97116 GAIT TRAINING THERAPY: CPT | Mod: GP

## 2023-07-25 PROCEDURE — 99232 SBSQ HOSP IP/OBS MODERATE 35: CPT | Mod: GC

## 2023-07-25 PROCEDURE — 250N000013 HC RX MED GY IP 250 OP 250 PS 637: Performed by: FAMILY MEDICINE

## 2023-07-25 PROCEDURE — 36415 COLL VENOUS BLD VENIPUNCTURE: CPT | Performed by: FAMILY MEDICINE

## 2023-07-25 RX ORDER — LACTULOSE 10 G/15ML
10 SOLUTION ORAL 2 TIMES DAILY PRN
Status: DISCONTINUED | OUTPATIENT
Start: 2023-07-25 | End: 2023-07-26 | Stop reason: HOSPADM

## 2023-07-25 RX ORDER — OXYCODONE HYDROCHLORIDE 5 MG/1
10 TABLET ORAL ONCE
Status: COMPLETED | OUTPATIENT
Start: 2023-07-25 | End: 2023-07-25

## 2023-07-25 RX ADMIN — OXYCODONE HYDROCHLORIDE 10 MG: 5 TABLET ORAL at 00:28

## 2023-07-25 RX ADMIN — ACETAMINOPHEN 650 MG: 325 TABLET ORAL at 00:28

## 2023-07-25 RX ADMIN — NORTRIPTYLINE HYDROCHLORIDE 75 MG: 25 CAPSULE ORAL at 21:01

## 2023-07-25 RX ADMIN — HEPARIN SODIUM 5000 UNITS: 5000 INJECTION, SOLUTION INTRAVENOUS; SUBCUTANEOUS at 20:54

## 2023-07-25 RX ADMIN — SEVELAMER CARBONATE 1600 MG: 800 TABLET, FILM COATED ORAL at 13:22

## 2023-07-25 RX ADMIN — ACETAMINOPHEN 650 MG: 325 TABLET ORAL at 22:19

## 2023-07-25 RX ADMIN — PANTOPRAZOLE SODIUM 40 MG: 40 TABLET, DELAYED RELEASE ORAL at 12:29

## 2023-07-25 RX ADMIN — OXYCODONE HYDROCHLORIDE 10 MG: 5 TABLET ORAL at 09:09

## 2023-07-25 RX ADMIN — LORAZEPAM 1 MG: 1 TABLET ORAL at 12:29

## 2023-07-25 RX ADMIN — SEVELAMER CARBONATE 2400 MG: 800 TABLET, FILM COATED ORAL at 18:24

## 2023-07-25 RX ADMIN — FAMOTIDINE 20 MG: 10 INJECTION, SOLUTION INTRAVENOUS at 20:54

## 2023-07-25 RX ADMIN — OXYCODONE HYDROCHLORIDE 10 MG: 5 TABLET ORAL at 17:01

## 2023-07-25 RX ADMIN — OXYCODONE HYDROCHLORIDE 10 MG: 5 TABLET ORAL at 22:19

## 2023-07-25 RX ADMIN — ROSUVASTATIN CALCIUM 10 MG: 10 TABLET, FILM COATED ORAL at 21:01

## 2023-07-25 RX ADMIN — LEVOTHYROXINE SODIUM 50 MCG: 0.05 TABLET ORAL at 21:01

## 2023-07-25 RX ADMIN — SEVELAMER CARBONATE 800 MG: 800 TABLET, FILM COATED ORAL at 09:09

## 2023-07-25 RX ADMIN — SENNOSIDES AND DOCUSATE SODIUM 2 TABLET: 50; 8.6 TABLET ORAL at 20:54

## 2023-07-25 RX ADMIN — SERTRALINE 150 MG: 100 TABLET, FILM COATED ORAL at 21:01

## 2023-07-25 RX ADMIN — SENNOSIDES AND DOCUSATE SODIUM 2 TABLET: 50; 8.6 TABLET ORAL at 12:29

## 2023-07-25 RX ADMIN — LABETALOL HYDROCHLORIDE 100 MG: 100 TABLET, FILM COATED ORAL at 21:00

## 2023-07-25 RX ADMIN — B-COMPLEX W/ C & FOLIC ACID TAB 1 MG 1 TABLET: 1 TAB at 12:29

## 2023-07-25 RX ADMIN — OXYCODONE HYDROCHLORIDE 10 MG: 5 TABLET ORAL at 21:14

## 2023-07-25 ASSESSMENT — ACTIVITIES OF DAILY LIVING (ADL)
ADLS_ACUITY_SCORE: 21

## 2023-07-25 NOTE — PLAN OF CARE
Problem: Plan of Care - These are the overarching goals to be used throughout the patient stay.    Goal: Absence of Hospital-Acquired Illness or Injury  Intervention: Identify and Manage Fall Risk  Recent Flowsheet Documentation  Taken 7/25/2023 1015 by Reina Roldan RN  Safety Promotion/Fall Prevention: safety round/check completed  Intervention: Prevent and Manage VTE (Venous Thromboembolism) Risk  Recent Flowsheet Documentation  Taken 7/25/2023 1015 by Reina Roldan RN  VTE Prevention/Management: (subQ heparin) other (see comments)     Patient alert and oriented. Dialysis today. Pain controlled with PO Oxycodone. Plan to discharge tomorrow, 7/26 to [facility]. Family to transport. IV saline locked. Call light within reach.

## 2023-07-25 NOTE — PROGRESS NOTES
Date: 7/25/2023  Time:11:00     Data:  Pre Wt:   33.8 kg  Desired Wt:   To be established  Post Wt:  31.8 kg (estimated)  Weight change:  2 kg  Ultrafiltration - Post Run Net Total Removed (mL):  2000 ml  Vascular Access Status: Fistula patent  Dialyzer Rinse:  Light  Total Blood Volume Processed: 57.5 L   Total Dialysis (Treatment) Time:   3 Hrs  Dialysate Bath: K 3, Ca 3  Heparin: Heparin: None     Lab:   No     Interventions:  Pt.dialyzed for 3 hours via  right AV Fistula using 16 gauge needles  UF set to 2 Liters, accommodating  priming and rinse back volumes  ,   No lab drawn  See administered per MAR  Decannulation done post HD, hemostasis is achieved in 10 minutes  Pressure dressing is applied, to be removed after 4-6 hours  Report given to PCN in stable condition     Assessment:  A/O x 4, calm and cooperative, denies pain  right arm AV Fistula has good thrill and bruit                Plan:    Per Renal team    Jens Landis BSN, RN  Acute Dialysis RN  [facility]

## 2023-07-25 NOTE — PLAN OF CARE
Continues to pass lots of gas. Calls appropriately. Pain managed with PRN oxycodone. Feet and ankles swollen, pitting edema. SBA for safety. No urine output, on HD. Plan for HD run today. Await TCU placement. IVSL. Alarms on for safety overnight due to weakness and narcotic use.     Problem: Intestinal Obstruction  Goal: Optimal Bowel Function  Outcome: Adequate for Care Transition     Problem: Intestinal Obstruction  Goal: Optimal Pain Control and Function  Outcome: Adequate for Care Transition  Intervention: Prevent or Manage Pain  Recent Flowsheet Documentation  Taken 7/25/2023 0020 by Smita Odonnell, RN  Pain Management Interventions:   MD notified (comment)   medication (see MAR)   Goal Outcome Evaluation:

## 2023-07-25 NOTE — PROGRESS NOTES
Care Management Follow Up    Length of Stay (days): 4    Expected Discharge Date: 07/26/2023     Concerns to be Addressed: discharge planning     Patient plan of care discussed at interdisciplinary rounds: Yes    Anticipated Discharge Disposition: Transitional Care     Anticipated Discharge Services: None  Anticipated Discharge DME: None (No new DME anticipated)    Patient/family educated on Medicare website which has current facility and service quality ratings: yes   Education Provided on the Discharge Plan: Yes   Patient/Family in Agreement with the Plan: yes    Referrals Placed by CM/SW:  TCU  Private pay costs discussed: Not applicable    Additional Information:    Chart reviewed.    [facility] [place] [facility] for SNF stay approved through [place] 7/30.    [facility] - Per Suki, can clinically accept this pt, but need to clarify pt's plan for transportation to and from [facility] T/Th/Sat.    Pt agreeable to [facility], states she usually self-transports to Dialysis.    Pt will contact family to ask for assistance with transport to/from dialysis while at U.    11:17 Am  [employer] - states pt does not have coverage for transport.    Greg Cohn at [facility], they can arrange for transport for pt to and from dialysis via [facility], but it would cost pt an estimated $150/ride to or from Dialysis.    12:50 PM  Pt states family will transport to and from [facility] and [facility] dialysis - pt made arrangements to have dialysis done at [facility].  Pt states she wants to make a stop at home prior to TCU.    Had a long conversation with pt regarding fact that she would not be allowed to make a stop at home after discharge, before arriving at U.  Pt considering discharge to home with homecare vs to TCU because she has pet birds at home that need to be cared for.    2:08 PM   Pt now agreeable to transition to TCU directly from hospital.  Pt in the process of making arrangements with her niece -  Niece to  pt's keys and give her pets to a neighbor.    [facility] - They are no longer able to accept pt this evening d/t too many admits this afternoon/evening.  They can accept pt tomorrow.  Anticipate discharge tomorrow.    Update given to Dr. Abraham of [facility] Resident team.    3:55 PM  Pt's sister Toyin ([phone]) plans to transport tomorrow 7/26 at 10:30am to [facility].    CM to update Toyin tomorrow morning/confirm discharge plans.    PAS needed.    Stefany Canales RN

## 2023-07-25 NOTE — PROGRESS NOTES
[facility]    Progress Note - Hospitalist Service       Date of Admission:  7/21/2023    Assessment & Plan   Serene Tipton is a 67 year old female who has a history of PKD s/p bilateral nephrectomy, ESRD on HD TTS schedule, active transplant evaluation, Anemia,  HTN, HLD, Hypothyroidism, active tobacco use, chronic pain, multiple recent admissions for SBO vs ileus, most recently 7/13-7/19 who presented due to abdominal pain with CT findings suggestive of ileus and large amount of stool. Now with return of normal BMs and abdominal pain improving. Medically stable, awaiting TCU placement.       Ileus  Constipation  Nausea  Abdominal pain, improving  Constipation in setting of chronic opioid use. Continues to have left sided abdominal pain. Concern for SBO during admission (7/5-7/6) therefore surgery was consulted, believed constipation to be more likely. MiraLAX bowel regimen started and patient had multiple loose bowel movements. Patient has since had 3 abdominal CT scans with findings more suggestive of ileus than SBO. GI consulted last admission 7/13-7/19. Gastrografin study 7/18 excluded SBO. Since discharge 7/19, patient had been having increasing abdominal pain with NBNB emesis. Was placed on aggressive bowel regimen with BID senna docusate, daily enemas, and lactulose in addition to PTA BID senna. Has now had several BM's during this admission. Bowel sounds present, abdominal pain improving. Plan to continue BID senna docusate on discharge along with lactulose PRN.  -Regular diet   -Zofran and Compazine PRN  -IV pepcid and continue PTA omeprazole   - PTA oxycodone, please give additional dose of this as first line PRN   -Continue senna-docusate 2 tablets twice daily   - Changed lactulose 10 mg twice daily PRN      Failure to thrive  Hypoalbuminemia   Generalized weakness  Weight loss, fatigue, poor appetite. Likely secondary to chronic disease. Passed ACTH stim test last  admission. Lives alone in her apartment.  Glucose 77 on admission. PT and OT recommending TCU and patient agreeable.   -Glucose checks BID  -Nutrition consult   -PT/OT     ESRD on HD  Mild hyponatremia, chronic  Mild hyponatremia but stable. Normal K. ESRD on dialysis TTS.   - Nephrology consulted, TTS dialysis   - AM renal panel  - Avoid nephrotoxic medications   - limit IVF to avoid fluid overload      Hypertension   Last admission had hypotension. Changed medications, now only on labetalol 100 twice daily. BP appears much improved.   - continue PTA labetalol   - PRN hydralazine      Hypomagnesemia   Slightly low on admission, 1.6.  -RN replacement protocol     Leukocytosis, improving  No clear source of infection. Met SIRS criteria with WBC 19 and elevated pulse. Improving without abx. Blood cx NGTD. Lactate 1.2  -Daily CBC     Tachycardia, improving   Likely related to pain. EKG with sinus tachycardia.    -monitor          Diet: Renal Diet (dialysis)  Snacks/Supplements Adult: Nepro Oral Supplement; With Meals    DVT Prophylaxis: heparin  Pool Catheter: Not present  Fluids: PO  Lines: None     Cardiac Monitoring: None  Code Status: Full Code        Disposition Plan      Expected Discharge Date: 07/26/2023      Destination: nursing home;other (comment) (For Transitional Care services.)          The patient's care was discussed with the Attending Physician, Dr. Earl and Patient.    Jarad Raymond MD PGY3  Hospitalist Service  [facility]  Securely message with [employer] (more info)  Text page via [facility] Paging/Directory   ______________________________________________________________________    Interval History   Reports lots of gas. Tolerating normal diet with good appetite. Excited to get out of hospital and is concerned about her pets at home. Would like to stop home before going to TCU.       Physical Exam   Vital Signs: Temp: 97.1  F (36.2  C) Temp src: Oral BP: 138/83 Pulse: 99   Resp:  16 SpO2: 96 % O2 Device: None (Room air)    Weight: 78 lbs 12.8 oz    Constitutional: awake but appears tired, cooperative, no apparent distress, thin, ill-appearing  Eyes: Lids and lashes normal, pupils equal, extra ocular muscles intact, sclera clear, conjunctiva normal  ENT: Normocephalic, without obvious abnormality, atraumatic, oral pharynx with tacky mucous membranes  Respiratory: No increased work of breathing, good air exchange, clear to auscultation bilaterally, no crackles or wheezing  Cardiovascular: Regular rate and rhythm, normal S1 and S2, no S3 or S4, and grade 3/6 systolic murmur noted  GI:  Normoactive bowel sounds, soft, mildly distended, minimal abdominal pain on palpation (improved from yesterday).  Neurologic: A&O x4.   Neuropsychiatric: General: normal, calm and normal eye contact  Level of consciousness: alert       Data   ------------------------- PAST 24 HR DATA REVIEWED -----------------------------------------------    I have personally reviewed the following data over the past 24 hrs:    N/A  \   N/A   / N/A     N/A N/A 59 (H) /  100 (H)   N/A N/A N/A \       Imaging results reviewed over the past 24 hrs:   No results found for this or any previous visit (from the past 24 hour(s)).

## 2023-07-25 NOTE — PROGRESS NOTES
RENAL PROGRESS NOTE    67-year-old female past medical history of polycystic kidney disease s/p bilateral nephrectomies, end-stage renal disease on insulin hemodialysis TTS schedule [facility], anemia of chronic disease, hypertension, hypothyroidism, and hyperlipidemia.  She was recently admitted at [facility] for SBO versus ileus during admit 7/13/2023 through 7/19/2023. Presented with similar issues and abd pain concerning for SBO/Ileus  Nehrology consulted for ESRd management    ASSESSMENT & PLAN:     ESRD on HD: 2/2 PKD s/p bilateral nephrectomy.  On in-center HD @  [facility].  Runs on a TTS schedule.  + JESSI VF.  We will dialyze on TTS schedule, while inpatient. HD Today, UF only run for hypervolemia.     Anemia of chronic disease: Receives PETE and parenteral iron with outpatient dialysis.  Hemoglobin 10s.  Follow need to dose PETE while inpatient.    Hyponatremia: Secondary to ESRD.  UF with HD.     Secondary renal hyperparathyroidism: Sevelamer 3 times daily AC.     History of HTN: Last admit was mildly hypotensive s/p DC several antihypertensives, now on monotherapy with labetalol.  Has midodrine available with HD for intradialytic hypotension is poor blood pressure with UF.  She is normotensive this admit thus far.Hypervolemia, Will attempt UF with dialysis limited by lower blood pressures     Ileus, constipation, nausea, abdominal pain:  Admit 7/13/2023 through 7/19/2023 with similar complaints.  Surgery was consulted during that admit and they believed symptoms were most consistent with constipation.  At that time she was placed on a bowel regiment.  Imaging more suggestive of ileus versus SBO. Continues to have left-sided abdominal pain.   Management per primary service.     History of hypothyroidism on replacement     HLD on statin    SUBJECTIVE:    Saw on dialysis, HD stable  3L Uf yesterday with hd, stable therapy  Attempted to get pre-BUN/Post BUN to calculate renal  clearance with HD, however issues with drawing labs. Discussed with [facility] HD RN that pt will need Kt/V with HD next Thursday. Also, pt would like to transfer to HD unit closer to TCU, relayed to HD RN at [facility] who will pass on to HD SW.   Pt reports BM, abd pain better.   Denies n, v, c, d, sob, fever, rash, edema  Discussed plan, answered all questions.     OBJECTIVE:  Physical Exam   Temp: 97.1  F (36.2  C) Temp src: Oral BP: 137/86 Pulse: 94   Resp: 16 SpO2: 96 % O2 Device: None (Room air)    Vitals:    07/21/23 2043 07/23/23 0231 07/24/23 0630   Weight: 35.9 kg (79 lb 3.2 oz) 34.7 kg (76 lb 9.6 oz) 35.7 kg (78 lb 12.8 oz)     Vital Signs with Ranges  Temp:  [97  F (36.1  C)-98.1  F (36.7  C)] 97.1  F (36.2  C)  Pulse:  [] 94  Resp:  [16] 16  BP: ()/(61-94) 137/86  SpO2:  [93 %-97 %] 96 %  I/O last 3 completed shifts:  In: 490 [P.O.:480; I.V.:10]  Out: 3000 [Other:3000]    Patient Vitals for the past 72 hrs:   Weight   07/24/23 0630 35.7 kg (78 lb 12.8 oz)   07/23/23 0231 34.7 kg (76 lb 9.6 oz)       Intake/Output Summary (Last 24 hours) at 7/24/2023 1111  Last data filed at 7/24/2023 0900  Gross per 24 hour   Intake 720 ml   Output --   Net 720 ml       PHYSICAL EXAM:  GEN: NAD, thin  CV: RRR   Lung: clear and equal  Ab: soft and NT  Ext: no edema and well perfused  Skin: no rash      LABORATORY STUDIES:     Recent Labs   Lab 07/24/23  0727 07/23/23  1522 07/23/23  0651 07/22/23  0656 07/21/23  1323 07/19/23  0446   WBC 13.0*  --  13.7* 17.4* 18.9* 9.8   RBC 2.94*  --  2.62* 2.93* 3.08* 2.89*   HGB 10.0* 9.5* 8.8* 10.0* 10.3* 9.6*   HCT 31.9*  --  28.0* 31.3* 32.4* 30.4*     --  270 318 343 323         Basic Metabolic Panel:  Recent Labs   Lab 07/25/23  0001 07/24/23 2009 07/24/23  0937 07/23/23  1811 07/23/23  0749 07/23/23  0651 07/22/23  1805 07/22/23  1100 07/22/23  0656 07/21/23  1323 07/19/23  0945 07/19/23  0446   NA  --   --   --   --   --  134*  --   --  132* 134*  --  137   POTASSIUM   --   --   --   --   --  4.0  --   --  4.8 4.2  --  3.6   CHLORIDE  --   --   --   --   --  97*  --   --  95* 94*  --  99   CO2  --   --   --   --   --  27  --   --  25 27  --  28   BUN 59*  --   --   --   --  27*  --   --  36* 24*  --  28*   CR  --   --   --   --   --  3.12*  --   --  4.35* 3.82*  --  3.78*   GLC  --  100* 64* 104* 88 81 93   < > 58* 77   < > 86   GAVIN  --   --   --   --   --  8.4*  --   --  8.4* 9.1  --  8.4*    < > = values in this interval not displayed.         INRNo lab results found in last 7 days.     Recent Labs   Lab Test 07/24/23  0727 07/23/23  1522 07/23/23  0651 07/14/23  0503 07/13/23  1239 07/11/23  1131   INR  --   --   --   --  1.70* 1.40*   WBC 13.0*  --  13.7*   < > 13.0* 11.5*   HGB 10.0* 9.5* 8.8*   < > 8.4* 9.0*     --  270   < > 320 288    < > = values in this interval not displayed.         Personally reviewed current labs    Mitzi Boogie [facility]  [facility]  [phone]

## 2023-07-26 VITALS
BODY MASS INDEX: 15.39 KG/M2 | RESPIRATION RATE: 16 BRPM | HEART RATE: 102 BPM | SYSTOLIC BLOOD PRESSURE: 102 MMHG | TEMPERATURE: 97.3 F | OXYGEN SATURATION: 100 % | WEIGHT: 78.8 LBS | DIASTOLIC BLOOD PRESSURE: 58 MMHG

## 2023-07-26 DIAGNOSIS — Z09 HOSPITAL DISCHARGE FOLLOW-UP: ICD-10-CM

## 2023-07-26 LAB
BACTERIA BLD CULT: NO GROWTH
GLUCOSE BLDC GLUCOMTR-MCNC: 127 MG/DL (ref 70–99)
MAGNESIUM SERPL-MCNC: 1.5 MG/DL (ref 1.8–2.6)

## 2023-07-26 PROCEDURE — 99238 HOSP IP/OBS DSCHRG MGMT 30/<: CPT | Mod: GC

## 2023-07-26 PROCEDURE — 250N000013 HC RX MED GY IP 250 OP 250 PS 637: Performed by: FAMILY MEDICINE

## 2023-07-26 PROCEDURE — 83735 ASSAY OF MAGNESIUM: CPT | Performed by: FAMILY MEDICINE

## 2023-07-26 PROCEDURE — 250N000013 HC RX MED GY IP 250 OP 250 PS 637

## 2023-07-26 PROCEDURE — 99231 SBSQ HOSP IP/OBS SF/LOW 25: CPT

## 2023-07-26 PROCEDURE — 250N000011 HC RX IP 250 OP 636: Mod: JZ

## 2023-07-26 PROCEDURE — 36415 COLL VENOUS BLD VENIPUNCTURE: CPT | Performed by: FAMILY MEDICINE

## 2023-07-26 RX ORDER — LABETALOL 100 MG/1
100 TABLET, FILM COATED ORAL AT BEDTIME
Qty: 30 TABLET | Refills: 0 | Status: SHIPPED | OUTPATIENT
Start: 2023-07-26 | End: 2023-01-01

## 2023-07-26 RX ORDER — LORAZEPAM 1 MG/1
1 TABLET ORAL
Qty: 30 TABLET | Refills: 0 | Status: ON HOLD | OUTPATIENT
Start: 2023-07-26 | End: 2023-01-01

## 2023-07-26 RX ORDER — OXYCODONE HYDROCHLORIDE 10 MG/1
10 TABLET ORAL EVERY 4 HOURS PRN
Qty: 30 TABLET | Refills: 0 | Status: ON HOLD | OUTPATIENT
Start: 2023-07-26 | End: 2023-01-01

## 2023-07-26 RX ORDER — LABETALOL 100 MG/1
100 TABLET, FILM COATED ORAL EVERY MORNING
Qty: 30 TABLET | Refills: 0 | Status: SHIPPED | OUTPATIENT
Start: 2023-07-26 | End: 2023-01-01

## 2023-07-26 RX ORDER — LACTULOSE 10 G/15ML
10 SOLUTION ORAL 2 TIMES DAILY PRN
Qty: 900 ML | Refills: 0 | Status: SHIPPED | OUTPATIENT
Start: 2023-07-26

## 2023-07-26 RX ORDER — SENNA AND DOCUSATE SODIUM 50; 8.6 MG/1; MG/1
2 TABLET, FILM COATED ORAL 2 TIMES DAILY
Qty: 90 TABLET | Refills: 0 | Status: ON HOLD | OUTPATIENT
Start: 2023-07-26 | End: 2024-01-01

## 2023-07-26 RX ADMIN — OXYCODONE HYDROCHLORIDE 10 MG: 5 TABLET ORAL at 01:22

## 2023-07-26 RX ADMIN — OXYCODONE HYDROCHLORIDE 10 MG: 5 TABLET ORAL at 05:29

## 2023-07-26 RX ADMIN — SEVELAMER CARBONATE 1600 MG: 800 TABLET, FILM COATED ORAL at 10:47

## 2023-07-26 RX ADMIN — SENNOSIDES AND DOCUSATE SODIUM 2 TABLET: 50; 8.6 TABLET ORAL at 07:48

## 2023-07-26 RX ADMIN — HEPARIN SODIUM 5000 UNITS: 5000 INJECTION, SOLUTION INTRAVENOUS; SUBCUTANEOUS at 07:48

## 2023-07-26 RX ADMIN — SEVELAMER CARBONATE 1600 MG: 800 TABLET, FILM COATED ORAL at 07:48

## 2023-07-26 RX ADMIN — PANTOPRAZOLE SODIUM 40 MG: 40 TABLET, DELAYED RELEASE ORAL at 07:48

## 2023-07-26 ASSESSMENT — ACTIVITIES OF DAILY LIVING (ADL)
ADLS_ACUITY_SCORE: 20
ADLS_ACUITY_SCORE: 21
ADLS_ACUITY_SCORE: 20

## 2023-07-26 NOTE — DISCHARGE SUMMARY
[facility]  Discharge Summary - Medicine & Pediatrics       Date of Admission:  7/21/2023  Date of Discharge:  7/26/2023 10:54 AM  Discharging Provider: Dr. Harmon  Discharge Service: Hospitalist Service    Discharge Diagnoses   Severe constipation    Clinically Significant Risk Factors     # Cachexia: Estimated body mass index is 15.39 kg/m  as calculated from the following:    Height as of 7/13/23: 1.524 m (5').    Weight as of this encounter: 35.7 kg (78 lb 12.8 oz).       Follow-ups Needed After Discharge   Follow-up Appointments     Follow Up and recommended labs and tests      Follow up with [facility] physician.  The following labs/tests are   recommended: recheck magnesium. Ensure patient is having consistent BMs.        Consider nutrition consult for low BMI in context of ESRD.    Unresulted Labs Ordered in the Past 30 Days of this Admission       Date and Time Order Name Status Description    7/21/2023  5:18 PM Blood Culture Arm, Right Preliminary         These results will be followed up by TCU physician    Discharge Disposition   Discharged to short-term care facility  Condition at discharge: Stable    Hospital Course   Serene Tipton was admitted on 7/21/2023 for ongoing/worsening left sided abdominal pain after being discharged from the hospital on 7/19 for constipation.  During previous admission surgery was consulted, believed constipation to be more likely than SBO. At that time, MiraLAX bowel regimen started and patient had multiple loose bowel movements. Patient has since had 3 abdominal CT scans with findings more suggestive of ileus than SBO. GI consulted last admission 7/13-7/19. Gastrografin study 7/18 excluded SBO. Since discharge 7/19, patient had been having increasing abdominal pain with NBNB emesis.  Started on 2 tablets of senna twice daily along with lactulose which has led to regular bowel movements.    The following problems were addressed during her  hospitalization:    Ileus  Constipation  Nausea  Abdominal pain  Constipation in setting of chronic opioid use. Was placed on aggressive bowel regimen with BID senna docusate, daily enemas, and lactulose in addition to PTA BID senna. Left-sided abdominal pain has been improving since having regular bowel movements.  The lactulose was causing her to use the bathroom too much so this was titrated off but senna was continued.  Plan to continue BID senna docusate on discharge along with lactulose PRN.  Patient had a mild hypomagnesemia on labs on the day prior to discharge to 1.7 which is likely due to the frequent bowel movements under the new bowel regimen.  Will likely resolve now that bowel movements have been more regulated.  Follow-up magnesium levels at TCU.  -Regular diet   - continue PTA omeprazole   - PTA oxycodone  -Continue senna-docusate 2 tablets twice daily   - lactulose 10 mg twice daily PRN; may take a partial dose based on patient preference        Failure to thrive  Hypoalbuminemia   Generalized weakness  Weight loss, fatigue, poor appetite. Likely secondary to chronic disease. Passed ACTH stim test last admission. Lives alone in her apartment.  PT and OT recommending TCU and patient agreeable.   -PT/OT     ESRD on HD  Mild hyponatremia, chronic  Receives dialysis TTS.   - Avoid nephrotoxic medications      Hypertension   Last admission had hypotension. Changed medications, now only on labetalol 100 twice daily. BP appears much improved.   - continue PTA labetalol      Hypomagnesemia   Slightly low on admission, 1.6.  Likely related to GI losses.  Should improve with regulation of bowel movements.  -Recheck at TCU     Leukocytosis, resolved  No clear source of infection. Met SIRS criteria with WBC 19 and elevated pulse. Improving without abx. Blood cx NGTD. Lactate improved to 2 1.2.     Tachycardia, improving   Likely related to pain. EKG with sinus tachycardia.      Consultations This Hospital Stay    NEPHROLOGY IP CONSULT  SURGERY GENERAL IP CONSULT  CARE MANAGEMENT / SOCIAL WORK IP CONSULT  PHYSICAL THERAPY ADULT IP CONSULT  OCCUPATIONAL THERAPY ADULT IP CONSULT    Code Status   Full Code       The patient was discussed with Dr. Faustino Raymond MD  Teaching service  [address] [address]  Phone: [phone]  Fax: [phone]  ______________________________________________________________________    Physical Exam   Vital Signs: Temp: 97.3  F (36.3  C) Temp src: Oral BP: 102/58 Pulse: 102   Resp: 16 SpO2: 100 % O2 Device: None (Room air)    Weight: 78 lbs 12.8 oz  Constitutional: awake, alert, cooperative, no apparent distress, and appears stated age and cachectic appearing.  Eyes: Lids and lashes normal, pupils equal, round and reactive to light, extra ocular muscles intact, sclera clear, conjunctiva normal  Respiratory: No increased work of breathing, good air exchange, clear to auscultation bilaterally, no crackles or wheezing  Cardiovascular: Regular rate and rhythm, normal S1 and S2, no S3 or S4, and grade 3/6 systolic murmur noted  GI: non-distended, mild tenderness around the umbilicus, no guarding present, and no masses palpated  Skin: no rashes and no lesions  Neurologic: Awake, alert, oriented to name, place and time.  Cranial nerves II-XII are grossly intact.    Neuropsychiatric: General: normal, calm, and normal eye contact  Level of consciousness: alert / normal  Affect: normal      Primary Care Physician   Ramos Lowry    Discharge Orders      General info for SNF    Length of Stay Estimate: Short Term Care: Estimated # of Days <30  Condition at Discharge: Improving  Level of care:board and care  Rehabilitation Potential: Good  Admission H&P remains valid and up-to-date: Yes  Recent Chemotherapy: N/A  Use Nursing Home Standing Orders: Yes     Mantoux instructions    Give two-step Mantoux (PPD) Per Facility  Policy Yes     Follow Up and recommended labs and tests    Follow up with detention physician.  The following labs/tests are recommended: recheck magnesium. Ensure patient is having consistent BMs.     Reason for your hospital stay    Admitted for severe constipation which has resolved.     Activity - Up ad margaux     Full Code     Physical Therapy Adult Consult    Evaluate and treat as clinically indicated.    Reason:  Deconditioning due to acute illness.     Occupational Therapy Adult Consult    Evaluate and treat as clinically indicated.    Reason:  Deconditioning due to acute illness.     Diet    Follow this diet upon discharge: Orders Placed This Encounter      Snacks/Supplements Adult: Nepro Oral Supplement; With Meals      Renal Diet (dialysis)       Significant Results and Procedures   Most Recent 3 CBC's:  Recent Labs   Lab Test 07/25/23  1802 07/24/23  0727 07/23/23  1522 07/23/23  0651   WBC 11.0 13.0*  --  13.7*   HGB 10.2* 10.0* 9.5* 8.8*   * 109*  --  107*    290  --  270     Most Recent 3 BMP's:  Recent Labs   Lab Test 07/26/23  0750 07/25/23  1802 07/25/23  1715 07/25/23  1002 07/25/23  0001 07/23/23  0749 07/23/23  0651 07/22/23  1100 07/22/23  0656   NA  --  132*  --   --   --   --  134*  --  132*   POTASSIUM  --  4.7  --   --   --   --  4.0  --  4.8   CHLORIDE  --  96*  --   --   --   --  97*  --  95*   CO2  --  24  --   --   --   --  27  --  25   BUN  --  27*  --   --  59*  --  27*  --  36*   CR  --  3.07*  --   --   --   --  3.12*  --  4.35*   ANIONGAP  --  12  --   --   --   --  10  --  12   GAVIN  --  8.9  --   --   --   --  8.4*  --  8.4*   * 116 167*   < >  --    < > 81   < > 58*    < > = values in this interval not displayed.       Discharge Medications   Discharge Medication List as of 7/26/2023  9:17 AM        START taking these medications    Details   lactulose (CHRONULAC) 10 GM/15ML solution Take 15 mLs (10 g) by mouth 2 times daily as needed for constipation,  Disp-900 mL, R-0, E-Prescribe           CONTINUE these medications which have CHANGED    Details   SENNA-docusate sodium (SENNA S) 8.6-50 MG tablet Take 2 tablets by mouth 2 times daily Hold for loose stools., Disp-90 tablet, R-0, E-Prescribe           CONTINUE these medications which have NOT CHANGED    Details   B Complex-C (SUPER B COMPLEX PO) Take 1 tablet by mouth At Bedtime, Historical      calcitRIOL (ROCALTROL) 0.5 MCG capsule Take 0.5 mcg by mouth three times a week Given at HD. [place], Thurs, Sat, Historical      cholecalciferol (VITAMIN D3) 25 mcg (1000 units) capsule Take 1 capsule by mouth At Bedtime, Historical      Epoetin Adam (EPOGEN IJ) Inject 1,000 Units into the vein three times a week At dialysis., Historical      Iron Sucrose (VENOFER IV) Inject 100 mg into the vein once a week At dialysis, Historical      levothyroxine (SYNTHROID/LEVOTHROID) 50 MCG tablet Take 50 mcg by mouth At Bedtime Takes in the middle of the night., Historical      nortriptyline (PAMELOR) 75 MG capsule Take 75 mg by mouth At Bedtime, Historical      omeprazole (PRILOSEC) 20 MG DR capsule Take 20 mg by mouth daily, Historical      rosuvastatin (CRESTOR) 10 MG tablet Take 10 mg by mouth At Bedtime, Historical      sertraline (ZOLOFT) 100 MG tablet Take 150 mg by mouth At Bedtime, Historical      sevelamer carbonate (RENVELA) 800 MG tablet Take 800-2,400 mg by mouth 3 times daily When eating; range of 1-3 tabs depending on meal size, Historical      !! labetalol (NORMODYNE) 100 MG tablet Take 1 tablet (100 mg) by mouth At Bedtime, Disp-30 tablet, R-0, E-Prescribe      !! labetalol (NORMODYNE) 100 MG tablet Take 1 tablet (100 mg) by mouth every morning, Disp-30 tablet, R-0, E-Prescribe      LORazepam (ATIVAN) 1 MG tablet Take 1 mg by mouth daily (before lunch), Historical      oxyCODONE IR (ROXICODONE) 10 MG tablet Take 10 mg by mouth every 4 hours as needed for pain Max of 5 doses per day., Historical       !! - Potential  duplicate medications found. Please discuss with provider.        Allergies   Allergies   Allergen Reactions    Penicillins Other (See Comments) and Shortness Of Breath     Breathing problem.  breathing      Carvedilol GI Disturbance     Nausea and vomiting    Ciprofloxacin Muscle Pain (Myalgia)    Morphine Other (See Comments)     Vomiting    No Clinical Screening - See Comments      PN: LW CM1: Contrast Intercapsular - Nonionic Reaction :    Nsaids Other (See Comments)    Sulfa Antibiotics Itching    Sulfamethoxazole-Trimethoprim      Other reaction(s): itching    Levofloxacin Muscle Pain (Myalgia) and Rash

## 2023-07-26 NOTE — PROGRESS NOTES
Addendum to Discharge Summary dated 7/26/2023    # Severe Protein-Calorie Malnutrition   Patient has protein calorie malnutrition likely caused by poor PO intake and end stage renal disease. She was seen and evaluated by nutritionist in the hospital.    Jarad Raymond MD

## 2023-07-26 NOTE — PROGRESS NOTES
Care Management Discharge Note    Discharge Date: 07/26/2023       Discharge Disposition: Transitional Care, Skilled Nursing Facility    Discharge Services: None    Discharge DME: None (No new DME anticipated)    Discharge Transportation: family or friend will provide    Private pay costs discussed: Not applicable    Does the patient's insurance plan have a 3 day qualifying hospital stay waiver?  No    PAS Confirmation Code:  ([id number])  Patient/family educated on Medicare website which has current facility and service quality ratings: yes (Pt declined.)    Education Provided on the Discharge Plan: Yes (Assessment and service coordination process explained.)  Persons Notified of Discharge Plans: Patient, pt's sister Toyin for transport per pt's request ([phone])  Patient/Family in Agreement with the Plan: yes    Handoff Referral Completed: Yes    Additional Information:    [facility] at 10:30am.  Pt's sister Toyin to transport to [facility] - called her to confirm discharge plans. Pt to resume OP dialysis at [facility] T/Th/Sat.    [id number] - PAS done - Does not require an OBRA level II assessment.    [facility] - Update given to Stephon: pt is discharging to TCU.    11:11 AM  [employer] - update given to Iban - pt discharged to [facility].    Stefany Canales RN

## 2023-07-26 NOTE — PLAN OF CARE
Patient cleared for discharge to TCU. Report given to Sean at [facility]. IV out. Education given. Paper prescriptions signed and placed in yellow folder for Ativan, Oxycodone, and Labetalol (morning and bedtime doses).

## 2023-07-26 NOTE — PROGRESS NOTES
RENAL PROGRESS NOTE    67-year-old female past medical history of polycystic kidney disease s/p bilateral nephrectomies, end-stage renal disease on insulin hemodialysis TTS schedule [facility], anemia of chronic disease, hypertension, hypothyroidism, and hyperlipidemia.  She was recently admitted at [facility] for SBO versus ileus during admit 7/13/2023 through 7/19/2023. Presented with similar issues and abd pain concerning for SBO/Ileus  Nehrology consulted for ESRd management    ASSESSMENT & PLAN:     ESRD on HD: 2/2 PKD s/p bilateral nephrectomy.  On in-center HD @  [facility].  Runs on a TTS schedule.  + JESSI VF.  We will dialyze on TTS schedule, while inpatient. HD Today, UF only run for hypervolemia.     Anemia of chronic disease: Receives PETE and parenteral iron with outpatient dialysis.  Hemoglobin 10s.  Follow need to dose PETE while inpatient.    Hyponatremia: Secondary to ESRD.  UF with HD.     Secondary renal hyperparathyroidism: Sevelamer 3 times daily AC.     History of HTN: Last admit was mildly hypotensive s/p DC several antihypertensives, now on monotherapy with labetalol.  Has midodrine available with HD for intradialytic hypotension is poor blood pressure with UF.  She is normotensive this admit thus far.Hypervolemia, Will attempt UF with dialysis limited by lower blood pressures     Ileus, constipation, nausea, abdominal pain:  Admit 7/13/2023 through 7/19/2023 with similar complaints.  Surgery was consulted during that admit and they believed symptoms were most consistent with constipation.  At that time she was placed on a bowel regiment.  Imaging more suggestive of ileus versus SBO. Continues to have left-sided abdominal pain.   Management per primary service.     History of hypothyroidism on replacement     HLD on statin    SUBJECTIVE:    Plan to discharge to TCU today  Discussed with HD RN at [facility] that pt will resume out-pt HD this Thursday. She would also  like to transfer to an HD unti closer to the TCU for temporary/convenience   Pt reports BM, abd pain better.   Denies n, v, c, d, sob, fever, rash, edema  Discussed plan, answered all questions.     OBJECTIVE:  Physical Exam   Temp: 97.3  F (36.3  C) Temp src: Oral BP: 102/58 Pulse: 102   Resp: 16 SpO2: 100 % O2 Device: None (Room air)    Vitals:    07/21/23 2043 07/23/23 0231 07/24/23 0630   Weight: 35.9 kg (79 lb 3.2 oz) 34.7 kg (76 lb 9.6 oz) 35.7 kg (78 lb 12.8 oz)     Vital Signs with Ranges  Temp:  [97.1  F (36.2  C)-98  F (36.7  C)] 97.3  F (36.3  C)  Pulse:  [] 102  Resp:  [16-22] 16  BP: ()/(55-86) 102/58  SpO2:  [96 %-100 %] 100 %  I/O last 3 completed shifts:  In: 480 [P.O.:480]  Out: 2000 [Other:2000]    Patient Vitals for the past 72 hrs:   Weight   07/24/23 0630 35.7 kg (78 lb 12.8 oz)       Intake/Output Summary (Last 24 hours) at 7/24/2023 1111  Last data filed at 7/24/2023 0900  Gross per 24 hour   Intake 720 ml   Output --   Net 720 ml       PHYSICAL EXAM:  GEN: NAD, thin  CV: RRR   Lung: clear and equal  Ab: soft and NT  Ext: no edema and well perfused  Skin: no rash  Access C/D/I      LABORATORY STUDIES:     Recent Labs   Lab 07/25/23  1802 07/24/23  0727 07/23/23  1522 07/23/23  0651 07/22/23  0656 07/21/23  1323   WBC 11.0 13.0*  --  13.7* 17.4* 18.9*   RBC 2.99* 2.94*  --  2.62* 2.93* 3.08*   HGB 10.2* 10.0* 9.5* 8.8* 10.0* 10.3*   HCT 32.5* 31.9*  --  28.0* 31.3* 32.4*    290  --  270 318 343         Basic Metabolic Panel:  Recent Labs   Lab 07/26/23  0750 07/25/23  1802 07/25/23  1715 07/25/23  1002 07/25/23  0001 07/24/23 2009 07/24/23  0937 07/23/23  0749 07/23/23  0651 07/22/23  1100 07/22/23  0656 07/21/23  1323   NA  --  132*  --   --   --   --   --   --  134*  --  132* 134*   POTASSIUM  --  4.7  --   --   --   --   --   --  4.0  --  4.8 4.2   CHLORIDE  --  96*  --   --   --   --   --   --  97*  --  95* 94*   CO2  --  24  --   --   --   --   --   --  27  --  25 27    BUN  --  27*  --   --  59*  --   --   --  27*  --  36* 24*   CR  --  3.07*  --   --   --   --   --   --  3.12*  --  4.35* 3.82*   * 116 167* 168*  --  100* 64*   < > 81   < > 58* 77   GAVIN  --  8.9  --   --   --   --   --   --  8.4*  --  8.4* 9.1    < > = values in this interval not displayed.         INRNo lab results found in last 7 days.     Recent Labs   Lab Test 07/25/23  1802 07/24/23  0727 07/14/23  0503 07/13/23  1239 07/11/23  1131   INR  --   --   --  1.70* 1.40*   WBC 11.0 13.0*   < > 13.0* 11.5*   HGB 10.2* 10.0*   < > 8.4* 9.0*    290   < > 320 288    < > = values in this interval not displayed.         Personally reviewed current labs    Mitzi PALMA-BC  [facility]  [phone]

## 2023-07-26 NOTE — PLAN OF CARE
Alvarez is A & O x4, able to make needs known. SBA, walked entire hallway with PT. C/O 6/10 back pain, Oxy 10mg given x 2, not effective at bedtime, given 1x dose of Oxy 10 again along with Tylenol. Ate 100% dinner, no N/V. Anticipating discharging to [facility] tomorrow 7/26, family will provide transport.     Had a good birthday today!

## 2023-07-26 NOTE — PLAN OF CARE
Goal Outcome Evaluation:         Problem: Hemodialysis  Goal: Safe, Effective Therapy Delivery  Outcome: Progressing  Intervention: Optimize Device Care and Function  Recent Flowsheet Documentation  Taken 7/26/2023 0030 by Yue Calvo RN  Medication Review/Management: medications reviewed         Patient has chronic back pain, controlled with Oxycodone prn.  VSS.   Pt alert and oriented. Plan is for pt to discharge to [facility] this am  transported by family.

## 2023-07-26 NOTE — PROGRESS NOTES
Occupational Therapy Discharge Summary    Reason for therapy discharge:    Discharged to transitional care facility.    Progress towards therapy goal(s). See goals on Care Plan in [facility] electronic health record for goal details.  Goals not met.  Barriers to achieving goals:   discharge from facility.    Therapy recommendation(s):    Continued therapy is recommended.  Rationale/Recommendations:  TCU to increase activity tolerance for all ADLs.

## 2023-07-26 NOTE — PROGRESS NOTES
Physical Therapy Discharge Summary    Reason for therapy discharge:    Discharged to transitional care facility.    Progress towards therapy goal(s). See goals on Care Plan in [facility] electronic health record for goal details.  Goals not met.  Barriers to achieving goals:   discharge from facility.    Therapy recommendation(s):    Continued therapy is recommended.  Rationale/Recommendations:  PT for increased strength, activity tolerance and functional mobility.

## 2023-07-28 ENCOUNTER — TRANSITIONAL CARE UNIT VISIT (OUTPATIENT)
Dept: GERIATRICS | Facility: CLINIC | Age: 68
End: 2023-07-28
Payer: COMMERCIAL

## 2023-07-28 VITALS
WEIGHT: 76 LBS | TEMPERATURE: 97.7 F | SYSTOLIC BLOOD PRESSURE: 120 MMHG | HEART RATE: 102 BPM | DIASTOLIC BLOOD PRESSURE: 77 MMHG | OXYGEN SATURATION: 97 % | RESPIRATION RATE: 20 BRPM | HEIGHT: 60 IN | BODY MASS INDEX: 14.92 KG/M2

## 2023-07-28 DIAGNOSIS — N18.6 ESRD (END STAGE RENAL DISEASE) ON DIALYSIS (H): ICD-10-CM

## 2023-07-28 DIAGNOSIS — R53.81 PHYSICAL DECONDITIONING: ICD-10-CM

## 2023-07-28 DIAGNOSIS — Z99.2 ESRD (END STAGE RENAL DISEASE) ON DIALYSIS (H): ICD-10-CM

## 2023-07-28 DIAGNOSIS — K56.609 SMALL BOWEL OBSTRUCTION (H): Primary | ICD-10-CM

## 2023-07-28 PROCEDURE — 99309 SBSQ NF CARE MODERATE MDM 30: CPT | Performed by: NURSE PRACTITIONER

## 2023-07-28 NOTE — PROGRESS NOTES
[facility] GERIATRIC SERVICES  Chief Complaint   Patient presents with     [facility] F/U     [facility] Medical Record Number:  [id number]  Place of Service where encounter took place:  [facility] ([facility]) [[id number]]  Code Status:  Full Code     HISTORY:      HPI:  Serene Tipton  is 68 year old (1955) undergoing physical and occupational therapy.  She is with past medical history end-stage renal disease on dialysis, depressive disorder, anxiety, arthritis, chronic low back pain, GERD, hyperlipidemia, hypertension who was admitted on 7/21/2023 for ongoing/worsening left sided abdominal pain after being discharged from the hospital on 7/19 for constipation. Excerpted from records During previous admission surgery was consulted, believed constipation to be more likely than SBO. At that time, MiraLAX bowel regimen started and patient had multiple loose bowel movements. Patient has since had 3 abdominal CT scans with findings more suggestive of ileus than SBO. GI consulted last admission 7/13-7/19. Gastrografin study 7/18 excluded SBO. Since discharge 7/19, patient had been having increasing abdominal pain with NBNB emesis.  Started on 2 tablets of senna twice daily along with lactulose which has led to regular bowel movements.    Today she is seen to review vital signs, labs, routine visit and to establish care.  She denied chest pain shortness of breath cough congestion.  She reports no bowel movement in 2 to 3 days.  However she did not take her full dose of lactulose and has requested the rest of the dose from the nurse at this time.  She does understand she is to have frequent bowel movements due to her recent bowel obstruction.  Abdomen was nontender she is passing flatus and bowel sounds active x4 quadrants.  She is currently on a dialysis patient for approximately the last 3 years, she is on Tuesday Thursday Saturday schedule.  Her color is ashen, she reports it is due to her dialysis.  Labs pending  for 7/31/2023.     ALLERGIES:Penicillins, Carvedilol, Ciprofloxacin, Floxacillin (flucloxacillin), Morphine, No clinical screening - see comments, Nsaids, Penicillin g, Sulfa antibiotics, Sulfamethoxazole-trimethoprim, and Levofloxacin    PAST MEDICAL HISTORY:   Past Medical History:   Diagnosis Date     Anemia in chronic kidney disease      Anxiety      Arthritis      Brain aneurysm     Cavernous segment of the right & left carotid arteries. See Neurosurg note 6/16/21.     Chronic low back pain     Managed by Pain Clinic     Depressive disorder 2013     Disease of thyroid gland      ESRD (end stage renal disease) on dialysis (H) 07/2020     GERD (gastroesophageal reflux disease)      Hepatic lesion      Hyperlipidemia      Hypertension      Nephrolithiasis      Neuromuscular disorder (H)      Osteoporosis      PKD (polycystic kidney disease) 09/01/1990     PTSD (post-traumatic stress disorder)      Tobacco abuse      Vitamin D deficiency        PAST SURGICAL HISTORY:   has a past surgical history that includes back surgery; biopsy (Left, 09/01/1990); Eye surgery (2008); orthopedic surgery (Right, 2005); breast lumpectomy, rt/lt (Left); Cystectomy pilonidal (1981); fracture surgery; Spine surgery; other surgical history; Cyst Removal (Right); FORMATION ARTERIOVENOUS FISTULA   (07/28/2020); and Nephrectomy bilateral (Bilateral, 2/1/2023).    FAMILY HISTORY: family history includes Cardiac Sudden Death (age of onset: 52) in her sister; Cerebrovascular Disease in her maternal grandmother, mother, and paternal grandmother; Chronic Obstructive Pulmonary Disease in her father; Coronary Artery Disease in her paternal grandfather; Heart Disease in her maternal grandfather; Hyperlipidemia in her paternal grandmother; Hypertension in her maternal grandmother, mother, and sister; Kidney Disease in her maternal grandmother, mother, and sister; Multiple Sclerosis in her father; Polycystic Kidney Diease in her maternal  grandmother, mother, and sister; Pulmonary Embolism in her paternal grandfather.    SOCIAL HISTORY:  reports that she quit smoking about 2 years ago. Her smoking use included cigarettes. She has a 7.00 pack-year smoking history. She has never used smokeless tobacco. She reports that she does not currently use alcohol. She reports that she does not use drugs.    ROS:  Constitutional: Negative for activity change, appetite change, fatigue and fever.   HENT: Negative for congestion.    Respiratory: Negative for cough, shortness of breath and wheezing.    Cardiovascular: Negative for chest pain and leg swelling.   Gastrointestinal: Negative for abdominal distention, abdominal pain, constipation, diarrhea and nausea.  Needs close monitoring of bowel status  Genitourinary: Negative for dysuria.   Musculoskeletal: Negative for arthralgia. Negative for back pain.  Fistula with positive bruit and thrill  Skin: Negative for color change and wound.   Neurological: Negative for dizziness.   Psychiatric/Behavioral: Negative for agitation, behavioral problems and confusion.     Physical Exam:  Constitutional:       Appearance: Patient appears malnourished and  color ashen  HENT:      Head: Normocephalic.   Eyes:      Conjunctiva/sclera: Conjunctivae normal.   Neck:      Musculoskeletal: Normal range of motion.   Cardiovascular:      Rate and Rhythm: Normal rate and regular rhythm.      Heart sounds: Normal heart sounds. No murmur.   Pulmonary:      Effort: No respiratory distress.      Breath sounds: Normal breath sounds. No wheezing or rales.   Abdominal:      General: Bowel sounds are normal. There is no distension.      Palpations: Abdomen is soft.      Tenderness: There is no abdominal tenderness.   Musculoskeletal:       Normal range of motion.     Skin:General:        Skin is warm.   Neurological:         Mental Status: Patient is alert and oriented to person, place, and time.   Psychiatric:         Behavior: Behavior  normal.     Vitals:/77   Pulse 102   Temp 97.7  F (36.5  C)   Resp 20   Ht 1.524 m (5')   Wt 34.5 kg (76 lb)   SpO2 97%   BMI 14.84 kg/m   and Body mass index is 14.84 kg/m .    Lab/Diagnostic data:   Recent Results (from the past 240 hour(s))   Glucose by meter    Collection Time: 07/19/23  9:45 AM   Result Value Ref Range    GLUCOSE BY METER POCT 64 (L) 70 - 99 mg/dL   Glucose by meter    Collection Time: 07/19/23 10:03 AM   Result Value Ref Range    GLUCOSE BY METER POCT 74 70 - 99 mg/dL   ECG 12-LEAD WITH MUSE (LHE)    Collection Time: 07/21/23 12:19 PM   Result Value Ref Range    Systolic Blood Pressure 123 mmHg    Diastolic Blood Pressure 64 mmHg    Ventricular Rate 102 BPM    Atrial Rate 102 BPM    MD Interval 170 ms    QRS Duration 80 ms     ms    QTc 466 ms    P Axis 73 degrees    R AXIS 48 degrees    T Axis 78 degrees    Interpretation ECG       Sinus tachycardia  Right atrial enlargement  Left ventricular hypertrophy with repolarization abnormality  Abnormal ECG  When compared with ECG of 13-JUL-2023 11:59,  MD interval has decreased  Incomplete left bundle branch block is no longer Present  Nonspecific T wave abnormality no longer evident in Inferior leads  QT has shortened  Confirmed by SEE ED PROVIDER NOTE FOR, ECG INTERPRETATION ([id number]),  GABRIELA PELAEZ ([id number]) on 7/21/2023 3:03:29 PM     Extra Blue Top Tube    Collection Time: 07/21/23  1:22 PM   Result Value Ref Range    Hold Specimen [facility]    Extra Green Top (Lithium Heparin) Tube    Collection Time: 07/21/23  1:22 PM   Result Value Ref Range    Hold Specimen [facility]    Basic metabolic panel    Collection Time: 07/21/23  1:23 PM   Result Value Ref Range    Sodium 134 (L) 136 - 145 mmol/L    Potassium 4.2 3.5 - 5.0 mmol/L    Chloride 94 (L) 98 - 107 mmol/L    Carbon Dioxide (CO2) 27 22 - 31 mmol/L    Anion Gap 13 5 - 18 mmol/L    Urea Nitrogen 24 (H) 8 - 22 mg/dL    Creatinine 3.82 (H) 0.60 - 1.10 mg/dL    Calcium 9.1 8.5 - 10.5  mg/dL    Glucose 77 70 - 125 mg/dL    GFR Estimate 12 (L) >60 mL/min/1.73m2   Troponin I (now)    Collection Time: 07/21/23  1:23 PM   Result Value Ref Range    Troponin I 0.03 0.00 - 0.29 ng/mL   Hepatic function panel    Collection Time: 07/21/23  1:23 PM   Result Value Ref Range    Bilirubin Total 0.7 0.0 - 1.0 mg/dL    Bilirubin Direct 0.2 <=0.5 mg/dL    Protein Total 5.1 (L) 6.0 - 8.0 g/dL    Albumin 2.4 (L) 3.5 - 5.0 g/dL    Alkaline Phosphatase 151 (H) 45 - 120 U/L    AST 38 0 - 40 U/L    ALT 40 0 - 45 U/L   Lipase    Collection Time: 07/21/23  1:23 PM   Result Value Ref Range    Lipase 11 0 - 52 U/L   Extra Red Top Tube    Collection Time: 07/21/23  1:23 PM   Result Value Ref Range    Hold Specimen [facility]    Extra Purple Top Tube    Collection Time: 07/21/23  1:23 PM   Result Value Ref Range    Hold Specimen [facility]    CBC with platelets and differential    Collection Time: 07/21/23  1:23 PM   Result Value Ref Range    WBC Count 18.9 (H) 4.0 - 11.0 10e3/uL    RBC Count 3.08 (L) 3.80 - 5.20 10e6/uL    Hemoglobin 10.3 (L) 11.7 - 15.7 g/dL    Hematocrit 32.4 (L) 35.0 - 47.0 %     (H) 78 - 100 fL    MCH 33.4 (H) 26.5 - 33.0 pg    MCHC 31.8 31.5 - 36.5 g/dL    RDW 16.4 (H) 10.0 - 15.0 %    Platelet Count 343 150 - 450 10e3/uL    % Neutrophils 89 %    % Lymphocytes 5 %    % Monocytes 5 %    % Eosinophils 0 %    % Basophils 0 %    % Immature Granulocytes 1 %    NRBCs per 100 WBC 0 <1 /100    Absolute Neutrophils 16.9 (H) 1.6 - 8.3 10e3/uL    Absolute Lymphocytes 1.0 0.8 - 5.3 10e3/uL    Absolute Monocytes 0.9 0.0 - 1.3 10e3/uL    Absolute Eosinophils 0.0 0.0 - 0.7 10e3/uL    Absolute Basophils 0.0 0.0 - 0.2 10e3/uL    Absolute Immature Granulocytes 0.1 <=0.4 10e3/uL    Absolute NRBCs 0.0 10e3/uL   Magnesium    Collection Time: 07/21/23  1:23 PM   Result Value Ref Range    Magnesium 1.6 (L) 1.8 - 2.6 mg/dL   Blood Culture Arm, Right    Collection Time: 07/21/23  5:57 PM    Specimen: Arm, Right; Blood   Result Value  Ref Range    Culture No Growth    Lactic acid whole blood    Collection Time: 07/21/23  5:58 PM   Result Value Ref Range    Lactic Acid 1.2 0.7 - 2.0 mmol/L   Basic metabolic panel    Collection Time: 07/22/23  6:56 AM   Result Value Ref Range    Sodium 132 (L) 136 - 145 mmol/L    Potassium 4.8 3.5 - 5.0 mmol/L    Chloride 95 (L) 98 - 107 mmol/L    Carbon Dioxide (CO2) 25 22 - 31 mmol/L    Anion Gap 12 5 - 18 mmol/L    Urea Nitrogen 36 (H) 8 - 22 mg/dL    Creatinine 4.35 (H) 0.60 - 1.10 mg/dL    Calcium 8.4 (L) 8.5 - 10.5 mg/dL    Glucose 58 (LL) 70 - 125 mg/dL    GFR Estimate 11 (L) >60 mL/min/1.73m2   CBC with platelets    Collection Time: 07/22/23  6:56 AM   Result Value Ref Range    WBC Count 17.4 (H) 4.0 - 11.0 10e3/uL    RBC Count 2.93 (L) 3.80 - 5.20 10e6/uL    Hemoglobin 10.0 (L) 11.7 - 15.7 g/dL    Hematocrit 31.3 (L) 35.0 - 47.0 %     (H) 78 - 100 fL    MCH 34.1 (H) 26.5 - 33.0 pg    MCHC 31.9 31.5 - 36.5 g/dL    RDW 17.2 (H) 10.0 - 15.0 %    Platelet Count 318 150 - 450 10e3/uL   Magnesium    Collection Time: 07/22/23  6:56 AM   Result Value Ref Range    Magnesium 2.3 1.8 - 2.6 mg/dL   Glucose by meter    Collection Time: 07/22/23 11:00 AM   Result Value Ref Range    GLUCOSE BY METER POCT 40 (LL) 70 - 99 mg/dL   Glucose by meter    Collection Time: 07/22/23 11:35 AM   Result Value Ref Range    GLUCOSE BY METER POCT 59 (L) 70 - 99 mg/dL   Glucose by meter    Collection Time: 07/22/23  6:05 PM   Result Value Ref Range    GLUCOSE BY METER POCT 93 70 - 99 mg/dL   Magnesium    Collection Time: 07/23/23  6:51 AM   Result Value Ref Range    Magnesium 1.9 1.8 - 2.6 mg/dL   Basic metabolic panel    Collection Time: 07/23/23  6:51 AM   Result Value Ref Range    Sodium 134 (L) 136 - 145 mmol/L    Potassium 4.0 3.5 - 5.0 mmol/L    Chloride 97 (L) 98 - 107 mmol/L    Carbon Dioxide (CO2) 27 22 - 31 mmol/L    Anion Gap 10 5 - 18 mmol/L    Urea Nitrogen 27 (H) 8 - 22 mg/dL    Creatinine 3.12 (H) 0.60 - 1.10 mg/dL     Calcium 8.4 (L) 8.5 - 10.5 mg/dL    Glucose 81 70 - 125 mg/dL    GFR Estimate 16 (L) >60 mL/min/1.73m2   CBC with platelets    Collection Time: 07/23/23  6:51 AM   Result Value Ref Range    WBC Count 13.7 (H) 4.0 - 11.0 10e3/uL    RBC Count 2.62 (L) 3.80 - 5.20 10e6/uL    Hemoglobin 8.8 (L) 11.7 - 15.7 g/dL    Hematocrit 28.0 (L) 35.0 - 47.0 %     (H) 78 - 100 fL    MCH 33.6 (H) 26.5 - 33.0 pg    MCHC 31.4 (L) 31.5 - 36.5 g/dL    RDW 17.4 (H) 10.0 - 15.0 %    Platelet Count 270 150 - 450 10e3/uL   Glucose by meter    Collection Time: 07/23/23  7:49 AM   Result Value Ref Range    GLUCOSE BY METER POCT 88 70 - 99 mg/dL   Hemoglobin    Collection Time: 07/23/23  3:22 PM   Result Value Ref Range    Hemoglobin 9.5 (L) 11.7 - 15.7 g/dL   Glucose by meter    Collection Time: 07/23/23  6:11 PM   Result Value Ref Range    GLUCOSE BY METER POCT 104 (H) 70 - 99 mg/dL   Magnesium    Collection Time: 07/24/23  7:27 AM   Result Value Ref Range    Magnesium 1.8 1.8 - 2.6 mg/dL   CBC with platelets    Collection Time: 07/24/23  7:27 AM   Result Value Ref Range    WBC Count 13.0 (H) 4.0 - 11.0 10e3/uL    RBC Count 2.94 (L) 3.80 - 5.20 10e6/uL    Hemoglobin 10.0 (L) 11.7 - 15.7 g/dL    Hematocrit 31.9 (L) 35.0 - 47.0 %     (H) 78 - 100 fL    MCH 34.0 (H) 26.5 - 33.0 pg    MCHC 31.3 (L) 31.5 - 36.5 g/dL    RDW 18.3 (H) 10.0 - 15.0 %    Platelet Count 290 150 - 450 10e3/uL   Glucose by meter    Collection Time: 07/24/23  9:37 AM   Result Value Ref Range    GLUCOSE BY METER POCT 64 (L) 70 - 99 mg/dL   Glucose by meter    Collection Time: 07/24/23  8:09 PM   Result Value Ref Range    GLUCOSE BY METER POCT 100 (H) 70 - 99 mg/dL   Urea nitrogen    Collection Time: 07/25/23 12:01 AM   Result Value Ref Range    Urea Nitrogen 59 (H) 8 - 22 mg/dL   Glucose by meter    Collection Time: 07/25/23 10:02 AM   Result Value Ref Range    GLUCOSE BY METER POCT 168 (H) 70 - 99 mg/dL   Glucose by meter    Collection Time: 07/25/23  5:15  PM   Result Value Ref Range    GLUCOSE BY METER POCT 167 (H) 70 - 99 mg/dL   CBC with platelets    Collection Time: 07/25/23  6:02 PM   Result Value Ref Range    WBC Count 11.0 4.0 - 11.0 10e3/uL    RBC Count 2.99 (L) 3.80 - 5.20 10e6/uL    Hemoglobin 10.2 (L) 11.7 - 15.7 g/dL    Hematocrit 32.5 (L) 35.0 - 47.0 %     (H) 78 - 100 fL    MCH 34.1 (H) 26.5 - 33.0 pg    MCHC 31.4 (L) 31.5 - 36.5 g/dL    RDW 18.6 (H) 10.0 - 15.0 %    Platelet Count 305 150 - 450 10e3/uL   Magnesium    Collection Time: 07/25/23  6:02 PM   Result Value Ref Range    Magnesium 1.7 (L) 1.8 - 2.6 mg/dL   Renal panel    Collection Time: 07/25/23  6:02 PM   Result Value Ref Range    Sodium 132 (L) 136 - 145 mmol/L    Potassium 4.7 3.5 - 5.0 mmol/L    Chloride 96 (L) 98 - 107 mmol/L    Carbon Dioxide (CO2) 24 22 - 31 mmol/L    Anion Gap 12 5 - 18 mmol/L    Urea Nitrogen 27 (H) 8 - 22 mg/dL    Creatinine 3.07 (H) 0.60 - 1.10 mg/dL    Calcium 8.9 8.5 - 10.5 mg/dL    Glucose 116 70 - 125 mg/dL    Albumin 2.7 (L) 3.5 - 5.0 g/dL    Phosphorus 1.7 (L) 2.5 - 4.5 mg/dL    GFR Estimate 16 (L) >60 mL/min/1.73m2   Glucose by meter    Collection Time: 07/26/23  7:50 AM   Result Value Ref Range    GLUCOSE BY METER POCT 127 (H) 70 - 99 mg/dL   Magnesium    Collection Time: 07/26/23  9:24 AM   Result Value Ref Range    Magnesium 1.5 (L) 1.8 - 2.6 mg/dL         MEDICATIONS:     Review of your medicines          Accurate as of July 28, 2023 11:59 PM. If you have any questions, ask your nurse or doctor.            CONTINUE these medicines which have NOT CHANGED      Dose / Directions   calcitRIOL 0.5 MCG capsule  Commonly known as: ROCALTROL      Dose: 0.5 mcg  Take 0.5 mcg by mouth three times a week Given at HD. Tues, Thurs, Sat  Refills: 0     cholecalciferol 25 mcg (1000 units) capsule  Commonly known as: VITAMIN D3      Dose: 1 capsule  Take 1 capsule by mouth At Bedtime  Refills: 0     EPOGEN IJ      Dose: 1,000 Units  Inject 1,000 Units into the vein  three times a week At dialysis.  Refills: 0     * labetalol 100 MG tablet  Commonly known as: NORMODYNE  Used for: Renovascular hypertension      Dose: 100 mg  Take 1 tablet (100 mg) by mouth At Bedtime  Quantity: 30 tablet  Refills: 0     * labetalol 100 MG tablet  Commonly known as: NORMODYNE  Used for: Renovascular hypertension      Dose: 100 mg  Take 1 tablet (100 mg) by mouth every morning  Quantity: 30 tablet  Refills: 0     lactulose 10 GM/15ML solution  Commonly known as: CHRONULAC  Used for: Constipation, unspecified constipation type      Dose: 10 g  Take 15 mLs (10 g) by mouth 2 times daily as needed for constipation  Quantity: 900 mL  Refills: 0     levothyroxine 50 MCG tablet  Commonly known as: SYNTHROID/LEVOTHROID      Dose: 50 mcg  Take 50 mcg by mouth At Bedtime Takes in the middle of the night.  Refills: 0     LORazepam 1 MG tablet  Commonly known as: ATIVAN  Used for: Anxiety      Dose: 1 mg  Take 1 tablet (1 mg) by mouth daily (before lunch)  Quantity: 30 tablet  Refills: 0     nortriptyline 75 MG capsule  Commonly known as: PAMELOR      Dose: 75 mg  Take 75 mg by mouth At Bedtime  Refills: 0     omeprazole 20 MG DR capsule  Commonly known as: priLOSEC      Dose: 20 mg  Take 20 mg by mouth daily  Refills: 0     oxyCODONE IR 10 MG tablet  Commonly known as: ROXICODONE  Used for: Chronic bilateral low back pain with sciatica, sciatica laterality unspecified      Dose: 10 mg  Take 1 tablet (10 mg) by mouth every 4 hours as needed for pain Max of 5 doses per day.  Quantity: 30 tablet  Refills: 0     rosuvastatin 10 MG tablet  Commonly known as: CRESTOR      Dose: 10 mg  Take 10 mg by mouth At Bedtime  Refills: 0     SENNA-docusate sodium 8.6-50 MG tablet  Commonly known as: SENNA S  Used for: Constipation, unspecified constipation type      Dose: 2 tablet  Take 2 tablets by mouth 2 times daily Hold for loose stools.  Quantity: 90 tablet  Refills: 0     sertraline 100 MG tablet  Commonly known as:  ZOLOFT      Dose: 150 mg  Take 150 mg by mouth At Bedtime  Refills: 0     sevelamer carbonate 800 MG tablet  Commonly known as: RENVELA      Dose: 800-2,400 mg  Take 800-2,400 mg by mouth 3 times daily When eating; range of 1-3 tabs depending on meal size  Refills: 0     SUPER B COMPLEX PO      Dose: 1 tablet  Take 1 tablet by mouth At Bedtime  Refills: 0     VENOFER IV      Dose: 100 mg  Inject 100 mg into the vein once a week At dialysis  Refills: 0         * This list has 2 medication(s) that are the same as other medications prescribed for you. Read the directions carefully, and ask your doctor or other care provider to review them with you.                ASSESSMENT/PLAN  Encounter Diagnoses   Name Primary?     Small bowel obstruction (H) Yes     ESRD (end stage renal disease) on dialysis (H)      Physical deconditioning      Small bowel obstruction monitor for consistent bowel movements    End-stage renal disease fistula with a positive bruit and thrill, scheduled dialysis Tuesday Thursday Saturday    Physical deconditioning PT OT    Hypertension on labetalol 100 mg twice daily    Bowel health continue lactulose 15 mL every 12 hours as needed, senna S2 tablets twice daily hold for loose stools, Zoloft 250 mg at bedtime    Hypothyroidism on levothyroxine anxiety continue scheduled Ativan    Pain management as needed oxycodone    Mood disorder on nortriptyline     GERD continue omeprazole    Electronically signed by: Doretha Arcos CNP

## 2023-07-28 NOTE — LETTER
7/28/2023        RE: Serene Tipton  [address]  [place] [address]        [facility] GERIATRIC SERVICES  Chief Complaint   Patient presents with     Hospital F/U     [facility] Record Number:  [id number]  Place of Service where encounter took place:  [facility] ([facility]) [[id number]]  Code Status:  Full Code     HISTORY:      HPI:  Serene Tipton  is 68 year old (1955) undergoing physical and occupational therapy.  She is with past medical history end-stage renal disease on dialysis, depressive disorder, anxiety, arthritis, chronic low back pain, GERD, hyperlipidemia, hypertension who was admitted on 7/21/2023 for ongoing/worsening left sided abdominal pain after being discharged from the hospital on 7/19 for constipation. Excerpted from records During previous admission surgery was consulted, believed constipation to be more likely than SBO. At that time, MiraLAX bowel regimen started and patient had multiple loose bowel movements. Patient has since had 3 abdominal CT scans with findings more suggestive of ileus than SBO. GI consulted last admission 7/13-7/19. Gastrografin study 7/18 excluded SBO. Since discharge 7/19, patient had been having increasing abdominal pain with NBNB emesis.  Started on 2 tablets of senna twice daily along with lactulose which has led to regular bowel movements.    Today she is seen to review vital signs, labs, routine visit and to establish care.  She denied chest pain shortness of breath cough congestion.  She reports no bowel movement in 2 to 3 days.  However she did not take her full dose of lactulose and has requested the rest of the dose from the nurse at this time.  She does understand she is to have frequent bowel movements due to her recent bowel obstruction.  Abdomen was nontender she is passing flatus and bowel sounds active x4 quadrants.  She is currently on a dialysis patient for approximately the last 3 years, she is on Tuesday  Thursday Saturday schedule.  Her color is ashen, she reports it is due to her dialysis.  Labs pending for 7/31/2023.     ALLERGIES:Penicillins, Carvedilol, Ciprofloxacin, Floxacillin (flucloxacillin), Morphine, No clinical screening - see comments, Nsaids, Penicillin g, Sulfa antibiotics, Sulfamethoxazole-trimethoprim, and Levofloxacin    PAST MEDICAL HISTORY:   Past Medical History:   Diagnosis Date     Anemia in chronic kidney disease      Anxiety      Arthritis      Brain aneurysm     Cavernous segment of the right & left carotid arteries. See Neurosurg note 6/16/21.     Chronic low back pain     Managed by Pain Clinic     Depressive disorder 2013     Disease of thyroid gland      ESRD (end stage renal disease) on dialysis (H) 07/2020     GERD (gastroesophageal reflux disease)      Hepatic lesion      Hyperlipidemia      Hypertension      Nephrolithiasis      Neuromuscular disorder (H)      Osteoporosis      PKD (polycystic kidney disease) 09/01/1990     PTSD (post-traumatic stress disorder)      Tobacco abuse      Vitamin D deficiency        PAST SURGICAL HISTORY:   has a past surgical history that includes back surgery; biopsy (Left, 09/01/1990); Eye surgery (2008); orthopedic surgery (Right, 2005); breast lumpectomy, rt/lt (Left); Cystectomy pilonidal (1981); fracture surgery; Spine surgery; other surgical history; Cyst Removal (Right); FORMATION ARTERIOVENOUS FISTULA   (07/28/2020); and Nephrectomy bilateral (Bilateral, 2/1/2023).    FAMILY HISTORY: family history includes Cardiac Sudden Death (age of onset: 52) in her sister; Cerebrovascular Disease in her maternal grandmother, mother, and paternal grandmother; Chronic Obstructive Pulmonary Disease in her father; Coronary Artery Disease in her paternal grandfather; Heart Disease in her maternal grandfather; Hyperlipidemia in her paternal grandmother; Hypertension in her maternal grandmother, mother, and sister; Kidney Disease in her maternal grandmother,  mother, and sister; Multiple Sclerosis in her father; Polycystic Kidney Diease in her maternal grandmother, mother, and sister; Pulmonary Embolism in her paternal grandfather.    SOCIAL HISTORY:  reports that she quit smoking about 2 years ago. Her smoking use included cigarettes. She has a 7.00 pack-year smoking history. She has never used smokeless tobacco. She reports that she does not currently use alcohol. She reports that she does not use drugs.    ROS:  Constitutional: Negative for activity change, appetite change, fatigue and fever.   HENT: Negative for congestion.    Respiratory: Negative for cough, shortness of breath and wheezing.    Cardiovascular: Negative for chest pain and leg swelling.   Gastrointestinal: Negative for abdominal distention, abdominal pain, constipation, diarrhea and nausea.  Needs close monitoring of bowel status  Genitourinary: Negative for dysuria.   Musculoskeletal: Negative for arthralgia. Negative for back pain.  Fistula with positive bruit and thrill  Skin: Negative for color change and wound.   Neurological: Negative for dizziness.   Psychiatric/Behavioral: Negative for agitation, behavioral problems and confusion.     Physical Exam:  Constitutional:       Appearance: Patient appears malnourished and  color ashen  HENT:      Head: Normocephalic.   Eyes:      Conjunctiva/sclera: Conjunctivae normal.   Neck:      Musculoskeletal: Normal range of motion.   Cardiovascular:      Rate and Rhythm: Normal rate and regular rhythm.      Heart sounds: Normal heart sounds. No murmur.   Pulmonary:      Effort: No respiratory distress.      Breath sounds: Normal breath sounds. No wheezing or rales.   Abdominal:      General: Bowel sounds are normal. There is no distension.      Palpations: Abdomen is soft.      Tenderness: There is no abdominal tenderness.   Musculoskeletal:       Normal range of motion.     Skin:General:        Skin is warm.   Neurological:         Mental Status: Patient is  alert and oriented to person, place, and time.   Psychiatric:         Behavior: Behavior normal.     Vitals:/77   Pulse 102   Temp 97.7  F (36.5  C)   Resp 20   Ht 1.524 m (5')   Wt 34.5 kg (76 lb)   SpO2 97%   BMI 14.84 kg/m   and Body mass index is 14.84 kg/m .    Lab/Diagnostic data:   Recent Results (from the past 240 hour(s))   Glucose by meter    Collection Time: 07/19/23  9:45 AM   Result Value Ref Range    GLUCOSE BY METER POCT 64 (L) 70 - 99 mg/dL   Glucose by meter    Collection Time: 07/19/23 10:03 AM   Result Value Ref Range    GLUCOSE BY METER POCT 74 70 - 99 mg/dL   ECG 12-LEAD WITH MUSE (LHE)    Collection Time: 07/21/23 12:19 PM   Result Value Ref Range    Systolic Blood Pressure 123 mmHg    Diastolic Blood Pressure 64 mmHg    Ventricular Rate 102 BPM    Atrial Rate 102 BPM    WI Interval 170 ms    QRS Duration 80 ms     ms    QTc 466 ms    P Axis 73 degrees    R AXIS 48 degrees    T Axis 78 degrees    Interpretation ECG       Sinus tachycardia  Right atrial enlargement  Left ventricular hypertrophy with repolarization abnormality  Abnormal ECG  When compared with ECG of 13-JUL-2023 11:59,  WI interval has decreased  Incomplete left bundle branch block is no longer Present  Nonspecific T wave abnormality no longer evident in Inferior leads  QT has shortened  Confirmed by SEE ED PROVIDER NOTE FOR, ECG INTERPRETATION ([id number]),  GABRIELA PELAEZ ([id number]) on 7/21/2023 3:03:29 PM     Extra Blue Top Tube    Collection Time: 07/21/23  1:22 PM   Result Value Ref Range    Hold Specimen JIC    Extra Green Top (Lithium Heparin) Tube    Collection Time: 07/21/23  1:22 PM   Result Value Ref Range    Hold Specimen JIC    Basic metabolic panel    Collection Time: 07/21/23  1:23 PM   Result Value Ref Range    Sodium 134 (L) 136 - 145 mmol/L    Potassium 4.2 3.5 - 5.0 mmol/L    Chloride 94 (L) 98 - 107 mmol/L    Carbon Dioxide (CO2) 27 22 - 31 mmol/L    Anion Gap 13 5 - 18 mmol/L    Urea Nitrogen  24 (H) 8 - 22 mg/dL    Creatinine 3.82 (H) 0.60 - 1.10 mg/dL    Calcium 9.1 8.5 - 10.5 mg/dL    Glucose 77 70 - 125 mg/dL    GFR Estimate 12 (L) >60 mL/min/1.73m2   Troponin I (now)    Collection Time: 07/21/23  1:23 PM   Result Value Ref Range    Troponin I 0.03 0.00 - 0.29 ng/mL   Hepatic function panel    Collection Time: 07/21/23  1:23 PM   Result Value Ref Range    Bilirubin Total 0.7 0.0 - 1.0 mg/dL    Bilirubin Direct 0.2 <=0.5 mg/dL    Protein Total 5.1 (L) 6.0 - 8.0 g/dL    Albumin 2.4 (L) 3.5 - 5.0 g/dL    Alkaline Phosphatase 151 (H) 45 - 120 U/L    AST 38 0 - 40 U/L    ALT 40 0 - 45 U/L   Lipase    Collection Time: 07/21/23  1:23 PM   Result Value Ref Range    Lipase 11 0 - 52 U/L   Extra Red Top Tube    Collection Time: 07/21/23  1:23 PM   Result Value Ref Range    Hold Specimen JIC    Extra Purple Top Tube    Collection Time: 07/21/23  1:23 PM   Result Value Ref Range    Hold Specimen JIC    CBC with platelets and differential    Collection Time: 07/21/23  1:23 PM   Result Value Ref Range    WBC Count 18.9 (H) 4.0 - 11.0 10e3/uL    RBC Count 3.08 (L) 3.80 - 5.20 10e6/uL    Hemoglobin 10.3 (L) 11.7 - 15.7 g/dL    Hematocrit 32.4 (L) 35.0 - 47.0 %     (H) 78 - 100 fL    MCH 33.4 (H) 26.5 - 33.0 pg    MCHC 31.8 31.5 - 36.5 g/dL    RDW 16.4 (H) 10.0 - 15.0 %    Platelet Count 343 150 - 450 10e3/uL    % Neutrophils 89 %    % Lymphocytes 5 %    % Monocytes 5 %    % Eosinophils 0 %    % Basophils 0 %    % Immature Granulocytes 1 %    NRBCs per 100 WBC 0 <1 /100    Absolute Neutrophils 16.9 (H) 1.6 - 8.3 10e3/uL    Absolute Lymphocytes 1.0 0.8 - 5.3 10e3/uL    Absolute Monocytes 0.9 0.0 - 1.3 10e3/uL    Absolute Eosinophils 0.0 0.0 - 0.7 10e3/uL    Absolute Basophils 0.0 0.0 - 0.2 10e3/uL    Absolute Immature Granulocytes 0.1 <=0.4 10e3/uL    Absolute NRBCs 0.0 10e3/uL   Magnesium    Collection Time: 07/21/23  1:23 PM   Result Value Ref Range    Magnesium 1.6 (L) 1.8 - 2.6 mg/dL   Blood Culture Arm,  Right    Collection Time: 07/21/23  5:57 PM    Specimen: Arm, Right; Blood   Result Value Ref Range    Culture No Growth    Lactic acid whole blood    Collection Time: 07/21/23  5:58 PM   Result Value Ref Range    Lactic Acid 1.2 0.7 - 2.0 mmol/L   Basic metabolic panel    Collection Time: 07/22/23  6:56 AM   Result Value Ref Range    Sodium 132 (L) 136 - 145 mmol/L    Potassium 4.8 3.5 - 5.0 mmol/L    Chloride 95 (L) 98 - 107 mmol/L    Carbon Dioxide (CO2) 25 22 - 31 mmol/L    Anion Gap 12 5 - 18 mmol/L    Urea Nitrogen 36 (H) 8 - 22 mg/dL    Creatinine 4.35 (H) 0.60 - 1.10 mg/dL    Calcium 8.4 (L) 8.5 - 10.5 mg/dL    Glucose 58 (LL) 70 - 125 mg/dL    GFR Estimate 11 (L) >60 mL/min/1.73m2   CBC with platelets    Collection Time: 07/22/23  6:56 AM   Result Value Ref Range    WBC Count 17.4 (H) 4.0 - 11.0 10e3/uL    RBC Count 2.93 (L) 3.80 - 5.20 10e6/uL    Hemoglobin 10.0 (L) 11.7 - 15.7 g/dL    Hematocrit 31.3 (L) 35.0 - 47.0 %     (H) 78 - 100 fL    MCH 34.1 (H) 26.5 - 33.0 pg    MCHC 31.9 31.5 - 36.5 g/dL    RDW 17.2 (H) 10.0 - 15.0 %    Platelet Count 318 150 - 450 10e3/uL   Magnesium    Collection Time: 07/22/23  6:56 AM   Result Value Ref Range    Magnesium 2.3 1.8 - 2.6 mg/dL   Glucose by meter    Collection Time: 07/22/23 11:00 AM   Result Value Ref Range    GLUCOSE BY METER POCT 40 (LL) 70 - 99 mg/dL   Glucose by meter    Collection Time: 07/22/23 11:35 AM   Result Value Ref Range    GLUCOSE BY METER POCT 59 (L) 70 - 99 mg/dL   Glucose by meter    Collection Time: 07/22/23  6:05 PM   Result Value Ref Range    GLUCOSE BY METER POCT 93 70 - 99 mg/dL   Magnesium    Collection Time: 07/23/23  6:51 AM   Result Value Ref Range    Magnesium 1.9 1.8 - 2.6 mg/dL   Basic metabolic panel    Collection Time: 07/23/23  6:51 AM   Result Value Ref Range    Sodium 134 (L) 136 - 145 mmol/L    Potassium 4.0 3.5 - 5.0 mmol/L    Chloride 97 (L) 98 - 107 mmol/L    Carbon Dioxide (CO2) 27 22 - 31 mmol/L    Anion Gap 10 5  - 18 mmol/L    Urea Nitrogen 27 (H) 8 - 22 mg/dL    Creatinine 3.12 (H) 0.60 - 1.10 mg/dL    Calcium 8.4 (L) 8.5 - 10.5 mg/dL    Glucose 81 70 - 125 mg/dL    GFR Estimate 16 (L) >60 mL/min/1.73m2   CBC with platelets    Collection Time: 07/23/23  6:51 AM   Result Value Ref Range    WBC Count 13.7 (H) 4.0 - 11.0 10e3/uL    RBC Count 2.62 (L) 3.80 - 5.20 10e6/uL    Hemoglobin 8.8 (L) 11.7 - 15.7 g/dL    Hematocrit 28.0 (L) 35.0 - 47.0 %     (H) 78 - 100 fL    MCH 33.6 (H) 26.5 - 33.0 pg    MCHC 31.4 (L) 31.5 - 36.5 g/dL    RDW 17.4 (H) 10.0 - 15.0 %    Platelet Count 270 150 - 450 10e3/uL   Glucose by meter    Collection Time: 07/23/23  7:49 AM   Result Value Ref Range    GLUCOSE BY METER POCT 88 70 - 99 mg/dL   Hemoglobin    Collection Time: 07/23/23  3:22 PM   Result Value Ref Range    Hemoglobin 9.5 (L) 11.7 - 15.7 g/dL   Glucose by meter    Collection Time: 07/23/23  6:11 PM   Result Value Ref Range    GLUCOSE BY METER POCT 104 (H) 70 - 99 mg/dL   Magnesium    Collection Time: 07/24/23  7:27 AM   Result Value Ref Range    Magnesium 1.8 1.8 - 2.6 mg/dL   CBC with platelets    Collection Time: 07/24/23  7:27 AM   Result Value Ref Range    WBC Count 13.0 (H) 4.0 - 11.0 10e3/uL    RBC Count 2.94 (L) 3.80 - 5.20 10e6/uL    Hemoglobin 10.0 (L) 11.7 - 15.7 g/dL    Hematocrit 31.9 (L) 35.0 - 47.0 %     (H) 78 - 100 fL    MCH 34.0 (H) 26.5 - 33.0 pg    MCHC 31.3 (L) 31.5 - 36.5 g/dL    RDW 18.3 (H) 10.0 - 15.0 %    Platelet Count 290 150 - 450 10e3/uL   Glucose by meter    Collection Time: 07/24/23  9:37 AM   Result Value Ref Range    GLUCOSE BY METER POCT 64 (L) 70 - 99 mg/dL   Glucose by meter    Collection Time: 07/24/23  8:09 PM   Result Value Ref Range    GLUCOSE BY METER POCT 100 (H) 70 - 99 mg/dL   Urea nitrogen    Collection Time: 07/25/23 12:01 AM   Result Value Ref Range    Urea Nitrogen 59 (H) 8 - 22 mg/dL   Glucose by meter    Collection Time: 07/25/23 10:02 AM   Result Value Ref Range    GLUCOSE  BY METER POCT 168 (H) 70 - 99 mg/dL   Glucose by meter    Collection Time: 07/25/23  5:15 PM   Result Value Ref Range    GLUCOSE BY METER POCT 167 (H) 70 - 99 mg/dL   CBC with platelets    Collection Time: 07/25/23  6:02 PM   Result Value Ref Range    WBC Count 11.0 4.0 - 11.0 10e3/uL    RBC Count 2.99 (L) 3.80 - 5.20 10e6/uL    Hemoglobin 10.2 (L) 11.7 - 15.7 g/dL    Hematocrit 32.5 (L) 35.0 - 47.0 %     (H) 78 - 100 fL    MCH 34.1 (H) 26.5 - 33.0 pg    MCHC 31.4 (L) 31.5 - 36.5 g/dL    RDW 18.6 (H) 10.0 - 15.0 %    Platelet Count 305 150 - 450 10e3/uL   Magnesium    Collection Time: 07/25/23  6:02 PM   Result Value Ref Range    Magnesium 1.7 (L) 1.8 - 2.6 mg/dL   Renal panel    Collection Time: 07/25/23  6:02 PM   Result Value Ref Range    Sodium 132 (L) 136 - 145 mmol/L    Potassium 4.7 3.5 - 5.0 mmol/L    Chloride 96 (L) 98 - 107 mmol/L    Carbon Dioxide (CO2) 24 22 - 31 mmol/L    Anion Gap 12 5 - 18 mmol/L    Urea Nitrogen 27 (H) 8 - 22 mg/dL    Creatinine 3.07 (H) 0.60 - 1.10 mg/dL    Calcium 8.9 8.5 - 10.5 mg/dL    Glucose 116 70 - 125 mg/dL    Albumin 2.7 (L) 3.5 - 5.0 g/dL    Phosphorus 1.7 (L) 2.5 - 4.5 mg/dL    GFR Estimate 16 (L) >60 mL/min/1.73m2   Glucose by meter    Collection Time: 07/26/23  7:50 AM   Result Value Ref Range    GLUCOSE BY METER POCT 127 (H) 70 - 99 mg/dL   Magnesium    Collection Time: 07/26/23  9:24 AM   Result Value Ref Range    Magnesium 1.5 (L) 1.8 - 2.6 mg/dL         MEDICATIONS:     Review of your medicines          Accurate as of July 28, 2023 11:59 PM. If you have any questions, ask your nurse or doctor.            CONTINUE these medicines which have NOT CHANGED      Dose / Directions   calcitRIOL 0.5 MCG capsule  Commonly known as: ROCALTROL      Dose: 0.5 mcg  Take 0.5 mcg by mouth three times a week Given at HD. Tues, Thurs, Sat  Refills: 0     cholecalciferol 25 mcg (1000 units) capsule  Commonly known as: VITAMIN D3      Dose: 1 capsule  Take 1 capsule by mouth At  Bedtime  Refills: 0     EPOGEN IJ      Dose: 1,000 Units  Inject 1,000 Units into the vein three times a week At dialysis.  Refills: 0     * labetalol 100 MG tablet  Commonly known as: NORMODYNE  Used for: Renovascular hypertension      Dose: 100 mg  Take 1 tablet (100 mg) by mouth At Bedtime  Quantity: 30 tablet  Refills: 0     * labetalol 100 MG tablet  Commonly known as: NORMODYNE  Used for: Renovascular hypertension      Dose: 100 mg  Take 1 tablet (100 mg) by mouth every morning  Quantity: 30 tablet  Refills: 0     lactulose 10 GM/15ML solution  Commonly known as: CHRONULAC  Used for: Constipation, unspecified constipation type      Dose: 10 g  Take 15 mLs (10 g) by mouth 2 times daily as needed for constipation  Quantity: 900 mL  Refills: 0     levothyroxine 50 MCG tablet  Commonly known as: SYNTHROID/LEVOTHROID      Dose: 50 mcg  Take 50 mcg by mouth At Bedtime Takes in the middle of the night.  Refills: 0     LORazepam 1 MG tablet  Commonly known as: ATIVAN  Used for: Anxiety      Dose: 1 mg  Take 1 tablet (1 mg) by mouth daily (before lunch)  Quantity: 30 tablet  Refills: 0     nortriptyline 75 MG capsule  Commonly known as: PAMELOR      Dose: 75 mg  Take 75 mg by mouth At Bedtime  Refills: 0     omeprazole 20 MG DR capsule  Commonly known as: priLOSEC      Dose: 20 mg  Take 20 mg by mouth daily  Refills: 0     oxyCODONE IR 10 MG tablet  Commonly known as: ROXICODONE  Used for: Chronic bilateral low back pain with sciatica, sciatica laterality unspecified      Dose: 10 mg  Take 1 tablet (10 mg) by mouth every 4 hours as needed for pain Max of 5 doses per day.  Quantity: 30 tablet  Refills: 0     rosuvastatin 10 MG tablet  Commonly known as: CRESTOR      Dose: 10 mg  Take 10 mg by mouth At Bedtime  Refills: 0     SENNA-docusate sodium 8.6-50 MG tablet  Commonly known as: SENNA S  Used for: Constipation, unspecified constipation type      Dose: 2 tablet  Take 2 tablets by mouth 2 times daily Hold for loose  stools.  Quantity: 90 tablet  Refills: 0     sertraline 100 MG tablet  Commonly known as: ZOLOFT      Dose: 150 mg  Take 150 mg by mouth At Bedtime  Refills: 0     sevelamer carbonate 800 MG tablet  Commonly known as: RENVELA      Dose: 800-2,400 mg  Take 800-2,400 mg by mouth 3 times daily When eating; range of 1-3 tabs depending on meal size  Refills: 0     SUPER B COMPLEX PO      Dose: 1 tablet  Take 1 tablet by mouth At Bedtime  Refills: 0     VENOFER IV      Dose: 100 mg  Inject 100 mg into the vein once a week At dialysis  Refills: 0         * This list has 2 medication(s) that are the same as other medications prescribed for you. Read the directions carefully, and ask your doctor or other care provider to review them with you.                ASSESSMENT/PLAN  Encounter Diagnoses   Name Primary?     Small bowel obstruction (H) Yes     ESRD (end stage renal disease) on dialysis (H)      Physical deconditioning      Small bowel obstruction monitor for consistent bowel movements    End-stage renal disease fistula with a positive bruit and thrill, scheduled dialysis Tuesday Thursday Saturday    Physical deconditioning PT OT    Hypertension on labetalol 100 mg twice daily    Bowel health continue lactulose 15 mL every 12 hours as needed, senna S2 tablets twice daily hold for loose stools, Zoloft 250 mg at bedtime    Hypothyroidism on levothyroxine anxiety continue scheduled Ativan    Pain management as needed oxycodone    Mood disorder on nortriptyline     GERD continue omeprazole    Electronically signed by: Doretha Arcos CNP        Sincerely,        Doretha Arcos CNP

## 2023-07-30 ENCOUNTER — LAB REQUISITION (OUTPATIENT)
Dept: LAB | Facility: CLINIC | Age: 68
End: 2023-07-30
Payer: COMMERCIAL

## 2023-07-30 DIAGNOSIS — Z51.81 ENCOUNTER FOR THERAPEUTIC DRUG LEVEL MONITORING: ICD-10-CM

## 2023-07-31 ENCOUNTER — TELEPHONE (OUTPATIENT)
Dept: GERIATRICS | Facility: CLINIC | Age: 68
End: 2023-07-31

## 2023-07-31 ENCOUNTER — TELEPHONE (OUTPATIENT)
Dept: TRANSPLANT | Facility: CLINIC | Age: 68
End: 2023-07-31
Payer: COMMERCIAL

## 2023-07-31 LAB
ANION GAP SERPL CALCULATED.3IONS-SCNC: 14 MMOL/L (ref 7–15)
BUN SERPL-MCNC: 65.1 MG/DL (ref 8–23)
CALCIUM SERPL-MCNC: 9.2 MG/DL (ref 8.8–10.2)
CHLORIDE SERPL-SCNC: 92 MMOL/L (ref 98–107)
CREAT SERPL-MCNC: 4.5 MG/DL (ref 0.51–0.95)
DEPRECATED HCO3 PLAS-SCNC: 24 MMOL/L (ref 22–29)
ERYTHROCYTE [DISTWIDTH] IN BLOOD BY AUTOMATED COUNT: 19.7 % (ref 10–15)
GFR SERPL CREATININE-BSD FRML MDRD: 10 ML/MIN/1.73M2
GLUCOSE SERPL-MCNC: 77 MG/DL (ref 70–99)
HCT VFR BLD AUTO: 34.6 % (ref 35–47)
HGB BLD-MCNC: 10.1 G/DL (ref 11.7–15.7)
MAGNESIUM SERPL-MCNC: 2 MG/DL (ref 1.7–2.3)
MCH RBC QN AUTO: 34 PG (ref 26.5–33)
MCHC RBC AUTO-ENTMCNC: 29.2 G/DL (ref 31.5–36.5)
MCV RBC AUTO: 117 FL (ref 78–100)
PLATELET # BLD AUTO: 335 10E3/UL (ref 150–450)
POTASSIUM SERPL-SCNC: 5.2 MMOL/L (ref 3.4–5.3)
RBC # BLD AUTO: 2.97 10E6/UL (ref 3.8–5.2)
SODIUM SERPL-SCNC: 130 MMOL/L (ref 136–145)
WBC # BLD AUTO: 8.6 10E3/UL (ref 4–11)

## 2023-07-31 PROCEDURE — 83735 ASSAY OF MAGNESIUM: CPT | Mod: ORL | Performed by: NURSE PRACTITIONER

## 2023-07-31 PROCEDURE — 80048 BASIC METABOLIC PNL TOTAL CA: CPT | Mod: ORL | Performed by: NURSE PRACTITIONER

## 2023-07-31 PROCEDURE — 36415 COLL VENOUS BLD VENIPUNCTURE: CPT | Mod: ORL | Performed by: NURSE PRACTITIONER

## 2023-07-31 PROCEDURE — P9604 ONE-WAY ALLOW PRORATED TRIP: HCPCS | Mod: ORL | Performed by: NURSE PRACTITIONER

## 2023-07-31 PROCEDURE — 85027 COMPLETE CBC AUTOMATED: CPT | Mod: ORL | Performed by: NURSE PRACTITIONER

## 2023-07-31 NOTE — TELEPHONE ENCOUNTER
Alvarez called me to update me on her progress.  She was recently discharged from the hospital and sent to [facility] in [place].  States that she has now had normal bowel movements and is slowly increasing her diet to be able to eat more.  She told me she hasn't felt this good in over a year.  She knows that she is currently too frail for transplant but is working on gaining back weight and working with PT to get stronger.  She will keep me updated on her progress and thinks she will be discharged from TCU in about two weeks.  Pending progress with PT and weight gain will discuss with her and the transplant team about recs moving forward.

## 2023-07-31 NOTE — TELEPHONE ENCOUNTER
[facility] Geriatrics Lab Note     Provider: ROULA Franks  Facility: [facility]  Facility Type:  TCU    Allergies   Allergen Reactions     Penicillins Other (See Comments) and Shortness Of Breath     Breathing problem.  breathing       Carvedilol GI Disturbance     Nausea and vomiting     Ciprofloxacin Muscle Pain (Myalgia)     Floxacillin (Flucloxacillin)      Other Reaction(s): rash, muscle and joint pain     Morphine Other (See Comments)     Vomiting     No Clinical Screening - See Comments      PN: LW CM1: Contrast Intercapsular - Nonionic Reaction :     Nsaids Other (See Comments)     Penicillin G Difficulty breathing     Sulfa Antibiotics Itching     Sulfamethoxazole-Trimethoprim      Other reaction(s): itching     Levofloxacin Muscle Pain (Myalgia) and Rash       Labs Reviewed by provider: Heme 2, BMP, Mg     Verbal Order/Direction given by Provider: Fax lab results to the transplant clinic.      Provider giving Order:  ROULA Franks    Verbal Order given to: Collette([phone])    Ismael Medina RN

## 2023-08-02 ENCOUNTER — DISCHARGE SUMMARY NURSING HOME (OUTPATIENT)
Dept: GERIATRICS | Facility: CLINIC | Age: 68
End: 2023-08-02
Payer: COMMERCIAL

## 2023-08-02 VITALS
SYSTOLIC BLOOD PRESSURE: 134 MMHG | WEIGHT: 76 LBS | DIASTOLIC BLOOD PRESSURE: 74 MMHG | TEMPERATURE: 97 F | OXYGEN SATURATION: 100 % | HEIGHT: 60 IN | BODY MASS INDEX: 14.92 KG/M2 | RESPIRATION RATE: 16 BRPM | HEART RATE: 81 BPM

## 2023-08-02 DIAGNOSIS — Z99.2 ESRD (END STAGE RENAL DISEASE) ON DIALYSIS (H): Primary | ICD-10-CM

## 2023-08-02 DIAGNOSIS — R53.81 PHYSICAL DECONDITIONING: ICD-10-CM

## 2023-08-02 DIAGNOSIS — R52 PAIN MANAGEMENT: ICD-10-CM

## 2023-08-02 DIAGNOSIS — N18.6 ESRD (END STAGE RENAL DISEASE) ON DIALYSIS (H): Primary | ICD-10-CM

## 2023-08-02 PROBLEM — E21.3 HYPERPARATHYROIDISM (H): Status: ACTIVE | Noted: 2023-08-02

## 2023-08-02 PROBLEM — M47.816 LUMBAR FACET JOINT SYNDROME: Status: ACTIVE | Noted: 2023-08-02

## 2023-08-02 PROBLEM — D63.1 ANEMIA IN STAGE 5 CHRONIC KIDNEY DISEASE (H): Status: ACTIVE | Noted: 2019-09-27

## 2023-08-02 PROBLEM — R92.8 MAMMOGRAM ABNORMAL: Status: ACTIVE | Noted: 2023-08-02

## 2023-08-02 PROBLEM — H43.812 PVD (POSTERIOR VITREOUS DETACHMENT), LEFT EYE: Status: ACTIVE | Noted: 2018-07-10

## 2023-08-02 PROBLEM — F41.9 ANXIETY: Status: ACTIVE | Noted: 2023-08-02

## 2023-08-02 PROBLEM — M75.101 NON-TRAUMATIC ROTATOR CUFF TEAR, RIGHT: Status: ACTIVE | Noted: 2020-02-20

## 2023-08-02 PROBLEM — Z98.1 S/P LUMBAR FUSION: Status: ACTIVE | Noted: 2017-10-12

## 2023-08-02 PROBLEM — Z79.891 LONG TERM (CURRENT) USE OF OPIATE ANALGESIC: Status: ACTIVE | Noted: 2023-08-02

## 2023-08-02 PROBLEM — G47.33 OBSTRUCTIVE SLEEP APNEA (ADULT) (PEDIATRIC): Status: ACTIVE | Noted: 2023-08-02

## 2023-08-02 PROBLEM — N18.5 ANEMIA IN STAGE 5 CHRONIC KIDNEY DISEASE (H): Status: ACTIVE | Noted: 2019-09-27

## 2023-08-02 PROBLEM — F17.210 NICOTINE DEPENDENCE, CIGARETTES, UNCOMPLICATED: Status: ACTIVE | Noted: 2023-08-02

## 2023-08-02 PROBLEM — H43.821 VITREOMACULAR ADHESION, RIGHT: Status: ACTIVE | Noted: 2018-07-10

## 2023-08-02 PROBLEM — H25.13 NS (NUCLEAR SCLEROSIS), BILATERAL: Status: ACTIVE | Noted: 2018-07-10

## 2023-08-02 PROBLEM — M48.062 SPINAL STENOSIS, LUMBAR REGION WITH NEUROGENIC CLAUDICATION: Status: ACTIVE | Noted: 2023-08-02

## 2023-08-02 PROBLEM — K76.9 HEPATIC LESION: Status: ACTIVE | Noted: 2023-08-02

## 2023-08-02 PROBLEM — E03.9 HYPOTHYROIDISM (ACQUIRED): Status: ACTIVE | Noted: 2023-08-02

## 2023-08-02 PROBLEM — F32.4 MAJOR DEPRESSIVE DISORDER WITH SINGLE EPISODE, IN PARTIAL REMISSION (H): Status: ACTIVE | Noted: 2023-08-02

## 2023-08-02 PROBLEM — M15.0 PRIMARY GENERALIZED (OSTEO)ARTHRITIS: Status: ACTIVE | Noted: 2023-08-02

## 2023-08-02 PROBLEM — K76.89 LIVER CYST: Status: ACTIVE | Noted: 2023-08-02

## 2023-08-02 PROCEDURE — 99316 NF DSCHRG MGMT 30 MIN+: CPT | Performed by: NURSE PRACTITIONER

## 2023-08-02 RX ORDER — CALCITRIOL 0.5 UG/1
0.5 CAPSULE, LIQUID FILLED ORAL
COMMUNITY
End: 2024-01-01

## 2023-08-02 NOTE — PROGRESS NOTES
[facility] GERIATRIC SERVICES  Chief Complaint   Patient presents with     Discharge Summary [facility] Medical Record Number:  [id number]  Place of Service where encounter took place:  [facility] ([facility]) [[id number]]  Code Status:  Full Code     HISTORY:      HPI:  Serene Tipton  is 68 year old (1955) undergoing physical and occupational therapy.  She is with past medical history end-stage renal disease on dialysis, depressive disorder, anxiety, arthritis, chronic low back pain, GERD, hyperlipidemia, hypertension who was admitted on 7/21/2023 for ongoing/worsening left sided abdominal pain after being discharged from the hospital on 7/19 for constipation. Excerpted from records During previous admission surgery was consulted, believed constipation to be more likely than SBO. At that time, MiraLAX bowel regimen started and patient had multiple loose bowel movements. Patient has since had 3 abdominal CT scans with findings more suggestive of ileus than SBO. GI consulted last admission 7/13-7/19. Gastrografin study 7/18 excluded SBO. Since discharge 7/19, patient had been having increasing abdominal pain with NBNB emesis.  Started on 2 tablets of senna twice daily along with lactulose which has led to regular bowel movements.    Today she is seen to review vital signs, labs, routine visit and a face to face for discharge. She will discharge to home with current medications and treatments. She will have home care services PT/OT/HHA. She denied chest pain shortness of breath cough congestion.  She is moving her bowels.  She is currently  a dialysis patient for approximately the last 3 years, she is on Tuesday Thursday Saturday schedule.  Her color is ashen, she reports it is due to her dialysis. Labs reviewed from 7/31/23.Last HGB 10.1.       ALLERGIES:Penicillins, Carvedilol, Ciprofloxacin, Floxacillin (flucloxacillin), Morphine, No clinical screening - see comments, Nsaids, Penicillin g,  Sulfa antibiotics, Sulfamethoxazole-trimethoprim, and Levofloxacin    PAST MEDICAL HISTORY:   Past Medical History:   Diagnosis Date     Anemia in chronic kidney disease      Anxiety      Arthritis      Brain aneurysm     Cavernous segment of the right & left carotid arteries. See Neurosurg note 6/16/21.     Chronic low back pain     Managed by Pain Clinic     Depressive disorder 2013     Disease of thyroid gland      ESRD (end stage renal disease) on dialysis (H) 07/2020     GERD (gastroesophageal reflux disease)      Hepatic lesion      Hyperlipidemia      Hypertension      Nephrolithiasis      Neuromuscular disorder (H)      Osteoporosis      PKD (polycystic kidney disease) 09/01/1990     Primary generalized (osteo)arthritis 8/2/2023     PTSD (post-traumatic stress disorder)      Tobacco abuse      Vitamin D deficiency        PAST SURGICAL HISTORY:   has a past surgical history that includes back surgery; biopsy (Left, 09/01/1990); Eye surgery (2008); orthopedic surgery (Right, 2005); breast lumpectomy, rt/lt (Left); Cystectomy pilonidal (1981); fracture surgery; Spine surgery; other surgical history; Cyst Removal (Right); FORMATION ARTERIOVENOUS FISTULA   (07/28/2020); and Nephrectomy bilateral (Bilateral, 2/1/2023).    FAMILY HISTORY: family history includes Cardiac Sudden Death (age of onset: 52) in her sister; Cerebrovascular Disease in her maternal grandmother, mother, and paternal grandmother; Chronic Obstructive Pulmonary Disease in her father; Coronary Artery Disease in her paternal grandfather; Heart Disease in her maternal grandfather; Hyperlipidemia in her paternal grandmother; Hypertension in her maternal grandmother, mother, and sister; Kidney Disease in her maternal grandmother, mother, and sister; Multiple Sclerosis in her father; Polycystic Kidney Diease in her maternal grandmother, mother, and sister; Pulmonary Embolism in her paternal grandfather.    SOCIAL HISTORY:  reports that she quit  smoking about 2 years ago. Her smoking use included cigarettes. She has a 7.00 pack-year smoking history. She has never used smokeless tobacco. She reports that she does not currently use alcohol. She reports that she does not use drugs.    ROS:  Constitutional: Negative for activity change, appetite change, fatigue and fever.   HENT: Negative for congestion.    Respiratory: Negative for cough, shortness of breath and wheezing.    Cardiovascular: Negative for chest pain and leg swelling.   Gastrointestinal: Negative for abdominal distention, abdominal pain, constipation, diarrhea and nausea.  Needs close monitoring of bowel status  Genitourinary: Negative for dysuria.   Musculoskeletal: Negative for arthralgia. Negative for back pain.  Fistula with positive bruit and thrill  Skin: Negative for color change and wound.   Neurological: Negative for dizziness.   Psychiatric/Behavioral: Negative for agitation, behavioral problems and confusion.     Physical Exam:  Constitutional:       Appearance: Patient appears malnourished and  color ashen  HENT:      Head: Normocephalic.   Eyes:      Conjunctiva/sclera: Conjunctivae normal.   Neck:      Musculoskeletal: Normal range of motion.   Cardiovascular:      Rate and Rhythm: Normal rate and regular rhythm.      Heart sounds: Normal heart sounds. No murmur.   Pulmonary:      Effort: No respiratory distress.      Breath sounds: Normal breath sounds. No wheezing or rales.   Abdominal:      General: Bowel sounds are normal. There is no distension.      Palpations: Abdomen is soft.      Tenderness: There is no abdominal tenderness.   Musculoskeletal:       Normal range of motion.     Skin:General:        Skin is warm.   Neurological:         Mental Status: Patient is alert and oriented to person, place, and time.   Psychiatric:         Behavior: Behavior normal.     Vitals:/74   Pulse 81   Temp 97  F (36.1  C)   Resp 16   Ht 1.524 m (5')   Wt 34.5 kg (76 lb)   SpO2  100%   BMI 14.84 kg/m   and Body mass index is 14.84 kg/m .    Lab/Diagnostic data:   Recent Results (from the past 240 hour(s))   Hemoglobin    Collection Time: 07/23/23  3:22 PM   Result Value Ref Range    Hemoglobin 9.5 (L) 11.7 - 15.7 g/dL   Glucose by meter    Collection Time: 07/23/23  6:11 PM   Result Value Ref Range    GLUCOSE BY METER POCT 104 (H) 70 - 99 mg/dL   Magnesium    Collection Time: 07/24/23  7:27 AM   Result Value Ref Range    Magnesium 1.8 1.8 - 2.6 mg/dL   CBC with platelets    Collection Time: 07/24/23  7:27 AM   Result Value Ref Range    WBC Count 13.0 (H) 4.0 - 11.0 10e3/uL    RBC Count 2.94 (L) 3.80 - 5.20 10e6/uL    Hemoglobin 10.0 (L) 11.7 - 15.7 g/dL    Hematocrit 31.9 (L) 35.0 - 47.0 %     (H) 78 - 100 fL    MCH 34.0 (H) 26.5 - 33.0 pg    MCHC 31.3 (L) 31.5 - 36.5 g/dL    RDW 18.3 (H) 10.0 - 15.0 %    Platelet Count 290 150 - 450 10e3/uL   Glucose by meter    Collection Time: 07/24/23  9:37 AM   Result Value Ref Range    GLUCOSE BY METER POCT 64 (L) 70 - 99 mg/dL   Glucose by meter    Collection Time: 07/24/23  8:09 PM   Result Value Ref Range    GLUCOSE BY METER POCT 100 (H) 70 - 99 mg/dL   Urea nitrogen    Collection Time: 07/25/23 12:01 AM   Result Value Ref Range    Urea Nitrogen 59 (H) 8 - 22 mg/dL   Glucose by meter    Collection Time: 07/25/23 10:02 AM   Result Value Ref Range    GLUCOSE BY METER POCT 168 (H) 70 - 99 mg/dL   Glucose by meter    Collection Time: 07/25/23  5:15 PM   Result Value Ref Range    GLUCOSE BY METER POCT 167 (H) 70 - 99 mg/dL   CBC with platelets    Collection Time: 07/25/23  6:02 PM   Result Value Ref Range    WBC Count 11.0 4.0 - 11.0 10e3/uL    RBC Count 2.99 (L) 3.80 - 5.20 10e6/uL    Hemoglobin 10.2 (L) 11.7 - 15.7 g/dL    Hematocrit 32.5 (L) 35.0 - 47.0 %     (H) 78 - 100 fL    MCH 34.1 (H) 26.5 - 33.0 pg    MCHC 31.4 (L) 31.5 - 36.5 g/dL    RDW 18.6 (H) 10.0 - 15.0 %    Platelet Count 305 150 - 450 10e3/uL   Magnesium    Collection  Time: 07/25/23  6:02 PM   Result Value Ref Range    Magnesium 1.7 (L) 1.8 - 2.6 mg/dL   Renal panel    Collection Time: 07/25/23  6:02 PM   Result Value Ref Range    Sodium 132 (L) 136 - 145 mmol/L    Potassium 4.7 3.5 - 5.0 mmol/L    Chloride 96 (L) 98 - 107 mmol/L    Carbon Dioxide (CO2) 24 22 - 31 mmol/L    Anion Gap 12 5 - 18 mmol/L    Urea Nitrogen 27 (H) 8 - 22 mg/dL    Creatinine 3.07 (H) 0.60 - 1.10 mg/dL    Calcium 8.9 8.5 - 10.5 mg/dL    Glucose 116 70 - 125 mg/dL    Albumin 2.7 (L) 3.5 - 5.0 g/dL    Phosphorus 1.7 (L) 2.5 - 4.5 mg/dL    GFR Estimate 16 (L) >60 mL/min/1.73m2   Glucose by meter    Collection Time: 07/26/23  7:50 AM   Result Value Ref Range    GLUCOSE BY METER POCT 127 (H) 70 - 99 mg/dL   Magnesium    Collection Time: 07/26/23  9:24 AM   Result Value Ref Range    Magnesium 1.5 (L) 1.8 - 2.6 mg/dL   Basic metabolic panel    Collection Time: 07/31/23  7:03 AM   Result Value Ref Range    Sodium 130 (L) 136 - 145 mmol/L    Potassium 5.2 3.4 - 5.3 mmol/L    Chloride 92 (L) 98 - 107 mmol/L    Carbon Dioxide (CO2) 24 22 - 29 mmol/L    Anion Gap 14 7 - 15 mmol/L    Urea Nitrogen 65.1 (H) 8.0 - 23.0 mg/dL    Creatinine 4.50 (H) 0.51 - 0.95 mg/dL    Calcium 9.2 8.8 - 10.2 mg/dL    Glucose 77 70 - 99 mg/dL    GFR Estimate 10 (L) >60 mL/min/1.73m2   Magnesium    Collection Time: 07/31/23  7:03 AM   Result Value Ref Range    Magnesium 2.0 1.7 - 2.3 mg/dL   CBC with platelets    Collection Time: 07/31/23  7:03 AM   Result Value Ref Range    WBC Count 8.6 4.0 - 11.0 10e3/uL    RBC Count 2.97 (L) 3.80 - 5.20 10e6/uL    Hemoglobin 10.1 (L) 11.7 - 15.7 g/dL    Hematocrit 34.6 (L) 35.0 - 47.0 %     (H) 78 - 100 fL    MCH 34.0 (H) 26.5 - 33.0 pg    MCHC 29.2 (L) 31.5 - 36.5 g/dL    RDW 19.7 (H) 10.0 - 15.0 %    Platelet Count 335 150 - 450 10e3/uL         MEDICATIONS:     Review of your medicines          Accurate as of August 2, 2023  2:26 PM. If you have any questions, ask your nurse or doctor.             CONTINUE these medicines which may have CHANGED, or have new prescriptions. If we are uncertain of the size of tablets/capsules you have at home, strength may be listed as something that might have changed.      Dose / Directions   calcitRIOL 0.5 MCG capsule  Commonly known as: ROCALTROL  This may have changed: Another medication with the same name was removed. Continue taking this medication, and follow the directions you see here.  Changed by: Doretha Arcos CNP      Dose: 0.5 mcg  Take 0.5 mcg by mouth three times a week Given at Dialysis  Refills: 0        CONTINUE these medicines which have NOT CHANGED      Dose / Directions   cholecalciferol 25 mcg (1000 units) capsule  Commonly known as: VITAMIN D3      Dose: 1 capsule  Take 1 capsule by mouth At Bedtime  Refills: 0     EPOGEN IJ      Dose: 1,000 Units  Inject 1,000 Units into the vein three times a week At dialysis.  Refills: 0     * labetalol 100 MG tablet  Commonly known as: NORMODYNE  Used for: Renovascular hypertension      Dose: 100 mg  Take 1 tablet (100 mg) by mouth At Bedtime  Quantity: 30 tablet  Refills: 0     * labetalol 100 MG tablet  Commonly known as: NORMODYNE  Used for: Renovascular hypertension      Dose: 100 mg  Take 1 tablet (100 mg) by mouth every morning  Quantity: 30 tablet  Refills: 0     lactulose 10 GM/15ML solution  Commonly known as: CHRONULAC  Used for: Constipation, unspecified constipation type      Dose: 10 g  Take 15 mLs (10 g) by mouth 2 times daily as needed for constipation  Quantity: 900 mL  Refills: 0     levothyroxine 50 MCG tablet  Commonly known as: SYNTHROID/LEVOTHROID      Dose: 50 mcg  Take 50 mcg by mouth At Bedtime Takes in the middle of the night.  Refills: 0     LORazepam 1 MG tablet  Commonly known as: ATIVAN  Used for: Anxiety      Dose: 1 mg  Take 1 tablet (1 mg) by mouth daily (before lunch)  Quantity: 30 tablet  Refills: 0     nortriptyline 75 MG capsule  Commonly known as: PAMELOR      Dose: 75  mg  Take 75 mg by mouth At Bedtime  Refills: 0     omeprazole 20 MG DR capsule  Commonly known as: priLOSEC      Dose: 20 mg  Take 20 mg by mouth daily  Refills: 0     oxyCODONE IR 10 MG tablet  Commonly known as: ROXICODONE  Used for: Chronic bilateral low back pain with sciatica, sciatica laterality unspecified      Dose: 10 mg  Take 1 tablet (10 mg) by mouth every 4 hours as needed for pain Max of 5 doses per day.  Quantity: 30 tablet  Refills: 0     rosuvastatin 10 MG tablet  Commonly known as: CRESTOR      Dose: 10 mg  Take 10 mg by mouth At Bedtime  Refills: 0     SENNA-docusate sodium 8.6-50 MG tablet  Commonly known as: SENNA S  Used for: Constipation, unspecified constipation type      Dose: 2 tablet  Take 2 tablets by mouth 2 times daily Hold for loose stools.  Quantity: 90 tablet  Refills: 0     sertraline 100 MG tablet  Commonly known as: ZOLOFT      Dose: 150 mg  Take 150 mg by mouth At Bedtime  Refills: 0     sevelamer carbonate 800 MG tablet  Commonly known as: RENVELA      Dose: 800-2,400 mg  Take 800-2,400 mg by mouth 3 times daily When eating; range of 1-3 tabs depending on meal size  Refills: 0     SUPER B COMPLEX PO      Dose: 1 tablet  Take 1 tablet by mouth At Bedtime  Refills: 0     VENOFER IV      Dose: 100 mg  Inject 100 mg into the vein once a week At dialysis  Refills: 0         * This list has 2 medication(s) that are the same as other medications prescribed for you. Read the directions carefully, and ask your doctor or other care provider to review them with you.                ASSESSMENT/PLAN  Encounter Diagnoses   Name Primary?     ESRD (end stage renal disease) on dialysis (H) Yes     Physical deconditioning      Pain management      Small bowel obstruction monitor for consistent bowel movements, Bowel medications as ordered.    End-stage renal disease fistula with a positive bruit and thrill, scheduled dialysis Tuesday Thursday Saturday    Physical deconditioning She will have home  care services PT/OT/HHA    Hypertension on labetalol 100 mg twice daily    Bowel health continue lactulose 15 mL every 12 hours as needed, senna S2 tablets twice daily hold for loose stools, Zoloft 250 mg at bedtime    Hypothyroidism on levothyroxine     Anxiety continue scheduled Ativan    Pain management as needed oxycodone    Mood disorder on nortriptyline     GERD continue omeprazole      DISCHARGE PLAN/FACE TO FACE:  I certify that services are/were furnished while this patient was under the care of a physician and that a physician or an allowed non-physician practitioner (NPP), had a face-to-face encounter that meets the physician face-to-face encounter requirements. The encounter was in whole, or in part, related to the primary reason for home health. The patient is confined to his/her home and needs intermittent skilled nursing, physical therapy, speech-language pathology, or the continued need for occupational therapy. A plan of care has been established by a physician and is periodically reviewed by a physician.  Date of Face-to-Face Encounter: 8/2/23    I certify that, based on my findings, the following services are medically necessary home health services: PT/OT/HHA    My clinical findings support the need for the above skilled services because: PT/OT for continued strength and endurance , HHA to assist with ADL's    This patient is homebound because: She is deconditioined following severe constipation and will continue home care for strengthening     The patient is, or has been, under my care and I have initiated the establishment of the plan of care. This patient will be followed by a physician who will periodically review the plan of care.    Schedule follow up visit with primary care provider within 7 days to reestablish care.    Electronically signed by: Doretha Arcos CNP

## 2023-08-02 NOTE — LETTER
8/2/2023        RE: Serene Tipton  [address]  [place] [address]        [facility]  Chief Complaint   Patient presents with     Discharge Summary [facility] Medical Record Number:  [id number]  Place of Service where encounter took place:  [facility] ([facility]) [[id number]]  Code Status:  Full Code     HISTORY:      HPI:  Serene Tipton  is 68 year old (1955) undergoing physical and occupational therapy.  She is with past medical history end-stage renal disease on dialysis, depressive disorder, anxiety, arthritis, chronic low back pain, GERD, hyperlipidemia, hypertension who was admitted on 7/21/2023 for ongoing/worsening left sided abdominal pain after being discharged from the hospital on 7/19 for constipation. Excerpted from records During previous admission surgery was consulted, believed constipation to be more likely than SBO. At that time, MiraLAX bowel regimen started and patient had multiple loose bowel movements. Patient has since had 3 abdominal CT scans with findings more suggestive of ileus than SBO. GI consulted last admission 7/13-7/19. Gastrografin study 7/18 excluded SBO. Since discharge 7/19, patient had been having increasing abdominal pain with NBNB emesis.  Started on 2 tablets of senna twice daily along with lactulose which has led to regular bowel movements.    Today she is seen to review vital signs, labs, routine visit and a face to face for discharge. She will discharge to home with current medications and treatments. She will have home care services PT/OT/HHA. She denied chest pain shortness of breath cough congestion.  She is moving her bowels.  She is currently  a dialysis patient for approximately the last 3 years, she is on Tuesday Thursday Saturday schedule.  Her color is ashen, she reports it is due to her dialysis. Labs reviewed from 7/31/23.Last HGB 10.1.       ALLERGIES:Penicillins, Carvedilol, Ciprofloxacin,  Floxacillin (flucloxacillin), Morphine, No clinical screening - see comments, Nsaids, Penicillin g, Sulfa antibiotics, Sulfamethoxazole-trimethoprim, and Levofloxacin    PAST MEDICAL HISTORY:   Past Medical History:   Diagnosis Date     Anemia in chronic kidney disease      Anxiety      Arthritis      Brain aneurysm     Cavernous segment of the right & left carotid arteries. See Neurosurg note 6/16/21.     Chronic low back pain     Managed by Pain Clinic     Depressive disorder 2013     Disease of thyroid gland      ESRD (end stage renal disease) on dialysis (H) 07/2020     GERD (gastroesophageal reflux disease)      Hepatic lesion      Hyperlipidemia      Hypertension      Nephrolithiasis      Neuromuscular disorder (H)      Osteoporosis      PKD (polycystic kidney disease) 09/01/1990     Primary generalized (osteo)arthritis 8/2/2023     PTSD (post-traumatic stress disorder)      Tobacco abuse      Vitamin D deficiency        PAST SURGICAL HISTORY:   has a past surgical history that includes back surgery; biopsy (Left, 09/01/1990); Eye surgery (2008); orthopedic surgery (Right, 2005); breast lumpectomy, rt/lt (Left); Cystectomy pilonidal (1981); fracture surgery; Spine surgery; other surgical history; Cyst Removal (Right); FORMATION ARTERIOVENOUS FISTULA   (07/28/2020); and Nephrectomy bilateral (Bilateral, 2/1/2023).    FAMILY HISTORY: family history includes Cardiac Sudden Death (age of onset: 52) in her sister; Cerebrovascular Disease in her maternal grandmother, mother, and paternal grandmother; Chronic Obstructive Pulmonary Disease in her father; Coronary Artery Disease in her paternal grandfather; Heart Disease in her maternal grandfather; Hyperlipidemia in her paternal grandmother; Hypertension in her maternal grandmother, mother, and sister; Kidney Disease in her maternal grandmother, mother, and sister; Multiple Sclerosis in her father; Polycystic Kidney Diease in her maternal grandmother, mother, and  sister; Pulmonary Embolism in her paternal grandfather.    SOCIAL HISTORY:  reports that she quit smoking about 2 years ago. Her smoking use included cigarettes. She has a 7.00 pack-year smoking history. She has never used smokeless tobacco. She reports that she does not currently use alcohol. She reports that she does not use drugs.    ROS:  Constitutional: Negative for activity change, appetite change, fatigue and fever.   HENT: Negative for congestion.    Respiratory: Negative for cough, shortness of breath and wheezing.    Cardiovascular: Negative for chest pain and leg swelling.   Gastrointestinal: Negative for abdominal distention, abdominal pain, constipation, diarrhea and nausea.  Needs close monitoring of bowel status  Genitourinary: Negative for dysuria.   Musculoskeletal: Negative for arthralgia. Negative for back pain.  Fistula with positive bruit and thrill  Skin: Negative for color change and wound.   Neurological: Negative for dizziness.   Psychiatric/Behavioral: Negative for agitation, behavioral problems and confusion.     Physical Exam:  Constitutional:       Appearance: Patient appears malnourished and  color ashen  HENT:      Head: Normocephalic.   Eyes:      Conjunctiva/sclera: Conjunctivae normal.   Neck:      Musculoskeletal: Normal range of motion.   Cardiovascular:      Rate and Rhythm: Normal rate and regular rhythm.      Heart sounds: Normal heart sounds. No murmur.   Pulmonary:      Effort: No respiratory distress.      Breath sounds: Normal breath sounds. No wheezing or rales.   Abdominal:      General: Bowel sounds are normal. There is no distension.      Palpations: Abdomen is soft.      Tenderness: There is no abdominal tenderness.   Musculoskeletal:       Normal range of motion.     Skin:General:        Skin is warm.   Neurological:         Mental Status: Patient is alert and oriented to person, place, and time.   Psychiatric:         Behavior: Behavior normal.     Vitals:/74    Pulse 81   Temp 97  F (36.1  C)   Resp 16   Ht 1.524 m (5')   Wt 34.5 kg (76 lb)   SpO2 100%   BMI 14.84 kg/m   and Body mass index is 14.84 kg/m .    Lab/Diagnostic data:   Recent Results (from the past 240 hour(s))   Hemoglobin    Collection Time: 07/23/23  3:22 PM   Result Value Ref Range    Hemoglobin 9.5 (L) 11.7 - 15.7 g/dL   Glucose by meter    Collection Time: 07/23/23  6:11 PM   Result Value Ref Range    GLUCOSE BY METER POCT 104 (H) 70 - 99 mg/dL   Magnesium    Collection Time: 07/24/23  7:27 AM   Result Value Ref Range    Magnesium 1.8 1.8 - 2.6 mg/dL   CBC with platelets    Collection Time: 07/24/23  7:27 AM   Result Value Ref Range    WBC Count 13.0 (H) 4.0 - 11.0 10e3/uL    RBC Count 2.94 (L) 3.80 - 5.20 10e6/uL    Hemoglobin 10.0 (L) 11.7 - 15.7 g/dL    Hematocrit 31.9 (L) 35.0 - 47.0 %     (H) 78 - 100 fL    MCH 34.0 (H) 26.5 - 33.0 pg    MCHC 31.3 (L) 31.5 - 36.5 g/dL    RDW 18.3 (H) 10.0 - 15.0 %    Platelet Count 290 150 - 450 10e3/uL   Glucose by meter    Collection Time: 07/24/23  9:37 AM   Result Value Ref Range    GLUCOSE BY METER POCT 64 (L) 70 - 99 mg/dL   Glucose by meter    Collection Time: 07/24/23  8:09 PM   Result Value Ref Range    GLUCOSE BY METER POCT 100 (H) 70 - 99 mg/dL   Urea nitrogen    Collection Time: 07/25/23 12:01 AM   Result Value Ref Range    Urea Nitrogen 59 (H) 8 - 22 mg/dL   Glucose by meter    Collection Time: 07/25/23 10:02 AM   Result Value Ref Range    GLUCOSE BY METER POCT 168 (H) 70 - 99 mg/dL   Glucose by meter    Collection Time: 07/25/23  5:15 PM   Result Value Ref Range    GLUCOSE BY METER POCT 167 (H) 70 - 99 mg/dL   CBC with platelets    Collection Time: 07/25/23  6:02 PM   Result Value Ref Range    WBC Count 11.0 4.0 - 11.0 10e3/uL    RBC Count 2.99 (L) 3.80 - 5.20 10e6/uL    Hemoglobin 10.2 (L) 11.7 - 15.7 g/dL    Hematocrit 32.5 (L) 35.0 - 47.0 %     (H) 78 - 100 fL    MCH 34.1 (H) 26.5 - 33.0 pg    MCHC 31.4 (L) 31.5 - 36.5 g/dL     RDW 18.6 (H) 10.0 - 15.0 %    Platelet Count 305 150 - 450 10e3/uL   Magnesium    Collection Time: 07/25/23  6:02 PM   Result Value Ref Range    Magnesium 1.7 (L) 1.8 - 2.6 mg/dL   Renal panel    Collection Time: 07/25/23  6:02 PM   Result Value Ref Range    Sodium 132 (L) 136 - 145 mmol/L    Potassium 4.7 3.5 - 5.0 mmol/L    Chloride 96 (L) 98 - 107 mmol/L    Carbon Dioxide (CO2) 24 22 - 31 mmol/L    Anion Gap 12 5 - 18 mmol/L    Urea Nitrogen 27 (H) 8 - 22 mg/dL    Creatinine 3.07 (H) 0.60 - 1.10 mg/dL    Calcium 8.9 8.5 - 10.5 mg/dL    Glucose 116 70 - 125 mg/dL    Albumin 2.7 (L) 3.5 - 5.0 g/dL    Phosphorus 1.7 (L) 2.5 - 4.5 mg/dL    GFR Estimate 16 (L) >60 mL/min/1.73m2   Glucose by meter    Collection Time: 07/26/23  7:50 AM   Result Value Ref Range    GLUCOSE BY METER POCT 127 (H) 70 - 99 mg/dL   Magnesium    Collection Time: 07/26/23  9:24 AM   Result Value Ref Range    Magnesium 1.5 (L) 1.8 - 2.6 mg/dL   Basic metabolic panel    Collection Time: 07/31/23  7:03 AM   Result Value Ref Range    Sodium 130 (L) 136 - 145 mmol/L    Potassium 5.2 3.4 - 5.3 mmol/L    Chloride 92 (L) 98 - 107 mmol/L    Carbon Dioxide (CO2) 24 22 - 29 mmol/L    Anion Gap 14 7 - 15 mmol/L    Urea Nitrogen 65.1 (H) 8.0 - 23.0 mg/dL    Creatinine 4.50 (H) 0.51 - 0.95 mg/dL    Calcium 9.2 8.8 - 10.2 mg/dL    Glucose 77 70 - 99 mg/dL    GFR Estimate 10 (L) >60 mL/min/1.73m2   Magnesium    Collection Time: 07/31/23  7:03 AM   Result Value Ref Range    Magnesium 2.0 1.7 - 2.3 mg/dL   CBC with platelets    Collection Time: 07/31/23  7:03 AM   Result Value Ref Range    WBC Count 8.6 4.0 - 11.0 10e3/uL    RBC Count 2.97 (L) 3.80 - 5.20 10e6/uL    Hemoglobin 10.1 (L) 11.7 - 15.7 g/dL    Hematocrit 34.6 (L) 35.0 - 47.0 %     (H) 78 - 100 fL    MCH 34.0 (H) 26.5 - 33.0 pg    MCHC 29.2 (L) 31.5 - 36.5 g/dL    RDW 19.7 (H) 10.0 - 15.0 %    Platelet Count 335 150 - 450 10e3/uL         MEDICATIONS:     Review of your medicines           Accurate as of August 2, 2023  2:26 PM. If you have any questions, ask your nurse or doctor.            CONTINUE these medicines which may have CHANGED, or have new prescriptions. If we are uncertain of the size of tablets/capsules you have at home, strength may be listed as something that might have changed.      Dose / Directions   calcitRIOL 0.5 MCG capsule  Commonly known as: ROCALTROL  This may have changed: Another medication with the same name was removed. Continue taking this medication, and follow the directions you see here.  Changed by: Doretha Arcos CNP      Dose: 0.5 mcg  Take 0.5 mcg by mouth three times a week Given at Dialysis  Refills: 0        CONTINUE these medicines which have NOT CHANGED      Dose / Directions   cholecalciferol 25 mcg (1000 units) capsule  Commonly known as: VITAMIN D3      Dose: 1 capsule  Take 1 capsule by mouth At Bedtime  Refills: 0     EPOGEN IJ      Dose: 1,000 Units  Inject 1,000 Units into the vein three times a week At dialysis.  Refills: 0     * labetalol 100 MG tablet  Commonly known as: NORMODYNE  Used for: Renovascular hypertension      Dose: 100 mg  Take 1 tablet (100 mg) by mouth At Bedtime  Quantity: 30 tablet  Refills: 0     * labetalol 100 MG tablet  Commonly known as: NORMODYNE  Used for: Renovascular hypertension      Dose: 100 mg  Take 1 tablet (100 mg) by mouth every morning  Quantity: 30 tablet  Refills: 0     lactulose 10 GM/15ML solution  Commonly known as: CHRONULAC  Used for: Constipation, unspecified constipation type      Dose: 10 g  Take 15 mLs (10 g) by mouth 2 times daily as needed for constipation  Quantity: 900 mL  Refills: 0     levothyroxine 50 MCG tablet  Commonly known as: SYNTHROID/LEVOTHROID      Dose: 50 mcg  Take 50 mcg by mouth At Bedtime Takes in the middle of the night.  Refills: 0     LORazepam 1 MG tablet  Commonly known as: ATIVAN  Used for: Anxiety      Dose: 1 mg  Take 1 tablet (1 mg) by mouth daily (before lunch)  Quantity: 30  tablet  Refills: 0     nortriptyline 75 MG capsule  Commonly known as: PAMELOR      Dose: 75 mg  Take 75 mg by mouth At Bedtime  Refills: 0     omeprazole 20 MG DR capsule  Commonly known as: priLOSEC      Dose: 20 mg  Take 20 mg by mouth daily  Refills: 0     oxyCODONE IR 10 MG tablet  Commonly known as: ROXICODONE  Used for: Chronic bilateral low back pain with sciatica, sciatica laterality unspecified      Dose: 10 mg  Take 1 tablet (10 mg) by mouth every 4 hours as needed for pain Max of 5 doses per day.  Quantity: 30 tablet  Refills: 0     rosuvastatin 10 MG tablet  Commonly known as: CRESTOR      Dose: 10 mg  Take 10 mg by mouth At Bedtime  Refills: 0     SENNA-docusate sodium 8.6-50 MG tablet  Commonly known as: SENNA S  Used for: Constipation, unspecified constipation type      Dose: 2 tablet  Take 2 tablets by mouth 2 times daily Hold for loose stools.  Quantity: 90 tablet  Refills: 0     sertraline 100 MG tablet  Commonly known as: ZOLOFT      Dose: 150 mg  Take 150 mg by mouth At Bedtime  Refills: 0     sevelamer carbonate 800 MG tablet  Commonly known as: RENVELA      Dose: 800-2,400 mg  Take 800-2,400 mg by mouth 3 times daily When eating; range of 1-3 tabs depending on meal size  Refills: 0     SUPER B COMPLEX PO      Dose: 1 tablet  Take 1 tablet by mouth At Bedtime  Refills: 0     VENOFER IV      Dose: 100 mg  Inject 100 mg into the vein once a week At dialysis  Refills: 0         * This list has 2 medication(s) that are the same as other medications prescribed for you. Read the directions carefully, and ask your doctor or other care provider to review them with you.                ASSESSMENT/PLAN  Encounter Diagnoses   Name Primary?     ESRD (end stage renal disease) on dialysis (H) Yes     Physical deconditioning      Pain management      Small bowel obstruction monitor for consistent bowel movements, Bowel medications as ordered.    End-stage renal disease fistula with a positive bruit and thrill,  scheduled dialysis Tuesday Thursday Saturday    Physical deconditioning She will have home care services PT/OT/HHA    Hypertension on labetalol 100 mg twice daily    Bowel health continue lactulose 15 mL every 12 hours as needed, senna S2 tablets twice daily hold for loose stools, Zoloft 250 mg at bedtime    Hypothyroidism on levothyroxine     Anxiety continue scheduled Ativan    Pain management as needed oxycodone    Mood disorder on nortriptyline     GERD continue omeprazole      DISCHARGE PLAN/FACE TO FACE:  I certify that services are/were furnished while this patient was under the care of a physician and that a physician or an allowed non-physician practitioner (NPP), had a face-to-face encounter that meets the physician face-to-face encounter requirements. The encounter was in whole, or in part, related to the primary reason for home health. The patient is confined to his/her home and needs intermittent skilled nursing, physical therapy, speech-language pathology, or the continued need for occupational therapy. A plan of care has been established by a physician and is periodically reviewed by a physician.  Date of Face-to-Face Encounter: 8/2/23    I certify that, based on my findings, the following services are medically necessary home health services: PT/OT/HHA    My clinical findings support the need for the above skilled services because: PT/OT for continued strength and endurance , HHA to assist with ADL's    This patient is homebound because: She is deconditioined following severe constipation and will continue home care for strengthening     The patient is, or has been, under my care and I have initiated the establishment of the plan of care. This patient will be followed by a physician who will periodically review the plan of care.    Schedule follow up visit with primary care provider within 7 days to reestablish care.    Electronically signed by: Doretha Arcos CNP          Sincerely,        Doretha  Isrrael, CNP

## 2023-08-14 ENCOUNTER — TRANSFERRED RECORDS (OUTPATIENT)
Dept: HEALTH INFORMATION MANAGEMENT | Facility: CLINIC | Age: 68
End: 2023-08-14

## 2023-08-18 ENCOUNTER — LAB REQUISITION (OUTPATIENT)
Dept: LAB | Facility: CLINIC | Age: 68
End: 2023-08-18
Payer: COMMERCIAL

## 2023-08-18 ENCOUNTER — TRANSFERRED RECORDS (OUTPATIENT)
Dept: HEALTH INFORMATION MANAGEMENT | Facility: CLINIC | Age: 68
End: 2023-08-18

## 2023-08-18 DIAGNOSIS — M25.411 EFFUSION, RIGHT SHOULDER: ICD-10-CM

## 2023-08-18 LAB
APPEARANCE FLD: ABNORMAL
CELL COUNT BODY FLUID SOURCE: ABNORMAL
COLOR FLD: ABNORMAL
CRYSTALS SNV MICRO: NORMAL
GRAM STAIN RESULT: NORMAL
GRAM STAIN RESULT: NORMAL
LYMPHOCYTES NFR FLD MANUAL: 22 %
MONOS+MACROS NFR FLD MANUAL: 74 %
NEUTS BAND NFR FLD MANUAL: 4 %
WBC # FLD AUTO: 227 /UL

## 2023-08-18 PROCEDURE — 89051 BODY FLUID CELL COUNT: CPT | Mod: ORL | Performed by: FAMILY MEDICINE

## 2023-08-18 PROCEDURE — 87070 CULTURE OTHR SPECIMN AEROBIC: CPT | Mod: ORL | Performed by: FAMILY MEDICINE

## 2023-08-18 PROCEDURE — 87205 SMEAR GRAM STAIN: CPT | Mod: ORL | Performed by: FAMILY MEDICINE

## 2023-08-18 PROCEDURE — 89060 EXAM SYNOVIAL FLUID CRYSTALS: CPT | Mod: ORL | Performed by: FAMILY MEDICINE

## 2023-08-23 LAB — BACTERIA SNV CULT: NO GROWTH

## 2023-08-25 ENCOUNTER — TELEPHONE (OUTPATIENT)
Dept: TRANSPLANT | Facility: CLINIC | Age: 68
End: 2023-08-25
Payer: COMMERCIAL

## 2023-08-25 DIAGNOSIS — I25.10 CARDIOVASCULAR DISEASE: ICD-10-CM

## 2023-08-25 DIAGNOSIS — N18.6 END STAGE RENAL DISEASE (H): ICD-10-CM

## 2023-08-25 DIAGNOSIS — Z01.818 PRE-TRANSPLANT EVALUATION FOR KIDNEY TRANSPLANT: Primary | ICD-10-CM

## 2023-08-25 DIAGNOSIS — Z87.891 HISTORY OF TOBACCO USE: ICD-10-CM

## 2023-08-25 DIAGNOSIS — I10 ESSENTIAL HYPERTENSION: ICD-10-CM

## 2023-08-25 DIAGNOSIS — E78.5 HYPERLIPIDEMIA: ICD-10-CM

## 2023-08-25 DIAGNOSIS — Q61.3 POLYCYSTIC KIDNEY DISEASE: ICD-10-CM

## 2023-08-25 DIAGNOSIS — Z76.82 ORGAN TRANSPLANT CANDIDATE: ICD-10-CM

## 2023-08-25 NOTE — TELEPHONE ENCOUNTER
Returned patient's call.  She was wondering about how she will know when she is at a suitable weight for transplant.  She states that she will be completing her home physical therapy in the next couple of months.  Knowing this, I think it would be good to get her back in to see a surgeon in person to ensure they are ok with her weight and to see a nurse for an in person frailty assessment.  She was agreeable to trying to make this appointment in the next 8 weeks.  Will get this ordered for her.  She prefers to see Dr. Giang given that he did her nephrectomy.  Will let the scheduling team know of this preference.  She will call me once this appointment is set up and scheduled.

## 2023-09-20 NOTE — TELEPHONE ENCOUNTER
Refused refill request.  Not a patient here.  Patient should contact PCP.    Estrellita Quiroz MD

## 2023-10-20 NOTE — PROGRESS NOTES
"Foot & Ankle Surgery  October 20, 2023    CC: \"toe nails corn proper alignment\"    I was asked to see Serene Tipton regarding the chief complaint by:  Dr. Lowry    HPI:  Pt is a 68 year old female who presents with above complaint.  Bilateral lower extremity \"more than 6 months\".  \"Burning\", \"makes me limp\".  Pain 5 out of 10 \"off and on\", worse with \"bumping it\".  \"Soaking using antibiotics\" for treatment.  \"My feet are terrible\".  She mentions a corn on the right second toe.  \"All the toenails\" are curling and she thinks this is secondary to pedicures.    ROS:   Pos for CC.  The patient denies current nausea, vomiting, chills, fevers, belly pain, calf pain, chest pain or SOB.  Complete remainder of ROS is otherwise neg.    VITALS:    Vitals:    10/20/23 1252   BP: 122/78   Weight: 34.5 kg (76 lb)       PMH:    Past Medical History:   Diagnosis Date    Anemia in chronic kidney disease     Anxiety     Arthritis     Brain aneurysm     Cavernous segment of the right & left carotid arteries. See Neurosurg note 6/16/21.    Chronic low back pain     Managed by Pain Clinic    Depressive disorder 2013    Disease of thyroid gland     ESRD (end stage renal disease) on dialysis (H) 07/2020    GERD (gastroesophageal reflux disease)     Hepatic lesion     Hyperlipidemia     Hypertension     Nephrolithiasis     Neuromuscular disorder (H)     Osteoporosis     PKD (polycystic kidney disease) 09/01/1990    Primary generalized (osteo)arthritis 8/2/2023    PTSD (post-traumatic stress disorder)     Tobacco abuse     Vitamin D deficiency        SXHX:    Past Surgical History:   Procedure Laterality Date    BACK SURGERY      spinal fusion w/hardware    BIOPSY Left 09/01/1990    Breast    BREAST LUMPECTOMY, RT/LT Left     Lumpectomy    CYST REMOVAL Right     rt wrist    CYSTECTOMY PILONIDAL  1981    EYE SURGERY  2008    lasik    FORMATION ARTERIOVENOUS FISTULA    07/28/2020    FRACTURE SURGERY      NEPHRECTOMY BILATERAL " Bilateral 2/1/2023    Procedure: bilateral native nephrectomy;  Surgeon: Alana Giang MD;  Location: [facility] OR    ORTHOPEDIC SURGERY Right 2005    Shoulder    OTHER SURGICAL HISTORY      carpal tunnel repair    SPINE SURGERY          MEDS:    Current Outpatient Medications   Medication    B Complex-C (SUPER B COMPLEX PO)    calcitRIOL (ROCALTROL) 0.5 MCG capsule    cholecalciferol (VITAMIN D3) 25 mcg (1000 units) capsule    Epoetin Adam (EPOGEN IJ)    Iron Sucrose (VENOFER IV)    labetalol (NORMODYNE) 100 MG tablet    labetalol (NORMODYNE) 100 MG tablet    lactulose (CHRONULAC) 10 GM/15ML solution    levothyroxine (SYNTHROID/LEVOTHROID) 50 MCG tablet    LORazepam (ATIVAN) 1 MG tablet    nortriptyline (PAMELOR) 75 MG capsule    omeprazole (PRILOSEC) 20 MG DR capsule    oxyCODONE IR (ROXICODONE) 10 MG tablet    rosuvastatin (CRESTOR) 10 MG tablet    SENNA-docusate sodium (SENNA S) 8.6-50 MG tablet    sertraline (ZOLOFT) 100 MG tablet    sevelamer carbonate (RENVELA) 800 MG tablet     No current facility-administered medications for this visit.       ALL:     Allergies   Allergen Reactions    Penicillins Other (See Comments) and Shortness Of Breath     Breathing problem.  breathing      Carvedilol GI Disturbance     Nausea and vomiting    Ciprofloxacin Muscle Pain (Myalgia)    Floxacillin (Flucloxacillin)      Other Reaction(s): rash, muscle and joint pain    Morphine Other (See Comments)     Vomiting    No Clinical Screening - See Comments      PN: LW CM1: Contrast Intercapsular - Nonionic Reaction :    Nsaids Other (See Comments)    Penicillin G Difficulty breathing    Sulfa Antibiotics Itching    Sulfamethoxazole-Trimethoprim      Other reaction(s): itching    Levofloxacin Muscle Pain (Myalgia) and Rash       FMH:    Family History   Problem Relation Age of Onset    Polycystic Kidney Diease Mother     Hypertension Mother     Kidney Disease Mother     Cerebrovascular Disease Mother     Chronic Obstructive Pulmonary  Disease Father     Multiple Sclerosis Father     Polycystic Kidney Diease Sister     Cardiac Sudden Death Sister 52    Hypertension Sister     Kidney Disease Sister     Polycystic Kidney Diease Maternal Grandmother     Hypertension Maternal Grandmother     Kidney Disease Maternal Grandmother     Cerebrovascular Disease Maternal Grandmother     Heart Disease Maternal Grandfather     Hyperlipidemia Paternal Grandmother     Cerebrovascular Disease Paternal Grandmother     Coronary Artery Disease Paternal Grandfather     Pulmonary Embolism Paternal Grandfather     Anesthesia Reaction No family hx of        SocHx:    Social History     Socioeconomic History    Marital status: Single     Spouse name: Not on file    Number of children: Not on file    Years of education: Not on file    Highest education level: Not on file   Occupational History    Not on file   Tobacco Use    Smoking status: Former     Packs/day: 0.50     Years: 14.00     Additional pack years: 0.00     Total pack years: 7.00     Types: Cigarettes     Quit date: 2021     Years since quittin.3    Smokeless tobacco: Never   Substance and Sexual Activity    Alcohol use: Not Currently    Drug use: Never    Sexual activity: Not on file   Other Topics Concern    Parent/sibling w/ CABG, MI or angioplasty before 65F 55M? Not Asked   Social History Narrative    Not on file     Social Determinants of Health     Financial Resource Strain: Not on file   Food Insecurity: Not on file   Transportation Needs: Not on file   Physical Activity: Not on file   Stress: Not on file   Social Connections: Not on file   Interpersonal Safety: Not on file   Housing Stability: Not on file           EXAMINATION:  Gen:   No apparent distress  Neuro:   A&Ox3, no deficits  Psych:    Answering questions appropriately for age and situation with normal affect  Head:    NCAT  Eye:    Visual scanning without deficit  Ear:    Response to auditory stimuli wnl  Lung:    Non-labored  breathing on RA noted  Abd:    NTND per patient report  Lymph:    Neg for pitting/non-pitting edema BLE  Vasc:    Pulses palpable, CFT minimally delayed  Neuro:    Light touch sensation intact to all sensory nerve distributions without paresthesias  Derm:    Callus at the medial right second DIPJ.  Nails are cylindrical, mycotic.  MSK:    Hammertoe bilateral with IPJ DJD  Calf:    Neg for redness, swelling or tenderness    Assessment:  68 year old female with onychomycosis/dystrophic nails; calluses; hammertoes      Medical Decision Making/Plan:  Discussed etiologies, anatomy and options  1.  Onychomycosis/dystrophic nails  -I personally reviewed and interpreted the patient's lower extremity history pertinent to today's visit, including imaging/labs, in preparation for initiating a treatment program.  -we discussed OTC topical, Rx topical, Rx PO med options.  All ar of limited efficacy.  -discussed routine foot care option.  Handout was dispensed    2.  Painful callus at the medial R 2nd DIPJ  -some of the skin was peeled off.  This is not a covered service  -home callus instructions  -routine foot care    3.  Bilateral hammertoes  -we discussed accommodative shoe gear and padding options.  Consider tenotomy vs surgical management      Follow up:  prn or sooner with acute issues      Patient's medical history was reviewed today      James Villareal DPM FACFAS FACFAOM  Podiatric Foot & Ankle Surgeon  [facility]  [phone]    Disclaimer: This note consists of symbols derived from keyboarding, dictation and/or voice recognition software. As a result, there may be errors in the script that have gone undetected. Please consider this when interpreting information found in this chart.

## 2023-10-20 NOTE — PATIENT INSTRUCTIONS
Thank you for choosing [facility] Podiat[facility] / Foot & Ankle Surgery!    DR. MARIN'S CLINIC LOCATIONS:     [facility] (Friday) TRIAGE LINE: [phone]   [address] [facility]  APPOINTMENTS: [phone]   [facility] [place] [address] RADIOLOGY: [phone]    PHYSICAL THERAPY: [phone]    SET UP SURGERY: [phone]   [place] (Mon-Tues AM-Thurs) BILLING QUESTIONS: [phone]   [address] [address] FAX: [phone]   [place] [address] [place] [facility]: [phone]      Calluses:   Tips for treating painful calluses:    1.  Pumice stone/roxana board therapy multiple times weekly to remove callus tissue as it builds up    2.  Good supportive shoes and minimizing barefoot ambulation can slow down callus formation, and can decrease pain levels    3.  Gel inserts can also provide padding to the bottom of the foot, to prevent pain and slow recurrence.    4.  Applying lotion daily/twice daily to the callus tissue can keep it softer and less painful.  Lotions with lactic acid or urea work well, but most lotions are sufficient      2.  Nails:   Here is a list of routine foot care resources, which includes toenail trimming and callus/corn management.     This is not a referral. It is your responsibility to contact the organization and your insurance to confirm cost and coverage.      ROUTINE FOOT CARE (NAIL TRIMMING / CALLUSES)      [facility]  [phone]   [facility]  [phone]  Multiple locations   [facility]  [phone] Dr. Moran and Dr. Walker  [address]  [place] [address] [phone]       3.  Hammertoes   [email or web address] or call [email or web address]  TOE COVERS/CAPS       [email or web address]   [email or web address]

## 2023-10-20 NOTE — LETTER
"    10/20/2023         RE: Serene Tipton  [address]  [place] [address]        Dear Colleague,    Thank you for referring your patient, Serene Tipton, to the [facility]. Please see a copy of my visit note below.    Foot & Ankle Surgery  October 20, 2023    CC: \"toe nails corn proper alignment\"    I was asked to see Serene JASMYNE Danuta regarding the chief complaint by:  Dr. Lowry    HPI:  Pt is a 68 year old female who presents with above complaint.  Bilateral lower extremity \"more than 6 months\".  \"Burning\", \"makes me limp\".  Pain 5 out of 10 \"off and on\", worse with \"bumping it\".  \"Soaking using antibiotics\" for treatment.  \"My feet are terrible\".  She mentions a corn on the right second toe.  \"All the toenails\" are curling and she thinks this is secondary to pedicures.    ROS:   Pos for CC.  The patient denies current nausea, vomiting, chills, fevers, belly pain, calf pain, chest pain or SOB.  Complete remainder of ROS is otherwise neg.    VITALS:    Vitals:    10/20/23 1252   BP: 122/78   Weight: 34.5 kg (76 lb)       PMH:    Past Medical History:   Diagnosis Date     Anemia in chronic kidney disease      Anxiety      Arthritis      Brain aneurysm     Cavernous segment of the right & left carotid arteries. See Neurosurg note 6/16/21.     Chronic low back pain     Managed by Pain Clinic     Depressive disorder 2013     Disease of thyroid gland      ESRD (end stage renal disease) on dialysis (H) 07/2020     GERD (gastroesophageal reflux disease)      Hepatic lesion      Hyperlipidemia      Hypertension      Nephrolithiasis      Neuromuscular disorder (H)      Osteoporosis      PKD (polycystic kidney disease) 09/01/1990     Primary generalized (osteo)arthritis 8/2/2023     PTSD (post-traumatic stress disorder)      Tobacco abuse      Vitamin D deficiency        SXHX:    Past Surgical History:   Procedure Laterality Date     BACK SURGERY      spinal fusion w/hardware "     BIOPSY Left 09/01/1990    Breast     BREAST LUMPECTOMY, RT/LT Left     Lumpectomy     CYST REMOVAL Right     rt wrist     CYSTECTOMY PILONIDAL  1981     EYE SURGERY  2008    lasik     FORMATION ARTERIOVENOUS FISTULA    07/28/2020     FRACTURE SURGERY       NEPHRECTOMY BILATERAL Bilateral 2/1/2023    Procedure: bilateral native nephrectomy;  Surgeon: Alana Giang MD;  Location: [facility] OR     ORTHOPEDIC SURGERY Right 2005    Shoulder     OTHER SURGICAL HISTORY      carpal tunnel repair     SPINE SURGERY          MEDS:    Current Outpatient Medications   Medication     B Complex-C (SUPER B COMPLEX PO)     calcitRIOL (ROCALTROL) 0.5 MCG capsule     cholecalciferol (VITAMIN D3) 25 mcg (1000 units) capsule     Epoetin Adam (EPOGEN IJ)     Iron Sucrose (VENOFER IV)     labetalol (NORMODYNE) 100 MG tablet     labetalol (NORMODYNE) 100 MG tablet     lactulose (CHRONULAC) 10 GM/15ML solution     levothyroxine (SYNTHROID/LEVOTHROID) 50 MCG tablet     LORazepam (ATIVAN) 1 MG tablet     nortriptyline (PAMELOR) 75 MG capsule     omeprazole (PRILOSEC) 20 MG DR capsule     oxyCODONE IR (ROXICODONE) 10 MG tablet     rosuvastatin (CRESTOR) 10 MG tablet     SENNA-docusate sodium (SENNA S) 8.6-50 MG tablet     sertraline (ZOLOFT) 100 MG tablet     sevelamer carbonate (RENVELA) 800 MG tablet     No current facility-administered medications for this visit.       ALL:     Allergies   Allergen Reactions     Penicillins Other (See Comments) and Shortness Of Breath     Breathing problem.  breathing       Carvedilol GI Disturbance     Nausea and vomiting     Ciprofloxacin Muscle Pain (Myalgia)     Floxacillin (Flucloxacillin)      Other Reaction(s): rash, muscle and joint pain     Morphine Other (See Comments)     Vomiting     No Clinical Screening - See Comments      PN: LW CM1: Contrast Intercapsular - Nonionic Reaction :     Nsaids Other (See Comments)     Penicillin G Difficulty breathing     Sulfa Antibiotics Itching      Sulfamethoxazole-Trimethoprim      Other reaction(s): itching     Levofloxacin Muscle Pain (Myalgia) and Rash       FMH:    Family History   Problem Relation Age of Onset     Polycystic Kidney Diease Mother      Hypertension Mother      Kidney Disease Mother      Cerebrovascular Disease Mother      Chronic Obstructive Pulmonary Disease Father      Multiple Sclerosis Father      Polycystic Kidney Diease Sister      Cardiac Sudden Death Sister 52     Hypertension Sister      Kidney Disease Sister      Polycystic Kidney Diease Maternal Grandmother      Hypertension Maternal Grandmother      Kidney Disease Maternal Grandmother      Cerebrovascular Disease Maternal Grandmother      Heart Disease Maternal Grandfather      Hyperlipidemia Paternal Grandmother      Cerebrovascular Disease Paternal Grandmother      Coronary Artery Disease Paternal Grandfather      Pulmonary Embolism Paternal Grandfather      Anesthesia Reaction No family hx of        SocHx:    Social History     Socioeconomic History     Marital status: Single     Spouse name: Not on file     Number of children: Not on file     Years of education: Not on file     Highest education level: Not on file   Occupational History     Not on file   Tobacco Use     Smoking status: Former     Packs/day: 0.50     Years: 14.00     Additional pack years: 0.00     Total pack years: 7.00     Types: Cigarettes     Quit date: 2021     Years since quittin.3     Smokeless tobacco: Never   Substance and Sexual Activity     Alcohol use: Not Currently     Drug use: Never     Sexual activity: Not on file   Other Topics Concern     Parent/sibling w/ CABG, MI or angioplasty before 65F 55M? Not Asked   Social History Narrative     Not on file     Social Determinants of Health     Financial Resource Strain: Not on file   Food Insecurity: Not on file   Transportation Needs: Not on file   Physical Activity: Not on file   Stress: Not on file   Social Connections: Not on file    Interpersonal Safety: Not on file   Housing Stability: Not on file           EXAMINATION:  Gen:   No apparent distress  Neuro:   A&Ox3, no deficits  Psych:    Answering questions appropriately for age and situation with normal affect  Head:    NCAT  Eye:    Visual scanning without deficit  Ear:    Response to auditory stimuli wnl  Lung:    Non-labored breathing on RA noted  Abd:    NTND per patient report  Lymph:    Neg for pitting/non-pitting edema BLE  Vasc:    Pulses palpable, CFT minimally delayed  Neuro:    Light touch sensation intact to all sensory nerve distributions without paresthesias  Derm:    Callus at the medial right second DIPJ.  Nails are cylindrical, mycotic.  MSK:    Hammertoe bilateral with IPJ DJD  Calf:    Neg for redness, swelling or tenderness    Assessment:  68 year old female with onychomycosis/dystrophic nails; calluses; hammertoes      Medical Decision Making/Plan:  Discussed etiologies, anatomy and options  1.  Onychomycosis/dystrophic nails  -I personally reviewed and interpreted the patient's lower extremity history pertinent to today's visit, including imaging/labs, in preparation for initiating a treatment program.  -we discussed OTC topical, Rx topical, Rx PO med options.  All ar of limited efficacy.  -discussed routine foot care option.  Handout was dispensed    2.  Painful callus at the medial R 2nd DIPJ  -some of the skin was peeled off.  This is not a covered service  -home callus instructions  -routine foot care    3.  Bilateral hammertoes  -we discussed accommodative shoe gear and padding options.  Consider tenotomy vs surgical management      Follow up:  prn or sooner with acute issues      Patient's medical history was reviewed today      James Villareal DPM FACFAS FACFAOM  Podiatric Foot & Ankle Surgeon  [facility]  [phone]    Disclaimer: This note consists of symbols derived from keyboarding, dictation and/or voice recognition software. As a  result, there may be errors in the script that have gone undetected. Please consider this when interpreting information found in this chart.          Again, thank you for allowing me to participate in the care of your patient.        Sincerely,        James Villareal DPM, ASHLEY

## 2023-11-06 NOTE — PROGRESS NOTES
Transplant Surgery Consult Note     Medical record number: [id number]  YOB: 1955,   Consult requested by Aaliyah Rhoades for evaluation of kidney transplant candidacy.    Assessment and Recommendations:Ms. Tipton appears to be a fair candidate for kidney transplantation and has a good understanding of the risks and benefits of this approach to the management of renal failure. The following issues should be addressed prior to finalizing her transplant candidacy:     69 yo with PKD s/p bilat nephrectomy earlier this year  Doing well, some diminished activity and weight loss but is now picking up her activity   strength is good  While she appears frail, I feels she is a reasonable candidate based on my previous encounters with her  SHe has long waiting time and will get a kidney fairly quickly.     Risks of the surgical procedure including but not limited to the rare risk of mortality discussed in detail. Patient verbalized good understanding and had several pertinent questions which were answered satisfactorily.     Immunosuppressive regimen, management and long term risks discussed in detail.         Transplant order: Ms. Tipton has End stage renal failure due to polycystic kidney disease (PKD) whose condition is not expected to resolve, is expected to progress, and is expected to continue to develop related comorbid conditions.  Recommend he be considered as a candidate for kidney.  Cardiology consult for cardiac risk stratification to be ordered: Yes  CT abdomen and pelvis without contrast to be ordered for assessment of vascular targets: Yes  Transplant listing labs ordered to include HLA, ABOx2, Cr, etc.  Dietician consult ordered: Yes  Social work consult ordered: Yes  Imaging reports reviewed:  yes  Radiology images reviewed:no  Recipient suitable to move forward with work up of living donors:  Yes      The majority of our visit was spent in counselling, discussing the medical and surgical  risks of kidney transplantation. We discussed approximate wait time and how that is influenced by issues such as blood type and sensitization (PRA) and access to a living donor. I contrasted potential waiting time for living vs  donor kidneys from  normal (0-85%) or higher (%) kidney donor profile index (KDPI) donors and their associated outcomes. I would not recommend this individual to consider kidneys from high KDPI donors. The reason for this decision is best summarized as:  prolonged wait time for blood type . Potential surgical complications of kidney transplantation include bleeding, superficial or deep wound complications (infection, hernia, lymphocele), ureteral anastomotic failure (leak or stenosis), graft thrombosis, need for reoperation and other issues such as cardiac complications, pneumonia, deep venous thrombosis, pulmonary embolism, post transplant diabetes and death. The potential for recurrent disease or need for retransplantation was also addressed. We discussed the possible need for ureteral stent (and subsequent removal), and the utility of protocol biopsy and laboratory studies to evaluate for rejection or recurrent disease. We discussed the risk of graft rejection, our center's average graft and patient survival rates, immunosuppression protocols, as well as the potential opportunity to participate in clinical trials.  We also discussed the average length of stay, recovery process, and posttransplant lab and monitoring protocol.  I emphasized the need for strict immunosuppression medication adherence and the potential for complications of immunosuppression such as skin cancer or lymphoma, as well as a very low but not zero risk of donor-derived disease transmission risks (infection, cancer). Ms. Tipton asked good questions and her candidacy will be reviewed at our Multidisciplinary Selection Committee. Thank you for the opportunity to participate in MsNila Danuta's  care.      Total time: 45 minutes  Counselling time: 25 minutes    .  Gena Sanders in Immunology and Transplantation  Surgical Director, Kidney & Pancreas Transplant Programs  Medical Director, Solid Organ Transplant Unit    ---------------------------------------------------------------------------------------------------    HPI: Ms. Tipton has End stage renal failure due to polycystic kidney disease (PKD). The patient is non-diabetic.       The patient is on dialysis.    Has potential kidney donors:  No.  Interested in participation in paired exchange if a donor is willing: No    The patient has the following pertinent history:       No    Yes  Dialysis:    []      [] via:       Blood Transfusion                  []      []  Number of units:   Most recently:  Pregnancy:    []      [] Number:       Previous Transplant:  []      [] Details:    Cancer    []      [] Comment:   Kidney stones   []      [] Comment:      Recurrent infections  []      []  Type:                  Bladder dysfunction  []      [] Cause:    Claudication   []      [] Distance:    Previous Amputation  []      [] Cause:     Chronic anticoagulation  []      [] Indication:   [facility]  []      []      Past Medical History:   Diagnosis Date    Anemia in chronic kidney disease     Anxiety     Arthritis     Brain aneurysm     Cavernous segment of the right & left carotid arteries. See Neurosurg note 6/16/21.    Chronic low back pain     Managed by Pain Clinic    Depressive disorder 2013    Disease of thyroid gland     ESRD (end stage renal disease) on dialysis (H) 07/2020    GERD (gastroesophageal reflux disease)     Hepatic lesion     Hyperlipidemia     Hypertension     Nephrolithiasis     Neuromuscular disorder (H)     Osteoporosis     PKD (polycystic kidney disease) 09/01/1990    Primary generalized (osteo)arthritis 8/2/2023    PTSD (post-traumatic stress disorder)     Tobacco abuse     Vitamin D deficiency      Past  Surgical History:   Procedure Laterality Date    BACK SURGERY      spinal fusion w/hardware    BIOPSY Left 09/01/1990    Breast    BREAST LUMPECTOMY, RT/LT Left     Lumpectomy    CYST REMOVAL Right     rt wrist    CYSTECTOMY PILONIDAL  1981    EYE SURGERY  2008    lasik    FORMATION ARTERIOVENOUS FISTULA    07/28/2020    FRACTURE SURGERY      NEPHRECTOMY BILATERAL Bilateral 2/1/2023    Procedure: bilateral native nephrectomy;  Surgeon: Alana Giang MD;  Location: [facility] OR    ORTHOPEDIC SURGERY Right 2005    Shoulder    OTHER SURGICAL HISTORY      carpal tunnel repair    SPINE SURGERY       Family History   Problem Relation Age of Onset    Polycystic Kidney Diease Mother     Hypertension Mother     Kidney Disease Mother     Cerebrovascular Disease Mother     Chronic Obstructive Pulmonary Disease Father     Multiple Sclerosis Father     Polycystic Kidney Diease Sister     Cardiac Sudden Death Sister 52    Hypertension Sister     Kidney Disease Sister     Polycystic Kidney Diease Maternal Grandmother     Hypertension Maternal Grandmother     Kidney Disease Maternal Grandmother     Cerebrovascular Disease Maternal Grandmother     Heart Disease Maternal Grandfather     Hyperlipidemia Paternal Grandmother     Cerebrovascular Disease Paternal Grandmother     Coronary Artery Disease Paternal Grandfather     Pulmonary Embolism Paternal Grandfather     Anesthesia Reaction No family hx of      Social History     Socioeconomic History    Marital status: Single     Spouse name: Not on file    Number of children: Not on file    Years of education: Not on file    Highest education level: Not on file   Occupational History    Not on file   Tobacco Use    Smoking status: Unknown    Smokeless tobacco: Never   Substance and Sexual Activity    Alcohol use: Yes     Comment: occasional    Drug use: Yes     Types: Marijuana    Sexual activity: Not on file   Other Topics Concern    Parent/sibling w/ CABG, MI or angioplasty before 65F  55M? Not Asked   Social History Narrative    Not on file     Social Determinants of Health     Financial Resource Strain: Not on file   Food Insecurity: Not on file   Transportation Needs: Not on file   Physical Activity: Not on file   Stress: Not on file   Social Connections: Not on file   Interpersonal Safety: Not on file   Housing Stability: Not on file       ROS:   CONSTITUTIONAL:  No fevers or chills  EYES: negative for icterus  ENT:  negative for hearing loss, tinnitus and sore throat  RESPIRATORY:  negative for cough, sputum, dyspnea  CARDIOVASCULAR:  negative for chest pain Fatigue  GASTROINTESTINAL:  negative for nausea, vomiting, diarrhea or constipation  GENITOURINARY:  negative for incontinence, dysuria, bladder emptying problems  HEME:  No easy bruising  INTEGUMENT:  negative for rash and pruritus  NEURO:  Negative for headache, seizure disorder  Allergies:   Allergies   Allergen Reactions    Penicillins Other (See Comments) and Shortness Of Breath     Breathing problem.  breathing      Carvedilol GI Disturbance     Nausea and vomiting    Ciprofloxacin Muscle Pain (Myalgia)    Floxacillin (Flucloxacillin)      Other Reaction(s): rash, muscle and joint pain    Morphine Other (See Comments)     Vomiting    No Clinical Screening - See Comments      PN: ISELA CM1: Contrast Intercapsular - Nonionic Reaction :    Nsaids Other (See Comments)    Penicillin G Difficulty breathing    Sulfa Antibiotics Itching    Sulfamethoxazole-Trimethoprim      Other reaction(s): itching    Levofloxacin Muscle Pain (Myalgia) and Rash     Medications:  Prescription Medications as of 11/6/2023         Rx Number Disp Refills Start End Last Dispensed Date Next Fill Date Owning Pharmacy    B Complex-C (SUPER B COMPLEX PO)            Sig: Take 1 tablet by mouth At Bedtime    Class: Historical    Route: Oral    calcitRIOL (ROCALTROL) 0.5 MCG capsule            Sig: Take 0.5 mcg by mouth three times a week Given at Dialysis    Class:  Historical    Route: Oral    cholecalciferol (VITAMIN D3) 25 mcg (1000 units) capsule        [id number] - [place], [place] - [address] [place] [address]    Sig: Take 1 capsule by mouth At Bedtime    Class: Historical    Route: Oral    Epoetin Adam (EPOGEN IJ)            Sig: Inject 1,000 Units into the vein three times a week At dialysis.    Class: Historical    Route: Intravenous    Iron Sucrose (VENOFER IV)            Sig: Inject 100 mg into the vein once a week At dialysis    Class: Historical    Route: Intravenous    labetalol (NORMODYNE) 100 MG tablet  30 tablet 0 7/26/2023        Sig: Take 1 tablet (100 mg) by mouth At Bedtime    Class: Local Print    Route: Oral    labetalol (NORMODYNE) 100 MG tablet  30 tablet 0 7/26/2023        Sig: Take 1 tablet (100 mg) by mouth every morning    Class: Local Print    Route: Oral    lactulose (CHRONULAC) 10 GM/15ML solution  900 mL 0 7/26/2023    [id number] - [address] [facility]    Sig: Take 15 mLs (10 g) by mouth 2 times daily as needed for constipation    Class: E-Prescribe    Route: Oral    levothyroxine (SYNTHROID/LEVOTHROID) 50 MCG tablet    11/15/2022        Sig: Take 50 mcg by mouth At Bedtime Takes in the middle of the night.    Class: Historical    Route: Oral    LORazepam (ATIVAN) 1 MG tablet  30 tablet 0 7/26/2023        Sig: Take 1 tablet (1 mg) by mouth daily (before lunch)    Class: Local Print    Route: Oral    Renewals       Renewal requests to authorizing provider (Jarad Raymond MD) <b>prohibited</b>            nortriptyline (PAMELOR) 75 MG capsule    11/21/2022        Sig: Take 75 mg by mouth At Bedtime    Class: Historical    Route: Oral    omeprazole (PRILOSEC) 20 MG DR capsule            Sig: Take 20 mg by mouth daily    Class: Historical    Route: Oral    oxyCODONE IR (ROXICODONE) 10 MG tablet  30 tablet 0 7/26/2023        Sig: Take 1 tablet (10 mg) by mouth every 4 hours as needed  for pain Max of 5 doses per day.    Class: Local Print    Earliest Fill Date: 7/26/2023    Route: Oral    Renewals       Renewal requests to authorizing provider (Jarad Raymond MD) <b>prohibited</b>            rosuvastatin (CRESTOR) 10 MG tablet            Sig: Take 10 mg by mouth At Bedtime    Class: Historical    Route: Oral    SENNA-docusate sodium (SENNA S) 8.6-50 MG tablet  90 tablet 0 7/26/2023    [id number] [address]    Sig: Take 2 tablets by mouth 2 times daily Hold for loose stools.    Class: E-Prescribe    Route: Oral    Renewals       Renewal requests to authorizing provider (Jarad Raymond MD) <b>prohibited</b>            sertraline (ZOLOFT) 100 MG tablet            Sig: Take 150 mg by mouth At Bedtime    Class: Historical    Route: Oral    sevelamer carbonate (RENVELA) 800 MG tablet    7/1/2021        Sig: Take 800-2,400 mg by mouth 3 times daily When eating; range of 1-3 tabs depending on meal size    Class: Historical    Route: Oral          Exam:     BP (!) 179/98   Pulse 106   Wt 33.1 kg (73 lb)   BMI 14.92 kg/m    Appearance: in no apparent distress.   Skin: normal  Eyes:  no redness or discharge.  Sclera anicteric  Head and Neck: Normal, no rashes or jaundice  Respiratory: easy respirations, no audible wheezing.  Abdomen: flat, Surgical scars consistent with history     Psychiatric: Normal mood and affect    Diagnostics:   No results found for this or any previous visit (from the past 672 hour(s)).  OS cPRA   Date Value Ref Range Status   05/19/2021 39  Final

## 2023-11-06 NOTE — NURSING NOTE
Frailty Assessment Note: Not Frail    Overall Score 2/5    Weight:   Wt Readings from Last 3 Encounters:   11/06/23 33.4 kg (73 lb 9.6 oz)   10/20/23 34.5 kg (76 lb)   08/02/23 34.5 kg (76 lb)     Height: 149 cm    Weight lost over 10lbs in last year: Yes    Reported Exhaustion/Energy Levels: None    Slowness: 5.935 seconds / 15 feet walking (average of 2 attempts)    Low Activity Level Score: 0      Strength: 16.3 (average of 3 trials on dominant hand)      Daquan Kitchen RN on 11/6/2023 at 1:57 PM

## 2023-11-06 NOTE — LETTER
11/6/2023         RE: Serene Tipton  [address]  [place] [address]        Dear Colleague,    Thank you for referring your patient, Serene Tipton, to the [facility]. Please see a copy of my visit note below.    Transplant Surgery Consult Note     Medical record number: [id number]  YOB: 1955,   Consult requested by Aaliyah Rhoades for evaluation of kidney transplant candidacy.    Assessment and Recommendations:Ms. Tipton appears to be a fair candidate for kidney transplantation and has a good understanding of the risks and benefits of this approach to the management of renal failure. The following issues should be addressed prior to finalizing her transplant candidacy:     67 yo with PKD s/p bilat nephrectomy earlier this year  Doing well, some diminished activity and weight loss but is now picking up her activity   strength is good  While she appears frail, I feels she is a reasonable candidate based on my previous encounters with her  SHe has long waiting time and will get a kidney fairly quickly.     Risks of the surgical procedure including but not limited to the rare risk of mortality discussed in detail. Patient verbalized good understanding and had several pertinent questions which were answered satisfactorily.     Immunosuppressive regimen, management and long term risks discussed in detail.         Transplant order: Ms. Tipton has End stage renal failure due to polycystic kidney disease (PKD) whose condition is not expected to resolve, is expected to progress, and is expected to continue to develop related comorbid conditions.  Recommend he be considered as a candidate for kidney.  Cardiology consult for cardiac risk stratification to be ordered: Yes  CT abdomen and pelvis without contrast to be ordered for assessment of vascular targets: Yes  Transplant listing labs ordered to include HLA, ABOx2, Cr, etc.  Dietician consult ordered:  Yes  Social work consult ordered: Yes  Imaging reports reviewed:  yes  Radiology images reviewed:no  Recipient suitable to move forward with work up of living donors:  Yes      The majority of our visit was spent in counselling, discussing the medical and surgical risks of kidney transplantation. We discussed approximate wait time and how that is influenced by issues such as blood type and sensitization (PRA) and access to a living donor. I contrasted potential waiting time for living vs  donor kidneys from  normal (0-85%) or higher (%) kidney donor profile index (KDPI) donors and their associated outcomes. I would not recommend this individual to consider kidneys from high KDPI donors. The reason for this decision is best summarized as:  prolonged wait time for blood type . Potential surgical complications of kidney transplantation include bleeding, superficial or deep wound complications (infection, hernia, lymphocele), ureteral anastomotic failure (leak or stenosis), graft thrombosis, need for reoperation and other issues such as cardiac complications, pneumonia, deep venous thrombosis, pulmonary embolism, post transplant diabetes and death. The potential for recurrent disease or need for retransplantation was also addressed. We discussed the possible need for ureteral stent (and subsequent removal), and the utility of protocol biopsy and laboratory studies to evaluate for rejection or recurrent disease. We discussed the risk of graft rejection, our center's average graft and patient survival rates, immunosuppression protocols, as well as the potential opportunity to participate in clinical trials.  We also discussed the average length of stay, recovery process, and posttransplant lab and monitoring protocol.  I emphasized the need for strict immunosuppression medication adherence and the potential for complications of immunosuppression such as skin cancer or lymphoma, as well as a very low but not  zero risk of donor-derived disease transmission risks (infection, cancer). Ms. Tipton asked good questions and her candidacy will be reviewed at our Multidisciplinary Selection Committee. Thank you for the opportunity to participate in Ms. Tipton's care.      Total time: 45 minutes  Counselling time: 25 minutes    .  Gena Sanders in Immunology and Transplantation  Surgical Director, Kidney & Pancreas Transplant Programs  Medical Director, Solid Organ Transplant Unit    ---------------------------------------------------------------------------------------------------    HPI: Ms. Tipton has End stage renal failure due to polycystic kidney disease (PKD). The patient is non-diabetic.       The patient is on dialysis.    Has potential kidney donors:  No.  Interested in participation in paired exchange if a donor is willing: No    The patient has the following pertinent history:       No    Yes  Dialysis:    []      [] via:       Blood Transfusion                  []      []  Number of units:   Most recently:  Pregnancy:    []      [] Number:       Previous Transplant:  []      [] Details:    Cancer    []      [] Comment:   Kidney stones   []      [] Comment:      Recurrent infections  []      []  Type:                  Bladder dysfunction  []      [] Cause:    Claudication   []      [] Distance:    Previous Amputation  []      [] Cause:     Chronic anticoagulation  []      [] Indication:   Buddhist  []      []      Past Medical History:   Diagnosis Date    Anemia in chronic kidney disease     Anxiety     Arthritis     Brain aneurysm     Cavernous segment of the right & left carotid arteries. See Neurosurg note 6/16/21.    Chronic low back pain     Managed by Pain Clinic    Depressive disorder 2013    Disease of thyroid gland     ESRD (end stage renal disease) on dialysis (H) 07/2020    GERD (gastroesophageal reflux disease)     Hepatic lesion     Hyperlipidemia     Hypertension      Nephrolithiasis     Neuromuscular disorder (H)     Osteoporosis     PKD (polycystic kidney disease) 09/01/1990    Primary generalized (osteo)arthritis 8/2/2023    PTSD (post-traumatic stress disorder)     Tobacco abuse     Vitamin D deficiency      Past Surgical History:   Procedure Laterality Date    BACK SURGERY      spinal fusion w/hardware    BIOPSY Left 09/01/1990    Breast    BREAST LUMPECTOMY, RT/LT Left     Lumpectomy    CYST REMOVAL Right     rt wrist    CYSTECTOMY PILONIDAL  1981    EYE SURGERY  2008    lasik    FORMATION ARTERIOVENOUS FISTULA    07/28/2020    FRACTURE SURGERY      NEPHRECTOMY BILATERAL Bilateral 2/1/2023    Procedure: bilateral native nephrectomy;  Surgeon: Alana Giang MD;  Location: [facility] OR    ORTHOPEDIC SURGERY Right 2005    Shoulder    OTHER SURGICAL HISTORY      carpal tunnel repair    SPINE SURGERY       Family History   Problem Relation Age of Onset    Polycystic Kidney Diease Mother     Hypertension Mother     Kidney Disease Mother     Cerebrovascular Disease Mother     Chronic Obstructive Pulmonary Disease Father     Multiple Sclerosis Father     Polycystic Kidney Diease Sister     Cardiac Sudden Death Sister 52    Hypertension Sister     Kidney Disease Sister     Polycystic Kidney Diease Maternal Grandmother     Hypertension Maternal Grandmother     Kidney Disease Maternal Grandmother     Cerebrovascular Disease Maternal Grandmother     Heart Disease Maternal Grandfather     Hyperlipidemia Paternal Grandmother     Cerebrovascular Disease Paternal Grandmother     Coronary Artery Disease Paternal Grandfather     Pulmonary Embolism Paternal Grandfather     Anesthesia Reaction No family hx of      Social History     Socioeconomic History    Marital status: Single     Spouse name: Not on file    Number of children: Not on file    Years of education: Not on file    Highest education level: Not on file   Occupational History    Not on file   Tobacco Use    Smoking status: Unknown     Smokeless tobacco: Never   Substance and Sexual Activity    Alcohol use: Yes     Comment: occasional    Drug use: Yes     Types: Marijuana    Sexual activity: Not on file   Other Topics Concern    Parent/sibling w/ CABG, MI or angioplasty before 65F 55M? Not Asked   Social History Narrative    Not on file     Social Determinants of Health     Financial Resource Strain: Not on file   Food Insecurity: Not on file   Transportation Needs: Not on file   Physical Activity: Not on file   Stress: Not on file   Social Connections: Not on file   Interpersonal Safety: Not on file   Housing Stability: Not on file       ROS:   CONSTITUTIONAL:  No fevers or chills  EYES: negative for icterus  ENT:  negative for hearing loss, tinnitus and sore throat  RESPIRATORY:  negative for cough, sputum, dyspnea  CARDIOVASCULAR:  negative for chest pain Fatigue  GASTROINTESTINAL:  negative for nausea, vomiting, diarrhea or constipation  GENITOURINARY:  negative for incontinence, dysuria, bladder emptying problems  HEME:  No easy bruising  INTEGUMENT:  negative for rash and pruritus  NEURO:  Negative for headache, seizure disorder  Allergies:   Allergies   Allergen Reactions    Penicillins Other (See Comments) and Shortness Of Breath     Breathing problem.  breathing      Carvedilol GI Disturbance     Nausea and vomiting    Ciprofloxacin Muscle Pain (Myalgia)    Floxacillin (Flucloxacillin)      Other Reaction(s): rash, muscle and joint pain    Morphine Other (See Comments)     Vomiting    No Clinical Screening - See Comments      PN: LW CM1: Contrast Intercapsular - Nonionic Reaction :    Nsaids Other (See Comments)    Penicillin G Difficulty breathing    Sulfa Antibiotics Itching    Sulfamethoxazole-Trimethoprim      Other reaction(s): itching    Levofloxacin Muscle Pain (Myalgia) and Rash     Medications:  Prescription Medications as of 11/6/2023         Rx Number Disp Refills Start End Last Dispensed Date Next Fill Date Owning Pharmacy     B Complex-C (SUPER B COMPLEX PO)            Sig: Take 1 tablet by mouth At Bedtime    Class: Historical    Route: Oral    calcitRIOL (ROCALTROL) 0.5 MCG capsule            Sig: Take 0.5 mcg by mouth three times a week Given at Dialysis    Class: Historical    Route: Oral    cholecalciferol (VITAMIN D3) 25 mcg (1000 units) capsule        [id number] - [place], [place] - [address] [place] [address]    Sig: Take 1 capsule by mouth At Bedtime    Class: Historical    Route: Oral    Epoetin Aadm (EPOGEN IJ)            Sig: Inject 1,000 Units into the vein three times a week At dialysis.    Class: Historical    Route: Intravenous    Iron Sucrose (VENOFER IV)            Sig: Inject 100 mg into the vein once a week At dialysis    Class: Historical    Route: Intravenous    labetalol (NORMODYNE) 100 MG tablet  30 tablet 0 7/26/2023        Sig: Take 1 tablet (100 mg) by mouth At Bedtime    Class: Local Print    Route: Oral    labetalol (NORMODYNE) 100 MG tablet  30 tablet 0 7/26/2023        Sig: Take 1 tablet (100 mg) by mouth every morning    Class: Local Print    Route: Oral    lactulose (CHRONULAC) 10 GM/15ML solution  900 mL 0 7/26/2023    [id number] - [address] AT [facility]    Sig: Take 15 mLs (10 g) by mouth 2 times daily as needed for constipation    Class: E-Prescribe    Route: Oral    levothyroxine (SYNTHROID/LEVOTHROID) 50 MCG tablet    11/15/2022        Sig: Take 50 mcg by mouth At Bedtime Takes in the middle of the night.    Class: Historical    Route: Oral    LORazepam (ATIVAN) 1 MG tablet  30 tablet 0 7/26/2023        Sig: Take 1 tablet (1 mg) by mouth daily (before lunch)    Class: Local Print    Route: Oral    Renewals       Renewal requests to authorizing provider (Jarad Raymond MD) <b>prohibited</b>            nortriptyline (PAMELOR) 75 MG capsule    11/21/2022        Sig: Take 75 mg by mouth At Bedtime    Class: Historical    Route: Oral     omeprazole (PRILOSEC) 20 MG DR capsule            Sig: Take 20 mg by mouth daily    Class: Historical    Route: Oral    oxyCODONE IR (ROXICODONE) 10 MG tablet  30 tablet 0 7/26/2023        Sig: Take 1 tablet (10 mg) by mouth every 4 hours as needed for pain Max of 5 doses per day.    Class: Local Print    Earliest Fill Date: 7/26/2023    Route: Oral    Renewals       Renewal requests to authorizing provider (Jarad Raymond MD) <b>prohibited</b>            rosuvastatin (CRESTOR) 10 MG tablet            Sig: Take 10 mg by mouth At Bedtime    Class: Historical    Route: Oral    SENNA-docusate sodium (SENNA S) 8.6-50 MG tablet  90 tablet 0 7/26/2023    [id number] [place], [place] - [address] [place] [address]    Sig: Take 2 tablets by mouth 2 times daily Hold for loose stools.    Class: E-Prescribe    Route: Oral    Renewals       Renewal requests to authorizing provider (Jarad Raymond MD) <b>prohibited</b>            sertraline (ZOLOFT) 100 MG tablet            Sig: Take 150 mg by mouth At Bedtime    Class: Historical    Route: Oral    sevelamer carbonate (RENVELA) 800 MG tablet    7/1/2021        Sig: Take 800-2,400 mg by mouth 3 times daily When eating; range of 1-3 tabs depending on meal size    Class: Historical    Route: Oral          Exam:     BP (!) 179/98   Pulse 106   Wt 33.1 kg (73 lb)   BMI 14.92 kg/m    Appearance: in no apparent distress.   Skin: normal  Eyes:  no redness or discharge.  Sclera anicteric  Head and Neck: Normal, no rashes or jaundice  Respiratory: easy respirations, no audible wheezing.  Abdomen: flat, Surgical scars consistent with history     Psychiatric: Normal mood and affect    Diagnostics:   No results found for this or any previous visit (from the past 672 hour(s)).  [facility] cPRA   Date Value Ref Range Status   05/19/2021 39  Final

## 2023-11-08 NOTE — TELEPHONE ENCOUNTER
Patient Call: General  Route to LPN    Reason for call: Estrellita MCKEON at [facility] looking for an update, would like to know if there's anything she can help with.    Call back needed? Yes    Return Call Needed  Same as documented in contacts section  When to return call?: Same day: Route High Priority

## 2023-11-09 PROBLEM — S72.002A HIP FRACTURE, LEFT, CLOSED, INITIAL ENCOUNTER (H): Status: ACTIVE | Noted: 2023-01-01

## 2023-11-09 PROBLEM — S92.301A CLOSED NONDISPLACED FRACTURE OF METATARSAL BONE OF RIGHT FOOT, UNSPECIFIED METATARSAL, INITIAL ENCOUNTER: Status: ACTIVE | Noted: 2023-01-01

## 2023-11-09 NOTE — TELEPHONE ENCOUNTER
Returning call to dialysis social worker Estrellita who was looking for an update on [facility]. Let her know that the patient will be going to Selection Committee next week to confirm active listing status.

## 2023-11-09 NOTE — ED PROVIDER NOTES
EMERGENCY DEPARTMENT ENCOUNTER     NAME: Serene Tipton   AGE: 68 year old female   YOB: 1955   MRN: [id number]   EVALUATION DATE & TIME: 11/9/2023  2:15 PM   PCP: Ramos Lowry     Chief Complaint   Patient presents with    Fall    Leg Pain   :    FINAL IMPRESSION       1. Hip fracture, left, closed, initial encounter (H)    2. Closed nondisplaced fracture of metatarsal bone of right foot, unspecified metatarsal, initial encounter           ED COURSE & MEDICAL DECISION MAKING      Pertinent Labs & Imaging studies reviewed. (See chart for details)   68 year old female  presents to the Emergency Department for evaluation of a mechanical fall that a ago and she was able to walk without difficulty.  Then today she states she was walking and heard a snap in her left groin area and has not been able to walk since. Initial Vitals Reviewed. Initial exam notable for patient who is thin and appears older than stated age.  She is dialysis dependent and missed dialysis today to come into the ED, but I did get a EKG and BMP and she has no hyperkalemia or signs that she requires emergent dialysis tonight.  In terms of her injuries, she did hurt her right foot the other day and I added an x-ray of this as well as her ribs on the left which are the 2 things she was complaining of from the fall the other day.  She does have a fracture of her right midfoot and I placed this in a walking boot for stabilization currently.  Her groin area and pelvis is tender to palpation but without deformity.  X-ray of the hip was equivocal for potential subcapital fracture and this was confirmed with a CT.  I have discussed the case with orthopedics and they would like to proceed with operative management tomorrow if the patient can be medically cleared.  She is requiring some fentanyl for pain control and is quite sedated by this.  I have discussed the case with hospitalist who excepted the patient for admission.           At  the conclusion of the encounter I discussed the results of all of the tests and the disposition. The questions were answered. The patient or family acknowledged understanding and was agreeable with the care plan.     0 minutes critical care time, see procedure note below for details if relevant    2:32 PM Met with and introduced myself to the patient. Discussed history and plan of care.   7:27 PM I paged the hospitalist.     Medical Decision Making    History:  Supplemental history from: Documented in chart, if applicable, EMS  External Record(s) reviewed: Documented in chart, if applicable.,  Office visit for pretransplant evaluation for kidney transplant 11/6/2023, but has not had a transplant    Work Up:  Chart documentation includes differential considered and any EKGs or imaging independently interpreted by provider, where specified.  In additional to work up documented, I considered the following work up: Documented in chart, if applicable.    External consultation:  Discussion of management with another provider: Hospitalist and Orthopedics    Complicating factors:  Care impacted by chronic illness: Chronic Kidney Disease, Hyperlipidemia, Hypertension, and Other: Hypothyroidism, hypoglycemia, benign neoplasm of ascending colon, benign neoplasm of cecum, and polyp of colon.   Care affected by social determinants of health: access to care    Disposition considerations: Admit.        MEDICATIONS GIVEN IN THE EMERGENCY:   Medications   oxyCODONE (ROXICODONE) tablet 5 mg (5 mg Oral $Given 11/9/23 1643)   cyclobenzaprine (FLEXERIL) tablet 10 mg (10 mg Oral $Given 11/9/23 1643)   fentaNYL (PF) (SUBLIMAZE) injection 50 mcg (50 mcg Intravenous $Given 11/9/23 1847)      NEW PRESCRIPTIONS STARTED AT TODAY'S ER VISIT   New Prescriptions    No medications on file     ================================================================   HISTORY OF PRESENT ILLNESS       Patient information was obtained from: Patient   Use of  Intrepreter:    Serene Tipton is a 68 year old female with history of benign neoplasm of ascending colon, benign neoplasm of cecum, polyp of colon, chronic kidney disease, hyperlipidemia, hypertension, hypoglycemia, and hypothyroidism who presents to the ED by EMS for evaluation of a fall and leg pain.     The patient reports that on Tuesday she fell down on her left side at her dialysis center. She explains that she believes she pulled a muscle on her right foot and tore a muscle on her left foot. Her ribs and left kneecap are in pain as well. Since then she was able to bear the pain, up until last night she heard a snap near her left pelvis and was unable to bear the pain.     ================================================================        PAST HISTORY     PAST MEDICAL HISTORY:   Past Medical History:   Diagnosis Date    Anemia in chronic kidney disease     Anxiety     Arthritis     Brain aneurysm     Cavernous segment of the right & left carotid arteries. See Neurosurg note 6/16/21.    Chronic low back pain     Managed by Pain Clinic    Depressive disorder 2013    Disease of thyroid gland     ESRD (end stage renal disease) on dialysis (H) 07/2020    GERD (gastroesophageal reflux disease)     Hepatic lesion     Hyperlipidemia     Hypertension     Nephrolithiasis     Neuromuscular disorder (H)     Osteoporosis     PKD (polycystic kidney disease) 09/01/1990    Primary generalized (osteo)arthritis 8/2/2023    PTSD (post-traumatic stress disorder)     Tobacco abuse     Vitamin D deficiency       PAST SURGICAL HISTORY:   Past Surgical History:   Procedure Laterality Date    BACK SURGERY      spinal fusion w/hardware    BIOPSY Left 09/01/1990    Breast    BREAST LUMPECTOMY, RT/LT Left     Lumpectomy    CYST REMOVAL Right     rt wrist    CYSTECTOMY PILONIDAL  1981    EYE SURGERY  2008    lasik    FORMATION ARTERIOVENOUS FISTULA    07/28/2020    FRACTURE SURGERY      NEPHRECTOMY BILATERAL Bilateral 2/1/2023     Procedure: bilateral native nephrectomy;  Surgeon: Alana Giang MD;  Location: [facility] OR    ORTHOPEDIC SURGERY Right 2005    Shoulder    OTHER SURGICAL HISTORY      carpal tunnel repair    SPINE SURGERY        CURRENT MEDICATIONS:   artificial saliva (BIOTENE MT) SOLN solution  B Complex-C (SUPER B COMPLEX PO)  calcitRIOL (ROCALTROL) 0.5 MCG capsule  cholecalciferol (VITAMIN D3) 25 mcg (1000 units) capsule  cyclobenzaprine (FLEXERIL) 5 MG tablet  Epoetin Adam (EPOGEN IJ)  glucosamine-chondroitin 500-400 MG CAPS per capsule  Iron Sucrose (VENOFER IV)  labetalol (NORMODYNE) 100 MG tablet  lactulose (CHRONULAC) 10 GM/15ML solution  levothyroxine (SYNTHROID/LEVOTHROID) 50 MCG tablet  LORazepam (ATIVAN) 1 MG tablet  nortriptyline (PAMELOR) 75 MG capsule  omeprazole (PRILOSEC) 20 MG DR capsule  oxyCODONE IR (ROXICODONE) 10 MG tablet  rosuvastatin (CRESTOR) 10 MG tablet  SENNA-docusate sodium (SENNA S) 8.6-50 MG tablet  sertraline (ZOLOFT) 100 MG tablet  sevelamer carbonate (RENVELA) 800 MG tablet      ALLERGIES:   Allergies   Allergen Reactions    Penicillins Other (See Comments) and Shortness Of Breath     Breathing problem.  breathing      Carvedilol GI Disturbance     Nausea and vomiting    Ciprofloxacin Muscle Pain (Myalgia)    Floxacillin (Flucloxacillin)      Other Reaction(s): rash, muscle and joint pain    Morphine Other (See Comments)     Vomiting    No Clinical Screening - See Comments      PN: LW CM1: Contrast Intercapsular - Nonionic Reaction :    Nsaids Other (See Comments)     Kidney damage    Penicillin G Difficulty breathing    Sulfa Antibiotics Itching    Sulfamethoxazole-Trimethoprim      Other reaction(s): itching    Levofloxacin Muscle Pain (Myalgia) and Rash      FAMILY HISTORY:   Family History   Problem Relation Age of Onset    Polycystic Kidney Diease Mother     Hypertension Mother     Kidney Disease Mother     Cerebrovascular Disease Mother     Chronic Obstructive Pulmonary Disease Father      "Multiple Sclerosis Father     Polycystic Kidney Diease Sister     Cardiac Sudden Death Sister 52    Hypertension Sister     Kidney Disease Sister     Polycystic Kidney Diease Maternal Grandmother     Hypertension Maternal Grandmother     Kidney Disease Maternal Grandmother     Cerebrovascular Disease Maternal Grandmother     Heart Disease Maternal Grandfather     Hyperlipidemia Paternal Grandmother     Cerebrovascular Disease Paternal Grandmother     Coronary Artery Disease Paternal Grandfather     Pulmonary Embolism Paternal Grandfather     Anesthesia Reaction No family hx of       SOCIAL HISTORY:   Social History     Socioeconomic History    Marital status: Single   Tobacco Use    Smoking status: Unknown    Smokeless tobacco: Never   Substance and Sexual Activity    Alcohol use: Yes     Comment: occasional    Drug use: Yes     Types: Marijuana        VITALS  Patient Vitals for the past 24 hrs:   BP Temp Temp src Pulse Resp SpO2 Height Weight   11/09/23 1900 -- -- -- 96 -- 94 % -- --   11/09/23 1730 (!) 159/78 -- -- 91 -- 96 % -- --   11/09/23 1700 (!) 181/91 -- -- -- -- -- -- --   11/09/23 1425 (!) 155/83 97.9  F (36.6  C) Temporal 99 18 94 % 1.499 m (4' 11\") 36.3 kg (80 lb)        ================================================================    PHYSICAL EXAM     VITAL SIGNS: BP (!) 159/78   Pulse 96   Temp 97.9  F (36.6  C) (Temporal)   Resp 18   Ht 1.499 m (4' 11\")   Wt 36.3 kg (80 lb)   SpO2 94%   BMI 16.16 kg/m     Constitutional:  Awake, no acute distress   HENT:  Atraumatic, oropharynx without exudate or erythema, membranes moist  Lymph:  No adenopathy  Eyes: EOM intact, PERRL, no injection  Neck: Supple  Respiratory:  Clear to auscultation bilaterally, no wheezes or crackles   Cardiovascular:  Regular rate and rhythm, single S1 and S2   GI:  Soft, nontender, nondistended, no rebound or guarding   Musculoskeletal:  Moves all extremities, no lower extremity edema, no deformities Tender to palpation " over the left anterior pelvis, left ribcage, and right foot.    Skin:  Warm, dry. Abrasion to left knee.   Neurologic:  Alert and oriented x3, no focal deficits noted       ================================================================  LAB       All pertinent labs reviewed and interpreted.   Labs Ordered and Resulted from Time of ED Arrival to Time of ED Departure   BASIC METABOLIC PANEL - Abnormal       Result Value    Sodium 132 (*)     Potassium 4.8      Chloride 91 (*)     Carbon Dioxide (CO2) 26      Anion Gap 15      Urea Nitrogen 43.9 (*)     Creatinine 6.86 (*)     GFR Estimate 6 (*)     Calcium 9.1      Glucose 92          ===============================================================  RADIOLOGY       Reviewed all pertinent imaging. Please see official radiology report.   CT Hip Left w/o Contrast   Final Result   IMPRESSION:   1.  Acute mildly displaced subcapital fracture of the left femur.         XR Knee Left 3 Views   Final Result   IMPRESSION: Prepatellar soft tissue swelling. Normal joint alignment and spacing. No fracture or concerning bone lesion.      XR Pelvis and Hip Left 2 Views   Final Result   IMPRESSION: Subtle area of linear lucency in the subcapital region of the left femur suggests an acute nondisplaced fracture. CT would be helpful in further evaluation. Osteopenia. Status post lumbosacral spinal fusion.      Ribs XR, unilat 3 views + PA chest,  left   Final Result   IMPRESSION: No hydropneumothorax. Lungs are clear. Heart is magnified due to projection. No signs of failure.       Left rib detail films obtained without a marker. No acute fractures underlying the marker.       There is an old fracture deformity of the left posterolateral sixth rib and likely the seventh. These are new since July.      Postsurgical changes seen in the upper abdomen  right shoulder with clips.      Foot  XR, G/E 3 views, right   Final Result   IMPRESSION: Acute transverse nondisplaced fracture of the  proximal portion of the fifth metatarsal shaft. No additional fractures. Moderate degenerative changes at the first MTP joint.            ================================================================  EKG     EKG reviewed interpreted by me shows sinus rhythm with rate of 97, normal axis, QTc 525 with no acute ST or T wave changes and some ongoing ST depression that appears similar since 21 July    I have independently reviewed and interpreted the EKG(s) documented above.     ================================================================  PROCEDURES         I, Nanette Coyneua, am serving as a scribe to document services personally performed by Dr. Castellanos based on my observation and the provider's statements to me. I, Sara Castellanos MD attest that Nanette Syed is acting in a scribe capacity, has observed my performance of the services and has documented them in accordance with my direction.   Sara Castellanos M.D.   Emergency Medicine   [facility] EMERGENCY ROOM  [address] [address]  [phone]  Dept: [phone]        Sara Castellanos MD  11/09/23 2011

## 2023-11-09 NOTE — ED TRIAGE NOTES
"Pt arrives via EMS from home. C/o fall Tuesday (11/7) at her dialysis center. States she had L hip/leg pain at that time but was able to bear weight. Pt had additional \"stumble\" last night (11/8) at home and heard and felt a \"crack\" in the L groin area at that time. Has been unable to bear weight since then. Also c/o pain in R second toe. States she is missing dialysis today d/t being in the ER and requests that she receive dialysis here.     Triage Assessment (Adult)       Row Name 11/09/23 1421          Triage Assessment    Airway WDL WDL        Respiratory WDL    Respiratory WDL WDL        Skin Circulation/Temperature WDL    Skin Circulation/Temperature WDL WDL        Cardiac WDL    Cardiac WDL WDL        Peripheral/Neurovascular WDL    Peripheral Neurovascular WDL WDL        Cognitive/Neuro/Behavioral WDL    Cognitive/Neuro/Behavioral WDL WDL                     "

## 2023-11-10 NOTE — CONSULTS
RENAL CONSULT NOTE    REQUESTING PHYSICIAN: MD Laisha    REASON FOR CONSULT: ESRD management    PLAN:  Dialysis this a.m. off schedule pre-op, then resume tomorrow on schedule TTS while admitted.   Hold Labetalol this a.m., pending UF with dialysis    ASSESSMENT  ESRD  on [facility], [facility] ([facility])  Runs on a TTS schedule.   ACCESS: LUE AVF.    EDW 37.5kg  HD Today  Anuric 2/2  s/p bilateral nephrectomy for PKD.     .      Anemia of chronic disease:   Receives PETE and parenteral iron with outpatient dialysis. Hemoglobin 11s.      Chronic Hyponatremia:   Mild, Secondary to ESRD.  UF with HD.     Secondary renal hyperparathyroidism:   On Sevelamer      HTN:    labetalol. BP escalated in the setting of volume and missed HD tx, UF today as able and continue BB     Hip fx:  Traumatic after fall, surg scheduled 11/10.      hypothyroidism   on replacement     HLD on statin        HPI: Serene Tipton is a 68 year old female with ESRD, admitted for hip fx after fall.  She is followed by our service (Dr Armstrong) at the [facility], for TTS intermittent hemodialysis.  Has L AVF.   She did miss her scheduled tx on Thursday d/t fall and subsequent admission.   Plan is for hip surg today with ortho.   Lytes are reasonable, she is chronically mildly hyponatremic.  K controlled.  Not overtly volume overloaded, but quite HTNive (pain and intrasvasc volume).  Will challenge UF today as able.  Asked RN to hold a.m. labetalol to allow for more UF.  Pt is anuric,, typically required 3-3.5L UF on dialysis   Hip is  painful, also c/o bilat shoulder pain and R foot pain.     REVIEW OF SYSTEMS:  See HPI, no neuro complaints.  No n/v/d/c.       Past Medical History:   Diagnosis Date    Anemia in chronic kidney disease     Anxiety     Arthritis     Brain aneurysm     Cavernous segment of the right & left carotid arteries. See Neurosurg note 6/16/21.    Chronic low back pain     Managed by Pain Clinic    Depressive disorder 2013     Disease of thyroid gland     ESRD (end stage renal disease) on dialysis (H) 07/2020    GERD (gastroesophageal reflux disease)     Hepatic lesion     Hyperlipidemia     Hypertension     Nephrolithiasis     Neuromuscular disorder (H)     Osteoporosis     PKD (polycystic kidney disease) 09/01/1990    Primary generalized (osteo)arthritis 8/2/2023    PTSD (post-traumatic stress disorder)     Tobacco abuse     Vitamin D deficiency        I have reviewed this patient's family history and updated it with pertinent information if needed.  Family History   Problem Relation Age of Onset    Polycystic Kidney Diease Mother     Hypertension Mother     Kidney Disease Mother     Cerebrovascular Disease Mother     Chronic Obstructive Pulmonary Disease Father     Multiple Sclerosis Father     Polycystic Kidney Diease Sister     Cardiac Sudden Death Sister 52    Hypertension Sister     Kidney Disease Sister     Polycystic Kidney Diease Maternal Grandmother     Hypertension Maternal Grandmother     Kidney Disease Maternal Grandmother     Cerebrovascular Disease Maternal Grandmother     Heart Disease Maternal Grandfather     Hyperlipidemia Paternal Grandmother     Cerebrovascular Disease Paternal Grandmother     Coronary Artery Disease Paternal Grandfather     Pulmonary Embolism Paternal Grandfather     Anesthesia Reaction No family hx of          Social History     Tobacco Use    Smoking status: Unknown    Smokeless tobacco: Never   Substance Use Topics    Alcohol use: Yes     Comment: occasional    Drug use: Yes     Types: Marijuana          No current facility-administered medications on file prior to encounter.  artificial saliva (BIOTENE MT) SOLN solution, Swish and spit 2 sprays in mouth as needed for dry mouth  B Complex-C (SUPER B COMPLEX PO), Take 1 tablet by mouth daily at 2 pm  calcitRIOL (ROCALTROL) 0.5 MCG capsule, Take 0.5 mcg by mouth three times a week Given at Dialysis  cholecalciferol (VITAMIN D3) 25 mcg (1000  units) capsule, Take 1 capsule by mouth daily at 2 pm  cyclobenzaprine (FLEXERIL) 5 MG tablet, Take 5 mg by mouth 2 times daily as needed for muscle spasms  Epoetin Adam (EPOGEN IJ), Inject 1,000 Units into the vein three times a week At dialysis.  glucosamine-chondroitin 500-400 MG CAPS per capsule, Take 2 capsules by mouth daily at 2 pm  Iron Sucrose (VENOFER IV), Inject 100 mg into the vein once a week At dialysis  labetalol (NORMODYNE) 100 MG tablet, Take 100 mg by mouth 2 times daily  lactulose (CHRONULAC) 10 GM/15ML solution, Take 15 mLs (10 g) by mouth 2 times daily as needed for constipation  levothyroxine (SYNTHROID/LEVOTHROID) 50 MCG tablet, Take 50 mcg by mouth At Bedtime Takes in the middle of the night.  LORazepam (ATIVAN) 1 MG tablet, Take 1 tablet (1 mg) by mouth daily (before lunch)  nortriptyline (PAMELOR) 75 MG capsule, Take 75 mg by mouth At Bedtime  omeprazole (PRILOSEC) 20 MG DR capsule, Take 20 mg by mouth daily  oxyCODONE IR (ROXICODONE) 10 MG tablet, Take 1 tablet (10 mg) by mouth every 4 hours as needed for pain Max of 5 doses per day.  rosuvastatin (CRESTOR) 10 MG tablet, Take 10 mg by mouth At Bedtime  SENNA-docusate sodium (SENNA S) 8.6-50 MG tablet, Take 2 tablets by mouth 2 times daily Hold for loose stools.  sertraline (ZOLOFT) 100 MG tablet, Take 150 mg by mouth At Bedtime  sevelamer carbonate (RENVELA) 800 MG tablet, Take 800-2,400 mg by mouth 3 times daily When eating; range of 1-3 tabs depending on meal size          ALLERGIES/SENSITIVITIES:  Allergies   Allergen Reactions    Penicillins Other (See Comments) and Shortness Of Breath     Breathing problem.  breathing      Carvedilol GI Disturbance     Nausea and vomiting    Ciprofloxacin Muscle Pain (Myalgia)    Floxacillin (Flucloxacillin)      Other Reaction(s): rash, muscle and joint pain    Morphine Other (See Comments)     Vomiting    No Clinical Screening - See Comments      PN: LW CM1: Contrast Intercapsular - Nonionic  "Reaction :    Nsaids Other (See Comments)     Kidney damage    Penicillin G Difficulty breathing    Sulfa Antibiotics Itching    Sulfamethoxazole-Trimethoprim      Other reaction(s): itching    Levofloxacin Muscle Pain (Myalgia) and Rash          PHYSICAL EXAM:  BP (!) 178/89   Pulse 101   Temp 98.2  F (36.8  C) (Oral)   Resp 17   Ht 1.499 m (4' 11\")   Wt 36.3 kg (80 lb)   SpO2 91%   BMI 16.16 kg/m      Patient Vitals for the past 72 hrs:   Weight   11/09/23 1425 36.3 kg (80 lb)     Body mass index is 16.16 kg/m .  No intake or output data in the 24 hours ending 11/10/23 0923      General - A & O x 3, NAD, seen in ED hallway D, then transferred to room ED 7.  Pt is pleasant and interactive  HEENT - PERRLA, no scleral icterus  Respiratory - Lungs CTA bilat without wheeze, rhonchi, rales, on RA  Cardiovascular - AP sl tachy SBP escalated 180s  Abdomen - soft, BS present, no bruits, no fluid wave  Extremities - R should ice packs in place, did not remove to examen, R foot in protective boot   Integumentary - intact, good turgor, no rash/lesions  Neurologic - grossly intact  Psych:  Judgement intact, affect WNL  :  anuric, anephric   No wt    Laboratory:  Recent Labs   Lab Test 11/09/23  2324 07/31/23  0703 07/14/23  0503 07/13/23  1239 07/11/23  1131   WBC 8.8 8.6   < > 13.0* 11.5*   HGB 11.0* 10.1*   < > 8.4* 9.0*   * 117*   < > 103* 103*    335   < > 320 288   INR  --   --   --  1.70* 1.40*    < > = values in this interval not displayed.      Recent Labs   Lab Test 11/10/23  0627 11/09/23  1505   POTASSIUM 5.0 4.8   CHLORIDE 91* 91*   BUN 48.5* 43.9*      Recent Labs   Lab Test 07/25/23  1802 07/21/23  1323 07/14/23  0503 07/13/23  1239 08/18/22  1731 05/19/21  1520   ALBUMIN 2.7* 2.4*   < > 2.2*   < >  --    BILITOTAL  --  0.7  --  1.1*   < >  --    ALT  --  40  --  28   < >  --    AST  --  38  --  31   < >  --    PROTEIN  --   --   --   --   --  100*    < > = values in this interval not " displayed.       Personally reviewed today's laboratory studies      Thank you for involving us in the care of this patient. We will continue to follow along with you.      Stephanie Gaviria, SIDDHARTHA-BC  [facility]  [phone]

## 2023-11-10 NOTE — H&P
"[facility] MEDICINE ADMISSION HISTORY AND PHYSICAL     Assessment & Plan       68 year old into [facility] after presenting to the ER for an injury  That occurred today while she was at dialysis.  The patient did not  Have her session.  She \"slipped\" injuring the left leg.      PMHx includes T,Th,Sat dialysis (post bilateral nephrectomy due to PKD),  HTN, chronic anemia, hypothyroidism,     ER diagnostics shows a left proximal femur (subcapital fracture) along with  A left foot fracture.  Electrolytes are normal though the creatinine is 6.8.    On my interview she is snoring.  Vitals are reviewed.  Per report she  Had fentanyl for pain.     Pt will be admitted for ORIF of the left hip likely tomorrow though  Will need AM electrolytes prior to operative clearance.        Problem list:    Left hip fracture: (closed, subcapital):  ortho consult, NPO   After midnight  right foot fracture: 5th metatarsal (proximal): pain control; ortho boot;  PT post op  ESRD with dialysis: T,Th,S: pt missed session today due to fall   Though electrolytes are stable; she is not volume overloaded,   Nor showing signs of uremia; recheck BMP in AM; nephrology   Consult; resume home binders and calcitriol    4.  Anemia, due to CKD: hemoglobin level pending  5.  Mood disorder:  zoloft, pamelor  6. GERD:  prilosec  7.  Dyslipidemia: crestor  8. Hypothryoidism:  synthroid  9.  HTN:  labetalol  10.  Prolonged Qtc: 525 on admission EKG; avoid high risk meds  10.  Dvt prevention:  scd          Chief Complaint  Left hip injury       Past Medical History     Past Medical History:  No date: Anemia in chronic kidney disease  No date: Anxiety  No date: Arthritis  No date: Brain aneurysm      Comment:  Cavernous segment of the right & left carotid arteries.                See Neurosurg note 6/16/21.  No date: Chronic low back pain      Comment:  [facility] by [facility]  2013: Depressive disorder  No date: Disease of thyroid " gland  07/2020: ESRD (end stage renal disease) on dialysis (H)  No date: GERD (gastroesophageal reflux disease)  No date: Hepatic lesion  No date: Hyperlipidemia  No date: Hypertension  No date: Nephrolithiasis  No date: Neuromuscular disorder (H)  No date: Osteoporosis  09/01/1990: PKD (polycystic kidney disease)  8/2/2023: Primary generalized (osteo)arthritis  No date: PTSD (post-traumatic stress disorder)  No date: Tobacco abuse  No date: Vitamin D deficiency     Surgical History     Past Surgical History:   Procedure Laterality Date    BACK SURGERY      spinal fusion w/hardware    BIOPSY Left 09/01/1990    Breast    BREAST LUMPECTOMY, RT/LT Left     Lumpectomy    CYST REMOVAL Right     rt wrist    CYSTECTOMY PILONIDAL  1981    EYE SURGERY  2008    lasik    FORMATION ARTERIOVENOUS FISTULA    07/28/2020    FRACTURE SURGERY      NEPHRECTOMY BILATERAL Bilateral 2/1/2023    Procedure: bilateral native nephrectomy;  Surgeon: Alana Giang MD;  Location: [facility] OR    ORTHOPEDIC SURGERY Right 2005    Shoulder    OTHER SURGICAL HISTORY      carpal tunnel repair    SPINE SURGERY       Family History    Reviewed, and   Family History   Problem Relation Age of Onset    Polycystic Kidney Diease Mother     Hypertension Mother     Kidney Disease Mother     Cerebrovascular Disease Mother     Chronic Obstructive Pulmonary Disease Father     Multiple Sclerosis Father     Polycystic Kidney Diease Sister     Cardiac Sudden Death Sister 52    Hypertension Sister     Kidney Disease Sister     Polycystic Kidney Diease Maternal Grandmother     Hypertension Maternal Grandmother     Kidney Disease Maternal Grandmother     Cerebrovascular Disease Maternal Grandmother     Heart Disease Maternal Grandfather     Hyperlipidemia Paternal Grandmother     Cerebrovascular Disease Paternal Grandmother     Coronary Artery Disease Paternal Grandfather     Pulmonary Embolism Paternal Grandfather     Anesthesia Reaction No family hx of         Social  History      Social History     Tobacco Use    Smoking status: Unknown    Smokeless tobacco: Never   Substance Use Topics    Alcohol use: Yes     Comment: occasional    Drug use: Yes     Types: Marijuana        Allergies     Allergies   Allergen Reactions    Penicillins Other (See Comments) and Shortness Of Breath     Breathing problem.  breathing      Carvedilol GI Disturbance     Nausea and vomiting    Ciprofloxacin Muscle Pain (Myalgia)    Floxacillin (Flucloxacillin)      Other Reaction(s): rash, muscle and joint pain    Morphine Other (See Comments)     Vomiting    No Clinical Screening - See Comments      PN: LW CM1: Contrast Intercapsular - Nonionic Reaction :    Nsaids Other (See Comments)     Kidney damage    Penicillin G Difficulty breathing    Sulfa Antibiotics Itching    Sulfamethoxazole-Trimethoprim      Other reaction(s): itching    Levofloxacin Muscle Pain (Myalgia) and Rash     Prior to Admission Medications      Prior to Admission Medications   Prescriptions Last Dose Informant Patient Reported? Taking?   B Complex-C (SUPER B COMPLEX PO) 11/7/2023  Yes Yes   Sig: Take 1 tablet by mouth daily at 2 pm   Epoetin Adam (EPOGEN IJ) Unknown  Yes Yes   Sig: Inject 1,000 Units into the vein three times a week At dialysis.   Iron Sucrose (VENOFER IV) Unknown  Yes Yes   Sig: Inject 100 mg into the vein once a week At dialysis   LORazepam (ATIVAN) 1 MG tablet 11/9/2023  No Yes   Sig: Take 1 tablet (1 mg) by mouth daily (before lunch)   SENNA-docusate sodium (SENNA S) 8.6-50 MG tablet 11/8/2023 at PM  No Yes   Sig: Take 2 tablets by mouth 2 times daily Hold for loose stools.   artificial saliva (BIOTENE MT) SOLN solution 11/9/2023 at Has with  Yes Yes   Sig: Swish and spit 2 sprays in mouth as needed for dry mouth   calcitRIOL (ROCALTROL) 0.5 MCG capsule Unknown  Yes Yes   Sig: Take 0.5 mcg by mouth three times a week Given at Dialysis   cholecalciferol (VITAMIN D3) 25 mcg (1000 units) capsule 11/9/2023 at  afternoon  Yes Yes   Sig: Take 1 capsule by mouth daily at 2 pm   cyclobenzaprine (FLEXERIL) 5 MG tablet 11/9/2023 at 0200  Yes Yes   Sig: Take 5 mg by mouth 2 times daily as needed for muscle spasms   glucosamine-chondroitin 500-400 MG CAPS per capsule 11/7/2023  Yes Yes   Sig: Take 2 capsules by mouth daily at 2 pm   labetalol (NORMODYNE) 100 MG tablet 11/9/2023 at AM  Yes Yes   Sig: Take 100 mg by mouth 2 times daily   lactulose (CHRONULAC) 10 GM/15ML solution 11/6/2023  No Yes   Sig: Take 15 mLs (10 g) by mouth 2 times daily as needed for constipation   levothyroxine (SYNTHROID/LEVOTHROID) 50 MCG tablet ~ 1 week ago, ran out  Yes Yes   Sig: Take 50 mcg by mouth At Bedtime Takes in the middle of the night.   nortriptyline (PAMELOR) 75 MG capsule 11/8/2023  Yes Yes   Sig: Take 75 mg by mouth At Bedtime   omeprazole (PRILOSEC) 20 MG DR capsule 11/9/2023 at AM  Yes Yes   Sig: Take 20 mg by mouth daily   oxyCODONE IR (ROXICODONE) 10 MG tablet 11/9/2023 at 0830  No Yes   Sig: Take 1 tablet (10 mg) by mouth every 4 hours as needed for pain Max of 5 doses per day.   rosuvastatin (CRESTOR) 10 MG tablet ~ 1 week ago  Yes Yes   Sig: Take 10 mg by mouth At Bedtime   sertraline (ZOLOFT) 100 MG tablet 11/8/2023 at PM  Yes Yes   Sig: Take 150 mg by mouth At Bedtime   sevelamer carbonate (RENVELA) 800 MG tablet 11/8/2023  Yes Yes   Sig: Take 800-2,400 mg by mouth 3 times daily When eating; range of 1-3 tabs depending on meal size      Facility-Administered Medications: None      Review of Systems     A 12 point comprehensive review of systems was negative except as noted above in HPI.    PHYSICAL EXAMINATION       Vitals      Temp:  [97.9  F (36.6  C)] 97.9  F (36.6  C)  Pulse:  [91-99] 93  Resp:  [18] 18  BP: (155-181)/(78-91) 162/81  SpO2:  [94 %-96 %] 96 %    Examination     GENERAL:  Sleeping; wakes to voice   HEAD:  Normocephalic, without obvious abnormality, atraumatic   EYES:  PERRL, conjunctiva/corneas clear, no scleral  icterus, EOM's intact   NOSE: Nares normal, septum midline, mucosa normal, no drainage   THROAT: Lips, mucosa, and tongue normal; teeth and gums normal, mouth moist   NECK: Supple, symmetrical, trachea midline   BACK:   Symmetric, no curvature, ROM normal   LUNGS:   Clear to auscultation bilaterally, no rales, rhonchi, or wheezing, symmetric chest rise on inhalation, respirations unlabored   CHEST WALL:  No tenderness or deformity   HEART:  Regular rate and rhythm, S1 and S2 normal, no murmur, rub, or gallop    ABDOMEN:   Soft, non-tender, bowel sounds active all four quadrants, no masses, no organomegaly, no rebound or guarding   EXTREMITIES: No edema; left hip with pain;    SKIN: Dry to touch, no exanthems in the visualized areas   NEURO: Alert, oriented x 4, moves all four extremities freely, non-focal   PSYCH: Cooperative, behavior is appropriate      Pertinent Lab         Pertinent Radiology     Radiology Results:   Recent Results (from the past 24 hour(s))   Foot  XR, G/E 3 views, right    Narrative    EXAM: XR FOOT RIGHT G/E 3 VIEWS  LOCATION: [facility]  DATE: 11/9/2023    INDICATION: fall, pain  COMPARISON: None.      Impression    IMPRESSION: Acute transverse nondisplaced fracture of the proximal portion of the fifth metatarsal shaft. No additional fractures. Moderate degenerative changes at the first MTP joint.   Ribs XR, unilat 3 views + PA chest,  left    Narrative    EXAM: XR RIBS AND CHEST LEFT 3 VIEWS  LOCATION: [facility]  DATE: 11/9/2023    INDICATION: fall, pain  COMPARISON: Chest x-ray 07/13/2023, chest CT 07/12/2023      Impression    IMPRESSION: No hydropneumothorax. Lungs are clear. Heart is magnified due to projection. No signs of failure.     Left rib detail films obtained without a marker. No acute fractures underlying the marker.     There is an old fracture deformity of the left posterolateral sixth rib and likely the seventh. These are  new since July.    Postsurgical changes seen in the upper abdomen  right shoulder with clips.   XR Pelvis and Hip Left 2 Views    Narrative    EXAM: XR PELVIS AND HIP LEFT 2 VIEWS  LOCATION: [facility]  DATE: 11/9/2023    INDICATION: Fall, pain.  COMPARISON: None.      Impression    IMPRESSION: Subtle area of linear lucency in the subcapital region of the left femur suggests an acute nondisplaced fracture. CT would be helpful in further evaluation. Osteopenia. Status post lumbosacral spinal fusion.   XR Knee Left 3 Views    Narrative    EXAM: XR KNEE LEFT 3 VIEWS  LOCATION: [facility]  DATE: 11/9/2023    INDICATION: Left knee pain after a fall.  COMPARISON: None.      Impression    IMPRESSION: Prepatellar soft tissue swelling. Normal joint alignment and spacing. No fracture or concerning bone lesion.   CT Hip Left w/o Contrast    Narrative    EXAM: CT HIP LEFT W/O CONTRAST  LOCATION: [facility]  DATE: 11/9/2023    INDICATION: Include femur, possible fracture, left hip pain.  COMPARISON: 11/09/2023 radiographs.  TECHNIQUE: Noncontrast. Axial, sagittal and coronal thin-section reconstruction. Dose reduction techniques were used.     FINDINGS:     BONES:  -Acute mildly displaced subcapital fracture of the left femur as best seen on series 6 image 53 and series 5 image 46. There is mild superior displacement of the femoral shaft. No evidence of additional fractures. Osteopenia. Very mild degenerative   changes in the left hip.    SOFT TISSUES:  -No muscle atrophy. Moderate-sized left hip effusion.      Impression    IMPRESSION:  1.  Acute mildly displaced subcapital fracture of the left femur.       EKG Results:    Advance Care Planning        Asha Interiano MD  [facility] Medicine  [facility]   Phone: #[phone]

## 2023-11-10 NOTE — PROGRESS NOTES
HEMODIALYSIS TREATMENT NOTE    Date: 11/10/2023  Time: 2.45 PM    Data:  Pre Wt: 36.3 kg    Desired Wt 32.3 kg   Post Wt: 33.3 kg(Estimated)    Weight change: 3.0  kg  Ultrafiltration - Post Run Net Total Removed (mL): 3000mL   Vascular Access Status: Fistula  patent  Dialyzer Rinse:  Moderate  Total Blood Volume Processed: 70.0L    Total Dialysis (Treatment) Time:  3.0 hours   Dialysate Bath: K 2, Ca 2.5  Heparin: None    Lab:   No    Interventions:  Pt scheduled for 3.0 hours HD via LAVFm thrill & bruit present. 2 15g needle cannulated successful.  with 600 DFR.  No dialysis related medication given during treatment.  Mid treatment Venouse pressure increasing with TMP alarm, flushing given with little effect, BFR down to 350 from 450 with good  effect.  Pt hemodynamically stable throughout the treatment, 3.0kg UF removed, Crit-line steady with B profile at the end of HD run.  Post HD , blood rinsed back, Fistula hemostasis achieved in less than 10 minutes, clean dressing applied & handoff report given.    Assessment:  Pt alert & oriented x4.  AVF thrill & bruit present.  Monitoring every 15 minutes & PRN     Plan:    Per Renal Team

## 2023-11-10 NOTE — PROGRESS NOTES
[facility] MEDICINE PROGRESS NOTE      Summary: 68 year old female into [facility] on 11/9/2023 after presenting from dialysis with left hip pain.  PMHx includes T,Th,Sat.  She was not dialyzed.  Per report she fell onto the left side 2 days prior.      Admission diagnostics showed a left hip fracture along with a left 5th  Metatarsal fracture.  The electrolytes were normal.  She had no signs of  Uremia, volume overload.     Overnight she is hypertensive despite her labetalol.  Pts creatinine today is  7.6.  The sodium is down a bit to 130.  Per phone discussion this morning  With nephrology the clinical plan will be to dialyze the patient this morning  Prior to OR.  Pt is aware.  Discussed with charge RN.     Addendum:  pt is tenatively planned for operative repair of the hip  At 1700 today;       Problem list:     Left hip fracture due to mechanical fall on 11/7/2023: pending  Operative repair post dialysis   ESRD on dialysis (T,Th,Sat): pending session this morning  HTN, uncontrolled: likely due to pain; continue labetalol; add  Hydralazine as needed   Right foot fracture: ortho boot in place  Hyponatremia: sodium 132 now 130 due to missed dialysis  Long QT: corrected was 525 on admission; now 499; avoid  Meds that will contribute (such as zofran)  Dvt prevention:  scd      Asha Interiano MD  [facility] Medicine  [facility]  Phone: #[phone]  Securely message with the Vocera Web Console (learn more here)  Text page via [employer] Paging/Directory     Interval History/Subjective:   crying in pain;       Physical Exam/Objective:  Temp:  [97.9  F (36.6  C)] 97.9  F (36.6  C)  Pulse:  [86-99] 95  Resp:  [18] 18  BP: (155-183)/(78-99) 176/99  SpO2:  [90 %-96 %] 91 %  Body mass index is 16.16 kg/m .    GENERAL:  Alert, in pain, cooperative    HEAD:  Normocephalic, without obvious abnormality, atraumatic   EYES:  PERRL, conjunctiva/corneas clear, no  scleral icterus, EOM's intact   NOSE: Nares normal, septum midline, mucosa normal, no drainage   THROAT: Lips, mucosa, and tongue normal; teeth and gums normal, mouth moist   NECK: Supple, symmetrical, trachea midline   BACK:   Symmetric, no curvature, ROM normal   LUNGS:   Clear to auscultation bilaterally, no rales, rhonchi, or wheezing, symmetric chest rise on inhalation, respirations unlabored   CHEST WALL:  No tenderness or deformity   HEART:  Regular rate and rhythm, S1 and S2 normal, no murmur, rub, or gallop    ABDOMEN:   Soft, non-tender, bowel sounds active all four quadrants, no masses, no organomegaly, no rebound or guarding   EXTREMITIES: No edema; no redness; right foot boot in place    SKIN: Dry to touch, no exanthems in the visualized areas   NEURO: Alert, oriented x3, moves all four extremities freely   PSYCH: Cooperative, behavior is appropriate      Data reviewed today: I personally reviewed all new medications, labs, imaging/diagnostics reports over the past 24 hours. Pertinent findings include:    Imaging:   Recent Results (from the past 24 hour(s))   Foot  XR, G/E 3 views, right    Narrative    EXAM: XR FOOT RIGHT G/E 3 VIEWS  LOCATION: [facility]  DATE: 11/9/2023    INDICATION: fall, pain  COMPARISON: None.      Impression    IMPRESSION: Acute transverse nondisplaced fracture of the proximal portion of the fifth metatarsal shaft. No additional fractures. Moderate degenerative changes at the first MTP joint.   Ribs XR, unilat 3 views + PA chest,  left    Narrative    EXAM: XR RIBS AND CHEST LEFT 3 VIEWS  LOCATION: [facility]  DATE: 11/9/2023    INDICATION: fall, pain  COMPARISON: Chest x-ray 07/13/2023, chest CT 07/12/2023      Impression    IMPRESSION: No hydropneumothorax. Lungs are clear. Heart is magnified due to projection. No signs of failure.     Left rib detail films obtained without a marker. No acute fractures underlying the marker.      There is an old fracture deformity of the left posterolateral sixth rib and likely the seventh. These are new since July.    Postsurgical changes seen in the upper abdomen  right shoulder with clips.   XR Pelvis and Hip Left 2 Views    Narrative    EXAM: XR PELVIS AND HIP LEFT 2 VIEWS  LOCATION: [facility]  DATE: 11/9/2023    INDICATION: Fall, pain.  COMPARISON: None.      Impression    IMPRESSION: Subtle area of linear lucency in the subcapital region of the left femur suggests an acute nondisplaced fracture. CT would be helpful in further evaluation. Osteopenia. Status post lumbosacral spinal fusion.   XR Knee Left 3 Views    Narrative    EXAM: XR KNEE LEFT 3 VIEWS  LOCATION: [facility]  DATE: 11/9/2023    INDICATION: Left knee pain after a fall.  COMPARISON: None.      Impression    IMPRESSION: Prepatellar soft tissue swelling. Normal joint alignment and spacing. No fracture or concerning bone lesion.   CT Hip Left w/o Contrast    Narrative    EXAM: CT HIP LEFT W/O CONTRAST  LOCATION: [facility]  DATE: 11/9/2023    INDICATION: Include femur, possible fracture, left hip pain.  COMPARISON: 11/09/2023 radiographs.  TECHNIQUE: Noncontrast. Axial, sagittal and coronal thin-section reconstruction. Dose reduction techniques were used.     FINDINGS:     BONES:  -Acute mildly displaced subcapital fracture of the left femur as best seen on series 6 image 53 and series 5 image 46. There is mild superior displacement of the femoral shaft. No evidence of additional fractures. Osteopenia. Very mild degenerative   changes in the left hip.    SOFT TISSUES:  -No muscle atrophy. Moderate-sized left hip effusion.      Impression    IMPRESSION:  1.  Acute mildly displaced subcapital fracture of the left femur.             Medications:   Personally Reviewed.  Medications      labetalol  100 mg Oral BID    levothyroxine  50 mcg Oral At Bedtime     LORazepam  1 mg Oral Daily before lunch    nortriptyline  75 mg Oral At Bedtime    pantoprazole  40 mg Oral Daily    senna-docusate  2 tablet Oral BID    sertraline  150 mg Oral At Bedtime    sevelamer carbonate  800-2,400 mg Oral TID AC    Vitamin D3  25 mcg Oral Daily

## 2023-11-10 NOTE — PROGRESS NOTES
Ortho Update Note:    I met with Serene JASMYNE Danuta this morning to discuss her left hip fracture. PMH notable for ESRD (dialysis) and hypertension but no other significant cardiopulmonary comorbidities. She lives alone in an apartment in [place] and is independent of her activities of daily living. She enjoys walking, but over past few months has not been able to walk as much as she likes. She does not typically use an assistive device, but has been recently.    We discussed her exam, history, and imaging findings in detail. We discussed that she has a displaced subcapital femoral neck fracture on the left. We discussed treatment options in detail including nonoperative care, surgical fixation, hemiarthroplasty, and total hip arthroplasty.    We discussed high morbidity of nonoperative care secondary to her otherwise relatively active lifestyle. We discussed possible failure rate/need for revision surgery if surgical fixation is performed due to varus displacement of fracture.    We focused a large portion of the conversation of on the option of hip hemiarthroplasty vs. Total hip arthroplasty. We discussed risks and benefits of each option including benefits of hemiarthroplasty to include typically shorter surgery, less bleeding risk (no acetabular work), less risk of acetabular complication/failure of ingrowth, and dislocation rates. We discussed benefits of CRAIG to include generally greater longevity of implant life, ability to create a potentially more anatomic reconstruction, and no need for future revision/conversion surgery if she develops prosthetic arthritis.    We discussed risks of either arthroplasty surgery to include pain, bleeding, infection, blood clots (DVT, PE), wound issues, continued chronic pain in the hip, post-operative joint stiffness, painful arc of motion, difficulty with ambulation, iatrogenic fracture, damage to nearby neurovascular structures, hip instability/dislocation, allergic  reaction to implanted components, implant wear, loosening and/or failure of the implants requiring revision, and possible post-operative leg length discrepancy (apparent or actual). The possibility of intra-operative and/or post-operative medical complications such as anesthesia complications or reactions, respiratory and cardiovascular events, stroke, heart attack and/or death were also discussed. In the case of infection of the joint, the patient understands that this will require prolonged IV antibiotic therapy and possible multiple operative procedures in the future.     After thorough discussion of the above and utilizing shared decision making, the patient elected to proceed with a Left hip hemiarthroplasty.    Plan:  - Left hip hemiarthroplasty w/ Dr. Weldon 11/10/2023  - NWB LLE  - WBAT in CAM boot for right 5th metatarsal base fracture  - Please keep NPO for OR this evening    Junior Arana MD  Orthopedic Surgery  [facility]

## 2023-11-10 NOTE — CONSULTS
ORTHOPEDIC CONSULTATION    Consultation  Serene Tipton,  1955, MRN [id number]    [place]  Closed nondisplaced fracture of metatarsal bone of right foot, unspecified metatarsal, initial encounter [S92.301A]    PCP: Ramos Lowry, [phone]   Code status:  Full Code       Extended Emergency Contact Information  Primary Emergency Contact: Brittaney Brunson  Home Phone: [phone]  Mobile Phone: [phone]  Relation: Niece  Secondary Emergency Contact: Bing Russell  Mobile Phone: [phone]  Relation: Niece   needed? No         IMPRESSION:  Left hip fracture following mechanical fall, initial encounter  Right 5th metatarsal fracture, following mechanical fall, initial encounter     PLAN:  This patient was discussed with Dr. Arana, on-call surgeon for [facility] and they are in agreement with the following plan.     - NPO for surgery today at 1730  - Dialysis prior to surgery if possible  - CAM boot to right foot for fracture  - WBAT for right foot with boot in place  - Bed rest until surgery for left hip    Thank you for including [facility] in the care of Serene Tipton. It has been a pleasure participating in [facility]'s care.        CHIEF COMPLAINT: Closed nondisplaced fracture of metatarsal bone of right foot, unspecified metatarsal, initial encounter    HISTORY OF PRESENT ILLNESS:  The patient is seen in orthopedic consultation at the request of Dr. Interiano.  The patient is a 68 year old female with moderate pain of the left  hip. The patient reports that Tuesday she was walking into dialysis when she slipped on a curb and rolled her right ankle.  She thought she just rolled her ankle although did have immediate onset of pain.  She was able to walk on it after without difficulty.  She then went home.  She went about her business on Wednesday without issue.  Thursday morning she noted that she was walking from her car to her apartment when she had immediate  onset of left groin pain and fell into the wall.  She did not land on her hip but feels that she was walking and then felt a snap.  Once she was able to hobble to her apartment she then called 911 who brought her to the ER.    PAST MEDICAL HISTORY:  Past Medical History:   Diagnosis Date    Anemia in chronic kidney disease     Anxiety     Arthritis     Brain aneurysm     Cavernous segment of the right & left carotid arteries. See Neurosurg note 6/16/21.    Chronic low back pain     Managed by Pain Clinic    Depressive disorder 2013    Disease of thyroid gland     ESRD (end stage renal disease) on dialysis (H) 07/2020    GERD (gastroesophageal reflux disease)     Hepatic lesion     Hyperlipidemia     Hypertension     Nephrolithiasis     Neuromuscular disorder (H)     Osteoporosis     PKD (polycystic kidney disease) 09/01/1990    Primary generalized (osteo)arthritis 8/2/2023    PTSD (post-traumatic stress disorder)     Tobacco abuse     Vitamin D deficiency           ALLERGIES:   Review of patient's allergies indicates   Allergies   Allergen Reactions    Penicillins Other (See Comments) and Shortness Of Breath     Breathing problem.  breathing      Carvedilol GI Disturbance     Nausea and vomiting    Ciprofloxacin Muscle Pain (Myalgia)    Floxacillin (Flucloxacillin)      Other Reaction(s): rash, muscle and joint pain    Morphine Other (See Comments)     Vomiting    No Clinical Screening - See Comments      PN: LW CM1: Contrast Intercapsular - Nonionic Reaction :    Nsaids Other (See Comments)     Kidney damage    Penicillin G Difficulty breathing    Sulfa Antibiotics Itching    Sulfamethoxazole-Trimethoprim      Other reaction(s): itching    Levofloxacin Muscle Pain (Myalgia) and Rash         MEDICATIONS UPON ADMISSION:  Medications were reviewed.  They include:   (Not in a hospital admission)        SOCIAL HISTORY:   she  reports that she has an unknown smoking status. She has never used smokeless tobacco. She  reports current alcohol use. She reports current drug use. Drug: Marijuana.    FAMILY HISTORY:  family history includes Cardiac Sudden Death (age of onset: 52) in her sister; Cerebrovascular Disease in her maternal grandmother, mother, and paternal grandmother; Chronic Obstructive Pulmonary Disease in her father; Coronary Artery Disease in her paternal grandfather; Heart Disease in her maternal grandfather; Hyperlipidemia in her paternal grandmother; Hypertension in her maternal grandmother, mother, and sister; Kidney Disease in her maternal grandmother, mother, and sister; Multiple Sclerosis in her father; Polycystic Kidney Diease in her maternal grandmother, mother, and sister; Pulmonary Embolism in her paternal grandfather.      REVIEW OF SYSTEMS:   Reviewed with patient. See HPI, otherwise negative       PHYSICAL EXAMINATION:  Vitals: Temp:  [97.9  F (36.6  C)] 97.9  F (36.6  C)  Pulse:  [86-99] 95  Resp:  [18] 18  BP: (155-183)/(78-99) 176/99  SpO2:  [90 %-96 %] 91 %  General: On examination, the patient is resting comfortably, NAD, awake, and alert and oriented to person, place, time, and, and general circumstances   SKIN: There is no evidence of erythema, swelling, induration or ecchymosis  Pulses:  dorsalis pedis and posterior tibial pulse is intact and equal bilaterally  Sensation: intact and equal bilaterally to the distal lower extremities.  Tenderness: Tenderness to palpation over the left hip, as well as the right lateral foot  ROM: Able to wiggle her toes range her right leg without difficulty deferred on the left side  Motor: Deferred due to cam boot in place on the right able to plantar and dorsiflex on the left  Contralateral side= Full range of motion, Negative joint instability findings, 5/5 motor groups about the joint, Non-tender.       RADIOGRAPHIC EVALUATION:  Personally reviewed    PERTINENT LABS:  Lab Results: personally reviewed.   @BRIEFLAB[NA,K,CL,CO2,BUN,CREATININE,GLUCOSE,GLUCOSE @  Lab  Results   Component Value Date    WBC 8.8 11/09/2023    WBC 10.4 05/19/2021    HGB 11.0 11/09/2023    HGB 13.0 05/19/2021    HCT 34.2 11/09/2023    HCT 39.6 05/19/2021     11/09/2023     05/19/2021     11/09/2023     05/19/2021         Nickie Bryan PA-C, ERICKA  Date: 11/10/2023  Time: 9:02 AM    CC1:   Asha Interiano MD    CC2:   Ramos Lowry

## 2023-11-10 NOTE — PHARMACY-ADMISSION MEDICATION HISTORY
Pharmacist Admission Medication History    Admission medication history is complete. The information provided in this note is only as accurate as the sources available at the time of the update.    Information Source(s): Patient and CareEverywhere/SureScripts via in-person    Pertinent Information: Patient was falling asleep during interview but answered questions appropriately when awake    Patient not sure what medications are given during dialysis such as epogen, calcitriol, venofer    Changes made to PTA medication list:  Added: glucosamine/chondroitin, cyclobenzaprine, biotene  Deleted: None  Changed: None         Allergies reviewed with patient and updates made in EHR: no    Medication History Completed By: Aaliyah Walker RPH 11/9/2023 6:25 PM    PTA Med List   Medication Sig Last Dose    artificial saliva (BIOTENE MT) SOLN solution Swish and spit 2 sprays in mouth as needed for dry mouth 11/9/2023 at Has with    B Complex-C (SUPER B COMPLEX PO) Take 1 tablet by mouth daily at 2 pm 11/7/2023    calcitRIOL (ROCALTROL) 0.5 MCG capsule Take 0.5 mcg by mouth three times a week Given at Dialysis Unknown    cholecalciferol (VITAMIN D3) 25 mcg (1000 units) capsule Take 1 capsule by mouth daily at 2 pm 11/9/2023 at afternoon    cyclobenzaprine (FLEXERIL) 5 MG tablet Take 5 mg by mouth 2 times daily as needed for muscle spasms 11/9/2023 at 0200    Epoetin Adam (EPOGEN IJ) Inject 1,000 Units into the vein three times a week At dialysis. Unknown    glucosamine-chondroitin 500-400 MG CAPS per capsule Take 2 capsules by mouth daily at 2 pm 11/7/2023    Iron Sucrose (VENOFER IV) Inject 100 mg into the vein once a week At dialysis Unknown    labetalol (NORMODYNE) 100 MG tablet Take 100 mg by mouth 2 times daily 11/9/2023 at AM    lactulose (CHRONULAC) 10 GM/15ML solution Take 15 mLs (10 g) by mouth 2 times daily as needed for constipation 11/6/2023    levothyroxine (SYNTHROID/LEVOTHROID) 50 MCG tablet Take 50 mcg by mouth At  Bedtime Takes in the middle of the night. ~ 1 week ago, ran out    LORazepam (ATIVAN) 1 MG tablet Take 1 tablet (1 mg) by mouth daily (before lunch) 11/9/2023    nortriptyline (PAMELOR) 75 MG capsule Take 75 mg by mouth At Bedtime 11/8/2023    omeprazole (PRILOSEC) 20 MG DR capsule Take 20 mg by mouth daily 11/9/2023 at AM    oxyCODONE IR (ROXICODONE) 10 MG tablet Take 1 tablet (10 mg) by mouth every 4 hours as needed for pain Max of 5 doses per day. 11/9/2023 at 0830    rosuvastatin (CRESTOR) 10 MG tablet Take 10 mg by mouth At Bedtime ~ 1 week ago    SENNA-docusate sodium (SENNA S) 8.6-50 MG tablet Take 2 tablets by mouth 2 times daily Hold for loose stools. 11/8/2023 at PM    sertraline (ZOLOFT) 100 MG tablet Take 150 mg by mouth At Bedtime 11/8/2023 at PM    sevelamer carbonate (RENVELA) 800 MG tablet Take 800-2,400 mg by mouth 3 times daily When eating; range of 1-3 tabs depending on meal size 11/8/2023

## 2023-11-10 NOTE — PROVIDER NOTIFICATION
Provider paged: Pt didn't receive her AM meds because of dialysis. HD just finished, do you want me to give them or hold until after surgery? Surgery sched at 1800 tonight.    Give meds, including BP med, per MD.

## 2023-11-10 NOTE — ED NOTES
Pt awake and coughing, writer went assess pain and if there were any other needs. Writer asked if patient was comfortable, or if she was having pain, before patient was able to answer questing, patient appear to fall back to sleep. Pt wanted to know if there were any updates on surgery time.

## 2023-11-10 NOTE — PLAN OF CARE
Radiographs and CT scan reviewed showing displaced left femoral neck fracture.    She is dialysis dependent and will await hospitalist recommendations and whether she is cleared medically for surgery on Friday    Please keep NPO at midnight for bipolar vs CRAIG tomorrow.

## 2023-11-11 NOTE — ANESTHESIA PREPROCEDURE EVALUATION
Anesthesia Pre-Procedure Evaluation    Patient: Serene Tipton   MRN: [id number] : 1955        Procedure : Procedure(s):  HEMIARTHROPLASTY, HIP, HEMIARTHROPLASTY          Past Medical History:   Diagnosis Date    Anemia in chronic kidney disease     Anxiety     Arthritis     Brain aneurysm     Cavernous segment of the right & left carotid arteries. See Neurosurg note 21.    Chronic low back pain     Managed by Pain Clinic    Depressive disorder 2013    Disease of thyroid gland     ESRD (end stage renal disease) on dialysis (H) 2020    GERD (gastroesophageal reflux disease)     Hepatic lesion     Hyperlipidemia     Hypertension     Nephrolithiasis     Neuromuscular disorder (H)     Osteoporosis     PKD (polycystic kidney disease) 1990    Primary generalized (osteo)arthritis 2023    PTSD (post-traumatic stress disorder)     Tobacco abuse     Vitamin D deficiency       Past Surgical History:   Procedure Laterality Date    BACK SURGERY      spinal fusion w/hardware    BIOPSY Left 1990    Breast    BREAST LUMPECTOMY, RT/LT Left     Lumpectomy    CYST REMOVAL Right     rt wrist    CYSTECTOMY PILONIDAL  1981    EYE SURGERY  2008    lasik    FORMATION ARTERIOVENOUS FISTULA    2020    FRACTURE SURGERY      NEPHRECTOMY BILATERAL Bilateral 2023    Procedure: bilateral native nephrectomy;  Surgeon: Alana Giang MD;  Location: [facility] OR    ORTHOPEDIC SURGERY Right 2005    Shoulder    OTHER SURGICAL HISTORY      carpal tunnel repair    SPINE SURGERY        Allergies   Allergen Reactions    Penicillins Other (See Comments) and Shortness Of Breath     Breathing problem.  breathing      Carvedilol GI Disturbance     Nausea and vomiting    Ciprofloxacin Muscle Pain (Myalgia)    Floxacillin (Flucloxacillin)      Other Reaction(s): rash, muscle and joint pain    Morphine Other (See Comments)     Vomiting    No Clinical Screening - See Comments      PN: LW CM1: Contrast Intercapsular -  Nonionic Reaction :    Nsaids Other (See Comments)     Kidney damage    Penicillin G Difficulty breathing    Sulfa Antibiotics Itching    Sulfamethoxazole-Trimethoprim      Other reaction(s): itching    Levofloxacin Muscle Pain (Myalgia) and Rash      Social History     Tobacco Use    Smoking status: Unknown    Smokeless tobacco: Never   Substance Use Topics    Alcohol use: Yes     Comment: occasional      Wt Readings from Last 1 Encounters:   11/09/23 36.3 kg (80 lb)        Anesthesia Evaluation            ROS/MED HX  ENT/Pulmonary:       Neurologic:  - neg neurologic ROS     Cardiovascular:       METS/Exercise Tolerance: >4 METS    Hematologic:  - neg hematologic  ROS     Musculoskeletal:  - neg musculoskeletal ROS     GI/Hepatic:       Renal/Genitourinary:       Endo:       Psychiatric/Substance Use:  - neg psychiatric ROS     Infectious Disease:  - neg infectious disease ROS     Malignancy:  - neg malignancy ROS     Other:  - neg other ROS          Physical Exam    Airway  airway exam normal      Mallampati: II       Respiratory Devices and Support         Dental  no notable dental history         Cardiovascular   cardiovascular exam normal          Pulmonary   pulmonary exam normal                OUTSIDE LABS:  CBC:   Lab Results   Component Value Date    WBC 8.8 11/09/2023    WBC 8.6 07/31/2023    HGB 11.0 (L) 11/09/2023    HGB 10.1 (L) 07/31/2023    HCT 34.2 (L) 11/09/2023    HCT 34.6 (L) 07/31/2023     11/09/2023     07/31/2023     BMP:   Lab Results   Component Value Date     (L) 11/10/2023     (L) 11/09/2023    POTASSIUM 5.0 11/10/2023    POTASSIUM 4.8 11/09/2023    CHLORIDE 91 (L) 11/10/2023    CHLORIDE 91 (L) 11/09/2023    CO2 25 11/10/2023    CO2 26 11/09/2023    BUN 48.5 (H) 11/10/2023    BUN 43.9 (H) 11/09/2023    CR 7.60 (H) 11/10/2023    CR 6.86 (H) 11/09/2023    GLC 89 11/10/2023    GLC 92 11/09/2023     COAGS:   Lab Results   Component Value Date    PTT 37 07/11/2023     "INR 1.70 (H) 07/13/2023     POC: No results found for: \"BGM\", \"HCG\", \"HCGS\"  HEPATIC:   Lab Results   Component Value Date    ALBUMIN 2.7 (L) 07/25/2023    PROTTOTAL 5.1 (L) 07/21/2023    ALT 40 07/21/2023    AST 38 07/21/2023    ALKPHOS 151 (H) 07/21/2023    BILITOTAL 0.7 07/21/2023     OTHER:   Lab Results   Component Value Date    LACT 1.2 07/21/2023    GAVIN 8.8 11/10/2023    PHOS 1.7 (L) 07/25/2023    MAG 2.0 07/31/2023    LIPASE 11 07/21/2023    TSH 3.96 09/06/2023    T4 0.72 (L) 08/18/2022       Anesthesia Plan    ASA Status:  3, emergent       Anesthesia Type: Spinal.              Consents    Anesthesia Plan(s) and associated risks, benefits, and realistic alternatives discussed. Questions answered and patient/representative(s) expressed understanding.     - Discussed:     - Discussed with:  Patient            Postoperative Care    Pain management: Peripheral nerve block (Continuous).        Comments:                Bertin Gavin MD  "

## 2023-11-11 NOTE — PROGRESS NOTES
HEMODIALYSIS TREATMENT NOTE    Date: 11/11/2023  Time: 3:44 PM    Data:  Pre Wt:     Desired Wt:   kg   Post Wt:    Weight change:   kg  Ultrafiltration - Post Run Net Total Removed (mL): 3.5 mL  Vascular Access Status: patent  Dialyzer Rinse: Moderate  Total Blood Volume Processed: 64 L Liters  Total Dialysis (Treatment) Time: 3.75 Hours    Lab:   No    Interventions:  PT request to end TX 15 minutes early due to back and incision pain    Assessment:  BP noted to be high at times     Plan:    Per renal

## 2023-11-11 NOTE — ANESTHESIA PROCEDURE NOTES
Fascia iliaca Procedure Note    Pre-Procedure   Staff -        Anesthesiologist:  Bertin Gavin MD       Performed By: anesthesiologist       Location: pre-op       Procedure Start/Stop Times: 11/10/2023 7:10 PM and 11/10/2023 7:15 PM       Pre-Anesthestic Checklist: patient identified, IV checked, site marked, risks and benefits discussed, informed consent, monitors and equipment checked, pre-op evaluation, at physician/surgeon's request and post-op pain management  Timeout:       Correct Patient: Yes        Correct Procedure: Yes        Correct Site: Yes        Correct Position: Yes        Correct Laterality: Yes        Site Marked: Yes  Procedure Documentation  Procedure: Fascia iliaca       Laterality: left       Patient Position: supine       Skin prep: Chloraprep       Local skin infiltrated with 3 mL of.        Needle Type: other       Needle Gauge: 20.        Needle Length (Inches): 6        Ultrasound guided       1. Ultrasound was used to identify targeted nerve, plexus, vascular marker, or fascial plane and place a needle adjacent to it in real-time.       2. Ultrasound was used to visualize the spread of anesthetic in close proximity to the above referenced structure.       3. A permanent image is entered into the patient's record.       4. The visualized anatomic structures appeared normal.       5. There were no apparent abnormal pathologic findings.    Assessment/Narrative         The placement was negative for: blood aspirated, painful injection and site bleeding       Paresthesias: No.       Test dose of 3 mL at.         Test dose negative, 3 minutes after injection, for signs of intravascular, subdural, or intrathecal injection.       Bolus given via needle. no blood aspirated via catheter.        Secured via.        Insertion/Infusion Method: Single Shot       Complications: none       Injection made incrementally with aspirations every 5 mL.    Medication(s) Administered   Medication  "Administration Time: 11/10/2023 7:10 PM      FOR [facility] ONLY:   Pain Team Contact information: please page the Pain Team Via Lecture[employer]. Search \"Pain\". During daytime hours, please page the attending first. At night please page the resident first.      "

## 2023-11-11 NOTE — CONSULTS
[facility] ACUTE PAIN SERVICE CONSULTATION     Date of Admission:  11/9/2023  Date of Consult (When I saw the patient): 11/11/23     Assessment/Plan:     Serene Tipton is a 68 year old female who was admitted on 11/9/2023.  Pain team was asked to see the patient for postop pain, sciatic pain postoperatively, fracture pain. Admitted for after she slipped at dialysis and injured her left leg. History of dialysis post bilateral nephrectomy due to PKD, hypertension, chronic anemia, hypothyroidism.  Imaging in the emergency department showed a left proximal femur fracture along with fracture of the right fifth metatarsal of the right foot.  Describes pain as 5/10 and aching, throbbing, electric-like shooting down the left lower extremity. The patient denies nausea, vomiting, constipation, diarrhea, chest pain, shortness of breath. The patient does not smoke and denies chemical dependency history.     Post op day: 1 Day Post-Op.  ORIF of the left hip    Opioid Induced Respiratory Depression Risk Assessment: high    Home pain medications/psych medications/anticoagulation medications include: Acetaminophen, cyclobenzaprine 5 mg twice daily as needed, Lorazepam 1 mg daily before lunch, nortriptyline 75 mg at bedtime, oxycodone 10 mg every 4 hours max of 5 doses per day, sertraline 150 mg at bedtime.     PLAN:   1) Pain is consistent with acute fracture pain, acute postoperative pain chronic sciatic pain of the left lower extremity.  Labs and imaging indicated: I have personally reviewed pertinent notes, labs, tests, and radiologic imaging in patient's chart. Treatment plan includes multimodal pain approach, Hospital Medicine Service for medical management, orthopedics, PT, OT. Patient educated regarding multimodal pain approach, medications as listed below. Patient is understanding of the plan. All questions and concerns addressed to patient's satisfaction.   2)Multimodal Medication Therapy  Topical: Lidocaine  patch x2 around left hip incision, lidocaine ointment 4 times daily to painful joints  NSAID'S: None, CKD, hemodialysis  Muscle Relaxants: Cyclobenzaprine 5 mg twice daily as needed  Adjuvants: Acetaminophen, nortriptyline 75 mg at bedtime (home medication). Reports she has used gabapentin and Lyrica in the past for sciatic pain without effectiveness and is not interested in taking these medications again.  She reports her nortriptyline from home is usually effective for sciatic pain however she reports worse sciatic pain postoperatively.   Antidepressants/anxiolytics: Lorazepam 0.5 mg injection every 4 hours as needed, sertraline 150 mg at bedtime (home medication), lorazepam daily before lunch (home medication)  Opioids: Oxycodone 5 to 10 mg every 4 hours as needed -first-line  IV Pain medication: Dilaudid 0.5 mg every 2 hours as needed -decrease to 0.5 mg every 4 hours as needed - second line for breakthrough pain  3)Non-medication interventions: Ortho boot for right foot fracture, PT, OT, ice  4)Constipation Prophylaxis: Scheduled and prn    -[place]  pulled from system on 11/11/2023. This indicates ongoing fills for lorazepam 1 mg and oxycodone 10 mg  10/31/2023 lorazepam 1 mg tablet #30 for 30 days by Amara Chance MD - same on 10/5/23   10/17/2023 oxycodone 10 mg #150 30 days by Kolton Blackman MD   9/19/23 Oxycodone 10 mg #150 for 30 days by Ramos Lowry MD   Discharge Recommendations - We recommend prescribing the following at the time of discharge: Per orthopedics     History of Present Illness (HPI):       Serene Tipton is a 68 year old female who presented to the ER after slipping at dialysis and injuring her left leg.  Past medical history as above. The pain is reported to be acute fracture and post op pain, chronic sciatic pain to the LLE. Current pain is rated at 5/10 and goal is 2-3/10.      Per [place]  review, the patient does have an opioid tolerance. Opioid induced side effects noted and  include: none    Reviewed medical record, labs, imaging, ED note, and care everywhere.     Past pain treatments have included: Oxycodone, Gabapentin and Lyrica (not effective), Nortriptyline, Flexeril      Medical History   PAST MEDICAL HISTORY:   Past Medical History:   Diagnosis Date    Anemia in chronic kidney disease     Anxiety     Arthritis     Brain aneurysm     Cavernous segment of the right & left carotid arteries. See Neurosurg note 6/16/21.    Chronic low back pain     Managed by Pain Clinic    Depressive disorder 2013    Disease of thyroid gland     ESRD (end stage renal disease) on dialysis (H) 07/2020    GERD (gastroesophageal reflux disease)     Hepatic lesion     Hyperlipidemia     Hypertension     Nephrolithiasis     Neuromuscular disorder (H)     Osteoporosis     PKD (polycystic kidney disease) 09/01/1990    Primary generalized (osteo)arthritis 8/2/2023    PTSD (post-traumatic stress disorder)     Tobacco abuse     Vitamin D deficiency        PAST SURGICAL HISTORY:   Past Surgical History:   Procedure Laterality Date    BACK SURGERY      spinal fusion w/hardware    BIOPSY Left 09/01/1990    Breast    BREAST LUMPECTOMY, RT/LT Left     Lumpectomy    CYST REMOVAL Right     rt wrist    CYSTECTOMY PILONIDAL  1981    EYE SURGERY  2008    lasik    FORMATION ARTERIOVENOUS FISTULA    07/28/2020    FRACTURE SURGERY      NEPHRECTOMY BILATERAL Bilateral 2/1/2023    Procedure: bilateral native nephrectomy;  Surgeon: Alana Giang MD;  Location: [facility] OR    ORTHOPEDIC SURGERY Right 2005    Shoulder    OTHER SURGICAL HISTORY      carpal tunnel repair    SPINE SURGERY         FAMILY HISTORY:   Family History   Problem Relation Age of Onset    Polycystic Kidney Diease Mother     Hypertension Mother     Kidney Disease Mother     Cerebrovascular Disease Mother     Chronic Obstructive Pulmonary Disease Father     Multiple Sclerosis Father     Polycystic Kidney Diease Sister     Cardiac Sudden Death Sister 52     Hypertension Sister     Kidney Disease Sister     Polycystic Kidney Diease Maternal Grandmother     Hypertension Maternal Grandmother     Kidney Disease Maternal Grandmother     Cerebrovascular Disease Maternal Grandmother     Heart Disease Maternal Grandfather     Hyperlipidemia Paternal Grandmother     Cerebrovascular Disease Paternal Grandmother     Coronary Artery Disease Paternal Grandfather     Pulmonary Embolism Paternal Grandfather     Anesthesia Reaction No family hx of        SOCIAL HISTORY:   Social History     Tobacco Use    Smoking status: Unknown    Smokeless tobacco: Never   Substance Use Topics    Alcohol use: Yes     Comment: occasional        HEALTH & LIFESTYLE PRACTICES  Tobacco:  reports that she has an unknown smoking status. She has never used smokeless tobacco.  Alcohol:  reports current alcohol use.  Illicit drugs:  reports current drug use. Drug: Marijuana.    Allergies  Allergies   Allergen Reactions    Penicillins Other (See Comments) and Shortness Of Breath     Breathing problem.  breathing      Carvedilol GI Disturbance     Nausea and vomiting    Ciprofloxacin Muscle Pain (Myalgia)    Floxacillin (Flucloxacillin)      Other Reaction(s): rash, muscle and joint pain    Morphine Other (See Comments)     Vomiting    No Clinical Screening - See Comments      PN: LW CM1: Contrast Intercapsular - Nonionic Reaction :    Nsaids Other (See Comments)     Kidney damage    Penicillin G Difficulty breathing    Sulfa Antibiotics Itching    Sulfamethoxazole-Trimethoprim      Other reaction(s): itching    Levofloxacin Muscle Pain (Myalgia) and Rash       Problem List  Patient Active Problem List    Diagnosis Date Noted    Hip fracture, left, closed, initial encounter (H) 11/09/2023     Priority: Medium    Closed nondisplaced fracture of metatarsal bone of right foot, unspecified metatarsal, initial encounter 11/09/2023     Priority: Medium    Anxiety 08/02/2023     Priority: Medium    Hepatic lesion  08/02/2023     Priority: Medium    Hyperparathyroidism (H24) 08/02/2023     Priority: Medium    Hypothyroidism (acquired) 08/02/2023     Priority: Medium    Liver cyst 08/02/2023     Priority: Medium    Long term (current) use of opiate analgesic 08/02/2023     Priority: Medium    Lumbar facet joint syndrome 08/02/2023     Priority: Medium    Major depressive disorder with single episode, in partial remission (H24) 08/02/2023     Priority: Medium    Mammogram abnormal 08/02/2023     Priority: Medium    Nicotine dependence, cigarettes, uncomplicated 08/02/2023     Priority: Medium    Obstructive sleep apnea (adult) (pediatric) 08/02/2023     Priority: Medium    Primary generalized (osteo)arthritis 08/02/2023     Priority: Medium    Spinal stenosis, lumbar region with neurogenic claudication 08/02/2023     Priority: Medium    Hypomagnesemia 07/21/2023     Priority: Medium    Diffuse abdominal pain 07/21/2023     Priority: Medium    Chronic renal failure, unspecified CKD stage 07/21/2023     Priority: Medium    Small bowel obstruction (H) 07/13/2023     Priority: Medium    Hypoglycemia 07/13/2023     Priority: Medium    Fall, initial encounter 07/13/2023     Priority: Medium    Constipation 07/06/2023     Priority: Medium    Hyperkalemia 07/05/2023     Priority: Medium    ESRD (end stage renal disease) on dialysis (H) 07/05/2023     Priority: Medium    Shortness of breath 02/27/2023     Priority: Medium    S/p nephrectomy 02/27/2023     Priority: Medium    Dialysis patient (H24) 02/27/2023     Priority: Medium    Pulmonary edema, noncardiac 02/27/2023     Priority: Medium    Acute post-operative pain 02/02/2023     Priority: Medium    Chronic, continuous use of opioids 02/02/2023     Priority: Medium    Polycystic kidney disease 02/01/2023     Priority: Medium    Postoperative anemia due to acute blood loss 02/01/2023     Priority: Medium    Enlarged kidney as indication for native nephrectomy 02/01/2023     Priority:  Medium    Chronic low back pain      Priority: Medium    Hyperlipidemia      Priority: Medium    Tobacco abuse      Priority: Medium    Vitamin D deficiency      Priority: Medium    Non-traumatic rotator cuff tear, right 02/20/2020     Priority: Medium     Formatting of this note might be different from the original. Added automatically from request for surgery [id number]      End stage kidney disease (H)      Priority: Medium    Polycystic kidney 01/22/2020     Priority: Medium    Hypertension 01/22/2020     Priority: Medium    Anemia in stage 5 chronic kidney disease (H) 09/27/2019     Priority: Medium    NS (nuclear sclerosis), bilateral 07/10/2018     Priority: Medium    PVD (posterior vitreous detachment), left eye 07/10/2018     Priority: Medium    Vitreomacular adhesion, right 07/10/2018     Priority: Medium    S/P lumbar fusion 10/12/2017     Priority: Medium    Hyperparathyroidism, secondary renal (H24) 09/11/2016     Priority: Medium    Gastroesophageal reflux disease without esophagitis 06/15/2015     Priority: Medium    Major depression, recurrent (H24) 09/06/2013     Priority: Medium     Formatting of this note might be different from the original. Overview: [facility], Dr Amara Chance [phone] Formatting of this note might be different from the original. [facility], Dr Amara Chance [phone]      Depressive disorder 01/01/2013     Priority: Medium    Insomnia 05/06/2009     Priority: Medium     Formatting of this note might be different from the original. 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 Posterior fusion 1985, anterior fusion 2006. See 5/12/2009 note ;  Spondylolisthesis, L5S1      Posttraumatic stress disorder 12/04/2006     Priority: Medium    Backache 07/12/2006     Priority: Medium    Fibrocystic breast changes 07/12/2006     Priority: Medium    Pure hypercholesterolemia 07/12/2006     Priority: Medium    Symptomatic menopausal or female climacteric states 07/12/2006     Priority: Medium    Renovascular hypertension 09/10/2004     Priority: Medium     Formatting of this note might be different from the original. Overview: Epic Formatting of this note might be different from the original. Epic         Prior to Admission Medications   Medications Prior to Admission   Medication Sig Dispense Refill Last Dose    artificial saliva (BIOTENE MT) SOLN solution Swish and spit 2 sprays in mouth as needed for dry mouth   11/9/2023 at Has with    B Complex-C (SUPER B COMPLEX PO) Take 1 tablet by mouth daily at 2 pm   11/7/2023    calcitRIOL (ROCALTROL) 0.5 MCG capsule Take 0.5 mcg by mouth three times a week Given at Dialysis   Unknown    cholecalciferol (VITAMIN D3) 25 mcg (1000 units) capsule Take 1 capsule by mouth daily at 2 pm   11/9/2023 at afternoon    cyclobenzaprine (FLEXERIL) 5 MG tablet Take 5 mg by mouth 2 times daily as needed for muscle spasms   11/9/2023 at 0200    Epoetin Adam (EPOGEN IJ) Inject 1,000 Units into the vein three times a week At dialysis.   Unknown    glucosamine-chondroitin 500-400 MG CAPS per capsule Take 2 capsules by mouth daily at 2 pm   11/7/2023    Iron Sucrose (VENOFER IV) Inject 100 mg into the vein once a week At dialysis   Unknown    labetalol (NORMODYNE) 100 MG tablet Take 100 mg by mouth 2 times daily   11/9/2023 at AM    lactulose (CHRONULAC) 10 GM/15ML solution Take 15 mLs (10 g) by mouth 2 times daily as needed for constipation 900 mL 0 11/6/2023    levothyroxine (SYNTHROID/LEVOTHROID) 50 MCG tablet Take 50 mcg by mouth At Bedtime Takes in the middle of the night.   ~ 1 week ago, ran out    LORazepam (ATIVAN) 1 MG tablet Take 1  "tablet (1 mg) by mouth daily (before lunch) 30 tablet 0 11/9/2023    nortriptyline (PAMELOR) 75 MG capsule Take 75 mg by mouth At Bedtime   11/8/2023    omeprazole (PRILOSEC) 20 MG DR capsule Take 20 mg by mouth daily   11/9/2023 at AM    oxyCODONE IR (ROXICODONE) 10 MG tablet Take 1 tablet (10 mg) by mouth every 4 hours as needed for pain Max of 5 doses per day. 30 tablet 0 11/9/2023 at 0830    rosuvastatin (CRESTOR) 10 MG tablet Take 10 mg by mouth At Bedtime   ~ 1 week ago    SENNA-docusate sodium (SENNA S) 8.6-50 MG tablet Take 2 tablets by mouth 2 times daily Hold for loose stools. 90 tablet 0 11/8/2023 at PM    sertraline (ZOLOFT) 100 MG tablet Take 150 mg by mouth At Bedtime   11/8/2023 at PM    sevelamer carbonate (RENVELA) 800 MG tablet Take 800-2,400 mg by mouth 3 times daily When eating; range of 1-3 tabs depending on meal size   11/8/2023       Review of Systems  Complete ROS reviewed, unless noted in HPI, all other systems reviewed (with patient) and all others found to be negative.      Objective:     Physical Exam:  BP (!) 150/78   Pulse 104   Temp 98.1  F (36.7  C) (Oral)   Resp 18   Ht 1.499 m (4' 11\")   Wt 36.3 kg (80 lb)   SpO2 99%   BMI 16.16 kg/m    Weight:   Vitals:    11/09/23 1425   Weight: 36.3 kg (80 lb)      Body mass index is 16.16 kg/m .    General Appearance:  Alert, cooperative, no distress   Head:  Normocephalic, without obvious abnormality, atraumatic   Eyes:  PERRL, conjunctiva/corneas clear, EOM's intact   ENT/Throat: Lips, mucosa, and tongue normal; teeth and gums normal   Lymph/Neck: Supple, symmetrical, trachea midline   Lungs:   Clear to auscultation bilaterally, respirations unlabored, room air    Cardiovascular/Heart:  Regular rate and rhythm, S1, S2 normal   Abdomen:   Soft, non-tender, bowel sounds active all four quadrants   Musculoskeletal: Incision covered, dialysis fistula to upper extremity    Skin: Skin dry, warm    Neurologic: Alert and oriented X 3, Moves all " "4 extremities     Psych: Affect is appropriate      Imaging: Reviewed I have personally reviewed pertinent labs, tests, and radiologic imaging in patient's chart.  XR Hip Port Left G/E 2 Views    Result Date: 11/10/2023  EXAM: XR HIP PORTABLE LEFT 2-3 VIEWS LOCATION: [facility] DATE: 11/10/2023 INDICATION: Postop COMPARISON: CT 11/09/2023     IMPRESSION: Cemented left hip hemiarthroplasty. Expected postoperative soft tissue thickening, edema, and gas. Otherwise, negative for postoperative purposes.    POC US Guidance Needle Placement    Result Date: 11/10/2023  Ultrasound was performed as guidance to an anesthesia procedure.  Click \"PACS images\" hyperlink below to view any stored images.  For specific procedure details, view procedure note authored by anesthesia.    POC US Guidance Needle Placement    Result Date: 11/10/2023  Ultrasound was performed as guidance to an anesthesia procedure.  Click \"PACS images\" hyperlink below to view any stored images.  For specific procedure details, view procedure note authored by anesthesia.    CT Hip Left w/o Contrast    Result Date: 11/9/2023  EXAM: CT HIP LEFT W/O CONTRAST LOCATION: [facility] DATE: 11/9/2023 INDICATION: Include femur, possible fracture, left hip pain. COMPARISON: 11/09/2023 radiographs. TECHNIQUE: Noncontrast. Axial, sagittal and coronal thin-section reconstruction. Dose reduction techniques were used. FINDINGS: BONES: -Acute mildly displaced subcapital fracture of the left femur as best seen on series 6 image 53 and series 5 image 46. There is mild superior displacement of the femoral shaft. No evidence of additional fractures. Osteopenia. Very mild degenerative changes in the left hip. SOFT TISSUES: -No muscle atrophy. Moderate-sized left hip effusion.     IMPRESSION: 1.  Acute mildly displaced subcapital fracture of the left femur.     Ribs XR, unilat 3 views + PA chest,  left    Result Date: " 11/9/2023  EXAM: XR RIBS AND CHEST LEFT 3 VIEWS LOCATION: [facility] DATE: 11/9/2023 INDICATION: fall, pain COMPARISON: Chest x-ray 07/13/2023, chest CT 07/12/2023     IMPRESSION: No hydropneumothorax. Lungs are clear. Heart is magnified due to projection. No signs of failure. Left rib detail films obtained without a marker. No acute fractures underlying the marker. There is an old fracture deformity of the left posterolateral sixth rib and likely the seventh. These are new since July. Postsurgical changes seen in the upper abdomen  right shoulder with clips.    XR Pelvis and Hip Left 2 Views    Result Date: 11/9/2023  EXAM: XR PELVIS AND HIP LEFT 2 VIEWS LOCATION: [facility] DATE: 11/9/2023 INDICATION: Fall, pain. COMPARISON: None.     IMPRESSION: Subtle area of linear lucency in the subcapital region of the left femur suggests an acute nondisplaced fracture. CT would be helpful in further evaluation. Osteopenia. Status post lumbosacral spinal fusion.    XR Knee Left 3 Views    Result Date: 11/9/2023  EXAM: XR KNEE LEFT 3 VIEWS LOCATION: [facility] DATE: 11/9/2023 INDICATION: Left knee pain after a fall. COMPARISON: None.     IMPRESSION: Prepatellar soft tissue swelling. Normal joint alignment and spacing. No fracture or concerning bone lesion.    Foot  XR, G/E 3 views, right    Result Date: 11/9/2023  EXAM: XR FOOT RIGHT G/E 3 VIEWS LOCATION: [facility] DATE: 11/9/2023 INDICATION: fall, pain COMPARISON: None.     IMPRESSION: Acute transverse nondisplaced fracture of the proximal portion of the fifth metatarsal shaft. No additional fractures. Moderate degenerative changes at the first MTP joint.      Labs: Reviewed I have personally reviewed pertinent labs, tests, and radiologic imaging in patient's chart.  Recent Results (from the past 24 hour(s))   Hemoglobin    Collection Time: 11/11/23  4:50 AM   Result Value  Ref Range    Hemoglobin 11.6 (L) 11.7 - 15.7 g/dL   Glucose    Collection Time: 11/11/23  4:50 AM   Result Value Ref Range    Glucose 126 (H) 70 - 99 mg/dL   Basic metabolic panel    Collection Time: 11/11/23 11:31 AM   Result Value Ref Range    Sodium 134 (L) 135 - 145 mmol/L    Potassium 4.7 3.4 - 5.3 mmol/L    Chloride 94 (L) 98 - 107 mmol/L    Carbon Dioxide (CO2) 25 22 - 29 mmol/L    Anion Gap 15 7 - 15 mmol/L    Urea Nitrogen 27.3 (H) 8.0 - 23.0 mg/dL    Creatinine 4.88 (H) 0.51 - 0.95 mg/dL    GFR Estimate 9 (L) >60 mL/min/1.73m2    Calcium 9.8 8.8 - 10.2 mg/dL    Glucose 125 (H) 70 - 99 mg/dL       Total time spent 65 minutes with greater than 50% in consultation, education and coordination of care.   Also discussed with RN,  pharmacist.   Elements of Medical Decision Making as described above.  Acute or chronic illness or injury or surgery. High risk therapy including opioids, high risk drug therapy including oral and/or parenteral controlled substances    Thank you for this consultation.    CHRIS AcuñaP-C  Acute Care Pain Management Program [facility]   Monday-Friday 8a-4p   Page via online [employer] system or [employer]

## 2023-11-11 NOTE — ANESTHESIA CARE TRANSFER NOTE
Patient: Serene Tipton    Procedure: Procedure(s):  HEMIARTHROPLASTY, HIP, HEMIARTHROPLASTY       Diagnosis: Hip fracture, left, closed, initial encounter (H) [S72.002A]  Diagnosis Additional Information: No value filed.    Anesthesia Type:   Spinal     Note:    Oropharynx: oropharynx clear of all foreign objects  Level of Consciousness: awake  Oxygen Supplementation: room air    Independent Airway: airway patency satisfactory and stable  Dentition: dentition unchanged  Vital Signs Stable: post-procedure vital signs reviewed and stable  Report to RN Given: handoff report given  Patient transferred to: PACU    Handoff Report: Identifed the Patient, Identified the Reponsible Provider, Reviewed the pertinent medical history, Discussed the surgical course, Reviewed Intra-OP anesthesia mangement and issues during anesthesia, Set expectations for post-procedure period and Allowed opportunity for questions and acknowledgement of understanding      Vitals:  Vitals Value Taken Time   /80 11/10/23 2107   Temp 37    Pulse 97 11/10/23 2109   Resp 13 11/10/23 2109   SpO2 97 % 11/10/23 2109   Vitals shown include unfiled device data.    Electronically Signed By: LIIDA Davison CRNA  November 10, 2023  9:10 PM

## 2023-11-11 NOTE — CONSULTS
Care Management Initial Consult    General Information  Assessment completed with: Patient, patient  Type of CM/SW Visit: Initial Assessment    Primary Care Provider verified and updated as needed:     Readmission within the last 30 days: no previous admission in last 30 days      Reason for Consult: discharge planning  Advance Care Planning: Advance Care Planning Reviewed: no concerns identified          Communication Assessment  Patient's communication style: spoken language (English or Bilingual)    Hearing Difficulty or Deaf: no   Wear Glasses or Blind: no    Cognitive  Cognitive/Neuro/Behavioral: .WDL except, mood/behavior, speech  Level of Consciousness: alert  Arousal Level: opens eyes spontaneously  Orientation: oriented x 4  Mood/Behavior: hyperactive (agitated, impulsive), anxious, restless          Living Environment:   People in home: alone     Current living Arrangements: apartment      Able to return to prior arrangements: yes  Living Arrangement Comments: lives alone in regular apartment.    Family/Social Support:  Care provided by: self, friend, other (see comments) (family)  Provides care for: no one, unable/limited ability to care for self  Marital Status: Single  Other (specify) (family, friends)          Description of Support System: Supportive, Involved    Support Assessment: Adequate family and caregiver support, Adequate social supports    Current Resources:   Patient receiving home care services: No     Community Resources: Dialysis Services  Equipment currently used at home: wheelchair, manual, walker, standard, walker, rolling  Supplies currently used at home: None    Employment/Financial:  Employment Status:       Employment/ Comments: unable to assess  Financial Concerns:   none voiced by patient.           Does the patient's insurance plan have a 3 day qualifying hospital stay waiver?  yes    Lifestyle & Psychosocial Needs:  Social Determinants of Health     Food Insecurity: Not  "on file   Depression: Not at risk (1/26/2021)    PHQ-2     PHQ-2 Score: 2   Housing Stability: Not on file   Tobacco Use: Unknown (11/10/2023)    Patient History     Smoking Tobacco Use: Unknown     Smokeless Tobacco Use: Never     Passive Exposure: Not on file   Recent Concern: Tobacco Use - Medium Risk (11/3/2023)    Patient History     Smoking Tobacco Use: Former     Smokeless Tobacco Use: Never     Passive Exposure: Not on file   Financial Resource Strain: Not on file   Alcohol Use: Not on file   Transportation Needs: Not on file   Physical Activity: Not on file   Interpersonal Safety: Not on file   Stress: Not on file   Social Connections: Not on file       Functional Status:  Prior to admission patient needed assistance:   Dependent ADLs:: Independent  Dependent IADLs:: Independent  Assesssment of Functional Status: Not at baseline with ADL Functioning, Not at baseline with mobility, Not at  functional baseline    Mental Health Status:  Mental Health Status: Other (see comment) (unable to assess)  Mental Health Management:  (unable to assess)    Chemical Dependency Status:  Chemical Dependency Status: Other (see comment)  Chemical Dependency Management:  (unable to assess)          Values/Beliefs:  Spiritual, Cultural Beliefs, Denominational Practices, Values that affect care: no               Additional Information:  Writer spoke to patient over phone due to time of day.  Patient tearful at time and feels pain is not being adequately managed despite having Pain Service following.  Patient has not had initial PT, OT evaluations but reports: \"I am leaving tomorrow so I can go home and get my pain back under control\"  Writer had discussion with patient re: importance of PT/OT evaluation and possible need for home care or TCU.    If TCU recommended, patient would like [facility] TCU.    Patient confirms HD [facility] T/TH/Sat 8am for 3 hour run.  Prior to admission, patient drove self but states friends/family will be " able to transport patient if discharge to TCU and likely not able to drive immediately after discharge.    Patient on waiting list for Kidney transplant.    CM/LINDA following along for PT/OT evaluations.      Radha Epstein CM

## 2023-11-11 NOTE — ANESTHESIA POSTPROCEDURE EVALUATION
Patient: Serene Tipton    Procedure: Procedure(s):  HEMIARTHROPLASTY, HIP, HEMIARTHROPLASTY       Anesthesia Type:  Spinal    Note:  Disposition: Inpatient   Postop Pain Control:             Sign Out: Well controlled pain   PONV: No   Neuro/Psych:             Sign Out: Acceptable/Baseline neuro status   Airway/Respiratory:             Sign Out: Acceptable/Baseline resp. status   CV/Hemodynamics:             Sign Out: Acceptable CV status   Other NRE: NONE   DID A NON-ROUTINE EVENT OCCUR?            Last vitals:  Vitals Value Taken Time   /84 11/10/23 2154   Temp 36.8  C (98.24  F) 11/10/23 2157   Pulse 95 11/10/23 2157   Resp 35 11/10/23 2157   SpO2 97 % 11/10/23 2157   Vitals shown include unfiled device data.    Electronically Signed By: Bertin Gavin MD  November 10, 2023  10:53 PM

## 2023-11-11 NOTE — PROVIDER NOTIFICATION
Patient is unable to sit or lay d/t sciatica pain unrelieved by PRN IV Ativan and Dilaudid. Can we get an order for a 1:1 and pain team consult please? Thanks, Megha Haley RN

## 2023-11-11 NOTE — ANESTHESIA PROCEDURE NOTES
"Intrathecal injection Procedure Note    Pre-Procedure   Staff -        Anesthesiologist:  Bertin Gavin MD       Performed By: anesthesiologist       Location: OR       Procedure Start/Stop Times: 11/10/2023 7:35 PM and 11/10/2023 7:40 PM       Pre-Anesthestic Checklist: patient identified, IV checked, risks and benefits discussed, informed consent, monitors and equipment checked, pre-op evaluation, at physician/surgeon's request and post-op pain management  Timeout:       Correct Patient: Yes        Correct Procedure: Yes        Correct Site: Yes        Correct Position: Yes   Procedure Documentation  Procedure: intrathecal injection       Patient Position: RLD       Skin prep: Chloraprep       Insertion Site: L4-5. (right paramedian approach).       Needle Gauge: 24.        Needle Length (Inches): 3.5        Spinal Needle Type: Pencan       Introducer used       # of attempts: 1 and  # of redirects:     Assessment/Narrative         Paresthesias: No.       CSF fluid: clear.    Medication(s) Administered   Medication Administration Time: 11/10/2023 7:35 PM      FOR [facility] ONLY:   Pain Team Contact information: please page the Pain Team Via [employer]. Search \"Pain\". During daytime hours, please page the attending first. At night please page the resident first.      "

## 2023-11-11 NOTE — OP NOTE
Operative Note    Name:  Serene Tipton  PCP:  Ramos Lowry  Procedure Date:  11/9/2023 - 11/10/2023    Left hip hemiarthroplasty    Pre-Procedure Diagnosis:  Left hip femoral neck fracture    Post-Procedure Diagnosis:    Left hip femoral neck fracture    Procedure: Left bipolar hemiarthroplasty using DePuy 3 cemented stem with a +1.5 mm head and 42 mm outside diameter bipolar head.    Surgeon(s):  Lucas Weldon MD PA-C assist: Rivera Evans PA-C  A PAC was necessary to ensure safety of this patient and adequate progression of the procedure.    Anesthesia Type:  Spinal    Estimated Blood Loss:   25 mL    Complications:    None    Indication for procedure: Serene is a 68-year-old female who fell and sustained a displaced femoral neck fracture. Risks and benfits of the procedure were discussed with the patient including infection, bleeding, dislocation, fracture, pain, leg-length discrepancy, damage to nearby structures, and need for further surgery. These risks were understood and they agreed to proceed.    Procedure: The patient was identified in the pre-operative holding area and the operative extremity was marked with indelible ink. After being informed of the risks, benefits, and alternatives to the procedure, the patient desired to proceed and was brought to the operating suite where the patient was placed under spinal anesthetic. The patient received 2 grams of Ancef preoperatively. The patient was positioned in a Horvath hip nicholas and the Left lower extremity was prepped and draped in a manner appropriate for the procedure. A timeout verification step was completed.    Posterior approach was taken to the hip. The piriformis was identified and tagged. The capsule was T ed and tagged. The fracture was identified at the femoral neck. The neck was resected and the head was removed. It was measured at approximately 42 mm. A 42 mm head trial was placed and this demonstrated excellent  stability.    Attention was directed towards the femur. Using a box chisel and canal finder, I then did sequential reaming up to cement 3, following by broaching up to cement 3.  A calcar planer was utilized. Trial reduction was carried out and excellent stability was demonstrated with the construct and a +1.5 mm head was utilized. The trial components were removed. The canal was prepared. A size large cement restrictor was placed. The canal was pulsatile lavaged. A vent tube sponge was placed. Two batches of tobramycin-containing cement were mixed for 90 seconds. These were introduced in a retrograde fashion. The stem was introduced and held in appropriate anteversion until the cement had hardened. Any excess cement was carefully removed. Trial reduction was once again carried out. Good reproduction of leg length and excellent stability was demonstrated. The final components were placed. The hip was reduced. The joint was copiously irrigated. The piriformis and capsule were repaired back to the greater trochanter through 1 drill hole, and through the soft tissues proximally. The IT band was closed with running #1 Vicryl sutures, the subcutaneous was closed with 0 and 2-0 Vicryl and skin was closed with staples. Sterile dressing was applied. An abductor pillow placed. Patient tolerated the procedure well and without complications and was transferred to the PACU in stable condition.    Post-operative plan  WBAT   Follow up in 2 weeks     Lucas Weldon MD    Date: 11/10/2023  Time: 8:53 PM      Implant Name Type Inv. Item Serial No.  Lot No. LRB No. Used Action   BONE CEMENT SIMPLEX FULL DOSE 6191-1-001 - [id number] Cement, Bone BONE CEMENT SIMPLEX FULL DOSE 6191-1-001  [employer] [id number] Left 1 Implanted   BONE CEMENT SIMPLEX FULL DOSE 6191-1-001 - [id number] Cement, Bone BONE CEMENT SIMPLEX FULL DOSE 6191-1-001  [employer] [id number] Left 1 Implanted   PLUG CEMENT VILLEGAS W/INSERT 25MM 3521-2741  - [id number] Total Joint Component/Insert PLUG CEMENT VILLEGAS W/INSERT 25MM 9845-4989  [employer] [id number] Left 1 Implanted   IMP HEAD FEMORAL DEPUY 28MM +1.5MM 1365- - [id number] Total Joint Component/Insert IMP HEAD FEMORAL DEPUY 28MM +1.5MM 1365-  [employer]- [id number] Left 1 Implanted   STEM FEMORAL SUMMIT STD CEMENTED SZ 3 - [id number] Total Joint Component/Insert STEM FEMORAL SUMMIT STD CEMENTED SZ 3  [employer] [facility] [employer]- [id number] Left 1 Implanted   IMP STEM CENTRALIZER DEPUY 8.5MM 1376- - [id number] Total Joint Component/Insert IMP STEM CENTRALIZER DEPUY 8.5MM 1376-  [employer] [facility] [employer]- [id number] Left 1 Implanted   IMP HEAD FEMORAL DEPUY BIPOLAR [id number] - [id number] Total Joint Component/Insert IMP HEAD FEMORAL DEPUY BIPOLAR [id number]  [employer]- [id number] Left 1 Implanted

## 2023-11-11 NOTE — PROGRESS NOTES
Hospitalist follow up note:    Called regarding agitation, pain, restlessness. Med rec indicates she may take up to 50mg oxycodone at home. Will increase prn oxycodone to 10mg, increase prn IV dilaudid.    Gucci Dubois DO   Pager #: [phone]

## 2023-11-11 NOTE — PROGRESS NOTES
RENAL PROGRESS NOTE    CC: ESRD on HD    ASSESSMENT & PLAN:     ESRD on IHD, [facility] ([facility]). Runs on a TTS schedule PTA. ACCESS: LUE AVF.  EDW 37.5kg. Anuric 2/2  s/p bilateral nephrectomy for PKD. S/p HD 11/10. Missed HD 11/9 d/t fall and subseq. Plan for TTS HD schedule  while here.     Anemia of chronic disease: Receives PETE and parenteral iron with outpatient dialysis. Hemoglobin 11s.      Chronic Hyponatremia: Mild, Secondary to ESRD.  UF with HD.     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 BP escalated in the setting of volume and missed HD tx, UF today as able and continue BB     Hip fx: Traumatic after fall, surg scheduled 11/10.      hypothyroidism on replacement     HLD on statin    SUBJECTIVE:   Pt sitting up at edge of bed eating breakfast  Denies n, v, c, d, sob, fever, rash or CP  Pain controlled with medications  Plan is for HD today, and TTS schedule thereafter while here  Discussed with  RN  BP slightly elevated, will challenge UF with HD, follow BP with HD/Uf therapy. control pain.     Discussed lab/plan with pt and answered all questions      OBJECTIVE:  Physical Exam   Temp: 98.1  F (36.7  C) Temp src: Oral BP: (!) 167/84 Pulse: 105   Resp: 18 SpO2: 95 % O2 Device: None (Room air) Oxygen Delivery: 1 LPM  Vitals:    11/09/23 1425   Weight: 36.3 kg (80 lb)     Vital Signs with Ranges  Temp:  [97.7  F (36.5  C)-100  F (37.8  C)] 98.1  F (36.7  C)  Pulse:  [] 105  Resp:  [12-60] 18  BP: (138-193)/() 167/84  SpO2:  [88 %-100 %] 95 %  I/O last 3 completed shifts:  In: 150 [I.V.:150]  Out: 3025 [Other:3000; Blood:25]      Patient Vitals for the past 72 hrs:   Weight   11/09/23 1425 36.3 kg (80 lb)     Intake/Output Summary (Last 24 hours) at 11/11/2023 0955  Last data filed at 11/10/2023 2156  Gross per 24 hour   Intake 150 ml   Output 3025 ml   Net -2875 ml       PHYSICAL EXAM:  GEN: NAD, aox3  CV: RRR. On RA  Lung: clear and equal  Ab:  soft and NT  Ext: + edema and well perfused  Skin: no rash  Psych: normal affect  Neuro: grossly intact  : anuric  Access: JESSI AVF +t/b      LABORATORY STUDIES:     Recent Labs   Lab 11/11/23 0450 11/09/23  2324   WBC  --  8.8   RBC  --  3.31*   HGB 11.6* 11.0*   HCT  --  34.2*   PLT  --  290       Basic Metabolic Panel:  Recent Labs   Lab 11/11/23  0450 11/10/23  0627 11/09/23  1505   NA  --  130* 132*   POTASSIUM  --  5.0 4.8   CHLORIDE  --  91* 91*   CO2  --  25 26   BUN  --  48.5* 43.9*   CR  --  7.60* 6.86*   * 89 92   GAVIN  --  8.8 9.1       INRNo lab results found in last 7 days.     Recent Labs   Lab Test 11/11/23 0450 11/09/23  2324 07/31/23  0703 07/14/23  0503 07/13/23  1239 07/11/23  1131   INR  --   --   --   --  1.70* 1.40*   WBC  --  8.8 8.6   < > 13.0* 11.5*   HGB 11.6* 11.0* 10.1*   < > 8.4* 9.0*   PLT  --  290 335   < > 320 288    < > = values in this interval not displayed.       Personally reviewed current labs    Mitzi PALMA-BC  [facility]  [phone]

## 2023-11-11 NOTE — PROGRESS NOTES
[facility] MEDICINE PROGRESS NOTE      Summary: 68 year old female into [facility] on 11/9/2023 after having  A fall 2 days prior.  PMHx includes ESRD with dialysis due to bilateral  Nephrectomy due to PKD.    Admitting diagnosis was a left hip fracture and right foot fracture. Yesterday  Pt went to OR after a dialysis session for ORIF.  She tolerated well though  Has been experiencing considerable post op left leg sciatica.  Pt normally  Takes oxycodone for sciatica and lumbar djd issues.  Since midnight she  Has received dilaudid 1.2 mg total.  She had a 10 mg oxycodone tablet  At 0322.  Pt has been agitated saying her pain is out of control.    On my interview she is standing near her recliner saying she just received  A dose of dilaudid and the pain is improving. Per RN report she was  Agitated during the night complaining of severe left sciatica pain.    Case discussed with ortho and nephrology teams today.  I suspect the  Sciatica will improve with improved activity.  The clincial goal is to  Support her with narcotics today.  She will be dialyzed today.  The dilaudid  Is mostly cleared hepatically with some active metabolite.  I do not  Feel she needs a 1:1 sitter.  The patient is insightful.      Hospital day number 2    Problem list:     POD #1 left hip ORIF: per ortho;    Sciatica: left leg: judicious use of narcotics today until  She increases her activity with PT  ESRD with T,Th,Sat dialysis: pending plans per nephrology  Foot fracture; right;  Long QT: present on admission though improved on follow up  EKG;   6.  HTN: not great control; continue with labetalol 100 mg twice daily   And add hydralazine 10 mg oral 3 times daily; per chart review   This is not a new issue  7.  Dvt prevention:  JOAQUIN, SCD             Asha Interiano MD  [facility]  [facility]  Phone: #[phone]  Securely message with the Vocera Web Console (learn  "more here)  Text page via [facility] Paging/Directory     Interval History/Subjective:     Physical Exam/Objective:  Temp:  [97.7  F (36.5  C)-100  F (37.8  C)] 98.1  F (36.7  C)  Pulse:  [] 115  Resp:  [12-60] 18  BP: (138-193)/() 138/87  SpO2:  [88 %-100 %] 95 %  Body mass index is 16.16 kg/m .    GENERAL:  Alert, standing at the bedside; just received a dose of dilaudid   HEAD:  Normocephalic, without obvious abnormality, atraumatic   EYES:  PERRL, conjunctiva/corneas clear, no scleral icterus, EOM's intact   NOSE: Nares normal, septum midline, mucosa normal, no drainage   THROAT: Lips, mucosa, and tongue normal; teeth and gums normal, mouth moist   NECK: Supple, symmetrical, trachea midline   BACK:   Symmetric, no curvature, ROM normal   LUNGS:   Clear to auscultation bilaterally, no rales, rhonchi, or wheezing, symmetric chest rise on inhalation, respirations unlabored   CHEST WALL:  No tenderness or deformity   HEART:  Regular rate and rhythm, S1 and S2 normal, no murmur, rub, or gallop    ABDOMEN:   Soft, non-tender, bowel sounds active all four quadrants, no masses, no organomegaly, no rebound or guarding   EXTREMITIES: Patent fistula LUE; bilateral shoulder swelling (chronic)   SKIN: Dry to touch, no exanthems in the visualized areas   NEURO: Alert, oriented x3, moves all four extremities freely   PSYCH: Cooperative, behavior is appropriate      Data reviewed today: I personally reviewed all new medications, labs, imaging/diagnostics reports over the past 24 hours. Pertinent findings include:    Imaging:   Recent Results (from the past 24 hour(s))   POC US Guidance Needle Placement    Narrative    Ultrasound was performed as guidance to an anesthesia procedure.  Click   \"PACS images\" hyperlink below to view any stored images.  For specific   procedure details, view procedure note authored by anesthesia.   POC US Guidance Needle Placement    Narrative    Ultrasound was performed as guidance to an " "anesthesia procedure.  Click   \"PACS images\" hyperlink below to view any stored images.  For specific   procedure details, view procedure note authored by anesthesia.   XR Hip Port Left G/E 2 Views    Narrative    EXAM: XR HIP PORTABLE LEFT 2-3 VIEWS  LOCATION: [facility]  DATE: 11/10/2023    INDICATION: Postop  COMPARISON: CT 11/09/2023      Impression    IMPRESSION: Cemented left hip hemiarthroplasty. Expected postoperative soft tissue thickening, edema, and gas. Otherwise, negative for postoperative purposes.       Labs:  Most Recent 3 BMP's:  Recent Labs   Lab Test 11/11/23  1131 11/11/23  0450 11/10/23  0627 11/09/23  1505   *  --  130* 132*   POTASSIUM 4.7  --  5.0 4.8   CHLORIDE 94*  --  91* 91*   CO2 25  --  25 26   BUN 27.3*  --  48.5* 43.9*   CR 4.88*  --  7.60* 6.86*   ANIONGAP 15  --  14 15   GAVIN 9.8  --  8.8 9.1   * 126* 89 92       Medications:   Personally Reviewed.  Medications    lactated ringers 75 mL/hr at 11/10/23 1621    - MEDICATION INSTRUCTIONS -      - MEDICATION INSTRUCTIONS -        acetaminophen  975 mg Oral Q8H    aspirin  81 mg Oral Daily    ceFAZolin  1 g Intravenous Once    labetalol  100 mg Oral BID    levothyroxine  50 mcg Oral At Bedtime    LORazepam  1 mg Oral Daily before lunch    nortriptyline  75 mg Oral At Bedtime    pantoprazole  40 mg Oral Daily    polyethylene glycol  17 g Oral Daily    senna-docusate  1 tablet Oral BID    senna-docusate  2 tablet Oral BID    sertraline  150 mg Oral At Bedtime    sevelamer carbonate  800-2,400 mg Oral TID AC    sodium chloride (PF)  3 mL Intracatheter Q8H    Vitamin D3  25 mcg Oral Daily      "

## 2023-11-11 NOTE — PROGRESS NOTES
ORTHOPEDIC L/E PROGRESS NOTE      ASSESSMENT  POD # 1 s/p  left hip hemiarthroplasty    PLAN  Continue Teds/SCDs  Continue with PT, OT.  Discharge planning for home when pain controlled, safe with PT  Pain management for left leg pain     Subjective:  Procedure(s):  HEMIARTHROPLASTY, HIP, HEMIARTHROPLASTY on 11/10/2023.   1 Day Post-Op    Pain:  severe left left pain, sciatic pain per patient report.  States left hip surgincal pain mild.  Sitting in chair, appears comfortable    Vital signs in last 24 hours  Temp:  [97.7  F (36.5  C)-100  F (37.8  C)] 98.1  F (36.7  C)  Pulse:  [] 105  Resp:  [12-60] 18  BP: (138-193)/() 167/84  SpO2:  [88 %-100 %] 95 %    EXAM   The patient is awake and alert, c/o pain left leg sciatic region  Calves are soft and non-tender.   Sensation is intact.  Dorsiflexion and plantar flexion is intact.  The dressing C/D/I left hip      Pj Wall MD  [facility]  Date: 11/11/2023  Time: 9:56 AM

## 2023-11-11 NOTE — PROVIDER NOTIFICATION
Dr. Romaine ANDERSON was notified that patient is chewing gum. JUSTIN okay with that and patient was instructed to spit it out.

## 2023-11-11 NOTE — PLAN OF CARE
Goal Outcome Evaluation:    Pain issues overnight.  Surgical pain moderately controlled and sciatic pain/spasms uncontrolled. When spasms occur, pt flails body in bed. She refuses to lay in bed due to pain and will only stand at bedside until pain better controlled.   Spoke with nocturnist and obtained orders for medications to assist in pain control.  Nursing assistant remained at pt bedside for safety until pt agreeable to get back into bed.   Pt alert and oriented.  CMS intact.      Problem: Adult Inpatient Plan of Care  Goal: Plan of Care Review  Description: The Plan of Care Review/Shift note should be completed every shift.  The Outcome Evaluation is a brief statement about your assessment that the patient is improving, declining, or no change.  This information will be displayed automatically on your shift  note.  Outcome: Progressing     Problem: Hip Fracture Surgical Repair  Goal: Optimal Coping with Change in Health Status  Outcome: Progressing

## 2023-11-12 NOTE — PROGRESS NOTES
11/12/23 0900   Appointment Info   Signing Clinician's Name / Credentials (PT) Polo Keating, PT, DPT   Living Environment   People in Home alone   Current Living Arrangements apartment   Home Accessibility no concerns   Self-Care   Usual Activity Tolerance good   Current Activity Tolerance fair   Equipment Currently Used at Home walker, rolling;wheelchair, manual   Fall history within last six months yes   Number of times patient has fallen within last six months 3   General Information   Onset of Illness/Injury or Date of Surgery 11/09/23   Referring Physician Dr. Lane   Patient/Family Therapy Goals Statement (PT) Decrease sciatic pain   Pertinent History of Current Problem (include personal factors and/or comorbidities that impact the POC) Fall, hip fx, R foot fx   Existing Precautions/Restrictions fall;brace worn when out of bed;no hip IR;no hip ADD past midline;90 degree hip flexion;weight bearing  (CAM boot on RLE)   Weight-Bearing Status - LLE weight-bearing as tolerated   Weight-Bearing Status - RLE weight-bearing as tolerated   Range of Motion (ROM)   ROM Comment WFL, decreased BLE due to CAM boot, precautions   Strength (Manual Muscle Testing)   Strength Comments Generalized weakness, pain limited with Bilat  shoulders, foot/hip fxs   Bed Mobility   Bed Mobility supine-sit   Supine-Sit [place] (Bed Mobility) verbal cues;supervision   Impairments Contributing to Impaired Bed Mobility pain   Transfers   Transfers sit-stand transfer   Sit-Stand Transfer   Sit-Stand [place] (Transfers) verbal cues;contact guard   Assistive Device (Sit-Stand Transfers) walker, front-wheeled   Gait/Stairs (Locomotion)   [place] Level (Gait) contact guard   Assistive Device (Gait) walker, front-wheeled   Distance in Feet (Gait) 10'   Pattern (Gait) step-through   Deviations/Abnormal Patterns (Gait) [place] decreased;stride length decreased   Clinical Impression   Criteria for Skilled Therapeutic Intervention  Yes, treatment indicated   PT Diagnosis (PT) Impaired functional mobility   Influenced by the following impairments Weakness, pain   Functional limitations due to impairments Transfers, gait   Clinical Presentation (PT Evaluation Complexity) stable   Clinical Presentation Rationale Pt presents as medically diagnosed   Clinical Decision Making (Complexity) moderate complexity   Planned Therapy Interventions (PT) gait training;home exercise program;patient/family education;strengthening;transfer training   Risk & Benefits of therapy have been explained care plan/treatment goals reviewed;patient   PT Total Evaluation Time   PT Eval, Moderate Complexity Minutes (53662) 10   Physical Therapy Goals   PT Frequency Daily   PT Predicted Duration/Target Date for Goal Attainment 11/17/23   PT Goals Transfers;Gait   PT: Transfers Supervision/stand-by assist;Sit to/from stand;Within precautions   PT: Gait Supervision/stand-by assist;Rolling walker;100 feet   Interventions   Interventions Quick Adds Gait Training;Therapeutic Activity   Therapeutic Activity   Therapeutic Activities: dynamic activities to improve functional performance Minutes (82559) 15   Symptoms Noted During/After Treatment Fatigue;Increased pain   Treatment Detail/Skilled Intervention Supine to sit completed Mod I with HOB slightly elevated to simulate home, educated on precautions, role of therapies, plan. SBA at EOB to work with OT on donning boot. Able to sit<>stand CGA, donning L shoe. Increased time for set up.   Gait Training   Gait Training Minutes (39375) 5   Symptoms Noted During/After Treatment (Gait Training) fatigue;increased pain   Treatment Detail/Skilled Intervention Pt amb into the bathroom with FWW CGA, moving slowly, encouraged mobility as able. Cues for navigation and safety, left with OT in bathroom.   Distance in Feet 20'   [place] Level (Gait Training) contact guard   Physical Assistance Level (Gait Training) verbal cues;supervision    Weight Bearing (Gait Training) weight-bearing as tolerated   Assistive Device (Gait Training) rolling walker   Pattern Analysis (Gait Training) swing-through gait   Gait Analysis Deviations decreased [place];decreased step length   Impairments (Gait Analysis/Training) pain;strength decreased   PT Discharge Planning   PT Plan Increase amb, precautions, LE ther exs, transfers   PT Discharge Recommendation (DC Rec) (S)  Transitional Care Facility   PT Rationale for DC Rec Pt high fall risk and having a hard time completing tasks as she will need to at home on her own (donning CAM boot) due to new precautions for the hip. Could benefit from TCU for increasing strength and overall fx prior to return home.   PT Brief overview of current status Amb in the room CGA with FWW, transfers mostly SBA, needs reminders on precautions and cues for completing/sequencing tasks   PT Equipment Needed at Discharge   (Has 4WW at home)

## 2023-11-12 NOTE — PLAN OF CARE
Problem: Adult Inpatient Plan of Care  Goal: Optimal Comfort and Wellbeing  Outcome: Not Progressing  Intervention: Monitor Pain and Promote Comfort  Recent Flowsheet Documentation  Taken 11/11/2023 1539 by Alfredo Haley RN  Pain Management Interventions: medication (see MAR)  Taken 11/11/2023 0813 by Alfredo Haley RN  Pain Management Interventions:   medication (see MAR)   MD notified (comment)  Taken 11/11/2023 0730 by Alfredo Haley RN  Pain Management Interventions: medication (see MAR)     Problem: Hip Fracture Surgical Repair  Goal: Optimal Pain Control and Function  Outcome: Not Progressing  Intervention: Prevent or Manage Pain  Recent Flowsheet Documentation  Taken 11/11/2023 1539 by Alfredo Haley RN  Pain Management Interventions: medication (see MAR)  Taken 11/11/2023 0813 by Alfredo Haley RN  Pain Management Interventions:   medication (see MAR)   MD notified (comment)  Taken 11/11/2023 0730 by Alfredo Haley RN  Pain Management Interventions: medication (see MAR)   Goal Outcome Evaluation:  BP elevated at times, new orders for Hydralazine 10 mg 3 times daily. Tachycardic at times, improves with pain relief measures. Pain managed with scheduled and PRN medications, ice therapy, repositioning/standing, and rest. CMS intact. Dressing CDI. Up with 1 assist and walker for transfers and ambulation. Anticipated discharge to home tomorrow pending clinical progression.

## 2023-11-12 NOTE — PLAN OF CARE
Problem: Hip Fracture Surgical Repair  Goal: Optimal Coping with Change in Health Status  Outcome: Progressing   BP and HR elevated at times, otherwise vitally stable on RA. Up with 1 assist and walker. CMS intact. Dressing CDI. Pain managed with PRN medications, repositioning/standing, and rest. Now agreeable to TCU, discharge pending placement.

## 2023-11-12 NOTE — PROGRESS NOTES
Will not see today:  PAIN MANAGEMENT SERVICE CHART CHECK  This patient's chart has been reviewed by the Pain Service. It has been determined that no change is necessary to the pain management regimen at this time. Patient refused adding Gabapentin or Lyrica to pain plan.  Per Orthopedics note: Woke patient up, reports she got decent sleep last night. Has hip pain, but sciatica causing biggest issue-this is improving. Discussed with Dr. Lane. PMT will sign off.     Thank you!    LIDIA Dominguez CNP   Acute Care Pain Management Program   Page via [employer] or [employer] web console

## 2023-11-12 NOTE — PROGRESS NOTES
11/12/23 0838   Appointment Info   Signing Clinician's Name / Credentials (OT) DWAYNE Sweet/L, CLT   Rehab Comments (OT) OT eval   Living Environment   People in Home alone   Current Living Arrangements apartment   Home Accessibility no concerns   Transportation Anticipated family or friend will provide   Self-Care   Usual Activity Tolerance good   Current Activity Tolerance fair   Equipment Currently Used at Home wheelchair, manual;walker, standard;shower chair;raised toilet seat;grab bar, toilet;grab bar, tub/shower  (tub/shower with grab bars with HHshower head.)   Fall history within last six months yes   Number of times patient has fallen within last six months 3   Activity/Exercise/Self-Care Comment indep with all I/ADL;   General Information   Onset of Illness/Injury or Date of Surgery 11/09/23   Referring Physician Dr. Interiano   Patient/Family Therapy Goal Statement (OT) home   Additional Occupational Profile Info/Pertinent History of Current Problem s/p gricelda hip arthoplasty   Existing Precautions/Restrictions fall;no hip IR;90 degree hip flexion;weight bearing   Left Lower Extremity (Weight-bearing Status) weight-bearing as tolerated (WBAT)   Right Lower Extremity (Weight-bearing Status) weight-bearing as tolerated (WBAT)  (with CAM boot only)   Cognitive Status Examination   Orientation Status orientation to person, place and time   Cognitive Status Comments STM difficulties   Sensory   Sensory Quick Adds sensation intact   Pain Assessment   Patient Currently in Pain No   Posture   Posture not impaired   Range of Motion Comprehensive   General Range of Motion bilateral upper extremity ROM WNL   Strength Comprehensive (MMT)   Comment, General Manual Muscle Testing (MMT) Assessment gen weakness   Muscle Tone Assessment   Muscle Tone Quick Adds No deficits were identified   Coordination   Upper Extremity Coordination No deficits were identified   Bed Mobility   Comment (Bed Mobility) CGA    Transfers   Transfer Comments min A   Balance   Balance Comments decreased   Activities of Daily Living   BADL Assessment/Intervention lower body dressing;grooming   Lower Body Dressing Assessment/Training   [place] Level (Lower Body Dressing) dependent (less than 25% patient effort)   Grooming Assessment/Training   [place] Level (Grooming) contact guard assist   Clinical Impression   Criteria for Skilled Therapeutic Interventions Met (OT) Yes, treatment indicated   OT Diagnosis decr ADL indep/safety   Influenced by the following impairments s/p fx of metatarsal bone of R foot, L hip gricelda arthroplasty after fracture. 68 year old female into [facility] on 11/9/2023 after having  A fall 2 days prior.  PMHx includes ESRD with dialysis due to bilateral  Nephrectomy due to PKD.   OT Problem List-Impairments impacting ADL activity tolerance impaired;balance;cognition;strength;pain;post-surgical precautions   Assessment of Occupational Performance 3-5 Performance Deficits   Identified Performance Deficits dressing, home mgmt, bathing, func mob, orthotics mgmt., decreased cog   Planned Therapy Interventions (OT) ADL retraining;balance training;bed mobility training;cognition;strengthening;transfer training;progressive activity/exercise   Clinical Decision Making Complexity (OT) detailed assessment/moderate complexity   Risk & Benefits of therapy have been explained care plan/treatment goals reviewed;patient   OT Total Evaluation Time   OT Eval, Moderate Complexity Minutes (74242) 10   OT Goals   Therapy Frequency (OT) Daily   OT Predicted Duration/Target Date for Goal Attainment 11/15/23   OT Goals Lower Body Dressing;Cognition   OT: Lower Body Dressing Modified independent;using adaptive equipment;within precautions   OT: Cognitive Patient/caregiver will verbalize understanding of cognitive assessment results/recommendations as needed for safe discharge planning  (min cues)   Interventions   Interventions  "Quick Adds Self-Care/Home Management   Self-Care/Home Management   Self-Care/Home Mgmt/ADL, Compensatory, Meal Prep Minutes (01008) 30   Symptoms Noted During/After Treatment (Meal Preparation/Planning Training) fatigue   Treatment Detail/Skilled Intervention Supine to sit with SBA and HOB elevated. Donned CAM boot with max A/cues for tech and problem solving. Donned sandal shoe on L LE with set-up/min A. All functional transfers with CGA/FWW/cues for tech/hand placement including bed, toilet, chair. Walked to bathroom with CGA/FWW/cues for posture/safety with FWW. Declined toileting d/t \"I have no kidneys\". Walked to sink and performed g/h tasks with CGA/FWW/cues for safety. Pt stood at sink with sink for support and completed g/h tasks including combing hair. Walked back to the chair with CGA/FWW/cues for posture/safety. Pt needed  Increased time/effort needed for all tasks d/t increased pain/weakness/decreased cog. Problem-solving with mod A. Educ in safe tech for functional transfers and ADL. Role of therapy explained. All questions answered.   OT Discharge Planning   OT Plan Don/doff CAM boot for R LE with stool (not bend past 90); don doff pants with AE; try ACLS?   OT Discharge Recommendation (DC Rec) (S)  Transitional Care Facility   OT Rationale for DC Rec Pt lives alone is fall risk, needs Ax1 for BADL including CAM boot. Decreased cog=decreased safety with all precautions post-fall/post-surgery.   OT Brief overview of current status Ax1 for all BADL;decreased cog/STM   OT Equipment Needed at Discharge   (may have reacher at home?)   Total Session Time   Timed Code Treatment Minutes 30   Total Session Time (sum of timed and untimed services) 40     "

## 2023-11-12 NOTE — PROGRESS NOTES
"Ortho Progress Note      Post-operative Day: 2 Days Post-Op  S/P Procedure(s):  HEMIARTHROPLASTY, HIP, HEMIARTHROPLASTY      Subjective:  Pain: moderate.   Chest pain, SOB:  no  Nausea, vomiting:  no  Lightheadedness, dizziness:  no  Neuro:  Patient denies new onset numbness or paresthesias    Woke patient up, reports she got decent sleep last night. Has hip pain, but sciatica causing biggest issue-this is improving. Complains of bilat shoulder pain and states she was going to make an appt to see [place] but then broke her hip.   States she was told she was going home today.    Objective:  /76 (BP Location: Right arm, Patient Position: Sitting)   Pulse 105   Temp 97.8  F (36.6  C) (Oral)   Resp 16   Ht 1.499 m (4' 11\")   Wt 36.3 kg (80 lb)   SpO2 95%   BMI 16.16 kg/m    Gen: A&O x 3. NAD. Was sleeping in bed when I arrived and woke her.  Wound status: dressing c/d/I  Soreness with palp over left hip and right 5th met  Circulation, motion and sensation: Dorsiflexion/plantarflexion intact and equal bilaterally; distal lower extremity sensation is intact and equal bilaterally  Swelling: mild  Calf tenderness: calves are soft and non-tender bilaterally    Pertinent Labs   Lab Results: personally reviewed.   Lab Results   Component Value Date    INR 1.70 (H) 07/13/2023    INR 1.40 (H) 07/11/2023    INR 1.02 05/19/2021     Lab Results   Component Value Date    WBC 8.8 11/09/2023    HGB 11.6 (L) 11/11/2023    HCT 34.2 (L) 11/09/2023     (H) 11/09/2023     11/09/2023     Lab Results   Component Value Date     (L) 11/11/2023    CO2 25 11/11/2023         ASSESSMENT  POD # 2 s/p  left hip hemiarthroplasty   Right 5th met fracture    Plan:   Continue Teds/SCDs  PT/OT to see- discussed she needs to see therapy before discharging to get there recommendations  Discharge planning when pain controlled, safe with PT. Patient wants to go home today.  Pain management for left leg pain   Follow up " outpatient for shoulder pain and right foot fracture in addition to left hip    Report completed by:  Lan Jack PA-C  Date: 11/12/2023  Time: 7:53 AM

## 2023-11-12 NOTE — PROGRESS NOTES
"[facility] Medicine PROGRESS NOTE      Identification/Summary:   68 year old female history significant for polycystic kidney disease status post bilateral nephrectomy, hypertension ESRD on dialysis, [facility] on 11/9/2023 after a fall 2 days prior to admission.     Admitting diagnosis was a left hip fracture and right foot fracture.  She had surgery for left hip fracture.  Tolerated well.  She had dialysis with no issues.  Had low back pain, notes improved with pain meds.  PT OT recommending TCU.  Patient would like to go home if possible.  Likely discharge in 1 day.     Problem list:      POD #2 left hip bipolar hemiarthroplasty for left hip fracture: per ortho;   DVT prophylaxis start heparin  PT OT recommending TCU     Sciatica: left leg: judicious use of narcotics today until  She increases her activity with PT  ESRD with T,Th,Sat dialysis:   Nephrology following  Input appreciated  Had dialysis 11/10, 11/11  Foot fracture; right; has boot in place.  Long QT: present on admission though improved on follow up  EKG;   6.  HTN: not great control; continue with labetalol 100 mg twice daily              And add hydralazine 10 mg oral 3 times daily; per chart review              This is not a new issue  Monitor.    Acute on chronic anemia of CKD  Monitor hemoglobin      Diet: Regular Diet Adult  DVT Prophylaxis: Start heparin  Code Status: Full Code    Clinically Significant Risk Factors                  # Hypertension: Noted on problem list        # Cachexia: Estimated body mass index is 16.16 kg/m  as calculated from the following:    Height as of this encounter: 1.499 m (4' 11\").    Weight as of this encounter: 36.3 kg (80 lb)., PRESENT ON ADMISSION               Anticipated possible discharge in 1 days once safe discharge plan milestones are met.    Interval History/Subjective:  Her pain is better controlled today.  Denies any shortness of breath, chest pain.  She has not had a stool in many days.  Able " "to pass gas, but has not had much to eat in the last few days.  She has trouble putting the boot on in the right foot.    Physical Exam/Objective:  Vitals I/O   Vital signs:  Temp: 98.1  F (36.7  C) Temp src: Oral BP: (!) 178/83 Pulse: 111   Resp: 16 SpO2: 96 % O2 Device: None (Room air) Oxygen Delivery: 1 LPM Height: 149.9 cm (4' 11\") Weight: 36.3 kg (80 lb)  Estimated body mass index is 16.16 kg/m  as calculated from the following:    Height as of this encounter: 1.499 m (4' 11\").    Weight as of this encounter: 36.3 kg (80 lb).      I/O last 3 completed shifts:  In: 955 [P.O.:840; I.V.:115]  Out: 3.5 [Other:3.5]     Body mass index is 16.16 kg/m .    General Appearance:  Alert, cooperative, no distress   Head:  Normocephalic, atraumatic   Eyes:  PERRL    Throat:  mucosa; moist   Neck: No JVD, thyromegaly   Lungs:   Clear to auscultation bilaterally, respirations unlabored   Chest Wall:  No tenderness or deformity   Heart:  Regular rate and rhythm, S1, S2 normal, systolic murmur   Abdomen:   Soft, non tender, non distended, bowel sounds present, no guarding or rigidity   Extremities: Right foot in a boot, left hip dressing looks clean.   Skin: Skin color, texture, turgor normal, no rashes or lesions   Neurologic: Alert and oriented X 3, Moves all 4 extremities       Medications:   Personally Reviewed.   acetaminophen  975 mg Oral Q8H    heparin ANTICOAGULANT  5,000 Units Subcutaneous Q12H    hydrALAZINE  10 mg Oral TID    labetalol  100 mg Oral BID    levothyroxine  50 mcg Oral At Bedtime    lidocaine  2 patch Transdermal Q24H    lidocaine   Topical 4x Daily    LORazepam  1 mg Oral Daily before lunch    nortriptyline  75 mg Oral At Bedtime    pantoprazole  40 mg Oral Daily    polyethylene glycol  17 g Oral Daily    rosuvastatin  10 mg Oral At Bedtime    senna-docusate  2 tablet Oral BID    sertraline  150 mg Oral At Bedtime    sevelamer carbonate  800-2,400 mg Oral TID AC    sodium chloride (PF)  3 mL " Intracatheter Q8H    Vitamin D3  25 mcg Oral Daily       Data reviewed today: I personally reviewed all new medications, labs, imaging/diagnostics reports over the past 24 hours. Pertinent findings include.     Labs:  Most Recent 3 CBC's:  Recent Labs   Lab Test 11/12/23  0757 11/11/23  0450 11/09/23  2324 07/31/23  0703 07/25/23  1802   WBC  --   --  8.8 8.6 11.0   HGB 9.9* 11.6* 11.0* 10.1* 10.2*   MCV  --   --  103* 117* 109*   PLT  --   --  290 335 305     Most Recent 3 BMP's:  Recent Labs   Lab Test 11/12/23  0757 11/11/23  1131 11/11/23  0450 11/10/23  0627    134*  --  130*   POTASSIUM 4.0 4.7  --  5.0   CHLORIDE 97* 94*  --  91*   CO2 25 25  --  25   BUN 21.1 27.3*  --  48.5*   CR 3.59* 4.88*  --  7.60*   ANIONGAP 13 15  --  14   GAVIN 9.7 9.8  --  8.8   GLC 94 125* 126* 89     Most Recent 2 LFT's:  Recent Labs   Lab Test 07/21/23  1323 07/13/23  1239   AST 38 31   ALT 40 28   ALKPHOS 151* 90   BILITOTAL 0.7 1.1*       Imaging:   No results found for this or any previous visit (from the past 24 hour(s)).       > 50 MINUTES SPENT BY ME on the date of service doing chart review, history, exam, documentation & further activities per the note.    Alissa Lane MD  Hospitalist  [facility]

## 2023-11-12 NOTE — PROGRESS NOTES
Care Management Follow Up    Length of Stay (days): 3    Expected Discharge Date: 11/13/2023     Concerns to be Addressed: discharge planning   (Patient reports WILL d/c tomorrow despite not seeing PT or OT yet for evaluation.)  Patient plan of care discussed at interdisciplinary rounds: Yes    Anticipated Discharge Disposition:  TCU vs Home     Anticipated Discharge Services:    Anticipated Discharge DME:      Patient/family educated on Medicare website which has current facility and service quality ratings:  yes  Education Provided on the Discharge Plan:  yes  Patient/Family in Agreement with the Plan:  yes    Referrals Placed by CM/SW:  TCU referrals sent  Private pay costs discussed: Not applicable    Additional Information:  Patient agreeable TCU choices [facility], [facility]. Patient unable to drive self to HD due to CAM boot on R foot.  Agrees to use friends/family if discharges home or TCU.    Radha Epstein CM

## 2023-11-12 NOTE — PLAN OF CARE
Alert and oriented x4. Able to make needs known via call light. Stood for  approx 2 hours at 0100 d/t sciatica pain. Able to go back to sleep once given flexeril, 10 mg oxycodone, and @ 0300 0.5 IV dilaudid. VSS. Dressing CDI.       Problem: Hip Fracture Surgical Repair  Goal: Optimal Pain Control and Function  Outcome: Not Progressing  Intervention: Prevent or Manage Pain  Recent Flowsheet Documentation  Taken 11/12/2023 0013 by Nabil Kitchen, RN  Pain Management Interventions: medication (see MAR)

## 2023-11-12 NOTE — PROGRESS NOTES
RENAL PROGRESS NOTE    CC: ESRD on HD    ASSESSMENT & PLAN:     ESRD on IHD, [place] [facility] ([place] Runs on a TTS schedule PTA. ACCESS: LUE AVF.  EDW 37.5kg. Anuric 2/2  s/p bilateral nephrectomy for PKD. S/p HD 11/10. Missed HD 11/9 d/t fall and subseq. Plan for TTS HD schedule  while here.     Anemia of chronic disease: Receives PETE and parenteral iron with outpatient dialysis. Hemoglobin 11s.      Chronic Hyponatremia: Mild, Secondary to ESRD.  UF with HD.     Secondary renal hyperparathyroidism: On Sevelamer      HTN: labetalol, hydralazine. has PRN Hydralazine. BP escalated in the setting of volume and missed HD tx, UF today as able and continue BB     Hip fx: Traumatic after fall, surg scheduled 11/10.      hypothyroidism on replacement     HLD on statin    SUBJECTIVE:   Pt sitting up in chair eating her breakfast  Pt has no new concerns today  Tolerated HD well yesterday with 3.5L removed  Denies n, v, c, d, sob, fever, rash or CP  Pain controlled with medications  Plan is for HD today, and TTS schedule thereafter while here  Discussed with floor RN  BP slightly elevated, will challenge UF with HD, follow BP with HD/Uf therapy. control pain.     Discussed lab/plan with pt and answered all questions      OBJECTIVE:  Physical Exam   Temp: 98.1  F (36.7  C) Temp src: Oral BP: (!) 165/84 Pulse: 96   Resp: 16 SpO2: 96 % O2 Device: None (Room air)    Vitals:    11/09/23 1425   Weight: 36.3 kg (80 lb)     Vital Signs with Ranges  Temp:  [97.8  F (36.6  C)-99  F (37.2  C)] 98.1  F (36.7  C)  Pulse:  [] 96  Resp:  [16-18] 16  BP: (136-180)/(75-97) 165/84  SpO2:  [93 %-99 %] 96 %  I/O last 3 completed shifts:  In: 955 [P.O.:840; I.V.:115]  Out: 3.5 [Other:3.5]      Patient Vitals for the past 72 hrs:   Weight   11/09/23 1425 36.3 kg (80 lb)     Intake/Output Summary (Last 24 hours) at 11/11/2023 0955  Last data filed at 11/10/2023 2156  Gross per 24 hour   Intake 150 ml   Output 3025 ml   Net -2875 ml        PHYSICAL EXAM:  GEN: NAD, aox3, thin  CV: RRR. On RA  Lung: clear and equal  Ab: soft and NT  Ext: + edema and well perfused  Skin: no rash  Psych: normal affect  Neuro: grossly intact  : anuric  Access: JESSI AVF +t/b      LABORATORY STUDIES:     Recent Labs   Lab 11/12/23 0757 11/11/23 0450 11/09/23  2324   WBC  --   --  8.8   RBC  --   --  3.31*   HGB 9.9* 11.6* 11.0*   HCT  --   --  34.2*   PLT  --   --  290       Basic Metabolic Panel:  Recent Labs   Lab 11/12/23 0757 11/11/23  1131 11/11/23 0450 11/10/23  0627 11/09/23  1505    134*  --  130* 132*   POTASSIUM 4.0 4.7  --  5.0 4.8   CHLORIDE 97* 94*  --  91* 91*   CO2 25 25  --  25 26   BUN 21.1 27.3*  --  48.5* 43.9*   CR 3.59* 4.88*  --  7.60* 6.86*   GLC 94 125* 126* 89 92   GAVIN 9.7 9.8  --  8.8 9.1       INRNo lab results found in last 7 days.     Recent Labs   Lab Test 11/12/23 0757 11/11/23 0450 11/09/23 2324 07/31/23  0703 07/14/23  0503 07/13/23  1239 07/11/23  1131   INR  --   --   --   --   --  1.70* 1.40*   WBC  --   --  8.8 8.6   < > 13.0* 11.5*   HGB 9.9* 11.6* 11.0* 10.1*   < > 8.4* 9.0*   PLT  --   --  290 335   < > 320 288    < > = values in this interval not displayed.       Personally reviewed current labs    Mitzi PALMA-BC  [facility]  [phone]

## 2023-11-13 NOTE — PLAN OF CARE
Problem: Hip Fracture Surgical Repair  Goal: Optimal Functional Ability  Intervention: Protect Joint Integrity  Recent Flowsheet Documentation  Taken 11/13/2023 0815 by Yue Diaz, RN  Equipment: (CAM boot R foot) immobilizer  Intervention: Promote Optimal Functional Status  Recent Flowsheet Documentation  Taken 11/13/2023 0815 by Yue Diaz, RN  Activity Management:   activity encouraged   activity adjusted per tolerance  Goal: Anesthesia/Sedation Recovery  Intervention: Optimize Anesthesia Recovery  Recent Flowsheet Documentation  Taken 11/13/2023 0815 by Yue Diaz, RN  Safety Promotion/Fall Prevention:   assistive device/personal items within reach   activity supervised   clutter free environment maintained   nonskid shoes/slippers when out of bed   room near nurse's station   safety round/check completed  Goal: Optimal Pain Control and Function  Intervention: Prevent or Manage Pain  Recent Flowsheet Documentation  Taken 11/13/2023 0828 by Yue Diaz, RN  Pain Management Interventions:   medication (see MAR)   repositioned   rest   quiet environment facilitated  Goal: Effective Oxygenation and Ventilation  Intervention: Optimize Oxygenation and Ventilation  Recent Flowsheet Documentation  Taken 11/13/2023 0815 by Yue Diaz, RN  Head of Bed (HOB) Positioning: HOB at 20-30 degrees    A/O x 4. R PIV SL. Pt c/o B/L shoulder pain and R hip pain; rest, ice,repositioning, and medications administered per the MAR. Dressing CDI. A x 1 with walker and gait belt. Discharge pending medical clearance and PT/OT evaluations.

## 2023-11-13 NOTE — PROGRESS NOTES
Pt is alert and oriented x4. Calls appropriately for need. Assist of 1 with ambulation with walker. VSS with exception of bp elevated to 160's, O2 98% on room air. Pt is tolerating regular diet. Pt is voiding appropriately.  Alarms in place, bed in low and locked position, and room environment safe. Contact precautions in place.    Plan: Pt will discharge to TCU tomorrow.

## 2023-11-13 NOTE — PROGRESS NOTES
"Orthopedic Progress Note      Assessment: 3 Days Post-Op  S/P Procedure(s):  HEMIARTHROPLASTY, HIP, HEMIARTHROPLASTY     Postop day #3 status post left hip hemiarthroplasty, progressing appropriately postop    Right closed nondisplaced fx of 5th metatarsal base, non-operative      Plan:   - Continue PT/OT   - Continue CAM boot on right foot   - Weightbearing status: WBAT   - Anticoagulation: Heparin per Medicine, early ambulation  - Discharge planning: Discharge to TCU, pending placement and ride coordination  - Follow up with Dr. Weldon 2 weeks post-op     Subjective:  Pain: Well controlled on Oxycodone and IV Dilaudid   Nausea, Vomiting:  No  Chest pain: No  Lightheadedness, Dizziness:  No  Neuro:  Patient denies new onset numbness or paresthesias     Patient doing well on POD #3. Ambulating with assistance from walker. Tolerating oral intake. Pain is controlled with current regimen. Has passed gas but no BM yet. Anuric and on dialysis.     Hgb 9.9 on 11/12 (11.0 pre-op)     Objective:  BP (!) 154/85   Pulse 96   Temp 97.5  F (36.4  C) (Oral)   Resp 16   Ht 1.499 m (4' 11\")   Wt 36.3 kg (80 lb)   SpO2 98%   BMI 16.16 kg/m    The patient is A&Ox3. Appears comfortable, sitting up at bedside.  Calves are soft and non-tender bilaterally  Brisk capillary refill in the toes.   Palpable left dorsalis pedis pulse. Foot warm & well-perfused.  Sensation is intact to light touch & equal bilaterally in the femoral, DP, SP & tibial nerve distributions.  ROM: Flexes at left hip. Appropriately flexes & extends all toes bilaterally.   Motor: +5/5 dorsiflexion, plantar flexion & EHL bilaterally. Fires quad.   Leg lengths equal.  Left hip dressing C/D/I without strikethrough, no surrounding erythema.   5/5 TA/GSC/EHL      Pertinent Labs   Lab Results: personally reviewed.   Lab Results   Component Value Date    INR 1.70 (H) 07/13/2023    INR 1.40 (H) 07/11/2023    INR 1.02 05/19/2021     Lab Results   Component Value Date    " WBC 8.8 11/09/2023    HGB 9.9 (L) 11/12/2023    HCT 34.2 (L) 11/09/2023     (H) 11/09/2023     11/09/2023     Lab Results   Component Value Date     (L) 11/13/2023    CO2 21 (L) 11/13/2023         Report completed by:  April Hill PA-C/[facility]    Date: 11/13/2023  Time: 10:08 AM

## 2023-11-13 NOTE — PROGRESS NOTES
Care Management Follow Up    Length of Stay (days): 4    Expected Discharge Date: 11/14/2023     Concerns to be Addressed: discharge planning   (Patient reports WILL d/c tomorrow despite not seeing PT or OT yet for evaluation.)  Patient plan of care discussed at interdisciplinary rounds: Yes    Anticipated Discharge Disposition: Transitional Care     Anticipated Discharge Services: None  Anticipated Discharge DME: None    Patient/family educated on Medicare website which has current facility and service quality ratings: yes  Education Provided on the Discharge Plan: Yes  Patient/Family in Agreement with the Plan:      Referrals Placed by CM/SW: Post Acute Facilities  Private pay costs discussed: Transportation     Additional Information:  [facility] can accept Pt when [facility] received. LINDA called [facility] and received authorization:  [id number]  today to November 20th for TCU    4:14 PM  Family will transport Pt tomorrow after dialysis.     EMILIE Warner

## 2023-11-13 NOTE — PROGRESS NOTES
RENAL PROGRESS NOTE    CC: ESRD on HD    ASSESSMENT & PLAN:     ESRD on IHD, [place] [facility] ([place]). Runs on a TTS schedule PTA. ACCESS: LUE AVF.  EDW 37.5kg. Anuric 2/2  s/p bilateral nephrectomy for PKD. S/p HD 11/10. Missed HD 11/9 d/t fall and subseq. Plan for TTS HD schedule  while here.     Anemia of chronic disease: Receives PETE and parenteral iron with outpatient dialysis. Hemoglobin 11's on admit, down-trending to 10, consider additional dose of EPO if going to remain admitted      Chronic Hyponatremia: Mild, Secondary to ESRD.  UF with HD.    Hyperkalemia - Specimen 11/13 hemolyzed, recheck ok at 5.1     Secondary renal hyperparathyroidism: On Sevelamer      HTN: Fairly hypertensive despite aggressive UF, has tendency toward volume overload and some documented med refusals. Increase hydralazine to 50 mg TID and on labetalol 100 mg BID.      Hip fx: Traumatic after fall, s/p surgical correction 11/10       hypothyroidism on replacement     HLD on statin    Dispo - possible discharge to TCU today or tomorrow     SUBJECTIVE: Seen at bedside, up eating breakfast. Feeling well, post-op pain well-controlled. Discussed high K+ reading with RN, first specimen was hemolyzed and recheck ok, did not need any shifting meds.       OBJECTIVE:  Physical Exam   Temp: 97.5  F (36.4  C) Temp src: Oral BP: (!) 154/85 Pulse: 96   Resp: 16 SpO2: 98 % O2 Device: None (Room air)    Vitals:    11/09/23 1425   Weight: 36.3 kg (80 lb)     Vital Signs with Ranges  Temp:  [97.5  F (36.4  C)-98.3  F (36.8  C)] 97.5  F (36.4  C)  Pulse:  [] 96  Resp:  [16-18] 16  BP: (154-185)/() 154/85  SpO2:  [95 %-98 %] 98 %  I/O last 3 completed shifts:  In: 480 [P.O.:480]  Out: -       No data found.    Intake/Output Summary (Last 24 hours) at 11/11/2023 0955  Last data filed at 11/10/2023 2156  Gross per 24 hour   Intake 150 ml   Output 3025 ml   Net -2875 ml       PHYSICAL EXAM:  GEN: NAD, aox3, thin  CV: RRR. On RA  Lung:  clear and equal  Ab: soft and NT  Ext: + edema and well perfused  Skin: no rash  Psych: normal affect  Neuro: grossly intact  : anuric  Access: JESSI AVF +t/b      LABORATORY STUDIES:     Recent Labs   Lab 11/12/23 0757 11/11/23 0450 11/09/23 2324   WBC  --   --  8.8   RBC  --   --  3.31*   HGB 9.9* 11.6* 11.0*   HCT  --   --  34.2*   PLT  --   --  290       Basic Metabolic Panel:  Recent Labs   Lab 11/13/23  0814 11/13/23 0440 11/12/23 0757 11/11/23  1131 11/11/23 0450 11/10/23  0627 11/09/23  1505   NA  --  132* 135 134*  --  130* 132*   POTASSIUM 5.1 6.0* 4.0 4.7  --  5.0 4.8   CHLORIDE  --  96* 97* 94*  --  91* 91*   CO2  --  21* 25 25  --  25 26   BUN  --  36.3* 21.1 27.3*  --  48.5* 43.9*   CR  --  4.80* 3.59* 4.88*  --  7.60* 6.86*   GLC  --  133* 94 125* 126* 89 92   GAVIN  --  9.5 9.7 9.8  --  8.8 9.1       INRNo lab results found in last 7 days.     Recent Labs   Lab Test 11/12/23 0757 11/11/23 0450 11/09/23 2324 07/31/23  0703 07/14/23  0503 07/13/23  1239 07/11/23  1131   INR  --   --   --   --   --  1.70* 1.40*   WBC  --   --  8.8 8.6   < > 13.0* 11.5*   HGB 9.9* 11.6* 11.0* 10.1*   < > 8.4* 9.0*   PLT  --   --  290 335   < > 320 288    < > = values in this interval not displayed.       Personally reviewed current labs    Yue Mcfarland PA-C   [facility]  [phone]

## 2023-11-13 NOTE — PLAN OF CARE
Patient vital signs are at baseline: Yes - Pt declined HS hydralazine. BP in 180's/100's overnight. PRN IV hydralazine given. Last BP taken at 0500 158/88 and then PO hydralazine scheduled given.  Patient able to ambulate as they were prior to admission or with assist devices provided by therapies during their stay:  Yes  Patient MUST void prior to discharge:  No,  Reason:  Anuric  Patient able to tolerate oral intake:  Yes  Pain has adequate pain control using Oral analgesics:  No,  Reason:  PRN IV 0.5 Dilaudid requested by pt d/t sciatic pain. L hip pain okay according to pt.  Does patient have an identified :  No  Has goal D/C date and time been discussed with patient:  Yes - TCU today or tomorrow [facility] and [facility].      Problem: Adult Inpatient Plan of Care  Goal: Readiness for Transition of Care  Outcome: Progressing     Problem: Hip Fracture Surgical Repair  Goal: Optimal Coping with Change in Health Status  Outcome: Progressing  Goal: Absence of Bleeding  Outcome: Progressing  Goal: Optimal Functional Ability  Outcome: Progressing  Intervention: Protect Joint Integrity  Recent Flowsheet Documentation  Taken 11/13/2023 0100 by Nabil Kitchen RN  Equipment: (boot to R ft) immobilizer  Goal: Optimal Pain Control and Function  Outcome: Progressing  Intervention: Prevent or Manage Pain  Recent Flowsheet Documentation  Taken 11/13/2023 0055 by Nabil Kitchen, RN  Pain Management Interventions: medication (see MAR)  Taken 11/13/2023 0005 by Nabil Kitchen, RN  Pain Management Interventions:   rest   repositioned  Goal: Effective Oxygenation and Ventilation  Outcome: Progressing

## 2023-11-13 NOTE — PROGRESS NOTES
"[facility] [facility] [place]      Identification/Summary:   68 year old female history significant for polycystic kidney disease status post bilateral nephrectomy, hypertension ESRD on dialysis,  hospital on 11/9/2023 after a fall 2 days prior to admission.     Admitting diagnosis was a left hip fracture and right foot fracture.  She had surgery for left hip fracture.  Tolerated well.  She had dialysis with no issues.  Had low back pain, notes improved with pain meds.  PT OT recommending TCU.  Awaiting placement.     Problem list:      POD #3 left hip bipolar hemiarthroplasty for left hip fracture: per ortho;   DVT prophylaxis started heparin  PT OT recommending TCU     Sciatica: left leg: Improving.    ESRD with T,Th,Sat dialysis:   Nephrology following  Input appreciated  Had dialysis 11/10, 11/11  Foot fracture; right; has boot in place.  Long QT: present on admission though improved on follow up  EKG;   6.  HTN urgency: Continue with labetalol 100 mg twice daily            Increasing hydralazine to 50mg 3 times daily.    Acute on chronic anemia of CKD  Monitor hemoglobin    Constipation:  On senna.  She does not want to take MiraLAX as she had to drink more fluids  We will schedule home lactulose.    Diet: Regular Diet Adult  DVT Prophylaxis: Start heparin  Code Status: Full Code    Clinically Significant Risk Factors        # Hyperkalemia: Highest K = 6 mmol/L in last 2 days, will monitor as appropriate           # Hypertension: Noted on problem list        # Cachexia: Estimated body mass index is 16.16 kg/m  as calculated from the following:    Height as of this encounter: 1.499 m (4' 11\").    Weight as of this encounter: 36.3 kg (80 lb)., PRESENT ON ADMISSION               Anticipated possible discharge in 1 days once safe discharge plan milestones are met.    Interval History/Subjective:  She is doing better today.  Denies any shortness of breath, chest pain, bowel complaints.  Moving better " "today.    Physical Exam/Objective:  Vitals I/O   Vital signs:  Temp: 97.5  F (36.4  C) Temp src: Oral BP: (!) 177/91 Pulse: 113   Resp: 16 SpO2: 98 % O2 Device: None (Room air) Oxygen Delivery: 1 LPM Height: 149.9 cm (4' 11\") Weight: 36.3 kg (80 lb)  Estimated body mass index is 16.16 kg/m  as calculated from the following:    Height as of this encounter: 1.499 m (4' 11\").    Weight as of this encounter: 36.3 kg (80 lb).      I/O last 3 completed shifts:  In: 480 [P.O.:480]  Out: -      Body mass index is 16.16 kg/m .    General Appearance:  Alert, cooperative, no distress   Head:  Normocephalic, atraumatic   Eyes:  PERRL    Throat:  mucosa; moist   Neck: No JVD, thyromegaly   Lungs:   Clear to auscultation bilaterally, respirations unlabored   Chest Wall:  No tenderness or deformity   Heart:  Regular rate and rhythm, S1, S2 normal, systolic murmur   Abdomen:   Soft, non tender, non distended, bowel sounds present, no guarding or rigidity   Extremities: Right foot in a boot, left hip dressing looks clean.   Skin: Skin color, texture, turgor normal, no rashes or lesions   Neurologic: Alert and oriented X 3, Moves all 4 extremities       Medications:   Personally Reviewed.   [COMPLETED] acetaminophen  975 mg Oral Q8H    heparin ANTICOAGULANT  5,000 Units Subcutaneous Q12H    hydrALAZINE  50 mg Oral Q8H SHAYNA    labetalol  100 mg Oral BID    levothyroxine  50 mcg Oral At Bedtime    lidocaine  2 patch Transdermal Q24H    lidocaine   Topical 4x Daily    LORazepam  1 mg Oral Daily before lunch    nortriptyline  75 mg Oral At Bedtime    pantoprazole  40 mg Oral Daily    polyethylene glycol  17 g Oral BID    rosuvastatin  10 mg Oral At Bedtime    senna-docusate  2 tablet Oral BID    sertraline  150 mg Oral At Bedtime    sevelamer carbonate  800-2,400 mg Oral TID AC    sodium chloride (PF)  3 mL Intracatheter Q8H    Vitamin D3  25 mcg Oral Daily       Data reviewed today: I personally reviewed all new medications, labs, " imaging/diagnostics reports over the past 24 hours. Pertinent findings include.     Labs:  Most Recent 3 CBC's:  Recent Labs   Lab Test 11/12/23  0757 11/11/23  0450 11/09/23  2324 07/31/23  0703 07/25/23  1802   WBC  --   --  8.8 8.6 11.0   HGB 9.9* 11.6* 11.0* 10.1* 10.2*   MCV  --   --  103* 117* 109*   PLT  --   --  290 335 305     Most Recent 3 BMP's:  Recent Labs   Lab Test 11/13/23  0814 11/13/23  0440 11/12/23  0757 11/11/23  1131   NA  --  132* 135 134*   POTASSIUM 5.1 6.0* 4.0 4.7   CHLORIDE  --  96* 97* 94*   CO2  --  21* 25 25   BUN  --  36.3* 21.1 27.3*   CR  --  4.80* 3.59* 4.88*   ANIONGAP  --  15 13 15   GAVIN  --  9.5 9.7 9.8   GLC  --  133* 94 125*     Most Recent 2 LFT's:  Recent Labs   Lab Test 07/21/23  1323 07/13/23  1239   AST 38 31   ALT 40 28   ALKPHOS 151* 90   BILITOTAL 0.7 1.1*       Imaging:   No results found for this or any previous visit (from the past 24 hour(s)).       35 MINUTES SPENT BY ME on the date of service doing chart review, history, exam, documentation & further activities per the note.    Alissa Lane MD  Hospitalist  [facility]

## 2023-11-14 NOTE — PLAN OF CARE
Physical Therapy Discharge Summary    Reason for therapy discharge:    Discharged to transitional care facility.    Progress towards therapy goal(s). See goals on Care Plan in [facility] electronic health record for goal details.  Goals met    Therapy recommendation(s):    Continued therapy is recommended.  Rationale/Recommendations:  fall risk/imbalance, hip prec, CAM boot; lives alone.

## 2023-11-14 NOTE — PROGRESS NOTES
HEMODIALYSIS TREATMENT NOTE    Date: 11/14/2023  Time: 3.45 PM    Data:  Pre Wt: 36.3 kg    Desired Wt: 2-3.5 kg   Post Wt: 33.3 kg(Estimated)    Weight change:3.0   kg  Ultrafiltration - Post Run Net Total Removed : 3000 mL  Vascular Access Status: Fistula  patent  Dialyzer Rinse: Moderate  Total Blood Volume Processed: 73.0 L   Total Dialysis (Treatment) Time: 3.0   Dialysate Bath: K 2, Ca 2.5  Heparin: None    Lab:   No    Interventions:  Pt scheduled for 3.0 hours HD via LAVF, thrill & bruit present. 2 15g needle cannulated .  with 600 DFR. No issues.  No dialysis related medication given during treatment.  100 mL bolus flushing given during treatment, no clot noted.  3.0 kg UF removed, Crit-line steady with c profile at the end of HD run.  Post HD , blood rinsed back, Fistula hemostasis achieved in less than 10 minutes, clean dressing applied & handoff report given.     Assessment:  Pt alert & oriented x4.  Monitoring every 15 minutes & PRN.  AVF thrill & bruit present.       Plan:    For possible discharged today

## 2023-11-14 NOTE — PLAN OF CARE

## 2023-11-14 NOTE — PLAN OF CARE
Patient vital signs are at baseline: Yes  Patient able to ambulate as they were prior to admission or with assist devices provided by therapies during their stay:  Yes  Patient MUST void prior to discharge:  Yes  Patient able to tolerate oral intake:  Yes  Pain has adequate pain control using Oral analgesics:  Yes  Does patient have an identified :  Yes  Has goal D/C date and time been discussed with patient:  Yes  Patient transferred to the floor at 22:20. Night medications were administered and vitals and assessment completed. Report given to NOC RN.

## 2023-11-14 NOTE — PLAN OF CARE
Problem: Hip Fracture Surgical Repair  Goal: Anesthesia/Sedation Recovery  Outcome: Progressing     Problem: Hip Fracture Surgical Repair  Goal: Optimal Pain Control and Function  Outcome: Progressing   Pt oriented x4. Endorsing pain in her L hip and R foot. PRN dilaudid and oxycodone provided per pt request. Did have a dose of IV dilaudid in at AM for breakthrough pain. High Bps, PRN hydralazine as well as scheduled hydralazine provided. Pt having dialysis on 11/14. Surgical dressings clean dry and intact.

## 2023-11-14 NOTE — PROGRESS NOTES
"Orthopedic Progress Note      Assessment: 4 Days Post-Op  S/P Procedure(s):  LEFT HEMIARTHROPLASTY, HIP, HEMIARTHROPLASTY @    Plan:   - Continue PT/OT   - Continue CAM boot on right foot   - Weightbearing status: WBAT   - Anticoagulation: Heparin per Medicine, early ambulation  - Discharge planning: Discharge to TCU, pending placement and ride coordination  - Follow up with Dr. Weldon 2 weeks post-op      Subjective:  Pain: well controlled   Chest pain, SOB: no  Nausea, Vomiting:  no  Lightheadedness, Dizziness:  no  Neuro:  Patient denies new onset numbness or paresthesias    Patient is doing well this morning and is tolerating oral intake, voiding and ambulating without difficulty. Will have dialysis today    Objective:  BP (!) 172/101 (BP Location: Right arm, Patient Position: Semi-Mccoy's, Cuff Size: Adult Small)   Pulse 101   Temp 98.3  F (36.8  C) (Oral)   Resp 18   Ht 1.499 m (4' 11\")   Wt 36.3 kg (80 lb)   SpO2 99%   BMI 16.16 kg/m    The patient is A&Ox3. Appears comfortable.   Sensation is intact.  Dorsiflexion and plantar flexion is intact.  Dorsalis pedis pulse intact.  Calves are soft and non-tender. Negative Zach's.  The incision is covered. Dressing C/D/I.    Pertinent Labs   Lab Results: personally reviewed.   Lab Results   Component Value Date    INR 1.70 (H) 07/13/2023    INR 1.40 (H) 07/11/2023    INR 1.02 05/19/2021     Lab Results   Component Value Date    WBC 8.8 11/09/2023    HGB 9.9 (L) 11/12/2023    HCT 34.2 (L) 11/09/2023     (H) 11/09/2023     11/09/2023     Lab Results   Component Value Date     (L) 11/13/2023    CO2 21 (L) 11/13/2023         Report completed by:  Nickie Bryan PA-C/Dr. Weldon  [facility]    Date: 11/14/2023  Time: 9:19 AM    "

## 2023-11-14 NOTE — PROGRESS NOTES
Gave report to CEDRIC Griffith who is resuming care for pt in room 370, transferring to [address] [place] [address] this evening for dialysis in AM then transfer to [facility] TCU 11/14.

## 2023-11-14 NOTE — PROGRESS NOTES
Care Management Discharge Note    Discharge Date: 11/14/2023       Discharge Disposition: Transitional Care  Discharge Services: None  Discharge DME: None  Discharge Transportation: family or friend will provide  Private pay costs discussed: Not applicable  Does the patient's insurance plan have a 3 day qualifying hospital stay waiver?  No  PAS Confirmation Code: [id number]  Patient/family educated on Medicare website which has current facility and service quality ratings: yes  Education Provided on the Discharge Plan: Yes  Persons Notified of Discharge Plans: yes  Patient/Family in Agreement with the Plan:yes    Handoff Referral Completed: Yes    Additional Information:  SW met with pt who stated that she no longer has transport to and from dialysis from [facility]CU as it is too far. Pt stated that she would prefer to go home with home care or go to [facility]. SW sent referral to [facility] and spoke with admissions who is currently review. Home care referral sent and pending for roberto up plan if not able to go to [facility]. SW updated [facility] that pt will not be coming to facility.  9:53 AM    [facility] accepted pt and bed secured with admissions; pt stated that her niece is able to transport at 1630. SW confirmed with facility and all parties aware and agreeable to discharge plan. DALTON Ferrera notified of updated facility.  10:32 AM    Iban Auth:[id number]      OMAR Pritchard

## 2023-11-14 NOTE — PROGRESS NOTES
RENAL PROGRESS NOTE    CC: ESRD on HD    ASSESSMENT & PLAN:     ESRD on IHD, [facility] ([facility]). Runs on a TTS schedule PTA. ACCESS: LUE AVF.  EDW 37.5kg. Anuric 2/2  s/p bilateral nephrectomy for PKD. S/p HD 11/10. Missed HD 11/9 d/t fall and subseq. Plan for TTS HD schedule while here.     Anemia of chronic disease: Receives PETE and parenteral iron with outpatient dialysis. Hemoglobin 11's on admit, down-trending to 10, consider additional dose of EPO if going to remain admitted      Chronic Hyponatremia: Mild, Secondary to ESRD.  UF with HD.    Hyperkalemia - Specimen 11/13 hemolyzed, recheck ok at 5.1     Secondary renal hyperparathyroidism: On Sevelamer      HTN: Fairly hypertensive despite aggressive UF, has tendency toward volume overload and some documented med refusals. Increased hydralazine to 50 mg TID and on labetalol 100 mg BID, has PRN's and BP still elevated, follow with UF today      Hip fx: Traumatic after fall, s/p surgical correction 11/10       hypothyroidism on replacement     HLD on statin    Dispo - possible discharge to TCU today     SUBJECTIVE: Seen at bedside, no new concerns today. Reviewed plan for discharge after HD today.       OBJECTIVE:  Physical Exam   Temp: 98.3  F (36.8  C) Temp src: Oral BP: (!) 172/101 Pulse: 101   Resp: 18 SpO2: 99 % O2 Device: None (Room air)    Vitals:    11/09/23 1425   Weight: 36.3 kg (80 lb)     Vital Signs with Ranges  Temp:  [97.7  F (36.5  C)-98.3  F (36.8  C)] 98.3  F (36.8  C)  Pulse:  [] 101  Resp:  [16-18] 18  BP: (154-185)/() 172/101  SpO2:  [97 %-100 %] 99 %  I/O last 3 completed shifts:  In: 480 [P.O.:480]  Out: -       No data found.    Intake/Output Summary (Last 24 hours) at 11/11/2023 0955  Last data filed at 11/10/2023 2156  Gross per 24 hour   Intake 150 ml   Output 3025 ml   Net -2875 ml       PHYSICAL EXAM:  GEN: NAD, A&Ox3, thin  CV: RRR. On RA  Lung: clear and equal  Ab: soft and NT  Ext: + edema and well  perfused  Skin: no rash  Psych: normal affect  Neuro: grossly intact  : anuric  Access: JESSI AVF +bruit and thrill       LABORATORY STUDIES:     Recent Labs   Lab 11/12/23 0757 11/11/23 0450 11/09/23 2324   WBC  --   --  8.8   RBC  --   --  3.31*   HGB 9.9* 11.6* 11.0*   HCT  --   --  34.2*   PLT  --   --  290       Basic Metabolic Panel:  Recent Labs   Lab 11/14/23  0708 11/13/23  0814 11/13/23 0440 11/12/23 0757 11/11/23  1131 11/11/23  0450 11/10/23  0627 11/09/23  1505   *  --  132* 135 134*  --  130* 132*   POTASSIUM 5.1 5.1 6.0* 4.0 4.7  --  5.0 4.8   CHLORIDE 94*  --  96* 97* 94*  --  91* 91*   CO2 28  --  21* 25 25  --  25 26   BUN 50.9*  --  36.3* 21.1 27.3*  --  48.5* 43.9*   CR 6.26*  --  4.80* 3.59* 4.88*  --  7.60* 6.86*   GLC 93  --  133* 94 125* 126* 89 92   GAVIN 9.1  --  9.5 9.7 9.8  --  8.8 9.1       INRNo lab results found in last 7 days.     Recent Labs   Lab Test 11/12/23 0757 11/11/23 0450 11/09/23 2324 07/31/23  0703 07/14/23  0503 07/13/23  1239 07/11/23  1131   INR  --   --   --   --   --  1.70* 1.40*   WBC  --   --  8.8 8.6   < > 13.0* 11.5*   HGB 9.9* 11.6* 11.0* 10.1*   < > 8.4* 9.0*   PLT  --   --  290 335   < > 320 288    < > = values in this interval not displayed.       Personally reviewed current labs    Yue Mcfarland PA-C   [facility]  [phone]

## 2023-11-14 NOTE — DISCHARGE SUMMARY
[facility] MEDICINE  DISCHARGE SUMMARY     Primary Care Physician: Ramos Lowry  Admission Date: 11/9/2023   Discharge Provider: Alissa Lane MD Discharge Date: 11/14/2023   Diet: Orders Placed This Encounter      Regular Diet Adult      Renal Diet (dialysis)      Code Status: Full Code   Activity: activity as tolerated   [facility]      Condition at Discharge: Stable      REASON FOR PRESENTATION(See Admission Note for Details)   Fall, hip pain    PRINCIPAL & ACTIVE DISCHARGE DIAGNOSES     Principal Problem:    Closed nondisplaced fracture of metatarsal bone of right foot, unspecified metatarsal, initial encounter  Active Problems:    Hip fracture, left, closed, initial encounter (H) s/p left hip hemiarthroplasty  ESRD on TTS dialysis  Poorly controlled hypertension  Mild hyponatremia  Prolonged QT improved  Hypertensive urgency  Acute on chronic anemia of CKD  Constipation   History of polycystic kidney disease status post bilateral nephrectomy      SIGNIFICANT FINDINGS (Imaging, labs):     Most Recent 3 CBC's:  Recent Labs   Lab Test 11/12/23  0757 11/11/23  0450 11/09/23  2324 07/31/23  0703 07/25/23  1802   WBC  --   --  8.8 8.6 11.0   HGB 9.9* 11.6* 11.0* 10.1* 10.2*   MCV  --   --  103* 117* 109*   PLT  --   --  290 335 305      Most Recent 3 BMP's:  Recent Labs   Lab Test 11/14/23  0708 11/13/23  0814 11/13/23  0440 11/12/23  0757   *  --  132* 135   POTASSIUM 5.1 5.1 6.0* 4.0   CHLORIDE 94*  --  96* 97*   CO2 28  --  21* 25   BUN 50.9*  --  36.3* 21.1   CR 6.26*  --  4.80* 3.59*   ANIONGAP 9  --  15 13   GAVIN 9.1  --  9.5 9.7   GLC 93  --  133* 94     Most Recent 2 LFT's:  Recent Labs   Lab Test 07/21/23  1323 07/13/23  1239   AST 38 31   ALT 40 28   ALKPHOS 151* 90   BILITOTAL 0.7 1.1*     Most Recent INR's and Anticoagulation Dosing History:  Anticoagulation Dose History          Latest Ref Rng & Units 5/19/2021 7/11/2023 7/13/2023   Recent Dosing and Labs    INR 0.85 - 1.15 1.02  1.40  1.70      Most Recent 3 Troponin's:No lab results found.  Most Recent Cholesterol Panel:  Recent Labs   Lab Test 09/06/23  1123   CHOL 162   LDL 65   HDL 75   TRIG 112     Most Recent 6 Bacteria Isolates From Any Culture (See EPIC Reports for Culture Details):No lab results found.  Most Recent TSH, T4 and A1c Labs:  Recent Labs   Lab Test 09/06/23  1123 10/04/22  1414 08/18/22  1731   TSH 3.96   < > 4.49*   T4  --   --  0.72*    < > = values in this interval not displayed.     Results for orders placed or performed during the hospital encounter of 11/09/23   Foot  XR, G/E 3 views, right    Narrative    EXAM: XR FOOT RIGHT G/E 3 VIEWS  LOCATION: [facility]  DATE: 11/9/2023    INDICATION: fall, pain  COMPARISON: None.      Impression    IMPRESSION: Acute transverse nondisplaced fracture of the proximal portion of the fifth metatarsal shaft. No additional fractures. Moderate degenerative changes at the first MTP joint.   Ribs XR, unilat 3 views + PA chest,  left    Narrative    EXAM: XR RIBS AND CHEST LEFT 3 VIEWS  LOCATION: [facility]  DATE: 11/9/2023    INDICATION: fall, pain  COMPARISON: Chest x-ray 07/13/2023, chest CT 07/12/2023      Impression    IMPRESSION: No hydropneumothorax. Lungs are clear. Heart is magnified due to projection. No signs of failure.     Left rib detail films obtained without a marker. No acute fractures underlying the marker.     There is an old fracture deformity of the left posterolateral sixth rib and likely the seventh. These are new since July.    Postsurgical changes seen in the upper abdomen  right shoulder with clips.   XR Pelvis and Hip Left 2 Views    Narrative    EXAM: XR PELVIS AND HIP LEFT 2 VIEWS  LOCATION: [facility]  DATE: 11/9/2023    INDICATION: Fall, pain.  COMPARISON: None.      Impression    IMPRESSION: Subtle area of linear lucency in the subcapital region of the  "left femur suggests an acute nondisplaced fracture. CT would be helpful in further evaluation. Osteopenia. Status post lumbosacral spinal fusion.   XR Knee Left 3 Views    Narrative    EXAM: XR KNEE LEFT 3 VIEWS  LOCATION: [facility]  DATE: 11/9/2023    INDICATION: Left knee pain after a fall.  COMPARISON: None.      Impression    IMPRESSION: Prepatellar soft tissue swelling. Normal joint alignment and spacing. No fracture or concerning bone lesion.   CT Hip Left w/o Contrast    Narrative    EXAM: CT HIP LEFT W/O CONTRAST  LOCATION: [facility]  DATE: 11/9/2023    INDICATION: Include femur, possible fracture, left hip pain.  COMPARISON: 11/09/2023 radiographs.  TECHNIQUE: Noncontrast. Axial, sagittal and coronal thin-section reconstruction. Dose reduction techniques were used.     FINDINGS:     BONES:  -Acute mildly displaced subcapital fracture of the left femur as best seen on series 6 image 53 and series 5 image 46. There is mild superior displacement of the femoral shaft. No evidence of additional fractures. Osteopenia. Very mild degenerative   changes in the left hip.    SOFT TISSUES:  -No muscle atrophy. Moderate-sized left hip effusion.      Impression    IMPRESSION:  1.  Acute mildly displaced subcapital fracture of the left femur.     POC US Guidance Needle Placement    Narrative    Ultrasound was performed as guidance to an anesthesia procedure.  Click   \"PACS images\" hyperlink below to view any stored images.  For specific   procedure details, view procedure note authored by anesthesia.   POC US Guidance Needle Placement    Narrative    Ultrasound was performed as guidance to an anesthesia procedure.  Click   \"PACS images\" hyperlink below to view any stored images.  For specific   procedure details, view procedure note authored by anesthesia.   XR Hip Port Left G/E 2 Views    Narrative    EXAM: XR HIP PORTABLE LEFT 2-3 VIEWS  LOCATION: [facility]  DATE: 11/10/2023    INDICATION: Postop  COMPARISON: CT 11/09/2023      Impression    IMPRESSION: Cemented left hip hemiarthroplasty. Expected postoperative soft tissue thickening, edema, and gas. Otherwise, negative for postoperative purposes.        PENDING LABS         PROCEDURES ( this hospitalization only)      Procedure(s):  LEFT HEMIARTHROPLASTY, HIP, HEMIARTHROPLASTY    RECOMMENDATION FOR F/U VISIT   Monitor blood pressure    DISPOSITION     Skilled Nursing Facility    SUMMARY OF HOSPITAL COURSE:    68-year old lady with past medical history significant for polycystic kidney disease status post bilateral nephrectomy, ESRD on dialysis, hypertension presented to the emergency room after a fall 2 days prior to admission.  She was diagnosed with left subcapital femoral fracture, fifth metatarsal fracture.  She was seen by orthopedic surgery.  She underwent left hip repair with hemiarthroplasty.  Postoperatively she was seen by pain team for pain control.  She had dialysis with no issues in the hospital.  She is discharging to TCU for rehabilitation.    Discharge Medications with Med changes:        Review of your medicines        START taking        Dose / Directions   acetaminophen 325 MG tablet  Commonly known as: TYLENOL      Dose: 975 mg  Take 3 tablets (975 mg) by mouth every 8 hours  Refills: 0     aspirin 81 MG EC tablet      Dose: 81 mg  Take 1 tablet (81 mg) by mouth daily  Refills: 0     hydrALAZINE 50 MG tablet  Commonly known as: APRESOLINE  Used for: Renovascular hypertension      Dose: 50 mg  Take 1 tablet (50 mg) by mouth every 8 hours for 30 days  Quantity: 90 tablet  Refills: 0     polyethylene glycol 17 GM/Dose powder  Commonly known as: MIRALAX      Dose: 17 g  Take 17 g by mouth daily  Refills: 0            CONTINUE these medicines which may have CHANGED, or have new prescriptions. If we are uncertain of the size of tablets/capsules you have at home, strength may be listed  as something that might have changed.        Dose / Directions   oxyCODONE 5 MG tablet  Commonly known as: ROXICODONE  This may have changed:   medication strength  how much to take  reasons to take this  additional instructions      Dose: 5-10 mg  Take 1-2 tablets (5-10 mg) by mouth every 4 hours as needed for severe pain or moderate pain  Quantity: 30 tablet  Refills: 0            CONTINUE these medicines which have NOT CHANGED        Dose / Directions   artificial saliva Soln solution      Dose: 2 spray  Swish and spit 2 sprays in mouth as needed for dry mouth  Refills: 0     calcitRIOL 0.5 MCG capsule  Commonly known as: ROCALTROL      Dose: 0.5 mcg  Take 0.5 mcg by mouth three times a week Given at Dialysis  Refills: 0     cholecalciferol 25 mcg (1000 units) capsule  Commonly known as: VITAMIN D3      Dose: 1 capsule  Take 1 capsule by mouth daily at 2 pm  Refills: 0     cyclobenzaprine 5 MG tablet  Commonly known as: FLEXERIL      Dose: 5 mg  Take 5 mg by mouth 2 times daily as needed for muscle spasms  Refills: 0     EPOGEN IJ      Dose: 1,000 Units  Inject 1,000 Units into the vein three times a week At dialysis.  Refills: 0     glucosamine-chondroitin 500-400 MG Caps per capsule      Dose: 2 capsule  Take 2 capsules by mouth daily at 2 pm  Refills: 0     labetalol 100 MG tablet  Commonly known as: NORMODYNE      Dose: 100 mg  Take 100 mg by mouth 2 times daily  Refills: 0     lactulose 10 GM/15ML solution  Commonly known as: CHRONULAC  Used for: Constipation, unspecified constipation type      Dose: 10 g  Take 15 mLs (10 g) by mouth 2 times daily as needed for constipation  Quantity: 900 mL  Refills: 0     levothyroxine 50 MCG tablet  Commonly known as: SYNTHROID/LEVOTHROID      Dose: 50 mcg  Take 50 mcg by mouth At Bedtime Takes in the middle of the night.  Refills: 0     LORazepam 1 MG tablet  Commonly known as: ATIVAN  Used for: Anxiety      Dose: 1 mg  Take 1 tablet (1 mg) by mouth daily (before  lunch)  Quantity: 3 tablet  Refills: 0     nortriptyline 75 MG capsule  Commonly known as: PAMELOR      Dose: 75 mg  Take 75 mg by mouth At Bedtime  Refills: 0     omeprazole 20 MG DR capsule  Commonly known as: PriLOSEC      Dose: 20 mg  Take 20 mg by mouth daily  Refills: 0     rosuvastatin 10 MG tablet  Commonly known as: CRESTOR      Dose: 10 mg  Take 10 mg by mouth At Bedtime  Refills: 0     SENNA-docusate sodium 8.6-50 MG tablet  Commonly known as: SENNA S  Used for: Constipation, unspecified constipation type      Dose: 2 tablet  Take 2 tablets by mouth 2 times daily Hold for loose stools.  Quantity: 90 tablet  Refills: 0     sertraline 100 MG tablet  Commonly known as: ZOLOFT      Dose: 150 mg  Take 150 mg by mouth At Bedtime  Refills: 0     sevelamer carbonate 800 MG tablet  Commonly known as: RENVELA      Dose: 800-2,400 mg  Take 800-2,400 mg by mouth 3 times daily When eating; range of 1-3 tabs depending on meal size  Refills: 0     SUPER B COMPLEX PO      Dose: 1 tablet  Take 1 tablet by mouth daily at 2 pm  Refills: 0     VENOFER IV      Dose: 100 mg  Inject 100 mg into the vein once a week At dialysis  Refills: 0               Where to get your medicines        These medications were sent to [facility][id number] [address] & [address] [address]      Phone: [phone]   hydrALAZINE 50 MG tablet       Some of these will need a paper prescription and others can be bought over the counter. Ask your nurse if you have questions.    Bring a paper prescription for each of these medications  LORazepam 1 MG tablet  oxyCODONE 5 MG tablet  You don't need a prescription for these medications  acetaminophen 325 MG tablet  aspirin 81 MG EC tablet  polyethylene glycol 17 GM/Dose powder              Rationale for medication changes:    Oxycodone for pain  Aspirin for DVT prevention  Hydralazine dose increased for  "hypertension        Consults   Orthopedic surgery, nephrology      Immunizations given this encounter     [unfilled]      Discharge Procedure Orders   Reason for your hospital stay   Order Comments: Left hip fracture     Activity - Up with nursing assistance     Order Specific Question Answer Comments   Is discharge order? Yes      Weight bearing status   Order Comments: WBAT     Follow Up and recommended labs and tests   Order Comments: Follow up with Dr. Whitley team at 2 weeks.  Call [phone] for appointment and any questions/concerns.  Follow up with orthopedics in 1-2 weeks     Physical Therapy Adult Consult   Order Comments: Evaluate and treat as clinically indicated.    Reason:  Left hip hemiarthroplasty     Occupational Therapy Adult Consult   Order Comments: Evaluate and treat as clinically indicated.    Reason:  left hip hemiarthroplasty     Hip precautions     Renal Diet (dialysis)     Order Specific Question Answer Comments   Is discharge order? Yes      Examination     Vital Signs in last 24 hours:   Vital signs:  Temp: 98.3  F (36.8  C) Temp src: Oral BP: (!) 168/97 Pulse: 119   Resp: 16 SpO2: 98 % O2 Device: None (Room air) Oxygen Delivery: 1 LPM Height: 149.9 cm (4' 11\") Weight: 36.3 kg (80 lb)  Estimated body mass index is 16.16 kg/m  as calculated from the following:    Height as of this encounter: 1.499 m (4' 11\").    Weight as of this encounter: 36.3 kg (80 lb).       Pertinent positives on exam:  Heart S1-S2 heard regular rate and rhythm systolic ejection murmur  Lungs: Clear to auscultation bilaterally  Extremities: No edema right foot in a boot    Please see EMR for more detailed significant labs, imaging, consultant notes etc.  Total time spent on discharge: > 35 minutes    Alissa Lane MD   [facility] Hospitalist Service: Ph:[phone]    CC:Ramos Lowry      "

## 2023-11-14 NOTE — PROGRESS NOTES
Patient vital signs are at baseline: Yes  Patient able to ambulate as they were prior to admission or with assist devices provided by therapies during their stay:  Yes  Patient MUST void prior to discharge:  Yes  Patient able to tolerate oral intake:  Yes  Pain has adequate pain control using Oral analgesics:  No,  Reason:  Required IV pain medication overnight.   Does patient have an identified :  No,  Reason:  patient going to TCU   Has goal D/C date and time been discussed with patient:  Yes    Poppy Chan RN  11/14/2023

## 2023-11-15 NOTE — TELEPHONE ENCOUNTER
Returning patient call. Unable to reach her but left message with my direct line requesting a call back.

## 2023-11-15 NOTE — TELEPHONE ENCOUNTER
Alvarez called to let her care team know she fell fractured her hip and now is at [facility]  PH# [phone] Ext# [phone]     Alvarez wanted to discuss.

## 2023-11-15 NOTE — PROGRESS NOTES
Occupational Therapy Discharge Summary    Reason for therapy discharge:    Discharged to transitional care facility.    Progress towards therapy goal(s). See goals on Care Plan in [facility] electronic health record for goal details.  Goals partially met.  Barriers to achieving goals:   discharge from facility.    Therapy recommendation(s):    Continued therapy is recommended.  Rationale/Recommendations:  To increase indep with ADLs and trsfs.  Further assess cognition.

## 2023-11-24 NOTE — TELEPHONE ENCOUNTER
Returning patient's call. She let me know that she had a fall and broke her hip.  She was in a rehab facility and now has been discharged home. She left AMA because the care was so bad according to her. States she is getting around just fine. Going back to see her ortho doc on Monday and she will call me once she has completed this visit. Let her know that I will be back next Thursday and if she needs something prior to that, she can call the main office number.

## 2023-12-01 NOTE — H&P
[facility]    History and Physical - Hospitalist Service       Date of Admission:  7/13/2023    Assessment & Plan        Serene Tipton is a 67 year old female who has a history of PKD s/p bilateral nephrectomy, ESRD on HD TTS schedule, active transplant evaluation, Anemia,  HTN, HLD, Hypothyroidism, active tobacco use, chronic pain who presented due to abdominal pain and fall and was found to have hyperkalemia 2/2 ESRD as well as SBO.     ESRD on HD  Hyperkalemia  Hyponatremia   Patient had short HD session on Tuesday due to hypotension and fall during dialysis. Creatinine on admission 6.44 (baseline 3-5), potassium 6.1. and Na 130. No EKG changes specific for hyperkalemia.  - Nephrology consulted, plan for HD today    - Telemetry  - Avoiding potassium binders with SBO  - repeat renal panel after dialysis   - Monitor BMP, mag and phos   - If K continues to increase will obtain repeat EKG, give Ca gluconate and insulin with dextrose   - Avoid nephrotoxic medications     Hypoglycemia  Poor appetite  Failure to thrive  Hypoglycemic to 60 per EMS and received dextrose. Reports weight loss, fatigue, poor appetite. Lives alone in her apartment.    -PT/OT  -Hypoglycemia protocol with dextrose  -nutrition consult  -consider mirtazapine  -D5 NS at 50ml/hr  -cortisol level    Small bowel obstruction  Constipation  Nausea  Abdominal pain  Continues to have left sided abdominal pain. Concern for SBP during last admission (7/5-7/6) therefore surgery was consulted, believed constipation to be more likely. At that time, MiraLAX bowel regimen started and patient had multiple loose bowel movements. Recent imaging showed moderate to large amount of stool seen throughout the colon therefore recommend continued bowel regimen. Abdominal XR today showed Multiple air-fluid levels are seen throughout the abdomen, predominantly in the left hemiabdomen. Differential includes small bowel obstruction.  Home prescriptions (including Oxycodone) sent with pt to floor and handed off to ELBA Decker charge nurse.   Further evaluation with CT is recommended. CT abdomen ordered and showed Increased fluid distention of small bowel loops with decompressed distal small bowel, compatible with small bowel obstruction.   -Bowel rest   -Compazine PRN  -TSH  - D5NS at 50ml/hr  - IV dilaudid PRN while NPO    Hypotension  Hx of hypertension   Initial BP 87/60 per EMS. Received 500 ml NS with improvement to SBP 110s.  On multiple anti-hypertensives. Per chart review and report, patient has been hypotensive more frequently including during dialysis. Fall today appears to be d/t mechanical etiology rather than hypotension though consider orthostatics. MRI head negative for acute pathology.   - hold PTA amlodipine, labetalol, clonidine, lisinopril  - midodrine during dialysis per nephro     Small bilateral pleural effusions  Leg swelling  Likely secondary to fluid overload status d/t need for dialysis. Hypoalbuminemia likely also contributing, albumin 2.2.  - dialysis as above  - limit IVF    Nonspecific ST changes, unchanged  Prolonged QT  Mild nonspecific ST depressions which have been seen on previous EKGs. No chest pain. QTc 522.  -avoid QT prolonging meds  -troponin and EKG if chest pain develops      Chronic conditions:  Hypothyroidism: PTA Synthroid  Anxiety/depression: PTA sertraline, PRN ativan   Chronic pain: PTA nortriptyline, hold PTA Oxycodone  Hyperlipidemia: PTA rosuvastatin  Anemia: PTA EPO and Venofer with HD  Tobacco use: declines nicotine replacement     Diet: NPO for Medical/Clinical Reasons Except for: Meds  DVT Prophylaxis: Heparin SQ  Pool Catheter: Not present  Fluids: D5NS at 50ml/hr  Lines: None     Cardiac Monitoring: ACTIVE order. Indication: Electrolyte Imbalance (24 hours)- Magnesium <1.3 mg/ml; Potassium < =2.8 or > 5.5 mg/ml  Code Status: Full Code    Clinically Significant Risk Factors Present on Admission        # Hyperkalemia: Highest K = 6.1 mmol/L in last 2 days, will monitor as appropriate      # Anion  Gap Metabolic Acidosis: Highest Anion Gap = 20 mmol/L in last 2 days, will monitor and treat as appropriate  # Hypoalbuminemia: Lowest albumin = 2.2 g/dL at 7/13/2023 12:39 PM, will monitor as appropriate  # Coagulation Defect: INR = 1.70 (Ref range: 0.85 - 1.15) and/or PTT = 37 Seconds (Ref range: 22 - 38 Seconds), will monitor for bleeding    # Hypertension: Noted on problem list               Disposition Plan      Expected Discharge Date: 07/15/2023                The patient's care was discussed with the Attending Physician, Dr. Albright.      Nigel Awad MD PGY3  Hospitalist Service  [facility]  Securely message with [employer] (more info)  Text page via [facility] Paging/Directory   ______________________________________________________________________    Chief Complaint   Fall     History is obtained from the patient    History of Present Illness   Serene Tipton is a 67 year old female who has a history of PKD s/p bilateral nephrectomy, ESRD on HD TTS schedule, active transplant evaluation, Anemia,  HTN, HLD, Hypothyroidism, active tobacco use, chronic pain, and recent admission for partial SBO who presented due to abdominal pain and fall.    Patient reports a fall today. She was going to sit down on a chair, missed the chair and fell on the ground. She fell on her bottom. She is not sure if her head hit the ground. No LOC.     She currently goes to dialysis TTS. On Tuesday, she was not able to complete dialysis due to a fall as well and hypotension. She was evaluated in the ED on 7/11 for this. No significant abnormalities found. Today, she did not go to dialysis because she fell this morning. No chest pain.    She reports ongoing weight loss and fatigue. She has been having poor appetite. She lives by herself in an apartment.    She continues to have abdominal pain, mostly on the left side. Her last BM was one day ago. No flatus or BM today. Some nausea, no vomiting. Of note,  recently admitted 7/5-7/6 for partial SBO on imaging. Surgery was consulted, believed constipation to be more likely. MiraLAX bowel regimen started and patient was discharged after multiple loose bowel movements.    EMS reported her glucose was 60 and her BP was 87/60. She received dextrose and a 500 ml bolus. SBP in the ED was 110s. She was found to have K 6.1 and Cr >6. Nephrology recommended urgent dialysis.         Past Medical History    Past Medical History:   Diagnosis Date     Anemia in chronic kidney disease      Anxiety      Arthritis      Brain aneurysm     Cavernous segment of the right & left carotid arteries. See Neurosurg note 6/16/21.     Chronic low back pain     Managed by Pain Clinic     Depressive disorder 2013     Disease of thyroid gland      ESRD (end stage renal disease) on dialysis (H) 07/2020     GERD (gastroesophageal reflux disease)      Hepatic lesion      Hyperlipidemia      Hypertension      Nephrolithiasis      Neuromuscular disorder (H)      Osteoporosis      PKD (polycystic kidney disease) 09/01/1990     PTSD (post-traumatic stress disorder)      Tobacco abuse      Vitamin D deficiency        Past Surgical History   Past Surgical History:   Procedure Laterality Date     BACK SURGERY      spinal fusion w/hardware     BIOPSY Left 09/01/1990    Breast     BREAST LUMPECTOMY, RT/LT Left     Lumpectomy     CYST REMOVAL Right     rt wrist     CYSTECTOMY PILONIDAL  1981     EYE SURGERY  2008    lasik     FORMATION ARTERIOVENOUS FISTULA    07/28/2020     FRACTURE SURGERY       NEPHRECTOMY BILATERAL Bilateral 2/1/2023    Procedure: bilateral native nephrectomy;  Surgeon: Alana Giang MD;  Location: [facility] OR     ORTHOPEDIC SURGERY Right 2005    Shoulder     OTHER SURGICAL HISTORY      carpal tunnel repair     SPINE SURGERY         Prior to Admission Medications   Prior to Admission Medications   Prescriptions Last Dose Informant Patient Reported? Taking?   B Complex-C (SUPER B COMPLEX PO)  7/12/2023  Yes Yes   Sig: Take 1 tablet by mouth At Bedtime   Epoetin Adam (EPOGEN IJ) Past Week  Yes Yes   Sig: Inject 1,000 Units into the vein three times a week At dialysis.   Iron Sucrose (VENOFER IV) Past Week  Yes Yes   Sig: Inject 100 mg into the vein once a week At dialysis   LORazepam (ATIVAN) 1 MG tablet 7/13/2023  Yes Yes   Sig: Take 1 mg by mouth daily (before lunch)   amLODIPine (NORVASC) 5 MG tablet 7/12/2023  Yes Yes   Sig: Take 5 mg by mouth At Bedtime   calcitRIOL (ROCALTROL) 0.5 MCG capsule 7/12/2023 at given at HD  Yes Yes   Sig: Take 0.5 mcg by mouth three times a week Given at HD. Abbi Noland, Sat   cholecalciferol (VITAMIN D3) 25 mcg (1000 units) capsule 7/12/2023  Yes Yes   Sig: Take 1 capsule by mouth At Bedtime   labetalol (NORMODYNE) 300 MG tablet 7/12/2023  Yes Yes   Sig: Take 600 mg by mouth At Bedtime   labetalol (NORMODYNE) 300 MG tablet 7/13/2023  Yes Yes   Sig: Take 450 mg by mouth every morning   levothyroxine (SYNTHROID/LEVOTHROID) 50 MCG tablet 7/12/2023  Yes Yes   Sig: Take 50 mcg by mouth At Bedtime Takes in the middle of the night.   lisinopril (ZESTRIL) 40 MG tablet 7/13/2023 at am dose  Yes Yes   Sig: Take 20 mg by mouth 2 times daily   nortriptyline (PAMELOR) 75 MG capsule 7/12/2023  Yes Yes   Sig: Take 75 mg by mouth At Bedtime   oxyCODONE (ROXICODONE) 10 MG tablet 7/12/2023  No Yes   Sig: Use oxycodone 10mg every 3 hours as needed for pain on 2/6 & 2/7. Then return to usual home dose.   rosuvastatin (CRESTOR) 10 MG tablet 7/12/2023  Yes Yes   Sig: Take 10 mg by mouth At Bedtime   senna-docusate (SENOKOT-S/PERICOLACE) 8.6-50 MG tablet 7/12/2023  Yes Yes   Sig: Take 2 tablets by mouth 2 times daily   sertraline (ZOLOFT) 100 MG tablet 7/12/2023  Yes Yes   Sig: Take 150 mg by mouth At Bedtime   sevelamer carbonate (RENVELA) 800 MG tablet 7/13/2023  Yes Yes   Sig: Take 800-2,400 mg by mouth 3 times daily When eating; range of 1-3 tabs depending on meal size       Facility-Administered Medications: None        Review of Systems    The 10 point Review of Systems is negative other than noted in the HPI or here.     Social History   I have reviewed this patient's social history and updated it with pertinent information if needed.  Social History     Tobacco Use     Smoking status: Former     Packs/day: 0.50     Years: 14.00     Pack years: 7.00     Types: Cigarettes     Quit date: 2021     Years since quittin.0     Smokeless tobacco: Never   Substance Use Topics     Alcohol use: Not Currently     Drug use: Never       Family History   I have reviewed this patient's family history and updated it with pertinent information if needed.  Family History   Problem Relation Age of Onset     Polycystic Kidney Diease Mother      Hypertension Mother      Kidney Disease Mother      Cerebrovascular Disease Mother      Chronic Obstructive Pulmonary Disease Father      Multiple Sclerosis Father      Polycystic Kidney Diease Sister      Cardiac Sudden Death Sister 52     Hypertension Sister      Kidney Disease Sister      Polycystic Kidney Diease Maternal Grandmother      Hypertension Maternal Grandmother      Kidney Disease Maternal Grandmother      Cerebrovascular Disease Maternal Grandmother      Heart Disease Maternal Grandfather      Hyperlipidemia Paternal Grandmother      Cerebrovascular Disease Paternal Grandmother      Coronary Artery Disease Paternal Grandfather      Pulmonary Embolism Paternal Grandfather      Anesthesia Reaction No family hx of        Allergies   Allergies   Allergen Reactions     Penicillins Other (See Comments) and Shortness Of Breath     Breathing problem.  breathing       Carvedilol GI Disturbance     Nausea and vomiting     Ciprofloxacin Muscle Pain (Myalgia)     Morphine Other (See Comments)     Vomiting     No Clinical Screening - See Comments      PN: LW CM1: Contrast Intercapsular - Nonionic Reaction :     Nsaids Other (See Comments)     Sulfa  Antibiotics Itching     Sulfamethoxazole-Trimethoprim      Other reaction(s): itching     Levofloxacin Muscle Pain (Myalgia) and Rash        Physical Exam   Vital Signs: Temp: 98.3  F (36.8  C) Temp src: Oral BP: 128/64 Pulse: 80   Resp: 19 SpO2: 95 % O2 Device: None (Room air)    Weight: 96 lbs 1.6 oz    Constitutional: awake but appears tired, cooperative, no apparent distress, thin, ill-appearing  Eyes: Lids and lashes normal, pupils equal, round and reactive to light, extra ocular muscles intact, sclera clear, conjunctiva normal  ENT: Normocephalic, without obvious abnormality, atraumatic, oral pharynx with dry mucous membranes  Respiratory: No increased work of breathing, good air exchange, clear to auscultation bilaterally, no crackles or wheezing  Cardiovascular: Regular rate and rhythm, normal S1 and S2, no S3 or S4, and systolic murmur noted  GI: Healed scars, hypoactive bowel sounds, soft, non-distended, nonspecific mild abdominal tenderness  Skin: Gray, pale. Dry.  Musculoskeletal: mild bilateral lower extremity edema  Neurologic: A&O x4.   Neuropsychiatric: General: normal, calm and normal eye contact  Level of consciousness: drowsy         Data   ------------------------- PAST 24 HR DATA REVIEWED -----------------------------------------------    I have personally reviewed the following data over the past 24 hrs:    13.0 (H)  \   8.4 (L)   / 320     130 (L) 91 (L) 113 (H) /  169 (H)   6.1 (HH) 19 (L) 6.44 (HH) \       ALT: 28 AST: 31 AP: 90 TBILI: 1.1 (H)   ALB: 2.2 (L) TOT PROTEIN: 4.5 (L) LIPASE: N/A       INR:  1.70 (H) PTT:  N/A   D-dimer:  N/A Fibrinogen:  N/A       Imaging results reviewed over the past 24 hrs:   Recent Results (from the past 24 hour(s))   Head CT w/o contrast    Narrative    EXAM: CT HEAD W/O CONTRAST  LOCATION: [facility]  DATE: 7/13/2023    INDICATION: Fall, head injury.  COMPARISON: None.  TECHNIQUE: Routine CT Head without IV contrast. Multiplanar  reformats. Dose reduction techniques were used.    FINDINGS:  No evidence of hemorrhage, mass, or hydrocephalus. Volume loss and patchy/confluent white matter hypoattenuation which likely represents advanced chronic small vessel ischemic change. Multiple presumed chronic basal ganglia lacunar infarcts, similar   compared to the prior. No acute osseous abnormality.      Impression    IMPRESSION: No acute intracranial abnormality.   Chest XR,  PA & LAT    Narrative    EXAM: XR CHEST 2 VIEWS, XR ABDOMEN 2 VIEWS  LOCATION: [facility]  DATE: 7/13/2023    INDICATION: Chest pain, abdominal pain  COMPARISON: 07/05/2023      Impression    IMPRESSION:     Chest:  No focal consolidation. Bilateral small pleural effusion.    ABDOMEN:  Multiple air-fluid levels are seen throughout the abdomen, predominantly in the left hemiabdomen. Differential includes small bowel obstruction. Further evaluation with CT is recommended.   Abdomen XR, 2 vw, flat and upright    Narrative    EXAM: XR CHEST 2 VIEWS, XR ABDOMEN 2 VIEWS  LOCATION: [facility]  DATE: 7/13/2023    INDICATION: Chest pain, abdominal pain  COMPARISON: 07/05/2023      Impression    IMPRESSION:     Chest:  No focal consolidation. Bilateral small pleural effusion.    ABDOMEN:  Multiple air-fluid levels are seen throughout the abdomen, predominantly in the left hemiabdomen. Differential includes small bowel obstruction. Further evaluation with CT is recommended.   CT Abdomen Pelvis w/o Contrast    Narrative    EXAM: CT ABDOMEN PELVIS W/O CONTRAST  LOCATION: [facility]  DATE: 7/13/2023    INDICATION: concern for SBO on CT  COMPARISON: 07/11/2023  TECHNIQUE: CT scan of the abdomen and pelvis was performed without IV contrast. Multiplanar reformats were obtained. Dose reduction techniques were used.  CONTRAST: None.    FINDINGS: Evaluation is limited due to lack of intravenous contrast and generalized  decreased contrast resolution due to diffuse body wall edema.    LOWER CHEST: Redemonstration of moderate cardiomegaly with trace bilateral pleural effusions. Mild atelectasis again seen in the lung bases. No confluent consolidations. Cardiac blood pool is again decreased.    HEPATOBILIARY: Multiple cysts again seen within the liver in keeping with disease spectrum of polycystic kidney disease. Gallbladder is normal.    PANCREAS: Normal.    SPLEEN: Normal.    ADRENAL GLANDS: Normal.    KIDNEYS/BLADDER: Bilateral kidneys appear removed. Urinary bladder is decompressed.    BOWEL: Moderate fluid distention of multiple small bowel loops in the abdomen has increased from the prior study. The most distal small bowel is decompressed. Small amount of stool is seen in the colon which is mostly decompressed as well. A transition   point is difficult to locate due to decreased contrast resolution but may be located in the midabdomen on series 3 image 42. Trace free fluid in the abdomen. No free air.    LYMPH NODES: Normal.    VASCULATURE: The iliac atherosclerotic calcifications with mild ectasia of the infrarenal abdominal aorta measuring 2.4 cm, unchanged. No abdominal aortic aneurysm.    PELVIC ORGANS: Uterus is not seen, likely removed.    MUSCULOSKELETAL: Status post anterior fusion documentation at L5/S1 with intervertebral disc spacer. Mild anterolisthesis of L5 on S1 is unchanged. No suspicious osseous lesions or acute fractures.        Impression    IMPRESSION:     1.  Increased fluid distention of small bowel loops with decompressed distal small bowel, compatible with small bowel obstruction. Transition point is difficult to pinpoint due to lack of intravenous contrast and generalized decreased contrast   resolution, but may be within the mid abdominal mesentery and likely secondary to adhesion.    2.  Moderate cardiomegaly with trace bilateral pleural effusion, small volume ascites and generalized body wall  edema.    3.  Anemia.

## 2023-12-15 NOTE — TELEPHONE ENCOUNTER
Returning call to patient. She wanted to let me know that she is going to see her orthopedic surgeon in a few weeks. Let her know to ask him to put in his note that she is clear for transplant from his perspective. She also wanted to let me know that we should have her primary nephrologist changed to Megan Armstrong. Let her know I can make this change for her. She will call me after she meets with her orthopedic surgeon so I can bring her to Selection Committee for consideration of active listing status.

## 2023-12-19 NOTE — TELEPHONE ENCOUNTER
Dialysis unit calling to inquire about whether or not PRA needs to be drawn. Patient found hers in her bag from a month ago that did not get sent in. We will need a new one drawn and updated level prior to listing so asked that they draw today at dialysis and send to us.

## 2024-01-01 ENCOUNTER — APPOINTMENT (OUTPATIENT)
Dept: RADIOLOGY | Facility: HOSPITAL | Age: 69
DRG: 388 | End: 2024-01-01
Attending: SURGERY
Payer: COMMERCIAL

## 2024-01-01 ENCOUNTER — ANCILLARY PROCEDURE (OUTPATIENT)
Dept: RADIOLOGY | Facility: CLINIC | Age: 69
End: 2024-01-01
Payer: COMMERCIAL

## 2024-01-01 ENCOUNTER — TELEPHONE (OUTPATIENT)
Dept: CARDIOLOGY | Facility: CLINIC | Age: 69
End: 2024-01-01
Payer: COMMERCIAL

## 2024-01-01 ENCOUNTER — TRANSFERRED RECORDS (OUTPATIENT)
Dept: HEALTH INFORMATION MANAGEMENT | Facility: CLINIC | Age: 69
End: 2024-01-01

## 2024-01-01 ENCOUNTER — APPOINTMENT (OUTPATIENT)
Dept: NUCLEAR MEDICINE | Facility: HOSPITAL | Age: 69
End: 2024-01-01
Attending: INTERNAL MEDICINE
Payer: COMMERCIAL

## 2024-01-01 ENCOUNTER — APPOINTMENT (OUTPATIENT)
Dept: CARDIOLOGY | Facility: CLINIC | Age: 69
DRG: 291 | End: 2024-01-01
Attending: STUDENT IN AN ORGANIZED HEALTH CARE EDUCATION/TRAINING PROGRAM
Payer: COMMERCIAL

## 2024-01-01 ENCOUNTER — HOSPITAL ENCOUNTER (EMERGENCY)
Facility: CLINIC | Age: 69
Discharge: HOME | End: 2024-03-07
Attending: STUDENT IN AN ORGANIZED HEALTH CARE EDUCATION/TRAINING PROGRAM | Admitting: STUDENT IN AN ORGANIZED HEALTH CARE EDUCATION/TRAINING PROGRAM
Payer: COMMERCIAL

## 2024-01-01 ENCOUNTER — APPOINTMENT (OUTPATIENT)
Dept: PHYSICAL THERAPY | Facility: CLINIC | Age: 69
DRG: 291 | End: 2024-01-01
Payer: COMMERCIAL

## 2024-01-01 ENCOUNTER — APPOINTMENT (OUTPATIENT)
Dept: RADIOLOGY | Facility: CLINIC | Age: 69
End: 2024-01-01
Attending: STUDENT IN AN ORGANIZED HEALTH CARE EDUCATION/TRAINING PROGRAM
Payer: COMMERCIAL

## 2024-01-01 ENCOUNTER — APPOINTMENT (OUTPATIENT)
Dept: RADIOLOGY | Facility: CLINIC | Age: 69
DRG: 291 | End: 2024-01-01
Attending: EMERGENCY MEDICINE
Payer: COMMERCIAL

## 2024-01-01 ENCOUNTER — LAB (OUTPATIENT)
Dept: LAB | Facility: CLINIC | Age: 69
End: 2024-01-01
Payer: COMMERCIAL

## 2024-01-01 ENCOUNTER — LAB REQUISITION (OUTPATIENT)
Dept: LAB | Facility: CLINIC | Age: 69
End: 2024-01-01

## 2024-01-01 ENCOUNTER — TELEPHONE (OUTPATIENT)
Dept: TRANSPLANT | Facility: CLINIC | Age: 69
End: 2024-01-01
Payer: COMMERCIAL

## 2024-01-01 ENCOUNTER — APPOINTMENT (OUTPATIENT)
Dept: OCCUPATIONAL THERAPY | Facility: CLINIC | Age: 69
DRG: 291 | End: 2024-01-01
Attending: NURSE PRACTITIONER
Payer: COMMERCIAL

## 2024-01-01 ENCOUNTER — OFFICE VISIT (OUTPATIENT)
Dept: CARDIOLOGY | Facility: CLINIC | Age: 69
End: 2024-01-01
Payer: COMMERCIAL

## 2024-01-01 ENCOUNTER — APPOINTMENT (OUTPATIENT)
Dept: CT IMAGING | Facility: CLINIC | Age: 69
DRG: 291 | End: 2024-01-01
Attending: EMERGENCY MEDICINE
Payer: COMMERCIAL

## 2024-01-01 ENCOUNTER — TRANSFERRED RECORDS (OUTPATIENT)
Dept: HEALTH INFORMATION MANAGEMENT | Facility: CLINIC | Age: 69
End: 2024-01-01
Payer: COMMERCIAL

## 2024-01-01 ENCOUNTER — HOSPITAL ENCOUNTER (EMERGENCY)
Facility: CLINIC | Age: 69
Discharge: LEFT BEFORE DISCHARGE | End: 2024-05-10
Payer: COMMERCIAL

## 2024-01-01 ENCOUNTER — HOSPITAL ENCOUNTER (INPATIENT)
Facility: CLINIC | Age: 69
LOS: 4 days | Discharge: HOME | DRG: 388 | End: 2024-03-27
Attending: STUDENT IN AN ORGANIZED HEALTH CARE EDUCATION/TRAINING PROGRAM | Admitting: STUDENT IN AN ORGANIZED HEALTH CARE EDUCATION/TRAINING PROGRAM
Payer: COMMERCIAL

## 2024-01-01 ENCOUNTER — APPOINTMENT (OUTPATIENT)
Dept: CARDIOLOGY | Facility: CLINIC | Age: 69
DRG: 291 | End: 2024-01-01
Attending: INTERNAL MEDICINE
Payer: COMMERCIAL

## 2024-01-01 ENCOUNTER — APPOINTMENT (OUTPATIENT)
Dept: OCCUPATIONAL THERAPY | Facility: CLINIC | Age: 69
DRG: 388 | End: 2024-01-01
Payer: COMMERCIAL

## 2024-01-01 ENCOUNTER — APPOINTMENT (OUTPATIENT)
Dept: CT IMAGING | Facility: CLINIC | Age: 69
DRG: 291 | End: 2024-01-01
Payer: COMMERCIAL

## 2024-01-01 ENCOUNTER — APPOINTMENT (OUTPATIENT)
Dept: RADIOLOGY | Facility: HOSPITAL | Age: 69
DRG: 388 | End: 2024-01-01
Attending: EMERGENCY MEDICINE
Payer: COMMERCIAL

## 2024-01-01 ENCOUNTER — HOSPITAL ENCOUNTER (INPATIENT)
Facility: HOSPITAL | Age: 69
LOS: 7 days | Discharge: HOME | DRG: 388 | End: 2024-07-18
Attending: EMERGENCY MEDICINE | Admitting: INTERNAL MEDICINE
Payer: COMMERCIAL

## 2024-01-01 ENCOUNTER — HOSPITAL ENCOUNTER (INPATIENT)
Facility: HOSPITAL | Age: 69
LOS: 2 days | Discharge: HOME | DRG: 323 | End: 2024-05-02
Attending: INTERNAL MEDICINE | Admitting: INTERNAL MEDICINE
Payer: COMMERCIAL

## 2024-01-01 ENCOUNTER — APPOINTMENT (OUTPATIENT)
Dept: OCCUPATIONAL THERAPY | Facility: HOSPITAL | Age: 69
DRG: 323 | End: 2024-01-01
Attending: INTERNAL MEDICINE
Payer: COMMERCIAL

## 2024-01-01 ENCOUNTER — HOSPITAL ENCOUNTER (OUTPATIENT)
Facility: HOSPITAL | Age: 69
Setting detail: OBSERVATION
Discharge: HOME | End: 2024-06-27
Attending: EMERGENCY MEDICINE | Admitting: INTERNAL MEDICINE
Payer: COMMERCIAL

## 2024-01-01 ENCOUNTER — APPOINTMENT (OUTPATIENT)
Dept: RADIOLOGY | Facility: HOSPITAL | Age: 69
End: 2024-01-01
Attending: EMERGENCY MEDICINE
Payer: COMMERCIAL

## 2024-01-01 ENCOUNTER — APPOINTMENT (OUTPATIENT)
Dept: RADIOLOGY | Facility: HOSPITAL | Age: 69
DRG: 388 | End: 2024-01-01
Attending: FAMILY MEDICINE
Payer: COMMERCIAL

## 2024-01-01 ENCOUNTER — APPOINTMENT (OUTPATIENT)
Dept: MRI IMAGING | Facility: HOSPITAL | Age: 69
DRG: 388 | End: 2024-01-01
Attending: INTERNAL MEDICINE
Payer: COMMERCIAL

## 2024-01-01 ENCOUNTER — DOCUMENTATION ONLY (OUTPATIENT)
Dept: TRANSPLANT | Facility: CLINIC | Age: 69
End: 2024-01-01
Payer: COMMERCIAL

## 2024-01-01 ENCOUNTER — HOSPITAL ENCOUNTER (INPATIENT)
Facility: CLINIC | Age: 69
LOS: 3 days | Discharge: HOME | DRG: 291 | End: 2024-05-20
Attending: EMERGENCY MEDICINE | Admitting: STUDENT IN AN ORGANIZED HEALTH CARE EDUCATION/TRAINING PROGRAM
Payer: COMMERCIAL

## 2024-01-01 ENCOUNTER — APPOINTMENT (OUTPATIENT)
Dept: OCCUPATIONAL THERAPY | Facility: HOSPITAL | Age: 69
DRG: 388 | End: 2024-01-01
Payer: COMMERCIAL

## 2024-01-01 ENCOUNTER — APPOINTMENT (OUTPATIENT)
Dept: CT IMAGING | Facility: CLINIC | Age: 69
DRG: 388 | End: 2024-01-01
Attending: STUDENT IN AN ORGANIZED HEALTH CARE EDUCATION/TRAINING PROGRAM
Payer: COMMERCIAL

## 2024-01-01 ENCOUNTER — APPOINTMENT (OUTPATIENT)
Dept: PHYSICAL THERAPY | Facility: HOSPITAL | Age: 69
DRG: 388 | End: 2024-01-01
Payer: COMMERCIAL

## 2024-01-01 ENCOUNTER — APPOINTMENT (OUTPATIENT)
Dept: RADIOLOGY | Facility: HOSPITAL | Age: 69
DRG: 388 | End: 2024-01-01
Payer: COMMERCIAL

## 2024-01-01 ENCOUNTER — NURSE TRIAGE (OUTPATIENT)
Dept: CARDIOLOGY | Facility: CLINIC | Age: 69
End: 2024-01-01
Payer: COMMERCIAL

## 2024-01-01 ENCOUNTER — HOSPITAL ENCOUNTER (INPATIENT)
Facility: CLINIC | Age: 69
LOS: 1 days | Discharge: ANOTHER HOSPITAL | DRG: 291 | End: 2024-04-30
Attending: EMERGENCY MEDICINE | Admitting: INTERNAL MEDICINE
Payer: COMMERCIAL

## 2024-01-01 ENCOUNTER — COMMITTEE REVIEW (OUTPATIENT)
Dept: TRANSPLANT | Facility: CLINIC | Age: 69
End: 2024-01-01
Payer: COMMERCIAL

## 2024-01-01 ENCOUNTER — TELEPHONE (OUTPATIENT)
Dept: MULTI SPECIALTY CLINIC | Facility: CLINIC | Age: 69
End: 2024-01-01
Payer: COMMERCIAL

## 2024-01-01 ENCOUNTER — APPOINTMENT (OUTPATIENT)
Dept: CARDIOLOGY | Facility: HOSPITAL | Age: 69
End: 2024-01-01
Attending: INTERNAL MEDICINE
Payer: COMMERCIAL

## 2024-01-01 ENCOUNTER — APPOINTMENT (OUTPATIENT)
Dept: CT IMAGING | Facility: HOSPITAL | Age: 69
DRG: 388 | End: 2024-01-01
Attending: EMERGENCY MEDICINE
Payer: COMMERCIAL

## 2024-01-01 ENCOUNTER — APPOINTMENT (OUTPATIENT)
Dept: ULTRASOUND IMAGING | Facility: HOSPITAL | Age: 69
DRG: 388 | End: 2024-01-01
Attending: EMERGENCY MEDICINE
Payer: COMMERCIAL

## 2024-01-01 VITALS
OXYGEN SATURATION: 91 % | DIASTOLIC BLOOD PRESSURE: 86 MMHG | RESPIRATION RATE: 16 BRPM | BODY MASS INDEX: 17.96 KG/M2 | HEIGHT: 59 IN | WEIGHT: 89.07 LBS | TEMPERATURE: 97.8 F | HEART RATE: 101 BPM | SYSTOLIC BLOOD PRESSURE: 146 MMHG

## 2024-01-01 VITALS
RESPIRATION RATE: 16 BRPM | TEMPERATURE: 98.2 F | HEART RATE: 97 BPM | SYSTOLIC BLOOD PRESSURE: 144 MMHG | WEIGHT: 83.2 LBS | DIASTOLIC BLOOD PRESSURE: 84 MMHG | OXYGEN SATURATION: 98 % | BODY MASS INDEX: 16.8 KG/M2

## 2024-01-01 VITALS
TEMPERATURE: 98.6 F | OXYGEN SATURATION: 98 % | WEIGHT: 83 LBS | DIASTOLIC BLOOD PRESSURE: 69 MMHG | HEART RATE: 92 BPM | BODY MASS INDEX: 16.3 KG/M2 | HEIGHT: 60 IN | SYSTOLIC BLOOD PRESSURE: 110 MMHG | RESPIRATION RATE: 18 BRPM

## 2024-01-01 VITALS
HEIGHT: 59 IN | OXYGEN SATURATION: 99 % | RESPIRATION RATE: 18 BRPM | HEART RATE: 104 BPM | BODY MASS INDEX: 16.53 KG/M2 | DIASTOLIC BLOOD PRESSURE: 83 MMHG | TEMPERATURE: 97.7 F | SYSTOLIC BLOOD PRESSURE: 146 MMHG | WEIGHT: 82.01 LBS

## 2024-01-01 VITALS
OXYGEN SATURATION: 98 % | DIASTOLIC BLOOD PRESSURE: 64 MMHG | BODY MASS INDEX: 16.31 KG/M2 | WEIGHT: 80.9 LBS | RESPIRATION RATE: 18 BRPM | TEMPERATURE: 97.8 F | SYSTOLIC BLOOD PRESSURE: 98 MMHG | HEART RATE: 88 BPM | HEIGHT: 59 IN

## 2024-01-01 VITALS
RESPIRATION RATE: 17 BRPM | OXYGEN SATURATION: 96 % | DIASTOLIC BLOOD PRESSURE: 80 MMHG | SYSTOLIC BLOOD PRESSURE: 147 MMHG | TEMPERATURE: 98.9 F | HEART RATE: 88 BPM

## 2024-01-01 VITALS
WEIGHT: 88 LBS | RESPIRATION RATE: 59 BRPM | TEMPERATURE: 98.2 F | OXYGEN SATURATION: 97 % | BODY MASS INDEX: 17.74 KG/M2 | HEART RATE: 94 BPM | DIASTOLIC BLOOD PRESSURE: 76 MMHG | HEIGHT: 59 IN | SYSTOLIC BLOOD PRESSURE: 129 MMHG

## 2024-01-01 VITALS
WEIGHT: 75.2 LBS | RESPIRATION RATE: 16 BRPM | DIASTOLIC BLOOD PRESSURE: 73 MMHG | BODY MASS INDEX: 15.16 KG/M2 | HEIGHT: 59 IN | SYSTOLIC BLOOD PRESSURE: 135 MMHG | OXYGEN SATURATION: 99 % | HEART RATE: 113 BPM | TEMPERATURE: 98.4 F

## 2024-01-01 VITALS
HEART RATE: 100 BPM | RESPIRATION RATE: 18 BRPM | SYSTOLIC BLOOD PRESSURE: 132 MMHG | BODY MASS INDEX: 16.55 KG/M2 | DIASTOLIC BLOOD PRESSURE: 64 MMHG | WEIGHT: 82 LBS | OXYGEN SATURATION: 99 %

## 2024-01-01 DIAGNOSIS — N18.6 END STAGE RENAL DISEASE (H): ICD-10-CM

## 2024-01-01 DIAGNOSIS — Z01.818 PRE-TRANSPLANT EVALUATION FOR KIDNEY TRANSPLANT: ICD-10-CM

## 2024-01-01 DIAGNOSIS — Z99.2 ESRD (END STAGE RENAL DISEASE) ON DIALYSIS (H): ICD-10-CM

## 2024-01-01 DIAGNOSIS — N18.6 ESRD (END STAGE RENAL DISEASE) (H): Primary | ICD-10-CM

## 2024-01-01 DIAGNOSIS — I50.9 CONGESTIVE HEART FAILURE, UNSPECIFIED HF CHRONICITY, UNSPECIFIED HEART FAILURE TYPE (H): ICD-10-CM

## 2024-01-01 DIAGNOSIS — I42.0 DILATED CARDIOMYOPATHY (H): Primary | ICD-10-CM

## 2024-01-01 DIAGNOSIS — K59.00 CONSTIPATION, UNSPECIFIED CONSTIPATION TYPE: Primary | ICD-10-CM

## 2024-01-01 DIAGNOSIS — I21.4 NSTEMI (NON-ST ELEVATED MYOCARDIAL INFARCTION) (H): Primary | ICD-10-CM

## 2024-01-01 DIAGNOSIS — R53.83 OTHER FATIGUE: ICD-10-CM

## 2024-01-01 DIAGNOSIS — I25.5 ISCHEMIC CARDIOMYOPATHY: ICD-10-CM

## 2024-01-01 DIAGNOSIS — R10.10 PAIN OF UPPER ABDOMEN: ICD-10-CM

## 2024-01-01 DIAGNOSIS — N18.9 CHRONIC RENAL FAILURE, UNSPECIFIED CKD STAGE: ICD-10-CM

## 2024-01-01 DIAGNOSIS — I50.23 ACUTE ON CHRONIC SYSTOLIC CONGESTIVE HEART FAILURE (H): ICD-10-CM
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
## 2024-01-01 DIAGNOSIS — E78.5 DYSLIPIDEMIA, GOAL LDL BELOW 70: ICD-10-CM

## 2024-01-01 DIAGNOSIS — R10.11 RIGHT UPPER QUADRANT PAIN: ICD-10-CM

## 2024-01-01 DIAGNOSIS — J81.1 CHRONIC PULMONARY EDEMA: ICD-10-CM

## 2024-01-01 DIAGNOSIS — Z76.82 ORGAN TRANSPLANT CANDIDATE: ICD-10-CM

## 2024-01-01 DIAGNOSIS — K59.00 CONSTIPATION, UNSPECIFIED CONSTIPATION TYPE: ICD-10-CM

## 2024-01-01 DIAGNOSIS — R06.02 SOB (SHORTNESS OF BREATH): ICD-10-CM

## 2024-01-01 DIAGNOSIS — R79.89 ELEVATED TROPONIN: ICD-10-CM

## 2024-01-01 DIAGNOSIS — M25.511 CHRONIC RIGHT SHOULDER PAIN: ICD-10-CM

## 2024-01-01 DIAGNOSIS — I25.10 CORONARY ARTERY DISEASE INVOLVING NATIVE CORONARY ARTERY OF NATIVE HEART WITHOUT ANGINA PECTORIS: Primary | ICD-10-CM

## 2024-01-01 DIAGNOSIS — I50.22 CHRONIC SYSTOLIC CONGESTIVE HEART FAILURE (H): ICD-10-CM

## 2024-01-01 DIAGNOSIS — Z53.21 PATIENT LEFT WITHOUT BEING SEEN: ICD-10-CM

## 2024-01-01 DIAGNOSIS — M25.519 PAIN IN UNSPECIFIED SHOULDER: ICD-10-CM

## 2024-01-01 DIAGNOSIS — K56.609 SMALL BOWEL OBSTRUCTION (H): ICD-10-CM

## 2024-01-01 DIAGNOSIS — N18.6 ESRD (END STAGE RENAL DISEASE) ON DIALYSIS (H): ICD-10-CM

## 2024-01-01 DIAGNOSIS — I10 BENIGN ESSENTIAL HYPERTENSION: ICD-10-CM

## 2024-01-01 DIAGNOSIS — Z99.2 END-STAGE RENAL DISEASE ON HEMODIALYSIS (H): ICD-10-CM

## 2024-01-01 DIAGNOSIS — N18.6 END-STAGE RENAL DISEASE ON HEMODIALYSIS (H): ICD-10-CM

## 2024-01-01 DIAGNOSIS — E87.5 HYPERKALEMIA: ICD-10-CM

## 2024-01-01 DIAGNOSIS — Z09 HOSPITAL DISCHARGE FOLLOW-UP: ICD-10-CM

## 2024-01-01 DIAGNOSIS — R79.89 ELEVATED TROPONIN: Primary | ICD-10-CM

## 2024-01-01 DIAGNOSIS — G89.29 CHRONIC RIGHT SHOULDER PAIN: ICD-10-CM

## 2024-01-01 LAB
1,25(OH)2D SERPL-MCNC: 41.8 PG/ML (ref 19.9–79.3)
ABO/RH(D): NORMAL
ABO/RH(D): NORMAL
ACT BLD: 217 SECONDS (ref 74–150)
ACT BLD: 240 SECONDS (ref 74–150)
ACT BLD: 244 SECONDS (ref 74–150)
ACT BLD: 259 SECONDS (ref 74–150)
ALBUMIN SERPL BCG-MCNC: 3.6 G/DL (ref 3.5–5.2)
ALBUMIN SERPL BCG-MCNC: 3.6 G/DL (ref 3.5–5.2)
ALBUMIN SERPL BCG-MCNC: 3.7 G/DL (ref 3.5–5.2)
ALBUMIN SERPL BCG-MCNC: 3.8 G/DL (ref 3.5–5.2)
ALBUMIN SERPL BCG-MCNC: 3.8 G/DL (ref 3.5–5.2)
ALBUMIN SERPL BCG-MCNC: 3.9 G/DL (ref 3.5–5.2)
ALBUMIN SERPL BCG-MCNC: 4.1 G/DL (ref 3.5–5.2)
ALP SERPL-CCNC: 107 U/L (ref 40–150)
ALP SERPL-CCNC: 112 U/L (ref 40–150)
ALP SERPL-CCNC: 118 U/L (ref 40–150)
ALP SERPL-CCNC: 121 U/L (ref 40–150)
ALP SERPL-CCNC: 126 U/L (ref 40–150)
ALP SERPL-CCNC: 131 U/L (ref 40–150)
ALP SERPL-CCNC: 137 U/L (ref 40–150)
ALP SERPL-CCNC: 157 U/L (ref 40–150)
ALT SERPL W P-5'-P-CCNC: 10 U/L (ref 0–50)
ALT SERPL W P-5'-P-CCNC: 10 U/L (ref 0–50)
ALT SERPL W P-5'-P-CCNC: 11 U/L (ref 0–50)
ALT SERPL W P-5'-P-CCNC: 11 U/L (ref 0–50)
ALT SERPL W P-5'-P-CCNC: 17 U/L (ref 0–50)
ALT SERPL W P-5'-P-CCNC: 9 U/L (ref 0–50)
ALT SERPL W P-5'-P-CCNC: 9 U/L (ref 0–50)
ALT SERPL W P-5'-P-CCNC: <5 U/L (ref 0–50)
ANION GAP SERPL CALCULATED.3IONS-SCNC: 11 MMOL/L (ref 7–15)
ANION GAP SERPL CALCULATED.3IONS-SCNC: 12 MMOL/L (ref 7–15)
ANION GAP SERPL CALCULATED.3IONS-SCNC: 12 MMOL/L (ref 7–15)
ANION GAP SERPL CALCULATED.3IONS-SCNC: 13 MMOL/L (ref 7–15)
ANION GAP SERPL CALCULATED.3IONS-SCNC: 13 MMOL/L (ref 7–15)
ANION GAP SERPL CALCULATED.3IONS-SCNC: 14 MMOL/L (ref 7–15)
ANION GAP SERPL CALCULATED.3IONS-SCNC: 15 MMOL/L (ref 7–15)
ANION GAP SERPL CALCULATED.3IONS-SCNC: 15 MMOL/L (ref 7–15)
ANION GAP SERPL CALCULATED.3IONS-SCNC: 16 MMOL/L (ref 7–15)
ANION GAP SERPL CALCULATED.3IONS-SCNC: 16 MMOL/L (ref 7–15)
ANION GAP SERPL CALCULATED.3IONS-SCNC: 17 MMOL/L (ref 7–15)
ANION GAP SERPL CALCULATED.3IONS-SCNC: 18 MMOL/L (ref 7–15)
ANION GAP SERPL CALCULATED.3IONS-SCNC: 19 MMOL/L (ref 7–15)
ANION GAP SERPL CALCULATED.3IONS-SCNC: 21 MMOL/L (ref 7–15)
ANION GAP SERPL CALCULATED.3IONS-SCNC: 25 MMOL/L (ref 7–15)
ANION GAP SERPL CALCULATED.3IONS-SCNC: 25 MMOL/L (ref 7–15)
ANION GAP SERPL CALCULATED.3IONS-SCNC: 28 MMOL/L (ref 7–15)
ANION GAP SERPL CALCULATED.3IONS-SCNC: 30 MMOL/L (ref 7–15)
ANION GAP SERPL CALCULATED.3IONS-SCNC: 32 MMOL/L (ref 7–15)
ANTIBODY SCREEN: NEGATIVE
ANTIBODY SCREEN: NEGATIVE
AST SERPL W P-5'-P-CCNC: 105 U/L (ref 0–45)
AST SERPL W P-5'-P-CCNC: 20 U/L (ref 0–45)
AST SERPL W P-5'-P-CCNC: 20 U/L (ref 0–45)
AST SERPL W P-5'-P-CCNC: 21 U/L (ref 0–45)
AST SERPL W P-5'-P-CCNC: 21 U/L (ref 0–45)
AST SERPL W P-5'-P-CCNC: 22 U/L (ref 0–45)
AST SERPL W P-5'-P-CCNC: 25 U/L (ref 0–45)
AST SERPL W P-5'-P-CCNC: 32 U/L (ref 0–45)
ATRIAL RATE - MUSE: 101 BPM
ATRIAL RATE - MUSE: 117 BPM
ATRIAL RATE - MUSE: 117 BPM
ATRIAL RATE - MUSE: 88 BPM
ATRIAL RATE - MUSE: 89 BPM
ATRIAL RATE - MUSE: 91 BPM
ATRIAL RATE - MUSE: 94 BPM
ATRIAL RATE - MUSE: 99 BPM
BACTERIA BLD CULT: NO GROWTH
BACTERIA BLD CULT: NO GROWTH
BASOPHILS # BLD AUTO: 0 10E3/UL (ref 0–0.2)
BASOPHILS # BLD AUTO: 0.1 10E3/UL (ref 0–0.2)
BASOPHILS NFR BLD AUTO: 0 %
BASOPHILS NFR BLD AUTO: 1 %
BASOPHILS NFR BLD AUTO: 1 %
BILIRUB DIRECT SERPL-MCNC: 0.21 MG/DL (ref 0–0.3)
BILIRUB DIRECT SERPL-MCNC: <0.2 MG/DL (ref 0–0.3)
BILIRUB DIRECT SERPL-MCNC: ABNORMAL MG/DL
BILIRUB SERPL-MCNC: 0.3 MG/DL
BILIRUB SERPL-MCNC: 0.4 MG/DL
BILIRUB SERPL-MCNC: 0.5 MG/DL
BILIRUB SERPL-MCNC: 0.6 MG/DL
BUN SERPL-MCNC: 18.7 MG/DL (ref 8–23)
BUN SERPL-MCNC: 26.4 MG/DL (ref 8–23)
BUN SERPL-MCNC: 36.1 MG/DL (ref 8–23)
BUN SERPL-MCNC: 39.1 MG/DL (ref 8–23)
BUN SERPL-MCNC: 39.1 MG/DL (ref 8–23)
BUN SERPL-MCNC: 44.1 MG/DL (ref 8–23)
BUN SERPL-MCNC: 44.9 MG/DL (ref 8–23)
BUN SERPL-MCNC: 46.9 MG/DL (ref 8–23)
BUN SERPL-MCNC: 51.3 MG/DL (ref 8–23)
BUN SERPL-MCNC: 57.2 MG/DL (ref 8–23)
BUN SERPL-MCNC: 60.4 MG/DL (ref 8–23)
BUN SERPL-MCNC: 60.9 MG/DL (ref 8–23)
BUN SERPL-MCNC: 70.4 MG/DL (ref 8–23)
BUN SERPL-MCNC: 71 MG/DL (ref 8–23)
BUN SERPL-MCNC: 73.9 MG/DL (ref 8–23)
BUN SERPL-MCNC: 76 MG/DL (ref 8–23)
BUN SERPL-MCNC: 78.2 MG/DL (ref 8–23)
BUN SERPL-MCNC: 80.2 MG/DL (ref 8–23)
BUN SERPL-MCNC: 81.6 MG/DL (ref 8–23)
BUN SERPL-MCNC: 83 MG/DL (ref 8–23)
BUN SERPL-MCNC: 90.9 MG/DL (ref 8–23)
BUN SERPL-MCNC: 91.1 MG/DL (ref 8–23)
BUN SERPL-MCNC: 95.1 MG/DL (ref 8–23)
BUN SERPL-MCNC: 99.9 MG/DL (ref 8–23)
CALCIUM SERPL-MCNC: 10.1 MG/DL (ref 8.8–10.2)
CALCIUM SERPL-MCNC: 10.2 MG/DL (ref 8.8–10.2)
CALCIUM SERPL-MCNC: 10.3 MG/DL (ref 8.8–10.2)
CALCIUM SERPL-MCNC: 10.3 MG/DL (ref 8.8–10.2)
CALCIUM SERPL-MCNC: 10.4 MG/DL (ref 8.8–10.2)
CALCIUM SERPL-MCNC: 10.5 MG/DL (ref 8.8–10.2)
CALCIUM SERPL-MCNC: 10.7 MG/DL (ref 8.8–10.2)
CALCIUM SERPL-MCNC: 7.8 MG/DL (ref 8.8–10.2)
CALCIUM SERPL-MCNC: 8.3 MG/DL (ref 8.8–10.2)
CALCIUM SERPL-MCNC: 8.4 MG/DL (ref 8.8–10.2)
CALCIUM SERPL-MCNC: 8.7 MG/DL (ref 8.8–10.4)
CALCIUM SERPL-MCNC: 8.8 MG/DL (ref 8.8–10.4)
CALCIUM SERPL-MCNC: 8.9 MG/DL (ref 8.8–10.2)
CALCIUM SERPL-MCNC: 9.2 MG/DL (ref 8.8–10.2)
CALCIUM SERPL-MCNC: 9.5 MG/DL (ref 8.8–10.2)
CALCIUM SERPL-MCNC: 9.6 MG/DL (ref 8.8–10.2)
CALCIUM SERPL-MCNC: 9.6 MG/DL (ref 8.8–10.2)
CALCIUM SERPL-MCNC: 9.8 MG/DL (ref 8.8–10.2)
CALCIUM SERPL-MCNC: 9.9 MG/DL (ref 8.8–10.2)
CALCIUM SERPL-MCNC: 9.9 MG/DL (ref 8.8–10.2)
CHLORIDE SERPL-SCNC: 78 MMOL/L (ref 98–107)
CHLORIDE SERPL-SCNC: 83 MMOL/L (ref 98–107)
CHLORIDE SERPL-SCNC: 84 MMOL/L (ref 98–107)
CHLORIDE SERPL-SCNC: 84 MMOL/L (ref 98–107)
CHLORIDE SERPL-SCNC: 85 MMOL/L (ref 98–107)
CHLORIDE SERPL-SCNC: 86 MMOL/L (ref 98–107)
CHLORIDE SERPL-SCNC: 86 MMOL/L (ref 98–107)
CHLORIDE SERPL-SCNC: 87 MMOL/L (ref 98–107)
CHLORIDE SERPL-SCNC: 89 MMOL/L (ref 98–107)
CHLORIDE SERPL-SCNC: 89 MMOL/L (ref 98–107)
CHLORIDE SERPL-SCNC: 90 MMOL/L (ref 98–107)
CHLORIDE SERPL-SCNC: 91 MMOL/L (ref 98–107)
CHLORIDE SERPL-SCNC: 91 MMOL/L (ref 98–107)
CHLORIDE SERPL-SCNC: 92 MMOL/L (ref 98–107)
CHLORIDE SERPL-SCNC: 92 MMOL/L (ref 98–107)
CHLORIDE SERPL-SCNC: 93 MMOL/L (ref 98–107)
CHLORIDE SERPL-SCNC: 94 MMOL/L (ref 98–107)
CHLORIDE SERPL-SCNC: 95 MMOL/L (ref 98–107)
CHLORIDE SERPL-SCNC: 97 MMOL/L (ref 98–107)
CHLORIDE SERPL-SCNC: 98 MMOL/L (ref 98–107)
CHLORIDE SERPL-SCNC: 99 MMOL/L (ref 98–107)
CHOLEST SERPL-MCNC: 111 MG/DL
CREAT SERPL-MCNC: 1.72 MG/DL (ref 0.51–0.95)
CREAT SERPL-MCNC: 2.52 MG/DL (ref 0.51–0.95)
CREAT SERPL-MCNC: 3.39 MG/DL (ref 0.51–0.95)
CREAT SERPL-MCNC: 3.47 MG/DL (ref 0.51–0.95)
CREAT SERPL-MCNC: 3.67 MG/DL (ref 0.51–0.95)
CREAT SERPL-MCNC: 3.9 MG/DL (ref 0.51–0.95)
CREAT SERPL-MCNC: 3.93 MG/DL (ref 0.51–0.95)
CREAT SERPL-MCNC: 4.01 MG/DL (ref 0.51–0.95)
CREAT SERPL-MCNC: 4.41 MG/DL (ref 0.51–0.95)
CREAT SERPL-MCNC: 4.78 MG/DL (ref 0.51–0.95)
CREAT SERPL-MCNC: 5.12 MG/DL (ref 0.51–0.95)
CREAT SERPL-MCNC: 5.33 MG/DL (ref 0.51–0.95)
CREAT SERPL-MCNC: 5.51 MG/DL (ref 0.51–0.95)
CREAT SERPL-MCNC: 5.59 MG/DL (ref 0.51–0.95)
CREAT SERPL-MCNC: 5.62 MG/DL (ref 0.51–0.95)
CREAT SERPL-MCNC: 5.94 MG/DL (ref 0.51–0.95)
CREAT SERPL-MCNC: 5.97 MG/DL (ref 0.51–0.95)
CREAT SERPL-MCNC: 6.23 MG/DL (ref 0.51–0.95)
CREAT SERPL-MCNC: 6.45 MG/DL (ref 0.51–0.95)
CREAT SERPL-MCNC: 6.61 MG/DL (ref 0.51–0.95)
CREAT SERPL-MCNC: 6.68 MG/DL (ref 0.51–0.95)
CREAT SERPL-MCNC: 7.03 MG/DL (ref 0.51–0.95)
CREAT SERPL-MCNC: 7.18 MG/DL (ref 0.51–0.95)
CREAT SERPL-MCNC: 7.42 MG/DL (ref 0.51–0.95)
CRP SERPL-MCNC: 143 MG/L
CV STRESS CURRENT BP HE: NORMAL
CV STRESS CURRENT HR HE: 107
CV STRESS CURRENT HR HE: 108
CV STRESS CURRENT HR HE: 109
CV STRESS CURRENT HR HE: 109
CV STRESS CURRENT HR HE: 110
CV STRESS CURRENT HR HE: 111
CV STRESS CURRENT HR HE: 111
CV STRESS CURRENT HR HE: 112
CV STRESS CURRENT HR HE: 97
CV STRESS DEVIATION TIME HE: NORMAL
CV STRESS ECHO PERCENT HR HE: NORMAL
CV STRESS EXERCISE STAGE HE: NORMAL
CV STRESS FINAL RESTING BP HE: NORMAL
CV STRESS FINAL RESTING HR HE: 112
CV STRESS MAX HR HE: 113
CV STRESS MAX TREADMILL GRADE HE: 0
CV STRESS MAX TREADMILL SPEED HE: 0
CV STRESS PEAK DIA BP HE: NORMAL
CV STRESS PEAK SYS BP HE: NORMAL
CV STRESS PHASE HE: NORMAL
CV STRESS PROTOCOL HE: NORMAL
CV STRESS RESTING PT POSITION HE: NORMAL
CV STRESS ST DEVIATION AMOUNT HE: NORMAL
CV STRESS ST DEVIATION ELEVATION HE: NORMAL
CV STRESS ST EVELATION AMOUNT HE: NORMAL
CV STRESS TEST TYPE HE: NORMAL
CV STRESS TOTAL STAGE TIME MIN 1 HE: NORMAL
D DIMER PPP FEU-MCNC: 2.48 UG/ML FEU (ref 0–0.5)
DEPRECATED HCO3 PLAS-SCNC: 22 MMOL/L (ref 22–29)
DEPRECATED HCO3 PLAS-SCNC: 23 MMOL/L (ref 22–29)
DEPRECATED HCO3 PLAS-SCNC: 24 MMOL/L (ref 22–29)
DEPRECATED HCO3 PLAS-SCNC: 25 MMOL/L (ref 22–29)
DEPRECATED HCO3 PLAS-SCNC: 25 MMOL/L (ref 22–29)
DEPRECATED HCO3 PLAS-SCNC: 26 MMOL/L (ref 22–29)
DEPRECATED HCO3 PLAS-SCNC: 27 MMOL/L (ref 22–29)
DEPRECATED HCO3 PLAS-SCNC: 27 MMOL/L (ref 22–29)
DEPRECATED HCO3 PLAS-SCNC: 28 MMOL/L (ref 22–29)
DEPRECATED HCO3 PLAS-SCNC: 28 MMOL/L (ref 22–29)
DEPRECATED HCO3 PLAS-SCNC: 29 MMOL/L (ref 22–29)
DEPRECATED HCO3 PLAS-SCNC: 30 MMOL/L (ref 22–29)
DEPRECATED HCO3 PLAS-SCNC: 30 MMOL/L (ref 22–29)
DEPRECATED HCO3 PLAS-SCNC: 31 MMOL/L (ref 22–29)
DIASTOLIC BLOOD PRESSURE - MUSE: NORMAL MMHG
EGFRCR SERPLBLD CKD-EPI 2021: 10 ML/MIN/1.73M2
EGFRCR SERPLBLD CKD-EPI 2021: 12 ML/MIN/1.73M2
EGFRCR SERPLBLD CKD-EPI 2021: 13 ML/MIN/1.73M2
EGFRCR SERPLBLD CKD-EPI 2021: 14 ML/MIN/1.73M2
EGFRCR SERPLBLD CKD-EPI 2021: 14 ML/MIN/1.73M2
EGFRCR SERPLBLD CKD-EPI 2021: 20 ML/MIN/1.73M2
EGFRCR SERPLBLD CKD-EPI 2021: 32 ML/MIN/1.73M2
EGFRCR SERPLBLD CKD-EPI 2021: 6 ML/MIN/1.73M2
EGFRCR SERPLBLD CKD-EPI 2021: 7 ML/MIN/1.73M2
EGFRCR SERPLBLD CKD-EPI 2021: 8 ML/MIN/1.73M2
EGFRCR SERPLBLD CKD-EPI 2021: 9 ML/MIN/1.73M2
EGFRCR SERPLBLD CKD-EPI 2021: 9 ML/MIN/1.73M2
EOSINOPHIL # BLD AUTO: 0 10E3/UL (ref 0–0.7)
EOSINOPHIL # BLD AUTO: 0.2 10E3/UL (ref 0–0.7)
EOSINOPHIL # BLD AUTO: 0.2 10E3/UL (ref 0–0.7)
EOSINOPHIL NFR BLD AUTO: 0 %
EOSINOPHIL NFR BLD AUTO: 1 %
EOSINOPHIL NFR BLD AUTO: 1 %
ERYTHROCYTE [DISTWIDTH] IN BLOOD BY AUTOMATED COUNT: 15.2 % (ref 10–15)
ERYTHROCYTE [DISTWIDTH] IN BLOOD BY AUTOMATED COUNT: 15.2 % (ref 10–15)
ERYTHROCYTE [DISTWIDTH] IN BLOOD BY AUTOMATED COUNT: 15.4 % (ref 10–15)
ERYTHROCYTE [DISTWIDTH] IN BLOOD BY AUTOMATED COUNT: 15.5 % (ref 10–15)
ERYTHROCYTE [DISTWIDTH] IN BLOOD BY AUTOMATED COUNT: 17.1 % (ref 10–15)
ERYTHROCYTE [DISTWIDTH] IN BLOOD BY AUTOMATED COUNT: 17.2 % (ref 10–15)
ERYTHROCYTE [DISTWIDTH] IN BLOOD BY AUTOMATED COUNT: 17.2 % (ref 10–15)
ERYTHROCYTE [DISTWIDTH] IN BLOOD BY AUTOMATED COUNT: 17.3 % (ref 10–15)
ERYTHROCYTE [DISTWIDTH] IN BLOOD BY AUTOMATED COUNT: 17.5 % (ref 10–15)
ERYTHROCYTE [DISTWIDTH] IN BLOOD BY AUTOMATED COUNT: 17.6 % (ref 10–15)
ERYTHROCYTE [DISTWIDTH] IN BLOOD BY AUTOMATED COUNT: 17.6 % (ref 10–15)
ERYTHROCYTE [DISTWIDTH] IN BLOOD BY AUTOMATED COUNT: 17.7 % (ref 10–15)
ERYTHROCYTE [DISTWIDTH] IN BLOOD BY AUTOMATED COUNT: 17.7 % (ref 10–15)
ERYTHROCYTE [DISTWIDTH] IN BLOOD BY AUTOMATED COUNT: 17.8 % (ref 10–15)
ERYTHROCYTE [DISTWIDTH] IN BLOOD BY AUTOMATED COUNT: 18.1 % (ref 10–15)
ERYTHROCYTE [DISTWIDTH] IN BLOOD BY AUTOMATED COUNT: 18.3 % (ref 10–15)
ERYTHROCYTE [SEDIMENTATION RATE] IN BLOOD BY WESTERGREN METHOD: 1 MM/HR (ref 0–30)
FERRITIN SERPL-MCNC: 2139 NG/ML (ref 11–328)
FOLATE SERPL-MCNC: 26.3 NG/ML (ref 4.6–34.8)
GLUCOSE BLDC GLUCOMTR-MCNC: 110 MG/DL (ref 70–99)
GLUCOSE BLDC GLUCOMTR-MCNC: 143 MG/DL (ref 70–99)
GLUCOSE BLDC GLUCOMTR-MCNC: 154 MG/DL (ref 70–99)
GLUCOSE BLDC GLUCOMTR-MCNC: 189 MG/DL (ref 70–99)
GLUCOSE BLDC GLUCOMTR-MCNC: 240 MG/DL (ref 70–99)
GLUCOSE BLDC GLUCOMTR-MCNC: 74 MG/DL (ref 70–99)
GLUCOSE BLDC GLUCOMTR-MCNC: 86 MG/DL (ref 70–99)
GLUCOSE BLDC GLUCOMTR-MCNC: 95 MG/DL (ref 70–99)
GLUCOSE SERPL-MCNC: 101 MG/DL (ref 70–99)
GLUCOSE SERPL-MCNC: 101 MG/DL (ref 70–99)
GLUCOSE SERPL-MCNC: 102 MG/DL (ref 70–99)
GLUCOSE SERPL-MCNC: 103 MG/DL (ref 70–99)
GLUCOSE SERPL-MCNC: 105 MG/DL (ref 70–99)
GLUCOSE SERPL-MCNC: 106 MG/DL (ref 70–99)
GLUCOSE SERPL-MCNC: 107 MG/DL (ref 70–99)
GLUCOSE SERPL-MCNC: 108 MG/DL (ref 70–99)
GLUCOSE SERPL-MCNC: 119 MG/DL (ref 70–99)
GLUCOSE SERPL-MCNC: 142 MG/DL (ref 70–99)
GLUCOSE SERPL-MCNC: 63 MG/DL (ref 70–99)
GLUCOSE SERPL-MCNC: 71 MG/DL (ref 70–99)
GLUCOSE SERPL-MCNC: 82 MG/DL (ref 70–99)
GLUCOSE SERPL-MCNC: 86 MG/DL (ref 70–99)
GLUCOSE SERPL-MCNC: 87 MG/DL (ref 70–99)
GLUCOSE SERPL-MCNC: 90 MG/DL (ref 70–99)
GLUCOSE SERPL-MCNC: 91 MG/DL (ref 70–99)
GLUCOSE SERPL-MCNC: 93 MG/DL (ref 70–99)
GLUCOSE SERPL-MCNC: 95 MG/DL (ref 70–99)
GLUCOSE SERPL-MCNC: 97 MG/DL (ref 70–99)
GLUCOSE SERPL-MCNC: 98 MG/DL (ref 70–99)
GLUCOSE SERPL-MCNC: 98 MG/DL (ref 70–99)
HBV SURFACE AB SERPL IA-ACNC: 566 M[IU]/ML
HBV SURFACE AB SERPL IA-ACNC: 70.5 M[IU]/ML
HBV SURFACE AB SERPL IA-ACNC: REACTIVE M[IU]/ML
HBV SURFACE AB SERPL IA-ACNC: REACTIVE M[IU]/ML
HBV SURFACE AG SERPL QL IA: NONREACTIVE
HBV SURFACE AG SERPL QL IA: NONREACTIVE
HCO3 SERPL-SCNC: 25 MMOL/L (ref 22–29)
HCO3 SERPL-SCNC: 26 MMOL/L (ref 22–29)
HCO3 SERPL-SCNC: 26 MMOL/L (ref 22–29)
HCT VFR BLD AUTO: 25.5 % (ref 35–47)
HCT VFR BLD AUTO: 27.4 % (ref 35–47)
HCT VFR BLD AUTO: 27.9 % (ref 35–47)
HCT VFR BLD AUTO: 28.3 % (ref 35–47)
HCT VFR BLD AUTO: 29 % (ref 35–47)
HCT VFR BLD AUTO: 29.1 % (ref 35–47)
HCT VFR BLD AUTO: 29.1 % (ref 35–47)
HCT VFR BLD AUTO: 30.3 % (ref 35–47)
HCT VFR BLD AUTO: 30.6 % (ref 35–47)
HCT VFR BLD AUTO: 30.9 % (ref 35–47)
HCT VFR BLD AUTO: 31.5 % (ref 35–47)
HCT VFR BLD AUTO: 31.5 % (ref 35–47)
HCT VFR BLD AUTO: 32.3 % (ref 35–47)
HCT VFR BLD AUTO: 32.5 % (ref 35–47)
HCT VFR BLD AUTO: 32.5 % (ref 35–47)
HCT VFR BLD AUTO: 32.7 % (ref 35–47)
HCT VFR BLD AUTO: 33.4 % (ref 35–47)
HCT VFR BLD AUTO: 33.6 % (ref 35–47)
HDLC SERPL-MCNC: 46 MG/DL
HGB BLD-MCNC: 10 G/DL (ref 11.7–15.7)
HGB BLD-MCNC: 10 G/DL (ref 11.7–15.7)
HGB BLD-MCNC: 10.1 G/DL (ref 11.7–15.7)
HGB BLD-MCNC: 10.2 G/DL (ref 11.7–15.7)
HGB BLD-MCNC: 10.4 G/DL (ref 11.7–15.7)
HGB BLD-MCNC: 10.5 G/DL (ref 11.7–15.7)
HGB BLD-MCNC: 10.6 G/DL (ref 11.7–15.7)
HGB BLD-MCNC: 10.8 G/DL (ref 11.7–15.7)
HGB BLD-MCNC: 7.8 G/DL (ref 11.7–15.7)
HGB BLD-MCNC: 8.5 G/DL (ref 11.7–15.7)
HGB BLD-MCNC: 8.6 G/DL (ref 11.7–15.7)
HGB BLD-MCNC: 8.8 G/DL (ref 11.7–15.7)
HGB BLD-MCNC: 9 G/DL (ref 11.7–15.7)
HGB BLD-MCNC: 9 G/DL (ref 11.7–15.7)
HGB BLD-MCNC: 9.2 G/DL (ref 11.7–15.7)
HGB BLD-MCNC: 9.4 G/DL (ref 11.7–15.7)
HGB BLD-MCNC: 9.4 G/DL (ref 11.7–15.7)
HGB BLD-MCNC: 9.5 G/DL (ref 11.7–15.7)
HGB BLD-MCNC: 9.8 G/DL (ref 11.7–15.7)
HOLD SPECIMEN: NORMAL
IMM GRANULOCYTES # BLD: 0 10E3/UL
IMM GRANULOCYTES # BLD: 0.1 10E3/UL
IMM GRANULOCYTES # BLD: 0.2 10E3/UL
IMM GRANULOCYTES NFR BLD: 0 %
IMM GRANULOCYTES NFR BLD: 0 %
IMM GRANULOCYTES NFR BLD: 1 %
IMM GRANULOCYTES NFR BLD: 2 %
INR PPP: 1.2 (ref 0.85–1.15)
INTERPRETATION ECG - MUSE: NORMAL
IRON BINDING CAPACITY (ROCHE): 204 UG/DL (ref 240–430)
IRON SATN MFR SERPL: 40 % (ref 15–46)
IRON SERPL-MCNC: 81 UG/DL (ref 37–145)
LACTATE SERPL-SCNC: 1 MMOL/L (ref 0.7–2)
LACTATE SERPL-SCNC: 1.1 MMOL/L (ref 0.7–2)
LACTATE SERPL-SCNC: 1.2 MMOL/L (ref 0.7–2)
LACTATE SERPL-SCNC: 1.5 MMOL/L (ref 0.7–2)
LACTATE SERPL-SCNC: 2.2 MMOL/L (ref 0.7–2)
LDLC SERPL CALC-MCNC: 50 MG/DL
LIPASE SERPL-CCNC: 12 U/L (ref 13–60)
LIPASE SERPL-CCNC: 15 U/L (ref 13–60)
LIPASE SERPL-CCNC: 37 U/L (ref 13–60)
LIPASE SERPL-CCNC: 7 U/L (ref 13–60)
LVEF ECHO: NORMAL
LVEF ECHO: NORMAL
LYMPHOCYTES # BLD AUTO: 0.4 10E3/UL (ref 0.8–5.3)
LYMPHOCYTES # BLD AUTO: 0.4 10E3/UL (ref 0.8–5.3)
LYMPHOCYTES # BLD AUTO: 1 10E3/UL (ref 0.8–5.3)
LYMPHOCYTES # BLD AUTO: 1.1 10E3/UL (ref 0.8–5.3)
LYMPHOCYTES # BLD AUTO: 1.3 10E3/UL (ref 0.8–5.3)
LYMPHOCYTES # BLD AUTO: 1.3 10E3/UL (ref 0.8–5.3)
LYMPHOCYTES NFR BLD AUTO: 11 %
LYMPHOCYTES NFR BLD AUTO: 11 %
LYMPHOCYTES NFR BLD AUTO: 12 %
LYMPHOCYTES NFR BLD AUTO: 3 %
LYMPHOCYTES NFR BLD AUTO: 4 %
LYMPHOCYTES NFR BLD AUTO: 9 %
MAGNESIUM SERPL-MCNC: 1.9 MG/DL (ref 1.7–2.3)
MAGNESIUM SERPL-MCNC: 1.9 MG/DL (ref 1.7–2.3)
MAGNESIUM SERPL-MCNC: 2.1 MG/DL (ref 1.7–2.3)
MAGNESIUM SERPL-MCNC: 2.3 MG/DL (ref 1.7–2.3)
MAGNESIUM SERPL-MCNC: 2.3 MG/DL (ref 1.7–2.3)
MAGNESIUM SERPL-MCNC: 2.4 MG/DL (ref 1.7–2.3)
MAGNESIUM SERPL-MCNC: 2.5 MG/DL (ref 1.7–2.3)
MAGNESIUM SERPL-MCNC: 2.6 MG/DL (ref 1.7–2.3)
MAGNESIUM SERPL-MCNC: 3 MG/DL (ref 1.7–2.3)
MCH RBC QN AUTO: 32.1 PG (ref 26.5–33)
MCH RBC QN AUTO: 32.8 PG (ref 26.5–33)
MCH RBC QN AUTO: 33.5 PG (ref 26.5–33)
MCH RBC QN AUTO: 33.5 PG (ref 26.5–33)
MCH RBC QN AUTO: 33.7 PG (ref 26.5–33)
MCH RBC QN AUTO: 33.8 PG (ref 26.5–33)
MCH RBC QN AUTO: 33.9 PG (ref 26.5–33)
MCH RBC QN AUTO: 34 PG (ref 26.5–33)
MCH RBC QN AUTO: 34.1 PG (ref 26.5–33)
MCH RBC QN AUTO: 34.4 PG (ref 26.5–33)
MCH RBC QN AUTO: 34.7 PG (ref 26.5–33)
MCHC RBC AUTO-ENTMCNC: 29.8 G/DL (ref 31.5–36.5)
MCHC RBC AUTO-ENTMCNC: 30.6 G/DL (ref 31.5–36.5)
MCHC RBC AUTO-ENTMCNC: 30.7 G/DL (ref 31.5–36.5)
MCHC RBC AUTO-ENTMCNC: 30.8 G/DL (ref 31.5–36.5)
MCHC RBC AUTO-ENTMCNC: 30.8 G/DL (ref 31.5–36.5)
MCHC RBC AUTO-ENTMCNC: 30.9 G/DL (ref 31.5–36.5)
MCHC RBC AUTO-ENTMCNC: 31 G/DL (ref 31.5–36.5)
MCHC RBC AUTO-ENTMCNC: 31 G/DL (ref 31.5–36.5)
MCHC RBC AUTO-ENTMCNC: 31.1 G/DL (ref 31.5–36.5)
MCHC RBC AUTO-ENTMCNC: 31.1 G/DL (ref 31.5–36.5)
MCHC RBC AUTO-ENTMCNC: 31.4 G/DL (ref 31.5–36.5)
MCHC RBC AUTO-ENTMCNC: 31.6 G/DL (ref 31.5–36.5)
MCHC RBC AUTO-ENTMCNC: 31.6 G/DL (ref 31.5–36.5)
MCHC RBC AUTO-ENTMCNC: 31.7 G/DL (ref 31.5–36.5)
MCHC RBC AUTO-ENTMCNC: 32.1 G/DL (ref 31.5–36.5)
MCHC RBC AUTO-ENTMCNC: 32.6 G/DL (ref 31.5–36.5)
MCV RBC AUTO: 100 FL (ref 78–100)
MCV RBC AUTO: 106 FL (ref 78–100)
MCV RBC AUTO: 107 FL (ref 78–100)
MCV RBC AUTO: 107 FL (ref 78–100)
MCV RBC AUTO: 108 FL (ref 78–100)
MCV RBC AUTO: 108 FL (ref 78–100)
MCV RBC AUTO: 109 FL (ref 78–100)
MCV RBC AUTO: 109 FL (ref 78–100)
MCV RBC AUTO: 110 FL (ref 78–100)
MCV RBC AUTO: 111 FL (ref 78–100)
MCV RBC AUTO: 112 FL (ref 78–100)
MCV RBC AUTO: 115 FL (ref 78–100)
MONOCYTES # BLD AUTO: 0.7 10E3/UL (ref 0–1.3)
MONOCYTES # BLD AUTO: 0.8 10E3/UL (ref 0–1.3)
MONOCYTES # BLD AUTO: 0.8 10E3/UL (ref 0–1.3)
MONOCYTES # BLD AUTO: 0.9 10E3/UL (ref 0–1.3)
MONOCYTES # BLD AUTO: 1.1 10E3/UL (ref 0–1.3)
MONOCYTES # BLD AUTO: 1.1 10E3/UL (ref 0–1.3)
MONOCYTES NFR BLD AUTO: 7 %
MONOCYTES NFR BLD AUTO: 7 %
MONOCYTES NFR BLD AUTO: 8 %
MONOCYTES NFR BLD AUTO: 8 %
MONOCYTES NFR BLD AUTO: 9 %
MONOCYTES NFR BLD AUTO: 9 %
NEUTROPHILS # BLD AUTO: 8 10E3/UL (ref 1.6–8.3)
NEUTROPHILS # BLD AUTO: 8.2 10E3/UL (ref 1.6–8.3)
NEUTROPHILS # BLD AUTO: 8.9 10E3/UL (ref 1.6–8.3)
NEUTROPHILS # BLD AUTO: 9.3 10E3/UL (ref 1.6–8.3)
NEUTROPHILS # BLD AUTO: 9.7 10E3/UL (ref 1.6–8.3)
NEUTROPHILS # BLD AUTO: 9.9 10E3/UL (ref 1.6–8.3)
NEUTROPHILS NFR BLD AUTO: 78 %
NEUTROPHILS NFR BLD AUTO: 79 %
NEUTROPHILS NFR BLD AUTO: 80 %
NEUTROPHILS NFR BLD AUTO: 81 %
NEUTROPHILS NFR BLD AUTO: 86 %
NEUTROPHILS NFR BLD AUTO: 88 %
NONHDLC SERPL-MCNC: 65 MG/DL
NRBC # BLD AUTO: 0 10E3/UL
NRBC BLD AUTO-RTO: 0 /100
NT-PROBNP SERPL-MCNC: ABNORMAL PG/ML (ref 0–900)
NUC STRESS EJECTION FRACTION: 31 %
P AXIS - MUSE: 52 DEGREES
P AXIS - MUSE: 61 DEGREES
P AXIS - MUSE: 64 DEGREES
P AXIS - MUSE: 64 DEGREES
P AXIS - MUSE: 67 DEGREES
P AXIS - MUSE: 68 DEGREES
P AXIS - MUSE: 68 DEGREES
P AXIS - MUSE: 75 DEGREES
PHOSPHATE SERPL-MCNC: 10.8 MG/DL (ref 2.5–4.5)
PHOSPHATE SERPL-MCNC: 4 MG/DL (ref 2.5–4.5)
PHOSPHATE SERPL-MCNC: 4.3 MG/DL (ref 2.5–4.5)
PHOSPHATE SERPL-MCNC: 5.1 MG/DL (ref 2.5–4.5)
PHOSPHATE SERPL-MCNC: 6.2 MG/DL (ref 2.5–4.5)
PHOSPHATE SERPL-MCNC: 8.6 MG/DL (ref 2.5–4.5)
PHOSPHATE SERPL-MCNC: 9.1 MG/DL (ref 2.5–4.5)
PHOSPHATE SERPL-MCNC: 9.8 MG/DL (ref 2.5–4.5)
PLATELET # BLD AUTO: 263 10E3/UL (ref 150–450)
PLATELET # BLD AUTO: 282 10E3/UL (ref 150–450)
PLATELET # BLD AUTO: 296 10E3/UL (ref 150–450)
PLATELET # BLD AUTO: 311 10E3/UL (ref 150–450)
PLATELET # BLD AUTO: 317 10E3/UL (ref 150–450)
PLATELET # BLD AUTO: 323 10E3/UL (ref 150–450)
PLATELET # BLD AUTO: 323 10E3/UL (ref 150–450)
PLATELET # BLD AUTO: 334 10E3/UL (ref 150–450)
PLATELET # BLD AUTO: 339 10E3/UL (ref 150–450)
PLATELET # BLD AUTO: 340 10E3/UL (ref 150–450)
PLATELET # BLD AUTO: 358 10E3/UL (ref 150–450)
PLATELET # BLD AUTO: 362 10E3/UL (ref 150–450)
PLATELET # BLD AUTO: 362 10E3/UL (ref 150–450)
PLATELET # BLD AUTO: 363 10E3/UL (ref 150–450)
PLATELET # BLD AUTO: 386 10E3/UL (ref 150–450)
PLATELET # BLD AUTO: 396 10E3/UL (ref 150–450)
PLATELET # BLD AUTO: 406 10E3/UL (ref 150–450)
PLATELET # BLD AUTO: 431 10E3/UL (ref 150–450)
PLATELET # BLD AUTO: 460 10E3/UL (ref 150–450)
POTASSIUM SERPL-SCNC: 3.1 MMOL/L (ref 3.4–5.3)
POTASSIUM SERPL-SCNC: 3.6 MMOL/L (ref 3.4–5.3)
POTASSIUM SERPL-SCNC: 3.9 MMOL/L (ref 3.4–5.3)
POTASSIUM SERPL-SCNC: 4 MMOL/L (ref 3.4–5.3)
POTASSIUM SERPL-SCNC: 4.1 MMOL/L (ref 3.4–5.3)
POTASSIUM SERPL-SCNC: 4.4 MMOL/L (ref 3.4–5.3)
POTASSIUM SERPL-SCNC: 4.5 MMOL/L (ref 3.4–5.3)
POTASSIUM SERPL-SCNC: 4.6 MMOL/L (ref 3.4–5.3)
POTASSIUM SERPL-SCNC: 4.7 MMOL/L (ref 3.4–5.3)
POTASSIUM SERPL-SCNC: 5 MMOL/L (ref 3.4–5.3)
POTASSIUM SERPL-SCNC: 5.1 MMOL/L (ref 3.4–5.3)
POTASSIUM SERPL-SCNC: 5.3 MMOL/L (ref 3.4–5.3)
POTASSIUM SERPL-SCNC: 5.4 MMOL/L (ref 3.4–5.3)
POTASSIUM SERPL-SCNC: 5.4 MMOL/L (ref 3.4–5.3)
POTASSIUM SERPL-SCNC: 5.5 MMOL/L (ref 3.4–5.3)
POTASSIUM SERPL-SCNC: 5.7 MMOL/L (ref 3.4–5.3)
POTASSIUM SERPL-SCNC: 5.9 MMOL/L (ref 3.4–5.3)
POTASSIUM SERPL-SCNC: 6 MMOL/L (ref 3.4–5.3)
POTASSIUM SERPL-SCNC: 6.2 MMOL/L (ref 3.4–5.3)
POTASSIUM SERPL-SCNC: 6.3 MMOL/L (ref 3.4–5.3)
POTASSIUM SERPL-SCNC: 6.4 MMOL/L (ref 3.4–5.3)
PR INTERVAL - MUSE: 166 MS
PR INTERVAL - MUSE: 170 MS
PR INTERVAL - MUSE: 188 MS
PR INTERVAL - MUSE: 190 MS
PR INTERVAL - MUSE: 192 MS
PR INTERVAL - MUSE: 204 MS
PR INTERVAL - MUSE: 204 MS
PR INTERVAL - MUSE: 222 MS
PROT SERPL-MCNC: 6.3 G/DL (ref 6.4–8.3)
PROT SERPL-MCNC: 6.4 G/DL (ref 6.4–8.3)
PROT SERPL-MCNC: 6.4 G/DL (ref 6.4–8.3)
PROT SERPL-MCNC: 6.6 G/DL (ref 6.4–8.3)
PROT SERPL-MCNC: 6.9 G/DL (ref 6.4–8.3)
PROT SERPL-MCNC: 6.9 G/DL (ref 6.4–8.3)
PROT SERPL-MCNC: 7.1 G/DL (ref 6.4–8.3)
PROT SERPL-MCNC: 7.9 G/DL (ref 6.4–8.3)
PROTOCOL CUTOFF: NORMAL
PROTOCOL CUTOFF: NORMAL
QRS DURATION - MUSE: 106 MS
QRS DURATION - MUSE: 108 MS
QRS DURATION - MUSE: 108 MS
QRS DURATION - MUSE: 114 MS
QRS DURATION - MUSE: 116 MS
QRS DURATION - MUSE: 116 MS
QRS DURATION - MUSE: 120 MS
QRS DURATION - MUSE: 92 MS
QT - MUSE: 324 MS
QT - MUSE: 330 MS
QT - MUSE: 378 MS
QT - MUSE: 386 MS
QT - MUSE: 394 MS
QT - MUSE: 402 MS
QT - MUSE: 408 MS
QT - MUSE: 422 MS
QTC - MUSE: 451 MS
QTC - MUSE: 460 MS
QTC - MUSE: 479 MS
QTC - MUSE: 490 MS
QTC - MUSE: 494 MS
QTC - MUSE: 495 MS
QTC - MUSE: 510 MS
QTC - MUSE: 510 MS
R AXIS - MUSE: 17 DEGREES
R AXIS - MUSE: 45 DEGREES
R AXIS - MUSE: 47 DEGREES
R AXIS - MUSE: 54 DEGREES
R AXIS - MUSE: 56 DEGREES
R AXIS - MUSE: 58 DEGREES
R AXIS - MUSE: 59 DEGREES
R AXIS - MUSE: 61 DEGREES
RATE PRESSURE PRODUCT: NORMAL
RBC # BLD AUTO: 2.29 10E6/UL (ref 3.8–5.2)
RBC # BLD AUTO: 2.5 10E6/UL (ref 3.8–5.2)
RBC # BLD AUTO: 2.5 10E6/UL (ref 3.8–5.2)
RBC # BLD AUTO: 2.67 10E6/UL (ref 3.8–5.2)
RBC # BLD AUTO: 2.68 10E6/UL (ref 3.8–5.2)
RBC # BLD AUTO: 2.69 10E6/UL (ref 3.8–5.2)
RBC # BLD AUTO: 2.71 10E6/UL (ref 3.8–5.2)
RBC # BLD AUTO: 2.73 10E6/UL (ref 3.8–5.2)
RBC # BLD AUTO: 2.76 10E6/UL (ref 3.8–5.2)
RBC # BLD AUTO: 2.81 10E6/UL (ref 3.8–5.2)
RBC # BLD AUTO: 2.89 10E6/UL (ref 3.8–5.2)
RBC # BLD AUTO: 2.94 10E6/UL (ref 3.8–5.2)
RBC # BLD AUTO: 2.95 10E6/UL (ref 3.8–5.2)
RBC # BLD AUTO: 2.98 10E6/UL (ref 3.8–5.2)
RBC # BLD AUTO: 3.08 10E6/UL (ref 3.8–5.2)
RBC # BLD AUTO: 3.08 10E6/UL (ref 3.8–5.2)
RBC # BLD AUTO: 3.1 10E6/UL (ref 3.8–5.2)
RBC # BLD AUTO: 3.36 10E6/UL (ref 3.8–5.2)
SA 1 CELL: NORMAL
SA 1 CELL: NORMAL
SA 1 TEST METHOD: NORMAL
SA 1 TEST METHOD: NORMAL
SA 2 CELL: NORMAL
SA 2 CELL: NORMAL
SA 2 TEST METHOD: NORMAL
SA 2 TEST METHOD: NORMAL
SA1 HI RISK ABY: NORMAL
SA1 HI RISK ABY: NORMAL
SA1 MOD RISK ABY: NORMAL
SA1 MOD RISK ABY: NORMAL
SA2 HI RISK ABY: NORMAL
SA2 HI RISK ABY: NORMAL
SA2 MOD RISK ABY: NORMAL
SA2 MOD RISK ABY: NORMAL
SODIUM SERPL-SCNC: 128 MMOL/L (ref 135–145)
SODIUM SERPL-SCNC: 130 MMOL/L (ref 135–145)
SODIUM SERPL-SCNC: 131 MMOL/L (ref 135–145)
SODIUM SERPL-SCNC: 132 MMOL/L (ref 135–145)
SODIUM SERPL-SCNC: 132 MMOL/L (ref 135–145)
SODIUM SERPL-SCNC: 133 MMOL/L (ref 135–145)
SODIUM SERPL-SCNC: 133 MMOL/L (ref 135–145)
SODIUM SERPL-SCNC: 134 MMOL/L (ref 135–145)
SODIUM SERPL-SCNC: 135 MMOL/L (ref 135–145)
SODIUM SERPL-SCNC: 136 MMOL/L (ref 135–145)
SODIUM SERPL-SCNC: 137 MMOL/L (ref 135–145)
SODIUM SERPL-SCNC: 139 MMOL/L (ref 135–145)
SODIUM SERPL-SCNC: 140 MMOL/L (ref 135–145)
SODIUM SERPL-SCNC: 144 MMOL/L (ref 135–145)
SODIUM SERPL-SCNC: 145 MMOL/L (ref 135–145)
SPECIMEN EXPIRATION DATE: NORMAL
SPECIMEN EXPIRATION DATE: NORMAL
STRESS ECHO BASELINE DIASTOLIC HE: 87
STRESS ECHO BASELINE HR: 105
STRESS ECHO BASELINE SYSTOLIC BP: 160
STRESS ECHO CALCULATED PERCENT HR: 73 %
STRESS ECHO LAST STRESS DIASTOLIC BP: 78
STRESS ECHO LAST STRESS HR: 107
STRESS ECHO LAST STRESS SYSTOLIC BP: 158
STRESS ECHO TARGET HR: 154
SYSTOLIC BLOOD PRESSURE - MUSE: NORMAL MMHG
T AXIS - MUSE: -27 DEGREES
T AXIS - MUSE: -37 DEGREES
T AXIS - MUSE: -52 DEGREES
T AXIS - MUSE: -63 DEGREES
T AXIS - MUSE: 0 DEGREES
T AXIS - MUSE: 108 DEGREES
T AXIS - MUSE: 16 DEGREES
T AXIS - MUSE: 270 DEGREES
TRIGL SERPL-MCNC: 76 MG/DL
TROPONIN T SERPL HS-MCNC: 156 NG/L
TROPONIN T SERPL HS-MCNC: 156 NG/L
TROPONIN T SERPL HS-MCNC: 164 NG/L
TROPONIN T SERPL HS-MCNC: 165 NG/L
TROPONIN T SERPL HS-MCNC: 166 NG/L
TROPONIN T SERPL HS-MCNC: 174 NG/L
TROPONIN T SERPL HS-MCNC: 195 NG/L
TROPONIN T SERPL HS-MCNC: 247 NG/L
TROPONIN T SERPL HS-MCNC: 254 NG/L
TROPONIN T SERPL HS-MCNC: 265 NG/L
UNACCEPTABLE ANTIGENS: NORMAL
UNACCEPTABLE ANTIGENS: NORMAL
UNOS CPRA: 35
UNOS CPRA: 35
VENTRICULAR RATE- MUSE: 101 BPM
VENTRICULAR RATE- MUSE: 117 BPM
VENTRICULAR RATE- MUSE: 117 BPM
VENTRICULAR RATE- MUSE: 88 BPM
VENTRICULAR RATE- MUSE: 89 BPM
VENTRICULAR RATE- MUSE: 91 BPM
VENTRICULAR RATE- MUSE: 94 BPM
VENTRICULAR RATE- MUSE: 99 BPM
VIT B12 SERPL-MCNC: 564 PG/ML (ref 232–1245)
WBC # BLD AUTO: 10.2 10E3/UL (ref 4–11)
WBC # BLD AUTO: 10.5 10E3/UL (ref 4–11)
WBC # BLD AUTO: 10.6 10E3/UL (ref 4–11)
WBC # BLD AUTO: 10.8 10E3/UL (ref 4–11)
WBC # BLD AUTO: 11 10E3/UL (ref 4–11)
WBC # BLD AUTO: 11.1 10E3/UL (ref 4–11)
WBC # BLD AUTO: 11.4 10E3/UL (ref 4–11)
WBC # BLD AUTO: 11.4 10E3/UL (ref 4–11)
WBC # BLD AUTO: 11.6 10E3/UL (ref 4–11)
WBC # BLD AUTO: 11.7 10E3/UL (ref 4–11)
WBC # BLD AUTO: 13.5 10E3/UL (ref 4–11)
WBC # BLD AUTO: 13.5 10E3/UL (ref 4–11)
WBC # BLD AUTO: 14.3 10E3/UL (ref 4–11)
WBC # BLD AUTO: 15.2 10E3/UL (ref 4–11)
WBC # BLD AUTO: 16.5 10E3/UL (ref 4–11)
WBC # BLD AUTO: 17.4 10E3/UL (ref 4–11)
WBC # BLD AUTO: 9 10E3/UL (ref 4–11)
WBC # BLD AUTO: 9.9 10E3/UL (ref 4–11)
WBC # BLD AUTO: 9.9 10E3/UL (ref 4–11)
ZZZSA 1  COMMENTS: NORMAL
ZZZSA 1  COMMENTS: NORMAL
ZZZSA 2 COMMENTS: NORMAL
ZZZSA 2 COMMENTS: NORMAL

## 2024-01-01 PROCEDURE — 36415 COLL VENOUS BLD VENIPUNCTURE: CPT

## 2024-01-01 PROCEDURE — 250N000013 HC RX MED GY IP 250 OP 250 PS 637

## 2024-01-01 PROCEDURE — 250N000013 HC RX MED GY IP 250 OP 250 PS 637: Performed by: INTERNAL MEDICINE

## 2024-01-01 PROCEDURE — 93458 L HRT ARTERY/VENTRICLE ANGIO: CPT | Mod: 26 | Performed by: INTERNAL MEDICINE

## 2024-01-01 PROCEDURE — 74018 RADEX ABDOMEN 1 VIEW: CPT | Mod: 77

## 2024-01-01 PROCEDURE — 99232 SBSQ HOSP IP/OBS MODERATE 35: CPT | Mod: GC

## 2024-01-01 PROCEDURE — 93005 ELECTROCARDIOGRAM TRACING: CPT

## 2024-01-01 PROCEDURE — 97161 PT EVAL LOW COMPLEX 20 MIN: CPT | Mod: GP

## 2024-01-01 PROCEDURE — 92972 PERQ TRLUML CORONRY LITHOTRP: CPT | Performed by: INTERNAL MEDICINE

## 2024-01-01 PROCEDURE — 83690 ASSAY OF LIPASE: CPT

## 2024-01-01 PROCEDURE — 99223 1ST HOSP IP/OBS HIGH 75: CPT | Mod: AI

## 2024-01-01 PROCEDURE — 84484 ASSAY OF TROPONIN QUANT: CPT | Performed by: INTERNAL MEDICINE

## 2024-01-01 PROCEDURE — G0378 HOSPITAL OBSERVATION PER HR: HCPCS

## 2024-01-01 PROCEDURE — 99232 SBSQ HOSP IP/OBS MODERATE 35: CPT | Performed by: INTERNAL MEDICINE

## 2024-01-01 PROCEDURE — 250N000011 HC RX IP 250 OP 636: Mod: JZ

## 2024-01-01 PROCEDURE — 258N000003 HC RX IP 258 OP 636: Performed by: EMERGENCY MEDICINE

## 2024-01-01 PROCEDURE — 71275 CT ANGIOGRAPHY CHEST: CPT

## 2024-01-01 PROCEDURE — 258N000003 HC RX IP 258 OP 636: Performed by: INTERNAL MEDICINE

## 2024-01-01 PROCEDURE — 83880 ASSAY OF NATRIURETIC PEPTIDE: CPT | Performed by: EMERGENCY MEDICINE

## 2024-01-01 PROCEDURE — 36415 COLL VENOUS BLD VENIPUNCTURE: CPT | Performed by: INTERNAL MEDICINE

## 2024-01-01 PROCEDURE — 90935 HEMODIALYSIS ONE EVALUATION: CPT

## 2024-01-01 PROCEDURE — 80048 BASIC METABOLIC PNL TOTAL CA: CPT

## 2024-01-01 PROCEDURE — 85025 COMPLETE CBC W/AUTO DIFF WBC: CPT | Performed by: EMERGENCY MEDICINE

## 2024-01-01 PROCEDURE — B2111ZZ FLUOROSCOPY OF MULTIPLE CORONARY ARTERIES USING LOW OSMOLAR CONTRAST: ICD-10-PCS | Performed by: INTERNAL MEDICINE

## 2024-01-01 PROCEDURE — 99233 SBSQ HOSP IP/OBS HIGH 50: CPT | Performed by: INTERNAL MEDICINE

## 2024-01-01 PROCEDURE — 258N000001 HC RX 258: Performed by: EMERGENCY MEDICINE

## 2024-01-01 PROCEDURE — 85027 COMPLETE CBC AUTOMATED: CPT

## 2024-01-01 PROCEDURE — 250N000013 HC RX MED GY IP 250 OP 250 PS 637: Performed by: PHYSICIAN ASSISTANT

## 2024-01-01 PROCEDURE — 120N000001 HC R&B MED SURG/OB

## 2024-01-01 PROCEDURE — 99281 EMR DPT VST MAYX REQ PHY/QHP: CPT

## 2024-01-01 PROCEDURE — 99223 1ST HOSP IP/OBS HIGH 75: CPT | Performed by: INTERNAL MEDICINE

## 2024-01-01 PROCEDURE — 272N000001 HC OR GENERAL SUPPLY STERILE: Performed by: INTERNAL MEDICINE

## 2024-01-01 PROCEDURE — 85018 HEMOGLOBIN: CPT | Performed by: EMERGENCY MEDICINE

## 2024-01-01 PROCEDURE — 99233 SBSQ HOSP IP/OBS HIGH 50: CPT | Mod: GC

## 2024-01-01 PROCEDURE — 99231 SBSQ HOSP IP/OBS SF/LOW 25: CPT | Performed by: PHYSICIAN ASSISTANT

## 2024-01-01 PROCEDURE — 74176 CT ABD & PELVIS W/O CONTRAST: CPT

## 2024-01-01 PROCEDURE — 99285 EMERGENCY DEPT VISIT HI MDM: CPT | Mod: 25

## 2024-01-01 PROCEDURE — 99207 PR NO CHARGE LOS: CPT | Performed by: FAMILY MEDICINE

## 2024-01-01 PROCEDURE — 93458 L HRT ARTERY/VENTRICLE ANGIO: CPT | Performed by: INTERNAL MEDICINE

## 2024-01-01 PROCEDURE — 97165 OT EVAL LOW COMPLEX 30 MIN: CPT | Mod: GO

## 2024-01-01 PROCEDURE — 93017 CV STRESS TEST TRACING ONLY: CPT

## 2024-01-01 PROCEDURE — 86706 HEP B SURFACE ANTIBODY: CPT | Performed by: PHYSICIAN ASSISTANT

## 2024-01-01 PROCEDURE — 99152 MOD SED SAME PHYS/QHP 5/>YRS: CPT | Performed by: INTERNAL MEDICINE

## 2024-01-01 PROCEDURE — 85049 AUTOMATED PLATELET COUNT: CPT | Performed by: EMERGENCY MEDICINE

## 2024-01-01 PROCEDURE — 93010 ELECTROCARDIOGRAM REPORT: CPT | Performed by: STUDENT IN AN ORGANIZED HEALTH CARE EDUCATION/TRAINING PROGRAM

## 2024-01-01 PROCEDURE — 97535 SELF CARE MNGMENT TRAINING: CPT | Mod: GO

## 2024-01-01 PROCEDURE — 36415 COLL VENOUS BLD VENIPUNCTURE: CPT | Performed by: STUDENT IN AN ORGANIZED HEALTH CARE EDUCATION/TRAINING PROGRAM

## 2024-01-01 PROCEDURE — 250N000011 HC RX IP 250 OP 636: Performed by: INTERNAL MEDICINE

## 2024-01-01 PROCEDURE — 5A1D70Z PERFORMANCE OF URINARY FILTRATION, INTERMITTENT, LESS THAN 6 HOURS PER DAY: ICD-10-PCS | Performed by: INTERNAL MEDICINE

## 2024-01-01 PROCEDURE — 71045 X-RAY EXAM CHEST 1 VIEW: CPT

## 2024-01-01 PROCEDURE — 83605 ASSAY OF LACTIC ACID: CPT | Performed by: EMERGENCY MEDICINE

## 2024-01-01 PROCEDURE — 86706 HEP B SURFACE ANTIBODY: CPT | Performed by: INTERNAL MEDICINE

## 2024-01-01 PROCEDURE — 80053 COMPREHEN METABOLIC PANEL: CPT | Performed by: FAMILY MEDICINE

## 2024-01-01 PROCEDURE — 99238 HOSP IP/OBS DSCHRG MGMT 30/<: CPT | Mod: GC

## 2024-01-01 PROCEDURE — 99284 EMERGENCY DEPT VISIT MOD MDM: CPT

## 2024-01-01 PROCEDURE — 80048 BASIC METABOLIC PNL TOTAL CA: CPT | Performed by: INTERNAL MEDICINE

## 2024-01-01 PROCEDURE — 250N000011 HC RX IP 250 OP 636

## 2024-01-01 PROCEDURE — 999N000111 HC STATISTIC OT IP EVAL DEFER

## 2024-01-01 PROCEDURE — C1887 CATHETER, GUIDING: HCPCS | Performed by: INTERNAL MEDICINE

## 2024-01-01 PROCEDURE — 84484 ASSAY OF TROPONIN QUANT: CPT | Performed by: STUDENT IN AN ORGANIZED HEALTH CARE EDUCATION/TRAINING PROGRAM

## 2024-01-01 PROCEDURE — 82962 GLUCOSE BLOOD TEST: CPT

## 2024-01-01 PROCEDURE — A9500 TC99M SESTAMIBI: HCPCS | Performed by: FAMILY MEDICINE

## 2024-01-01 PROCEDURE — 93016 CV STRESS TEST SUPVJ ONLY: CPT | Performed by: INTERNAL MEDICINE

## 2024-01-01 PROCEDURE — 86900 BLOOD TYPING SEROLOGIC ABO: CPT | Performed by: PHYSICIAN ASSISTANT

## 2024-01-01 PROCEDURE — C9600 PERC DRUG-EL COR STENT SING: HCPCS | Performed by: INTERNAL MEDICINE

## 2024-01-01 PROCEDURE — C9113 INJ PANTOPRAZOLE SODIUM, VIA: HCPCS | Mod: JZ

## 2024-01-01 PROCEDURE — 83735 ASSAY OF MAGNESIUM: CPT | Performed by: STUDENT IN AN ORGANIZED HEALTH CARE EDUCATION/TRAINING PROGRAM

## 2024-01-01 PROCEDURE — 5A1D70Z PERFORMANCE OF URINARY FILTRATION, INTERMITTENT, LESS THAN 6 HOURS PER DAY: ICD-10-PCS | Performed by: PHYSICIAN ASSISTANT

## 2024-01-01 PROCEDURE — 87340 HEPATITIS B SURFACE AG IA: CPT | Performed by: INTERNAL MEDICINE

## 2024-01-01 PROCEDURE — 93005 ELECTROCARDIOGRAM TRACING: CPT | Performed by: STUDENT IN AN ORGANIZED HEALTH CARE EDUCATION/TRAINING PROGRAM

## 2024-01-01 PROCEDURE — 99239 HOSP IP/OBS DSCHRG MGMT >30: CPT | Performed by: INTERNAL MEDICINE

## 2024-01-01 PROCEDURE — 99207 PR APP CREDIT; MD BILLING SHARED VISIT: CPT

## 2024-01-01 PROCEDURE — 83605 ASSAY OF LACTIC ACID: CPT | Performed by: FAMILY MEDICINE

## 2024-01-01 PROCEDURE — 36415 COLL VENOUS BLD VENIPUNCTURE: CPT | Performed by: EMERGENCY MEDICINE

## 2024-01-01 PROCEDURE — 99232 SBSQ HOSP IP/OBS MODERATE 35: CPT

## 2024-01-01 PROCEDURE — 93321 DOPPLER ECHO F-UP/LMTD STD: CPT | Mod: 26 | Performed by: INTERNAL MEDICINE

## 2024-01-01 PROCEDURE — 80053 COMPREHEN METABOLIC PANEL: CPT | Performed by: EMERGENCY MEDICINE

## 2024-01-01 PROCEDURE — 93005 ELECTROCARDIOGRAM TRACING: CPT | Performed by: EMERGENCY MEDICINE

## 2024-01-01 PROCEDURE — 36415 COLL VENOUS BLD VENIPUNCTURE: CPT | Performed by: FAMILY MEDICINE

## 2024-01-01 PROCEDURE — 255N000002 HC RX 255 OP 636: Performed by: INTERNAL MEDICINE

## 2024-01-01 PROCEDURE — 96376 TX/PRO/DX INJ SAME DRUG ADON: CPT

## 2024-01-01 PROCEDURE — 92928 PRQ TCAT PLMT NTRAC ST 1 LES: CPT | Mod: RC | Performed by: INTERNAL MEDICINE

## 2024-01-01 PROCEDURE — 99231 SBSQ HOSP IP/OBS SF/LOW 25: CPT | Performed by: FAMILY MEDICINE

## 2024-01-01 PROCEDURE — 84132 ASSAY OF SERUM POTASSIUM: CPT | Performed by: INTERNAL MEDICINE

## 2024-01-01 PROCEDURE — 250N000013 HC RX MED GY IP 250 OP 250 PS 637: Performed by: FAMILY MEDICINE

## 2024-01-01 PROCEDURE — 84100 ASSAY OF PHOSPHORUS: CPT | Performed by: STUDENT IN AN ORGANIZED HEALTH CARE EDUCATION/TRAINING PROGRAM

## 2024-01-01 PROCEDURE — 84484 ASSAY OF TROPONIN QUANT: CPT

## 2024-01-01 PROCEDURE — 99417 PROLNG OP E/M EACH 15 MIN: CPT | Performed by: INTERNAL MEDICINE

## 2024-01-01 PROCEDURE — 99231 SBSQ HOSP IP/OBS SF/LOW 25: CPT | Mod: FS | Performed by: SURGERY

## 2024-01-01 PROCEDURE — 99232 SBSQ HOSP IP/OBS MODERATE 35: CPT | Performed by: PHYSICIAN ASSISTANT

## 2024-01-01 PROCEDURE — 73030 X-RAY EXAM OF SHOULDER: CPT | Mod: RT

## 2024-01-01 PROCEDURE — 83690 ASSAY OF LIPASE: CPT | Performed by: EMERGENCY MEDICINE

## 2024-01-01 PROCEDURE — 120N000004 HC R&B MS OVERFLOW

## 2024-01-01 PROCEDURE — C1725 CATH, TRANSLUMIN NON-LASER: HCPCS | Performed by: INTERNAL MEDICINE

## 2024-01-01 PROCEDURE — 85027 COMPLETE CBC AUTOMATED: CPT | Performed by: INTERNAL MEDICINE

## 2024-01-01 PROCEDURE — 74018 RADEX ABDOMEN 1 VIEW: CPT

## 2024-01-01 PROCEDURE — 76705 ECHO EXAM OF ABDOMEN: CPT

## 2024-01-01 PROCEDURE — C9113 INJ PANTOPRAZOLE SODIUM, VIA: HCPCS

## 2024-01-01 PROCEDURE — 250N000013 HC RX MED GY IP 250 OP 250 PS 637: Performed by: STUDENT IN AN ORGANIZED HEALTH CARE EDUCATION/TRAINING PROGRAM

## 2024-01-01 PROCEDURE — C1874 STENT, COATED/COV W/DEL SYS: HCPCS | Performed by: INTERNAL MEDICINE

## 2024-01-01 PROCEDURE — 99214 OFFICE O/P EST MOD 30 MIN: CPT | Performed by: INTERNAL MEDICINE

## 2024-01-01 PROCEDURE — 86833 HLA CLASS II HIGH DEFIN QUAL: CPT

## 2024-01-01 PROCEDURE — 250N000013 HC RX MED GY IP 250 OP 250 PS 637: Performed by: NURSE PRACTITIONER

## 2024-01-01 PROCEDURE — 250N000011 HC RX IP 250 OP 636: Mod: JZ | Performed by: FAMILY MEDICINE

## 2024-01-01 PROCEDURE — 83735 ASSAY OF MAGNESIUM: CPT

## 2024-01-01 PROCEDURE — 84100 ASSAY OF PHOSPHORUS: CPT

## 2024-01-01 PROCEDURE — 84132 ASSAY OF SERUM POTASSIUM: CPT | Performed by: EMERGENCY MEDICINE

## 2024-01-01 PROCEDURE — 343N000001 HC RX 343: Performed by: FAMILY MEDICINE

## 2024-01-01 PROCEDURE — 99221 1ST HOSP IP/OBS SF/LOW 40: CPT | Performed by: SURGERY

## 2024-01-01 PROCEDURE — 80048 BASIC METABOLIC PNL TOTAL CA: CPT | Performed by: STUDENT IN AN ORGANIZED HEALTH CARE EDUCATION/TRAINING PROGRAM

## 2024-01-01 PROCEDURE — 250N000013 HC RX MED GY IP 250 OP 250 PS 637: Performed by: EMERGENCY MEDICINE

## 2024-01-01 PROCEDURE — 85379 FIBRIN DEGRADATION QUANT: CPT

## 2024-01-01 PROCEDURE — 83605 ASSAY OF LACTIC ACID: CPT

## 2024-01-01 PROCEDURE — 85049 AUTOMATED PLATELET COUNT: CPT

## 2024-01-01 PROCEDURE — 96375 TX/PRO/DX INJ NEW DRUG ADDON: CPT

## 2024-01-01 PROCEDURE — 97116 GAIT TRAINING THERAPY: CPT | Mod: GP

## 2024-01-01 PROCEDURE — 4A023N7 MEASUREMENT OF CARDIAC SAMPLING AND PRESSURE, LEFT HEART, PERCUTANEOUS APPROACH: ICD-10-PCS | Performed by: INTERNAL MEDICINE

## 2024-01-01 PROCEDURE — 99222 1ST HOSP IP/OBS MODERATE 55: CPT | Performed by: PHYSICIAN ASSISTANT

## 2024-01-01 PROCEDURE — 82374 ASSAY BLOOD CARBON DIOXIDE: CPT

## 2024-01-01 PROCEDURE — 99222 1ST HOSP IP/OBS MODERATE 55: CPT | Performed by: NURSE PRACTITIONER

## 2024-01-01 PROCEDURE — C1769 GUIDE WIRE: HCPCS | Performed by: INTERNAL MEDICINE

## 2024-01-01 PROCEDURE — 85004 AUTOMATED DIFF WBC COUNT: CPT

## 2024-01-01 PROCEDURE — C1760 CLOSURE DEV, VASC: HCPCS | Performed by: INTERNAL MEDICINE

## 2024-01-01 PROCEDURE — 83735 ASSAY OF MAGNESIUM: CPT | Performed by: EMERGENCY MEDICINE

## 2024-01-01 PROCEDURE — C1761 HC OR CATH, TRANS INTRA LITHO/CORONARY: HCPCS | Performed by: INTERNAL MEDICINE

## 2024-01-01 PROCEDURE — 97162 PT EVAL MOD COMPLEX 30 MIN: CPT | Mod: GP

## 2024-01-01 PROCEDURE — 93308 TTE F-UP OR LMTD: CPT | Mod: 26 | Performed by: INTERNAL MEDICINE

## 2024-01-01 PROCEDURE — 90945 DIALYSIS ONE EVALUATION: CPT | Performed by: INTERNAL MEDICINE

## 2024-01-01 PROCEDURE — P9047 ALBUMIN (HUMAN), 25%, 50ML: HCPCS | Performed by: PHYSICIAN ASSISTANT

## 2024-01-01 PROCEDURE — G0257 UNSCHED DIALYSIS ESRD PT HOS: HCPCS

## 2024-01-01 PROCEDURE — 82607 VITAMIN B-12: CPT

## 2024-01-01 PROCEDURE — 80061 LIPID PANEL: CPT | Performed by: INTERNAL MEDICINE

## 2024-01-01 PROCEDURE — 93325 DOPPLER ECHO COLOR FLOW MAPG: CPT

## 2024-01-01 PROCEDURE — 82040 ASSAY OF SERUM ALBUMIN: CPT | Performed by: FAMILY MEDICINE

## 2024-01-01 PROCEDURE — 250N000011 HC RX IP 250 OP 636: Mod: JZ | Performed by: EMERGENCY MEDICINE

## 2024-01-01 PROCEDURE — 96374 THER/PROPH/DIAG INJ IV PUSH: CPT

## 2024-01-01 PROCEDURE — 82374 ASSAY BLOOD CARBON DIOXIDE: CPT | Performed by: INTERNAL MEDICINE

## 2024-01-01 PROCEDURE — C1894 INTRO/SHEATH, NON-LASER: HCPCS | Performed by: INTERNAL MEDICINE

## 2024-01-01 PROCEDURE — 258N000003 HC RX IP 258 OP 636: Performed by: PHYSICIAN ASSISTANT

## 2024-01-01 PROCEDURE — 86832 HLA CLASS I HIGH DEFIN QUAL: CPT

## 2024-01-01 PROCEDURE — 82652 VIT D 1 25-DIHYDROXY: CPT | Performed by: FAMILY MEDICINE

## 2024-01-01 PROCEDURE — 85347 COAGULATION TIME ACTIVATED: CPT

## 2024-01-01 PROCEDURE — 84450 TRANSFERASE (AST) (SGOT): CPT | Performed by: INTERNAL MEDICINE

## 2024-01-01 PROCEDURE — 027035Z DILATION OF CORONARY ARTERY, ONE ARTERY WITH TWO DRUG-ELUTING INTRALUMINAL DEVICES, PERCUTANEOUS APPROACH: ICD-10-PCS | Performed by: INTERNAL MEDICINE

## 2024-01-01 PROCEDURE — 99231 SBSQ HOSP IP/OBS SF/LOW 25: CPT

## 2024-01-01 PROCEDURE — 99153 MOD SED SAME PHYS/QHP EA: CPT | Performed by: INTERNAL MEDICINE

## 2024-01-01 PROCEDURE — 90937 HEMODIALYSIS REPEATED EVAL: CPT

## 2024-01-01 PROCEDURE — 250N000009 HC RX 250

## 2024-01-01 PROCEDURE — P9047 ALBUMIN (HUMAN), 25%, 50ML: HCPCS | Performed by: INTERNAL MEDICINE

## 2024-01-01 PROCEDURE — 84132 ASSAY OF SERUM POTASSIUM: CPT | Performed by: STUDENT IN AN ORGANIZED HEALTH CARE EDUCATION/TRAINING PROGRAM

## 2024-01-01 PROCEDURE — 86900 BLOOD TYPING SEROLOGIC ABO: CPT | Performed by: INTERNAL MEDICINE

## 2024-01-01 PROCEDURE — 71046 X-RAY EXAM CHEST 2 VIEWS: CPT

## 2024-01-01 PROCEDURE — 93306 TTE W/DOPPLER COMPLETE: CPT | Mod: 26 | Performed by: INTERNAL MEDICINE

## 2024-01-01 PROCEDURE — 999N000127 HC STATISTIC PERIPHERAL IV START W US GUIDANCE

## 2024-01-01 PROCEDURE — 85049 AUTOMATED PLATELET COUNT: CPT | Performed by: INTERNAL MEDICINE

## 2024-01-01 PROCEDURE — 84295 ASSAY OF SERUM SODIUM: CPT

## 2024-01-01 PROCEDURE — 250N000009 HC RX 250: Performed by: INTERNAL MEDICINE

## 2024-01-01 PROCEDURE — 5A1D70Z PERFORMANCE OF URINARY FILTRATION, INTERMITTENT, LESS THAN 6 HOURS PER DAY: ICD-10-PCS | Performed by: FAMILY MEDICINE

## 2024-01-01 PROCEDURE — 02F03ZZ FRAGMENTATION IN CORONARY ARTERY, ONE ARTERY, PERCUTANEOUS APPROACH: ICD-10-PCS | Performed by: INTERNAL MEDICINE

## 2024-01-01 PROCEDURE — 85041 AUTOMATED RBC COUNT: CPT

## 2024-01-01 PROCEDURE — 82746 ASSAY OF FOLIC ACID SERUM: CPT

## 2024-01-01 PROCEDURE — 36415 COLL VENOUS BLD VENIPUNCTURE: CPT | Performed by: PHYSICIAN ASSISTANT

## 2024-01-01 PROCEDURE — 85025 COMPLETE CBC W/AUTO DIFF WBC: CPT | Performed by: STUDENT IN AN ORGANIZED HEALTH CARE EDUCATION/TRAINING PROGRAM

## 2024-01-01 PROCEDURE — 82728 ASSAY OF FERRITIN: CPT | Performed by: PHYSICIAN ASSISTANT

## 2024-01-01 PROCEDURE — 84100 ASSAY OF PHOSPHORUS: CPT | Performed by: EMERGENCY MEDICINE

## 2024-01-01 PROCEDURE — 84484 ASSAY OF TROPONIN QUANT: CPT | Performed by: EMERGENCY MEDICINE

## 2024-01-01 PROCEDURE — 99207 PR APP CREDIT; MD BILLING SHARED VISIT: CPT | Mod: FS | Performed by: NURSE PRACTITIONER

## 2024-01-01 PROCEDURE — 99215 OFFICE O/P EST HI 40 MIN: CPT | Performed by: INTERNAL MEDICINE

## 2024-01-01 PROCEDURE — 250N000011 HC RX IP 250 OP 636: Performed by: EMERGENCY MEDICINE

## 2024-01-01 PROCEDURE — 99231 SBSQ HOSP IP/OBS SF/LOW 25: CPT | Performed by: SURGERY

## 2024-01-01 PROCEDURE — 634N000001 HC RX 634: Performed by: INTERNAL MEDICINE

## 2024-01-01 PROCEDURE — 84075 ASSAY ALKALINE PHOSPHATASE: CPT | Performed by: INTERNAL MEDICINE

## 2024-01-01 PROCEDURE — 99223 1ST HOSP IP/OBS HIGH 75: CPT | Mod: FS | Performed by: INTERNAL MEDICINE

## 2024-01-01 PROCEDURE — 85652 RBC SED RATE AUTOMATED: CPT | Performed by: FAMILY MEDICINE

## 2024-01-01 PROCEDURE — 87340 HEPATITIS B SURFACE AG IA: CPT | Performed by: PHYSICIAN ASSISTANT

## 2024-01-01 PROCEDURE — 999N000157 HC STATISTIC RCP TIME EA 10 MIN

## 2024-01-01 PROCEDURE — 258N000003 HC RX IP 258 OP 636: Performed by: NURSE PRACTITIONER

## 2024-01-01 PROCEDURE — 83880 ASSAY OF NATRIURETIC PEPTIDE: CPT

## 2024-01-01 PROCEDURE — 80048 BASIC METABOLIC PNL TOTAL CA: CPT | Performed by: EMERGENCY MEDICINE

## 2024-01-01 PROCEDURE — 82565 ASSAY OF CREATININE: CPT

## 2024-01-01 PROCEDURE — 93325 DOPPLER ECHO COLOR FLOW MAPG: CPT | Mod: 26 | Performed by: INTERNAL MEDICINE

## 2024-01-01 PROCEDURE — 99232 SBSQ HOSP IP/OBS MODERATE 35: CPT | Mod: FS | Performed by: INTERNAL MEDICINE

## 2024-01-01 PROCEDURE — 83550 IRON BINDING TEST: CPT | Performed by: PHYSICIAN ASSISTANT

## 2024-01-01 PROCEDURE — 93018 CV STRESS TEST I&R ONLY: CPT | Performed by: INTERNAL MEDICINE

## 2024-01-01 PROCEDURE — 93010 ELECTROCARDIOGRAM REPORT: CPT | Performed by: INTERNAL MEDICINE

## 2024-01-01 PROCEDURE — 85048 AUTOMATED LEUKOCYTE COUNT: CPT | Performed by: FAMILY MEDICINE

## 2024-01-01 PROCEDURE — 86140 C-REACTIVE PROTEIN: CPT | Performed by: FAMILY MEDICINE

## 2024-01-01 PROCEDURE — 80051 ELECTROLYTE PANEL: CPT | Performed by: PHYSICIAN ASSISTANT

## 2024-01-01 PROCEDURE — 78452 HT MUSCLE IMAGE SPECT MULT: CPT | Mod: 26 | Performed by: INTERNAL MEDICINE

## 2024-01-01 PROCEDURE — 999N000208 ECHOCARDIOGRAM COMPLETE

## 2024-01-01 PROCEDURE — 82248 BILIRUBIN DIRECT: CPT

## 2024-01-01 PROCEDURE — 78452 HT MUSCLE IMAGE SPECT MULT: CPT

## 2024-01-01 PROCEDURE — 83690 ASSAY OF LIPASE: CPT | Performed by: STUDENT IN AN ORGANIZED HEALTH CARE EDUCATION/TRAINING PROGRAM

## 2024-01-01 PROCEDURE — 99222 1ST HOSP IP/OBS MODERATE 55: CPT | Mod: GC

## 2024-01-01 PROCEDURE — 99231 SBSQ HOSP IP/OBS SF/LOW 25: CPT | Mod: GC

## 2024-01-01 PROCEDURE — 258N000003 HC RX IP 258 OP 636

## 2024-01-01 PROCEDURE — 80069 RENAL FUNCTION PANEL: CPT | Performed by: INTERNAL MEDICINE

## 2024-01-01 PROCEDURE — 74181 MRI ABDOMEN W/O CONTRAST: CPT

## 2024-01-01 PROCEDURE — 250N000012 HC RX MED GY IP 250 OP 636 PS 637: Performed by: EMERGENCY MEDICINE

## 2024-01-01 PROCEDURE — 87040 BLOOD CULTURE FOR BACTERIA: CPT | Performed by: EMERGENCY MEDICINE

## 2024-01-01 PROCEDURE — 85610 PROTHROMBIN TIME: CPT | Performed by: EMERGENCY MEDICINE

## 2024-01-01 PROCEDURE — 250N000011 HC RX IP 250 OP 636: Performed by: PHYSICIAN ASSISTANT

## 2024-01-01 PROCEDURE — 97110 THERAPEUTIC EXERCISES: CPT | Mod: GO

## 2024-01-01 PROCEDURE — 80053 COMPREHEN METABOLIC PANEL: CPT | Performed by: STUDENT IN AN ORGANIZED HEALTH CARE EDUCATION/TRAINING PROGRAM

## 2024-01-01 PROCEDURE — 99232 SBSQ HOSP IP/OBS MODERATE 35: CPT | Performed by: SURGERY

## 2024-01-01 PROCEDURE — C8929 TTE W OR WO FOL WCON,DOPPLER: HCPCS

## 2024-01-01 PROCEDURE — 99232 SBSQ HOSP IP/OBS MODERATE 35: CPT | Performed by: NURSE PRACTITIONER

## 2024-01-01 DEVICE — STENT CORONARY SYNERGY XD MR US 2.75X48MM H7493941848270: Type: IMPLANTABLE DEVICE | Site: CORONARY | Status: FUNCTIONAL

## 2024-01-01 DEVICE — STENT CORONARY DES SYNERGY XD MR US 3.50X20MM H7493941820350: Type: IMPLANTABLE DEVICE | Site: CORONARY | Status: FUNCTIONAL

## 2024-01-01 RX ORDER — PANTOPRAZOLE SODIUM 40 MG/1
40 TABLET, DELAYED RELEASE ORAL EVERY EVENING
Status: DISCONTINUED | OUTPATIENT
Start: 2024-01-01 | End: 2024-01-01

## 2024-01-01 RX ORDER — ASPIRIN 81 MG/1
81 TABLET, CHEWABLE ORAL DAILY
Status: DISCONTINUED | OUTPATIENT
Start: 2024-01-01 | End: 2024-01-01 | Stop reason: HOSPADM

## 2024-01-01 RX ORDER — CYCLOBENZAPRINE HCL 5 MG
5 TABLET ORAL 2 TIMES DAILY PRN
Status: DISCONTINUED | OUTPATIENT
Start: 2024-01-01 | End: 2024-01-01 | Stop reason: HOSPADM

## 2024-01-01 RX ORDER — LORAZEPAM 1 MG/1
1 TABLET ORAL DAILY PRN
Status: DISCONTINUED | OUTPATIENT
Start: 2024-01-01 | End: 2024-01-01 | Stop reason: HOSPADM

## 2024-01-01 RX ORDER — POLYETHYLENE GLYCOL 3350 17 G/17G
17 POWDER, FOR SOLUTION ORAL 2 TIMES DAILY PRN
Status: DISCONTINUED | OUTPATIENT
Start: 2024-01-01 | End: 2024-01-01 | Stop reason: HOSPADM

## 2024-01-01 RX ORDER — NALOXONE HYDROCHLORIDE 0.4 MG/ML
0.2 INJECTION, SOLUTION INTRAMUSCULAR; INTRAVENOUS; SUBCUTANEOUS
Status: DISCONTINUED | OUTPATIENT
Start: 2024-01-01 | End: 2024-01-01 | Stop reason: HOSPADM

## 2024-01-01 RX ORDER — LORAZEPAM 0.5 MG/1
.5-1 TABLET ORAL
Status: DISCONTINUED | OUTPATIENT
Start: 2024-01-01 | End: 2024-01-01 | Stop reason: HOSPADM

## 2024-01-01 RX ORDER — SIMETHICONE 80 MG
80 TABLET,CHEWABLE ORAL EVERY 6 HOURS PRN
Status: DISCONTINUED | OUTPATIENT
Start: 2024-01-01 | End: 2024-01-01 | Stop reason: HOSPADM

## 2024-01-01 RX ORDER — ONDANSETRON 4 MG/1
4 TABLET, ORALLY DISINTEGRATING ORAL EVERY 6 HOURS PRN
Status: DISCONTINUED | OUTPATIENT
Start: 2024-01-01 | End: 2024-01-01 | Stop reason: HOSPADM

## 2024-01-01 RX ORDER — NORTRIPTYLINE HCL 25 MG
75 CAPSULE ORAL AT BEDTIME
Status: CANCELLED | OUTPATIENT
Start: 2024-01-01

## 2024-01-01 RX ORDER — NALOXONE HYDROCHLORIDE 0.4 MG/ML
0.2 INJECTION, SOLUTION INTRAMUSCULAR; INTRAVENOUS; SUBCUTANEOUS
Status: DISCONTINUED | OUTPATIENT
Start: 2024-01-01 | End: 2024-01-01

## 2024-01-01 RX ORDER — ACETAMINOPHEN 650 MG/1
650 SUPPOSITORY RECTAL EVERY 4 HOURS PRN
Status: DISCONTINUED | OUTPATIENT
Start: 2024-01-01 | End: 2024-01-01 | Stop reason: HOSPADM

## 2024-01-01 RX ORDER — LIDOCAINE 40 MG/G
CREAM TOPICAL
Status: CANCELLED | OUTPATIENT
Start: 2024-01-01

## 2024-01-01 RX ORDER — LIDOCAINE 40 MG/G
CREAM TOPICAL
Status: DISCONTINUED | OUTPATIENT
Start: 2024-01-01 | End: 2024-01-01 | Stop reason: HOSPADM

## 2024-01-01 RX ORDER — FENTANYL CITRATE 50 UG/ML
25 INJECTION, SOLUTION INTRAMUSCULAR; INTRAVENOUS
Status: DISCONTINUED | OUTPATIENT
Start: 2024-01-01 | End: 2024-01-01

## 2024-01-01 RX ORDER — LEVOTHYROXINE SODIUM 25 UG/1
50 TABLET ORAL DAILY
Status: DISCONTINUED | OUTPATIENT
Start: 2024-01-01 | End: 2024-01-01 | Stop reason: HOSPADM

## 2024-01-01 RX ORDER — AMOXICILLIN 250 MG
2 CAPSULE ORAL 2 TIMES DAILY PRN
Status: DISCONTINUED | OUTPATIENT
Start: 2024-01-01 | End: 2024-01-01 | Stop reason: HOSPADM

## 2024-01-01 RX ORDER — CALCITRIOL 0.25 UG/1
0.5 CAPSULE, LIQUID FILLED ORAL
Status: CANCELLED | OUTPATIENT
Start: 2024-01-01

## 2024-01-01 RX ORDER — CALCIUM ACETATE 667 MG/1
667-1334 CAPSULE ORAL
Status: DISCONTINUED | OUTPATIENT
Start: 2024-01-01 | End: 2024-01-01 | Stop reason: HOSPADM

## 2024-01-01 RX ORDER — ONDANSETRON 2 MG/ML
4 INJECTION INTRAMUSCULAR; INTRAVENOUS EVERY 6 HOURS PRN
Status: DISCONTINUED | OUTPATIENT
Start: 2024-01-01 | End: 2024-01-01 | Stop reason: HOSPADM

## 2024-01-01 RX ORDER — LIDOCAINE 4 G/G
1 PATCH TOPICAL
Status: DISCONTINUED | OUTPATIENT
Start: 2024-01-01 | End: 2024-01-01 | Stop reason: HOSPADM

## 2024-01-01 RX ORDER — NALOXONE HYDROCHLORIDE 0.4 MG/ML
0.4 INJECTION, SOLUTION INTRAMUSCULAR; INTRAVENOUS; SUBCUTANEOUS
Status: CANCELLED | OUTPATIENT
Start: 2024-01-01

## 2024-01-01 RX ORDER — HYDROMORPHONE HYDROCHLORIDE 1 MG/ML
0.2 INJECTION, SOLUTION INTRAMUSCULAR; INTRAVENOUS; SUBCUTANEOUS
Status: DISCONTINUED | OUTPATIENT
Start: 2024-01-01 | End: 2024-01-01

## 2024-01-01 RX ORDER — PROCHLORPERAZINE MALEATE 5 MG
5 TABLET ORAL EVERY 6 HOURS PRN
Status: DISCONTINUED | OUTPATIENT
Start: 2024-01-01 | End: 2024-01-01 | Stop reason: HOSPADM

## 2024-01-01 RX ORDER — DIAZEPAM 2 MG
2 TABLET ORAL
Status: DISCONTINUED | OUTPATIENT
Start: 2024-01-01 | End: 2024-01-01

## 2024-01-01 RX ORDER — IODIXANOL 320 MG/ML
INJECTION, SOLUTION INTRAVASCULAR
Status: DISCONTINUED | OUTPATIENT
Start: 2024-01-01 | End: 2024-01-01 | Stop reason: HOSPADM

## 2024-01-01 RX ORDER — NALOXONE HYDROCHLORIDE 0.4 MG/ML
0.2 INJECTION, SOLUTION INTRAMUSCULAR; INTRAVENOUS; SUBCUTANEOUS
Status: CANCELLED | OUTPATIENT
Start: 2024-01-01

## 2024-01-01 RX ORDER — CYCLOBENZAPRINE HCL 5 MG
5 TABLET ORAL 2 TIMES DAILY PRN
Status: CANCELLED | OUTPATIENT
Start: 2024-01-01

## 2024-01-01 RX ORDER — DEXTROSE MONOHYDRATE 25 G/50ML
25-50 INJECTION, SOLUTION INTRAVENOUS
Status: DISCONTINUED | OUTPATIENT
Start: 2024-01-01 | End: 2024-01-01 | Stop reason: HOSPADM

## 2024-01-01 RX ORDER — AMOXICILLIN 250 MG
2 CAPSULE ORAL 2 TIMES DAILY PRN
Status: DISCONTINUED | OUTPATIENT
Start: 2024-01-01 | End: 2024-01-01

## 2024-01-01 RX ORDER — HYDROMORPHONE HYDROCHLORIDE 1 MG/ML
0.5 INJECTION, SOLUTION INTRAMUSCULAR; INTRAVENOUS; SUBCUTANEOUS ONCE
Status: COMPLETED | OUTPATIENT
Start: 2024-01-01 | End: 2024-01-01

## 2024-01-01 RX ORDER — LABETALOL 100 MG/1
200 TABLET, FILM COATED ORAL 2 TIMES DAILY
Status: DISCONTINUED | OUTPATIENT
Start: 2024-01-01 | End: 2024-01-01 | Stop reason: HOSPADM

## 2024-01-01 RX ORDER — SODIUM CHLORIDE 9 MG/ML
INJECTION, SOLUTION INTRAVENOUS CONTINUOUS
Status: ACTIVE | OUTPATIENT
Start: 2024-01-01 | End: 2024-01-01

## 2024-01-01 RX ORDER — NALOXONE HYDROCHLORIDE 0.4 MG/ML
0.4 INJECTION, SOLUTION INTRAMUSCULAR; INTRAVENOUS; SUBCUTANEOUS
Status: DISCONTINUED | OUTPATIENT
Start: 2024-01-01 | End: 2024-01-01

## 2024-01-01 RX ORDER — SENNA AND DOCUSATE SODIUM 50; 8.6 MG/1; MG/1
2 TABLET, FILM COATED ORAL 2 TIMES DAILY
Qty: 120 TABLET | Refills: 0 | Status: SHIPPED | OUTPATIENT
Start: 2024-01-01

## 2024-01-01 RX ORDER — LIDOCAINE HYDROCHLORIDE 30 MG/G
CREAM TOPICAL DAILY PRN
COMMUNITY
End: 2024-01-01

## 2024-01-01 RX ORDER — PROCHLORPERAZINE 25 MG
12.5 SUPPOSITORY, RECTAL RECTAL EVERY 12 HOURS PRN
Status: DISCONTINUED | OUTPATIENT
Start: 2024-01-01 | End: 2024-01-01 | Stop reason: HOSPADM

## 2024-01-01 RX ORDER — NICOTINE 21 MG/24HR
1 PATCH, TRANSDERMAL 24 HOURS TRANSDERMAL DAILY
Status: DISCONTINUED | OUTPATIENT
Start: 2024-01-01 | End: 2024-01-01 | Stop reason: HOSPADM

## 2024-01-01 RX ORDER — ASPIRIN 81 MG/1
81 TABLET ORAL DAILY
Status: DISCONTINUED | OUTPATIENT
Start: 2024-01-01 | End: 2024-01-01 | Stop reason: HOSPADM

## 2024-01-01 RX ORDER — METOPROLOL SUCCINATE 25 MG/1
25 TABLET, EXTENDED RELEASE ORAL DAILY
Status: DISCONTINUED | OUTPATIENT
Start: 2024-01-01 | End: 2024-01-01 | Stop reason: HOSPADM

## 2024-01-01 RX ORDER — SALIVA STIMULANT COMB. NO.3
2 SPRAY, NON-AEROSOL (ML) MUCOUS MEMBRANE 4 TIMES DAILY PRN
Status: DISCONTINUED | OUTPATIENT
Start: 2024-01-01 | End: 2024-01-01 | Stop reason: HOSPADM

## 2024-01-01 RX ORDER — NALOXONE HYDROCHLORIDE 0.4 MG/ML
0.4 INJECTION, SOLUTION INTRAMUSCULAR; INTRAVENOUS; SUBCUTANEOUS
Status: DISCONTINUED | OUTPATIENT
Start: 2024-01-01 | End: 2024-01-01 | Stop reason: HOSPADM

## 2024-01-01 RX ORDER — ASPIRIN 81 MG/1
81 TABLET, CHEWABLE ORAL DAILY
COMMUNITY
Start: 2024-01-01

## 2024-01-01 RX ORDER — SALIVA STIMULANT COMB. NO.3
2 SPRAY, NON-AEROSOL (ML) MUCOUS MEMBRANE
Status: CANCELLED | OUTPATIENT
Start: 2024-01-01

## 2024-01-01 RX ORDER — LORAZEPAM 0.5 MG/1
.5-1 TABLET ORAL
Status: DISCONTINUED | OUTPATIENT
Start: 2024-01-01 | End: 2024-01-01

## 2024-01-01 RX ORDER — ALBUMIN (HUMAN) 12.5 G/50ML
50 SOLUTION INTRAVENOUS
Status: DISCONTINUED | OUTPATIENT
Start: 2024-01-01 | End: 2024-01-01 | Stop reason: HOSPADM

## 2024-01-01 RX ORDER — ROSUVASTATIN CALCIUM 10 MG/1
10 TABLET, COATED ORAL DAILY
Status: DISCONTINUED | OUTPATIENT
Start: 2024-01-01 | End: 2024-01-01 | Stop reason: HOSPADM

## 2024-01-01 RX ORDER — CALCIUM CARBONATE 500 MG/1
1000 TABLET, CHEWABLE ORAL 4 TIMES DAILY PRN
Status: DISCONTINUED | OUTPATIENT
Start: 2024-01-01 | End: 2024-01-01 | Stop reason: HOSPADM

## 2024-01-01 RX ORDER — LANOLIN ALCOHOL/MO/W.PET/CERES
3 CREAM (GRAM) TOPICAL AT BEDTIME
Status: DISCONTINUED | OUTPATIENT
Start: 2024-01-01 | End: 2024-01-01 | Stop reason: HOSPADM

## 2024-01-01 RX ORDER — LIDOCAINE 50 MG/G
OINTMENT TOPICAL DAILY PRN
Status: DISCONTINUED | OUTPATIENT
Start: 2024-01-01 | End: 2024-01-01 | Stop reason: HOSPADM

## 2024-01-01 RX ORDER — OXYCODONE HYDROCHLORIDE 5 MG/1
10 TABLET ORAL EVERY 4 HOURS PRN
Status: DISCONTINUED | OUTPATIENT
Start: 2024-01-01 | End: 2024-01-01

## 2024-01-01 RX ORDER — LACTULOSE 10 G/15ML
10 SOLUTION ORAL 2 TIMES DAILY PRN
Status: DISCONTINUED | OUTPATIENT
Start: 2024-01-01 | End: 2024-01-01

## 2024-01-01 RX ORDER — LORAZEPAM 1 MG/1
1 TABLET ORAL
Status: DISCONTINUED | OUTPATIENT
Start: 2024-01-01 | End: 2024-01-01 | Stop reason: HOSPADM

## 2024-01-01 RX ORDER — CALCIUM CARBONATE 500 MG/1
1000 TABLET, CHEWABLE ORAL 4 TIMES DAILY PRN
Status: CANCELLED | OUTPATIENT
Start: 2024-01-01

## 2024-01-01 RX ORDER — LOSARTAN POTASSIUM 25 MG/1
12.5 TABLET ORAL DAILY
Qty: 45 TABLET | Refills: 11 | Status: ON HOLD | OUTPATIENT
Start: 2024-01-01 | End: 2024-01-01

## 2024-01-01 RX ORDER — NITROGLYCERIN 0.4 MG/1
0.4 TABLET SUBLINGUAL EVERY 5 MIN PRN
Status: DISCONTINUED | OUTPATIENT
Start: 2024-01-01 | End: 2024-01-01 | Stop reason: HOSPADM

## 2024-01-01 RX ORDER — LORAZEPAM 1 MG/1
1 TABLET ORAL DAILY PRN
COMMUNITY

## 2024-01-01 RX ORDER — SIMETHICONE 80 MG
80 TABLET,CHEWABLE ORAL EVERY 6 HOURS PRN
Status: CANCELLED | OUTPATIENT
Start: 2024-01-01

## 2024-01-01 RX ORDER — CLOPIDOGREL BISULFATE 75 MG/1
75 TABLET ORAL DAILY
Status: DISCONTINUED | OUTPATIENT
Start: 2024-01-01 | End: 2024-01-01 | Stop reason: HOSPADM

## 2024-01-01 RX ORDER — OXYCODONE HYDROCHLORIDE 5 MG/1
10 TABLET ORAL EVERY 4 HOURS PRN
Status: DISCONTINUED | OUTPATIENT
Start: 2024-01-01 | End: 2024-01-01 | Stop reason: HOSPADM

## 2024-01-01 RX ORDER — LACTULOSE 10 G/15ML
10 SOLUTION ORAL 2 TIMES DAILY PRN
Status: DISCONTINUED | OUTPATIENT
Start: 2024-01-01 | End: 2024-01-01 | Stop reason: HOSPADM

## 2024-01-01 RX ORDER — ATROPINE SULFATE 0.1 MG/ML
0.5 INJECTION INTRAVENOUS
Status: DISCONTINUED | OUTPATIENT
Start: 2024-01-01 | End: 2024-01-01 | Stop reason: HOSPADM

## 2024-01-01 RX ORDER — ONDANSETRON 2 MG/ML
4 INJECTION INTRAMUSCULAR; INTRAVENOUS ONCE
Status: DISCONTINUED | OUTPATIENT
Start: 2024-01-01 | End: 2024-01-01 | Stop reason: ALTCHOICE

## 2024-01-01 RX ORDER — PANTOPRAZOLE SODIUM 40 MG/1
40 TABLET, DELAYED RELEASE ORAL EVERY EVENING
Status: DISCONTINUED | OUTPATIENT
Start: 2024-01-01 | End: 2024-01-01 | Stop reason: HOSPADM

## 2024-01-01 RX ORDER — ALBUMIN (HUMAN) 12.5 G/50ML
25 SOLUTION INTRAVENOUS ONCE
Status: COMPLETED | OUTPATIENT
Start: 2024-01-01 | End: 2024-01-01

## 2024-01-01 RX ORDER — LEVOTHYROXINE SODIUM 25 UG/1
50 TABLET ORAL DAILY
Status: DISCONTINUED | OUTPATIENT
Start: 2024-01-01 | End: 2024-01-01

## 2024-01-01 RX ORDER — ASPIRIN 81 MG/1
243 TABLET, CHEWABLE ORAL ONCE
Status: DISCONTINUED | OUTPATIENT
Start: 2024-01-01 | End: 2024-01-01 | Stop reason: HOSPADM

## 2024-01-01 RX ORDER — FLUMAZENIL 0.1 MG/ML
0.2 INJECTION, SOLUTION INTRAVENOUS
Status: DISCONTINUED | OUTPATIENT
Start: 2024-01-01 | End: 2024-01-01

## 2024-01-01 RX ORDER — OXYCODONE HYDROCHLORIDE 5 MG/1
5 TABLET ORAL EVERY 4 HOURS PRN
Status: DISCONTINUED | OUTPATIENT
Start: 2024-01-01 | End: 2024-01-01

## 2024-01-01 RX ORDER — OXYCODONE HYDROCHLORIDE 5 MG/1
5-10 TABLET ORAL EVERY 4 HOURS PRN
Status: CANCELLED | OUTPATIENT
Start: 2024-01-01

## 2024-01-01 RX ORDER — LABETALOL 100 MG/1
200 TABLET, FILM COATED ORAL 2 TIMES DAILY
Status: DISCONTINUED | OUTPATIENT
Start: 2024-01-01 | End: 2024-01-01

## 2024-01-01 RX ORDER — LEVOTHYROXINE SODIUM 50 UG/1
50 TABLET ORAL DAILY
COMMUNITY

## 2024-01-01 RX ORDER — LABETALOL 200 MG/1
200 TABLET, FILM COATED ORAL 2 TIMES DAILY
Status: ON HOLD | COMMUNITY
Start: 2024-01-01 | End: 2024-01-01

## 2024-01-01 RX ORDER — MAGNESIUM CARB/ALUMINUM HYDROX 105-160MG
148 TABLET,CHEWABLE ORAL ONCE
Status: DISCONTINUED | OUTPATIENT
Start: 2024-01-01 | End: 2024-01-01

## 2024-01-01 RX ORDER — AMOXICILLIN 250 MG
2 CAPSULE ORAL 2 TIMES DAILY
Status: DISCONTINUED | OUTPATIENT
Start: 2024-01-01 | End: 2024-01-01 | Stop reason: HOSPADM

## 2024-01-01 RX ORDER — ACETAMINOPHEN 325 MG/1
650 TABLET ORAL EVERY 4 HOURS PRN
Status: DISCONTINUED | OUTPATIENT
Start: 2024-01-01 | End: 2024-01-01 | Stop reason: HOSPADM

## 2024-01-01 RX ORDER — ATORVASTATIN CALCIUM 40 MG/1
80 TABLET, FILM COATED ORAL EVERY EVENING
Status: DISCONTINUED | OUTPATIENT
Start: 2024-01-01 | End: 2024-01-01 | Stop reason: HOSPADM

## 2024-01-01 RX ORDER — PANTOPRAZOLE SODIUM 40 MG/1
40 TABLET, DELAYED RELEASE ORAL
Status: CANCELLED | OUTPATIENT
Start: 2024-01-01

## 2024-01-01 RX ORDER — NITROGLYCERIN 0.4 MG/1
TABLET SUBLINGUAL
Qty: 30 TABLET | Refills: 0 | Status: SHIPPED | OUTPATIENT
Start: 2024-01-01

## 2024-01-01 RX ORDER — HYDRALAZINE HYDROCHLORIDE 20 MG/ML
10 INJECTION INTRAMUSCULAR; INTRAVENOUS EVERY 4 HOURS PRN
Status: DISCONTINUED | OUTPATIENT
Start: 2024-01-01 | End: 2024-01-01 | Stop reason: HOSPADM

## 2024-01-01 RX ORDER — HYDROMORPHONE HCL IN WATER/PF 6 MG/30 ML
0.4 PATIENT CONTROLLED ANALGESIA SYRINGE INTRAVENOUS
Status: DISCONTINUED | OUTPATIENT
Start: 2024-01-01 | End: 2024-01-01 | Stop reason: HOSPADM

## 2024-01-01 RX ORDER — CALCIUM ACETATE 667 MG/1
1-2 TABLET ORAL
COMMUNITY
Start: 2024-01-01

## 2024-01-01 RX ORDER — TRAZODONE HYDROCHLORIDE 50 MG/1
50 TABLET, FILM COATED ORAL AT BEDTIME
Status: DISCONTINUED | OUTPATIENT
Start: 2024-01-01 | End: 2024-01-01 | Stop reason: HOSPADM

## 2024-01-01 RX ORDER — HYDROMORPHONE HCL IN WATER/PF 6 MG/30 ML
0.2 PATIENT CONTROLLED ANALGESIA SYRINGE INTRAVENOUS
Status: COMPLETED | OUTPATIENT
Start: 2024-01-01 | End: 2024-01-01

## 2024-01-01 RX ORDER — LORAZEPAM 0.5 MG/1
.5-1 TABLET ORAL
Status: CANCELLED | OUTPATIENT
Start: 2024-01-01

## 2024-01-01 RX ORDER — ATORVASTATIN CALCIUM 80 MG/1
80 TABLET, FILM COATED ORAL DAILY
Qty: 90 TABLET | Refills: 3 | Status: SHIPPED | OUTPATIENT
Start: 2024-01-01

## 2024-01-01 RX ORDER — IOPAMIDOL 755 MG/ML
67 INJECTION, SOLUTION INTRAVASCULAR ONCE
Status: DISCONTINUED | OUTPATIENT
Start: 2024-01-01 | End: 2024-01-01

## 2024-01-01 RX ORDER — ATORVASTATIN CALCIUM 40 MG/1
80 TABLET, FILM COATED ORAL AT BEDTIME
Status: DISCONTINUED | OUTPATIENT
Start: 2024-01-01 | End: 2024-01-01 | Stop reason: HOSPADM

## 2024-01-01 RX ORDER — ROSUVASTATIN CALCIUM 10 MG/1
10 TABLET, COATED ORAL AT BEDTIME
Status: CANCELLED | OUTPATIENT
Start: 2024-01-01

## 2024-01-01 RX ORDER — LACTULOSE 10 G/15ML
10 SOLUTION ORAL DAILY
Status: DISCONTINUED | OUTPATIENT
Start: 2024-01-01 | End: 2024-01-01 | Stop reason: HOSPADM

## 2024-01-01 RX ORDER — AMINOPHYLLINE 25 MG/ML
50-100 INJECTION, SOLUTION INTRAVENOUS
Status: COMPLETED | OUTPATIENT
Start: 2024-01-01 | End: 2024-01-01

## 2024-01-01 RX ORDER — CALCIUM ACETATE 667 MG/1
667-1334 TABLET ORAL
Status: DISCONTINUED | OUTPATIENT
Start: 2024-01-01 | End: 2024-01-01

## 2024-01-01 RX ORDER — NORTRIPTYLINE HCL 25 MG
75 CAPSULE ORAL AT BEDTIME
Status: DISCONTINUED | OUTPATIENT
Start: 2024-01-01 | End: 2024-01-01 | Stop reason: HOSPADM

## 2024-01-01 RX ORDER — LEVOTHYROXINE SODIUM 50 UG/1
50 TABLET ORAL DAILY
Status: DISCONTINUED | OUTPATIENT
Start: 2024-01-01 | End: 2024-01-01 | Stop reason: HOSPADM

## 2024-01-01 RX ORDER — AMOXICILLIN 250 MG
2 CAPSULE ORAL 2 TIMES DAILY
Status: DISCONTINUED | OUTPATIENT
Start: 2024-01-01 | End: 2024-01-01

## 2024-01-01 RX ORDER — ASPIRIN 325 MG
325 TABLET ORAL ONCE
Status: CANCELLED | OUTPATIENT
Start: 2024-01-01 | End: 2024-01-01

## 2024-01-01 RX ORDER — METOPROLOL TARTRATE 1 MG/ML
5 INJECTION, SOLUTION INTRAVENOUS
Status: DISCONTINUED | OUTPATIENT
Start: 2024-01-01 | End: 2024-01-01 | Stop reason: HOSPADM

## 2024-01-01 RX ORDER — LABETALOL 100 MG/1
200 TABLET, FILM COATED ORAL 2 TIMES DAILY
Status: CANCELLED | OUTPATIENT
Start: 2024-01-01

## 2024-01-01 RX ORDER — HALOPERIDOL 5 MG/ML
2 INJECTION INTRAMUSCULAR EVERY 6 HOURS PRN
Status: DISCONTINUED | OUTPATIENT
Start: 2024-01-01 | End: 2024-01-01

## 2024-01-01 RX ORDER — AMOXICILLIN 250 MG
1 CAPSULE ORAL 2 TIMES DAILY
Status: DISCONTINUED | OUTPATIENT
Start: 2024-01-01 | End: 2024-01-01 | Stop reason: HOSPADM

## 2024-01-01 RX ORDER — ROSUVASTATIN CALCIUM 10 MG/1
10 TABLET, COATED ORAL AT BEDTIME
Status: DISCONTINUED | OUTPATIENT
Start: 2024-01-01 | End: 2024-01-01 | Stop reason: HOSPADM

## 2024-01-01 RX ORDER — VITAMIN B COMPLEX
25 TABLET ORAL DAILY
Status: CANCELLED | OUTPATIENT
Start: 2024-01-01

## 2024-01-01 RX ORDER — HEPARIN SODIUM 1000 [USP'U]/ML
INJECTION, SOLUTION INTRAVENOUS; SUBCUTANEOUS
Status: DISCONTINUED | OUTPATIENT
Start: 2024-01-01 | End: 2024-01-01 | Stop reason: HOSPADM

## 2024-01-01 RX ORDER — MAGNESIUM CARB/ALUMINUM HYDROX 105-160MG
148 TABLET,CHEWABLE ORAL ONCE
Status: COMPLETED | OUTPATIENT
Start: 2024-01-01 | End: 2024-01-01

## 2024-01-01 RX ORDER — ASPIRIN 325 MG
325 TABLET ORAL ONCE
Status: DISCONTINUED | OUTPATIENT
Start: 2024-01-01 | End: 2024-01-01 | Stop reason: HOSPADM

## 2024-01-01 RX ORDER — SIMETHICONE 80 MG
80 TABLET,CHEWABLE ORAL EVERY 6 HOURS PRN
Qty: 30 TABLET | Refills: 0 | Status: SHIPPED | OUTPATIENT
Start: 2024-01-01

## 2024-01-01 RX ORDER — HEPARIN SODIUM 5000 [USP'U]/.5ML
5000 INJECTION, SOLUTION INTRAVENOUS; SUBCUTANEOUS EVERY 8 HOURS
Status: DISCONTINUED | OUTPATIENT
Start: 2024-01-01 | End: 2024-01-01 | Stop reason: HOSPADM

## 2024-01-01 RX ORDER — LORAZEPAM 0.5 MG/1
1 TABLET ORAL ONCE
Status: COMPLETED | OUTPATIENT
Start: 2024-01-01 | End: 2024-01-01

## 2024-01-01 RX ORDER — OXYCODONE HYDROCHLORIDE 5 MG/1
5-10 TABLET ORAL EVERY 6 HOURS PRN
Status: DISCONTINUED | OUTPATIENT
Start: 2024-01-01 | End: 2024-01-01

## 2024-01-01 RX ORDER — AMOXICILLIN 250 MG
1 CAPSULE ORAL 2 TIMES DAILY PRN
Status: DISCONTINUED | OUTPATIENT
Start: 2024-01-01 | End: 2024-01-01

## 2024-01-01 RX ORDER — HEPARIN SODIUM 5000 [USP'U]/.5ML
5000 INJECTION, SOLUTION INTRAVENOUS; SUBCUTANEOUS EVERY 8 HOURS
Status: CANCELLED | OUTPATIENT
Start: 2024-01-01

## 2024-01-01 RX ORDER — CALCITRIOL 0.25 UG/1
0.5 CAPSULE, LIQUID FILLED ORAL
Status: DISCONTINUED | OUTPATIENT
Start: 2024-01-01 | End: 2024-01-01 | Stop reason: HOSPADM

## 2024-01-01 RX ORDER — CARVEDILOL 6.25 MG/1
6.25 TABLET ORAL 2 TIMES DAILY WITH MEALS
Status: DISCONTINUED | OUTPATIENT
Start: 2024-01-01 | End: 2024-01-01 | Stop reason: HOSPADM

## 2024-01-01 RX ORDER — ACETAMINOPHEN 325 MG/1
650 TABLET ORAL EVERY 4 HOURS PRN
Status: CANCELLED | OUTPATIENT
Start: 2024-01-01

## 2024-01-01 RX ORDER — HYDRALAZINE HYDROCHLORIDE 20 MG/ML
10 INJECTION INTRAMUSCULAR; INTRAVENOUS
Status: DISCONTINUED | OUTPATIENT
Start: 2024-01-01 | End: 2024-01-01 | Stop reason: HOSPADM

## 2024-01-01 RX ORDER — AMOXICILLIN 250 MG
1 CAPSULE ORAL 2 TIMES DAILY PRN
Status: DISCONTINUED | OUTPATIENT
Start: 2024-01-01 | End: 2024-01-01 | Stop reason: HOSPADM

## 2024-01-01 RX ORDER — DEXTROSE MONOHYDRATE 25 G/50ML
25 INJECTION, SOLUTION INTRAVENOUS ONCE
Status: COMPLETED | OUTPATIENT
Start: 2024-01-01 | End: 2024-01-01

## 2024-01-01 RX ORDER — CALCITRIOL 0.25 UG/1
0.5 CAPSULE, LIQUID FILLED ORAL
Status: DISCONTINUED | OUTPATIENT
Start: 2024-01-01 | End: 2024-01-01

## 2024-01-01 RX ORDER — AMOXICILLIN 250 MG
2 CAPSULE ORAL 2 TIMES DAILY
Status: CANCELLED | OUTPATIENT
Start: 2024-01-01

## 2024-01-01 RX ORDER — CAFFEINE 200 MG
200 TABLET ORAL
Status: ACTIVE | OUTPATIENT
Start: 2024-01-01 | End: 2024-01-01

## 2024-01-01 RX ORDER — LORAZEPAM 0.5 MG/1
.5-1 TABLET ORAL
Status: DISCONTINUED | OUTPATIENT
Start: 2024-01-01 | End: 2024-01-01 | Stop reason: DRUGHIGH

## 2024-01-01 RX ORDER — ONDANSETRON 2 MG/ML
4 INJECTION INTRAMUSCULAR; INTRAVENOUS ONCE
Status: COMPLETED | OUTPATIENT
Start: 2024-01-01 | End: 2024-01-01

## 2024-01-01 RX ORDER — IOPAMIDOL 755 MG/ML
75 INJECTION, SOLUTION INTRAVASCULAR ONCE
Status: COMPLETED | OUTPATIENT
Start: 2024-01-01 | End: 2024-01-01

## 2024-01-01 RX ORDER — CAFFEINE CITRATE 20 MG/ML
60 SOLUTION INTRAVENOUS
Status: ACTIVE | OUTPATIENT
Start: 2024-01-01 | End: 2024-01-01

## 2024-01-01 RX ORDER — OXYCODONE HYDROCHLORIDE 5 MG/1
5-10 TABLET ORAL EVERY 4 HOURS PRN
Status: DISCONTINUED | OUTPATIENT
Start: 2024-01-01 | End: 2024-01-01 | Stop reason: HOSPADM

## 2024-01-01 RX ORDER — SALIVA STIMULANT COMB. NO.3
2 SPRAY, NON-AEROSOL (ML) MUCOUS MEMBRANE
Status: DISCONTINUED | OUTPATIENT
Start: 2024-01-01 | End: 2024-01-01 | Stop reason: HOSPADM

## 2024-01-01 RX ORDER — FLUMAZENIL 0.1 MG/ML
0.2 INJECTION, SOLUTION INTRAVENOUS
Status: ACTIVE | OUTPATIENT
Start: 2024-01-01 | End: 2024-01-01

## 2024-01-01 RX ORDER — CALCIUM GLUCONATE 20 MG/ML
1 INJECTION, SOLUTION INTRAVENOUS ONCE
Status: COMPLETED | OUTPATIENT
Start: 2024-01-01 | End: 2024-01-01

## 2024-01-01 RX ORDER — VITAMIN B COMPLEX
25 TABLET ORAL DAILY
Status: DISCONTINUED | OUTPATIENT
Start: 2024-01-01 | End: 2024-01-01 | Stop reason: HOSPADM

## 2024-01-01 RX ORDER — LABETALOL 200 MG/1
200 TABLET, FILM COATED ORAL 2 TIMES DAILY
Status: DISCONTINUED | OUTPATIENT
Start: 2024-01-01 | End: 2024-01-01

## 2024-01-01 RX ORDER — HYDROMORPHONE HCL IN WATER/PF 6 MG/30 ML
0.2 PATIENT CONTROLLED ANALGESIA SYRINGE INTRAVENOUS
Status: DISCONTINUED | OUTPATIENT
Start: 2024-01-01 | End: 2024-01-01 | Stop reason: HOSPADM

## 2024-01-01 RX ORDER — CALCIUM CHLORIDE 100 MG/ML
INJECTION INTRAVENOUS; INTRAVENTRICULAR
Status: DISCONTINUED | OUTPATIENT
Start: 2024-01-01 | End: 2024-01-01 | Stop reason: HOSPADM

## 2024-01-01 RX ORDER — CALCIUM ACETATE 667 MG/1
667-1334 CAPSULE ORAL
Status: CANCELLED | OUTPATIENT
Start: 2024-01-01

## 2024-01-01 RX ORDER — ATORVASTATIN CALCIUM 40 MG/1
80 TABLET, FILM COATED ORAL DAILY
Status: DISCONTINUED | OUTPATIENT
Start: 2024-01-01 | End: 2024-01-01

## 2024-01-01 RX ORDER — LEVOTHYROXINE SODIUM 50 UG/1
50 TABLET ORAL DAILY
Status: CANCELLED | OUTPATIENT
Start: 2024-01-01

## 2024-01-01 RX ORDER — ROSUVASTATIN CALCIUM 10 MG/1
10 TABLET, COATED ORAL AT BEDTIME
Status: DISCONTINUED | OUTPATIENT
Start: 2024-01-01 | End: 2024-01-01

## 2024-01-01 RX ORDER — LEVOTHYROXINE SODIUM 25 UG/1
50 TABLET ORAL EVERY EVENING
Status: DISCONTINUED | OUTPATIENT
Start: 2024-01-01 | End: 2024-01-01 | Stop reason: HOSPADM

## 2024-01-01 RX ORDER — ALBUTEROL SULFATE 90 UG/1
2 AEROSOL, METERED RESPIRATORY (INHALATION) EVERY 5 MIN PRN
Status: DISCONTINUED | OUTPATIENT
Start: 2024-01-01 | End: 2024-01-01 | Stop reason: HOSPADM

## 2024-01-01 RX ORDER — LACTULOSE 10 G/15ML
10 SOLUTION ORAL 2 TIMES DAILY PRN
Status: CANCELLED | OUTPATIENT
Start: 2024-01-01

## 2024-01-01 RX ORDER — HYDROMORPHONE HCL IN WATER/PF 6 MG/30 ML
0.2 PATIENT CONTROLLED ANALGESIA SYRINGE INTRAVENOUS ONCE
Status: COMPLETED | OUTPATIENT
Start: 2024-01-01 | End: 2024-01-01

## 2024-01-01 RX ORDER — ASPIRIN 81 MG/1
81 TABLET ORAL DAILY
Qty: 30 TABLET | Refills: 3 | Status: SHIPPED | OUTPATIENT
Start: 2024-01-01 | End: 2024-01-01

## 2024-01-01 RX ORDER — FENTANYL CITRATE 50 UG/ML
INJECTION, SOLUTION INTRAMUSCULAR; INTRAVENOUS
Status: DISCONTINUED | OUTPATIENT
Start: 2024-01-01 | End: 2024-01-01 | Stop reason: HOSPADM

## 2024-01-01 RX ORDER — LOSARTAN POTASSIUM 25 MG/1
25 TABLET ORAL DAILY
Status: DISCONTINUED | OUTPATIENT
Start: 2024-01-01 | End: 2024-01-01 | Stop reason: HOSPADM

## 2024-01-01 RX ORDER — ASPIRIN 81 MG/1
81 TABLET, CHEWABLE ORAL ONCE
Status: DISCONTINUED | OUTPATIENT
Start: 2024-01-01 | End: 2024-01-01

## 2024-01-01 RX ORDER — PANTOPRAZOLE SODIUM 40 MG/1
40 TABLET, DELAYED RELEASE ORAL
Status: DISCONTINUED | OUTPATIENT
Start: 2024-01-01 | End: 2024-01-01 | Stop reason: HOSPADM

## 2024-01-01 RX ORDER — CARVEDILOL 3.12 MG/1
3.12 TABLET ORAL 2 TIMES DAILY WITH MEALS
Status: DISCONTINUED | OUTPATIENT
Start: 2024-01-01 | End: 2024-01-01

## 2024-01-01 RX ORDER — ALBUMIN (HUMAN) 12.5 G/50ML
50 SOLUTION INTRAVENOUS
Status: CANCELLED | OUTPATIENT
Start: 2024-01-01

## 2024-01-01 RX ORDER — REGADENOSON 0.08 MG/ML
0.4 INJECTION, SOLUTION INTRAVENOUS ONCE
Status: COMPLETED | OUTPATIENT
Start: 2024-01-01 | End: 2024-01-01

## 2024-01-01 RX ORDER — ATROPINE SULFATE 0.1 MG/ML
0.5 INJECTION INTRAVENOUS
Status: ACTIVE | OUTPATIENT
Start: 2024-01-01 | End: 2024-01-01

## 2024-01-01 RX ORDER — OXYCODONE HYDROCHLORIDE 10 MG/1
10 TABLET ORAL EVERY 4 HOURS PRN
COMMUNITY
Start: 2024-01-01

## 2024-01-01 RX ORDER — LEVOTHYROXINE SODIUM 50 UG/1
50 TABLET ORAL EVERY EVENING
Status: DISCONTINUED | OUTPATIENT
Start: 2024-01-01 | End: 2024-01-01 | Stop reason: HOSPADM

## 2024-01-01 RX ORDER — HYDROMORPHONE HYDROCHLORIDE 1 MG/ML
0.4 INJECTION, SOLUTION INTRAMUSCULAR; INTRAVENOUS; SUBCUTANEOUS
Status: DISCONTINUED | OUTPATIENT
Start: 2024-01-01 | End: 2024-01-01

## 2024-01-01 RX ORDER — OXYCODONE HYDROCHLORIDE 5 MG/1
10 TABLET ORAL ONCE
Status: COMPLETED | OUTPATIENT
Start: 2024-01-01 | End: 2024-01-01

## 2024-01-01 RX ORDER — ACETAMINOPHEN 650 MG/1
650 SUPPOSITORY RECTAL EVERY 4 HOURS PRN
Status: CANCELLED | OUTPATIENT
Start: 2024-01-01

## 2024-01-01 RX ORDER — VIT B COMP NO.3/FOLIC/C/BIOTIN 1 MG-60 MG
1 TABLET ORAL DAILY
Status: DISCONTINUED | OUTPATIENT
Start: 2024-01-01 | End: 2024-01-01 | Stop reason: HOSPADM

## 2024-01-01 RX ORDER — CLOPIDOGREL BISULFATE 75 MG/1
75 TABLET ORAL DAILY
Qty: 90 TABLET | Refills: 3 | Status: SHIPPED | OUTPATIENT
Start: 2024-01-01

## 2024-01-01 RX ORDER — SODIUM CHLORIDE 9 MG/ML
INJECTION, SOLUTION INTRAVENOUS CONTINUOUS
Status: DISCONTINUED | OUTPATIENT
Start: 2024-01-01 | End: 2024-01-01 | Stop reason: HOSPADM

## 2024-01-01 RX ORDER — MORPHINE SULFATE 4 MG/ML
4 INJECTION, SOLUTION INTRAMUSCULAR; INTRAVENOUS ONCE
Status: DISCONTINUED | OUTPATIENT
Start: 2024-01-01 | End: 2024-01-01

## 2024-01-01 RX ORDER — LORAZEPAM 2 MG/ML
0.5 INJECTION INTRAMUSCULAR ONCE
Status: COMPLETED | OUTPATIENT
Start: 2024-01-01 | End: 2024-01-01

## 2024-01-01 RX ORDER — NICOTINE POLACRILEX 4 MG
15-30 LOZENGE BUCCAL
Status: DISCONTINUED | OUTPATIENT
Start: 2024-01-01 | End: 2024-01-01 | Stop reason: HOSPADM

## 2024-01-01 RX ORDER — LORAZEPAM 1 MG/1
1 TABLET ORAL
Status: DISCONTINUED | OUTPATIENT
Start: 2024-01-01 | End: 2024-01-01

## 2024-01-01 RX ORDER — METOPROLOL SUCCINATE 25 MG/1
25 TABLET, EXTENDED RELEASE ORAL DAILY
Qty: 30 TABLET | Refills: 0 | Status: SHIPPED | OUTPATIENT
Start: 2024-01-01

## 2024-01-01 RX ORDER — SODIUM CHLORIDE 9 MG/ML
INJECTION, SOLUTION INTRAVENOUS CONTINUOUS
Status: DISCONTINUED | OUTPATIENT
Start: 2024-01-01 | End: 2024-01-01

## 2024-01-01 RX ADMIN — Medication 25 MCG: at 12:00

## 2024-01-01 RX ADMIN — METOPROLOL SUCCINATE 25 MG: 25 TABLET, EXTENDED RELEASE ORAL at 09:01

## 2024-01-01 RX ADMIN — SENNOSIDES AND DOCUSATE SODIUM 2 TABLET: 50; 8.6 TABLET ORAL at 09:07

## 2024-01-01 RX ADMIN — PANTOPRAZOLE SODIUM 40 MG: 40 TABLET, DELAYED RELEASE ORAL at 20:34

## 2024-01-01 RX ADMIN — ATORVASTATIN CALCIUM 80 MG: 40 TABLET, FILM COATED ORAL at 20:16

## 2024-01-01 RX ADMIN — OXYCODONE HYDROCHLORIDE 10 MG: 5 TABLET ORAL at 20:35

## 2024-01-01 RX ADMIN — PANTOPRAZOLE SODIUM 40 MG: 40 TABLET, DELAYED RELEASE ORAL at 21:15

## 2024-01-01 RX ADMIN — OXYCODONE HYDROCHLORIDE 10 MG: 5 TABLET ORAL at 00:32

## 2024-01-01 RX ADMIN — ACETAMINOPHEN 650 MG: 325 TABLET ORAL at 12:55

## 2024-01-01 RX ADMIN — SENNOSIDES AND DOCUSATE SODIUM 2 TABLET: 50; 8.6 TABLET ORAL at 22:12

## 2024-01-01 RX ADMIN — HYDROMORPHONE HYDROCHLORIDE 0.4 MG: 0.2 INJECTION, SOLUTION INTRAMUSCULAR; INTRAVENOUS; SUBCUTANEOUS at 05:33

## 2024-01-01 RX ADMIN — PANTOPRAZOLE SODIUM 40 MG: 40 INJECTION, POWDER, FOR SOLUTION INTRAVENOUS at 08:16

## 2024-01-01 RX ADMIN — NORTRIPTYLINE HYDROCHLORIDE 75 MG: 50 CAPSULE ORAL at 23:03

## 2024-01-01 RX ADMIN — NORTRIPTYLINE HYDROCHLORIDE 75 MG: 25 CAPSULE ORAL at 21:59

## 2024-01-01 RX ADMIN — CALCITRIOL 0.5 MCG: 0.25 CAPSULE ORAL at 10:36

## 2024-01-01 RX ADMIN — NORTRIPTYLINE HYDROCHLORIDE 75 MG: 25 CAPSULE ORAL at 22:13

## 2024-01-01 RX ADMIN — CALCIUM ACETATE 1334 MG: 667 CAPSULE ORAL at 12:23

## 2024-01-01 RX ADMIN — ASPIRIN 81 MG: 81 TABLET, COATED ORAL at 09:01

## 2024-01-01 RX ADMIN — ATORVASTATIN CALCIUM 80 MG: 40 TABLET, FILM COATED ORAL at 19:56

## 2024-01-01 RX ADMIN — ASPIRIN 81 MG CHEWABLE TABLET 81 MG: 81 TABLET CHEWABLE at 08:53

## 2024-01-01 RX ADMIN — IOPAMIDOL 75 ML: 755 INJECTION, SOLUTION INTRAVENOUS at 15:47

## 2024-01-01 RX ADMIN — OXYCODONE HYDROCHLORIDE 10 MG: 5 TABLET ORAL at 09:04

## 2024-01-01 RX ADMIN — LEVOTHYROXINE SODIUM 50 MCG: 0.05 TABLET ORAL at 21:40

## 2024-01-01 RX ADMIN — LIDOCAINE 1 PATCH: 4 PATCH TOPICAL at 23:00

## 2024-01-01 RX ADMIN — SENNOSIDES AND DOCUSATE SODIUM 2 TABLET: 50; 8.6 TABLET ORAL at 08:53

## 2024-01-01 RX ADMIN — HYDROMORPHONE HYDROCHLORIDE 0.4 MG: 1 INJECTION, SOLUTION INTRAMUSCULAR; INTRAVENOUS; SUBCUTANEOUS at 06:24

## 2024-01-01 RX ADMIN — OXYCODONE 10 MG: 5 TABLET ORAL at 05:30

## 2024-01-01 RX ADMIN — HEPARIN SODIUM 5000 UNITS: 5000 INJECTION, SOLUTION INTRAVENOUS; SUBCUTANEOUS at 10:00

## 2024-01-01 RX ADMIN — PANTOPRAZOLE SODIUM 40 MG: 40 TABLET, DELAYED RELEASE ORAL at 05:30

## 2024-01-01 RX ADMIN — METOPROLOL SUCCINATE 25 MG: 25 TABLET, EXTENDED RELEASE ORAL at 08:03

## 2024-01-01 RX ADMIN — OXYCODONE HYDROCHLORIDE 10 MG: 5 TABLET ORAL at 05:40

## 2024-01-01 RX ADMIN — CLOPIDOGREL BISULFATE 75 MG: 75 TABLET ORAL at 08:16

## 2024-01-01 RX ADMIN — NORTRIPTYLINE HYDROCHLORIDE 75 MG: 50 CAPSULE ORAL at 21:20

## 2024-01-01 RX ADMIN — LORAZEPAM 1 MG: 1 TABLET ORAL at 12:46

## 2024-01-01 RX ADMIN — CALCIUM ACETATE 667 MG: 667 CAPSULE ORAL at 18:19

## 2024-01-01 RX ADMIN — ONDANSETRON 4 MG: 2 INJECTION INTRAMUSCULAR; INTRAVENOUS at 13:56

## 2024-01-01 RX ADMIN — Medication 25 MCG: at 11:31

## 2024-01-01 RX ADMIN — CLOPIDOGREL BISULFATE 75 MG: 75 TABLET ORAL at 12:56

## 2024-01-01 RX ADMIN — ALBUMIN HUMAN 50 ML: 0.25 SOLUTION INTRAVENOUS at 08:25

## 2024-01-01 RX ADMIN — HEPARIN SODIUM 5000 UNITS: 10000 INJECTION, SOLUTION INTRAVENOUS; SUBCUTANEOUS at 16:04

## 2024-01-01 RX ADMIN — LORAZEPAM 1 MG: 0.5 TABLET ORAL at 11:00

## 2024-01-01 RX ADMIN — Medication 25 MCG: at 11:06

## 2024-01-01 RX ADMIN — ASPIRIN 81 MG CHEWABLE TABLET 81 MG: 81 TABLET CHEWABLE at 09:04

## 2024-01-01 RX ADMIN — LIDOCAINE 1 PATCH: 4 PATCH TOPICAL at 08:27

## 2024-01-01 RX ADMIN — ASPIRIN 81 MG: 81 TABLET, COATED ORAL at 10:33

## 2024-01-01 RX ADMIN — OXYCODONE HYDROCHLORIDE 10 MG: 5 TABLET ORAL at 21:14

## 2024-01-01 RX ADMIN — Medication 25 MCG: at 16:32

## 2024-01-01 RX ADMIN — SIMETHICONE 80 MG: 80 TABLET, CHEWABLE ORAL at 15:30

## 2024-01-01 RX ADMIN — CLOPIDOGREL BISULFATE 75 MG: 75 TABLET ORAL at 10:33

## 2024-01-01 RX ADMIN — SERTRALINE HYDROCHLORIDE 150 MG: 100 TABLET ORAL at 22:00

## 2024-01-01 RX ADMIN — SODIUM CHLORIDE 250 ML: 9 INJECTION, SOLUTION INTRAVENOUS at 10:31

## 2024-01-01 RX ADMIN — SENNOSIDES AND DOCUSATE SODIUM 2 TABLET: 8.6; 5 TABLET ORAL at 21:19

## 2024-01-01 RX ADMIN — CLOPIDOGREL BISULFATE 75 MG: 75 TABLET ORAL at 08:53

## 2024-01-01 RX ADMIN — SENNOSIDES AND DOCUSATE SODIUM 2 TABLET: 8.6; 5 TABLET ORAL at 01:34

## 2024-01-01 RX ADMIN — SODIUM CHLORIDE 250 ML: 9 INJECTION, SOLUTION INTRAVENOUS at 21:27

## 2024-01-01 RX ADMIN — SERTRALINE HYDROCHLORIDE 150 MG: 100 TABLET ORAL at 22:20

## 2024-01-01 RX ADMIN — CALCIUM ACETATE 1334 MG: 667 CAPSULE ORAL at 13:45

## 2024-01-01 RX ADMIN — OXYCODONE 10 MG: 5 TABLET ORAL at 02:35

## 2024-01-01 RX ADMIN — LACTULOSE 10 G: 10 SOLUTION ORAL at 18:17

## 2024-01-01 RX ADMIN — SERTRALINE 150 MG: 100 TABLET, FILM COATED ORAL at 21:16

## 2024-01-01 RX ADMIN — SERTRALINE HYDROCHLORIDE 150 MG: 100 TABLET ORAL at 20:18

## 2024-01-01 RX ADMIN — OXYCODONE HYDROCHLORIDE 10 MG: 5 TABLET ORAL at 23:01

## 2024-01-01 RX ADMIN — SODIUM CHLORIDE 300 ML: 9 INJECTION, SOLUTION INTRAVENOUS at 18:35

## 2024-01-01 RX ADMIN — Medication 25 MCG: at 09:23

## 2024-01-01 RX ADMIN — OXYCODONE HYDROCHLORIDE 10 MG: 5 TABLET ORAL at 19:11

## 2024-01-01 RX ADMIN — LORAZEPAM 1 MG: 1 TABLET ORAL at 19:24

## 2024-01-01 RX ADMIN — ASPIRIN 81 MG: 81 TABLET, COATED ORAL at 12:00

## 2024-01-01 RX ADMIN — CALCIUM ACETATE 1334 MG: 667 CAPSULE ORAL at 19:07

## 2024-01-01 RX ADMIN — LORAZEPAM 1 MG: 1 TABLET ORAL at 10:42

## 2024-01-01 RX ADMIN — ROSUVASTATIN CALCIUM 10 MG: 10 TABLET, FILM COATED ORAL at 08:50

## 2024-01-01 RX ADMIN — CYCLOBENZAPRINE HYDROCHLORIDE 5 MG: 5 TABLET, FILM COATED ORAL at 00:17

## 2024-01-01 RX ADMIN — SERTRALINE HYDROCHLORIDE 150 MG: 100 TABLET ORAL at 20:08

## 2024-01-01 RX ADMIN — HYDROMORPHONE HYDROCHLORIDE 0.4 MG: 0.2 INJECTION, SOLUTION INTRAMUSCULAR; INTRAVENOUS; SUBCUTANEOUS at 23:47

## 2024-01-01 RX ADMIN — SODIUM CHLORIDE 250 ML: 9 INJECTION, SOLUTION INTRAVENOUS at 12:26

## 2024-01-01 RX ADMIN — LIDOCAINE 1 PATCH: 4 PATCH TOPICAL at 12:58

## 2024-01-01 RX ADMIN — DIATRIZOATE MEGLUMINE AND DIATRIZOATE SODIUM 120 ML: 660; 100 SOLUTION ORAL; RECTAL at 12:33

## 2024-01-01 RX ADMIN — ATORVASTATIN CALCIUM 80 MG: 40 TABLET, FILM COATED ORAL at 20:35

## 2024-01-01 RX ADMIN — LABETALOL HYDROCHLORIDE 200 MG: 100 TABLET, FILM COATED ORAL at 21:59

## 2024-01-01 RX ADMIN — ACETAMINOPHEN 650 MG: 325 TABLET ORAL at 00:34

## 2024-01-01 RX ADMIN — LEVOTHYROXINE SODIUM 50 MCG: 0.03 TABLET ORAL at 20:17

## 2024-01-01 RX ADMIN — ROSUVASTATIN CALCIUM 10 MG: 10 TABLET, FILM COATED ORAL at 08:49

## 2024-01-01 RX ADMIN — HYDROMORPHONE HYDROCHLORIDE 0.4 MG: 0.2 INJECTION, SOLUTION INTRAMUSCULAR; INTRAVENOUS; SUBCUTANEOUS at 00:40

## 2024-01-01 RX ADMIN — OXYCODONE HYDROCHLORIDE 10 MG: 5 TABLET ORAL at 01:39

## 2024-01-01 RX ADMIN — HYDROMORPHONE HYDROCHLORIDE 0.4 MG: 0.2 INJECTION, SOLUTION INTRAMUSCULAR; INTRAVENOUS; SUBCUTANEOUS at 11:32

## 2024-01-01 RX ADMIN — NORTRIPTYLINE HYDROCHLORIDE 75 MG: 50 CAPSULE ORAL at 22:12

## 2024-01-01 RX ADMIN — SERTRALINE HYDROCHLORIDE 150 MG: 100 TABLET ORAL at 21:38

## 2024-01-01 RX ADMIN — CALCITRIOL 0.5 MCG: 0.25 CAPSULE ORAL at 10:00

## 2024-01-01 RX ADMIN — SENNOSIDES AND DOCUSATE SODIUM 1 TABLET: 8.6; 5 TABLET ORAL at 08:49

## 2024-01-01 RX ADMIN — LEVOTHYROXINE SODIUM 50 MCG: 0.05 TABLET ORAL at 20:35

## 2024-01-01 RX ADMIN — NORTRIPTYLINE HYDROCHLORIDE 75 MG: 50 CAPSULE ORAL at 20:09

## 2024-01-01 RX ADMIN — SENNOSIDES AND DOCUSATE SODIUM 2 TABLET: 50; 8.6 TABLET ORAL at 08:03

## 2024-01-01 RX ADMIN — METOPROLOL SUCCINATE 25 MG: 25 TABLET, EXTENDED RELEASE ORAL at 12:56

## 2024-01-01 RX ADMIN — HEPARIN SODIUM 5000 UNITS: 10000 INJECTION, SOLUTION INTRAVENOUS; SUBCUTANEOUS at 09:55

## 2024-01-01 RX ADMIN — SERTRALINE 150 MG: 100 TABLET, FILM COATED ORAL at 21:08

## 2024-01-01 RX ADMIN — CALCIUM ACETATE 1334 MG: 667 CAPSULE ORAL at 12:00

## 2024-01-01 RX ADMIN — CYCLOBENZAPRINE HYDROCHLORIDE 5 MG: 5 TABLET, FILM COATED ORAL at 00:10

## 2024-01-01 RX ADMIN — OXYCODONE HYDROCHLORIDE 10 MG: 5 TABLET ORAL at 11:55

## 2024-01-01 RX ADMIN — ASPIRIN 81 MG CHEWABLE TABLET 81 MG: 81 TABLET CHEWABLE at 11:30

## 2024-01-01 RX ADMIN — NORTRIPTYLINE HYDROCHLORIDE 75 MG: 25 CAPSULE ORAL at 21:15

## 2024-01-01 RX ADMIN — LORAZEPAM 1 MG: 0.5 TABLET ORAL at 16:51

## 2024-01-01 RX ADMIN — Medication 25 MCG: at 16:57

## 2024-01-01 RX ADMIN — LEVOTHYROXINE SODIUM 50 MCG: 0.03 TABLET ORAL at 06:38

## 2024-01-01 RX ADMIN — HYDROMORPHONE HYDROCHLORIDE 0.4 MG: 0.2 INJECTION, SOLUTION INTRAMUSCULAR; INTRAVENOUS; SUBCUTANEOUS at 08:54

## 2024-01-01 RX ADMIN — CALCIUM ACETATE 667 MG: 667 CAPSULE ORAL at 12:19

## 2024-01-01 RX ADMIN — NORTRIPTYLINE HYDROCHLORIDE 75 MG: 50 CAPSULE ORAL at 20:21

## 2024-01-01 RX ADMIN — CALCIUM ACETATE 1334 MG: 667 CAPSULE ORAL at 14:30

## 2024-01-01 RX ADMIN — HYDROMORPHONE HYDROCHLORIDE 0.2 MG: 0.2 INJECTION, SOLUTION INTRAMUSCULAR; INTRAVENOUS; SUBCUTANEOUS at 03:11

## 2024-01-01 RX ADMIN — CALCITRIOL CAPSULES 0.25 MCG 0.5 MCG: 0.25 CAPSULE ORAL at 12:00

## 2024-01-01 RX ADMIN — LORAZEPAM 1 MG: 1 TABLET ORAL at 18:16

## 2024-01-01 RX ADMIN — SENNOSIDES AND DOCUSATE SODIUM 2 TABLET: 50; 8.6 TABLET ORAL at 09:04

## 2024-01-01 RX ADMIN — METOPROLOL SUCCINATE 25 MG: 25 TABLET, EXTENDED RELEASE ORAL at 16:06

## 2024-01-01 RX ADMIN — LEVOTHYROXINE SODIUM 50 MCG: 0.03 TABLET ORAL at 06:17

## 2024-01-01 RX ADMIN — PANTOPRAZOLE SODIUM 40 MG: 40 TABLET, DELAYED RELEASE ORAL at 01:35

## 2024-01-01 RX ADMIN — SODIUM ZIRCONIUM CYCLOSILICATE 10 G: 10 POWDER, FOR SUSPENSION ORAL at 07:37

## 2024-01-01 RX ADMIN — ACETAMINOPHEN 650 MG: 325 TABLET ORAL at 00:50

## 2024-01-01 RX ADMIN — LACTULOSE 10 G: 10 SOLUTION ORAL at 13:56

## 2024-01-01 RX ADMIN — NORTRIPTYLINE HYDROCHLORIDE 75 MG: 25 CAPSULE ORAL at 21:36

## 2024-01-01 RX ADMIN — OXYCODONE HYDROCHLORIDE 10 MG: 5 TABLET ORAL at 16:53

## 2024-01-01 RX ADMIN — LEVOTHYROXINE SODIUM 50 MCG: 0.03 TABLET ORAL at 17:10

## 2024-01-01 RX ADMIN — ONDANSETRON 4 MG: 2 INJECTION INTRAMUSCULAR; INTRAVENOUS at 09:02

## 2024-01-01 RX ADMIN — CLOPIDOGREL BISULFATE 75 MG: 75 TABLET ORAL at 12:00

## 2024-01-01 RX ADMIN — ATORVASTATIN CALCIUM 80 MG: 40 TABLET, FILM COATED ORAL at 22:20

## 2024-01-01 RX ADMIN — LEVOTHYROXINE SODIUM 50 MCG: 0.03 TABLET ORAL at 20:11

## 2024-01-01 RX ADMIN — DIATRIZOATE MEGLUMINE AND DIATRIZOATE SODIUM 120 ML: 660; 100 SOLUTION ORAL; RECTAL at 18:54

## 2024-01-01 RX ADMIN — ALBUMIN HUMAN 25 G: 0.25 SOLUTION INTRAVENOUS at 19:34

## 2024-01-01 RX ADMIN — Medication 3 MG: at 23:53

## 2024-01-01 RX ADMIN — SENNOSIDES AND DOCUSATE SODIUM 2 TABLET: 8.6; 5 TABLET ORAL at 20:35

## 2024-01-01 RX ADMIN — Medication 25 MCG: at 08:03

## 2024-01-01 RX ADMIN — PROMETHAZINE HYDROCHLORIDE 12.5 MG: 25 INJECTION INTRAMUSCULAR; INTRAVENOUS at 16:18

## 2024-01-01 RX ADMIN — PANTOPRAZOLE SODIUM 40 MG: 40 TABLET, DELAYED RELEASE ORAL at 16:57

## 2024-01-01 RX ADMIN — Medication 3 MG: at 22:20

## 2024-01-01 RX ADMIN — HEPARIN SODIUM 5000 UNITS: 5000 INJECTION, SOLUTION INTRAVENOUS; SUBCUTANEOUS at 23:49

## 2024-01-01 RX ADMIN — CLOPIDOGREL BISULFATE 75 MG: 75 TABLET ORAL at 11:31

## 2024-01-01 RX ADMIN — Medication 25 MCG: at 14:30

## 2024-01-01 RX ADMIN — CALCIUM ACETATE 667 MG: 667 CAPSULE ORAL at 17:42

## 2024-01-01 RX ADMIN — METOPROLOL SUCCINATE 25 MG: 25 TABLET, EXTENDED RELEASE ORAL at 09:04

## 2024-01-01 RX ADMIN — PROMETHAZINE HYDROCHLORIDE 12.5 MG: 25 INJECTION INTRAMUSCULAR; INTRAVENOUS at 03:11

## 2024-01-01 RX ADMIN — HYDROMORPHONE HYDROCHLORIDE 0.4 MG: 1 INJECTION, SOLUTION INTRAMUSCULAR; INTRAVENOUS; SUBCUTANEOUS at 00:09

## 2024-01-01 RX ADMIN — NORTRIPTYLINE HYDROCHLORIDE 75 MG: 50 CAPSULE ORAL at 21:29

## 2024-01-01 RX ADMIN — Medication 3 MG: at 22:02

## 2024-01-01 RX ADMIN — CALCIUM ACETATE 667 MG: 667 CAPSULE ORAL at 08:01

## 2024-01-01 RX ADMIN — OXYCODONE 10 MG: 5 TABLET ORAL at 16:57

## 2024-01-01 RX ADMIN — SENNOSIDES AND DOCUSATE SODIUM 2 TABLET: 8.6; 5 TABLET ORAL at 10:33

## 2024-01-01 RX ADMIN — PANTOPRAZOLE SODIUM 40 MG: 40 TABLET, DELAYED RELEASE ORAL at 09:23

## 2024-01-01 RX ADMIN — Medication 25 MCG: at 12:24

## 2024-01-01 RX ADMIN — PANTOPRAZOLE SODIUM 40 MG: 40 TABLET, DELAYED RELEASE ORAL at 12:00

## 2024-01-01 RX ADMIN — LEVOTHYROXINE SODIUM 50 MCG: 0.05 TABLET ORAL at 21:52

## 2024-01-01 RX ADMIN — SERTRALINE 150 MG: 100 TABLET, FILM COATED ORAL at 21:52

## 2024-01-01 RX ADMIN — Medication 1 TABLET: at 12:23

## 2024-01-01 RX ADMIN — PROCHLORPERAZINE EDISYLATE 5 MG: 5 INJECTION INTRAMUSCULAR; INTRAVENOUS at 10:26

## 2024-01-01 RX ADMIN — OXYCODONE HYDROCHLORIDE 10 MG: 5 TABLET ORAL at 08:02

## 2024-01-01 RX ADMIN — ALBUMIN HUMAN 50 ML: 0.25 SOLUTION INTRAVENOUS at 09:21

## 2024-01-01 RX ADMIN — Medication 25 MCG: at 12:56

## 2024-01-01 RX ADMIN — HYDROMORPHONE HYDROCHLORIDE 0.2 MG: 0.2 INJECTION, SOLUTION INTRAMUSCULAR; INTRAVENOUS; SUBCUTANEOUS at 02:36

## 2024-01-01 RX ADMIN — SODIUM ZIRCONIUM CYCLOSILICATE 10 G: 10 POWDER, FOR SUSPENSION ORAL at 16:05

## 2024-01-01 RX ADMIN — SENNOSIDES AND DOCUSATE SODIUM 2 TABLET: 8.6; 5 TABLET ORAL at 09:01

## 2024-01-01 RX ADMIN — SENNOSIDES AND DOCUSATE SODIUM 1 TABLET: 8.6; 5 TABLET ORAL at 12:24

## 2024-01-01 RX ADMIN — SENNOSIDES AND DOCUSATE SODIUM 2 TABLET: 8.6; 5 TABLET ORAL at 20:15

## 2024-01-01 RX ADMIN — TRAZODONE HYDROCHLORIDE 50 MG: 50 TABLET ORAL at 21:59

## 2024-01-01 RX ADMIN — Medication 25 MCG: at 15:06

## 2024-01-01 RX ADMIN — SERTRALINE HYDROCHLORIDE 150 MG: 100 TABLET ORAL at 23:01

## 2024-01-01 RX ADMIN — ASPIRIN 81 MG CHEWABLE TABLET 81 MG: 81 TABLET CHEWABLE at 09:07

## 2024-01-01 RX ADMIN — HYDROMORPHONE HYDROCHLORIDE 0.5 MG: 1 INJECTION, SOLUTION INTRAMUSCULAR; INTRAVENOUS; SUBCUTANEOUS at 10:45

## 2024-01-01 RX ADMIN — LEVOTHYROXINE SODIUM 50 MCG: 0.05 TABLET ORAL at 21:34

## 2024-01-01 RX ADMIN — PANTOPRAZOLE SODIUM 40 MG: 40 INJECTION, POWDER, FOR SOLUTION INTRAVENOUS at 07:43

## 2024-01-01 RX ADMIN — ACETAMINOPHEN 650 MG: 325 TABLET ORAL at 04:30

## 2024-01-01 RX ADMIN — SENNOSIDES AND DOCUSATE SODIUM 2 TABLET: 8.6; 5 TABLET ORAL at 21:40

## 2024-01-01 RX ADMIN — PERFLUTREN 3 ML: 6.52 INJECTION, SUSPENSION INTRAVENOUS at 09:50

## 2024-01-01 RX ADMIN — PANTOPRAZOLE SODIUM 40 MG: 40 TABLET, DELAYED RELEASE ORAL at 21:35

## 2024-01-01 RX ADMIN — LEVOTHYROXINE SODIUM 50 MCG: 0.03 TABLET ORAL at 19:58

## 2024-01-01 RX ADMIN — LABETALOL HYDROCHLORIDE 200 MG: 100 TABLET, FILM COATED ORAL at 10:00

## 2024-01-01 RX ADMIN — LEVOTHYROXINE SODIUM 50 MCG: 0.05 TABLET ORAL at 10:03

## 2024-01-01 RX ADMIN — OXYCODONE HYDROCHLORIDE 10 MG: 5 TABLET ORAL at 12:55

## 2024-01-01 RX ADMIN — SERTRALINE HYDROCHLORIDE 150 MG: 100 TABLET ORAL at 22:11

## 2024-01-01 RX ADMIN — LIDOCAINE: 50 OINTMENT TOPICAL at 12:23

## 2024-01-01 RX ADMIN — OXYCODONE HYDROCHLORIDE 10 MG: 5 TABLET ORAL at 06:17

## 2024-01-01 RX ADMIN — METOPROLOL SUCCINATE 25 MG: 25 TABLET, EXTENDED RELEASE ORAL at 08:49

## 2024-01-01 RX ADMIN — HYDROMORPHONE HYDROCHLORIDE 0.2 MG: 0.2 INJECTION, SOLUTION INTRAMUSCULAR; INTRAVENOUS; SUBCUTANEOUS at 00:47

## 2024-01-01 RX ADMIN — ATORVASTATIN CALCIUM 80 MG: 40 TABLET, FILM COATED ORAL at 22:12

## 2024-01-01 RX ADMIN — HYDROMORPHONE HYDROCHLORIDE 0.2 MG: 0.2 INJECTION, SOLUTION INTRAMUSCULAR; INTRAVENOUS; SUBCUTANEOUS at 21:47

## 2024-01-01 RX ADMIN — AMINOPHYLLINE 50 MG: 25 INJECTION, SOLUTION INTRAVENOUS at 13:40

## 2024-01-01 RX ADMIN — ASPIRIN 81 MG CHEWABLE TABLET 81 MG: 81 TABLET CHEWABLE at 14:28

## 2024-01-01 RX ADMIN — CALCIUM ACETATE 667 MG: 667 CAPSULE ORAL at 14:28

## 2024-01-01 RX ADMIN — CLOPIDOGREL BISULFATE 75 MG: 75 TABLET ORAL at 09:01

## 2024-01-01 RX ADMIN — OXYCODONE HYDROCHLORIDE 10 MG: 5 TABLET ORAL at 12:15

## 2024-01-01 RX ADMIN — CLOPIDOGREL BISULFATE 75 MG: 75 TABLET ORAL at 16:35

## 2024-01-01 RX ADMIN — OXYCODONE HYDROCHLORIDE 10 MG: 5 TABLET ORAL at 09:47

## 2024-01-01 RX ADMIN — HYDROMORPHONE HYDROCHLORIDE 0.2 MG: 0.2 INJECTION, SOLUTION INTRAMUSCULAR; INTRAVENOUS; SUBCUTANEOUS at 16:01

## 2024-01-01 RX ADMIN — OXYCODONE 10 MG: 5 TABLET ORAL at 21:59

## 2024-01-01 RX ADMIN — SODIUM CHLORIDE 75 ML/HR: 9 INJECTION, SOLUTION INTRAVENOUS at 19:09

## 2024-01-01 RX ADMIN — OXYCODONE HYDROCHLORIDE 10 MG: 5 TABLET ORAL at 01:55

## 2024-01-01 RX ADMIN — Medication 33 MILLICURIE: at 13:35

## 2024-01-01 RX ADMIN — OXYCODONE HYDROCHLORIDE 10 MG: 5 TABLET ORAL at 15:06

## 2024-01-01 RX ADMIN — SODIUM CHLORIDE 250 ML: 9 INJECTION, SOLUTION INTRAVENOUS at 09:38

## 2024-01-01 RX ADMIN — SENNOSIDES AND DOCUSATE SODIUM 2 TABLET: 8.6; 5 TABLET ORAL at 10:00

## 2024-01-01 RX ADMIN — OXYCODONE HYDROCHLORIDE 10 MG: 5 TABLET ORAL at 00:03

## 2024-01-01 RX ADMIN — TRAZODONE HYDROCHLORIDE 50 MG: 50 TABLET ORAL at 21:35

## 2024-01-01 RX ADMIN — LEVOTHYROXINE SODIUM 50 MCG: 0.05 TABLET ORAL at 21:59

## 2024-01-01 RX ADMIN — HYDROMORPHONE HYDROCHLORIDE 0.2 MG: 0.2 INJECTION, SOLUTION INTRAMUSCULAR; INTRAVENOUS; SUBCUTANEOUS at 15:41

## 2024-01-01 RX ADMIN — Medication 3 MG: at 23:01

## 2024-01-01 RX ADMIN — HYDROMORPHONE HYDROCHLORIDE 0.2 MG: 0.2 INJECTION, SOLUTION INTRAMUSCULAR; INTRAVENOUS; SUBCUTANEOUS at 13:14

## 2024-01-01 RX ADMIN — ATORVASTATIN CALCIUM 80 MG: 40 TABLET, FILM COATED ORAL at 22:01

## 2024-01-01 RX ADMIN — CLOPIDOGREL BISULFATE 75 MG: 75 TABLET ORAL at 09:04

## 2024-01-01 RX ADMIN — ATORVASTATIN CALCIUM 80 MG: 40 TABLET, FILM COATED ORAL at 01:35

## 2024-01-01 RX ADMIN — ATORVASTATIN CALCIUM 80 MG: 40 TABLET, FILM COATED ORAL at 21:33

## 2024-01-01 RX ADMIN — SENNOSIDES AND DOCUSATE SODIUM 2 TABLET: 8.6; 5 TABLET ORAL at 21:08

## 2024-01-01 RX ADMIN — CALCIUM ACETATE 1334 MG: 667 CAPSULE ORAL at 18:21

## 2024-01-01 RX ADMIN — METOPROLOL SUCCINATE 25 MG: 25 TABLET, EXTENDED RELEASE ORAL at 08:16

## 2024-01-01 RX ADMIN — ASPIRIN 81 MG CHEWABLE TABLET 81 MG: 81 TABLET CHEWABLE at 08:02

## 2024-01-01 RX ADMIN — Medication 25 MCG: at 11:19

## 2024-01-01 RX ADMIN — TRAZODONE HYDROCHLORIDE 50 MG: 50 TABLET ORAL at 22:13

## 2024-01-01 RX ADMIN — OXYCODONE 10 MG: 5 TABLET ORAL at 13:56

## 2024-01-01 RX ADMIN — SIMETHICONE 80 MG: 80 TABLET, CHEWABLE ORAL at 15:32

## 2024-01-01 RX ADMIN — REGADENOSON 0.4 MG: 0.08 INJECTION, SOLUTION INTRAVENOUS at 13:33

## 2024-01-01 RX ADMIN — OXYCODONE 10 MG: 5 TABLET ORAL at 02:09

## 2024-01-01 RX ADMIN — LORAZEPAM 1 MG: 0.5 TABLET ORAL at 16:57

## 2024-01-01 RX ADMIN — ROSUVASTATIN CALCIUM 10 MG: 10 TABLET, FILM COATED ORAL at 21:40

## 2024-01-01 RX ADMIN — SERTRALINE HYDROCHLORIDE 150 MG: 100 TABLET ORAL at 21:19

## 2024-01-01 RX ADMIN — CALCIUM ACETATE 1334 MG: 667 CAPSULE ORAL at 19:13

## 2024-01-01 RX ADMIN — LORAZEPAM 1 MG: 1 TABLET ORAL at 16:05

## 2024-01-01 RX ADMIN — SERTRALINE 150 MG: 100 TABLET, FILM COATED ORAL at 22:13

## 2024-01-01 RX ADMIN — CALCIUM ACETATE 1334 MG: 667 CAPSULE ORAL at 18:17

## 2024-01-01 RX ADMIN — Medication 1 TABLET: at 08:50

## 2024-01-01 RX ADMIN — LACTULOSE 10 G: 10 SOLUTION ORAL at 21:12

## 2024-01-01 RX ADMIN — CLOPIDOGREL BISULFATE 75 MG: 75 TABLET ORAL at 08:50

## 2024-01-01 RX ADMIN — LACTULOSE 10 G: 10 SOLUTION ORAL at 13:03

## 2024-01-01 RX ADMIN — HYDROMORPHONE HYDROCHLORIDE 0.2 MG: 1 INJECTION, SOLUTION INTRAMUSCULAR; INTRAVENOUS; SUBCUTANEOUS at 21:30

## 2024-01-01 RX ADMIN — HYDROMORPHONE HYDROCHLORIDE 0.4 MG: 0.2 INJECTION, SOLUTION INTRAMUSCULAR; INTRAVENOUS; SUBCUTANEOUS at 23:53

## 2024-01-01 RX ADMIN — CALCIUM ACETATE 667 MG: 667 CAPSULE ORAL at 10:00

## 2024-01-01 RX ADMIN — SERTRALINE HYDROCHLORIDE 150 MG: 100 TABLET ORAL at 21:28

## 2024-01-01 RX ADMIN — LACTULOSE 10 G: 10 SOLUTION ORAL at 08:49

## 2024-01-01 RX ADMIN — PANTOPRAZOLE SODIUM 40 MG: 40 TABLET, DELAYED RELEASE ORAL at 21:59

## 2024-01-01 RX ADMIN — EPOETIN ALFA-EPBX 10000 UNITS: 10000 INJECTION, SOLUTION INTRAVENOUS; SUBCUTANEOUS at 16:56

## 2024-01-01 RX ADMIN — LORAZEPAM 1 MG: 1 TABLET ORAL at 00:19

## 2024-01-01 RX ADMIN — SENNOSIDES AND DOCUSATE SODIUM 1 TABLET: 8.6; 5 TABLET ORAL at 21:59

## 2024-01-01 RX ADMIN — CYCLOBENZAPRINE HYDROCHLORIDE 5 MG: 5 TABLET, FILM COATED ORAL at 01:38

## 2024-01-01 RX ADMIN — SODIUM CHLORIDE: 9 INJECTION, SOLUTION INTRAVENOUS at 11:43

## 2024-01-01 RX ADMIN — LACTULOSE 10 G: 10 SOLUTION ORAL at 15:29

## 2024-01-01 RX ADMIN — CLOPIDOGREL BISULFATE 75 MG: 75 TABLET ORAL at 14:28

## 2024-01-01 RX ADMIN — LIDOCAINE 1 PATCH: 4 PATCH TOPICAL at 22:03

## 2024-01-01 RX ADMIN — SIMETHICONE 80 MG: 80 TABLET, CHEWABLE ORAL at 09:08

## 2024-01-01 RX ADMIN — Medication 25 MCG: at 08:53

## 2024-01-01 RX ADMIN — ROSUVASTATIN CALCIUM 10 MG: 10 TABLET, FILM COATED ORAL at 08:29

## 2024-01-01 RX ADMIN — ASPIRIN 81 MG CHEWABLE TABLET 81 MG: 81 TABLET CHEWABLE at 16:35

## 2024-01-01 RX ADMIN — CYCLOBENZAPRINE HYDROCHLORIDE 5 MG: 5 TABLET, FILM COATED ORAL at 00:13

## 2024-01-01 RX ADMIN — TRAZODONE HYDROCHLORIDE 50 MG: 50 TABLET ORAL at 21:16

## 2024-01-01 RX ADMIN — SENNOSIDES AND DOCUSATE SODIUM 2 TABLET: 50; 8.6 TABLET ORAL at 22:02

## 2024-01-01 RX ADMIN — CALCIUM ACETATE 667 MG: 667 CAPSULE ORAL at 13:03

## 2024-01-01 RX ADMIN — METOPROLOL SUCCINATE 25 MG: 25 TABLET, EXTENDED RELEASE ORAL at 12:06

## 2024-01-01 RX ADMIN — SERTRALINE 150 MG: 50 TABLET, FILM COATED ORAL at 21:40

## 2024-01-01 RX ADMIN — HEPARIN SODIUM 5000 UNITS: 10000 INJECTION, SOLUTION INTRAVENOUS; SUBCUTANEOUS at 00:16

## 2024-01-01 RX ADMIN — ATORVASTATIN CALCIUM 80 MG: 40 TABLET, FILM COATED ORAL at 21:52

## 2024-01-01 RX ADMIN — ATORVASTATIN CALCIUM 80 MG: 40 TABLET, FILM COATED ORAL at 20:08

## 2024-01-01 RX ADMIN — LABETALOL HYDROCHLORIDE 200 MG: 100 TABLET, FILM COATED ORAL at 21:40

## 2024-01-01 RX ADMIN — Medication 8.7 MILLICURIE: at 12:09

## 2024-01-01 RX ADMIN — SODIUM CHLORIDE 300 ML: 9 INJECTION, SOLUTION INTRAVENOUS at 12:26

## 2024-01-01 RX ADMIN — LORAZEPAM 1 MG: 1 TABLET ORAL at 12:06

## 2024-01-01 RX ADMIN — NORTRIPTYLINE HYDROCHLORIDE 75 MG: 50 CAPSULE ORAL at 01:36

## 2024-01-01 RX ADMIN — SERTRALINE 150 MG: 100 TABLET, FILM COATED ORAL at 21:34

## 2024-01-01 RX ADMIN — CALCIUM ACETATE 667 MG: 667 CAPSULE ORAL at 01:47

## 2024-01-01 RX ADMIN — CLOPIDOGREL BISULFATE 75 MG: 75 TABLET ORAL at 08:05

## 2024-01-01 RX ADMIN — HYDROMORPHONE HYDROCHLORIDE 0.2 MG: 0.2 INJECTION, SOLUTION INTRAMUSCULAR; INTRAVENOUS; SUBCUTANEOUS at 09:49

## 2024-01-01 RX ADMIN — CALCIUM GLUCONATE 1 G: 20 INJECTION, SOLUTION INTRAVENOUS at 16:34

## 2024-01-01 RX ADMIN — OXYCODONE 10 MG: 5 TABLET ORAL at 00:38

## 2024-01-01 RX ADMIN — SENNOSIDES AND DOCUSATE SODIUM 2 TABLET: 50; 8.6 TABLET ORAL at 22:20

## 2024-01-01 RX ADMIN — OXYCODONE HYDROCHLORIDE 10 MG: 5 TABLET ORAL at 22:09

## 2024-01-01 RX ADMIN — HYDROMORPHONE HYDROCHLORIDE 0.4 MG: 1 INJECTION, SOLUTION INTRAMUSCULAR; INTRAVENOUS; SUBCUTANEOUS at 05:40

## 2024-01-01 RX ADMIN — METOPROLOL SUCCINATE 25 MG: 25 TABLET, EXTENDED RELEASE ORAL at 13:05

## 2024-01-01 RX ADMIN — NORTRIPTYLINE HYDROCHLORIDE 75 MG: 50 CAPSULE ORAL at 22:16

## 2024-01-01 RX ADMIN — PANTOPRAZOLE SODIUM 40 MG: 20 TABLET, DELAYED RELEASE ORAL at 19:56

## 2024-01-01 RX ADMIN — OXYCODONE 10 MG: 5 TABLET ORAL at 22:19

## 2024-01-01 RX ADMIN — Medication 25 MCG: at 10:00

## 2024-01-01 RX ADMIN — LEVOTHYROXINE SODIUM 50 MCG: 0.05 TABLET ORAL at 16:57

## 2024-01-01 RX ADMIN — LABETALOL HYDROCHLORIDE 200 MG: 100 TABLET, FILM COATED ORAL at 12:24

## 2024-01-01 RX ADMIN — PANTOPRAZOLE SODIUM 40 MG: 40 TABLET, DELAYED RELEASE ORAL at 16:04

## 2024-01-01 RX ADMIN — CALCIUM ACETATE 1334 MG: 667 CAPSULE ORAL at 08:16

## 2024-01-01 RX ADMIN — Medication: at 22:00

## 2024-01-01 RX ADMIN — ROSUVASTATIN CALCIUM 10 MG: 10 TABLET, FILM COATED ORAL at 12:24

## 2024-01-01 RX ADMIN — PANTOPRAZOLE SODIUM 40 MG: 40 INJECTION, POWDER, FOR SOLUTION INTRAVENOUS at 09:42

## 2024-01-01 RX ADMIN — Medication 1 TABLET: at 08:18

## 2024-01-01 RX ADMIN — METOPROLOL SUCCINATE 25 MG: 25 TABLET, EXTENDED RELEASE ORAL at 16:36

## 2024-01-01 RX ADMIN — HYDROMORPHONE HYDROCHLORIDE 0.4 MG: 0.2 INJECTION, SOLUTION INTRAMUSCULAR; INTRAVENOUS; SUBCUTANEOUS at 19:04

## 2024-01-01 RX ADMIN — DEXTROSE MONOHYDRATE 50 ML: 25 INJECTION, SOLUTION INTRAVENOUS at 19:42

## 2024-01-01 RX ADMIN — ATORVASTATIN CALCIUM 80 MG: 40 TABLET, FILM COATED ORAL at 21:12

## 2024-01-01 RX ADMIN — CLOPIDOGREL BISULFATE 75 MG: 75 TABLET ORAL at 09:23

## 2024-01-01 RX ADMIN — SODIUM CHLORIDE 300 ML: 9 INJECTION, SOLUTION INTRAVENOUS at 10:31

## 2024-01-01 RX ADMIN — LEVOTHYROXINE SODIUM 50 MCG: 0.03 TABLET ORAL at 05:42

## 2024-01-01 RX ADMIN — DEXTROSE MONOHYDRATE 25 G: 25 INJECTION, SOLUTION INTRAVENOUS at 16:36

## 2024-01-01 RX ADMIN — SODIUM CHLORIDE 3.8 UNITS: 9 INJECTION, SOLUTION INTRAVENOUS at 16:39

## 2024-01-01 RX ADMIN — METOPROLOL SUCCINATE 25 MG: 25 TABLET, EXTENDED RELEASE ORAL at 08:54

## 2024-01-01 RX ADMIN — SENNOSIDES AND DOCUSATE SODIUM 2 TABLET: 50; 8.6 TABLET ORAL at 21:12

## 2024-01-01 RX ADMIN — SENNOSIDES AND DOCUSATE SODIUM 2 TABLET: 8.6; 5 TABLET ORAL at 09:25

## 2024-01-01 RX ADMIN — ACETAMINOPHEN 650 MG: 325 TABLET ORAL at 19:11

## 2024-01-01 RX ADMIN — OXYCODONE 10 MG: 5 TABLET ORAL at 18:16

## 2024-01-01 RX ADMIN — OXYCODONE HYDROCHLORIDE 10 MG: 5 TABLET ORAL at 16:16

## 2024-01-01 RX ADMIN — CALCIUM ACETATE 1334 MG: 667 CAPSULE ORAL at 09:01

## 2024-01-01 RX ADMIN — METOPROLOL SUCCINATE 25 MG: 25 TABLET, EXTENDED RELEASE ORAL at 09:07

## 2024-01-01 RX ADMIN — CALCITRIOL 0.5 MCG: 0.25 CAPSULE ORAL at 12:24

## 2024-01-01 RX ADMIN — OXYCODONE 10 MG: 5 TABLET ORAL at 16:07

## 2024-01-01 RX ADMIN — PROMETHAZINE HYDROCHLORIDE 12.5 MG: 25 INJECTION INTRAMUSCULAR; INTRAVENOUS at 09:53

## 2024-01-01 RX ADMIN — SERTRALINE 150 MG: 100 TABLET, FILM COATED ORAL at 21:59

## 2024-01-01 RX ADMIN — ASPIRIN 81 MG CHEWABLE TABLET 81 MG: 81 TABLET CHEWABLE at 08:49

## 2024-01-01 RX ADMIN — HYDROMORPHONE HYDROCHLORIDE 0.4 MG: 0.2 INJECTION, SOLUTION INTRAMUSCULAR; INTRAVENOUS; SUBCUTANEOUS at 02:15

## 2024-01-01 RX ADMIN — SENNOSIDES AND DOCUSATE SODIUM 2 TABLET: 8.6; 5 TABLET ORAL at 08:16

## 2024-01-01 RX ADMIN — LORAZEPAM 1 MG: 1 TABLET ORAL at 14:00

## 2024-01-01 RX ADMIN — Medication 1 TABLET: at 08:49

## 2024-01-01 RX ADMIN — PANTOPRAZOLE SODIUM 40 MG: 40 INJECTION, POWDER, FOR SOLUTION INTRAVENOUS at 20:16

## 2024-01-01 RX ADMIN — METOPROLOL SUCCINATE 25 MG: 25 TABLET, EXTENDED RELEASE ORAL at 11:31

## 2024-01-01 RX ADMIN — CALCIUM ACETATE 1334 MG: 667 CAPSULE ORAL at 19:29

## 2024-01-01 RX ADMIN — CALCIUM ACETATE 667 MG: 667 CAPSULE ORAL at 17:28

## 2024-01-01 RX ADMIN — Medication 25 MCG: at 09:07

## 2024-01-01 RX ADMIN — ASPIRIN 81 MG: 81 TABLET, COATED ORAL at 09:23

## 2024-01-01 RX ADMIN — ASPIRIN 81 MG: 81 TABLET, COATED ORAL at 08:16

## 2024-01-01 RX ADMIN — NORTRIPTYLINE HYDROCHLORIDE 75 MG: 25 CAPSULE ORAL at 21:51

## 2024-01-01 RX ADMIN — PANTOPRAZOLE SODIUM 40 MG: 40 INJECTION, POWDER, FOR SOLUTION INTRAVENOUS at 22:27

## 2024-01-01 RX ADMIN — OXYCODONE HYDROCHLORIDE 10 MG: 5 TABLET ORAL at 01:35

## 2024-01-01 RX ADMIN — OXYCODONE 10 MG: 5 TABLET ORAL at 11:08

## 2024-01-01 RX ADMIN — ASPIRIN 81 MG CHEWABLE TABLET 81 MG: 81 TABLET CHEWABLE at 12:56

## 2024-01-01 RX ADMIN — OXYCODONE HYDROCHLORIDE 10 MG: 5 TABLET ORAL at 05:42

## 2024-01-01 RX ADMIN — OXYCODONE HYDROCHLORIDE 10 MG: 5 TABLET ORAL at 21:52

## 2024-01-01 RX ADMIN — LORAZEPAM 1 MG: 1 TABLET ORAL at 11:06

## 2024-01-01 RX ADMIN — SENNOSIDES AND DOCUSATE SODIUM 2 TABLET: 8.6; 5 TABLET ORAL at 12:00

## 2024-01-01 RX ADMIN — OXYCODONE HYDROCHLORIDE 10 MG: 5 TABLET ORAL at 09:23

## 2024-01-01 RX ADMIN — SENNOSIDES AND DOCUSATE SODIUM 2 TABLET: 50; 8.6 TABLET ORAL at 11:31

## 2024-01-01 RX ADMIN — OXYCODONE 5 MG: 5 TABLET ORAL at 22:33

## 2024-01-01 RX ADMIN — LEVOTHYROXINE SODIUM 50 MCG: 0.03 TABLET ORAL at 09:22

## 2024-01-01 RX ADMIN — Medication 25 MCG: at 09:04

## 2024-01-01 RX ADMIN — MAGNESIUM CITRATE 148 ML: 1.75 LIQUID ORAL at 20:30

## 2024-01-01 RX ADMIN — OXYCODONE HYDROCHLORIDE 10 MG: 5 TABLET ORAL at 17:41

## 2024-01-01 RX ADMIN — OXYCODONE 10 MG: 5 TABLET ORAL at 04:36

## 2024-01-01 RX ADMIN — HYDROMORPHONE HYDROCHLORIDE 0.2 MG: 0.2 INJECTION, SOLUTION INTRAMUSCULAR; INTRAVENOUS; SUBCUTANEOUS at 18:38

## 2024-01-01 RX ADMIN — CALCIUM ACETATE 1334 MG: 667 CAPSULE ORAL at 12:38

## 2024-01-01 RX ADMIN — SIMETHICONE 80 MG: 80 TABLET, CHEWABLE ORAL at 16:57

## 2024-01-01 RX ADMIN — ONDANSETRON 4 MG: 2 INJECTION INTRAMUSCULAR; INTRAVENOUS at 21:40

## 2024-01-01 RX ADMIN — SODIUM CHLORIDE 250 ML: 9 INJECTION, SOLUTION INTRAVENOUS at 18:35

## 2024-01-01 RX ADMIN — SERTRALINE 150 MG: 100 TABLET, FILM COATED ORAL at 20:35

## 2024-01-01 RX ADMIN — SODIUM CHLORIDE 1000 ML: 9 INJECTION, SOLUTION INTRAVENOUS at 14:09

## 2024-01-01 RX ADMIN — SERTRALINE HYDROCHLORIDE 150 MG: 100 TABLET ORAL at 21:12

## 2024-01-01 RX ADMIN — OXYCODONE HYDROCHLORIDE 10 MG: 5 TABLET ORAL at 10:24

## 2024-01-01 RX ADMIN — CYCLOBENZAPRINE HYDROCHLORIDE 5 MG: 5 TABLET, FILM COATED ORAL at 00:49

## 2024-01-01 RX ADMIN — NORTRIPTYLINE HYDROCHLORIDE 75 MG: 50 CAPSULE ORAL at 22:19

## 2024-01-01 RX ADMIN — HYDROMORPHONE HYDROCHLORIDE 0.4 MG: 0.2 INJECTION, SOLUTION INTRAMUSCULAR; INTRAVENOUS; SUBCUTANEOUS at 11:37

## 2024-01-01 RX ADMIN — SENNOSIDES AND DOCUSATE SODIUM 2 TABLET: 50; 8.6 TABLET ORAL at 20:07

## 2024-01-01 RX ADMIN — SERTRALINE HYDROCHLORIDE 150 MG: 100 TABLET ORAL at 01:27

## 2024-01-01 RX ADMIN — ACETAMINOPHEN 650 MG: 325 TABLET ORAL at 00:10

## 2024-01-01 RX ADMIN — HYDROMORPHONE HYDROCHLORIDE 0.5 MG: 1 INJECTION, SOLUTION INTRAMUSCULAR; INTRAVENOUS; SUBCUTANEOUS at 08:44

## 2024-01-01 RX ADMIN — LIDOCAINE 1 PATCH: 4 PATCH TOPICAL at 12:45

## 2024-01-01 RX ADMIN — LORAZEPAM 1 MG: 1 TABLET ORAL at 12:23

## 2024-01-01 RX ADMIN — OXYCODONE HYDROCHLORIDE 10 MG: 5 TABLET ORAL at 12:04

## 2024-01-01 RX ADMIN — HEPARIN SODIUM 5000 UNITS: 5000 INJECTION, SOLUTION INTRAVENOUS; SUBCUTANEOUS at 16:57

## 2024-01-01 RX ADMIN — METOPROLOL SUCCINATE 25 MG: 25 TABLET, EXTENDED RELEASE ORAL at 21:32

## 2024-01-01 RX ADMIN — CALCIUM ACETATE 667 MG: 667 CAPSULE ORAL at 08:48

## 2024-01-01 RX ADMIN — OXYCODONE 10 MG: 5 TABLET ORAL at 00:20

## 2024-01-01 RX ADMIN — LEVOTHYROXINE SODIUM 50 MCG: 0.03 TABLET ORAL at 06:09

## 2024-01-01 RX ADMIN — Medication 25 MCG: at 16:36

## 2024-01-01 RX ADMIN — LEVOTHYROXINE SODIUM 50 MCG: 0.05 TABLET ORAL at 20:34

## 2024-01-01 RX ADMIN — ATORVASTATIN CALCIUM 80 MG: 40 TABLET, FILM COATED ORAL at 21:19

## 2024-01-01 RX ADMIN — LEVOTHYROXINE SODIUM 50 MCG: 0.05 TABLET ORAL at 21:15

## 2024-01-01 RX ADMIN — CLOPIDOGREL BISULFATE 75 MG: 75 TABLET ORAL at 09:08

## 2024-01-01 RX ADMIN — OXYCODONE HYDROCHLORIDE 10 MG: 5 TABLET ORAL at 14:47

## 2024-01-01 RX ADMIN — ATORVASTATIN CALCIUM 80 MG: 40 TABLET, FILM COATED ORAL at 23:01

## 2024-01-01 ASSESSMENT — ACTIVITIES OF DAILY LIVING (ADL)
ADLS_ACUITY_SCORE: 25
ADLS_ACUITY_SCORE: 31
ADLS_ACUITY_SCORE: 26
ADLS_ACUITY_SCORE: 27
ADLS_ACUITY_SCORE: 21
ADLS_ACUITY_SCORE: 22
ADLS_ACUITY_SCORE: 31
ADLS_ACUITY_SCORE: 23
ADLS_ACUITY_SCORE: 23
ADLS_ACUITY_SCORE: 25
ADLS_ACUITY_SCORE: 44
ADLS_ACUITY_SCORE: 30
ADLS_ACUITY_SCORE: 23
ADLS_ACUITY_SCORE: 30
ADLS_ACUITY_SCORE: 37
ADLS_ACUITY_SCORE: 25
ADLS_ACUITY_SCORE: 30
ADLS_ACUITY_SCORE: 44
ADLS_ACUITY_SCORE: 23
ADLS_ACUITY_SCORE: 27
ADLS_ACUITY_SCORE: 38
ADLS_ACUITY_SCORE: 38
ADLS_ACUITY_SCORE: 25
ADLS_ACUITY_SCORE: 25
ADLS_ACUITY_SCORE: 31
ADLS_ACUITY_SCORE: 31
ADLS_ACUITY_SCORE: 25
ADLS_ACUITY_SCORE: 31
ADLS_ACUITY_SCORE: 21
ADLS_ACUITY_SCORE: 25
ADLS_ACUITY_SCORE: 31
ADLS_ACUITY_SCORE: 30
ADLS_ACUITY_SCORE: 38
ADLS_ACUITY_SCORE: 25
ADLS_ACUITY_SCORE: 31
ADLS_ACUITY_SCORE: 23
ADLS_ACUITY_SCORE: 22
ADLS_ACUITY_SCORE: 23
ADLS_ACUITY_SCORE: 31
DEPENDENT_IADLS:: CLEANING;LAUNDRY
ADLS_ACUITY_SCORE: 31
ADLS_ACUITY_SCORE: 23
ADLS_ACUITY_SCORE: 31
ADLS_ACUITY_SCORE: 44
ADLS_ACUITY_SCORE: 31
ADLS_ACUITY_SCORE: 21
ADLS_ACUITY_SCORE: 37
ADLS_ACUITY_SCORE: 30
ADLS_ACUITY_SCORE: 25
ADLS_ACUITY_SCORE: 21
ADLS_ACUITY_SCORE: 31
ADLS_ACUITY_SCORE: 25
ADLS_ACUITY_SCORE: 31
ADLS_ACUITY_SCORE: 25
ADLS_ACUITY_SCORE: 31
ADLS_ACUITY_SCORE: 23
ADLS_ACUITY_SCORE: 25
ADLS_ACUITY_SCORE: 22
ADLS_ACUITY_SCORE: 30
ADLS_ACUITY_SCORE: 23
ADLS_ACUITY_SCORE: 21
ADLS_ACUITY_SCORE: 25
ADLS_ACUITY_SCORE: 43
ADLS_ACUITY_SCORE: 31
ADLS_ACUITY_SCORE: 26
ADLS_ACUITY_SCORE: 39
ADLS_ACUITY_SCORE: 23
ADLS_ACUITY_SCORE: 22
ADLS_ACUITY_SCORE: 31
ADLS_ACUITY_SCORE: 23
ADLS_ACUITY_SCORE: 25
ADLS_ACUITY_SCORE: 38
ADLS_ACUITY_SCORE: 21
ADLS_ACUITY_SCORE: 39
ADLS_ACUITY_SCORE: 23
ADLS_ACUITY_SCORE: 20
ADLS_ACUITY_SCORE: 26
ADLS_ACUITY_SCORE: 37
ADLS_ACUITY_SCORE: 22
ADLS_ACUITY_SCORE: 23
ADLS_ACUITY_SCORE: 29
ADLS_ACUITY_SCORE: 25
ADLS_ACUITY_SCORE: 31
ADLS_ACUITY_SCORE: 31
ADLS_ACUITY_SCORE: 25
ADLS_ACUITY_SCORE: 31
ADLS_ACUITY_SCORE: 23
ADLS_ACUITY_SCORE: 38
ADLS_ACUITY_SCORE: 43
ADLS_ACUITY_SCORE: 23
ADLS_ACUITY_SCORE: 23
ADLS_ACUITY_SCORE: 25
ADLS_ACUITY_SCORE: 27
ADLS_ACUITY_SCORE: 21
ADLS_ACUITY_SCORE: 37
ADLS_ACUITY_SCORE: 23
ADLS_ACUITY_SCORE: 43
ADLS_ACUITY_SCORE: 31
ADLS_ACUITY_SCORE: 23
ADLS_ACUITY_SCORE: 29
ADLS_ACUITY_SCORE: 38
ADLS_ACUITY_SCORE: 26
ADLS_ACUITY_SCORE: 31
ADLS_ACUITY_SCORE: 30
ADLS_ACUITY_SCORE: 38
ADLS_ACUITY_SCORE: 29
ADLS_ACUITY_SCORE: 43
ADLS_ACUITY_SCORE: 37
ADLS_ACUITY_SCORE: 38
ADLS_ACUITY_SCORE: 31
ADLS_ACUITY_SCORE: 31
ADLS_ACUITY_SCORE: 23
ADLS_ACUITY_SCORE: 38
ADLS_ACUITY_SCORE: 22
ADLS_ACUITY_SCORE: 22
ADLS_ACUITY_SCORE: 30
ADLS_ACUITY_SCORE: 38
ADLS_ACUITY_SCORE: 31
ADLS_ACUITY_SCORE: 38
ADLS_ACUITY_SCORE: 38
ADLS_ACUITY_SCORE: 29
ADLS_ACUITY_SCORE: 22
ADLS_ACUITY_SCORE: 31
ADLS_ACUITY_SCORE: 31
ADLS_ACUITY_SCORE: 25
ADLS_ACUITY_SCORE: 26
ADLS_ACUITY_SCORE: 30
ADLS_ACUITY_SCORE: 25
ADLS_ACUITY_SCORE: 30
ADLS_ACUITY_SCORE: 21
ADLS_ACUITY_SCORE: 38
ADLS_ACUITY_SCORE: 38
ADLS_ACUITY_SCORE: 23
ADLS_ACUITY_SCORE: 21
ADLS_ACUITY_SCORE: 21
ADLS_ACUITY_SCORE: 39
ADLS_ACUITY_SCORE: 31
ADLS_ACUITY_SCORE: 27
ADLS_ACUITY_SCORE: 39
ADLS_ACUITY_SCORE: 39
ADLS_ACUITY_SCORE: 38
ADLS_ACUITY_SCORE: 23
ADLS_ACUITY_SCORE: 38
ADLS_ACUITY_SCORE: 39
ADLS_ACUITY_SCORE: 29
ADLS_ACUITY_SCORE: 30
ADLS_ACUITY_SCORE: 25
ADLS_ACUITY_SCORE: 31
ADLS_ACUITY_SCORE: 31
ADLS_ACUITY_SCORE: 21
ADLS_ACUITY_SCORE: 23
ADLS_ACUITY_SCORE: 25
ADLS_ACUITY_SCORE: 31
ADLS_ACUITY_SCORE: 21
ADLS_ACUITY_SCORE: 25
ADLS_ACUITY_SCORE: 43
ADLS_ACUITY_SCORE: 40
ADLS_ACUITY_SCORE: 25
ADLS_ACUITY_SCORE: 23
ADLS_ACUITY_SCORE: 23
ADLS_ACUITY_SCORE: 25
ADLS_ACUITY_SCORE: 23
ADLS_ACUITY_SCORE: 23
ADLS_ACUITY_SCORE: 31
ADLS_ACUITY_SCORE: 44
ADLS_ACUITY_SCORE: 22
ADLS_ACUITY_SCORE: 25
ADLS_ACUITY_SCORE: 23
ADLS_ACUITY_SCORE: 22
ADLS_ACUITY_SCORE: 25
ADLS_ACUITY_SCORE: 31
ADLS_ACUITY_SCORE: 31
ADLS_ACUITY_SCORE: 25
ADLS_ACUITY_SCORE: 38
ADLS_ACUITY_SCORE: 31
ADLS_ACUITY_SCORE: 25
ADLS_ACUITY_SCORE: 27
ADLS_ACUITY_SCORE: 38
ADLS_ACUITY_SCORE: 31
ADLS_ACUITY_SCORE: 38
ADLS_ACUITY_SCORE: 27
ADLS_ACUITY_SCORE: 25
ADLS_ACUITY_SCORE: 27
ADLS_ACUITY_SCORE: 31
ADLS_ACUITY_SCORE: 29
ADLS_ACUITY_SCORE: 23
ADLS_ACUITY_SCORE: 21
ADLS_ACUITY_SCORE: 25
ADLS_ACUITY_SCORE: 27
ADLS_ACUITY_SCORE: 23
ADLS_ACUITY_SCORE: 27
ADLS_ACUITY_SCORE: 22
ADLS_ACUITY_SCORE: 23
ADLS_ACUITY_SCORE: 23
ADLS_ACUITY_SCORE: 25
ADLS_ACUITY_SCORE: 22
ADLS_ACUITY_SCORE: 25
ADLS_ACUITY_SCORE: 31
ADLS_ACUITY_SCORE: 31
ADLS_ACUITY_SCORE: 26
ADLS_ACUITY_SCORE: 25
ADLS_ACUITY_SCORE: 31
ADLS_ACUITY_SCORE: 26
ADLS_ACUITY_SCORE: 25
ADLS_ACUITY_SCORE: 25
ADLS_ACUITY_SCORE: 26
ADLS_ACUITY_SCORE: 23
ADLS_ACUITY_SCORE: 24
ADLS_ACUITY_SCORE: 38
ADLS_ACUITY_SCORE: 23
ADLS_ACUITY_SCORE: 26
ADLS_ACUITY_SCORE: 23
ADLS_ACUITY_SCORE: 31
ADLS_ACUITY_SCORE: 29
ADLS_ACUITY_SCORE: 44
ADLS_ACUITY_SCORE: 31
ADLS_ACUITY_SCORE: 23
ADLS_ACUITY_SCORE: 31
ADLS_ACUITY_SCORE: 31
ADLS_ACUITY_SCORE: 38
ADLS_ACUITY_SCORE: 39
ADLS_ACUITY_SCORE: 29
ADLS_ACUITY_SCORE: 21
ADLS_ACUITY_SCORE: 43
ADLS_ACUITY_SCORE: 25
ADLS_ACUITY_SCORE: 21
ADLS_ACUITY_SCORE: 25
ADLS_ACUITY_SCORE: 25
ADLS_ACUITY_SCORE: 31
ADLS_ACUITY_SCORE: 43
ADLS_ACUITY_SCORE: 23
ADLS_ACUITY_SCORE: 27
ADLS_ACUITY_SCORE: 31
ADLS_ACUITY_SCORE: 38
ADLS_ACUITY_SCORE: 31
ADLS_ACUITY_SCORE: 29
ADLS_ACUITY_SCORE: 31
ADLS_ACUITY_SCORE: 27
ADLS_ACUITY_SCORE: 29
ADLS_ACUITY_SCORE: 23
ADLS_ACUITY_SCORE: 25
ADLS_ACUITY_SCORE: 31
ADLS_ACUITY_SCORE: 31
ADLS_ACUITY_SCORE: 25
ADLS_ACUITY_SCORE: 38
ADLS_ACUITY_SCORE: 23
DEPENDENT_IADLS:: CLEANING;LAUNDRY
ADLS_ACUITY_SCORE: 21
ADLS_ACUITY_SCORE: 44
ADLS_ACUITY_SCORE: 31
ADLS_ACUITY_SCORE: 25
ADLS_ACUITY_SCORE: 25
ADLS_ACUITY_SCORE: 23
ADLS_ACUITY_SCORE: 31
ADLS_ACUITY_SCORE: 27
ADLS_ACUITY_SCORE: 31
ADLS_ACUITY_SCORE: 23
ADLS_ACUITY_SCORE: 43
ADLS_ACUITY_SCORE: 30
ADLS_ACUITY_SCORE: 23
ADLS_ACUITY_SCORE: 24
ADLS_ACUITY_SCORE: 22
ADLS_ACUITY_SCORE: 23
ADLS_ACUITY_SCORE: 26
ADLS_ACUITY_SCORE: 21
ADLS_ACUITY_SCORE: 31
ADLS_ACUITY_SCORE: 29
DEPENDENT_IADLS:: LAUNDRY
ADLS_ACUITY_SCORE: 31
ADLS_ACUITY_SCORE: 44
ADLS_ACUITY_SCORE: 27
ADLS_ACUITY_SCORE: 25
ADLS_ACUITY_SCORE: 25
ADLS_ACUITY_SCORE: 23
ADLS_ACUITY_SCORE: 31
ADLS_ACUITY_SCORE: 21
ADLS_ACUITY_SCORE: 23
ADLS_ACUITY_SCORE: 21
ADLS_ACUITY_SCORE: 25
ADLS_ACUITY_SCORE: 21
ADLS_ACUITY_SCORE: 37
ADLS_ACUITY_SCORE: 23
ADLS_ACUITY_SCORE: 25
ADLS_ACUITY_SCORE: 22
ADLS_ACUITY_SCORE: 31
ADLS_ACUITY_SCORE: 22
ADLS_ACUITY_SCORE: 25
ADLS_ACUITY_SCORE: 38
ADLS_ACUITY_SCORE: 40
ADLS_ACUITY_SCORE: 29
ADLS_ACUITY_SCORE: 27
ADLS_ACUITY_SCORE: 22
ADLS_ACUITY_SCORE: 38
ADLS_ACUITY_SCORE: 25
ADLS_ACUITY_SCORE: 31
ADLS_ACUITY_SCORE: 31
ADLS_ACUITY_SCORE: 44
ADLS_ACUITY_SCORE: 21
ADLS_ACUITY_SCORE: 31
ADLS_ACUITY_SCORE: 23
ADLS_ACUITY_SCORE: 38
ADLS_ACUITY_SCORE: 31
ADLS_ACUITY_SCORE: 31
ADLS_ACUITY_SCORE: 26
ADLS_ACUITY_SCORE: 31
ADLS_ACUITY_SCORE: 24
ADLS_ACUITY_SCORE: 23
ADLS_ACUITY_SCORE: 21
ADLS_ACUITY_SCORE: 31
ADLS_ACUITY_SCORE: 26
ADLS_ACUITY_SCORE: 23
ADLS_ACUITY_SCORE: 31
ADLS_ACUITY_SCORE: 21
ADLS_ACUITY_SCORE: 23
ADLS_ACUITY_SCORE: 31
ADLS_ACUITY_SCORE: 23
ADLS_ACUITY_SCORE: 23
ADLS_ACUITY_SCORE: 25
ADLS_ACUITY_SCORE: 26
ADLS_ACUITY_SCORE: 23
ADLS_ACUITY_SCORE: 26
ADLS_ACUITY_SCORE: 25
ADLS_ACUITY_SCORE: 20
ADLS_ACUITY_SCORE: 44
ADLS_ACUITY_SCORE: 36
ADLS_ACUITY_SCORE: 25
ADLS_ACUITY_SCORE: 26
ADLS_ACUITY_SCORE: 30
ADLS_ACUITY_SCORE: 31
ADLS_ACUITY_SCORE: 23
ADLS_ACUITY_SCORE: 22
ADLS_ACUITY_SCORE: 31
ADLS_ACUITY_SCORE: 22
ADLS_ACUITY_SCORE: 38
ADLS_ACUITY_SCORE: 23
ADLS_ACUITY_SCORE: 31
ADLS_ACUITY_SCORE: 25
ADLS_ACUITY_SCORE: 31
ADLS_ACUITY_SCORE: 27
ADLS_ACUITY_SCORE: 22
ADLS_ACUITY_SCORE: 23
ADLS_ACUITY_SCORE: 37
ADLS_ACUITY_SCORE: 29
ADLS_ACUITY_SCORE: 25
ADLS_ACUITY_SCORE: 31
ADLS_ACUITY_SCORE: 38
ADLS_ACUITY_SCORE: 27
ADLS_ACUITY_SCORE: 23
ADLS_ACUITY_SCORE: 38
ADLS_ACUITY_SCORE: 37
ADLS_ACUITY_SCORE: 23
ADLS_ACUITY_SCORE: 25
ADLS_ACUITY_SCORE: 23
ADLS_ACUITY_SCORE: 39
ADLS_ACUITY_SCORE: 43
ADLS_ACUITY_SCORE: 23
ADLS_ACUITY_SCORE: 38
ADLS_ACUITY_SCORE: 37
ADLS_ACUITY_SCORE: 23
ADLS_ACUITY_SCORE: 25
ADLS_ACUITY_SCORE: 31
ADLS_ACUITY_SCORE: 29
ADLS_ACUITY_SCORE: 26
ADLS_ACUITY_SCORE: 21
ADLS_ACUITY_SCORE: 25
ADLS_ACUITY_SCORE: 40
ADLS_ACUITY_SCORE: 37
ADLS_ACUITY_SCORE: 36
ADLS_ACUITY_SCORE: 23
ADLS_ACUITY_SCORE: 21
ADLS_ACUITY_SCORE: 24
ADLS_ACUITY_SCORE: 30
ADLS_ACUITY_SCORE: 21
ADLS_ACUITY_SCORE: 23
ADLS_ACUITY_SCORE: 21
ADLS_ACUITY_SCORE: 38
ADLS_ACUITY_SCORE: 27
ADLS_ACUITY_SCORE: 31
ADLS_ACUITY_SCORE: 23
ADLS_ACUITY_SCORE: 25
ADLS_ACUITY_SCORE: 23
ADLS_ACUITY_SCORE: 29
ADLS_ACUITY_SCORE: 23
ADLS_ACUITY_SCORE: 27
ADLS_ACUITY_SCORE: 39
ADLS_ACUITY_SCORE: 25
ADLS_ACUITY_SCORE: 26
ADLS_ACUITY_SCORE: 38
ADLS_ACUITY_SCORE: 23
ADLS_ACUITY_SCORE: 23
ADLS_ACUITY_SCORE: 25
ADLS_ACUITY_SCORE: 31
ADLS_ACUITY_SCORE: 21
ADLS_ACUITY_SCORE: 31
ADLS_ACUITY_SCORE: 23
ADLS_ACUITY_SCORE: 23
ADLS_ACUITY_SCORE: 22
ADLS_ACUITY_SCORE: 44
ADLS_ACUITY_SCORE: 21
DEPENDENT_IADLS:: LAUNDRY
ADLS_ACUITY_SCORE: 21
ADLS_ACUITY_SCORE: 31
ADLS_ACUITY_SCORE: 23
ADLS_ACUITY_SCORE: 25
ADLS_ACUITY_SCORE: 44
ADLS_ACUITY_SCORE: 23
ADLS_ACUITY_SCORE: 31
ADLS_ACUITY_SCORE: 23
ADLS_ACUITY_SCORE: 31
ADLS_ACUITY_SCORE: 21
ADLS_ACUITY_SCORE: 38
ADLS_ACUITY_SCORE: 23
ADLS_ACUITY_SCORE: 31
ADLS_ACUITY_SCORE: 31
ADLS_ACUITY_SCORE: 23
ADLS_ACUITY_SCORE: 29
ADLS_ACUITY_SCORE: 25
ADLS_ACUITY_SCORE: 27
ADLS_ACUITY_SCORE: 23
ADLS_ACUITY_SCORE: 31
ADLS_ACUITY_SCORE: 38
ADLS_ACUITY_SCORE: 38
ADLS_ACUITY_SCORE: 25
ADLS_ACUITY_SCORE: 25
ADLS_ACUITY_SCORE: 30
ADLS_ACUITY_SCORE: 25
ADLS_ACUITY_SCORE: 31
ADLS_ACUITY_SCORE: 38
ADLS_ACUITY_SCORE: 23
ADLS_ACUITY_SCORE: 25
ADLS_ACUITY_SCORE: 43
ADLS_ACUITY_SCORE: 21
ADLS_ACUITY_SCORE: 25
ADLS_ACUITY_SCORE: 31
ADLS_ACUITY_SCORE: 40
ADLS_ACUITY_SCORE: 38
ADLS_ACUITY_SCORE: 23
ADLS_ACUITY_SCORE: 27
ADLS_ACUITY_SCORE: 31
ADLS_ACUITY_SCORE: 37
ADLS_ACUITY_SCORE: 31
DEPENDENT_IADLS:: CLEANING;LAUNDRY
ADLS_ACUITY_SCORE: 21
ADLS_ACUITY_SCORE: 27
ADLS_ACUITY_SCORE: 22
ADLS_ACUITY_SCORE: 23
ADLS_ACUITY_SCORE: 25
ADLS_ACUITY_SCORE: 27
ADLS_ACUITY_SCORE: 22
ADLS_ACUITY_SCORE: 23

## 2024-01-01 ASSESSMENT — COLUMBIA-SUICIDE SEVERITY RATING SCALE - C-SSRS
1. IN THE PAST MONTH, HAVE YOU WISHED YOU WERE DEAD OR WISHED YOU COULD GO TO SLEEP AND NOT WAKE UP?: NO
1. IN THE PAST MONTH, HAVE YOU WISHED YOU WERE DEAD OR WISHED YOU COULD GO TO SLEEP AND NOT WAKE UP?: NO
6. HAVE YOU EVER DONE ANYTHING, STARTED TO DO ANYTHING, OR PREPARED TO DO ANYTHING TO END YOUR LIFE?: NO
2. HAVE YOU ACTUALLY HAD ANY THOUGHTS OF KILLING YOURSELF IN THE PAST MONTH?: NO
2. HAVE YOU ACTUALLY HAD ANY THOUGHTS OF KILLING YOURSELF IN THE PAST MONTH?: NO
1. IN THE PAST MONTH, HAVE YOU WISHED YOU WERE DEAD OR WISHED YOU COULD GO TO SLEEP AND NOT WAKE UP?: NO
6. HAVE YOU EVER DONE ANYTHING, STARTED TO DO ANYTHING, OR PREPARED TO DO ANYTHING TO END YOUR LIFE?: NO
2. HAVE YOU ACTUALLY HAD ANY THOUGHTS OF KILLING YOURSELF IN THE PAST MONTH?: NO
6. HAVE YOU EVER DONE ANYTHING, STARTED TO DO ANYTHING, OR PREPARED TO DO ANYTHING TO END YOUR LIFE?: NO
1. IN THE PAST MONTH, HAVE YOU WISHED YOU WERE DEAD OR WISHED YOU COULD GO TO SLEEP AND NOT WAKE UP?: NO
2. HAVE YOU ACTUALLY HAD ANY THOUGHTS OF KILLING YOURSELF IN THE PAST MONTH?: NO
1. IN THE PAST MONTH, HAVE YOU WISHED YOU WERE DEAD OR WISHED YOU COULD GO TO SLEEP AND NOT WAKE UP?: NO
1. IN THE PAST MONTH, HAVE YOU WISHED YOU WERE DEAD OR WISHED YOU COULD GO TO SLEEP AND NOT WAKE UP?: NO

## 2024-01-01 ASSESSMENT — EJECTION FRACTION
EF_VALUE: .25
EF_VALUE: .16

## 2024-01-01 ASSESSMENT — ENCOUNTER SYMPTOMS
ABDOMINAL PAIN: 1
ABDOMINAL PAIN: 1
FATIGUE: 1
VOMITING: 1
VOMITING: 1
ABDOMINAL DISTENTION: 1
SHORTNESS OF BREATH: 1
WEAKNESS: 1
DIARRHEA: 1
NAUSEA: 1
FATIGUE: 1

## 2024-01-02 NOTE — TELEPHONE ENCOUNTER
Returning patient call.  She has been cleared by her orthopedic surgeon for transplant and weight bearing status. Will plan to bring her to the selection committee tomorrow for consideration of active listing.

## 2024-01-03 NOTE — COMMITTEE REVIEW
Abdominal Committee Review Note     Evaluation Date: 1/26/2021  Committee Review Date: 1/3/2024    Organ being evaluated for: Kidney    Transplant Phase: Evaluation  Transplant Status: Active    Transplant Coordinator: Trinidad Devine  Transplant Surgeon:  Dr. Giang      Referring Physician: Aaliyah Rhoades    Primary Diagnosis: Polycystic Kidneys  Secondary Diagnosis: ESKD    Committee Review Members:  Nephrology Wang Escalera, APRN CNP, Derrell Calvert MD   Nurse KHURRAM, MASON, RN   Nutrition Poppy Charles, [facility]   Pharmacist Ismael Zuniga, [facility]   Pharmacy Eyad Angulo, [facility]    - Clinical Chen Rita Youngblood, [facility], Amirah Marin, [facility]   Transplant JD TALAMANTES, RN, Va Mclean, RN, Connie Ferreira, RN, Deshawn Weldon MD, Noreen Uribe, APR CNP, Kalpana Wayne, CEDRIC, Amirah Salazar, RN, Mary Dowling, NP, Mary Brito, RN, Alejandra Domingo, RN, Trinidad Resendez, CEDRIC, Elly Camejo RN       Transplant Eligibility: Irreversible chronic kidney disease treated w/dialysis or expected need for dialysis    Committee Review Decision: Approved    Relative Contraindications: None    Absolute Contraindications: None    Committee Chair Deshawn Weldon MD verbally attested to the committee's decision.    Committee Discussion Details: Reviewed pt's medical status and evaluation results to date with multidisciplinary committee.    Recommended the following evaluation items:    Cardiology: Lexiscan 2021 was negative. ECHO and EKG WNL except 1st degree AVB.   Pulmonary: 20-30 pack year smoking history. PFTs normal. Low dose chest CT ok'd.   Neurology: MRA brain due to PKD history.  Referred to neurology for small aneurysm type outpouchings. These were not recommended to be intervened on due to low suspicion for rupture and if they did rupture they would be contained in a sinus.   BMI: low- this is normal for patient and was discussed with the selection committee. Ok to proceed to  transplant.   Functional Status: Frail appearing per Dr. Giang's most recent note but has good  strength. She broke her hip but is now weight bearing and has had clearance from her orthopedic surgeon.   Imaging Recommended:  No further imaging recommended. US and CT have already been reviewed by surgery.   Health Maintenance:  Up to date.   KDPI and Receipt of Information:completed  My Transplant Place Class Review: completed    Patient should have live donors register now to initiate donor evaluation: Yes    Committee determined that patient is a Good candidate for Kidney     Listing plan: List Active     A2B Candidate: No    Patient will be called and summary letter will be sent.

## 2024-01-03 NOTE — LETTER
01/03/24        Serene Tipton  [address]  [place] [address]        Dear Serene,    It was a pleasure to see you recently for consideration of kidney transplantation. Your pre-transplant evaluation results were reviewed at our Multidisciplinary Selection Committee on 01/03/2024. The committee has determined the following:     You have been approved for active listing on the kidney transplant list. We will need to verify insurance coverage and ensure an updated PRA sample has been sent prior to active listing. I will handle these on my end.     For any questions, please contact the Transplant Office at [phone].      Sincerely,  Andry Devine RN   Pre-Kidney Transplant Coordiantor  Direct line: [phone]     Solid Organ Transplant  [facility], [facility]

## 2024-01-03 NOTE — TELEPHONE ENCOUNTER
Called patient to discuss results of the selection committee today.  Unable to reach her but did leave  requesting a call back at her earliest convenience.

## 2024-01-04 NOTE — TELEPHONE ENCOUNTER
Calling patient to discuss the results of the Selection Committee from  yesterday.  Unable to reach her but did leave a message with direct line requesting a call back.

## 2024-01-05 NOTE — TELEPHONE ENCOUNTER
Called patient to discuss the Selection Committee from this week. Let her know that she has been approved for active status.  She did ask me to go through education again so we did do that over the phone. Went through the need for support post transplant. She has a good plan but we want to clear this with social work first. Will call her back Monday to discuss.

## 2024-01-05 NOTE — TELEPHONE ENCOUNTER
Returning call to patient. Unable to reach her but left a VM with direct line requesting call back.

## 2024-01-08 NOTE — TELEPHONE ENCOUNTER
Transplant Social Work Services Phone Call    Data: Alvarez is active on the kidney transplant wait list. Primary SW received a message from the transplant coordinator asking to follow up with the patient to confirm her care giving plan.   Intervention: Coverage SW called Alvarez to confirm her care giving plan. I re-reviewed our transplant expectations and Alvarez endorses having ample support available to her. She will have a team of caregivers including her neighbor, several close friends (who supported her following previous hospitalizations), and her sister Toyin Fisher [phone]) who will drive her to and from her clinic appointments. I offered to email Alvarez a hard copy of our transplant expectations- she provided the following email address: [email or web address].   Assessment: Alvarez has a good care giving plan and is eager for transplant.   Education provided by LINDA: Post transplant expectations (this information was emailed Alvarez following this call)  Plan: Sw to update transplant coordinator and primary transplant SW.     Karey White, CLARY, LICSW  SOT/BMT/CF Float

## 2024-01-08 NOTE — TELEPHONE ENCOUNTER
Social work approved patient's post transplant plan for support.  She let me know that patient wondered about getting the education information sent to her again so she had access to it while she waits for a transplant. Calling at this time to inquire about where she would like me to send it: email vs. [employer]. Unable to reach her but did leave detailed message and requested a call back at her earliest convenience.

## 2024-01-10 NOTE — TELEPHONE ENCOUNTER
Returning patient call.  She would like me to send a link to the education modules to her MyChart.  Sending now.  Also, Dr. Giang had changed her KDPI status to say no to high KDPI donors.  Re sent the consent form for her to sign. She will sign and return to me tonight or tomorrow morning.  I will look for this to move forward with listing.

## 2024-01-11 NOTE — TELEPHONE ENCOUNTER
Returning call to patient to let her know that I did receive her KDPI form and she will be transferring to [facility] as previously discussed.  She has her contact information. She expressed good understanding of this.

## 2024-01-11 NOTE — LETTER
2024    Serene MEDLEY Danuta  [address]  [place] [address]      Dear Ms. Kenacarmelobenny,    This letter is sent to confirm that you have completed your transplant work-up and you are a candidate in the kidney transplant program at the [facility].  You were placed on the kidney active waitlist on 2024.      When you are active on the waitlist and an organ becomes available, a coordinator will need to speak to you immediately.  You could be contacted at any time during the day or night as an organ could become available at any time.  Please make certain our office always has your current telephone numbers and address.      Items we will need from you:    We have received approval from your insurance company for the transplant procedure.  It is critical that you notify us if there is any change in your insurance.  It is also important that you familiarize yourself with the details of your specific insurance policy.  Our patient  is available to assist you if you should have any questions regarding your coverage.    An ALA or PRA blood sample will need to be sent here every 3 months to match you with  donors or any potential living donors. In order to complete this, special mailing boxes (called mailers) will be sent to your dialysis unti directly from the Outreach Department.  You should take the physician order form and the  to your home laboratory when it is time to for this testing to be done.  Additional mailers can be obtained by calling the Transplant Office and asking to speak to a kidney .    During this waiting period, we may request additional periodic laboratory tests with your primary physician.  It will be your responsibility to remind your physician to forward your results to the Transplant Office.    We need to be kept informed of any changes in your medical condition  such as:    changes in your medications,   significant changes in your health  significant infections (such as pneumonia or abscesses)  blood transfusions  any condition which requires hospitalization  any surgery    Remember to complete any routine cancer screening tests required before your transplant.  This includes colonoscopy; prostate screening for men, and mammogram and gynecologic testing for women, as well as dental work.  Your primary care clinic can assist you with arranging for these exams.  Remind your caregivers to forward copies of the records and final reports.      We want you to know that our program has physician and surgeon coverage 24 hours a day, 365 days a year. In addition, our transplant surgeons and physicians will not be on call for two or more transplant programs more than 30 miles apart unless the circumstances have been reviewed and approved by the [employer] for Organ Sharing (UNOS) Membership and Professional Standards Committee (MPSC). Finally, our primary physician and primary surgeons are not designated as the primary surgeon or primary physician at more than 1 transplant hospital. If this coverage changes or there are substantial program changes, you will be notified in writing by letter.     Attached is a letter from UNOS that describes the services and information offered to patients by UNOS and the Organ Procurement and Transplantation Network (OPTN).    We appreciate having had the opportunity to participate in your care.  If you have questions, please feel free to call the Transplant Office at [phone] or [phone].      Sincerely,  Trinidad Devine RN   Pre-Kidney Transplant Coordinator  Direct line: [phone]    Solid Organ Transplant  [facility]      Enclosures: ALA/PRA Physician Order, Telephone Contact List, Travel Resources, UNOS Letter, Waitlist  Information Update, and While You Are Waiting  cc: Care Team                  The Organ Procurement and Transplantation Network Toll-free patient services line:  [phone]  Your resource for organ transplant information    Staffed 8:30 am - 5:00 pm ET Monday - Friday Leave a message 24/7 to receive a call back    The Organ Procurement and Transplantation Network (OPTN) is the national transplant system. It makes the policies that decide how donated organs are matched to patients waiting for a transplant. The OPTN:    Makes sure donated organs get matched to people on the transplant waiting list  Tells people about the donation and transplant processes  Makes sure that the public knows about the need for more organ and tissue donations    The OPTN has a free patient services line that you can call to:  Get more information about:  Organ donation and organ transplants  Donation and transplant policies  Get an information kit with:  A list of transplant hospitals  Waiting list information  Talk about any questions you may have about your transplant hospital or organ procurement organization. The staff will do their best to help you or point you to others who may help.  Find out how you can volunteer with the OPTN and help shape transplant policy     The patient services line number is: [phone]    Patient services line staff CANNOT answer questions about your own medical care, including:  Waiting list status  Test results  Medical records  You will need to call your transplant hospital for this information.    The following websites have more information about transplantation and donation:    OPTN: https://optn.transplant.hrsa.gov/    For potential living donors and transplant recipients:    Living with transplant: https://www.transplantliving.org/    Living donation process: https://optn.transplant.hrsa.gov/living-donation/    Financial assistance: https://www.livingdonorassistance.org/    Transplantation  data: https://www.srtr.org/    Organ donation: https://www.organdonor.gov/    Volunteer with the OPTN: https://optn.transplant.[facility].gov/get-involved/

## 2024-01-12 NOTE — TELEPHONE ENCOUNTER
Called pt to introduce self as WL coordinator and update due for RWL. Unable to leave VM as voicemail box is not set up yet.     Called house phone. Introduced myself as WL coordinator and encouraged pt to stay up on health maintenance and to let us know if insurance changes, contact info updates. Explained WL protocol for pt's status and when follow up is needed. Gave pt direct information and encouraged to reach out with any questions/concerns. Pt is due for RWL, orders placed, scheduling will be reaching out to arrange.

## 2024-01-12 NOTE — TELEPHONE ENCOUNTER
Called Alvarez to let her know that I missed her mammogram being up to date in the process of getting her listed. She will be going to [facility] in [place] this morning at 10:45 to update her mammogram and records will be requested to be pushed after.

## 2024-02-20 NOTE — TELEPHONE ENCOUNTER
Received call from pt that she needs to have both her shoulders replaced. Inform them of status change on Kidney Waitlist. Pt changed to inactive on 24 due to needing shoulder surgery. Pt was informed that they are still accruing time on the waitlist, they just cannot receive  donor offers at this time. Status change letter will be sent to patient. Pt will need to be healed prior to active status consideration, she will update writer when surgeries are scheduled. Pt verbalized understanding of information and has no further questions. Encouraged to reach out if questions arise.

## 2024-03-07 NOTE — TELEPHONE ENCOUNTER
"Nephrology note:    Pt called her outpt dialysis  unit today  to say that she was not going to make it to dialysis.  She is TTS [facility]HD at the [facility] under the care of Dr Armstrong. She missed dialysis Tues d/t not feeling well, having diarrhea.  Last tx 3/2 was completed and uneventful.   She was advised earlier today to go to the ED after reporting that she  \"got out of bed and then fell/felt dizzy\".  She denies LOC and did not hit her head.  She was able to get up with her walker, then ate a little and took a nap.    She says that she has been having diarrhea earlier this week (though no stools for 2 day)  She says that she is edematous but denies MCBRIDE or SOB.      She is working on finding a friend to bring her to the ED as she is still dizzy and doesn't feel safe to drive (agreed).  She says that her BP's have been \"going crazy\".  Had been quite high now trending low over the last few days.  I asked her to read her recent home BP's which were mostly normal:  SBP 130s-140's. BP on dialysis has been mostly controlled.      For renal reference, Dialysis RX:  TTS, last HD 3/2; \", EDW 39kg, , , K 2, Na 138, Jakob 2.5; Heparin load 1500, 800/hr  ACCESS:  L AVF  MINERVA Almonte-BC  [facility]  [address]  [place] [address]    Office:  [phone]  Fax:  [phone]         "

## 2024-03-08 NOTE — MEDICATION SCRIBE - ADMISSION MEDICATION HISTORY
Medication Scribe Admission Medication History    Admission medication history is complete. The information provided in this note is only as accurate as the sources available at the time of the update.    Information Source(s): Patient and CareEverywhere/SureScripts via in-person    Pertinent Information: Spoke with patient in person. As she was discharging asked yes/no questions of what she is taking or not taking rather than obtain last administered time. Utilized dispense report and care everywhere to pull in outside information. Patient requested removal of acetaminophen and PEG 17 g. Reports not taking hydralazine anymore.     Able to answer all questions appropriately. Patient was falling asleep about every 1-2 minutes sometimes during mid-speech and would wake up and continue the sentence.     Patient has concerns over excessive daytime sleepiness, dizziness, and concerns with transportation.     Changes made to PTA medication list:  Added:   Calcium acetate   Deleted:   Acetaminophen   Hydralazine 50 mg  PEG 17 g  Changed:   Labetalol 100 mg --> 200 mg (care everywhere)    Allergies reviewed with patient and updates made in EHR: yes    Medication History Completed By: Joseph Camp 3/7/2024 10:00 PM    PTA Med List   Medication Sig Last Dose    artificial saliva (BIOTENE MT) SOLN solution Swish and spit 2 sprays in mouth as needed for dry mouth     aspirin 81 MG EC tablet Take 1 tablet (81 mg) by mouth daily     B Complex-C (SUPER B COMPLEX PO) Take 1 tablet by mouth daily at 2 pm     calcitRIOL (ROCALTROL) 0.5 MCG capsule Take 0.5 mcg by mouth three times a week Given at Dialysis     calcium acetate (CALPHRON) 667 MG TABS tablet Take 667 mg by mouth 3 times daily (with meals)     cholecalciferol (VITAMIN D3) 25 mcg (1000 units) capsule Take 1 capsule by mouth daily at 2 pm     cyclobenzaprine (FLEXERIL) 5 MG tablet Take 5 mg by mouth 2 times daily as needed for muscle spasms     Epoetin Adam (EPOGEN  IJ) Inject 1,000 Units into the vein three times a week At dialysis.     glucosamine-chondroitin 500-400 MG CAPS per capsule Take 2 capsules by mouth daily at 2 pm     Iron Sucrose (VENOFER IV) Inject 100 mg into the vein once a week At dialysis     labetalol (NORMODYNE) 200 MG tablet Take 200 mg by mouth 2 times daily     lactulose (CHRONULAC) 10 GM/15ML solution Take 15 mLs (10 g) by mouth 2 times daily as needed for constipation     LORazepam (ATIVAN) 1 MG tablet Take 1 tablet (1 mg) by mouth daily (before lunch)     nortriptyline (PAMELOR) 75 MG capsule Take 75 mg by mouth At Bedtime     omeprazole (PRILOSEC) 20 MG DR capsule Take 20 mg by mouth daily     oxyCODONE (ROXICODONE) 5 MG tablet Take 1-2 tablets (5-10 mg) by mouth every 4 hours as needed for severe pain or moderate pain     rosuvastatin (CRESTOR) 10 MG tablet Take 10 mg by mouth At Bedtime     SENNA-docusate sodium (SENNA S) 8.6-50 MG tablet Take 2 tablets by mouth 2 times daily Hold for loose stools.     sertraline (ZOLOFT) 100 MG tablet Take 150 mg by mouth At Bedtime     sevelamer carbonate (RENVELA) 800 MG tablet Take 800-2,400 mg by mouth 3 times daily When eating; range of 1-3 tabs depending on meal size

## 2024-03-08 NOTE — ED PROVIDER NOTES
EMERGENCY DEPARTMENT ENCOUNTER       ED Course & Medical Decision Making     Final Impression  68 year old female presents for evaluation of right shoulder pain after rolling out of bed this morning.  Patient reports that she rolled out of bed this morning at about 6 AM, landed on her right shoulder and is now having pain in her right shoulder as well as her lateral collarbone.  Patient denies striking her head or sustaining LOC, is not on blood thinners.  Right shoulder exam fairly reassuring, has chronic shoulder problems and states she is actually due to get a total shoulder replacement, actually has an appointment with Dr. Jensen of [facility] tomorrow for evaluation and operative planning it sounds like.  No acute fracture seen on x-ray.  Patient given a dose of her home oxycodone for pain.  No acute abnormalities on exam or x-ray imaging, will recommend follow-up with orthopedic surgery at her prescheduled appointment tomorrow    Patient reports missing 2 episodes of dialysis this week, strongly recommended that she schedule a make-up appointment soon as possible    Prior to making a final disposition on this patient the results of patient's tests and other diagnostic studies were discussed with the patient. All questions were answered. Patient expressed understanding of the plan and was amenable to it.    Medical Decision Making    History:  Supplemental history from: NA  External Record(s) reviewed as documented below;  2/22/2024, [facility] primary care note, seen for surgical clearance for likely upcoming shoulder surgery, note reviewed    Work Up:  Chart documentation includes differential considered and any EKGs or imaging independently interpreted by provider, where specified.  DDx considered but not limited to: Shoulder fracture, shoulder dislocation, clavicle fracture, pneumothorax, rotator cuff injury    Complicating factors:  Care impacted by chronic illness: ESRD on dialysis, anxiety,  hyperlipidemia, hypertension, tobacco use  Care affected by social determinants of health: N/A    Disposition considerations: Discharge. I recommended the patient continue their current prescription strength medication(s): Chronic oxycodone for pain. See documentation for any additional details.      Medications   oxyCODONE (ROXICODONE) tablet 10 mg (has no administration in time range)       New Prescriptions    No medications on file       Final Impression     1. Fall, initial encounter    2. Chronic right shoulder pain        Chief Complaint     Chief Complaint   Patient presents with    Fall    Fatigue     Pt reports having a fall today around 6 am, having pain on right collar bone. Pt was in bed when she fell. Not on thinners. She is on dialysis T/TH/Saturday; last dialysis appointment was this past Saturday. She reports she did not go Tuesday and Thursday.   Feeling fatigued.     HPI     Serene Tipton is a 68 year old female who presents for evaluation of right shoulder pain after a fall this morning    Physical Exam     /70   Pulse 86   Temp 98.9  F (37.2  C) (Temporal)   Resp 20   SpO2 92%   Constitutional: Awake, alert, in no acute distress.  Head: Normocephalic, atraumatic.  ENT: Mucous membranes moist.  Eyes: Conjunctiva normal.  Respiratory: Respirations even, unlabored, in no acute respiratory distress.  Cardiovascular: Regular rate and rhythm. Good peripheral perfusion.  GI: Abdomen soft, non-tender.  Musculoskeletal: Moves all 4 extremities equally.  Good  strength in right upper extremity, no obvious deformity of the right shoulder, though does have an old anterior scar from prior surgery.  Relatively good range of motion of the right shoulder, no palpable deformity around the shoulder or clavicle.  Integument: Warm, dry.  Neurologic: Alert & oriented x 3. Normal speech. Grossly normal motor and sensory function. No focal deficits noted.  Psychiatric: Normal mood    Labs & Imaging      Imaging reviewed and independently interpreted as below;   XR right shoulder images reviewed, degenerative changes, old surgical clips present near humerus.  Chronic appearing distal acromion fracture    Results for orders placed or performed during the hospital encounter of 03/07/24   XR Shoulder Right 2 Views    Impression    IMPRESSION: Postoperative changes to the humeral head with soft tissue suture anchors. The humeral head is intact. There is degenerative change at the right glenohumeral joint. There is some irregularity of the tip of the acromion. This is also seen on   prior CT scan of the chest 07/12/2023 and is unchanged. Considerable effacement of the subacromial space suggestive of high-grade rotator cuff tearing but this is also likely chronic. There is significant soft tissue swelling seen lateral to the humeral   head which may represent fluid within the subacromial/subdeltoid bursa or hematoma within the subcutaneous soft tissues within the deltoid musculature.        Chris Buckley MD  03/07/24 [id number]

## 2024-03-08 NOTE — ED NOTES
Discharge instructions discussed with patient and all questions answered. Pt agreeable with discharge plan of care. VSS. Pt ambulatory at discharge.

## 2024-03-08 NOTE — DISCHARGE INSTRUCTIONS
Your x-ray did not show any acute fractures or broken bones in your right shoulder, though did show a lot of chronic changes that you are likely already aware of.  You will need to follow-up with the orthopedic surgeon regarding your chronic right shoulder pain.    If you missed dialysis, you will need to contact your dialysis center to arrange a make-up session.

## 2024-03-08 NOTE — ED TRIAGE NOTES
Pt reports having a fall today around 6 am, having pain on right collar bone. Pt was in bed when she fell. Not on thinners. She is on dialysis T/TH/Saturday; last dialysis appointment was this past Saturday. She reports she did not go Tuesday and Thursday.   Feeling fatigued.    Triage Assessment (Adult)       Row Name 03/07/24 1927          Triage Assessment    Airway WDL WDL        Respiratory WDL    Respiratory WDL WDL        Skin Circulation/Temperature WDL    Skin Circulation/Temperature WDL WDL        Cardiac WDL    Cardiac WDL WDL        Peripheral/Neurovascular WDL    Peripheral Neurovascular WDL WDL        Cognitive/Neuro/Behavioral WDL    Cognitive/Neuro/Behavioral WDL WDL        [place] Coma Scale    Best Eye Response 4-->(E4) spontaneous     Best Motor Response 6-->(M6) obeys commands     Best Verbal Response 5-->(V5) oriented     Nik[place]j Coma Scale Score 15

## 2024-03-23 PROBLEM — J81.1 CHRONIC PULMONARY EDEMA: Status: ACTIVE | Noted: 2023-07-25

## 2024-03-23 PROBLEM — S72.002D FRACTURE OF UNSPECIFIED PART OF NECK OF LEFT FEMUR, SUBSEQUENT ENCOUNTER FOR CLOSED FRACTURE WITH ROUTINE HEALING: Status: ACTIVE | Noted: 2023-01-01

## 2024-03-23 PROBLEM — K56.609: Status: ACTIVE | Noted: 2023-07-25

## 2024-03-23 PROBLEM — N18.9 CHRONIC KIDNEY DISEASE, UNSPECIFIED: Status: ACTIVE | Noted: 2023-07-25

## 2024-03-23 PROBLEM — D12.0 BENIGN NEOPLASM OF CECUM: Status: ACTIVE | Noted: 2021-07-20

## 2024-03-23 PROBLEM — K76.9 DISEASE OF LIVER: Status: ACTIVE | Noted: 2023-01-01

## 2024-03-23 PROBLEM — S92.301D: Status: ACTIVE | Noted: 2023-01-01

## 2024-03-23 PROBLEM — Z90.5 ACQUIRED ABSENCE OF KIDNEY: Status: ACTIVE | Noted: 2023-07-25

## 2024-03-23 PROBLEM — K56.609 SMALL BOWEL OBSTRUCTION (H): Status: ACTIVE | Noted: 2023-07-13

## 2024-03-23 PROBLEM — N18.6 END STAGE RENAL DISEASE (H): Status: ACTIVE | Noted: 2023-07-25

## 2024-03-23 PROBLEM — Z94.0 KIDNEY TRANSPLANT STATUS: Status: ACTIVE | Noted: 2023-07-25

## 2024-03-23 PROBLEM — D12.2 BENIGN NEOPLASM OF ASCENDING COLON: Status: ACTIVE | Noted: 2021-07-20

## 2024-03-23 PROBLEM — F32.A DEPRESSION, UNSPECIFIED: Status: ACTIVE | Noted: 2023-07-25

## 2024-03-23 PROBLEM — N18.4 CKD (CHRONIC KIDNEY DISEASE) STAGE 4, GFR 15-29 ML/MIN (H): Status: ACTIVE | Noted: 2018-10-25

## 2024-03-23 PROBLEM — K56.7 ILEUS, UNSPECIFIED (H): Status: ACTIVE | Noted: 2023-07-25

## 2024-03-23 PROBLEM — E16.2 HYPOGLYCEMIA, UNSPECIFIED: Status: ACTIVE | Noted: 2023-07-25

## 2024-03-23 PROBLEM — I10 ESSENTIAL (PRIMARY) HYPERTENSION: Status: ACTIVE | Noted: 2023-07-25

## 2024-03-23 PROBLEM — K59.00 CONSTIPATION, UNSPECIFIED: Status: ACTIVE | Noted: 2023-07-25

## 2024-03-23 PROBLEM — R10.84 GENERALIZED ABDOMINAL PAIN: Status: ACTIVE | Noted: 2023-07-25

## 2024-03-23 PROBLEM — Z99.2 DEPENDENCE ON RENAL DIALYSIS (H): Status: ACTIVE | Noted: 2023-07-25

## 2024-03-23 PROBLEM — K63.5 POLYP OF COLON: Status: ACTIVE | Noted: 2021-07-16

## 2024-03-23 NOTE — ED PROVIDER NOTES
EMERGENCY DEPARTMENT ENCOUNTER      NAME: Serene Tipton  AGE: 68 year old female  YOB: 1955  MRN: [id number]  EVALUATION DATE & TIME: No admission date for patient encounter.    PCP: Ramos Lowry    ED PROVIDER: Aravind Fortune M.D.      Chief Complaint   Patient presents with    Abdominal Pain         FINAL IMPRESSION:  1. Small bowel obstruction (H)          ED COURSE & MEDICAL DECISION MAKING:    Pertinent Labs & Imaging studies reviewed. (See chart for details)  68 year old female presents to the Emergency Department for evaluation of abdominal bloating and weakness.  Patient with a history of complicated bowel issues which included concern for small bowel obstruction versus constipation/obstipation.  Patient says these symptoms are similar to previous admission but do seem slightly worse.  Proceeded with CT scan to evaluate which shows clear small bowel obstruction with transition point.  No signs of perforation or other complication at this time.  Patient's electrolytes are consistent with her having missed dialysis today however her potassium and other electrolytes are normal.  With the small bowel obstruction chose not to proceed with the enema which had been ordered and felt that especially considering the complicated nature of the patient's abdominal history that admission to the hospital for surgical consultation/GI consultation and further treatment was warranted.  Patient does not need emergent dialysis but will leave it up to the inpatient team to decide on timing.  Patient will be kept n.p.o.    At the conclusion of the encounter I discussed the results of all of the tests and the disposition. The questions were answered. The patient or family acknowledged understanding and was agreeable with the care plan.        1:40 PM I met with the patient, obtained history, performed an initial exam, and discussed options and plan for diagnostics and treatment here in the ED.     Medical  Decision Making  Obtained supplemental history:Supplemental history obtained?: No  Reviewed external records: External records reviewed?: Inpatient Record: [facility] Emergency Room (3/7/2024)  Care impacted by chronic illness:Chronic Kidney Disease, Hyperlipidemia, Hypertension, and Other: tobacco use, anxiety, chronic opoid use  Care significantly affected by social determinants of health:Alcohol Abuse and/or Recreational Drug Use  Did you consider but not order tests?: Work up considered but not performed and documented in chart, if applicable  Did you interpret images independently?: Independent interpretation of ECG and images noted in documentation, when applicable.  Consultation discussion with other provider:Did you involve another provider (consultant, , pharmacy, etc.)?: No  Admit.    This patient involved a high degree of complexity in medical decision making, as significant risks were present and assessed. Recent encounters & results in medical record reviewed by me.     All workup (i.e. any EKG/labs/imaging as per charting below) reviewed and independently interpreted by me. See respective sections for details.       minutes of critical care time     MEDICATIONS GIVEN IN THE EMERGENCY:  Medications   lidocaine 1 % 0.1-1 mL (has no administration in time range)   lidocaine (LMX4) cream (has no administration in time range)   sodium chloride (PF) 0.9% PF flush 3 mL (3 mLs Intracatheter $Given 3/24/24 0040)   sodium chloride (PF) 0.9% PF flush 3 mL (has no administration in time range)   calcium carbonate (TUMS) chewable tablet 1,000 mg (has no administration in time range)   acetaminophen (TYLENOL) tablet 650 mg (650 mg Oral Not Given 3/23/24 1816)     Or   acetaminophen (TYLENOL) Suppository 650 mg ( Rectal See Alternative 3/23/24 1816)   melatonin tablet 1 mg (has no administration in time range)   ondansetron (ZOFRAN ODT) ODT tab 4 mg (has no administration in time  range)     Or   ondansetron (ZOFRAN) injection 4 mg (has no administration in time range)   prochlorperazine (COMPAZINE) injection 5 mg (has no administration in time range)     Or   prochlorperazine (COMPAZINE) tablet 5 mg (has no administration in time range)     Or   prochlorperazine (COMPAZINE) suppository 12.5 mg (has no administration in time range)   HYDROmorphone (PF) (DILAUDID) injection 0.2 mg (0.2 mg Intravenous $Given 3/23/24 2130)   HYDROmorphone (PF) (DILAUDID) injection 0.4 mg (has no administration in time range)   artificial saliva (BIOTENE MT) solution 2 spray (has no administration in time range)   rosuvastatin (CRESTOR) tablet 10 mg (has no administration in time range)   sertraline (ZOLOFT) tablet 150 mg (150 mg Oral $Given 3/23/24 2134)   Vitamin D3 (CHOLECALCIFEROL) tablet 25 mcg (has no administration in time range)   nortriptyline (PAMELOR) capsule 75 mg (75 mg Oral $Given 3/23/24 2136)   calcitRIOL (ROCALTROL) capsule 0.5 mcg (has no administration in time range)   cyclobenzaprine (FLEXERIL) tablet 5 mg (has no administration in time range)   calcium acetate (PHOSLO) capsule 667-1,334 mg ( Oral Not Given 3/23/24 1851)   oxyCODONE (ROXICODONE) tablet 10 mg (10 mg Oral $Given 3/24/24 0038)   levothyroxine (SYNTHROID/LEVOTHROID) tablet 50 mcg (50 mcg Oral $Given 3/23/24 2134)   lidocaine (XYLOCAINE) 5 % ointment (has no administration in time range)   labetalol (NORMODYNE) tablet 200 mg ( Oral Automatically Held 3/29/24 2000)   lactulose (CHRONULAC) solution 10 g ( Oral Held by provider 3/23/24 1730)   pantoprazole (PROTONIX) EC tablet 40 mg (40 mg Oral $Given 3/23/24 2135)   multivitamin RENAL (RENAVITE RX/NEPHROVITE) tablet 1 tablet (has no administration in time range)   traZODone (DESYREL) half-tab 25 mg ( Oral See Alternative 3/23/24 2135)     Or   traZODone (DESYREL) tablet 50 mg (50 mg Oral $Given 3/23/24 2135)   sodium chloride 0.9 % infusion ( Intravenous Not Given 3/23/24 1828)  "  LORazepam (ATIVAN) tablet 1 mg (1 mg Oral $Given 3/23/24 1816)   Enema Compound (docusate/mineral oil/NaPhos) NO MAG CIT PREMIX (226 mLs Rectal Not Given 3/23/24 1528)   albumin human 25 % injection 25 g (25 g Intravenous $New Bag 3/23/24 1934)       NEW PRESCRIPTIONS STARTED AT TODAY'S ER VISIT  Current Discharge Medication List             =================================================================    HPI    Patient information was obtained from: patient     Use of :         Serene Tipton is a 68 year old female with a pertinent history of ESRD on dialysis, Hyperlipidemia, Hypertension, tobacco use, anxiety, and chronic opoid use who presents for evaluation of constipation.    The patient reports that she currently is constipated, has nausea with vomiting, is feeling weak and exhausted, and has a mildly distended abdomen with pain. Originally, her conspitation and distended abdomen began 2 weeks ago. Patient ate a salad with broccli stalks two days in a row, when she is supposed to eat a low fiber diet. Shortly after, patient's stomach became very boated and painful. She states her abdomen was \"hard as a rock.\" Patient's stomach was so distended that she got shortness of breath. She has been taking Lactulose TID for 2 weeks since her symptoms began. After taking the Lactulose, patient was passing \"explosive gas\", felt weak, and was belching. She was having some bowel movements, however they were infrequent and small. Since then, more recently patient says that her stomach is not as distended and she has had some relief of her symptoms. However, she prompts to the ED as she is worried that it will become as bloated again. Last night, patient had an episode of diarrhea. She had nausea with vomiting (x2) yesterday, and one episode of vomiting this morning. Patient's last formed bowel movement was a few days ago, however was small. Of note, patient had a small bowel obstruction in July " 2023.    Patient has ESRD and mentioned that she missed her dialysis today. She said that she felt too weak and exhausted (secondary to her abdominal symptoms) to get ready and go to the clinic. She also missed dialysis 2 weeks ago when her abdomen was extremely distended. Her next dialysis is on Monday (3/25/2024), however patient would like to get dialyzed today in the ED.      REVIEW OF SYSTEMS   Review of Systems   Constitutional:  Positive for fatigue.   Gastrointestinal:  Positive for abdominal distention, abdominal pain, diarrhea, nausea and vomiting.   Neurological:  Positive for weakness.        PAST MEDICAL HISTORY:  Past Medical History:   Diagnosis Date    Anemia in chronic kidney disease     Anxiety     Arthritis     Brain aneurysm     Cavernous segment of the right & left carotid arteries. See Neurosurg note 6/16/21.    Chronic low back pain     Managed by Pain Clinic    Depressive disorder 2013    Disease of liver 8/29/2023    Disease of thyroid gland     ESRD (end stage renal disease) on dialysis (H) 07/2020    GERD (gastroesophageal reflux disease)     Hepatic lesion     Hyperlipidemia     Hypertension     Nephrolithiasis     Neuromuscular disorder (H)     Osteoporosis     PKD (polycystic kidney disease) 09/01/1990    Primary generalized (osteo)arthritis 8/2/2023    PTSD (post-traumatic stress disorder)     Tobacco abuse     Vitamin D deficiency        PAST SURGICAL HISTORY:  Past Surgical History:   Procedure Laterality Date    BACK SURGERY      spinal fusion w/hardware    BIOPSY Left 09/01/1990    Breast    BREAST LUMPECTOMY, RT/LT Left     Lumpectomy    CYST REMOVAL Right     rt wrist    CYSTECTOMY PILONIDAL  1981    EYE SURGERY  2008    lasik    FORMATION ARTERIOVENOUS FISTULA    07/28/2020    FRACTURE SURGERY      NEPHRECTOMY BILATERAL Bilateral 2/1/2023    Procedure: bilateral native nephrectomy;  Surgeon: Alana Giang MD;  Location: [facility] OR    OPEN REDUCTION INTERNAL FIXATION HIP BIPOLAR  Left 11/10/2023    Procedure: LEFT HEMIARTHROPLASTY, HIP, HEMIARTHROPLASTY;  Surgeon: Lucas Weldon MD;  Location: [facility] Main OR    ORTHOPEDIC SURGERY Right 2005    Shoulder    OTHER SURGICAL HISTORY      carpal tunnel repair    SPINE SURGERY             CURRENT MEDICATIONS:    No current outpatient medications on file.      ALLERGIES:  Allergies   Allergen Reactions    Penicillins Other (See Comments) and Shortness Of Breath     Breathing problem.  breathing      No Clinical Screening - See Comments      PN: LW CM1: Contrast Intercapsular - Nonionic Reaction :    Nsaids Other (See Comments)     Kidney damage    Carvedilol GI Disturbance     Nausea and vomiting    Ciprofloxacin Muscle Pain (Myalgia)    Floxacillin (Flucloxacillin) Muscle Pain (Myalgia) and Rash    Levofloxacin Muscle Pain (Myalgia) and Rash    Morphine Nausea and Vomiting    Sulfa Antibiotics Itching       FAMILY HISTORY:  Family History   Problem Relation Age of Onset    Polycystic Kidney Diease Mother     Hypertension Mother     Kidney Disease Mother     Cerebrovascular Disease Mother     Chronic Obstructive Pulmonary Disease Father     Multiple Sclerosis Father     Polycystic Kidney Diease Sister     Cardiac Sudden Death Sister 52    Hypertension Sister     Kidney Disease Sister     Polycystic Kidney Diease Maternal Grandmother     Hypertension Maternal Grandmother     Kidney Disease Maternal Grandmother     Cerebrovascular Disease Maternal Grandmother     Heart Disease Maternal Grandfather     Hyperlipidemia Paternal Grandmother     Cerebrovascular Disease Paternal Grandmother     Coronary Artery Disease Paternal Grandfather     Pulmonary Embolism Paternal Grandfather     Anesthesia Reaction No family hx of        SOCIAL HISTORY:   Social History     Socioeconomic History    Marital status: Single   Tobacco Use    Smoking status: Unknown    Smokeless tobacco: Never   Substance and Sexual Activity    Alcohol use: Yes     Comment: occasional  "   Drug use: Yes     Types: Marijuana       PHYSICAL EXAM    VITAL SIGNS: /76 (BP Location: Right arm)   Pulse 89   Temp 97.7  F (36.5  C) (Oral)   Resp 14   Ht 1.499 m (4' 11\")   Wt 36.3 kg (80 lb)   SpO2 95%   BMI 16.16 kg/m    Constitutional:  Well developed, well nourished   EYES: Conjunctivae clear, no discharge  HENT: Atraumatic, normocephalic, bilateral external ears normal.  Oropharynx moist. Nose normal.   Neck: Normal ROM , Supple   GI:,No masses, No flank tenderness. No rebound or guarding. Bloated abdomen with mild diffuse tenderness.   Respiratory:  No respiratory distress, normal nonlabored respirations.   Cardiovascular:  Distal perfusion appears intact  Musculoskeletal:  No edema appreciated, No cyanosis, No clubbing. Good range of motion in all major joints.   Integument:  Warm, Dry, No erythema, No rash.   Neurologic:  Alert and oriented. No focal deficits noted.  Ambulatory  Psychiatric:  Affect normal         LAB:  All pertinent labs reviewed and interpreted.  Labs Ordered and Resulted from Time of ED Arrival to Time of ED Departure   COMPREHENSIVE METABOLIC PANEL - Abnormal       Result Value    Sodium 130 (*)     Potassium 5.3      Carbon Dioxide (CO2) 29      Anion Gap 15      Urea Nitrogen 70.4 (*)     Creatinine 5.97 (*)     GFR Estimate 7 (*)     Calcium 9.9      Chloride 86 (*)     Glucose 101 (*)     Alkaline Phosphatase 118       (*)     ALT 11      Protein Total 7.1      Albumin 3.7      Bilirubin Total 0.5     CBC WITH PLATELETS AND DIFFERENTIAL - Abnormal    WBC Count 11.0      RBC Count 3.36 (*)     Hemoglobin 10.8 (*)     Hematocrit 33.6 (*)           MCH 32.1      MCHC 32.1      RDW 15.5 (*)     Platelet Count 406      % Neutrophils 88      % Lymphocytes 4      % Monocytes 7      % Eosinophils 0      % Basophils 0      % Immature Granulocytes 1      NRBCs per 100 WBC 0      Absolute Neutrophils 9.7 (*)     Absolute Lymphocytes 0.4 (*)     Absolute " Monocytes 0.8      Absolute Eosinophils 0.0      Absolute Basophils 0.0      Absolute Immature Granulocytes 0.1      Absolute NRBCs 0.0     LIPASE - Normal    Lipase 15         RADIOLOGY:  Reviewed all pertinent imaging. Please see official radiology report.  CT Abdomen Pelvis w/o Contrast   Final Result   IMPRESSION:    1.  Findings are compatible with small bowel obstruction transition point is likely within the pelvis or right lower quadrant.   2.  Mild patchy opacities of the bilateral lung bases are favored atelectasis.   3.  Additional unchanged findings as above.          PROCEDURES:         I, Kelvin Leong, am serving as a scribe to document services personally performed by Dr. Aravind Fortune based on my observation and the provider's statements to me. I, Aravind Fortune MD attest that Kelvin Leong is acting in a scribe capacity, has observed my performance of the services and has documented them in accordance with my direction.    Aravind Fortune M.D.  Emergency Medicine  [facility] EMERGENCY ROOM  [address] [id number] [phone]  Dept: [phone]       Aravind Fortune MD  03/24/24 0808

## 2024-03-23 NOTE — PHARMACY-ADMISSION MEDICATION HISTORY
Pharmacy Intern Admission Medication History    Admission medication history is complete. The information provided in this note is only as accurate as the sources available at the time of the update.    Information Source(s): Patient and CareEverywhere/SureScripts via in-person    Pertinent Information:   - Patient reported that she was able to take all medications this morning except for the labetalol   - Reports that she only went to dialysis on Thursday   - Patient reports that she completed prednisolone eyedrops for her left eye     Changes made to PTA medication list:  Added:   Lidocaine cream  levothyroxine  Deleted:   Sevelamer - expensive  ASA 81 - not taking  Oxycodone 5mg tablet  Changed:   Oxycodone 10mg tablet - added sig    Allergies reviewed with patient and updates made in EHR: yes    Medication History Completed By: Lashell Arias 3/23/2024 4:51 PM    PTA Med List   Medication Sig Last Dose    artificial saliva (BIOTENE MT) SOLN solution Swish and spit 2 sprays in mouth as needed for dry mouth PRN at PRN    B Complex-C (SUPER B COMPLEX PO) Take 1 tablet by mouth daily at 2 pm 3/23/2024 at Noon    calcitRIOL (ROCALTROL) 0.5 MCG capsule Take 0.5 mcg by mouth three times a week Given at Dialysis 3/21/2024 at Given at dialysis    calcium acetate (CALPHRON) 667 MG TABS tablet Take 1-2 tablets by mouth 3 times daily (with meals) 3/23/2024 at AM    cholecalciferol (VITAMIN D3) 25 mcg (1000 units) capsule Take 1 capsule by mouth daily at 2 pm 3/23/2024 at Noon    cyclobenzaprine (FLEXERIL) 5 MG tablet Take 5 mg by mouth 2 times daily as needed for muscle spasms Past Month at PRN    Epoetin Adam (EPOGEN IJ) Inject 1,000 Units into the vein three times a week At dialysis. 3/21/2024 at Given at dialysis    glucosamine-chondroitin 500-400 MG CAPS per capsule Take 2 capsules by mouth daily at 2 pm 3/22/2024 at Noon    Iron Sucrose (VENOFER IV) Inject 100 mg into the vein once a week At dialysis  at Given at  dialysis    labetalol (NORMODYNE) 200 MG tablet Take 200 mg by mouth 2 times daily 3/22/2024 at HS    lactulose (CHRONULAC) 10 GM/15ML solution Take 15 mLs (10 g) by mouth 2 times daily as needed for constipation 3/23/2024 at AM    levothyroxine (SYNTHROID/LEVOTHROID) 50 MCG tablet Take 50 mcg by mouth daily 3/22/2024 at HS    lidocaine HCl 3 % cream Apply topically daily as needed (pain) PRN at PRN    LORazepam (ATIVAN) 1 MG tablet Take 1 tablet (1 mg) by mouth daily (before lunch) 3/23/2024 at Noon    nortriptyline (PAMELOR) 75 MG capsule Take 75 mg by mouth At Bedtime 3/22/2024 at HS    Nutritional Supplements (NEPRO) LIQD Take 1 Can by mouth daily 3/23/2024 at AM    omeprazole (PRILOSEC) 20 MG DR capsule Take 20 mg by mouth daily 3/22/2024 at HS    oxyCODONE IR (ROXICODONE) 10 MG tablet Take 10 mg by mouth every 4 hours as needed for pain 3/22/2024 at HS    rosuvastatin (CRESTOR) 10 MG tablet Take 10 mg by mouth At Bedtime 3/23/2024 at AM    SENNA-docusate sodium (SENNA S) 8.6-50 MG tablet Take 2 tablets by mouth 2 times daily Hold for loose stools. 3/23/2024 at AM    sertraline (ZOLOFT) 100 MG tablet Take 150 mg by mouth At Bedtime 3/22/2024 at HS

## 2024-03-23 NOTE — PROGRESS NOTES
Aware of nephrology consult  ESRD secondary to polycystic kidney disease  On hemodialysis TTS schedule [facility]  Miss dialysis today due to abdominal discomfort, present at  emergency department, found to have small bowel obstruction  No indication for dialysis tonight in the ED based on labs and volume  Will plan for dialysis tomorrow  [facility] dialysis RN updated on run for tomorrow   Please transfer patient to her room in the ED capable of dialysis if she does not have room on the floor by tomorrow morning  B/p soft anti HTN meds on hold, pt well above TW limit IVF, will order Albumin for volume expansion in place of IVF  I will see her formerly in the morning      Ever Hayes PA-C  [facility]   [phone]

## 2024-03-23 NOTE — H&P
"[facility]    History and Physical - Hospitalist Service       Date of Admission:  3/23/2024    Assessment & Plan      Serene Tipton is a 68 year old female admitted on 3/23/2024. She has a history of ESRD and past SBO and is admitted for a 1-2 week history of abdominal pain and bloating. CT in ED demonstrated SBO. She was admitted with a similar presentation in July 2023 which was complicated by readmission with adynamic ileus after apparent resolution of the SBO.     SBO  CT demonstrating SBO with transition point.  No vomiting today so no NGT placed at this time.  NPO today  Clear liquids ADAT starting 3/24  Surgery consult  Hold PTA lactulose  Hot and cold packs  Tylenol for mild pain as needed  PTA oxycodone 10 mg every 4 hours as needed for moderate pain  IV Dilaudid for breakthrough pain  Zofran and Compazine for nausea    ESRD  Anemia of chronic renal disease  Polycystic kidney disease s/p bilateral nephrectomy. Dialysis TThS but missed day of admission.  She takes EPO 3 times a week, at dialysis sessions.  Nephrology consult  EPO not ordered at admission, will defer to nephrology  Continue PTA vitamin/mineral supplements  Venofer  BMP in a.m.    Bilateral shoulder osteoarthritis  Chronic.  Has tried lidocaine cream and IcyHot and reports some relief with each of these.  PTA lidocaine cream  Consider Voltaren, low potential for harm as she is anephric    Heart murmur  3/6 systolic murmur best heard at apex.  Most likely due to dialysis fistula.  She does report occasional shortness of breath and pedal edema however these are also consistent with known ESRD.  Today she denies orthopnea, PND, or shortness of breath.  Last echo in 2021 showed no significant valvular disease but did demonstrate trace mitral and tricuspid insufficiency.   Noted    Insomnia  Appears to be chronic.  Says melatonin ineffective generally.  Has not tried trazodone.  States that Ambien works but \"one " "morning I woke up and found my iPad in my microwave.  I live alone.\"  Melatonin as needed  Trazodone as needed    Hypertension  Soft blood pressures at admission.  Hold PTA labetalol    Chronic problems  Hypothyroidism: PTA Synthroid  Anxiety: PTA Ativan  Chronic pain: PTA nortriptyline, oxycodone, Flexeril  Hyperlipidemia: PTA rosuvastatin  Depression: PTA sertraline  GERD: Substitute pantoprazole for PTA omeprazole  INDIGO: History per chart review.  CPAP ordered.        Diet: NPO for Medical/Clinical Reasons Except for: Meds, Ice Chips  DVT Prophylaxis: Low Risk/Ambulatory with no VTE prophylaxis indicated (Padua score = 0, ambulate q shift)  Pool Catheter: Not present  Fluids: IV NS @ 75ml/hr  Lines: None     Cardiac Monitoring: None  Code Status: Full Code    Clinically Significant Risk Factors Present on Admission                  # Hypertension: Noted on problem list      # Cachexia: Estimated body mass index is 16.16 kg/m  as calculated from the following:    Height as of this encounter: 1.499 m (4' 11\").    Weight as of this encounter: 36.3 kg (80 lb).            Disposition Plan      Expected Discharge Date: 03/25/2024                The patient's care was discussed with the Attending Physician, Dr. Danii Escobedo .    Catalino Rosado MD, MEd  Resident Physician (PGY-1)  [facility] [place]  Secure messaging (7:30am - 5:30pm): [email or web address]  After hours or if no response: page senior resident: [phone]  ______________________________________________________________________    Chief Complaint   SBO    History of Present Illness   Serene Tipton is a 68 year old female who presents with about 2 weeks of poor appetite, fatigue, insomnia, and abdominal pain.  Her last bowel movement was a small amount of liquid stool overnight.  She also vomited last night.  She presented to the emergency department where CT scan confirmed small bowel " obstruction with transition point.    Past Medical History    Past Medical History:   Diagnosis Date    Anemia in chronic kidney disease     Anxiety     Arthritis     Brain aneurysm     Cavernous segment of the right & left carotid arteries. See Neurosurg note 6/16/21.    Chronic low back pain     Managed by Pain Clinic    Depressive disorder 2013    Disease of liver 8/29/2023    Disease of thyroid gland     ESRD (end stage renal disease) on dialysis (H) 07/2020    GERD (gastroesophageal reflux disease)     Hepatic lesion     Hyperlipidemia     Hypertension     Nephrolithiasis     Neuromuscular disorder (H)     Osteoporosis     PKD (polycystic kidney disease) 09/01/1990    Primary generalized (osteo)arthritis 8/2/2023    PTSD (post-traumatic stress disorder)     Tobacco abuse     Vitamin D deficiency        Past Surgical History   Past Surgical History:   Procedure Laterality Date    BACK SURGERY      spinal fusion w/hardware    BIOPSY Left 09/01/1990    Breast    BREAST LUMPECTOMY, RT/LT Left     Lumpectomy    CYST REMOVAL Right     rt wrist    CYSTECTOMY PILONIDAL  1981    EYE SURGERY  2008    lasik    FORMATION ARTERIOVENOUS FISTULA    07/28/2020    FRACTURE SURGERY      NEPHRECTOMY BILATERAL Bilateral 2/1/2023    Procedure: bilateral native nephrectomy;  Surgeon: Alana Giang MD;  Location: [facility] OR    OPEN REDUCTION INTERNAL FIXATION HIP BIPOLAR Left 11/10/2023    Procedure: LEFT HEMIARTHROPLASTY, HIP, HEMIARTHROPLASTY;  Surgeon: Lucas Weldon MD;  Location: [facility] Main OR    ORTHOPEDIC SURGERY Right 2005    Shoulder    OTHER SURGICAL HISTORY      carpal tunnel repair    SPINE SURGERY         Prior to Admission Medications   Prior to Admission Medications   Prescriptions Last Dose Informant Patient Reported? Taking?   B Complex-C (SUPER B COMPLEX PO) 3/23/2024 at Noon  Yes Yes   Sig: Take 1 tablet by mouth daily at 2 pm   Epoetin Adam (EPOGEN IJ) 3/21/2024 at Given at dialysis  Yes Yes   Sig: Inject 1,000  Units into the vein three times a week At dialysis.   Iron Sucrose (VENOFER IV)  at Given at dialysis  Yes Yes   Sig: Inject 100 mg into the vein once a week At dialysis   LORazepam (ATIVAN) 1 MG tablet 3/23/2024 at Noon  No Yes   Sig: Take 1 tablet (1 mg) by mouth daily (before lunch)   Nutritional Supplements (NEPRO) LIQD 3/23/2024 at AM  Yes Yes   Sig: Take 1 Can by mouth daily   SENNA-docusate sodium (SENNA S) 8.6-50 MG tablet 3/23/2024 at AM  No Yes   Sig: Take 2 tablets by mouth 2 times daily Hold for loose stools.   artificial saliva (BIOTENE MT) SOLN solution PRN at PRN  Yes Yes   Sig: Swish and spit 2 sprays in mouth as needed for dry mouth   calcitRIOL (ROCALTROL) 0.5 MCG capsule 3/21/2024 at Given at dialysis  Yes Yes   Sig: Take 0.5 mcg by mouth three times a week Given at Dialysis   calcium acetate (CALPHRON) 667 MG TABS tablet 3/23/2024 at AM  Yes Yes   Sig: Take 1-2 tablets by mouth 3 times daily (with meals)   cholecalciferol (VITAMIN D3) 25 mcg (1000 units) capsule 3/23/2024 at Noon  Yes Yes   Sig: Take 1 capsule by mouth daily at 2 pm   cyclobenzaprine (FLEXERIL) 5 MG tablet Past Month at PRN  Yes Yes   Sig: Take 5 mg by mouth 2 times daily as needed for muscle spasms   glucosamine-chondroitin 500-400 MG CAPS per capsule 3/22/2024 at Noon  Yes Yes   Sig: Take 2 capsules by mouth daily at 2 pm   labetalol (NORMODYNE) 200 MG tablet 3/22/2024 at HS  Yes Yes   Sig: Take 200 mg by mouth 2 times daily   lactulose (CHRONULAC) 10 GM/15ML solution 3/23/2024 at AM  No Yes   Sig: Take 15 mLs (10 g) by mouth 2 times daily as needed for constipation   levothyroxine (SYNTHROID/LEVOTHROID) 50 MCG tablet 3/22/2024 at HS  Yes Yes   Sig: Take 50 mcg by mouth daily   lidocaine HCl 3 % cream PRN at PRN  Yes Yes   Sig: Apply topically daily as needed (pain)   nortriptyline (PAMELOR) 75 MG capsule 3/22/2024 at HS  Yes Yes   Sig: Take 75 mg by mouth At Bedtime   omeprazole (PRILOSEC) 20 MG DR capsule 3/22/2024 at HS  Yes  Yes   Sig: Take 20 mg by mouth daily   oxyCODONE IR (ROXICODONE) 10 MG tablet 3/22/2024 at HS  Yes Yes   Sig: Take 10 mg by mouth every 4 hours as needed for pain   rosuvastatin (CRESTOR) 10 MG tablet 3/23/2024 at AM  Yes Yes   Sig: Take 10 mg by mouth At Bedtime   sertraline (ZOLOFT) 100 MG tablet 3/22/2024 at HS  Yes Yes   Sig: Take 150 mg by mouth At Bedtime      Facility-Administered Medications: None      Social History   I have reviewed this patient's social history and updated it with pertinent information if needed.  Social History     Tobacco Use    Smoking status: Unknown    Smokeless tobacco: Never   Substance Use Topics    Alcohol use: Yes     Comment: occasional    Drug use: Yes     Types: Marijuana     Family History   I have reviewed this patient's family history and updated it with pertinent information if needed.  Family History   Problem Relation Age of Onset    Polycystic Kidney Diease Mother     Hypertension Mother     Kidney Disease Mother     Cerebrovascular Disease Mother     Chronic Obstructive Pulmonary Disease Father     Multiple Sclerosis Father     Polycystic Kidney Diease Sister     Cardiac Sudden Death Sister 52    Hypertension Sister     Kidney Disease Sister     Polycystic Kidney Diease Maternal Grandmother     Hypertension Maternal Grandmother     Kidney Disease Maternal Grandmother     Cerebrovascular Disease Maternal Grandmother     Heart Disease Maternal Grandfather     Hyperlipidemia Paternal Grandmother     Cerebrovascular Disease Paternal Grandmother     Coronary Artery Disease Paternal Grandfather     Pulmonary Embolism Paternal Grandfather     Anesthesia Reaction No family hx of         Physical Exam   Vital Signs: Temp: 98.4  F (36.9  C)   BP: 104/67 Pulse: 95   Resp: 19 SpO2: 95 % O2 Device: None (Room air)    Weight: 80 lbs 0 oz    Constitutional: alert, no acute distress, pleasant and cooperative  Cardiovascular: regular rate and rhythm, extremities warm and well  perfused, no peripheral edema, 3/6 early systolic murmur best heard at the apex, LUE dialysis fistula with thrill  Respiratory: normal respiratory rate/rhythm/effort, all lung fields CTAB, speaks in complete sentences, no retractions or accessory muscle use  Neck: no adenopathy, thyroid symmetric and normal size, trachea midline, and no jugular venous distention  Eyes: conjunctivae normal and sclera anicteric  ENT: Throat normal without erythema or exudate   Abdomen: Tenderness in left lower quadrant and right upper quadrant.  Hepatomegaly.  2 scars from past surgeries.  Neuro: Alert and oriented, no focal deficits  Skin: no suspicious lesions or rashes   MSK: no gross deformities noted, range of motion grossly normal   Psychiatric: mentation and affect normal, judgment and insight intact      Data     I have personally reviewed the following data over the past 24 hrs:    11.0  \   10.8 (L)   / 406     130 (L) 86 (L) 70.4 (H) /  101 (H)   5.3 29 5.97 (H) \     ALT: 11 AST: 105 (H) AP: 118 TBILI: 0.5   ALB: 3.7 TOT PROTEIN: 7.1 LIPASE: 15       Imaging results reviewed over the past 24 hrs:   Recent Results (from the past 24 hour(s))   CT Abdomen Pelvis w/o Contrast    Narrative    EXAM: CT ABDOMEN PELVIS W/O CONTRAST  LOCATION: [facility]  DATE: 3/23/2024    INDICATION: Abdominal pain.  COMPARISON: CT abdomen pelvis 07/21/2023.  TECHNIQUE: CT scan of the abdomen and pelvis was performed without IV contrast. Multiplanar reformats were obtained. Dose reduction techniques were used.  CONTRAST: None.    FINDINGS:   LOWER CHEST: Low attenuation of the blood pool suggestive of anemia. Mild patchy opacities within the bilateral lung bases are favored atelectasis.    HEPATOBILIARY: Numerous hepatic cysts appear similar. No radiopaque gallstones.    PANCREAS: Unchanged diffuse dilatation of the main pancreatic duct.    SPLEEN: Calcified granulomas.    ADRENAL GLANDS: Difficult to visualize. No  obvious adrenal mass.    KIDNEYS/BLADDER: Status post bilateral nephrectomies.    BOWEL: Difficult to assess bowel due to no significant intra-abdominal fat and absence of intravenous contrast. The colon appears decompressed with a moderate burden of stool. There are multiple dilated loops of small bowel within the left hemiabdomen   and pelvis compatible with a small bowel obstruction. The transition point is likely within the right lower quadrant but is not clearly identified.    LYMPH NODES: Diffuse mesenteric edema. There is likely trace volume abdominal ascites. Difficult to assess for adenopathy. No obvious adenopathy is identified.    VASCULATURE: Heavy burden of vascular calcification without abdominal aortic aneurysm. Mild infrarenal aortic ectasia measuring up to 2.4 cm in diameter.    PELVIC ORGANS: No pelvic masses.    MUSCULOSKELETAL: Partially imaged left hip arthroplasty. Anterior fusion hardware at L5-S1.      Impression    IMPRESSION:   1.  Findings are compatible with small bowel obstruction transition point is likely within the pelvis or right lower quadrant.  2.  Mild patchy opacities of the bilateral lung bases are favored atelectasis.  3.  Additional unchanged findings as above.

## 2024-03-23 NOTE — ED TRIAGE NOTES
Pt arrives with abdominal pain and bloating for the past 2 weeks. N/V. Hx of bowel obstructions in the past and this feels similar.      Triage Assessment (Adult)       Row Name 03/23/24 1202          Triage Assessment    Airway WDL WDL        Respiratory WDL    Respiratory WDL WDL        Skin Circulation/Temperature WDL    Skin Circulation/Temperature WDL WDL        Cardiac WDL    Cardiac WDL WDL        Peripheral/Neurovascular WDL    Peripheral Neurovascular WDL WDL        Cognitive/Neuro/Behavioral WDL    Cognitive/Neuro/Behavioral WDL WDL

## 2024-03-24 NOTE — CONSULTS
"General Surgery Consultation  Serene Tipton MRN# [id number]   Age/Sex: 68 year old female YOB: 1955     Reason for consult: 1. Small bowel obstruction (H)            Requesting physician: Dr. Rosado                   Assessment and Plan:   Assessment:  Obstipation versus SBO. Heavy stool burden with known functional issues with obstipation. Will start with enema and magnesium citrate (this was discussed with nephrology who stated they had no objection) and see how patient responds. If this is unsuccessful or distension increases, we will need to consider NG and gastrografin challenge.    Plan:  Enema followed by mag citrate   NPO  Surgery will continue to follow          Chief Complaint:     Chief Complaint   Patient presents with    Abdominal Pain        History is obtained from the patient and electronic health record    HPI:   Serene Tipton is a 68 year old female who presents with abdominal distension and pain. PMH significant for ESRD s/p bilateral nephrectomies on HD, anxiety, INDIGO, HLD, INDIGO, and chronic ileus with obstipation. Patient reports that she developed distension and was unable to have a BM over the last week. She has had small success with a BM yesterday, but is now not able to pass flatus over the last 24 hours. She states her pain is localized to her left lower abdomen and denies nausea or vomiting. Patient has not had much bowel response to her lactulose the past few days and feels very \"backed up\". Patient denies fever, chills, diarrhea, CP, SOB, rashes or lesions.          Past Medical History:     Past Medical History:   Diagnosis Date    Anemia in chronic kidney disease     Anxiety     Arthritis     Brain aneurysm     Cavernous segment of the right & left carotid arteries. See Neurosurg note 6/16/21.    Chronic low back pain     Managed by Pain Clinic    Depressive disorder 2013    Disease of liver 8/29/2023    Disease of thyroid gland     ESRD (end stage " renal disease) on dialysis (H) 07/2020    GERD (gastroesophageal reflux disease)     Hepatic lesion     Hyperlipidemia     Hypertension     Nephrolithiasis     Neuromuscular disorder (H)     Osteoporosis     PKD (polycystic kidney disease) 09/01/1990    Primary generalized (osteo)arthritis 8/2/2023    PTSD (post-traumatic stress disorder)     Tobacco abuse     Vitamin D deficiency               Past Surgical History:     Past Surgical History:   Procedure Laterality Date    BACK SURGERY      spinal fusion w/hardware    BIOPSY Left 09/01/1990    Breast    BREAST LUMPECTOMY, RT/LT Left     Lumpectomy    CYST REMOVAL Right     rt wrist    CYSTECTOMY PILONIDAL  1981    EYE SURGERY  2008    lasik    FORMATION ARTERIOVENOUS FISTULA    07/28/2020    FRACTURE SURGERY      NEPHRECTOMY BILATERAL Bilateral 2/1/2023    Procedure: bilateral native nephrectomy;  Surgeon: Alana Giang MD;  Location: [facility] OR    OPEN REDUCTION INTERNAL FIXATION HIP BIPOLAR Left 11/10/2023    Procedure: LEFT HEMIARTHROPLASTY, HIP, HEMIARTHROPLASTY;  Surgeon: Lucas Weldon MD;  Location: [facility] Main OR    ORTHOPEDIC SURGERY Right 2005    Shoulder    OTHER SURGICAL HISTORY      carpal tunnel repair    SPINE SURGERY               Social History:    reports that she has an unknown smoking status. She has never used smokeless tobacco. She reports current alcohol use. She reports current drug use. Drug: Marijuana.           Family History:     Family History   Problem Relation Age of Onset    Polycystic Kidney Diease Mother     Hypertension Mother     Kidney Disease Mother     Cerebrovascular Disease Mother     Chronic Obstructive Pulmonary Disease Father     Multiple Sclerosis Father     Polycystic Kidney Diease Sister     Cardiac Sudden Death Sister 52    Hypertension Sister     Kidney Disease Sister     Polycystic Kidney Diease Maternal Grandmother     Hypertension Maternal Grandmother     Kidney Disease Maternal Grandmother     Cerebrovascular  Disease Maternal Grandmother     Heart Disease Maternal Grandfather     Hyperlipidemia Paternal Grandmother     Cerebrovascular Disease Paternal Grandmother     Coronary Artery Disease Paternal Grandfather     Pulmonary Embolism Paternal Grandfather     Anesthesia Reaction No family hx of               Allergies:     Allergies   Allergen Reactions    Penicillins Other (See Comments) and Shortness Of Breath     Breathing problem.  breathing      No Clinical Screening - See Comments      PN: LW CM1: Contrast Intercapsular - Nonionic Reaction :    Nsaids Other (See Comments)     Kidney damage    Carvedilol GI Disturbance     Nausea and vomiting    Ciprofloxacin Muscle Pain (Myalgia)    Floxacillin (Flucloxacillin) Muscle Pain (Myalgia) and Rash    Levofloxacin Muscle Pain (Myalgia) and Rash    Morphine Nausea and Vomiting    Sulfa Antibiotics Itching              Medications:     Prior to Admission medications    Medication Sig Start Date End Date Taking? Authorizing Provider   artificial saliva (BIOTENE MT) SOLN solution Swish and spit 2 sprays in mouth as needed for dry mouth   Yes Unknown, Entered By History   B Complex-C (SUPER B COMPLEX PO) Take 1 tablet by mouth daily at 2 pm   Yes Unknown, Entered By History   calcitRIOL (ROCALTROL) 0.5 MCG capsule Take 0.5 mcg by mouth three times a week Given at Dialysis   Yes Reported, Patient   calcium acetate (CALPHRON) 667 MG TABS tablet Take 1-2 tablets by mouth 3 times daily (with meals) 2/20/24  Yes Reported, Patient   cholecalciferol (VITAMIN D3) 25 mcg (1000 units) capsule Take 1 capsule by mouth daily at 2 pm   Yes Reported, Patient   cyclobenzaprine (FLEXERIL) 5 MG tablet Take 5 mg by mouth 2 times daily as needed for muscle spasms   Yes Unknown, Entered By History   Epoetin Adam (EPOGEN IJ) Inject 1,000 Units into the vein three times a week At dialysis.   Yes Unknown, Entered By History   glucosamine-chondroitin 500-400 MG CAPS per capsule Take 2 capsules by mouth  daily at 2 pm   Yes Unknown, Entered By History   Iron Sucrose (VENOFER IV) Inject 100 mg into the vein once a week At dialysis   Yes Unknown, Entered By History   labetalol (NORMODYNE) 200 MG tablet Take 200 mg by mouth 2 times daily 3/2/24  Yes Reported, Patient   lactulose (CHRONULAC) 10 GM/15ML solution Take 15 mLs (10 g) by mouth 2 times daily as needed for constipation 7/26/23  Yes Jarad Raymond MD   levothyroxine (SYNTHROID/LEVOTHROID) 50 MCG tablet Take 50 mcg by mouth daily   Yes Unknown, Entered By History   lidocaine HCl 3 % cream Apply topically daily as needed (pain)   Yes Unknown, Entered By History   LORazepam (ATIVAN) 1 MG tablet Take 1 tablet (1 mg) by mouth daily (before lunch) 11/14/23  Yes Alissa Lane MD   nortriptyline (PAMELOR) 75 MG capsule Take 75 mg by mouth At Bedtime 11/21/22  Yes Reported, Patient   Nutritional Supplements (NEPRO) LIQD Take 1 Can by mouth daily   Yes Unknown, Entered By History   omeprazole (PRILOSEC) 20 MG DR capsule Take 20 mg by mouth daily   Yes Unknown, Entered By History   oxyCODONE IR (ROXICODONE) 10 MG tablet Take 10 mg by mouth every 4 hours as needed for pain 3/20/24  Yes Reported, Patient   rosuvastatin (CRESTOR) 10 MG tablet Take 10 mg by mouth At Bedtime   Yes Reported, Patient   SENNA-docusate sodium (SENNA S) 8.6-50 MG tablet Take 2 tablets by mouth 2 times daily Hold for loose stools. 7/26/23  Yes Jarad Raymond MD   sertraline (ZOLOFT) 100 MG tablet Take 150 mg by mouth At Bedtime   Yes Reported, Patient              Review of Systems:   The Review of Systems is negative other than noted in the HPI            Physical Exam:   Patient Vitals for the past 24 hrs:   BP Temp Temp src Pulse Resp SpO2   03/24/24 1245 (!) 145/65 -- -- 67 20 97 %   03/24/24 1230 124/61 -- -- 64 18 95 %   03/24/24 1215 122/65 -- -- 65 18 95 %   03/24/24 1200 128/70 -- -- 66 18 94 %   03/24/24 1145 122/58 -- -- 65 19 96 %   03/24/24 1130 96/54 -- -- 65 21 95 %   03/24/24 1115  118/59 -- -- 64 20 95 %   03/24/24 1100 121/60 -- -- 66 17 98 %   03/24/24 1045 108/57 -- -- 65 17 95 %   03/24/24 1030 131/55 -- -- 73 19 96 %   03/24/24 1015 124/80 -- -- 71 20 94 %   03/24/24 1000 115/77 -- -- 78 18 --   03/24/24 0751 126/76 97.7  F (36.5  C) Oral 89 14 95 %   03/24/24 0021 130/73 97.7  F (36.5  C) Oral 104 10 94 %   03/23/24 1935 110/67 98  F (36.7  C) Oral 95 18 95 %   03/23/24 1859 -- -- -- 98 -- 94 %   03/23/24 1814 -- -- -- 94 -- 95 %   03/23/24 1812 116/75 -- -- -- -- --   03/23/24 1500 104/67 -- -- 95 -- 95 %   03/23/24 1444 103/66 -- -- 97 -- 93 %   03/23/24 1400 105/66 -- -- 95 -- 93 %   03/23/24 1323 104/64 -- -- 95 -- 94 %          Intake/Output Summary (Last 24 hours) at 3/24/2024 1252  Last data filed at 3/24/2024 0851  Gross per 24 hour   Intake 5 ml   Output --   Net 5 ml      Constitutional:   awake, alert, cooperative, no apparent distress, and appears stated age       Eyes:   PERRL, conjunctiva/corneas clear, EOM's intact; no scleral edema or icterus noted        ENT:   Normocephalic, without obvious abnormality, atraumatic, Lips, mucosa, and tongue normal        Lungs:   Normal respiratory effort, no accessory muscle use       Cardiovascular:   Regular rate and rhythm       Abdomen:   Soft, distended, tender in LLQ and left lateral abdomen; no periumbilical tenderness; no peritoneal signs       Musculoskeletal:   No obvious swelling, bruising or deformity       Skin:   Skin color and texture normal for patient, no rashes or lesions              Data:         All imaging studies reviewed by me.    Results for orders placed or performed during the hospital encounter of 03/23/24 (from the past 24 hour(s))   CBC with Platelets & Differential    Narrative    The following orders were created for panel order CBC with Platelets & Differential.  Procedure                               Abnormality         Status                     ---------                               -----------          ------                     CBC with platelets and d...[[id number]]  Abnormal            Final result                 Please view results for these tests on the individual orders.   Comprehensive metabolic panel   Result Value Ref Range    Sodium 130 (L) 135 - 145 mmol/L    Potassium 5.3 3.4 - 5.3 mmol/L    Carbon Dioxide (CO2) 29 22 - 29 mmol/L    Anion Gap 15 7 - 15 mmol/L    Urea Nitrogen 70.4 (H) 8.0 - 23.0 mg/dL    Creatinine 5.97 (H) 0.51 - 0.95 mg/dL    GFR Estimate 7 (L) >60 mL/min/1.73m2    Calcium 9.9 8.8 - 10.2 mg/dL    Chloride 86 (L) 98 - 107 mmol/L    Glucose 101 (H) 70 - 99 mg/dL    Alkaline Phosphatase 118 40 - 150 U/L     (H) 0 - 45 U/L    ALT 11 0 - 50 U/L    Protein Total 7.1 6.4 - 8.3 g/dL    Albumin 3.7 3.5 - 5.2 g/dL    Bilirubin Total 0.5 <=1.2 mg/dL   Lipase   Result Value Ref Range    Lipase 15 13 - 60 U/L   CBC with platelets and differential   Result Value Ref Range    WBC Count 11.0 4.0 - 11.0 10e3/uL    RBC Count 3.36 (L) 3.80 - 5.20 10e6/uL    Hemoglobin 10.8 (L) 11.7 - 15.7 g/dL    Hematocrit 33.6 (L) 35.0 - 47.0 %     78 - 100 fL    MCH 32.1 26.5 - 33.0 pg    MCHC 32.1 31.5 - 36.5 g/dL    RDW 15.5 (H) 10.0 - 15.0 %    Platelet Count 406 150 - 450 10e3/uL    % Neutrophils 88 %    % Lymphocytes 4 %    % Monocytes 7 %    % Eosinophils 0 %    % Basophils 0 %    % Immature Granulocytes 1 %    NRBCs per 100 WBC 0 <1 /100    Absolute Neutrophils 9.7 (H) 1.6 - 8.3 10e3/uL    Absolute Lymphocytes 0.4 (L) 0.8 - 5.3 10e3/uL    Absolute Monocytes 0.8 0.0 - 1.3 10e3/uL    Absolute Eosinophils 0.0 0.0 - 0.7 10e3/uL    Absolute Basophils 0.0 0.0 - 0.2 10e3/uL    Absolute Immature Granulocytes 0.1 <=0.4 10e3/uL    Absolute NRBCs 0.0 10e3/uL   CT Abdomen Pelvis w/o Contrast    Narrative    EXAM: CT ABDOMEN PELVIS W/O CONTRAST  LOCATION: [facility]  DATE: 3/23/2024    INDICATION: Abdominal pain.  COMPARISON: CT abdomen pelvis 07/21/2023.  TECHNIQUE: CT scan of  the abdomen and pelvis was performed without IV contrast. Multiplanar reformats were obtained. Dose reduction techniques were used.  CONTRAST: None.    FINDINGS:   LOWER CHEST: Low attenuation of the blood pool suggestive of anemia. Mild patchy opacities within the bilateral lung bases are favored atelectasis.    HEPATOBILIARY: Numerous hepatic cysts appear similar. No radiopaque gallstones.    PANCREAS: Unchanged diffuse dilatation of the main pancreatic duct.    SPLEEN: Calcified granulomas.    ADRENAL GLANDS: Difficult to visualize. No obvious adrenal mass.    KIDNEYS/BLADDER: Status post bilateral nephrectomies.    BOWEL: Difficult to assess bowel due to no significant intra-abdominal fat and absence of intravenous contrast. The colon appears decompressed with a moderate burden of stool. There are multiple dilated loops of small bowel within the left hemiabdomen   and pelvis compatible with a small bowel obstruction. The transition point is likely within the right lower quadrant but is not clearly identified.    LYMPH NODES: Diffuse mesenteric edema. There is likely trace volume abdominal ascites. Difficult to assess for adenopathy. No obvious adenopathy is identified.    VASCULATURE: Heavy burden of vascular calcification without abdominal aortic aneurysm. Mild infrarenal aortic ectasia measuring up to 2.4 cm in diameter.    PELVIC ORGANS: No pelvic masses.    MUSCULOSKELETAL: Partially imaged left hip arthroplasty. Anterior fusion hardware at L5-S1.      Impression    IMPRESSION:   1.  Findings are compatible with small bowel obstruction transition point is likely within the pelvis or right lower quadrant.  2.  Mild patchy opacities of the bilateral lung bases are favored atelectasis.  3.  Additional unchanged findings as above.   Basic metabolic panel   Result Value Ref Range    Sodium 128 (L) 135 - 145 mmol/L    Potassium 6.0 (H) 3.4 - 5.3 mmol/L    Chloride 85 (L) 98 - 107 mmol/L    Carbon Dioxide (CO2) 29  22 - 29 mmol/L    Anion Gap 14 7 - 15 mmol/L    Urea Nitrogen 78.2 (H) 8.0 - 23.0 mg/dL    Creatinine 6.68 (H) 0.51 - 0.95 mg/dL    GFR Estimate 6 (L) >60 mL/min/1.73m2    Calcium 10.2 8.8 - 10.2 mg/dL    Glucose 90 70 - 99 mg/dL   Lipase   Result Value Ref Range    Lipase 12 (L) 13 - 60 U/L        Yasmeen Gomez, APRN CNP

## 2024-03-24 NOTE — PROGRESS NOTES
Date: 3/24/2024  Time: 2:15 PM     Data:  Pre Wt:   39.4 kg (standing scale)  Desired Wt:   To be re-established - 39 kg out patient  Post Wt:  36.7 kg (standing scale)  Weight change: - 2.7 kg (actual change)  Ultrafiltration - Post Run Net Total Removed (mL):  2500 ml  Vascular Access Status: Fistula patent  Dialyzer Rinse:  Light  Total Blood Volume Processed: 58.5 L   Total Dialysis (Treatment) Time:   3 Hrs  Dialysate Bath: K 2, Ca 2.5  Heparin: Heparin: None     Lab:   Yes - HbsAg + HbsAb  HbsAg - 7/16/2023 (Non Reactive)  HbsAb - 7/16/2023 25.55 (Immune)      Interventions:  Dialysis done through Left upper arm AV Fistula using 16 gauge needles. , .  UF goal initially set to 2.3 Liters, accommodating priming and rinse back volumes (of 300 ml), then increased to 2.8 Liters per Crit Profile A and adequate blood pressure readings, no complications encountered.  No medications administered related to dialysis  See Flowsheet Crit Profile A mostly throughout the run  Treatment has ended safely and  blood is rinsed back completely  Decannulation done post HD, hemostasis is achieved in 10 minutes  Pressure dressing is applied, to be removed after 4-6 hours  Post assessment and standing weight taken, then patient is left in her room in stable condition  Report given to Magaly BARRIENTOS RN.     Assessment:  A/O x 4, calm and cooperative, reports abdominal discomfort  Left upper arm AV Fistula with visible aneurysm, has good thrill and bruit                Plan:    Per Renal team        MARQUES RomeoN, RN  Acute Dialysis RN  [facility]

## 2024-03-24 NOTE — PLAN OF CARE
Pt is having diaysis today. Pt still NPO see notes. PIV is  saline locked. Abdomen is distended, bowel sounds are present however not passing gas per pt. SBA. Makes needs known.   Magaly Zamudio, RN on 3/24/2024 at 1:21 PM   Problem: Intestinal Obstruction  Goal: Optimal Bowel Function  Outcome: Progressing  Goal: Fluid and Electrolyte Balance  Outcome: Progressing  Goal: Absence of Infection Signs and Symptoms  Outcome: Progressing  Goal: Optimize Nutrition Status  Outcome: Progressing  Goal: Optimal Pain Control and Function  Outcome: Progressing     Problem: Hemodialysis  Goal: Safe, Effective Therapy Delivery  Outcome: Progressing  Intervention: Optimize Device Care and Function  Recent Flowsheet Documentation  Taken 3/24/2024 0900 by Magaly Zamudio, RN  Medication Review/Management: medications reviewed  Goal: Effective Tissue Perfusion  Outcome: Progressing  Goal: Absence of Infection Signs and Symptoms  Outcome: Progressing   Goal Outcome Evaluation:

## 2024-03-24 NOTE — CONSULTS
RENAL CONSULT NOTE    REQUESTING PHYSICIAN: Dr Rosado     REASON FOR CONSULT: ESRD on HD    ASSESSMENT/PLAN:  ESRD: 2/2 polycystic kidney disease s/p bilateral nephrectomies.  On in center hemodialysis [facility].  Runs on a TTS schedule.  She runs 3 hours.  Has a left upper extremity aVF.  Missed outpatient HD yesterday due to hospital admit, running off schedule today.  Will convert to TTS schedule ongoing.    Hyperkalemia: Moderate.  Potassium 6.0 today with extra day between dialysis treatments.  K2 with HD.       Hypothyroidism: On replacement     Anemia of chronic disease: Receives PETE and parenteral iron with outpatient dialysis. Hemoglobin 10.8.  Follow need to dose PETE while inpatient.     Hyponatremia: Secondary to ESRD.  UF with HD.     Hyperlipidemia: Statin     Secondary renal hyperparathyroidism: Sevelamer 3 times daily AC when patient is no longer NPO.     History of hypertension: On labetalol historically.  Blood pressures have been running on the softer side and patient has not been taking labetalol.  Continue to hold labetalol inpatient if systolic blood pressure remains <130 mmHg     SBO, nausea, abdominal pain: NPO.  Surgery consulted.  Management per primary team and surgery.    HPI:   Serene is a 67-year-old female past medical history of polycystic kidney disease s/p bilateral nephrectomies, end-stage renal disease on insulin hemodialysis TTS schedule [facility], anemia of chronic disease, hypertension, hypothyroidism, and hyperlipidemia.     Serene has been experiencing abdominal pain and bloating progressively worsening over the past 1-2 weeks  She has associated poor intakes  Had some intermittent vomiting  Has been drinking Nepro protein shakes daily  Small BM 2 nights ago, fairly liquidy  Has been attending dialysis per usual schedule, but then missed treatment yesterday due to hospital admission  No access concerns  Denies shortness of breath  Blood  pressures that been running soft over the past couple weeks, she has not been taking her labetalol  No blood pressure readings >140 mmHg  Patient was seen on dialysis today  UF goal set 2.8L  Profile A  Tolerating UF with adequate blood pressure    REVIEW OF SYSTEMS:  Complete 12 point review of systems was negative other than those noted in the HPI      Past Medical History:   Diagnosis Date    Anemia in chronic kidney disease     Anxiety     Arthritis     Brain aneurysm     Cavernous segment of the right & left carotid arteries. See Neurosurg note 6/16/21.    Chronic low back pain     Managed by Pain Clinic    Depressive disorder 2013    Disease of liver 8/29/2023    Disease of thyroid gland     ESRD (end stage renal disease) on dialysis (H) 07/2020    GERD (gastroesophageal reflux disease)     Hepatic lesion     Hyperlipidemia     Hypertension     Nephrolithiasis     Neuromuscular disorder (H)     Osteoporosis     PKD (polycystic kidney disease) 09/01/1990    Primary generalized (osteo)arthritis 8/2/2023    PTSD (post-traumatic stress disorder)     Tobacco abuse     Vitamin D deficiency        No current facility-administered medications on file prior to encounter.  artificial saliva (BIOTENE MT) SOLN solution, Swish and spit 2 sprays in mouth as needed for dry mouth  B Complex-C (SUPER B COMPLEX PO), Take 1 tablet by mouth daily at 2 pm  calcitRIOL (ROCALTROL) 0.5 MCG capsule, Take 0.5 mcg by mouth three times a week Given at Dialysis  calcium acetate (CALPHRON) 667 MG TABS tablet, Take 1-2 tablets by mouth 3 times daily (with meals)  cholecalciferol (VITAMIN D3) 25 mcg (1000 units) capsule, Take 1 capsule by mouth daily at 2 pm  cyclobenzaprine (FLEXERIL) 5 MG tablet, Take 5 mg by mouth 2 times daily as needed for muscle spasms  Epoetin Adam (EPOGEN IJ), Inject 1,000 Units into the vein three times a week At dialysis.  glucosamine-chondroitin 500-400 MG CAPS per capsule, Take 2 capsules by mouth daily at 2  pm  Iron Sucrose (VENOFER IV), Inject 100 mg into the vein once a week At dialysis  labetalol (NORMODYNE) 200 MG tablet, Take 200 mg by mouth 2 times daily  lactulose (CHRONULAC) 10 GM/15ML solution, Take 15 mLs (10 g) by mouth 2 times daily as needed for constipation  levothyroxine (SYNTHROID/LEVOTHROID) 50 MCG tablet, Take 50 mcg by mouth daily  lidocaine HCl 3 % cream, Apply topically daily as needed (pain)  LORazepam (ATIVAN) 1 MG tablet, Take 1 tablet (1 mg) by mouth daily (before lunch)  nortriptyline (PAMELOR) 75 MG capsule, Take 75 mg by mouth At Bedtime  Nutritional Supplements (NEPRO) LIQD, Take 1 Can by mouth daily  omeprazole (PRILOSEC) 20 MG DR capsule, Take 20 mg by mouth daily  oxyCODONE IR (ROXICODONE) 10 MG tablet, Take 10 mg by mouth every 4 hours as needed for pain  rosuvastatin (CRESTOR) 10 MG tablet, Take 10 mg by mouth At Bedtime  SENNA-docusate sodium (SENNA S) 8.6-50 MG tablet, Take 2 tablets by mouth 2 times daily Hold for loose stools.  sertraline (ZOLOFT) 100 MG tablet, Take 150 mg by mouth At Bedtime        No current outpatient medications on file.      ALLERGIES/SENSITIVITIES:  Allergies   Allergen Reactions    Penicillins Other (See Comments) and Shortness Of Breath     Breathing problem.  breathing      No Clinical Screening - See Comments      PN: LW CM1: Contrast Intercapsular - Nonionic Reaction :    Nsaids Other (See Comments)     Kidney damage    Carvedilol GI Disturbance     Nausea and vomiting    Ciprofloxacin Muscle Pain (Myalgia)    Floxacillin (Flucloxacillin) Muscle Pain (Myalgia) and Rash    Levofloxacin Muscle Pain (Myalgia) and Rash    Morphine Nausea and Vomiting    Sulfa Antibiotics Itching     Social History     Tobacco Use    Smoking status: Unknown    Smokeless tobacco: Never   Substance Use Topics    Alcohol use: Yes     Comment: occasional    Drug use: Yes     Types: Marijuana     I have reviewed this patient's family history and updated it with pertinent  information if needed.  Family History   Problem Relation Age of Onset    Polycystic Kidney Diease Mother     Hypertension Mother     Kidney Disease Mother     Cerebrovascular Disease Mother     Chronic Obstructive Pulmonary Disease Father     Multiple Sclerosis Father     Polycystic Kidney Diease Sister     Cardiac Sudden Death Sister 52    Hypertension Sister     Kidney Disease Sister     Polycystic Kidney Diease Maternal Grandmother     Hypertension Maternal Grandmother     Kidney Disease Maternal Grandmother     Cerebrovascular Disease Maternal Grandmother     Heart Disease Maternal Grandfather     Hyperlipidemia Paternal Grandmother     Cerebrovascular Disease Paternal Grandmother     Coronary Artery Disease Paternal Grandfather     Pulmonary Embolism Paternal Grandfather     Anesthesia Reaction No family hx of          PHYSICAL EXAM:  Physical Exam   Temp: 97.7  F (36.5  C) Temp src: Oral BP: 108/57 Pulse: 65   Resp: 17 SpO2: 95 % O2 Device: None (Room air)    Vitals:    03/23/24 1202   Weight: 36.3 kg (80 lb)     Vital Signs with Ranges  Temp:  [97.7  F (36.5  C)-98.4  F (36.9  C)] 97.7  F (36.5  C)  Pulse:  [] 65  Resp:  [10-20] 17  BP: (103-131)/(55-80) 108/57  SpO2:  [93 %-100 %] 95 %  No intake/output data recorded.    Patient Vitals for the past 72 hrs:   Weight   03/23/24 1202 36.3 kg (80 lb)     General: Alert, NAD  HENT: Supple, Non-tender, no obvious JVD  Eyes: No scleral icterus  Cardiovascular: RRR, no rub, gallop, or murmur.   Extremities: Trace edema bilateral ankles   Respiratory: CTAB, non-labored  Gastrointestinal: Abdomen is tender, nondistended.  Musculoskeletal: Grossly normal   Integumentary: Warm, dry, no rash  Neurologic: Non focal   Psychiatric: Cooperative  Access: Left upper AVF, aneurysmal expansion, good thrill and bruit  Laboratory:     Recent Labs   Lab 03/23/24  1321   WBC 11.0   RBC 3.36*   HGB 10.8*   HCT 33.6*          Basic Metabolic Panel:  Recent Labs   Lab  03/24/24  0455 03/23/24  1321   * 130*   POTASSIUM 6.0* 5.3   CHLORIDE 85* 86*   CO2 29 29   BUN 78.2* 70.4*   CR 6.68* 5.97*   GLC 90 101*   GAVIN 10.2 9.9       INRNo lab results found in last 7 days.    Recent Labs   Lab Test 03/24/24  0455 03/23/24  1321   POTASSIUM 6.0* 5.3   CHLORIDE 85* 86*   BUN 78.2* 70.4*      Recent Labs   Lab Test 03/23/24  1321 03/15/24  1230 08/18/22  1731 05/19/21  1520   ALBUMIN 3.7 3.8   < >  --    BILITOTAL 0.5 0.3   < >  --    ALT 11 17   < >  --    * 25   < >  --    PROTEIN  --   --   --  100*    < > = values in this interval not displayed.       Personally reviewed today's laboratory studies    This note was dictated using voice recognition       Thank you for involving us in the care of this patient. We will continue to follow along with you.      Ever Hayes PA-C  [facility]  [phone]

## 2024-03-24 NOTE — PLAN OF CARE
Goal Outcome Evaluation:      Problem: Intestinal Obstruction  Goal: Optimal Bowel Function  Outcome: Progressing  Intervention: Promote Bowel Function  Recent Flowsheet Documentation  Taken 3/24/2024 0021 by Toya Lucas RN  Body Position: position changed independently  Head of Bed (HOB) Positioning: HOB at 20-30 degrees  Positioning/Transfer Devices:   pillows   in use     Problem: Intestinal Obstruction  Goal: Optimize Nutrition Status  Outcome: Progressing     Problem: Intestinal Obstruction  Goal: Optimal Pain Control and Function  Outcome: Progressing   Patient is drowsy. Oriented x4. Arouses to voice. Pain is managed with PRN medications, see MAR for details. Bowel sounds are active. Abdomen in distended. Pt reports some abdominal fullness. Fistula on LUE. Patient is anuric. NPO except for ice chips and medications. Patient ambulates with standby assist. Bed alarm activated for patient safety.

## 2024-03-24 NOTE — PLAN OF CARE
Problem: Adult Inpatient Plan of Care  Goal: Absence of Hospital-Acquired Illness or Injury  Intervention: Identify and Manage Fall Risk  Recent Flowsheet Documentation  Taken 3/23/2024 1930 by Shivani Geramin, RN  Safety Promotion/Fall Prevention:   activity supervised   clutter free environment maintained   increased rounding and observation   increase visualization of patient   lighting adjusted   nonskid shoes/slippers when out of bed   patient and family education   room door open   room organization consistent   safety round/check completed     Pt admitted from ED for SBO at 1930. A&Ox4. VSS on RA. Albumin given tonight, plan for dialysis tomorrow AM. Fistula to LUE. Up with sba in room. Pain managed with PRN IV dilaudid. NPO with ice chips. Pt denies nausea this evening. General surgery consult pending.

## 2024-03-24 NOTE — ED NOTES
EMERGENCY DEPARTMENT SIGN OUT NOTE        ED COURSE AND MEDICAL DECISION MAKING  Patient was signed out to me by Dr Aravind Fortune at 1545    In brief, Serene Tipton is a 68 year old female who initially presented generalized abdominal pain and distention    At time of sign out, disposition was pending discussion with hospitalist team about small bowel obstruction seen on CT scan.    I discussed with resident service who agrees to meet the patient at this point in time for ongoing management of small bowel obstruction.    FINAL IMPRESSION    No diagnosis found.    ED MEDS  Medications   lidocaine 1 % 0.1-1 mL (has no administration in time range)   lidocaine (LMX4) cream (has no administration in time range)   sodium chloride (PF) 0.9% PF flush 3 mL (3 mLs Intracatheter $Given 3/23/24 1845)   sodium chloride (PF) 0.9% PF flush 3 mL (has no administration in time range)   calcium carbonate (TUMS) chewable tablet 1,000 mg (has no administration in time range)   acetaminophen (TYLENOL) tablet 650 mg (650 mg Oral Not Given 3/23/24 1816)     Or   acetaminophen (TYLENOL) Suppository 650 mg ( Rectal See Alternative 3/23/24 1816)   melatonin tablet 1 mg (has no administration in time range)   ondansetron (ZOFRAN ODT) ODT tab 4 mg (has no administration in time range)     Or   ondansetron (ZOFRAN) injection 4 mg (has no administration in time range)   prochlorperazine (COMPAZINE) injection 5 mg (has no administration in time range)     Or   prochlorperazine (COMPAZINE) tablet 5 mg (has no administration in time range)     Or   prochlorperazine (COMPAZINE) suppository 12.5 mg (has no administration in time range)   HYDROmorphone (PF) (DILAUDID) injection 0.2 mg (0.2 mg Intravenous $Given 3/23/24 2130)   HYDROmorphone (PF) (DILAUDID) injection 0.4 mg (has no administration in time range)   artificial saliva (BIOTENE MT) solution 2 spray (has no administration in time range)   rosuvastatin (CRESTOR) tablet 10 mg (has no  administration in time range)   sertraline (ZOLOFT) tablet 150 mg (150 mg Oral $Given 3/23/24 2134)   Vitamin D3 (CHOLECALCIFEROL) tablet 25 mcg (has no administration in time range)   nortriptyline (PAMELOR) capsule 75 mg (75 mg Oral $Given 3/23/24 2136)   calcitRIOL (ROCALTROL) capsule 0.5 mcg (has no administration in time range)   cyclobenzaprine (FLEXERIL) tablet 5 mg (has no administration in time range)   calcium acetate (PHOSLO) capsule 667-1,334 mg ( Oral Not Given 3/23/24 1851)   oxyCODONE (ROXICODONE) tablet 10 mg (10 mg Oral $Given 3/23/24 1816)   levothyroxine (SYNTHROID/LEVOTHROID) tablet 50 mcg (50 mcg Oral $Given 3/23/24 2134)   lidocaine (XYLOCAINE) 5 % ointment (has no administration in time range)   labetalol (NORMODYNE) tablet 200 mg ( Oral Automatically Held 3/29/24 2000)   lactulose (CHRONULAC) solution 10 g ( Oral Held by provider 3/23/24 1730)   pantoprazole (PROTONIX) EC tablet 40 mg (40 mg Oral $Given 3/23/24 2135)   multivitamin RENAL (RENAVITE RX/NEPHROVITE) tablet 1 tablet (has no administration in time range)   traZODone (DESYREL) half-tab 25 mg ( Oral See Alternative 3/23/24 2135)     Or   traZODone (DESYREL) tablet 50 mg (50 mg Oral $Given 3/23/24 2135)   sodium chloride 0.9 % infusion ( Intravenous Not Given 3/23/24 1828)   LORazepam (ATIVAN) tablet 1 mg (1 mg Oral $Given 3/23/24 1816)   Enema Compound (docusate/mineral oil/NaPhos) NO MAG CIT PREMIX (226 mLs Rectal Not Given 3/23/24 1528)   albumin human 25 % injection 25 g (25 g Intravenous $New Bag 3/23/24 1934)       LAB  Labs Ordered and Resulted from Time of ED Arrival to Time of ED Departure   COMPREHENSIVE METABOLIC PANEL - Abnormal       Result Value    Sodium 130 (*)     Potassium 5.3      Carbon Dioxide (CO2) 29      Anion Gap 15      Urea Nitrogen 70.4 (*)     Creatinine 5.97 (*)     GFR Estimate 7 (*)     Calcium 9.9      Chloride 86 (*)     Glucose 101 (*)     Alkaline Phosphatase 118       (*)     ALT 11       Protein Total 7.1      Albumin 3.7      Bilirubin Total 0.5     CBC WITH PLATELETS AND DIFFERENTIAL - Abnormal    WBC Count 11.0      RBC Count 3.36 (*)     Hemoglobin 10.8 (*)     Hematocrit 33.6 (*)           MCH 32.1      MCHC 32.1      RDW 15.5 (*)     Platelet Count 406      % Neutrophils 88      % Lymphocytes 4      % Monocytes 7      % Eosinophils 0      % Basophils 0      % Immature Granulocytes 1      NRBCs per 100 WBC 0      Absolute Neutrophils 9.7 (*)     Absolute Lymphocytes 0.4 (*)     Absolute Monocytes 0.8      Absolute Eosinophils 0.0      Absolute Basophils 0.0      Absolute Immature Granulocytes 0.1      Absolute NRBCs 0.0     LIPASE - Normal    Lipase 15         EKG      RADIOLOGY    CT Abdomen Pelvis w/o Contrast   Final Result   IMPRESSION:    1.  Findings are compatible with small bowel obstruction transition point is likely within the pelvis or right lower quadrant.   2.  Mild patchy opacities of the bilateral lung bases are favored atelectasis.   3.  Additional unchanged findings as above.          DISCHARGE MEDS  Current Discharge Medication List          Jaylan Alexis MD  [address] [address] [phone]       Jaylan Alexis MD  03/24/24 0016

## 2024-03-24 NOTE — PROGRESS NOTES
[facility]    Progress Note - Hospitalist Service       Date of Admission:  3/23/2024    Assessment & Plan   Serene Tipton is a 68 year old female admitted on 3/23/2024. She has a history of PCKD s/p bilateral nephrectomy on TTS hemodialysis and past SBO admitted for a 1-2 week history of abdominal pain and bloating. CT in ED demonstrated SBO. She was admitted with a similar presentation in July 2023 which was complicated by readmission with adynamic ileus after apparent resolution of the SBO.      SBO  CT demonstrating SBO with transition point. Also heavy stool burden concerning for obstipation. Difficult NG placement in past so no placement yet.   Surgery consulted, appreciate recs  Enema followed by mag citrate  NPO   If unsuccessful, may need NG and gastrograffin  Hold PTA lactulose  Hot and cold packs  Tylenol for mild pain as needed  PTA oxycodone 10 mg every 4 hours as needed for moderate pain  IV Dilaudid for breakthrough pain  Zofran and Compazine for nausea     ESRD  Anemia of chronic renal disease  Polycystic kidney disease s/p bilateral nephrectomy. Dialysis TThS but missed day of admission.  She takes EPO 3 times a week, at dialysis sessions.  Nephrology consulted, appreciate recs  Dialysis initiated 3/24  EPO not ordered at admission, will defer to nephrology  Continue PTA vitamin/mineral supplements  Venofer     Bilateral shoulder osteoarthritis  Chronic.  Has tried lidocaine cream and IcyHot and reports some relief with each of these.  PTA lidocaine cream  Consider Voltaren, low potential for harm as she is anephric     Heart murmur  3/6 systolic murmur best heard at apex.  Most likely due to dialysis fistula.  She does report occasional shortness of breath and pedal edema however these are also consistent with known ESRD.  Today she denies orthopnea, PND, or shortness of breath.  Last echo in 2021 showed no significant valvular disease but did demonstrate trace  "mitral and tricuspid insufficiency.   Noted     Insomnia  Appears to be chronic.  Says melatonin ineffective generally.  Has not tried trazodone.  States that Ambien works but \"one morning I woke up and found my iPad in my microwave.  I live alone.\"  Melatonin as needed  Trazodone as needed     Hypertension  Soft blood pressures at admission. However now normal. Can resume.   PTA labetalol     Chronic problems  Hypothyroidism: PTA Synthroid  Anxiety: PTA Ativan  Chronic pain: PTA nortriptyline, oxycodone, Flexeril  Hyperlipidemia: PTA rosuvastatin  Depression: PTA sertraline  GERD: Substitute pantoprazole for PTA omeprazole  INDIGO: History per chart review.  CPAP ordered.        Diet: NPO for Medical/Clinical Reasons Except for: Meds, Ice Chips    DVT Prophylaxis: Low Risk/Ambulatory with no VTE prophylaxis indicated  Pool Catheter: Not present  Fluids: PO   Lines: None     Cardiac Monitoring: None  Code Status: Full Code      Disposition Plan     Expected Discharge Date: 03/25/2024                The patient's care was discussed with the Attending Physician, Dr. Danii Escobedo .    Jeremías Saenz MD  Hospitalist Service  [facility]  Securely message with [employer] info)  Text page via [employer] Paging/Directory   ______________________________________________________________________    Interval History   Doing well this morning. States that she has not had a BM since day before admission. Had some nausea overnight however no abdominal pain. No chest pain, palpitations, shortness of breath.     Physical Exam   Vital Signs: Temp: 97.7  F (36.5  C) Temp src: Oral BP: 137/65 Pulse: 65   Resp: 19 SpO2: 96 % O2 Device: None (Room air)    Weight: 80 lbs 0 oz    Constitutional: awake, alert, cooperative, no apparent distress, thin.   Respiratory: No increased work of breathing, good air exchange, clear to auscultation bilaterally, no crackles or wheezing  Cardiovascular: appreciable thrill on CV " exam  GI: midline abdominal scar; bowel sounds present; some mild epigastric abd tenderness on initial palpitation; non reproducible. No acute peritonitis.       Data     I have personally reviewed the following data over the past 24 hrs:    11.0  \   10.8 (L)   / 406     128 (L) 85 (L) 78.2 (H) /  90   6.0 (H) 29 6.68 (H) \     ALT: 11 AST: 105 (H) AP: 118 TBILI: 0.5   ALB: 3.7 TOT PROTEIN: 7.1 LIPASE: 12 (L)       Imaging results reviewed over the past 24 hrs:   Recent Results (from the past 24 hour(s))   CT Abdomen Pelvis w/o Contrast    Narrative    EXAM: CT ABDOMEN PELVIS W/O CONTRAST  LOCATION: [facility]  DATE: 3/23/2024    INDICATION: Abdominal pain.  COMPARISON: CT abdomen pelvis 07/21/2023.  TECHNIQUE: CT scan of the abdomen and pelvis was performed without IV contrast. Multiplanar reformats were obtained. Dose reduction techniques were used.  CONTRAST: None.    FINDINGS:   LOWER CHEST: Low attenuation of the blood pool suggestive of anemia. Mild patchy opacities within the bilateral lung bases are favored atelectasis.    HEPATOBILIARY: Numerous hepatic cysts appear similar. No radiopaque gallstones.    PANCREAS: Unchanged diffuse dilatation of the main pancreatic duct.    SPLEEN: Calcified granulomas.    ADRENAL GLANDS: Difficult to visualize. No obvious adrenal mass.    KIDNEYS/BLADDER: Status post bilateral nephrectomies.    BOWEL: Difficult to assess bowel due to no significant intra-abdominal fat and absence of intravenous contrast. The colon appears decompressed with a moderate burden of stool. There are multiple dilated loops of small bowel within the left hemiabdomen   and pelvis compatible with a small bowel obstruction. The transition point is likely within the right lower quadrant but is not clearly identified.    LYMPH NODES: Diffuse mesenteric edema. There is likely trace volume abdominal ascites. Difficult to assess for adenopathy. No obvious adenopathy is  identified.    VASCULATURE: Heavy burden of vascular calcification without abdominal aortic aneurysm. Mild infrarenal aortic ectasia measuring up to 2.4 cm in diameter.    PELVIC ORGANS: No pelvic masses.    MUSCULOSKELETAL: Partially imaged left hip arthroplasty. Anterior fusion hardware at L5-S1.      Impression    IMPRESSION:   1.  Findings are compatible with small bowel obstruction transition point is likely within the pelvis or right lower quadrant.  2.  Mild patchy opacities of the bilateral lung bases are favored atelectasis.  3.  Additional unchanged findings as above.

## 2024-03-24 NOTE — CONSULTS
Care Management Initial Consult    General Information  Assessment completed with: Patient,    Type of CM/SW Visit: Initial Assessment    Primary Care Provider verified and updated as needed:     Readmission within the last 30 days:        Reason for Consult: discharge planning  Advance Care Planning:            Communication Assessment  Patient's communication style: spoken language (English or Bilingual)    Hearing Difficulty or Deaf: no   Wear Glasses or Blind: no    Cognitive  Cognitive/Neuro/Behavioral: .WDL except  Level of Consciousness: alert  Arousal Level: opens eyes spontaneously  Orientation: oriented x 4  Mood/Behavior: calm, cooperative  Best Language: 0 - No aphasia  Speech: clear, logical    Living Environment:   People in home: alone     Current living Arrangements: apartment      Able to return to prior arrangements:         Family/Social Support:  Care provided by: self  Provides care for:                  Description of Support System:           Current Resources:   Patient receiving home care services:       Community Resources:    Equipment currently used at home: none  Supplies currently used at home:      Employment/Financial:  Employment Status:          Financial Concerns:             Does the patient's insurance plan have a 3 day qualifying hospital stay waiver?  Yes     Which insurance plan 3 day waiver is available? Alternative insurance waiver    Will the waiver be used for post-acute placement? Undetermined at this time                    Additional Information:  Initial assessment completed with pt, pt lives in an apartment alone with her 2 birds.  Pt is independent at baseline.  Pt goes to dialysis at [facility] on Tues, Thurs and Sat.  Pt plans to return home at discharge, denies any needs at this time.  Pt plans to have a friend transport home.    EMILIE Alonzo

## 2024-03-25 NOTE — PROGRESS NOTES
RENAL PROGRESS NOTE        ASSESSMENT & PLAN:     ESRD: 2/2 PKDs/p bilateral nephrectomies.  On in center HD @  [facility].  Runs on a TTS schedule.  She runs 3 hours.  Has a left upper extremity aVF. TTS HD while here.      Hyperkalemia: Moderate.  Potassium 6.0 today with extra day between dialysis treatments.  K2 with HD.       Hypothyroidism: On replacement     ACD: Receives PETE and parenteral iron with outpatient dialysis. Hemoglobin 10.8.  Follow need to dose PETE while inpatient.     Hyponatremia: Secondary to ESRD.  UF with HD.     HLD: Statin     Secondary renal hyperparathyroidism: Sevelamer 3 times daily AC when patient is no longer NPO.     H/O HTN: On labetalol historically.  Blood pressures have been running on the softer side and patient has not been taking labetalol.  Continue to hold labetalol inpatient if systolic blood pressure remains <130 mmHg     SBO, nausea, abdominal pain: NPO.  Surgery consulted.  Management per primary team and surgery.    SUBJECTIVE:    Pt appears comfortable  Sitting up at edge of bed eating jello  Pt has no complaints today.   She reports BM, tells me constipation is better  She is overall feeling much better  VSS, wt stable  2.4L UF with HD yesterday, tolerated therapy well.   Will resume TTS HD schedule while here.   Discussed labs/plan and answered all questions    OBJECTIVE:  Physical Exam   Temp: 98.2  F (36.8  C) Temp src: Oral BP: 130/70 Pulse: 95   Resp: 16 SpO2: 94 % O2 Device: None (Room air)    Vitals:    03/24/24 1000 03/24/24 1350 03/25/24 0500   Weight: 39.4 kg (86 lb 13.8 oz) (P) 36.7 kg (80 lb 14.5 oz) 36 kg (79 lb 5.9 oz)     Vital Signs with Ranges  Temp:  [97.3  F (36.3  C)-98.2  F (36.8  C)] 98.2  F (36.8  C)  Pulse:  [] 95  Resp:  [14-21] 16  BP: ()/(54-80) 130/70  SpO2:  [94 %-98 %] 94 %  I/O last 3 completed shifts:  In: 5 [I.V.:5]  Out: 2.5 [Other:2.5]      Patient Vitals for the past 72 hrs:   Weight   03/25/24 0500 36 kg  (79 lb 5.9 oz)   03/24/24 1350 (P) 36.7 kg (80 lb 14.5 oz)   03/24/24 1000 39.4 kg (86 lb 13.8 oz)   03/23/24 1202 36.3 kg (80 lb)     Intake/Output Summary (Last 24 hours) at 3/25/2024 0946  Last data filed at 3/24/2024 1319  Gross per 24 hour   Intake 0 ml   Output 2.5 ml   Net -2.5 ml       PHYSICAL EXAM:  GEN: NAD  CV: RRR, no JVD  Lung: clear and equal; no extra sounds  Ab: soft and NT; not distended; normal bs  Ext: + trace LLE BL and well perfused  Skin: no rash      LABORATORY STUDIES:     Recent Labs   Lab 03/23/24  1321   WBC 11.0   RBC 3.36*   HGB 10.8*   HCT 33.6*          Basic Metabolic Panel:  Recent Labs   Lab 03/25/24  0804 03/24/24  0455 03/23/24  1321    128* 130*   POTASSIUM 5.0 6.0* 5.3   CHLORIDE 92* 85* 86*   CO2 29 29 29   BUN  --  78.2* 70.4*   CR  --  6.68* 5.97*   GLC  --  90 101*   GAVIN  --  10.2 9.9       INRNo lab results found in last 7 days.     Recent Labs   Lab Test 03/23/24  1321 03/15/24  1230 11/12/23  0757 11/11/23  0450 11/09/23  2324 07/14/23  0503 07/13/23  1239 07/11/23  1131   INR  --   --   --   --   --   --  1.70* 1.40*   WBC 11.0 11.4*  --   --  8.8   < > 13.0* 11.5*   HGB 10.8*  --  9.9*   < > 11.0*   < > 8.4* 9.0*     --   --   --  290   < > 320 288    < > = values in this interval not displayed.       Personally reviewed current labs    Mitzi PALMA-BC  [facility]  [phone]

## 2024-03-25 NOTE — PROGRESS NOTES
General Surgery Progress Note:    Hospital Day # 2    ASSESSMENT:   1. Small bowel obstruction (H)        Serene Tipton is a 68 year old female after patient versus SBO.  Patient is showing improvement in her symptoms.  She is passing flatus and having bowel movements.  No nausea no vomiting.    PLAN:   -Advancing her to full liquid diet.  If the patient can continue advancing, she can be discharged when she is medically stable.  -I have educated patient on continuing with stool softeners then laxatives if needed given her history of obstipation.  -Continue medical management per primary team.      SUBJECTIVE:   Serene Tipton was seen on rounds.  Patient states that she is doing a lot better.  No nausea no vomiting.  Passing flatus and had 1 bowel movement.  She has been tolerating clear liquid diet with no issues.      Patient Vitals for the past 24 hrs:   BP Temp Temp src Pulse Resp SpO2 Weight   03/25/24 0829 130/70 98.2  F (36.8  C) Oral 95 16 94 % --   03/25/24 0500 119/72 97.9  F (36.6  C) Oral 100 16 95 % 36 kg (79 lb 5.9 oz)   03/24/24 2349 124/62 97.6  F (36.4  C) Axillary 86 16 97 % --   03/24/24 2031 118/70 -- -- 86 -- -- --   03/24/24 1937 108/68 97.3  F (36.3  C) Oral 79 16 95 % --   03/24/24 1539 130/75 98  F (36.7  C) Oral 77 14 94 % --   03/24/24 1350 -- -- -- -- -- -- (P) 36.7 kg (80 lb 14.5 oz)   03/24/24 1319 138/72 -- -- 74 20 95 % --   03/24/24 1315 112/56 -- -- 67 18 96 % --   03/24/24 1300 137/65 -- -- 65 19 96 % --   03/24/24 1245 (!) 145/65 -- -- 67 20 97 % --   03/24/24 1230 124/61 -- -- 64 18 95 % --   03/24/24 1215 122/65 -- -- 65 18 95 % --   03/24/24 1200 128/70 -- -- 66 18 94 % --   03/24/24 1145 122/58 -- -- 65 19 96 % --   03/24/24 1130 96/54 -- -- 65 21 95 % --   03/24/24 1115 118/59 -- -- 64 20 95 % --   03/24/24 1100 121/60 -- -- 66 17 98 % --   03/24/24 1045 108/57 -- -- 65 17 95 % --   03/24/24 1030 131/55 -- -- 73 19 96 % --       Physical Exam:  General: NAD,  pleasant  CV:RRR  LUNGS:Normal respiratory effort, no accessory muscle use  ABD: Abdomen soft, nondistended, nontender to palpation.  EXT:no CCE    Admission on 03/23/2024   Component Date Value    Sodium 03/23/2024 130 (L)     Potassium 03/23/2024 5.3     Carbon Dioxide (CO2) 03/23/2024 29     Anion Gap 03/23/2024 15     Urea Nitrogen 03/23/2024 70.4 (H)     Creatinine 03/23/2024 5.97 (H)     GFR Estimate 03/23/2024 7 (L)     Calcium 03/23/2024 9.9     Chloride 03/23/2024 86 (L)     Glucose 03/23/2024 101 (H)     Alkaline Phosphatase 03/23/2024 118     AST 03/23/2024 105 (H)     ALT 03/23/2024 11     Protein Total 03/23/2024 7.1     Albumin 03/23/2024 3.7     Bilirubin Total 03/23/2024 0.5     Lipase 03/23/2024 15     WBC Count 03/23/2024 11.0     RBC Count 03/23/2024 3.36 (L)     Hemoglobin 03/23/2024 10.8 (L)     Hematocrit 03/23/2024 33.6 (L)     MCV 03/23/2024 100     MCH 03/23/2024 32.1     MCHC 03/23/2024 32.1     RDW 03/23/2024 15.5 (H)     Platelet Count 03/23/2024 406     % Neutrophils 03/23/2024 88     % Lymphocytes 03/23/2024 4     % Monocytes 03/23/2024 7     % Eosinophils 03/23/2024 0     % Basophils 03/23/2024 0     % Immature Granulocytes 03/23/2024 1     NRBCs per 100 WBC 03/23/2024 0     Absolute Neutrophils 03/23/2024 9.7 (H)     Absolute Lymphocytes 03/23/2024 0.4 (L)     Absolute Monocytes 03/23/2024 0.8     Absolute Eosinophils 03/23/2024 0.0     Absolute Basophils 03/23/2024 0.0     Absolute Immature Granul* 03/23/2024 0.1     Absolute NRBCs 03/23/2024 0.0     Sodium 03/24/2024 128 (L)     Potassium 03/24/2024 6.0 (H)     Chloride 03/24/2024 85 (L)     Carbon Dioxide (CO2) 03/24/2024 29     Anion Gap 03/24/2024 14     Urea Nitrogen 03/24/2024 78.2 (H)     Creatinine 03/24/2024 6.68 (H)     GFR Estimate 03/24/2024 6 (L)     Calcium 03/24/2024 10.2     Glucose 03/24/2024 90     Hepatitis B Surface Anti* 03/24/2024 Reactive     Hepatitis B Surface Anti* 03/24/2024 70.50     Hepatitis B Surface  Anti* 03/24/2024 Nonreactive     Lipase 03/24/2024 12 (L)     Sodium 03/25/2024 136     Potassium 03/25/2024 5.0     Chloride 03/25/2024 92 (L)     Carbon Dioxide (CO2) 03/25/2024 29     Anion Gap 03/25/2024 15     Hold Specimen 03/25/2024 DO Jeanmarie Anderson DO  General Surgeon  [facility]  Surgery [address] [address]?  Office: [phone]  Employed by - [employer]  Pager: [phone]

## 2024-03-25 NOTE — PROGRESS NOTES
[facility]    Progress Note - Hospitalist Service       Date of Admission:  3/23/2024    Assessment & Plan   Serene Tipton is a 68 year old female admitted on 3/23/2024. She has a history of PCKD s/p bilateral nephrectomy on TTS hemodialysis and past SBO admitted for a 1-2 week history of abdominal pain and bloating. CT in ED demonstrated SBO. She was admitted with a similar presentation in July 2023 which was complicated by readmission with adynamic ileus after apparent resolution of the SBO.      SBO versus obstipation  CT demonstrating SBO with transition point. Also heavy stool burden concerning for obstipation. Difficult NG placement in past so no placement yet.  Surgery consulted and patient now status post enema plus mag citrate with improvement of stool burden.  Will advance her diet and see if she tolerates.  Surgery consulted, appreciate recs  Status post enema followed by mag citrate  Diet advance to full liquid diet; advance as tolerated  Can discharge when medically stable  Resume PTA lactulose  Hot and cold packs  Tylenol for mild pain as needed  PTA oxycodone 10 mg every 4 hours as needed for moderate pain  IV Dilaudid for breakthrough pain  Zofran and Compazine for nausea     ESRD  Anemia of chronic renal disease  Polycystic kidney disease s/p bilateral nephrectomy. Dialysis TThS but missed day of admission.  She takes EPO 3 times a week, at dialysis sessions.  Nephrology consulted, appreciate recs  Dialysis initiated 3/24  EPO not ordered at admission, will defer to nephrology  Continue PTA vitamin/mineral supplements  Venofer     Bilateral shoulder osteoarthritis  Chronic.  Has tried lidocaine cream and IcyHot and reports some relief with each of these.  PTA lidocaine cream  Consider Voltaren, low potential for harm as she is anephric  PT/OT     Heart murmur  3/6 systolic murmur best heard at apex.  Most likely due to dialysis fistula.  She does report occasional  "shortness of breath and pedal edema however these are also consistent with known ESRD.  Today she denies orthopnea, PND, or shortness of breath.  Last echo in 2021 showed no significant valvular disease but did demonstrate trace mitral and tricuspid insufficiency.   Noted     Insomnia  Appears to be chronic.    Melatonin as needed  Trazodone as needed     Hypertension  Soft blood pressures at admission. However now normal. Can resume.   PTA labetalol     Chronic problems  Hypothyroidism: PTA Synthroid  Anxiety: PTA Ativan  Chronic pain: PTA nortriptyline, oxycodone, Flexeril  Hyperlipidemia: PTA rosuvastatin  Depression: PTA sertraline  GERD: Substitute pantoprazole for PTA omeprazole  INDIGO: History per chart review.  CPAP ordered.        Diet: Full Liquid Diet    DVT Prophylaxis: Low Risk/Ambulatory with no VTE prophylaxis indicated  Pool Catheter: Not present  Fluids: PO   Lines: None     Cardiac Monitoring: None  Code Status: Full Code      Disposition Plan      Expected Discharge Date: 03/26/2024        Discharge Comments: Home pending medical clearance and advancing diet        The patient's care was discussed with the Attending Physician, Dr. Kolton Earl .    Jeremías Saenz MD  Hospitalist Service  [facility]  Securely message with [employer] (more info)  Text page via [facility] Paging/Directory   ______________________________________________________________________    Interval History   Doing well this morning.  Was able to get significant stool with enema last night.  States that she feels much better but still endorses feeling act up.  Very \"hangry\" and would like to eat this morning.  Patient denies any abdominal pain.  States that she would like to go on a walk to help her bowel movements.  No nausea.    Physical Exam   Vital Signs: Temp: 98.2  F (36.8  C) Temp src: Oral BP: 130/70 Pulse: 95   Resp: 16 SpO2: 94 % O2 Device: None (Room air)    Weight: 79 lbs 5.85 oz    Constitutional: " awake, alert, cooperative, no apparent distress, thin and frail frame.   Respiratory: No increased work of breathing, good air exchange, clear to auscultation bilaterally, no crackles or wheezing  Cardiovascular: appreciable thrill on CV exam  GI: midline abdominal scar; bowel sounds present; some mild epigastric abd tenderness on initial palpitation; non reproducible. No acute peritonitis.   MSK: Right shoulder with fluctuant mass with mild tenderness.      Data     I have personally reviewed the following data over the past 24 hrs:    N/A  \   N/A   / N/A     136 92 (L) N/A /  N/A   5.0 29 N/A \       Imaging results reviewed over the past 24 hrs:   No results found for this or any previous visit (from the past 24 hour(s)).

## 2024-03-25 NOTE — PLAN OF CARE
Problem: Adult Inpatient Plan of Care  Goal: Absence of Hospital-Acquired Illness or Injury  Intervention: Prevent Skin Injury  Recent Flowsheet Documentation  Taken 3/25/2024 1230 by Magalis Lezama RN  Body Position:   sitting up in bed   position changed independently  Taken 3/25/2024 1047 by Magalis Lezama RN  Body Position: sitting up in bed  Taken 3/25/2024 1000 by Magalis Lezama RN  Body Position: sitting up in bed  Taken 3/25/2024 0940 by Magalis Lezama RN  Body Position: supine  Taken 3/25/2024 0829 by Magalis Lezama RN  Body Position:   position changed independently   heels elevated   legs elevated  Skin Protection: adhesive use limited  Device Skin Pressure Protection: absorbent pad utilized/changed     Problem: Adult Inpatient Plan of Care  Goal: Optimal Comfort and Wellbeing  3/25/2024 1436 by Magalis Lezama RN  Outcome: Progressing  3/25/2024 1108 by Magalis Lezama RN  Outcome: Progressing  Intervention: Monitor Pain and Promote Comfort  Recent Flowsheet Documentation  Taken 3/25/2024 0829 by Magalis Lezama RN  Pain Management Interventions: declines     Problem: Intestinal Obstruction  Goal: Optimal Bowel Function  3/25/2024 1436 by Magalis Lezama RN  Outcome: Progressing  3/25/2024 1108 by Magalis Lezama RN  Outcome: Progressing  Intervention: Promote Bowel Function  Recent Flowsheet Documentation  Taken 3/25/2024 1230 by Magalis Lezama RN  Body Position:   sitting up in bed   position changed independently  Head of Bed (HOB) Positioning: HOB at 45 degrees  Positioning/Transfer Devices:   pillows   in use  Chair: Shifted weight  Taken 3/25/2024 1047 by Magalis Lezama RN  Body Position: sitting up in bed  Head of Bed (HOB) Positioning: HOB at 45 degrees  Positioning/Transfer Devices:   pillows   in use  Taken 3/25/2024 1000 by Magalis Lezama RN  Body Position: sitting up in bed  Head of Bed (HOB) Positioning: HOB at 45 degrees  Chair: Shifted weight  Taken 3/25/2024 0940 by Magalis Lezama RN  Body Position:  supine  Head of Bed (HOB) Positioning: HOB at 20-30 degrees  Positioning/Transfer Devices:   pillows   in use  Taken 3/25/2024 0829 by Magalis Lezama RN  Body Position:   position changed independently   heels elevated   legs elevated  Head of Bed (HOB) Positioning: HOB at 20-30 degrees  Positioning/Transfer Devices:   pillows   in use       Pt A&Ox4. VSS. RA. Walked with staff sometime today. Advanced to full liquid diet. Tolerable with food. Denies N/V, SOB, CP, dizziness or HA. Not passing gas today yet. BS audible. No BM this shift. Calls appropriately. Bed alarm on for pt safety.

## 2024-03-25 NOTE — PLAN OF CARE
Problem: Intestinal Obstruction  Goal: Optimal Pain Control and Function  Outcome: Progressing   Goal Outcome Evaluation:         Problem: Adult Inpatient Plan of Care  Goal: Absence of Hospital-Acquired Illness or Injury  Intervention: Prevent Infection  Recent Flowsheet Documentation  Taken 3/24/2024 1600 by Rusty Bonilla, RN  Infection Prevention:   hand hygiene promoted   rest/sleep promoted   single patient room provided   Patient alert and oriented. VSS. RA. Saline locked. Still on NPO. Abdomen distended, has bowel sounds but not passing gas. Enema done this afternoon, was able to have loose stool x 1.  PRN Oxycodone given for pain.  Call light within reach. Calls appropriately. Alarms on.

## 2024-03-25 NOTE — PLAN OF CARE
Goal Outcome Evaluation:      Problem: Intestinal Obstruction  Goal: Optimal Bowel Function  Outcome: Progressing  Intervention: Promote Bowel Function  Recent Flowsheet Documentation  Taken 3/25/2024 0009 by Toya Lucas RN  Body Position: position changed independently  Head of Bed (HOB) Positioning: HOB at 20-30 degrees  Positioning/Transfer Devices:   in use   pillows     Problem: Intestinal Obstruction  Goal: Optimize Nutrition Status  Outcome: Progressing   Patient is alert and oriented x4. Vital signs are stable. Patient is on room air. Pain is controlled with PRN pain medications. Patient's bowel sounds are active. Abdomen is distended. Denies passing gas. Patient is NPO except for meds and ice chips. Standby assist. Bed alarm activated for patient safety.

## 2024-03-26 NOTE — PROGRESS NOTES
General Surgery Progress Note:    Hospital Day # 3    ASSESSMENT:   1. Small bowel obstruction (H)        Serene Tipton is a 68 year old female after patient versus SBO.      PLAN:   -Continue with low fiber diet  -No further surgical intervention from general surgery team.  -Continue medical management per primary team.      SUBJECTIVE:   Serene Tipton was seen on rounds.  Patient states that she is doing a lot better.  No nausea no vomiting.  Passing flatus.  Patient is tolerating HD at bedside.  She has no further complaints.    Patient Vitals for the past 24 hrs:   BP Temp Temp src Pulse Resp SpO2 Weight   03/26/24 1000 125/79 -- -- 76 -- -- --   03/26/24 0945 133/69 -- -- 107 -- 92 % --   03/26/24 0930 131/78 -- -- 107 -- 93 % --   03/26/24 0915 132/63 -- -- 99 -- 92 % --   03/26/24 0900 (!) 140/67 -- -- 97 -- 91 % --   03/26/24 0845 (!) 141/87 -- -- 88 -- 93 % --   03/26/24 0830 135/79 -- -- 101 -- -- --   03/26/24 0823 130/84 -- -- 97 -- -- --   03/26/24 0815 137/89 97.6  F (36.4  C) Oral 91 -- -- --   03/26/24 0800 -- -- -- -- -- -- 38.1 kg (84 lb 1.6 oz)   03/25/24 2300 132/62 97.8  F (36.6  C) Oral 101 16 96 % --   03/25/24 2113 116/87 -- -- 98 -- -- --   03/25/24 1932 110/80 97.9  F (36.6  C) Oral 98 16 96 % --   03/25/24 1546 139/79 97.1  F (36.2  C) Oral 94 16 96 % --       Physical Exam:  General: NAD, pleasant  CV:RRR  LUNGS:Normal respiratory effort, no accessory muscle use  ABD: Abdomen soft, nondistended, nontender to palpation.  EXT:no CCE    Admission on 03/23/2024   Component Date Value    Sodium 03/23/2024 130 (L)     Potassium 03/23/2024 5.3     Carbon Dioxide (CO2) 03/23/2024 29     Anion Gap 03/23/2024 15     Urea Nitrogen 03/23/2024 70.4 (H)     Creatinine 03/23/2024 5.97 (H)     GFR Estimate 03/23/2024 7 (L)     Calcium 03/23/2024 9.9     Chloride 03/23/2024 86 (L)     Glucose 03/23/2024 101 (H)     Alkaline Phosphatase 03/23/2024 118     AST 03/23/2024 105 (H)     ALT  03/23/2024 11     Protein Total 03/23/2024 7.1     Albumin 03/23/2024 3.7     Bilirubin Total 03/23/2024 0.5     Lipase 03/23/2024 15     WBC Count 03/23/2024 11.0     RBC Count 03/23/2024 3.36 (L)     Hemoglobin 03/23/2024 10.8 (L)     Hematocrit 03/23/2024 33.6 (L)     MCV 03/23/2024 100     MCH 03/23/2024 32.1     MCHC 03/23/2024 32.1     RDW 03/23/2024 15.5 (H)     Platelet Count 03/23/2024 406     % Neutrophils 03/23/2024 88     % Lymphocytes 03/23/2024 4     % Monocytes 03/23/2024 7     % Eosinophils 03/23/2024 0     % Basophils 03/23/2024 0     % Immature Granulocytes 03/23/2024 1     NRBCs per 100 WBC 03/23/2024 0     Absolute Neutrophils 03/23/2024 9.7 (H)     Absolute Lymphocytes 03/23/2024 0.4 (L)     Absolute Monocytes 03/23/2024 0.8     Absolute Eosinophils 03/23/2024 0.0     Absolute Basophils 03/23/2024 0.0     Absolute Immature Granul* 03/23/2024 0.1     Absolute NRBCs 03/23/2024 0.0     Sodium 03/24/2024 128 (L)     Potassium 03/24/2024 6.0 (H)     Chloride 03/24/2024 85 (L)     Carbon Dioxide (CO2) 03/24/2024 29     Anion Gap 03/24/2024 14     Urea Nitrogen 03/24/2024 78.2 (H)     Creatinine 03/24/2024 6.68 (H)     GFR Estimate 03/24/2024 6 (L)     Calcium 03/24/2024 10.2     Glucose 03/24/2024 90     Hepatitis B Surface Anti* 03/24/2024 Reactive     Hepatitis B Surface Anti* 03/24/2024 70.50     Hepatitis B Surface Anti* 03/24/2024 Nonreactive     Lipase 03/24/2024 12 (L)     Sodium 03/25/2024 136     Potassium 03/25/2024 5.0     Chloride 03/25/2024 92 (L)     Carbon Dioxide (CO2) 03/25/2024 29     Anion Gap 03/25/2024 15     Hold Specimen 03/25/2024 DO Jeanmarie Anderson DO  General Surgeon  [facility]  Surgery [address] [address]?  Office: [phone]  Employed by - [facility]  Pager: [phone]

## 2024-03-26 NOTE — DISCHARGE SUMMARY
"[facility]  Discharge Summary - Medicine & Pediatrics       Date of Admission:  3/23/2024  Date of Discharge:  3/27/2024  Discharging Provider: Kolton Earl MD; Jeremías Saenz MD   Discharge Service: Hospitalist Service    Discharge Diagnoses   SBO  Obstipation     Clinically Significant Risk Factors     # Cachexia: Estimated body mass index is 16.34 kg/m  as calculated from the following:    Height as of this encounter: 1.499 m (4' 11\").    Weight as of this encounter: 36.7 kg (80 lb 14.4 oz).       Follow-ups Needed After Discharge   Follow-up Appointments     Follow-up and recommended labs and tests       Follow up with primary care provider, Ramos Lowry, within 7 days for   hospital follow- up regarding SBO vs obstipation and discuss stool   regimen.          Unresulted Labs Ordered in the Past 30 Days of this Admission       No orders found from 2/22/2024 to 3/24/2024.        These results will be followed up by PCP     Discharge Disposition   Discharged to home  Condition at discharge: Stable    Hospital Course   Serene Tipton is a 68 year old female admitted on 3/23/2024. She has a history of PCKD s/p bilateral nephrectomy on TTS hemodialysis and past SBO admitted for a 1-2 week history of abdominal pain and bloating. CT in ED demonstrated SBO. She was admitted with a similar presentation in July 2023 which was complicated by readmission with adynamic ileus after apparent resolution of the SBO. The following problems were addressed during this hospitalization:      SBO versus obstipation  CT demonstrating SBO with transition point. Also heavy stool burden concerning for obstipation. Difficult NG placement in past so no placement this hospitalization.  Surgery was consulted and had patient undergo enema plus mag citrate with improvement of stool burden. She advanced her diet as tolerated and is now stable to be discharged home.  PTA lactulose  PTA senna docusate    ESRD  Anemia of " chronic renal disease  Polycystic kidney disease s/p bilateral nephrectomy. Dialysis TThS but missed a run on the day of her admission.  She takes EPO 3 times a week, at dialysis sessions. Nephrology was consulted this admission for dialysis.  Patient will resume her regular schedule outpatient.    Consultations This Hospital Stay   NEPHROLOGY IP CONSULT  SURGERY GENERAL IP CONSULT  CARE MANAGEMENT / SOCIAL WORK IP CONSULT  PHYSICAL THERAPY ADULT IP CONSULT  OCCUPATIONAL THERAPY ADULT IP CONSULT    Code Status   Full Code       The patient was discussed with Dr. Kolton Saenz MD  [facility]  [address] [address]  Phone: [phone]  Fax: [phone]  ______________________________________________________________________    Physical Exam   Vital Signs: Temp: 97.8  F (36.6  C) Temp src: Oral BP: 98/64 Pulse: 88   Resp: 18 SpO2: 98 % O2 Device: None (Room air)    Weight: 80 lbs 14.4 oz  Constitutional: awake, alert, cooperative, no apparent distress, thin and frail frame.   Respiratory: No increased work of breathing, good air exchange, clear to auscultation bilaterally, no crackles or wheezing  Cardiovascular: appreciable thrill on CV exam  GI: midline abdominal scar; bowel sounds present; some mild epigastric abd tenderness on initial palpitation; non reproducible. No acute abdomen.  MSK: Right shoulder with fluctuant mass with mild tenderness.  Fistula in right arm.         Primary Care Physician   Ramos Lowry    Discharge Orders      Reason for your hospital stay    You were hospitalized for concerns of a bowel obstruction vs constipation/obstipation. You received an enema with improvement of your symptoms. You are now stable to be discharged home with close follow-up with your PCP to discuss strategies to prevent future SBOs.     Follow-up and recommended labs and tests     Follow up with primary care provider,  Ramos Lowry, within 7 days for hospital follow- up regarding SBO vs obstipation and discuss stool regimen.     Activity    Your activity upon discharge: activity as tolerated     Diet    Follow this diet upon discharge: Orders Placed This Encounter      Full Liquid Diet       Significant Results and Procedures   Most Recent 3 BMP's:  Recent Labs   Lab Test 03/25/24  0804 03/24/24  0455 03/23/24  1321 03/15/24  1230    128* 130* 135   POTASSIUM 5.0 6.0* 5.3 4.6   CHLORIDE 92* 85* 86* 91*   CO2 29 29 29 27   BUN  --  78.2* 70.4* 44.9*   CR  --  6.68* 5.97* 3.90*   ANIONGAP 15 14 15 17*   GAVIN  --  10.2 9.9 10.1   GLC  --  90 101* 98   ,   Results for orders placed or performed during the hospital encounter of 03/23/24   CT Abdomen Pelvis w/o Contrast    Narrative    EXAM: CT ABDOMEN PELVIS W/O CONTRAST  LOCATION: [facility]  DATE: 3/23/2024    INDICATION: Abdominal pain.  COMPARISON: CT abdomen pelvis 07/21/2023.  TECHNIQUE: CT scan of the abdomen and pelvis was performed without IV contrast. Multiplanar reformats were obtained. Dose reduction techniques were used.  CONTRAST: None.    FINDINGS:   LOWER CHEST: Low attenuation of the blood pool suggestive of anemia. Mild patchy opacities within the bilateral lung bases are favored atelectasis.    HEPATOBILIARY: Numerous hepatic cysts appear similar. No radiopaque gallstones.    PANCREAS: Unchanged diffuse dilatation of the main pancreatic duct.    SPLEEN: Calcified granulomas.    ADRENAL GLANDS: Difficult to visualize. No obvious adrenal mass.    KIDNEYS/BLADDER: Status post bilateral nephrectomies.    BOWEL: Difficult to assess bowel due to no significant intra-abdominal fat and absence of intravenous contrast. The colon appears decompressed with a moderate burden of stool. There are multiple dilated loops of small bowel within the left hemiabdomen   and pelvis compatible with a small bowel obstruction. The transition point is likely  within the right lower quadrant but is not clearly identified.    LYMPH NODES: Diffuse mesenteric edema. There is likely trace volume abdominal ascites. Difficult to assess for adenopathy. No obvious adenopathy is identified.    VASCULATURE: Heavy burden of vascular calcification without abdominal aortic aneurysm. Mild infrarenal aortic ectasia measuring up to 2.4 cm in diameter.    PELVIC ORGANS: No pelvic masses.    MUSCULOSKELETAL: Partially imaged left hip arthroplasty. Anterior fusion hardware at L5-S1.      Impression    IMPRESSION:   1.  Findings are compatible with small bowel obstruction transition point is likely within the pelvis or right lower quadrant.  2.  Mild patchy opacities of the bilateral lung bases are favored atelectasis.  3.  Additional unchanged findings as above.       Discharge Medications   Current Discharge Medication List        CONTINUE these medications which have NOT CHANGED    Details   artificial saliva (BIOTENE MT) SOLN solution Swish and spit 2 sprays in mouth as needed for dry mouth      B Complex-C (SUPER B COMPLEX PO) Take 1 tablet by mouth daily at 2 pm      calcitRIOL (ROCALTROL) 0.5 MCG capsule Take 0.5 mcg by mouth three times a week Given at Dialysis      calcium acetate (CALPHRON) 667 MG TABS tablet Take 1-2 tablets by mouth 3 times daily (with meals)      cholecalciferol (VITAMIN D3) 25 mcg (1000 units) capsule Take 1 capsule by mouth daily at 2 pm      cyclobenzaprine (FLEXERIL) 5 MG tablet Take 5 mg by mouth 2 times daily as needed for muscle spasms      Epoetin Adam (EPOGEN IJ) Inject 1,000 Units into the vein three times a week At dialysis.      glucosamine-chondroitin 500-400 MG CAPS per capsule Take 2 capsules by mouth daily at 2 pm      Iron Sucrose (VENOFER IV) Inject 100 mg into the vein once a week At dialysis      labetalol (NORMODYNE) 200 MG tablet Take 200 mg by mouth 2 times daily      lactulose (CHRONULAC) 10 GM/15ML solution Take 15 mLs (10 g) by mouth 2  times daily as needed for constipation  Qty: 900 mL, Refills: 0    Associated Diagnoses: Constipation, unspecified constipation type      levothyroxine (SYNTHROID/LEVOTHROID) 50 MCG tablet Take 50 mcg by mouth daily      lidocaine HCl 3 % cream Apply topically daily as needed (pain)      LORazepam (ATIVAN) 1 MG tablet Take 1 tablet (1 mg) by mouth daily (before lunch)  Qty: 3 tablet, Refills: 0    Associated Diagnoses: Anxiety      nortriptyline (PAMELOR) 75 MG capsule Take 75 mg by mouth At Bedtime      Nutritional Supplements (NEPRO) LIQD Take 1 Can by mouth daily      omeprazole (PRILOSEC) 20 MG DR capsule Take 20 mg by mouth daily      oxyCODONE IR (ROXICODONE) 10 MG tablet Take 10 mg by mouth every 4 hours as needed for pain      rosuvastatin (CRESTOR) 10 MG tablet Take 10 mg by mouth At Bedtime      SENNA-docusate sodium (SENNA S) 8.6-50 MG tablet Take 2 tablets by mouth 2 times daily Hold for loose stools.  Qty: 90 tablet, Refills: 0    Associated Diagnoses: Constipation, unspecified constipation type      sertraline (ZOLOFT) 100 MG tablet Take 150 mg by mouth At Bedtime           Allergies   Allergies   Allergen Reactions    Penicillins Other (See Comments) and Shortness Of Breath     Breathing problem.  breathing      No Clinical Screening - See Comments      PN: LW CM1: Contrast Intercapsular - Nonionic Reaction :    Nsaids Other (See Comments)     Kidney damage    Carvedilol GI Disturbance     Nausea and vomiting    Ciprofloxacin Muscle Pain (Myalgia)    Floxacillin (Flucloxacillin) Muscle Pain (Myalgia) and Rash    Levofloxacin Muscle Pain (Myalgia) and Rash    Morphine Nausea and Vomiting    Sulfa Antibiotics Itching

## 2024-03-26 NOTE — PROGRESS NOTES
[facility]    Progress Note - Hospitalist Service       Date of Admission:  3/23/2024    Assessment & Plan   Serene Tipton is a 68 year old female admitted on 3/23/2024. She has a history of PCKD s/p bilateral nephrectomy on TTS hemodialysis and past SBO admitted for a 1-2 week history of abdominal pain and bloating. CT in ED demonstrated SBO. She was admitted with a similar presentation in July 2023 which was complicated by readmission with adynamic ileus after apparent resolution of the SBO.      SBO versus obstipation  CT demonstrating SBO with transition point. Also heavy stool burden concerning for obstipation. Difficult NG placement in past so no placement yet.  Surgery consulted and patient now status post enema plus mag citrate with improvement of stool burden.  Will advance her diet and see if she tolerates.  Surgery consulted, appreciate recs  Status post enema followed by mag citrate (3/24)  Diet advance to full liquid diet; advance as tolerated  Can discharge when medically stable  Resume PTA lactulose  Add senna docusate BID  Hot and cold packs  Tylenol for mild pain as needed  PTA oxycodone 10 mg every 4 hours as needed for moderate pain  Zofran and Compazine for nausea     ESRD  Anemia of chronic renal disease  Polycystic kidney disease s/p bilateral nephrectomy. Dialysis TThS but missed day of admission.  She takes EPO 3 times a week, at dialysis sessions.  Nephrology consulted, appreciate recs  Dialysis initiated 3/24  EPO not ordered at admission, will defer to nephrology  Continue PTA vitamin/mineral supplements  Venofer     Bilateral shoulder osteoarthritis  Chronic.  Has tried lidocaine cream and IcyHot and reports some relief with each of these.  PTA lidocaine cream  Consider Voltaren, low potential for harm as she is anephric  PT/OT     Heart murmur  3/6 systolic murmur best heard at apex.  Most likely due to dialysis fistula.  She does report occasional  shortness of breath and pedal edema however these are also consistent with known ESRD.  Today she denies orthopnea, PND, or shortness of breath.  Last echo in 2021 showed no significant valvular disease but did demonstrate trace mitral and tricuspid insufficiency.   Noted     Insomnia  Appears to be chronic.    Melatonin as needed  Trazodone as needed     Hypertension  Soft blood pressures at admission. However now normal. Can resume.   PTA labetalol     Chronic problems  Hypothyroidism: PTA Synthroid  Anxiety: PTA Ativan  Chronic pain: PTA nortriptyline, oxycodone, Flexeril  Hyperlipidemia: PTA rosuvastatin  Depression: PTA sertraline  GERD: Substitute pantoprazole for PTA omeprazole  INDIGO: History per chart review.  CPAP ordered.        Diet: Low Fiber Diet    DVT Prophylaxis: Low Risk/Ambulatory with no VTE prophylaxis indicated  Pool Catheter: Not present  Fluids: PO   Lines: None     Cardiac Monitoring: None  Code Status: Full Code      Disposition Plan      Expected Discharge Date: 03/26/2024        Discharge Comments: Home pending medical clearance and advancing diet        The patient's care was discussed with the Attending Physician, Dr. Kolton Earl .    Jeremías Saenz MD  Hospitalist Service  [facility]  Securely message with [employer] info)  Text page via [facility] Paging/Directory   ______________________________________________________________________    Interval History   Getting dialyzed this morning. Tolerated thin liquids yesterday. Does not feel ready to go home yet; would like to advance diet first and see how she does. No complaints this morning. Says she has a lot of help at home if needed. She has a lot of neighbors who checks on her.     Physical Exam   Vital Signs: Temp: 97.6  F (36.4  C) Temp src: Oral BP: 125/79 Pulse: 76   Resp: 16 SpO2: 92 % O2 Device: None (Room air)    Weight: 84 lbs 1.6 oz    Constitutional: awake, alert, cooperative, no apparent distress, thin  and frail frame.   Respiratory: No increased work of breathing, good air exchange, clear to auscultation bilaterally, no crackles or wheezing  Cardiovascular: appreciable thrill on CV exam  GI: midline abdominal scar; bowel sounds present; some mild epigastric abd tenderness on initial palpitation; non reproducible. No acute peritonitis.   MSK: Right shoulder with fluctuant mass with mild tenderness.      Data         Imaging results reviewed over the past 24 hrs:   No results found for this or any previous visit (from the past 24 hour(s)).

## 2024-03-26 NOTE — PLAN OF CARE
Problem: Adult Inpatient Plan of Care  Goal: Optimal Comfort and Wellbeing  Outcome: Progressing  Intervention: Monitor Pain and Promote Comfort     Goal Outcome Evaluation:       Pt A/Ox4, able to make needs known. Reports 7/10 pain in back and shoulders, PRN oxycodone and flexeril given. PRN  IV dilaudid given for breakthrough pain. Denies nausea. Tolerating full liquid diet, passing gas. Up with SBA .VSS, on room air.

## 2024-03-26 NOTE — PROGRESS NOTES
Date: 3/26/2024  Time: 1200     Data:  Pre Wt:   38.1 kg  Post Wt:  36.05 kg   Weight change: - 2.5 kg  Ultrafiltration - Post Run Net Total Removed (mL):  2500 ml  Vascular Access Status: Fistula patent  Dialyzer Rinse:  Light  Total Blood Volume Processed: 59.5 L   Total Dialysis (Treatment) Time:   3 Hrs  Dialysate Bath: K 2, Ca 2.5  Heparin: Heparin: None     Lab:   No  Interventions:  Dialysis done through left AV Fistula using 16 gauge needles  UF set to 2.5 Liters, accommodating priming and rinse back volumes  ,   See Flowsheet for Crit Profile throughout the run  Treatment has ended safely and  blood is rinsed back completely  Decannulation done post HD, hemostasis is achieved in 10 minutes  Pressure dressing is applied, to be removed after 4-6 hours  Report given to PCN, pt remains her room in stable condition     Assessment:  A/O x 4, calm and cooperative, denies pain  left arm AV Fistula has good thrill and bruit                Plan:    Per Renal team    Shiv Hernández, RN, BSN.  Acute Dialysis [facility]  [facility]

## 2024-03-26 NOTE — PLAN OF CARE
Problem: Intestinal Obstruction  Goal: Optimal Bowel Function  Intervention: Promote Bowel Function  Recent Flowsheet Documentation  Taken 3/25/2024 1700 by Rusty Bonilla, RN  Body Position: position changed independently  Head of Bed (HOB) Positioning: HOB at 20-30 degrees  Positioning/Transfer Devices:   pillows   in use  Taken 3/25/2024 1600 by Rusty Bonilla, RN  Body Position: right  Head of Bed (HOB) Positioning: HOB at 20-30 degrees  Positioning/Transfer Devices:   pillows   in use  Goal: Optimize Nutrition Status  Outcome: Progressing  Goal: Optimal Pain Control and Function  Outcome: Progressing   Goal Outcome Evaluation:    Patient alert and oriented. VSS. RA. Saline locked. Denies N/V. SOB, pain. Tolerating Full liquid diet. Bowel sounds audible, passing gas, no BM this shift. Ambulating outside room. SBA. Calls appropriately. Call light within reach. Alarms on.

## 2024-03-26 NOTE — PROGRESS NOTES
RENAL PROGRESS NOTE        ASSESSMENT & PLAN:     ESRD: 2/2 PKDs/p bilateral nephrectomies.  [facility]  Runs on a TTS schedule.  She runs 3 hours.  Has a left upper extremity aVF. Coming in under EDW will change EDW to 38 Kg, follow. TTS HD while here.      Hyperkalemia: Moderate.  Potassium 6.0 today with extra day between dialysis treatments.  K2 with HD.       Hypothyroidism: On replacement     ACD: Receives PETE and parenteral iron with outpatient dialysis. Hemoglobin 10.8.  Follow need to dose PETE while inpatient.     Hyponatremia: Secondary to ESRD.  UF with HD.     HLD: Statin     Secondary renal hyperparathyroidism: Sevelamer 3 times daily AC when patient is no longer NPO.     H/O HTN: On labetalol historically.  Blood pressures have been running on the softer side and patient has not been taking labetalol.  Continue to hold labetalol inpatient if systolic blood pressure remains <130 mmHg     SBO, nausea, abdominal pain: NPO.  Surgery consulted.  Management per primary team and surgery.    SUBJECTIVE:    Appears comfortable, saw ot on dialysis, tolerating therapy well  She reports overal feeling much better  Pt has no complaints today.   Tolerating advancing diet  She reports BM, tells me constipation is better  VSS, wt stable  Discussed with HD RN, pt coming in under EDW, will change EDW to 38.0, follow with UF, adjust down further if indicated.   Will resume TTS HD schedule while here.   Discussed labs/plan and answered all questions    OBJECTIVE:  Physical Exam   Temp: 97.6  F (36.4  C) Temp src: Oral BP: (!) 141/87 Pulse: 88   Resp: 16 SpO2: 93 % O2 Device: None (Room air)    Vitals:    03/24/24 1350 03/25/24 0500 03/26/24 0800   Weight: (P) 36.7 kg (80 lb 14.5 oz) 36 kg (79 lb 5.9 oz) 38.1 kg (84 lb 1.6 oz)     Vital Signs with Ranges  Temp:  [97.1  F (36.2  C)-97.9  F (36.6  C)] 97.6  F (36.4  C)  Pulse:  [] 88  Resp:  [16] 16  BP: (110-141)/(62-89) 141/87  SpO2:   [93 %-96 %] 93 %  No intake/output data recorded.      Patient Vitals for the past 72 hrs:   Weight   03/26/24 0800 38.1 kg (84 lb 1.6 oz)   03/25/24 0500 36 kg (79 lb 5.9 oz)   03/24/24 1350 (P) 36.7 kg (80 lb 14.5 oz)   03/24/24 1000 39.4 kg (86 lb 13.8 oz)   03/23/24 1202 36.3 kg (80 lb)     Intake/Output Summary (Last 24 hours) at 3/25/2024 0946  Last data filed at 3/24/2024 1319  Gross per 24 hour   Intake 0 ml   Output 2.5 ml   Net -2.5 ml       PHYSICAL EXAM:  GEN: NAD, aox3, thin  CV: RRR, no JVD  Lung: clear and equal  Ab: soft and NT  Ext: + trace LLE BL and well perfused  Skin: no rash  Access: C/d/iI, achieving BFR with dialysis, +t/b, no post tx bleeding.       LABORATORY STUDIES:     Recent Labs   Lab 03/23/24  1321   WBC 11.0   RBC 3.36*   HGB 10.8*   HCT 33.6*          Basic Metabolic Panel:  Recent Labs   Lab 03/25/24  0804 03/24/24  0455 03/23/24  1321    128* 130*   POTASSIUM 5.0 6.0* 5.3   CHLORIDE 92* 85* 86*   CO2 29 29 29   BUN  --  78.2* 70.4*   CR  --  6.68* 5.97*   GLC  --  90 101*   GAVIN  --  10.2 9.9       INRNo lab results found in last 7 days.     Recent Labs   Lab Test 03/23/24  1321 03/15/24  1230 11/12/23  0757 11/11/23  0450 11/09/23  2324 07/14/23  0503 07/13/23  1239 07/11/23  1131   INR  --   --   --   --   --   --  1.70* 1.40*   WBC 11.0 11.4*  --   --  8.8   < > 13.0* 11.5*   HGB 10.8*  --  9.9*   < > 11.0*   < > 8.4* 9.0*     --   --   --  290   < > 320 288    < > = values in this interval not displayed.       Personally reviewed current labs    Mitzi PALMA-BC  [facility]  [phone]

## 2024-03-26 NOTE — PLAN OF CARE
Problem: Adult Inpatient Plan of Care  Goal: Absence of Hospital-Acquired Illness or Injury  Intervention: Identify and Manage Fall Risk  Recent Flowsheet Documentation  Taken 3/26/2024 1600 by Stephen Jamison RN  Safety Promotion/Fall Prevention: safety round/check completed     Problem: Adult Inpatient Plan of Care  Goal: Absence of Hospital-Acquired Illness or Injury  Intervention: Prevent and Manage VTE (Venous Thromboembolism) Risk  Recent Flowsheet Documentation  Taken 3/26/2024 1600 by Stephen Jamison RN  VTE Prevention/Management: SCDs (sequential compression devices) on   Goal Outcome Evaluation:      Plan of Care Reviewed With: patient    Overall Patient Progress: improvingOverall Patient Progress: improving  Pt alert and oriented, vss on room air, can voice needs, she is assist of one, had dialysis this morning and has been sleeping most of the afternoon but woke up to eat, potential discharge tomorrow.

## 2024-03-26 NOTE — PLAN OF CARE
Problem: Adult Inpatient Plan of Care  Goal: Optimal Comfort and Wellbeing  3/26/2024 1439 by Janee Vieira, RN  Outcome: Progressing  3/26/2024 1438 by Janee Vieira, RN  Outcome: Progressing     Problem: Hemodialysis  Goal: Effective Tissue Perfusion  3/26/2024 1439 by Janee Vieira, RN  Outcome: Progressing  3/26/2024 1438 by Janee Vieira, RN  Outcome: Progressing     Problem: Pain Chronic (Persistent)  Goal: Optimal Pain Control and Function  Outcome: Not Progressing   Goal Outcome Evaluation:       Pt A&O x4 and VSS. C/O shoulder/back pain with relief from prn pain medication. PIV patent and SL.  No BM but flatus noted. SBA with gb/walker. Call light within reach and calls appropriately.

## 2024-03-27 NOTE — PROGRESS NOTES
Physical Therapy: Orders received. Chart reviewed and discussed with care team.? Physical Therapy not indicated due to patient ambulating safely with OT and has elevator at home.? Defer discharge recommendations to OT and MD.? Will complete orders.

## 2024-03-27 NOTE — PROGRESS NOTES
Patient discharged with friend to transport to home. VSS on RA. PIV access removed prior to discharge. Belongings remain with pt at discharge. AVS reviewed with pt, questions answered, education complete.

## 2024-03-27 NOTE — PROGRESS NOTES
03/27/24 0741   Appointment Info   Signing Clinician's Name / Credentials (OT) Florentino Alvarez, OTR/L   Living Environment   People in Home alone   Current Living Arrangements apartment   Home Accessibility no concerns  (has elevator)   Transportation Anticipated family or friend will provide   Living Environment Comments No ADs. tub shower w/ grab bars, shorter toilets   Self-Care   Usual Activity Tolerance moderate   Current Activity Tolerance moderate   Equipment Currently Used at Home grab bar, tub/shower   Fall history within last six months yes   Number of times patient has fallen within last six months 1   Activity/Exercise/Self-Care Comment Pt typically IND w/ ADLs and most IADLs at baseline but does get assistance from friends w/ cooking, shopping at times   General Information   Onset of Illness/Injury or Date of Surgery 03/23/24   Referring Physician Kolton Earl MD   Patient/Family Therapy Goal Statement (OT) go home today   Additional Occupational Profile Info/Pertinent History of Current Problem Serene Tipton is a 68 year old female admitted on 3/23/2024. She has a history of PCKD s/p bilateral nephrectomy on TTS hemodialysis and past SBO admitted for a 1-2 week history of abdominal pain and bloating. CT in ED demonstrated SBO. She was admitted with a similar presentation in July 2023 which was complicated by readmission with adynamic ileus after apparent resolution of the SBO.   Existing Precautions/Restrictions no known precautions/restrictions   Cognitive Status Examination   Orientation Status orientation to person, place and time   Affect/Mental Status (Cognitive) WNL   Visual Perception   Visual Impairment/Limitations WFL   Sensory   Sensory Quick Adds sensation intact   Pain Assessment   Patient Currently in Pain No   Posture   Posture not impaired   Range of Motion Comprehensive   General Range of Motion upper extremity range of motion deficits identified   Comment, General Range of Motion  minimal R shoulder flexion and ~90 degree L shoulder flexion   General Upper Extremity Assessment (Range of Motion)   Upper Extremity: Range of Motion shoulder, left: UE ROM;shoulder, right: UE ROM   Strength Comprehensive (MMT)   General Manual Muscle Testing (MMT) Assessment no strength deficits identified   Bed Mobility   Bed Mobility No deficits identified   Transfers   Transfers No deficits identified   Balance   Balance Comments Pt amb. ~325 ft, no AD and no LOB   Activities of Daily Living   BADL Assessment/Intervention bathing;grooming   Bathing Assessment/Intervention   [place] Level (Bathing) minimum assist (75% patient effort)   Comment, (Bathing) washing hair   Grooming Assessment/Training   [place] Level (Grooming) hair care, combing/brushing;minimum assist (75% patient effort)   Clinical Impression   Criteria for Skilled Therapeutic Interventions Met (OT) Yes, treatment indicated   OT Diagnosis decreased ADLs   Influenced by the following impairments small bowel obstruction   OT Problem List-Impairments impacting ADL activity tolerance impaired;range of motion (ROM)   Assessment of Occupational Performance 1-3 Performance Deficits   Identified Performance Deficits bathing, g/h   Planned Therapy Interventions (OT) ADL retraining;transfer training   Clinical Decision Making Complexity (OT) problem focused assessment/low complexity   Risk & Benefits of therapy have been explained evaluation/treatment results reviewed;patient   OT Total Evaluation Time   OT Eval, Low Complexity Minutes (45108) 15   OT Goals   Therapy Frequency (OT) One time eval and treatment   OT Predicted Duration/Target Date for Goal Attainment 03/27/24   OT Goals Lower Body Dressing;Bed Mobility;Toilet Transfer/Toileting   OT: Lower Body Dressing Independent;Goal Met   OT: Bed Mobility Independent;supine to/from sitting;rolling;Goal Met   OT: Toilet Transfer/Toileting Independent;toilet transfer;cleaning and garment  management;Goal Met   Interventions   Interventions Quick Adds Self-Care/Home Management   Self-Care/Home Management   Self-Care/Home Mgmt/ADL, Compensatory, Meal Prep Minutes (05337) 10   Symptoms Noted During/After Treatment (Meal Preparation/Planning Training) none   Treatment Detail/Skilled Intervention Pt demonstrated IND w/ dressing/bed mobility/transfers. Pt amb. 15 ft to BR for g/h tasks - min A to brush back of hair. Educ on extended hair brush for home. Pt amb. well, no  ft. Pt instructed on energy conservation strategies for home. Discussed safe home setup.   OT Discharge Planning   OT Plan DC OT   OT Discharge Recommendation (DC Rec) (S)  home with assist   OT Rationale for DC Rec Pt appears to be about at her baseline. Pt has good home setup w/ all necessary DME and can get assistance w/ IADLs if needed. Pt is IND w/ ADLs   OT Brief overview of current status IND w/ADLs   Total Session Time   Timed Code Treatment Minutes 10   Total Session Time (sum of timed and untimed services) 25

## 2024-03-27 NOTE — PROGRESS NOTES
RENAL PROGRESS NOTE        ASSESSMENT & PLAN:     ESRD: 2/2 PKDs/p bilateral nephrectomies.  On in center HD @  [facility].  Runs on a TTS schedule.  She runs 3 hours.  Has a left upper extremity aVF. Coming in under EDW will change EDW to 38 Kg, follow. TTS HD while here.      Hyperkalemia: Moderate.  Potassium 6.0 today with extra day between dialysis treatments.  K2 with HD.       Hypothyroidism: On replacement     ACD: Receives PETE and parenteral iron with outpatient dialysis. Hemoglobin 10.8.  Follow need to dose PETE while inpatient.     Hyponatremia: Secondary to ESRD.  UF with HD.     HLD: Statin     Secondary renal hyperparathyroidism: Sevelamer 3 times daily AC when patient is no longer NPO.     H/O HTN: On labetalol historically.  Blood pressures have been running on the softer side and patient has not been taking labetalol.  Continue to hold labetalol inpatient if systolic blood pressure remains <130 mmHg     SBO, nausea, abdominal pain: NPO.  Surgery consulted.  Management per primary team and surgery.    SUBJECTIVE:    Appears comfortable, resting in bed  She reports overal feeling much better  Pt has no complaints today.   Tolerating advancing diet, passing gas, on low fiber diet  She reports BM, tells me constipation is better  VSS, wt stable  Discussed with HD RN, pt coming in under EDW, will change EDW to 38.0, follow with UF, adjust down further if indicated.   I called [facility] gave reports, and verbal order to decrease EDW to 38Kg  Will resume TTS HD schedule while here.   Discussed labs/plan and answered all questions    OBJECTIVE:  Physical Exam   Temp: 97.8  F (36.6  C) Temp src: Oral BP: 98/64 Pulse: 88   Resp: 18 SpO2: 98 % O2 Device: None (Room air)    Vitals:    03/26/24 0800 03/26/24 1145 03/27/24 0746   Weight: 38.1 kg (84 lb 1.6 oz) 36.1 kg (79 lb 9.4 oz) 36.7 kg (80 lb 14.4 oz)     Vital Signs with Ranges  Temp:  [97.6  F (36.4  C)-97.9  F (36.6  C)] 97.8  F (36.6   C)  Pulse:  [] 88  Resp:  [16-18] 18  BP: ()/(63-90) 98/64  SpO2:  [91 %-98 %] 98 %  I/O last 3 completed shifts:  In: 600 [P.O.:600]  Out: 2500 [Other:2500]      Patient Vitals for the past 72 hrs:   Weight   03/27/24 0746 36.7 kg (80 lb 14.4 oz)   03/26/24 1145 36.1 kg (79 lb 9.4 oz)   03/26/24 0800 38.1 kg (84 lb 1.6 oz)   03/25/24 0500 36 kg (79 lb 5.9 oz)   03/24/24 1350 (P) 36.7 kg (80 lb 14.5 oz)   03/24/24 1000 39.4 kg (86 lb 13.8 oz)     Intake/Output Summary (Last 24 hours) at 3/25/2024 0946  Last data filed at 3/24/2024 1319  Gross per 24 hour   Intake 0 ml   Output 2.5 ml   Net -2.5 ml       PHYSICAL EXAM:  GEN: NAD, aox3, thin  CV: RRR, no JVD  Lung: clear and equal  Ab: soft and NT  Ext: + trace LLE BL and well perfused  Skin: no rash  Access: C/d/iI, achieving BFR with dialysis, +t/b, no post tx bleeding.       LABORATORY STUDIES:     Recent Labs   Lab 03/23/24  1321   WBC 11.0   RBC 3.36*   HGB 10.8*   HCT 33.6*          Basic Metabolic Panel:  Recent Labs   Lab 03/25/24  0804 03/24/24  0455 03/23/24  1321    128* 130*   POTASSIUM 5.0 6.0* 5.3   CHLORIDE 92* 85* 86*   CO2 29 29 29   BUN  --  78.2* 70.4*   CR  --  6.68* 5.97*   GLC  --  90 101*   GAVIN  --  10.2 9.9       INRNo lab results found in last 7 days.     Recent Labs   Lab Test 03/23/24  1321 03/15/24  1230 11/12/23  0757 11/11/23  0450 11/09/23  2324 07/14/23  0503 07/13/23  1239 07/11/23  1131   INR  --   --   --   --   --   --  1.70* 1.40*   WBC 11.0 11.4*  --   --  8.8   < > 13.0* 11.5*   HGB 10.8*  --  9.9*   < > 11.0*   < > 8.4* 9.0*     --   --   --  290   < > 320 288    < > = values in this interval not displayed.       Personally reviewed current labs    Mitzi PALMA-BC  [facility]  [phone]

## 2024-03-27 NOTE — PLAN OF CARE
Goal Outcome Evaluation:         Problem: Adult Inpatient Plan of Care  Goal: Optimal Comfort and Wellbeing  Outcome: Progressing  Intervention: Monitor Pain and Promote Comfort    Problem: Intestinal Obstruction  Goal: Optimal Bowel Function  Intervention: Promote Bowel Function      Pt A/Ox4, able to make needs known. Pain managed with PRN oxycodone and PRN flexeril. Ambulated in hallway this shift. Denies nausea, tolerating low fiber diet. Passing gas. Up with SBA. VSS, on room air.

## 2024-04-05 NOTE — TELEPHONE ENCOUNTER
Called pt to check in on shoulder surgery scheduling. Unable to leave message as voicemailbox is not set up yet. Will attempt to call another day.

## 2024-04-11 NOTE — TELEPHONE ENCOUNTER
Called pt to check in on when should surgery is scheduled. Voicemail box not set up yet, unable to leave message.     Called HD center to leave message for pt to call writer back. Pt is not at center today. Left message with clinic for pt to call writer back. Spoke with SW, pt did no show HD today. Compliance has been not great due to shoulder pain and when she gets sick tends to snowball into more missed treatments. Appears to be more physical the behavioral.

## 2024-04-15 NOTE — TELEPHONE ENCOUNTER
Received a call from pt. Shoulder surgery is not scheduled yet. She will notify us when it is scheduled. Reports cell phone is only for when she is active. She would like home phone updated as primary number for calls until she is active again.

## 2024-04-22 NOTE — TELEPHONE ENCOUNTER
Received call from Dr. Jensen from [facility] regarding pts shoulder surgeries and wondering if this should be done prior to transplant. Pt has bad bone quality and terrible osteoporosis, she would not be able to get the quality of fixation that they typically go for due to poor bone healing due to kidney disease. Her socket would need a bone graft as well. Will reach out to surgeon to discuss with Dr. Jensen.

## 2024-04-24 NOTE — TELEPHONE ENCOUNTER
Received a call from pt. She does not feel she needs shoulder surgery anymore as she has stopped doing things that aggravated it. Reports she is no longer having any pain. Will review at committee next week. Pt also wondering how to donate her body to science. Email sent with [facility] site for forms needed. Pt verbalized understanding of information and has no further questions. Encouraged to reach out if questions arise.

## 2024-04-29 PROBLEM — I50.9 CONGESTIVE HEART FAILURE, UNSPECIFIED HF CHRONICITY, UNSPECIFIED HEART FAILURE TYPE (H): Status: ACTIVE | Noted: 2024-01-01

## 2024-04-29 NOTE — ED TRIAGE NOTES
Abd bloating, due to gas in bowels and liver issues, causing difficulty breathing.  Lots of pressure in abdomen. Feels breathing is very difficult when lying down. Last dialysis Saturday.

## 2024-04-29 NOTE — H&P
Admission History & Physical  Serene Tipton, 1955, [id number]    [facility]  Ramos Lowry, [phone]    Assessment and Plan:  68 year old female past medical history significant for polycystic kidney disease status post bilateral nephrectomy, ESRD on hemodialysis TTS schedule, anxiety depression hypertension dyslipidemia anemia of CKD GERD presented to the emergency room with complaints of shortness of breath, abdominal bloating, being admitted for volume overload in the setting of ESRD secondary to short runs of dialysis with fistula infiltration, elevated troponin with no chest pain.        Volume overload  ESRD on TTS dialysis  Dyspnea  Appreciate nephrology input  Plan for dialysis today and tomorrow per nephrology  Renal diet    Elevated troponin  EKG nonspecific T wave changes  No chest pain  Trend serial troponins  Check echocardiogram  Cardiology evaluation requested    Abdominal bloating  Mild epigastric tenderness  LFTs are normal  Increase omeprazole to twice daily  CT scan of the abdomen pelvis with unchanged pancreatic duct dilation, infiltration of mesentery, retroperitoneal fat, will need follow-up outpatient  Continue bowel meds    Hypertension  Blood pressure stable  On labetalol    Anemia of CKD  Monitor hemoglobin   per nephrology    DVT prophylaxis subcu heparin  Code: DNR confirmed with patient    Addendum : staff notified me that she wanted to change code status. D/w patient, she would like it to be changed to full code. Ordered.    Clinically Significant Risk Factors Present on Admission           # Hypercalcemia: Highest Ca = 10.2 mg/dL in last 2 days, will monitor as appropriate        # Hypertension: Noted on problem list                    Expected length of stay : anticipate hospitalization more than 2 days.     Chief Complaint: shortness of breath    HPI:     Serene Tipton is a 68 year old female past medical history significant for  polycystic kidney disease status post bilateral nephrectomy, ESRD on hemodialysis TTS schedule, anxiety depression hypertension dyslipidemia anemia of CKD GERD presented to the emergency room with complaints of shortness of breath, abdominal bloating.  She reports she had a short run of her dialysis last Tuesday due to access infiltration.  Was recommended to have dialysis following 3 days ago which she could not follow through.  Subsequently she presented to the ED today with worsening shortness of breath.  No fevers chills no cough.  No chest pain.  She complains of intermittent abdominal bloating over the past year, never had upper endoscopy.  Bloating sensation improves with belching.  She is able to pass gas, had a bowel movement yesterday.  Denies any abdominal pain.  No nausea or vomiting.  No other urinary complaints.  Was admitted in March for obstipation.     In ED vitally she was sinus tachycardia 117/min, 95% on room air, EKG shows PVCs, sinus tach, nonspecific T wave changes in anterolateral leads.  Troponin was elevated up to 247.  Cardiology was called from ED recommended to trend troponins.  Chest x-ray showed interstitial edema, central congestion, cardiomegaly, CT scan of the abdomen pelvis without contrast no bowel obstruction, confirmed pulmonary edema, trace bilateral pleural effusions, chronically dilated pancreatic duct, infiltration of the mesentery retroperitoneal fat limits evaluation for adenopathy.   evaluated by nephrology in ED and the plan for dialysis today and tomorrow.     Medical History  Past Medical History:   Diagnosis Date    Anemia in chronic kidney disease     Anxiety     Arthritis     Brain aneurysm     Cavernous segment of the right & left carotid arteries. See Neurosurg note 6/16/21.    Chronic low back pain     Managed by Pain [facility]    Congestive heart failure, unspecified HF chronicity, unspecified heart failure type (H) 4/29/2024    Depressive disorder 2013    Disease  of liver 8/29/2023    Disease of thyroid gland     ESRD (end stage renal disease) on dialysis (H) 07/2020    GERD (gastroesophageal reflux disease)     Hepatic lesion     Hyperlipidemia     Hypertension     Nephrolithiasis     Neuromuscular disorder (H)     Osteoporosis     PKD (polycystic kidney disease) 09/01/1990    Primary generalized (osteo)arthritis 8/2/2023    PTSD (post-traumatic stress disorder)     Tobacco abuse     Vitamin D deficiency       Patient Active Problem List    Diagnosis Date Noted    Congestive heart failure, unspecified HF chronicity, unspecified heart failure type (H) 04/29/2024     Priority: Medium    Fracture of unspecified metatarsal bone(s), right foot, subsequent encounter for fracture with routine healing 11/13/2023     Priority: Medium    Fracture of unspecified part of neck of left femur, subsequent encounter for closed fracture with routine healing 11/13/2023     Priority: Medium    Hip fracture, left, closed, initial encounter (H) 11/09/2023     Priority: Medium    Closed nondisplaced fracture of metatarsal bone of right foot, unspecified metatarsal, initial encounter 11/09/2023     Priority: Medium    Disease of liver 08/29/2023     Priority: Medium    Anxiety 08/02/2023     Priority: Medium    Hepatic lesion 08/02/2023     Priority: Medium    Hyperparathyroidism (H24) 08/02/2023     Priority: Medium    Hypothyroidism (acquired) 08/02/2023     Priority: Medium    Liver cyst 08/02/2023     Priority: Medium    Long term (current) use of opiate analgesic 08/02/2023     Priority: Medium    Lumbar facet joint syndrome 08/02/2023     Priority: Medium    Major depressive disorder with single episode, in partial remission (H24) 08/02/2023     Priority: Medium    Mammogram abnormal 08/02/2023     Priority: Medium    Nicotine dependence, cigarettes, uncomplicated 08/02/2023     Priority: Medium    Obstructive sleep apnea (adult) (pediatric) 08/02/2023     Priority: Medium    Primary  generalized (osteo)arthritis 08/02/2023     Priority: Medium    Spinal stenosis, lumbar region with neurogenic claudication 08/02/2023     Priority: Medium    Acquired absence of kidney 07/25/2023     Priority: Medium    Constipation, unspecified 07/25/2023     Priority: Medium    Depression, unspecified 07/25/2023     Priority: Medium    Dependence on renal dialysis (H24) 07/25/2023     Priority: Medium    Generalized abdominal pain 07/25/2023     Priority: Medium    Chronic kidney disease, unspecified 07/25/2023     Priority: Medium    End stage renal disease (H) 07/25/2023     Priority: Medium    Essential (primary) hypertension 07/25/2023     Priority: Medium    Hypoglycemia, unspecified 07/25/2023     Priority: Medium    Chronic pulmonary edema 07/25/2023     Priority: Medium    Kidney transplant status 07/25/2023     Priority: Medium    Ileus, unspecified (H) 07/25/2023     Priority: Medium    Unspecified intestinal obstruction, unspecified as to partial versus complete obstruction (H) 07/25/2023     Priority: Medium    Hypomagnesemia 07/21/2023     Priority: Medium    Diffuse abdominal pain 07/21/2023     Priority: Medium    Chronic renal failure, unspecified CKD stage 07/21/2023     Priority: Medium    Small bowel obstruction (H) 07/13/2023     Priority: Medium    Hypoglycemia 07/13/2023     Priority: Medium    Fall, initial encounter 07/13/2023     Priority: Medium    Constipation 07/06/2023     Priority: Medium    Hyperkalemia 07/05/2023     Priority: Medium    ESRD (end stage renal disease) on dialysis (H) 07/05/2023     Priority: Medium    Shortness of breath 02/27/2023     Priority: Medium    S/p nephrectomy 02/27/2023     Priority: Medium    Dialysis patient (H24) 02/27/2023     Priority: Medium    Pulmonary edema, noncardiac 02/27/2023     Priority: Medium    Acute post-operative pain 02/02/2023     Priority: Medium    Chronic, continuous use of opioids 02/02/2023     Priority: Medium    Polycystic  kidney disease 02/01/2023     Priority: Medium    Postoperative anemia due to acute blood loss 02/01/2023     Priority: Medium    Enlarged kidney as indication for native nephrectomy 02/01/2023     Priority: Medium    Benign neoplasm of ascending colon 07/20/2021     Priority: Medium    Benign neoplasm of cecum 07/20/2021     Priority: Medium    Polyp of colon 07/16/2021     Priority: Medium    Chronic low back pain      Priority: Medium    Hyperlipidemia      Priority: Medium    Tobacco abuse      Priority: Medium    Vitamin D deficiency      Priority: Medium    Non-traumatic rotator cuff tear, right 02/20/2020     Priority: Medium     Formatting of this note might be different from the original. Added automatically from request for surgery [id number]      End stage kidney disease (H)      Priority: Medium    Polycystic kidney 01/22/2020     Priority: Medium    Hypertension 01/22/2020     Priority: Medium    Anemia in stage 5 chronic kidney disease (H) 09/27/2019     Priority: Medium    CKD (chronic kidney disease) stage 4, GFR 15-29 ml/min (H) 10/25/2018     Priority: Medium    NS (nuclear sclerosis), bilateral 07/10/2018     Priority: Medium    PVD (posterior vitreous detachment), left eye 07/10/2018     Priority: Medium    Vitreomacular adhesion, right 07/10/2018     Priority: Medium    S/P lumbar fusion 10/12/2017     Priority: Medium    Hyperparathyroidism, secondary renal (H24) 09/11/2016     Priority: Medium    Gastroesophageal reflux disease without esophagitis 06/15/2015     Priority: Medium    Major depression, recurrent (H24) 09/06/2013     Priority: Medium     Formatting of this note might be different from the original. [facility]: [phone] Formatting of this note might be different from the original. [facility], Dr Amara Chance [phone]      Depressive disorder 01/01/2013     Priority: Medium    Insomnia 05/06/2009     Priority: Medium     Formatting of  this note might be different from the original. Overview: inactive icd-9 diagnosis auto replaced with icd-10, display name retained//[email or web address]      Insomnia, unspecified 05/06/2009     Priority: Medium    Pain in joint, pelvic region and thigh 03/23/2009     Priority: Medium    Piriformis syndrome 03/23/2009     Priority: Medium    Congenital spondylolisthesis 11/08/2007     Priority: Medium     Formatting of this note might be different from the original. Posterior fusion 1985, anterior fusion 2006. See 5/12/2009 note ; Spondylolisthesis, [id number]      Posttraumatic stress disorder 12/04/2006     Priority: Medium    Sciatica 09/21/2006     Priority: Medium    Backache 07/12/2006     Priority: Medium    Fibrocystic breast changes 07/12/2006     Priority: Medium    Pure hypercholesterolemia 07/12/2006     Priority: Medium    Symptomatic menopausal or female climacteric states 07/12/2006     Priority: Medium    Diffuse cystic mastopathy 07/12/2006     Priority: Medium    Renovascular hypertension 09/10/2004     Priority: Medium     Formatting of this note might be different from the original. Overview: Epic Formatting of this note might be different from the original. Epic          Surgical History  She  has a past surgical history that includes back surgery; biopsy (Left, 09/01/1990); Eye surgery (2008); orthopedic surgery (Right, 2005); breast lumpectomy, rt/lt (Left); Cystectomy pilonidal (1981); fracture surgery; Spine surgery; other surgical history; Cyst Removal (Right); FORMATION ARTERIOVENOUS FISTULA   (07/28/2020); Nephrectomy bilateral (Bilateral, 2/1/2023); and Open reduction internal fixation hip bipolar (Left, 11/10/2023).     Past Surgical History:   Procedure Laterality Date    BACK SURGERY      spinal fusion w/hardware    BIOPSY Left 09/01/1990    Breast    BREAST LUMPECTOMY, RT/LT Left     Lumpectomy    CYST REMOVAL Right     rt wrist    CYSTECTOMY PILONIDAL  1981    EYE SURGERY  2008    lasik    FORMATION  ARTERIOVENOUS FISTULA    07/28/2020    FRACTURE SURGERY      NEPHRECTOMY BILATERAL Bilateral 2/1/2023    Procedure: bilateral native nephrectomy;  Surgeon: Alana Giang MD;  Location: [facility] OR    OPEN REDUCTION INTERNAL FIXATION HIP BIPOLAR Left 11/10/2023    Procedure: LEFT HEMIARTHROPLASTY, HIP, HEMIARTHROPLASTY;  Surgeon: Lucas Weldon MD;  Location: [facility] Main OR    ORTHOPEDIC SURGERY Right 2005    Shoulder    OTHER SURGICAL HISTORY      carpal tunnel repair    SPINE SURGERY         Allergies  Allergies   Allergen Reactions    Penicillins Other (See Comments) and Shortness Of Breath     Breathing problem.  breathing      No Clinical Screening - See Comments      PN: LW CM1: Contrast Intercapsular - Nonionic Reaction :    Nsaids Other (See Comments)     Kidney damage    Carvedilol GI Disturbance     Nausea and vomiting    Ciprofloxacin Muscle Pain (Myalgia)    Floxacillin (Flucloxacillin) Muscle Pain (Myalgia) and Rash    Levofloxacin Muscle Pain (Myalgia) and Rash    Morphine Nausea and Vomiting    Sulfa Antibiotics Itching       Prior to Admission Medications   Medications Prior to Admission   Medication Sig Dispense Refill Last Dose    artificial saliva (BIOTENE MT) SOLN solution Swish and spit 2 sprays in mouth as needed for dry mouth   Unknown    B Complex-C (SUPER B COMPLEX PO) Take 1 tablet by mouth daily at 2 pm   4/28/2024    calcitRIOL (ROCALTROL) 0.5 MCG capsule Take 0.5 mcg by mouth three times a week Given at Dialysis   Past Week    calcium acetate (CALPHRON) 667 MG TABS tablet Take 1-2 tablets by mouth 3 times daily (with meals)   4/28/2024    cholecalciferol (VITAMIN D3) 25 mcg (1000 units) capsule Take 1 capsule by mouth daily at 2 pm   4/28/2024    cyclobenzaprine (FLEXERIL) 5 MG tablet Take 5 mg by mouth 2 times daily as needed for muscle spasms   Unknown    Epoetin Adam (EPOGEN IJ) Inject 1,000 Units into the vein three times a week At dialysis.   Past Week    glucosamine-chondroitin  "500-400 MG CAPS per capsule Take 2 capsules by mouth daily at 2 pm   4/28/2024    Iron Sucrose (VENOFER IV) Inject 100 mg into the vein once a week At dialysis   Past Week    labetalol (NORMODYNE) 200 MG tablet Take 200 mg by mouth 2 times daily   Past Week at \"several days ago\"    lactulose (CHRONULAC) 10 GM/15ML solution Take 15 mLs (10 g) by mouth 2 times daily as needed for constipation 900 mL 0 Past Week    levothyroxine (SYNTHROID/LEVOTHROID) 50 MCG tablet Take 50 mcg by mouth daily   4/28/2024    lidocaine HCl 3 % cream Apply topically daily as needed (pain)   Unknown    LORazepam (ATIVAN) 1 MG tablet Take 1 tablet (1 mg) by mouth daily (before lunch) 3 tablet 0 4/28/2024    nortriptyline (PAMELOR) 75 MG capsule Take 75 mg by mouth At Bedtime   4/28/2024    Nutritional Supplements (NEPRO) LIQD Take 1 Can by mouth daily   4/28/2024    omeprazole (PRILOSEC) 20 MG DR capsule Take 20 mg by mouth daily   4/28/2024    oxyCODONE IR (ROXICODONE) 10 MG tablet Take 10 mg by mouth every 4 hours as needed for pain   4/28/2024    rosuvastatin (CRESTOR) 10 MG tablet Take 10 mg by mouth At Bedtime   4/28/2024    SENNA-docusate sodium (SENNA S) 8.6-50 MG tablet Take 2 tablets by mouth 2 times daily Hold for loose stools. 120 tablet 0 4/28/2024    sertraline (ZOLOFT) 100 MG tablet Take 150 mg by mouth At Bedtime   4/28/2024       Social History  Reviewed, and she  reports that she has an unknown smoking status. She has never used smokeless tobacco. She reports current alcohol use. She reports current drug use. Drug: Marijuana.  Social History     Tobacco Use    Smoking status: Unknown    Smokeless tobacco: Never   Substance Use Topics    Alcohol use: Yes     Comment: occasional       Family History  Reviewed, and I have reviewed this patient's family history and updated it with pertinent information if needed.  Family History   Problem Relation Age of Onset    Polycystic Kidney Diease Mother     Hypertension Mother     Kidney " "Disease Mother     Cerebrovascular Disease Mother     Chronic Obstructive Pulmonary Disease Father     Multiple Sclerosis Father     Polycystic Kidney Diease Sister     Cardiac Sudden Death Sister 52    Hypertension Sister     Kidney Disease Sister     Polycystic Kidney Diease Maternal Grandmother     Hypertension Maternal Grandmother     Kidney Disease Maternal Grandmother     Cerebrovascular Disease Maternal Grandmother     Heart Disease Maternal Grandfather     Hyperlipidemia Paternal Grandmother     Cerebrovascular Disease Paternal Grandmother     Coronary Artery Disease Paternal Grandfather     Pulmonary Embolism Paternal Grandfather     Anesthesia Reaction No family hx of           Review Of Systems  Complete As per admission HPI, all other systems reviewed and negative.     Physical Exam:  Vital signs:  Temp: 98.3  F (36.8  C) Temp src: Oral BP: (!) 145/87 Pulse: 110   Resp: 20 SpO2: 100 % O2 Device: Nasal cannula Oxygen Delivery: 3 LPM Height: 149.9 cm (4' 11\") Weight: 41.9 kg (92 lb 4.8 oz)  Estimated body mass index is 18.64 kg/m  as calculated from the following:    Height as of this encounter: 1.499 m (4' 11\").    Weight as of this encounter: 41.9 kg (92 lb 4.8 oz).    General Appearance:  Awake Alert, orientedx3, not in any apparent distress   Head:  Normocephalic, without obvious abnormality   Eyes:  PERRL, conjunctiva/corneas clear   Throat: Oral mucosa moist   Neck: Supple,  + JVD   Lungs:   Clear to auscultation bilaterally, respirations unlabored   Chest Wall:  No tenderness   Heart:  Regular rate and rhythm, S1, S2 normal,no murmur   Abdomen:   Soft, mildly tender in epigastrium bowel sounds present,  no guarding, rigidity    Extremities:  no edema, no joint swelling   Skin: Left arm AV fistula present.   Neurologic: Alert and oriented X 3, no focal deficits     Results:  Labs Ordered and Resulted from Time of ED Arrival to Time of ED Departure   COMPREHENSIVE METABOLIC PANEL - Abnormal       " Result Value    Sodium 135      Potassium 5.0      Carbon Dioxide (CO2) 28      Anion Gap 18 (*)     Urea Nitrogen 60.9 (*)     Creatinine 4.41 (*)     GFR Estimate 10 (*)     Calcium 10.2      Chloride 89 (*)     Glucose 105 (*)     Alkaline Phosphatase 112      AST 21      ALT <5      Protein Total 6.9      Albumin 3.9      Bilirubin Total 0.6     LACTIC ACID WHOLE BLOOD - Abnormal    Lactic Acid 2.2 (*)    TROPONIN T, HIGH SENSITIVITY - Abnormal    Troponin T, High Sensitivity 247 (*)    CBC WITH PLATELETS AND DIFFERENTIAL - Abnormal    WBC Count 11.6 (*)     RBC Count 2.73 (*)     Hemoglobin 9.4 (*)     Hematocrit 31.5 (*)      (*)     MCH 34.4 (*)     MCHC 29.8 (*)     RDW 18.3 (*)     Platelet Count 317      % Neutrophils 80      % Lymphocytes 11      % Monocytes 8      % Eosinophils 0      % Basophils 0      % Immature Granulocytes 1      NRBCs per 100 WBC 0      Absolute Neutrophils 9.3 (*)     Absolute Lymphocytes 1.3      Absolute Monocytes 0.9      Absolute Eosinophils 0.0      Absolute Basophils 0.0      Absolute Immature Granulocytes 0.1      Absolute NRBCs 0.0     NT PROBNP INPATIENT - Abnormal    N terminal Pro BNP Inpatient >70,000 (*)    TROPONIN T, HIGH SENSITIVITY      EKG:  EKG: Sinus tachycardia, frequent PVCs, asymmetric T wave inversions in anterolateral leads.    Echocardiogram 05/2021  Interpretation Summary  Global and regional left ventricular function is normal with an EF of 60-65%.  Mild concentric wall thickening consistent with left ventricular hypertrophy  is present.  Right ventricular function, chamber size, wall motion, and thickness are  normal.  No significant valvular abnormalities were noted.  Previous study not available for comparison.  ______________________________________    Imaging:   CT Abdomen Pelvis w/o Contrast    Result Date: 4/29/2024  EXAM: CT ABDOMEN PELVIS W/O CONTRAST LOCATION: [facility] DATE: 4/29/2024 INDICATION: abd pain,  bloating COMPARISON: CT abdomen pelvis 03/23/2024 TECHNIQUE: CT scan of the abdomen and pelvis was performed without IV contrast. Multiplanar reformats were obtained. Dose reduction techniques were used. CONTRAST: None. FINDINGS: LOWER CHEST: Septal thickening and hazy groundglass in the lower lungs, compatible with pulmonary edema. Trace right greater than left pleural effusions with compressive atelectasis. HEPATOBILIARY: Unchanged liver contours with scattered hepatic cysts. Layering sludge within the gallbladder. PANCREAS: Unchanged dilation of the main pancreatic duct. SPLEEN: Calcified granulomas. ADRENAL GLANDS: Poorly visualized. KIDNEYS/BLADDER: Bilateral nephrectomies. BOWEL: No bowel obstruction. Mild amount of colonic stool as well as fluid-filled small bowel in the lower abdomen. No pneumatosis or pneumoperitoneum. LYMPH NODES: Infiltration of the mesentery/retroperitoneal fat limits evaluation for adenopathy. VASCULATURE: Abdominal aorta is nonaneurysmal with severe circumferential atherosclerotic calcifications. PELVIC ORGANS: No pelvic mass. MUSCULOSKELETAL: Lower lumbar spinal fusion hardware. Left hip prosthesis. No destructive osseous lesions. Mild body wall edema.     IMPRESSION: 1.  Findings of pulmonary edema with trace right greater than left pleural effusions. 2.  No bowel obstruction or other acute findings in the abdomen or pelvis on noncontrast CT. 3.  Additional noncritical details in the findings.     XR Chest 2 Views    Result Date: 4/29/2024  EXAM: XR CHEST 2 VIEWS LOCATION: [facility] DATE: 4/29/2024 INDICATION: Shortness of breath COMPARISON: X-ray 07/13/2023     IMPRESSION: Cardiomegaly with central vascular congestion and interstitial edema. Trace right pleural effusion with adjacent opacities likely reflecting atelectasis. This constellation of findings suggests CHF.        > 75 MINUTES SPENT BY ME on the date of service doing chart review, history, exam,  documentation & further activities per the note.      Alissa Lane M.D  [facility]  Internal Medicine    4/29/2024  3:31 PM

## 2024-04-29 NOTE — ED PROVIDER NOTES
EMERGENCY DEPARTMENT ENCOUNTER      NAME: Serene Tipton  AGE: 68 year old female  YOB: 1955  MRN: [id number]  EVALUATION DATE & TIME: 2024 11:25 AM    PCP: Ramos Lowry    ED PROVIDER: Freddy Khan M.D.      Chief Complaint   Patient presents with    Abdominal Pain    Shortness of Breath         FINAL IMPRESSION:  1. Congestive heart failure, unspecified HF chronicity, unspecified heart failure type (H)          ED COURSE & MEDICAL DECISION MAKIN:32 AM I met with the patient to obtain patient history and performed a physical exam. Discussed plan for ED work up including potential diagnostic studies and interventions.  12:17 PM Spoke with [facility], MINERVA Almonte, face to face who agrees with admission.  1:14 PM Repeat exam unchanged. Discussed findings and need for admission.  1:26 PM Spoke with Cardiology, Dr. Begum.  1:31 PM Spoke with the hospitalist Dr. Lane who accepts the patient for admission.    Pertinent Labs & Imaging studies reviewed. (See chart for details)  68 year old female presents to the Emergency Department for evaluation of shortness of breath, abdominal pain. Patient appears non toxic with stable vitals signs, patient tachycardic but afebrile with no hypoxia.'s are clear, exam significant for epigastric tenderness, mild distention but no fluid wave.  Per review of the medical record I did review discharge summary from [facility] on 3/27/2024 where patient was admitted and found to have small bowel obstruction versus obstipation likely contributing to her shortness of breath.  No fluid wave on exam to suggest ascites, no focal tenderness to suggest diverticulitis or appendicitis, certainly concern for adynamic ileus or obstruction leading to compression of diaphragm and harder for patient to take deep breaths.  Lung sounds are clear, no chest pain or cough, fever, story is atypical for ACS and nothing she has  PE or dissection, no fever or cough to suggest COVID, pneumonia, influenza.  We will obtain screening labs and CT imaging the abdomen pelvis.    Reassessment: Labs by my independent interpretation concerning for CHF with an elevated BNP, concerning elevated troponin but nothing clinically to suggest true cardiac ischemia.  She has troponin of 247 which I think is related to her CHF and chronic kidney disease.  Did note lactic acid of 2.2 and white blood cell count of 11.6 but again no fever or infectious type symptoms here with nothing to suggest sepsis.  CT imaging reported pulmonary edema but no concerns for bowel obstruction or other acute findings in the abdomen or pelvis.  Chest x-ray reported cardiomegaly and vascular congestion and interstitial edema, pleural effusion.  Did discuss patient case with nephrology who recommends admission for dialysis.  Discussed all these findings and need for admission with the patient who is in agreement.  Discussed care plan to admitting service.  Again discussed with cardiology and at this time no indication for emergent heparinization, will be admitted for dialysis and likely echocardiogram.    Medical Decision Making  Obtained supplemental history:Supplemental history obtained?: No  Reviewed external records: External records reviewed?: Inpatient Record: Admission 3/23/2024-3/27/2024  Care impacted by chronic illness:Chronic Kidney Disease, Hyperlipidemia, Hypertension, Smoking / Nicotine Use, and Other: GERD, JAMIL  Care significantly affected by social determinants of health:Access to Medical Care  Did you consider but not order tests?: Work up considered but not performed and documented in chart, if applicable  Did you interpret images independently?: Independent interpretation of ECG and images noted in documentation, when applicable.  Consultation discussion with other provider:Did you involve another provider (consultant, , pharmacy, etc.)?: I discussed the care with  another health care provider, see documentation for details.  Admit.      At the conclusion of the encounter I discussed the results of all of the tests and the disposition. The questions were answered and return precautions provided. The patient or family acknowledged understanding and was agreeable with the care plan.         MEDICATIONS GIVEN IN THE EMERGENCY:  Medications - No data to display    NEW PRESCRIPTIONS STARTED AT TODAY'S ER VISIT  New Prescriptions    No medications on file            =================================================================    HPI    Patient information was obtained from: patient    Use of Intrepreter: N/A         Serene Tipton is a 68 year old female with pertinent medical history of ESRD on dialysis, nephrectomy (bilateral, 2/1/2023), SBO, ileus, constipation, GERD, JAMIL, HTN, HLD, tobacco use disorder, who presents abdominal pain and shortness of breath.    Patient reports for the past month, she's been dealing with intermittent diffuse abdominal pain with associating bloating and shortness of breath. This has been going on for the past year but has been more frequent this past month. The abdominal pain is better with releasing gas. She did recently get admitted in 3/23/2024-3/27/2024 for an SBO and her pain was controlled at time of discharge. Over this past week, she's noted that the shortness of breath has been worsening which makes her unable to sleep because she feels too short of breath. She notes after eating a salad with broccoli stems last week, she hasn't had any BM's. She is still taking her stool softeners and lactulose.     She otherwise denies associating chest pain, cough, fever, or vomiting. She has history of ESRD and doesn't produce urine. She goes to dialysis on Tuesday, Thursday, and Saturday. She denies history of ascites. There were no other concerns/complaints at this time.    Per chart review:  3/23/2024-3/27/2024 - Patient was admitted at  [facility] for SBO. CT abdomen showed SBO with transition point with heavy stool burden. NG placement was difficult so it wasn't placed. General surgery was consulted who recommended to undergo an enema with mag citrate with improvement of stool burden. She was discharged home with follow up with PCP and lactulose and senna docusate.      REVIEW OF SYSTEMS   Constitutional:  Denies fever, chills  Respiratory: Endorses shortness of breath. Denies productive cough  Cardiovascular:  Denies chest pain, palpitations  GI: Endorses abdominal pain, distention, and constipation. Denies nausea, vomiting, or change in bladder habits   Musculoskeletal:  Denies any new muscle/joint swelling  Skin:  Denies rash   Neurologic:  Denies focal weakness  All systems negative except as marked.     PAST MEDICAL HISTORY:  Past Medical History:   Diagnosis Date    Anemia in chronic kidney disease     Anxiety     Arthritis     Brain aneurysm     Cavernous segment of the right & left carotid arteries. See Neurosurg note 6/16/21.    Chronic low back pain     Managed by [facility]    Congestive heart failure, unspecified HF chronicity, unspecified heart failure type (H) 4/29/2024    Depressive disorder 2013    Disease of liver 8/29/2023    Disease of thyroid gland     ESRD (end stage renal disease) on dialysis (H) 07/2020    GERD (gastroesophageal reflux disease)     Hepatic lesion     Hyperlipidemia     Hypertension     Nephrolithiasis     Neuromuscular disorder (H)     Osteoporosis     PKD (polycystic kidney disease) 09/01/1990    Primary generalized (osteo)arthritis 8/2/2023    PTSD (post-traumatic stress disorder)     Tobacco abuse     Vitamin D deficiency        PAST SURGICAL HISTORY:  Past Surgical History:   Procedure Laterality Date    BACK SURGERY      spinal fusion w/hardware    BIOPSY Left 09/01/1990    Breast    BREAST LUMPECTOMY, RT/LT Left     Lumpectomy    CYST REMOVAL Right     rt wrist    CYSTECTOMY PILONIDAL   1981    EYE SURGERY  2008    lasik    FORMATION ARTERIOVENOUS FISTULA    07/28/2020    FRACTURE SURGERY      NEPHRECTOMY BILATERAL Bilateral 2/1/2023    Procedure: bilateral native nephrectomy;  Surgeon: Alana Giang MD;  Location: [facility] OR    OPEN REDUCTION INTERNAL FIXATION HIP BIPOLAR Left 11/10/2023    Procedure: LEFT HEMIARTHROPLASTY, HIP, HEMIARTHROPLASTY;  Surgeon: Lucas Weldon MD;  Location: [facility] Main OR    ORTHOPEDIC SURGERY Right 2005    Shoulder    OTHER SURGICAL HISTORY      carpal tunnel repair    SPINE SURGERY           CURRENT MEDICATIONS:    Prior to Admission medications    Medication Sig Start Date End Date Taking? Authorizing Provider   artificial saliva (BIOTENE MT) SOLN solution Swish and spit 2 sprays in mouth as needed for dry mouth    Unknown, Entered By History   B Complex-C (SUPER B COMPLEX PO) Take 1 tablet by mouth daily at 2 pm    Unknown, Entered By History   calcitRIOL (ROCALTROL) 0.5 MCG capsule Take 0.5 mcg by mouth three times a week Given at Dialysis    Reported, Patient   calcium acetate (CALPHRON) 667 MG TABS tablet Take 1-2 tablets by mouth 3 times daily (with meals) 2/20/24   Reported, Patient   cholecalciferol (VITAMIN D3) 25 mcg (1000 units) capsule Take 1 capsule by mouth daily at 2 pm    Reported, Patient   cyclobenzaprine (FLEXERIL) 5 MG tablet Take 5 mg by mouth 2 times daily as needed for muscle spasms    Unknown, Entered By History   Epoetin Adam (EPOGEN IJ) Inject 1,000 Units into the vein three times a week At dialysis.    Unknown, Entered By History   glucosamine-chondroitin 500-400 MG CAPS per capsule Take 2 capsules by mouth daily at 2 pm    Unknown, Entered By History   Iron Sucrose (VENOFER IV) Inject 100 mg into the vein once a week At dialysis    Unknown, Entered By History   labetalol (NORMODYNE) 200 MG tablet Take 200 mg by mouth 2 times daily 3/2/24   Reported, Patient   lactulose (CHRONULAC) 10 GM/15ML solution Take 15 mLs (10 g) by mouth 2 times  daily as needed for constipation 7/26/23   Jarad Raymond MD   levothyroxine (SYNTHROID/LEVOTHROID) 50 MCG tablet Take 50 mcg by mouth daily    Unknown, Entered By History   lidocaine HCl 3 % cream Apply topically daily as needed (pain)    Unknown, Entered By History   LORazepam (ATIVAN) 1 MG tablet Take 1 tablet (1 mg) by mouth daily (before lunch) 11/14/23   Ailssa Lane MD   nortriptyline (PAMELOR) 75 MG capsule Take 75 mg by mouth At Bedtime 11/21/22   Reported, Patient   Nutritional Supplements (NEPRO) LIQD Take 1 Can by mouth daily    Unknown, Entered By History   omeprazole (PRILOSEC) 20 MG DR capsule Take 20 mg by mouth daily    Unknown, Entered By History   oxyCODONE IR (ROXICODONE) 10 MG tablet Take 10 mg by mouth every 4 hours as needed for pain 3/20/24   Reported, Patient   rosuvastatin (CRESTOR) 10 MG tablet Take 10 mg by mouth At Bedtime    Reported, Patient   SENNA-docusate sodium (SENNA S) 8.6-50 MG tablet Take 2 tablets by mouth 2 times daily Hold for loose stools. 3/27/24   Jeremías Saenz MD   sertraline (ZOLOFT) 100 MG tablet Take 150 mg by mouth At Bedtime    Reported, Patient        ALLERGIES:  Allergies   Allergen Reactions    Penicillins Other (See Comments) and Shortness Of Breath     Breathing problem.  breathing      No Clinical Screening - See Comments      PN: LW CM1: Contrast Intercapsular - Nonionic Reaction :    Nsaids Other (See Comments)     Kidney damage    Carvedilol GI Disturbance     Nausea and vomiting    Ciprofloxacin Muscle Pain (Myalgia)    Floxacillin (Flucloxacillin) Muscle Pain (Myalgia) and Rash    Levofloxacin Muscle Pain (Myalgia) and Rash    Morphine Nausea and Vomiting    Sulfa Antibiotics Itching       FAMILY HISTORY:  Family History   Problem Relation Age of Onset    Polycystic Kidney Diease Mother     Hypertension Mother     Kidney Disease Mother     Cerebrovascular Disease Mother     Chronic Obstructive Pulmonary Disease Father     Multiple Sclerosis Father      "Polycystic Kidney Diease Sister     Cardiac Sudden Death Sister 52    Hypertension Sister     Kidney Disease Sister     Polycystic Kidney Diease Maternal Grandmother     Hypertension Maternal Grandmother     Kidney Disease Maternal Grandmother     Cerebrovascular Disease Maternal Grandmother     Heart Disease Maternal Grandfather     Hyperlipidemia Paternal Grandmother     Cerebrovascular Disease Paternal Grandmother     Coronary Artery Disease Paternal Grandfather     Pulmonary Embolism Paternal Grandfather     Anesthesia Reaction No family hx of        SOCIAL HISTORY:   Social History     Socioeconomic History    Marital status: Single   Tobacco Use    Smoking status: Unknown    Smokeless tobacco: Never   Substance and Sexual Activity    Alcohol use: Yes     Comment: occasional    Drug use: Yes     Types: Marijuana       VITALS:  Patient Vitals for the past 24 hrs:   BP Temp Pulse Resp SpO2 Height Weight   04/29/24 1117 135/79 98  F (36.7  C) 117 20 95 % 1.499 m (4' 11\") 39.7 kg (87 lb 8.4 oz)        PHYSICAL EXAM    Constitutional:  Awake, alert, in no apparent distress  HENT:  Normocephalic, Atraumatic. Bilateral external ears normal. Oropharynx moist. Nose normal. Neck- Normal range of motion with no guarding, No midline cervical tenderness, Supple, No stridor.   Eyes:  PERRL, EOMI with no signs of entrapment, Conjunctiva normal, No discharge.   Respiratory:  Normal breath sounds, No respiratory distress, No wheezing.    Cardiovascular: Tachycardic, normal rhythm  GI:  Soft, epigastric tenderness with mild distention and but no fluid wave  Musculoskeletal:  Intact distal pulses, 1+ pitting edema bilaterally.  No gross deformities  Integument:  Warm, Dry, No erythema, No rash.   Neurologic:  Alert & oriented, Normal motor function, Normal sensory function, No focal deficits noted.   Psychiatric:  Affect normal, Judgment normal, Mood normal.     LAB:  All pertinent labs reviewed and interpreted.  Results for " orders placed or performed during the hospital encounter of 04/29/24   CT Abdomen Pelvis w/o Contrast    Impression    IMPRESSION:   1.  Findings of pulmonary edema with trace right greater than left pleural effusions.  2.  No bowel obstruction or other acute findings in the abdomen or pelvis on noncontrast CT.  3.  Additional noncritical details in the findings.     XR Chest 2 Views    Impression    IMPRESSION: Cardiomegaly with central vascular congestion and interstitial edema. Trace right pleural effusion with adjacent opacities likely reflecting atelectasis. This constellation of findings suggests CHF.   Comprehensive metabolic panel   Result Value Ref Range    Sodium 135 135 - 145 mmol/L    Potassium 5.0 3.4 - 5.3 mmol/L    Carbon Dioxide (CO2) 28 22 - 29 mmol/L    Anion Gap 18 (H) 7 - 15 mmol/L    Urea Nitrogen 60.9 (H) 8.0 - 23.0 mg/dL    Creatinine 4.41 (H) 0.51 - 0.95 mg/dL    GFR Estimate 10 (L) >60 mL/min/1.73m2    Calcium 10.2 8.8 - 10.2 mg/dL    Chloride 89 (L) 98 - 107 mmol/L    Glucose 105 (H) 70 - 99 mg/dL    Alkaline Phosphatase 112 40 - 150 U/L    AST 21 0 - 45 U/L    ALT <5 0 - 50 U/L    Protein Total 6.9 6.4 - 8.3 g/dL    Albumin 3.9 3.5 - 5.2 g/dL    Bilirubin Total 0.6 <=1.2 mg/dL   Lactic acid whole blood   Result Value Ref Range    Lactic Acid 2.2 (H) 0.7 - 2.0 mmol/L   Result Value Ref Range    Troponin T, High Sensitivity 247 (HH) <=14 ng/L   CBC with platelets and differential   Result Value Ref Range    WBC Count 11.6 (H) 4.0 - 11.0 10e3/uL    RBC Count 2.73 (L) 3.80 - 5.20 10e6/uL    Hemoglobin 9.4 (L) 11.7 - 15.7 g/dL    Hematocrit 31.5 (L) 35.0 - 47.0 %     (H) 78 - 100 fL    MCH 34.4 (H) 26.5 - 33.0 pg    MCHC 29.8 (L) 31.5 - 36.5 g/dL    RDW 18.3 (H) 10.0 - 15.0 %    Platelet Count 317 150 - 450 10e3/uL    % Neutrophils 80 %    % Lymphocytes 11 %    % Monocytes 8 %    % Eosinophils 0 %    % Basophils 0 %    % Immature Granulocytes 1 %    NRBCs per 100 WBC 0 <1 /100     Absolute Neutrophils 9.3 (H) 1.6 - 8.3 10e3/uL    Absolute Lymphocytes 1.3 0.8 - 5.3 10e3/uL    Absolute Monocytes 0.9 0.0 - 1.3 10e3/uL    Absolute Eosinophils 0.0 0.0 - 0.7 10e3/uL    Absolute Basophils 0.0 0.0 - 0.2 10e3/uL    Absolute Immature Granulocytes 0.1 <=0.4 10e3/uL    Absolute NRBCs 0.0 10e3/uL   Nt probnp inpatient (BNP)   Result Value Ref Range    N terminal Pro BNP Inpatient >70,000 (H) 0 - 900 pg/mL   ECG 12-LEAD WITH MUSE ([facility])   Result Value Ref Range    Systolic Blood Pressure  mmHg    Diastolic Blood Pressure  mmHg    Ventricular Rate 117 BPM    Atrial Rate 117 BPM    ND Interval 170 ms    QRS Duration 108 ms     ms    QTc 451 ms    P Axis 64 degrees    R AXIS 56 degrees    T Axis -63 degrees    Interpretation ECG       Sinus tachycardia with frequent Premature ventricular complexes  T wave abnormality, consider inferolateral ischemia  Abnormal ECG  When compared with ECG of 07-MAR-2024 19:31,  Premature ventricular complexes are now Present  ND interval has decreased  Nonspecific T wave abnormality now evident in Anterior leads  Confirmed by SEE ED PROVIDER NOTE FOR, ECG INTERPRETATION ([id number]),  IRWIN STUBBS ([id number]) on 4/29/2024 1:36:26 PM         RADIOLOGY:  XR Chest 2 Views   Final Result   IMPRESSION: Cardiomegaly with central vascular congestion and interstitial edema. Trace right pleural effusion with adjacent opacities likely reflecting atelectasis. This constellation of findings suggests CHF.      CT Abdomen Pelvis w/o Contrast   Final Result   IMPRESSION:    1.  Findings of pulmonary edema with trace right greater than left pleural effusions.   2.  No bowel obstruction or other acute findings in the abdomen or pelvis on noncontrast CT.   3.  Additional noncritical details in the findings.                EKG:    Sinus tachycardia, no specific ST acute ischemic changes, no concerning dysrhythmias or for prolongation, compared to ECG of March 7, 2024, no specific ST acute  ischemic change appreciated  I have independently reviewed and interpreted the EKG(s) documented above.    PROCEDURES:   None      I, Irais Allen, am serving as a scribe to document services personally performed by rFeddy Khan MD, based on my observation and the provider's statements to me. I, Freddy Khan MD attest that Irais Allen is acting in a scribe capacity, has observed my performance of the services and has documented them in accordance with my direction.    Freddy Khan M.D.  Emergency Medicine  [facility] EMERGENCY ROOM  [address] [facility] [address]  [phone]  Dept: [phone]     Freddy Khan MD  04/29/24 1412

## 2024-04-29 NOTE — CONSULTS
RENAL CONSULT NOTE    REQUESTING PHYSICIAN: ED MD    REASON FOR CONSULT: ESRD     PLAN:  UF only tx today for volume overload   LE lymphedema wraps.  Dialysis tomorrow and continue on TTS schedule while admitted  Abd pain w/up in progress    ASSESSMENT:  ESRD:   2/2 PKDs/p bilateral nephrectomies.    [place]  [facility] [place] a TTS schedule.  She runs 3 hours.    Has a left upper extremity AVF, working well.  EDW to 37-38 Kg, follow.   Plan for UF only tx for volume overload today, then HD tomorrow on TTS schedule       Lytes/Acid-base:  No acute issues  Renal diet when taking pos      Abd pain:  Recurrent, w/up pending    Hypothyroidism:   On replacement     ACD:   Receives PETE and parenteral iron with outpatient dialysis.   Hemoglobin  9's  Follow need to dose PETE while inpatient.     HLD:   Statin     Secondary renal hyperparathyroidism:   Sevelamer 3 times daily AC when taking pos     H/O HTN:   On labetalol historically.    Continue as needed     SBO, nausea, abdominal pain:   NPO.  Recurrent, w/up in progress  Management per primary team     HPI: Serene is a 67-year-old female past medical history of polycystic kidney disease s/p bilateral nephrectomies, end-stage renal disease on insulin [place] [facility] [place] anemia of chronic disease, hypertension, hypothyroidism, and hyperlipidemia.     Serene has been experiencing abdominal pain and bloating progressively worsening over the past 1-2 weeks, recurrent Has been drinking Nepro protein shakes daily  Taking oral bicarb 4x daily for indigestion, has noted increased bloating.  Modest SOB. Currently on supplemental 02.  She is trending above her target weight, did have dialysis on 4/27, left 2kg above her target wt.  She did have a shortened HD tx on Tues d/t infiltration.  Was advised to tx Th, Fri, Sat, but she did not want to tx every    REVIEW OF SYSTEMS:  COMPLETE REVIEW OF SYSTEMS: General: see  HPI  Eyes: denies  Ears/Nose/Throat: denies new issues  Cardiovascular: no CP  Respiratory: endorses ongoing SOB PTA, on supp 02 her  Gastrointestinal: see HPI  Musculoskeletal: shoulder pain, effusion  Skin: bruising Larm  Neurologic: no new issues   Psychiatric: no new issues       Past Medical History:   Diagnosis Date    Anemia in chronic kidney disease     Anxiety     Arthritis     Brain aneurysm     Cavernous segment of the right & left carotid arteries. See Neurosurg note 6/16/21.    Chronic low back pain     Managed by Pain [facility]    Depressive disorder 2013    Disease of liver 8/29/2023    Disease of thyroid gland     ESRD (end stage renal disease) on dialysis (H) 07/2020    GERD (gastroesophageal reflux disease)     Hepatic lesion     Hyperlipidemia     Hypertension     Nephrolithiasis     Neuromuscular disorder (H)     Osteoporosis     PKD (polycystic kidney disease) 09/01/1990    Primary generalized (osteo)arthritis 8/2/2023    PTSD (post-traumatic stress disorder)     Tobacco abuse     Vitamin D deficiency        I have reviewed this patient's family history and updated it with pertinent information if needed.  Family History   Problem Relation Age of Onset    Polycystic Kidney Diease Mother     Hypertension Mother     Kidney Disease Mother     Cerebrovascular Disease Mother     Chronic Obstructive Pulmonary Disease Father     Multiple Sclerosis Father     Polycystic Kidney Diease Sister     Cardiac Sudden Death Sister 52    Hypertension Sister     Kidney Disease Sister     Polycystic Kidney Diease Maternal Grandmother     Hypertension Maternal Grandmother     Kidney Disease Maternal Grandmother     Cerebrovascular Disease Maternal Grandmother     Heart Disease Maternal Grandfather     Hyperlipidemia Paternal Grandmother     Cerebrovascular Disease Paternal Grandmother     Coronary Artery Disease Paternal Grandfather     Pulmonary Embolism Paternal Grandfather     Anesthesia Reaction No family hx of           Social History     Tobacco Use    Smoking status: Unknown    Smokeless tobacco: Never   Substance Use Topics    Alcohol use: Yes     Comment: occasional    Drug use: Yes     Types: Marijuana          No current facility-administered medications for this encounter.     Current Outpatient Medications   Medication Sig Dispense Refill    artificial saliva (BIOTENE MT) SOLN solution Swish and spit 2 sprays in mouth as needed for dry mouth      B Complex-C (SUPER B COMPLEX PO) Take 1 tablet by mouth daily at 2 pm      calcitRIOL (ROCALTROL) 0.5 MCG capsule Take 0.5 mcg by mouth three times a week Given at Dialysis      calcium acetate (CALPHRON) 667 MG TABS tablet Take 1-2 tablets by mouth 3 times daily (with meals)      cholecalciferol (VITAMIN D3) 25 mcg (1000 units) capsule Take 1 capsule by mouth daily at 2 pm      cyclobenzaprine (FLEXERIL) 5 MG tablet Take 5 mg by mouth 2 times daily as needed for muscle spasms      Epoetin Adam (EPOGEN IJ) Inject 1,000 Units into the vein three times a week At dialysis.      glucosamine-chondroitin 500-400 MG CAPS per capsule Take 2 capsules by mouth daily at 2 pm      Iron Sucrose (VENOFER IV) Inject 100 mg into the vein once a week At dialysis      labetalol (NORMODYNE) 200 MG tablet Take 200 mg by mouth 2 times daily      lactulose (CHRONULAC) 10 GM/15ML solution Take 15 mLs (10 g) by mouth 2 times daily as needed for constipation 900 mL 0    levothyroxine (SYNTHROID/LEVOTHROID) 50 MCG tablet Take 50 mcg by mouth daily      lidocaine HCl 3 % cream Apply topically daily as needed (pain)      LORazepam (ATIVAN) 1 MG tablet Take 1 tablet (1 mg) by mouth daily (before lunch) 3 tablet 0    nortriptyline (PAMELOR) 75 MG capsule Take 75 mg by mouth At Bedtime      Nutritional Supplements (NEPRO) LIQD Take 1 Can by mouth daily      omeprazole (PRILOSEC) 20 MG DR capsule Take 20 mg by mouth daily      oxyCODONE IR (ROXICODONE) 10 MG tablet Take 10 mg by mouth every 4 hours as  "needed for pain      rosuvastatin (CRESTOR) 10 MG tablet Take 10 mg by mouth At Bedtime      SENNA-docusate sodium (SENNA S) 8.6-50 MG tablet Take 2 tablets by mouth 2 times daily Hold for loose stools. 120 tablet 0    sertraline (ZOLOFT) 100 MG tablet Take 150 mg by mouth At Bedtime           ALLERGIES/SENSITIVITIES:  Allergies   Allergen Reactions    Penicillins Other (See Comments) and Shortness Of Breath     Breathing problem.  breathing      No Clinical Screening - See Comments      PN: LW CM1: Contrast Intercapsular - Nonionic Reaction :    Nsaids Other (See Comments)     Kidney damage    Carvedilol GI Disturbance     Nausea and vomiting    Ciprofloxacin Muscle Pain (Myalgia)    Floxacillin (Flucloxacillin) Muscle Pain (Myalgia) and Rash    Levofloxacin Muscle Pain (Myalgia) and Rash    Morphine Nausea and Vomiting    Sulfa Antibiotics Itching          PHYSICAL EXAM:  /79   Pulse 117   Temp 98  F (36.7  C)   Resp 20   Ht 1.499 m (4' 11\")   Wt 39.7 kg (87 lb 8.4 oz)   SpO2 95%   BMI 17.68 kg/m      Patient Vitals for the past 72 hrs:   Weight   04/29/24 1117 39.7 kg (87 lb 8.4 oz)     Body mass index is 17.68 kg/m .  No intake or output data in the 24 hours ending 04/29/24 1251      General - A & O x 3, NAD, sitting up in bed ED 15, RN present, labs being drawn  HEENT - PERRLA, no scleral icterus  Respiratory - 2L per NC, sats mid 90s  Cardiovascular - rate and SBP controlled,   Abdomen - soft, distended   Extremities - well perfused,++ edema, mostly confined to ankles/feet   Integumentary - intact, good turgor, no rash/lesions  Neurologic - grossly intact  Psych:  Judgement intact, affect WNL  :  no UO     Laboratory:  Recent Labs   Lab Test 04/29/24  1205 03/23/24  1321 07/14/23  0503 07/13/23  1239 07/11/23  1131   WBC 11.6* 11.0   < > 13.0* 11.5*   HGB 9.4* 10.8*   < > 8.4* 9.0*   * 100   < > 103* 103*    406   < > 320 288   INR  --   --   --  1.70* 1.40*    < > = values in this " interval not displayed.      Recent Labs   Lab Test 04/29/24  1205 03/25/24  0804 03/24/24  0455   POTASSIUM 5.0 5.0 6.0*   CHLORIDE 89* 92* 85*   BUN 60.9*  --  78.2*      Recent Labs   Lab Test 04/29/24  1205 03/23/24  1321 08/18/22  1731 05/19/21  1520   ALBUMIN 3.9 3.7   < >  --    BILITOTAL 0.6 0.5   < >  --    ALT <5 11   < >  --    AST 21 105*   < >  --    PROTEIN  --   --   --  100*    < > = values in this interval not displayed.       Personally reviewed today's laboratory studies      Thank you for involving us in the care of this patient. We will continue to follow along with you.      Stephanie Gaviria, MINERVA-BC  [facility]  [phone]

## 2024-04-29 NOTE — CONSULTS
Thank you, Dr. Canchola, for asking the [facility] team to see Ms. Serene Tipton for evaluation of elevated troponin and shortness of breath.    Assessment/Recommendations   Assessment/Plan:  Elevated troponin: The patient complains of abdominal bloating, shortness of breath over last 7 days.  No chest pain.  ECG showed nonspecific T wave abnormality, more evident in anterior leads.  Hypersensitivity troponin is elevated 247 on the setting of end-stage renal disease.  Less likely this is acute coronary syndrome.  We will follow-up her second hypersensitivity troponins.  If it is elevated, consider heparin infusion.  Echocardiogram is requested for evaluation of heart function and structure.  Depend on serials of hyposensitivity troponin and echo findings, and then decide next step.  Acute on chronic congestive heart failure with preserved ejection fraction: Her chest x-ray indicated pulmonary congestion.  proBNP is significantly elevated.  Continue hemodialysis to remove extra fluid.  Benign essential hypertension: Continue labetalol 200 mg twice a day.  Continue to monitor her blood pressure, add amlodipine for better blood pressure control if indicated.  Dyslipidemia: Continue rosuvastatin.  End-stage renal disease on hemodialysis, polycystic kidney disease, chronic anemia, abdominal bloating: Please see primary team of management for details.    Clinically Significant Risk Factors Present on Admission           # Hypercalcemia: Highest Ca = 10.2 mg/dL in last 2 days, will monitor as appropriate        # Hypertension: Noted on problem list            Fluid overload, unspecified  CKD POA List: ESRD on dialysis       History of Present Illness/Subjective    It is my pleasure to see Serene Tipton at [facility] for evaluation of shortness of breath and elevated troponin.  Serene Tipton is a 68 year old female with a medical history of end-stage  "renal disease on hemodialysis TTS, polycystic kidney disease s/p bilateral nephrectomies, chronic anemia of chronic kidney disease, benign essential hypertension, hypothyroidism, dyslipidemia.    The patient states that she developed abdominal bloating, shortness of breath over last week leading to ER visit.  She states that the she has been belching, get rid of lots of gas over last week.  After gas up or belching, and then her abdominal bloating can be improved, shortness of breath can be better.  She had no chest pain, palpitations, orthopnea, PND or leg edema.    On admission evaluation, ECG showed sinus tachycardia, more evidence of T wave inversion in anterior leads.  Initial hypersensitivity troponin is 247.  N-terminal proBNP is more than 70,000.  Chest x-ray indicated central vascular congestion and interstitial edema.  Abdominal CT scan indicated no bowel obstruction.       Physical Examination Review of Systems   BP (!) 151/93 (BP Location: Right arm)   Pulse 113   Temp 99  F (37.2  C) (Oral)   Resp 22   Ht 1.499 m (4' 11\")   Wt 41.9 kg (92 lb 4.8 oz)   SpO2 93%   BMI 18.64 kg/m    Body mass index is 18.64 kg/m .  Wt Readings from Last 3 Encounters:   04/29/24 41.9 kg (92 lb 4.8 oz)   03/27/24 36.7 kg (80 lb 14.4 oz)   11/09/23 36.3 kg (80 lb)     No intake or output data in the 24 hours ending 04/29/24 1648    General Appearance:   Awake, Alert, No acute distress.   HEENT:  No scleral icterus; the mucous membranes were moist.   Neck: No cervical bruits. No JVD. No thyromegaly.    Chest: The spine was straight. The chest was symmetric.   Lungs:   Respirations unlabored; Lungs are clear to auscultation. No crackles.  No wheezing.   Cardiovascular:   Regular rhythm and rate, normal first and second heart sounds with no murmurs. No rubs or gallops.    Abdomen:  Soft. No tenderness. Bowels sounds are present   Extremities: Equal tibial pulses. No leg edema.   Skin: No rashes. Warm, Dry. "   Musculoskeletal: No tenderness.   Neurologic: Oriented x 3. Mood and affect are appropriate.     A 12 point comprehensive review of systems was negative except as noted.        Medical History  Surgical History Family History Social History   Past Medical History:   Diagnosis Date    Anemia in chronic kidney disease     Anxiety     Arthritis     Brain aneurysm     Cavernous segment of the right & left carotid arteries. See Neurosurg note 6/16/21.    Chronic low back pain     Managed by Pain Clinic    Congestive heart failure, unspecified HF chronicity, unspecified heart failure type (H) 4/29/2024    Depressive disorder 2013    Disease of liver 8/29/2023    Disease of thyroid gland     ESRD (end stage renal disease) on dialysis (H) 07/2020    GERD (gastroesophageal reflux disease)     Hepatic lesion     Hyperlipidemia     Hypertension     Nephrolithiasis     Neuromuscular disorder (H)     Osteoporosis     PKD (polycystic kidney disease) 09/01/1990    Primary generalized (osteo)arthritis 8/2/2023    PTSD (post-traumatic stress disorder)     Tobacco abuse     Vitamin D deficiency     Past Surgical History:   Procedure Laterality Date    BACK SURGERY      spinal fusion w/hardware    BIOPSY Left 09/01/1990    Breast    BREAST LUMPECTOMY, RT/LT Left     Lumpectomy    CYST REMOVAL Right     rt wrist    CYSTECTOMY PILONIDAL  1981    EYE SURGERY  2008    lasik    FORMATION ARTERIOVENOUS FISTULA    07/28/2020    FRACTURE SURGERY      NEPHRECTOMY BILATERAL Bilateral 2/1/2023    Procedure: bilateral native nephrectomy;  Surgeon: Alana Giang MD;  Location: [facility] OR    OPEN REDUCTION INTERNAL FIXATION HIP BIPOLAR Left 11/10/2023    Procedure: LEFT HEMIARTHROPLASTY, HIP, HEMIARTHROPLASTY;  Surgeon: Lucas Weldon MD;  Location: [facility] Main OR    ORTHOPEDIC SURGERY Right 2005    Shoulder    OTHER SURGICAL HISTORY      carpal tunnel repair    SPINE SURGERY      Family History   Problem Relation Age of Onset    Polycystic  Kidney Diease Mother     Hypertension Mother     Kidney Disease Mother     Cerebrovascular Disease Mother     Chronic Obstructive Pulmonary Disease Father     Multiple Sclerosis Father     Polycystic Kidney Diease Sister     Cardiac Sudden Death Sister 52    Hypertension Sister     Kidney Disease Sister     Polycystic Kidney Diease Maternal Grandmother     Hypertension Maternal Grandmother     Kidney Disease Maternal Grandmother     Cerebrovascular Disease Maternal Grandmother     Heart Disease Maternal Grandfather     Hyperlipidemia Paternal Grandmother     Cerebrovascular Disease Paternal Grandmother     Coronary Artery Disease Paternal Grandfather     Pulmonary Embolism Paternal Grandfather     Anesthesia Reaction No family hx of     Social History     Socioeconomic History    Marital status: Single     Spouse name: Not on file    Number of children: Not on file    Years of education: Not on file    Highest education level: Not on file   Occupational History    Not on file   Tobacco Use    Smoking status: Unknown    Smokeless tobacco: Never   Substance and Sexual Activity    Alcohol use: Yes     Comment: occasional    Drug use: Yes     Types: Marijuana    Sexual activity: Not on file   Other Topics Concern    Parent/sibling w/ CABG, MI or angioplasty before 65F 55M? Not Asked   Social History Narrative    Not on file     Social Determinants of Health     Financial Resource Strain: Not on file   Food Insecurity: Not on file   Transportation Needs: Not on file   Physical Activity: Not on file   Stress: Not on file   Social Connections: Not on file   Interpersonal Safety: Not on file   Housing Stability: Not on file          Medications  Allergies   Scheduled Meds:  Current Facility-Administered Medications   Medication Dose Route Frequency Provider Last Rate Last Admin    [START ON 4/30/2024] calcitRIOL (ROCALTROL) capsule 0.5 mcg  0.5 mcg Oral Once per day on Tuesday Thursday Saturday Alissa Lane MD         calcium acetate (PHOSLO) capsule 667-1,334 mg  667-1,334 mg Oral TID w/meals Alissa Lane MD        heparin ANTICOAGULANT injection 5,000 Units  5,000 Units Subcutaneous Q8H Alissa Lane MD        labetalol (NORMODYNE) tablet 200 mg  200 mg Oral BID Alissa Lane MD        levothyroxine (SYNTHROID/LEVOTHROID) tablet 50 mcg  50 mcg Oral Daily Alissa Lane MD        LORazepam (ATIVAN) tablet 0.5-1 mg  0.5-1 mg Oral Daily before lunch Alissa Lane MD        nortriptyline (PAMELOR) capsule 75 mg  75 mg Oral At Bedtime Alissa Lane MD        pantoprazole (PROTONIX) EC tablet 40 mg  40 mg Oral BID AC Alissa Lane MD        rosuvastatin (CRESTOR) tablet 10 mg  10 mg Oral At Bedtime Alissa Lane MD        senna-docusate (SENOKOT-S/PERICOLACE) 8.6-50 MG per tablet 2 tablet  2 tablet Oral BID Alissa Lane MD        sertraline (ZOLOFT) tablet 150 mg  150 mg Oral At Bedtime Alissa Lane MD        sodium chloride (PF) 0.9% PF flush 3 mL  3 mL Intracatheter Q8H Alissa Lane MD        sodium chloride 0.9% BOLUS 250 mL  250 mL Intravenous Once in dialysis/CRRT Stephanie Gaviria NP        sodium chloride 0.9% BOLUS 300 mL  300 mL Hemodialysis Machine Once Stephanie Gaviria NP        Vitamin D3 (CHOLECALCIFEROL) tablet 25 mcg  25 mcg Oral Daily Alissa Lane MD         Continuous Infusions:  Current Facility-Administered Medications   Medication Dose Route Frequency Provider Last Rate Last Admin    Stop Heparin 60 minutes before end of treatment   Does not apply Continuous PRN Stephanie Gaviria NP         PRN Meds:.  Current Facility-Administered Medications   Medication Dose Route Frequency Provider Last Rate Last Admin    acetaminophen (TYLENOL) tablet 650 mg  650 mg Oral Q4H PRN Alissa Lane MD        Or    acetaminophen (TYLENOL) Suppository 650 mg  650 mg Rectal Q4H PRN Alissa Lane MD        albumin human 25 % injection 50 mL  50 mL Intravenous Q1H PRN Stephanie Gaviria NP        artificial saliva  (BIOTENE MT) solution 2 spray  2 spray Swish & Spit Q1H PRN Alissa Lane MD        calcium carbonate (TUMS) chewable tablet 1,000 mg  1,000 mg Oral 4x Daily PRN Alissa Lane MD        cyclobenzaprine (FLEXERIL) tablet 5 mg  5 mg Oral BID PRN Alissa Lane MD        lactulose (CHRONULAC) solution 10 g  10 g Oral BID PRN Alissa Lane MD        lidocaine (LMX4) cream   Topical Q1H PRN Alissa Lane MD        lidocaine 1 % 0.1-1 mL  0.1-1 mL Other Q1H PRN Alissa Lane MD        lidocaine 1 % 0.5 mL  0.5 mL Intradermal Once PRN Stephanie Gaviria NP        lidocaine 1 % 0.5 mL  0.5 mL Intradermal Once PRN Stephanie Gaviria NP        naloxone (NARCAN) injection 0.2 mg  0.2 mg Intravenous Q2 Min PRN Alissa Lane MD        Or    naloxone (NARCAN) injection 0.4 mg  0.4 mg Intravenous Q2 Min PRN Alissa Lane MD        Or    naloxone (NARCAN) injection 0.2 mg  0.2 mg Intramuscular Q2 Min PRN Alissa Lane MD        Or    naloxone (NARCAN) injection 0.4 mg  0.4 mg Intramuscular Q2 Min PRAlissa Moser MD        oxyCODONE (ROXICODONE) tablet 5-10 mg  5-10 mg Oral Q4H PRN Alissa Lane MD        simethicone (MYLICON) chewable tablet 80 mg  80 mg Oral Q6H PRN Alissa Lane MD        sodium chloride (PF) 0.9% PF flush 3 mL  3 mL Intracatheter q1 min prn Alissa Lane MD        sodium chloride 0.9% BOLUS 100-150 mL  100-150 mL Intravenous Q15 Min PRN Stephanie Gaviria NP        Stop Heparin 60 minutes before end of treatment   Does not apply Continuous PRN Stephanie Gaviria NP        Allergies   Allergen Reactions    Penicillins Other (See Comments) and Shortness Of Breath     Breathing problem.  breathing      No Clinical Screening - See Comments      PN: LW CM1: Contrast Intercapsular - Nonionic Reaction :    Nsaids Other (See Comments)     Kidney damage    Carvedilol GI Disturbance     Nausea and vomiting    Ciprofloxacin Muscle Pain (Myalgia)    Floxacillin (Flucloxacillin) Muscle Pain (Myalgia) and Rash     Levofloxacin Muscle Pain (Myalgia) and Rash    Morphine Nausea and Vomiting    Sulfa Antibiotics Itching         Lab Results    Chemistry/lipid CBC Cardiac Enzymes/BNP/TSH/INR   Lab Results   Component Value Date    CHOL 162 09/06/2023    HDL 75 09/06/2023    TRIG 112 09/06/2023    BUN 60.9 (H) 04/29/2024     04/29/2024    CO2 28 04/29/2024    Lab Results   Component Value Date    WBC 11.6 (H) 04/29/2024    HGB 9.4 (L) 04/29/2024    HCT 31.5 (L) 04/29/2024     (H) 04/29/2024     04/29/2024    Lab Results   Component Value Date    TROPONINI 0.03 07/21/2023    BNP >5,000 (H) 02/25/2023    TSH 3.96 09/06/2023    INR 1.70 (H) 07/13/2023

## 2024-04-29 NOTE — ED NOTES
Pt states she has been having regular Bms.  She also does not make urine.  On dialysis and has no kidneys.  States she feels bloated and her abd is hard..  Has a history of bowel obstructions.

## 2024-04-29 NOTE — PHARMACY-ADMISSION MEDICATION HISTORY
"Pharmacist Admission Medication History    Admission medication history is complete. The information provided in this note is only as accurate as the sources available at the time of the update.    Information Source(s): Patient via in-person    Pertinent Information:     Patient previously discharged [facility] on 3/27/24.  She is not aware of medication changes since discharging.  Review of Surescripts does not indicate changes.  No clinic / HD notes available.     Changes made to PTA medication list:  Added: None  Deleted: None  Changed: None    Allergies reviewed with patient and updates made in EHR: yes    Medication History Completed By: Abebe Douglass RPH 4/29/2024 12:29 PM    PTA Med List   Medication Sig Last Dose    artificial saliva (BIOTENE MT) SOLN solution Swish and spit 2 sprays in mouth as needed for dry mouth Unknown    B Complex-C (SUPER B COMPLEX PO) Take 1 tablet by mouth daily at 2 pm 4/28/2024    calcitRIOL (ROCALTROL) 0.5 MCG capsule Take 0.5 mcg by mouth three times a week Given at Dialysis Past Week    calcium acetate (CALPHRON) 667 MG TABS tablet Take 1-2 tablets by mouth 3 times daily (with meals) 4/28/2024    cholecalciferol (VITAMIN D3) 25 mcg (1000 units) capsule Take 1 capsule by mouth daily at 2 pm 4/28/2024    cyclobenzaprine (FLEXERIL) 5 MG tablet Take 5 mg by mouth 2 times daily as needed for muscle spasms Unknown    Epoetin Adam (EPOGEN IJ) Inject 1,000 Units into the vein three times a week At dialysis. Past Week    glucosamine-chondroitin 500-400 MG CAPS per capsule Take 2 capsules by mouth daily at 2 pm 4/28/2024    Iron Sucrose (VENOFER IV) Inject 100 mg into the vein once a week At dialysis Past Week    labetalol (NORMODYNE) 200 MG tablet Take 200 mg by mouth 2 times daily Past Week at \"several days ago\"    lactulose (CHRONULAC) 10 GM/15ML solution Take 15 mLs (10 g) by mouth 2 times daily as needed for constipation Past Week    levothyroxine (SYNTHROID/LEVOTHROID) 50 MCG tablet " Take 50 mcg by mouth daily 4/28/2024    lidocaine HCl 3 % cream Apply topically daily as needed (pain) Unknown    LORazepam (ATIVAN) 1 MG tablet Take 1 tablet (1 mg) by mouth daily (before lunch) 4/28/2024    nortriptyline (PAMELOR) 75 MG capsule Take 75 mg by mouth At Bedtime 4/28/2024    Nutritional Supplements (NEPRO) LIQD Take 1 Can by mouth daily 4/28/2024    omeprazole (PRILOSEC) 20 MG DR capsule Take 20 mg by mouth daily 4/28/2024    oxyCODONE IR (ROXICODONE) 10 MG tablet Take 10 mg by mouth every 4 hours as needed for pain 4/28/2024    rosuvastatin (CRESTOR) 10 MG tablet Take 10 mg by mouth At Bedtime 4/28/2024    SENNA-docusate sodium (SENNA S) 8.6-50 MG tablet Take 2 tablets by mouth 2 times daily Hold for loose stools. 4/28/2024    sertraline (ZOLOFT) 100 MG tablet Take 150 mg by mouth At Bedtime 4/28/2024

## 2024-04-30 PROBLEM — R79.89 ELEVATED TROPONIN: Status: ACTIVE | Noted: 2024-01-01

## 2024-04-30 PROBLEM — I42.0 DILATED CARDIOMYOPATHY (H): Status: ACTIVE | Noted: 2024-01-01

## 2024-04-30 PROBLEM — I25.5 ISCHEMIC CARDIOMYOPATHY: Status: ACTIVE | Noted: 2024-01-01

## 2024-04-30 PROBLEM — I50.23 ACUTE ON CHRONIC SYSTOLIC CONGESTIVE HEART FAILURE (H): Status: ACTIVE | Noted: 2024-01-01

## 2024-04-30 NOTE — Clinical Note
dry, intact, no bleeding and no hematoma. Right femoral site. Angioseal. Manual pressure held until hemostasis achieved. Dressing applied.

## 2024-04-30 NOTE — PROGRESS NOTES
RENAL     Patient transferring to [facility] for coronary angiogram. Seen by our service 4/30/2024 prior to transfer. Plan is for her to have cardiac procedure with dialysis to follow. HD orders entered and HD RN aware of patient.     Yue Mcfarland PA-C   [facility]  [phone]

## 2024-04-30 NOTE — DISCHARGE SUMMARY
"[facility]  Hospitalist Discharge Summary      Date of Admission:  4/29/2024  Date of Discharge:  4/30/2024  Discharging Provider: Trevor Sutherland  Discharge Service: Hospitalist Service    Discharge Diagnoses     Acute Pulmonary edema with respiratory distress  ESRD on TTS dialysis  CHF with severely decreased global systolic LVEF  Elevated Troponin concerning for undiagnosed coronary disease  HTN--Note cardiology change Labetalol to carvedilol 6.25mg BID  JAMIL  HDL  Anemia of CKD    Clinically Significant Risk Factors     # Cachexia: Estimated body mass index is 17.99 kg/m  as calculated from the following:    Height as of this encounter: 1.499 m (4' 11\").    Weight as of this encounter: 40.4 kg (89 lb 1.1 oz).       Discharge Disposition   Transferred to [facility]  Condition at discharge: Stable    Hospital Course   Assessment & Plan  Serene Tipton is a 68 year old female with pertinent medical history of ESRD on dialysis, nephrectomy for Polycystic Kidney Disease (bilateral, 2/1/2023), SBO, ileus, constipation, GERD, JAMIL, HTN, HLD, CHF, tobacco use disorder, who presents with abdominal pain and shortness of breath. Her shortness of breath is associated with intermittent abdominal discomfort and bloating for the past month. Pt reports regular bowel movements, however was admitted 3/23/24-3/27/24 for SBO related to constipation, corrected with Mg citrate enema. Pt has been taking senna docusate and lactulose since discharge.     In the ED with tachycardia in low 100s and elevated BP 140s-150s systolic. Otherwise afebrile and not hypoxic. Chest/Abd/Pelvic CT showed pulmonary edema and pleural effusion but no bowel obstruction. CXR showed cardiomegaly, vascular congestion, pleural effusion. BNP markedly elevated at 70,000. Troponin elevated 247. Lactic acid 2.2. CBC with WBC 1.6, RBC 2.73, Hemoglobin 9.4, Hematocrit 31.5 . Anion gap 18, UN 60.9, Cr 4.41, Cl 89. " Nephrology recommended admission for dialysis for volume overload. Cardiology consulted due to elevated troponin, advised echocardiogram.   Echo showed severe global LV hypokinesis new from previous. Given echo findings and possible anginal equivalent (abd bloating), recommended coronary angiogram with percutaneous coronary intervention at [facility].  Pt will undergo dialysis after this procedure. Pt NPO status until after this procedure.      Acute Pulmonary edema with CHF   Elevated troponin  EKG nonspecific T wave changes  Trend serial troponin's 247-->254-->265  Pro BNP 70,000  Cardiology consulted   -recommended heparin infusion if trop continues to escalate   -requested echo   -recommended added amlodipine for added HTN coverage if needed  Echo shows acute systolic congestive heart failure, severe cardiomyopathy with LVEF 25 to 30% (previous LVEF 70% in 2021 per nuclear stress test). Left ventricle with severe global hypokinesia. Right ventricle mildly dilated. Left atrium severely dilated. Mod-severe mitral regurgitation. Right ventricular systolic pressure elevated.   Plan to transfer to [place] for angiogram. Dialysis after.  NPO status    Volume overload  ESRD on TTS dialysis  Dyspnea  Pt presents with shortness of breath associated with abdominal distention. CT/XR evidence of small pleural effusion and vascular congestion. Admitted for dialysis for volume overload.  Appreciate nephrology input              -UF only treatment (4/29)              -continue TTS dialysis during admission              -LE lymphedema wraps  Pt on 3LNC for comfort overnight.      Abdominal bloating  Possible anginal equivalent. Mild epigastric tenderness and RUQ tenderness. Better today (4/30) after UF dialysis yesterday. CT negative for SBO. Distention has improved since admission.  CT scan of the abdomen pelvis with unchanged pancreatic duct dilation, infiltration of mesentery, retroperitoneal fat, will need  follow-up outpatient    LFTs are normal  Continue Protonix 40mg BID  Continue senna docusate and simethicone 80mg q6hr PRN     Hyperkalemia  5.0-->5.5 (4/30)  Possibly related to inadequate dialysis   Will monitor closely     Hypertension  Blood pressure stable  Per cardiology change labetalol to carvedilol 6.25mg BID     Anemia of CKD  Pt receives PETE and parenteral iron with outpatient dialysis  Monitor hemoglobin 9.4--> 9.0     Consultations This Hospital Stay   LYMPHEDEMA THERAPY IP CONSULT  CARE MANAGEMENT / SOCIAL WORK IP CONSULT  CARDIOLOGY IP CONSULT    Code Status   Full Code    Time Spent on this Encounter   I, Leonides Diaz MD, personally saw the patient today and spent greater than 30 minutes discharging this patient.  I have independently seen and examined this patient. I have discussed the patient with the physician assistant student and collaborated on the documentation, assessment and plan. I agree with the documentation in this note.    Leonides Diaz M.D.  [facility] Medicine [facility]       MARISOL Abraham  [facility]  [address] [place] [id number]  Phone: [phone]  Fax: [phone]  ______________________________________________________________________    Physical Exam   Vital Signs: Temp: 97.8  F (36.6  C) Temp src: Oral BP: (!) 146/86 Pulse: 101   Resp: 16 SpO2: 91 % O2 Device: None (Room air) Oxygen Delivery: 3 LPM  Weight: 89 lbs 1.05 oz    General Appearance:  Resting comfortably in bed. In no acute distress.  Respiratory: Non labored breathing. 3L NC. No wheezing, rales, or rhonchi.   Cardiovascular: RRR. No murmur, gallop, or rub.  GI: Non distended abdomen. Hard to palpation with mild tenderness of RUQ. Active bowel sounds.   Skin: Warm and dry. No lesions or rashes.  Extremities: Bilateral LE 1+ pitting edema.        Primary Care Physician   Ramos Lowry    Discharge Orders   No discharge procedures on  file.    Significant Results and Procedures   Most Recent 3 CBC's:  Recent Labs   Lab Test 04/30/24  0508 04/29/24  1205 03/23/24  1321   WBC 10.8 11.6* 11.0   HGB 9.0* 9.4* 10.8*   * 115* 100    317 406     Most Recent 3 BMP's:  Recent Labs   Lab Test 04/30/24  0508 04/29/24  1205 03/25/24  0804 03/24/24  0455    135 136 128*   POTASSIUM 5.5* 5.0 5.0 6.0*   CHLORIDE 91* 89* 92* 85*   CO2 26 28 29 29   BUN 71.0* 60.9*  --  78.2*   CR 5.12* 4.41*  --  6.68*   ANIONGAP 18* 18* 15 14   GAVIN 10.5* 10.2  --  10.2   * 105*  --  90     Most Recent 3 Creatinines:  Recent Labs   Lab Test 04/30/24  0508 04/29/24  1205 03/24/24  0455   CR 5.12* 4.41* 6.68*     Most Recent 3 Hemoglobins:  Recent Labs   Lab Test 04/30/24  0508 04/29/24  1205 03/23/24  1321   HGB 9.0* 9.4* 10.8*     Most Recent 3 Troponin's:No lab results found.  Most Recent 3 BNP's:  Recent Labs   Lab Test 04/29/24  1205   NTBNPI >70,000*     Most Recent Anemia Panel:  Recent Labs   Lab Test 04/30/24  0508 07/14/23  0503 07/13/23  1239 01/26/23  1422 08/18/22  1731   WBC 10.8   < > 13.0*   < > 8.5   HGB 9.0*   < > 8.4*   < > 11.2*   HCT 29.0*   < > 25.8*   < > 35.6   *   < > 103*   < > 108*      < > 320   < > 287   IRON  --   --  43   < >  --    IRONSAT  --   --  43   < >  --    FEB  --   --  101*   < >  --    WILMER  --   --  1,003*   < >  --    B12  --   --   --   --  785    < > = values in this interval not displayed.   ,   Results for orders placed or performed during the hospital encounter of 04/29/24   CT Abdomen Pelvis w/o Contrast    Narrative    EXAM: CT ABDOMEN PELVIS W/O CONTRAST  LOCATION: [facility]  DATE: 4/29/2024    INDICATION: abd pain, bloating  COMPARISON: CT abdomen pelvis 03/23/2024  TECHNIQUE: CT scan of the abdomen and pelvis was performed without IV contrast. Multiplanar reformats were obtained. Dose reduction techniques were used.  CONTRAST: None.    FINDINGS:   LOWER CHEST:  Septal thickening and hazy groundglass in the lower lungs, compatible with pulmonary edema. Trace right greater than left pleural effusions with compressive atelectasis.    HEPATOBILIARY: Unchanged liver contours with scattered hepatic cysts. Layering sludge within the gallbladder.    PANCREAS: Unchanged dilation of the main pancreatic duct.    SPLEEN: Calcified granulomas.    ADRENAL GLANDS: Poorly visualized.    KIDNEYS/BLADDER: Bilateral nephrectomies.    BOWEL: No bowel obstruction. Mild amount of colonic stool as well as fluid-filled small bowel in the lower abdomen. No pneumatosis or pneumoperitoneum.    LYMPH NODES: Infiltration of the mesentery/retroperitoneal fat limits evaluation for adenopathy.    VASCULATURE: Abdominal aorta is nonaneurysmal with severe circumferential atherosclerotic calcifications.    PELVIC ORGANS: No pelvic mass.    MUSCULOSKELETAL: Lower lumbar spinal fusion hardware. Left hip prosthesis. No destructive osseous lesions. Mild body wall edema.      Impression    IMPRESSION:   1.  Findings of pulmonary edema with trace right greater than left pleural effusions.  2.  No bowel obstruction or other acute findings in the abdomen or pelvis on noncontrast CT.  3.  Additional noncritical details in the findings.     XR Chest 2 Views    Narrative    EXAM: XR CHEST 2 VIEWS  LOCATION: [facility]  DATE: 2024    INDICATION: Shortness of breath  COMPARISON: X-ray 2023      Impression    IMPRESSION: Cardiomegaly with central vascular congestion and interstitial edema. Trace right pleural effusion with adjacent opacities likely reflecting atelectasis. This constellation of findings suggests CHF.   Echocardiogram Complete     Value    LVEF  25-30% (severely reduced)    Narrative    [id number]  [id number]  [id number]  [id number]^[address]^[place] [address]     Name: MJ GODINEZ  MRN: [id number]  : 1955  Study  Date: 04/30/2024 08:45 AM  Age: 68 yrs  Gender: Female  Patient Location: [facility]  Reason For Study: Dyspnea  Ordering Physician: LU PATRICK  Performed By: JACQUE     BSA: 1.3 m2  Height: 59 in  Weight: 89 lb  BP: 128/70 mmHg  ______________________________________________________________________________  Procedure  Complete Portable Echo Adult. Definity (NDC #64352-438) given intravenously.  ______________________________________________________________________________  Interpretation Summary     The left ventricle is mildly dilated.  Left ventricular function is decreased. The ejection fraction is 25-30%  (severely reduced).  There is severe global hypokinesia of the left ventricle.  The right ventricle is mildly dilated.  Mildly decreased right ventricular systolic function  The left atrium is severely dilated.  There is mod-severe to severe (3-4+) mitral regurgitation.  There is moderate to mod-severe (2-3+) tricuspid regurgitation.  Right ventricular systolic pressure is elevated, consistent with moderate to  severe pulmonary hypertension.  IVC diameter <2.1 cm collapsing >50% with sniff suggests a normal RA pressure  of 3 mmHg.  Trivial pericardial effusion  ______________________________________________________________________________  Left Ventricle  The left ventricle is mildly dilated. Left ventricular function is decreased.  The ejection fraction is 25-30% (severely reduced). There is normal left  ventricular wall thickness. Diastolic Doppler findings (E/E' ratio and/or  other parameters) suggest left ventricular filling pressures are increased.  Left ventricular diastolic function is abnormal. There is severe global  hypokinesia of the left ventricle.     Right Ventricle  The right ventricle is mildly dilated. Mildly decreased right ventricular  systolic function. TAPSE is abnormal, which is consistent with abnormal right  ventricular systolic function.     Atria  The left atrium is severely dilated. The  right atrium is mild to moderately  dilated.     Mitral Valve  There is mild mitral annular calcification. There is mod-severe to severe (3-  4+) mitral regurgitation. Apical displacement of leaflet coaptation resulting  in significant mitral insufficiency.     Tricuspid Valve  Tricuspid valve leaflets appear normal. There is moderate to mod-severe (2-3+)  tricuspid regurgitation. Right ventricular systolic pressure is elevated,  consistent with moderate to severe pulmonary hypertension.     Aortic Valve  The aortic valve is trileaflet with aortic valve sclerosis. No hemodynamically  significant valvular aortic stenosis.     Pulmonic Valve  The pulmonic valve is normal in structure and function.     Vessels  The aorta root is normal. Normal size ascending aorta. IVC diameter <2.1 cm  collapsing >50% with sniff suggests a normal RA pressure of 3 mmHg.     Pericardium  Trivial pericardial effusion.     ______________________________________________________________________________  MMode/2D Measurements & Calculations  IVSd: 1.2 cm  LVIDd: 5.8 cm  LVIDs: 4.3 cm  LVPWd: 0.92 cm     FS: 26.6 %  LV mass(C)d: 253.6 grams  LV mass(C)dI: 194.0 grams/m2  Ao root diam: 3.2 cm  asc Aorta Diam: 3.5 cm  LVOT diam: 2.0 cm  LVOT area: 3.0 cm2  Ao root diam index Ht(cm/m): 2.1  Ao root diam index BSA (cm/m2): 2.5  Asc Ao diam index BSA (cm/m2): 2.7  Asc Ao diam index Ht(cm/m): 2.4  EF Biplane: 41.1 %     LA Volume Indexed (AL/bp): 74.9 ml/m2  RV Base: 4.0 cm  RWT: 0.31  TAPSE: 1.3 cm     Time Measurements  MM HR: 60.0 BPM     Doppler Measurements & Calculations  MV E max henri: 138.0 cm/sec  Ao V2 max: 189.6 cm/sec  Ao max P.0 mmHg  Ao V2 mean: 130.3 cm/sec  Ao mean P.6 mmHg  Ao V2 VTI: 30.4 cm  WISAM(I,D): 1.9 cm2  WISAM(V,D): 2.2 cm2  LV V1 max P.2 mmHg  LV V1 max: 134.0 cm/sec  LV V1 VTI: 19.3 cm  MR PISA: 2.2 cm2  MR ERO: 0.18 cm2  MR volume: 22.7 ml  SV(LVOT): 58.8 ml  SI(LVOT): 45.0 ml/m2  PA V2 max: 90.4 cm/sec  PA max  PG: 3.3 mmHg  PA acc time: 0.08 sec  PI end-d ian: 184.6 cm/sec     TR max ian: 356.3 cm/sec  TR max P.8 mmHg  Pulm Sys Ian: 43.8 cm/sec  Pulm Trammell Ian: 67.8 cm/sec  Pulm A Revs Ian: 35.0 cm/sec  Pulm S/D: 0.65  AV Ian Ratio (DI): 0.71  WISAM Index (cm2/m2): 1.5  E/E': 14.7  E/E' av.1  Lateral E/e': 9.5  Medial E/e': 14.8  Peak E' Ian: 9.4 cm/sec  RV S Ian: 8.8 cm/sec     ______________________________________________________________________________  Report approved by: Christina Sigala 2024 11:02 AM             Discharge Medications   Current Discharge Medication List        CONTINUE these medications which have NOT CHANGED    Details   artificial saliva (BIOTENE MT) SOLN solution Swish and spit 2 sprays in mouth as needed for dry mouth      B Complex-C (SUPER B COMPLEX PO) Take 1 tablet by mouth daily at 2 pm      calcitRIOL (ROCALTROL) 0.5 MCG capsule Take 0.5 mcg by mouth three times a week Given at Dialysis      calcium acetate (CALPHRON) 667 MG TABS tablet Take 1-2 tablets by mouth 3 times daily (with meals)      cholecalciferol (VITAMIN D3) 25 mcg (1000 units) capsule Take 1 capsule by mouth daily at 2 pm      cyclobenzaprine (FLEXERIL) 5 MG tablet Take 5 mg by mouth 2 times daily as needed for muscle spasms      Epoetin Adam (EPOGEN IJ) Inject 1,000 Units into the vein three times a week At dialysis.      glucosamine-chondroitin 500-400 MG CAPS per capsule Take 2 capsules by mouth daily at 2 pm      Iron Sucrose (VENOFER IV) Inject 100 mg into the vein once a week At dialysis      labetalol (NORMODYNE) 200 MG tablet Take 200 mg by mouth 2 times daily      lactulose (CHRONULAC) 10 GM/15ML solution Take 15 mLs (10 g) by mouth 2 times daily as needed for constipation  Qty: 900 mL, Refills: 0    Associated Diagnoses: Constipation, unspecified constipation type      levothyroxine (SYNTHROID/LEVOTHROID) 50 MCG tablet Take 50 mcg by mouth daily      lidocaine HCl 3 % cream Apply topically daily  as needed (pain)      LORazepam (ATIVAN) 1 MG tablet Take 1 tablet (1 mg) by mouth daily (before lunch)  Qty: 3 tablet, Refills: 0    Associated Diagnoses: Anxiety      nortriptyline (PAMELOR) 75 MG capsule Take 75 mg by mouth At Bedtime      Nutritional Supplements (NEPRO) LIQD Take 1 Can by mouth daily      omeprazole (PRILOSEC) 20 MG DR capsule Take 20 mg by mouth daily      oxyCODONE IR (ROXICODONE) 10 MG tablet Take 10 mg by mouth every 4 hours as needed for pain      rosuvastatin (CRESTOR) 10 MG tablet Take 10 mg by mouth At Bedtime      SENNA-docusate sodium (SENNA S) 8.6-50 MG tablet Take 2 tablets by mouth 2 times daily Hold for loose stools.  Qty: 120 tablet, Refills: 0    Associated Diagnoses: Constipation, unspecified constipation type      sertraline (ZOLOFT) 100 MG tablet Take 150 mg by mouth At Bedtime           Allergies   Allergies   Allergen Reactions    Penicillins Other (See Comments) and Shortness Of Breath     Breathing problem.  breathing      No Clinical Screening - See Comments      PN: LW CM1: Contrast Intercapsular - Nonionic Reaction :    Nsaids Other (See Comments)     Kidney damage    Carvedilol GI Disturbance     Nausea and vomiting    Ciprofloxacin Muscle Pain (Myalgia)    Floxacillin (Flucloxacillin) Muscle Pain (Myalgia) and Rash    Levofloxacin Muscle Pain (Myalgia) and Rash    Morphine Nausea and Vomiting    Sulfa Antibiotics Itching

## 2024-04-30 NOTE — PROGRESS NOTES
[facility]    Medicine Progress Note - Hospitalist Service    Date of Admission:  4/29/2024    Assessment & Plan   Serene Tipton is a 68 year old female with pertinent medical history of ESRD on dialysis, nephrectomy (bilateral, 2/1/2023), SBO, ileus, constipation, GERD, JAMIL, HTN, HLD, CHF, tobacco use disorder, who presents abdominal pain and shortness of breath. Her shortness of breath is associated with intermittent abdominal discomfort and bloating for the past month. Pt report regular bowel movements, however was admitted 3/23/24-3/27/24 for SBO, corrected with Mg citrate enema. Pt has been taking senna docusate and lactulose since discharge.    In the ED with tachycardia in low 100s and elevated BP 140s-150s systolic. Otherwise afebrile and not hypoxic. CT shows pulmonary edema and pleural effusion but no bowel obstruction. CXR showed cardiomegaly, vascular congestion, pleural effusion. BNP markedly elevated at 70,000. Troponin elevated 247. Lactic acid 2.2. CBC with WBC 1.6, RBC 2.73, Hemoglobin 9.4, Hematocrit 31.5 . Anion gap 18, UN 60.9, Cr 4.41, Cl 89. Nephrology recommended admission for dialysis for volume overload. Cardiology consulted.    Volume overload  ESRD on TTS dialysis  Dyspnea  Pt presents with shortness of breath associated with abdominal distention. CT/XR evidence of small pleural effusion and vascular congestion. Admitted for dialysis for volume overload.  Appreciate nephrology input   -UF only treatment (4/29)   -continue TTS dialysis during admission   -LE lymphedema wraps  Renal diet  Pt on 3LNC for comfort overnight.      Elevated troponin  EKG nonspecific T wave changes  Trend serial troponin's 247-->254-->265  Cardiology consulted  Echo shows acute systolic congestive heart failure, severe cardiomyopathy with LVEF 25 to 30% (previous LVEF 70% in 2021 per nuclear stress test): Her chest x-ray indicated pulmonary congestion.   Plan to transfer to  "[facility] for angiogram. Dialysis after.         Abdominal bloating  Mild epigastric tenderness and RUQ tenderness. Better today (4/30) after UF dialysis yesterday. Likely a product of volume overload as CT negative for SBO. Distention has improved since admission.  CT scan of the abdomen pelvis with unchanged pancreatic duct dilation, infiltration of mesentery, retroperitoneal fat, will need follow-up outpatient  LFTs are normal  Continue Protonix 40mg BID  Continue senna docusate and simethicone 80mg q6hr PRN    Hyperkalemia  5.0-->5.5 (4/30)  Will monitor closely     Hypertension  Blood pressure stable  Per cardiology change labetalol to carvedilol 6.25mg BID     Anemia of CKD  Pt receives PETE and parenteral iron with outpatient dialysis  Monitor hemoglobin 9.4--> 9.0          Diet: Combination Diet Regular Diet Adult; Renal Diet (dialysis)    DVT Prophylaxis: Heparin SQ  Pool Catheter: Not present  Lines: None     Cardiac Monitoring: ACTIVE order. Indication: Acute decompensated heart failure (48 hours)  Code Status: Full Code      Clinically Significant Risk Factors Present on Admission        # Hyperkalemia: Highest K = 5.5 mmol/L in last 2 days, will monitor as appropriate    # Hypercalcemia: Highest Ca = 10.5 mg/dL in last 2 days, will monitor as appropriate        # Hypertension: Noted on problem list      # Cachexia: Estimated body mass index is 17.99 kg/m  as calculated from the following:    Height as of this encounter: 1.499 m (4' 11\").    Weight as of this encounter: 40.4 kg (89 lb 1.1 oz).              Disposition Plan     Medically Ready for Discharge: Anticipated in 2-4 Days           The patient's care was discussed with the Attending Physician, Dr. Leonides Diaz .  ILeonides MD, personally saw the patient today and spent greater than 30 minutes discharging this patient.  I have independently seen and examined this patient. I have discussed the patient with the physician assistant student and " collaborated on the documentation, assessment and plan. I agree with the documentation in this note.    Leonides Diaz M.D.  [facility] Medicine Service    MARISOL Abraham  Hospitalist Service  [facility]  Securely message with [employer] (more info)  Text page via [facility] Paging/Directory   ______________________________________________________________________    Interval History   Pt resting comfortably in bed. Pt tolerated dialysis well last evening. Pt requested 3L NC for comfort.This morning her shortness of breath and abdominal bloating has diminished significantly. Only complaining of minimal RUQ pain. No BM yet today. Pt denies CP.     Physical Exam   Vital Signs: Temp: 97.8  F (36.6  C) Temp src: Oral BP: 128/70 Pulse: 101   Resp: 16 SpO2: 97 % O2 Device: Nasal cannula Oxygen Delivery: 3 LPM  Weight: 89 lbs 1.05 oz    General Appearance: Resting comfortably in bed. In no acute distress.  Respiratory: Non labored breathing. 3L NC. No wheezing, rales, or rhonchi.   Cardiovascular: RRR. No murmur, gallop, or rub.  GI: Non distended abdomen. Hard to palpation with mild tenderness of RUQ. Active bowel sounds.   Skin: Warm and dry. No lesions or rashes.  Extremities: Bilateral LE 1+ pitting edema.     Medical Decision Making             Data   Imaging results reviewed over the past 24 hrs:   Recent Results (from the past 24 hour(s))   CT Abdomen Pelvis w/o Contrast    Narrative    EXAM: CT ABDOMEN PELVIS W/O CONTRAST  LOCATION: [facility]  DATE: 4/29/2024    INDICATION: abd pain, bloating  COMPARISON: CT abdomen pelvis 03/23/2024  TECHNIQUE: CT scan of the abdomen and pelvis was performed without IV contrast. Multiplanar reformats were obtained. Dose reduction techniques were used.  CONTRAST: None.    FINDINGS:   LOWER CHEST: Septal thickening and hazy groundglass in the lower lungs, compatible with pulmonary edema. Trace right greater than left pleural  effusions with compressive atelectasis.    HEPATOBILIARY: Unchanged liver contours with scattered hepatic cysts. Layering sludge within the gallbladder.    PANCREAS: Unchanged dilation of the main pancreatic duct.    SPLEEN: Calcified granulomas.    ADRENAL GLANDS: Poorly visualized.    KIDNEYS/BLADDER: Bilateral nephrectomies.    BOWEL: No bowel obstruction. Mild amount of colonic stool as well as fluid-filled small bowel in the lower abdomen. No pneumatosis or pneumoperitoneum.    LYMPH NODES: Infiltration of the mesentery/retroperitoneal fat limits evaluation for adenopathy.    VASCULATURE: Abdominal aorta is nonaneurysmal with severe circumferential atherosclerotic calcifications.    PELVIC ORGANS: No pelvic mass.    MUSCULOSKELETAL: Lower lumbar spinal fusion hardware. Left hip prosthesis. No destructive osseous lesions. Mild body wall edema.      Impression    IMPRESSION:   1.  Findings of pulmonary edema with trace right greater than left pleural effusions.  2.  No bowel obstruction or other acute findings in the abdomen or pelvis on noncontrast CT.  3.  Additional noncritical details in the findings.     XR Chest 2 Views    Narrative    EXAM: XR CHEST 2 VIEWS  LOCATION: [facility]  DATE: 4/29/2024    INDICATION: Shortness of breath  COMPARISON: X-ray 07/13/2023      Impression    IMPRESSION: Cardiomegaly with central vascular congestion and interstitial edema. Trace right pleural effusion with adjacent opacities likely reflecting atelectasis. This constellation of findings suggests CHF.     Recent Labs   Lab 04/30/24  0508 04/29/24  1205   WBC 10.8 11.6*   HGB 9.0* 9.4*   * 115*    317    135   POTASSIUM 5.5* 5.0   CHLORIDE 91* 89*   CO2 26 28   BUN 71.0* 60.9*   CR 5.12* 4.41*   ANIONGAP 18* 18*   GAVIN 10.5* 10.2   * 105*   ALBUMIN 3.7 3.9   PROTTOTAL  --  6.9   BILITOTAL  --  0.6   ALKPHOS  --  112   ALT  --  <5   AST  --  21

## 2024-04-30 NOTE — Clinical Note
Stent deployed in the proximal right coronary artery. Max pressure = 11 florian. Total duration = 30 seconds.

## 2024-04-30 NOTE — PROGRESS NOTES
Assessment/Plan:  Elevated troponin: The patient complains of abdominal bloating, shortness of breath over last 7 days.  No chest pain.  ECG showed nonspecific T wave abnormality, more evident in anterior leads.  Hypersensitivity troponin is elevated 247, stable on the setting of end-stage renal disease.  Her symptoms including abdominal bloating, shortness of breath on exertion could be angina equivalent.  Since echo was reported severely reduced left ventricular ejection fraction, the patient could have a multiple coronary artery disease leading to severe cardiomyopathy.  She has some multiple risk factors.  The patient could develop severe coronary artery disease.  In additional, the patient is going to have renal transplant.  She needs to rule out to significant coronary artery disease from a renal transplant requested.  The patient agreed with the plan.  The patient understand the benefit and the possible complications.  After coronary angiogram procedure, she is going to have hemodialysis this afternoon.    Acute systolic congestive heart failure, severe cardiomyopathy with LVEF 25 to 30% (previous LVEF 70% in 2021 per nuclear stress test): Her chest x-ray indicated pulmonary congestion.  proBNP is significantly elevated.  Continue hemodialysis to remove extra fluid.  Discontinued labetalol, start the carvedilol 6.25 mg twice a day.  He is not a good candidate for ACE inhibitor and spironolactone due to end-stage renal disease.  Benign essential hypertension: Changed labetalol to carvedilol.  Continue to monitor her blood pressure.  Dyslipidemia: Continue rosuvastatin.  Last LDL was 65.  End-stage renal disease on hemodialysis, polycystic kidney disease, chronic anemia, abdominal bloating: Please see primary team of management for details.    Subjective:  Interval History:  Has no complaints of chest pain.  She complains of abdominal bloating after eating or drinking.  She has shortness of breath when she  had abdominal bloating.          Current Medications:  Scheduled Meds:  Current Facility-Administered Medications   Medication Dose Route Frequency Provider Last Rate Last Admin    calcitRIOL (ROCALTROL) capsule 0.5 mcg  0.5 mcg Oral Once per day on Tuesday Thursday Saturday Alissa Lane MD   0.5 mcg at 04/30/24 1000    calcium acetate (PHOSLO) capsule 667-1,334 mg  667-1,334 mg Oral TID w/meals Alissa Lane MD   667 mg at 04/30/24 1000    heparin ANTICOAGULANT injection 5,000 Units  5,000 Units Subcutaneous Q8H Alissa Lane MD   5,000 Units at 04/30/24 1000    labetalol (NORMODYNE) tablet 200 mg  200 mg Oral BID Alissa Lane MD   200 mg at 04/30/24 1000    levothyroxine (SYNTHROID/LEVOTHROID) tablet 50 mcg  50 mcg Oral Daily Alissa Lane MD   50 mcg at 04/30/24 1003    LORazepam (ATIVAN) tablet 0.5-1 mg  0.5-1 mg Oral Daily before lunch Alissa Lane MD   1 mg at 04/30/24 1100    No heparin via hemodialysis machine   Does not apply Once Stephanie Gaviria, NP        nortriptyline (PAMELOR) capsule 75 mg  75 mg Oral At Bedtime Alissa Lane MD   75 mg at 04/29/24 2151    pantoprazole (PROTONIX) EC tablet 40 mg  40 mg Oral BID AC Alissa Lane MD   40 mg at 04/30/24 0530    rosuvastatin (CRESTOR) tablet 10 mg  10 mg Oral At Bedtime Alissa Lane MD   10 mg at 04/29/24 2140    senna-docusate (SENOKOT-S/PERICOLACE) 8.6-50 MG per tablet 2 tablet  2 tablet Oral BID Alissa Lane MD   2 tablet at 04/30/24 1000    sertraline (ZOLOFT) tablet 150 mg  150 mg Oral At Bedtime Alissa Lane MD   150 mg at 04/29/24 2140    sodium chloride (PF) 0.9% PF flush 3 mL  3 mL Intracatheter Q8H Alissa Lane MD   3 mL at 04/30/24 1005    sodium chloride 0.9% BOLUS 250 mL  250 mL Intravenous Once in dialysis/CRRT Stephanie Gaviria NP        sodium chloride 0.9% BOLUS 300 mL  300 mL Hemodialysis Machine Once Stephanie Gaviria NP        Vitamin D3 (CHOLECALCIFEROL) tablet 25 mcg  25 mcg Oral Daily Alissa Lane MD   25 mcg  at 04/30/24 1000     Continuous Infusions:  Current Facility-Administered Medications   Medication Dose Route Frequency Provider Last Rate Last Admin    Stop Heparin 60 minutes before end of treatment   Does not apply Continuous PRN Stephanie Gaviria NP         PRN Meds:.  Current Facility-Administered Medications   Medication Dose Route Frequency Provider Last Rate Last Admin    acetaminophen (TYLENOL) tablet 650 mg  650 mg Oral Q4H PRN Alissa Lane MD        Or    acetaminophen (TYLENOL) Suppository 650 mg  650 mg Rectal Q4H PRN Alissa Lane MD        albumin human 25 % injection 50 mL  50 mL Intravenous Q1H PRN Stephanie Gaviria NP        artificial saliva (BIOTENE MT) solution 2 spray  2 spray Swish & Spit Q1H PRN Alissa Lane MD        calcium carbonate (TUMS) chewable tablet 1,000 mg  1,000 mg Oral 4x Daily PRN Alissa Lane MD        cyclobenzaprine (FLEXERIL) tablet 5 mg  5 mg Oral BID PRN Alissa Lane MD        lactulose (CHRONULAC) solution 10 g  10 g Oral BID PRN Alissa Lane MD        lidocaine (LMX4) cream   Topical Q1H PRN Alissa Lane MD        lidocaine 1 % 0.1-1 mL  0.1-1 mL Other Q1H PRN Alissa Lane MD        lidocaine 1 % 0.5 mL  0.5 mL Intradermal Once PRN Stephanie Gaviria NP        lidocaine 1 % 0.5 mL  0.5 mL Intradermal Once PRN Stephanie Gaviria NP        naloxone (NARCAN) injection 0.2 mg  0.2 mg Intravenous Q2 Min PRN Alissa Lane MD        Or    naloxone (NARCAN) injection 0.4 mg  0.4 mg Intravenous Q2 Min PRN Alissa Lane MD        Or    naloxone (NARCAN) injection 0.2 mg  0.2 mg Intramuscular Q2 Min PRN Alissa Lane MD        Or    naloxone (NARCAN) injection 0.4 mg  0.4 mg Intramuscular Q2 Min PRN Alissa Lane MD        oxyCODONE (ROXICODONE) tablet 5-10 mg  5-10 mg Oral Q4H PRN Alissa Lane MD   10 mg at 04/30/24 0530    simethicone (MYLICON) chewable tablet 80 mg  80 mg Oral Q6H PRN Alissa Lane MD   80 mg at 04/29/24 1423    sodium chloride (PF) 0.9%  PF flush 3 mL  3 mL Intracatheter q1 min prn Alissa Lane MD        sodium chloride 0.9% BOLUS 100-150 mL  100-150 mL Intravenous Q15 Min TRAN Stephanie Gaviria NP        sodium chloride 0.9% BOLUS 100-150 mL  100-150 mL Intravenous Q15 Min TRAN Stephanie Gaviria NP        Stop Heparin 60 minutes before end of treatment   Does not apply Continuous PRN Stephanie Gaviria NP           Objective:   Vital signs in last 24 hours:  Temp:  [97.8  F (36.6  C)-99  F (37.2  C)] 97.8  F (36.6  C)  Pulse:  [] 101  Resp:  [16-22] 16  BP: (124-158)/() 146/86  SpO2:  [91 %-100 %] 91 %  Weight:   [unfilled]     Physical Exam:  General Appearance:   Awake, Alert, No acute distress.   HEENT:  No scleral icterus; the mucous membranes were moist.   Neck: No cervical bruits. No jugular venous distention   Lungs:   Lungs are clear to auscultation. No crackles. No wheezing.   Cardiovascular:   RRR, normal first and second heart sounds with no murmurs. No rubs or gallops.     Abdomen:  Soft. No tenderness. Bowels sounds are present   Extremities: No leg edema. Equal posterior tibial pulsese.   Skin: Warm, Dry. No rashes.   Neurologic: Mood and affect are appropriate. No focal deficits.         Cardiographics:   Report: personally reviewed. .      Tele monitoring -sinus rhythm no cardiac arrhythmia.    Echocardiogram today:  The left ventricle is mildly dilated.  Left ventricular function is decreased. The ejection fraction is 25-30%  (severely reduced).  There is severe global hypokinesia of the left ventricle.  The right ventricle is mildly dilated.  Mildly decreased right ventricular systolic function  The left atrium is severely dilated.  There is mod-severe to severe (3-4+) mitral regurgitation.  There is moderate to mod-severe (2-3+) tricuspid regurgitation.  Right ventricular systolic pressure is elevated, consistent with moderate to  severe pulmonary hypertension.  IVC diameter <2.1 cm collapsing >50% with sniff  suggests a normal RA pressure of 3 mmHg.  Trivial pericardial effusion        Lab Results:   personally reviewed.     Lab Results   Component Value Date     04/30/2024     05/19/2021    CO2 26 04/30/2024    CO2 24 07/25/2023    CO2 31 05/19/2021    BUN 71.0 04/30/2024    BUN 27 07/25/2023    BUN 27 05/19/2021     Lab Results   Component Value Date    TROPONINI 0.03 07/21/2023     Lab Results   Component Value Date    WBC 10.8 04/30/2024    WBC 10.4 05/19/2021    HGB 9.0 04/30/2024    HGB 13.0 05/19/2021    HCT 29.0 04/30/2024    HCT 39.6 05/19/2021     04/30/2024     05/19/2021     04/30/2024     05/19/2021     Lab Results   Component Value Date    CHOL 162 09/06/2023    TRIG 112 09/06/2023    HDL 75 09/06/2023    LDL 65 09/06/2023

## 2024-04-30 NOTE — Clinical Note
The first balloon was inserted into the right coronary artery and proximal right coronary artery.Max pressure = 12 florian. Total duration = 8 seconds.

## 2024-04-30 NOTE — Clinical Note
Balloon prepped per 's instructions. Picato Counseling:  I discussed with the patient the risks of Picato including but not limited to erythema, scaling, itching, weeping, crusting, and pain.

## 2024-04-30 NOTE — Clinical Note
The first balloon was inserted into the right coronary artery and middle right coronary artery.Max pressure = 12 florian. Total duration = 7 seconds.     Max pressure = 12 florian. Total duration = 8 seconds.    Balloon reinflated a second time: Max pressure = 12 florian. Total duration = 8 seconds.  Balloon reinflated a third time: Max pressure = 12 florian. Total duration = 5 seconds.

## 2024-04-30 NOTE — Clinical Note
The first balloon was inserted into the right coronary artery and middle right coronary artery.Max pressure = 12 florian. Total duration = 5 seconds.     Max pressure = 12 florian. Total duration = 5 seconds.    Balloon reinflated a second time: Max pressure = 12 florian. Total duration = 5 seconds.  Balloon reinflated a third time: Max pressure = 12 florian. Total duration = 5 seconds.

## 2024-04-30 NOTE — PROGRESS NOTES
[facility]    Medicine Progress Note - Hospitalist Service    Date of Admission:  4/30/2024    Assessment & Plan   68-year-old with hypertension, ESRD on dialysis GERD, hyperlipidemia, tobacco user who presented with shortness of breath and abdominal pain.  Troponins were elevated echo shows global hypokinesia.  Patient was transferred to [facility] for angiogram.  She will undergo dialysis after this procedure.    Essential hypertension controlled  -- Hold labetalol since patient is now on carvedilol as per cardiology      Secondary hyperparathyroidism due to ESRD  -- Continue calcitriol and PhosLo    Hypothyroidism  -- Continue home meds    Anxiety depression  -- Continue lorazepam, nortriptyline, sertraline    Chronic pain syndrome  -- On narcotics at home    Non-STEMI  --Acute congestive heart failure exacerbation.   --Patient on dialysis  's s/p angio and PCI on--4/30  --Currently on aspirin, Plavix, atorvastatin  -- Defer ARB and Entresto to nephrology    Abdominal bloating  --CT scan of the abdomen pelvis unchanged  --Could be due to constipation  --Continue lactulose and senna    Hyperkalemia  --May need dialysis today    ESRD on hemodialysis    Anemia of chronic kidney disease  --Receives EPO and parenteral iron at outpatient dialysis            Diet: Low Saturated Fat Na <2400 mg    DVT Prophylaxis: Defer to primary service  Pool Catheter: Not present  Lines: None     Cardiac Monitoring: ACTIVE order. Indication: Post- PCI/Angiogram (24 hours)  Code Status: Full Code      Clinically Significant Risk Factors Present on Admission        # Hyperkalemia: Highest K = 5.5 mmol/L in last 2 days, will monitor as appropriate    # Hypercalcemia: Highest Ca = 10.5 mg/dL in last 2 days, will monitor as appropriate        # Hypertension: Noted on problem list  # Chronic heart failure with reduced ejection fraction: last echo with EF <40%     # Cachexia: Estimated body mass index is  "17.99 kg/m  as calculated from the following:    Height as of 24: 1.499 m (4' 11\").    Weight as of an earlier encounter on 24: 40.4 kg (89 lb 1.1 oz).              Disposition Plan     Medically Ready for Discharge: Anticipated in 2-4 Days             Declan Redman MD  Hospitalist Service  [facility]  Securely message with [employer] (more info)  Text page via [employer] Paging/Directory   ______________________________________________________________________    Interval History   Patient new to me.  Chart reviewed.  S/p coronary angiogram.  Hospitalist consulted for noncardiac such as hypertension anemia.  Patient is drowsy and comfortable after the procedure.    Physical Exam   Vital Signs: Temp: 97.7  F (36.5  C) Temp src: Oral BP: 128/79 Pulse: 90   Resp: 20 SpO2: 100 % O2 Device: Nasal cannula Oxygen Delivery: 2 LPM  Weight: 0 lbs 0 oz    Denies any chest pain  No distress    Medical Decision Making       35 MINUTES SPENT BY ME on the date of service doing chart review, history, exam, documentation & further activities per the note.  MANAGEMENT DISCUSSED with the following over the past 24 hours: Nursing   NOTE(S)/MEDICAL RECORDS REVIEWED over the past 24 hours: Discharge summary, nephrology       Data     I have personally reviewed the following data over the past 24 hrs:    10.8  \   9.0 (L)   / 323     135 91 (L) 71.0 (H) /  107 (H)   5.5 (H) 26 5.12 (H) \     ALT: N/A AST: N/A AP: N/A TBILI: N/A   ALB: 3.7 TOT PROTEIN: N/A LIPASE: N/A     Trop: 265 (HH) BNP: N/A       Imaging results reviewed over the past 24 hrs:   Recent Results (from the past 24 hour(s))   Echocardiogram Complete   Result Value    LVEF  25-30% (severely reduced)    Narrative    [id number]  [id number]  [id number]  [id number]^[place] [address]     Name: MJ GODINEZ  MRN: [id number]  : 1955  Study Date: 2024 08:45 AM  Age: 68 yrs  Gender: " Female  Patient Location: [facility]  Reason For Study: Dyspnea  Ordering Physician: LU PATRICK  Performed By: JACQUE     BSA: 1.3 m2  Height: 59 in  Weight: 89 lb  BP: 128/70 mmHg  ______________________________________________________________________________  Procedure  Complete Portable Echo Adult. Definity (NDC #10327-717) given intravenously.  ______________________________________________________________________________  Interpretation Summary     The left ventricle is mildly dilated.  Left ventricular function is decreased. The ejection fraction is 25-30%  (severely reduced).  There is severe global hypokinesia of the left ventricle.  The right ventricle is mildly dilated.  Mildly decreased right ventricular systolic function  The left atrium is severely dilated.  There is mod-severe to severe (3-4+) mitral regurgitation.  There is moderate to mod-severe (2-3+) tricuspid regurgitation.  Right ventricular systolic pressure is elevated, consistent with moderate to  severe pulmonary hypertension.  IVC diameter <2.1 cm collapsing >50% with sniff suggests a normal RA pressure  of 3 mmHg.  Trivial pericardial effusion  ______________________________________________________________________________  Left Ventricle  The left ventricle is mildly dilated. Left ventricular function is decreased.  The ejection fraction is 25-30% (severely reduced). There is normal left  ventricular wall thickness. Diastolic Doppler findings (E/E' ratio and/or  other parameters) suggest left ventricular filling pressures are increased.  Left ventricular diastolic function is abnormal. There is severe global  hypokinesia of the left ventricle.     Right Ventricle  The right ventricle is mildly dilated. Mildly decreased right ventricular  systolic function. TAPSE is abnormal, which is consistent with abnormal right  ventricular systolic function.     Atria  The left atrium is severely dilated. The right atrium is mild to moderately  dilated.      Mitral Valve  There is mild mitral annular calcification. There is mod-severe to severe (3-  4+) mitral regurgitation. Apical displacement of leaflet coaptation resulting  in significant mitral insufficiency.     Tricuspid Valve  Tricuspid valve leaflets appear normal. There is moderate to mod-severe (2-3+)  tricuspid regurgitation. Right ventricular systolic pressure is elevated,  consistent with moderate to severe pulmonary hypertension.     Aortic Valve  The aortic valve is trileaflet with aortic valve sclerosis. No hemodynamically  significant valvular aortic stenosis.     Pulmonic Valve  The pulmonic valve is normal in structure and function.     Vessels  The aorta root is normal. Normal size ascending aorta. IVC diameter <2.1 cm  collapsing >50% with sniff suggests a normal RA pressure of 3 mmHg.     Pericardium  Trivial pericardial effusion.     ______________________________________________________________________________  MMode/2D Measurements & Calculations  IVSd: 1.2 cm  LVIDd: 5.8 cm  LVIDs: 4.3 cm  LVPWd: 0.92 cm     FS: 26.6 %  LV mass(C)d: 253.6 grams  LV mass(C)dI: 194.0 grams/m2  Ao root diam: 3.2 cm  asc Aorta Diam: 3.5 cm  LVOT diam: 2.0 cm  LVOT area: 3.0 cm2  Ao root diam index Ht(cm/m): 2.1  Ao root diam index BSA (cm/m2): 2.5  Asc Ao diam index BSA (cm/m2): 2.7  Asc Ao diam index Ht(cm/m): 2.4  EF Biplane: 41.1 %     LA Volume Indexed (AL/bp): 74.9 ml/m2  RV Base: 4.0 cm  RWT: 0.31  TAPSE: 1.3 cm     Time Measurements  MM HR: 60.0 BPM     Doppler Measurements & Calculations  MV E max henri: 138.0 cm/sec  Ao V2 max: 189.6 cm/sec  Ao max P.0 mmHg  Ao V2 mean: 130.3 cm/sec  Ao mean P.6 mmHg  Ao V2 VTI: 30.4 cm  WISAM(I,D): 1.9 cm2  WISAM(V,D): 2.2 cm2  LV V1 max P.2 mmHg  LV V1 max: 134.0 cm/sec  LV V1 VTI: 19.3 cm  MR PISA: 2.2 cm2  MR ERO: 0.18 cm2  MR volume: 22.7 ml  SV(LVOT): 58.8 ml  SI(LVOT): 45.0 ml/m2  PA V2 max: 90.4 cm/sec  PA max PG: 3.3 mmHg  PA acc time: 0.08 sec  PI end-d  ian: 184.6 cm/sec     TR max ian: 356.3 cm/sec  TR max P.8 mmHg  Pulm Sys Ian: 43.8 cm/sec  Pulm Trammell Ian: 67.8 cm/sec  Pulm A Revs Ian: 35.0 cm/sec  Pulm S/D: 0.65  AV Ian Ratio (DI): 0.71  WISAM Index (cm2/m2): 1.5  E/E': 14.7  E/E' av.1  Lateral E/e': 9.5  Medial E/e': 14.8  Peak E' Ian: 9.4 cm/sec  RV S Ian: 8.8 cm/sec     ______________________________________________________________________________  Report approved by: Christina Sigala 2024 11:02 AM         Cardiac Catheterization    Narrative    Images from the original result were not included.  Serene Tipton is a 68 year old old female with HTN, HL, ESRD who is   here for w/up of new cardiomyopathy.     - single-vessel obstructive Ca2+ CAD in RCA s/p Shockwave and DESx2;   mildly elevated L-sided filling pressures  - ASA 81mg daily indefinitely, clopidogrel 600mg once, followed by 75mg   daily for at least 12 months but ideally indefinitely  - add atorva 80  - continue aggressive risk factor modification          Findings:  LM:no obstruction  LAD:mildly irregular w/ mostly extraluminal Ca2+  Lcx:mildly irregular  RCA:dominant, extensively Ca2+ form prox to mid-vessel, tortuous up to 95%   narrowing.     LVEDP:21    Access:  R Femoral artery    PCI:  RCA was engaged w/ a 8F AL 0.75 Guide catheter and the lesion in RCA was   wired w/ a Fielder FC wire. Given tortuosity we chose not to Roto, and   instead serially dilated w/ a 2.0x12mm NC Emerge, 2.5x12, and 3.0tr92cb NC   Emerge at 12 florian inflations. 2.5x12mm Shockwave balloon would not advance   into the distal portion of the lesion but was used proximally for vessel   prep at 4-6 florian inflations. We then used a 6F Guidliner, sucked in over a   3.0x12mm NC Emerge to deliver a 2.56c34ij Synergy EES (at 11 florian), and a   3.5x20mm Synergy EES at 11 florian proximally. Both stents were post-dilated   w/ a 3.0x12mm NC Emerge at 12-16 florian inflations, and overlap and proximal   stent were also  post-dilated w a 4.0x12mm NC Emerge at 12 florian inflations.   Final angiography showed no dissection or perforation and a MICHELL 3 flow.    Closure:   Angioseal    This is a complex modifier 22 procedure due to high risk anatomy, Ca2+,   need for intracoronary lithotripsy

## 2024-04-30 NOTE — Clinical Note
The first balloon was inserted into the right coronary artery and proximal right coronary artery.Max pressure = 12 florian. Total duration = 23 seconds.

## 2024-04-30 NOTE — PROGRESS NOTES
"    RENAL PROGRESS NOTE    REASON FOR CONSULT: ESRD      PLAN:  Dialysis today and continue on TTS schedule while admitted (s/p UF only run for 2200ml last noc)  ECHO pending  Renal diet      ASSESSMENT:  ESRD:   2/2 PKDs/p bilateral nephrectomies.    On in center HD @  [facility].  Runs on a TTS schedule.  She runs 3 hours.    Has a left upper extremity AVF, working well.  EDW to 37-38 Kg, follow.   Plan for UF only tx for volume overload today, then HD tomorrow on TTS schedule       Lytes/Acid-base:  Hyperkalemia:  Correct with dialysis  Renal diet when taking pos       Abd pain:  Recurrent, w/up pending     Hypothyroidism:   On replacement     ACD:   Receives PETE and parenteral iron with outpatient dialysis.   Hemoglobin  9's  Follow need to dose PETE while inpatient.     HLD:   Statin     Secondary renal hyperparathyroidism:   Sevelamer 3 times daily AC when taking pos     H/O HTN:   On labetalol historically.    Continue as needed     SBO, nausea, abdominal pain:   NPO.  Recurrent, w/up in progress  Management per primary team        SUBJECTIVE:  Orin is feeling generally better today, hungry, was quite tired last noc, but denies this was related to HD, \" I just couldn't sleep for a few days PTA\"     OBJECTIVE:  Physical Exam   Temp: 97.8  F (36.6  C) Temp src: Oral BP: 128/70 Pulse: 101   Resp: 16 SpO2: 97 % O2 Device: Nasal cannula Oxygen Delivery: 3 LPM  Vitals:    24 1117 24 1448 24 0513   Weight: 39.7 kg (87 lb 8.4 oz) 41.9 kg (92 lb 4.8 oz) 40.4 kg (89 lb 1.1 oz)     Vital Signs with Ranges  Temp:  [97.8  F (36.6  C)-99  F (37.2  C)] 97.8  F (36.6  C)  Pulse:  [] 101  Resp:  [16-22] 16  BP: (124-158)/() 128/70  SpO2:  [93 %-100 %] 97 %  I/O last 3 completed shifts:  In: 440 [P.O.:440]  Out: 2200 [Other:2200]    Temp (24hrs), Av.5  F (36.9  C), Min:97.8  F (36.6  C), Max:99  F (37.2  C)      Patient Vitals for the past 72 hrs:   Weight   24 0513 40.4 kg (89 " lb 1.1 oz)   04/29/24 1448 41.9 kg (92 lb 4.8 oz)   04/29/24 1117 39.7 kg (87 lb 8.4 oz)     Intake/Output Summary (Last 24 hours) at 4/30/2024 0844  Last data filed at 4/30/2024 0600  Gross per 24 hour   Intake 440 ml   Output 2200 ml   Net -1760 ml       PHYSICAL EXAM:  General - Alert and oriented x3, appears comfortable, NAD, sitting up bedside, just had ECHO   Cardiovascular -SBP mostly controlled   Respiratory - Clear to auscultation , on RA   Abd: no guarding or pain with palpation, ? Modest ascites  Extremities - No lower extremity edema bilaterally, R shoulder edema  Skin: dry, intact, no rash, good turgor  Neuro:  Grossly intact, no focal deficits  MSK:  Grossly intact  Psych:  Normal affect    LABORATORY STUDIES:     Recent Labs   Lab 04/30/24  0508 04/29/24  1205   WBC 10.8 11.6*   RBC 2.69* 2.73*   HGB 9.0* 9.4*   HCT 29.0* 31.5*    317       Basic Metabolic Panel:  Recent Labs   Lab 04/30/24  0508 04/29/24  1205    135   POTASSIUM 5.5* 5.0   CHLORIDE 91* 89*   CO2 26 28   BUN 71.0* 60.9*   CR 5.12* 4.41*   * 105*   GAVIN 10.5* 10.2       INRNo lab results found in last 7 days.     Recent Labs   Lab Test 04/30/24  0508 04/29/24  1205 07/14/23  0503 07/13/23  1239 07/11/23  1131   INR  --   --   --  1.70* 1.40*   WBC 10.8 11.6*   < > 13.0* 11.5*   HGB 9.0* 9.4*   < > 8.4* 9.0*    317   < > 320 288    < > = values in this interval not displayed.       Personally reviewed current labs      Stephanie Gaviria, [facility]-BC  [facility]  [phone]

## 2024-04-30 NOTE — UTILIZATION REVIEW
Admission Status; Secondary Review Determination  CONDITIONAL DETERMINATION  Under the authority of the Utilization Management Committee, the utilization review process indicated a secondary review on the above patient. The review outcome is based on review of the medical records, discussions with staff, and applying clinical experience noted on the date of the review.     (x) Inpatient Status Appropriate - This patient's medical care is consistent with medical management for inpatient care and reasonable inpatient medical practice.     RATIONALE FOR DETERMINATION   Ms. Tipton is a 67 yo female with a PMH of ESRD ( on hemodialysis) who presents to the ED with SOB and abd bloating.  Clinically with significant hypervolemia secondary to short runs of dialysis d/t fistula infiltration.  Noted to be showing signs of improved respiratory status this am but still tachycardic.  Nephrology consulted; undergoing additional hemodialysis treatment today before is medically ready for discharge.    Currently , she is requiring further inpatient medical evaluation and medical treatment.  If she rapidly improved after dialysis today and it is determined that she can discharge home---then would consider transitioning to OBS status prior to discharge.  D/W Dr. Diaz.    At the time of admission with the information available to the attending physician more than 2 nights Hospital complex care was anticipated, based on patient risk of adverse outcome if treated as outpatient and complex care required. Inpatient admission is appropriate based on the Medicare guidelines.   The information on this document is developed by the utilization review team in order for the business office to ensure compliance. This only denotes the appropriateness of proper admission status and does not reflect the quality of care rendered.   The definitions of Inpatient Status and Observation Status used in making the determination above are those provided in  the CMS Coverage Manual, Chapter 1 and Chapter 6, section 70.4.     Sincerely,     Danae Tran, DO  Utilization Review  Physician Advisor

## 2024-04-30 NOTE — PRE-PROCEDURE
GENERAL PRE-PROCEDURE:   Procedure:  Coronary angiogram with possible percutaneous coronary intervention  Date/Time:  4/30/2024 1:47 PM    Written consent obtained?: Yes    Risks and benefits: Risks, benefits and alternatives were discussed    Consent given by:  Patient  Patient states understanding of procedure being performed: Yes    Patient's understanding of procedure matches consent: Yes    Procedure consent matches procedure scheduled: Yes    Expected level of sedation:  Moderate  Appropriately NPO:  Yes  ASA Class:  4  Mallampati  :  Grade 3- soft palate visible, posterior pharyngeal wall not visible  Lungs:  Other (comment)  Lung exam comment:  Minimal crackles to bilateral lung fields  Heart:  Systolic murmur, RRR and other (comment)  Heart exam comment:  PVCs  History & Physical reviewed:  History and physical reviewed and updates made (see comment)  H&P Comments:  Clinically Significant Risk Factors Present on Admission    Cardiovascular : Acute on chronic heart failure with reduced ejection fraction, moderate-severe MR, moderate-severe TR    Fluid & Electrolyte Disorders : Hyperkalemia K = 5.5, volume overload    Gastroenterology : Not present on admission    Hematology/Oncology : Anemia of chronic disease     Nephrology : ESRD on HD    Neurology : Not present on admission    Pulmonology : Not present on admission    Systemic : Not present on admission    [unfilled]    Statement of review:  I have reviewed the lab findings, diagnostic data, medications, and the plan for sedation

## 2024-04-30 NOTE — CONSULTS
Thank you, Dr. Begum, for asking the [facility] team to see Ms. Serene Tipton to evaluate       Assessment/Recommendations   Assessment/Plan:  CAD s/p PCI cont DAPT and high dose statin  CM - beta blocker, ARB or entresto per nephrology start low dose carvedilol    Follow up with Dr. Begum and heart failure team     History of Present Illness/Subjective    Ms. Serene Tipton is a 68 year old female with ESRD on HD, CM with EF 25-30%, CAD s/p complex PCI of RCA, pt doing well laying flat, comfortable and tired, [facility] in [facility]s note from [facility],noted elevated trop prompted referral.           Physical Examination Review of Systems   /82 (BP Location: Right arm, Patient Position: Supine, Cuff Size: Adult Small)   Pulse 91   Temp 97.7  F (36.5  C) (Oral)   Resp 20   SpO2 99%   There is no height or weight on file to calculate BMI.  Wt Readings from Last 3 Encounters:   04/30/24 40.4 kg (89 lb 1.1 oz)   03/27/24 36.7 kg (80 lb 14.4 oz)   11/09/23 36.3 kg (80 lb)     No intake or output data in the 24 hours ending 04/30/24 1700  General Appearance:   no distress, normal body habitus   ENT/Mouth: membranes moist, no oral lesions or bleeding gums.      EYES:  no scleral icterus, normal conjunctivae   Neck: no carotid bruits or thyromegaly   Chest/Lungs:   lungs are clear to auscultation, no rales or wheezing,  sternal scar, equal chest wall expansion    Cardiovascular:   Regular. Normal first and second heart sounds with no murmurs, rubs, or gallops; the carotid, radial and posterior tibial pulses are intact, Jugular venous pressure , edema bilaterally    Abdomen:  no organomegaly, masses, bruits, or tenderness; bowel sounds are present   Extremities: no cyanosis or clubbing   Skin: no xanthelasma, warm.    Neurologic: normal  bilateral, no tremors     Psychiatric: alert and oriented x3, calm     Review of Systems - 12 points nega other than above      Medical  History  Surgical History Family History Social History   Past Medical History:   Diagnosis Date    Anemia in chronic kidney disease     Anxiety     Arthritis     Brain aneurysm     Cavernous segment of the right & left carotid arteries. See Neurosurg note 6/16/21.    Chronic low back pain     Managed by [facility]    Congestive heart failure, unspecified HF chronicity, unspecified heart failure type (H) 4/29/2024    Depressive disorder 2013    Disease of liver 8/29/2023    Disease of thyroid gland     ESRD (end stage renal disease) on dialysis (H) 07/2020    GERD (gastroesophageal reflux disease)     Hepatic lesion     Hyperlipidemia     Hypertension     Nephrolithiasis     Neuromuscular disorder (H)     Osteoporosis     PKD (polycystic kidney disease) 09/01/1990    Primary generalized (osteo)arthritis 8/2/2023    PTSD (post-traumatic stress disorder)     Tobacco abuse     Vitamin D deficiency     Past Surgical History:   Procedure Laterality Date    BACK SURGERY      spinal fusion w/hardware    BIOPSY Left 09/01/1990    Breast    BREAST LUMPECTOMY, RT/LT Left     Lumpectomy    CYST REMOVAL Right     rt wrist    CYSTECTOMY PILONIDAL  1981    EYE SURGERY  2008    lasik    FORMATION ARTERIOVENOUS FISTULA    07/28/2020    FRACTURE SURGERY      NEPHRECTOMY BILATERAL Bilateral 2/1/2023    Procedure: bilateral native nephrectomy;  Surgeon: Alana Giang MD;  Location: [facility] OR    OPEN REDUCTION INTERNAL FIXATION HIP BIPOLAR Left 11/10/2023    Procedure: LEFT HEMIARTHROPLASTY, HIP, HEMIARTHROPLASTY;  Surgeon: Lucas Weldon MD;  Location: [facility] Main OR    ORTHOPEDIC SURGERY Right 2005    Shoulder    OTHER SURGICAL HISTORY      carpal tunnel repair    SPINE SURGERY      Family History   Problem Relation Age of Onset    Polycystic Kidney Diease Mother     Hypertension Mother     Kidney Disease Mother     Cerebrovascular Disease Mother     Chronic Obstructive Pulmonary Disease Father     Multiple Sclerosis Father      Polycystic Kidney Diease Sister     Cardiac Sudden Death Sister 52    Hypertension Sister     Kidney Disease Sister     Polycystic Kidney Diease Maternal Grandmother     Hypertension Maternal Grandmother     Kidney Disease Maternal Grandmother     Cerebrovascular Disease Maternal Grandmother     Heart Disease Maternal Grandfather     Hyperlipidemia Paternal Grandmother     Cerebrovascular Disease Paternal Grandmother     Coronary Artery Disease Paternal Grandfather     Pulmonary Embolism Paternal Grandfather     Anesthesia Reaction No family hx of     Social History     Socioeconomic History    Marital status: Single     Spouse name: Not on file    Number of children: Not on file    Years of education: Not on file    Highest education level: Not on file   Occupational History    Not on file   Tobacco Use    Smoking status: Unknown    Smokeless tobacco: Never   Substance and Sexual Activity    Alcohol use: Yes     Comment: occasional    Drug use: Yes     Types: Marijuana    Sexual activity: Not on file   Other Topics Concern    Parent/sibling w/ CABG, MI or angioplasty before 65F 55M? Not Asked   Social History Narrative    Not on file     Social Determinants of Health     Financial Resource Strain: Not on file   Food Insecurity: Not on file   Transportation Needs: Not on file   Physical Activity: Not on file   Stress: Not on file   Social Connections: Not on file   Interpersonal Safety: Not on file   Housing Stability: Not on file          Medications  Allergies   Scheduled Meds:  Current Facility-Administered Medications   Medication Dose Route Frequency Provider Last Rate Last Admin    aspirin (ASA) chewable tablet 81 mg  81 mg Oral Once Jac Russell MD        [START ON 5/1/2024] aspirin EC tablet 81 mg  81 mg Oral Daily Jac Russell MD        atorvastatin (LIPITOR) tablet 80 mg  80 mg Oral Daily Jac Russell MD        [START ON 5/2/2024] calcitRIOL (ROCALTROL) capsule 0.5 mcg  0.5 mcg Oral Once  per day on Tuesday Thursday Saturday Jac Russell MD        calcium acetate (PHOSLO) capsule 667-1,334 mg  667-1,334 mg Oral TID w/meals Jac Russell MD        [START ON 5/1/2024] clopidogrel (PLAVIX) tablet 75 mg  75 mg Oral Daily Jac Russell MD        heparin ANTICOAGULANT injection 5,000 Units  5,000 Units Subcutaneous Q8H Jac Russell MD        [START ON 5/1/2024] levothyroxine (SYNTHROID/LEVOTHROID) tablet 50 mcg  50 mcg Oral Daily Jac Russell MD        [START ON 5/1/2024] LORazepam (ATIVAN) tablet 0.5-1 mg  0.5-1 mg Oral Daily before lunch Jac Russell MD        No heparin via hemodialysis machine   Does not apply Once Jac Russell MD        nortriptyline (PAMELOR) capsule 75 mg  75 mg Oral At Bedtime Jac Russell MD        pantoprazole (PROTONIX) EC tablet 40 mg  40 mg Oral BID AC Jac Russell MD        rosuvastatin (CRESTOR) tablet 10 mg  10 mg Oral At Bedtime Jac Russell MD        senna-docusate (SENOKOT-S/PERICOLACE) 8.6-50 MG per tablet 2 tablet  2 tablet Oral BID Jac Russell MD        sertraline (ZOLOFT) tablet 150 mg  150 mg Oral At Bedtime Jac Russell MD        sodium chloride (PF) 0.9% PF flush 3 mL  3 mL Intracatheter Q8H Jac Russell MD        sodium chloride 0.9% BOLUS 250 mL  250 mL Intravenous Once in dialysis/CRRT Jac Russell MD        sodium chloride 0.9% BOLUS 300 mL  300 mL Hemodialysis Machine Once Jac Russell MD        [START ON 5/1/2024] Vitamin D3 (CHOLECALCIFEROL) tablet 25 mcg  25 mcg Oral Daily Jac Russell MD         Continuous Infusions:  Current Facility-Administered Medications   Medication Dose Route Frequency Provider Last Rate Last Admin    Continuing beta blocker from home medication list OR beta blocker order already placed during this visit   Does not apply DOES NOT GO TO Jac Sheridan MD        Percutaneous Coronary Intervention orders placed (this is information  for BPA alerting)   Does not apply DOES NOT GO TO Jac Sheridan MD        sodium chloride 0.9 % infusion   Intravenous Continuous Jac Russell MD        Stop Heparin 60 minutes before end of treatment   Does not apply Continuous Jac Louis MD        Stop Heparin 60 minutes before end of treatment   Does not apply Continuous Jac Louis MD         PRN Meds:.  Current Facility-Administered Medications   Medication Dose Route Frequency Provider Last Rate Last Admin    acetaminophen (TYLENOL) tablet 650 mg  650 mg Oral Q4H PRJac Yancey MD        Or    acetaminophen (TYLENOL) Suppository 650 mg  650 mg Rectal Q4H PRJac Yancey MD        acetaminophen (TYLENOL) tablet 650 mg  650 mg Oral Q4H PRJac Yancey MD        albumin human 25 % injection 50 mL  50 mL Intravenous Q1H PRN Jac Russell MD        albumin human 25 % injection 50 mL  50 mL Intravenous Q1H PRJac Yancey MD        artificial saliva (BIOTENE MT) solution 2 spray  2 spray Swish & Spit Q1H PRJac Yancey MD        atropine injection 0.5 mg  0.5 mg Intravenous Once PRJac Yancey MD        calcium carbonate (TUMS) chewable tablet 1,000 mg  1,000 mg Oral 4x Daily PRJac Yancey MD        Continuing beta blocker from home medication list OR beta blocker order already placed during this visit   Does not apply DOES NOT GO TO Jac Sheridan MD        cyclobenzaprine (FLEXERIL) tablet 5 mg  5 mg Oral BID PRJac Yancey MD        fentaNYL (PF) (SUBLIMAZE) injection 25 mcg  25 mcg Intravenous Q15 Min PRJac Yancey MD        flumazenil (ROMAZICON) injection 0.2 mg  0.2 mg Intravenous q1 min prJac Yancey MD        HOLD: Metformin and metformin containing medications on day of procedure and 48 hours after IV contrast given for patients with acute kidney injury or severe chronic kidney disease (stage IV or stage V; i.e., eGFR less than  30)   Does not apply HOLD Jac Russell MD        hydrALAZINE (APRESOLINE) injection 10 mg  10 mg Intravenous Q4H PRN Jac Russell MD        lactulose (CHRONULAC) solution 10 g  10 g Oral BID PRN Jac Russell MD        lidocaine (LMX4) cream   Topical Q1H PRN Jac Russell MD        lidocaine 1 % 0.1-1 mL  0.1-1 mL Other Q1H PRN aJc Russell MD        lidocaine 1 % 0.5 mL  0.5 mL Intradermal Once PRN Jac Russell MD        lidocaine 1 % 0.5 mL  0.5 mL Intradermal Once PRN Jac Russell MD        lidocaine 1 % 0.5 mL  0.5 mL Intradermal Once PRN Jac Russell MD        lidocaine 1 % 0.5 mL  0.5 mL Intradermal Once PRN Jac Russell MD        metoprolol (LOPRESSOR) injection 5 mg  5 mg Intravenous Q15 Min PRN Jac Russell MD        midazolam (VERSED) injection 0.5 mg  0.5 mg Intravenous Q5 Min PRN Jac Russell MD        naloxone (NARCAN) injection 0.2 mg  0.2 mg Intravenous Q2 Min PRN Jac Russell MD        Or    naloxone (NARCAN) injection 0.4 mg  0.4 mg Intravenous Q2 Min PRJac Yancey MD        Or    naloxone (NARCAN) injection 0.2 mg  0.2 mg Intramuscular Q2 Min PRN Jac Russell MD        Or    naloxone (NARCAN) injection 0.4 mg  0.4 mg Intramuscular Q2 Min PRN Jac Russell MD        naloxone (NARCAN) injection 0.2 mg  0.2 mg Intravenous Q2 Min PRN Jac Russell MD        Or    naloxone (NARCAN) injection 0.4 mg  0.4 mg Intravenous Q2 Min PRJac Yancey MD        Or    naloxone (NARCAN) injection 0.2 mg  0.2 mg Intramuscular Q2 Min PRJac Yancey MD        Or    naloxone (NARCAN) injection 0.4 mg  0.4 mg Intramuscular Q2 Min PRN Jac Russell MD        nitroGLYcerin (NITROSTAT) sublingual tablet 0.4 mg  0.4 mg Sublingual Q5 Min PRN Jac Russell MD        ondansetron (ZOFRAN ODT) ODT tab 4 mg  4 mg Oral Q6H PRN Jac Russell MD        Or    ondansetron (ZOFRAN) injection 4 mg  4 mg  Intravenous Q6H PRJac Yancey MD        oxyCODONE (ROXICODONE) tablet 5 mg  5 mg Oral Q4H PRJac Yancey MD        Or    oxyCODONE (ROXICODONE) tablet 10 mg  10 mg Oral Q4H PRJac Yanecy MD        oxyCODONE (ROXICODONE) tablet 5-10 mg  5-10 mg Oral Q4H PRJac Yancey MD        Percutaneous Coronary Intervention orders placed (this is information for BPA alerting)   Does not apply DOES NOT GO TO Jac Sheridan MD        simethicone (MYLICON) chewable tablet 80 mg  80 mg Oral Q6H PRJac Yancey MD        sodium chloride (PF) 0.9% PF flush 3 mL  3 mL Intracatheter q1 min prJac Yancey MD        sodium chloride 0.9% BOLUS 100-150 mL  100-150 mL Intravenous Q15 Min Jac Louis MD        sodium chloride 0.9% BOLUS 100-150 mL  100-150 mL Intravenous Q15 Min PRJac Yancey MD        sodium chloride 0.9% BOLUS 100-150 mL  100-150 mL Intravenous Q15 Min Jca Louis MD        sodium chloride 0.9% BOLUS 250 mL  250 mL Intravenous Once PRJac Yancey MD        Stop Heparin 60 minutes before end of treatment   Does not apply Continuous Jac Louis MD        Stop Heparin 60 minutes before end of treatment   Does not apply Continuous Jac Louis MD        Allergies   Allergen Reactions    Penicillins Other (See Comments) and Shortness Of Breath     Breathing problem.  breathing      No Clinical Screening - See Comments      PN: LW CM1: Contrast Intercapsular - Nonionic Reaction :    Nsaids Other (See Comments)     Kidney damage    Carvedilol GI Disturbance     Nausea and vomiting    Ciprofloxacin Muscle Pain (Myalgia)    Floxacillin (Flucloxacillin) Muscle Pain (Myalgia) and Rash    Levofloxacin Muscle Pain (Myalgia) and Rash    Morphine Nausea and Vomiting    Sulfa Antibiotics Itching         Lab Results    Chemistry/lipid CBC Cardiac Enzymes/BNP/TSH/INR   Lab Results   Component Value Date    CHOL 162 09/06/2023     HDL 75 09/06/2023    TRIG 112 09/06/2023    BUN 71.0 (H) 04/30/2024     04/30/2024    CO2 26 04/30/2024    Lab Results   Component Value Date    WBC 10.8 04/30/2024    HGB 9.0 (L) 04/30/2024    HCT 29.0 (L) 04/30/2024     (H) 04/30/2024     04/30/2024    Lab Results   Component Value Date    TROPONINI 0.03 07/21/2023    BNP >5,000 (H) 02/25/2023    TSH 3.96 09/06/2023    INR 1.70 (H) 07/13/2023              Maykel Hernandez MD  Interventional Cardiology  [facility]

## 2024-04-30 NOTE — Clinical Note
The first balloon was inserted into the right coronary artery and proximal right coronary artery.Max pressure = 4 florian. Total duration = 10 seconds.

## 2024-04-30 NOTE — H&P (VIEW-ONLY)
Assessment/Plan:  Elevated troponin: The patient complains of abdominal bloating, shortness of breath over last 7 days.  No chest pain.  ECG showed nonspecific T wave abnormality, more evident in anterior leads.  Hypersensitivity troponin is elevated 247, stable on the setting of end-stage renal disease.  Her symptoms including abdominal bloating, shortness of breath on exertion could be angina equivalent.  Since echo was reported severely reduced left ventricular ejection fraction, the patient could have a multiple coronary artery disease leading to severe cardiomyopathy.  She has some multiple risk factors.  The patient could develop severe coronary artery disease.  In additional, the patient is going to have renal transplant.  She needs to rule out to significant coronary artery disease from a renal transplant requested.  The patient agreed with the plan.  The patient understand the benefit and the possible complications.  After coronary angiogram procedure, she is going to have hemodialysis this afternoon.    Acute systolic congestive heart failure, severe cardiomyopathy with LVEF 25 to 30% (previous LVEF 70% in 2021 per nuclear stress test): Her chest x-ray indicated pulmonary congestion.  proBNP is significantly elevated.  Continue hemodialysis to remove extra fluid.  Discontinued labetalol, start the carvedilol 6.25 mg twice a day.  He is not a good candidate for ACE inhibitor and spironolactone due to end-stage renal disease.  Benign essential hypertension: Changed labetalol to carvedilol.  Continue to monitor her blood pressure.  Dyslipidemia: Continue rosuvastatin.  Last LDL was 65.  End-stage renal disease on hemodialysis, polycystic kidney disease, chronic anemia, abdominal bloating: Please see primary team of management for details.    Subjective:  Interval History:  Has no complaints of chest pain.  She complains of abdominal bloating after eating or drinking.  She has shortness of breath when she  had abdominal bloating.          Current Medications:  Scheduled Meds:  Current Facility-Administered Medications   Medication Dose Route Frequency Provider Last Rate Last Admin    calcitRIOL (ROCALTROL) capsule 0.5 mcg  0.5 mcg Oral Once per day on Tuesday Thursday Saturday Alissa Lane MD   0.5 mcg at 04/30/24 1000    calcium acetate (PHOSLO) capsule 667-1,334 mg  667-1,334 mg Oral TID w/meals Alissa Lane MD   667 mg at 04/30/24 1000    heparin ANTICOAGULANT injection 5,000 Units  5,000 Units Subcutaneous Q8H Alissa Lane MD   5,000 Units at 04/30/24 1000    labetalol (NORMODYNE) tablet 200 mg  200 mg Oral BID Alissa Lane MD   200 mg at 04/30/24 1000    levothyroxine (SYNTHROID/LEVOTHROID) tablet 50 mcg  50 mcg Oral Daily Alissa Lane MD   50 mcg at 04/30/24 1003    LORazepam (ATIVAN) tablet 0.5-1 mg  0.5-1 mg Oral Daily before lunch Alissa Lane MD   1 mg at 04/30/24 1100    No heparin via hemodialysis machine   Does not apply Once Stephanie Gaviria, NP        nortriptyline (PAMELOR) capsule 75 mg  75 mg Oral At Bedtime Alissa Lane MD   75 mg at 04/29/24 2151    pantoprazole (PROTONIX) EC tablet 40 mg  40 mg Oral BID AC Alissa Lane MD   40 mg at 04/30/24 0530    rosuvastatin (CRESTOR) tablet 10 mg  10 mg Oral At Bedtime Alissa Lane MD   10 mg at 04/29/24 2140    senna-docusate (SENOKOT-S/PERICOLACE) 8.6-50 MG per tablet 2 tablet  2 tablet Oral BID Alissa Lane MD   2 tablet at 04/30/24 1000    sertraline (ZOLOFT) tablet 150 mg  150 mg Oral At Bedtime Alissa Lane MD   150 mg at 04/29/24 2140    sodium chloride (PF) 0.9% PF flush 3 mL  3 mL Intracatheter Q8H Alissa Lane MD   3 mL at 04/30/24 1005    sodium chloride 0.9% BOLUS 250 mL  250 mL Intravenous Once in dialysis/CRRT Stephanie Gaviria NP        sodium chloride 0.9% BOLUS 300 mL  300 mL Hemodialysis Machine Once Stephanie Gaviria NP        Vitamin D3 (CHOLECALCIFEROL) tablet 25 mcg  25 mcg Oral Daily Alissa Lane MD   25 mcg  at 04/30/24 1000     Continuous Infusions:  Current Facility-Administered Medications   Medication Dose Route Frequency Provider Last Rate Last Admin    Stop Heparin 60 minutes before end of treatment   Does not apply Continuous PRN Stephanie Gaviria NP         PRN Meds:.  Current Facility-Administered Medications   Medication Dose Route Frequency Provider Last Rate Last Admin    acetaminophen (TYLENOL) tablet 650 mg  650 mg Oral Q4H PRN Alissa Lane MD        Or    acetaminophen (TYLENOL) Suppository 650 mg  650 mg Rectal Q4H PRN Alissa Lane MD        albumin human 25 % injection 50 mL  50 mL Intravenous Q1H PRN Stephanie Gaviria NP        artificial saliva (BIOTENE MT) solution 2 spray  2 spray Swish & Spit Q1H PRN Alissa Lane MD        calcium carbonate (TUMS) chewable tablet 1,000 mg  1,000 mg Oral 4x Daily PRN Alissa Lane MD        cyclobenzaprine (FLEXERIL) tablet 5 mg  5 mg Oral BID PRN Alissa Lane MD        lactulose (CHRONULAC) solution 10 g  10 g Oral BID PRN Alissa Lane MD        lidocaine (LMX4) cream   Topical Q1H PRN Alissa Lane MD        lidocaine 1 % 0.1-1 mL  0.1-1 mL Other Q1H PRN Alissa Lane MD        lidocaine 1 % 0.5 mL  0.5 mL Intradermal Once PRN Stephanie Gaviria NP        lidocaine 1 % 0.5 mL  0.5 mL Intradermal Once PRN Stephanie Gaviria NP        naloxone (NARCAN) injection 0.2 mg  0.2 mg Intravenous Q2 Min PRN Alissa Lane MD        Or    naloxone (NARCAN) injection 0.4 mg  0.4 mg Intravenous Q2 Min PRN Alissa Lane MD        Or    naloxone (NARCAN) injection 0.2 mg  0.2 mg Intramuscular Q2 Min PRN Alissa aLne MD        Or    naloxone (NARCAN) injection 0.4 mg  0.4 mg Intramuscular Q2 Min PRN Alissa Lane MD        oxyCODONE (ROXICODONE) tablet 5-10 mg  5-10 mg Oral Q4H PRN Alissa Lane MD   10 mg at 04/30/24 0530    simethicone (MYLICON) chewable tablet 80 mg  80 mg Oral Q6H PRN Alissa Lane MD   80 mg at 04/29/24 0009    sodium chloride (PF) 0.9%  PF flush 3 mL  3 mL Intracatheter q1 min prn Alissa Lane MD        sodium chloride 0.9% BOLUS 100-150 mL  100-150 mL Intravenous Q15 Min TRAN Stephanie Gaviria NP        sodium chloride 0.9% BOLUS 100-150 mL  100-150 mL Intravenous Q15 Min TRAN Stephanie Gaviria NP        Stop Heparin 60 minutes before end of treatment   Does not apply Continuous PRN Stephanie Gaviria NP           Objective:   Vital signs in last 24 hours:  Temp:  [97.8  F (36.6  C)-99  F (37.2  C)] 97.8  F (36.6  C)  Pulse:  [] 101  Resp:  [16-22] 16  BP: (124-158)/() 146/86  SpO2:  [91 %-100 %] 91 %  Weight:   [unfilled]     Physical Exam:  General Appearance:   Awake, Alert, No acute distress.   HEENT:  No scleral icterus; the mucous membranes were moist.   Neck: No cervical bruits. No jugular venous distention   Lungs:   Lungs are clear to auscultation. No crackles. No wheezing.   Cardiovascular:   RRR, normal first and second heart sounds with no murmurs. No rubs or gallops.     Abdomen:  Soft. No tenderness. Bowels sounds are present   Extremities: No leg edema. Equal posterior tibial pulsese.   Skin: Warm, Dry. No rashes.   Neurologic: Mood and affect are appropriate. No focal deficits.         Cardiographics:   Report: personally reviewed. .      Tele monitoring -sinus rhythm no cardiac arrhythmia.    Echocardiogram today:  The left ventricle is mildly dilated.  Left ventricular function is decreased. The ejection fraction is 25-30%  (severely reduced).  There is severe global hypokinesia of the left ventricle.  The right ventricle is mildly dilated.  Mildly decreased right ventricular systolic function  The left atrium is severely dilated.  There is mod-severe to severe (3-4+) mitral regurgitation.  There is moderate to mod-severe (2-3+) tricuspid regurgitation.  Right ventricular systolic pressure is elevated, consistent with moderate to  severe pulmonary hypertension.  IVC diameter <2.1 cm collapsing >50% with sniff  suggests a normal RA pressure of 3 mmHg.  Trivial pericardial effusion        Lab Results:   personally reviewed.     Lab Results   Component Value Date     04/30/2024     05/19/2021    CO2 26 04/30/2024    CO2 24 07/25/2023    CO2 31 05/19/2021    BUN 71.0 04/30/2024    BUN 27 07/25/2023    BUN 27 05/19/2021     Lab Results   Component Value Date    TROPONINI 0.03 07/21/2023     Lab Results   Component Value Date    WBC 10.8 04/30/2024    WBC 10.4 05/19/2021    HGB 9.0 04/30/2024    HGB 13.0 05/19/2021    HCT 29.0 04/30/2024    HCT 39.6 05/19/2021     04/30/2024     05/19/2021     04/30/2024     05/19/2021     Lab Results   Component Value Date    CHOL 162 09/06/2023    TRIG 112 09/06/2023    HDL 75 09/06/2023    LDL 65 09/06/2023

## 2024-04-30 NOTE — PROGRESS NOTES
Transfer from [facility].  No questions.  Ace wraps on legs for edema.  Back pain moderate.  Prep for angiogram.

## 2024-04-30 NOTE — CONSULTS
Care Management Initial Consult    General Information  Assessment completed with: Patient,    Type of CM/SW Visit: Initial Assessment    Primary Care Provider verified and updated as needed: Yes   Readmission within the last 30 days: no previous admission in last 30 days      Reason for Consult: discharge planning  Advance Care Planning: Advance Care Planning Reviewed: present on chart          Communication Assessment  Patient's communication style: spoken language (English or Bilingual)    Hearing Difficulty or Deaf: no   Wear Glasses or Blind: no    Cognitive  Cognitive/Neuro/Behavioral: WDL  Level of Consciousness: alert  Arousal Level: opens eyes spontaneously  Orientation: oriented x 4             Living Environment:   People in home: alone     Current living Arrangements: apartment      Able to return to prior arrangements: yes       Family/Social Support:  Care provided by: other (see comments), friend (sister)  Provides care for: pet(s)  Marital Status: Single  Sibling(s), Neighbor, Other (specify) (friends)          Description of Support System: Supportive, Involved         Current Resources:   Patient receiving home care services: No     Community Resources: Dialysis Services  Equipment currently used at home: grab bar, tub/shower, shower chair, cane, straight, walker, rolling, walker, gricelda  Supplies currently used at home: None    Employment/Financial:  Employment Status: retired        Financial Concerns:             Does the patient's insurance plan have a 3 day qualifying hospital stay waiver?  No    Lifestyle & Psychosocial Needs:  Social Determinants of Health     Food Insecurity: Not on file   Depression: Not at risk (1/26/2021)    PHQ-2     PHQ-2 Score: 2   Housing Stability: Not on file   Tobacco Use: Unknown (4/29/2024)    Patient History     Smoking Tobacco Use: Unknown     Smokeless Tobacco Use: Never     Passive Exposure: Not on file   Financial Resource Strain: Not on file   Alcohol Use:  Not on file   Transportation Needs: Not on file   Physical Activity: Not on file   Interpersonal Safety: Not on file   Stress: Not on file   Social Connections: Not on file   Health Literacy: Not on file       Functional Status:  Prior to admission patient needed assistance:   Dependent ADLs:: Independent  Dependent IADLs:: Laundry       Mental Health Status:  Mental Health Status: No Current Concerns       Chemical Dependency Status:  Chemical Dependency Status: No Current Concerns             Values/Beliefs:  Spiritual, Cultural Beliefs, Mu-ism Practices, Values that affect care:                 Additional Information:  Met with pt, introduced self and CM role. Pt lives alone in her apartment with her 2 birds. Pt is independent with I/ADLs. Pt does get assistance with laundry from her sister d/t her shoulders hurting so bad. Pt also gets help with vacuuming from her neighbor. Pt does use a delivery service for some of her shopping.     Pt goes to [facility] for dialysis on TTS schedule.     Pt will be getting transferred to [facility] for angiogram.     Friend or family to transport at time of discharge.     Chu Estrella RN

## 2024-04-30 NOTE — Clinical Note
The first balloon was inserted into the right coronary artery and middle right coronary artery.Max pressure = 12 florian. Total duration = 6 seconds.     Max pressure = 12 florian. Total duration = 7 seconds.    Balloon reinflated a second time: Max pressure = 12 florian. Total duration = 7 seconds.  Balloon reinflated a third time: Max pressure = 12 florian. Total duration = 6 seconds. Max pressure = 12 florian. Total duration = 6 seconds.

## 2024-04-30 NOTE — Clinical Note
The first balloon was inserted into the right coronary artery and middle right coronary artery.Max pressure = 12 florian. Total duration = 4 seconds.     Max pressure = 12 florian. Total duration = 4 seconds.    Balloon reinflated a second time: Max pressure = 12 florian. Total duration = 4 seconds.  Balloon reinflated a third time: Max pressure = 12 florian. Total duration = 5 seconds.  Balloon reinflated a fourth time: Max pressure = 12 florian. Total du ration = 4 seconds.

## 2024-04-30 NOTE — PROGRESS NOTES
[id number]  Patient alert and oriented. Up SBA. Diminished lung sound crackle coarse in lower bases. Room air this morning. Denies chest pain. Complained of abdominal bloating after drinking juice. Repositioning helped per pt. Had echo this morning and lymph edema wrap. Plan dialysis today. Will monitor and continue plan of care.

## 2024-04-30 NOTE — Clinical Note
The first balloon was inserted into the right coronary artery and proximal right coronary artery.Max pressure = 12 florian. Total duration = 5 seconds.     Max pressure = 12 florian. Total duration = 6 seconds.    Balloon reinflated a second time: Max pressure = 12 florian. Total duration = 6 seconds.

## 2024-04-30 NOTE — INTERVAL H&P NOTE
"I have reviewed the surgical (or preoperative) H&P that is linked to this encounter, and examined the patient. Noted changes include: hyperkalemia, K = 5.5. Slightly somnolent on exam but awakens to voice     Clinical Conditions Present on Arrival:  Clinically Significant Risk Factors Present on Admission        # Hyperkalemia: Highest K = 5.5 mmol/L in last 2 days, will monitor as appropriate  # Hyponatremia: Lowest Na = 135 mmol/L in last 30 days, will monitor as appropriate  # Hypercalcemia: Highest Ca = 10.5 mg/dL in last 2 days, will monitor as appropriate         # Cachexia: Estimated body mass index is 17.99 kg/m  as calculated from the following:    Height as of 4/29/24: 1.499 m (4' 11\").    Weight as of an earlier encounter on 4/30/24: 40.4 kg (89 lb 1.1 oz).       "

## 2024-04-30 NOTE — Clinical Note
The first balloon was inserted into the right coronary artery and middle right coronary artery.Max pressure = 8 florian. Total duration = 8 seconds.     Max pressure = 8 florian. Total duration = 6 seconds.   Mid rca.  Balloon reinflated a second time: Max pressure = 8 florian. Total duration = 6 seconds. Prox rca Balloon reinflated a third time: Max pressure = 12 florian. Total duration = 8 seconds. Prox rca Balloon reinflated a fourth time: Max press ure = 12 florian. Total duration = 7 seconds. Prox rca Balloon reinflated a fourth time: Max pressure = 12 florian. Total duration = 9 seconds. Prox rca

## 2024-04-30 NOTE — PLAN OF CARE
Writer assumed cares at 1200. Pt plan is to transport via EMS to [place] for angiogram. Pt will received HD at [place]. Report given to Brit at [place]. Melani Adams RN

## 2024-04-30 NOTE — Clinical Note
The first balloon was inserted into the right coronary artery and middle right coronary artery.Max pressure = 12 florian. Total duration = 7 seconds.

## 2024-04-30 NOTE — PROGRESS NOTES
Lymphedema Eval:      04/30/24 1045   Appointment Info   Signing Clinician's Name / Credentials (OT) Florentino Alvarez OTR/L   Quick Adds   Quick Adds Edema   General Information   Onset of Illness/Injury or Date of Surgery 04/29/24   Referring Physician Leonides Diaz MD   Patient/Family Therapy Goal Statement (OT) get the extra fluid off   Additional Occupational Profile Info/Pertinent History of Current Problem 68 year old female past medical history significant for polycystic kidney disease status post bilateral nephrectomy, ESRD on hemodialysis TTS schedule, anxiety depression hypertension dyslipidemia anemia of CKD GERD presented to the emergency room with complaints of shortness of breath, abdominal bloating, being admitted for volume overload in the setting of ESRD   Existing Precautions/Restrictions no known precautions/restrictions   Cognitive Status Examination   Orientation Status orientation to person, place and time   Affect/Mental Status (Cognitive) WNL   Sensory   Sensory Quick Adds sensation intact   Pain Assessment   Patient Currently in Pain No   Edema General Information   Onset of Edema   (on/off over past year)   Affected Body Part(s) Left LE;Right LE;Other (see comments)  (abdominal region)   Edema Etiology Other (see comments)  (CHF, ESRD)   Edema Precautions Cardiac Edema/CHF;Renal Insufficiency   Edema Examination/Assessment   Skin Condition Pitting;Intact  (cold)   Scar No   Ulcerations No   Skin Integrity no concerns   Pitting Assessment 2+   Fibrosis Assessment none   Clinical Impression   Criteria for Skilled Therapeutic Interventions Met (OT) Yes, treatment indicated   Edema: Patient Presentation Edema   Influenced by the following impairments BLE edema   Edema: Planned Interventions Gradient compression bandaging;Fit for compression garment;Edema exercises;Precautions to prevent infection/exacerbation;Education;Manual therapy   Clinical Decision Making Complexity (OT) problem focused  assessment/low complexity   Risk & Benefits of therapy have been explained evaluation/treatment results reviewed;patient   OT Total Evaluation Time   OT Eval, Low Complexity Minutes (00227) 10   OT Goals   Therapy Frequency (OT) Daily   OT Predicted Duration/Target Date for Goal Attainment 05/03/24   OT Goals Edema   OT: Edema education to increase ability to manage edema after discharge from the hospital Patient;Verbalize;Skin care routine;signs/symptoms of intolerance;wear schedule;limb positioning;garrnet/bandage care;discharge recommendations   OT: Management of edema bandages Patient;Verbalize;garment(s)   OT: Functional edema exercise program to reduce limb volume, increase activity tolerance and improve independence with ADL Patient;Verbalize;HEP   Interventions   Interventions Quick Adds Self-Care/Home Management   Self-Care/Home Management   Self-Care/Home Mgmt/ADL, Compensatory, Meal Prep Minutes (81820) 24   Symptoms Noted During/After Treatment (Meal Preparation/Planning Training) none   Treatment Detail/Skilled Intervention Pt in bed for session. Pt edu on lymphedema SSB wraps and continuing w/ mobility to decrease edema in BLEs. Pt's BLEs assessed, washed w/ cloth, and lotioned. Medium TG soft donned on BLEs. 10 CM x 5M SSB wrap on foot/ankle starting at base of toes and 12cm x 5M wrap on ankle to below the knee; all wraps w/ spiral technique, light-medium compression, 75% overlap. Upon first wrapping both feet/ankles, pt stating that wraps were too tight and needed to be redone. Decreasd compression aroung foot/ankles. Pt edu on PLB, wear schedule, and signs/symptoms to indicate that wraps need to be taken off - pt verbalized understanding. Instructed pt on AROM of ankles/knees to assist w/ edema. Socks donned and pt ended session in bed w/ all needs w/in reach.   OT Discharge Planning   OT Plan likely Tubigrip next session   OT Discharge Recommendation (DC Rec)   (Tubigrip for home)   OT Rationale  for DC Rec Pt will be close to baseline after 24 hrs of wraps being on BLEs   Total Session Time   Timed Code Treatment Minutes 24   Total Session Time (sum of timed and untimed services) 34

## 2024-04-30 NOTE — PROGRESS NOTES
Date: 4/29/2024  Time: 1830     Data:  Pre Wt:   41.9 kg  Desired Wt:   To be established  Post Wt:  39.9 kg (estimated)  Weight change: - 2.2 kg  Ultrafiltration - Post Run Net Total Removed (mL):  2200 ml  Vascular Access Status: Fistula patent  Dialyzer Rinse:  Light  Total Blood Volume Processed: 0 L   Total Dialysis (Treatment) Time:   2 Hrs  Dialysate Bath: K 2, Ca 2.5  Heparin: Heparin: None     Lab:   No  HbsAg - non reactive (03/24/2024)  HbsAb - immune >70.5 (03/24/2024)      Interventions:  Dialysis done through left AV Fistula using 15 gauge needles  UF set to 2.5 Liters, accommodating priming and rinse back volumes  , Sequential  tx, UF only  CritLine used for fluid volume monitoring, Profile A/B throughout the run, tolerating UF pull, Vital signs monitored every 15 min and PRN,   Pt remained hemodynamically stable throughout hemodialysis  Treatment ended as expected and rinsed back successfully  Decannulation done post HD, hemostasis is achieved in 10 minutes  Pressure dressing is applied, to be removed after 4-6 hours  Report given to PCN, left pt in room in stable condition     Assessment:  A/O x 4, calm and cooperative, denies pain  Left arm AV Fistula has good thrill and bruit                Plan:    Per Renal team        MARQUES MorrellN, RN  Acute Dialysis RN  [facility]

## 2024-04-30 NOTE — Clinical Note
The first balloon was inserted into the right coronary artery and proximal right coronary artery.Max pressure = 4 florian. Total duration = 10 seconds.     Therapy delivered x1.

## 2024-04-30 NOTE — Clinical Note
The first balloon was inserted into the right coronary artery and proximal right coronary artery.Max pressure = 12 florian. Total duration = 12 seconds.     Max pressure = 16 florian. Total duration = 12 seconds.    Balloon reinflated a second time: Max pressure = 16 florian. Total duration = 12 seconds.

## 2024-04-30 NOTE — Clinical Note
The first balloon was inserted into the right coronary artery and proximal right coronary artery.Max pressure = 12 florian. Total duration = 13 seconds.     Max pressure = 12 florian. Total duration = 4 seconds.    Balloon reinflated a second time: Max pressure = 12 florian. Total duration = 4 seconds.  Balloon reinflated a third time: Max pressure = 4 florian. Total duration = 7 seconds.  Balloon reinflated a fourth time: Max pressure = 4 florian. Total d uration = 6 seconds.  Balloon reinflated a fourth time: Max pressure = 4 florian. Total duration = 6 seconds.

## 2024-04-30 NOTE — PHARMACY-ADMISSION MEDICATION HISTORY
Admission medication history completed at [facility]. Please see Pharmacist Admission Medication History note from 04/29/2024.

## 2024-04-30 NOTE — Clinical Note
30 day provided as he is due for follow up of dose adjustment. Please schedule. Thanks. Guide removed intact.

## 2024-04-30 NOTE — Clinical Note
Stent prepped per 's instructions. temperature recheck 99.8. patient denies sob distress or pain. cooling measures continued

## 2024-04-30 NOTE — Clinical Note
The first balloon was inserted into the right coronary artery and middle right coronary artery.Max pressure = 12 florian. Total duration = 4 seconds.     Max pressure = 12 florian. Total duration = 5 seconds.    Balloon reinflated a second time: Max pressure = 12 florian. Total duration = 5 seconds.  Balloon reinflated a third time: Max pressure = 12 florian. Total duration = 4 seconds.

## 2024-04-30 NOTE — PLAN OF CARE
Problem: Adult Inpatient Plan of Care  Goal: Optimal Comfort and Wellbeing  Outcome: Progressing     Problem: Heart Failure  Goal: Stable Heart Rate and Rhythm  Outcome: Progressing  Goal: Improved Oral Intake  Outcome: Progressing  Goal: Effective Oxygenation and Ventilation  Outcome: Progressing  Intervention: Promote Airway Secretion Clearance  Recent Flowsheet Documentation  Taken 4/29/2024 2251 by Jennifer Aaron RN  Activity Management: ambulated to bathroom  Goal: Effective Breathing Pattern During Sleep  Outcome: Progressing  Intervention: Monitor and Manage Obstructive Sleep Apnea  Recent Flowsheet Documentation  Taken 4/29/2024 2342 by Jennifer Aaron RN  Medication Review/Management: medications reviewed  Taken 4/29/2024 2030 by Jennifer Aaron RN  Medication Review/Management: medications reviewed   Goal Outcome Evaluation:       Assumed care at 1900. VSS on 3L NC (per pt request/comfort). Pt drowsy after dialysis. Sinus to sinus tach on tele. Pt states she feels less bloated after dialysis. Independent in room. Reports back pain- one dose of 10 oxy given. No other acute changes.

## 2024-05-01 NOTE — PLAN OF CARE
Problem: Cardiac Catheterization (Diagnostic/Interventional)  Goal: Absence of Bleeding  Outcome: Progressing  Goal: Absence of Contrast-Induced Injury  Outcome: Progressing  Goal: Stable Heart Rate and Rhythm  Outcome: Progressing  Goal: Absence of Embolism Signs and Symptoms  Outcome: Progressing  Goal: Anesthesia/Sedation Recovery  Outcome: Progressing  Intervention: Optimize Anesthesia Recovery  Recent Flowsheet Documentation  Taken 4/30/2024 2018 by Manasa Ortiz RN  Safety Promotion/Fall Prevention:   safety round/check completed   room door open   nonskid shoes/slippers when out of bed   mobility aid in reach   lighting adjusted   increase visualization of patient   increased rounding and observation   clutter free environment maintained  Taken 4/30/2024 1645 by Manasa Ortiz RN  Safety Promotion/Fall Prevention:   safety round/check completed   room door open   nonskid shoes/slippers when out of bed   mobility aid in reach   lighting adjusted   increase visualization of patient   increased rounding and observation   clutter free environment maintained  Goal: Optimal Pain Control and Function  Outcome: Progressing  Goal: Absence of Vascular Access Complication  Intervention: Prevent and Manage Access Complications  Recent Flowsheet Documentation  Taken 4/30/2024 2018 by Manasa Ortiz RN  Activity Management:   activity adjusted per tolerance   activity encouraged  Head of Bed (HOB) Positioning:   HOB flat   HOB not elevated due to postsurgical restriction  Taken 4/30/2024 1645 by Manasa Ortiz RN  Activity Management:   activity adjusted per tolerance   activity encouraged  Head of Bed (HOB) Positioning:   HOB flat   HOB not elevated due to postsurgical restriction   Goal Outcome Evaluation:       Pt was drowsy, but wakes up easily when her name is called. She is oriented x 4  but forgetful  at times. Pt denies pain, has SOB at rest and with activity. On O2 at 2 LPM  per nasal cannula, SpO2 99 to 100 %. Her  O2 saturation on RA was 95 to 96% but pt prefers to keep the supplemental O2 at 2 LPM for comfort. Lung sounds clear, diminished, on RA. HR in the 90's, NSR. Pt refused to take Carvedilol tonight until her kidney doctor will approve it. Right groin site has soft hematoma, marked, no changes since she came to the unit. Pulses are palpable, no bleeding noted. Mild edema in both lower legs noted with lymphedema wrap in place. At 2045, pt requested to have the lymphedema wrap off. Pt went down to dialysis room at 2100.

## 2024-05-01 NOTE — PROGRESS NOTES
Lymphedema Discharge Summary    Reason for therapy discharge:    Discharged to [facility]    Progress towards therapy goal(s). See goals on Care Plan in [facility] electronic health record for goal details.  Goals partially met.  Barriers to achieving goals:   discharge from facility.    Therapy recommendation(s):    Continued therapy is recommended.  Rationale/Recommendations:  Pt to continue w/ Tubigrip compression stockings.

## 2024-05-01 NOTE — PROGRESS NOTES
Hemodialysis Treatment Note:    Total UF removed:  2500 ml    Access:   AV Fistula L upper arm: Thrill and bruit preesnt. No s/s of infections. Cannulated with 15g needles without complications. Access patent.  achieved.     Run Summary:   K2 bath 3hr treatment with goal 2.5kg. Pt was hemodynamically stable during dialysis. Pt completed dialysis treatment without issues. Report given to CEDRIC García on P3.      Access (post dialysis) :   AV Fistula continued to be patent.  maintained during dialysis. Needles pulled and gauze applied with direct pressure, then secured with tape.  Hemostasis achieved within 10 min.     Interventions:  VS check q15min     Plans:  Per renal team.    Tevin Mg RN  [facility] ....................5/1/2024 12:56 AM

## 2024-05-01 NOTE — PROGRESS NOTES
"Care Management Follow Up    Length of Stay (days): 1    Expected Discharge Date: 05/02/2024     Concerns to be Addressed: Care progression - discharge planning     Patient plan of care discussed at interdisciplinary rounds: Yes    Anticipated Discharge Disposition:  TBD     Anticipated Discharge Services:  TBD  Anticipated Discharge DME:  [facility]    Patient/family educated on Medicare website which has current facility and service quality ratings:  NA  Education Provided on the Discharge Plan:  Yes per team  Patient/Family in Agreement with the Plan:  NA    Referrals Placed by CM/SW:  [facility]  Private pay costs discussed: Not applicable    Additional Information:  Per provider, Dr. Sosa, patient is not ready. Waiting for nephrology clearance.     Social Hx: \"Live alone in an apartment. Report independent with ADLs, but needs assistance with IADLs. Does HD TTS. Has DME supplies. No community resources. Goal is to return home. Family able to transport.\"    RNCM to follow for medical progression, recommendations, and final discharge plan.     Katiuska Phillips RN     "

## 2024-05-01 NOTE — CONSULTS
Care Management Initial Consult    General Information  Assessment completed with: Patient, Patient  Type of CM/SW Visit: Initial Assessment    Primary Care Provider verified and updated as needed: Yes   Readmission within the last 30 days: current reason for admission unrelated to previous admission      Reason for Consult: discharge planning  Advance Care Planning: Advance Care Planning Reviewed: present on chart        Communication Assessment  Patient's communication style: spoken language (English or Bilingual)        Cognitive  Cognitive/Neuro/Behavioral: WDL  Level of Consciousness: lethargic (drowsy sedation)  Arousal Level: opens eyes spontaneously  Orientation: oriented x 4             Living Environment:   People in home: alone     Current living Arrangements: apartment      Able to return to prior arrangements: yes     Family/Social Support:  Care provided by: other (see comments) (Sister, friends)  Provides care for: no one  Marital Status: Single  Sibling(s), Other (specify) (Friends)          Description of Support System: Supportive    Support Assessment: Adequate family and caregiver support    Current Resources:   Patient receiving home care services: No     Community Resources: Dialysis Services  Equipment currently used at home: grab bar, tub/shower, cane, straight, walker, gricelda, walker, rolling, walker, standard, shower chair  Supplies currently used at home: None    Employment/Financial:  Employment Status: retired        Financial Concerns: none   Referral to Financial Worker: No     Does the patient's insurance plan have a 3 day qualifying hospital stay waiver?  No    Lifestyle & Psychosocial Needs:  Social Determinants of Health     Food Insecurity: Not on file   Depression: Not at risk (1/26/2021)    PHQ-2     PHQ-2 Score: 2   Housing Stability: Not on file   Tobacco Use: Unknown (4/29/2024)    Patient History     Smoking Tobacco Use: Unknown     Smokeless Tobacco Use: Never     Passive  Exposure: Not on file   Financial Resource Strain: Not on file   Alcohol Use: Not on file   Transportation Needs: Not on file   Physical Activity: Not on file   Interpersonal Safety: Not on file   Stress: Not on file   Social Connections: Not on file   Health Literacy: Not on file     Functional Status:  Prior to admission patient needed assistance:   Dependent ADLs:: Independent, Ambulation-walker  Dependent IADLs:: Laundry     Additional Information:  Writer met with patient at bedside to review role of care management services, discuss goals of care and assess need for any possible services at discharge. Patient alert, answering questions appropriately and engaged in the conversation. Patient has a HCD on file. Transfer from [facility] for coronary angiogram. Lives alone in an apartment. Report independent with ADLs, but needs assistance with IADLs. Does HD. Has DME supplies. No community resources. Goal is to return home. Family able to transport.       Katiuska Phillips RN

## 2024-05-01 NOTE — PROGRESS NOTES
[facility]  [facility]   Follow up    Serene Tipton   MRN: [id number]  : 1955   DOA: 2024   CC: ESRD      Assessment and Plan:  68 year old female with ESRD    ESRD: HD tomorrow on TTS schedule  Had extra UF run on Monday noc and ran last noc post angio; will plan for HD here tomorrow prior to discharge  Volume status; elevated and more SOB and on O2; will run here tomorrow and try to lower EDW  Hyperkalemia: corrected with dialysis  Hypothyroidism: on replacement  CAD; s/p PCI to RCA; usual cares/meds  Anemia; on chronic PETE as OP  Hyperlipid; on statin  Receives PETE and parenteral iron with outpatient dialysis.   Hemoglobin  9's  Follow need to dose PETE while inpatient.  Secondary renal hyperparathyroidism: Sevelamer 3 times daily AC when taking pos  HTN:  labetalol    Subjective  Pt new to me; chart and meds reviewed  Pt says still SOB renea with moving; thinks she is 5-6 pounds over her EDW; had some issues with infiltration of access and missed some HD/UF  Had stent placed; reviewed cath report  Objective    Vital signs in last 24 hours  Temp:  [97.7  F (36.5  C)-99  F (37.2  C)] 99  F (37.2  C)  Pulse:  [84-97] 97  Resp:  [10-24] 18  BP: (105-150)/(61-94) 111/70  SpO2:  [93 %-100 %] 98 %      Intake/Output last 3 shifts  I/O last 3 completed shifts:  In: 780 [P.O.:480; I.V.:300]  Out: 2200 [Other:2200]  Intake/Output this shift:  I/O this shift:  In: 240 [P.O.:240]  Out: -     Physical Exam  Alert/oriented x 3, awake, NAD  CV: RRR without murmur or rub  Lung: clear and equal; no extra sounds  Ab: soft and NT; not distended; normal bs  Ext: some edema and well perfused  Skin; no rash    Pertinent Labs     Last Renal Panel:  Sodium   Date Value Ref Range Status   2024 137 135 - 145 mmol/L Final     Comment:     Reference intervals for this test were updated on 2023 to more accurately reflect our healthy population. There  may be differences in the flagging of prior results with similar values performed with this method. Interpretation of those prior results can be made in the context of the updated reference intervals.    05/19/2021 136 133 - 144 mmol/L Final     Potassium   Date Value Ref Range Status   05/01/2024 4.0 3.4 - 5.3 mmol/L Final   07/25/2023 4.7 3.5 - 5.0 mmol/L Final   05/19/2021 3.9 3.4 - 5.3 mmol/L Final     Chloride   Date Value Ref Range Status   05/01/2024 97 (L) 98 - 107 mmol/L Final   07/25/2023 96 (L) 98 - 107 mmol/L Final   05/19/2021 98 94 - 109 mmol/L Final     Carbon Dioxide   Date Value Ref Range Status   05/19/2021 31 20 - 32 mmol/L Final     Carbon Dioxide (CO2)   Date Value Ref Range Status   05/01/2024 29 22 - 29 mmol/L Final   07/25/2023 24 22 - 31 mmol/L Final     Anion Gap   Date Value Ref Range Status   05/01/2024 11 7 - 15 mmol/L Final   07/25/2023 12 5 - 18 mmol/L Final   05/19/2021 7 3 - 14 mmol/L Final     Glucose   Date Value Ref Range Status   05/01/2024 119 (H) 70 - 99 mg/dL Final   07/25/2023 116 70 - 125 mg/dL Final   05/19/2021 69 (L) 70 - 99 mg/dL Final     GLUCOSE BY METER POCT   Date Value Ref Range Status   07/26/2023 127 (H) 70 - 99 mg/dL Final     Urea Nitrogen   Date Value Ref Range Status   05/01/2024 26.4 (H) 8.0 - 23.0 mg/dL Final   07/25/2023 27 (H) 8 - 22 mg/dL Final   05/19/2021 27 7 - 30 mg/dL Final     Creatinine   Date Value Ref Range Status   05/01/2024 2.52 (H) 0.51 - 0.95 mg/dL Final   05/19/2021 3.43 (H) 0.52 - 1.04 mg/dL Final     GFR Estimate   Date Value Ref Range Status   05/01/2024 20 (L) >60 mL/min/1.73m2 Final   05/19/2021 13 (L) >60 mL/min/[1.73_m2] Final     Comment:     Non  GFR Calc  Starting 12/18/2018, serum creatinine based estimated GFR (eGFR) will be   calculated using the Chronic Kidney Disease Epidemiology Collaboration   (CKD-EPI) equation.       Calcium   Date Value Ref Range Status   05/01/2024 10.2 8.8 - 10.2 mg/dL Final    05/19/2021 9.5 8.5 - 10.1 mg/dL Final     Phosphorus   Date Value Ref Range Status   04/30/2024 4.3 2.5 - 4.5 mg/dL Final   05/19/2021 2.8 2.5 - 4.5 mg/dL Final     Albumin   Date Value Ref Range Status   04/30/2024 3.7 3.5 - 5.2 g/dL Final   07/25/2023 2.7 (L) 3.5 - 5.0 g/dL Final   05/19/2021 4.2 3.4 - 5.0 g/dL Final     Recent Labs   Lab 04/30/24  0508 04/29/24  1205   HGB 9.0* 9.4*          I reviewed all lab results  Karla Brown MD

## 2024-05-01 NOTE — CONSULTS
NUTRITION EDUCATION      REASON FOR ASSESSMENT:  Consulted to provide education on heart healthy diet    NUTRITION HISTORY:  Information obtained from Pt.  Pt states she has had diet education in the past and follows a heart healthy, low sodium, dialysis diet at home.  She usually takes Nepro with chocolate syrup mixed in with it.  She does not want any supplements here as she states she's going home tomorrow.    She did not want to review the heart healthy guidelines but accepted written materials and she states she will read through them    CURRENT DIET:  Renal ( dialysis) diet, 2 gram Na    INTERVENTIONS:    Nutrition Prescription:  Low fat, saturated fat, low sodium, dialysis diet    Implementation:      *  Nutrition Education (Content):   A)  Provided handout Heart healthy eating nutrition therapy, food choice guidelines for heart healthy eating with CHF.   B)  Discussed pt would like to review information herself          *  Anticipate good compliance      *  Diet Education - refer to Education Flowsheet    Goals:      *  Patient will verbalize understanding of diet met      *  All of the above goals met during the education session    Follow Up/Monitoring:      *  Provided RD contact information for future questions      *  Recommended Out-Patient Nutrition Referral, if further diet instructions are needed

## 2024-05-01 NOTE — PLAN OF CARE
Problem: Cardiac Catheterization (Diagnostic/Interventional)  Goal: Optimal Pain Control and Function  Intervention: Prevent or Manage Pain  Recent Flowsheet Documentation  Taken 5/1/2024 0939 by Faith Gould RN  Pain Management Interventions:   medication (see MAR)   emotional support   distraction   Goal Outcome Evaluation:       Pt A&OX4, up indep and steady. Pt elected to stay another day to have another dialysis run tomorrow. With new HF dx pt feels she will need more fluid pulled. VSS, room air sats high 90s, does like to wear oxygen for comfort when lying down due to ffeling like she can't take a deep breath in. Lung sounds diminished. IS encouarged hourly. Pt has no cough. Appetite good. No BM, prn lactuolse given for early constipation. Anuric. HF folder and videos reviewed. Plan of care ongoing.

## 2024-05-01 NOTE — PROGRESS NOTES
Occupational Therapy      05/01/24 1135   Appointment Info   Signing Clinician's Name / Credentials (OT) Haylee Duffy OTR/L   Living Environment   People in Home alone   Current Living Arrangements apartment   Home Accessibility no concerns   Transportation Anticipated family or friend will provide   Living Environment Comments W/I shower   Self-Care   Usual Activity Tolerance moderate   Current Activity Tolerance moderate   Regular Exercise No   Equipment Currently Used at Home grab bar, tub/shower;cane, straight;walker, gricelda;walker, rolling;walker, standard;shower chair   Fall history within last six months no   Activity/Exercise/Self-Care Comment Ind. w/ ADL tasks   Instrumental Activities of Daily Living (IADL)   IADL Comments Ind. w/ IADL tasks per pt report   General Information   Onset of Illness/Injury or Date of Surgery 04/30/24   Referring Physician Jac Russell MD   Additional Occupational Profile Info/Pertinent History of Current Problem 68 year old female with ESRD on HD, CM with EF 25-30%, CAD s/p complex PCI of RCA   Existing Precautions/Restrictions cardiac   General Observations and Info Pt had MANUEL manning Angio   Cognitive Status Examination   Orientation Status orientation to person, place and time   Bed Mobility   Bed Mobility No deficits identified   Transfers   Transfers sit-stand transfer   Sit-Stand Transfer   Sit-Stand [place] (Transfers) verbal cues;supervision   Balance   Balance Assessment no deficits identified   Activities of Daily Living   BADL Assessment/Intervention no deficits identified   Clinical Impression   Criteria for Skilled Therapeutic Interventions Met (OT) Yes, treatment indicated   OT Diagnosis decreased endurance   OT Problem List-Impairments impacting ADL problems related to;activity tolerance impaired   Assessment of Occupational Performance 1-3 Performance Deficits   Planned Therapy Interventions (OT) ADL retraining   Clinical Decision Making Complexity (OT)  problem focused assessment/low complexity   Risk & Benefits of therapy have been explained evaluation/treatment results reviewed;risks/benefits reviewed   OT Total Evaluation Time   OT Eval, Low Complexity Minutes (75488) 10   OT Goals   Therapy Frequency (OT) Daily   OT Predicted Duration/Target Date for Goal Attainment 05/08/24   Interventions   Interventions Quick Adds Cardiac Rehab;Therapeutic Procedures/Exercise   Self-Care/Home Management   Self-Care/Home Mgmt/ADL, Compensatory, Meal Prep Minutes (97657) 8   Treatment Detail/Skilled Intervention Ind. w/ bed mobility, STS SBA w/o device, pt ambulating w/o device no LOB noted. Pt ed. on Cardiac education/precautions following R. groin angio   Therapeutic Procedures/Exercise   Therapeutic Procedure: strength, endurance, ROM, flexibillity minutes (24560) 10   Symptoms Noted During/After Treatment fatigue   Treatment Detail/Skilled Intervention see ambulation tab   Treatment Time Includes (CR Only) See specific exercise details intervention group(s)   Ambulation   Workload 200ft   Symptoms Denies symptoms   Cardiovascular Response Normal   Exercise Details SBA w/o device w/ no LOB noted slow pace, 1 standing rest break for ~5' d/t fatigue   Vital Signs Details pre  /77 post  /70   Cardiac Education   Education Provided Stop light tool;Outpatient Cardiac Rehab;Resuming home activities;Signs and symptoms;Home exercise program;Precautions   Education Packet Given to Patient Yes   All Patient Education Handouts Reviewed with Patient and/or Family Yes   OT Discharge Planning   OT Plan progress ambulation/endurance   OT Discharge Recommendation (DC Rec) home   OT Rationale for DC Rec Pt moving well w/ SBA w/o device slow pace d/t fatigue, pt lives alone in apartment (one level). Pt reporting family to assist as needed.   Total Session Time   Timed Code Treatment Minutes 18   Total Session Time (sum of timed and untimed services) 28

## 2024-05-01 NOTE — PLAN OF CARE
Problem: Heart Failure  Goal: Optimal Coping  Outcome: Progressing  Intervention: Support Psychosocial Response  Recent Flowsheet Documentation  Taken 5/1/2024 0400 by Ryan Cardoza RN  Supportive Measures: active listening utilized  Taken 5/1/2024 0135 by Ryan Cardoza RN  Supportive Measures: active listening utilized  Goal: Optimal Cardiac Output  Outcome: Progressing  Goal: Stable Heart Rate and Rhythm  Outcome: Progressing  Goal: Optimal Functional Ability  Outcome: Progressing  Intervention: Optimize Functional Ability  Recent Flowsheet Documentation  Taken 5/1/2024 0400 by Ryan Cardoza RN  Activity Management:   activity adjusted per tolerance   activity encouraged  Taken 5/1/2024 0135 by Ryan Cardoza RN  Activity Management:   activity adjusted per tolerance   activity encouraged  Goal: Fluid and Electrolyte Balance  Outcome: Progressing  Goal: Improved Oral Intake  Outcome: Progressing  Goal: Effective Oxygenation and Ventilation  Outcome: Not Progressing  Intervention: Promote Airway Secretion Clearance  Recent Flowsheet Documentation  Taken 5/1/2024 0400 by Ryan Cardoza RN  Activity Management:   activity adjusted per tolerance   activity encouraged  Taken 5/1/2024 0135 by Ryan Cardoza RN  Activity Management:   activity adjusted per tolerance   activity encouraged  Intervention: Optimize Oxygenation and Ventilation  Recent Flowsheet Documentation  Taken 5/1/2024 0400 by Ryan Cardoza RN  Head of Bed (HOB) Positioning:   HOB flat   HOB not elevated due to postsurgical restriction  Taken 5/1/2024 0135 by Ryan Cardoza RN  Head of Bed (HOB) Positioning:   HOB flat   HOB not elevated due to postsurgical restriction  Goal: Effective Breathing Pattern During Sleep  Outcome: Progressing  Intervention: Monitor and Manage Obstructive Sleep Apnea  Recent Flowsheet Documentation  Taken 5/1/2024 0400 by Ryan Cardoza  "RN  Medication Review/Management:   medications reviewed   high-risk medications identified  Taken 5/1/2024 0135 by Ryan Cardoza RN  Medication Review/Management:   medications reviewed   high-risk medications identified   Goal Outcome Evaluation:    Cardiac:  NSR, rate 70-90's.  Respiratory:  Rate 16-20, non-labored.  Sat's: Maintaining mid 90's at 3L NC.  LS: Clear all fields, bilat.  C/o feeling \"oxygen starved\" despite supplemental O2 and sat's remaining in the mid 90's throughout NOC.  Neuro:  WNL.  GI: Passing flatus.  BS: Present X4 quadrants.  No BM for NOC.  :  No UOP observed.  Returned from HD at approximately 01:30 hrs.  Pain:  C/o elevated sore back pain.  Treated with PRN Oxycodone.  No further interventions.  Wound/Incision:  Healing well.  Bruising at R femoral puncture site.  Ambulation:  Up SBA X1 in room.  Labs:  AM labs pending.  Plan:  Medical management of cardiac condition.  HD as indicated.                          "

## 2024-05-01 NOTE — PROGRESS NOTES
Imp/plan:  CAD sp PCI on DAPT and high dose statin  CM - on medical therapy - complicated by ESRD - consider hydralazine/imdur, rxn to carveidlol, change to metoprolol    Will sign off - follow up with heart failure and Dr. Begum in Cardiology  Current Facility-Administered Medications   Medication Dose Route Frequency Provider Last Rate Last Admin    acetaminophen (TYLENOL) tablet 650 mg  650 mg Oral Q4H PRN Jac Russell MD        Or    acetaminophen (TYLENOL) Suppository 650 mg  650 mg Rectal Q4H PRN Jac Russell MD        acetaminophen (TYLENOL) tablet 650 mg  650 mg Oral Q4H PRN Jac Russell MD        albumin human 25 % injection 50 mL  50 mL Intravenous Q1H PRN Jac Russell MD        albumin human 25 % injection 50 mL  50 mL Intravenous Q1H PRN Jac Russell MD        artificial saliva (BIOTENE MT) solution 2 spray  2 spray Swish & Spit Q1H PRN Jac Russell MD        aspirin EC tablet 81 mg  81 mg Oral Daily Jac Russell MD   81 mg at 05/01/24 0923    atorvastatin (LIPITOR) tablet 80 mg  80 mg Oral QPM Otilia Ngo PA-C   80 mg at 05/01/24 0135    [START ON 5/2/2024] calcitRIOL (ROCALTROL) capsule 0.5 mcg  0.5 mcg Oral Once per day on Tuesday Thursday Saturday Jac Russell MD        calcium acetate (PHOSLO) capsule 667-1,334 mg  667-1,334 mg Oral TID w/meals Jac Russell MD   667 mg at 05/01/24 0147    calcium carbonate (TUMS) chewable tablet 1,000 mg  1,000 mg Oral 4x Daily PRN Jac Russell MD        carvedilol (COREG) tablet 3.125 mg  3.125 mg Oral BID w/meals Maykel Hernandez MD        clopidogrel (PLAVIX) tablet 75 mg  75 mg Oral Daily Jac Russell MD   75 mg at 05/01/24 0923    Continuing beta blocker from home medication list OR beta blocker order already placed during this visit   Does not apply DOES NOT GO TO Jac Sheridan MD        cyclobenzaprine (FLEXERIL) tablet 5 mg  5 mg Oral BID PRN Jac Russell MD         cyclobenzaprine (FLEXERIL) tablet 5 mg  5 mg Oral BID PRN Declan Redman MD        heparin ANTICOAGULANT injection 5,000 Units  5,000 Units Subcutaneous Q8H Jac Russell MD   5,000 Units at 05/01/24 0955    HOLD: Metformin and metformin containing medications on day of procedure and 48 hours after IV contrast given for patients with acute kidney injury or severe chronic kidney disease (stage IV or stage V; i.e., eGFR less than 30)   Does not apply HOLD Jac Russell MD        hydrALAZINE (APRESOLINE) injection 10 mg  10 mg Intravenous Q4H PRN Jac Russell MD        lactulose (CHRONULAC) solution 10 g  10 g Oral BID PRN Jac Russell MD        lactulose (CHRONULAC) solution 10 g  10 g Oral BID PRN Declan Redman MD        levothyroxine (SYNTHROID/LEVOTHROID) tablet 50 mcg  50 mcg Oral Daily Declan Redman MD   50 mcg at 05/01/24 0922    lidocaine (LMX4) cream   Topical Q1H PRN Jac Russell MD        lidocaine 1 % 0.1-1 mL  0.1-1 mL Other Q1H PRN Jac Russell MD        lidocaine 1 % 0.5 mL  0.5 mL Intradermal Once PRN Jac Russell MD        lidocaine 1 % 0.5 mL  0.5 mL Intradermal Once PRN Jac Russell MD        lidocaine 1 % 0.5 mL  0.5 mL Intradermal Once PRN Jac Russell MD        lidocaine 1 % 0.5 mL  0.5 mL Intradermal Once PRN Jac Russell MD        LORazepam (ATIVAN) tablet 1 mg  1 mg Oral Daily before lunch Declan Redman MD        metoprolol (LOPRESSOR) injection 5 mg  5 mg Intravenous Q15 Min PRN Jac Russell MD        naloxone (NARCAN) injection 0.2 mg  0.2 mg Intravenous Q2 Min PRN Jac Russell MD        Or    naloxone (NARCAN) injection 0.4 mg  0.4 mg Intravenous Q2 Min PRN Jac Russell MD        Or    naloxone (NARCAN) injection 0.2 mg  0.2 mg Intramuscular Q2 Min PRN Jac Russell MD        Or    naloxone (NARCAN) injection 0.4 mg  0.4 mg Intramuscular Q2 Min PRN Jac Russell MD        nitroGLYcerin (NITROSTAT)  sublingual tablet 0.4 mg  0.4 mg Sublingual Q5 Min PRN Jac Russell MD        nortriptyline (PAMELOR) capsule 75 mg  75 mg Oral At Bedtime Jac Russell MD   75 mg at 05/01/24 0136    ondansetron (ZOFRAN ODT) ODT tab 4 mg  4 mg Oral Q6H PRN Jac Russell MD        Or    ondansetron (ZOFRAN) injection 4 mg  4 mg Intravenous Q6H PRN Jac Russell MD        oxyCODONE (ROXICODONE) tablet 10 mg  10 mg Oral Q4H PRN Declan Redman MD   10 mg at 05/01/24 0923    oxyCODONE (ROXICODONE) tablet 5-10 mg  5-10 mg Oral Q4H PRN Jac Russell MD        pantoprazole (PROTONIX) EC tablet 40 mg  40 mg Oral BID AC Jac Russell MD   40 mg at 05/01/24 0923    Percutaneous Coronary Intervention orders placed (this is information for BPA alerting)   Does not apply DOES NOT GO TO Jac Sheridan MD        senna-docusate (SENOKOT-S/PERICOLACE) 8.6-50 MG per tablet 2 tablet  2 tablet Oral BID Declan Redman MD   2 tablet at 05/01/24 0925    sertraline (ZOLOFT) tablet 150 mg  150 mg Oral At Bedtime Declan Redman MD   150 mg at 05/01/24 0127    simethicone (MYLICON) chewable tablet 80 mg  80 mg Oral Q6H PRN Jac Russell MD        sodium chloride (PF) 0.9% PF flush 3 mL  3 mL Intracatheter Q8H Jac Russell MD   3 mL at 05/01/24 0925    sodium chloride (PF) 0.9% PF flush 3 mL  3 mL Intracatheter q1 min prJac Yancey MD        sodium chloride 0.9% BOLUS 100-150 mL  100-150 mL Intravenous Q15 Min PRJac Yancey MD        sodium chloride 0.9% BOLUS 100-150 mL  100-150 mL Intravenous Q15 Min PRN Yanavitski, Jac, MD        sodium chloride 0.9% BOLUS 100-150 mL  100-150 mL Intravenous Q15 Min PRN Jac Russell MD        sodium chloride 0.9% BOLUS 300 mL  300 mL Hemodialysis Machine Once Jac Russell MD        Stop Heparin 60 minutes before end of treatment   Does not apply Continuous PRN Jac Russell MD        Stop Heparin 60 minutes before end of treatment   Does  "not apply Continuous PRN Jac Russell MD        Vitamin D3 (CHOLECALCIFEROL) tablet 25 mcg  25 mcg Oral Daily Jac Russell MD   25 mcg at 05/01/24 0923     Past Medical History:   Diagnosis Date    Anemia in chronic kidney disease     Anxiety     Arthritis     Brain aneurysm     Cavernous segment of the right & left carotid arteries. See Neurosurg note 6/16/21.    Chronic low back pain     Managed by Pain Clinic    Congestive heart failure, unspecified HF chronicity, unspecified heart failure type (H) 4/29/2024    Depressive disorder 2013    Disease of liver 8/29/2023    Disease of thyroid gland     ESRD (end stage renal disease) on dialysis (H) 07/2020    GERD (gastroesophageal reflux disease)     Hepatic lesion     Hyperlipidemia     Hypertension     Nephrolithiasis     Neuromuscular disorder (H)     Osteoporosis     PKD (polycystic kidney disease) 09/01/1990    Primary generalized (osteo)arthritis 8/2/2023    PTSD (post-traumatic stress disorder)     Tobacco abuse     Vitamin D deficiency         Review of Systems: 12 points negative other than above    /72 (BP Location: Right arm)   Pulse 98   Temp 98.4  F (36.9  C) (Oral)   Resp 18   Wt 39.3 kg (86 lb 11.2 oz)   SpO2 98%   BMI 17.51 kg/m    JVP<7cm, carotids normal  Lungs clear  Cor RRR no c,r,m  Abs soft +BS, no mass  Ext no c,c,e    Lab Results   Component Value Date    HGB 9.0 (L) 04/30/2024     Lab Results   Component Value Date     04/30/2024     No results found for: \"CREATININE\"  No components found for: \"K\"          Maykel Hernandez MD  Interventional Cardiology   [facility]  [phone]    "

## 2024-05-01 NOTE — PROGRESS NOTES
[facility]    Medicine Progress Note - Hospitalist Service    Date of Admission:  4/30/2024    Assessment & Plan   Serene Tipton is a 68 year old female with pertinent medical history of ESRD on dialysis, nephrectomy for Polycystic Kidney Disease (bilateral, 2/1/2023),  CHF, tobacco use disorder, who presents with abdominal pain and shortness of breath. . . Lactic acid 2.2. CBC with WBC 1.6, RBC 2.73, Hemoglobin 9.4, Hematocrit 31.5 . Anion gap 18, UN 60.9, Cr 4.41, Cl 89. Nephrology recommended admission for dialysis for volume overload. Cardiology consulted due to elevated troponin, advised echocardiogram.   Echo showed severe global LV hypokinesis new from previous. Given echo findings and possible anginal equivalent (abd bloating), recommended coronary angiogram with percutaneous coronary intervention at [facility].  Pt will undergo dialysis after this procedure. Pt NPO status until after this procedure.      Acute Pulmonary edema with CHF   CAD  ---angio yesterday; s/p complex PCI to RCA - cont DAPT and high dose statin   ---cafrds started carvedilol (was on labetolol)  ---Trend serial troponin's 247-->254-->265  ---Echo shows acute systolic congestive heart failure, severe cardiomyopathy with LVEF 25 to 30% (previous LVEF 70% in 2021 per nuclear stress test). Left ventricle with severe global hypokinesia. Right ventricle mildly dilated. Left atrium severely dilated. Mod-severe mitral regurgitation. Right ventricular systolic pressure elevated.   ---Chest/Abd/Pelvic CT showed pulmonary edema and pleural effusion but no bowel obstruction.   ---CXR showed cardiomegaly, vascular congestion, pleural effusion.   ---BNP markedly elevated at 70,000.      Volume overload  ESRD on TTS dialysis  Dyspnea  Pt presents with shortness of breath associated with abdominal distention. CT/XR evidence of small pleural effusion and vascular congestion.  ---Appreciate nephrology input;  "had UF 4/29 amd 4/30  ---usual TTS dialysis; discussed with nephrology and will either dialyze extra today or tomorrow and stay in for extra dialysis            -LE lymphedema wraps       Abdominal bloating  --- Hospitalized recently for SBO due to constipation   --- Clear if this was possible anginal equivalent.   ---Better (4/30) after UF dialysis   --- CT negative for SBO.   --- CT scan of the abdomen pelvis with unchanged pancreatic duct dilation, infiltration of mesentery, retroperitoneal fat, will need follow-up outpatient    LFTs are normal  Continue Protonix 40mg BID  Continue senna docusate and simethicone 80mg q6hr PRN     Hyperkalemia  ---resolved with dilaysis  ---suspect was related to inadequate dialysis      Hypertension  Blood pressure stable  Per cardiology change labetalol to carvedilol 6.25mg BID     Anemia of CKD  Pt receives PETE and parenteral iron with outpatient dialysis  Monitor hemoglobin     Secondary hyperparathyroidism due to ESRD  -- Continue calcitriol and PhosLo     Hypothyroidism  -- Continue home meds     Anxiety depression  -- Continue lorazepam, nortriptyline, sertraline     Chronic pain syndrome  -- On narcotics at home             Diet: Combination Diet Renal Diet (dialysis); 2 gm NA Diet    DVT Prophylaxis: Heparin SQ  Pool Catheter: Not present  Lines: None     Cardiac Monitoring: ACTIVE order. Indication: Post- PCI/Angiogram (24 hours)  Code Status: Full Code      Clinically Significant Risk Factors Present on Admission        # Hyperkalemia: Highest K = 5.5 mmol/L in last 2 days, will monitor as appropriate    # Hypercalcemia: Highest Ca = 10.5 mg/dL in last 2 days, will monitor as appropriate        # Hypertension: Noted on problem list  # Chronic heart failure with reduced ejection fraction: last echo with EF <40%     # Cachexia: Estimated body mass index is 17.51 kg/m  as calculated from the following:    Height as of 4/29/24: 1.499 m (4' 11\").    Weight as of this " encounter: 39.3 kg (86 lb 11.2 oz).              Disposition Plan     Medically Ready for Discharge: Anticipated Tomorrow             Zoya Sosa MD  Hospitalist Service  [facility]  Securely message with [employer] (more info)  Text page via [employer] Paging/Directory   ______________________________________________________________________    Interval History   --- Patient seen and chart reviewed.  --- Still feels short of breath like she has some fluid and therefore kept for extra dialysis  --- No chest pain noted.  --- No nausea or vomiting    Physical Exam   Vital Signs: Temp: 99  F (37.2  C) Temp src: Oral BP: 108/73 Pulse: 92   Resp: 18 SpO2: 100 % O2 Device: Nasal cannula Oxygen Delivery: 2 LPM  Weight: 86 lbs 11.2 oz    General Appearance: Frail female.  No apparent distress  Respiratory: Fairly clear to auscultation  Cardiovascular: The rate and rhythm without significant murmurs  GI: Soft and nontender without hepatosplenomegaly  Skin: No open areas or rashes or significant lower extremity edema  Other: No gross intact without focal deficits appreciate    Medical Decision Making             Data     I have personally reviewed the following data over the past 24 hrs:    N/A  \   N/A   / N/A     137 97 (L) 26.4 (H) /  119 (H)   4.0 29 2.52 (H) \       Imaging results reviewed over the past 24 hrs:   Recent Results (from the past 24 hour(s))   Cardiac Catheterization    Narrative    Images from the original result were not included.  Serene Tipton is a 68 year old old female with HTN, HL, ESRD who is   here for w/up of new cardiomyopathy.     - single-vessel obstructive Ca2+ CAD in RCA s/p Shockwave and DESx2;   mildly elevated L-sided filling pressures  - ASA 81mg daily indefinitely, clopidogrel 600mg once, followed by 75mg   daily for at least 12 months but ideally indefinitely  - add atorva 80  - continue aggressive risk factor modification          Findings:  LM:no  obstruction  LAD:mildly irregular w/ mostly extraluminal Ca2+  Lcx:mildly irregular  RCA:dominant, extensively Ca2+ form prox to mid-vessel, tortuous up to 95%   narrowing.     LVEDP:21    Access:  R Femoral artery    PCI:  RCA was engaged w/ a 8F AL 0.75 Guide catheter and the lesion in RCA was   wired w/ a Fielder FC wire. Given tortuosity we chose not to Roto, and   instead serially dilated w/ a 2.0x12mm NC Emerge, 2.5x12, and 3.3cw13uu NC   Emerge at 12 florian inflations. 2.5x12mm Shockwave balloon would not advance   into the distal portion of the lesion but was used proximally for vessel   prep at 4-6 florian inflations. We then used a 6F Guidliner, sucked in over a   3.0x12mm NC Emerge to deliver a 2.66w43np Synergy EES (at 11 florian), and a   3.5x20mm Synergy EES at 11 florian proximally. Both stents were post-dilated   w/ a 3.0x12mm NC Emerge at 12-16 florian inflations, and overlap and proximal   stent were also post-dilated w a 4.0x12mm NC Emerge at 12 florian inflations.   Final angiography showed no dissection or perforation and a MICHELL 3 flow.    Closure:   Angioseal    This is a complex modifier 22 procedure due to high risk anatomy, Ca2+,   need for intracoronary lithotripsy

## 2024-05-02 NOTE — PLAN OF CARE
Goal Outcome Evaluation:      Plan of Care Reviewed With: patient    Overall Patient Progress: improvingOverall Patient Progress: improving    Outcome Evaluation: Pt tolerated dialysis well.  Pt eating lunch and feels ready to discharge.  Pt's ride will be here about 1300.

## 2024-05-02 NOTE — PROCEDURES
Potassium   Date Value Ref Range Status   05/01/2024 4.0 3.4 - 5.3 mmol/L Final   07/25/2023 4.7 3.5 - 5.0 mmol/L Final   05/19/2021 3.9 3.4 - 5.3 mmol/L Final     Hemoglobin   Date Value Ref Range Status   04/30/2024 9.0 (L) 11.7 - 15.7 g/dL Final   05/19/2021 13.0 11.7 - 15.7 g/dL Final     Creatinine   Date Value Ref Range Status   05/01/2024 2.52 (H) 0.51 - 0.95 mg/dL Final   05/19/2021 3.43 (H) 0.52 - 1.04 mg/dL Final     Urea Nitrogen   Date Value Ref Range Status   05/01/2024 26.4 (H) 8.0 - 23.0 mg/dL Final   07/25/2023 27 (H) 8 - 22 mg/dL Final   05/19/2021 27 7 - 30 mg/dL Final     Sodium   Date Value Ref Range Status   05/01/2024 137 135 - 145 mmol/L Final     Comment:     Reference intervals for this test were updated on 09/26/2023 to more accurately reflect our healthy population. There may be differences in the flagging of prior results with similar values performed with this method. Interpretation of those prior results can be made in the context of the updated reference intervals.    05/19/2021 136 133 - 144 mmol/L Final     INR   Date Value Ref Range Status   07/13/2023 1.70 (H) 0.85 - 1.15 Final   05/19/2021 1.02 0.86 - 1.14 Final       DIALYSIS PROCEDURE NOTE  Hepatitis status of previous patient on machine log was checked and verified ok to use with this patients hepatitis status.  Patient dialyzed for 3 hrs. on a K3 bath with a net fluid removal of  3L.  A BFR of 400 ml/min was obtained via a LUE AVF using 15 gauge needles.      The treatment plan was discussed with Dr. Brown during the treatment.      Needle cannulation sites held x 7 min.       Meds  given: Albumin 25% 50mL X2   Complications: none, pt tolerated tx well      Person educated: Patient. Knowledge base substantial. Barriers to learning: none. Educated on procedure via oral mode.      ICEBOAT? Timeout performed pre-treatment  I: Patient was identified using 2 identifiers  C:  Consent Signed Yes  E: Equipment preventative maintenance  is current and dialysis delivery system OK to use  B: Hepatitis B Surface Antigen: negative; Draw Date: 3/24/24      Hepatitis B Surface Antibody: immune; Draw Date: 3/24/24  O: Dialysis orders present and complete prior to treatment  A: Vascular access verified and assessed prior to treatment  T: Treatment was performed at a clinically appropriate time  ?: Patient was allowed to ask questions and address concerns prior to treatment  See Adult Hemodialysis flowsheet in [facility] for further details and post assessment.  Machine water alarm in place and functioning. Transducer pods intact and checked every 15min.   Pt returned via bed .  Chlorine/Chloramine water system checked every 4 hours.      Patient repositioned every 2 hours during the treatment.  Post treatment report given to JESU Roberts RN regarding 3L of fluid removed, last BP of 129/76, and patient pain rating of 0/10.

## 2024-05-02 NOTE — DISCHARGE SUMMARY
[facility]    Discharge Summary  Hospitalist    Date of Admission:  4/30/2024  Date of Discharge:  5/2/2024  Provider:  Danae Tran DO    Discharge Diagnoses   CAD - s/p PCI to RCA  Acute pulmonary edema/CHF  Ischemic CM   ESRD on hemodialysis  5.    Abdominal bloating, resolved  6.    Hyperkalemia  7.    HTN    Other medical issues:  Past Medical History:   Diagnosis Date    Anemia in chronic kidney disease     Anxiety     Arthritis     Brain aneurysm     Cavernous segment of the right & left carotid arteries. See Neurosurg note 6/16/21.    Chronic low back pain     Managed by [facility]    Congestive heart failure, unspecified HF chronicity, unspecified heart failure type (H) 4/29/2024    Depressive disorder 2013    Disease of liver 8/29/2023    Disease of thyroid gland     ESRD (end stage renal disease) on dialysis (H) 07/2020    GERD (gastroesophageal reflux disease)     Hepatic lesion     Hyperlipidemia     Hypertension     Nephrolithiasis     Neuromuscular disorder (H)     Osteoporosis     PKD (polycystic kidney disease) 09/01/1990    Primary generalized (osteo)arthritis 8/2/2023    PTSD (post-traumatic stress disorder)     Tobacco abuse     Vitamin D deficiency        History of Present Illness   Serene Tipton is an 68 year old female who presented with abd bloating and SOB.  Please see the admission history and physical for full details.    Hospital Course   Serene Tipton was admitted on 4/30/2024.  The following problems were addressed during her hospitalization:     CAD          Abd bloating and SOB, suspected anginal equivalents    Ms. Tipton is a 67 yo female who presented to the ED with SOB and abd bloating.  Noted to have elevated troponin.   Cardiology was consulted.   ECHO revealed severe global LV hypokinesis and EF of 25-30%(new from previous finding).  Cardiology recommended coronary angiogram with dialysis post-procedure.  She underwent complex  PCI to RCA.  She will continue on DAPT and high dose statin at time of discharge.    Her symptoms of abdominal bloating resolved post cardiac stent placement and suspect that this symptom may be an anginal equivalent.      2.  Ischemic CM    New decrease in EF and severe global LV hypokinesis on ECHO.  Medical management complicated by ESRD.  On metoprolol and tolerating it well (has had a prior reaction to Coreg).  Hydralazine/Imdur may be considered in outpt f/up.      3.  ESRD       hyperkalemia    Nephrology consulted and assisted with hemodialysis treatments post-angiogram and for medical management of hypervolemia and hyperkalemia.  She underwent dialysis on am of day of discharge on her normal scheduled day.      Significant Results and Procedures   Cardiac angiogram   hemodialysis    Pending Results   Unresulted Labs Ordered in the Past 30 Days of this Admission       No orders found from 3/31/2024 to 5/1/2024.            Code Status   Full Code       Primary Care Physician   Ramos Lowry    Physical Exam   Temp: 97.5  F (36.4  C) Temp src: Temporal BP: 125/74 Pulse: 94   Resp: (!) 50 SpO2: 97 % O2 Device: None (Room air) Oxygen Delivery: 2 LPM  Vitals:    04/30/24 2018 05/01/24 0200 05/02/24 0638   Weight: 41.3 kg (91 lb 1.6 oz) 39.3 kg (86 lb 11.2 oz) 39.9 kg (88 lb)     Vital Signs with Ranges  Temp:  [97.5  F (36.4  C)-98  F (36.7  C)] 97.5  F (36.4  C)  Pulse:  [] 94  Resp:  [13-50] 50  BP: (112-136)/(56-94) 125/74  SpO2:  [93 %-100 %] 97 %  I/O last 3 completed shifts:  In: 530 [P.O.:530]  Out: -     Seen and examined during dialysis treatment  GEN:  Alert, oriented x 3, comfortable, NAD  HEENT:  Normocephalic/atraumatic, PERRL, no scleral icterus, no nasal discharge  CV:  Regular rate and rhythm, no loud murmur to ausc.  S1 + S2 noted  LUNGS:  Clear to auscultation ant/lat bilaterally   ABD:  Active bowel sounds, soft, non-tender to light palpation throughout, not significantly distended.  No  clear rebound/guarding/rigidity.    PSYCH:  Mood appropriate, Not tearful or depressed.  Maintains direct eye contact.  NEURO:  No tremors at rest, speech clear and appropriate.    Discharge Disposition   Discharged to home    Consultations This Hospital Stay   CARE MANAGEMENT / SOCIAL WORK IP CONSULT  CARDIOLOGY IP CONSULT  HOSPITALIST IP CONSULT  HOSPITALIST IP CONSULT  NUTRITION SERVICES ADULT IP CONSULT  CARDIAC REHAB IP CONSULT  PHARMACY IP CONSULT  SMOKING CESSATION PROGRAM IP CONSULT    Time Spent on this Encounter   Danae SIMS DO, personally saw the patient today and spent greater than 30 minutes discharging this patient.    Discharge Orders     Discharge Medications   Current Discharge Medication List        START taking these medications    Details   aspirin 81 MG EC tablet Take 1 tablet (81 mg) by mouth daily Start tomorrow.  Qty: 30 tablet, Refills: 3    Associated Diagnoses: Dilated cardiomyopathy (H); Acute on chronic systolic congestive heart failure (H); End stage renal disease (H)      atorvastatin (LIPITOR) 80 MG tablet Take 1 tablet (80 mg) by mouth daily  Qty: 90 tablet, Refills: 3    Associated Diagnoses: Dilated cardiomyopathy (H); Acute on chronic systolic congestive heart failure (H); End stage renal disease (H)      clopidogrel (PLAVIX) 75 MG tablet Take 1 tablet (75 mg) by mouth daily Dose to start tomorrow.  Qty: 90 tablet, Refills: 3    Associated Diagnoses: Dilated cardiomyopathy (H); Acute on chronic systolic congestive heart failure (H); End stage renal disease (H)      metoprolol succinate ER (TOPROL XL) 25 MG 24 hr tablet Take 1 tablet (25 mg) by mouth daily  Qty: 30 tablet, Refills: 0    Associated Diagnoses: Ischemic cardiomyopathy      nitroGLYcerin (NITROSTAT) 0.4 MG sublingual tablet For chest pain place 1 tablet under the tongue every 5 minutes for 3 doses. If symptoms persist 5 minutes after 1st dose call 911.  Qty: 30 tablet, Refills: 0    Associated  Diagnoses: Ischemic cardiomyopathy           CONTINUE these medications which have NOT CHANGED    Details   artificial saliva (BIOTENE MT) SOLN solution Swish and spit 2 sprays in mouth as needed for dry mouth      B Complex-C (SUPER B COMPLEX PO) Take 1 tablet by mouth daily at 2 pm      calcitRIOL (ROCALTROL) 0.5 MCG capsule Take 0.5 mcg by mouth three times a week Given at Dialysis      calcium acetate (CALPHRON) 667 MG TABS tablet Take 1-2 tablets by mouth 3 times daily (with meals)      cholecalciferol (VITAMIN D3) 25 mcg (1000 units) capsule Take 1 capsule by mouth daily at 2 pm      cyclobenzaprine (FLEXERIL) 5 MG tablet Take 5 mg by mouth 2 times daily as needed for muscle spasms      Epoetin Adam (EPOGEN IJ) Inject 1,000 Units into the vein three times a week At dialysis.      glucosamine-chondroitin 500-400 MG CAPS per capsule Take 2 capsules by mouth daily at 2 pm      Iron Sucrose (VENOFER IV) Inject 100 mg into the vein once a week At dialysis      lactulose (CHRONULAC) 10 GM/15ML solution Take 15 mLs (10 g) by mouth 2 times daily as needed for constipation  Qty: 900 mL, Refills: 0    Associated Diagnoses: Constipation, unspecified constipation type      levothyroxine (SYNTHROID/LEVOTHROID) 50 MCG tablet Take 50 mcg by mouth daily      lidocaine HCl 3 % cream Apply topically daily as needed (pain)      LORazepam (ATIVAN) 1 MG tablet Take 1 tablet (1 mg) by mouth daily (before lunch)  Qty: 3 tablet, Refills: 0    Associated Diagnoses: Anxiety      nortriptyline (PAMELOR) 75 MG capsule Take 75 mg by mouth At Bedtime      Nutritional Supplements (NEPRO) LIQD Take 1 Can by mouth daily      omeprazole (PRILOSEC) 20 MG DR capsule Take 20 mg by mouth daily      oxyCODONE IR (ROXICODONE) 10 MG tablet Take 10 mg by mouth every 4 hours as needed for pain      SENNA-docusate sodium (SENNA S) 8.6-50 MG tablet Take 2 tablets by mouth 2 times daily Hold for loose stools.  Qty: 120 tablet, Refills: 0    Associated  "Diagnoses: Constipation, unspecified constipation type      sertraline (ZOLOFT) 100 MG tablet Take 150 mg by mouth At Bedtime           STOP taking these medications       labetalol (NORMODYNE) 200 MG tablet Comments:   Reason for Stopping:         rosuvastatin (CRESTOR) 10 MG tablet Comments:   Reason for Stopping:             Allergies   Allergies   Allergen Reactions    Penicillins Other (See Comments) and Shortness Of Breath     Breathing problem.  breathing      No Clinical Screening - See Comments      PN: LW CM1: Contrast Intercapsular - Nonionic Reaction :    Nsaids Other (See Comments)     Kidney damage    Carvedilol GI Disturbance     Nausea and vomiting    Ciprofloxacin Muscle Pain (Myalgia)    Floxacillin (Flucloxacillin) Muscle Pain (Myalgia) and Rash    Levofloxacin Muscle Pain (Myalgia) and Rash    Morphine Nausea and Vomiting    Sulfa Antibiotics Itching     Data   Recent Labs   Lab 05/02/24  0439 04/30/24  0508 04/29/24  1205   WBC  --  10.8 11.6*   HGB  --  9.0* 9.4*   HCT  --  29.0* 31.5*   MCV  --  108* 115*    323 317     Recent Labs   Lab 05/01/24  0424 04/30/24  0508 04/29/24  1205    135 135   POTASSIUM 4.0 5.5* 5.0   CHLORIDE 97* 91* 89*   CO2 29 26 28   ANIONGAP 11 18* 18*   * 107* 105*   BUN 26.4* 71.0* 60.9*   CR 2.52* 5.12* 4.41*   GFRESTIMATED 20* 9* 10*   GAVIN 10.2 10.5* 10.2     Recent Labs   Lab 05/01/24 0424 04/30/24  0508 04/29/24  1205   BUN 26.4* 71.0* 60.9*   CR 2.52* 5.12* 4.41*     No results for input(s): \"TROPONIN\", \"TROPI\", \"TROPR\", \"TROPONINIS\" in the last 168 hours.    Invalid input(s): \"TROPT\", \"TROP\", \"TROPONINIES\", \"TNIH\"  No results for input(s): \"COLOR\", \"APPEARANCE\", \"URINEGLC\", \"URINEBILI\", \"URINEKETONE\", \"SG\", \"UBLD\", \"URINEPH\", \"PROTEIN\", \"UROBILINOGEN\", \"NITRITE\", \"LEUKEST\", \"RBCU\", \"WBCU\" in the last 168 hours.  Results for orders placed or performed during the hospital encounter of 04/30/24   Cardiac Catheterization    Narrative    Images " from the original result were not included.  Serene Tipton is a 68 year old old female with HTN, HL, ESRD who is   here for w/up of new cardiomyopathy.     - single-vessel obstructive Ca2+ CAD in RCA s/p Shockwave and DESx2;   mildly elevated L-sided filling pressures  - ASA 81mg daily indefinitely, clopidogrel 600mg once, followed by 75mg   daily for at least 12 months but ideally indefinitely  - add atorva 80  - continue aggressive risk factor modification          Findings:  LM:no obstruction  LAD:mildly irregular w/ mostly extraluminal Ca2+  Lcx:mildly irregular  RCA:dominant, extensively Ca2+ form prox to mid-vessel, tortuous up to 95%   narrowing.     LVEDP:21    Access:  R Femoral artery    PCI:  RCA was engaged w/ a 8F AL 0.75 Guide catheter and the lesion in RCA was   wired w/ a Fielder FC wire. Given tortuosity we chose not to Roto, and   instead serially dilated w/ a 2.0x12mm NC Emerge, 2.5x12, and 3.9vw58fy NC   Emerge at 12 florian inflations. 2.5x12mm Shockwave balloon would not advance   into the distal portion of the lesion but was used proximally for vessel   prep at 4-6 florian inflations. We then used a 6F Guidliner, sucked in over a   3.0x12mm NC Emerge to deliver a 2.55k80wo Synergy EES (at 11 florian), and a   3.5x20mm Synergy EES at 11 florian proximally. Both stents were post-dilated   w/ a 3.0x12mm NC Emerge at 12-16 florian inflations, and overlap and proximal   stent were also post-dilated w a 4.0x12mm NC Emerge at 12 florian inflations.   Final angiography showed no dissection or perforation and a MICHELL 3 flow.    Closure:   Angioseal    This is a complex modifier 22 procedure due to high risk anatomy, Ca2+,   need for intracoronary lithotripsy

## 2024-05-02 NOTE — PLAN OF CARE
AOX4; NSR on tele w/ inverted t-waves; VSS on RA; denies pain, CP, SOB.  States SOB is improving, sating in 90s on RA.  Independent in room, able to make needs known, call light in reach.     Sharmila Christie RN        Goal Outcome Evaluation:      Plan of Care Reviewed With: patient    Overall Patient Progress: improvingOverall Patient Progress: improving         Problem: Adult Inpatient Plan of Care  Goal: Plan of Care Review  Description: The Plan of Care Review/Shift note should be completed every shift.  The Outcome Evaluation is a brief statement about your assessment that the patient is improving, declining, or no change.  This information will be displayed automatically on your shift  note.  Outcome: Progressing  Flowsheets (Taken 5/1/2024 2214)  Plan of Care Reviewed With: patient  Overall Patient Progress: improving  Goal: Absence of Hospital-Acquired Illness or Injury  Intervention: Identify and Manage Fall Risk  Recent Flowsheet Documentation  Taken 5/1/2024 1940 by Sharmila Christie RN  Safety Promotion/Fall Prevention:   safety round/check completed   room door open   nonskid shoes/slippers when out of bed   mobility aid in reach   lighting adjusted   increase visualization of patient   increased rounding and observation   clutter free environment maintained   activity supervised   room organization consistent   room near nurse's station   patient and family education   assistive device/personal items within reach   supervised activity   treat underlying cause   treat reversible contributory factors  Taken 5/1/2024 1600 by Sharmila Christie RN  Safety Promotion/Fall Prevention:   safety round/check completed   room door open   nonskid shoes/slippers when out of bed   mobility aid in reach   lighting adjusted   increase visualization of patient   increased rounding and observation   clutter free environment maintained   activity supervised   room organization consistent   room near nurse's station   patient and  family education   assistive device/personal items within reach   supervised activity   treat underlying cause   treat reversible contributory factors  Intervention: Prevent Skin Injury  Recent Flowsheet Documentation  Taken 5/1/2024 1940 by Sharmila Christie RN  Body Position: position changed independently  Taken 5/1/2024 1600 by Sharmila Christie RN  Body Position: position changed independently  Intervention: Prevent and Manage VTE (Venous Thromboembolism) Risk  Recent Flowsheet Documentation  Taken 5/1/2024 1940 by Sharmila Christie RN  VTE Prevention/Management: SCDs (sequential compression devices) off  Taken 5/1/2024 1600 by Sharmila Christie RN  VTE Prevention/Management: SCDs (sequential compression devices) off  Intervention: Prevent Infection  Recent Flowsheet Documentation  Taken 5/1/2024 1940 by Sharmila Christie RN  Infection Prevention:   rest/sleep promoted   single patient room provided   hand hygiene promoted   environmental surveillance performed   personal protective equipment utilized  Taken 5/1/2024 1600 by Sharmila Christie RN  Infection Prevention:   rest/sleep promoted   single patient room provided   hand hygiene promoted   environmental surveillance performed   personal protective equipment utilized  Goal: Optimal Comfort and Wellbeing  Intervention: Monitor Pain and Promote Comfort  Recent Flowsheet Documentation  Taken 5/1/2024 1616 by Sharmila Christie RN  Pain Management Interventions:   medication (see MAR)   emotional support   distraction     Problem: Cardiac Catheterization (Diagnostic/Interventional)  Goal: Absence of Embolism Signs and Symptoms  Intervention: Prevent or Manage Embolism  Recent Flowsheet Documentation  Taken 5/1/2024 1940 by Sharmila Christie RN  VTE Prevention/Management: SCDs (sequential compression devices) off  Taken 5/1/2024 1600 by Sharmila Christie RN  VTE Prevention/Management: SCDs (sequential compression devices) off  Goal: Anesthesia/Sedation Recovery  Intervention: Optimize Anesthesia  Recovery  Recent Flowsheet Documentation  Taken 5/1/2024 1940 by Sharmila Christie RN  Safety Promotion/Fall Prevention:   safety round/check completed   room door open   nonskid shoes/slippers when out of bed   mobility aid in reach   lighting adjusted   increase visualization of patient   increased rounding and observation   clutter free environment maintained   activity supervised   room organization consistent   room near nurse's station   patient and family education   assistive device/personal items within reach   supervised activity   treat underlying cause   treat reversible contributory factors  Taken 5/1/2024 1600 by Sharmila Christie RN  Safety Promotion/Fall Prevention:   safety round/check completed   room door open   nonskid shoes/slippers when out of bed   mobility aid in reach   lighting adjusted   increase visualization of patient   increased rounding and observation   clutter free environment maintained   activity supervised   room organization consistent   room near nurse's station   patient and family education   assistive device/personal items within reach   supervised activity   treat underlying cause   treat reversible contributory factors  Goal: Optimal Pain Control and Function  Intervention: Prevent or Manage Pain  Recent Flowsheet Documentation  Taken 5/1/2024 1616 by Sharmila Christie RN  Pain Management Interventions:   medication (see MAR)   emotional support   distraction  Goal: Absence of Vascular Access Complication  Intervention: Prevent and Manage Access Complications  Recent Flowsheet Documentation  Taken 5/1/2024 1940 by Sharmila Christie RN  Activity Management:   activity adjusted per tolerance   activity encouraged   ambulated in room  Head of Bed (HOB) Positioning: HOB at 20-30 degrees  Taken 5/1/2024 1600 by Sharmila Christie RN  Activity Management:   activity adjusted per tolerance   activity encouraged   ambulated in room  Head of Bed (HOB) Positioning: HOB at 20-30 degrees     Problem:  Heart Failure  Goal: Optimal Coping  Intervention: Support Psychosocial Response  Recent Flowsheet Documentation  Taken 5/1/2024 1940 by Sharmila Christie RN  Supportive Measures: active listening utilized  Taken 5/1/2024 1600 by Sharmila Christie RN  Supportive Measures: active listening utilized  Goal: Optimal Cardiac Output  Intervention: Optimize Cardiac Output  Recent Flowsheet Documentation  Taken 5/1/2024 1940 by Sharmila Christie RN  Environmental Support: calm environment promoted  Taken 5/1/2024 1600 by Sharmila Christie RN  Environmental Support: calm environment promoted  Goal: Optimal Functional Ability  Intervention: Optimize Functional Ability  Recent Flowsheet Documentation  Taken 5/1/2024 1940 by Sharmila Christie RN  Activity Management:   activity adjusted per tolerance   activity encouraged   ambulated in room  Taken 5/1/2024 1600 by Sharmila Christie RN  Activity Management:   activity adjusted per tolerance   activity encouraged   ambulated in room  Goal: Effective Oxygenation and Ventilation  Intervention: Promote Airway Secretion Clearance  Recent Flowsheet Documentation  Taken 5/1/2024 1940 by Sharmila Christie RN  Cough And Deep Breathing: done with encouragement  Activity Management:   activity adjusted per tolerance   activity encouraged   ambulated in room  Taken 5/1/2024 1600 by Sharmila Christie RN  Cough And Deep Breathing: done with encouragement  Activity Management:   activity adjusted per tolerance   activity encouraged   ambulated in room  Intervention: Optimize Oxygenation and Ventilation  Recent Flowsheet Documentation  Taken 5/1/2024 1940 by Sharmila Christie RN  Head of Bed (HOB) Positioning: HOB at 20-30 degrees  Taken 5/1/2024 1600 by Sharmila Christie RN  Head of Bed (HOB) Positioning: HOB at 20-30 degrees  Goal: Effective Breathing Pattern During Sleep  Intervention: Monitor and Manage Obstructive Sleep Apnea  Recent Flowsheet Documentation  Taken 5/1/2024 1940 by Sharmila Christie RN  Medication  Review/Management:   medications reviewed   high-risk medications identified  Taken 5/1/2024 1600 by Sharmila Christie RN  Medication Review/Management:   medications reviewed   high-risk medications identified     Problem: Hemodialysis  Goal: Safe, Effective Therapy Delivery  Intervention: Optimize Device Care and Function  Recent Flowsheet Documentation  Taken 5/1/2024 1940 by Sharmila Christie RN  Medication Review/Management:   medications reviewed   high-risk medications identified  Taken 5/1/2024 1600 by Sharmila Christie RN  Medication Review/Management:   medications reviewed   high-risk medications identified  Goal: Absence of Infection Signs and Symptoms  Intervention: Prevent or Manage Infection  Recent Flowsheet Documentation  Taken 5/1/2024 1940 by Sharmila Christie RN  Infection Prevention:   rest/sleep promoted   single patient room provided   hand hygiene promoted   environmental surveillance performed   personal protective equipment utilized  Taken 5/1/2024 1600 by Sharmila Christie RN  Infection Prevention:   rest/sleep promoted   single patient room provided   hand hygiene promoted   environmental surveillance performed   personal protective equipment utilized

## 2024-05-02 NOTE — DISCHARGE INSTRUCTIONS
Interventional Cardiology  Coronary Angiogram/Angioplasty/Stent/Atherectomy Discharge Instructions -   Femoral Approach    The instructions below are to help you understand how to take care of yourself. There is also information about when to call the doctor or emergency services    **Do not stop your aspirin or platelet inhibitor unless directed by your Cardiologist.  These medications help to prevent platelets in your blood from sticking together and forming a clot.  Examples of these medications are:  Ticagrelor (Brilinta), Clopidigrel (Plavix), Prasugrel (Effient)          For the first 72 hours after your procedure:  No driving for 24 hours  Do not lift more than 15 pounds.  If you usually lift 50 pounds or more daily, please contact your cardiologist  Avoid any hard work or tiring activities, this includes, yard work, jogging, biking, sexual activity  During the day get up and walk around every 2 hours  You may return to work after 72 hours if you are feeling well and your job does not involve heavy lifting          Groin Site / Wound Care / Bleeding    You may take off the dressing on your groin the day after your procedure  Keep the area dry and clean  It is ok to shower with regular soap.  Pat dry, do not rub  You do not need to use a bandage  No tub/pool or hot tub for 1 week  If your groin starts to bleed or begins to swell suddenly after leaving the hospital, lie flat and apply firm pressure above the site for 15 minutes.  If bleeding continues, call 9-1-1  Bruising around the groin area is normal.  It may take 2-3 weeks for this to go away.  It is normal for the bruised area to turn green and/or yellow as it is healing.  A small lump may also be present and may last 2-3 months.  Your leg may be sore or stiff for a few days.  You may take Tylenol or a pain medicine recommended by your doctor  If you have a fever over 100.4, that lasts more than one day - call your cardiologist.      Our Cardiac Rehab  staff may visit briefly with you while your in the hospital.  If they miss you, someone will contact you after you are home.  You are encouraged to enroll in an Outpatient Cardiac Rehab Program     No elective dental work for 6 weeks after having a stent.     No driving for 24 hours      Your Procedural Physician was: Dr. Jac Russell  the phone number is: [phone]      [facility]:  [phone]  If you are calling after hours, please listen to the entire voicemail, a live  will answer at the end of the message

## 2024-05-02 NOTE — PLAN OF CARE
Cardiac Rehab Discharge Summary    Reason for therapy discharge:    All goals and outcomes met, no further needs identified.    Progress towards therapy goal(s). See goals on Care Plan in Epic electronic health record for goal details.  Goals met    Therapy recommendation(s):    Home w/ support from friends, outpatient cardiac rehab     ANNELIESE Whitehead, OTR/L, 5/2/2024, 1:09 PM

## 2024-05-02 NOTE — TELEPHONE ENCOUNTER
Called pt to check in after hospitalization. Pt had stents placed and EF is now 25-30% will be seeing HF cards at the end of the month. Reviewed pt needs to complete DAPT prior to active status, per angiogram report needs for 12 months. Pt would like to discuss with cardiologist if the full 12 months would be needed will send message to provider. Reviewed would like pt to complete cardiac rehab and follow up echo after stenting. Pt verbalized understanding of information and has no further questions. Encouraged to reach out if questions arise.

## 2024-05-02 NOTE — PROGRESS NOTES
[facility]  [facility]   Follow up    Serene Tipton   MRN: [id number]  : 1955   DOA: 2024   CC: ESRD      Assessment and Plan:  68 year old female with ESRD    ESRD: HD tomorrow on TTS schedule  Volume status; elevated and more SOB and was on O2; trying to lower EDW with aggressive UF today; 3 kg UF and continue to challenge EDW as OP  Hyperkalemia: corrected with dialysis  Hypothyroidism: on replacement  CAD; s/p PCI to RCA; usual cares/meds  Anemia; on chronic PETE as OP  Hyperlipid; on statin  Secondary renal hyperparathyroidism: Sevelamer 3 times daily AC when taking pos  HTN:  labetalol; controlled  Dispo; no objection to discharge today    Subjective  Seen on dialysis; now off O2; breathing a little easier; 3 kg UF today  Objective    Vital signs in last 24 hours  Temp:  [97.5  F (36.4  C)-98.2  F (36.8  C)] 98.2  F (36.8  C)  Pulse:  [] 94  Resp:  [13-50] 50  BP: (112-136)/(56-94) 125/74  SpO2:  [93 %-100 %] 97 %      Intake/Output last 3 shifts  I/O last 3 completed shifts:  In: 530 [P.O.:530]  Out: -   Intake/Output this shift:  I/O this shift:  In: 120 [P.O.:120]  Out: -     Physical Exam  Alert/oriented x 3, awake, NAD  CV: RRR without murmur or rub  Lung: clear and equal; no extra sounds  Ab: soft and NT; not distended; normal bs  Ext: some edema and well perfused  Skin; no rash    Pertinent Labs     Last Renal Panel:  Sodium   Date Value Ref Range Status   2024 137 135 - 145 mmol/L Final     Comment:     Reference intervals for this test were updated on 2023 to more accurately reflect our healthy population. There may be differences in the flagging of prior results with similar values performed with this method. Interpretation of those prior results can be made in the context of the updated reference intervals.    2021 136 133 - 144 mmol/L Final     Potassium   Date Value Ref Range Status   2024  4.0 3.4 - 5.3 mmol/L Final   07/25/2023 4.7 3.5 - 5.0 mmol/L Final   05/19/2021 3.9 3.4 - 5.3 mmol/L Final     Chloride   Date Value Ref Range Status   05/01/2024 97 (L) 98 - 107 mmol/L Final   07/25/2023 96 (L) 98 - 107 mmol/L Final   05/19/2021 98 94 - 109 mmol/L Final     Carbon Dioxide   Date Value Ref Range Status   05/19/2021 31 20 - 32 mmol/L Final     Carbon Dioxide (CO2)   Date Value Ref Range Status   05/01/2024 29 22 - 29 mmol/L Final   07/25/2023 24 22 - 31 mmol/L Final     Anion Gap   Date Value Ref Range Status   05/01/2024 11 7 - 15 mmol/L Final   07/25/2023 12 5 - 18 mmol/L Final   05/19/2021 7 3 - 14 mmol/L Final     Glucose   Date Value Ref Range Status   05/01/2024 119 (H) 70 - 99 mg/dL Final   07/25/2023 116 70 - 125 mg/dL Final   05/19/2021 69 (L) 70 - 99 mg/dL Final     GLUCOSE BY METER POCT   Date Value Ref Range Status   07/26/2023 127 (H) 70 - 99 mg/dL Final     Urea Nitrogen   Date Value Ref Range Status   05/01/2024 26.4 (H) 8.0 - 23.0 mg/dL Final   07/25/2023 27 (H) 8 - 22 mg/dL Final   05/19/2021 27 7 - 30 mg/dL Final     Creatinine   Date Value Ref Range Status   05/01/2024 2.52 (H) 0.51 - 0.95 mg/dL Final   05/19/2021 3.43 (H) 0.52 - 1.04 mg/dL Final     GFR Estimate   Date Value Ref Range Status   05/01/2024 20 (L) >60 mL/min/1.73m2 Final   05/19/2021 13 (L) >60 mL/min/[1.73_m2] Final     Comment:     Non  GFR Calc  Starting 12/18/2018, serum creatinine based estimated GFR (eGFR) will be   calculated using the Chronic Kidney Disease Epidemiology Collaboration   (CKD-EPI) equation.       Calcium   Date Value Ref Range Status   05/01/2024 10.2 8.8 - 10.2 mg/dL Final   05/19/2021 9.5 8.5 - 10.1 mg/dL Final     Phosphorus   Date Value Ref Range Status   04/30/2024 4.3 2.5 - 4.5 mg/dL Final   05/19/2021 2.8 2.5 - 4.5 mg/dL Final     Albumin   Date Value Ref Range Status   04/30/2024 3.7 3.5 - 5.2 g/dL Final   07/25/2023 2.7 (L) 3.5 - 5.0 g/dL Final   05/19/2021 4.2 3.4 -  5.0 g/dL Final     Recent Labs   Lab 04/30/24  0508 04/29/24  1205   HGB 9.0* 9.4*          I reviewed all lab results  Karla Brown MD

## 2024-05-10 NOTE — ED TRIAGE NOTES
Arrives to ED with c/o bleeding from LUE fistula site. Began last night at 1700. Saturating dressing last night. Blood noted to bandage from home. Removed and applied gauze in triage. Bleeding controlled at this time.      Triage Assessment (Adult)       Row Name 05/10/24 1849          Triage Assessment    Airway WDL WDL        Respiratory WDL    Respiratory WDL WDL        Skin Circulation/Temperature WDL    Skin Circulation/Temperature WDL WDL        Cardiac WDL    Cardiac WDL WDL        Peripheral/Neurovascular WDL    Peripheral Neurovascular WDL WDL        Cognitive/Neuro/Behavioral WDL    Cognitive/Neuro/Behavioral WDL WDL

## 2024-05-11 NOTE — ED PROVIDER NOTES
EMERGENCY DEPARTMENT ENCOUNTER      NAME: Serene Tipton  AGE: 68 year old female  YOB: 1955  MRN: [id number]  EVALUATION DATE & TIME: No admission date for patient encounter.    PCP: Ramos Lowry    ED PROVIDER: Maria Antonia Burch PA-C      Chief Complaint   Patient presents with    Fistual Issue         FINAL IMPRESSION:  1. Patient left without being seen        ED COURSE & MEDICAL DECISION MAKIN:50 PM Attempted to meet with patient for initial interview.  10:10 PM Attempted to meet with patient for initial interview. Patient left after triage without being seen.  10:11 PM Called patient, patient did not answer and voicemail not set up, unable to leave voicemail.     Maria Antonia Burch PA-C  [facility] EMERGENCY ROOM  [address] [address]  [phone]      Maria Antonia Burch PA-C  05/10/24 2465

## 2024-05-17 PROBLEM — R06.02 SOB (SHORTNESS OF BREATH): Status: ACTIVE | Noted: 2024-01-01

## 2024-05-17 PROBLEM — Z99.2 ESRD (END STAGE RENAL DISEASE) ON DIALYSIS (H): Status: ACTIVE | Noted: 2023-07-05

## 2024-05-17 PROBLEM — N18.6 ESRD (END STAGE RENAL DISEASE) ON DIALYSIS (H): Status: ACTIVE | Noted: 2023-07-05

## 2024-05-17 PROBLEM — R53.83 OTHER FATIGUE: Status: ACTIVE | Noted: 2024-01-01

## 2024-05-17 NOTE — ED TRIAGE NOTES
Patient presents to ED with ongoing SOB, fatigue and abdominal bloating, patient was had 2 stents placed a few weeks ago and reports that she feels the same as she did when she found out she needed the stents placed, patient on dialysis and last run was on Tuesday, having too much chronic shoulder pain to go in yesterday.  Felisha Mas RN.......5/17/2024 1:06 PM     Triage Assessment (Adult)       Row Name 05/17/24 1301          Triage Assessment    Airway WDL WDL        Respiratory WDL    Respiratory WDL X  SOB        Skin Circulation/Temperature WDL    Skin Circulation/Temperature WDL WDL        Cardiac WDL    Cardiac WDL WDL        Peripheral/Neurovascular WDL    Peripheral Neurovascular WDL WDL        Cognitive/Neuro/Behavioral WDL    Cognitive/Neuro/Behavioral WDL WDL

## 2024-05-17 NOTE — TELEPHONE ENCOUNTER
[facility] Call Center    Phone Message    May a detailed message be left on voicemail: yes     Reason for Call: Other: Alvarez called because she is experiencing some symptoms following her stent placement back on 4/30. Alvarez states she has troubles breathing at time and believes this is related to her stent placement. Alvarez has an appt on Monday 5/20 to see Kandi but would like to speak with someone in the meantime. Please reach out to Alvarez to discuss. Thank you!     Action Taken: Other: Cardiology    Travel Screening: Not Applicable    Thank you!  [facility]

## 2024-05-17 NOTE — H&P
[facility]    History and Physical - Hospitalist Service       Date of Admission:  5/17/2024    Assessment & Plan      Serene Tipton is a 68 year old female admitted on 5/17/2024. Serene Tipton is a 68 year old female who has a history of HTN, HLD, ESRD on HD, nephrectomy for Polycystic Kidney Disease (bilateral, 2/1/2023), recent admission for ischemic CM and CAD s/p complex PCI to RCA, tobacco use disorder, and is admitted for acute hypoxic respiratory failure and hyperkalemia after missing dialysis this week.    ESRD on HD T/TH/S  Hyperkalemia  Acute Hypoxic Respiratory Failure  Hypervolemia  Hemoptysis  Small pleural effusion that was noted on CT CAP unchanged from previous admission on 4/30, elevated D-dimer in the ED but negative CT PE.  Some mild bibasilar crackles on exam.  Saturating well on 2 LPM via NC (on RA at baseline), however drops to mid 80s with activity, good recovery of saturations with rest.  No concerns with AV fistula. Acute hypoxic respiratory failure most likely due to hypervolemia, CT showed stable pleural effusion with no new consolidation.  Unclear etiology of hemoptysis, however has not recurred since 5/14.   -Nephrology consulted, appreciate recommendations  -Anticipate dialysis tomorrow  -Lokelma 5mg once  -O2 via NC, keep saturations >90%      Ischemic CM s/p PCI to RCA on 4/30  HFrEF  Abdominal bloating  Possible that abdominal bloating is anginal equivalent, however she also has a history of constipation. Previously, her symptoms improved following dialysis and recent PCI.  Delta troponin negative for acute ischemia.  EKG shows sinus tachycardia.echo performed on last admission showed new severe global LV hypokinesis and EF of 25 to 30%. Bibasilar crackles on exam, requiring O2, suspect she is hypervolemic and will improve once she receives dialysis.  -Cardiac telemetry  -Continue PTA metoprolol  -?Cardiology consult      Constipation  CT  chest abdomen pelvis showed large amount of stool, possible this is contributing to her abdominal bloating. Previously admitted with SBO and heavy stool burden on 3/23/2024 that resolved with enema and magnesium citrate.  Last BM yesterday that was normal.   -PTA lactulose TID PRN  -PTA senna BID  -Day team to evaluate for need for enema and magnesium citrate  -Patient states she does not want to use miralax, would rather use her lactulose.      Anemia of CKD  Appears to have baseline hemoglobin 9-10, was 7.8 on admission.  Reported 2 small episodes hemoptysis, no further episodes.  No bloody or melanotic stools, no hematemesis.  Receives Venofer infusions once weekly at dialysis.    -AM CBC, continue monitor      HTN  Hypertensive at 140/82 on admission.  -Continue to monitor      Chronic conditions  Hypothyroidism: PTA levothyroxine  MDD, JAMIL: PTA sertraline, PTA Ativan 1 mg daily as needed for anxiety  Chronic pain syndrome: PTA nortriptyline, as needed Tylenol and oxycodone ordered  Secondary hyperparathyroidism: PTA levothyroxine  GERD: Hospital formulary pantoprazole 40 mg daily  Tobacco use disorder: Nicotine patch 14mg daily         Diet: Combination Diet Regular Diet Adult; Renal Diet (dialysis)  DVT Prophylaxis: Pneumatic Compression Devices  Pool Catheter: Not present  Fluids: PO  Lines: None     Cardiac Monitoring: ACTIVE order. Indication: Electrolyte Imbalance (24 hours)- Magnesium <1.3 mg/ml; Potassium < =2.8 or > 5.5 mg/ml  Code Status:  Special Code - CPR/Defib/Code drugs, NO intubation    Clinically Significant Risk Factors Present on Admission        # Hyperkalemia: Highest K = 5.7 mmol/L in last 2 days, will monitor as appropriate    # Hypercalcemia: Highest Ca = 10.7 mg/dL in last 2 days, will monitor as appropriate      # Drug Induced Platelet Defect: home medication list includes an antiplatelet medication   # Hypertension: Noted on problem list    # Chronic heart failure with reduced  "ejection fraction: last echo with EF <40%     # Cachexia: Estimated body mass index is 16.92 kg/m  as calculated from the following:    Height as of this encounter: 1.499 m (4' 11\").    Weight as of this encounter: 38 kg (83 lb 12.4 oz).         # Financial/Environmental Concerns:           Disposition Plan      Expected Discharge Date: 05/19/2024                The patient's care was discussed with the Attending Physician, Dr. Danii Escobedo .      Brandon Palacios DO PGY2  Hospitalist Service  [facility]  Securely message with [employer] (more info)  Text page via [facility] Paging/Directory   ______________________________________________________________________    Chief Complaint   Shortness of breath, abdominal distention    History is obtained from the patient    History of Present Illness   Serene Tipton is a 68 year old female who has a history of HTN, HLD, ESRD on HD, nephrectomy for Polycystic Kidney Disease (bilateral, 2/1/2023), recent admission for ischemic CM and CAD s/p complex PCI to RCA, tobacco use disorder, and is admitted for abdominal distention and hyperkalemia after missing dialysis this week.    Recently admitted at [facility] from 4/30 - 5/2/2024 after presenting with abdominal bloating and SOB.  Found to have new severe global LV hypokinesis and EF of 25 to 30%.  Cardiology performed angiogram with dialysis postprocedure, and she underwent complex PCI to RCA.  She was continued on DAPT and high-dose statin at discharge.  Her abdominal bloating and SOB were suspected to be anginal equivalents as they resolved following stent placement.    She states that on 5/14 she had an episode of hemoptysis where there was visible blood but no more than a teaspoon.  After that she began to feel some shortness of breath and abdominal distention that had worsened through the week, causing her to cancel dialysis on 5/16 and reschedule it for today 5/17.  This morning she felt even more short " "of breath and decided she needed to present to the ED.  Along the development of the symptoms, she feels that her appetite is decreased as well.    No fevers or chills.  Last BM was yesterday and was normal appearing, no diarrhea or melanotic or bloody stools.  No N/V or hematemesis.  Denies dysuria or increased urinary frequency.  Right shoulder pain and low back pain are chronic and relatively unchanged, manages at home with as needed oxycodone.    She lives alone in an apartment building and prior to her recent illnesses was able to go up a flight of stairs, and states that a month ago she was able to walk a mile.  She smokes cigarettes, and smokes \" some days none, some days 8-9\".    Past Medical History    Past Medical History:   Diagnosis Date    Anemia in chronic kidney disease     Anxiety     Arthritis     Brain aneurysm     Cavernous segment of the right & left carotid arteries. See Neurosurg note 6/16/21.    Chronic low back pain     Managed by [facility]    Congestive heart failure, unspecified HF chronicity, unspecified heart failure type (H) 4/29/2024    Depressive disorder 2013    Disease of liver 8/29/2023    Disease of thyroid gland     ESRD (end stage renal disease) on dialysis (H) 07/2020    GERD (gastroesophageal reflux disease)     Hepatic lesion     Hyperlipidemia     Hypertension     Nephrolithiasis     Neuromuscular disorder (H)     Osteoporosis     PKD (polycystic kidney disease) 09/01/1990    Primary generalized (osteo)arthritis 8/2/2023    PTSD (post-traumatic stress disorder)     Tobacco abuse     Vitamin D deficiency        Past Surgical History   Past Surgical History:   Procedure Laterality Date    BACK SURGERY      spinal fusion w/hardware    BIOPSY Left 09/01/1990    Breast    BREAST LUMPECTOMY, RT/LT Left     Lumpectomy    CV CORONARY ANGIOGRAM N/A 4/30/2024    Procedure: CV CORONARY ANGIOGRAM;  Surgeon: Jac Russell MD;  Location[facility] CATH LAB CV    CV CORONARY " LITHOTRIPSY PCI N/A 4/30/2024    Procedure: Percutaneous Coronary Intervention - Lithotripsy;  Surgeon: Jac Russell MD;  Location: [facility] CATH LAB CV    CV LEFT HEART CATH N/A 4/30/2024    Procedure: Left Heart Catheterization;  Surgeon: Jac Russell MD;  Location: [facility] LAB CV    CV PCI STENT DRUG ELUTING N/A 4/30/2024    Procedure: Percutaneous Coronary Intervention Stent;  Surgeon: Jac Russell MD;  Location: [facility] CATH LAB CV    CYST REMOVAL Right     rt wrist    CYSTECTOMY PILONIDAL  1981    EYE SURGERY  2008    lasik    FORMATION ARTERIOVENOUS FISTULA    07/28/2020    FRACTURE SURGERY      IR CVC TUNNEL W2 CATH W/O PORT  7/17/2020    IR DIALYSIS FISTULOGRAM RIGHT  10/21/2020    NEPHRECTOMY BILATERAL Bilateral 2/1/2023    Procedure: bilateral native nephrectomy;  Surgeon: Alana Giang MD;  Location: [facility] OR    OPEN REDUCTION INTERNAL FIXATION HIP BIPOLAR Left 11/10/2023    Procedure: LEFT HEMIARTHROPLASTY, HIP, HEMIARTHROPLASTY;  Surgeon: Lucas Weldon MD;  Location: [facility] Main OR    ORTHOPEDIC SURGERY Right 2005    Shoulder    OTHER SURGICAL HISTORY      carpal tunnel repair    SPINE SURGERY         Prior to Admission Medications   Prior to Admission Medications   Prescriptions Last Dose Informant Patient Reported? Taking?   B Complex-C (SUPER B COMPLEX PO) 5/17/2024  Yes Yes   Sig: Take 1 tablet by mouth daily   Epoetin Adam (EPOGEN IJ) Past Week at 5/14  Yes Yes   Sig: Inject 1,000 Units into the vein three times a week At dialysis.   Iron Sucrose (VENOFER IV) Past Week at 5/14?  Yes Yes   Sig: Inject 100 mg into the vein once a week At dialysis   LORazepam (ATIVAN) 1 MG tablet 5/17/2024 at 1100  Yes Yes   Sig: Take 1 mg by mouth daily as needed for anxiety   Nutritional Supplements (NEPRO) LIQD 5/17/2024 at AM  Yes Yes   Sig: Take 1 Can by mouth daily   SENNA-docusate sodium (SENNA S) 8.6-50 MG tablet 5/17/2024 at AM; 2 tab  No Yes   Sig: Take 2 tablets by mouth 2  times daily Hold for loose stools.   artificial saliva (BIOTENE MT) SOLN solution Past Week at PRN  Yes Yes   Sig: Swish and spit 2 sprays in mouth as needed for dry mouth   aspirin 81 MG EC tablet 5/17/2024 at AM  No Yes   Sig: Take 1 tablet (81 mg) by mouth daily Start tomorrow.   atorvastatin (LIPITOR) 80 MG tablet 5/16/2024 at HS  No Yes   Sig: Take 1 tablet (80 mg) by mouth daily   calcitRIOL (ROCALTROL) 0.5 MCG capsule Past Week at 5/14  Yes Yes   Sig: Take 0.5 mcg by mouth three times a week Given at Dialysis   calcium acetate (CALPHRON) 667 MG TABS tablet 5/17/2024 at AM only  Yes Yes   Sig: Take 1-2 tablets by mouth 3 times daily (with meals)   cholecalciferol (VITAMIN D3) 25 mcg (1000 units) capsule 5/16/2024 at PM  Yes Yes   Sig: Take 1 capsule by mouth daily at 2 pm   clopidogrel (PLAVIX) 75 MG tablet 5/17/2024 at AM  No Yes   Sig: Take 1 tablet (75 mg) by mouth daily Dose to start tomorrow.   cyclobenzaprine (FLEXERIL) 5 MG tablet Past Week at few days ago, PRN  Yes Yes   Sig: Take 5 mg by mouth 2 times daily as needed for muscle spasms   glucosamine-chondroitin 500-400 MG CAPS per capsule Past Month at when remembered  Yes Yes   Sig: Take 2 capsules by mouth daily at 2 pm   lactulose (CHRONULAC) 10 GM/15ML solution 5/16/2024 at PM  No Yes   Sig: Take 15 mLs (10 g) by mouth 2 times daily as needed for constipation   levothyroxine (SYNTHROID/LEVOTHROID) 50 MCG tablet 5/16/2024 at PM  Yes Yes   Sig: Take 50 mcg by mouth daily   metoprolol succinate ER (TOPROL XL) 25 MG 24 hr tablet 5/17/2024 at AM  No Yes   Sig: Take 1 tablet (25 mg) by mouth daily   nitroGLYcerin (NITROSTAT) 0.4 MG sublingual tablet never used  No Yes   Sig: For chest pain place 1 tablet under the tongue every 5 minutes for 3 doses. If symptoms persist 5 minutes after 1st dose call 911.   nortriptyline (PAMELOR) 75 MG capsule 5/16/2024 at HS  Yes Yes   Sig: Take 75 mg by mouth At Bedtime   omeprazole (PRILOSEC) 20 MG DR capsule 5/17/2024  at AM  Yes Yes   Sig: Take 20 mg by mouth daily   oxyCODONE IR (ROXICODONE) 10 MG tablet Past Week at in the last week, none today  Yes Yes   Sig: Take 10 mg by mouth every 4 hours as needed for pain   sertraline (ZOLOFT) 100 MG tablet 5/16/2024 at HS  Yes Yes   Sig: Take 150 mg by mouth At Bedtime      Facility-Administered Medications: None         Physical Exam   Vital Signs: Temp: 98.1  F (36.7  C) Temp src: Oral BP: (!) 144/84 Pulse: 99   Resp: 15 SpO2: 93 % O2 Device: Nasal cannula Oxygen Delivery: 2 LPM  Weight: 83 lbs 12.4 oz    Constitutional: Fatigue.  Alert and cooperative.  Chronically ill-appearing and cachectic.  Eyes: Lids and lashes normal, pupils equal, round and reactive to light, extra ocular muscles intact, sclera clear, conjunctiva normal  ENT: Normocephalic, without obvious abnormality, atraumatic, oral pharynx with moist mucous membranes.  Respiratory: Bibasilar crackles. Diminished breath sounds bilaterally.  No accessory muscle use, normal work of breathing.  Cardiovascular: Normal apical impulse, regular rate and rhythm, normal S1 and S2.  Bruit auscultated at the LUE AV fistula.  No peripheral edema.  GI: Distended but soft abdomen.  Mild RLQ tenderness to palpation.  Bowel sounds present.  Skin: no redness, warmth, or swelling  Musculoskeletal: There is no redness, warmth, or swelling of the joints.  Neurologic: Awake, alert, oriented to name, place and time.     Medical Decision Making       Please see A&P for additional details of medical decision making.      Data   ------------------------- PAST 24 HR DATA REVIEWED -----------------------------------------------    I have personally reviewed the following data over the past 24 hrs:    10.6  \   7.8 (L)   / 339     135 93 (L) 73.9 (H) /  87   5.7 (H) 26 5.59 (H) \     ALT: 10 AST: 32 AP: 137 TBILI: 0.5   ALB: 3.8 TOT PROTEIN: 6.3 (L) LIPASE: 37     Trop: 156 (HH) BNP: N/A     INR:  N/A PTT:  N/A   D-dimer:  2.48 (H) Fibrinogen:  N/A        Imaging results reviewed over the past 24 hrs:   Recent Results (from the past 24 hour(s))   CT Chest (PE) Abdomen Pelvis w Contrast    Narrative    EXAM: CT CHEST PE ABDOMEN PELVIS W CONTRAST  LOCATION: [facility]  DATE: 5/17/2024    INDICATION: elevated ddimer with sob, generalized abdominal pain  COMPARISON: CTs AP 4/29/2024 3/24/2024  TECHNIQUE: CT chest pulmonary angiogram and routine CT abdomen pelvis with IV contrast. Arterial phase through the chest and venous phase through the abdomen and pelvis. Multiplanar reformats and MIP reconstructions were performed. Dose reduction   techniques were used.   CONTRAST: Isovue 370 75mL    FINDINGS:  ANGIOGRAM CHEST: Pulmonary arteries are normal caliber with no pulmonary emboli. Calcified atheromatous plaque throughout the normal caliber thoracic aorta which is not opacified well enough to evaluate for acute aortic syndrome.    LUNGS AND PLEURA: Hyperinflation of the lungs, moderate centrilobular emphysema, chronic bronchial wall thickening, interstitial edema, minimal bibasilar atelectasis and small right pleural effusion tracking into the right major fissure are all   unchanged.    MEDIASTINUM/AXILLAE: No lymphadenopathy. Left heart predominant cardiac enlargement. No pericardial effusion. Mild aortic valve leaflet calcification and thickening.    CORONARY ARTERY CALCIFICATION: Severe three-vessel coronary artery calcification..    HEPATOBILIARY: Multiple benign hepatic cysts consistent with history of other normal dominant polycystic kidney disease. Liver is otherwise normal.  No calcified gallstones or bile duct dilatation.     PANCREAS: Mild stable dilatation main pancreatic duct. Pancreas otherwise normal.    SPLEEN: Spleen size normal.    ADRENAL GLANDS: Normal.    KIDNEY/BLADDER: Bilateral nephrectomies.    BOWEL: Colon is tortuous and redundant with relatively large amount of stool. No bowel obstruction or obvious signs of  inflammation. Generalized mesenteric edema. No ascites.    LYMPH NODES: No lymphadenopathy.    VASCULATURE: Severe calcified atheromatous plaque throughout the normal caliber abdominal aorta and iliac arteries.    PELVIC ORGANS: Unremarkable.    MUSCULOSKELETAL: Generalized subcutaneous and intramuscular edema. Chronic erosive arthropathy both glenohumeral joints anterior plate and screw lumbosacral interspace. Bones are demineralized. No acute fractures or bone lesion.      Impression    IMPRESSION:   1.  No pulmonary emboli.  2.  Hyperinflation of the lungs, moderate centrilobular emphysema, chronic bronchial wall thickening, interstitial edema, minimal bibasilar atelectasis and small right pleural effusion all unchanged.  3.  Left heart predominant cardiac enlargement, severe three-vessel coronary artery calcification and aortic valve leaflet calcification and thickening.  4.  Colon is tortuous and redundant with relatively large amount of stool.   5.  Generalized mesenteric, subcutaneous and intramuscular edema.

## 2024-05-17 NOTE — TELEPHONE ENCOUNTER
PC to patient to discuss current symptoms. She states for past 3 days is feeling increasingly SOB, fatigued, LE edema, and has abdominal distention. She states feels groggy and unsteady if she stands for any period of time. She is unable lie flat and had to sleep upright during noc. Reviewed her dialysis schedule as T/TH/SAT and patient states did not go to dialysis yesterday d/t not feeling well. She states her weight has been stable when checked at dialysis, and reports was 83 lbs on 5/14.  Patient reports has taken all her meds except she missed her med 5/12 as she slept through the day, per her report. Per chart review patient recently hospitalized for acute pulm edema, isch CM, and CAD resulting in PCI to her RCA. Patient advised to be evaluated in the ED, she verbalized understanding and agrees to plan. No further questions or concerns at this time.  -sea

## 2024-05-17 NOTE — ED PROVIDER NOTES
EMERGENCY DEPARTMENT ENCOUNTER      NAME: Serene Tipton  AGE: 68 year old female  YOB: 1955  MRN: [id number]  EVALUATION DATE & TIME: 5/17/2024  1:10 PM    PCP: Ramos Lowry    ED PROVIDER: Nicki Montgomery PA-C      Chief Complaint   Patient presents with    Fatigue    Shortness of Breath     FINAL IMPRESSION:  1. Dialysis patient (H24)      ED COURSE & MEDICAL DECISION MAKING:    Pertinent Labs & Imaging studies reviewed. (See chart for details)  68 year old female presents to the Emergency Department for evaluation of abdominal distention.  Patient goes to dialysis on Tuesday Thursday and Saturday.  She no longer makes urine.  On Tuesday morning prior to dialysis patient started to feel fatigued and have abdominal distention so she decided to skip dialysis.  No fevers or chills.  No chest pain.  She does note some shortness of breath with walking.  On 4/30 patient was admitted for cardiac stents.  Vital signs reviewed and unremarkable.  Afebrile.  On exam no abdominal distention.  Abdomen is soft and tender in all 4 quadrants .  Bilateral lower extremities have 1+ pitting edema.     Diagnosis includes ACS, pericarditis, myocarditis, pneumonia, pneumothorax, hemothorax, PE, ascites, electrolyte abnormalities. CBC shows 10.6.  Hemoglobin is 7.8.  2 weeks ago was 9.0.  Potassium is 5.7. Anion gap is slightly elevated at 16.  Waiting for the urine creatinine is elevated at 5.59.  Patient did miss dialysis on Tuesday.  GFR is 8. Lipase and hepatic panel is reassuring.  D-dimer is elevated.  Troponin was 164.  2 weeks ago, troponin was 265 and patient had cardiac stents placed.  Delta troponin is pending.  EKG shows sinus tachycardia. CT shows no pulmonary embolism.  Hyperinflation of the lungs, moderate central lobar emphysema, chronic bronchial wall thickening, interstitial edema, medial bibasilar atelectasis and small right pleural effusion all unchanged.  Left heart predominant cardiac  enlargement, severe three-vessel coronary artery calcification and aortic valve leaflet calcification and thickening.  Colon is tortuous and redundant with relative large amount of stool.  Generalized mesenteric and subcutaneous anterior muscular edema. She was educated on her results.  Patient is resting comfortably.  Patient will be admitted to the hospitalist.  I spoke with the hospitalist who agrees to admit the patient      ED COURSE:   1:28 PM I saw the patient.   2:00 PM Staffed with Dr. Bunn  4:50 PM I checked on the patient.  Patient is resting comfortably.  Patient was educated on results.  Patient will be admitted she agrees with plan.  All questions answered   4:51 PM spoke with hospitalist who agrees to admit the patient.  All questions answered.    At the conclusion of the encounter I discussed the results of all of the tests and the disposition. The questions were answered. The patient or family acknowledged understanding and was agreeable with the care plan.     0 minutes of critical care time       Medical Decision Making  Obtained supplemental history:Supplemental history obtained?: No  Reviewed external records: External records reviewed?: No  Care impacted by chronic illness:Chronic Kidney Disease, Hyperlipidemia, Hypertension, and Smoking / Nicotine Use  Care significantly affected by social determinants of health:N/A  Did you consider but not order tests?: Work up considered but not performed and documented in chart, if applicable  Did you interpret images independently?: Independent interpretation of ECG and images noted in documentation, when applicable.  Consultation discussion with other provider:Did you involve another provider (consultant, MH, pharmacy, etc.)?: I discussed the care with another health care provider, see documentation for details.  Admit.      MEDICATIONS GIVEN IN THE EMERGENCY:  Medications   iopamidol (ISOVUE-370) solution 75 mL (75 mLs Intravenous $Given 5/17/24 5332)        NEW PRESCRIPTIONS STARTED AT TODAY'S ER VISIT  New Prescriptions    No medications on file          =================================================================    HPI    Patient information was obtained from: Patient    Use of : N/A       Serene Tipton is a 68 year old female with a pertinent history of hypertension, cardiomyopathy, hyperlipidemia, tobacco abuse, and chronic kidney disease stage 4 who presents to this ED via private car for evaluation of shortness of breath and hemoptysis.     The patient reports that on Tuesday (5/14) she had one episodes cough where she had speaks of dark red blood in it. No other episode of blood in sputum. No rectal bleeding. Later that night she developed a distended abdomen with associated shortness of breath and fatigue. She notes that she receives dialysis every Tuesday, Thursday, and Saturday, but missed yesterday due to not feeling well. She currently produces no urine.     Per chart review patient was admitted on 4/30/2024.  At that time echo showed severe global LV hypokinesia and EF of 25 to 30%.  Angiogram was recommended with dialysis postprocedure.  Underwent complex PCI to RCA.  Nephrology was consulted as well during this admission for hyperkalemia and end-stage renal disease.    Denies constipation, chest pain, calf pain, or any other complaints at this time.    REVIEW OF SYSTEMS   As per HPI    PAST MEDICAL HISTORY:  Past Medical History:   Diagnosis Date    Anemia in chronic kidney disease     Anxiety     Arthritis     Brain aneurysm     Cavernous segment of the right & left carotid arteries. See Neurosurg note 6/16/21.    Chronic low back pain     Managed by [facility]    Congestive heart failure, unspecified HF chronicity, unspecified heart failure type (H) 4/29/2024    Depressive disorder 2013    Disease of liver 8/29/2023    Disease of thyroid gland     ESRD (end stage renal disease) on dialysis (H) 07/2020    GERD  (gastroesophageal reflux disease)     Hepatic lesion     Hyperlipidemia     Hypertension     Nephrolithiasis     Neuromuscular disorder (H)     Osteoporosis     PKD (polycystic kidney disease) 09/01/1990    Primary generalized (osteo)arthritis 8/2/2023    PTSD (post-traumatic stress disorder)     Tobacco abuse     Vitamin D deficiency        PAST SURGICAL HISTORY:  Past Surgical History:   Procedure Laterality Date    BACK SURGERY      spinal fusion w/hardware    BIOPSY Left 09/01/1990    Breast    BREAST LUMPECTOMY, RT/LT Left     Lumpectomy    CV CORONARY ANGIOGRAM N/A 4/30/2024    Procedure: CV CORONARY ANGIOGRAM;  Surgeon: Jac Russell MD;  Location: [facility] CATH LAB CV    CV CORONARY LITHOTRIPSY PCI N/A 4/30/2024    Procedure: Percutaneous Coronary Intervention - Lithotripsy;  Surgeon: Jac Russell MD;  Location: [facility] LAB CV    CV LEFT HEART CATH N/A 4/30/2024    Procedure: Left Heart Catheterization;  Surgeon: Jac Russell MD;  Location: [facility] LAB CV    CV PCI STENT DRUG ELUTING N/A 4/30/2024    Procedure: Percutaneous Coronary Intervention Stent;  Surgeon: Jac Russell MD;  Location: [facility] CATH LAB CV    CYST REMOVAL Right     rt wrist    CYSTECTOMY PILONIDAL  1981    EYE SURGERY  2008    lasik    FORMATION ARTERIOVENOUS FISTULA    07/28/2020    FRACTURE SURGERY      IR CVC TUNNEL W2 CATH W/O PORT  7/17/2020    IR DIALYSIS FISTULOGRAM RIGHT  10/21/2020    NEPHRECTOMY BILATERAL Bilateral 2/1/2023    Procedure: bilateral native nephrectomy;  Surgeon: Alana Giang MD;  Location: [facility] OR    OPEN REDUCTION INTERNAL FIXATION HIP BIPOLAR Left 11/10/2023    Procedure: LEFT HEMIARTHROPLASTY, HIP, HEMIARTHROPLASTY;  Surgeon: Lucas Weldon MD;  Location: [facility] Main OR    ORTHOPEDIC SURGERY Right 2005    Shoulder    OTHER SURGICAL HISTORY      carpal tunnel repair    SPINE SURGERY             CURRENT MEDICATIONS:    artificial saliva (BIOTENE MT) SOLN solution  aspirin  81 MG EC tablet  atorvastatin (LIPITOR) 80 MG tablet  B Complex-C (SUPER B COMPLEX PO)  calcitRIOL (ROCALTROL) 0.5 MCG capsule  calcium acetate (CALPHRON) 667 MG TABS tablet  cholecalciferol (VITAMIN D3) 25 mcg (1000 units) capsule  clopidogrel (PLAVIX) 75 MG tablet  cyclobenzaprine (FLEXERIL) 5 MG tablet  Epoetin Adam (EPOGEN IJ)  glucosamine-chondroitin 500-400 MG CAPS per capsule  Iron Sucrose (VENOFER IV)  lactulose (CHRONULAC) 10 GM/15ML solution  levothyroxine (SYNTHROID/LEVOTHROID) 50 MCG tablet  LORazepam (ATIVAN) 1 MG tablet  metoprolol succinate ER (TOPROL XL) 25 MG 24 hr tablet  nitroGLYcerin (NITROSTAT) 0.4 MG sublingual tablet  nortriptyline (PAMELOR) 75 MG capsule  Nutritional Supplements (NEPRO) LIQD  omeprazole (PRILOSEC) 20 MG DR capsule  oxyCODONE IR (ROXICODONE) 10 MG tablet  SENNA-docusate sodium (SENNA S) 8.6-50 MG tablet  sertraline (ZOLOFT) 100 MG tablet        ALLERGIES:  Allergies   Allergen Reactions    Penicillins Other (See Comments) and Shortness Of Breath     Breathing problem.  breathing      No Clinical Screening - See Comments      PN: LW CM1: Contrast Intercapsular - Nonionic Reaction :    Nsaids Other (See Comments)     Kidney damage    Carvedilol GI Disturbance     Nausea and vomiting    Ciprofloxacin Muscle Pain (Myalgia)    Floxacillin (Flucloxacillin) Muscle Pain (Myalgia) and Rash    Levofloxacin Muscle Pain (Myalgia) and Rash    Morphine Nausea and Vomiting    Sulfa Antibiotics Itching       FAMILY HISTORY:  Family History   Problem Relation Age of Onset    Polycystic Kidney Diease Mother     Hypertension Mother     Kidney Disease Mother     Cerebrovascular Disease Mother     Chronic Obstructive Pulmonary Disease Father     Multiple Sclerosis Father     Polycystic Kidney Diease Sister     Cardiac Sudden Death Sister 52    Hypertension Sister     Kidney Disease Sister     Polycystic Kidney Diease Maternal Grandmother     Hypertension Maternal Grandmother     Kidney Disease  "Maternal Grandmother     Cerebrovascular Disease Maternal Grandmother     Heart Disease Maternal Grandfather     Hyperlipidemia Paternal Grandmother     Cerebrovascular Disease Paternal Grandmother     Coronary Artery Disease Paternal Grandfather     Pulmonary Embolism Paternal Grandfather     Anesthesia Reaction No family hx of        SOCIAL HISTORY:   Social History     Socioeconomic History    Marital status: Single   Tobacco Use    Smoking status: Unknown    Smokeless tobacco: Never   Substance and Sexual Activity    Alcohol use: Yes     Comment: occasional    Drug use: Yes     Types: Marijuana       VITALS:  BP (!) 146/87   Pulse 97   Temp 98.2  F (36.8  C) (Oral)   Resp 22   Ht 1.499 m (4' 11\")   Wt 38 kg (83 lb 12.4 oz)   SpO2 92%   BMI 16.92 kg/m      PHYSICAL EXAM    Physical Exam  Vitals and nursing note reviewed.   Constitutional:       Appearance: Normal appearance.   HENT:      Head: Atraumatic.      Right Ear: External ear normal.      Left Ear: External ear normal.      Nose: Nose normal.      Mouth/Throat:      Mouth: Mucous membranes are moist.   Eyes:      Conjunctiva/sclera: Conjunctivae normal.      Pupils: Pupils are equal, round, and reactive to light.   Cardiovascular:      Rate and Rhythm: Normal rate and regular rhythm.      Pulses: Normal pulses.      Heart sounds: Normal heart sounds. No murmur heard.     No friction rub. No gallop.   Pulmonary:      Effort: Pulmonary effort is normal.      Breath sounds: Normal breath sounds. No wheezing or rales.   Abdominal:      Tenderness: There is abdominal tenderness. There is no guarding or rebound.   Musculoskeletal:      Cervical back: Normal range of motion.      Right lower leg: Edema (1+) present.      Left lower leg: Edema (1+) present.   Skin:     General: Skin is dry.      Capillary Refill: Capillary refill takes less than 2 seconds.      Findings: No rash.   Neurological:      General: No focal deficit present.      Mental Status: " She is alert. Mental status is at baseline.   Psychiatric:         Mood and Affect: Mood normal.         Thought Content: Thought content normal.          LAB:  All pertinent labs reviewed and interpreted.  Labs Ordered and Resulted from Time of ED Arrival to Time of ED Departure   BASIC METABOLIC PANEL - Abnormal       Result Value    Sodium 135      Potassium 5.7 (*)     Chloride 93 (*)     Carbon Dioxide (CO2) 26      Anion Gap 16 (*)     Urea Nitrogen 73.9 (*)     Creatinine 5.59 (*)     GFR Estimate 8 (*)     Calcium 10.7 (*)     Glucose 87     HEPATIC FUNCTION PANEL - Abnormal    Protein Total 6.3 (*)     Albumin 3.8      Bilirubin Total 0.5      Alkaline Phosphatase 137      AST 32      ALT 10      Bilirubin Direct       D DIMER QUANTITATIVE - Abnormal    D-Dimer Quantitative 2.48 (*)    TROPONIN T, HIGH SENSITIVITY - Abnormal    Troponin T, High Sensitivity 164 (*)    CBC WITH PLATELETS AND DIFFERENTIAL - Abnormal    WBC Count 10.6      RBC Count 2.29 (*)     Hemoglobin 7.8 (*)     Hematocrit 25.5 (*)      (*)     MCH 34.1 (*)     MCHC 30.6 (*)     RDW 17.3 (*)     Platelet Count 339      % Neutrophils 78      % Lymphocytes 12      % Monocytes 8      % Eosinophils 1      % Basophils 1      % Immature Granulocytes 0      NRBCs per 100 WBC 0      Absolute Neutrophils 8.2      Absolute Lymphocytes 1.3      Absolute Monocytes 0.8      Absolute Eosinophils 0.2      Absolute Basophils 0.1      Absolute Immature Granulocytes 0.0      Absolute NRBCs 0.0     LIPASE - Normal    Lipase 37     TROPONIN T, HIGH SENSITIVITY        RADIOLOGY:  Reviewed all pertinent imaging. Please see official radiology report.  CT Chest (PE) Abdomen Pelvis w Contrast   Final Result   IMPRESSION:    1.  No pulmonary emboli.   2.  Hyperinflation of the lungs, moderate centrilobular emphysema, chronic bronchial wall thickening, interstitial edema, minimal bibasilar atelectasis and small right pleural effusion all unchanged.   3.   Left heart predominant cardiac enlargement, severe three-vessel coronary artery calcification and aortic valve leaflet calcification and thickening.   4.  Colon is tortuous and redundant with relatively large amount of stool.    5.  Generalized mesenteric, subcutaneous and intramuscular edema.           EKG:    Performed at: 13:01 5/17/24  Impression: Tachycardia.  Possible left atrial enlargement.  Left ventricle hypertrophy.  When compared to EKG done on 5/1/2024 minimal criteria of inferior fracture is not present.  101 bpm.  190 MI interval.  114 QRS duration.  QTc is 490.  MI axis is 75 and 59 and -52  I have independently reviewed and interpreted the EKG(s) documented above.    I, Charline Skinner, am serving as a scribe to document services personally performed by Nicki Montgomery PA-C, based on my observation and the provider's statements to me. I, Nicki Montgomery PA-C, attest that Charline Skinner is acting in a scribe capacity, has observed my performance of the services and has documented them in accordance with my direction.    Nicki Montgomery PA-C  [facility] EMERGENCY ROOM  [address] [id number] [phone]     Nicki Montgoemry PA-C  05/17/24 1532

## 2024-05-17 NOTE — ED PROVIDER NOTES
"Emergency Department Midlevel Supervisory Note     I had a face to face encounter with this patient seen by the Advanced Practice Provider (RAS). I personally made/approved the management plan and take responsibility for the patient management. I personally saw patient and performed a substantive portion of the visit including all aspects of the medical decision making.     ED Course:  2:00 PM Nicki Montgomery PA-C staffed patient with me. I agree with their assessment and plan of management, and I will see the patient.  2:05 PM I met with the patient to introduce myself, gather additional history, perform my initial exam, and discuss the plan.     Brief HPI:     Serene Tipton is a 68 year old female who presents for evaluation of shortness of breath and hemoptysis. The patient reports that on Tuesday (5/14) she had two episodes of dark red hemoptysis. Later that night she developed a distended abdomen with associated shortness of breath and fatigue. She notes that she receives dialysis every Tuesday, Thursday, and Saturday, but missed yesterday due to not feeling well. She currently produces no urine.     I, Connie Herrera, am serving as a scribe to document services personally performed by Adina Bunn DO, based on my observations and the provider's statements to me.  I, Adina Bunn DO, attest that Connie Herrera is acting in a scribe capacity, has observed my performance of the services and has documented them in accordance with my direction.      Brief Physical Exam: BP (!) 146/87   Pulse 97   Temp 98.2  F (36.8  C) (Oral)   Resp 22   Ht 1.499 m (4' 11\")   Wt 38 kg (83 lb 12.4 oz)   SpO2 92%   BMI 16.92 kg/m    Constitutional:  Alert, in no acute distress.  Chronically ill appearing.  EYES: Conjunctivae clear  HENT:  Atraumatic  Respiratory:  Respirations even, unlabored, in no acute respiratory distress  Cardiovascular:  Regular rate and rhythm, good peripheral perfusion  GI: Soft, " non-distended, non-tender  Musculoskeletal:  Moves all 4 extremities equally, grossly symmetrical strength.  Fistula to LUE  Integument: Warm & dry. No appreciable rash, erythema.  Neurologic:  Alert & oriented, speech clear and fluent, no focal deficits noted  Psych: Normal mood and affect       MDM:  Patient presenting for evaluation of shortness of breath, weakness.  She is end-stage renal disease on dialysis.  She reports similar symptoms last month, when she was at her dry weight.  Was thought may be an anginal equivalent and she did stent placed to the RCA.  She was feeling better until this week.  No fevers.  She is chronically ill-appearing but nontoxic.  She has missed dialysis twice this week.  Labs do show elevated creatinine, potassium 5.7.  EKG without evidence of arrhythmia or ischemia.  Her troponin has down trended from her prior admission.  CT imaging without any obvious cause.  Remains quite symptomatic here, certainly will need dialysis.  Hemoglobin is 7.8 here but I suspect this is most likely delusional as she is very fluid overloaded       1. SOB (shortness of breath)    2. ESRD (end stage renal disease) on dialysis (H)    3. Other fatigue        Labs and Imaging:  Results for orders placed or performed during the hospital encounter of 05/17/24   CT Chest (PE) Abdomen Pelvis w Contrast    Impression    IMPRESSION:   1.  No pulmonary emboli.  2.  Hyperinflation of the lungs, moderate centrilobular emphysema, chronic bronchial wall thickening, interstitial edema, minimal bibasilar atelectasis and small right pleural effusion all unchanged.  3.  Left heart predominant cardiac enlargement, severe three-vessel coronary artery calcification and aortic valve leaflet calcification and thickening.  4.  Colon is tortuous and redundant with relatively large amount of stool.   5.  Generalized mesenteric, subcutaneous and intramuscular edema.    Basic metabolic panel   Result Value Ref Range    Sodium 135  135 - 145 mmol/L    Potassium 5.7 (H) 3.4 - 5.3 mmol/L    Chloride 93 (L) 98 - 107 mmol/L    Carbon Dioxide (CO2) 26 22 - 29 mmol/L    Anion Gap 16 (H) 7 - 15 mmol/L    Urea Nitrogen 73.9 (H) 8.0 - 23.0 mg/dL    Creatinine 5.59 (H) 0.51 - 0.95 mg/dL    GFR Estimate 8 (L) >60 mL/min/1.73m2    Calcium 10.7 (H) 8.8 - 10.2 mg/dL    Glucose 87 70 - 99 mg/dL   Result Value Ref Range    Lipase 37 13 - 60 U/L   Hepatic function panel   Result Value Ref Range    Protein Total 6.3 (L) 6.4 - 8.3 g/dL    Albumin 3.8 3.5 - 5.2 g/dL    Bilirubin Total 0.5 <=1.2 mg/dL    Alkaline Phosphatase 137 40 - 150 U/L    AST 32 0 - 45 U/L    ALT 10 0 - 50 U/L    Bilirubin Direct     D dimer quantitative   Result Value Ref Range    D-Dimer Quantitative 2.48 (H) 0.00 - 0.50 ug/mL FEU   Troponin T, High Sensitivity (now)   Result Value Ref Range    Troponin T, High Sensitivity 164 (HH) <=14 ng/L   CBC with platelets and differential   Result Value Ref Range    WBC Count 10.6 4.0 - 11.0 10e3/uL    RBC Count 2.29 (L) 3.80 - 5.20 10e6/uL    Hemoglobin 7.8 (L) 11.7 - 15.7 g/dL    Hematocrit 25.5 (L) 35.0 - 47.0 %     (H) 78 - 100 fL    MCH 34.1 (H) 26.5 - 33.0 pg    MCHC 30.6 (L) 31.5 - 36.5 g/dL    RDW 17.3 (H) 10.0 - 15.0 %    Platelet Count 339 150 - 450 10e3/uL    % Neutrophils 78 %    % Lymphocytes 12 %    % Monocytes 8 %    % Eosinophils 1 %    % Basophils 1 %    % Immature Granulocytes 0 %    NRBCs per 100 WBC 0 <1 /100    Absolute Neutrophils 8.2 1.6 - 8.3 10e3/uL    Absolute Lymphocytes 1.3 0.8 - 5.3 10e3/uL    Absolute Monocytes 0.8 0.0 - 1.3 10e3/uL    Absolute Eosinophils 0.2 0.0 - 0.7 10e3/uL    Absolute Basophils 0.1 0.0 - 0.2 10e3/uL    Absolute Immature Granulocytes 0.0 <=0.4 10e3/uL    Absolute NRBCs 0.0 10e3/uL   ECG 12-LEAD WITH MUSE (LHE)   Result Value Ref Range    Systolic Blood Pressure  mmHg    Diastolic Blood Pressure  mmHg    Ventricular Rate 101 BPM    Atrial Rate 101 BPM    MN Interval 190 ms    QRS Duration 114  ms     ms    QTc 490 ms    P Axis 75 degrees    R AXIS 59 degrees    T Axis -52 degrees    Interpretation ECG       Sinus tachycardia  Possible Left atrial enlargement  Left ventricular hypertrophy  Cannot rule out Inferior infarct , age undetermined  Abnormal ECG  When compared with ECG of 01-MAY-2024 05:51,  Minimal criteria for Inferior infarct are now Present  Confirmed by SEE ED PROVIDER NOTE FOR, ECG INTERPRETATION ([id number]),  IRWIN STUBBS ([id number]) on 5/17/2024 1:19:10 PM         I have reviewed the relevant laboratory studies above.      EKG: I reviewed and independently interpreted the patient's EKG, with comments made as listed below. Please see scanned EKG for full report.       Procedures:  I was present for the key portions of procedures documented in RAS/midlevel note, see midlevel note for further details.    Adina Bunn DO  [facility] EMERGENCY ROOM  [facility] [id number] [phone]     Adina Bunn DO  05/17/24 4509

## 2024-05-17 NOTE — MEDICATION SCRIBE - ADMISSION MEDICATION HISTORY
Medication Scribe Admission Medication History    Admission medication history is complete. The information provided in this note is only as accurate as the sources available at the time of the update.    Information Source(s): Patient and CareEverywhere/SureScripts via in-person    Pertinent Information: Takes calcium acetate only with meals which ranges from 1 to 4 doses per day as needed. Has taken an oxycodone this week but does not remember when. None today. Very fatigued during interview.     Changes made to PTA medication list:  Added: None  Deleted:   Lidocaine 3% cream -- pt reports not using  Changed:   Lorazepam 1 mg: scheduled before lunch --> once daily PRN    Allergies reviewed with patient and updates made in EHR: yes    Medication History Completed By: Joseph Camp 5/17/2024 3:28 PM    PTA Med List   Medication Sig Last Dose    artificial saliva (BIOTENE MT) SOLN solution Swish and spit 2 sprays in mouth as needed for dry mouth Past Week at PRN    aspirin 81 MG EC tablet Take 1 tablet (81 mg) by mouth daily Start tomorrow. 5/17/2024 at AM    atorvastatin (LIPITOR) 80 MG tablet Take 1 tablet (80 mg) by mouth daily 5/16/2024 at HS    B Complex-C (SUPER B COMPLEX PO) Take 1 tablet by mouth daily 5/17/2024    calcitRIOL (ROCALTROL) 0.5 MCG capsule Take 0.5 mcg by mouth three times a week Given at Dialysis Past Week at 5/14    calcium acetate (CALPHRON) 667 MG TABS tablet Take 1-2 tablets by mouth 3 times daily (with meals) 5/17/2024 at AM only    cholecalciferol (VITAMIN D3) 25 mcg (1000 units) capsule Take 1 capsule by mouth daily at 2 pm 5/16/2024 at PM    clopidogrel (PLAVIX) 75 MG tablet Take 1 tablet (75 mg) by mouth daily Dose to start tomorrow. 5/17/2024 at AM    cyclobenzaprine (FLEXERIL) 5 MG tablet Take 5 mg by mouth 2 times daily as needed for muscle spasms Past Week at few days ago, PRN    Epoetin Adam (EPOGEN IJ) Inject 1,000 Units into the vein three times a week At dialysis. Past  Week at 5/14    glucosamine-chondroitin 500-400 MG CAPS per capsule Take 2 capsules by mouth daily at 2 pm Past Month at when remembered    Iron Sucrose (VENOFER IV) Inject 100 mg into the vein once a week At dialysis Past Week at 5/14?    lactulose (CHRONULAC) 10 GM/15ML solution Take 15 mLs (10 g) by mouth 2 times daily as needed for constipation 5/16/2024 at PM    levothyroxine (SYNTHROID/LEVOTHROID) 50 MCG tablet Take 50 mcg by mouth daily 5/16/2024 at PM    LORazepam (ATIVAN) 1 MG tablet Take 1 mg by mouth daily as needed for anxiety 5/17/2024 at 1100    metoprolol succinate ER (TOPROL XL) 25 MG 24 hr tablet Take 1 tablet (25 mg) by mouth daily 5/17/2024 at AM    nitroGLYcerin (NITROSTAT) 0.4 MG sublingual tablet For chest pain place 1 tablet under the tongue every 5 minutes for 3 doses. If symptoms persist 5 minutes after 1st dose call 911. never used    nortriptyline (PAMELOR) 75 MG capsule Take 75 mg by mouth At Bedtime 5/16/2024 at HS    Nutritional Supplements (NEPRO) LIQD Take 1 Can by mouth daily 5/17/2024 at AM    omeprazole (PRILOSEC) 20 MG DR capsule Take 20 mg by mouth daily 5/17/2024 at AM    oxyCODONE IR (ROXICODONE) 10 MG tablet Take 10 mg by mouth every 4 hours as needed for pain Past Week at in the last week, none today    SENNA-docusate sodium (SENNA S) 8.6-50 MG tablet Take 2 tablets by mouth 2 times daily Hold for loose stools. 5/17/2024 at AM; 2 tab    sertraline (ZOLOFT) 100 MG tablet Take 150 mg by mouth At Bedtime 5/16/2024 at HS

## 2024-05-18 NOTE — PLAN OF CARE
Problem: Gas Exchange Impaired  Goal: Optimal Gas Exchange  Outcome: Progressing     Problem: Pain Acute  Goal: Optimal Pain Control and Function  Outcome: Progressing    Patient is alert and orientated times four. Pain mananged with prn acetaminophen. On 3LPM of oxygen to keep sats above 90%. On telemetry and has been SR with 1st degree AVB. Bed alarm in place for patient's safety.

## 2024-05-18 NOTE — PROGRESS NOTES
05/18/24 7997   Appointment Info   Signing Clinician's Name / Credentials (PT) Taty Denise, PT   Living Environment   Home Accessibility   (elevator)   Transportation Anticipated   (drives self)   Living Environment Comments see prior notes this section   Self-Care   Regular Exercise No   Equipment Currently Used at Home   (has variety of amb. devices but uses none)   Fall history within last six months yes   Number of times patient has fallen within last six months 2   Activity/Exercise/Self-Care Comment assist with vacuuming and laundry; otherwise reports indep.   General Information   Onset of Illness/Injury or Date of Surgery 05/17/24   Referring Physician fifi kelley   Patient/Family Therapy Goals Statement (PT) home; breathe better   Pertinent History of Current Problem (include personal factors and/or comorbidities that impact the POC) hx L hip fx/repair 11/23; 2 spine surg./fusion; cardiac stent a couple weeks ago   Existing Precautions/Restrictions   (on 3 L O2, tele, dialysis)   Cognition   Orientation Status (Cognition) oriented x 4   Pain Assessment   Patient Currently in Pain   (7; RN just gave meds)   Integumentary/Edema   Integumentary/Edema   (large bruise and edema R knee (ran into coffe table recently))   Range of Motion (ROM)   ROM Comment shoulders very limited: R AROM slow/painful to <90 degr. ; L AROM to 90 deg.  LE's WNL   Strength (Manual Muscle Testing)   Strength Comments able to bridge; fair SLR's; hip flex. and quads all approx 3+ (MMT limited by back pain)   Gait/Stairs (Locomotion)   Kidder Level (Gait) contact guard   Distance in Feet (Gait) 190   Deviations/Abnormal Patterns (Gait) gait speed decreased  (initially unsteady but improved over walk)   Balance   Balance Comments able to PF/DF, tight YUMIKO, sternal nudge tolerated. SLS R 10 sec; L 5-6 sec.   Sensory Examination   Sensory Perception patient reports no sensory changes   Clinical Impression   Criteria for Skilled  Therapeutic Intervention Evaluation only   PT Diagnosis (PT) na   Influenced by the following impairments na   Functional limitations due to impairments na   Clinical Presentation (PT Evaluation Complexity) stable   Clinical Presentation Rationale presents as medicallydiagnosed   Clinical Decision Making (Complexity) low complexity   PT Total Evaluation Time   PT Eval, Moderate Complexity Minutes (30296) 45   PT Discharge Planning   PT Plan dc   PT Discharge Recommendation (DC Rec) home with assist  (and plans to begin cardiac rehab)   PT Rationale for DC Rec moving safely but cardiac rehab approp. for tolerance   PT Brief overview of current status amb no device supervised   Total Session Time   Total Session Time (sum of timed and untimed services) 45

## 2024-05-18 NOTE — PLAN OF CARE
Problem: Electrolyte Imbalance  Goal: Electrolyte Balance  Outcome: Progressing  Intervention: Monitor and Manage Electrolyte Imbalance  Flowsheets (Taken 5/18/2024 1818)  Fluid/Electrolyte Management: electrolyte-binding therapy initiated   Goal Outcome Evaluation:    A/Ox4. Pain managed with oxycodone PRN x 1 today. On 2L NC, VSS. Had HD run today, K recheck 4.1. Ambulating with SBA.

## 2024-05-18 NOTE — CONSULTS
RENAL CONSULT NOTE    REQUESTING PHYSICIAN: Dr Palacios    REASON FOR CONSULT: ESRD, hyperkalemia    ASSESSMENT/PLAN:  ESRD: 2/2 polycystic kidney disease s/p bilateral nephrectomies.  On in center hemodialysis [place] [facility].  Runs on a TTS schedule.  She runs 3 hours.  Has a left upper extremity aVF.  Missed outpatient HD on Thursday, missed rescheduled run yesterday.  Plan for dialysis today per usual schedule.  Consider UF run tomorrow if needed.     Hyperkalemia: Moderate.  Potassium 6.4, in the setting of ESRD and missed HD.  HD today using K2 bath.    Acute hypoxemic respiratory failure: In the setting of hypervolemia s/p missed HD.  UF with HD today as tolerated, possible UF from tomorrow if needed.  CT per PE study negative for PE this admit.  Currently maintained on NC 2 L    Hypertension: Metoprolol PTA.  Hold off on adjusting meds until after HD, anticipate improvement in blood pressure with HD/UF.     Hypothyroidism: On replacement     Anemia of chronic disease: Receives PETE and parenteral iron with outpatient dialysis.  Hemoglobin 8.6.  Transfusions per hospital service.  Will check iron studies.  Will dose Venofer EPO if indicated.     Hyponatremia: Secondary to ESRD and hypervolemia.  UF with HD.     Hyperlipidemia: Statin     Secondary renal hyperparathyroidism: Sevelamer 3 times daily AC      CAD, ischemic cardiomyopathy, s/p recent PCI to RCA on 4/30.  PTA meds.    HPI:   Serene is a very pleasant 68-year-old female with history of ESRD 2/2 PKD maintained on HD TTS [facility]. Recent admission for ischemic cardiomyopathy s/p complex PCI to RCA at [facility] 4/30 through 5/2    She dialyzed on Tuesday of this week, but she missed her dialysis treatment on Thursday  Dialysis treatment was rescheduled for Friday 5/17 but she missed that treatment as well  She has been feeling short of breath that is progressively worsened over the past several days  She did mention some hemoptysis  x 1 on 5/14, less than a teaspoon, no recurrence   Denies any fevers or chills  No diarrhea  Does feel her abdomen is a bit bloated  Has left upper extremity aVF, was having some posttreatment bleeding from her fistula  Of note she was recently started on DAPT after recent PCI  We have been holding her maintenance heparin during HD  She has had less bleeding issues since holding maintenance heparin  Access otherwise working well        REVIEW OF SYSTEMS:  Complete 12 point review of systems was negative other than those noted in the HPI      Past Medical History:   Diagnosis Date    Anemia in chronic kidney disease     Anxiety     Arthritis     Brain aneurysm     Cavernous segment of the right & left carotid arteries. See Neurosurg note 6/16/21.    Chronic low back pain     Managed by Pain [facility]    Congestive heart failure, unspecified HF chronicity, unspecified heart failure type (H) 4/29/2024    Depressive disorder 2013    Disease of liver 8/29/2023    Disease of thyroid gland     ESRD (end stage renal disease) on dialysis (H) 07/2020    GERD (gastroesophageal reflux disease)     Hepatic lesion     Hyperlipidemia     Hypertension     Nephrolithiasis     Neuromuscular disorder (H)     Osteoporosis     PKD (polycystic kidney disease) 09/01/1990    Primary generalized (osteo)arthritis 8/2/2023    PTSD (post-traumatic stress disorder)     Tobacco abuse     Vitamin D deficiency        Current Facility-Administered Medications   Medication Dose Route Frequency Provider Last Rate Last Admin    acetaminophen (TYLENOL) tablet 650 mg  650 mg Oral Q4H PRN Brandon Palacios DO   650 mg at 05/18/24 0034    Or    acetaminophen (TYLENOL) Suppository 650 mg  650 mg Rectal Q4H PRN Brandon Palacios DO        artificial saliva (BIOTENE MT) solution 2 spray  2 spray Swish & Spit 4x Daily PRN Brandon Palacios DO        aspirin EC tablet 81 mg  81 mg Oral Daily Brandon Palacios DO        atorvastatin (LIPITOR) tablet 80 mg  80 mg Oral At  Bedtime Brandon Palacios DO        calcitRIOL (ROCALTROL) capsule 0.5 mcg  0.5 mcg Oral Once per day on Tuesday Thursday Saturday Brandon Palacios DO        calcium acetate (PHOSLO) capsule 667-1,334 mg  667-1,334 mg Oral TID w/meals Brandon Palacios DO        clopidogrel (PLAVIX) tablet 75 mg  75 mg Oral Daily Brandon Palacios DO        cyclobenzaprine (FLEXERIL) tablet 5 mg  5 mg Oral BID PRN Brandon Palacios DO        lactulose (CHRONULAC) solution 10 g  10 g Oral BID PRN Brandon Palacios DO        levothyroxine (SYNTHROID/LEVOTHROID) tablet 50 mcg  50 mcg Oral Daily Danii Escobedo MD   50 mcg at 05/17/24 2140    lidocaine (LMX4) cream   Topical Q1H PRN Brandon Palacios DO        lidocaine 1 % 0.1-1 mL  0.1-1 mL Other Q1H PRN Brandon Palacios DO        LORazepam (ATIVAN) tablet 1 mg  1 mg Oral Daily PRN Brandon Palacios DO        metoprolol succinate ER (TOPROL XL) 24 hr tablet 25 mg  25 mg Oral Daily Brandon Palacios DO        naloxone (NARCAN) injection 0.2 mg  0.2 mg Intravenous Q2 Min PRN Danii Escobedo MD        Or    naloxone (NARCAN) injection 0.4 mg  0.4 mg Intravenous Q2 Min PRN Danii Escobedo MD        Or    naloxone (NARCAN) injection 0.2 mg  0.2 mg Intramuscular Q2 Min PRN Danii Escobedo MD        Or    naloxone (NARCAN) injection 0.4 mg  0.4 mg Intramuscular Q2 Min PRN Danii Escobedo MD        nicotine (NICODERM CQ) 14 MG/24HR 24 hr patch 1 patch  1 patch Transdermal Daily Brandon Palacios DO        nitroGLYcerin (NITROSTAT) sublingual tablet 0.4 mg  0.4 mg Sublingual Q5 Min PRN Brandon Palacios DO        ondansetron (ZOFRAN ODT) ODT tab 4 mg  4 mg Oral Q6H PRN Brandon Palacios DO        Or    ondansetron (ZOFRAN) injection 4 mg  4 mg Intravenous Q6H PRN Brandon Palacios DO   4 mg at 05/17/24 3462    oxyCODONE (ROXICODONE) tablet 5-10 mg  5-10 mg Oral Q6H PRN Brandon Palacios DO        pantoprazole (PROTONIX) IV push injection 40 mg  40 mg Intravenous Daily with breakfast Brandon Palacios DO   40 mg at 05/18/24 3990    polyethylene glycol  (MIRALAX) Packet 17 g  17 g Oral BID PRN Brandon Palacios DO        prochlorperazine (COMPAZINE) injection 5 mg  5 mg Intravenous Q6H PRN Brandon Palacios DO        Or    prochlorperazine (COMPAZINE) tablet 5 mg  5 mg Oral Q6H PRN Brandon Palacios DO        Or    prochlorperazine (COMPAZINE) suppository 12.5 mg  12.5 mg Rectal Q12H PRN Brandon Palacios DO        senna-docusate (SENOKOT-S/PERICOLACE) 8.6-50 MG per tablet 1 tablet  1 tablet Oral BID PRN Brandon Palacios DO        Or    senna-docusate (SENOKOT-S/PERICOLACE) 8.6-50 MG per tablet 2 tablet  2 tablet Oral BID PRN Brandon Palacios DO        senna-docusate (SENOKOT-S/PERICOLACE) 8.6-50 MG per tablet 2 tablet  2 tablet Oral BID Brandon Palacios DO   2 tablet at 05/17/24 2108    sertraline (ZOLOFT) tablet 150 mg  150 mg Oral At Bedtime Brandon Palacios DO   150 mg at 05/17/24 2108    sodium chloride (PF) 0.9% PF flush 3 mL  3 mL Intracatheter Q8H Brandon Palacios DO   3 mL at 05/18/24 0408    sodium chloride (PF) 0.9% PF flush 3 mL  3 mL Intracatheter q1 min prn Brandon Palacios DO        Vitamin D3 (CHOLECALCIFEROL) tablet 25 mcg  25 mcg Oral Daily Brandon Palacios DO           No current outpatient medications on file.      ALLERGIES/SENSITIVITIES:  Allergies   Allergen Reactions    Penicillins Other (See Comments) and Shortness Of Breath     Breathing problem.  breathing      No Clinical Screening - See Comments      PN: LW CM1: Contrast Intercapsular - Nonionic Reaction :    Nsaids Other (See Comments)     Kidney damage    Carvedilol GI Disturbance     Nausea and vomiting    Ciprofloxacin Muscle Pain (Myalgia)    Floxacillin (Flucloxacillin) Muscle Pain (Myalgia) and Rash    Levofloxacin Muscle Pain (Myalgia) and Rash    Morphine Nausea and Vomiting    Sulfa Antibiotics Itching     Social History     Tobacco Use    Smoking status: Unknown    Smokeless tobacco: Never   Substance Use Topics    Alcohol use: Yes     Comment: occasional    Drug use: Yes     Types: Marijuana     I  have reviewed this patient's family history and updated it with pertinent information if needed.  Family History   Problem Relation Age of Onset    Polycystic Kidney Diease Mother     Hypertension Mother     Kidney Disease Mother     Cerebrovascular Disease Mother     Chronic Obstructive Pulmonary Disease Father     Multiple Sclerosis Father     Polycystic Kidney Diease Sister     Cardiac Sudden Death Sister 52    Hypertension Sister     Kidney Disease Sister     Polycystic Kidney Diease Maternal Grandmother     Hypertension Maternal Grandmother     Kidney Disease Maternal Grandmother     Cerebrovascular Disease Maternal Grandmother     Heart Disease Maternal Grandfather     Hyperlipidemia Paternal Grandmother     Cerebrovascular Disease Paternal Grandmother     Coronary Artery Disease Paternal Grandfather     Pulmonary Embolism Paternal Grandfather     Anesthesia Reaction No family hx of          PHYSICAL EXAM:  Physical Exam   Temp: 97.6  F (36.4  C) Temp src: Oral BP: (!) 140/79 Pulse: 99   Resp: 16 SpO2: 91 % O2 Device: Nasal cannula Oxygen Delivery: 3 LPM  Vitals:    05/17/24 1301 05/17/24 2128 05/18/24 0401   Weight: 38 kg (83 lb 12.4 oz) 40.8 kg (90 lb) 39.7 kg (87 lb 8.4 oz)     Vital Signs with Ranges  Temp:  [97.4  F (36.3  C)-98.2  F (36.8  C)] 97.6  F (36.4  C)  Pulse:  [] 99  Resp:  [14-22] 16  BP: (131-153)/(77-87) 140/79  SpO2:  [90 %-99 %] 91 %  No intake/output data recorded.      Patient Vitals for the past 72 hrs:   Weight   05/18/24 0401 39.7 kg (87 lb 8.4 oz)   05/17/24 2128 40.8 kg (90 lb)   05/17/24 1301 38 kg (83 lb 12.4 oz)   General: Alert, NAD  HENT: Supple, Non-tender, no obvious JVD  Eyes: No scleral icterus  Cardiovascular: RRR, no rub, gallop, or murmur.   Extremities: Trace edema bilateral ankles   Respiratory: No advantageous breath sounds, NC 2 L  Gastrointestinal: Abdomen is tender, nondistended.  Musculoskeletal: Grossly normal   Integumentary: Warm, dry, no  rash  Neurologic: Non focal   Psychiatric: Cooperative  Access: Left upper AVF, aneurysmal expansion, good thrill and bruit  Laboratory:     Recent Labs   Lab 05/18/24  0606 05/17/24  1427   WBC 11.1* 10.6   RBC 2.50* 2.29*   HGB 8.6* 7.8*   HCT 27.9* 25.5*    339       Basic Metabolic Panel:  Recent Labs   Lab 05/18/24  0606 05/17/24  1427   * 135   POTASSIUM 6.4* 5.7*   CHLORIDE 93* 93*   CO2 25 26   BUN 80.2* 73.9*   CR 6.23* 5.59*   GLC 90 87   GAVIN 10.5* 10.7*       INRNo lab results found in last 7 days.    Recent Labs   Lab Test 05/18/24  0606 05/17/24  1427   POTASSIUM 6.4* 5.7*   CHLORIDE 93* 93*   BUN 80.2* 73.9*      Recent Labs   Lab Test 05/17/24  1427 04/30/24  0508 04/29/24  1205 08/18/22  1731 05/19/21  1520   ALBUMIN 3.8 3.7 3.9   < >  --    BILITOTAL 0.5  --  0.6   < >  --    ALT 10  --  <5   < >  --    AST 32  --  21   < >  --    PROTEIN  --   --   --   --  100*    < > = values in this interval not displayed.       Personally reviewed today's laboratory studies    This note was dictated using voice recognition       Thank you for involving us in the care of this patient. We will continue to follow along with you.      Ever Hayes PA-C  [facility]  [phone]

## 2024-05-18 NOTE — PROVIDER NOTIFICATION
Patient is A&O X4, is hypertensive but other vital signs are stable. She has chronic shoulder and back pain, nonpharmacologic pain management measures used. She appears ashen/grey in color, is of low body weight and has been unable to finish the snack provided.   Report given to CEDRIC Fowler on [address] for continuity of cares.

## 2024-05-18 NOTE — PLAN OF CARE
Goal Outcome Evaluation:  Pt alert and oriented x4, report back and shoulder pain, managed with prn medication, pt LS clear and diminished, on 2-4L oxygen nasal cannula, pt desat with activity, encouraged IS used, cough and deep breathing, pt SBA, possibly hemodialysis tomorrow, discharge pending.

## 2024-05-18 NOTE — PROGRESS NOTES
Date: 5/18/2024  Time: 3:20 PM     Data:  Pre Wt:  39.7 kg   Desired Wt:   To be established  Post Wt:  36.7 kg   Weight change: - 3 kg  Ultrafiltration - Post Run Net Total Removed (mL):  3000 ml  Vascular Access Status: Fistula patent  Dialyzer Rinse:  Light  Total Blood Volume Processed: 59.3 L   Total Dialysis (Treatment) Time:  3 Hrs  Dialysate Bath: K 2, Ca 2.5  Heparin: Other:     Lab:   No    Interventions:  Dialysis done through left AV Fistula using 16 gauge needles. ,   UF set to 3.3 Liters, accommodating priming and rinse back volumes. Pt tolerated well HD Tx with no issue.  See administered per MAR  See Flowsheet for Crit Profile A throughout the run  Treatment has ended safely and  blood is rinsed back completely  Decannulation done post HD, hemostasis is achieved in 10 minutes  Pressure dressing is applied, to be removed after 4-6 hours  Post Tx assessment done. Pt left in her room in stable condition  Report given to CEDRIC Barajas     Assessment:  A/O x 4, calm and cooperative, denies pain  Left arm AV Fistula has good thrill and bruit                Plan:    Per Renal team

## 2024-05-18 NOTE — PROGRESS NOTES
[facility]    Progress Note - Hospitalist Service       Date of Admission:  5/17/2024    Assessment & Plan   Serene Tipton is a 68 year old female admitted on 5/17/2024. She  has a history of HTN, HLD, ESRD on HD, nephrectomy for Polycystic Kidney Disease (bilateral, 2/1/2023), recent admission for ischemic CM and CAD s/p complex PCI to RCA, tobacco use disorder, and is admitted for acute hypoxic respiratory failure and hyperkalemia after missing dialysis this week.     ESRD on HD T/TH/S  Hyperkalemia  Acute Hypoxic Respiratory Failure 2/2 hypervolemia due to missed hemodialysis   Hemoptysis  Small pleural effusion that was noted on CT CAP unchanged from previous admission on 4/30, elevated D-dimer in the ED but negative CT PE.  Some mild bibasilar crackles on exam.  Saturating well on 3 LPM via NC (on RA at baseline), however drops to mid 80s with activity, good recovery of saturations with rest.  No concerns with AV fistula. Acute hypoxic respiratory failure most likely due to hypervolemia, CT showed stable pleural effusion with no new consolidation. Unclear etiology of hemoptysis, however has not recurred since 5/14.   -Nephrology consulted, appreciate recommendations   -Dialysis today per usual schedule   -Consider UF run tomorrow if needed   -Will dose Venofer EPO if indicated for anemia of chronic disease  -O2 via NC, keep saturations >90%  -AM BMP     Ischemic CM s/p PCI to RCA on 4/30  HFrEF  Abdominal bloating  Possible that abdominal bloating is anginal equivalent, however she also has a history of constipation. Previously, her symptoms improved following dialysis and recent PCI.  Delta troponin negative for acute ischemia.  EKG shows sinus tachycardia. Echo performed on last admission showed new severe global LV hypokinesis and EF of 25 to 30%. Bibasilar crackles on exam, requiring O2, suspect she is hypervolemic and will improve once she receives dialysis.  Considering  cardiology consult.  -Cardiac telemetry  -Continue PTA metoprolol  -Repeat echo     Constipation  CT chest abdomen pelvis showed large amount of stool, possible this is contributing to her abdominal bloating. Previously admitted with SBO and heavy stool burden on 3/23/2024 that resolved with enema and magnesium citrate.  Last BM 2 days ago that was normal. Patient stataed no concerns and no abdominal pain.  -PTA lactulose TID PRN  -PTA senna BID  -Patient states she does not want to use miralax, would rather use her lactulose.     Anemia of CKD  Appears to have baseline hemoglobin 9-10, was 7.8 on admission.  Reported 2 small episodes hemoptysis, no further episodes.  No bloody or melanotic stools, no hematemesis.  Receives Venofer infusions once weekly at dialysis.    -AM CBC, continue monitor     HTN  Hypertensive at 140/82 on admission. Likely will improve after HD.  -Continue to monitor     Chronic conditions  Hypothyroidism: PTA levothyroxine  MDD, JAMIL: PTA sertraline, PTA Ativan 1 mg daily as needed for anxiety  Chronic pain syndrome: PTA nortriptyline, as needed Tylenol and oxycodone ordered  Secondary hyperparathyroidism: PTA levothyroxine  GERD: Hospital formulary pantoprazole 40 mg daily  Tobacco use disorder: Nicotine patch 14mg daily         Diet: Combination Diet Regular Diet Adult; Renal Diet (dialysis)    DVT Prophylaxis: Pneumatic Compression Devices  Pool Catheter: Not present  Fluids: po  Lines: None     Cardiac Monitoring: ACTIVE order. Indication: Electrolyte Imbalance (24 hours)- Magnesium <1.3 mg/ml; Potassium < =2.8 or > 5.5 mg/ml  Code Status: Special Code - CPR/Defib/Code drugs, NO intubation     Clinically Significant Risk Factors Present on Admission        # Hyperkalemia: Highest K = 6.4 mmol/L in last 2 days, will monitor as appropriate    # Hypercalcemia: Highest Ca = 10.7 mg/dL in last 2 days, will monitor as appropriate      # Drug Induced Platelet Defect: home medication list  "includes an antiplatelet medication   # Hypertension: Noted on problem list  # Chronic heart failure with reduced ejection fraction: last echo with EF <40%     # Cachexia: Estimated body mass index is 17.67 kg/m  as calculated from the following:    Height as of this encounter: 1.499 m (4' 11.02\").    Weight as of this encounter: 39.7 kg (87 lb 8.4 oz).       # Financial/Environmental Concerns:           Disposition Plan      Expected Discharge Date: 05/19/2024        Discharge Comments: HD T, TH, SA        The patient's care was discussed with the Attending Physician, Dr. Escobedo .    Starr Doran MD PGY1  Hospitalist Service  [facility]  Securely message with [employer] (more info)  Text page via [facility] Paging/Directory   ______________________________________________________________________    Interval History   Patient was admitted last night.  No acute events overnight.  Has been needing supplemental oxygen to keep her saturations above 90%.  Slept well.  No questions or concerns.  Discussed plan.  Denied chest pain, abdominal pain, nausea, vomiting.  Endorsed some slight shortness of breath.    Physical Exam   Vital Signs: Temp: 97.6  F (36.4  C) Temp src: Oral BP: (!) 140/79 Pulse: 99   Resp: 16 SpO2: 91 % O2 Device: Nasal cannula Oxygen Delivery: 3 LPM  Weight: 87 lbs 8.36 oz    GENERAL: alert and no acute distress, chronically ill-appearing and cachectic  HENT: hearing grossly intact, moist mucus membranes  RESP: Bibasilar crackles with diminished breath sounds bilaterally.  No wheezing or rhonchi.  CV: Distant heart sounds, regular rate and rhythm, LUE AV fistula with bruit  ABDOMEN: soft, nontender, and bowel sounds normal  MS: no gross musculoskeletal defects noted, no edema  PSYCH: mentation appears normal, affect normal/bright    Data     I have personally reviewed the following data over the past 24 hrs:    11.1 (H)  \   8.6 (L)   / 362     132 (L) 93 (L) 80.2 (H) /  90   6.2 " (HH) 25 6.23 (H) \     ALT: 10 AST: 32 AP: 137 TBILI: 0.5   ALB: 3.8 TOT PROTEIN: 6.3 (L) LIPASE: 37     Trop: 156 (HH) BNP: N/A     INR:  N/A PTT:  N/A   D-dimer:  2.48 (H) Fibrinogen:  N/A       Imaging results reviewed over the past 24 hrs:   Recent Results (from the past 24 hour(s))   CT Chest (PE) Abdomen Pelvis w Contrast    Narrative    EXAM: CT CHEST PE ABDOMEN PELVIS W CONTRAST  LOCATION: [facility]  DATE: 5/17/2024    INDICATION: elevated ddimer with sob, generalized abdominal pain  COMPARISON: CTs AP 4/29/2024 3/24/2024  TECHNIQUE: CT chest pulmonary angiogram and routine CT abdomen pelvis with IV contrast. Arterial phase through the chest and venous phase through the abdomen and pelvis. Multiplanar reformats and MIP reconstructions were performed. Dose reduction   techniques were used.   CONTRAST: Isovue 370 75mL    FINDINGS:  ANGIOGRAM CHEST: Pulmonary arteries are normal caliber with no pulmonary emboli. Calcified atheromatous plaque throughout the normal caliber thoracic aorta which is not opacified well enough to evaluate for acute aortic syndrome.    LUNGS AND PLEURA: Hyperinflation of the lungs, moderate centrilobular emphysema, chronic bronchial wall thickening, interstitial edema, minimal bibasilar atelectasis and small right pleural effusion tracking into the right major fissure are all   unchanged.    MEDIASTINUM/AXILLAE: No lymphadenopathy. Left heart predominant cardiac enlargement. No pericardial effusion. Mild aortic valve leaflet calcification and thickening.    CORONARY ARTERY CALCIFICATION: Severe three-vessel coronary artery calcification..    HEPATOBILIARY: Multiple benign hepatic cysts consistent with history of other normal dominant polycystic kidney disease. Liver is otherwise normal.  No calcified gallstones or bile duct dilatation.     PANCREAS: Mild stable dilatation main pancreatic duct. Pancreas otherwise normal.    SPLEEN: Spleen size  normal.    ADRENAL GLANDS: Normal.    KIDNEY/BLADDER: Bilateral nephrectomies.    BOWEL: Colon is tortuous and redundant with relatively large amount of stool. No bowel obstruction or obvious signs of inflammation. Generalized mesenteric edema. No ascites.    LYMPH NODES: No lymphadenopathy.    VASCULATURE: Severe calcified atheromatous plaque throughout the normal caliber abdominal aorta and iliac arteries.    PELVIC ORGANS: Unremarkable.    MUSCULOSKELETAL: Generalized subcutaneous and intramuscular edema. Chronic erosive arthropathy both glenohumeral joints anterior plate and screw lumbosacral interspace. Bones are demineralized. No acute fractures or bone lesion.      Impression    IMPRESSION:   1.  No pulmonary emboli.  2.  Hyperinflation of the lungs, moderate centrilobular emphysema, chronic bronchial wall thickening, interstitial edema, minimal bibasilar atelectasis and small right pleural effusion all unchanged.  3.  Left heart predominant cardiac enlargement, severe three-vessel coronary artery calcification and aortic valve leaflet calcification and thickening.  4.  Colon is tortuous and redundant with relatively large amount of stool.   5.  Generalized mesenteric, subcutaneous and intramuscular edema.

## 2024-05-19 NOTE — PLAN OF CARE
Problem: Adult Inpatient Plan of Care  Goal: Absence of Hospital-Acquired Illness or Injury  5/19/2024 1515 by Ruben De Souza RN  Outcome: Progressing  5/19/2024 1513 by Ruben De Souza RN  Outcome: Progressing  Intervention: Identify and Manage Fall Risk  Recent Flowsheet Documentation  Taken 5/19/2024 1125 by Ruben De Souza RN  Safety Promotion/Fall Prevention:   assistive device/personal items within reach   clutter free environment maintained   lighting adjusted   mobility aid in reach   nonskid shoes/slippers when out of bed   room near nurse's station   safety round/check completed   room organization consistent  Intervention: Prevent Infection  Recent Flowsheet Documentation  Taken 5/19/2024 1125 by Ruben De Souza RN  Infection Prevention: rest/sleep promoted  Goal: Optimal Comfort and Wellbeing  5/19/2024 1515 by Ruben De Souza RN  Outcome: Progressing  5/19/2024 1513 by Ruben De Souza RN  Outcome: Progressing     Problem: Hemodialysis  Goal: Safe, Effective Therapy Delivery  5/19/2024 1515 by Ruben De Souza RN  Outcome: Progressing  5/19/2024 1513 by Ruben De Souza RN  Outcome: Progressing  Intervention: Optimize Device Care and Function  Recent Flowsheet Documentation  Taken 5/19/2024 1125 by Ruben De Souza RN  Medication Review/Management: medications reviewed   Goal Outcome Evaluation:    Pt A&Ox4 calm and cooperative. Pt stated back pain. VSS except /85. Denied SOB. SpO2 97% NC 2 LPM. Pt had cough with moderate red brown thick sputum this AM. MD notified. Weight 81.9 by standing scale. Received dialysis today. Lorazepam given at 1400 during dialysis. Call light within arm reach. Continue of care.

## 2024-05-19 NOTE — PROGRESS NOTES
[facility]    Progress Note - Hospitalist Service       Date of Admission:  5/17/2024    Assessment & Plan   Serene Tipton is a 68 year old female admitted on 5/17/2024. She  has a history of HTN, HLD, ESRD on HD, nephrectomy for Polycystic Kidney Disease (bilateral, 2/1/2023), recent admission for ischemic CM and CAD s/p complex PCI to RCA, tobacco use disorder. She is admitted for acute hypoxic respiratory failure and hyperkalemia secondary to hypervolemia and missed dialysis.     ESRD on HD T/TH/S  Acute hypoxic respiratory failure 2/2 hypervolemia due to missed hemodialysis   Hemoptysis  Small pleural effusion that was noted on CT CAP unchanged from previous admission on 4/30, elevated D-dimer in the ED but negative CT PE.  Improving bibasilar crackles on exam.  CT showed stable pleural effusion with no new consolidation.  RA at baseline.  Acute hypoxic respiratory failure most likely due to hypervolemia. Unclear etiology of hemoptysis, but differential includes posterior nasal bleed, emphysema, trachea/esophageal irritation.  Unlikely TB in setting of independent living and no recent travel.  -Nephrology consulted, appreciate recommendations   -Dialysis to be repeated today   -Will dose Venofer EPO if indicated for anemia of chronic disease  -O2 via NC, keep saturations >90%  -As needed Ocean Nasal Spray  -AM BMP     Ischemic CM s/p PCI to RCA on 4/30  HFrEF  Mitral regurgitation  Abdominal bloating  Possible that abdominal bloating is anginal equivalent.  However, she also has a history of constipation. Previously, her symptoms improved following dialysis and recent PCI.  Delta troponin negative for acute ischemia.  Initial EKG shows sinus tachycardia. Echo 5/17 showed severe global LV hypokinesis and EF of 25 to 30%, mod/severe mitral and tricuspid regurgitation, and mild pulmonary hypertension. Bibasilar crackles on exam, requiring O2, suspect she is hypervolemic and will  improve once she receives dialysis.  -Cardiac telemetry  -PTA metoprolol  -PTA clopidogrel 75 mg  -PTA aspirin 81 mg     Constipation  CT chest abdomen pelvis showed large amount of stool.  Likely this is contributing to her abdominal bloating. Previously admitted with SBO and heavy stool burden on 3/23/2024 which resolved with enema and magnesium citrate.  Last BM 2 days ago that was normal. Patient stataed no concerns and no abdominal pain.  -PTA lactulose TID PRN  -PTA senna BID  -Patient states she does not want to use miralax, would rather use her lactulose.     Iron deficiency anemia  Macrocytic anemia  Appears to have baseline hemoglobin 9-10, was 7.8 on admission.  Reported 2 small episodes hemoptysis, no further episodes.  No bloody or melanotic stools, no hematemesis. MCV elevated to 110.  Folate wnl.  -AM CBC, continue monitor  -Receives Venofer infusions once weekly at dialysis. Dialysis team to manage.  -B12     HTN  Hypertensive at 140/82 on admission. Likely will improve after HD.  -PTA losartan 25 mg daily    Hyperkalemia, resolved  - Dialysis    Chronic conditions  Hypothyroidism: PTA levothyroxine  MDD, JAMIL: PTA sertraline, PTA Ativan 1 mg daily as needed for anxiety  Chronic pain syndrome: PTA nortriptyline, as needed Tylenol and oxycodone ordered  Secondary hyperparathyroidism: PTA levothyroxine  GERD: Hospital formulary pantoprazole 40 mg daily  Tobacco use disorder: Nicotine patch 14mg daily         Diet: Combination Diet Regular Diet Adult; Renal Diet (dialysis)    DVT Prophylaxis: Pneumatic Compression Devices  Pool Catheter: Not present  Fluids: po  Lines: None     Cardiac Monitoring: ACTIVE order. Indication: Chest pain/ ACS rule out (24 hours)  Code Status: Special Code - CPR/Defib/Code drugs, NO intubation     Clinically Significant Risk Factors     # Hyperkalemia: Highest K = 6.4 mmol/L in last 2 days, will monitor as appropriate    # Hypercalcemia: Highest Ca = 10.7 mg/dL in last 2  "days, will monitor as appropriate        # Hypertension: Noted on problem list    # Chronic heart failure with reduced ejection fraction: last echo with EF <40%       # Cachexia: Estimated body mass index is 17.67 kg/m  as calculated from the following:    Height as of this encounter: 1.499 m (4' 11.02\").    Weight as of this encounter: 39.7 kg (87 lb 8.4 oz)., PRESENT ON ADMISSION       # Financial/Environmental Concerns:           Disposition Plan      Expected Discharge Date: 05/19/2024        Discharge Comments: HD T, TH, SA        The patient's care was discussed with the Attending Physician, Dr. Danii Escobedo .    Lynn Carlson MD PGY3  Hospitalist Service  [facility]  Securely message with [employer] (more info)  Text page via [employer] Paging/Directory   ______________________________________________________________________    Interval History   Patient states she feels the same as when she came in even though she dialyzed 3L of fluid off yesterday. Another run of dialysis will be completed today. Patient remains short of breath. She has an oxygen saturation >90% when off of oxygen supplementation, put patient has been requesting to have this turned out for comfort.  Patient states she missed her dialysis appointments due to her increased pain of her shoulders and lower back.  She states the chair is there just make her pain worse.    Patient endorses coughing up \"dried blood with mucus\" this morning.  She states 2 days ago she had a similar coughing episode but endorses it was a lesser amount of blood.  She denies any recent travel.  She also lives at home and not in a nursing facility.  She denies any feeling of mucus or blood dripping down the back of her throat.  She denies any nosebleeds.  She states this is never happened to her before.  Patient endorses a smoking history starting at the age of 18 to 42 years of age.  During this time, she smoked half a pack of cigarettes daily.  She " continues to smoke now but inconsistently.  She states she will smoke anywhere from 0 cigarettes to a pack daily.    Physical Exam   Vital Signs: Temp: 98.1  F (36.7  C) Temp src: Oral BP: (!) 143/85 Pulse: 93   Resp: 19 SpO2: 97 % O2 Device: Nasal cannula Oxygen Delivery: 2 LPM  Weight: 87 lbs 8.36 oz    GENERAL: alert and no acute distress, cachectic  HENT: hearing grossly intact, moist mucus membranes and no blood noted in oropharynx, nares patent and without dried blood  RESP: Right basilar fine crackles.  No wheezing or rhonchi.  No increased work of breathing  CV: Regular rate and rhythm, 3/6 systolic murmur loudest at apex, left upper extremity AV fistula with bruit   ABDOMEN: soft, nontender, and bowel sounds normal  MS: no gross musculoskeletal defects noted, +1 pitting edema in bilateral lower extremities  PSYCH: mentation appears normal, affect normal/bright    Data     I have personally reviewed the following data over the past 24 hrs:    10.2  \   8.5 (L)   / 334     136 97 (L) 51.3 (H) /  95   4.6 22 4.01 (H) \     Ferritin:  N/A % Retic:  N/A LDH:  N/A       Imaging results reviewed over the past 24 hrs:   Recent Results (from the past 24 hour(s))   Echo Limited   Result Value    LVEF  25-30% (severely reduced)    Narrative    [id number]  [id number]  [id number]  [id number]^[place] [address]     Name: MJ GODINEZ  MRN: [id number]  : 1955  Study Date: 2024 01:16 PM  Age: 68 yrs  Gender: Female  Patient Location: [facility]  Reason For Study: Acute Pericarditis, Acute Myocarditis, Cardiomyopathy  Ordering Physician: RAHEEM YANES  Performed By: RORY     BSA: 1.3 m2  Height: 59 in  Weight: 87 lb  HR: 97  ______________________________________________________________________________  Procedure  Limited Portable Echo Adult.  ______________________________________________________________________________  Interpretation Summary     1 The left  ventricle is mildly dilated. There is severe global hypokinesia of  the left ventricle. Left ventricular function is decreased. The ejection  fraction is 25-30% (severely reduced).  2. Normal right ventricle size and systolic function.  3. There is mod-severe to severe (3-4+) mitral regurgitation. There is  moderate (2+) tricuspid regurgitation.  4. Right ventricular systolic pressure is elevated, consistent with mild  pulmonary hypertension.  5. Trivial to small pericardial effusion. There are no echocardiographic  indications of cardiac tamponade.     Compared to the prior study dated 4/30/2024, mild interval improvement in LVEF  and normalization of RV function on current study. No significant change in  pericardial effusion.  ______________________________________________________________________________  Left Ventricle  The left ventricle is mildly dilated. Left ventricular function is decreased.  The ejection fraction is 25-30% (severely reduced). There is severe global  hypokinesia of the left ventricle.     Right Ventricle  Normal right ventricle size and systolic function.     Atria  The left atrium is severely dilated. The right atrium is moderate to severely  dilated.     Mitral Valve  The mitral valve leaflets are mildly thickened. There is moderate mitral  annular calcification. There is mod-severe to severe (3-4+) mitral  regurgitation. There is no mitral valve stenosis.     Tricuspid Valve  There is moderate (2+) tricuspid regurgitation. The right ventricular systolic  pressure is approximated at 37.2 mmHg plus the right atrial pressure. Right  ventricular systolic pressure is elevated, consistent with mild pulmonary  hypertension. There is no tricuspid stenosis.     Aortic Valve  Moderate aortic valve calcification is present.     Pulmonic Valve  The pulmonic valve is not well visualized. There is mild (1+) pulmonic  valvular regurgitation.     Vessels  IVC diameter <2.1 cm collapsing >50% with sniff  suggests a normal RA pressure  of 3 mmHg.     Pericardium  There are no echocardiographic indications of cardiac tamponade. Small  pericardial effusion.     ______________________________________________________________________________  MMode/2D Measurements & Calculations  IVSd: 1.0 cm  LVIDd: 5.0 cm  LVIDs: 4.7 cm  LVPWd: 0.71 cm     FS: 5.0 %  LV mass(C)d: 151.9 grams  LV mass(C)dI: 117.3 grams/m2  LVOT diam: 2.0 cm  LVOT area: 3.1 cm2  EF Biplane: 39.7 %  RWT: 0.28     Doppler Measurements & Calculations  LV V1 max PG: 3.8 mmHg  LV V1 max: 97.4 cm/sec  LV V1 VTI: 15.2 cm  MR PISA: 1.6 cm2  MR ERO: 0.09 cm2  MR volume: 12.5 ml  SV(LVOT): 47.8 ml  SI(LVOT): 36.9 ml/m2  TR max henri: 305.0 cm/sec  TR max P.2 mmHg     ______________________________________________________________________________  Report approved by: Christina Reina 2024 03:17 PM

## 2024-05-19 NOTE — PLAN OF CARE
Problem: Gas Exchange Impaired  Goal: Optimal Gas Exchange  Outcome: Progressing     Problem: Pain Acute  Goal: Optimal Pain Control and Function  Outcome: Progressing   Goal Outcome Evaluation:    Patient is alert and orientated times four. Received prn acetaminophen and Flexeril for pain tonight. IV saline locked. On telemetry and has been NSR and SR with 1st degree AVB. Able to wean patient off of oxygen and thus far has maintained above 90%. Bed alarm is in place for patient's safety.

## 2024-05-19 NOTE — PROGRESS NOTES
RENAL PROGRESS NOTE    ASSESSMENT & PLAN:   ESRD: 2/2 polycystic kidney disease s/p bilateral nephrectomies.  On in center hemodialysis [facility].  Runs on a TTS schedule.  She runs 3 hours.  Has a left upper extremity aVF.  Missed outpatient HD on Thursday, missed rescheduled run Friday.  S/p HD inpatient yesterday per normal TTS schedule, tolerated 3L UF.  Has only had 2 dialysis treatments this week  Discussed plans for dialysis today, UF as able, challenge down TW, likely lost weight with recent hospitalizations.  Will need updated CDU with new TW prior to discharge.     Hyperkalemia: Moderate hyperkalemia on admission, in the setting of ESRD and missed HD.  Hyperkalemia has normalized with dialysis.     Acute hypoxemic respiratory failure: In the setting of hypervolemia s/p missed HD. CT per PE study negative for PE this admit, interstitial edema noted on CT.  She is requiring supplemental oxygen.  S/p HD 3L UF 5/18, dialysis again today and challenge TW as able.     Hypertension: Metoprolol PTA.  Hold off on adjusting meds until after HD, anticipate improvement in blood pressure with HD/UF.     Hypothyroidism: On replacement     Anemia of chronic disease: On anemia management protocol as outpatient.  Hemoglobin 8.5.  Transfusions per hospital service.  Will avoid iron supps with ferritin >2000.  Will dose PETE if indicated     Hyponatremia: Mild initially.  Secondary to ESRD and hypervolemia.  Sodium is normalized with UF.     Hyperlipidemia: Statin     Secondary renal hyperparathyroidism: Sevelamer 3 times daily AC      CAD, ischemic cardiomyopathy, s/p recent PCI to RCA on 4/30.  PTA meds.    SUBJECTIVE:    Seen bedside  Dialysis went fine yesterday, tolerated 3L UF  Did not have any cramping or intradialytic hypotension  She still feels mildly short of breath, requiring 2L via NC  Has only had 2 dialysis treatments this week  Discussed plans for dialysis today, UF as able, challenge down TW,  likely lost weight with recent hospitalizations  All questions answered    OBJECTIVE:  Physical Exam   Temp: 98.1  F (36.7  C) Temp src: Oral BP: (!) 143/85 Pulse: 93   Resp: 19 SpO2: 97 % O2 Device: Nasal cannula Oxygen Delivery: 2 LPM  Vitals:    05/17/24 1301 05/17/24 2128 05/18/24 0401   Weight: 38 kg (83 lb 12.4 oz) 40.8 kg (90 lb) 39.7 kg (87 lb 8.4 oz)     Vital Signs with Ranges  Temp:  [97.3  F (36.3  C)-98.1  F (36.7  C)] 98.1  F (36.7  C)  Pulse:  [] 93  Resp:  [18-24] 19  BP: (115-147)/(65-85) 143/85  SpO2:  [94 %-100 %] 97 %  I/O last 3 completed shifts:  In: 365 [P.O.:360; I.V.:5]  Out: 3000 [Other:3000]    Patient Vitals for the past 72 hrs:   Weight   05/18/24 0401 39.7 kg (87 lb 8.4 oz)   05/17/24 2128 40.8 kg (90 lb)   05/17/24 1301 38 kg (83 lb 12.4 oz)     Intake/Output Summary (Last 24 hours) at 5/19/2024 0852  Last data filed at 5/19/2024 0841  Gross per 24 hour   Intake 605 ml   Output 3000 ml   Net -2395 ml       PHYSICAL EXAM:  General: Alert, NAD  HENT: Supple, Non-tender, no obvious JVD  Eyes: No scleral icterus  Cardiovascular: RRR, no rub, gallop, or murmur.   Extremities: Trace edema bilateral ankles   Respiratory: No advantageous breath sounds, NC 2 L  Gastrointestinal: Abdomen is tender, nondistended.  Musculoskeletal: Grossly normal   Integumentary: Warm, dry, no rash  Neurologic: Non focal   Psychiatric: Cooperative  Access: Left upper AVF, aneurysmal expansion, good thrill and bruit    LABORATORY STUDIES:     Recent Labs   Lab 05/19/24  0651 05/18/24  0606 05/17/24  1427   WBC 10.2 11.1* 10.6   RBC 2.50* 2.50* 2.29*   HGB 8.5* 8.6* 7.8*   HCT 27.4* 27.9* 25.5*    362 339       Basic Metabolic Panel:  Recent Labs   Lab 05/19/24  0651 05/18/24  1829 05/18/24  1019 05/18/24  0606 05/17/24  1427    137  --  132* 135   POTASSIUM 4.6 4.1 6.2* 6.4* 5.7*   CHLORIDE 97* 99  --  93* 93*   CO2 22 24  --  25 26   BUN 51.3* 39.1*  --  80.2* 73.9*   CR 4.01* 3.39*  --  6.23*  5.59*   GLC 95 142*  --  90 87   GAVIN 9.9 9.6  --  10.5* 10.7*       INRNo lab results found in last 7 days.     Recent Labs   Lab Test 05/19/24  0651 05/18/24  0606 07/14/23  0503 07/13/23  1239 07/11/23  1131   INR  --   --   --  1.70* 1.40*   WBC 10.2 11.1*   < > 13.0* 11.5*   HGB 8.5* 8.6*   < > 8.4* 9.0*    362   < > 320 288    < > = values in this interval not displayed.       Personally reviewed current labs    This note was dictated using voice recognition     Ever Hayes PA-C  [facility]  [phone]

## 2024-05-19 NOTE — PROGRESS NOTES
Date: 5/19/2024  Time: 3:39 PM     Data:  Pre Wt:   37.1 kg (standing scale)  Desired Wt:   To be established  Post Wt:  36.6 kg (standing scale)  Weight change: - 0.5 kg (???)  Ultrafiltration - Post Run Net Total Removed (mL):  3000 ml  Vascular Access Status: Fistula patent  Dialyzer Rinse:  Light  Total Blood Volume Processed: 59.2 L   Total Dialysis (Treatment) Time:   3.0 Hrs  Dialysate Bath: K 2, Ca 2.5  Heparin: Heparin: None     Lab:   No  HbsAg - 3/24/2024 (Non Reactive)  HbsAb - 3/24/2024 70.50 (Immune)      Interventions:  Dialysis done through Left upper arm AV Fistula using 16 gauge needles. ,   UF set to 3.3 Liters, accommodating priming and rinse back volumes  No medications administered related to dialysis  Crit Profile mostly on Profile B for this run  Treatment has ended safely and blood is rinsed back completely  Decannulation done post HD, hemostasis is achieved in 10 minutes  Pressure dressing is applied, to be removed after 4-6 hours  Post Tx assessment done. Patient is left in her room in stable condition  Report given to Ayaka REYNAGA RN     Assessment:  A/O x 4, calm and cooperative  Lung sounds clear anterior and lateral BUL, diminished BLL  Left upper arm AV Fistula has good thrill and bruit                Plan:    Per Renal team        MARQUES RomeoN, RN  Acute Dialysis RN  [facility]

## 2024-05-20 NOTE — DISCHARGE SUMMARY
"[facility]  Discharge Summary - Medicine & Pediatrics       Date of Admission:  5/17/2024  Date of Discharge:  5/20/2024  Discharging Provider: Dr. Cuevas  Discharge Service: Hospitalist Service    Discharge Diagnoses   ESRD on HD  Acute hypoxic respiratory failure  Hemoptysis  HFrEF   Iron deficiency anemia   Hyperkalemia     Clinically Significant Risk Factors     # Cachexia: Estimated body mass index is 16.56 kg/m  as calculated from the following:    Height as of this encounter: 1.499 m (4' 11.02\").    Weight as of this encounter: 37.2 kg (82 lb 0.2 oz).       Follow-ups Needed After Discharge   Follow-up Appointments     Follow-up and recommended labs and tests       Follow up with primary care provider, Ramos Lowry, within 7 days for   hospital follow- up.  The following labs/tests are recommended: Sleep   study for concern for sleep apnea, BMP, follow up regarding bloody spit-up   if it happens again.  Also follow up with cardiac rehab and cardiology.            Discharge Disposition   Discharged to home  Condition at discharge: Stable    Hospital Course   Serene Tipton was admitted on 5/17/2024 for acute hypoxic respiratory failure and hyperkalemia secondary to hypervolemia and missed dialysis.  The following problems were addressed during her hospitalization:    ESRD on HD T/TH/S  Acute hypoxic respiratory failure 2/2 hypervolemia due to missed hemodialysis, resolved   Hemoptysis, resolved  Patient had missed her last dialysis appointment.  Small pleural effusion that was noted on CT CAP unchanged from previous admission on 4/30, elevated D-dimer in the ED but negative CT PE. Patient initially needed supplemental oxygen but was able to be weaned off to room air by the time of discharge.  Acute hypoxic respiratory failure was thought to most likely be due to hypervolemia from the missed dialysis and improved after 2 rounds of dialysis was given inpatient.  Nephrology was " consulted. Total of 6 L was removed. Unclear etiology of hemoptysis, but differential includes posterior nasal bleed, emphysema, trachea/esophageal irritation.  Unlikely TB in setting of independent living and no recent travel.  Recommended patient follow-up with PCP if recurrence happens.       Ischemic CM s/p PCI to RCA on 4/30  HFrEF  Mitral regurgitation  Delta troponin negative for acute ischemia.  Initial EKG shows sinus tachycardia.  Repeat echo 5/17 showed severe global LV hypokinesis and EF of 25 to 30%, mod/severe mitral and tricuspid regurgitation, and mild pulmonary hypertension.      Iron deficiency anemia  Macrocytic anemia  Appears to have baseline hemoglobin 9-10, was 7.8 on admission.  Reported 2 small episodes hemoptysis.  No bloody or melanotic stools, no hematemesis. MCV elevated to 110.  Folate wnl.  Given a dose of B12  -Receives Venofer infusions once weekly at dialysis. Dialysis team to manage.    HTN  Hypertensive at 140/82 on admission.  Previously has been on losartan 25 mg daily, but does not currently want to go back on this medication.  Could consider restarting at a later point but would possibly have to hold prior to dialysis sessions.     Hyperkalemia, resolved  Initially came in with potassium of 6.4.  Resolved before discharge and was 4.6.    Consultations This Hospital Stay   NEPHROLOGY IP CONSULT  PHYSICAL THERAPY ADULT IP CONSULT  OCCUPATIONAL THERAPY ADULT IP CONSULT  CARDIOLOGY IP CONSULT  CARE MANAGEMENT / SOCIAL WORK IP CONSULT    Code Status   Special Code       The patient was discussed with Dr. Mason Doran MD PGY1  [address] [address]  Phone: [phone]  Fax: [phone]  ______________________________________________________________________    Physical Exam   Vital Signs: Temp: 97.7  F (36.5  C) Temp src: Oral BP: (!) 146/83 Pulse: 104   Resp: 18 SpO2: 99 % O2 Device: None  (Room air)    Weight: 82 lbs .18 oz    GENERAL: alert and no acute distress, cachectic  HENT: hearing grossly intact, moist mucus membranes  RESP: Bilateral basilar fine crackles.  No wheezing or rhonchi.  No increased work of breathing  CV: Regular rate and rhythm, 3/6 systolic murmur loudest at apex, left upper extremity AV fistula with bruit   ABDOMEN: soft, nontender, and bowel sounds normal  MS: no gross musculoskeletal defects noted, no lower extremity edema  PSYCH: mentation appears normal, affect normal/bright      Primary Care Physician   Ramos Lowry    Discharge Orders      Reason for your hospital stay    You were admitted for breathing problems and high potassium due to extra fluid caused by missed dialysis     Follow-up and recommended labs and tests     Follow up with primary care provider, Ramos Lowry, within 7 days for hospital follow- up.  The following labs/tests are recommended: Sleep study for concern for sleep apnea, BMP, follow up regarding bloody spit-up if it happens again.  Also follow up with cardiac rehab and cardiology.     Activity    Your activity upon discharge: activity as tolerated     Diet    Follow this diet upon discharge: Orders Placed This Encounter      Combination Diet Regular Diet Adult; Renal Diet (dialysis)       Significant Results and Procedures   Most Recent 3 CBC's:  Recent Labs   Lab Test 05/20/24  0901 05/19/24  0651 05/18/24  0606   WBC 9.9 10.2 11.1*   HGB 9.5* 8.5* 8.6*   * 110* 112*    334 362     Most Recent 3 BMP's:  Recent Labs   Lab Test 05/20/24  0901 05/19/24  0651 05/18/24  1829   * 136 137   POTASSIUM 4.6 4.6 4.1   CHLORIDE 95* 97* 99   CO2 26 22 24   BUN 39.1* 51.3* 39.1*   CR 3.47* 4.01* 3.39*   ANIONGAP 13 17* 14   GAVIN 9.6 9.9 9.6   * 95 142*   ,   Results for orders placed or performed during the hospital encounter of 05/17/24   CT Chest (PE) Abdomen Pelvis w Contrast    Narrative    EXAM: CT CHEST PE ABDOMEN PELVIS W  CONTRAST  LOCATION: [facility]  DATE: 5/17/2024    INDICATION: elevated ddimer with sob, generalized abdominal pain  COMPARISON: CTs AP 4/29/2024 3/24/2024  TECHNIQUE: CT chest pulmonary angiogram and routine CT abdomen pelvis with IV contrast. Arterial phase through the chest and venous phase through the abdomen and pelvis. Multiplanar reformats and MIP reconstructions were performed. Dose reduction   techniques were used.   CONTRAST: Isovue 370 75mL    FINDINGS:  ANGIOGRAM CHEST: Pulmonary arteries are normal caliber with no pulmonary emboli. Calcified atheromatous plaque throughout the normal caliber thoracic aorta which is not opacified well enough to evaluate for acute aortic syndrome.    LUNGS AND PLEURA: Hyperinflation of the lungs, moderate centrilobular emphysema, chronic bronchial wall thickening, interstitial edema, minimal bibasilar atelectasis and small right pleural effusion tracking into the right major fissure are all   unchanged.    MEDIASTINUM/AXILLAE: No lymphadenopathy. Left heart predominant cardiac enlargement. No pericardial effusion. Mild aortic valve leaflet calcification and thickening.    CORONARY ARTERY CALCIFICATION: Severe three-vessel coronary artery calcification..    HEPATOBILIARY: Multiple benign hepatic cysts consistent with history of other normal dominant polycystic kidney disease. Liver is otherwise normal.  No calcified gallstones or bile duct dilatation.     PANCREAS: Mild stable dilatation main pancreatic duct. Pancreas otherwise normal.    SPLEEN: Spleen size normal.    ADRENAL GLANDS: Normal.    KIDNEY/BLADDER: Bilateral nephrectomies.    BOWEL: Colon is tortuous and redundant with relatively large amount of stool. No bowel obstruction or obvious signs of inflammation. Generalized mesenteric edema. No ascites.    LYMPH NODES: No lymphadenopathy.    VASCULATURE: Severe calcified atheromatous plaque throughout the normal caliber abdominal aorta and  iliac arteries.    PELVIC ORGANS: Unremarkable.    MUSCULOSKELETAL: Generalized subcutaneous and intramuscular edema. Chronic erosive arthropathy both glenohumeral joints anterior plate and screw lumbosacral interspace. Bones are demineralized. No acute fractures or bone lesion.      Impression    IMPRESSION:   1.  No pulmonary emboli.  2.  Hyperinflation of the lungs, moderate centrilobular emphysema, chronic bronchial wall thickening, interstitial edema, minimal bibasilar atelectasis and small right pleural effusion all unchanged.  3.  Left heart predominant cardiac enlargement, severe three-vessel coronary artery calcification and aortic valve leaflet calcification and thickening.  4.  Colon is tortuous and redundant with relatively large amount of stool.   5.  Generalized mesenteric, subcutaneous and intramuscular edema.    Echo Limited     Value    LVEF  25-30% (severely reduced)    [facility]    [id number]  [id number]  [id number]^FARZANA^[place] [address]     Name: MJ GODINEZ  MRN: [id number]  : 1955  Study Date: 2024 01:16 PM  Age: 68 yrs  Gender: Female  Patient Location: [facility]  Reason For Study: Acute Pericarditis, Acute Myocarditis, Cardiomyopathy  Ordering Physician: RAHEEM YANES  Performed By: RORY     BSA: 1.3 m2  Height: 59 in  Weight: 87 lb  HR: 97  ______________________________________________________________________________  Procedure  Limited Portable Echo Adult.  ______________________________________________________________________________  Interpretation Summary     1. The left ventricle is mildly dilated. There is severe global hypokinesia of  the left ventricle. Left ventricular function is decreased. The ejection  fraction is 25-30% (severely reduced).  2. Normal right ventricle size and systolic function.  3. There is mod-severe to severe (3-4+) mitral regurgitation. There is  moderate (2+) tricuspid regurgitation.  4.  Right ventricular systolic pressure is elevated, consistent with mild  pulmonary hypertension.  5. Trivial to small pericardial effusion. There are no echocardiographic  indications of cardiac tamponade.     Compared to the prior study dated 4/30/2024, mild interval improvement in LVEF  and normalization of RV function on current study. No significant change in  pericardial effusion.  ______________________________________________________________________________  Left Ventricle  The left ventricle is mildly dilated. Left ventricular function is decreased.  The ejection fraction is 25-30% (severely reduced). There is severe global  hypokinesia of the left ventricle.     Right Ventricle  Normal right ventricle size and systolic function.     Atria  The left atrium is severely dilated. The right atrium is moderate to severely  dilated.     Mitral Valve  The mitral valve leaflets are mildly thickened. There is moderate mitral  annular calcification. There is mod-severe to severe (3-4+) mitral  regurgitation. There is no mitral valve stenosis.     Tricuspid Valve  There is moderate (2+) tricuspid regurgitation. The right ventricular systolic  pressure is approximated at 37.2 mmHg plus the right atrial pressure. Right  ventricular systolic pressure is elevated, consistent with mild pulmonary  hypertension. There is no tricuspid stenosis.     Aortic Valve  Moderate aortic valve calcification is present.     Pulmonic Valve  The pulmonic valve is not well visualized. There is mild (1+) pulmonic  valvular regurgitation.     Vessels  IVC diameter <2.1 cm collapsing >50% with sniff suggests a normal RA pressure  of 3 mmHg.     Pericardium  There are no echocardiographic indications of cardiac tamponade. Small  pericardial effusion.     ______________________________________________________________________________  MMode/2D Measurements & Calculations  IVSd: 1.0 cm  LVIDd: 5.0 cm  LVIDs: 4.7 cm  LVPWd: 0.71 cm     FS: 5.0 %  LV  mass(C)d: 151.9 grams  LV mass(C)dI: 117.3 grams/m2  LVOT diam: 2.0 cm  LVOT area: 3.1 cm2  EF Biplane: 39.7 %  RWT: 0.28     Doppler Measurements & Calculations  LV V1 max PG: 3.8 mmHg  LV V1 max: 97.4 cm/sec  LV V1 VTI: 15.2 cm  MR PISA: 1.6 cm2  MR ERO: 0.09 cm2  MR volume: 12.5 ml  SV(LVOT): 47.8 ml  SI(LVOT): 36.9 ml/m2  TR max henri: 305.0 cm/sec  TR max P.2 mmHg     ______________________________________________________________________________  Report approved by: Christina Reina 2024 03:17 PM             Discharge Medications   Current Discharge Medication List        CONTINUE these medications which have NOT CHANGED    Details   artificial saliva (BIOTENE MT) SOLN solution Swish and spit 2 sprays in mouth as needed for dry mouth      aspirin 81 MG EC tablet Take 1 tablet (81 mg) by mouth daily Start tomorrow.  Qty: 30 tablet, Refills: 3    Associated Diagnoses: Dilated cardiomyopathy (H); Acute on chronic systolic congestive heart failure (H); End stage renal disease (H)      atorvastatin (LIPITOR) 80 MG tablet Take 1 tablet (80 mg) by mouth daily  Qty: 90 tablet, Refills: 3    Associated Diagnoses: Dilated cardiomyopathy (H); Acute on chronic systolic congestive heart failure (H); End stage renal disease (H)      B Complex-C (SUPER B COMPLEX PO) Take 1 tablet by mouth daily      calcitRIOL (ROCALTROL) 0.5 MCG capsule Take 0.5 mcg by mouth three times a week Given at Dialysis      calcium acetate (CALPHRON) 667 MG TABS tablet Take 1-2 tablets by mouth 3 times daily (with meals)      cholecalciferol (VITAMIN D3) 25 mcg (1000 units) capsule Take 1 capsule by mouth daily at 2 pm      clopidogrel (PLAVIX) 75 MG tablet Take 1 tablet (75 mg) by mouth daily Dose to start tomorrow.  Qty: 90 tablet, Refills: 3    Associated Diagnoses: Dilated cardiomyopathy (H); Acute on chronic systolic congestive heart failure (H); End stage renal disease (H)      cyclobenzaprine (FLEXERIL) 5 MG tablet Take 5 mg  by mouth 2 times daily as needed for muscle spasms      Epoetin Adam (EPOGEN IJ) Inject 1,000 Units into the vein three times a week At dialysis.      glucosamine-chondroitin 500-400 MG CAPS per capsule Take 2 capsules by mouth daily at 2 pm      Iron Sucrose (VENOFER IV) Inject 100 mg into the vein once a week At dialysis      lactulose (CHRONULAC) 10 GM/15ML solution Take 15 mLs (10 g) by mouth 2 times daily as needed for constipation  Qty: 900 mL, Refills: 0    Associated Diagnoses: Constipation, unspecified constipation type      levothyroxine (SYNTHROID/LEVOTHROID) 50 MCG tablet Take 50 mcg by mouth daily      LORazepam (ATIVAN) 1 MG tablet Take 1 mg by mouth daily as needed for anxiety      metoprolol succinate ER (TOPROL XL) 25 MG 24 hr tablet Take 1 tablet (25 mg) by mouth daily  Qty: 30 tablet, Refills: 0    Associated Diagnoses: Ischemic cardiomyopathy      nitroGLYcerin (NITROSTAT) 0.4 MG sublingual tablet For chest pain place 1 tablet under the tongue every 5 minutes for 3 doses. If symptoms persist 5 minutes after 1st dose call 911.  Qty: 30 tablet, Refills: 0    Associated Diagnoses: Ischemic cardiomyopathy      nortriptyline (PAMELOR) 75 MG capsule Take 75 mg by mouth At Bedtime      Nutritional Supplements (NEPRO) LIQD Take 1 Can by mouth daily      omeprazole (PRILOSEC) 20 MG DR capsule Take 20 mg by mouth daily      oxyCODONE IR (ROXICODONE) 10 MG tablet Take 10 mg by mouth every 4 hours as needed for pain      SENNA-docusate sodium (SENNA S) 8.6-50 MG tablet Take 2 tablets by mouth 2 times daily Hold for loose stools.  Qty: 120 tablet, Refills: 0    Associated Diagnoses: Constipation, unspecified constipation type      sertraline (ZOLOFT) 100 MG tablet Take 150 mg by mouth At Bedtime           Allergies   Allergies   Allergen Reactions    Penicillins Other (See Comments) and Shortness Of Breath     Breathing problem.  breathing      No Clinical Screening - See Comments      PN: LW CM1: Contrast  Intercapsular - Nonionic Reaction :    Nsaids Other (See Comments)     Kidney damage    Carvedilol GI Disturbance     Nausea and vomiting    Ciprofloxacin Muscle Pain (Myalgia)    Floxacillin (Flucloxacillin) Muscle Pain (Myalgia) and Rash    Levofloxacin Muscle Pain (Myalgia) and Rash    Morphine Nausea and Vomiting    Sulfa Antibiotics Itching

## 2024-05-20 NOTE — PROGRESS NOTES
RENAL PROGRESS NOTE    ASSESSMENT & PLAN:     ESRD: 2/2 polycystic kidney disease s/p bilateral nephrectomies.  On in center hemodialysis [place] [facility].  Runs on a TTS schedule.  She runs 3 hours.  Has a left upper extremity aVF.  Has history of frequently missed treatments and presented after a missed treatment with fluid overload and hyperkalemia. Planning to resume her typical TTS schedule at [facility] tomorrow.      Hyperkalemia: Moderate hyperkalemia on admission, in the setting of ESRD and missed HD.  Hyperkalemia has normalized with dialysis.     Acute hypoxemic respiratory failure: In the setting of hypervolemia s/p missed HD. CT per PE study negative for PE this admit, interstitial edema noted on CT. Resolved following aggressive UF with HD this admit, now on RA.      Hypertension: Metoprolol PTA.  Hold off on adjusting meds until after HD, anticipate improvement in blood pressure with HD/UF.     Hypothyroidism: On replacement     Anemia of chronic disease: On anemia management protocol as outpatient.  Hemoglobin 8.5.  Transfusions per hospital service.  Will avoid iron supps with ferritin >2000.  Will dose PETE if indicated     Hyponatremia: Mild initially.  Secondary to ESRD and hypervolemia.  Sodium is normalized with UF.     Hyperlipidemia: Statin     Secondary renal hyperparathyroidism: Sevelamer 3 times daily AC      CAD, ischemic cardiomyopathy, s/p recent PCI to RCA on 4/30.  PTA meds.    SUBJECTIVE:  Patient known to me from previous admissions. Feels ok today, no real new complaints. Reviewed HD run from yesterday. Updated CDU with new target weight. Also discussed with bedside RN and Dr. Doran. Possible plans for discharge today.      OBJECTIVE:  Physical Exam   Temp: 97.7  F (36.5  C) Temp src: Oral BP: (!) 146/83 Pulse: 104   Resp: 18 SpO2: 100 % O2 Device: None (Room air) Oxygen Delivery: 2 LPM  Vitals:    05/19/24 1120 05/19/24 1535 05/20/24 0415   Weight: 37.1 kg (81 lb 14.4 oz) 36.6  kg (80 lb 11 oz) 37.2 kg (82 lb 0.2 oz)     Vital Signs with Ranges  Temp:  [97.7  F (36.5  C)-98.2  F (36.8  C)] 97.7  F (36.5  C)  Pulse:  [] 104  Resp:  [16-20] 18  BP: (108-147)/(56-83) 146/83  SpO2:  [95 %-100 %] 100 %  I/O last 3 completed shifts:  In: 480 [P.O.:480]  Out: 3000 [Other:3000]    Patient Vitals for the past 72 hrs:   Weight   05/20/24 0415 37.2 kg (82 lb 0.2 oz)   05/19/24 1535 36.6 kg (80 lb 11 oz)   05/19/24 1120 37.1 kg (81 lb 14.4 oz)   05/18/24 0401 39.7 kg (87 lb 8.4 oz)   05/17/24 2128 40.8 kg (90 lb)   05/17/24 1301 38 kg (83 lb 12.4 oz)     Intake/Output Summary (Last 24 hours) at 5/19/2024 0852  Last data filed at 5/19/2024 0841  Gross per 24 hour   Intake 605 ml   Output 3000 ml   Net -2395 ml       PHYSICAL EXAM:  General: Alert, NAD  HENT: Supple, Non-tender, no obvious JVD  Eyes: No scleral icterus  Cardiovascular: RRR, no rub, gallop, or murmur.   Extremities: Trace edema bilateral ankles   Respiratory: Lungs clear bilaterally, on RA   Gastrointestinal: Abdomen is tender, nondistended.  Musculoskeletal: Grossly normal   Integumentary: Warm, dry, no rash  Neurologic: Non focal   Psychiatric: Cooperative  Access: Left upper AVF, aneurysmal expansion, good thrill and bruit    LABORATORY STUDIES:     Recent Labs   Lab 05/20/24  0901 05/19/24  0651 05/18/24  0606 05/17/24  1427   WBC 9.9 10.2 11.1* 10.6   RBC 2.76* 2.50* 2.50* 2.29*   HGB 9.5* 8.5* 8.6* 7.8*   HCT 30.3* 27.4* 27.9* 25.5*    334 362 339       Basic Metabolic Panel:  Recent Labs   Lab 05/20/24  0901 05/19/24  0651 05/18/24  1829 05/18/24  1019 05/18/24  0606 05/17/24  1427   * 136 137  --  132* 135   POTASSIUM 4.6 4.6 4.1 6.2* 6.4* 5.7*   CHLORIDE 95* 97* 99  --  93* 93*   CO2 26 22 24  --  25 26   BUN 39.1* 51.3* 39.1*  --  80.2* 73.9*   CR 3.47* 4.01* 3.39*  --  6.23* 5.59*   * 95 142*  --  90 87   GAVIN 9.6 9.9 9.6  --  10.5* 10.7*       INRNo lab results found in last 7 days.     Recent Labs    Lab Test 05/20/24  0901 05/19/24  0651 07/14/23  0503 07/13/23  1239 07/11/23  1131   INR  --   --   --  1.70* 1.40*   WBC 9.9 10.2   < > 13.0* 11.5*   HGB 9.5* 8.5*   < > 8.4* 9.0*    334   < > 320 288    < > = values in this interval not displayed.       Personally reviewed current labs      Yue Mcfarland PA-C  [facility]  [phone]

## 2024-05-20 NOTE — PLAN OF CARE
Goal Outcome Evaluation:       Nice patient to care for. Lower back pain flared up overnight and this morning. Spoke with MD - applied lidocaine patch and then gave next dose of PRN oxycodone and ultimately pain has resided throughout the day. Now discharge orders are in place and Alvarez feels ready to discharge home. Rates pain at 3/10. Maintaining O2 sats on room air all throughout the morning. Did not sleep well last night so was able to nap some this morning. Noted some apnea periods in her breathing and snoring while she was sleeping. Discussed with Dr. Marroquin - she instructed Alvarez to speak with her primary care doc at her hospital follow up about potentially doing a sleep study as an outpatient.     Nephrology rounded - no dialysis today. Alvarez is scheduled for dialysis tomorrow at her usual clinic at 11:35 am.     AVS reviewed, Alvarez verbalizes understanding. PIV discontinued. Friend will be transporting home. Alvarez will put her call light on when her ride has arrived.

## 2024-05-20 NOTE — PLAN OF CARE
A/O. VSS on RA.   Pain is not well managed with current regimen; pt believes activities with PT exacerbated the pain, especially in the right leg. Pacing in room and hallway. All available med administered with minimal relief. MD notified. 1 dose IV dilaudid ordered and given; pt expressed short relief, was able to sleep apprx 1 hr before waking from pain.   Tele: SR    Goal Outcome Evaluation:    Problem: Adult Inpatient Plan of Care  Goal: Optimal Comfort and Wellbeing  Outcome: Progressing     Problem: Pain Acute  Goal: Optimal Pain Control and Function  Outcome: Progressing  Intervention: Prevent or Manage Pain  Recent Flowsheet Documentation  Taken 5/20/2024 0424 by Katiuska Ram, RN  Sensory Stimulation Regulation: auditory stimulation minimized  Medication Review/Management: medications reviewed  Taken 5/19/2024 1930 by Katiuska Ram, RN  Sensory Stimulation Regulation: auditory stimulation minimized  Medication Review/Management: medications reviewed

## 2024-05-20 NOTE — PROGRESS NOTES
Occupational Therapy: Orders received. OT attempted eval yesterday, but pt was receiving dialysis. Chart reviewed and discussed with care team.today. Pt was cleared by PT and has no mobility issues.  RN said pt has no ADL or cognitive concerns. Occupational Therapy not indicated due to the pt does not have a skilled need.? Defer discharge recommendations to medical team.? Will complete orders.

## 2024-06-11 NOTE — TELEPHONE ENCOUNTER
Received call from pt requesting call back. Returned call. Left  with direct line for return call.

## 2024-06-17 NOTE — TELEPHONE ENCOUNTER
Received call from pt. Reviewed PRA level not due at this time. Reviewed needs DAPT for 1 year per cards recs. Should check with PCP on PAP. Pt verbalized understanding of information and has no further questions. Encouraged to reach out if questions arise.

## 2024-06-25 PROBLEM — N18.9 CHRONIC RENAL FAILURE, UNSPECIFIED CKD STAGE: Status: ACTIVE | Noted: 2023-07-21

## 2024-06-25 PROBLEM — I21.4 NSTEMI (NON-ST ELEVATED MYOCARDIAL INFARCTION) (H): Status: ACTIVE | Noted: 2024-01-01

## 2024-06-25 PROBLEM — E87.5 HYPERKALEMIA: Status: ACTIVE | Noted: 2023-07-05

## 2024-06-25 NOTE — H&P
[facility]    History and Physical - Hospitalist Service       Date of Admission:  6/25/2024    Assessment & Plan      Serene Tipton is a 68 year old female w/PMHx significant for NSTEMI s/p PCI (Apr 2024), ESRD on HD T/Th/Sa, PCKD s/p BL nephrectomy, CHF, tobacco use disorder, HTN, JAMIL, and HLD admitted on 6/25/2024 for 1wk of worsening fatigue, poor PO intake, and orthopnea.     Elevated troponin, concern for post-MI complications  Hx of NSTEMI s/p PCI  Patient present w/1wk of worsening fatigue, poor PO intake, and orthopnea, sx similar to presentation in Apr 2024, where she was h NSTEMI. Initial workup notable for troponin 166 and 156 on repeat, EKG showing borderline tachycardia, LVH, and nonspecific T-wave inversions; no evidence of acute ischemia. Denies chest pain interestingly. Physical exam was notable for LLSB systolic murmur - previously noted. Cards contacted in ED with plan for stress test tomorrow. Touched base with on-call cardiologist who recommended continuing medication management at this time, did not recommend heparin gtt as EKG not suggestive of madison occlusion.   - Cards consult, recs appreciated  - NPO at midnight   - BNP  - Cardiac tele  - Continue PTA metoprolol XL 25mg d  - Continue PTA ASA 81 mg d  - Continue PTA plavix 75mg d  - SL nitroglycerin 0.4mg q5min PRN     Hyperkalemia   ESRD on HD Tu/Th/Sa  Hx of PCKD s/p BL nephrectomy   K 6.3 on admission in setting of known ESRD. No significant EKG changes. Received lokelma w/improvement to K 5.5. Patient missed dialysis run today d/t acute illness. Follows w/[facility] (Dr. Armstrong). Appears to be at EDW 38kg per chart rvw in May 2024.   - BMP daily   - Nephrology consult, recs appreciated     Fatigue  Poor PO intake   Abdominal bloating  BMI 16  Ongoing for 1wk, developed n/v today. Cachectic appearing on exam. No si/sx to suggest infection - afebrile, WBC wnl, CXR negative for opacities. Possibly could be related  to ESRD. She does have a h/o SBO and ileus, but abdominal exam was benign.   - Follow fever curve  - Hepatic panel add-on  - Decided to hold off on fluid bolus d/t patient appearing euvolemic on exam and abdominal bloating did raise questions of possible hypervolemia  - Protonix 40mg IV d   - Consider abdominal imaging     Seru amnion gap without acidosis  AG 17 on admission. Lactate wnl, bicarb wnl. Unclear etiology. Difficult to interpret in setting of ESRD. No si/sx of infection.   - BMP daily     Chronic conditions:  HFrEF (EF 25-30%)  HTN  Last echo completed May 2024, showing EF 25-30%   - Metoprolol/ASA per above   - Consider repeat echo     JAMIL - continue PTA zoloft, lorazepam, and nortriptyline   Thyroid disease - Continue PTA levothyroxine   HLD - continue PTA lipitor             Diet: NPO per Anesthesia Guidelines for Procedure/Surgery Except for: Meds    DVT Prophylaxis: Pneumatic Compression Devices  Pool Catheter: Not present  Lines: None     Cardiac Monitoring: ACTIVE order. Indication: AMI (NSTEMI/ STEMI) (48 hours)  Code Status: Full Code      Clinically Significant Risk Factors Present on Admission        # Hyperkalemia: Highest K = 6.3 mmol/L in last 2 days, will monitor as appropriate    # Hypercalcemia: Highest Ca = 10.4 mg/dL in last 2 days, will monitor as appropriate      # Drug Induced Platelet Defect: home medication list includes an antiplatelet medication   # Hypertension: Noted on problem list  # Chronic heart failure with reduced ejection fraction: last echo with EF <40%   # Anemia: based on hgb <11               # Financial/Environmental Concerns:           Disposition Plan     Medically Ready for Discharge: Anticipated in 2-4 Days         The patient's care will be discussed with primary medicine team in the morning.     Rupal Ramirez MD  Hospitalist Service  [facility]  Securely message with [employer] (more info)  Text page via [employer]      ______________________________________________________________________    Chief Complaint   Fatigue    History is obtained from the patient    History of Present Illness   Serene Tipton is a 68 year old female w/PMHx significant for NSTEMI s/p PCI (Apr 2024), ESRD on HD T/Th/Sa, PCKD s/p BL nephrectomy, CHF, HTN, JAMIL, and HLD admitted on 6/25/2024 for 1wk of worsening fatigue, poor PO intake, and orthopnea.     Developed worsening fatigue, orthopnea, and abdominal fullness 1wk ago. She started having nausea/vomiting today and has not been able to take food for almost 3d.   Sx feel similarly to how she felt when she came went to [facility] in Apr 2024 and was fth NSTEMI.   She lives independently in [place]. She has been focusing on self-directed cardiac rehab, which includes walking. Denies MCBRIDE, chest pain on exertion, heart palpitations, BLE swelling leading up to 1wk ago. The only thing limiting her is poor footwear and known plantar fasciitis.   Did not attend dialysis today d/t acute illness. Has not missed runs.   Doesn't make urine at baseline. No issues w/BM. Denies fevers/chills.       Past Medical History    Past Medical History:   Diagnosis Date    Anemia in chronic kidney disease     Anxiety     Arthritis     Brain aneurysm     Cavernous segment of the right & left carotid arteries. See Neurosurg note 6/16/21.    Chronic low back pain     Managed by Pain Clinic    Congestive heart failure, unspecified HF chronicity, unspecified heart failure type (H) 4/29/2024    Depressive disorder 2013    Disease of liver 8/29/2023    Disease of thyroid gland     ESRD (end stage renal disease) on dialysis (H) 07/2020    GERD (gastroesophageal reflux disease)     Hepatic lesion     Hyperlipidemia     Hypertension     Nephrolithiasis     Neuromuscular disorder (H)     Osteoporosis     PKD (polycystic kidney disease) 09/01/1990    Primary generalized (osteo)arthritis 8/2/2023    PTSD (post-traumatic stress  disorder)     Tobacco abuse     Vitamin D deficiency        Past Surgical History   Past Surgical History:   Procedure Laterality Date    BACK SURGERY      spinal fusion w/hardware    BIOPSY Left 09/01/1990    Breast    BREAST LUMPECTOMY, RT/LT Left     Lumpectomy    CV CORONARY ANGIOGRAM N/A 4/30/2024    Procedure: CV CORONARY ANGIOGRAM;  Surgeon: Jac Russell MD;  Location: [facility] LAB CV    CV CORONARY LITHOTRIPSY PCI N/A 4/30/2024    Procedure: Percutaneous Coronary Intervention - Lithotripsy;  Surgeon: Jac Russell MD;  Location: [facility] LAB CV    CV LEFT HEART CATH N/A 4/30/2024    Procedure: Left Heart Catheterization;  Surgeon: Jac Russell MD;  Location: [facility] LAB CV    CV PCI STENT DRUG ELUTING N/A 4/30/2024    Procedure: Percutaneous Coronary Intervention Stent;  Surgeon: Jac Russell MD;  Location: [facility] LAB CV    CYST REMOVAL Right     rt wrist    CYSTECTOMY PILONIDAL  1981    EYE SURGERY  2008    lasik    FORMATION ARTERIOVENOUS FISTULA    07/28/2020    FRACTURE SURGERY      IR CVC TUNNEL W2 CATH W/O PORT  7/17/2020    IR DIALYSIS FISTULOGRAM RIGHT  10/21/2020    NEPHRECTOMY BILATERAL Bilateral 2/1/2023    Procedure: bilateral native nephrectomy;  Surgeon: Alana Giang MD;  Location: [facility] OR    OPEN REDUCTION INTERNAL FIXATION HIP BIPOLAR Left 11/10/2023    Procedure: LEFT HEMIARTHROPLASTY, HIP, HEMIARTHROPLASTY;  Surgeon: Lucas Weldon MD;  Location: [facility] Main OR    ORTHOPEDIC SURGERY Right 2005    Shoulder    OTHER SURGICAL HISTORY      carpal tunnel repair    SPINE SURGERY         Prior to Admission Medications   Prior to Admission Medications   Prescriptions Last Dose Informant Patient Reported? Taking?   B Complex-C (SUPER B COMPLEX PO) 6/25/2024 at am  Yes Yes   Sig: Take 1 tablet by mouth daily   Epoetin Adam (EPOGEN IJ) Past Week at sat  Yes Yes   Sig: Inject 1,000 Units into the vein three times a week At dialysis.    GLUCOSAMINE-CHONDROITIN-VIT D3 PO 6/24/2024 at noon  Yes Yes   Sig: Take 2 capsules by mouth daily   Iron Sucrose (VENOFER IV) Past Week at sat  Yes Yes   Sig: Inject 100 mg into the vein once a week At dialysis   LORazepam (ATIVAN) 1 MG tablet 6/24/2024 at am  Yes Yes   Sig: Take 1 mg by mouth daily as needed for anxiety   Nutritional Supplements (NEPRO) LIQD 6/24/2024 at noon  Yes Yes   Sig: Take 1 Can by mouth daily   SENNA-docusate sodium (SENNA S) 8.6-50 MG tablet 6/25/2024 at am  No Yes   Sig: Take 2 tablets by mouth 2 times daily Hold for loose stools.   artificial saliva (BIOTENE MT) SOLN solution Past Week at prn  Yes Yes   Sig: Swish and spit 2 sprays in mouth as needed for dry mouth   aspirin (ASA) 81 MG chewable tablet 6/25/2024 at am  Yes Yes   Sig: Take 81 mg by mouth daily   atorvastatin (LIPITOR) 80 MG tablet 6/24/2024 at pm  No Yes   Sig: Take 1 tablet (80 mg) by mouth daily   calcium acetate (CALPHRON) 667 MG TABS tablet 6/25/2024 at am  Yes Yes   Sig: Take 1-2 tablets by mouth 3 times daily (with meals)   cholecalciferol (VITAMIN D3) 25 mcg (1000 units) capsule 6/24/2024 at pm  Yes Yes   Sig: Take 1 capsule by mouth daily at 2 pm   clopidogrel (PLAVIX) 75 MG tablet 6/25/2024 at am  No Yes   Sig: Take 1 tablet (75 mg) by mouth daily Dose to start tomorrow.   cyclobenzaprine (FLEXERIL) 5 MG tablet Past Week at prn  Yes Yes   Sig: Take 5 mg by mouth 2 times daily as needed for muscle spasms   lactulose (CHRONULAC) 10 GM/15ML solution 6/24/2024 at am  No Yes   Sig: Take 15 mLs (10 g) by mouth 2 times daily as needed for constipation   levothyroxine (SYNTHROID/LEVOTHROID) 50 MCG tablet 6/24/2024 at pm  Yes Yes   Sig: Take 50 mcg by mouth daily   metoprolol succinate ER (TOPROL XL) 25 MG 24 hr tablet 6/25/2024 at am  No Yes   Sig: Take 1 tablet (25 mg) by mouth daily   nitroGLYcerin (NITROSTAT) 0.4 MG sublingual tablet Unknown at prn  No Yes   Sig: For chest pain place 1 tablet under the tongue every  5 minutes for 3 doses. If symptoms persist 5 minutes after 1st dose call 911.   nortriptyline (PAMELOR) 75 MG capsule 6/24/2024 at pm  Yes Yes   Sig: Take 75 mg by mouth At Bedtime   omeprazole (PRILOSEC) 20 MG DR capsule 6/24/2024 at pm  Yes Yes   Sig: Take 20 mg by mouth daily   oxyCODONE IR (ROXICODONE) 10 MG tablet 6/24/2024 at pm  Yes Yes   Sig: Take 10 mg by mouth every 4 hours as needed for pain   sertraline (ZOLOFT) 100 MG tablet 6/24/2024 at pm  Yes Yes   Sig: Take 150 mg by mouth At Bedtime      Facility-Administered Medications: None        Review of Systems    The 10 point Review of Systems is negative other than noted in the HPI or here.     Physical Exam   Vital Signs: Temp: 97.8  F (36.6  C) Temp src: Oral BP: (!) 144/89 Pulse: 93   Resp: 16 SpO2: 97 % O2 Device: None (Room air)    Weight: 84 lbs 0 oz    Physical Exam  Constitutional:       Appearance: She is ill-appearing.      Comments: Speaking in full sentences, conversing appropriately, diaphoretic   HENT:      Head: Normocephalic and atraumatic.      Mouth/Throat:      Mouth: Mucous membranes are dry.   Cardiovascular:      Rate and Rhythm: Normal rate and regular rhythm.      Comments: LLSB systolic murmur  Pulmonary:      Breath sounds: Normal breath sounds. No stridor. No wheezing, rhonchi or rales.   Abdominal:      General: There is no distension.      Palpations: Abdomen is soft.      Tenderness: There is no abdominal tenderness.      Comments: Scaphoid    Musculoskeletal:      Right lower leg: No edema.      Left lower leg: No edema.   Skin:     General: Skin is warm.      Capillary Refill: Capillary refill takes less than 2 seconds.   Neurological:      General: No focal deficit present.      Mental Status: She is oriented to person, place, and time.      Comments: Generalized weakness   Psychiatric:         Mood and Affect: Mood normal.         Behavior: Behavior normal.               Data     I have personally reviewed the following  data over the past 24 hrs:    9.9  \   10.0 (L)   / 386     132 (L) 90 (L) 91.1 (H) /  154 (H)   5.5 (H) 25 5.94 (H) \     Trop: 156 (HH) BNP: N/A     Procal: N/A CRP: N/A Lactic Acid: 1.0         Imaging results reviewed over the past 24 hrs:   Recent Results (from the past 24 hour(s))   XR Chest Port 1 View    Narrative    EXAM: XR CHEST PORT 1 VIEW  LOCATION: [facility]  DATE: 6/25/2024    INDICATION: Shortness of breath.   COMPARISON: Chest radiograph 4/29/2024.       Impression    IMPRESSION:     Mild cardiomegaly with central pulmonary vascular congestion and diffuse interstitial edema, suggesting CHF exacerbation. Trace right pleural effusion. No left pleural effusion. No focal airspace opacities or pneumothorax.    Aortic atherosclerotic calcifications.

## 2024-06-25 NOTE — TELEPHONE ENCOUNTER
"Nurse Triage SBAR    Is this a 2nd Level Triage? NO    Situation: Patient calling clinic reporting worsening SOB, fatigue, and vomiting since yesterday.    Background: Patient had recent stent placement.    Assessment: Patient endorsed worsening SOB when laying down, severe fatigue to the point she can \"fall asleep when standing up\", vomiting, and stomach distension. Patient stated her weight has been back to normal minus a few pounds, no reported chest pain/pressure.    Protocol Recommended Disposition:   Go to ED Now    Recommendation: Per disposition, RN advised patient to return to the ED for immediate evaluation. Patient agreed with plan of care and will go to ED now.    Routed to provider    Does the patient meet one of the following criteria for ADS visit consideration? No      Reason for Disposition   Major surgery in the past month    Additional Information   Negative: SEVERE difficulty breathing (e.g., struggling for each breath, speaks in single words, pulse > 120)   Negative: Breathing stopped and hasn't returned   Negative: Choking on something   Negative: Bluish (or gray) lips or face   Negative: Difficult to awaken or acting confused (e.g., disoriented, slurred speech)   Negative: Passed out (i.e., fainted, collapsed and was not responding)   Negative: Wheezing started suddenly after medicine, an allergic food, or bee sting   Negative: Stridor (harsh sound while breathing in)   Negative: Slow, shallow and weak breathing   Negative: Sounds like a life-threatening emergency to the triager   Negative: Chest pain   Negative: Wheezing (high pitched whistling sound) and previous asthma attacks or use of asthma medicines   Negative: Difficulty breathing and within 14 days of COVID-19 Exposure   Negative: Difficulty breathing and only present when coughing   Negative: Difficulty breathing and only from stuffy nose   Negative: Difficulty breathing and only from stuffy nose or runny nose from common cold   " "Negative: MODERATE difficulty breathing (e.g., speaks in phrases, SOB even at rest, pulse 100-120) of new-onset or worse than normal   Negative: Oxygen level (e.g., pulse oximetry) 90% or lower   Negative: Wheezing can be heard across the room   Negative: Drooling or spitting out saliva (because can't swallow)   Negative: Any history of prior \"blood clot\" in leg or lungs   Negative: Illness requiring prolonged bedrest in past month (e.g., immobilization, long hospital stay)   Negative: Hip or leg fracture (broken bone) in past month (or had cast on leg or ankle in past month)    Protocols used: Breathing Difficulty-A-OH    " Moderate Variability

## 2024-06-25 NOTE — MEDICATION SCRIBE - ADMISSION MEDICATION HISTORY
Medication Scribe Admission Medication History    Admission medication history is complete. The information provided in this note is only as accurate as the sources available at the time of the update.    Information Source(s): Patient and CareEverywhere/SureScripts via in-person    Pertinent Information: per pt no longer taking labetalol,prednisolone Acetate     Changes made to PTA medication list:  Added: None  Deleted: labetalol,prednisolone Acetate  Changed: None    Allergies reviewed with patient and updates made in EHR: yes    Medication History Completed By: JODI COLE 6/25/2024 4:41 PM    PTA Med List   Medication Sig Last Dose    artificial saliva (BIOTENE MT) SOLN solution Swish and spit 2 sprays in mouth as needed for dry mouth Past Week at prn    aspirin (ASA) 81 MG chewable tablet Take 81 mg by mouth daily 6/25/2024 at am    atorvastatin (LIPITOR) 80 MG tablet Take 1 tablet (80 mg) by mouth daily 6/24/2024 at pm    B Complex-C (SUPER B COMPLEX PO) Take 1 tablet by mouth daily 6/25/2024 at am    calcium acetate (CALPHRON) 667 MG TABS tablet Take 1-2 tablets by mouth 3 times daily (with meals) 6/25/2024 at am    cholecalciferol (VITAMIN D3) 25 mcg (1000 units) capsule Take 1 capsule by mouth daily at 2 pm 6/24/2024 at pm    clopidogrel (PLAVIX) 75 MG tablet Take 1 tablet (75 mg) by mouth daily Dose to start tomorrow. 6/25/2024 at am    cyclobenzaprine (FLEXERIL) 5 MG tablet Take 5 mg by mouth 2 times daily as needed for muscle spasms Past Week at prn    Epoetin Adam (EPOGEN IJ) Inject 1,000 Units into the vein three times a week At dialysis. Past Week at sat    GLUCOSAMINE-CHONDROITIN-VIT D3 PO Take 2 capsules by mouth daily 6/24/2024 at noon    Iron Sucrose (VENOFER IV) Inject 100 mg into the vein once a week At dialysis Past Week at sat    lactulose (CHRONULAC) 10 GM/15ML solution Take 15 mLs (10 g) by mouth 2 times daily as needed for constipation 6/24/2024 at am    levothyroxine  (SYNTHROID/LEVOTHROID) 50 MCG tablet Take 50 mcg by mouth daily 6/24/2024 at pm    LORazepam (ATIVAN) 1 MG tablet Take 1 mg by mouth daily as needed for anxiety 6/24/2024 at am    metoprolol succinate ER (TOPROL XL) 25 MG 24 hr tablet Take 1 tablet (25 mg) by mouth daily 6/25/2024 at am    nitroGLYcerin (NITROSTAT) 0.4 MG sublingual tablet For chest pain place 1 tablet under the tongue every 5 minutes for 3 doses. If symptoms persist 5 minutes after 1st dose call 911. Unknown at prn    nortriptyline (PAMELOR) 75 MG capsule Take 75 mg by mouth At Bedtime 6/24/2024 at pm    Nutritional Supplements (NEPRO) LIQD Take 1 Can by mouth daily 6/24/2024 at noon    omeprazole (PRILOSEC) 20 MG DR capsule Take 20 mg by mouth daily 6/24/2024 at pm    oxyCODONE IR (ROXICODONE) 10 MG tablet Take 10 mg by mouth every 4 hours as needed for pain 6/24/2024 at pm    SENNA-docusate sodium (SENNA S) 8.6-50 MG tablet Take 2 tablets by mouth 2 times daily Hold for loose stools. 6/25/2024 at am    sertraline (ZOLOFT) 100 MG tablet Take 150 mg by mouth At Bedtime 6/24/2024 at pm

## 2024-06-25 NOTE — ED TRIAGE NOTES
Pt had cardiac stents placed 2 months ago, has been feeling ok, and then has had onset of fatigue and sob x 1 week with some vomiting today. Pt does hemodialysis and was due to go today at 11:30am. Last dialysis was Saturday. Pt is feeling some abdominal distension this week, as well.

## 2024-06-25 NOTE — ED PROVIDER NOTES
Emergency Department Encounter      NAME: Serene Tipton  AGE: 68 year old female  YOB: 1955  MRN: [id number]  EVALUATION DATE & TIME: 6/25/2024  3:15 PM    PCP: Ramos Lowry    ED PROVIDER: Immanuel Mullins M.D.      Chief Complaint   Patient presents with    Fatigue     sob         FINAL IMPRESSION:  1. NSTEMI (non-ST elevated myocardial infarction) (H)    2. Hyperkalemia    3. Chronic renal failure, unspecified CKD stage        MEDICAL DECISION MAKING:    3:20 PM I met with the patient, obtained history, performed an initial exam, and discussed options and plan for diagnostics and treatment here in the ED.   3:30 PM I paged nephrology and cardiology.  3:50 PM I spoke to Dr. Bates with nephrology.  3:52 PM I spoke to Dr. Mayen with cardiology.  4:09 PM I spoke to [facility].     This patient is a 68-year-old female with a history of end-stage renal disease on dialysis who presents for weakness.  She says that she was supposed to get dialysis today.  She says that she began to feel the same symptoms that she had before she had her coronary artery stents placed on 30 April.  She is having fatigue and exhaustion.  She is dyspnea on exertion and orthopnea.  Lab work had been done while the patient was waiting in the waiting room and I was called with her potassium 6.3 and her troponin was 166.  An EKG was done which showed a prolonged QT and possible inferior ischemia.  I independently reviewed and interpreted the EKG.  There were P waves seen and no peaked T waves.  The patient was moved back to a room and put on the cardiac monitor.  I spoke with Dr. Bates who is her nephrologist.  He came down to the ER and evaluated the patient.  He recommended giving the patient a gram of calcium gluconate, D50 and 5 units of insulin as well as the Lokelma.  We did this to treat her hyperkalemia while in the ER.  He is going to dialyze her tomorrow.  I spoke with Dr. Mayen from cardiology.  He agreed with trending  the patient's troponins and will possibly arrange for a stress test.  The patient was admitted to the [facility] clinic service and I spoke with Dr. Sanchez.     Pertinent Labs & Imaging studies reviewed. (See chart for details)    Critical care time 35 minutes not including procedures    Medical Decision Making     History:  Supplemental history from: Documented in chart, if applicable  External Record(s) reviewed: Documented in chart, if applicable.     Work Up:  Chart documentation includes differential considered and any EKGs or imaging independently interpreted by provider, where specified.  In additional to work up documented, I considered the following work up: Documented in chart, if applicable.     External consultation:  Discussion of management with another provider: Documented in chart, if applicable     Complicating factors:  Care impacted by chronic illness: ESRD on dialysis, nephrectomy for Polycystic Kidney Disease (bilateral, 2/1/2023), SBO, ileus, constipation, GERD, JAMIL, HTN, HLD, CHF, tobacco use disorder  Care affected by social determinants of health: Access to Medical Care     Disposition considerations: Admit.      MEDICATIONS GIVEN IN THE EMERGENCY:  Medications   glucose gel 15-30 g ( Oral See Alternative 6/25/24 1942)     Or   dextrose 50 % injection 25-50 mL (50 mLs Intravenous $Given 6/25/24 1942)     Or   glucagon injection 1 mg ( Subcutaneous See Alternative 6/25/24 1942)   aspirin (ASA) chewable tablet 81 mg (has no administration in time range)   atorvastatin (LIPITOR) tablet 80 mg (80 mg Oral $Given 6/25/24 1956)   calcium acetate (PHOSLO) capsule 667-1,334 mg (667 mg Oral Not Given 6/1955)   clopidogrel (PLAVIX) tablet 75 mg (has no administration in time range)   cyclobenzaprine (FLEXERIL) tablet 5 mg (has no administration in time range)   levothyroxine (SYNTHROID/LEVOTHROID) tablet 50 mcg (50 mcg Oral $Given 6/25/24 1958)   LORazepam (ATIVAN) tablet 1 mg (has no  administration in time range)   metoprolol succinate ER (TOPROL XL) 24 hr tablet 25 mg (has no administration in time range)   nitroGLYcerin (NITROSTAT) sublingual tablet 0.4 mg (has no administration in time range)   nortriptyline (PAMELOR) capsule 75 mg (75 mg Oral $Given 6/25/24 2129)   sertraline (ZOLOFT) tablet 150 mg (150 mg Oral $Given 6/25/24 2128)   lidocaine 1 % 0.1-1 mL (has no administration in time range)   lidocaine (LMX4) cream (has no administration in time range)   sodium chloride (PF) 0.9% PF flush 3 mL (3 mLs Intracatheter $Given 6/25/24 1845)   sodium chloride (PF) 0.9% PF flush 3 mL (has no administration in time range)   senna-docusate (SENOKOT-S/PERICOLACE) 8.6-50 MG per tablet 1 tablet (has no administration in time range)     Or   senna-docusate (SENOKOT-S/PERICOLACE) 8.6-50 MG per tablet 2 tablet (has no administration in time range)   acetaminophen (TYLENOL) tablet 650 mg (has no administration in time range)     Or   acetaminophen (TYLENOL) Suppository 650 mg (has no administration in time range)   ondansetron (ZOFRAN ODT) ODT tab 4 mg (has no administration in time range)     Or   ondansetron (ZOFRAN) injection 4 mg (has no administration in time range)   prochlorperazine (COMPAZINE) injection 5 mg (has no administration in time range)     Or   prochlorperazine (COMPAZINE) tablet 5 mg (has no administration in time range)     Or   prochlorperazine (COMPAZINE) suppository 12.5 mg (has no administration in time range)   pantoprazole (PROTONIX) IV push injection 40 mg (40 mg Intravenous $Given 6/25/24 2227)   sodium zirconium cyclosilicate (LOKELMA) packet 10 g (10 g Oral $Given 6/25/24 1605)   calcium gluconate 1 g in 50 mL in sodium chloride intermittent infusion (1 g Intravenous $Given 6/25/24 1634)   dextrose 50 % injection 25 g (25 g Intravenous $Given 6/25/24 1636)   insulin regular 1 unit/mL injection 3.8 Units (3.8 Units Intravenous $Given 6/25/24 5102)   LORazepam (ATIVAN) tablet 1  mg (1 mg Oral $Given 6/25/24 9229)       NEW PRESCRIPTIONS STARTED AT TODAY'S ER VISIT:  Current Discharge Medication List             =================================================================    HPI    Patient information was obtained from: patient    Use of : N/A        Serene Tipton is a 68 year old female with a past medical history of ESRD on dialysis, nephrectomy for Polycystic Kidney Disease (bilateral, 2/1/2023), SBO, ileus, constipation, GERD, JAMIL, HTN, HLD, CHF, and tobacco use disorder, who presents fatigue.    On 4/30/24, the patient had 2 stents put in and has been feeling fine. Last week, the patient started to have shortness of breath, fatigue, and epigastric pressure, which feels similar to before her stents were put in. At 0700, the patient had an episode of vomiting. Her shortness of breath is provoked with laying down. She takes Plavix and metoprolol.    Denies chest pain or any other complaints at this time.    Per chart review,  5/17/24 The patient visited  ED for shortness of breath. Patient had missed her last dialysis appointment.  Small pleural effusion that was noted on CT CAP unchanged from previous admission on 4/30, elevated D-dimer in the ED but negative CT PE.   Delta troponin negative for acute ischemia. Initial EKG shows sinus tachycardia. Repeat echo 5/17 showed severe global LV hypokinesis and EF of 25 to 30%, mod/severe mitral and tricuspid regurgitation, and mild pulmonary hypertension.   Appears to have baseline hemoglobin 9-10, was 7.8 on admission.   Initially came in with potassium of 6.4. Resolved before discharge and was 4.6.     4/29/24-4/30/24 The patient was admitted to  Hospital for acute on chronic systolic CHF. Chest/Abd/Pelvic CT showed pulmonary edema and pleural effusion but no bowel obstruction. CXR showed cardiomegaly, vascular congestion, pleural effusion. BNP markedly elevated at 70,000. Troponin elevated 247. Lactic acid 2.2. CBC  with WBC 1.6, RBC 2.73, Hemoglobin 9.4, Hematocrit 31.5 . Anion gap 18, UN 60.9, Cr 4.41, Cl 89. Echo showed severe global LV hypokinesis new from previous.   Recommended coronary angiogram with percutaneous coronary intervention at [facility].  Pt will undergo dialysis after this procedure. Pt NPO status until after this procedure.     REVIEW OF SYSTEMS   Review of Systems   Constitutional:  Positive for fatigue.   Respiratory:  Positive for shortness of breath.    Cardiovascular:  Negative for chest pain.   Gastrointestinal:  Positive for abdominal pain and vomiting.        PAST MEDICAL HISTORY:  Past Medical History:   Diagnosis Date    Anemia in chronic kidney disease     Anxiety     Arthritis     Brain aneurysm     Cavernous segment of the right & left carotid arteries. See Neurosurg note 6/16/21.    Chronic low back pain     Managed by [facility]    Congestive heart failure, unspecified HF chronicity, unspecified heart failure type (H) 4/29/2024    Depressive disorder 2013    Disease of liver 8/29/2023    Disease of thyroid gland     ESRD (end stage renal disease) on dialysis (H) 07/2020    GERD (gastroesophageal reflux disease)     Hepatic lesion     Hyperlipidemia     Hypertension     Nephrolithiasis     Neuromuscular disorder (H)     Osteoporosis     PKD (polycystic kidney disease) 09/01/1990    Primary generalized (osteo)arthritis 8/2/2023    PTSD (post-traumatic stress disorder)     Tobacco abuse     Vitamin D deficiency        PAST SURGICAL HISTORY:  Past Surgical History:   Procedure Laterality Date    BACK SURGERY      spinal fusion w/hardware    BIOPSY Left 09/01/1990    Breast    BREAST LUMPECTOMY, RT/LT Left     Lumpectomy    CV CORONARY ANGIOGRAM N/A 4/30/2024    Procedure: CV CORONARY ANGIOGRAM;  Surgeon: Jac Russell MD;  Location: [facility] CATH LAB CV    CV CORONARY LITHOTRIPSY PCI N/A 4/30/2024    Procedure: Percutaneous Coronary Intervention - Lithotripsy;  Surgeon:  Jac Russell MD;  Location: [facility] LAB CV    CV LEFT HEART CATH N/A 4/30/2024    Procedure: Left Heart Catheterization;  Surgeon: Jac Russell MD;  Location: [facility] CATH LAB CV    CV PCI STENT DRUG ELUTING N/A 4/30/2024    Procedure: Percutaneous Coronary Intervention Stent;  Surgeon: Jac Russell MD;  Location: [facility] CATH LAB CV    CYST REMOVAL Right     rt wrist    CYSTECTOMY PILONIDAL  1981    EYE SURGERY  2008    lasik    FORMATION ARTERIOVENOUS FISTULA    07/28/2020    FRACTURE SURGERY      IR CVC TUNNEL W2 CATH W/O PORT  7/17/2020    IR DIALYSIS FISTULOGRAM RIGHT  10/21/2020    NEPHRECTOMY BILATERAL Bilateral 2/1/2023    Procedure: bilateral native nephrectomy;  Surgeon: Alana Giang MD;  Location: [facility] OR    OPEN REDUCTION INTERNAL FIXATION HIP BIPOLAR Left 11/10/2023    Procedure: LEFT HEMIARTHROPLASTY, HIP, HEMIARTHROPLASTY;  Surgeon: Lucas Weldon MD;  Location: [facility] Main OR    ORTHOPEDIC SURGERY Right 2005    Shoulder    OTHER SURGICAL HISTORY      carpal tunnel repair    SPINE SURGERY         CURRENT MEDICATIONS:      Current Facility-Administered Medications:     acetaminophen (TYLENOL) tablet 650 mg, 650 mg, Oral, Q4H PRN **OR** acetaminophen (TYLENOL) Suppository 650 mg, 650 mg, Rectal, Q4H PRN, Rupal Ramirez MD    aspirin (ASA) chewable tablet 81 mg, 81 mg, Oral, Daily, Rupal Ramirez MD    atorvastatin (LIPITOR) tablet 80 mg, 80 mg, Oral, QPM, Rupal Ramirez MD, 80 mg at 06/25/24 1956    calcium acetate (PHOSLO) capsule 667-1,334 mg, 667-1,334 mg, Oral, TID w/meals, Rupal Ramirez MD    clopidogrel (PLAVIX) tablet 75 mg, 75 mg, Oral, Daily, Rupal Ramirez MD    cyclobenzaprine (FLEXERIL) tablet 5 mg, 5 mg, Oral, BID PRN, Rupal Ramirez MD    glucose gel 15-30 g, 15-30 g, Oral, Q15 Min PRN **OR** dextrose 50 % injection 25-50 mL, 25-50 mL, Intravenous, Q15 Min PRN, 50 mL at 06/25/24 1942 **OR** glucagon injection 1 mg, 1 mg, Subcutaneous, Q15 Min PRN, Harpreet,  Immanuel DE LA TORRE MD    levothyroxine (SYNTHROID/LEVOTHROID) tablet 50 mcg, 50 mcg, Oral, QPM, Rupal Ramirez MD, 50 mcg at 06/25/24 1958    lidocaine (LMX4) cream, , Topical, Q1H PRN, Rupal Ramirez MD    lidocaine 1 % 0.1-1 mL, 0.1-1 mL, Other, Q1H PRN, Rupal Ramirez MD    LORazepam (ATIVAN) tablet 1 mg, 1 mg, Oral, Daily PRN, Rupal Ramirez MD    metoprolol succinate ER (TOPROL XL) 24 hr tablet 25 mg, 25 mg, Oral, Daily, Rupal Ramirez MD    nitroGLYcerin (NITROSTAT) sublingual tablet 0.4 mg, 0.4 mg, Sublingual, Q5 Min PRN, Rupal Ramirez MD    nortriptyline (PAMELOR) capsule 75 mg, 75 mg, Oral, At Bedtime, Rupal Ramirez MD, 75 mg at 06/25/24 2129    ondansetron (ZOFRAN ODT) ODT tab 4 mg, 4 mg, Oral, Q6H PRN **OR** ondansetron (ZOFRAN) injection 4 mg, 4 mg, Intravenous, Q6H PRN, Rupal Ramirez MD    pantoprazole (PROTONIX) IV push injection 40 mg, 40 mg, Intravenous, QPM, Rupal Ramirez MD, 40 mg at 06/25/24 2227    prochlorperazine (COMPAZINE) injection 5 mg, 5 mg, Intravenous, Q6H PRN **OR** prochlorperazine (COMPAZINE) tablet 5 mg, 5 mg, Oral, Q6H PRN **OR** prochlorperazine (COMPAZINE) suppository 12.5 mg, 12.5 mg, Rectal, Q12H PRN, Rupal Ramirez MD    senna-docusate (SENOKOT-S/PERICOLACE) 8.6-50 MG per tablet 1 tablet, 1 tablet, Oral, BID PRN **OR** senna-docusate (SENOKOT-S/PERICOLACE) 8.6-50 MG per tablet 2 tablet, 2 tablet, Oral, BID PRN, Rupal Ramirez MD    sertraline (ZOLOFT) tablet 150 mg, 150 mg, Oral, At Bedtime, Rupal Ramirez MD, 150 mg at 06/25/24 2128    sodium chloride (PF) 0.9% PF flush 3 mL, 3 mL, Intracatheter, Q8H, Rupal Ramirez MD, 3 mL at 06/25/24 1845    sodium chloride (PF) 0.9% PF flush 3 mL, 3 mL, Intracatheter, q1 min prn, Rupal Ramirez MD    ALLERGIES:  Allergies   Allergen Reactions    Penicillins Other (See Comments) and Shortness Of Breath     Breathing problem.  breathing      No Clinical Screening - See Comments      PN: LW CM1: Contrast Intercapsular - Nonionic Reaction :     Nsaids Other (See Comments)     Kidney damage    Carvedilol GI Disturbance     Nausea and vomiting    Ciprofloxacin Muscle Pain (Myalgia)    Floxacillin (Flucloxacillin) Muscle Pain (Myalgia) and Rash    Levofloxacin Muscle Pain (Myalgia) and Rash    Morphine Nausea and Vomiting    Sulfa Antibiotics Itching       FAMILY HISTORY:  Family History   Problem Relation Age of Onset    Polycystic Kidney Diease Mother     Hypertension Mother     Kidney Disease Mother     Cerebrovascular Disease Mother     Chronic Obstructive Pulmonary Disease Father     Multiple Sclerosis Father     Polycystic Kidney Diease Sister     Cardiac Sudden Death Sister 52    Hypertension Sister     Kidney Disease Sister     Polycystic Kidney Diease Maternal Grandmother     Hypertension Maternal Grandmother     Kidney Disease Maternal Grandmother     Cerebrovascular Disease Maternal Grandmother     Heart Disease Maternal Grandfather     Hyperlipidemia Paternal Grandmother     Cerebrovascular Disease Paternal Grandmother     Coronary Artery Disease Paternal Grandfather     Pulmonary Embolism Paternal Grandfather     Anesthesia Reaction No family hx of        SOCIAL HISTORY:   Social History     Socioeconomic History    Marital status: Single   Tobacco Use    Smoking status: Unknown    Smokeless tobacco: Never   Substance and Sexual Activity    Alcohol use: Yes     Comment: occasional    Drug use: Yes     Types: Marijuana       PHYSICAL EXAM:    Vitals: BP (!) 155/83 (BP Location: Right arm)   Pulse 93   Temp 98.1  F (36.7  C) (Oral)   Resp 22   Wt 38.1 kg (84 lb)   SpO2 94%   BMI 16.96 kg/m     Constitutional: Chronically ill-appearing white female in no respiratory distress.  HEAD:Normocephalic, atraumatic,   Eyes: Pale conjunctiva, PERRLA, mild periorbital edema  ENT: mucous membranes moist, nose normal.   Neck- Supple, gross ROM intact.  No JVD.  No palpable nodes.  Pulmonary: Clear to auscultation bilaterally, no respiratory distress, no  wheezing, speaks full sentences easily.  Chest: No chest wall tenderness  Cardiovascular: Normal heart rate, regular rhythm, no murmurs. No lower extremity edema, 2+ DP pulses.   GI: Soft, no tenderness to deep palpation in all quadrants, not distended, no masses.  No hepatosplenomegaly.  Musculoskeletal: Moving all 4 extremities intentionally and without pain. No obvious deformity. Good bruit and thrill over left arm fistula. No calf tenderness or edema.  Back: No CVA tenderness  Skin: Warm, dry, no rash.  Neurologic: Alert & oriented x 3, speech clear, moving all extremities spontaneously   Psychiatric: Affect normal, cooperative.     LAB:  All pertinent labs reviewed and interpreted.  Labs Ordered and Resulted from Time of ED Arrival to Time of ED Departure   BASIC METABOLIC PANEL - Abnormal       Result Value    Sodium 132 (*)     Potassium 6.3 (*)     Chloride 90 (*)     Carbon Dioxide (CO2) 25      Anion Gap 17 (*)     Urea Nitrogen 91.1 (*)     Creatinine 5.94 (*)     GFR Estimate 7 (*)     Calcium 10.4 (*)     Glucose 101 (*)    TROPONIN T, HIGH SENSITIVITY - Abnormal    Troponin T, High Sensitivity 166 (*)    CBC WITH PLATELETS AND DIFFERENTIAL - Abnormal    WBC Count 9.9      RBC Count 3.10 (*)     Hemoglobin 10.5 (*)     Hematocrit 32.7 (*)      (*)     MCH 33.9 (*)     MCHC 32.1      RDW 15.2 (*)     Platelet Count 386      % Neutrophils 81      % Lymphocytes 11      % Monocytes 7      % Eosinophils 0      % Basophils 0      % Immature Granulocytes 1      NRBCs per 100 WBC 0      Absolute Neutrophils 8.0      Absolute Lymphocytes 1.1      Absolute Monocytes 0.7      Absolute Eosinophils 0.0      Absolute Basophils 0.0      Absolute Immature Granulocytes 0.1      Absolute NRBCs 0.0     POTASSIUM - Abnormal    Potassium 5.5 (*)    HEMOGLOBIN - Abnormal    Hemoglobin 10.0 (*)    TROPONIN T, HIGH SENSITIVITY - Abnormal    Troponin T, High Sensitivity 156 (*)    GLUCOSE BY METER - Abnormal    GLUCOSE  BY METER POCT 189 (*)    GLUCOSE BY METER - Abnormal    GLUCOSE BY METER POCT 143 (*)    GLUCOSE BY METER - Abnormal    GLUCOSE BY METER POCT 110 (*)    GLUCOSE BY METER - Abnormal    GLUCOSE BY METER POCT 240 (*)    GLUCOSE BY METER - Abnormal    GLUCOSE BY METER POCT 154 (*)    LACTIC ACID WHOLE BLOOD - Normal    Lactic Acid 1.0     GLUCOSE BY METER - Normal    GLUCOSE BY METER POCT 95     GLUCOSE BY METER - Normal    GLUCOSE BY METER POCT 86     GLUCOSE BY METER - Normal    GLUCOSE BY METER POCT 74     GLUCOSE MONITOR NURSING POCT   GLUCOSE MONITOR NURSING POCT   OCCULT BLOOD STOOL       RADIOLOGY:  XR Chest Port 1 View   Final Result   IMPRESSION:       Mild cardiomegaly with central pulmonary vascular congestion and diffuse interstitial edema, suggesting CHF exacerbation. Trace right pleural effusion. No left pleural effusion. No focal airspace opacities or pneumothorax.      Aortic atherosclerotic calcifications.          EKG:   Performed at: 1343  Impression: Normal sinus rhythm with sinus arrhythmia. Possible left atrial enlargement. Left ventricular hypertrophy. T wave abnormality, consider inferior ischemia. Prolonged QT.  Rate: 99  Rhythm: Sinus  QRS Interval: 106  QTc Interval: 495  Comparison: 5/18/24 Heart rate increased 8 bpm.  I have independently reviewed and interpreted the EKG(s) documented above.     PROCEDURES:   Procedures       I, Nicola Waterman, am serving as a scribe to document services personally performed by Dr. Immanuel Mullins based on my observation and the provider's statements to me. I, Immanuel Mullins M.D. attest that Nicola Waterman is acting in a scribe capacity, has observed my performance of the services and has documented them in accordance with my direction.      Immanuel Mullins M.D.  Emergency Medicine  [facility] EMERGENCY DEPARTMENT  [facility] [facility] [id number]  [phone]  Dept: [phone]     Immanuel Mullins  MD  06/26/24 0050

## 2024-06-26 NOTE — PROGRESS NOTES
RENAL PROGRESS NOTE    CC:  Mansi Lam MD    ASSESSMENT & PLAN:   [place] [facility] [place] Dr. Armstrong. Last HD on 6/22.  min, TW 37kg.   HD today and no issue reported  Renal diet  Renally dosing medications.   Will check URR.      Electrolytes/hyperkalemia: Na 131 and K 6.3 upon admission and treated with insulin and glucose and back down to 5.4 and increased to 5.9 this morning.  HD today.  Will do daily assessment for additional HD while here.    Acid-base: Bicarb 23.   HD as above.      BMD: Calcium 10.3 today.  Corrected Ca 9.6, Phos 5.1,  on 6/11 at outpatient unit.   PTA calcium acetate.   Resume Calcium acetate wm.   Renal diet.      Anemia: Hb was 11.2 on 6/11. 10.5 upon admission and 10.0 in recheck.  Hb 10.1 today.   Monitor hb.      BP/Volume: Volume is clinically euvolemic. BP acceptable.   HD with UF as above.      Access: Lt upper AVF (+).   No functional issue reported.     SUBJECTIVE: Patient seen and examined during HD.  Stated feeling tired.  Denies any chest pain, palpitation, nausea.    OBJECTIVE:  Physical Exam   Temp: 97.4  F (36.3  C) Temp src: Temporal BP: (!) 158/88 Pulse: 105   Resp: 15 SpO2: 99 % O2 Device: None (Room air) Oxygen Delivery: 1.5 LPM  Vitals:    06/25/24 1335   Weight: 38.1 kg (84 lb)     Vital Signs with Ranges  Temp:  [97.4  F (36.3  C)-98.5  F (36.9  C)] 97.4  F (36.3  C)  Pulse:  [] 105  Resp:  [12-34] 15  BP: (133-158)/(74-95) 158/88  SpO2:  [85 %-100 %] 99 %  No intake/output data recorded.    @TMAXR(24)@    Patient Vitals for the past 72 hrs:   Weight   06/25/24 1335 38.1 kg (84 lb)     Intake/Output Summary (Last 24 hours) at 6/26/2024 1045  Last data filed at 6/26/2024 0800  Gross per 24 hour   Intake --   Output 0 ml   Net 0 ml       PHYSICAL EXAM:  General - Alert and oriented x3, appears comfortable, NAD  Cardiovascular - Regular rate and rhythm, no rub  Respiratory - Clear to auscultation  bilaterally, no crackles or wheezes  Abd: BS present, no guarding or pain with palpation, no ascites  Extremities - No lower extremity edema bilaterally  Skin: dry, intact, no rash, good turgor  Neuro:  Grossly intact, no focal deficits  MSK:  Grossly intact  Psych:  Normal affect    LABORATORY STUDIES:     Recent Labs   Lab 06/26/24 0427 06/25/24 1848 06/25/24  1434   WBC 10.5  --  9.9   RBC 2.98*  --  3.10*   HGB 10.1* 10.0* 10.5*   HCT 31.5*  --  32.7*     --  386       Basic Metabolic Panel:  Recent Labs   Lab 06/26/24 0427 06/25/24 2052 06/25/24 2045 06/25/24 2003 06/25/24 1938 06/25/24  1911 06/25/24 1848 06/25/24 1846 06/25/24  1714 06/25/24  1434   *  --   --   --   --   --   --   --   --  132*   POTASSIUM 5.9* 5.4*  --   --   --   --  5.5*  --   --  6.3*   CHLORIDE 89*  --   --   --   --   --   --   --   --  90*   CO2 23  --   --   --   --   --   --   --   --  25   BUN 95.1*  --   --   --   --   --   --   --   --  91.1*   CR 6.45*  --   --   --   --   --   --   --   --  5.94*   GLC 93  --  154* 240* 74 86  --  95   < > 101*   GAVIN 10.3*  --   --   --   --   --   --   --   --  10.4*    < > = values in this interval not displayed.       INRNo lab results found in last 7 days.     Recent Labs   Lab Test 06/26/24 0427 06/25/24 1848 06/25/24  1434 07/14/23  0503 07/13/23  1239 07/11/23  1131   INR  --   --   --   --  1.70* 1.40*   WBC 10.5  --  9.9   < > 13.0* 11.5*   HGB 10.1* 10.0* 10.5*   < > 8.4* 9.0*     --  386   < > 320 288    < > = values in this interval not displayed.       Personally reviewed current labs      Cecy Bates MD  [facility]  [phone]

## 2024-06-26 NOTE — PLAN OF CARE
Goal Outcome Evaluation:      Plan of Care Reviewed With: patient    Overall Patient Progress: improvingOverall Patient Progress: improving     Pt. Alert, oriented. Denied pain, nausea, dyspnea, or dizziness. VSS, on 1.5L O2.Continue to monitor.

## 2024-06-26 NOTE — PROGRESS NOTES
[facility]    Progress Note - Hospitalist Service       Date of Admission:  6/25/2024    Assessment & Plan   Serene Tipton is a 68 year old female w/PMHx significant for NSTEMI s/p PCI (Apr 2024), ESRD on HD T/Th/Sa, PCKD s/p BL nephrectomy, CHF, admitted on 6/25/2024 for 1wk of worsening fatigue, poor PO intake, and orthopnea.      Elevated troponin, concern for post-MI complications  Hx of NSTEMI s/p PCI  Patient present w/1wk of worsening fatigue, poor PO intake, and orthopnea, sx similar to presentation in Apr 2024, where she was [facility] NSTEMI. Initial workup notable for troponin 166 and 156 on repeat, EKG showing borderline tachycardia, LVH, and nonspecific T-wave inversions; no evidence of acute ischemia. Denies chest pain.    - Cards consult, recs appreciated   - plan for NM lexiscan stress test today  - Continue PTA metoprolol XL 25mg daily (hold for stress test)  - Continue PTA ASA and plavix      Hyperkalemia   ESRD on HD Tu/Th/Sa  Hx of PCKD s/p BL nephrectomy   K 6.3 on admission in setting of known ESRD. No significant EKG changes. Received lokelma w/improvement to K 5.5. Patient missed dialysis run on day of admission due to acute illness. Follows w/[facility] (Dr. Armstrong). Appears to be at EDW 38kg.  - BMP daily   - Nephrology consult, recs appreciated    - dialysis today     Fatigue  Poor PO intake   Abdominal bloating  BMI 16  Ongoing for 1wk, developed n/v on day of admission. Cachectic appearing on exam. No si/sx to suggest infection - afebrile, WBC wnl, CXR negative for opacities. Possibly could be related to ESRD. She does have a h/o SBO and ileus, but abdominal exam was benign.   - Protonix 40mg IV d   - Consider abdominal imaging if worsening     Seru amnion gap without acidosis  AG 17 on admission. Lactate wnl, bicarb wnl. Unclear etiology. Difficult to interpret in setting of ESRD. No si/sx of infection.   - BMP daily      Chronic conditions:  HFrEF (EF  "25-30%)  HTN  Last echo completed May 2024, showing EF 25-30%   - Metoprolol/ASA per above   - Consider repeat echo      JAMIL - continue PTA zoloft, lorazepam, and nortriptyline   Thyroid disease - Continue PTA levothyroxine   HLD - continue PTA lipitor        Diet: NPO for Medical/Clinical Reasons Except for: Meds, Ice Chips    DVT Prophylaxis: Pneumatic Compression Devices  Pool Catheter: Not present  Fluids: PO  Lines: None     Cardiac Monitoring: ACTIVE order. Indication: AMI (NSTEMI/ STEMI) (48 hours)  Code Status: Full Code      Clinically Significant Risk Factors Present on Admission        # Hyperkalemia: Highest K = 6.3 mmol/L in last 2 days, will monitor as appropriate    # Hypercalcemia: Highest Ca = 10.4 mg/dL in last 2 days, will monitor as appropriate   # Anion Gap Metabolic Acidosis: Highest Anion Gap = 19 mmol/L in last 2 days, will monitor and treat as appropriate    # Drug Induced Platelet Defect: home medication list includes an antiplatelet medication   # Hypertension: Noted on problem list  # Chronic heart failure with reduced ejection fraction: last echo with EF <40%  # Acute Respiratory Failure: Documented O2 saturation < 91%.  Continue supplemental oxygen as needed   # Anemia: based on hgb <11               # Financial/Environmental Concerns:           Disposition Plan      Expected Discharge Date: 06/27/2024    Discharge Delays: Procedure Pending (enter procedure & time in comments)            The patient's care was discussed with the Attending Physician, Dr. Lam .    Maureen Hamlin MD  Hospitalist Service  [facility]  Securely message with [employer] (more info)  Text page via [employer] Paging/Directory   ______________________________________________________________________    Interval History   Patient seen in dialysis this morning. She notes feeling hungry. Feels that her breathing is better with the oxygen in place. She has \"racked her brain\" trying to figure " out why she has been feeling sick.     Physical Exam   Vital Signs: Temp: 97.4  F (36.3  C) Temp src: Temporal BP: (!) 161/89 Pulse: 104   Resp: 30 SpO2: 99 % O2 Device: None (Room air) Oxygen Delivery: 1.5 LPM  Weight: 84 lbs 0 oz  GEN: Patient sitting comfortably in no acute distress.  HEEN: Head is atraumatic, normocephalic, eyes anicteric, mucous membranes moist.  CV: Regular rate and rhythm, systolic murmur left lower sternal border  PULM: Clear to auscultation bilaterally without wheezing or rales.  ABD: Soft, nontender, bowel sounds present.  NEURO: Alert and oriented x3.  No focal motor abnormalities.  Face symmetric.  PSYCH: Appropriate affect.  SKIN: No rashes, bruising, or other lesions.      Medical Decision Making             Data

## 2024-06-26 NOTE — CONSULTS
RENAL CONSULT NOTE    REQUESTING PHYSICIAN: Rupal Ramirez MD    REASON FOR CONSULT: ESRD on HD    ASSESSMENT/PLAN:  ESRD on  HD: D[place]yzes at [facility] [place]. Primary nephrologist Dr. Armstrong. Last HD on 6/22.  min, TW 37kg.   Plan to dialyze tomorrow to make up for today.   Renal diet  Renally dosing medications.     Electrolytes/hyperkalemia: Na 132 and K 6.3 upon admission. After management from ED K was down to 5.4.   Plan for HD tomorrow.     Acid-base: Bicarb 25.   HD as above.     BMD: Ca 10.4 today. Cor Ca 9.6, Phos 5.1,  on 6/11 at outpatient unit.   PTA calcium acetate.   Resume Calcium acetate wm.   Renal diet.     Anemia: Hb was 11.2 on 6/11. 10.5 upon admission and 10.0 in recheck.   Monitor hb as it is concerning for GIB.     BP/Volume: Volume is clinically euvolemic. BP acceptable.   HD with UF as above.     Access: Lt upper AVF (+).   Thrill and bruit on exam.  No issue documented outpatient HD unit.       HPI: 68 F with ESRD on HD dialyze at [facility] [place] came in for worsening fatigue, poor po intake and orthopnea. She was found to have elevated troponin and admitted for further management.   Nephrology consulted for management of ESRD.  Her dialysis was on 6/22 and ran 174 mins.   She stated that she has been eating a lot of fruit during weekend. Also complaint of dark vomitus today.     REVIEW OF SYSTEMS: a 12 point review of systems was reviewed and negative other than noted in the HPI above.      Past Medical History:   Diagnosis Date    Anemia in chronic kidney disease     Anxiety     Arthritis     Brain aneurysm     Cavernous segment of the right & left carotid arteries. See Neurosurg note 6/16/21.    Chronic low back pain     Managed by [facility]    Congestive heart failure, unspecified HF chronicity, unspecified heart failure type (H) 4/29/2024    Depressive disorder 2013    Disease of liver 8/29/2023    Disease of thyroid gland     ESRD (end stage renal disease)  on dialysis (H) 07/2020    GERD (gastroesophageal reflux disease)     Hepatic lesion     Hyperlipidemia     Hypertension     Nephrolithiasis     Neuromuscular disorder (H)     Osteoporosis     PKD (polycystic kidney disease) 09/01/1990    Primary generalized (osteo)arthritis 8/2/2023    PTSD (post-traumatic stress disorder)     Tobacco abuse     Vitamin D deficiency        Current Facility-Administered Medications   Medication Dose Route Frequency Provider Last Rate Last Admin    acetaminophen (TYLENOL) tablet 650 mg  650 mg Oral Q4H PRN Rupal Ramirez MD        Or    acetaminophen (TYLENOL) Suppository 650 mg  650 mg Rectal Q4H PRN Rupal Ramirez MD        [START ON 6/26/2024] aspirin (ASA) chewable tablet 81 mg  81 mg Oral Daily Rupal Ramirez MD        atorvastatin (LIPITOR) tablet 80 mg  80 mg Oral QPM Rupal Ramirez MD   80 mg at 06/25/24 1956    calcium acetate (PHOSLO) capsule 667-1,334 mg  667-1,334 mg Oral TID w/meals Rupal Ramirez MD        [START ON 6/26/2024] clopidogrel (PLAVIX) tablet 75 mg  75 mg Oral Daily Rupal Ramirez MD        cyclobenzaprine (FLEXERIL) tablet 5 mg  5 mg Oral BID PRN Rupal Ramirez MD        glucose gel 15-30 g  15-30 g Oral Q15 Min PRN Immanuel Mullins MD        Or    dextrose 50 % injection 25-50 mL  25-50 mL Intravenous Q15 Min PRN Immanuel Mullins MD   50 mL at 06/25/24 1942    Or    glucagon injection 1 mg  1 mg Subcutaneous Q15 Min PRN Immanuel Mullins MD        levothyroxine (SYNTHROID/LEVOTHROID) tablet 50 mcg  50 mcg Oral QPM Rupal Ramirez MD   50 mcg at 06/25/24 1958    lidocaine (LMX4) cream   Topical Q1H PRN Rupal Ramirez MD        lidocaine 1 % 0.1-1 mL  0.1-1 mL Other Q1H PRN Rupal Ramirez MD        LORazepam (ATIVAN) tablet 1 mg  1 mg Oral Daily PRN Rupal Ramirez MD        [START ON 6/26/2024] metoprolol succinate ER (TOPROL XL) 24 hr tablet 25 mg  25 mg Oral Daily Rupal Ramirez MD        nitroGLYcerin (NITROSTAT) sublingual tablet 0.4 mg  0.4 mg  Sublingual Q5 Min PRN Rupal Ramirez MD        nortriptyline (PAMELOR) capsule 75 mg  75 mg Oral At Bedtime Rupal Ramirez MD   75 mg at 06/25/24 2129    ondansetron (ZOFRAN ODT) ODT tab 4 mg  4 mg Oral Q6H PRN Rupal Ramirez MD        Or    ondansetron (ZOFRAN) injection 4 mg  4 mg Intravenous Q6H PRN Rupal Ramirez MD        pantoprazole (PROTONIX) IV push injection 40 mg  40 mg Intravenous QPM Rupal Ramirez MD   40 mg at 06/25/24 2227    prochlorperazine (COMPAZINE) injection 5 mg  5 mg Intravenous Q6H PRN Rupal Ramirez MD        Or    prochlorperazine (COMPAZINE) tablet 5 mg  5 mg Oral Q6H PRN Rupal Ramirez MD        Or    prochlorperazine (COMPAZINE) suppository 12.5 mg  12.5 mg Rectal Q12H PRN Rupal Ramirez MD        senna-docusate (SENOKOT-S/PERICOLACE) 8.6-50 MG per tablet 1 tablet  1 tablet Oral BID PRN Rupal Ramirez MD        Or    senna-docusate (SENOKOT-S/PERICOLACE) 8.6-50 MG per tablet 2 tablet  2 tablet Oral BID PRN Rupal Ramirez MD        sertraline (ZOLOFT) tablet 150 mg  150 mg Oral At Bedtime Rupal Ramirez MD   150 mg at 06/25/24 2128    sodium chloride (PF) 0.9% PF flush 3 mL  3 mL Intracatheter Q8H Rupal Ramirez MD   3 mL at 06/25/24 1845    sodium chloride (PF) 0.9% PF flush 3 mL  3 mL Intracatheter q1 min prn Rupal Ramirez MD           No current outpatient medications on file.      ALLERGIES/SENSITIVITIES:  Allergies   Allergen Reactions    Penicillins Other (See Comments) and Shortness Of Breath     Breathing problem.  breathing      No Clinical Screening - See Comments      PN: LW CM1: Contrast Intercapsular - Nonionic Reaction :    Nsaids Other (See Comments)     Kidney damage    Carvedilol GI Disturbance     Nausea and vomiting    Ciprofloxacin Muscle Pain (Myalgia)    Floxacillin (Flucloxacillin) Muscle Pain (Myalgia) and Rash    Levofloxacin Muscle Pain (Myalgia) and Rash    Morphine Nausea and Vomiting    Sulfa Antibiotics Itching     Social History     Tobacco Use    Smoking  status: Unknown    Smokeless tobacco: Never   Substance Use Topics    Alcohol use: Yes     Comment: occasional    Drug use: Yes     Types: Marijuana     I have reviewed this patient's family history and updated it with pertinent information if needed.  Family History   Problem Relation Age of Onset    Polycystic Kidney Diease Mother     Hypertension Mother     Kidney Disease Mother     Cerebrovascular Disease Mother     Chronic Obstructive Pulmonary Disease Father     Multiple Sclerosis Father     Polycystic Kidney Diease Sister     Cardiac Sudden Death Sister 52    Hypertension Sister     Kidney Disease Sister     Polycystic Kidney Diease Maternal Grandmother     Hypertension Maternal Grandmother     Kidney Disease Maternal Grandmother     Cerebrovascular Disease Maternal Grandmother     Heart Disease Maternal Grandfather     Hyperlipidemia Paternal Grandmother     Cerebrovascular Disease Paternal Grandmother     Coronary Artery Disease Paternal Grandfather     Pulmonary Embolism Paternal Grandfather     Anesthesia Reaction No family hx of          PHYSICAL EXAM:  Physical Exam   Temp: 98  F (36.7  C) Temp src: Oral BP: (!) 146/84 Pulse: 91   Resp: 15 SpO2: 94 % O2 Device: Nasal cannula Oxygen Delivery: 1.5 LPM  Vitals:    06/25/24 1335   Weight: 38.1 kg (84 lb)     Vital Signs with Ranges  Temp:  [97.8  F (36.6  C)-98.5  F (36.9  C)] 98  F (36.7  C)  Pulse:  [91-98] 91  Resp:  [12-23] 15  BP: (138-156)/(74-95) 146/84  SpO2:  [85 %-98 %] 94 %  No intake/output data recorded.    @TMAXR(24)@    Patient Vitals for the past 72 hrs:   Weight   06/25/24 1335 38.1 kg (84 lb)       General - A & O x 3, NAD  HEENT - PERRLA, no scleral icterus  Neck - no carotid bruits, no JVD  Respiratory - Lungs CTA bilat without wheeze, rhonchi, rales  Cardiovascular - AP RRR without murmur  Abdomen - tenderness on epigastrium.   Extremities - well perfused, no edema  Integumentary - intact, good turgor, no rash/lesions  Neurologic -  grossly intact  Psych:  Judgement intact, affect WNL  :  No Pool's catheter.     Laboratory:     Recent Labs   Lab 06/25/24 1848 06/25/24 1434   WBC  --  9.9   RBC  --  3.10*   HGB 10.0* 10.5*   HCT  --  32.7*   PLT  --  386       Basic Metabolic Panel:  Recent Labs   Lab 06/25/24 2052 06/25/24 2045 06/25/24 2003 06/25/24 1938 06/25/24 1911 06/25/24 1848 06/25/24 1846 06/25/24 1812 06/25/24 1714 06/25/24  1434   NA  --   --   --   --   --   --   --   --   --  132*   POTASSIUM 5.4*  --   --   --   --  5.5*  --   --   --  6.3*   CHLORIDE  --   --   --   --   --   --   --   --   --  90*   CO2  --   --   --   --   --   --   --   --   --  25   BUN  --   --   --   --   --   --   --   --   --  91.1*   CR  --   --   --   --   --   --   --   --   --  5.94*   GLC  --  154* 240* 74 86  --  95 110*   < > 101*   GAVIN  --   --   --   --   --   --   --   --   --  10.4*    < > = values in this interval not displayed.       INRNo lab results found in last 7 days.    Recent Labs   Lab Test 06/25/24 2052 06/25/24 1848 06/25/24 1434 05/20/24  0901   POTASSIUM 5.4* 5.5* 6.3* 4.6   CHLORIDE  --   --  90* 95*   BUN  --   --  91.1* 39.1*      Recent Labs   Lab Test 05/17/24  1427 04/30/24  0508 04/29/24  1205 08/18/22  1731 05/19/21  1520   ALBUMIN 3.8 3.7 3.9   < >  --    BILITOTAL 0.5  --  0.6   < >  --    ALT 10  --  <5   < >  --    AST 32  --  21   < >  --    PROTEIN  --   --   --   --  100*    < > = values in this interval not displayed.       Personally reviewed today's laboratory studies      Thank you for involving us in the care of this patient. We will continue to follow along with you.      Cecy Bates MD  [facility]  [phone]

## 2024-06-26 NOTE — ED NOTES
Patient received regular insulin at approximately 1835. She is sleepy, though she says this is what she came in for and sweaty, Her BG has been trending down, now 86. Text page sent to Dr. Lam to update.

## 2024-06-26 NOTE — PROGRESS NOTES
Care Management Initial Consult    General Information  Assessment completed with: Patient,    Type of CM/SW Visit: Initial Assessment    Primary Care Provider verified and updated as needed: Yes   Readmission within the last 30 days: no previous admission in last 30 days      Reason for Consult: discharge planning, other (see comments) (high readmission score)  Advance Care Planning: Advance Care Planning Reviewed: no concerns identified          Communication Assessment  Patient's communication style: spoken language (English or Bilingual)    Hearing Difficulty or Deaf: no   Wear Glasses or Blind: no    Cognitive  Cognitive/Neuro/Behavioral: WDL                      Living Environment:   People in home: alone     Current living Arrangements: apartment      Able to return to prior arrangements: yes       Family/Social Support:  Care provided by: self  Provides care for: no one                Description of Support System:           Current Resources:   Patient receiving home care services: No     Community Resources: None  Equipment currently used at home: none  Supplies currently used at home: Incontinence Supplies    Employment/Financial:  Employment Status: retired, disabled        Financial Concerns: none           Does the patient's insurance plan have a 3 day qualifying hospital stay waiver?  No    Lifestyle & Psychosocial Needs:  Social Determinants of Health     Food Insecurity: Not on file   Depression: Not at risk (1/26/2021)    PHQ-2     PHQ-2 Score: 2   Housing Stability: Not on file   Tobacco Use: Unknown (5/17/2024)    Patient History     Smoking Tobacco Use: Unknown     Smokeless Tobacco Use: Never     Passive Exposure: Not on file   Financial Resource Strain: Not on file   Alcohol Use: Not on file   Transportation Needs: Not on file   Physical Activity: Not on file   Interpersonal Safety: Not on file   Stress: Not on file   Social Connections: Not on file   Health Literacy: Not on file        Functional Status:  Prior to admission patient needed assistance:   Dependent ADLs:: Independent  Dependent IADLs:: Cleaning, Laundry       Mental Health Status:          Chemical Dependency Status:                Values/Beliefs:  Spiritual, Cultural Beliefs, Yazdanism Practices, Values that affect care:                 Additional Information:  Assessed. Pt lives in a apartment alone. She is independent with ADLs and IADLs. Get support from her sister with laundry and cleaning from time to time. No equipment. Still drives. Has dialysis at [facility] T/T/S. Family to transport at discharge. PEREZ explained to pt, pt signed, copy placed in pt chart. Pt did not want a copy of the MOON.    RNCM to follow for medical progression, recommendations, and final discharge plan.        Ronit Hobbs RN

## 2024-06-26 NOTE — ED NOTES
[facility] ED Handoff Report    ED Chief Complaint: fatigue    ED Diagnosis:  (I21.4) NSTEMI (non-ST elevated myocardial infarction) (H)  Comment: troponin chronically elevated after stent placement 2 months ago.     (E87.5) Hyperkalemia    (N18.9) Chronic renal failure, unspecified CKD stage       PMH:    Past Medical History:   Diagnosis Date    Anemia in chronic kidney disease     Anxiety     Arthritis     Brain aneurysm     Cavernous segment of the right & left carotid arteries. See Neurosurg note 6/16/21.    Chronic low back pain     Managed by [facility]    Congestive heart failure, unspecified HF chronicity, unspecified heart failure type (H) 4/29/2024    Depressive disorder 2013    Disease of liver 8/29/2023    Disease of thyroid gland     ESRD (end stage renal disease) on dialysis (H) 07/2020    GERD (gastroesophageal reflux disease)     Hepatic lesion     Hyperlipidemia     Hypertension     Nephrolithiasis     Neuromuscular disorder (H)     Osteoporosis     PKD (polycystic kidney disease) 09/01/1990    Primary generalized (osteo)arthritis 8/2/2023    PTSD (post-traumatic stress disorder)     Tobacco abuse     Vitamin D deficiency         Code Status:  Full Code     Falls Risk: Yes Band: Applied    Current Living Situation/Residence: lives in an apartment     Elimination Status: Continent: Yes of stool. Anuria; bilateral nephrectomy    Activity Level: SBA    Patients Preferred Language:  English     Needed: No    Vital Signs:  BP (!) 149/95   Pulse 97   Temp 97.8  F (36.6  C) (Oral)   Resp 22   Wt 38.1 kg (84 lb)   SpO2 95%   BMI 16.96 kg/m       Cardiac Rhythm:     Pain Score: 0/10    Is the Patient Confused:  No    Last Food or Drink: 6/25/24 with meds at 2000, otherwise NPO    Focused Assessment:  patient comes to ED for evaluation of fatigue and shortness of breath at home x1 week. She also endorses abdominal fullness and had several episodes of dark emesis at home early this  morning. Abdomen is soft in all quadrants with the epigastrium tender to palpation. Hgb today was 10.5 then 10.   Patient receives dialysis T/Th/Sat. Her last run was Saturday. She missed dialysis today due to ED visit. K+ was 6.3 - treated. Following insulin administration, patient became increasingly sleepy and clammy with her BG trending down consistently. Treated her with 50 mL dextrose at 1942, the 15 min recheck of her sugar was 240 and Dr. Ramirez ordered 2 units aspart. However at 2040, her BG had dropped to 154 so the aspart was not given and Dr. Ramirez was notified. Remaining BG checks due at 2140 and 2240. She is alert and oriented.    Tests Performed: Done: Labs    Treatments Provided:  see MAR    Family Dynamics/Concerns: No    Family Updated On Visitor Policy: No    Plan of Care Communicated to Family: No    Who Was Updated about Plan of Care: patient to update family at her own discretion    Belongings Checklist Done and Signed by Patient: Yes    Belongings Sent with Patient:  clothing; shoes; purse/wallet; cash/credit card; jewelry (3 rings); cell phone/electronics (cell phone, iPad); tote bag     Medications sent with patient: sertraline, nortriptyline, calcium acetate    Covid: asymptomatic , not tested    Additional Information: patient desaturated to 85-88% on room air, known orthopnea and sleep apnea, however she declined raising HOB as she reports it worsens her shortness of breath. NC applied at 1.5 LPM    RN: Ruth THAPA 6/25/2024 8:20 PM

## 2024-06-26 NOTE — PROGRESS NOTES
Nuclear stress test with regadenoson 0.4 mg IV, vasodilator side effects required aminophylline dose for reversal, images pending.

## 2024-06-26 NOTE — CONSULTS
HEART CARE NOTE        Thank you, Dr. Lam, for asking the [facility] Heart Care team to see Serene Tipton to evaluate NSTEMI.      Assessment/Recommendations     1.  Severe HFrEF c/b ADHF  Assessment / Plan  Volume management per nephrology    2. ESRD  Assessment / Plan  Volume management per nephrology    3. Hyponatremia  Assessment / Plan  Likely hypervolemic hyponatremia; Volume and solute management per nephrology    4. CAD c/b NSTEMI  Assessment / Plan  Troponin persistently elevated S/p recent MI/PCI in the setting of ESRD c/b volume overload - troponin is noted to be trending down in comparison to most recent admission AMI admission (pk 265 at the time PCI and now down to 156); given the above and lack of angina, more likely that renal dysfunction and volume overload are the culprits behind persistent troponin leak given   Proceed with nuc stress testing  Continue ASA, high intensity atorvastatin, clopidogrel, metoprolol    5. HTN  Assessment / Plan  Management per nephrology    6. HLP  Assessment / Plan  Continue high intensity atorvastatin    Clinically Significant Risk Factors Present on Admission        # Hyperkalemia: Highest K = 6.3 mmol/L in last 2 days, will monitor as appropriate    # Hypercalcemia: Highest Ca = 10.4 mg/dL in last 2 days, will monitor as appropriate   # Anion Gap Metabolic Acidosis: Highest Anion Gap = 19 mmol/L in last 2 days, will monitor and treat as appropriate    # Drug Induced Platelet Defect: home medication list includes an antiplatelet medication   # Hypertension: Noted on problem list  # Chronic heart failure with reduced ejection fraction: last echo with EF <40%  # Acute Respiratory Failure: Documented O2 saturation < 91%.  Continue supplemental oxygen as needed   # Anemia: based on hgb <11               # Financial/Environmental Concerns:          Cardiomyopathy  Non-Rheumatic Valve Disease: Mitral valve insufficiency  Tricuspid valve  insufficiency  Systolic acute    hyponatremia and Hypo-osmolality, Fluid overload, unspecified, Other fluid overload, and Other disorders of electrolyte and fluid balance, not elsewhere classified    CKD POA List: ESRD on dialysis    85 minutes spent reviewing prior records (including documentation, laboratory studies, cardiac testing/imaging), history and physical exam, planning, and subsequent documentation.    History of Present Illness/Subjective    Ms. Serene Tipton is a 68 year old female with a PMHx significant for (per Epic notation) w/PMHx significant for NSTEMI s/p PCI (Apr 2024), ESRD on HD T/Th/Sa, PCKD s/p BL nephrectomy, CHF, tobacco use disorder, HTN, JAMIL, and HLD admitted on 6/25/2024 for 1wk of worsening fatigue, poor PO intake, and orthopnea.     Today, Mrs. Tipton denies acute cardiac events or complaints including, but not limited to angina; Management plan as detailed above    ECG: Personally reviewed. normal sinus rhythm, nonspecific ST and T waves changes.    ECHO (personnaly Reviewed on 6/26/24):   1. The left ventricle is mildly dilated. There is severe global hypokinesia of  the left ventricle. Left ventricular function is decreased. The ejection  fraction is 25-30% (severely reduced).  2. Normal right ventricle size and systolic function.  3. There is mod-severe to severe (3-4+) mitral regurgitation. There is  moderate (2+) tricuspid regurgitation.  4. Right ventricular systolic pressure is elevated, consistent with mild  pulmonary hypertension.  5. Trivial to small pericardial effusion. There are no echocardiographic  indications of cardiac tamponade.     Compared to the prior study dated 4/30/2024, mild interval improvement in LVEF  and normalization of RV function on current study. No significant change in  pericardial effusion.    Telemetry: personally reviewed June 26, 2024; notable for sinus rhythm     Lab results: personally reviewed June 26, 2024; notable for  hyponatremia    Medical history and pertinent documents reviewed in Care Everywhere please where applicable see details above          Physical Examination Review of Systems   BP (!) 148/82   Pulse 96   Temp 97.5  F (36.4  C) (Oral)   Resp 18   Wt 38.1 kg (84 lb)   SpO2 93%   BMI 16.96 kg/m    Body mass index is 16.96 kg/m .  Wt Readings from Last 3 Encounters:   06/25/24 38.1 kg (84 lb)   05/20/24 37.2 kg (82 lb 0.2 oz)   05/10/24 37.6 kg (83 lb)     General Appearance:   no distress, normal body habitus   ENT/Mouth: membranes moist, no oral lesions or bleeding gums.      EYES:  no scleral icterus, normal conjunctivae   Neck: no carotid bruits or thyromegaly   Chest/Lungs:   lungs are clear to auscultation, no rales or wheezing, equal chest wall expansion    Cardiovascular:   Regular. Normal first and second heart sounds with +TAL; no rubs, or gallops; the carotid, radial and posterior tibial pulses are intact, + JVD and mild LE edema bilaterally    Abdomen:  no organomegaly, masses, bruits, or tenderness; bowel sounds are present   Extremities: no cyanosis or clubbing   Skin: no xanthelasma, warm.    Neurologic: NAD     Psychiatric: alert and oriented x3, calm     A complete 10 systems ROS was reviewed  And is negative except what is listed in the HPI.          Medical History  Surgical History Family History Social History   Past Medical History:   Diagnosis Date    Anemia in chronic kidney disease     Anxiety     Arthritis     Brain aneurysm     Cavernous segment of the right & left carotid arteries. See Neurosurg note 6/16/21.    Chronic low back pain     Managed by [facility]    Congestive heart failure, unspecified HF chronicity, unspecified heart failure type (H) 4/29/2024    Depressive disorder 2013    Disease of liver 8/29/2023    Disease of thyroid gland     ESRD (end stage renal disease) on dialysis (H) 07/2020    GERD (gastroesophageal reflux disease)     Hepatic lesion     Hyperlipidemia      Hypertension     Nephrolithiasis     Neuromuscular disorder (H)     Osteoporosis     PKD (polycystic kidney disease) 09/01/1990    Primary generalized (osteo)arthritis 8/2/2023    PTSD (post-traumatic stress disorder)     Tobacco abuse     Vitamin D deficiency     Past Surgical History:   Procedure Laterality Date    BACK SURGERY      spinal fusion w/hardware    BIOPSY Left 09/01/1990    Breast    BREAST LUMPECTOMY, RT/LT Left     Lumpectomy    CV CORONARY ANGIOGRAM N/A 4/30/2024    Procedure: CV CORONARY ANGIOGRAM;  Surgeon: Jac Russell MD;  Location: [facility] LAB CV    CV CORONARY LITHOTRIPSY PCI N/A 4/30/2024    Procedure: Percutaneous Coronary Intervention - Lithotripsy;  Surgeon: Jac Russell MD;  Location: [facility] CATH LAB CV    CV LEFT HEART CATH N/A 4/30/2024    Procedure: Left Heart Catheterization;  Surgeon: Jac Russell MD;  Location: [facility] LAB CV    CV PCI STENT DRUG ELUTING N/A 4/30/2024    Procedure: Percutaneous Coronary Intervention Stent;  Surgeon: Jac Russell MD;  Location: [facility] CATH LAB CV    CYST REMOVAL Right     rt wrist    CYSTECTOMY PILONIDAL  1981    EYE SURGERY  2008    lasik    FORMATION ARTERIOVENOUS FISTULA    07/28/2020    FRACTURE SURGERY      IR CVC TUNNEL W2 CATH W/O PORT  7/17/2020    IR DIALYSIS FISTULOGRAM RIGHT  10/21/2020    NEPHRECTOMY BILATERAL Bilateral 2/1/2023    Procedure: bilateral native nephrectomy;  Surgeon: Alana Giang MD;  Location: [facility] OR    OPEN REDUCTION INTERNAL FIXATION HIP BIPOLAR Left 11/10/2023    Procedure: LEFT HEMIARTHROPLASTY, HIP, HEMIARTHROPLASTY;  Surgeon: Lucas Weldon MD;  Location: [facility] Main OR    ORTHOPEDIC SURGERY Right 2005    Shoulder    OTHER SURGICAL HISTORY      carpal tunnel repair    SPINE SURGERY      no family history of premature coronary artery disease Social History     Socioeconomic History    Marital status: Single     Spouse name: Not on file    Number of children: Not on file     Years of education: Not on file    Highest education level: Not on file   Occupational History    Not on file   Tobacco Use    Smoking status: Unknown    Smokeless tobacco: Never   Substance and Sexual Activity    Alcohol use: Yes     Comment: occasional    Drug use: Yes     Types: Marijuana    Sexual activity: Not on file   Other Topics Concern    Parent/sibling w/ CABG, MI or angioplasty before 65F 55M? Not Asked   Social History Narrative    Not on file     Social Determinants of Health     Financial Resource Strain: Not on file   Food Insecurity: Not on file   Transportation Needs: Not on file   Physical Activity: Not on file   Stress: Not on file   Social Connections: Not on file   Interpersonal Safety: Not on file   Housing Stability: Not on file           Lab Results    Chemistry/lipid CBC Cardiac Enzymes/BNP/TSH/INR   Lab Results   Component Value Date    CHOL 111 04/30/2024    HDL 46 (L) 04/30/2024    TRIG 76 04/30/2024    BUN 95.1 (H) 06/26/2024     (L) 06/26/2024    CO2 23 06/26/2024    Lab Results   Component Value Date    WBC 10.5 06/26/2024    HGB 10.1 (L) 06/26/2024    HCT 31.5 (L) 06/26/2024     (H) 06/26/2024     06/26/2024    Lab Results   Component Value Date    TROPONINI 0.03 07/21/2023    BNP >5,000 (H) 02/25/2023    TSH 3.96 09/06/2023    INR 1.70 (H) 07/13/2023     Lab Results   Component Value Date    TROPONINI 0.03 07/21/2023          Weight:    Wt Readings from Last 3 Encounters:   06/25/24 38.1 kg (84 lb)   05/20/24 37.2 kg (82 lb 0.2 oz)   05/10/24 37.6 kg (83 lb)       Allergies  Allergies   Allergen Reactions    Penicillins Other (See Comments) and Shortness Of Breath     Breathing problem.  breathing      No Clinical Screening - See Comments      PN: LW CM1: Contrast Intercapsular - Nonionic Reaction :    Nsaids Other (See Comments)     Kidney damage    Carvedilol GI Disturbance     Nausea and vomiting    Ciprofloxacin Muscle Pain (Myalgia)    Floxacillin  (Flucloxacillin) Muscle Pain (Myalgia) and Rash    Levofloxacin Muscle Pain (Myalgia) and Rash    Morphine Nausea and Vomiting    Sulfa Antibiotics Itching         Surgical History  Past Surgical History:   Procedure Laterality Date    BACK SURGERY      spinal fusion w/hardware    BIOPSY Left 09/01/1990    Breast    BREAST LUMPECTOMY, RT/LT Left     Lumpectomy    CV CORONARY ANGIOGRAM N/A 4/30/2024    Procedure: CV CORONARY ANGIOGRAM;  Surgeon: Jac Russell MD;  Location: [facility] LAB CV    CV CORONARY LITHOTRIPSY PCI N/A 4/30/2024    Procedure: Percutaneous Coronary Intervention - Lithotripsy;  Surgeon: Jac Russell MD;  Location: [facility] LAB CV    CV LEFT HEART CATH N/A 4/30/2024    Procedure: Left Heart Catheterization;  Surgeon: Jac Russell MD;  Location: [facility] CV    CV PCI STENT DRUG ELUTING N/A 4/30/2024    Procedure: Percutaneous Coronary Intervention Stent;  Surgeon: Jac Russell MD;  Location: [facility] CATH LAB CV    CYST REMOVAL Right     rt wrist    CYSTECTOMY PILONIDAL  1981    EYE SURGERY  2008    lasik    FORMATION ARTERIOVENOUS FISTULA    07/28/2020    FRACTURE SURGERY      IR CVC TUNNEL W2 CATH W/O PORT  7/17/2020    IR DIALYSIS FISTULOGRAM RIGHT  10/21/2020    NEPHRECTOMY BILATERAL Bilateral 2/1/2023    Procedure: bilateral native nephrectomy;  Surgeon: Alana Giang MD;  Location: [facility] OR    OPEN REDUCTION INTERNAL FIXATION HIP BIPOLAR Left 11/10/2023    Procedure: LEFT HEMIARTHROPLASTY, HIP, HEMIARTHROPLASTY;  Surgeon: Lucas Weldon MD;  Location: [facility] Main OR    ORTHOPEDIC SURGERY Right 2005    Shoulder    OTHER SURGICAL HISTORY      carpal tunnel repair    SPINE SURGERY         Social History  Tobacco:   History   Smoking Status    Unknown   Smokeless Tobacco    Never    Alcohol:   Social History    Substance and Sexual Activity      Alcohol use: Yes        Comment: occasional   Illicit Drugs:   History   Drug Use    Types: Marijuana        Family History  Family History   Problem Relation Age of Onset    Polycystic Kidney Diease Mother     Hypertension Mother     Kidney Disease Mother     Cerebrovascular Disease Mother     Chronic Obstructive Pulmonary Disease Father     Multiple Sclerosis Father     Polycystic Kidney Diease Sister     Cardiac Sudden Death Sister 52    Hypertension Sister     Kidney Disease Sister     Polycystic Kidney Diease Maternal Grandmother     Hypertension Maternal Grandmother     Kidney Disease Maternal Grandmother     Cerebrovascular Disease Maternal Grandmother     Heart Disease Maternal Grandfather     Hyperlipidemia Paternal Grandmother     Cerebrovascular Disease Paternal Grandmother     Coronary Artery Disease Paternal Grandfather     Pulmonary Embolism Paternal Grandfather     Anesthesia Reaction No family hx of           Anyi Patterson MD on 6/26/2024      cc: Ramos Lowry,

## 2024-06-26 NOTE — PROCEDURES
Potassium   Date Value Ref Range Status   06/26/2024 3.1 (L) 3.4 - 5.3 mmol/L Final   07/25/2023 4.7 3.5 - 5.0 mmol/L Final   05/19/2021 3.9 3.4 - 5.3 mmol/L Final     Hemoglobin   Date Value Ref Range Status   06/26/2024 10.1 (L) 11.7 - 15.7 g/dL Final   05/19/2021 13.0 11.7 - 15.7 g/dL Final     Creatinine   Date Value Ref Range Status   06/26/2024 1.72 (H) 0.51 - 0.95 mg/dL Final   05/19/2021 3.43 (H) 0.52 - 1.04 mg/dL Final     Urea Nitrogen   Date Value Ref Range Status   06/26/2024 18.7 8.0 - 23.0 mg/dL Final   07/25/2023 27 (H) 8 - 22 mg/dL Final   05/19/2021 27 7 - 30 mg/dL Final     Sodium   Date Value Ref Range Status   06/26/2024 139 135 - 145 mmol/L Final   05/19/2021 136 133 - 144 mmol/L Final     INR   Date Value Ref Range Status   07/13/2023 1.70 (H) 0.85 - 1.15 Final   05/19/2021 1.02 0.86 - 1.14 Final       DIALYSIS PROCEDURE NOTE  Hepatitis status of previous patient on machine log was checked and verified ok to use with this patients hepatitis status.  Patient dialyzed for 3 hrs. on a K2 bath with a net fluid removal of  1.5L.  A BFR of 400 ml/min was obtained 15 gauge needles.      The treatment plan was discussed with Dr. Bates during the treatment.    Needle cannulation sites held x 5 min.     Meds  given: Lidocaine spray   Complications: none      Person educated: Patient. Knowledge base substantial. Barriers to learning: none. Educated on procedure via explanation.      ICEBOAT? Timeout performed pre-treatment  I: Patient was identified using 2 identifiers  C:  Consent Signed Yes  E: Equipment preventative maintenance is current and dialysis delivery system OK to use  B: Hepatitis B Surface Antigen: Negative; Draw Date: 3/24/24      Hepatitis B Surface Antibody: Immune; Draw Date: 3/24/24  O: Dialysis orders present and complete prior to treatment  A: Vascular access verified and assessed prior to treatment  T: Treatment was performed at a clinically appropriate time  ?: Patient was allowed to  ask questions and address concerns prior to treatment  See Adult Hemodialysis flowsheet in [facility] for further details and post assessment.  Machine water alarm in place and functioning. Transducer pods intact and checked every 15min.   Pt transferred to nuclear med via bed.  Chlorine/Chloramine water system checked every 4 hours.    Patient repositioned every 2 hours during the treatment.  Post treatment report given to CARI Roberts RN regarding 1.5L of fluid removed, last BP of 157/84, and patient pain rating of 0/10.

## 2024-06-26 NOTE — PLAN OF CARE
Heart Failure Care Map  GOALS TO BE MET BEFORE DISCHARGE:    1. Decrease congestion and/or edema with diuretic therapy to achieve near optimal volume status.     Dyspnea improved: No, further care required to meet this goal. Please explain Pt needs 1 liter to mainatin 90% and 1.5 while sleeping.    Edema improved: Yes, satisfactory for discharge.        Last 24 hour I/O: No intake or output data in the 24 hours ending 06/25/24 2326        Net I/O and Weights since admission:   No intake/output data recorded.     Vitals:    06/25/24 1335   Weight: 38.1 kg (84 lb)       2.  O2 sats > 90% on room air, or at prior home O2 therapy level.      Able to wean O2 this shift to keep sats above 90%?: No, further care required to meet this goal. Please explain 1-1.5 ;iters via nasal cannula.   Does patient use Home O2? No          Current oxygenation status:   SpO2: 94 %     O2 Device: Nasal cannula, Oxygen Delivery: 1.5 LPM    3.  Tolerates ambulation and mobility near baseline.     Ambulation: No, further care required to meet this goal. Please explain Pt  was able to take a few steps from stretcher to bed.    Times patient ambulated with staff this shift: 0    Please review the Heart Failure Care Map for additional HF goal outcomes.    Janie Salcedo RN  6/25/2024         Pt is alert and oriented x 4. She is able to make needs known.     Cardiac rhythm is NSR. No chest pain. Pt was brought in for SOB and fatigue with elevated troponin. Last troponin was 156 at 1848.

## 2024-06-27 PROBLEM — E87.5 HYPERKALEMIA: Status: RESOLVED | Noted: 2023-07-05 | Resolved: 2024-01-01

## 2024-06-27 PROBLEM — I21.4 NSTEMI (NON-ST ELEVATED MYOCARDIAL INFARCTION) (H): Status: RESOLVED | Noted: 2024-01-01 | Resolved: 2024-01-01

## 2024-06-27 PROBLEM — R06.01 ORTHOPNEA: Status: ACTIVE | Noted: 2024-01-01

## 2024-06-27 NOTE — PROGRESS NOTES
Care Management Discharge Note    Discharge Date: 06/27/2024       Discharge Disposition:  home    Discharge Services:  none    Discharge DME:  none    Discharge Transportation: family or friend will provide    Private pay costs discussed: Not applicable    Does the patient's insurance plan have a 3 day qualifying hospital stay waiver?  No    PAS Confirmation Code:    Patient/family educated on Medicare website which has current facility and service quality ratings:      Education Provided on the Discharge Plan:    Persons Notified of Discharge Plans: yes  Patient/Family in Agreement with the Plan:      Handoff Referral Completed: Yes    Additional Information:  Pt medically adequate for discharge. Discharge orders placed. No CM needs anticipated. CM will sign off.    Ronit Hobbs RN

## 2024-06-27 NOTE — PROCEDURES
Potassium   Date Value Ref Range Status   06/27/2024 4.5 3.4 - 5.3 mmol/L Final   07/25/2023 4.7 3.5 - 5.0 mmol/L Final   05/19/2021 3.9 3.4 - 5.3 mmol/L Final     Hemoglobin   Date Value Ref Range Status   06/27/2024 9.8 (L) 11.7 - 15.7 g/dL Final   05/19/2021 13.0 11.7 - 15.7 g/dL Final     Creatinine   Date Value Ref Range Status   06/27/2024 3.93 (H) 0.51 - 0.95 mg/dL Final   05/19/2021 3.43 (H) 0.52 - 1.04 mg/dL Final     Urea Nitrogen   Date Value Ref Range Status   06/27/2024 44.1 (H) 8.0 - 23.0 mg/dL Final   07/25/2023 27 (H) 8 - 22 mg/dL Final   05/19/2021 27 7 - 30 mg/dL Final     Sodium   Date Value Ref Range Status   06/27/2024 133 (L) 135 - 145 mmol/L Final   05/19/2021 136 133 - 144 mmol/L Final     INR   Date Value Ref Range Status   07/13/2023 1.70 (H) 0.85 - 1.15 Final   05/19/2021 1.02 0.86 - 1.14 Final       DIALYSIS PROCEDURE NOTE  Hepatitis status of previous patient on machine log was checked and verified ok to use with this patients hepatitis status.  Patient dialyzed for 3 hrs. on a K3 bath with a net fluid removal of  1L.  A BFR of 400 ml/min was obtained via a LUE AVF using 15 gauge needles.      The treatment plan was discussed with Dr. Bates during the treatment.      Needle cannulation sites held x 5 min.       Meds  given: None   Complications: none, pt tolerated tx well      Person educated: patient. Knowledge base substantial. Barriers to learning: none. Educated on procedure via oral mode.      ICEBOAT? Timeout performed pre-treatment  I: Patient was identified using 2 identifiers  C:  Consent Signed Yes  E: Equipment preventative maintenance is current and dialysis delivery system OK to use  B: Hepatitis B Surface Antigen: Negative; Draw Date: 3/24/24      Hepatitis B Surface Antibody: Immune; Draw Date: 3/24/24  O: Dialysis orders present and complete prior to treatment  A: Vascular access verified and assessed prior to treatment  T: Treatment was performed at a [facility]  appropriate time  ?: Patient was allowed to ask questions and address concerns prior to treatment  See Adult Hemodialysis flowsheet in [facility] for further details and post assessment.  Machine water alarm in place and functioning. Transducer pods intact and checked every 15min.   Pt returned via bed.  Chlorine/Chloramine water system checked every 4 hours.      Patient repositioned every 2 hours during the treatment.  Post treatment report given to MACK Carcamo RN regarding 1L of fluid removed, last BP of 147/85, and patient pain rating of 0/10.

## 2024-06-27 NOTE — PLAN OF CARE
Goal Outcome Evaluation:    Pt back from HD run. Vitals checked and WNL. On 4 lpm O2 via NC.  Left groin site WDL, CMS intact.  Pt did ambulate to hallway, tolerated well. CMS intact, no bleeding/hematoma noted.  Discharge orders/instructions given to pt, verbalized understanding.  Pt belongings remained with the pt. Discharging to home accompanied by friend via own transport.      Problem: Adult Inpatient Plan of Care  Goal: Absence of Hospital-Acquired Illness or Injury  Intervention: Identify and Manage Fall Risk  Recent Flowsheet Documentation  Taken 6/27/2024 1715 by Stone Carcamo, RN  Safety Promotion/Fall Prevention: activity supervised  Intervention: Prevent Skin Injury  Recent Flowsheet Documentation  Taken 6/27/2024 1715 by Stone Carcamo, RN  Body Position: position changed independently

## 2024-06-27 NOTE — PROGRESS NOTES
RENAL PROGRESS NOTE    CC:  Mansi Lam MD    ASSESSMENT & PLAN:   ESRD on  HD: Dialyzes at [facility] [place]. TTS schedule. Primary nephrologist Dr. Armstrong. Last HD on 6/22.  min, TW 37kg.   HD today per outpatient schedule.   Renal diet  Renally dosing medications.   Will check URR > 80% and c/w adequate. Her spKdt/V has been at goal outpatient also.      Electrolytes/hyperkalemia: 133 and K 4.5.   HD as above.     Acid-base: Bicarb 27.   HD as above.      BMD: Calcium 9.5 today.  Corrected Ca 9.6, Phos 5.1,  on 6/11 at outpatient unit.   PTA calcium acetate.   Resume Calcium acetate wm.   Renal diet.      Anemia: Hb was 11.2 on 6/11. 10.5 upon admission and 10.0 in recheck.  Hb 9.8 today.   Monitor hb.      BP/Volume: Volume is clinically euvolemic. BP acceptable.   HD with UF as above.      Access: Lt upper AVF (+).   No functional issue reported.      SUBJECTIVE: Patient seen and examined at bedside.  Stated better and back to her baseline. .  Denies any chest pain, palpitation, nausea.    OBJECTIVE:  Physical Exam   Temp: 98.2  F (36.8  C) Temp src: Temporal BP: (!) 146/91 Pulse: 97   Resp: 12 SpO2: 100 % O2 Device: Nasal cannula Oxygen Delivery: 2 LPM  Vitals:    06/25/24 1335 06/27/24 0424 06/27/24 0610   Weight: 38.1 kg (84 lb) 41 kg (90 lb 6.2 oz) 37.7 kg (83 lb 3.2 oz)     Vital Signs with Ranges  Temp:  [97.8  F (36.6  C)-99.1  F (37.3  C)] 98.2  F (36.8  C)  Pulse:  [] 97  Resp:  [12-18] 12  BP: (135-154)/(74-91) 146/91  SpO2:  [94 %-100 %] 100 %  I/O last 3 completed shifts:  In: 603 [P.O.:600; I.V.:3]  Out: 1500 [Other:1500]    @TMAXR(24)@    Patient Vitals for the past 72 hrs:   Weight   06/27/24 0610 37.7 kg (83 lb 3.2 oz)   06/27/24 0424 41 kg (90 lb 6.2 oz)   06/25/24 1335 38.1 kg (84 lb)     Intake/Output Summary (Last 24 hours) at 6/26/2024 1045  Last data filed at 6/26/2024 0800  Gross per 24 hour   Intake --   Output 0 ml   Net 0 ml       PHYSICAL  EXAM:  General - Alert and oriented x3, appears comfortable, NAD  Cardiovascular - Regular rate and rhythm, no rub  Respiratory - Clear to auscultation bilaterally, no crackles or wheezes  Abd: BS present, no guarding or pain with palpation, no ascites  Extremities - No lower extremity edema bilaterally  Skin: dry, intact, no rash, good turgor  Neuro:  Grossly intact, no focal deficits  MSK:  Grossly intact  Psych:  Normal affect    LABORATORY STUDIES:     Recent Labs   Lab 06/27/24  0502 06/26/24 0427 06/25/24  1848 06/25/24  1434   WBC 9.0 10.5  --  9.9   RBC 2.89* 2.98*  --  3.10*   HGB 9.8* 10.1* 10.0* 10.5*   HCT 30.9* 31.5*  --  32.7*    363  --  386       Basic Metabolic Panel:  Recent Labs   Lab 06/27/24  0502 06/26/24  1145 06/26/24 0427 06/25/24 2052 06/25/24 2045 06/25/24 2003 06/25/24 1938 06/25/24  1911 06/25/24  1848 06/25/24  1714 06/25/24  1434   * 139 131*  --   --   --   --   --   --   --  132*   POTASSIUM 4.5 3.1* 5.9* 5.4*  --   --   --   --  5.5*  --  6.3*   CHLORIDE 94* 98 89*  --   --   --   --   --   --   --  90*   CO2 27 29 23  --   --   --   --   --   --   --  25   BUN 44.1* 18.7 95.1*  --   --   --   --   --   --   --  91.1*   CR 3.93* 1.72* 6.45*  --   --   --   --   --   --   --  5.94*   GLC 90 82 93  --  154* 240* 74   < >  --    < > 101*   GAVIN 9.5 8.9 10.3*  --   --   --   --   --   --   --  10.4*    < > = values in this interval not displayed.       INRNo lab results found in last 7 days.     Recent Labs   Lab Test 06/27/24  0502 06/26/24  0427 07/14/23  0503 07/13/23  1239 07/11/23  1131   INR  --   --   --  1.70* 1.40*   WBC 9.0 10.5   < > 13.0* 11.5*   HGB 9.8* 10.1*   < > 8.4* 9.0*    363   < > 320 288    < > = values in this interval not displayed.       Personally reviewed current labs      Cecy Bates MD  [facility]  [phone]

## 2024-06-27 NOTE — PLAN OF CARE
Goal Outcome Evaluation:      Plan of Care Reviewed With: patient    Overall Patient Progress: improvingOverall Patient Progress: improving         Goal Outcome Evaluation:       Plan of Care Reviewed With: patient     Overall Patient Progress: improvingOverall Patient Progress: improving        PRIMARY DIAGNOSIS: Hyperkalemia, shortness of breath, elevated troponin  OUTPATIENT/OBSERVATION GOALS TO BE MET BEFORE DISCHARGE:  ADLs back to baseline: No     Activity and level of assistance: Up with assist of one     Pain status: Improved-controlled with oral pain medications.     Return to near baseline physical activity: Yes  Requiring oxygen 2L per NC.   Stress test complete  Completed dialysis             Discharge Planner Nurse  Safe discharge environment identified: Yes  Barriers to discharge: Yes

## 2024-06-27 NOTE — PLAN OF CARE
Problem: Adult Inpatient Plan of Care  Goal: Plan of Care Review  Description: The Plan of Care Review/Shift note should be completed every shift.  The Outcome Evaluation is a brief statement about your assessment that the patient is improving, declining, or no change.  This information will be displayed automatically on your shift  note.  Outcome: Progressing     Problem: Fall Injury Risk  Goal: Absence of Fall and Fall-Related Injury  Outcome: Progressing  Intervention: Identify and Manage Contributors  Recent Flowsheet Documentation  Taken 6/27/2024 0416 by Luh Taylor RN  Medication Review/Management: medications reviewed  Taken 6/27/2024 0000 by Luh Taylor RN  Medication Review/Management: medications reviewed     Problem: Chronic Kidney Disease  Goal: Optimal Coping with Chronic Illness  Outcome: Progressing  Pt is alert and oriented x 4, complained of on he back, shoulders. Had 10 mg of oxycodone as ordered. No complains of nausea/vomiting. Standby assist. Calls appropriately, 1 liter of oxygen while awake, and about 2 litters when she is sleeping. No SOB. Pt had dialysis yesterday with 1.5 litters out. Sinus tachy with HR in the one teens.

## 2024-06-27 NOTE — PRE-PROCEDURE
GENERAL PRE-PROCEDURE:   Procedure:  Coronary angiogram with possible PCI  Date/Time:  6/27/2024 10:57 AM    Written consent obtained?: Yes    Risks and benefits: Risks, benefits and alternatives were discussed    Consent given by:  Patient  Patient states understanding of procedure being performed: Yes    Patient's understanding of procedure matches consent: Yes    Procedure consent matches procedure scheduled: Yes    Expected level of sedation:  Moderate  Appropriately NPO:  Yes  ASA Class:  4 (NSTEMI, CAD with prior MI; s/p complex RCA PCI with intracoronary lithotripsy and SARAH x2, HTN, HLD, ESRD; on HD)  Mallampati  :  Grade 2- soft palate, base of uvula, tonsillar pillars, and portion of posterior pharyngeal wall visible  Lungs:  Lungs clear with good breath sounds bilaterally  Heart:  Normal heart sounds and rate  History & Physical reviewed:  History and physical reviewed and updates made (see comment)  H&P Comments:  Clinically Significant Risk Factors Present on Admission    Cardiovascular : NSTEMI, CAD with prior MI; s/p complex RCA PCI with intracoronary lithotripsy and SARAH x2, HTN, HLD    Fluid & Electrolyte Disorders : Hypervolemic hyponatremia; mild    Gastroenterology : Not present on admission    Hematology/Oncology : Not present on admission    Nephrology : ESRD; on HD    Neurology : Not present on admission    Pulmonology : Not present on admission    Systemic : Not present on admission    Statement of review:  I have reviewed the lab findings, diagnostic data, medications, and the plan for sedation

## 2024-06-27 NOTE — PLAN OF CARE
Goal Outcome Evaluation:      Plan of Care Reviewed With: patient    Overall Patient Progress: improvingOverall Patient Progress: improving         PRIMARY DIAGNOSIS: Hyperkalemia, shortness of breath, elevated troponin  OUTPATIENT/OBSERVATION GOALS TO BE MET BEFORE DISCHARGE:  ADLs back to baseline: No     Activity and level of assistance: Up with assist of one     Pain status: Improved-controlled with oral pain medications.     Return to near baseline physical activity: Yes  Requiring oxygen 2L per NC.     Completed dialysis             Discharge Planner Nurse  Safe discharge environment identified: Yes  Barriers to discharge: Yes

## 2024-06-27 NOTE — DISCHARGE INSTRUCTIONS
Interventional Cardiology  Coronary Angiogram/Angioplasty/Stent/Atherectomy Discharge Instructions -   Femoral Approach    The instructions below are to help you understand how to take care of yourself. There is also information about when to call the doctor or emergency services    **Do not stop your aspirin or platelet inhibitor unless directed by your Cardiologist.  These medications help to prevent platelets in your blood from sticking together and forming a clot.  Examples of these medications are:  Ticagrelor (Brilinta), Clopidigrel (Plavix), Prasugrel (Effient)          For the first 72 hours after your procedure:  No driving for 24 hours  Do not lift more than 15 pounds.  If you usually lift 50 pounds or more daily, please contact your cardiologist  Avoid any hard work or tiring activities, this includes, yard work, jogging, biking, sexual activity  During the day get up and walk around every 2 hours  You may return to work after 72 hours if you are feeling well and your job does not involve heavy lifting          Groin Site / Wound Care / Bleeding    You may take off the dressing on your groin the day after your procedure  Keep the area dry and clean  It is ok to shower with regular soap.  Pat dry, do not rub  You do not need to use a bandage  No tub/pool or hot tub for 1 week  If your groin starts to bleed or begins to swell suddenly after leaving the hospital, lie flat and apply firm pressure above the site for 15 minutes.  If bleeding continues, call 9-1-1  Bruising around the groin area is normal.  It may take 2-3 weeks for this to go away.  It is normal for the bruised area to turn green and/or yellow as it is healing.  A small lump may also be present and may last 2-3 months.  Your leg may be sore or stiff for a few days.  You may take Tylenol or a pain medicine recommended by your doctor  If you have a fever over 100.4, that lasts more than one day - call your cardiologist.      Our Cardiac Rehab  staff may visit briefly with you while your in the hospital.  If they miss you, someone will contact you after you are home.  You are encouraged to enroll in an Outpatient Cardiac Rehab Program     No elective dental work for 6 weeks after having a stent.     No driving for 24 hours      Your Procedural Physician was: {CV Physicians:[id number]}  the phone number is: [phone]      [facility]:  [phone]  If you are calling after hours, please listen to the entire voicemail, a live  will answer at the end of the message

## 2024-06-27 NOTE — UTILIZATION REVIEW
Concurrent stay review; Secondary Review Determination     Under the authority of the Utilization Management Committee, the utilization review process indicated a secondary review on Serene Tipton.  The review outcome is based on review of the medical records, discussions with staff, and applying clinical experience noted on the date of the review.        (x) Observation Status Appropriate - Concurrent stay review    RATIONALE FOR DETERMINATION   68-year-old female with history of NSTEMI status post PCI in April 2024, end-stage renal disease on dialysis, polycystic kidney disease status post bilateral nephrectomy and heart failure admitted with elevated troponin and hyperkalemia and fatigue.  Cardiology consulted.  Lexiscan stress test showed low risk of future ischemic events and no further interventions recommended.  Underwent dialysis while here, nephrology consulted.  Vitals are stable and on no IV medications and expecting discharge home today.    Patient is clinically improving and there is no clear indication to change patient's status to inpatient. The severity of illness, intensity of service provided, expected LOS and risk for adverse outcome make the care appropriate for observation.    The information on this document is developed by the utilization review team in order for the business office to ensure compliance.  This only denotes the appropriateness of proper admission status and does not reflect the quality of care rendered.         The definitions of Inpatient Status and Observation Status used in making the determination above are those provided in the CMS Coverage Manual, Chapter 1 and Chapter 6, section 70.4.      Sincerely,   Cale White MD  Utilization Review  Physician Advisor  [facility]

## 2024-06-27 NOTE — PLAN OF CARE
"  Problem: Adult Inpatient Plan of Care  Goal: Plan of Care Review  Description: The Plan of Care Review/Shift note should be completed every shift.  The Outcome Evaluation is a brief statement about your assessment that the patient is improving, declining, or no change.  This information will be displayed automatically on your shift  note.  Outcome: Progressing  Flowsheets (Taken 6/27/2024 1432)  Plan of Care Reviewed With: patient  Goal: Patient-Specific Goal (Individualized)  Description: You can add care plan individualizations to a care plan. Examples of Individualization might be:  \"Parent requests to be called daily at 9am for status\", \"I have a hard time hearing out of my right ear\", or \"Do not touch me to wake me up as it startles  me\".  Outcome: Progressing  Goal: Absence of Hospital-Acquired Illness or Injury  Outcome: Progressing  Intervention: Identify and Manage Fall Risk  Recent Flowsheet Documentation  Taken 6/27/2024 0853 by Nellie Toth RN  Safety Promotion/Fall Prevention: activity supervised  Intervention: Prevent Skin Injury  Recent Flowsheet Documentation  Taken 6/27/2024 0853 by Nellie Toth RN  Body Position: position changed independently  Intervention: Prevent Infection  Recent Flowsheet Documentation  Taken 6/27/2024 0853 by Nellie Toth RN  Infection Prevention: rest/sleep promoted  Goal: Optimal Comfort and Wellbeing  Outcome: Progressing  Goal: Readiness for Transition of Care  Outcome: Progressing     Problem: Gas Exchange Impaired  Goal: Optimal Gas Exchange  Outcome: Progressing  Intervention: Optimize Oxygenation and Ventilation  Recent Flowsheet Documentation  Taken 6/27/2024 0853 by Nellie Toth RN  Head of Bed (HOB) Positioning: HOB at 30 degrees     Problem: Heart Failure  Goal: Optimal Coping  Outcome: Progressing  Intervention: Support Psychosocial Response  Recent Flowsheet Documentation  Taken 6/27/2024 0853 by Nellie Toth RN  Supportive Measures: active listening " utilized  Goal: Optimal Cardiac Output  Outcome: Progressing  Intervention: Optimize Cardiac Output  Recent Flowsheet Documentation  Taken 6/27/2024 0853 by Nellie Toth RN  Environmental Support: calm environment promoted  Goal: Stable Heart Rate and Rhythm  Outcome: Progressing  Goal: Optimal Functional Ability  Outcome: Progressing  Intervention: Optimize Functional Ability  Recent Flowsheet Documentation  Taken 6/27/2024 0853 by Nellie Toth RN  Activity Management: activity adjusted per tolerance  Goal: Fluid and Electrolyte Balance  Outcome: Progressing  Goal: Improved Oral Intake  Outcome: Progressing  Goal: Effective Oxygenation and Ventilation  Outcome: Progressing  Intervention: Promote Airway Secretion Clearance  Recent Flowsheet Documentation  Taken 6/27/2024 0853 by Nellie Toth RN  Cough And Deep Breathing: done independently per patient  Activity Management: activity adjusted per tolerance  Intervention: Optimize Oxygenation and Ventilation  Recent Flowsheet Documentation  Taken 6/27/2024 0853 by Nellie Toth RN  Head of Bed (HOB) Positioning: HOB at 30 degrees  Goal: Effective Breathing Pattern During Sleep  Outcome: Progressing  Intervention: Monitor and Manage Obstructive Sleep Apnea  Recent Flowsheet Documentation  Taken 6/27/2024 0853 by Nellie Toth RN  Medication Review/Management: medications reviewed     Problem: Fall Injury Risk  Goal: Absence of Fall and Fall-Related Injury  Outcome: Progressing  Intervention: Identify and Manage Contributors  Recent Flowsheet Documentation  Taken 6/27/2024 0853 by Nellie Toth RN  Medication Review/Management: medications reviewed  Intervention: Promote Injury-Free Environment  Recent Flowsheet Documentation  Taken 6/27/2024 0853 by Nellie Toth RN  Safety Promotion/Fall Prevention: activity supervised     Problem: Chronic Kidney Disease  Goal: Optimal Coping with Chronic Illness  Outcome: Progressing  Intervention: Support Psychosocial  Response  Recent Flowsheet Documentation  Taken 6/27/2024 0853 by Nellie Toth RN  Supportive Measures: active listening utilized  Goal: Electrolyte Balance  Outcome: Progressing  Goal: Fluid Balance  Outcome: Progressing  Goal: Optimal Functional Ability  Outcome: Progressing  Intervention: Optimize Functional Ability  Recent Flowsheet Documentation  Taken 6/27/2024 0853 by Nellie Toth RN  Activity Management: activity adjusted per tolerance  Environment Familiarity/Consistency: daily routine followed  Goal: Absence of Anemia Signs and Symptoms  Outcome: Progressing  Intervention: Manage Signs of Anemia and Bleeding  Recent Flowsheet Documentation  Taken 6/27/2024 0853 by Nellie Toth RN  Environmental Support: calm environment promoted  Goal: Optimal Oral Intake  Outcome: Progressing  Goal: Acceptable Pain Control  Outcome: Progressing  Goal: Minimize Renal Failure Effects  Outcome: Progressing  Intervention: Monitor and Support Renal Function  Recent Flowsheet Documentation  Taken 6/27/2024 0853 by Nellie Toth RN  Medication Review/Management: medications reviewed   Goal Outcome Evaluation:      Plan of Care Reviewed With: patient      VSS, Pt's metoprolol was restarted today. NSR with ST improving to low 100's & WNL. Pt is anuric. Pt has denied pain, however PRN pain medication was give post procedure. Pt had left femoral angiogram without intervention or complication. Pt went to dialysis as scheduled, one hour post procedure. Left groin site remained unchanged, VSS. Dialysis RNCarli continued to monitor site for the last hour of bedrest. Writer worked closely with Carli RN regarding questions, concerns with femoral angio site = unchanged. Pt aware of upcoming discharge after dialysis. Pt will need to ambulate and have site assessed with activity.  Pt can transport home between 5-6 per her friend being available, if no complications occur.  Will continue plan of care. Discharge orders have been  initiated, post procedure restriction & cares have been added to AVS. Pt remains in dialysis at this time.

## 2024-06-27 NOTE — PLAN OF CARE
PRIMARY DIAGNOSIS: Hyperkalemia, shortness of breath, elevated troponin  OUTPATIENT/OBSERVATION GOALS TO BE MET BEFORE DISCHARGE:  ADLs back to baseline: No    Activity and level of assistance: Up with assist of one    Pain status: Improved-controlled with oral pain medications.    Return to near baseline physical activity: Yes  Requiring oxygen 2L per NC.   In dialysis     Discharge Planner Nurse   Safe discharge environment identified: Yes  Barriers to discharge: Yes       Entered by: Mansi Roberts RN 06/26/2024 7:57 PM     Please review provider order for any additional goals.   Nurse to notify provider when observation goals have been met and patient is ready for discharge.

## 2024-06-27 NOTE — DISCHARGE SUMMARY
[facility]  Discharge Summary - Medicine & Pediatrics       Date of Admission:  6/25/2024  Date of Discharge:  6/27/2024  Discharging Provider: Dr. Hamlin and Dr. Lam  Discharge Service: Hospitalist Service    Discharge Diagnoses   The primary encounter diagnosis was NSTEMI (non-ST elevated myocardial infarction) (H). Diagnoses of Hyperkalemia and Chronic renal failure, unspecified CKD stage were also pertinent to this visit.      Clinically Significant Risk Factors          Follow-ups Needed After Discharge   Follow-up Appointments     Follow-up and recommended labs and tests       Follow up with primary care provider, Ramos Lowry, within 7 days for   hospital follow- up.  No follow up labs or test are needed.        Consider repeat PFTs.    Discharge Disposition   Discharged to home  Condition at discharge: Stable    Hospital Course   Serene Tipton was admitted on 6/25/2024 for orthopnea.  The following problems were addressed during her hospitalization:    Elevated troponin, concern for post-MI complications  Hx of NSTEMI s/p PCI  Patient present w/1wk of worsening fatigue, poor PO intake, and orthopnea, sx similar to presentation in Apr 2024, where she was fth NSTEMI. Initial workup notable for troponin 166 and 156 on repeat, EKG showing borderline tachycardia, LVH, and nonspecific T-wave inversions; no evidence of acute ischemia. Denies chest pain. She had an NM lexiscan stress test that was nondiagnostic for ischemia, then had repeat coronary angiogram on 6/27, which did not show any stent thrombosis or restenosis. She still has some mild orthopnea but was reassured that it was not a repeat myocardial infarction. She was continued on her PTA metoprolol, ASA, and plavix and is appropriate for outpatient follow-up.       Hyperkalemia, resolved  ESRD on HD Tu/Th/Sa  Hx of PCKD s/p BL nephrectomy   K 6.3 on admission in setting of known ESRD. No significant EKG changes.  Received lokelma w/improvement to K 5.5. Patient missed dialysis run on day of admission due to acute illness. Follows w/[facility] (Dr. Armstrong). Appears to be at EDW 38kg. She dialyzed on 6/26 and 6/27.      Fatigue  Poor PO intake   Abdominal bloating  BMI 16  Ongoing for 1wk, developed n/v on day of admission. Cachectic appearing on exam. No si/sx to suggest infection - afebrile, WBC wnl, CXR negative for opacities. Possibly could be related to ESRD. She does have a h/o SBO and ileus, but abdominal exam was benign. She was treated with IV protonix, stable for outpatient follow-up.      Seru amnion gap without acidosis, resolved  AG 17 on admission. Lactate wnl, bicarb wnl. Unclear etiology. Difficult to interpret in setting of ESRD. No si/sx of infection. Resolved     Chronic conditions:  HFrEF (EF 25-30%)  HTN  Last echo completed May 2024, showing EF 25-30%. EF on NM lexiscan stress test was 31% this admission.     JAMIL - continue PTA zoloft, lorazepam, and nortriptyline   Thyroid disease - Continue PTA levothyroxine   HLD - continue PTA lipitor    Consultations This Hospital Stay   CARDIOLOGY IP CONSULT  NEPHROLOGY IP CONSULT  CARE MANAGEMENT / SOCIAL WORK IP CONSULT    Code Status   Full Code       The patient was discussed with Dr. Genaro Hamlin MD  Residency Service  [facility]  [address] [place] [id number]  Phone: [phone]  Fax: [phone]  ______________________________________________________________________    Physical Exam   Vital Signs: Temp: 98.4  F (36.9  C) Temp src: Oral BP: (!) 154/89 Pulse: 99   Resp: 16 SpO2: 97 % O2 Device: None (Room air) Oxygen Delivery: 2 LPM  Weight: 83 lbs 3.2 oz  GEN: Patient sitting comfortably in no acute distress.  HEEN: Head is atraumatic, normocephalic, eyes anicteric, mucous membranes moist.  CV: Extremities well-perfused. No obvious lower extremity edema.  PULM: Breathing comfortably on room air.  Speaking in full sentences.  NEURO: Alert and oriented x3.  No focal motor abnormalities.  Face symmetric.  PSYCH: Appropriate affect.  SKIN: No rashes, bruising, or other lesions visible on exposed skin.        Primary Care Physician   Ramos Lowry    Discharge Orders      Reason for your hospital stay    Difficulty breathing - ruled out repeat heart attack     Follow-up and recommended labs and tests     Follow up with primary care provider, Ramos Lowry, within 7 days for hospital follow- up.  No follow up labs or test are needed.     Activity    Your activity upon discharge: activity as tolerated     Diet    Follow this diet upon discharge: Orders Placed This Encounter      Low Saturated Fat Na <2400 mg       Significant Results and Procedures   Most Recent 3 CBC's:  Recent Labs   Lab Test 06/27/24  0502 06/26/24  0427 06/25/24  1848 06/25/24  1434   WBC 9.0 10.5  --  9.9   HGB 9.8* 10.1* 10.0* 10.5*   * 106*  --  106*    363  --  386     Most Recent 3 BMP's:  Recent Labs   Lab Test 06/27/24  0502 06/26/24  1145 06/26/24  0427   * 139 131*   POTASSIUM 4.5 3.1* 5.9*   CHLORIDE 94* 98 89*   CO2 27 29 23   BUN 44.1* 18.7 95.1*   CR 3.93* 1.72* 6.45*   ANIONGAP 12 12 19*   GAVIN 9.5 8.9 10.3*   GLC 90 82 93   ,   Results for orders placed or performed during the hospital encounter of 06/25/24   XR Chest Port 1 View    Narrative    EXAM: XR CHEST PORT 1 VIEW  LOCATION: [facility]  DATE: 6/25/2024    INDICATION: Shortness of breath.   COMPARISON: Chest radiograph 4/29/2024.       Impression    IMPRESSION:     Mild cardiomegaly with central pulmonary vascular congestion and diffuse interstitial edema, suggesting CHF exacerbation. Trace right pleural effusion. No left pleural effusion. No focal airspace opacities or pneumothorax.    Aortic atherosclerotic calcifications.   Cardiac Catheterization    Narrative    1.  Mild calcified proximal LAD disease  2.  Mild ostial  circumflex narrowing  3.  Stents from proximal to mid RCA remain widely patent.  4.  LVEDP = 30 mmHg     NM Lexiscan stress test     Value    Pharmacologic Protocol Lexiscan    Test Type Pharmacological    Baseline     Baseline Systolic     Baseline Diastolic BP 87    Last Stress     Last Stress Systolic     Last Stress Diastolic BP 78    Target     PERCENT HR 85%    ST Deviation Elevation V3 0.5mm    Deviation Time II -0.7mm    ST Elevation Amount V3 1.6mm    ST Deviation Amount he III -1.4mm    Final Resting /83    Final Resting     Max Treadmill Speed 0.0    Max Treadmill Grade 0.0    Peak Systolic /83    Peak Diastolic /84    Max HR  113    Stress Phase Resting    Stress Resting Pt Position MANUAL EVENT    Current     Current /84    Stress Phase Stress    Stage Minute EXE 00:00    Exercise Stage STAGE 2    Current     Current /87    Stress Phase Stress    Stage Minute EXE 01:00    Exercise Stage STAGE 3    Current HR 97    Current /87    Stress Phase Stress    Stage Minute EXE 02:00    Exercise Stage STAGE 4    Current     Current /87    Stress Phase Stress    Stage Minute EXE 02:11    Exercise Stage STAGE 4    Current     Current /71    Stress Phase Stress    Stage Minute EXE 03:00    Exercise Stage STAGE 5    Current     Current /71    Stress Phase Stress    Stage Minute EXE 03:39    Exercise Stage STAGE 5    Current     Current /78    Stress Phase Stress    Stage Minute EXE 04:00    Exercise Stage STAGE 6    Current     Current /78    Stress Phase Stress    Stage Minute EXE 04:00    Exercise Stage STAGE 6    Current     Current /78    Stress Phase Recovery    Stage Minute REC 00:51    Exercise Stage Recovery    Current     Current /84    Stress Phase Recovery    Stage Minute REC 00:59    Exercise Stage Recovery    Current     Current BP  157/84    Stress Phase Recovery    Stage Minute REC 01:59    Exercise Stage Recovery    Current     Current /84    Stress Phase Recovery    Stage Minute REC 02:20    Exercise Stage Recovery    Current     Current /84    Stress Phase Recovery    Stage Minute REC 02:59    Exercise Stage Recovery    Current     Current /84    Stress Phase Recovery    Stage Minute REC 03:31    Exercise Stage Recovery    Current     Current /83    Stress Phase Recovery    Stage Minute REC 03:59    Exercise Stage Recovery    Current     Current /83    Stress Phase Recovery    Stage Minute REC 04:59    Exercise Stage Recovery    Current     Current /83    Stress Phase Recovery    Stage Minute REC 05:59    Exercise Stage Recovery    Current     Current /83    Stress Phase Recovery    Stage Minute REC 06:59    Exercise Stage Recovery    Current     Current /83    Stress Phase Recovery    Stage Minute REC 07:50    Exercise Stage Recovery    Current     Current /83    Max Predicted HR  73    Rate Pressure Product 17,854.0    Left Ventricular EF 31    Narrative      Lexiscan stress ECG nondiagnostic for ischemia secondary to baseline ST   abnormality.    The nuclear stress test is abnormal.  There is a medium sized area of   nontransmural infarction involving the inferior wall and apex with small   area of abdiaziz-infarct ischemia in the mid to basal inferior wall.    Specificity of this finding is somewhat reduced by significant bowel   attenuation artifact.    The left ventricular ejection fraction at stress is 31% with global   hypokinesis.    The patient is at a low risk of future cardiac ischemic events.    A prior study was conducted on 6/7/2021.  The area of mixed ischemia   and infarction in the inferior wall is new.  There is also been a   significant decline in overall LV function.    The findings of this examination were  communicated to Dr. Patterson         Discharge Medications   Current Discharge Medication List        CONTINUE these medications which have NOT CHANGED    Details   artificial saliva (BIOTENE MT) SOLN solution Swish and spit 2 sprays in mouth as needed for dry mouth      aspirin (ASA) 81 MG chewable tablet Take 81 mg by mouth daily      atorvastatin (LIPITOR) 80 MG tablet Take 1 tablet (80 mg) by mouth daily  Qty: 90 tablet, Refills: 3    Associated Diagnoses: Dilated cardiomyopathy (H); Acute on chronic systolic congestive heart failure (H); End stage renal disease (H)      B Complex-C (SUPER B COMPLEX PO) Take 1 tablet by mouth daily      calcium acetate (CALPHRON) 667 MG TABS tablet Take 1-2 tablets by mouth 3 times daily (with meals)      cholecalciferol (VITAMIN D3) 25 mcg (1000 units) capsule Take 1 capsule by mouth daily at 2 pm      clopidogrel (PLAVIX) 75 MG tablet Take 1 tablet (75 mg) by mouth daily Dose to start tomorrow.  Qty: 90 tablet, Refills: 3    Associated Diagnoses: Dilated cardiomyopathy (H); Acute on chronic systolic congestive heart failure (H); End stage renal disease (H)      cyclobenzaprine (FLEXERIL) 5 MG tablet Take 5 mg by mouth 2 times daily as needed for muscle spasms      Epoetin Adam (EPOGEN IJ) Inject 1,000 Units into the vein three times a week At dialysis.      GLUCOSAMINE-CHONDROITIN-VIT D3 PO Take 2 capsules by mouth daily      Iron Sucrose (VENOFER IV) Inject 100 mg into the vein once a week At dialysis      lactulose (CHRONULAC) 10 GM/15ML solution Take 15 mLs (10 g) by mouth 2 times daily as needed for constipation  Qty: 900 mL, Refills: 0    Associated Diagnoses: Constipation, unspecified constipation type      levothyroxine (SYNTHROID/LEVOTHROID) 50 MCG tablet Take 50 mcg by mouth daily      LORazepam (ATIVAN) 1 MG tablet Take 1 mg by mouth daily as needed for anxiety      metoprolol succinate ER (TOPROL XL) 25 MG 24 hr tablet Take 1 tablet (25 mg) by mouth daily  Qty: 30  tablet, Refills: 0    Associated Diagnoses: Ischemic cardiomyopathy      nitroGLYcerin (NITROSTAT) 0.4 MG sublingual tablet For chest pain place 1 tablet under the tongue every 5 minutes for 3 doses. If symptoms persist 5 minutes after 1st dose call 911.  Qty: 30 tablet, Refills: 0    Associated Diagnoses: Ischemic cardiomyopathy      nortriptyline (PAMELOR) 75 MG capsule Take 75 mg by mouth At Bedtime      Nutritional Supplements (NEPRO) LIQD Take 1 Can by mouth daily      omeprazole (PRILOSEC) 20 MG DR capsule Take 20 mg by mouth daily      oxyCODONE IR (ROXICODONE) 10 MG tablet Take 10 mg by mouth every 4 hours as needed for pain      SENNA-docusate sodium (SENNA S) 8.6-50 MG tablet Take 2 tablets by mouth 2 times daily Hold for loose stools.  Qty: 120 tablet, Refills: 0    Associated Diagnoses: Constipation, unspecified constipation type      sertraline (ZOLOFT) 100 MG tablet Take 150 mg by mouth At Bedtime           Allergies   Allergies   Allergen Reactions    Penicillins Other (See Comments) and Shortness Of Breath     Breathing problem.  breathing      No Clinical Screening - See Comments      PN: LW CM1: Contrast Intercapsular - Nonionic Reaction :    Nsaids Other (See Comments)     Kidney damage    Carvedilol GI Disturbance     Nausea and vomiting    Ciprofloxacin Muscle Pain (Myalgia)    Floxacillin (Flucloxacillin) Muscle Pain (Myalgia) and Rash    Levofloxacin Muscle Pain (Myalgia) and Rash    Morphine Nausea and Vomiting    Sulfa Antibiotics Itching

## 2024-07-03 NOTE — PROGRESS NOTES
Assessment/Plan:   Coronary artery disease status post SARAH to RCA in April 2024: No chest pain.  Mild shortness of breath on exertion is improved.  No palpitations.  Continue aspirin, Plavix, metoprolol and atorvastatin.  Cardiomyopathy, LVEF 30%, chronic systolic congestive heart failure: She had bilateral nephrectomy, no urine output.  Her actual fluid has to be removed by hemodialysis.  Continue metoprolol XL.  Start a low-dose of losartan 12.5 mg daily.  Continue optimize CHF medication.  Benign essential hypertension: Her blood pressure is well-controlled.  Dyslipidemia: Continue atorvastatin 80 mg at bedtime.  End-stage renal disease on hemodialysis: Follow-up with nephrology clinic.    Total 61 minutes were spent in this visit for face to face visit, physical exam, review of current labs/imaging studies, plan for ongoing treatment with >50% spent on counseling and coordination of care as documented in the above mentioned note.      Thank you for the opportunity to be involved in the care of Serene Tipton. If you have any questions, please feel free to contact me.  I will see the patient again in 6 months and as needed.    Much or all of the text in this note was generated through the use of Dragon Dictate voice-to-text software. Errors in spelling or words which seem out of context are unintentional. Sound alike errors, in particular, may have escaped editing.       History of Present Illness:   It is my pleasure to see Serene Tipton at the [facility] clinic for routine cardiology follow up.  Hospital discharge.  Serene Tipton is a 68 year old female with a medical history of coronary artery disease s/p SARAH to RCA in April 2024, ischemic cardiomyopathy, LVEF 30%, chronic systolic congestive heart failure, dyslipidemia, benign essential hypertension, polycystic kidney disease status post bilateral nephrectomy, end-stage renal disease on hemodialysis.    The patient  states that she had no chest pain post the hospital discharge.  She had no palpitations, dizziness, orthopnea, leg edema.  She has mild dyspnea on exertion which has been improved.  She has occasional PND.  Her blood pressure and heart rate are in normal range.  She has no side effects from current medications.      Past Medical History:     Patient Active Problem List   Diagnosis    Polycystic kidney    Hypertension    End stage kidney disease (H)    Depressive disorder    Chronic low back pain    Hyperlipidemia    Tobacco abuse    Vitamin D deficiency    Polycystic kidney disease    Postoperative anemia due to acute blood loss    Enlarged kidney as indication for native nephrectomy    Acute post-operative pain    Chronic, continuous use of opioids    Shortness of breath    S/p nephrectomy    Dialysis patient (H24)    Pulmonary edema, noncardiac    ESRD (end stage renal disease) on dialysis (H)    Constipation    Small bowel obstruction (H)    Hypoglycemia    Fall, initial encounter    Hypomagnesemia    Diffuse abdominal pain    Chronic renal failure, unspecified CKD stage    Anemia in stage 5 chronic kidney disease (H)    Anxiety    Backache    Congenital spondylolisthesis    Fibrocystic breast changes    Gastroesophageal reflux disease without esophagitis    Hepatic lesion    Hyperparathyroidism (H24)    Hyperparathyroidism, secondary renal (H24)    Hypothyroidism (acquired)    Insomnia    Liver cyst    Long term (current) use of opiate analgesic    Lumbar facet joint syndrome    Major depression, recurrent (H24)    Major depressive disorder with single episode, in partial remission (H24)    Mammogram abnormal    Nicotine dependence, cigarettes, uncomplicated    Non-traumatic rotator cuff tear, right    NS (nuclear sclerosis), bilateral    Obstructive sleep apnea (adult) (pediatric)    Pain in joint, pelvic region and thigh    Posttraumatic stress disorder    Primary generalized (osteo)arthritis    Pure  hypercholesterolemia    PVD (posterior vitreous detachment), left eye    Renovascular hypertension    S/P lumbar fusion    Piriformis syndrome    Spinal stenosis, lumbar region with neurogenic claudication    Symptomatic menopausal or female climacteric states    Vitreomacular adhesion, right    Hip fracture, left, closed, initial encounter (H)    Closed nondisplaced fracture of metatarsal bone of right foot, unspecified metatarsal, initial encounter    Acquired absence of kidney    Benign neoplasm of ascending colon    Benign neoplasm of cecum    Fracture of unspecified metatarsal bone(s), right foot, subsequent encounter for fracture with routine healing    Polyp of colon    Constipation, unspecified    Depression, unspecified    Dependence on renal dialysis (H24)    Generalized abdominal pain    Chronic kidney disease, unspecified    CKD (chronic kidney disease) stage 4, GFR 15-29 ml/min (H)    End stage renal disease (H)    Diffuse cystic mastopathy    Disease of liver    Fracture of unspecified part of neck of left femur, subsequent encounter for closed fracture with routine healing    Essential (primary) hypertension    Hypoglycemia, unspecified    Insomnia, unspecified    Sciatica    Chronic pulmonary edema    Kidney transplant status    Ileus, unspecified (H)    Unspecified intestinal obstruction, unspecified as to partial versus complete obstruction (H)    Acute on chronic systolic congestive heart failure (H)    Elevated troponin    Dilated cardiomyopathy (H)    Ischemic cardiomyopathy    Orthopnea    Other fatigue       Past Surgical History:     Past Surgical History:   Procedure Laterality Date    BACK SURGERY      spinal fusion w/hardware    BIOPSY Left 09/01/1990    Breast    BREAST LUMPECTOMY, RT/LT Left     Lumpectomy    CV CORONARY ANGIOGRAM N/A 4/30/2024    Procedure: CV CORONARY ANGIOGRAM;  Surgeon: Jac Russell MD;  Location: [facility] CATH LAB CV    CV CORONARY ANGIOGRAM N/A 6/27/2024     Procedure: CV CORONARY ANGIOGRAM;  Surgeon: Patrick Ascencio MD;  Location: [facility] LAB CV    CV CORONARY LITHOTRIPSY PCI N/A 4/30/2024    Procedure: Percutaneous Coronary Intervention - Lithotripsy;  Surgeon: Jac Russell MD;  Location: [facility] CATH LAB CV    CV LEFT HEART CATH N/A 4/30/2024    Procedure: Left Heart Catheterization;  Surgeon: Jac Russell MD;  Location: [facility] CATH LAB CV    CV LEFT HEART CATH N/A 6/27/2024    Procedure: Left Heart Catheterization;  Surgeon: Patrick Ascencio MD;  Location: [facility] LAB CV    CV PCI STENT DRUG ELUTING N/A 4/30/2024    Procedure: Percutaneous Coronary Intervention Stent;  Surgeon: Jac Russell MD;  Location: [facility] LAB CV    CYST REMOVAL Right     rt wrist    CYSTECTOMY PILONIDAL  1981    EYE SURGERY  2008    lasik    FORMATION ARTERIOVENOUS FISTULA    07/28/2020    FRACTURE SURGERY      IR CVC TUNNEL W2 CATH W/O PORT  7/17/2020    IR DIALYSIS FISTULOGRAM RIGHT  10/21/2020    NEPHRECTOMY BILATERAL Bilateral 2/1/2023    Procedure: bilateral native nephrectomy;  Surgeon: Alana Giang MD;  Location: [facility] OR    OPEN REDUCTION INTERNAL FIXATION HIP BIPOLAR Left 11/10/2023    Procedure: LEFT HEMIARTHROPLASTY, HIP, HEMIARTHROPLASTY;  Surgeon: Lucas Weldon MD;  Location: [facility] Main OR    ORTHOPEDIC SURGERY Right 2005    Shoulder    OTHER SURGICAL HISTORY      carpal tunnel repair    SPINE SURGERY         Family History:     Family History   Problem Relation Age of Onset    Polycystic Kidney Diease Mother     Hypertension Mother     Kidney Disease Mother     Cerebrovascular Disease Mother     Chronic Obstructive Pulmonary Disease Father     Multiple Sclerosis Father     Polycystic Kidney Diease Sister     Cardiac Sudden Death Sister 52    Hypertension Sister     Kidney Disease Sister     Polycystic Kidney Diease Maternal Grandmother     Hypertension Maternal Grandmother     Kidney Disease Maternal Grandmother      Cerebrovascular Disease Maternal Grandmother     Heart Disease Maternal Grandfather     Hyperlipidemia Paternal Grandmother     Cerebrovascular Disease Paternal Grandmother     Coronary Artery Disease Paternal Grandfather     Pulmonary Embolism Paternal Grandfather     Anesthesia Reaction No family hx of         Social History:    reports that she has an unknown smoking status. She has never used smokeless tobacco. She reports current alcohol use. She reports current drug use. Drug: Marijuana.    Review of Systems:   12 systems are reviewed negative except for in HPI.    Meds:     Current Outpatient Medications:     artificial saliva (BIOTENE MT) SOLN solution, Swish and spit 2 sprays in mouth as needed for dry mouth, Disp: , Rfl:     aspirin (ASA) 81 MG chewable tablet, Take 81 mg by mouth daily, Disp: , Rfl:     atorvastatin (LIPITOR) 80 MG tablet, Take 1 tablet (80 mg) by mouth daily (Patient taking differently: Take 80 mg by mouth every evening), Disp: 90 tablet, Rfl: 3    B Complex-C (SUPER B COMPLEX PO), Take 1 tablet by mouth daily, Disp: , Rfl:     calcium acetate (CALPHRON) 667 MG TABS tablet, Take 1-2 tablets by mouth 3 times daily (with meals), Disp: , Rfl:     cholecalciferol (VITAMIN D3) 25 mcg (1000 units) capsule, Take 1 capsule by mouth daily at 2 pm, Disp: , Rfl:     clopidogrel (PLAVIX) 75 MG tablet, Take 1 tablet (75 mg) by mouth daily Dose to start tomorrow., Disp: 90 tablet, Rfl: 3    cyclobenzaprine (FLEXERIL) 5 MG tablet, Take 5 mg by mouth 2 times daily as needed for muscle spasms, Disp: , Rfl:     Epoetin Adam (EPOGEN IJ), Inject 1,000 Units into the vein three times a week At dialysis., Disp: , Rfl:     GLUCOSAMINE-CHONDROITIN-VIT D3 PO, Take 2 capsules by mouth daily, Disp: , Rfl:     Iron Sucrose (VENOFER IV), Inject 100 mg into the vein once a week At dialysis, Disp: , Rfl:     lactulose (CHRONULAC) 10 GM/15ML solution, Take 15 mLs (10 g) by mouth 2 times daily as needed for  constipation, Disp: 900 mL, Rfl: 0    levothyroxine (SYNTHROID/LEVOTHROID) 50 MCG tablet, Take 50 mcg by mouth daily, Disp: , Rfl:     LORazepam (ATIVAN) 1 MG tablet, Take 1 mg by mouth daily as needed for anxiety, Disp: , Rfl:     losartan (COZAAR) 25 MG tablet, Take 0.5 tablets (12.5 mg) by mouth daily, Disp: 45 tablet, Rfl: 11    metoprolol succinate ER (TOPROL XL) 25 MG 24 hr tablet, Take 1 tablet (25 mg) by mouth daily, Disp: 30 tablet, Rfl: 0    nitroGLYcerin (NITROSTAT) 0.4 MG sublingual tablet, For chest pain place 1 tablet under the tongue every 5 minutes for 3 doses. If symptoms persist 5 minutes after 1st dose call 911., Disp: 30 tablet, Rfl: 0    nortriptyline (PAMELOR) 75 MG capsule, Take 75 mg by mouth At Bedtime, Disp: , Rfl:     Nutritional Supplements (NEPRO) LIQD, Take 1-2 Cans by mouth daily, Disp: , Rfl:     omeprazole (PRILOSEC) 20 MG DR capsule, Take 20 mg by mouth 2 times daily as needed, Disp: , Rfl:     oxyCODONE IR (ROXICODONE) 10 MG tablet, Take 10 mg by mouth every 4 hours as needed for pain, Disp: , Rfl:     SENNA-docusate sodium (SENNA S) 8.6-50 MG tablet, Take 2 tablets by mouth 2 times daily Hold for loose stools., Disp: 120 tablet, Rfl: 0    sertraline (ZOLOFT) 100 MG tablet, Take 150 mg by mouth At Bedtime, Disp: , Rfl:     Allergies:   Penicillins, No clinical screening - see comments, Nsaids, Carvedilol, Ciprofloxacin, Floxacillin (flucloxacillin), Levofloxacin, Morphine, and Sulfa antibiotics      Objective:      Physical Exam  37.2 kg (82 lb)     Body mass index is 16.55 kg/m .  /64 (BP Location: Right arm, Patient Position: Sitting, Cuff Size: Adult Small)   Pulse 100   Resp 18   Wt 37.2 kg (82 lb)   SpO2 99%   BMI 16.55 kg/m      General Appearance:   Awake, Alert, No acute distress.   HEENT:  Pupil equal and reactive to light. No scleral icterus; the mucous membranes were moist.   Neck: No cervical bruits. No JVD. No thyromegaly.     Chest: The spine was straight.  The chest was symmetric.   Lungs:   Respirations unlabored; Lungs are clear to auscultation. No crackles. No wheezing.   Cardiovascular:   Regular rhythm and rate, normal first and second heart sounds with II/VI systolic murmurs at apex and LUSB. No rubs or gallops.    Abdomen:  Soft. No tenderness. Non-distended. Bowels sounds are present   Extremities: Equal tibial pulses. No leg edema.   Skin: No rashes or ulcers. Warm, Dry.   Musculoskeletal: No tenderness. No deformity.   Neurologic: Mood and affect are appropriate. No focal deficits.         EKG: Personally reviewed  Sinus rhythm   Left ventricular hypertrophy with QRS widening   Possible Inferior infarct (cited on or before 17-MAY-2024)   Abnormal ECG   When compared with ECG of 17-MAY-2024 13:01,   No significant change was found     Cardiac Imaging Studies  ECHO 5-:  1. The left ventricle is mildly dilated. There is severe global hypokinesia of  the left ventricle. Left ventricular function is decreased. The ejection  fraction is 25-30% (severely reduced).  2. Normal right ventricle size and systolic function.  3. There is mod-severe to severe (3-4+) mitral regurgitation. There is  moderate (2+) tricuspid regurgitation.  4. Right ventricular systolic pressure is elevated, consistent with mild  pulmonary hypertension.  5. Trivial to small pericardial effusion. There are no echocardiographic  indications of cardiac tamponade.     Compared to the prior study dated 4/30/2024, mild interval improvement in LVEF  and normalization of RV function on current study. No significant change in  pericardial effusion.    Coronary angiogram with PCI in April 2024:  LM:no obstruction  LAD:mildly irregular w/ mostly extraluminal Ca2+  Lcx:mildly irregular  RCA:dominant, extensively Ca2+ form prox to mid-vessel, tortuous up to 95% narrowing. S/p SARAH to RCA   LVEDP:21    Lab Review   Lab Results   Component Value Date     07/02/2024     05/19/2021    CO2 26  "07/02/2024    CO2 24 07/25/2023    CO2 31 05/19/2021    BUN 76.0 07/02/2024    BUN 27 07/25/2023    BUN 27 05/19/2021     Lab Results   Component Value Date    WBC 9.0 06/27/2024    WBC 10.4 05/19/2021    HGB 9.8 06/27/2024    HGB 13.0 05/19/2021    HCT 30.9 06/27/2024    HCT 39.6 05/19/2021     06/27/2024     05/19/2021     06/27/2024     05/19/2021     Lab Results   Component Value Date    CHOL 111 04/30/2024    CHOL 162 09/06/2023    CHOL 144 08/18/2022     Lab Results   Component Value Date    HDL 46 (L) 04/30/2024    HDL 75 09/06/2023     No components found for: \"LDLCALC\"  Lab Results   Component Value Date    TRIG 76 04/30/2024    TRIG 112 09/06/2023     No results found for: \"CHOLHDL\"  Lab Results   Component Value Date    TROPONINI 0.03 07/21/2023     Lab Results   Component Value Date    BNP >5,000 02/25/2023     Lab Results   Component Value Date    TSH 3.96 09/06/2023    TSH 3.90 07/13/2023             "

## 2024-07-03 NOTE — LETTER
7/3/2024    Ramos Lowry MD  [address]  [place] [address]    RE: Serene JASMYNE Danuta       Dear Colleague,     I had the pleasure of seeing Serene Tipton in the [facility].            Assessment/Plan:   Coronary artery disease status post SARAH to RCA in April 2024: No chest pain.  Mild shortness of breath on exertion is improved.  No palpitations.  Continue aspirin, Plavix, metoprolol and atorvastatin.  Cardiomyopathy, LVEF 30%, chronic systolic congestive heart failure: She had bilateral nephrectomy, no urine output.  Her actual fluid has to be removed by hemodialysis.  Continue metoprolol XL.  Start a low-dose of losartan 12.5 mg daily.  Continue optimize CHF medication.  Benign essential hypertension: Her blood pressure is well-controlled.  Dyslipidemia: Continue atorvastatin 80 mg at bedtime.  End-stage renal disease on hemodialysis: Follow-up with nephrology clinic.    Total 61 minutes were spent in this visit for face to face visit, physical exam, review of current labs/imaging studies, plan for ongoing treatment with >50% spent on counseling and coordination of care as documented in the above mentioned note.      Thank you for the opportunity to be involved in the care of Serene Tipton. If you have any questions, please feel free to contact me.  I will see the patient again in 6 months and as needed.    Much or all of the text in this note was generated through the use of Dragon Dictate voice-to-text software. Errors in spelling or words which seem out of context are unintentional. Sound alike errors, in particular, may have escaped editing.       History of Present Illness:   It is my pleasure to see Serene Escuderocarmelobenny at the [facility] clinic for routine cardiology follow up.  Hospital discharge.  Serene Tipton is a 68 year old female with a medical history of coronary artery disease s/p SARAH to RCA in April 2024, ischemic  cardiomyopathy, LVEF 30%, chronic systolic congestive heart failure, dyslipidemia, benign essential hypertension, polycystic kidney disease status post bilateral nephrectomy, end-stage renal disease on hemodialysis.    The patient states that she had no chest pain post the hospital discharge.  She had no palpitations, dizziness, orthopnea, leg edema.  She has mild dyspnea on exertion which has been improved.  She has occasional PND.  Her blood pressure and heart rate are in normal range.  She has no side effects from current medications.      Past Medical History:     Patient Active Problem List   Diagnosis    Polycystic kidney    Hypertension    End stage kidney disease (H)    Depressive disorder    Chronic low back pain    Hyperlipidemia    Tobacco abuse    Vitamin D deficiency    Polycystic kidney disease    Postoperative anemia due to acute blood loss    Enlarged kidney as indication for native nephrectomy    Acute post-operative pain    Chronic, continuous use of opioids    Shortness of breath    S/p nephrectomy    Dialysis patient (H24)    Pulmonary edema, noncardiac    ESRD (end stage renal disease) on dialysis (H)    Constipation    Small bowel obstruction (H)    Hypoglycemia    Fall, initial encounter    Hypomagnesemia    Diffuse abdominal pain    Chronic renal failure, unspecified CKD stage    Anemia in stage 5 chronic kidney disease (H)    Anxiety    Backache    Congenital spondylolisthesis    Fibrocystic breast changes    Gastroesophageal reflux disease without esophagitis    Hepatic lesion    Hyperparathyroidism (H24)    Hyperparathyroidism, secondary renal (H24)    Hypothyroidism (acquired)    Insomnia    Liver cyst    Long term (current) use of opiate analgesic    Lumbar facet joint syndrome    Major depression, recurrent (H24)    Major depressive disorder with single episode, in partial remission (H24)    Mammogram abnormal    Nicotine dependence, cigarettes, uncomplicated    Non-traumatic rotator  cuff tear, right    NS (nuclear sclerosis), bilateral    Obstructive sleep apnea (adult) (pediatric)    Pain in joint, pelvic region and thigh    Posttraumatic stress disorder    Primary generalized (osteo)arthritis    Pure hypercholesterolemia    PVD (posterior vitreous detachment), left eye    Renovascular hypertension    S/P lumbar fusion    Piriformis syndrome    Spinal stenosis, lumbar region with neurogenic claudication    Symptomatic menopausal or female climacteric states    Vitreomacular adhesion, right    Hip fracture, left, closed, initial encounter (H)    Closed nondisplaced fracture of metatarsal bone of right foot, unspecified metatarsal, initial encounter    Acquired absence of kidney    Benign neoplasm of ascending colon    Benign neoplasm of cecum    Fracture of unspecified metatarsal bone(s), right foot, subsequent encounter for fracture with routine healing    Polyp of colon    Constipation, unspecified    Depression, unspecified    Dependence on renal dialysis (H24)    Generalized abdominal pain    Chronic kidney disease, unspecified    CKD (chronic kidney disease) stage 4, GFR 15-29 ml/min (H)    End stage renal disease (H)    Diffuse cystic mastopathy    Disease of liver    Fracture of unspecified part of neck of left femur, subsequent encounter for closed fracture with routine healing    Essential (primary) hypertension    Hypoglycemia, unspecified    Insomnia, unspecified    Sciatica    Chronic pulmonary edema    Kidney transplant status    Ileus, unspecified (H)    Unspecified intestinal obstruction, unspecified as to partial versus complete obstruction (H)    Acute on chronic systolic congestive heart failure (H)    Elevated troponin    Dilated cardiomyopathy (H)    Ischemic cardiomyopathy    Orthopnea    Other fatigue       Past Surgical History:     Past Surgical History:   Procedure Laterality Date    BACK SURGERY      spinal fusion w/hardware    BIOPSY Left 09/01/1990    Breast     BREAST LUMPECTOMY, RT/LT Left     Lumpectomy    CV CORONARY ANGIOGRAM N/A 4/30/2024    Procedure: CV CORONARY ANGIOGRAM;  Surgeon: Jac Russell MD;  Location: [facility] CATH LAB CV    CV CORONARY ANGIOGRAM N/A 6/27/2024    Procedure: CV CORONARY ANGIOGRAM;  Surgeon: Patrick Ascencio MD;  Location: [facility] CATH LAB CV    CV CORONARY LITHOTRIPSY PCI N/A 4/30/2024    Procedure: Percutaneous Coronary Intervention - Lithotripsy;  Surgeon: Jac Russell MD;  Location: [facility] CATH LAB CV    CV LEFT HEART CATH N/A 4/30/2024    Procedure: Left Heart Catheterization;  Surgeon: Jac Russell MD;  Location: [facility] CATH LAB CV    CV LEFT HEART CATH N/A 6/27/2024    Procedure: Left Heart Catheterization;  Surgeon: Patrick Ascencio MD;  Location: [facility] CATH LAB CV    CV PCI STENT DRUG ELUTING N/A 4/30/2024    Procedure: Percutaneous Coronary Intervention Stent;  Surgeon: Jac Russell MD;  Location: [facility] CATH LAB CV    CYST REMOVAL Right     rt wrist    CYSTECTOMY PILONIDAL  1981    EYE SURGERY  2008    lasik    FORMATION ARTERIOVENOUS FISTULA    07/28/2020    FRACTURE SURGERY      IR CVC TUNNEL W2 CATH W/O PORT  7/17/2020    IR DIALYSIS FISTULOGRAM RIGHT  10/21/2020    NEPHRECTOMY BILATERAL Bilateral 2/1/2023    Procedure: bilateral native nephrectomy;  Surgeon: Alana Giang MD;  Location: [facility] OR    OPEN REDUCTION INTERNAL FIXATION HIP BIPOLAR Left 11/10/2023    Procedure: LEFT HEMIARTHROPLASTY, HIP, HEMIARTHROPLASTY;  Surgeon: Lucas Weldon MD;  Location: [facility] Main OR    ORTHOPEDIC SURGERY Right 2005    Shoulder    OTHER SURGICAL HISTORY      carpal tunnel repair    SPINE SURGERY         Family History:     Family History   Problem Relation Age of Onset    Polycystic Kidney Diease Mother     Hypertension Mother     Kidney Disease Mother     Cerebrovascular Disease Mother     Chronic Obstructive Pulmonary Disease Father     Multiple Sclerosis Father     Polycystic Kidney Diease Sister      Cardiac Sudden Death Sister 52    Hypertension Sister     Kidney Disease Sister     Polycystic Kidney Diease Maternal Grandmother     Hypertension Maternal Grandmother     Kidney Disease Maternal Grandmother     Cerebrovascular Disease Maternal Grandmother     Heart Disease Maternal Grandfather     Hyperlipidemia Paternal Grandmother     Cerebrovascular Disease Paternal Grandmother     Coronary Artery Disease Paternal Grandfather     Pulmonary Embolism Paternal Grandfather     Anesthesia Reaction No family hx of         Social History:    reports that she has an unknown smoking status. She has never used smokeless tobacco. She reports current alcohol use. She reports current drug use. Drug: Marijuana.    Review of Systems:   12 systems are reviewed negative except for in HPI.    Meds:     Current Outpatient Medications:     artificial saliva (BIOTENE MT) SOLN solution, Swish and spit 2 sprays in mouth as needed for dry mouth, Disp: , Rfl:     aspirin (ASA) 81 MG chewable tablet, Take 81 mg by mouth daily, Disp: , Rfl:     atorvastatin (LIPITOR) 80 MG tablet, Take 1 tablet (80 mg) by mouth daily (Patient taking differently: Take 80 mg by mouth every evening), Disp: 90 tablet, Rfl: 3    B Complex-C (SUPER B COMPLEX PO), Take 1 tablet by mouth daily, Disp: , Rfl:     calcium acetate (CALPHRON) 667 MG TABS tablet, Take 1-2 tablets by mouth 3 times daily (with meals), Disp: , Rfl:     cholecalciferol (VITAMIN D3) 25 mcg (1000 units) capsule, Take 1 capsule by mouth daily at 2 pm, Disp: , Rfl:     clopidogrel (PLAVIX) 75 MG tablet, Take 1 tablet (75 mg) by mouth daily Dose to start tomorrow., Disp: 90 tablet, Rfl: 3    cyclobenzaprine (FLEXERIL) 5 MG tablet, Take 5 mg by mouth 2 times daily as needed for muscle spasms, Disp: , Rfl:     Epoetin Adam (EPOGEN IJ), Inject 1,000 Units into the vein three times a week At dialysis., Disp: , Rfl:     GLUCOSAMINE-CHONDROITIN-VIT D3 PO, Take 2 capsules by mouth daily, Disp: ,  Rfl:     Iron Sucrose (VENOFER IV), Inject 100 mg into the vein once a week At dialysis, Disp: , Rfl:     lactulose (CHRONULAC) 10 GM/15ML solution, Take 15 mLs (10 g) by mouth 2 times daily as needed for constipation, Disp: 900 mL, Rfl: 0    levothyroxine (SYNTHROID/LEVOTHROID) 50 MCG tablet, Take 50 mcg by mouth daily, Disp: , Rfl:     LORazepam (ATIVAN) 1 MG tablet, Take 1 mg by mouth daily as needed for anxiety, Disp: , Rfl:     losartan (COZAAR) 25 MG tablet, Take 0.5 tablets (12.5 mg) by mouth daily, Disp: 45 tablet, Rfl: 11    metoprolol succinate ER (TOPROL XL) 25 MG 24 hr tablet, Take 1 tablet (25 mg) by mouth daily, Disp: 30 tablet, Rfl: 0    nitroGLYcerin (NITROSTAT) 0.4 MG sublingual tablet, For chest pain place 1 tablet under the tongue every 5 minutes for 3 doses. If symptoms persist 5 minutes after 1st dose call 911., Disp: 30 tablet, Rfl: 0    nortriptyline (PAMELOR) 75 MG capsule, Take 75 mg by mouth At Bedtime, Disp: , Rfl:     Nutritional Supplements (NEPRO) LIQD, Take 1-2 Cans by mouth daily, Disp: , Rfl:     omeprazole (PRILOSEC) 20 MG DR capsule, Take 20 mg by mouth 2 times daily as needed, Disp: , Rfl:     oxyCODONE IR (ROXICODONE) 10 MG tablet, Take 10 mg by mouth every 4 hours as needed for pain, Disp: , Rfl:     SENNA-docusate sodium (SENNA S) 8.6-50 MG tablet, Take 2 tablets by mouth 2 times daily Hold for loose stools., Disp: 120 tablet, Rfl: 0    sertraline (ZOLOFT) 100 MG tablet, Take 150 mg by mouth At Bedtime, Disp: , Rfl:     Allergies:   Penicillins, No clinical screening - see comments, Nsaids, Carvedilol, Ciprofloxacin, Floxacillin (flucloxacillin), Levofloxacin, Morphine, and Sulfa antibiotics      Objective:      Physical Exam  37.2 kg (82 lb)     Body mass index is 16.55 kg/m .  /64 (BP Location: Right arm, Patient Position: Sitting, Cuff Size: Adult Small)   Pulse 100   Resp 18   Wt 37.2 kg (82 lb)   SpO2 99%   BMI 16.55 kg/m      General Appearance:   Awake, Alert,  No acute distress.   HEENT:  Pupil equal and reactive to light. No scleral icterus; the mucous membranes were moist.   Neck: No cervical bruits. No JVD. No thyromegaly.     Chest: The spine was straight. The chest was symmetric.   Lungs:   Respirations unlabored; Lungs are clear to auscultation. No crackles. No wheezing.   Cardiovascular:   Regular rhythm and rate, normal first and second heart sounds with II/VI systolic murmurs at apex and LUSB. No rubs or gallops.    Abdomen:  Soft. No tenderness. Non-distended. Bowels sounds are present   Extremities: Equal tibial pulses. No leg edema.   Skin: No rashes or ulcers. Warm, Dry.   Musculoskeletal: No tenderness. No deformity.   Neurologic: Mood and affect are appropriate. No focal deficits.         EKG: Personally reviewed  Sinus rhythm   Left ventricular hypertrophy with QRS widening   Possible Inferior infarct (cited on or before 17-MAY-2024)   Abnormal ECG   When compared with ECG of 17-MAY-2024 13:01,   No significant change was found     Cardiac Imaging Studies  ECHO 5-:  1. The left ventricle is mildly dilated. There is severe global hypokinesia of  the left ventricle. Left ventricular function is decreased. The ejection  fraction is 25-30% (severely reduced).  2. Normal right ventricle size and systolic function.  3. There is mod-severe to severe (3-4+) mitral regurgitation. There is  moderate (2+) tricuspid regurgitation.  4. Right ventricular systolic pressure is elevated, consistent with mild  pulmonary hypertension.  5. Trivial to small pericardial effusion. There are no echocardiographic  indications of cardiac tamponade.     Compared to the prior study dated 4/30/2024, mild interval improvement in LVEF  and normalization of RV function on current study. No significant change in  pericardial effusion.    Coronary angiogram with PCI in April 2024:  LM:no obstruction  LAD:mildly irregular w/ mostly extraluminal Ca2+  Lcx:mildly  "irregular  RCA:dominant, extensively Ca2+ form prox to mid-vessel, tortuous up to 95% narrowing. S/p SARAH to RCA   LVEDP:21    Lab Review   Lab Results   Component Value Date     07/02/2024     05/19/2021    CO2 26 07/02/2024    CO2 24 07/25/2023    CO2 31 05/19/2021    BUN 76.0 07/02/2024    BUN 27 07/25/2023    BUN 27 05/19/2021     Lab Results   Component Value Date    WBC 9.0 06/27/2024    WBC 10.4 05/19/2021    HGB 9.8 06/27/2024    HGB 13.0 05/19/2021    HCT 30.9 06/27/2024    HCT 39.6 05/19/2021     06/27/2024     05/19/2021     06/27/2024     05/19/2021     Lab Results   Component Value Date    CHOL 111 04/30/2024    CHOL 162 09/06/2023    CHOL 144 08/18/2022     Lab Results   Component Value Date    HDL 46 (L) 04/30/2024    HDL 75 09/06/2023     No components found for: \"LDLCALC\"  Lab Results   Component Value Date    TRIG 76 04/30/2024    TRIG 112 09/06/2023     No results found for: \"CHOLHDL\"  Lab Results   Component Value Date    TROPONINI 0.03 07/21/2023     Lab Results   Component Value Date    BNP >5,000 02/25/2023     Lab Results   Component Value Date    TSH 3.96 09/06/2023    TSH 3.90 07/13/2023                 Thank you for allowing me to participate in the care of your patient.      Sincerely,     Thad Begum MD     [facility]  cc:   Referred Self,   "

## 2024-07-11 PROBLEM — R10.10 PAIN OF UPPER ABDOMEN: Status: ACTIVE | Noted: 2024-01-01

## 2024-07-11 NOTE — CONSULTS
General Surgery Consult Note     Serene Tipton MRN# [id number]   YOB: 1955 Age: 68 year old      Date of Admission:  7/11/2024    Reason for Consult: SBO vs ileus, gallgladder distension    Assessment: Serene Tipton is a(n) 68 year old year old female s/p bilateral nephrectomy for PCKD, low EF, among other medical issues, presents with nausea, bloating, obstipation.  WBC 11.4.  CT with ileus vs SBO.  Prior admission 7/2023 with similar issue.  CT also demonstrates biliary dilation without stones.  LFTs normal    Plan:  - Refusing NG, understands risk of PO gastrografin and option to not to do challenge, may help her leave sooner if through, she'd like to try  - In terms of biliary dilation, with no stones and normal LFTs defer further workup to GI, may just be due to distension  -------------------    HPI: Serene Tipton is a(n) 68 year old year old female s/p bilateral nephrectomy for PCKD, low EF, among other medical issues, presents with nausea, bloating, obstipation.  Last BM 3 days ago, unsure of flatus.  Crampy abdominal pain.  No f/c.  No jaundice.    Past Medical History:  Past Medical History:   Diagnosis Date    Anemia in chronic kidney disease     Anxiety     Arthritis     Brain aneurysm     Cavernous segment of the right & left carotid arteries. See Neurosurg note 6/16/21.    Chronic low back pain     Managed by [facility]    Congestive heart failure, unspecified HF chronicity, unspecified heart failure type (H) 4/29/2024    Depressive disorder 2013    Disease of liver 8/29/2023    Disease of thyroid gland     ESRD (end stage renal disease) on dialysis (H) 07/2020    GERD (gastroesophageal reflux disease)     Hepatic lesion     Hyperlipidemia     Hypertension     Nephrolithiasis     Neuromuscular disorder (H)     Osteoporosis     PKD (polycystic kidney disease) 09/01/1990    Primary generalized (osteo)arthritis 8/2/2023    PTSD (post-traumatic stress disorder)     Tobacco  abuse     Vitamin D deficiency        Past Surgical History:  Past Surgical History:   Procedure Laterality Date    BACK SURGERY      spinal fusion w/hardware    BIOPSY Left 09/01/1990    Breast    BREAST LUMPECTOMY, RT/LT Left     Lumpectomy    CV CORONARY ANGIOGRAM N/A 4/30/2024    Procedure: CV CORONARY ANGIOGRAM;  Surgeon: Jac Russell MD;  Location: [facility] CATH LAB CV    CV CORONARY ANGIOGRAM N/A 6/27/2024    Procedure: CV CORONARY ANGIOGRAM;  Surgeon: Patrick Ascencio MD;  Location: [facility] CATH LAB CV    CV CORONARY LITHOTRIPSY PCI N/A 4/30/2024    Procedure: Percutaneous Coronary Intervention - Lithotripsy;  Surgeon: Jac Russell MD;  Location: [facility] CATH LAB CV    CV LEFT HEART CATH N/A 4/30/2024    Procedure: Left Heart Catheterization;  Surgeon: Jac Russell MD;  Location: [facility] CATH LAB CV    CV LEFT HEART CATH N/A 6/27/2024    Procedure: Left Heart Catheterization;  Surgeon: Patrick Ascencio MD;  Location: [facility] CATH LAB CV    CV PCI STENT DRUG ELUTING N/A 4/30/2024    Procedure: Percutaneous Coronary Intervention Stent;  Surgeon: Jac Russell MD;  Location: [facility] CATH LAB CV    CYST REMOVAL Right     rt wrist    CYSTECTOMY PILONIDAL  1981    EYE SURGERY  2008    lasik    FORMATION ARTERIOVENOUS FISTULA    07/28/2020    FRACTURE SURGERY      IR CVC TUNNEL W2 CATH W/O PORT  7/17/2020    IR DIALYSIS FISTULOGRAM RIGHT  10/21/2020    NEPHRECTOMY BILATERAL Bilateral 2/1/2023    Procedure: bilateral native nephrectomy;  Surgeon: Alana Giang MD;  Location: [facility] OR    OPEN REDUCTION INTERNAL FIXATION HIP BIPOLAR Left 11/10/2023    Procedure: LEFT HEMIARTHROPLASTY, HIP, HEMIARTHROPLASTY;  Surgeon: Lucas Weldon MD;  Location: [facility] Main OR    ORTHOPEDIC SURGERY Right 2005    Shoulder    OTHER SURGICAL HISTORY      carpal tunnel repair    SPINE SURGERY         Allergies:     Allergies   Allergen Reactions    Penicillins Other (See Comments) and Shortness Of Breath      Breathing problem.  breathing      No Clinical Screening - See Comments      PN: LW CM1: Contrast Intercapsular - Nonionic Reaction :    Nsaids Other (See Comments)     Kidney damage    Carvedilol GI Disturbance     Nausea and vomiting    Ciprofloxacin Muscle Pain (Myalgia)    Floxacillin (Flucloxacillin) Muscle Pain (Myalgia) and Rash    Levofloxacin Muscle Pain (Myalgia) and Rash    Morphine Nausea and Vomiting    Sulfa Antibiotics Itching       Medications:  Current Facility-Administered Medications   Medication Dose Route Frequency Provider Last Rate Last Admin    acetaminophen (TYLENOL) tablet 650 mg  650 mg Oral Q4H PRN Patrick Judd MD        Or    acetaminophen (TYLENOL) Suppository 650 mg  650 mg Rectal Q4H PRN Patrick Judd MD        aspirin (ASA) chewable tablet 81 mg  81 mg Oral Daily Patrick Judd MD        atorvastatin (LIPITOR) tablet 80 mg  80 mg Oral QPM Patrick Judd MD        calcium acetate (PHOSLO) capsule 667-1,334 mg  667-1,334 mg Oral TID w/meals Patrick Judd MD        calcium carbonate (TUMS) chewable tablet 1,000 mg  1,000 mg Oral 4x Daily PRN Patrick Judd MD        clopidogrel (PLAVIX) tablet 75 mg  75 mg Oral Daily Patrick Judd MD        HYDROmorphone (DILAUDID) injection 0.2 mg  0.2 mg Intravenous Q2H PRN Patrick Judd MD   0.2 mg at 07/11/24 1601    HYDROmorphone (DILAUDID) injection 0.4 mg  0.4 mg Intravenous Q2H PRN Patrick Judd MD        levothyroxine (SYNTHROID/LEVOTHROID) tablet 50 mcg  50 mcg Oral Daily Patrick Judd MD        lidocaine (LMX4) cream   Topical Q1H PRN Patrick Judd MD        lidocaine 1 % 0.1-1 mL  0.1-1 mL Other Q1H PRN Patrick Judd MD        LORazepam (ATIVAN) tablet 1 mg  1 mg Oral Daily PRN Patrick Judd MD        losartan (COZAAR) half-tab 12.5 mg  12.5 mg Oral Daily Patrick Judd MD        metoprolol succinate ER (TOPROL XL) 24 hr tablet 25 mg  25 mg Oral Daily Patrick Judd MD         naloxone (NARCAN) injection 0.2 mg  0.2 mg Intravenous Q2 Min PRN Patrick Judd MD        Or    naloxone (NARCAN) injection 0.4 mg  0.4 mg Intravenous Q2 Min PRN Patrick Judd MD        Or    naloxone (NARCAN) injection 0.2 mg  0.2 mg Intramuscular Q2 Min PRN Patrick Judd MD        Or    naloxone (NARCAN) injection 0.4 mg  0.4 mg Intramuscular Q2 Min PRN Patrick Judd MD        nitroGLYcerin (NITROSTAT) sublingual tablet 0.4 mg  0.4 mg Sublingual Q5 Min PRN Patrick Judd MD        nortriptyline (PAMELOR) capsule 75 mg  75 mg Oral At Bedtime Patrick Judd MD        oxyCODONE (ROXICODONE) tablet 10 mg  10 mg Oral Q4H PRN Patrikc Judd MD        promethazine (PHENERGAN) 12.5 mg in sodium chloride 0.9 % 55 mL intermittent infusion  12.5 mg Intravenous Q6H PRN Patrick Judd MD   12.5 mg at 07/11/24 1618    senna-docusate (SENOKOT-S/PERICOLACE) 8.6-50 MG per tablet 2 tablet  2 tablet Oral BID Patrick Judd MD        sertraline (ZOLOFT) tablet 150 mg  150 mg Oral At Bedtime Patrick Judd MD        sodium chloride (PF) 0.9% PF flush 3 mL  3 mL Intracatheter Q8H Patrick Judd MD   3 mL at 07/11/24 1622    sodium chloride (PF) 0.9% PF flush 3 mL  3 mL Intracatheter q1 min prn Patrick Judd MD        Vitamin D3 (CHOLECALCIFEROL) tablet 25 mcg  25 mcg Oral Daily Patrick Judd MD           Social History:  Social History     Socioeconomic History    Marital status: Single     Spouse name: Not on file    Number of children: Not on file    Years of education: Not on file    Highest education level: Not on file   Occupational History    Not on file   Tobacco Use    Smoking status: Unknown    Smokeless tobacco: Never   Substance and Sexual Activity    Alcohol use: Yes     Comment: occasional    Drug use: Yes     Types: Marijuana    Sexual activity: Not on file   Other Topics Concern    Parent/sibling w/ CABG, MI or angioplasty before 65F 55M? Not Asked   Social History Narrative  "   Not on file     Social Determinants of Health     Financial Resource Strain: Not on file   Food Insecurity: Not on file   Transportation Needs: Not on file   Physical Activity: Not on file   Stress: Not on file   Social Connections: Not on file   Interpersonal Safety: Not on file   Housing Stability: Not on file       Family History:  Family History   Problem Relation Age of Onset    Polycystic Kidney Diease Mother     Hypertension Mother     Kidney Disease Mother     Cerebrovascular Disease Mother     Chronic Obstructive Pulmonary Disease Father     Multiple Sclerosis Father     Polycystic Kidney Diease Sister     Cardiac Sudden Death Sister 52    Hypertension Sister     Kidney Disease Sister     Polycystic Kidney Diease Maternal Grandmother     Hypertension Maternal Grandmother     Kidney Disease Maternal Grandmother     Cerebrovascular Disease Maternal Grandmother     Heart Disease Maternal Grandfather     Hyperlipidemia Paternal Grandmother     Cerebrovascular Disease Paternal Grandmother     Coronary Artery Disease Paternal Grandfather     Pulmonary Embolism Paternal Grandfather     Anesthesia Reaction No family hx of        ROS:  Per HPI    Exam:  /68 (BP Location: Right arm)   Pulse 113   Temp 98.3  F (36.8  C) (Oral)   Resp 16   Ht 1.499 m (4' 11\")   Wt 35.9 kg (79 lb 2.3 oz)   SpO2 99%   BMI 15.99 kg/m    General: Alert, interactive, & in NAD  Resp: NLB  Cardiac: Mild tachycardia; extremities warm;   Abdomen: Soft, nontender, moderate distension.  Skin: Warm and dry, no jaundice or rash    Labs:  All laboratory data reviewed    Imaging:   CT and US reviewed    --    Newton Montenegro MD      4:39 PM, 7/11/2024          "

## 2024-07-11 NOTE — CONSULTS
[facility] Consult       Name: Serene Tipton    Medical Record #: [id number]    YOB: 1955    Date/Time: 7/11/2024/4:30 PM    Reason for Consultation: Patrick Judd MD has asked me to evaluate Serene Tipton regarding abdominal pain and nausea and vomiting.    HPI: Patient is a 68-year-old female who presents with a few days of nausea and vomiting and upper abdominal pain.  CT scan reveals ileus versus small bowel obstruction as well as biliary ductal dilation and gallbladder distention.  Right upper quadrant ultrasound also shows biliary ductal dilation and gallbladder distention.  No cholelithiasis seen.  Patient also has a history of end-stage renal disease on hemodialysis.  Patient denies diarrhea or other GI symptoms.  Patient's LFTs are normal.    Patient Active Problem List   Diagnosis    Polycystic kidney    Hypertension    End stage kidney disease (H)    Depressive disorder    Chronic low back pain    Hyperlipidemia    Tobacco abuse    Vitamin D deficiency    Polycystic kidney disease    Postoperative anemia due to acute blood loss    Enlarged kidney as indication for native nephrectomy    Acute post-operative pain    Chronic, continuous use of opioids    Shortness of breath    S/p nephrectomy    Dialysis patient (H24)    Pulmonary edema, noncardiac    ESRD (end stage renal disease) on dialysis (H)    Constipation    Small bowel obstruction (H)    Hypoglycemia    Fall, initial encounter    Hypomagnesemia    Diffuse abdominal pain    Chronic renal failure, unspecified CKD stage    Anemia in stage 5 chronic kidney disease (H)    Anxiety    Backache    Congenital spondylolisthesis    Fibrocystic breast changes    Gastroesophageal reflux disease without esophagitis    Hepatic lesion    Hyperparathyroidism (H24)    Hyperparathyroidism, secondary renal (H24)    Hypothyroidism (acquired)    Insomnia    Liver cyst    Long term (current) use of opiate analgesic    Lumbar facet  joint syndrome    Major depression, recurrent (H24)    Major depressive disorder with single episode, in partial remission (H24)    Mammogram abnormal    Nicotine dependence, cigarettes, uncomplicated    Non-traumatic rotator cuff tear, right    NS (nuclear sclerosis), bilateral    Obstructive sleep apnea (adult) (pediatric)    Pain in joint, pelvic region and thigh    Posttraumatic stress disorder    Primary generalized (osteo)arthritis    Pure hypercholesterolemia    PVD (posterior vitreous detachment), left eye    Renovascular hypertension    S/P lumbar fusion    Piriformis syndrome    Spinal stenosis, lumbar region with neurogenic claudication    Symptomatic menopausal or female climacteric states    Vitreomacular adhesion, right    Hip fracture, left, closed, initial encounter (H)    Closed nondisplaced fracture of metatarsal bone of right foot, unspecified metatarsal, initial encounter    Acquired absence of kidney    Benign neoplasm of ascending colon    Benign neoplasm of cecum    Fracture of unspecified metatarsal bone(s), right foot, subsequent encounter for fracture with routine healing    Polyp of colon    Constipation, unspecified    Depression, unspecified    Dependence on renal dialysis (H24)    Generalized abdominal pain    Chronic kidney disease, unspecified    CKD (chronic kidney disease) stage 4, GFR 15-29 ml/min (H)    End stage renal disease (H)    Diffuse cystic mastopathy    Disease of liver    Fracture of unspecified part of neck of left femur, subsequent encounter for closed fracture with routine healing    Essential (primary) hypertension    Hypoglycemia, unspecified    Insomnia, unspecified    Sciatica    Chronic pulmonary edema    Kidney transplant status    Ileus, unspecified (H)    Unspecified intestinal obstruction, unspecified as to partial versus complete obstruction (H)    Acute on chronic systolic congestive heart failure (H)    Elevated troponin    Dilated cardiomyopathy (H)     Ischemic cardiomyopathy    Orthopnea    Other fatigue    Pain of upper abdomen          Review of Systems (ROS): A comprehensive review of systems was negative.    Past Medical History:  Past Medical History:   Diagnosis Date    Anemia in chronic kidney disease     Anxiety     Arthritis     Brain aneurysm     Cavernous segment of the right & left carotid arteries. See Neurosurg note 6/16/21.    Chronic low back pain     Managed by Pain Clinic    Congestive heart failure, unspecified HF chronicity, unspecified heart failure type (H) 4/29/2024    Depressive disorder 2013    Disease of liver 8/29/2023    Disease of thyroid gland     ESRD (end stage renal disease) on dialysis (H) 07/2020    GERD (gastroesophageal reflux disease)     Hepatic lesion     Hyperlipidemia     Hypertension     Nephrolithiasis     Neuromuscular disorder (H)     Osteoporosis     PKD (polycystic kidney disease) 09/01/1990    Primary generalized (osteo)arthritis 8/2/2023    PTSD (post-traumatic stress disorder)     Tobacco abuse     Vitamin D deficiency        Medications:   No current outpatient medications on file.       Allergies: Penicillins, No clinical screening - see comments, Nsaids, Carvedilol, Ciprofloxacin, Floxacillin (flucloxacillin), Levofloxacin, Morphine, and Sulfa antibiotics    Family History:  Family History   Problem Relation Age of Onset    Polycystic Kidney Diease Mother     Hypertension Mother     Kidney Disease Mother     Cerebrovascular Disease Mother     Chronic Obstructive Pulmonary Disease Father     Multiple Sclerosis Father     Polycystic Kidney Diease Sister     Cardiac Sudden Death Sister 52    Hypertension Sister     Kidney Disease Sister     Polycystic Kidney Diease Maternal Grandmother     Hypertension Maternal Grandmother     Kidney Disease Maternal Grandmother     Cerebrovascular Disease Maternal Grandmother     Heart Disease Maternal Grandfather     Hyperlipidemia Paternal Grandmother     Cerebrovascular  "Disease Paternal Grandmother     Coronary Artery Disease Paternal Grandfather     Pulmonary Embolism Paternal Grandfather     Anesthesia Reaction No family hx of        Social History:  Social History     Socioeconomic History    Marital status: Single     Spouse name: Not on file    Number of children: Not on file    Years of education: Not on file    Highest education level: Not on file   Occupational History    Not on file   Tobacco Use    Smoking status: Unknown    Smokeless tobacco: Never   Substance and Sexual Activity    Alcohol use: Yes     Comment: occasional    Drug use: Yes     Types: Marijuana    Sexual activity: Not on file   Other Topics Concern    Parent/sibling w/ CABG, MI or angioplasty before 65F 55M? Not Asked   Social History Narrative    Not on file     Social Determinants of Health     Financial Resource Strain: Not on file   Food Insecurity: Not on file   Transportation Needs: Not on file   Physical Activity: Not on file   Stress: Not on file   Social Connections: Not on file   Interpersonal Safety: Not on file   Housing Stability: Not on file       PHYSICAL EXAMINATION:  /68 (BP Location: Right arm)   Pulse 113   Temp 98.3  F (36.8  C) (Oral)   Resp 16   Ht 1.499 m (4' 11\")   Wt 35.9 kg (79 lb 2.3 oz)   SpO2 99%   BMI 15.99 kg/m   Body mass index is 15.99 kg/m .  General:  NAD  HEENT: Sclera non-icteric.  Moist mucous membranes.   Lymph: No cervical lymphadenopathy.  Gastrointestinal: Distended, mild upper abdominal tenderness  Musculoskeletal: Extremities are warm, no lower extremity edema.  Neuro: Alert and oriented.  No gross focal abnormalities.  Psych: Mood and affect normal.    I have reviewed recent laboratory studies:    LABORATORY DATA:    Recent Labs   Lab Test 07/11/24  0840   WBC 11.4*   RBC 2.94*   HGB 10.2*   HCT 32.3*   *   MCH 34.7*   MCHC 31.6   RDW 18.1*         Sodium   Date Value Ref Range Status   07/11/2024 134 (L) 135 - 145 mmol/L Final "   05/19/2021 136 133 - 144 mmol/L Final     Potassium   Date Value Ref Range Status   07/11/2024 5.4 (H) 3.4 - 5.3 mmol/L Final   07/25/2023 4.7 3.5 - 5.0 mmol/L Final   05/19/2021 3.9 3.4 - 5.3 mmol/L Final     Chloride   Date Value Ref Range Status   07/11/2024 87 (L) 98 - 107 mmol/L Final   07/25/2023 96 (L) 98 - 107 mmol/L Final   05/19/2021 98 94 - 109 mmol/L Final     Carbon Dioxide   Date Value Ref Range Status   05/19/2021 31 20 - 32 mmol/L Final     Carbon Dioxide (CO2)   Date Value Ref Range Status   07/11/2024 26 22 - 29 mmol/L Final   07/25/2023 24 22 - 31 mmol/L Final     Anion Gap   Date Value Ref Range Status   07/11/2024 21 (H) 7 - 15 mmol/L Final   07/25/2023 12 5 - 18 mmol/L Final   05/19/2021 7 3 - 14 mmol/L Final     Glucose   Date Value Ref Range Status   07/11/2024 103 (H) 70 - 99 mg/dL Final   07/25/2023 116 70 - 125 mg/dL Final   05/19/2021 69 (L) 70 - 99 mg/dL Final     GLUCOSE BY METER POCT   Date Value Ref Range Status   06/25/2024 154 (H) 70 - 99 mg/dL Final     Urea Nitrogen   Date Value Ref Range Status   07/11/2024 57.2 (H) 8.0 - 23.0 mg/dL Final   07/25/2023 27 (H) 8 - 22 mg/dL Final   05/19/2021 27 7 - 30 mg/dL Final     Creatinine   Date Value Ref Range Status   07/11/2024 5.33 (H) 0.51 - 0.95 mg/dL Final   05/19/2021 3.43 (H) 0.52 - 1.04 mg/dL Final     GFR Estimate   Date Value Ref Range Status   07/11/2024 8 (L) >60 mL/min/1.73m2 Final     Comment:     eGFR calculated using 2021 CKD-EPI equation.   05/19/2021 13 (L) >60 mL/min/[1.73_m2] Final     Comment:     Non  GFR Calc  Starting 12/18/2018, serum creatinine based estimated GFR (eGFR) will be   calculated using the Chronic Kidney Disease Epidemiology Collaboration   (CKD-EPI) equation.       Calcium   Date Value Ref Range Status   07/11/2024 9.8 8.8 - 10.2 mg/dL Final   05/19/2021 9.5 8.5 - 10.1 mg/dL Final      Recent Labs   Lab Test 07/11/24  0840   PROTTOTAL 6.6   ALBUMIN 3.6   BILITOTAL 0.4   ALKPHOS 131    AST 20   ALT 10      INR   Date Value Ref Range Status   07/11/2024 1.20 (H) 0.85 - 1.15 Final   05/19/2021 1.02 0.86 - 1.14 Final         Radiology:     Impression: This is a 68-year-old female who presents with a few days of nausea and vomiting and upper abdominal pain.  CT scan reveals ileus versus small bowel obstruction as well as biliary ductal dilation and gallbladder distention.  Right upper quadrant ultrasound also shows biliary ductal dilation and gallbladder distention.  No cholelithiasis seen.  Patient also has a history of end-stage renal disease on hemodialysis. Patient's LFTs are normal.    Recommendation:   -MRCP without contrast for further evaluation of biliary ductal dilation and gallbladder distention  -Recheck LFTs tomorrow morning  -Recheck flat and upright abdominal x-rays tomorrow morning to reassess ileus versus small bowel obstruction  -Agree with general surgery consultation for possible small bowel obstruction as well as gallbladder distention    Micaela Montgomery MD  7/11/2024  [facility]

## 2024-07-11 NOTE — ED PROVIDER NOTES
EMERGENCY DEPARTMENT ENCOUNTER      NAME: Serene Tipton  AGE: 68 year old female  YOB: 1955  MRN: [id number]  EVALUATION DATE & TIME: 7/11/2024  8:03 AM    PCP: Ramos Lowry    ED PROVIDER: Lucas Genao      Chief Complaint   Patient presents with    Abdominal Pain    Chest Pain     FINAL IMPRESSION:  1. Pain of upper abdomen        ED COURSE & MEDICAL DECISION MAKING:    Pertinent Labs & Imaging studies reviewed. (See chart for details)  68 year old female presents to the Emergency Department for evaluation of chest pain and abdominal pain.  Recent hospital admission for chest pain and non-ST elevation MI where she underwent coronary angiography with nonobstructive disease she is in end-stage renal disease patient receives dialysis Tuesday Thursday and Saturday she missed her dialysis appointment today presenting to the emergency department for evaluation of primarily abdominal pain.  She does have those same chest pain symptoms that she continued to have while she was in the hospital but is now noting some escalating primarily epigastric discomfort to examination.  She had focal pain throughout palpation of her abdomen by clinical examination with an exacerbation to her discomfort with palpation in the epigastric region.  Noted to be mildly tachycardic with a nonischemic appearing ECG otherwise at arrival to the emergency department.  Laboratory testing demonstrating sequela of her chronic kidney disease including mild elevation to her potassium at 5.4 phosphorus 6.2 creatinine 5.33 glucose was 103.  Magnesium 2.1.  Initial troponin elevated likely secondary to her chronic kidney disease some trending upward of her troponin on serial testing in the emergency department.  CT scan imaging demonstrating dilatation to her small bowel concerning for developing SBO versus ileus.  Also intra and extrahepatic dilatation of her biliary tract system of uncertain clinical significance her  ultrasound demonstrating no findings of cholecystitis and her LFTs were normal.  Ultimately given the multiple medical issues and patient's ongoing discomfort with abnormal imaging findings I did recommend readmission to the hospital for further care management.  Case was discussed with the resident service for admission.  Updated patient on test results and plan of care.       8:07 AM Introduced myself to the patient, obtained history of present illness, and performed initial physical exam at this time.   1:06 PM I spoke with Phalen about patient's case.      Medical Decision Making  Obtained supplemental history:Supplemental history obtained?: No  Reviewed external records: External records reviewed?: Documented in chart and Other: 6/25/2024 [facility]  Care impacted by chronic illness:Chronic Kidney Disease  Care significantly affected by social determinants of health:N/A  Did you consider but not order tests?: Work up considered but not performed and documented in chart, if applicable  Did you interpret images independently?: Independent interpretation of ECG and images noted in documentation, when applicable.  Consultation discussion with other provider:Did you involve another provider (consultant, , pharmacy, etc.)?: I discussed the care with another health care provider, see documentation for details.  Admit.      At the conclusion of the encounter I discussed the results of all of the tests and the disposition. The questions were answered. The patient or family acknowledged understanding and was agreeable with the care plan.         MEDICATIONS GIVEN IN THE EMERGENCY:  Medications   HYDROmorphone (PF) (DILAUDID) injection 0.5 mg (0.5 mg Intravenous $Given 7/11/24 8538)   ondansetron (ZOFRAN) injection 4 mg (4 mg Intravenous $Given 7/11/24 0902)   prochlorperazine (COMPAZINE) injection 5 mg (5 mg Intravenous $Given 7/11/24 1026)   HYDROmorphone (PF) (DILAUDID) injection 0.5 mg  "(0.5 mg Intravenous $Given 7/11/24 1045)       NEW PRESCRIPTIONS STARTED AT TODAY'S ER VISIT  New Prescriptions    No medications on file     Modified Medications    No medications on file       =================================================================    HPI    Patient information was obtained from: Patient    Use of : N/A         Serene Tipton is a 68 year old female with a pertinent history of hypertension, end-stage kidney disease, depression, hyperlipidemia, tobacco abuse, polycystic kidney disease, status post nephrectomy, dialysis patient, hypoglycemia, hyperparathyroidism, hypercholesterolemia, benign neoplasm of ascending colon, these of liver, congestive heart failure, PTSD, no referral lithiasis, neuroexam, and anemia and chronic kidney disease who presents to this ED by EMS for evaluation of abdominal pain and chest pain.    Per chart review patient was seen at [facility] on 6/25/2024 for NSTEMI.  Diagnoses of hyperkalemia and chronic renal failure, unspecified CKD stage were also pertinent at this visit.  Troponin was 166.  Patient admitted at this time.  Patient discharged on 6/27/2024.  No new prescriptions.    Patient reports after recent hospitalization things were overall good, however Saturday (7/6/2024) she endorses experiencing intermittent pain noticeably in the abdomen.  Patient endorses that the pain feels like \"I swallowed something sharp.\"  Patient reports Tuesday evening everything progressively worsened noting vomiting and unable to eat since.  Patient endorses multiple episodes of emesis most recently this morning in ambulance.  Patient notes history of STEMI. When she started experiencing chest pressure she looked at her previous paperwork and took nitro glycerin along with 3 other pills morning.  Patient further endorses nausea, but denies chest pain shortness of breath or urinary or bowel concerns.  Patient's last bowel movement was " "Tuesday (7/9/2024).  En route to ED, EMS gave patient Zofran.    Patient has history of end-stage renal disease and currently going through dialysis.  Patient dialysis is Tuesday, Thursday, and Saturday, with a fistula in the left arm.  Patient is supposed to have dialysis today. Patient status post coronary angiogram and left heart catheterization as of 6/27/2024.  Patient had coronary artery stents placed on 4/30/2024.      REVIEW OF SYSTEMS   Review of Systems   As per HPI otherwise negative.    PHYSICAL EXAM    BP (!) 153/74   Pulse 105   Temp 98.4  F (36.9  C) (Oral)   Resp 15   Ht 1.499 m (4' 11\")   Wt 35.1 kg (77 lb 6.4 oz)   SpO2 100%   BMI 15.63 kg/m    PHYSICAL EXAM    Constitutional: Chronically ill-appearing elderly female, appears to be in distress and fatigue  HENT: Normocephalic, Atraumatic, Bilateral external ears normal, Oropharynx normal, mucous membranes moist, Nose normal. Neck-  Normal range of motion, No tenderness, Supple, No stridor.   Eyes: PERRL, EOMI, Conjunctiva normal, No discharge.   Respiratory: Normal breath sounds, No respiratory distress, No wheezing, Speaks full sentences easily. No cough.   Cardiovascular: Normal heart rate, Regular rhythm, No murmurs Chest wall nontender.  Fistula to left upper extremity with palpable thrill  GI: Diffuse abdominal tenderness to palpation with a focus of discomfort to the upper abdomen no guarding no peritoneal signs  : No cva tenderness    Musculoskeletal: 2+ DP pulses. No edema. No cyanosis. Good range of motion in all major joints. No tenderness to palpation. No tenderness of the CTLS spine.   Integument: Warm, Dry, No erythema, No rash. No petechiae.   Neurologic: Alert & oriented x 3, Normal motor function, Normal sensory function, No focal deficits noted.   Psychiatric: Affect normal, Judgment normal, Mood normal. Cooperative.        LAB:  All pertinent labs reviewed and interpreted.  Results for orders placed or performed during " the hospital encounter of 07/11/24   CT Abdomen Pelvis w/o Contrast    Impression    IMPRESSION:   1.  Moderate diffuse small bowel dilatation which may reflect an ileus or distal small bowel obstruction. No discrete transition is identified on this limited noncontrast exam. Short-term follow-up is recommended.  2.  Mild to moderate gallbladder distention and interval development of mild intra and extra hepatic biliary ductal dilatation. No cause for obstruction is identified on this exam. Correlate with liver function tests. Follow-up with MRCP may be helpful.     XR Chest Port 1 View    Impression    IMPRESSION: Mild cardiomegaly. Mild central vascular congestion and mild interstitial edema. No focal pneumonic consolidation or pleural effusion.   US Abdomen Limited    Impression    IMPRESSION:  1.  Mild to moderate intrahepatic biliary ductal dilatation. No cause for obstruction is identified on this exam. Further evaluation with MRCP may be helpful.  2.  Gallbladder distention. No cholelithiasis and no evidence for acute cholecystitis.  3.  Diffusely heterogeneous liver with diffuse non masslike hyperechoic foci. This may reflect periportal edema/hepatic inflammation.  Correlate with liver function tests.     Result Value Ref Range    INR 1.20 (H) 0.85 - 1.15   Comprehensive metabolic panel   Result Value Ref Range    Sodium 134 (L) 135 - 145 mmol/L    Potassium 5.4 (H) 3.4 - 5.3 mmol/L    Carbon Dioxide (CO2) 26 22 - 29 mmol/L    Anion Gap 21 (H) 7 - 15 mmol/L    Urea Nitrogen 57.2 (H) 8.0 - 23.0 mg/dL    Creatinine 5.33 (H) 0.51 - 0.95 mg/dL    GFR Estimate 8 (L) >60 mL/min/1.73m2    Calcium 9.8 8.8 - 10.2 mg/dL    Chloride 87 (L) 98 - 107 mmol/L    Glucose 103 (H) 70 - 99 mg/dL    Alkaline Phosphatase 131 40 - 150 U/L    AST 20 0 - 45 U/L    ALT 10 0 - 50 U/L    Protein Total 6.6 6.4 - 8.3 g/dL    Albumin 3.6 3.5 - 5.2 g/dL    Bilirubin Total 0.4 <=1.2 mg/dL   Result Value Ref Range    Lipase 7 (L) 13 - 60 U/L    Lactic acid whole blood with 1x repeat in 2 hr when >2   Result Value Ref Range    Lactic Acid, Initial 1.5 0.7 - 2.0 mmol/L   Result Value Ref Range    Troponin T, High Sensitivity 165 (HH) <=14 ng/L   Result Value Ref Range    Magnesium 2.1 1.7 - 2.3 mg/dL   Nt probnp inpatient (BNP)   Result Value Ref Range    N terminal Pro BNP Inpatient >70,000 (H) 0 - 900 pg/mL   Result Value Ref Range    Phosphorus 6.2 (H) 2.5 - 4.5 mg/dL   CBC with platelets and differential   Result Value Ref Range    WBC Count 11.4 (H) 4.0 - 11.0 10e3/uL    RBC Count 2.94 (L) 3.80 - 5.20 10e6/uL    Hemoglobin 10.2 (L) 11.7 - 15.7 g/dL    Hematocrit 32.3 (L) 35.0 - 47.0 %     (H) 78 - 100 fL    MCH 34.7 (H) 26.5 - 33.0 pg    MCHC 31.6 31.5 - 36.5 g/dL    RDW 18.1 (H) 10.0 - 15.0 %    Platelet Count 358 150 - 450 10e3/uL    % Neutrophils 86 %    % Lymphocytes 3 %    % Monocytes 9 %    % Eosinophils 0 %    % Basophils 1 %    % Immature Granulocytes 0 %    NRBCs per 100 WBC 0 <1 /100    Absolute Neutrophils 9.9 (H) 1.6 - 8.3 10e3/uL    Absolute Lymphocytes 0.4 (L) 0.8 - 5.3 10e3/uL    Absolute Monocytes 1.1 0.0 - 1.3 10e3/uL    Absolute Eosinophils 0.0 0.0 - 0.7 10e3/uL    Absolute Basophils 0.1 0.0 - 0.2 10e3/uL    Absolute Immature Granulocytes 0.1 <=0.4 10e3/uL    Absolute NRBCs 0.0 10e3/uL   Troponin T, High Sensitivity (now)   Result Value Ref Range    Troponin T, High Sensitivity 174 (HH) <=14 ng/L       RADIOLOGY:  Reviewed all pertinent imaging. Please see official radiology report.  US Abdomen Limited   Final Result   IMPRESSION:   1.  Mild to moderate intrahepatic biliary ductal dilatation. No cause for obstruction is identified on this exam. Further evaluation with MRCP may be helpful.   2.  Gallbladder distention. No cholelithiasis and no evidence for acute cholecystitis.   3.  Diffusely heterogeneous liver with diffuse non masslike hyperechoic foci. This may reflect periportal edema/hepatic inflammation.  Correlate with  liver function tests.         XR Chest Port 1 View   Final Result   IMPRESSION: Mild cardiomegaly. Mild central vascular congestion and mild interstitial edema. No focal pneumonic consolidation or pleural effusion.      CT Abdomen Pelvis w/o Contrast   Final Result   IMPRESSION:    1.  Moderate diffuse small bowel dilatation which may reflect an ileus or distal small bowel obstruction. No discrete transition is identified on this limited noncontrast exam. Short-term follow-up is recommended.   2.  Mild to moderate gallbladder distention and interval development of mild intra and extra hepatic biliary ductal dilatation. No cause for obstruction is identified on this exam. Correlate with liver function tests. Follow-up with MRCP may be helpful.             EKG:    Performed at: 08:11    Impression: Sinus tachycardia.  Right atrial enlargement.  Voltage criteria for left ventricular hypertrophy.  Nonspecific T wave abnormality.  Prolonged QT.    Rate: 102 bpm  Rhythm: Sinus tachycardia  Axis: 61 48 66  TX Interval: 170 ms  QRS Interval: 96 ms  QTc Interval: 552 ms  ST Changes: Nonspecific T wave abnormality  Comparison: 6/25/2024 - T wave inversion no longer evident in inferior leads.  QT has lengthened.    I have independently reviewed and interpreted the EKG(s) documented above.          I, Kalpana De La Paz, am serving as a scribe to document services personally performed by Lucas Genao MD based on my observation and the provider's statements to me. I, Lucas Genao MD, attest that Kalpana De La Paz is acting in a scribe capacity, has observed my performance of the services and has documented them in accordance with my direction.    Lucas Genao MD  [facility] EMERGENCY DEPARTMENT  [address] [place] [id number] [phone]       Lucas Genao MD  07/11/24 4581

## 2024-07-11 NOTE — CONSULTS
RENAL CONSULT NOTE    REQUESTING PHYSICIAN: MD Danish    REASON FOR CONSULT: ESRD    ASSESSMENT/PLAN:  REASON FOR CONSULT: ESRD      PLAN:  Dialysis tomorrow off schedule d/t missed tx today (no urgency in tx  this p.m., volume ok and K only mildly elevated), then continue on TTS schedule on Sat  Currently NPO, Renal diet when taking pos with FR 1L/day   Hold off on K binders, under GI issues clarified/resolved  Surg and GI have been consulted per [facility]     ASSESSMENT:  ESRD:  Dialysis off schedule tomorrow d/t missed tx today, volume status ok, K only mildly elevated, pt would prefer to not run today d/t feeling so poorly  On in center HD @  [facility], Under the care of DR Armstrong  RX:  TTS schedule, 3 hour TT,   EDW 35kg, /   ACCESS: left upper extremity AVF  No recent missed tx, last 7/9 left near her EDW.   Some intra-dialytic HoTN noted   Transplant inactive d/t recent coronary PCI's in April, severe LV dysfunction   Initiated HD 2020  Oliguric  s/p bilateral nephrectomies for PCKD     Lytes/Acid-base:  Hyperkalemia:  Chronic mild hyperK, dialysis in the a.m.  Renal diet when taking pos    Single dose Lokelma     CKD/MBD:  On binders, Phos 6     Abd pain:  Recurrent, unclear etiol, ? Mesenteric ischemia? Vs ?? Biliary obstruction?  h/o SBO/ileus,  constipation?  abd imaging showing some hepatic ductal dilatation, no stones, LFT's are ok, no obvious etiol, some constipation, feels distended, GI and surg have been consulted.     Volume:  She is near her current renal target wt, poor intake and had emesis at home, no evidence of severe overload, UF with dialysis tomorrow    SOB:  Some baseline emphysema, mild edema, on some supp 02, states that she is shallow breathing d/t abd distention    HTN:  PTA Losartan 12.5 (started last week per cards), Metop 25 daily, SBP sl escalated, cont home meds, prns     ASCAD:  PCI of RCA in 4/2024, severe LVEF 20-25%, chest pressure  Referred pain,? H/o  "SARAH 4/2024, trops elev but stable for her, likely d/t ESRD, has followed with cards in the past, She had an NM lexiscan stress test that was nondiagnostic for ischemia, then had repeat coronary angiogram on 6/27, which did not show any stent thrombosis or restenosi last 7/3 cards note concurred trop likely d/t renal dz/volume management per [facility]   On ASA, statin, Plavix and BB, ARB started 7/3    Hypothyroidism:   On replacement     ACD:   Receives PETE and parenteral iron with outpatient dialysis.   Hemoglobin  10's    Malnutrition:  Has been losing wt, d/t abd pain and poor intake, enc pos supps      HLD:   Statin     Secondary renal hyperparathyroidism:   Sevelamer 3 times daily AC      HPI: Serene Tipton is a 68 year old female familiar to our service for ESRD management, on intermittent hemodialysis at the [facility], under the care of DR Armstrong, who last saw her on 7/9.  She has not missed any tx recently.  Getting at or near her recently reduced target wt.  NO issues with her HD access. She doesn't feel fluid overloaded and is hoping to hold off on dialysis today d/t not feeling well (abd pain mostly)   Serene was admitted with acute on chronic abd pain, worse over  the last 4-5 days.  She admits to having some constipation, last BM ? 3 days ago, she isn't certain.  She is have sig nausea/emesis and reports very poor intake.  She also reports some mild SOB and chest pressure. Pert h/o ASCAD, s/p PCI in 4/2024 (RCA), did have a repeat coronary angio on 6/27 or thereabouts for ongoing chest pressure, no restenosis or tx/.  She did see her cardiologist on  7/3, who added low dose Losartan 12.5.  No other changes.     Discussed with DR Judd.  Updated HD RN to put pt on tx list for tomorrow am.       REVIEW OF SYSTEMS:  COMPLETE REVIEW OF SYSTEMS: General: see HPI  Eyes: deniese acute issues  Ears/Nose/Throat: denies issuess   Cardiovascular: persistent chest pressure that she thinks is r/t her \"full\" " "abdomen.  Respiratory: mild SOB \"I cannot take in a deep breath because of my stomach being so big\"  Gastrointestinal: see above and HPI  Musculoskeletal: gen muscle wasting, bilat should pain is chronic, sedetary  Skin: denies acute rash, or lesions  Neurologic: denies acute changes  Psychiatric: feeling tired, reported to outpt HD staff that she was read [facility]to [facility] to [facility].       Past Medical History:   Diagnosis Date    Anemia in chronic kidney disease     Anxiety     Arthritis     Brain aneurysm     Cavernous segment of the right & left carotid arteries. See Neurosurg note 6/16/21.    Chronic low back pain     Managed by Pain [facility]    Congestive heart failure, unspecified HF chronicity, unspecified heart failure type (H) 4/29/2024    Depressive disorder 2013    Disease of liver 8/29/2023    Disease of thyroid gland     ESRD (end stage renal disease) on dialysis (H) 07/2020    GERD (gastroesophageal reflux disease)     Hepatic lesion     Hyperlipidemia     Hypertension     Nephrolithiasis     Neuromuscular disorder (H)     Osteoporosis     PKD (polycystic kidney disease) 09/01/1990    Primary generalized (osteo)arthritis 8/2/2023    PTSD (post-traumatic stress disorder)     Tobacco abuse     Vitamin D deficiency        I have reviewed this patient's family history and updated it with pertinent information if needed.  Family History   Problem Relation Age of Onset    Polycystic Kidney Diease Mother     Hypertension Mother     Kidney Disease Mother     Cerebrovascular Disease Mother     Chronic Obstructive Pulmonary Disease Father     Multiple Sclerosis Father     Polycystic Kidney Diease Sister     Cardiac Sudden Death Sister 52    Hypertension Sister     Kidney Disease Sister     Polycystic Kidney Diease Maternal Grandmother     Hypertension Maternal Grandmother     Kidney Disease Maternal Grandmother     Cerebrovascular Disease Maternal Grandmother     Heart Disease Maternal Grandfather     Hyperlipidemia " "Paternal Grandmother     Cerebrovascular Disease Paternal Grandmother     Coronary Artery Disease Paternal Grandfather     Pulmonary Embolism Paternal Grandfather     Anesthesia Reaction No family hx of          Social History     Tobacco Use    Smoking status: Unknown    Smokeless tobacco: Never   Substance Use Topics    Alcohol use: Yes     Comment: occasional    Drug use: Yes     Types: Marijuana          Current Facility-Administered Medications   Medication Dose Route Frequency Provider Last Rate Last Admin    haloperidol lactate (HALDOL) injection 2 mg  2 mg Intravenous Q6H PRN Declan Redman MD             ALLERGIES/SENSITIVITIES:  Allergies   Allergen Reactions    Penicillins Other (See Comments) and Shortness Of Breath     Breathing problem.  breathing      No Clinical Screening - See Comments      PN: LW CM1: Contrast Intercapsular - Nonionic Reaction :    Nsaids Other (See Comments)     Kidney damage    Carvedilol GI Disturbance     Nausea and vomiting    Ciprofloxacin Muscle Pain (Myalgia)    Floxacillin (Flucloxacillin) Muscle Pain (Myalgia) and Rash    Levofloxacin Muscle Pain (Myalgia) and Rash    Morphine Nausea and Vomiting    Sulfa Antibiotics Itching          PHYSICAL EXAM:  BP (!) 151/65 (BP Location: Right arm)   Pulse 112   Temp 98.2  F (36.8  C) (Oral)   Resp 18   Ht 1.499 m (4' 11\")   Wt 35.9 kg (79 lb 2.3 oz)   SpO2 99%   BMI 15.99 kg/m      Patient Vitals for the past 72 hrs:   Weight   07/11/24 1441 35.9 kg (79 lb 2.3 oz)   07/11/24 0815 35.1 kg (77 lb 6.4 oz)     Body mass index is 15.99 kg/m .    Intake/Output Summary (Last 24 hours) at 7/11/2024 1451  Last data filed at 7/11/2024 1031  Gross per 24 hour   Intake --   Output 100 ml   Net -100 ml         General - A & O x 3, NAD, cachectic appearance   HEENT - PERRLA, no scleral icterus  Neck - no obvious JVD  Respiratory - Lungs CTA, diminished, on 1L per NC   Cardiovascular - rate sl tachy, SBP sl escalated, cont BB  Abdomen - " semi-firm, distended  Extremities -  no edema, aneurysmal dysfunction fistula R, AVF with thrill/aneurysmal  Integumentary - ashen appearamce   Neurologic - grossly intact  Psych:  Judgement intact, affect WNL  :  anephic     Laboratory:  Recent Labs   Lab Test 07/11/24  0840 06/27/24  0502 07/14/23  0503 07/13/23  1239   WBC 11.4* 9.0   < > 13.0*   HGB 10.2* 9.8*   < > 8.4*   * 107*   < > 103*    323   < > 320   INR 1.20*  --   --  1.70*    < > = values in this interval not displayed.      Recent Labs   Lab Test 07/11/24  0840 07/02/24  1059   POTASSIUM 5.4* 5.0   CHLORIDE 87* 92*   BUN 57.2* 76.0*      Recent Labs   Lab Test 07/11/24  0840 07/02/24  1059 08/18/22  1731 05/19/21  1520   ALBUMIN 3.6 3.7   < >  --    BILITOTAL 0.4 0.4   < >  --    ALT 10 9   < >  --    AST 20 20   < >  --    PROTEIN  --   --   --  100*    < > = values in this interval not displayed.       Personally reviewed today's laboratory studies      Thank you for involving us in the care of this patient. We will continue to follow along with you.      Stephanie Gaviria, P-BC  [facility]  [phone]

## 2024-07-11 NOTE — ED NOTES
"[facility] ED Handoff Report    ED Chief Complaint: CP Abdominal pain    ED Diagnosis:  (R10.10) Pain of upper abdomen  Comment:   Plan:        PMH:    Past Medical History:   Diagnosis Date    Anemia in chronic kidney disease     Anxiety     Arthritis     Brain aneurysm     Cavernous segment of the right & left carotid arteries. See Neurosurg note 6/16/21.    Chronic low back pain     Managed by Pain Clinic    Congestive heart failure, unspecified HF chronicity, unspecified heart failure type (H) 4/29/2024    Depressive disorder 2013    Disease of liver 8/29/2023    Disease of thyroid gland     ESRD (end stage renal disease) on dialysis (H) 07/2020    GERD (gastroesophageal reflux disease)     Hepatic lesion     Hyperlipidemia     Hypertension     Nephrolithiasis     Neuromuscular disorder (H)     Osteoporosis     PKD (polycystic kidney disease) 09/01/1990    Primary generalized (osteo)arthritis 8/2/2023    PTSD (post-traumatic stress disorder)     Tobacco abuse     Vitamin D deficiency         Code Status:  Prior     Falls Risk: Yes Band: Applied    Current Living Situation/Residence: lives alone     Elimination Status: Continent: Yes     Activity Level: SBA    Patients Preferred Language:  English     Needed: No    Vital Signs:  BP (!) 145/67   Pulse 105   Temp 98.4  F (36.9  C) (Oral)   Resp 13   Ht 1.499 m (4' 11\")   Wt 35.1 kg (77 lb 6.4 oz)   SpO2 100%   BMI 15.63 kg/m       Cardiac Rhythm: Tachycardia    Pain Score: 5/10    Is the Patient Confused:  No    Last Food or Drink: 07/11/24 at 0900    Focused Assessment:  68 year old female presents to the Emergency Department for evaluation of chest pain and abdominal pain.  Recent hospital admission for chest pain and non-ST elevation MI where she underwent coronary angiography with nonobstructive disease she is in end-stage renal disease patient receives dialysis Tuesday Thursday and Saturday she missed her dialysis appointment today " presenting to the emergency department for evaluation of primarily abdominal pain.  She does have those same chest pain symptoms that she continued to have while she was in the hospital but is now noting some escalating primarily epigastric discomfort to examination.  She had focal pain throughout palpation of her abdomen by clinical examination with an exacerbation to her discomfort with palpation in the epigastric region.  Noted to be mildly tachycardic with a nonischemic appearing ECG otherwise at arrival to the emergency department.  Laboratory testing demonstrating sequela of her chronic kidney disease including mild elevation to her potassium at 5.4 phosphorus 6.2 creatinine 5.33 glucose was 103.  Magnesium 2.1.  Initial troponin elevated likely secondary to her chronic kidney disease some trending upward of her troponin on serial testing in the emergency department.  CT scan imaging demonstrating dilatation to her small bowel concerning for developing SBO versus ileus.  Also intra and extrahepatic dilatation of her biliary tract system of uncertain clinical significance her ultrasound demonstrating no findings of cholecystitis and her LFTs were normal.  Ultimately given the multiple medical issues and patient's ongoing discomfort with abnormal imaging findings I did recommend readmission to the hospital for further care management.  Case was discussed with the resident service for admission.  Updated patient on test results and plan of care.     Tests Performed: Done: Labs and Imaging    Treatments Provided:  Pain and antiemetic meds.    Family Dynamics/Concerns: No    Family Updated On Visitor Policy: Yes    Plan of Care Communicated to Family: Yes    Who Was Updated about Plan of Care: Brittaney (niece)     Juss Checklist Done and Signed by Patient: Yes    Medications sent with patient:     Covid: asymptomatic , N/A    Additional Information:     RN: Fabienne Burgess RN   7/11/2024 2:08 PM

## 2024-07-11 NOTE — ED NOTES
Bed: JNED-09  Expected date:   Expected time:   Means of arrival: Ambulance  Comments:  [facility]: Chest pain, abd pain

## 2024-07-11 NOTE — PHARMACY-ADMISSION MEDICATION HISTORY
Pharmacist Admission Medication History    Admission medication history is complete. The information provided in this note is only as accurate as the sources available at the time of the update.    Information Source(s): Patient and Hospital records via in-person    Pertinent Information: patient was wanting to take a nap but stating only medication taken today was nitroglycerin and that nothing has changed since the discharge on 6/27/24    Changes made to PTA medication list:  Added: None  Deleted: None  Changed: None    Allergies reviewed with patient and updates made in EHR: yes    Medication History Completed By: Mirna Tello RPH 7/11/2024 1:52 PM    PTA Med List   Medication Sig Last Dose    artificial saliva (BIOTENE MT) SOLN solution Swish and spit 2 sprays in mouth as needed for dry mouth Unknown at PRN    aspirin (ASA) 81 MG chewable tablet Take 81 mg by mouth daily 7/10/2024 at am    atorvastatin (LIPITOR) 80 MG tablet Take 1 tablet (80 mg) by mouth daily (Patient taking differently: Take 80 mg by mouth every evening) 7/10/2024 at PM    B Complex-C (SUPER B COMPLEX PO) Take 1 tablet by mouth daily 7/10/2024 at am    calcium acetate (CALPHRON) 667 MG TABS tablet Take 1-2 tablets by mouth 3 times daily (with meals) 7/10/2024 at pm    cholecalciferol (VITAMIN D3) 25 mcg (1000 units) capsule Take 1 capsule by mouth daily at 2 pm 7/10/2024 at pm    clopidogrel (PLAVIX) 75 MG tablet Take 1 tablet (75 mg) by mouth daily Dose to start tomorrow. 7/10/2024 at am    cyclobenzaprine (FLEXERIL) 5 MG tablet Take 5 mg by mouth 2 times daily as needed for muscle spasms Unknown at PRN    Epoetin Adam (EPOGEN IJ) Inject 1,000 Units into the vein three times a week At dialysis. Past Week    GLUCOSAMINE-CHONDROITIN-VIT D3 PO Take 2 capsules by mouth daily 7/10/2024 at am    Iron Sucrose (VENOFER IV) Inject 100 mg into the vein once a week At dialysis Past Week at typically Saturdays    lactulose (CHRONULAC) 10 GM/15ML  solution Take 15 mLs (10 g) by mouth 2 times daily as needed for constipation Unknown at PRN    levothyroxine (SYNTHROID/LEVOTHROID) 50 MCG tablet Take 50 mcg by mouth daily 7/10/2024 at am    LORazepam (ATIVAN) 1 MG tablet Take 1 mg by mouth daily as needed for anxiety Unknown at PRN    losartan (COZAAR) 25 MG tablet Take 0.5 tablets (12.5 mg) by mouth daily 7/10/2024 at am    metoprolol succinate ER (TOPROL XL) 25 MG 24 hr tablet Take 1 tablet (25 mg) by mouth daily 7/10/2024 at am    nitroGLYcerin (NITROSTAT) 0.4 MG sublingual tablet For chest pain place 1 tablet under the tongue every 5 minutes for 3 doses. If symptoms persist 5 minutes after 1st dose call 911. 7/11/2024 at am    nortriptyline (PAMELOR) 75 MG capsule Take 75 mg by mouth At Bedtime 7/10/2024 at pm    Nutritional Supplements (NEPRO) LIQD Take 1-2 Cans by mouth daily 7/10/2024 at am    omeprazole (PRILOSEC) 20 MG DR capsule Take 20 mg by mouth 2 times daily as needed Unknown at PRN    oxyCODONE IR (ROXICODONE) 10 MG tablet Take 10 mg by mouth every 4 hours as needed for pain Unknown at PRN    SENNA-docusate sodium (SENNA S) 8.6-50 MG tablet Take 2 tablets by mouth 2 times daily Hold for loose stools. Unknown    sertraline (ZOLOFT) 100 MG tablet Take 150 mg by mouth At Bedtime 7/10/2024 at PM

## 2024-07-11 NOTE — ED TRIAGE NOTES
Pt arrives via [facility] due to CP and ab epigastric pain for 2 days. Pt had nausea and vomiting (no blood). EMS gave 4mg zofran. Pt is due for dialysis today (T,TH,Sat). Pt endorses dyspnea w/exertion, satting @100% on RA.      Triage Assessment (Adult)       Row Name 07/11/24 0810          Triage Assessment    Airway WDL WDL        Respiratory WDL    Respiratory WDL X;rhythm/pattern     Rhythm/Pattern, Respiratory shortness of breath        Skin Circulation/Temperature WDL    Skin Circulation/Temperature WDL WDL        Cardiac WDL    Cardiac WDL X;chest pain;rhythm     Pulse Rate & Regularity tachycardic        Chest Pain Assessment    Chest Pain Location epigastric     Chest Pain Radiation abdomen     Character pressure     Precipitating Factors nothing     Associated Signs/Symptoms nausea;tachycardia;vomiting     Chest Pain Intervention cardiac biomarkers drawn;cardiac monitoring continued;cardiac monitor placed;12-lead ECG obtained;activity minimized;nitroglycerin SL given        Peripheral/Neurovascular WDL    Peripheral Neurovascular WDL WDL        Cognitive/Neuro/Behavioral WDL    Cognitive/Neuro/Behavioral WDL WDL

## 2024-07-11 NOTE — H&P
[facility]    History and Physical - Hospitalist Service       Date of Admission:  7/11/2024    Serene Tipton is a 68 year old female with a pertinent history of hypertension, end-stage kidney disease, depression, hyperlipidemia, tobacco abuse, polycystic kidney disease, status post bilateral nephrectomy, dialysis patient, hypoglycemia, hyperparathyroidism, hypercholesterolemia, benign neoplasm of ascending colon, these of liver, congestive heart failure, PTSD, no referral lithiasis, neuroexam, and anemia and chronic kidney disease who presents to this ED by EMS for evaluation of abdominal pain     Assessment & Plan   Abdominal pain, nausea and vomiting, CT abdomen with ileus versus distal SBO, gallbladder distention but no cholecystitis.  Admit for surgical evaluation, n.p.o., ice chips for now, focus on pain control, placing NG tube if patient willing    CT showing gallbladder distention with intra/extrahepatic biliary ductal dilatation, confirmed on abdominal ultrasound, however LFTs are normal, no fever or chills suggest cholangitis, consult GI for evaluation, antibiotics if fever noted    CAD history of non-STEMI earlier this year, readmitted in June for chest pain with repeat angiogram showing no stent thrombosis or restenosis, no chest pain currently, no acute EKG change, continue cardiac monitoring    Prolonged QTc on EKG, avoid Zofran for nausea for now, per pharmacy okay to use Phenergan    Elevated troponin with mild delta change, patient however has a history of chronic troponin likely from ESRD, no chest pain or acute EKG changes currently, continue home meds for now    ESRD on hemodialysis, seen by nephrology, plan dialysis sometime tomorrow    Heart failure reduced  ejection fraction, chronic high bnp,currently compensated, last echo with LVEF 25 to 30%, continue cardiac monitoring: Fluid management per dialysis team    Thyroid disease continue  "levothyroxine    Hyperlipidemia continue Lipitor    Full code per discussion with patient at bedside        Diet:  N.p.o. for now  DVT Prophylaxis: Low Risk/Ambulatory with no VTE prophylaxis indicated  Pool Catheter: Not present  Lines: None     Cardiac Monitoring: None  Code Status:  Full code    Clinically Significant Risk Factors Present on Admission        # Hyperkalemia: Highest K = 5.4 mmol/L in last 2 days, will monitor as appropriate      # Anion Gap Metabolic Acidosis: Highest Anion Gap = 21 mmol/L in last 2 days, will monitor and treat as appropriate   # Coagulation Defect: INR = 1.20 (Ref range: 0.85 - 1.15) and/or PTT = N/A, will monitor for bleeding  # Drug Induced Platelet Defect: home medication list includes an antiplatelet medication   # Hypertension: Noted on problem list  # Chronic heart failure with reduced ejection fraction: last echo with EF <40%   # Anemia: based on hgb <11           # Cachexia: Estimated body mass index is 15.99 kg/m  as calculated from the following:    Height as of this encounter: 1.499 m (4' 11\").    Weight as of this encounter: 35.9 kg (79 lb 2.3 oz).       # Financial/Environmental Concerns:           Disposition Plan     Medically Ready for Discharge: Anticipated in 2-4 Days           Patrick Judd MD  Hospitalist Service  [facility]  Securely message with [employer] (more info)  Text page via [employer] Paging/Directory     ______________________________________________________________________    Chief Complaint   Abdominal pain nausea vomiting 3 days ago    History is obtained from the patient    History of Present Illness   Serene Tipton is a 68 year old female who presented to ED for evaluation of abdominal pain, nausea and vomiting began 3 days ago but worsened last 24 hours.  Main symptom is abdominal pain radiating to the upper chest, has persistent nausea and vomiting but no diarrhea, no fever or chills, no PND orthopnea no leg " swelling.  Denies any urinary symptoms.  Patient had no  Significant response to nitro use at home.    Upon arrival in the ED she had stable vital signs was in mild distress, was given IV Dilaudid with some relief in symptoms on Compazine, CT subsequently showed SBO versus ileus, also was bile duct dilatation.  She was admitted to hospital medicine for further care      Past Medical History    Past Medical History:   Diagnosis Date    Anemia in chronic kidney disease     Anxiety     Arthritis     Brain aneurysm     Cavernous segment of the right & left carotid arteries. See Neurosurg note 6/16/21.    Chronic low back pain     Managed by Pain Clinic    Congestive heart failure, unspecified HF chronicity, unspecified heart failure type (H) 4/29/2024    Depressive disorder 2013    Disease of liver 8/29/2023    Disease of thyroid gland     ESRD (end stage renal disease) on dialysis (H) 07/2020    GERD (gastroesophageal reflux disease)     Hepatic lesion     Hyperlipidemia     Hypertension     Nephrolithiasis     Neuromuscular disorder (H)     Osteoporosis     PKD (polycystic kidney disease) 09/01/1990    Primary generalized (osteo)arthritis 8/2/2023    PTSD (post-traumatic stress disorder)     Tobacco abuse     Vitamin D deficiency        Past Surgical History   Past Surgical History:   Procedure Laterality Date    BACK SURGERY      spinal fusion w/hardware    BIOPSY Left 09/01/1990    Breast    BREAST LUMPECTOMY, RT/LT Left     Lumpectomy    CV CORONARY ANGIOGRAM N/A 4/30/2024    Procedure: CV CORONARY ANGIOGRAM;  Surgeon: Jac Russell MD;  Location: [facility] CV    CV CORONARY ANGIOGRAM N/A 6/27/2024    Procedure: CV CORONARY ANGIOGRAM;  Surgeon: Patrick Ascencio MD;  Location: [facility] LAB CV    CV CORONARY LITHOTRIPSY PCI N/A 4/30/2024    Procedure: Percutaneous Coronary Intervention - Lithotripsy;  Surgeon: Jac Russell MD;  Location: [facility] CATH LAB CV    CV LEFT HEART CATH N/A  4/30/2024    Procedure: Left Heart Catheterization;  Surgeon: Jac Russell MD;  Location: [facility] CATH LAB CV    CV LEFT HEART CATH N/A 6/27/2024    Procedure: Left Heart Catheterization;  Surgeon: Patrick Ascencio MD;  Location: [facility] LAB CV    CV PCI STENT DRUG ELUTING N/A 4/30/2024    Procedure: Percutaneous Coronary Intervention Stent;  Surgeon: Jac Russell MD;  Location: [facility] CATH LAB CV    CYST REMOVAL Right     rt wrist    CYSTECTOMY PILONIDAL  1981    EYE SURGERY  2008    lasik    FORMATION ARTERIOVENOUS FISTULA    07/28/2020    FRACTURE SURGERY      IR CVC TUNNEL W2 CATH W/O PORT  7/17/2020    IR DIALYSIS FISTULOGRAM RIGHT  10/21/2020    NEPHRECTOMY BILATERAL Bilateral 2/1/2023    Procedure: bilateral native nephrectomy;  Surgeon: Alana Giang MD;  Location: [facility] OR    OPEN REDUCTION INTERNAL FIXATION HIP BIPOLAR Left 11/10/2023    Procedure: LEFT HEMIARTHROPLASTY, HIP, HEMIARTHROPLASTY;  Surgeon: Lucas Weldon MD;  Location: [facility] OR    ORTHOPEDIC SURGERY Right 2005    Shoulder    OTHER SURGICAL HISTORY      carpal tunnel repair    SPINE SURGERY         Prior to Admission Medications   Prior to Admission Medications   Prescriptions Last Dose Informant Patient Reported? Taking?   B Complex-C (SUPER B COMPLEX PO) 7/10/2024 at am  Yes Yes   Sig: Take 1 tablet by mouth daily   Epoetin Adam (EPOGEN IJ) Past Week  Yes Yes   Sig: Inject 1,000 Units into the vein three times a week At dialysis.   GLUCOSAMINE-CHONDROITIN-VIT D3 PO 7/10/2024 at am  Yes Yes   Sig: Take 2 capsules by mouth daily   Iron Sucrose (VENOFER IV) Past Week at typically Saturdays  Yes Yes   Sig: Inject 100 mg into the vein once a week At dialysis   LORazepam (ATIVAN) 1 MG tablet Unknown at PRN  Yes Yes   Sig: Take 1 mg by mouth daily as needed for anxiety   Nutritional Supplements (NEPRO) LIQD 7/10/2024 at am  Yes Yes   Sig: Take 1-2 Cans by mouth daily   SENNA-docusate sodium (SENNA S) 8.6-50 MG tablet  Unknown  No Yes   Sig: Take 2 tablets by mouth 2 times daily Hold for loose stools.   artificial saliva (BIOTENE MT) SOLN solution Unknown at PRN  Yes Yes   Sig: Swish and spit 2 sprays in mouth as needed for dry mouth   aspirin (ASA) 81 MG chewable tablet 7/10/2024 at am  Yes Yes   Sig: Take 81 mg by mouth daily   atorvastatin (LIPITOR) 80 MG tablet 7/10/2024 at PM  No Yes   Sig: Take 1 tablet (80 mg) by mouth daily   Patient taking differently: Take 80 mg by mouth every evening   calcium acetate (CALPHRON) 667 MG TABS tablet 7/10/2024 at pm  Yes Yes   Sig: Take 1-2 tablets by mouth 3 times daily (with meals)   cholecalciferol (VITAMIN D3) 25 mcg (1000 units) capsule 7/10/2024 at pm  Yes Yes   Sig: Take 1 capsule by mouth daily at 2 pm   clopidogrel (PLAVIX) 75 MG tablet 7/10/2024 at am  No Yes   Sig: Take 1 tablet (75 mg) by mouth daily Dose to start tomorrow.   cyclobenzaprine (FLEXERIL) 5 MG tablet Unknown at PRN  Yes Yes   Sig: Take 5 mg by mouth 2 times daily as needed for muscle spasms   lactulose (CHRONULAC) 10 GM/15ML solution Unknown at PRN  No Yes   Sig: Take 15 mLs (10 g) by mouth 2 times daily as needed for constipation   levothyroxine (SYNTHROID/LEVOTHROID) 50 MCG tablet 7/10/2024 at am  Yes Yes   Sig: Take 50 mcg by mouth daily   losartan (COZAAR) 25 MG tablet 7/10/2024 at am  No Yes   Sig: Take 0.5 tablets (12.5 mg) by mouth daily   metoprolol succinate ER (TOPROL XL) 25 MG 24 hr tablet 7/10/2024 at am  No Yes   Sig: Take 1 tablet (25 mg) by mouth daily   nitroGLYcerin (NITROSTAT) 0.4 MG sublingual tablet 7/11/2024 at am  No Yes   Sig: For chest pain place 1 tablet under the tongue every 5 minutes for 3 doses. If symptoms persist 5 minutes after 1st dose call 911.   nortriptyline (PAMELOR) 75 MG capsule 7/10/2024 at pm  Yes Yes   Sig: Take 75 mg by mouth At Bedtime   omeprazole (PRILOSEC) 20 MG DR capsule Unknown at PRN  Yes Yes   Sig: Take 20 mg by mouth 2 times daily as needed   oxyCODONE IR  (ROXICODONE) 10 MG tablet Unknown at PRN  Yes Yes   Sig: Take 10 mg by mouth every 4 hours as needed for pain   sertraline (ZOLOFT) 100 MG tablet 7/10/2024 at PM  Yes Yes   Sig: Take 150 mg by mouth At Bedtime      Facility-Administered Medications: None        Physical Exam   Vital Signs: Temp: 98.2  F (36.8  C) Temp src: Oral BP: (!) 151/65 Pulse: 112   Resp: 18 SpO2: 99 % O2 Device: Nasal cannula Oxygen Delivery: 1 LPM  Weight: 79 lbs 2.32 oz    General Appearance: AAOx3,  Respiratory: Clear anteriorly  Cardiovascular: S1-S2 ESM murmur heard  GI: Soft nontender bowel sounds hypoactive  Skin: No erythema  Other: No edema bilaterally    Medical Decision Making       7 0 MINUTES SPENT BY ME on the date of service doing chart review, history, exam, documentation & further activities per the note.      Data   Imaging results reviewed over the past 24 hrs:   Recent Results (from the past 24 hour(s))   CT Abdomen Pelvis w/o Contrast    Narrative    EXAM: CT ABDOMEN PELVIS W/O CONTRAST  LOCATION: [facility]  DATE: 7/11/2024    INDICATION: Abdominal pain  COMPARISON: CT exams 5/17/2024, 4/29/2024 and 7/21/2023  TECHNIQUE: CT scan of the abdomen and pelvis was performed without IV contrast. Multiplanar reformats were obtained. Dose reduction techniques were used.  CONTRAST: None.    FINDINGS:   LOWER CHEST: Mild cardiomegaly. Severe coronary artery calcifications. Aortic leaflet and mitral annulus calcifications. Bibasilar emphysema and scarring as well as mild interstitial edema.    HEPATOBILIARY: Multiple stable hepatic cysts. Mild to moderately distended gallbladder. No associated wall thickening or edema. Interval development of mild intra and extra hepatic biliary duct dilatation with the common duct measuring 10 mm in diameter,   previously 6 mm. No obstructing stone or mass lesion is identified.    PANCREAS: Stable parenchymal atrophy and stable mild dilatation of the main pancreatic duct  measuring up to 5 mm in diameter.    SPLEEN: Stable benign partially calcified nodule.    ADRENAL GLANDS: Normal.    KIDNEYS/BLADDER: Bilateral nephrectomy.    BOWEL: Unremarkable stomach. Moderate diffuse distention of fluid-filled small bowel. No discrete transition is identified on this limited noncontrast exam. Nondistended colon with mild stool burden. Nonvisualized appendix. No free air or pneumatosis.   Mesenteric and body wall edema.    LYMPH NODES: Normal.    VASCULATURE: Severe aortoiliac atherosclerosis. Ectatic infrarenal abdominal aorta measuring 2.7 cm in diameter.    PELVIC ORGANS: Obscured by streak artifact.    MUSCULOSKELETAL: Left hip arthroplasty and L5-S1 anterior spinal fusion hardware. Old healed right inferior pubic ramus fracture. Bilateral L5 pars defects with grade 1 anterolisthesis at L5/S1. Spinal degenerative changes.      Impression    IMPRESSION:   1.  Moderate diffuse small bowel dilatation which may reflect an ileus or distal small bowel obstruction. No discrete transition is identified on this limited noncontrast exam. Short-term follow-up is recommended.  2.  Mild to moderate gallbladder distention and interval development of mild intra and extra hepatic biliary ductal dilatation. No cause for obstruction is identified on this exam. Correlate with liver function tests. Follow-up with MRCP may be helpful.     XR Chest Port 1 View    Narrative    EXAM: XR CHEST PORT 1 VIEW  LOCATION: [facility]  DATE: 7/11/2024    INDICATION: Chest pain shortness of breath dialysis patient  COMPARISON: X-ray 6/25/2024      Impression    IMPRESSION: Mild cardiomegaly. Mild central vascular congestion and mild interstitial edema. No focal pneumonic consolidation or pleural effusion.   US Abdomen Limited    Narrative    EXAM: US ABDOMEN LIMITED  LOCATION: [facility]  DATE: 7/11/2024    INDICATION: Abdominal pain, abnormal CT scan  COMPARISON: CT  7/11/2024  TECHNIQUE: Limited abdominal ultrasound.    FINDINGS:    GALLBLADDER: Mild to moderately distended gallbladder. Minimal layering sludge in the fundus. No gallstones. No wall thickening or edema. Negative sonographic Maradiaga's sign.    BILE DUCTS: Intrahepatic and extra hepatic biliary ductal dilatation The common duct measures 11 mm. No obstructing stone or mass is identified on this exam.    LIVER: Mildly enlarged. Normal surface contour. Diffusely heterogeneous echotexture with diffuse non masslike hyperechoic foci. Multiple simple cysts including a dominant 3.2 cm right lobe cyst.    RIGHT KIDNEY: No hydronephrosis.    PANCREAS: The visualized portions are normal.    No ascites.      Impression    IMPRESSION:  1.  Mild to moderate intrahepatic biliary ductal dilatation. No cause for obstruction is identified on this exam. Further evaluation with MRCP may be helpful.  2.  Gallbladder distention. No cholelithiasis and no evidence for acute cholecystitis.  3.  Diffusely heterogeneous liver with diffuse non masslike hyperechoic foci. This may reflect periportal edema/hepatic inflammation.  Correlate with liver function tests.

## 2024-07-11 NOTE — PROGRESS NOTES
Patient arrived to P2 at 1440. Settled into room. VSS ex HTN. On 1L O2 NC. Waiting for MD to place orders. Report given to oncoming RN.

## 2024-07-12 NOTE — PROCEDURES
Serene Tipton dialyzed for 2 hours on a Revaclear 300 dialyzer with a net fluid removal of 0L. A BFR of 350ml/min was obtained via a LUE AVF and 15G needles.  Pt on K2 bath. Complications: pt extremely nauseated, vomiting, complaining of pain. Bedside RN informed and appropriate meds. VSS post run. Verbal report given to GARY Velez RN. Pt education provided concerning dialysis procedure and all questions answered. Consent on file. See flowsheet in epic for further details. Water alarm in place during treatment. Hepatitis B Antigen negative. Date drawn 6/26/24. Hepatitis B Antibody immune. Date drawn: 6/25/24.      Stephanie Lemus RN [facility]

## 2024-07-12 NOTE — PROGRESS NOTES
[facility] Follow-up    Went to see patient but she was not in her room.    Small bowel follow-through showed findings suspicious for small bowel obstruction.  LFTs today showed a mild elevation of alkaline phosphatase otherwise LFTs were normal.    MRCP was ordered yesterday and this is still pending.    Further management of presumed small bowel obstruction as per surgery team.    Micaela Montgomery MD

## 2024-07-12 NOTE — CONSULTS
Care Management Initial Consult    General Information  Assessment completed with: Patient,    Type of CM/SW Visit: Initial Assessment    Primary Care Provider verified and updated as needed: Yes   Readmission within the last 30 days: current reason for admission unrelated to previous admission         Advance Care Planning: Advance Care Planning Reviewed: present on chart          Communication Assessment  Patient's communication style: spoken language (English or Bilingual)    Hearing Difficulty or Deaf: yes   Wear Glasses or Blind: no    Cognitive  Cognitive/Neuro/Behavioral: WDL                      Living Environment:   People in home: alone     Current living Arrangements: apartment      Able to return to prior arrangements: yes       Family/Social Support:  Care provided by: self  Provides care for: no one  Marital Status: Single  Sibling(s)          Description of Support System: Supportive, Involved    Support Assessment: Adequate family and caregiver support, Adequate social supports    Current Resources:   Patient receiving home care services:       Community Resources:    Equipment currently used at home: none  Supplies currently used at home:      Employment/Financial:  Employment Status: retired, disabled        Financial Concerns: none           Does the patient's insurance plan have a 3 day qualifying hospital stay waiver?  No    Lifestyle & Psychosocial Needs:  Social Determinants of Health     Food Insecurity: Not on file   Depression: Not at risk (1/26/2021)    PHQ-2     PHQ-2 Score: 2   Housing Stability: Not on file   Tobacco Use: Unknown (7/3/2024)    Patient History     Smoking Tobacco Use: Unknown     Smokeless Tobacco Use: Never     Passive Exposure: Not on file   Financial Resource Strain: Not on file   Alcohol Use: Not on file   Transportation Needs: Not on file   Physical Activity: Not on file   Interpersonal Safety: Not on file   Stress: Not on file   Social Connections: Not on file    Health Literacy: Not on file       Functional Status:  Prior to admission patient needed assistance:   Dependent ADLs:: Independent  Dependent IADLs:: Cleaning, Laundry       Mental Health Status:  Mental Health Status: No Current Concerns       Chemical Dependency Status:  Chemical Dependency Status: No Current Concerns               Additional Information:  CM met with patient in patient's room.  Patient lives alone in an apartment with elevator access.  Does not use assistive devices.  Patient has had OP cardiac rehab in the past, no current therapies, community services or home care.  Patient mostly independent with I/ADLs, sister comes over form time to time to hep with cleaning and laundry.  Patient attends OP HD at [facility] TTS schedule.   Family/friend transport at discharge.     CM will continue to monitor medical progression and aid in discharge planning.     Antonella Torres RN

## 2024-07-12 NOTE — PROGRESS NOTES
"General Surgery Progress Note:    Hospital Day # 1    ASSESSMENT:   1. Pain of upper abdomen        Serene Tipton is a 68 year old female with hx notable for multiple co-morbidities notable for low EF, b/l nephrectomy for PCKD (2023) who is HD2 presenting d/t nausea, bloating and obstipation c/f ileus vs SBO currently undergoing non-operative mgmt trial. Gastrografin challenge proceeded yesterday with patient deferring NGT yesterday. GGC XR without definite contrast in colon. Patient with worsening emesis overnight. She is persistently tachycardic (100-110s) without acute worsening, normotensive and AF. There is an uptrend of her leukocytosis but without hyperlactemia. Plan to continue non operative management with NGT decompression and interval XR.     PLAN:   -NPO diet  -Multimodal pain control  -MIVF  -NGT placement for decompression  -Interval AM XR     -reveals gastric and SB contrast that is diluted, SB stable gas dilation as well as gas within the colon  -Will continue to monitor following NGT placement/decompression  -Surgery to continue to follow    SUBJECTIVE:   Serene Tipton is reporting increased abdominal pain, chest pain, nausea, emesis as well as dyspnea. States multiple emesis episodes overnight with OP appearing dark brown with patient shortly after vomiting in room. Upon education of need for NGT placement, states \"I need to be asleep for that\" however upon further education of risk of general anesthesia for NGT placement she voices to be amenable to NGT placement \"as long as you give me pain meds before\". Denies fever, chills. Endorses intermittent flatus but denies BM x ~3-4 days.     Patient Vitals for the past 24 hrs:   BP Temp Temp src Pulse Resp SpO2 Weight   07/12/24 1140 131/66 97.6  F (36.4  C) Oral 118 20 100 % --   07/12/24 1030 132/61 98.4  F (36.9  C) Skin 115 17 95 % --   07/12/24 1015 102/52 -- -- 117 17 98 % --   07/12/24 1000 118/58 -- -- 117 21 97 % --   07/12/24 0945 " 117/56 -- -- 118 24 96 % --   07/12/24 0930 (!) 157/67 -- -- 116 22 100 % --   07/12/24 0915 93/62 -- -- 115 15 97 % --   07/12/24 0900 (!) 89/53 -- -- 109 15 95 % --   07/12/24 0845 97/57 -- -- 110 13 93 % --   07/12/24 0830 102/60 -- -- 111 14 92 % --   07/12/24 0815 111/64 -- -- 109 14 92 % --   07/12/24 0803 119/59 -- -- 110 15 (!) 89 % --   07/12/24 0746 125/59 98.4  F (36.9  C) Temporal 110 12 (!) 89 % --   07/12/24 0744 -- -- -- 111 -- (!) 89 % --   07/12/24 0652 -- -- -- -- -- -- 34.1 kg (75 lb 3.2 oz)   07/12/24 0441 116/67 98.5  F (36.9  C) Oral 117 16 -- --   07/12/24 0116 107/62 98.3  F (36.8  C) Oral 113 16 100 % --   07/11/24 2007 122/61 98.1  F (36.7  C) Oral 110 18 98 % --   07/11/24 2000 -- -- -- -- -- 98 % --   07/11/24 1636 (!) 158/71 -- -- 113 -- -- --   07/11/24 1525 128/68 98.3  F (36.8  C) Oral 113 16 99 % --   07/11/24 1441 (!) 151/65 98.2  F (36.8  C) Oral 112 18 99 % 35.9 kg (79 lb 2.3 oz)   07/11/24 1421 (!) 145/67 -- -- 105 18 100 % --   07/11/24 1400 (!) 145/69 -- -- 108 14 100 % --       Physical Exam:  General: patient seen resting in bed who is intermittently vomiting (brown liquid OP) but in NAD.   Resp: no respiratory distress, breathing comfortably on 2L NC  Abdomen: moderately distended, semi-firm to palpation with generalized tenderness greatest in epigastrium without rebound or involuntary guarding.   Extremities: warm and well perfused    Admission on 07/11/2024   Component Date Value    INR 07/11/2024 1.20 (H)     Sodium 07/11/2024 134 (L)     Potassium 07/11/2024 5.4 (H)     Carbon Dioxide (CO2) 07/11/2024 26     Anion Gap 07/11/2024 21 (H)     Urea Nitrogen 07/11/2024 57.2 (H)     Creatinine 07/11/2024 5.33 (H)     GFR Estimate 07/11/2024 8 (L)     Calcium 07/11/2024 9.8     Chloride 07/11/2024 87 (L)     Glucose 07/11/2024 103 (H)     Alkaline Phosphatase 07/11/2024 131     AST 07/11/2024 20     ALT 07/11/2024 10     Protein Total 07/11/2024 6.6     Albumin 07/11/2024 3.6      Bilirubin Total 07/11/2024 0.4     Lipase 07/11/2024 7 (L)     Lactic Acid, Initial 07/11/2024 1.5     Troponin T, High Sensiti* 07/11/2024 165 (HH)     Magnesium 07/11/2024 2.1     N terminal Pro BNP Inpat* 07/11/2024 >70,000 (H)     Culture 07/11/2024 No growth after 1 day     Culture 07/11/2024 No growth after 1 day     Phosphorus 07/11/2024 6.2 (H)     WBC Count 07/11/2024 11.4 (H)     RBC Count 07/11/2024 2.94 (L)     Hemoglobin 07/11/2024 10.2 (L)     Hematocrit 07/11/2024 32.3 (L)     MCV 07/11/2024 110 (H)     MCH 07/11/2024 34.7 (H)     MCHC 07/11/2024 31.6     RDW 07/11/2024 18.1 (H)     Platelet Count 07/11/2024 358     % Neutrophils 07/11/2024 86     % Lymphocytes 07/11/2024 3     % Monocytes 07/11/2024 9     % Eosinophils 07/11/2024 0     % Basophils 07/11/2024 1     % Immature Granulocytes 07/11/2024 0     NRBCs per 100 WBC 07/11/2024 0     Absolute Neutrophils 07/11/2024 9.9 (H)     Absolute Lymphocytes 07/11/2024 0.4 (L)     Absolute Monocytes 07/11/2024 1.1     Absolute Eosinophils 07/11/2024 0.0     Absolute Basophils 07/11/2024 0.1     Absolute Immature Granul* 07/11/2024 0.1     Absolute NRBCs 07/11/2024 0.0     Troponin T, High Sensiti* 07/11/2024 174 (HH)     Potassium 07/11/2024 5.5 (H)     Ventricular Rate 07/12/2024 117     Atrial Rate 07/12/2024 117     MA Interval 07/12/2024 166     QRS Duration 07/12/2024 92     QT 07/12/2024 330     QTc 07/12/2024 460     P Axis 07/12/2024 52     R AXIS 07/12/2024 45     T [place] 07/12/2024 108     Interpretation ECG 07/12/2024                      Value:Sinus tachycardia  Possible Left atrial enlargement  Left ventricular hypertrophy  ST & T wave abnormality, consider anterolateral ischemia  Abnormal ECG  When compared with ECG of 11-JUL-2024 08:11,  T wave inversion now evident in Anterolateral leads  Confirmed by DANIEL SIMS MD LOC:[facility] ([id number]) on 7/12/2024 12:47:31 PM      Sodium 07/12/2024 140     Potassium 07/12/2024 5.1     Chloride 07/12/2024 78  (L)     Carbon Dioxide (CO2) 07/12/2024 30 (H)     Anion Gap 07/12/2024 32 (H)     Urea Nitrogen 07/12/2024 81.6 (H)     Creatinine 07/12/2024 7.18 (H)     GFR Estimate 07/12/2024 6 (L)     Calcium 07/12/2024 10.3 (H)     Glucose 07/12/2024 98     WBC Count 07/12/2024 15.2 (H)     RBC Count 07/12/2024 3.08 (L)     Hemoglobin 07/12/2024 10.6 (L)     Hematocrit 07/12/2024 32.5 (L)     MCV 07/12/2024 106 (H)     MCH 07/12/2024 34.4 (H)     MCHC 07/12/2024 32.6     RDW 07/12/2024 17.7 (H)     Platelet Count 07/12/2024 460 (H)     Protein Total 07/12/2024 7.9     Albumin 07/12/2024 4.1     Bilirubin Total 07/12/2024 0.4     Alkaline Phosphatase 07/12/2024 157 (H)     AST 07/12/2024 22     ALT 07/12/2024 11     Bilirubin Direct 07/12/2024 0.21     Lactic Acid, Initial 07/12/2024 1.2         Ryann Lopez PA-C  [facility]  [address] [place] [address]  [address]  [place] [address]

## 2024-07-12 NOTE — SIGNIFICANT EVENT
Significant Event Note    Time of event: 7:28 AM July 12, 2024    Description of event:  Patient has been followed by resident service and recently discharged on 6/27/24     Plan:  Transfer medical care to resident service     Discussed with: Dr. Nury Wahl MD

## 2024-07-12 NOTE — PLAN OF CARE
Problem: Adult Inpatient Plan of Care  Goal: Plan of Care Review  Description: The Plan of Care Review/Shift note should be completed every shift.  The Outcome Evaluation is a brief statement about your assessment that the patient is improving, declining, or no change.  This information will be displayed automatically on your shift  note.  Outcome: Progressing     Problem: Adult Inpatient Plan of Care  Goal: Absence of Hospital-Acquired Illness or Injury  Intervention: Identify and Manage Fall Risk  Recent Flowsheet Documentation  Taken 7/11/2024 1938 by Bria Varma, RN  Safety Promotion/Fall Prevention: activity supervised     Problem: Adult Inpatient Plan of Care  Goal: Optimal Comfort and Wellbeing  Outcome: Progressing  Intervention: Monitor Pain and Promote Comfort  Recent Flowsheet Documentation  Taken 7/11/2024 1936 by Bria Varma, RN  Pain Management Interventions: medication (see MAR)     Patient alert and oriented. VSS. C/o pain to lower back, refer to MAR for medications given. Takes meds well, with some nausea after. Received gastrograf, vomited shortly after, x-ray called, came to assess, states there is enough to continue with x-ray 8-10 hours after (per orders). MRI to be completed tomorrow. NPO per orders. Cooperative and friendly with cares and staff.

## 2024-07-12 NOTE — PROGRESS NOTES
"CLINICAL NUTRITION SERVICES - ASSESSMENT NOTE     Nutrition Prescription    RECOMMENDATIONS FOR MDs/PROVIDERS TO ORDER:  Daily renal multivitamin and possibly iron supplement when able to tolerate oral intake    Malnutrition Status:    Severe in context of acute on chronic illness    Recommendations already ordered by Registered Dietitian (RD):      Future/Additional Recommendations:  Will monitor diet advancement, offer supplements/snacks when diet advances     REASON FOR ASSESSMENT  Serene Tipton is a/an 68 year old female assessed by the dietitian for Admission Nutrition Risk Screen for eating poorly due to decreased appetite and weight loss.    Per chart, pt  with a pertinent history of hypertension, end-stage kidney disease, depression, hyperlipidemia, tobacco abuse, polycystic kidney disease, status post bilateral nephrectomy, dialysis patient, hypoglycemia, hyperparathyroidism, hypercholesterolemia, benign neoplasm of ascending colon, these of liver, congestive heart failure, PTSD, no referral lithiasis, neuroexam, and anemia and chronic kidney disease who presents to this ED by EMS for evaluation of abdominal pain.  Hospitalized 6/25/24 NSTEMI     Pt refuses NGT per surgery  Ileus vs small bowel obstruction per GI note.  No gallstones seen. Does have biliary duct dilation and gallbladder distention.    NUTRITION HISTORY  Pt is back from HD, sleeping soundly.  Per ED notes, pt reported pain feels like \"I swallowed something sharp\". Tuesday evening everything progressively worsened noting vomiting and unable to eat since. Patient endorses multiple episodes of emesis most recently this morning in ambulance.    CURRENT NUTRITION ORDERS  Diet: NPO, except ice chips    LABS  Labs reviewed.  (HD scheduled today)  BUN 81.6 H  Creat 7.18 H  Alk phos 157 H  Hgb 10.6 L    MEDICATIONS  Medications reviewed  ASA  Lipitor  Phoslo  Vit D  Zofran and compazine given yesterday  PRN's given yesterday for " "nausea/vomiting, pain, bowels    ANTHROPOMETRICS  Height: 149.9 cm (4' 11\")  Most Recent Weight: 34.1 kg (75 lb 3.2 oz)  8.5% weight loss in 9 days, likely some dehydration. Pt is on HD  IBW: 44 kg  BMI: Underweight BMI <18.5  15.19  Weight History:   Wt Readings from Last 10 Encounters:   07/12/24 34.1 kg (75 lb 3.2 oz)   07/03/24 37.2 kg (82 lb)   06/27/24 37.7 kg (83 lb 3.2 oz)   05/20/24 37.2 kg (82 lb 0.2 oz)   05/10/24 37.6 kg (83 lb)   05/02/24 39.9 kg (88 lb)   04/30/24 40.4 kg (89 lb 1.1 oz)   03/27/24 36.7 kg (80 lb 14.4 oz)   11/09/23 36.3 kg (80 lb)   11/06/23 33.1 kg (73 lb)      Dosing Weight: 34.1 kg    ASSESSED NUTRITION NEEDS  Estimated Energy Needs: 1025 - 1195+ kcals/day (30 - 35+ kcals/kg )  Justification: Increased needs and Underweight  Estimated Protein Needs: 38 - 48+ grams protein/day (1.1 - 1.4+ grams of pro/kg)  Justification: Dialysis and Repletion  Estimated Fluid Needs:  1025 mL/day (30 mL/kg), or per provider  Justification: Increased needs, HD    PHYSICAL FINDINGS  Per chart,  Nausea, emesis  Soft, tender abdomen, abdominal discomfort  Last BM 7/9/24, obstipation     MALNUTRITION:  % Weight Loss:  > 2% in 1 week (severe malnutrition)  % Intake:  need nutrition hx  Subcutaneous Fat Loss:  unable to assess  Muscle Loss:  Patellar region severe depletion and Posterior calf region severe depletion, viewed from doorway  Fluid Retention:  None noted    Malnutrition Diagnosis: Severe malnutrition  In Context of:  Acute illness or injury on chronic illness    NUTRITION DIAGNOSIS  Malnutrition related to poor oral intake, altered GI function as evidenced by >2% weight loss in one week and severe muscle loss, nausea and vomiting      INTERVENTIONS  Implementation  Recommend renal multivitamin and possible iron supplement    Goals  Diet advancement vs nutrition support in 2-3 days     Monitoring/Evaluation  Progress toward goals will be monitored and evaluated per protocol.    "

## 2024-07-12 NOTE — PLAN OF CARE
Problem: Nausea and Vomiting  Goal: Nausea and Vomiting Relief  Intervention: Prevent and Manage Nausea and Vomiting  Recent Flowsheet Documentation  Taken 7/12/2024 0100 by Yasmeen Arias, RN  Nausea/Vomiting Interventions: antiemetic   Goal Outcome Evaluation:  Pt is alert and oriented, able to make needs known. Pt is up to the commode independently. Pt is on tele, sinus tachycardia.PRN dilaudid given for pain x2. Pt c/o flank pain from throwing up so much. Pt still refusing an NG but has had multiple emesis tonight. PRN phenergan given but per pt its not helpful. Pt can not have compazine or zofran because of QT. Follow up EKG to be done later this morning. Pt was throwing up when they tried to do it overnight.   Yasmeen rAias, RN

## 2024-07-12 NOTE — PLAN OF CARE
Problem: Adult Inpatient Plan of Care  Goal: Optimal Comfort and Wellbeing  Outcome: Progressing     Problem: Nausea and Vomiting  Goal: Nausea and Vomiting Relief  Outcome: Progressing  Intervention: Prevent and Manage Nausea and Vomiting  Recent Flowsheet Documentation  Taken 7/12/2024 1100 by Janee Velez RN  Nausea/Vomiting Interventions: antiemetic    Pt had 3 emesis this day shift.  Pt given phenergan for nausea management.  Pt c/o pain in abdomin and back rating 7-8/10.  IV dilaudid given for pain management.   Pt had NG placed this afternoon.  Immediate output of 900 ml dark liquid was obtained when hooked to low intermittent suction.  Pt felt better and was able to rest.   Tap water enema ordered this afternoon.  Pt to go for MRCP and was exhausted after procedure and wanted to wait to do enema until later.

## 2024-07-12 NOTE — PROGRESS NOTES
[facility]    Progress Note - Hospitalist Service       Date of Admission:  7/11/2024    Assessment & Plan   Serene Tipton is a 68 year old female w/PMHx significant for NSTEMI s/p PCI (Apr 2024), ESRD on HD T/Th/Sa, PCKD s/p BL nephrectomy, CHF, tobacco use disorder, HTN, JAMIL, and HLD admitted on 7/11/2024 for abdominal pain with concern for SBO. Surgery following, NG placed for decompression. Nephrology also following, dialyzed 7/12 with possible dialysis 7/13.     Abdominal pain  Gallbladder distention  Possible SBO  Hx of SBO/ileus in 7/2023. Presented with abdominal pain starting 7/6/24 which worsened and developed multiple episodes of emesis. Work up in the ED revealed elevated Alk Phos to 157 though other LFTs normal. CT abdomen on 7/11 showed moderate small bowel dilatation suggestive of ileus vs SBO and mild-mod GB distention. RUQ US also showed biliary duct dilatation and GB distension. Gastrografin challenge attempted though patient took PO and had emesis after so was difficult to appreciate on xray.  - Surgery following   - NPO  - NG tube placed for decompression  - GI consulted   - MRCP pending    ESRD  Hyperkalemia  Hypercalcemia  Hyperphosphatemia  Secondary renal hyperparathyroidism  Started dialysis in 2020. Dialyzes Tu/Th/Sat at [facility]. Follows with Dr. Armstrong. Of note patient is oliguric s/p bilateral nephrectomies for PCKD. Dialysis today 7/12 though shortened run due to abdominal pain, nausea, vomiting.   - Nephrology following  - dialysis again 7/13 vs 7/15 based on renal assessment   - held losartan for soft BP; can restart if SBP >130  - renal diet when diet resumed  - hold off on K binders  - daily BMP    Qtc prolongation  Nausea  Many bouts of large amount of emesis. EKG on 7/11 with Qtc of 552. Repeat EKG with Qtc of 460.   - added Zofran PRN as Qtc improved due to severity of nausea  - Phenergan PRN    Other chronic problems:   Macrocytic  "anemia, chronic - stable  - daily CBC    CAD  Hx of NSTEMI s/p PCI  Admitted in 6/2024 for chest pain. Repeat angio showed no stent thrombosis or restenosis. Denies current chest pain. EKG on 7/11 with sinus tach without ischemic changes.  - continue cardiac tele, as above    HFrEF  Chronically high BNP. ECHO 5/2024 with LVEF of 25-30%. EF on Lexiscan in 6/2024 was 31%.   - cardiac tele  - fluid management per nephrology     HTN   - held PTA losartan 12.5 due to soft BP during dialysis; may restart if SBP >130  - PTA metoprolol 25    Hypothyroidism - PTA Synthroid 50 mcg  HLD - PTA atorvastatin 80 mg  JAMIL - PTA Zoloft 150, nortriptyline 75     Malnutrition  Has been losing weight due to abdominal pain and poor intake  - Nutrition consulted   - daily renal multivitamin and iron supplement when able to tolerate PO        Diet: NPO for Medical/Clinical Reasons Except for: Meds, Ice Chips    DVT Prophylaxis: Pneumatic Compression Devices  Pool Catheter: Not present  Fluids: NPO  Lines: None     Cardiac Monitoring: ACTIVE order. Indication: Tachyarrhythmias, acute (48 hours)  Code Status: Full Code      Clinically Significant Risk Factors        # Hyperkalemia: Highest K = 5.5 mmol/L in last 2 days, will monitor as appropriate    # Hypercalcemia: Highest Ca = 10.3 mg/dL in last 2 days, will monitor as appropriate   # Anion Gap Metabolic Acidosis: Highest Anion Gap = 32 mmol/L in last 2 days, will monitor and treat as appropriate     # Coagulation Defect: INR = 1.20 (Ref range: 0.85 - 1.15) and/or PTT = N/A, will monitor for bleeding    # Hypertension: Noted on problem list  # Chronic heart failure with reduced ejection fraction: last echo with EF <40%               # Cachexia: Estimated body mass index is 15.19 kg/m  as calculated from the following:    Height as of this encounter: 1.499 m (4' 11\").    Weight as of this encounter: 34.1 kg (75 lb 3.2 oz)., PRESENT ON ADMISSION  # Severe Malnutrition: based on nutrition " assessment, PRESENT ON ADMISSION     # Financial/Environmental Concerns:           Disposition Plan      Expected Discharge Date: 07/13/2024                The patient's care was discussed with the Attending Physician, Dr. Reyez .    Estrellita Medina MD  Hospitalist Service  [facility]  Securely message with [email or web address] (more info)  Text page via [facility] Paging/Directory   ______________________________________________________________________    Interval History     Nursing notes reviewed.     Had 5-7 episodes of emesis yesterday. Continues to be nauseated and vomiting today. Also with abdominal pain requiring Dilaudid use.  Endorses intermittent flatus but denies BM x ~3-4 days. Agreeable to NG tube today.     Physical Exam   Vital Signs: Temp: 97.6  F (36.4  C) Temp src: Oral BP: 131/66 Pulse: 118   Resp: 20 SpO2: 100 % O2 Device: Nasal cannula Oxygen Delivery: 2 LPM  Weight: 75 lbs 3.2 oz    Exam limited by multiple episodes of large volume emesis throughout interaction    Constitutional: lying on side curled up, cachectic appearing  Head: Normocephalic. No masses, lesions, tenderness or abnormalities  Respiratory: no respiratory distress  Cardiovascular: tachycardic on monitor  MSK: diffuse muscle wasting  Skin: left arm with AV fistula; ashen appearance  Extremities: no edema bilaterally    Medical Decision Making   Please see A&P for additional details of medical decision making.      Data   ------------------------- PAST 24 HR DATA REVIEWED -----------------------------------------------    I have personally reviewed the following data over the past 24 hrs:    15.2 (H)  \   10.6 (L)   / 460 (H)     140 78 (L) 81.6 (H) /  98   5.1 30 (H) 7.18 (H) \     ALT: 11 AST: 22 AP: 157 (H) TBILI: 0.4   ALB: 4.1 TOT PROTEIN: 7.9 LIPASE: N/A     Procal: N/A CRP: N/A Lactic Acid: 1.2         Imaging results reviewed over the past 24 hrs:   Recent Results (from the past 24 hour(s))   XR Gastrografin  Challenge    Narrative    EXAM: XR GASTROGRAFIN CHALLENGE  LOCATION: [facility]  DATE: 7/12/2024    INDICATION: Small Bowel Obstruction  COMPARISON: Same day CT abdomen/pelvis without contrast  TECHNIQUE: Routine water soluble contrast follow-through challenge.      Impression    IMPRESSION:  Administered enteric contrast does not definitely opacify the colon by 8 hours post administration. Redemonstration of multiple gas and fluid dilated loops of small bowel. Findings of moderate suspicion for bowel obstruction. No supine radiographic   evidence of pneumatosis or free intraperitoneal gas. Surgical clips overlie the abdomen. Anterior fusion changes at L5/S1. Pelvic phleboliths. Left hip arthroplasty.   XR Abdomen Port 1 View    Narrative    EXAM: XR ABDOMEN PORT 1 VIEW  LOCATION: [facility]  DATE: 7/12/2024    INDICATION: follow up gastrografin contrast  COMPARISON: Abdominal images from Gastrografin challenge, earlier today, abdomen CT 7/11/2024      Impression    IMPRESSION:     Residual positive enteric contrast in the stomach and central loops of small bowel has become diluted and now difficult to appreciate. Mild gas distention of the stomach on this single supine view. Unchanged gas dilation of small bowel in the left   abdomen which measures up to 4 cm in diameter. There is a paucity of gas within the colon.    Anterior fusion device near the lumbosacral junction and left hip joint replacement are again noted.

## 2024-07-12 NOTE — PROGRESS NOTES
RENAL PROGRESS NOTE    HPI:  68 year old female, ESRD on   TTS IHD ([facility]), admitted with abd pain, possible SBO.    ASSESSMENT & PLAN:      PLAN:  Dialysis today off schedule d/t missed tx today, cutting tx shorter d/t chest and abd pain, n/v; below target wt, bolused fluids on tx, no UF.  IF ongoing sig n/v/ and pain with surg and GI w/up in progress and NPO, could likely hold off on dialysis until Monday (off TTS schedule) TBD based on renal assessment 7/13  Hold Losartan with soft BP,  ok resume if SBP >130 more consistently      ASSESSMENT:  ESRD:  Dialysis off schedule tomorrow d/t missed tx today, volume status ok, K only mildly elevated, pt would prefer to not run today d/t feeling so poorly  [place]  [facility] [place]  RX:  TTS schedule, 3 hour TT,   EDW 35kg, /   ACCESS: left upper extremity AVF  No recent missed tx, last 7/9 left near her EDW.   Some intra-dialytic HoTN noted   Transplant inactive d/t recent coronary PCI's in April, severe LV dysfunction   Initiated HD 2020  Oliguric  s/p bilateral nephrectomies for PCKD     Lytes/Acid-base:  Hyperkalemia:  Chronic mild hyperK, dialysis today  Renal diet when taking pos       CKD/MBD:  On binders when taking pos, Phos 6     Abd pain:  Recurrent, unclear etiol, ? Mesenteric ischemia? Vs ?? Biliary obstruction?  h/o SBO/ileus,  constipation?, plans for MRCP and surg to eval for suspected SBO.   abd imaging also showing some hepatic ductal dilatation, no stones, LFT's are ok,      Volume:  She is  below current renal target wt, poor intake and had emesis, some fluids given on HD today.  Trend     SOB:  Some baseline emphysema, 02 as needed, not volume overloaded     HTN:  Labile, some HoTN on dialysis  PTA Losartan 12.5 (started last week per cards), Metop 25 daily, SBP sl escalated, cont home meds, prns      ASCAD:  PCI of RCA in 4/2024, severe LVEF 20-25%, chest pressure  Referred pain,?  H/o SARAH 2024, trops elev but stable for her, likely d/t ESRD, has followed with cards in the past, She had an NM lexiscan stress test that was nondiagnostic for ischemia, then had repeat coronary angiogram on , which did not show any stent thrombosis or restenosi last 7/3 cards note concurred trop likely d/t renal dz/volume management per [facility]   On ASA, statin, Plavix and BB, ARB started 7/3     Hypothyroidism:   On replacement     ACD:   Receives PETE and parenteral iron with outpatient dialysis.   Hemoglobin  10's     Malnutrition:  Has been losing wt, d/t abd pain and poor intake, enc pos supps      HLD:   Statin     Secondary renal hyperparathyroidism:   Sevelamer 3 times daily AC       SUBJECTIVE:  feeling poorly, emesis all night, GI did see, MRCP rec; awaiting on gen surg, imaging suggestive of SBP.  Pt is NPO.  C/o chest pressure, very tired.  Wanting to cut tx short d/t if possible d/t feeling so bad (K ok and volume actually low, so this is reasonable given more pressing issues with SBO).  Pt asking for antiemetics and pain meds. Seen x 2 on HD.     OBJECTIVE:  Physical Exam   Temp: 98.4  F (36.9  C) Temp src: Temporal BP: (!) 89/53 Pulse: 109   Resp: 15 SpO2: 95 % O2 Device: Nasal cannula Oxygen Delivery: 1 LPM  Vitals:    24 0815 24 1441 24 0652   Weight: 35.1 kg (77 lb 6.4 oz) 35.9 kg (79 lb 2.3 oz) 34.1 kg (75 lb 3.2 oz)     Vital Signs with Ranges  Temp:  [98.1  F (36.7  C)-98.5  F (36.9  C)] 98.4  F (36.9  C)  Pulse:  [105-117] 109  Resp:  [10-20] 15  BP: ()/(53-97) 89/53  SpO2:  [89 %-100 %] 95 %  I/O last 3 completed shifts:  In: -   Out: 500 [Emesis/NG output:500]    Temp (24hrs), Av.3  F (36.8  C), Min:98.1  F (36.7  C), Max:98.5  F (36.9  C)      Patient Vitals for the past 72 hrs:   Weight   24 0652 34.1 kg (75 lb 3.2 oz)   24 1441 35.9 kg (79 lb 2.3 oz)   24 0815 35.1 kg (77 lb 6.4 oz)     Intake/Output Summary (Last 24 hours) at 2024  0944  Last data filed at 7/12/2024 0454  Gross per 24 hour   Intake --   Output 500 ml   Net -500 ml       PHYSICAL EXAM:  General - Alert and oriented x3, appears uncomfortable and acutely ill, NAD  Cardiovascular - BP soft 90's, sl tachy   Respiratory - on supp o2 per NC 1-2   Abd: distended, tender   Extremities - No lower extremity edema bilaterally  Skin: dry, no rash, ashen color  Neuro:  Grossly intact, no focal deficits  MSK:  severely sarcopenic  Psych flat, ill affect  Anuric/anephric  Wt below EDW 34 kg     LABORATORY STUDIES:     Recent Labs   Lab 07/12/24  0644 07/11/24  0840   WBC 15.2* 11.4*   RBC 3.08* 2.94*   HGB 10.6* 10.2*   HCT 32.5* 32.3*   * 358       Basic Metabolic Panel:  Recent Labs   Lab 07/12/24  0644 07/11/24  1748 07/11/24  0840     --  134*   POTASSIUM 5.1 5.5* 5.4*   CHLORIDE 78*  --  87*   CO2 30*  --  26   BUN 81.6*  --  57.2*   CR 7.18*  --  5.33*   GLC 98  --  103*   GAVIN 10.3*  --  9.8       INR  Recent Labs   Lab 07/11/24  0840   INR 1.20*        Recent Labs   Lab Test 07/12/24  0644 07/11/24  0840 07/14/23  0503 07/13/23  1239   INR  --  1.20*  --  1.70*   WBC 15.2* 11.4*   < > 13.0*   HGB 10.6* 10.2*   < > 8.4*   * 358   < > 320    < > = values in this interval not displayed.       Personally reviewed current labs      Stephanie Gaviria, FNP-BC  [facility]  [phone]

## 2024-07-13 NOTE — PROGRESS NOTES
Renal progress note  CC:ESRD  Assessment and Plan:  68 YOF with hx of ESRD on IHD at [facility] under care of Dr Armstrong TTS, hx of CAD sp PCI 4/2024, HFrEF 20-25%, HTN, emphysema , and hypothyroidism , admitted with abdominal pain , and vomiting  Concerns for SBO  Nephrology following for ESRD managemnet    ESRD:  Dialysis off schedule tomorrow d/t missed tx today, volume status ok, K only mildly elevated, pt would prefer to not run today d/t feeling so poorly  On in center HD @  [facility] [place], Under the care of DR Armstrong  RX:  TTS schedule, 3 hour TT,   EDW 35kg, /   ACCESS: left upper extremity AVF  No recent missed tx, last 7/9 left near her EDW.   Some intra-dialytic HoTN noted   Transplant inactive d/t recent coronary PCI's in April, severe LV dysfunction   Initiated HD 2020  Oliguric  s/p bilateral nephrectomies for PCKD  --> HD 7/12 complicated by hypoTN and tachycardia , tx shortened by 60min  Will evaluate on Monday otherwise plan to dialyze on Tuesday , high GI losses and appears more volume contracted , labs stable     Lytes/Acid-base:  Hyperkalemia:  Chronic mild hyperK, dialysis with K2  Renal diet when taking PO     CKD/MBD:  On binders when taking pos, Phos 6     Volume:  She is  below current renal target wt, poor intake and had emesis, some fluids given on HD today.  Trend     HTN:  Labile, some HoTN on dialysis  PTA Losartan 12.5 (started last week per cards), Metop 25 daily, SBP sl escalated, cont home meds, prns      Abd pain:  Recurrent, unclear etiol, ? Mesenteric ischemia? Vs ?? Biliary obstruction?  h/o SBO/ileus,  constipation?, plans for MRCP and surg to eval for suspected SBO.   abd imaging also showing some hepatic ductal dilatation, no stones, LFT's are ok,     Emphysema    02 as needed, not volume overloaded    Hx of CAD sp PCI  PCI of RCA in 4/2024, severe LVEF 20-25%, chest pressure  Referred pain,? H/o SARAH 4/2024, trops elev but stable for her, likely d/t  ESRD, has followed with cards in the past, She had an NM lexiscan stress test that was nondiagnostic for ischemia, then had repeat coronary angiogram on 6/27, which did not show any stent thrombosis or restenosi last 7/3 cards note concurred trop likely d/t renal dz/volume management per [facility]   On ASA, statin, Plavix and BB, ARB started 7/3     Hypothyroidism:   On replacement     ACD:   Receives PETE and parenteral iron with outpatient dialysis.   Hemoglobin  10's     Malnutrition:  Has been losing wt, d/t abd pain and poor intake, enc pos supps      HLD:   Statin     Secondary renal hyperparathyroidism:   Sevelamer 3 times daily AC    Thank you for the consultation we will follow  Flaco Warren MD  [facility]  [phone]      Subjective  Seen at bedside  Feels better but still some nausea  Eating ice chips , wants broth  Discussed NGT and high outputs /SBO  She feels abd pain is a lot better  Has been symptomatic for >1wk    Objective    Vital signs in last 24 hours  Temp:  [97.6  F (36.4  C)-98.4  F (36.9  C)] 98.3  F (36.8  C)  Pulse:  [109-123] 119  Resp:  [12-24] 18  BP: ()/(52-67) 97/53  SpO2:  [89 %-100 %] 98 %  Weight:   [unfilled]    Intake/Output last 3 shifts  I/O last 3 completed shifts:  In: 600 [Other:600]  Out: 2586 [Emesis/NG output:2525; Other:61]  Intake/Output this shift:  I/O this shift:  In: -   Out: 300 [Emesis/NG output:300]    Physical Exam  Alert/oriented x 3, awake, NAD  CV: RRR without murmur or rub  Lung: clear and equal; no extra sounds  Ab: soft and NT; not distended; normal bs  Ext: no edema and well perfused  Skin; no rash    Pertinent Labs   Lab Results   Component Value Date    WBC 13.5 (H) 07/13/2024    HGB 10.4 (L) 07/13/2024    HCT 33.4 (L) 07/13/2024     (H) 07/13/2024     07/13/2024     Lab Results   Component Value Date    BUN 60.4 (H) 07/13/2024     07/13/2024    CO2 31 (H) 07/13/2024       Lab Results   Component Value Date     ALBUMIN 4.1 07/12/2024     Lab Results   Component Value Date    PHOS 6.2 (H) 07/11/2024     I reviewed all lab results  Flaco Warren MD

## 2024-07-13 NOTE — PLAN OF CARE
Problem: Nausea and Vomiting  Goal: Nausea and Vomiting Relief  7/13/2024 0146 by Samantha Madrid RN  Outcome: Progressing  7/13/2024 0145 by Samantha Madrid RN  Outcome: Progressing  7/13/2024 0144 by Samantha Madrid RN  Outcome: Progressing     Problem: Dysrhythmia  Goal: Normalized Cardiac Rhythm  7/13/2024 0146 by Samantha Madrid RN  Outcome: Progressing  7/13/2024 0145 by Samantha Madrid RN  Outcome: Progressing     Problem: Intestinal Obstruction  Goal: Optimal Bowel Function  Outcome: Progressing  Intervention: Promote Bowel Function  Recent Flowsheet Documentation  Taken 7/12/2024 1900 by Samantha Madrid RN  Body Position: position changed independently  Head of Bed (HOB) Positioning: HOB at 20-30 degrees  Positioning/Transfer Devices:   in use   pillows  Goal: Optimal Pain Control and Function  Outcome: Progressing   Goal Outcome Evaluation:    Patient is A/O x4.  Tachycardia; pt was not able to take metoprolol until tonight due to nausea; . Pt reports 8/10 back pain reduced by IV dilaudid. Reports nausea, passing gas. Tele: tachy.  Hemodialysis today rather than T, Th, Sat due to hospital admit; NG in place with dark brown OP; NPO except for ice chips and meds;  refuses enema

## 2024-07-13 NOTE — PROGRESS NOTES
General Surgery Progress Note:    Hospital Day # 2    ASSESSMENT:   1. Pain of upper abdomen        Serene Tipton is a 68 year old female with hx notable for multiple co-morbidities notable for low EF, b/l nephrectomy for PCKD (2023) who is HD2 presenting d/t nausea, bloating and obstipation c/f ileus vs SBO currently undergoing non-operative mgmt trial. Initial GG challenge on 7/12 did not go through, NG placed on 7/12 with 3L out and feeling better, but without resolution of SBO    PLAN:   -NPO  -Continue NG decompression for the next 24 hrs, may consider repeat GG challenge if not showing signs of opening up  -Up in chair and ambulate  -Will continue to follow, no surgical indication at this time    SUBJECTIVE:   Doing better since NG placement yesterday, no n/v, no gas or BM    Patient Vitals for the past 24 hrs:   BP Temp Temp src Pulse Resp SpO2   07/13/24 0730 97/53 98.3  F (36.8  C) Oral 119 18 98 %   07/13/24 0514 105/59 97.7  F (36.5  C) Oral 117 18 98 %   07/13/24 0002 99/60 97.7  F (36.5  C) Oral 120 18 98 %   07/12/24 1514 127/64 98.3  F (36.8  C) Oral (!) 123 18 99 %       Physical Exam:  General: NAD, sitting in chair  Resp: no respiratory distress,   Abdomen: mild distention, nontender  Extremities: warm and well perfused    Admission on 07/11/2024   Component Date Value    INR 07/11/2024 1.20 (H)     Sodium 07/11/2024 134 (L)     Potassium 07/11/2024 5.4 (H)     Carbon Dioxide (CO2) 07/11/2024 26     Anion Gap 07/11/2024 21 (H)     Urea Nitrogen 07/11/2024 57.2 (H)     Creatinine 07/11/2024 5.33 (H)     GFR Estimate 07/11/2024 8 (L)     Calcium 07/11/2024 9.8     Chloride 07/11/2024 87 (L)     Glucose 07/11/2024 103 (H)     Alkaline Phosphatase 07/11/2024 131     AST 07/11/2024 20     ALT 07/11/2024 10     Protein Total 07/11/2024 6.6     Albumin 07/11/2024 3.6     Bilirubin Total 07/11/2024 0.4     Lipase 07/11/2024 7 (L)     Lactic Acid, Initial 07/11/2024 1.5     Troponin T, High Sensiti*  07/11/2024 165 (HH)     Magnesium 07/11/2024 2.1     N terminal Pro BNP Inpat* 07/11/2024 >70,000 (H)     Culture 07/11/2024 No growth after 1 day     Culture 07/11/2024 No growth after 1 day     Phosphorus 07/11/2024 6.2 (H)     WBC Count 07/11/2024 11.4 (H)     RBC Count 07/11/2024 2.94 (L)     Hemoglobin 07/11/2024 10.2 (L)     Hematocrit 07/11/2024 32.3 (L)     MCV 07/11/2024 110 (H)     MCH 07/11/2024 34.7 (H)     MCHC 07/11/2024 31.6     RDW 07/11/2024 18.1 (H)     Platelet Count 07/11/2024 358     % Neutrophils 07/11/2024 86     % Lymphocytes 07/11/2024 3     % Monocytes 07/11/2024 9     % Eosinophils 07/11/2024 0     % Basophils 07/11/2024 1     % Immature Granulocytes 07/11/2024 0     NRBCs per 100 WBC 07/11/2024 0     Absolute Neutrophils 07/11/2024 9.9 (H)     Absolute Lymphocytes 07/11/2024 0.4 (L)     Absolute Monocytes 07/11/2024 1.1     Absolute Eosinophils 07/11/2024 0.0     Absolute Basophils 07/11/2024 0.1     Absolute Immature Granul* 07/11/2024 0.1     Absolute NRBCs 07/11/2024 0.0     Troponin T, High Sensiti* 07/11/2024 174 (HH)     Potassium 07/11/2024 5.5 (H)     Ventricular Rate 07/12/2024 117     Atrial Rate 07/12/2024 117     GA Interval 07/12/2024 166     QRS Duration 07/12/2024 92     QT 07/12/2024 330     QTc 07/12/2024 460     P Axis 07/12/2024 52     R AXIS 07/12/2024 45     T [place] 07/12/2024 108     Interpretation ECG 07/12/2024                      Value:Sinus tachycardia  Possible Left atrial enlargement  Left ventricular hypertrophy  ST & T wave abnormality, consider anterolateral ischemia  Abnormal ECG  When compared with ECG of 11-JUL-2024 08:11,  T wave inversion now evident in Anterolateral leads  Confirmed by DANIEL SIMS MD LOC:JN ([id number]) on 7/12/2024 12:47:31 PM      Sodium 07/12/2024 140     Potassium 07/12/2024 5.1     Chloride 07/12/2024 78 (L)     Carbon Dioxide (CO2) 07/12/2024 30 (H)     Anion Gap 07/12/2024 32 (H)     Urea Nitrogen 07/12/2024 81.6 (H)     Creatinine  07/12/2024 7.18 (H)     GFR Estimate 07/12/2024 6 (L)     Calcium 07/12/2024 10.3 (H)     Glucose 07/12/2024 98     WBC Count 07/12/2024 15.2 (H)     RBC Count 07/12/2024 3.08 (L)     Hemoglobin 07/12/2024 10.6 (L)     Hematocrit 07/12/2024 32.5 (L)     MCV 07/12/2024 106 (H)     MCH 07/12/2024 34.4 (H)     MCHC 07/12/2024 32.6     RDW 07/12/2024 17.7 (H)     Platelet Count 07/12/2024 460 (H)     Protein Total 07/12/2024 7.9     Albumin 07/12/2024 4.1     Bilirubin Total 07/12/2024 0.4     Alkaline Phosphatase 07/12/2024 157 (H)     AST 07/12/2024 22     ALT 07/12/2024 11     Bilirubin Direct 07/12/2024 0.21     Lactic Acid, Initial 07/12/2024 1.2         Reji Aguilar PA-C  [facility]  Surgery [address]  [address]  [place] [address]?  Office: [phone]

## 2024-07-13 NOTE — PROGRESS NOTES
[facility]    Progress Note - Hospitalist Service       Date of Admission:  7/11/2024    Assessment & Plan   Serene Tipton is a 68 year old female w/PMHx significant for NSTEMI s/p PCI (Apr 2024), ESRD on HD T/Th/Sa, PCKD s/p BL nephrectomy, CHF, tobacco use disorder, HTN, JAMIL, and HLD admitted on 7/11/2024 for abdominal pain with concern for SBO.       SBO  Gallbladder distention, mild CBD dilation   Abdominal pain, nausea - improved  Leukocytosis, suspect reactive - improving   Hx of SBO/ileus in 7/2023. Presented with abdominal pain starting 7/6/24 which worsened and developed multiple episodes of emesis. Work up in ED revealed elevated Alk Phos to 157 though other LFTs normal. CT abdomen on 7/11 showed moderate small bowel dilatation suggestive of ileus vs SBO and mild-mod GB distention. RUQ US also showed biliary duct dilatation and GB distension. Gastrografin challenge attempted though patient took PO and had emesis after so was difficult to appreciate on xray. MRCP yesterday showed mild dilation of CBD with slight tapering towards ampulla. Gallbladder again appeared distended with trace sludge, no stones or evidence of cholecystitis. Multiple dilated loops in LUQ consistent with ongoing SBO. Patient feeling much better this AM. Has had about 3L output from NGT, softer pressures this AM. WBC downtrending.   - NPO  - prn antiemetics   - prn tylenol, oxycodone, dilaudid   - 1L NSB ordered, monitor NGT losses  - Surgery following  - NG tube placed for decompression  - GI following     ESRD on TTSat HD  Hyperkalemia - resolved  Hypercalcemia - resolved  Hyperphosphatemia  Secondary renal hyperparathyroidism  Started dialysis in 2020. Dialyzes Tu/Th/Sat at [facility]. Follows with Dr. Armstrong. Of note patient is oliguric s/p bilateral nephrectomies for PCKD. Dialysis 7/12 though shortened run due to abdominal pain, nausea, vomiting.   - Nephrology following  - dialysis  again today vs 7/15 based on renal assessment   - held losartan for soft BP; can restart if SBP >130  - renal diet when diet resumed  - hold off on K binders  - daily BMP    Malnutrition, severe  Cachexia, severe  Has been losing weight due to abdominal pain and poor intake. Suspect she will be higher risk for refeeding syndrome when we are able to advance diet.    - Nutrition following   - daily renal multivitamin and iron supplement when able to tolerate PO  - daily BMP, Mg, Phos    ACD - stable  Hgb remains in 10s this admission.   - daily CBC    CAD  Hx of NSTEMI s/p PCI  HLD  Admitted in 6/2024 for chest pain. Repeat angio showed no stent thrombosis or restenosis. Denies current chest pain. EKG on 7/11 with sinus tach without ischemic changes.  - tele  -PTA DAPT, statin    HFrEF  HTN  Chronically high BNP. ECHO 5/2024 with LVEF of 25-30%. EF on Lexiscan in 6/2024 was 31%.   - tele  - judicious use of IVF  - PTA metoprolol 25, holding PTA losartan for soft pressures    Hypothyroidism - PTA Synthroid 50 mcg  JAMIL - PTA Zoloft 150, nortriptyline 75, prn Ativan        Diet: NPO for Medical/Clinical Reasons Except for: Meds, Ice Chips    DVT Prophylaxis: Pneumatic Compression Devices  Pool Catheter: Not present  Fluids: NPO  Lines: None     Cardiac Monitoring: ACTIVE order. Indication: Tachyarrhythmias, acute (48 hours)  Code Status: Full Code      Clinically Significant Risk Factors        # Hyperkalemia: Highest K = 5.5 mmol/L in last 2 days, will monitor as appropriate    # Hypercalcemia: Highest Ca = 10.3 mg/dL in last 2 days, will monitor as appropriate   # Anion Gap Metabolic Acidosis: Highest Anion Gap = 32 mmol/L in last 2 days, will monitor and treat as appropriate     # Coagulation Defect: INR = 1.20 (Ref range: 0.85 - 1.15) and/or PTT = N/A, will monitor for bleeding    # Hypertension: Noted on problem list  # Chronic heart failure with reduced ejection fraction: last echo with EF <40%               #  "Cachexia: Estimated body mass index is 15.19 kg/m  as calculated from the following:    Height as of this encounter: 1.499 m (4' 11\").    Weight as of this encounter: 34.1 kg (75 lb 3.2 oz)., PRESENT ON ADMISSION  # Severe Malnutrition: based on nutrition assessment, PRESENT ON ADMISSION     # Financial/Environmental Concerns: none         Disposition Plan     Expected Discharge Date: 07/13/2024      Destination: home          The patient's care was discussed with the Attending Physician, Dr. Reyez .    Marcial Sim MD  Hospitalist Service  [facility]  Securely message with [employer] (more info)  Text page via [facility] Paging/Directory   ______________________________________________________________________    Interval History   NAEON.   Patient feeling better. Abd pain and nausea much improved.     Physical Exam   Vital Signs: Temp: 98.3  F (36.8  C) Temp src: Oral BP: 97/53 Pulse: 119   Resp: 18 SpO2: 98 % O2 Device: Nasal cannula Oxygen Delivery: 2 LPM  Weight: 75 lbs 3.2 oz    Constitutional: lying on side curled up, severely cachetic, chronically ill appearing, fatigued, NAD.  HEENT: Normocephalic. EOMi. NGT in place.  Respiratory: no respiratory distress, CTAB.   Cardiovascular: Tachycardia, regular rhythm.    MSK: diffuse muscle wasting  Skin: left arm with AV fistula; ashen appearance  Extremities: no edema bilaterally    Medical Decision Making   Please see A&P for additional details of medical decision making.      Data   ------------------------- PAST 24 HR DATA REVIEWED -----------------------------------------------    I have personally reviewed the following data over the past 24 hrs:    13.5 (H)  \   10.4 (L)   / 431     145 86 (L) 60.4 (H) /  102 (H)   4.4 31 (H) 5.51 (H) \     Procal: N/A CRP: N/A Lactic Acid: 1.2         Imaging results reviewed over the past 24 hrs:   Recent Results (from the past 24 hour(s))   XR Abdomen Port 1 View    Narrative    EXAM: XR ABDOMEN PORT 1 " VIEW  LOCATION: [facility]  DATE: 7/12/2024    INDICATION: follow up gastrografin contrast  COMPARISON: Abdominal images from Gastrografin challenge, earlier today, abdomen CT 7/11/2024      Impression    IMPRESSION:     Residual positive enteric contrast in the stomach and central loops of small bowel has become diluted and now difficult to appreciate. Mild gas distention of the stomach on this single supine view. Unchanged gas dilation of small bowel in the left   abdomen which measures up to 4 cm in diameter. There is a paucity of gas within the colon.    Anterior fusion device near the lumbosacral junction and left hip joint replacement are again noted.   MR Abdomen MRCP without Contrast    Narrative    EXAM: MR ABDOMEN MRCP W/O CONTRAST  LOCATION: [facility]  DATE: 7/12/2024    INDICATION: Biliary dilatation and gallbladder distention.   COMPARISON: CT abdomen pelvis 7/11/2024. Ultrasound 7/11/2024.   TECHNIQUE: Routine MR liver/pancreas protocol including axial and coronal MRCP sequences. 2D and 3D reconstruction performed by MR technologist including MIP reconstruction and slab cholangiograms. If performed with contrast, additional dynamic T1 post   IV contrast images.  CONTRAST: None.      FINDINGS:     LIVER: Diffuse iron deposition throughout the liver. No features of cirrhosis. Innumerable mostly subcentimeter benign cysts measuring up to 2.9 cm within the right hepatic lobe. No suspicious liver lesions.    GALLBLADDER/BILIARY: Gallbladder is distended. Trace gallbladder sludge within the fundus. No gallstones. Mild dilation of the common bile duct up to 10 mm at the hepatic hilum with gradual tapering to 7 mm near the ampulla. No choledocholiths or   discrete biliary strictures. No intrahepatic biliary ductal dilation.    PANCREAS: Conventional pancreatic ductal anatomy. No ductal dilation. No discrete pancreatic lesions.     SPLEEN: Diffuse iron  deposition throughout the spleen. No splenomegaly.     ADRENALS: Normal.     KIDNEYS: Bilateral nephrectomies.     BOWEL: Gastric drainage tube tip is in the stomach. Multiple dilated small bowel loops within the left upper quadrant measuring up to 4 cm in caliber. No dilated large bowel loops are identified. No ascites.     LYMPH NODES: No enlarged lymph node.     VASCULATURE: Extensive aortic atherosclerosis.     LUNG BASES: Normal.    MUSCULOSKELETAL: Diffuse low signal of the bone marrow likely due to iron deposition. No acute bony abnormality.      Impression    IMPRESSION:    1.  Mild dilation of the common bile duct with slight tapering towards the ampulla. No choledocholiths, biliary strictures, or specific cause of the ductal dilation is identified. No intrahepatic biliary ductal dilation.    2.  Gallbladder is distended and contains trace sludge. No gallstones or evidence of acute cholecystitis.    3.  Signs of secondary siderosis including diffuse iron deposition throughout the liver, spleen, and bone marrow.    4.  Multiple dilated small bowel loops within the left upper quadrant, suggestive of an ongoing small bowel obstruction.    5.  Innumerable cysts throughout the liver related to autosomal dominant polycystic kidney disease.

## 2024-07-13 NOTE — PROGRESS NOTES
"[facility] Progress Note    Subjective: Patient had nasogastric tube placed.  She reports she feels better after the nasogastric tube was placed.  She reports she is passing small amounts of gas but has not had any bowel movements.    Objective:  BP 97/53 (BP Location: Right arm)   Pulse 119   Temp 98.3  F (36.8  C) (Oral)   Resp 18   Ht 1.499 m (4' 11\")   Wt 34.1 kg (75 lb 3.2 oz)   SpO2 98%   BMI 15.19 kg/m     Gen: Awake, no acute distress  Gastrointestinal: soft, non-tender, minimally distended    Patient Active Problem List   Diagnosis    Polycystic kidney    Hypertension    End stage kidney disease (H)    Depressive disorder    Chronic low back pain    Hyperlipidemia    Tobacco abuse    Vitamin D deficiency    Polycystic kidney disease    Postoperative anemia due to acute blood loss    Enlarged kidney as indication for native nephrectomy    Acute post-operative pain    Chronic, continuous use of opioids    Shortness of breath    S/p nephrectomy    Dialysis patient (H24)    Pulmonary edema, noncardiac    ESRD (end stage renal disease) on dialysis (H)    Constipation    Small bowel obstruction (H)    Hypoglycemia    Fall, initial encounter    Hypomagnesemia    Diffuse abdominal pain    Chronic renal failure, unspecified CKD stage    Anemia in stage 5 chronic kidney disease (H)    Anxiety    Backache    Congenital spondylolisthesis    Fibrocystic breast changes    Gastroesophageal reflux disease without esophagitis    Hepatic lesion    Hyperparathyroidism (H24)    Hyperparathyroidism, secondary renal (H24)    Hypothyroidism (acquired)    Insomnia    Liver cyst    Long term (current) use of opiate analgesic    Lumbar facet joint syndrome    Major depression, recurrent (H24)    Major depressive disorder with single episode, in partial remission (H24)    Mammogram abnormal    Nicotine dependence, cigarettes, uncomplicated    Non-traumatic rotator cuff tear, right    NS (nuclear sclerosis), " bilateral    Obstructive sleep apnea (adult) (pediatric)    Pain in joint, pelvic region and thigh    Posttraumatic stress disorder    Primary generalized (osteo)arthritis    Pure hypercholesterolemia    PVD (posterior vitreous detachment), left eye    Renovascular hypertension    S/P lumbar fusion    Piriformis syndrome    Spinal stenosis, lumbar region with neurogenic claudication    Symptomatic menopausal or female climacteric states    Vitreomacular adhesion, right    Hip fracture, left, closed, initial encounter (H)    Closed nondisplaced fracture of metatarsal bone of right foot, unspecified metatarsal, initial encounter    Acquired absence of kidney    Benign neoplasm of ascending colon    Benign neoplasm of cecum    Fracture of unspecified metatarsal bone(s), right foot, subsequent encounter for fracture with routine healing    Polyp of colon    Constipation, unspecified    Depression, unspecified    Dependence on renal dialysis (H24)    Generalized abdominal pain    Chronic kidney disease, unspecified    CKD (chronic kidney disease) stage 4, GFR 15-29 ml/min (H)    End stage renal disease (H)    Diffuse cystic mastopathy    Disease of liver    Fracture of unspecified part of neck of left femur, subsequent encounter for closed fracture with routine healing    Essential (primary) hypertension    Hypoglycemia, unspecified    Insomnia, unspecified    Sciatica    Chronic pulmonary edema    Kidney transplant status    Ileus, unspecified (H)    Unspecified intestinal obstruction, unspecified as to partial versus complete obstruction (H)    Acute on chronic systolic congestive heart failure (H)    Elevated troponin    Dilated cardiomyopathy (H)    Ischemic cardiomyopathy    Orthopnea    Other fatigue    Pain of upper abdomen       Labs:  Recent Labs   Lab Test 07/13/24  0554   WBC 13.5*   RBC 3.08*   HGB 10.4*   HCT 33.4*   *   MCH 33.8*   MCHC 31.1*   RDW 17.8*         Sodium   Date Value Ref Range  Status   07/13/2024 145 135 - 145 mmol/L Final   05/19/2021 136 133 - 144 mmol/L Final     Potassium   Date Value Ref Range Status   07/13/2024 4.4 3.4 - 5.3 mmol/L Final   07/25/2023 4.7 3.5 - 5.0 mmol/L Final   05/19/2021 3.9 3.4 - 5.3 mmol/L Final     Chloride   Date Value Ref Range Status   07/13/2024 86 (L) 98 - 107 mmol/L Final   07/25/2023 96 (L) 98 - 107 mmol/L Final   05/19/2021 98 94 - 109 mmol/L Final     Carbon Dioxide   Date Value Ref Range Status   05/19/2021 31 20 - 32 mmol/L Final     Carbon Dioxide (CO2)   Date Value Ref Range Status   07/13/2024 31 (H) 22 - 29 mmol/L Final   07/25/2023 24 22 - 31 mmol/L Final     Anion Gap   Date Value Ref Range Status   07/13/2024 28 (H) 7 - 15 mmol/L Final   07/25/2023 12 5 - 18 mmol/L Final   05/19/2021 7 3 - 14 mmol/L Final     Glucose   Date Value Ref Range Status   07/13/2024 102 (H) 70 - 99 mg/dL Final   07/25/2023 116 70 - 125 mg/dL Final   05/19/2021 69 (L) 70 - 99 mg/dL Final     GLUCOSE BY METER POCT   Date Value Ref Range Status   06/25/2024 154 (H) 70 - 99 mg/dL Final     Urea Nitrogen   Date Value Ref Range Status   07/13/2024 60.4 (H) 8.0 - 23.0 mg/dL Final   07/25/2023 27 (H) 8 - 22 mg/dL Final   05/19/2021 27 7 - 30 mg/dL Final     Creatinine   Date Value Ref Range Status   07/13/2024 5.51 (H) 0.51 - 0.95 mg/dL Final   05/19/2021 3.43 (H) 0.52 - 1.04 mg/dL Final     GFR Estimate   Date Value Ref Range Status   07/13/2024 8 (L) >60 mL/min/1.73m2 Final     Comment:     eGFR calculated using 2021 CKD-EPI equation.   05/19/2021 13 (L) >60 mL/min/[1.73_m2] Final     Comment:     Non  GFR Calc  Starting 12/18/2018, serum creatinine based estimated GFR (eGFR) will be   calculated using the Chronic Kidney Disease Epidemiology Collaboration   (CKD-EPI) equation.       Calcium   Date Value Ref Range Status   07/13/2024 9.2 8.8 - 10.2 mg/dL Final   05/19/2021 9.5 8.5 - 10.1 mg/dL Final      Recent Labs   Lab Test 07/12/24  0644   PROTTOTAL 7.9    ALBUMIN 4.1   BILITOTAL 0.4   ALKPHOS 157*   AST 22   ALT 11      INR   Date Value Ref Range Status   07/11/2024 1.20 (H) 0.85 - 1.15 Final   05/19/2021 1.02 0.86 - 1.14 Final        7/12 MRCP: Biliary ductal dilation but no evidence of choledocholithiasis or biliary stricture.  Multiple dilated loops of small bowel were again seen suggestive of an ongoing small bowel obstruction    Impression: This is a 68-year-old female who presents with a few days of nausea and vomiting and upper abdominal pain.  CT scan reveals ileus versus small bowel obstruction as well as biliary ductal dilation and gallbladder distention.  Right upper quadrant ultrasound also shows biliary ductal dilation and gallbladder distention.  No cholelithiasis seen.  Patient also has a history of end-stage renal disease on hemodialysis.  Patient's initial LFTs were normal.  Alkaline phosphatase was slightly elevated yesterday but other LFTs remain normal.  Patient appears to have persistent small bowel obstruction.  She has felt better since nasogastric tube was placed.     Recommendation:   -Further management of small bowel obstruction as per general surgery team  -We will schedule patient for an outpatient endoscopic ultrasound of the biliary tree for further evaluation of her biliary ductal dilation.  -Will sign off for now.  Please call with questions.    Micaela Montgomery MD  7/13/2024  [facility]

## 2024-07-14 NOTE — PLAN OF CARE
Problem: Nausea and Vomiting  Goal: Nausea and Vomiting Relief  Outcome: Progressing     Problem: Intestinal Obstruction  Goal: Optimal Bowel Function  Outcome: Progressing  Goal: Fluid and Electrolyte Balance  Outcome: Progressing  Goal: Absence of Infection Signs and Symptoms  Outcome: Progressing  Goal: Optimize Nutrition Status  Outcome: Progressing  Goal: Optimal Pain Control and Function  Outcome: Progressing     Goal Outcome Evaluation:  VSS, tele Sinus tach.  Pain moderately controlled with prn iv pain medication.  Pt. Reported struggling to sleep.  Has request amiben for sleep.  MD paged.  No new orders at this time.  Pt. Declined other sleep aides.  NG tube to low intermittent suction.  With brown output.  Passing gas. Not bowel movement yet

## 2024-07-14 NOTE — PROGRESS NOTES
Renal progress note  CC:ESRD  Assessment and Plan:  68 YOF with hx of ESRD on IHD at [facility] under care of Dr Armstrong TTS, hx of CAD sp PCI 4/2024, HFrEF 20-25%, HTN, emphysema , and hypothyroidism , admitted with abdominal pain , and vomiting  Concerns for SBO  Nephrology following for ESRD managemnet    ESRD:  Dialysis off schedule 7/12 d/t missed tx on 7/11, volume status ok, K only mildly elevated,   On [place]  [facility] [place] Under the care of DR Armstrong  RX:  TTS schedule, 3 hour TT,   EDW 35kg, /   ACCESS: left upper extremity AVF  No recent missed tx, last 7/9 left near her EDW.   Some intra-dialytic HoTN noted   Transplant inactive d/t recent coronary PCI's in April, severe LV dysfunction   Initiated HD 2020  Oliguric  s/p bilateral nephrectomies for PCKD  --> HD 7/12 complicated by hypoTN and tachycardia , tx shortened by 60min  Will evaluate on Monday otherwise plan to dialyze on Tuesday , high GI losses and appears more volume contracted with 2+L NG outputs, labs stable     Lytes/Acid-base:  Hyperkalemia:  Chronic mild hyperK, dialysis with K2  Renal diet when taking PO     CKD/MBD:  On binders when taking pos, Phos 6     Volume:  She is  below current renal target wt, poor intake and had emesis, some fluids given on HD today.  Trend  IVF 1L yesterday and now on NS 50ml/hr for 24hrs     HTN:  Labile, some HoTN on dialysis  PTA Losartan 12.5 (started last week per cards), Metop 25 daily, SBP sl escalated, cont home meds, prns      Abd pain:SBO  Recurrent, unclear etiol, ? Mesenteric ischemia? Vs ?? Biliary obstruction?  h/o SBO/ileus,  constipation  abd imaging also showing some hepatic ductal dilatation, no stones, LFT's are ok,     Emphysema    02 as needed, not volume overloaded    Hx of CAD sp PCI  PCI of RCA in 4/2024, severe LVEF 20-25%, chest pressure  Referred pain,? H/o SARAH 4/2024, trops elev but stable for her, likely d/t ESRD, has followed with cards in the  past, She had an NM lexiscan stress test that was nondiagnostic for ischemia, then had repeat coronary angiogram on 6/27, which did not show any stent thrombosis or restenosi last 7/3 cards note concurred trop likely d/t renal dz/volume management per [facility]   On ASA, statin, Plavix and BB, ARB started 7/3     Hypothyroidism:   On replacement     ACD:   Receives PETE and parenteral iron with outpatient dialysis.   Hemoglobin  10's     Malnutrition:  Has been losing wt, d/t abd pain and poor intake, enc pos supps      HLD:   Statin     Secondary renal hyperparathyroidism:   Sevelamer 3 times daily AC    Thank you for the consultation we will follow  Flaco Warren MD  [facility]  [phone]      Subjective  Seen at bedside  Feels better but still some nausea  Eating ice chips , wants broth  Discussed NGT and high outputs /SBO  On IVF since NG outputs have been excessive  Feels a lot bright today  Moving Bowels now    Objective    Vital signs in last 24 hours  Temp:  [97.7  F (36.5  C)-98.2  F (36.8  C)] 97.9  F (36.6  C)  Pulse:  [103-110] 107  Resp:  [16-18] 18  BP: (105-124)/(59-66) 117/66  SpO2:  [98 %-100 %] 100 %  Weight:   [unfilled]    Intake/Output last 3 shifts  I/O last 3 completed shifts:  In: 1000 [IV Piggyback:1000]  Out: 1500 [Emesis/NG output:1500]  Intake/Output this shift:  No intake/output data recorded.    Physical Exam  Alert/oriented x 3, awake, NAD  CV: RRR without murmur or rub  Lung: clear and equal; no extra sounds  Ab: soft and NT; not distended; normal bs  Ext: no edema and well perfused  Skin; no rash    Pertinent Labs   Lab Results   Component Value Date    WBC 17.4 (H) 07/14/2024    HGB 10.0 (L) 07/14/2024    HCT 32.5 (L) 07/14/2024     (H) 07/14/2024     07/14/2024     Lab Results   Component Value Date    BUN 83.0 (H) 07/14/2024     07/14/2024    CO2 30 (H) 07/14/2024       Lab Results   Component Value Date    ALBUMIN 4.1 07/12/2024     Lab  Results   Component Value Date    PHOS 9.8 (H) 07/14/2024     I reviewed all lab results  Flaco Warren MD

## 2024-07-14 NOTE — PROGRESS NOTES
Problem: Adult Inpatient Plan of Care  Goal: Optimal Comfort and Wellbeing  Outcome: Progressing     Problem: Intestinal Obstruction  Goal: Fluid and Electrolyte Balance  Outcome: Progressing     Problem: Intestinal Obstruction  Goal: Optimal Pain Control and Function  Outcome: Progressing     Pt rating pain 7/10.  Has been taking oral medications and tolerating.  Switched to oxycodone for pain management.  Pt requested lidocaine patch for her lower back pain.  Was ordered and placed in tail bone area.       Pt having gastrografin challenge done today.  Gastrografin given at 1230 today, xray informed.

## 2024-07-14 NOTE — PROGRESS NOTES
[facility]    Progress Note - Hospitalist Service       Date of Admission:  7/11/2024    Assessment & Plan   Serene Tipton is a 68 year old female w/PMHx significant for NSTEMI s/p PCI (Apr 2024), ESRD on HD T/Th/Sa, PCKD s/p BL nephrectomy, CHF, tobacco use disorder, HTN, JAMIL, and HLD admitted on 7/11/2024 for abdominal pain with concern for SBO.       SBO  Gallbladder distention, mild CBD dilation   Abdominal pain, nausea - improved  Leukocytosis, suspect reactive  Hx of SBO/ileus in 7/2023. Presented with abdominal pain starting 7/6/24 which worsened and developed multiple episodes of emesis. Work up in ED revealed elevated Alk Phos to 157 though other LFTs normal. CT abdomen on 7/11 showed moderate small bowel dilatation suggestive of ileus vs SBO and mild-mod GB distention. RUQ US also showed biliary duct dilatation and GB distension. Gastrografin challenge attempted though patient took PO and had emesis after so was difficult to appreciate on xray. MRCP 7/12 showed mild dilation of CBD with slight tapering towards ampulla. Gallbladder again appeared distended with trace sludge, no stones or evidence of cholecystitis. Multiple dilated loops in LUQ consistent with ongoing SBO. Patient continues to feel better. Had another 1.5L output from NGT over past 24h, endorses feeling dehydrated.   - NPO  - prn antiemetics   - prn tylenol, oxycodone, dilaudid   - Surgery following   - repeat gastrografin challenge today   - NGT  - encourage ambulation - I ordered PT/OT this AM  - GI following   - s/p 1L NSB yesterday, nephrology started mIVF this AM      ESRD on TTSat HD  AGMA  Hyperkalemia - resolved  Hypercalcemia - resolved  Hyperphosphatemia  Secondary renal hyperparathyroidism  Started dialysis in 2020. Dialyzes Tu/Th/Sat [facility]. Follows with Dr. Armstrong. Of note patient is oliguric s/p bilateral nephrectomies for PCKD. Dialysis 7/12 though shortened run due to  abdominal pain, nausea, vomiting.   - Nephrology following  - anticipate next HD run 7/15 vs 7/16  - held losartan for soft BP; can restart if SBP >130  - renal diet when diet resumed  - hold off on K binders  - daily BMP    Malnutrition, severe  Cachexia, severe  Has been losing weight due to abdominal pain and poor intake. Suspect she will be higher risk for refeeding syndrome when we are able to advance diet.    - Nutrition following   - daily renal multivitamin and iron supplement when able to tolerate PO  - daily BMP, Mg, Phos  - PT/OT/CM, appreciate    Insomnia  Patient was requesting Ambien overnight. Discussed with her today regarding risks outweighing benefits of this.   - scheduled melatonin at bedtime     LBP  - lidocaine patch    ACD - stable  Hgb remains in 10s this admission.   - daily CBC    CAD  Hx of NSTEMI s/p PCI  HLD  Admitted in 6/2024 for chest pain. Repeat angio showed no stent thrombosis or restenosis. Denies current chest pain. EKG on 7/11 with sinus tach without ischemic changes.  - tele  -PTA DAPT, statin    HFrEF  HTN  Chronically high BNP. ECHO 5/2024 with LVEF of 25-30%. EF on Lexiscan in 6/2024 was 31%.   - tele  - judicious use of IVF  - PTA metoprolol 25, holding PTA losartan for soft pressures    Hypothyroidism - PTA Synthroid 50 mcg  JAMIL - PTA Zoloft 150, nortriptyline 75, prn Ativan        Diet: NPO for Medical/Clinical Reasons Except for: Meds, Ice Chips    DVT Prophylaxis: Pneumatic Compression Devices  Pool Catheter: Not present  Fluids: NPO  Lines: None     Cardiac Monitoring: None  Code Status: Full Code      Clinically Significant Risk Factors             # Anion Gap Metabolic Acidosis: Highest Anion Gap = 30 mmol/L in last 2 days, will monitor and treat as appropriate        # Hypertension: Noted on problem list  # Chronic heart failure with reduced ejection fraction: last echo with EF <40%               # Cachexia: Estimated body mass index is 15.19 kg/m  as calculated  "from the following:    Height as of this encounter: 1.499 m (4' 11\").    Weight as of this encounter: 34.1 kg (75 lb 3.2 oz)., PRESENT ON ADMISSION  # Severe Malnutrition: based on nutrition assessment, PRESENT ON ADMISSION     # Financial/Environmental Concerns: none         Disposition Plan      Expected Discharge Date: 07/15/2024      Destination: home          The patient's care was discussed with the Attending Physician, Dr. Reyez .    Marcial Sim MD  Hospitalist Service  [facility]  Securely message with [employer] info)  Text page via [facility] Paging/Directory   ______________________________________________________________________    Interval History   NAEON.   Was requesting sleep aid for insomnia.   Continues to deny further nausea or abd pain.   Feeling dehydrated.     Physical Exam   Vital Signs: Temp: 97.8  F (36.6  C) Temp src: Oral BP: 122/58 Pulse: 93   Resp: 20 SpO2: 100 % O2 Device: Nasal cannula Oxygen Delivery: 3 LPM  Weight: 75 lbs 3.2 oz    Constitutional: lying in bed, severely cachetic, chronically ill appearing, appears more comfortable, NAD.  HEENT: Normocephalic. EOMi. NGT in place.  Respiratory: no respiratory distress, CTAB.   Cardiovascular: RRR, normal S1 and S2, no peripheral edema   MSK: diffuse muscle wasting  Skin: left arm with AV fistula; ashen appearance  Psych: A&Ox3. Mood and affect normal.     Medical Decision Making   Please see A&P for additional details of medical decision making.      Data   ------------------------- PAST 24 HR DATA REVIEWED -----------------------------------------------    I have personally reviewed the following data over the past 24 hrs:    17.4 (H)  \   10.0 (L)   / 396     144 84 (L) 83.0 (H) /  63 (L)   4.6 30 (H) 6.61 (H) \       Imaging results reviewed over the past 24 hrs:   Recent Results (from the past 24 hour(s))   XR Abdomen Port 1 View    Narrative    EXAM: XR ABDOMEN PORT 1 VIEW  LOCATION: [facility]  DATE: 7/14/2024    INDICATION: verify ng tube placement  COMPARISON: 7/12/2024      Impression    IMPRESSION: Orogastric tube with tip over stomach. Nonobstructive bowel gas pattern. No free air. Contrast seen throughout the colon. Multiple surgical clips project over abdomen.

## 2024-07-14 NOTE — PROGRESS NOTES
"General Surgery Progress Note    Subjective:  No acute events overnight.  States she is feeling a bit better from abdominal standpoint.  She is passing quite a bit of gas.  Her main complaint far away this morning is exhaustion from lack of sleep for several weeks.  She has not had a bowel movement yet.    Objective:  /66 (BP Location: Right leg)   Pulse 107   Temp 97.9  F (36.6  C) (Oral)   Resp 18   Ht 1.499 m (4' 11\")   Wt 34.1 kg (75 lb 3.2 oz)   SpO2 100%   BMI 15.19 kg/m      Sitting up on edge of bed in no acute distress  NG tube in place currently clamped for medications.  Tube filled with greenish-brown output.  Mild tachycardia  Nonlabored breathing with some supplemental oxygen  Abdomen mild to moderately distended but quite soft.  Nontender    I/O last 3 completed shifts:  In: 1000 [IV Piggyback:1000]  Out: 1500 [Emesis/NG output:1500]    WBC 17.4 today up from 13.5 yesterday    Assessment and plan:  Patient is a 68-year-old woman with history of bilateral nephrectomy for polycystic kidney disease presenting with a bowel obstruction.  Medically she seems to be slowly resolving her obstruction.  She is having more gas today.  Her abdomen is soft and fairly benign.  I am little concerned about her elevation white count.  Would continue the course.  Will attempt to repeat Gastrografin study now that she has been decompressed with the nasogastric tube.    -Continue nasogastric tube  -Gastrografin challenge today  -IV fluids  -Encourage ambulation  -Hospitalist team to address medication to assist with sleep  -Surgery continue to follow    Juan Diego MD  General Surgeon  [facility]  Surgery [facility] - [address]  [address]  [place] [address]  Office: [phone]      "

## 2024-07-15 NOTE — PLAN OF CARE
Occupational Therapy Discharge Summary    Reason for therapy discharge:    All goals and outcomes met, no further needs identified.    Progress towards therapy goal(s). See goals on Care Plan in [facility] electronic health record for goal details.  Goals met    Therapy recommendation(s):    No further therapy is recommended.    ANNELIESE Whitehead, OTR/L, 7/15/2024, 2:19 PM

## 2024-07-15 NOTE — PLAN OF CARE
Alert and oriented. SBA. Ready for solid food, no nausea today and tolerated full liquids. Oxy and tylenol prn given for low back pain and hip pain. Tolerated solid food, but could only eat 25 percent of meal.

## 2024-07-15 NOTE — PLAN OF CARE
Goal Outcome Evaluation:    Problem: Adult Inpatient Plan of Care  Goal: Optimal Comfort and Wellbeing     Problem: Nausea and Vomiting  Goal: Nausea and Vomiting Relief    Problem: Intestinal Obstruction  Goal: Optimal Bowel Function  Intervention: Promote Bowel Function  Goal: Fluid and Electrolyte Balance  Goal: Absence of Infection Signs and Symptoms  Goal: Optimize Nutrition Status  Goal: Optimal Pain Control and Function          Pt alert and oriented. VSS on 3L NC. Up to commode SBA. 2x loose brown BM this shift. Tolerating clear liquid diet. Pain controlled with prn and scheduled medications.

## 2024-07-15 NOTE — PLAN OF CARE
Goal Outcome Evaluation:    Problem: Nausea and Vomiting  Goal: Nausea and Vomiting Relief  Outcome: Progressing    Problem: Intestinal Obstruction  Goal: Optimal Bowel Function  Outcome: Progressing  Intervention: Promote Bowel Function  Goal: Fluid and Electrolyte Balance  Outcome: Progressing  Goal: Absence of Infection Signs and Symptoms  Outcome: Progressing  Goal: Optimize Nutrition Status  Outcome: Progressing  Goal: Optimal Pain Control and Function  Outcome: Progressing        Pt alert and oriented. VSS on 3L NC. Reports moderate to severe pain, waiting til bedtime for pain meds per pt. Audible bowel sounds, passing gas and multiple bowel movements pert pt. XR gastro completed, resident paged with results. NG clamped and clear liquid diet ordered. Tolerating oral liquids.

## 2024-07-15 NOTE — PLAN OF CARE
"  Problem: Adult Inpatient Plan of Care  Goal: Plan of Care Review  Description: The Plan of Care Review/Shift note should be completed every shift.  The Outcome Evaluation is a brief statement about your assessment that the patient is improving, declining, or no change.  This information will be displayed automatically on your shift  note.  Outcome: Progressing  Flowsheets (Taken 7/15/2024 1119)  Plan of Care Reviewed With: patient  Goal: Patient-Specific Goal (Individualized)  Description: You can add care plan individualizations to a care plan. Examples of Individualization might be:  \"Parent requests to be called daily at 9am for status\", \"I have a hard time hearing out of my right ear\", or \"Do not touch me to wake me up as it startles  me\".  Outcome: Progressing  Goal: Absence of Hospital-Acquired Illness or Injury  Outcome: Progressing  Intervention: Identify and Manage Fall Risk  Recent Flowsheet Documentation  Taken 7/15/2024 0800 by Cassandra Rojas RN  Safety Promotion/Fall Prevention:   activity supervised   nonskid shoes/slippers when out of bed   patient and family education   safety round/check completed  Intervention: Prevent Skin Injury  Recent Flowsheet Documentation  Taken 7/15/2024 0800 by Cassandra Rojas RN  Body Position: position changed independently  Goal: Optimal Comfort and Wellbeing  Outcome: Progressing  Goal: Readiness for Transition of Care  Outcome: Progressing     Problem: Nausea and Vomiting  Goal: Nausea and Vomiting Relief  Outcome: Progressing     Problem: Dysrhythmia  Goal: Normalized Cardiac Rhythm  Outcome: Progressing     Problem: Intestinal Obstruction  Goal: Optimal Bowel Function  Outcome: Progressing  Intervention: Promote Bowel Function  Recent Flowsheet Documentation  Taken 7/15/2024 0800 by Cassandra Rojas RN  Body Position: position changed independently  Head of Bed (HOB) Positioning: HOB at 20-30 degrees  Goal: Fluid and Electrolyte Balance  Outcome: " Progressing  Goal: Absence of Infection Signs and Symptoms  Outcome: Progressing  Goal: Optimize Nutrition Status  Outcome: Progressing  Goal: Optimal Pain Control and Function  Outcome: Progressing   Goal Outcome Evaluation:      Plan of Care Reviewed With: patient           Pt is alert and oriented x4. Pt is med complaint. Pt's v/s is stable. Pt is up with assist of 1. Pt's Ng was removed this morning. Diet was advanced. Continue to monitor pt.

## 2024-07-15 NOTE — PROGRESS NOTES
General Surgery Progress Note:    Hospital Day # 4    ASSESSMENT:   1. Pain of upper abdomen        Serene Tipton is a 68 year old female admitted with a SBO. Patient with NG tube and underwent Gastrografin challenge yesterday and subsequently started passing flatus and liquid stools overnight. Follow-up x-ray revealed contrast through the colon. Patient's NG tube clamped yesterday evening and tolerating clear liquids without nausea, vomiting and having antegrade bowel function. NG tube removed and okay to advance diet as tolerated.    PLAN:   - NG tube removed by myself  - Okay to advance diet as tolerated  - Continue to increase activity and encourage ambulation to promote bowel function  - Surgery will continue to follow    SUBJECTIVE:   Serene Tipton is feeling better today. Endorses mild abdominal cramping. Denies fever, chills, nausea, vomiting. Tolerating clear liquids without issues and passing flatus and BM's. Voiding independently.     Patient Vitals for the past 24 hrs:   BP Temp Temp src Pulse Resp SpO2   07/15/24 0827 (!) 166/99 97.4  F (36.3  C) Oral 91 18 (!) 87 %   07/15/24 0016 106/63 97.5  F (36.4  C) Oral 106 18 98 %   07/14/24 1609 112/63 97.6  F (36.4  C) Oral 96 18 100 %       Physical Exam:  General: patient seen resting in bed, no acute distress  HEENT: NG tube clamped.   Resp: no respiratory distress, breathing comfortably on 3L via nasal canula  Abdomen: Softly distended, non-tender. No rebound or guarding.  Extremities: warm and well perfused    Admission on 07/11/2024   Component Date Value    INR 07/11/2024 1.20 (H)     Sodium 07/11/2024 134 (L)     Potassium 07/11/2024 5.4 (H)     Carbon Dioxide (CO2) 07/11/2024 26     Anion Gap 07/11/2024 21 (H)     Urea Nitrogen 07/11/2024 57.2 (H)     Creatinine 07/11/2024 5.33 (H)     GFR Estimate 07/11/2024 8 (L)     Calcium 07/11/2024 9.8     Chloride 07/11/2024 87 (L)     Glucose 07/11/2024 103 (H)     Alkaline Phosphatase 07/11/2024  131     AST 07/11/2024 20     ALT 07/11/2024 10     Protein Total 07/11/2024 6.6     Albumin 07/11/2024 3.6     Bilirubin Total 07/11/2024 0.4     Lipase 07/11/2024 7 (L)     Lactic Acid, Initial 07/11/2024 1.5     Troponin T, High Sensiti* 07/11/2024 165 (HH)     Magnesium 07/11/2024 2.1     N terminal Pro BNP Inpat* 07/11/2024 >70,000 (H)     Culture 07/11/2024 No growth after 4 days     Culture 07/11/2024 No growth after 4 days     Phosphorus 07/11/2024 6.2 (H)     WBC Count 07/11/2024 11.4 (H)     RBC Count 07/11/2024 2.94 (L)     Hemoglobin 07/11/2024 10.2 (L)     Hematocrit 07/11/2024 32.3 (L)     MCV 07/11/2024 110 (H)     MCH 07/11/2024 34.7 (H)     MCHC 07/11/2024 31.6     RDW 07/11/2024 18.1 (H)     Platelet Count 07/11/2024 358     % Neutrophils 07/11/2024 86     % Lymphocytes 07/11/2024 3     % Monocytes 07/11/2024 9     % Eosinophils 07/11/2024 0     % Basophils 07/11/2024 1     % Immature Granulocytes 07/11/2024 0     NRBCs per 100 WBC 07/11/2024 0     Absolute Neutrophils 07/11/2024 9.9 (H)     Absolute Lymphocytes 07/11/2024 0.4 (L)     Absolute Monocytes 07/11/2024 1.1     Absolute Eosinophils 07/11/2024 0.0     Absolute Basophils 07/11/2024 0.1     Absolute Immature Granul* 07/11/2024 0.1     Absolute NRBCs 07/11/2024 0.0     Troponin T, High Sensiti* 07/11/2024 174 (HH)     Potassium 07/11/2024 5.5 (H)     Ventricular Rate 07/12/2024 117     Atrial Rate 07/12/2024 117     VA Interval 07/12/2024 166     QRS Duration 07/12/2024 92     QT 07/12/2024 330     QTc 07/12/2024 460     P Axis 07/12/2024 52     R AXIS 07/12/2024 45     T [place] 07/12/2024 108     Interpretation ECG 07/12/2024                      Value:Sinus tachycardia  Possible Left atrial enlargement  Left ventricular hypertrophy  ST & T wave abnormality, consider anterolateral ischemia  Abnormal ECG  When compared with ECG of 11-JUL-2024 08:11,  T wave inversion now evident in Anterolateral leads  Confirmed by EBTHANY KANG, DANIEL LOC:JN  ([id number]) on 7/12/2024 12:47:31 PM      Sodium 07/12/2024 140     Potassium 07/12/2024 5.1     Chloride 07/12/2024 78 (L)     Carbon Dioxide (CO2) 07/12/2024 30 (H)     Anion Gap 07/12/2024 32 (H)     Urea Nitrogen 07/12/2024 81.6 (H)     Creatinine 07/12/2024 7.18 (H)     GFR Estimate 07/12/2024 6 (L)     Calcium 07/12/2024 10.3 (H)     Glucose 07/12/2024 98     WBC Count 07/12/2024 15.2 (H)     RBC Count 07/12/2024 3.08 (L)     Hemoglobin 07/12/2024 10.6 (L)     Hematocrit 07/12/2024 32.5 (L)     MCV 07/12/2024 106 (H)     MCH 07/12/2024 34.4 (H)     MCHC 07/12/2024 32.6     RDW 07/12/2024 17.7 (H)     Platelet Count 07/12/2024 460 (H)     Protein Total 07/12/2024 7.9     Albumin 07/12/2024 4.1     Bilirubin Total 07/12/2024 0.4     Alkaline Phosphatase 07/12/2024 157 (H)     AST 07/12/2024 22     ALT 07/12/2024 11     Bilirubin Direct 07/12/2024 0.21     Lactic Acid, Initial 07/12/2024 1.2     Sodium 07/13/2024 145     Potassium 07/13/2024 4.4     Chloride 07/13/2024 86 (L)     Carbon Dioxide (CO2) 07/13/2024 31 (H)     Anion Gap 07/13/2024 28 (H)     Urea Nitrogen 07/13/2024 60.4 (H)     Creatinine 07/13/2024 5.51 (H)     GFR Estimate 07/13/2024 8 (L)     Calcium 07/13/2024 9.2     Glucose 07/13/2024 102 (H)     WBC Count 07/13/2024 13.5 (H)     RBC Count 07/13/2024 3.08 (L)     Hemoglobin 07/13/2024 10.4 (L)     Hematocrit 07/13/2024 33.4 (L)     MCV 07/13/2024 108 (H)     MCH 07/13/2024 33.8 (H)     MCHC 07/13/2024 31.1 (L)     RDW 07/13/2024 17.8 (H)     Platelet Count 07/13/2024 431     Magnesium 07/13/2024 2.6 (H)     Phosphorus 07/13/2024 8.6 (H)     Sodium 07/14/2024 144     Potassium 07/14/2024 4.6     Chloride 07/14/2024 84 (L)     Carbon Dioxide (CO2) 07/14/2024 30 (H)     Anion Gap 07/14/2024 30 (H)     Urea Nitrogen 07/14/2024 83.0 (H)     Creatinine 07/14/2024 6.61 (H)     GFR Estimate 07/14/2024 6 (L)     Calcium 07/14/2024 8.4 (L)     Glucose 07/14/2024 63 (L)     WBC Count 07/14/2024 17.4 (H)      RBC Count 07/14/2024 2.95 (L)     Hemoglobin 07/14/2024 10.0 (L)     Hematocrit 07/14/2024 32.5 (L)     MCV 07/14/2024 110 (H)     MCH 07/14/2024 33.9 (H)     MCHC 07/14/2024 30.8 (L)     RDW 07/14/2024 17.7 (H)     Platelet Count 07/14/2024 396     Magnesium 07/14/2024 2.5 (H)     Phosphorus 07/14/2024 9.8 (H)     Sodium 07/15/2024 137     Potassium 07/15/2024 4.6     Chloride 07/15/2024 84 (L)     Carbon Dioxide (CO2) 07/15/2024 28     Anion Gap 07/15/2024 25 (H)     Urea Nitrogen 07/15/2024 90.9 (H)     Creatinine 07/15/2024 7.03 (H)     GFR Estimate 07/15/2024 6 (L)     Calcium 07/15/2024 7.8 (L)     Glucose 07/15/2024 91     WBC Count 07/15/2024 16.5 (H)     RBC Count 07/15/2024 2.71 (L)     Hemoglobin 07/15/2024 9.2 (L)     Hematocrit 07/15/2024 29.1 (L)     MCV 07/15/2024 107 (H)     MCH 07/15/2024 33.9 (H)     MCHC 07/15/2024 31.6     RDW 07/15/2024 17.6 (H)     Platelet Count 07/15/2024 340     Magnesium 07/15/2024 2.4 (H)     Phosphorus 07/15/2024 10.8 (H)         Dilcia Hightower PA-C  [facility] General Surgery  [address]  [address]  [place] [address]

## 2024-07-15 NOTE — PROGRESS NOTES
Occupational Therapy      07/15/24 1110   Appointment Info   Signing Clinician's Name / Credentials (OT) Haylee Duffy OTR/L   Living Environment   People in Home alone   Current Living Arrangements independent living facility   Home Accessibility no concerns   Transportation Anticipated family or friend will provide   Living Environment Comments Able to live on one level In ILF   Self-Care   Usual Activity Tolerance good   Current Activity Tolerance moderate   Regular Exercise No   Equipment Currently Used at Home none   Fall history within last six months no   Activity/Exercise/Self-Care Comment Ind. w/ ADL tasks   General Information   Onset of Illness/Injury or Date of Surgery 07/11/24   Referring Physician Stephanie Reyez DO   Additional Occupational Profile Info/Pertinent History of Current Problem 68 year old female w/PMHx significant for NSTEMI s/p PCI (Apr 2024), ESRD on HD T/Th/Sa, PCKD s/p BL nephrectomy, CHF, tobacco use disorder, HTN, JAMIL, and HLD admitted on 7/11/2024 for abdominal pain with concern for SBO.   Existing Precautions/Restrictions fall  (NG tube removed this AM)   Cognitive Status Examination   Orientation Status orientation to person, place and time   Bed Mobility   Bed Mobility No deficits identified   Transfers   Transfers sit-stand transfer   Sit-Stand Transfer   Sit-Stand [place] (Transfers) supervision;verbal cues   Balance   Balance Assessment no deficits identified   Activities of Daily Living   BADL Assessment/Intervention no deficits identified   Clinical Impression   Criteria for Skilled Therapeutic Interventions Met (OT) Yes, treatment indicated   OT Diagnosis decreased ADL ind.   OT Problem List-Impairments impacting ADL problems related to;activity tolerance impaired;balance;mobility   Assessment of Occupational Performance 1-3 Performance Deficits   Identified Performance Deficits decreased ADL ind.   Planned Therapy Interventions (OT) ADL retraining;transfer training    Clinical Decision Making Complexity (OT) problem focused assessment/low complexity   Risk & Benefits of therapy have been explained evaluation/treatment results reviewed;patient   OT Total Evaluation Time   OT Eval, Low Complexity Minutes (03411) 11   OT Goals   Therapy Frequency (OT) One time eval and treatment   OT Predicted Duration/Target Date for Goal Attainment 07/15/24   OT Goals Hygiene/Grooming;Lower Body Dressing   OT: Hygiene/Grooming modified independent;using adaptive equipment   OT: Lower Body Dressing Modified independent;using adaptive equipment   Self-Care/Home Management   Self-Care/Home Mgmt/ADL, Compensatory, Meal Prep Minutes (84859) 8   Symptoms Noted During/After Treatment (Meal Preparation/Planning Training) fatigue   Treatment Detail/Skilled Intervention Pt up in bed upon arrival pt agreeable to session, supervision additional STS no LOB pt ed. on energy conservation tech/work simplification to manage symptoms of shortness of breath/fatigue. Pt stood at sink Supervision no LOB- no device used for functional ambulation task.   OT Discharge Planning   OT Plan dc OT   OT Discharge Recommendation (DC Rec) home with assist   OT Rationale for DC Rec Pt moving well w/o device no LOB noted,pt lives alone in ILF family does support her as needed.   Total Session Time   Timed Code Treatment Minutes 8   Total Session Time (sum of timed and untimed services) 19

## 2024-07-15 NOTE — PROGRESS NOTES
[facility]    Progress Note - Hospitalist Service       Date of Admission:  7/11/2024    Assessment & Plan   Serene Tipton is a 68 year old female w/PMHx significant for NSTEMI s/p PCI (Apr 2024), ESRD on HD T/Th/Sa, PCKD s/p BL nephrectomy, CHF, tobacco use disorder, HTN, JAMIL, and HLD admitted on 7/11/2024 for abdominal pain with concern for SBO. NG tube removed 7/15, advancing diet.     SBO  Gallbladder distention, mild CBD dilation   Abdominal pain, nausea - improved  Leukocytosis, suspect reactive  Hx of SBO/ileus in 7/2023. Presented with 5 days of worsening abdominal pain and emesis on 7/11. Work up in ED revealed elevated Alk Phos to 157 though other LFTs normal. CT abdomen showed moderate small bowel dilatation suggestive of ileus vs SBO and mild-mod GB distention. RUQ US also showed biliary duct dilatation and GB distension. Gastrografin challenge attempted though patient took PO and had emesis after so was difficult to appreciate on xray. MRCP 7/12 showed mild dilation of CBD with slight tapering towards ampulla. Gallbladder again appeared distended with trace sludge, no stones or evidence of cholecystitis. Multiple dilated loops in LUQ consistent with ongoing SBO. Repeat gastrografin challenge on 7/14 suggestive of resolution of SBO. NG removed and advancing diet.   - prn antiemetics   - prn tylenol, oxycodone, dilaudid   - Surgery following   - advance diet as tolerated; full liquids for now  - encourage ambulation with PT  - GI following     ESRD on T/T/Sat HD  AGMA  Hyperkalemia - resolved  Hypercalcemia - resolved  Hyperphosphatemia  Secondary renal hyperparathyroidism  Started dialysis in 2020. Dialyzes Tu/Th/Sat at [facility]. Follows with Dr. Armstrong. Of note patient is oliguric s/p bilateral nephrectomies for PCKD. Dialysis 7/12 though shortened run due to abdominal pain, nausea, vomiting. Received 1L IVF on 7/14 as below wt goal, had poor intake and high  NG output.    - Nephrology following  - anticipate next HD run 7/16  - 50 mL/hr NS x24 hours  - held losartan for soft BP; can restart if SBP >130  - renal diet when diet resumed  - hold off on K binders  - daily BMP    Malnutrition, severe  Cachexia, severe  Has been losing weight due to abdominal pain and poor intake. Suspect she will be higher risk for refeeding syndrome when we are able to advance diet.    - Nutrition following   - daily renal multivitamin and iron supplement when able to tolerate PO  - daily BMP, Mg, Phos  - PT/OT/CM    Insomnia  Patient was requesting Ambien overnight. Discussed that risks outweigh benefits.   - melatonin at bed time    Low back pain  - lidocaine patch    Anemia of chronic disease - stable  Hgb remains in 9-10s this admission.   - daily CBC  - PETE and Fe with outpatient dialysis    CAD  Hx of NSTEMI s/p PCI  HLD  Admitted in 6/2024 for chest pain. Repeat angio showed no stent thrombosis or restenosis. Denies current chest pain. EKG on 7/11 with sinus tach without ischemic changes.  - tele  - PTA DAPT, statin    HFrEF  HTN  Chronically high BNP. ECHO 5/2024 with LVEF of 25-30%. EF on Lexiscan in 6/2024 was 31%.   - tele  - judicious use of IVF  - PTA metoprolol 25, holding PTA losartan for soft pressures    Hypothyroidism - PTA Synthroid 50 mcg  JAMIL - PTA Zoloft 150, nortriptyline 75, prn Ativan        Diet: Full Liquid Diet    DVT Prophylaxis: Pneumatic Compression Devices  Pool Catheter: Not present  Fluids: NPO  Lines: None     Cardiac Monitoring: None  Code Status: Full Code      Clinically Significant Risk Factors             # Anion Gap Metabolic Acidosis: Highest Anion Gap = 30 mmol/L in last 2 days, will monitor and treat as appropriate        # Hypertension: Noted on problem list  # Chronic heart failure with reduced ejection fraction: last echo with EF <40%               # Cachexia: Estimated body mass index is 15.19 kg/m  as calculated from the following:    Height  "as of this encounter: 1.499 m (4' 11\").    Weight as of this encounter: 34.1 kg (75 lb 3.2 oz)., PRESENT ON ADMISSION  # Severe Malnutrition: based on nutrition assessment, PRESENT ON ADMISSION     # Financial/Environmental Concerns: none         Disposition Plan      Expected Discharge Date: 07/16/2024      Destination: home          The patient's care was discussed with the Attending Physician, Dr. Huffman .    Estrellita Medina MD  Hospitalist Service  [facility]  Securely message with [employer] (more info)  Text page via [employer] Paging/Directory   ______________________________________________________________________    Interval History     No acute events overnight. Nursing notes reviewed.     Feeling much better today. Says she feels \"hangry.\" Has had no issues with clear liquid diet. Wants to advance her diet. Denies any abdominal pain, nausea, or vomiting. Passing gas and having more formed stool. Would like her NG tube removed as it is irritating her nose.     Physical Exam   Vital Signs: Temp: 97.4  F (36.3  C) Temp src: Oral BP: (!) 166/99 Pulse: 91   Resp: 18 SpO2: (!) 87 % O2 Device: Nasal cannula Oxygen Delivery: 3 LPM  Weight: 75 lbs 3.2 oz    Constitutional: sitting up in bed and talking on phone, appears more comfortable; severely cachetic  HEENT: Normocephalic, atraumatic. EOM intact. NG tube in place though clamped.  Respiratory: no respiratory distress, CTAB.   Cardiovascular: RRR, normal S1 and S2, no peripheral edema   GI: bowel sounds present, soft and nontender to palpation  MSK: diffuse muscle wasting  Skin: left arm with AV fistula  Psych: A&Ox3. Mood and affect normal.     Medical Decision Making   Please see A&P for additional details of medical decision making.      Data   ------------------------- PAST 24 HR DATA REVIEWED -----------------------------------------------    I have personally reviewed the following data over the past 24 hrs:    16.5 (H)  \   9.2 (L) "   / 340     137 84 (L) 90.9 (H) /  91   4.6 28 7.03 (H) \       Imaging results reviewed over the past 24 hrs:   Recent Results (from the past 24 hour(s))   XR Gastrografin Challenge    Narrative    EXAM: XR GASTROGRAFIN CHALLENGE  LOCATION: [facility]  DATE: 7/14/2024    INDICATION: Small Bowel Obstruction.  COMPARISON: 7/14/2024.  TECHNIQUE: Routine water soluble contrast follow-through challenge.      Impression    IMPRESSION: Enteric tube tip over the proximal stomach. Oral contrast has extended through small bowel and is in the colon. Bowel dilatation has improved.

## 2024-07-15 NOTE — PROGRESS NOTES
Renal progress note  CC:ESRD  Assessment and Plan:  68 YOF with hx of ESRD on IHD at [facility] under care of Dr Armstrong TTS, hx of CAD sp PCI 4/2024, HFrEF 20-25%, HTN, emphysema , and hypothyroidism , admitted with abdominal pain , and vomiting, found to have SBO. Nephrology following for ESRD managemnet    ESRD:  No recent missed tx, last 7/9 left near her EDW.   Dialysis off schedule 7/12 d/t missed tx on 7/11, volume status ok, K only mildly elevated. HD 7/12 complicated by hypoTN and tachycardia , tx shortened by 60min  On in center HD @  [facility], Under the care of DR Armstrong  RX:  TTS schedule, 3 hour TT,   EDW 35kg, /   ACCESS: left upper extremity AVF  Some intra-dialytic HoTN noted   Transplant inactive d/t recent coronary PCI's in April, severe LV dysfunction   Initiated HD 2020  Oliguric  s/p bilateral nephrectomies for PCKD  Recs:  No indications for HD today. Ok to discharge from renal stand point.  We will plan to dialyze on Tuesday if still inpatient.     Lytes/Acid-base:  Hyperkalemia:Chronic mild hyperK, dialysis with K2  Renal diet when taking PO     CKD/MBD:  On binders when taking pos, Phos 6     Volume:  She is  below current renal target wt, poor intake and had emesis, some fluids given on HD . S/p IVF 1L and now on NS 50ml/hr .  Anuric  Tolerating diet now. Discontinue NS.     HTN:  Labile, some HoTN on dialysis  PTA Losartan 12.5 (started last week per cards), Metop 25 daily, SBP sl escalated, cont home meds, prns      SBO-had NG tube placed, underwent Gastrografin challenge and subsequently clinically improved. NG tube removed. Tolerating diet. I reviewed. General surgery note from today.     Emphysema  -02 as needed, not volume overloaded    Hx of CAD sp PCI  PCI of RCA in 4/2024, severe LVEF 20-25%, chest pressure  Referred pain,? H/o SARAH 4/2024, trops elev but stable for her, likely d/t ESRD, has followed with cards in the past, She had an NM lexiscan  stress test that was nondiagnostic for ischemia, then had repeat coronary angiogram on 6/27, which did not show any stent thrombosis or restenosi last 7/3 cards note concurred trop likely d/t renal dz/volume management per [facility]   On ASA, statin, Plavix and BB, ARB started 7/3     Hypothyroidism:   On replacement     ACD:   Receives PETE and parenteral iron with outpatient dialysis.   Hemoglobin  10's     Malnutrition:  Has been losing wt, d/t abd pain and poor intake, enc pos supps      HLD: on Statin     Secondary renal hyperparathyroidism:   Sevelamer 3 times daily AC    We will follow.    Kathy Hernandez MD  [facility]  [address], [address]  [place] [address]  Phone# [phone]  Fax# [phone]      Subjective  No acute issues overnight.  NG tube removed. Patient states that she is feeling much better, abdominal pain resolved.  Tolerated advance diet/.  Patient denies: fever, chills, dizziness, adenopathy, sore throat, rhinorrhea, cough, shortness of breath , chest pain, palpitations, orthopnea, nausea, vomiting, abdominal pain.  Updated on plan of care.      Objective    Vital signs in last 24 hours  Temp:  [97.4  F (36.3  C)-97.6  F (36.4  C)] 97.4  F (36.3  C)  Pulse:  [] 91  Resp:  [18] 18  BP: (106-166)/(63-99) 166/99  SpO2:  [87 %-100 %] 87 %  Weight:   [unfilled]    Intake/Output last 3 shifts  I/O last 3 completed shifts:  In: 960 [P.O.:960]  Out: -   Intake/Output this shift:  No intake/output data recorded.    Physical Exam  Alert/oriented x 3, awake, NAD, cachetic.   CV: RRR without murmur or rub  Lung: breathing comfortable on 2 via NC, clear and equal; no extra sounds  Ab: soft and NT; not distended; normal bs  Ext: no edema and well perfused  Skin; no rash  Access: LUE AVF with palpable thrill and audible bruit.    Pertinent Labs   Lab Results   Component Value Date    WBC 16.5 (H) 07/15/2024    HGB 9.2 (L) 07/15/2024    HCT 29.1 (L) 07/15/2024    MCV  107 (H) 07/15/2024     07/15/2024     Lab Results   Component Value Date    BUN 90.9 (H) 07/15/2024     07/15/2024    CO2 28 07/15/2024       Lab Results   Component Value Date    ALBUMIN 4.1 07/12/2024     Lab Results   Component Value Date    PHOS 10.8 (H) 07/15/2024     I reviewed all lab results.    Kathy Hernandez MD  [facility]  [address] [place] [address], [address]  [place] [address]  Phone# [phone]  Fax# [phone]

## 2024-07-15 NOTE — PROGRESS NOTES
"CLINICAL NUTRITION SERVICES - BRIEF NOTE    EVALUATION OF THE PROGRESS TOWARD GOALS   Diet: Clear Liquid  Intake: 100% of 1 meal per flowsheets on 7/14 although noted nothing ordered from room service until this morning       NEW FINDINGS   Met with pt \"hangry\", \"starving\" for the first time in awhile, wants NG tube removed and diet advanced. Offered Ensure Clear while on the clear liquid diet but does not want, does agree to try some Gel plus. Drinks 2-3 vanilla Nepro with Gipsy syrup at home, does not want here once diet advanced since unable to provide Gipsy syrup.    NG tube placed 7/12, clamped 7/14 and clear liquid diet started after gastrografin challenge    Labs: Un 90.9 (H), creatinine 7.03 (H), Mg 2.4 (H), phos 10.8 (H)    INTERVENTIONS  Implementation  Trial of Gel plus  Renal multivitamin recommended      "

## 2024-07-15 NOTE — PROGRESS NOTES
Care Management Follow Up    Length of Stay (days): 4    Expected Discharge Date: 07/16/2024     Concerns to be Addressed: discharge planning     Patient plan of care discussed at interdisciplinary rounds: Yes    Anticipated Discharge Disposition:  home      Anticipated Discharge Services:    Anticipated Discharge DME:      Patient/family educated on Medicare website which has current facility and service quality ratings:    Education Provided on the Discharge Plan:    Patient/Family in Agreement with the Plan:      Referrals Placed by CM/SW:    Private pay costs discussed: Not applicable    Additional Information:  Chart reviewed. CM following for needs at discharge     History:   Patient lives alone in an apartment with elevator access.  Does not use assistive devices.  Patient has had OP cardiac rehab in the past, no current therapies, community services or home care.  Patient mostly independent with I/ADLs, sister comes over form time to time to hep with cleaning and laundry.  Patient attends OP HD at [facility] schedule.   Family/friend transport at discharge.     Enma Carrillo RN

## 2024-07-16 NOTE — PROCEDURES
Potassium   Date Value Ref Range Status   07/16/2024 4.7 3.4 - 5.3 mmol/L Final   07/25/2023 4.7 3.5 - 5.0 mmol/L Final   05/19/2021 3.9 3.4 - 5.3 mmol/L Final     Hemoglobin   Date Value Ref Range Status   07/16/2024 8.8 (L) 11.7 - 15.7 g/dL Final   05/19/2021 13.0 11.7 - 15.7 g/dL Final     Creatinine   Date Value Ref Range Status   07/16/2024 7.42 (H) 0.51 - 0.95 mg/dL Final   05/19/2021 3.43 (H) 0.52 - 1.04 mg/dL Final     Urea Nitrogen   Date Value Ref Range Status   07/16/2024 99.9 (H) 8.0 - 23.0 mg/dL Final   07/25/2023 27 (H) 8 - 22 mg/dL Final   05/19/2021 27 7 - 30 mg/dL Final     Sodium   Date Value Ref Range Status   07/16/2024 133 (L) 135 - 145 mmol/L Final   05/19/2021 136 133 - 144 mmol/L Final     INR   Date Value Ref Range Status   07/11/2024 1.20 (H) 0.85 - 1.15 Final   05/19/2021 1.02 0.86 - 1.14 Final       DIALYSIS PROCEDURE NOTE  Hepatitis status of previous patient on machine log was checked and verified ok to use with this patients hepatitis status.  Patient dialyzed for 3 hrs. on a K2 bath with a net fluid removal of  0L.  A BFR of 400 ml/min was obtained via a AVF       The treatment plan was discussed with Dr. Hernandez during the treatment.    Total heparin received during the treatment: 0 units.          Meds  given: none   Complications: tolerated treatment well      Person educated: patient. Knowledge base good. Barriers to learning: none. Educated on procedure via verbal mode.      ICEBOAT? Timeout performed pre-treatment  I: Patient was identified using 2 identifiers  C:  Consent Signed Yes  E: Equipment preventative maintenance is current and dialysis delivery system OK to use  B: Hepatitis B Surface Antigen: negative; Draw Date: 6/26/24      Hepatitis B Surface Antibody: immune; Draw Date: 6/25/24  O: Dialysis orders present and complete prior to treatment  A: Vascular access verified and assessed prior to treatment  T: Treatment was performed at a clinically appropriate time  ?:  Patient was allowed to ask questions and address concerns prior to treatment  See Adult Hemodialysis flowsheet in EPIC for further details and post assessment.  Machine water alarm in place and functioning. Transducer pods intact and checked every 15min.   Pt returned via bed.  Chlorine/Chloramine water system checked every 4 hours.  Out[facility] D[facility]    Patient repositioned every 2 hours during the treatment.  Post treatment report given to Leah FARMER RN regarding 0L of fluid removed, last BP of 123/62, and patient pain rating of 5/10.

## 2024-07-16 NOTE — PLAN OF CARE
Problem: Pain Chronic (Persistent)  Goal: Optimal Pain Control and Function  Outcome: Progressing     Problem: Intestinal Obstruction  Goal: Optimal Bowel Function  Intervention: Promote Bowel Function  Recent Flowsheet Documentation  Taken 7/16/2024 0037 by Kristopher Diop RN  Body Position: position changed independently  Head of Bed (HOB) Positioning: HOB at 20-30 degrees   Goal Outcome Evaluation:    Patient complained of back pain rated 7/10. Given prn oxycodone 10 mg at 0032 with good effect. Bowel sounds active and is passing gas. Denies nausea or abdominal discomfort. Fistula to LUE is intact, bruit and thrill present.

## 2024-07-16 NOTE — PROGRESS NOTES
[facility]    Progress Note - Hospitalist Service       Date of Admission:  7/11/2024    Assessment & Plan   Serene Tipton is a 68 year old female w/PMHx significant for NSTEMI s/p PCI (Apr 2024), ESRD on HD T/Th/Sa, PCKD s/p BL nephrectomy, CHF, tobacco use disorder, HTN, JAMIL, and HLD admitted on 7/11/2024 for abdominal pain with concern for SBO. NG tube removed 7/15. Tolerating advanced diet. Working with PT.     SBO  Gallbladder distention, mild CBD dilation   Abdominal pain, nausea - improved  Leukocytosis, suspect reactive  Hx of SBO/ileus in 7/2023. Presented with 5 days of worsening abdominal pain and emesis on 7/11. Work up in ED revealed elevated Alk Phos to 157 though other LFTs normal. CT abdomen showed moderate small bowel dilatation suggestive of ileus vs SBO and mild-mod GB distention. RUQ US also showed biliary duct dilatation and GB distension. Gastrografin challenge attempted though patient took PO and had emesis after so was difficult to appreciate on xray. MRCP 7/12 showed mild dilation of CBD with slight tapering towards ampulla. Gallbladder again appeared distended with trace sludge, no stones or evidence of cholecystitis. Multiple dilated loops in LUQ consistent with ongoing SBO. Repeat gastrografin challenge on 7/14 suggestive of resolution of SBO. NG removed 7/15. Tolerating advanced diet.   - prn antiemetics   - prn tylenol, oxycodone, dilaudid   - Surgery signed off   - continue regular diet   - increase activity and ambulation with PT    ESRD on T/T/Sat HD  AGMA  Hyperkalemia - resolved  Hypercalcemia - resolved  Hyperphosphatemia  Secondary renal hyperparathyroidism  Started dialysis in 2020. [place] [facility] Follows with Dr. Armstrong. Of note patient is oliguric s/p bilateral nephrectomies for PCKD. Dialysis 7/12 though shortened run due to abdominal pain, nausea, vomiting. Received 1L IVF on 7/14 as below wt goal, had poor intake  and high NG output.    - Nephrology following  - dialyzed today   - held losartan for soft BP; can restart if SBP >130  - renal diet  - hold off on K binders  - daily BMP    Malnutrition, severe  Cachexia, severe  Has been losing weight due to abdominal pain and poor intake. Monitoring for signs of refeeding syndrome as suspect she will be higher risk.  - Nutrition following   - daily renal multivitamin and iron supplement when able to tolerate PO  - daily BMP, Mg, Phos  - PT/OT/CM    Insomnia  Patient was requesting Ambien overnight on 7/13. Discussed that risks outweigh benefits.   - melatonin at bed time    Low back pain, chronic  Likely secondary to history of spinal stenosis of lumbar region.   - lidocaine patch q24h   - oxycodone 10 mg q4h prn     Anemia of chronic disease - stable  Hgb remains in 9-10s this admission.   - daily CBC  - PETE and Fe with outpatient dialysis    CAD  Hx of NSTEMI s/p PCI  HLD  Admitted in 6/2024 for chest pain. Repeat angio showed no stent thrombosis or restenosis. Denies current chest pain. EKG on 7/11 with sinus tach without ischemic changes.  - tele  - PTA DAPT, statin    HFrEF  HTN  Chronically high BNP. ECHO 5/2024 with LVEF of 25-30%. EF on Lexiscan in 6/2024 was 31%.   - telemetry  - judicious use of IVF  - PTA metoprolol 25, holding PTA losartan for softer pressures    Hypothyroidism - PTA Synthroid 50 mcg  JAMIL - PTA Zoloft 150, nortriptyline 75, prn Ativan        Diet: Regular Diet Adult    DVT Prophylaxis: Pneumatic Compression Devices  Pool Catheter: Not present  Fluids: NPO  Lines: None     Cardiac Monitoring: None  Code Status: Full Code      Clinically Significant Risk Factors             # Anion Gap Metabolic Acidosis: Highest Anion Gap = 25 mmol/L in last 2 days, will monitor and treat as appropriate        # Hypertension: Noted on problem list  # Chronic heart failure with reduced ejection fraction: last echo with EF <40%               # Cachexia: Estimated body  "mass index is 15.23 kg/m  as calculated from the following:    Height as of this encounter: 1.499 m (4' 11\").    Weight as of this encounter: 34.2 kg (75 lb 6.4 oz).   # Severe Malnutrition: based on nutrition assessment      # Financial/Environmental Concerns: none         Disposition Plan      Expected Discharge Date: 07/18/2024      Destination: home          The patient's care was discussed with the Attending Physician, Dr. Huffman .    Estrellita Medina MD  Hospitalist Service  [facility]  Securely message with [employer] (more info)  Text page via [facility] Paging/Directory   ______________________________________________________________________    Interval History     No acute events overnight. Nursing notes reviewed.     Alvarez was able to eat some solid foods yesterday. Reports she has a large appetite but gets full quickly. She notes she had a small formed bowel movement and is passing gas.     Has sternal and back pain. Oxycodone given with good effect. Feels weak. Working with PT.     Physical Exam   Vital Signs: Temp: 98.5  F (36.9  C) Temp src: Temporal BP: 124/63 Pulse: 105   Resp: 29 SpO2: 96 % O2 Device: None (Room air)    Weight: 75 lbs 6.36 oz    Constitutional: lying in bed at dialysis, intermittently joking around; severely cachetic  HEENT: Normocephalic, atraumatic. EOM intact.  Respiratory: no respiratory distress, CTAB.   Cardiovascular: RRR, normal S1 and S2, no peripheral edema   GI: abdomen is soft, mild tenderness to palpation diffusely; bowel sounds present  MSK: diffuse muscle wasting  Skin: left arm with AV fistula  Psych: A&Ox3. Mood and affect normal.    Medical Decision Making   Please see A&P for additional details of medical decision making.      Data   ------------------------- PAST 24 HR DATA REVIEWED -----------------------------------------------    I have personally reviewed the following data over the past 24 hrs:    11.7 (H)  \   8.8 (L)   / 595 282 " (L) 83 (L) 99.9 (H) /  97   4.7 25 7.42 (H) \       Imaging results reviewed over the past 24 hrs:   No results found for this or any previous visit (from the past 24 hour(s)).

## 2024-07-16 NOTE — PROGRESS NOTES
"CLINICAL NUTRITION SERVICES - REASSESSMENT NOTE     Nutrition Prescription    RECOMMENDATIONS FOR MDs/PROVIDERS TO ORDER:  Renal multi vitamin daily    Malnutrition Status:    Severe in acute on chronic illness    Recommendations already ordered by Registered Dietitian (RD):  Order gelatein plus     Future/Additional Recommendations:  Monitor po intake, weight, labs, GOC     EVALUATION OF THE PROGRESS TOWARD GOALS   Diet: Renal ( dialysis)  Nutrition Supplements- tried the gelatein plus and will continue to take it if it's cold  Intake: 25% supper and 50% lunch on 7/15/2024  Pt currently having gas pains. Spoke with her for a short period of time       ANTHROPOMETRICS  Height: 149.9 cm (4' 11\")  Most Recent Weight: 34.2 kg (75 lb 6.4 oz)    Weight History:   Wt Readings from Last 10 Encounters:   07/16/24 34.2 kg (75 lb 6.4 oz)   07/03/24 37.2 kg (82 lb)   06/27/24 37.7 kg (83 lb 3.2 oz)   05/20/24 37.2 kg (82 lb 0.2 oz)   05/10/24 37.6 kg (83 lb)   05/02/24 39.9 kg (88 lb)   04/30/24 40.4 kg (89 lb 1.1 oz)   03/27/24 36.7 kg (80 lb 14.4 oz)   11/09/23 36.3 kg (80 lb)   11/06/23 33.1 kg (73 lb)     GI CONCERNS  Currently having gas pains  + BS all quadrants  Passing flatus  Last BM 7/16/2024    LABS  Reviewed: Na 133 (L), K 4.7, urea nitrogen 99.9 (H), Cr 7.42 (H), Ca 8.3 (L), phos 9.1 (H), Glu 97    MEDICATIONS  Reviewed: lipitor, plavix, levothyroxine, melatonin, toprol XL, pamelor, senna-docusate, zoloft, Vit D 3    CURRENT NUTRITION DIAGNOSIS  Malnutrition related to poor oral intake, altered GI function as evidenced by >2% weight loss in one week and severe muscle loss, N/V      INTERVENTIONS  Implementation  Medical food supplement therapy-add gelatein plus twice daily (cold)    Goals  Diet advancement vs nutrition support within 2-3 days.-met  Meet > 75% estimated nutrition needs    Monitoring/Evaluation  Progress toward goals will be monitored and evaluated per protocol.   "

## 2024-07-16 NOTE — PROGRESS NOTES
Renal progress note  CC:ESRD  Assessment and Plan:  68 YOF with hx of ESRD on IHD at [facility] under care of Dr Armstrong TTS, hx of CAD sp PCI 4/2024, HFrEF 20-25%, HTN, emphysema , and hypothyroidism , admitted with abdominal pain , and vomiting, found to have SBO. Nephrology following for ESRD managemnet    ESRD:  No recent missed tx, last 7/9 left near her EDW.   Dialysis off schedule 7/12 d/t missed tx on 7/11, volume status ok, K only mildly elevated. HD 7/12 complicated by hypoTN and tachycardia , tx shortened by 60min  On in center HD @  [facility], Under the care of DR Armstrong  RX:  TTS schedule, 3 hour TT,   EDW 35kg, /   ACCESS: left upper extremity AVF  Some intra-dialytic HoTN noted   Transplant inactive d/t recent coronary PCI's in April, severe LV dysfunction   Initiated HD 2020  Oliguric  s/p bilateral nephrectomies for PCKD  Recs:  Received HD today as per usual TTS schedule. Ok to discharge from renal stand point.  We will plan to dialyze next on Thursday if still inpatient.     Lytes/Acid-base:  Hyperkalemia:Chronic mild hyperK, dialysis with K2. Improved, serum potassium is wnl at 4.7 this am. Renal diet     CKD/MBD:  Secondary renal hyperparathyroidism:   PTA on binders/phosplo w/meals. Hold binders given recurrent SBO.  Renal diet     Volume:  She is  below current renal target wt, poor intake and had emesis, some fluids given on HD . S/p IVF.  Anuric  Tolerating diet now  Recs:   32 oz fluid restriction  Strict I/O  Daily weight     HTN:  Labile, some HoTN on dialysis  PTA Losartan 12.5 (started last week per cards), Metop 25 daily, SBP sl escalated, cont home meds, prns      SBO-had NG tube placed, underwent Gastrografin challenge and subsequently clinically improved. NG tube removed. Tolerating advanced diet. I reviewed. General surgery note from today.     Emphysema  -02 as needed at baseline.    Hx of CAD sp PCI  PCI of RCA in 4/2024, severe LVEF 20-25%, chest  pressure  Referred pain,? H/o SARAH 4/2024, trops elev but stable for her, likely d/t ESRD, has followed with cards in the past, She had an NM lexiscan stress test that was nondiagnostic for ischemia, then had repeat coronary angiogram on 6/27, which did not show any stent thrombosis or restenosi last 7/3 cards note concurred trop likely d/t renal dz/volume management per [facility]   On ASA, statin, Plavix and BB, ARB started 7/3     Hypothyroidism: On replacement     ACD:   Receives PETE and parenteral iron with outpatient dialysis. Received 100mcg of Mircera on 07/20  Hemoglobin  is trending down to 8.8 this am. No signs of active bleeding   Give Epo 10K injection today.      Malnutrition:  Has been losing wt, d/t abd pain and poor intake, enc pos supps      HLD: on Statin       We will follow.    Kathy Hernandez MD  [facility]  [address], [address]  [place] [address]  Phone# [phone]  Fax# [phone]      Subjective  No acute issues overnight.  Weaned off continuous supplemental O2 to RA.   Patient was seen and examined in the end of dialysis treatment. Tolerated without issues. No UF.   Patient c/o generalized weakness. Tolerated advanced diet yesterday.  Patient denies: fever, chills, dizziness, adenopathy, sore throat, rhinorrhea, cough, shortness of breath , chest pain, palpitations, orthopnea, nausea, vomiting, abdominal pain.  Updated on plan of care.      Objective    Vital signs in last 24 hours  Temp:  [97.5  F (36.4  C)-98  F (36.7  C)] 98  F (36.7  C)  Pulse:  [] 105  Resp:  [13-46] 17  BP: ()/(50-79) 99/57  SpO2:  [90 %-96 %] 96 %  Weight:   [unfilled]    Intake/Output last 3 shifts  I/O last 3 completed shifts:  In: 350 [P.O.:350]  Out: -   Intake/Output this shift:  No intake/output data recorded.    Physical Exam  Alert/oriented x 3, awake, NAD, cachetic.   CV: RRR without murmur or rub  Lung: breathing comfortable on RA, clear and equal; no extra  sounds  Ab: soft and NT; not distended; normal bs  Ext: no edema and well perfused  MSK: severe sarcopenia.   Skin; no rash  Access: LUE AVF is cannulated.    Pertinent Labs   Lab Results   Component Value Date    WBC 11.7 (H) 07/16/2024    HGB 8.8 (L) 07/16/2024    HCT 28.3 (L) 07/16/2024     (H) 07/16/2024     07/16/2024     Lab Results   Component Value Date    BUN 99.9 (H) 07/16/2024     (L) 07/16/2024    CO2 25 07/16/2024       Lab Results   Component Value Date    ALBUMIN 4.1 07/12/2024     Lab Results   Component Value Date    PHOS 9.1 (H) 07/16/2024     I reviewed all lab results.    Kathy Hernandez MD  [facility]  [address], [address]  [place] [address]  Phone# [phone]  Fax# [phone]

## 2024-07-16 NOTE — PROGRESS NOTES
General Surgery Progress Note:    Hospital Day # 5    ASSESSMENT:   1. Pain of upper abdomen        Serene Tipton is a 68 year old female admitted with SBO.  Patient's NG tube was removed yesterday morning and her diet was advanced as tolerated.  She is tolerating a regular diet with ongoing antegrade bowel function.    PLAN:   -Continue regular diet as tolerated  -Continue to increase activity and encourage ambulation to continue to promote bowel function.  -No indication for surgical intervention  -General surgery will sign off at this time.  Please page/call if further questions.    SUBJECTIVE:   Serene Tiptno was seen at dialysis and reports feeling exhausted.  States she tolerated an omelette yesterday evening without issues.  Her last bowel movement was yesterday evening and she has continued to pass gas.  She denies abdominal pain but reports some cramping which she attributes to gas.  Denies fever, chills, nausea, vomiting.    Patient Vitals for the past 24 hrs:   BP Temp Temp src Pulse Resp SpO2 Weight   07/16/24 1112 124/63 98.5  F (36.9  C) Temporal -- -- -- --   07/16/24 1100 116/59 -- -- 105 29 -- --   07/16/24 1045 116/68 -- -- 105 (!) 45 -- --   07/16/24 1030 105/64 -- -- 108 30 -- --   07/16/24 1015 98/57 -- -- 105 19 -- --   07/16/24 1000 99/57 -- -- 105 17 -- --   07/16/24 0945 107/58 -- -- 105 13 -- --   07/16/24 0930 115/63 -- -- 105 15 -- --   07/16/24 0915 120/75 -- -- 103 13 -- --   07/16/24 0900 120/79 -- -- 102 15 -- --   07/16/24 0845 113/73 -- -- 101 20 -- --   07/16/24 0830 112/71 -- -- 102 24 -- --   07/16/24 0815 107/69 -- -- 100 16 -- --   07/16/24 0802 -- -- -- 93 (!) 36 -- --   07/16/24 0800 111/74 -- -- 92 28 -- --   07/16/24 0758 -- -- -- 93 (!) 46 -- --   07/16/24 0745 108/70 -- -- 94 16 -- --   07/16/24 0741 98/67 98  F (36.7  C) Temporal 95 13 -- --   07/16/24 0729 -- -- -- -- -- -- 34.2 kg (75 lb 6.4 oz)   07/16/24 0715 120/66 97.9  F (36.6  C) Oral 95 19 -- 34.8 kg  (76 lb 11.5 oz)   07/16/24 0428 93/50 97.5  F (36.4  C) Oral 96 20 96 % --   07/15/24 2356 119/72 97.5  F (36.4  C) Oral 97 20 -- --   07/15/24 1615 106/58 97.9  F (36.6  C) Oral 94 20 93 % --   07/15/24 1300 91/54 -- -- -- 18 90 % --       Physical Exam:  General: patient seen resting in bed, no acute distress  Resp: no respiratory distress, breathing comfortably on room air  Abdomen: Soft, nontender, nondistended.  No rebound or guarding.  Extremities: warm and well perfused    Admission on 07/11/2024   Component Date Value    INR 07/11/2024 1.20 (H)     Sodium 07/11/2024 134 (L)     Potassium 07/11/2024 5.4 (H)     Carbon Dioxide (CO2) 07/11/2024 26     Anion Gap 07/11/2024 21 (H)     Urea Nitrogen 07/11/2024 57.2 (H)     Creatinine 07/11/2024 5.33 (H)     GFR Estimate 07/11/2024 8 (L)     Calcium 07/11/2024 9.8     Chloride 07/11/2024 87 (L)     Glucose 07/11/2024 103 (H)     Alkaline Phosphatase 07/11/2024 131     AST 07/11/2024 20     ALT 07/11/2024 10     Protein Total 07/11/2024 6.6     Albumin 07/11/2024 3.6     Bilirubin Total 07/11/2024 0.4     Lipase 07/11/2024 7 (L)     Lactic Acid, Initial 07/11/2024 1.5     Troponin T, High Sensiti* 07/11/2024 165 (HH)     Magnesium 07/11/2024 2.1     N terminal Pro BNP Inpat* 07/11/2024 >70,000 (H)     Culture 07/11/2024 No Growth     Culture 07/11/2024 No Growth     Phosphorus 07/11/2024 6.2 (H)     WBC Count 07/11/2024 11.4 (H)     RBC Count 07/11/2024 2.94 (L)     Hemoglobin 07/11/2024 10.2 (L)     Hematocrit 07/11/2024 32.3 (L)     MCV 07/11/2024 110 (H)     MCH 07/11/2024 34.7 (H)     MCHC 07/11/2024 31.6     RDW 07/11/2024 18.1 (H)     Platelet Count 07/11/2024 358     % Neutrophils 07/11/2024 86     % Lymphocytes 07/11/2024 3     % Monocytes 07/11/2024 9     % Eosinophils 07/11/2024 0     % Basophils 07/11/2024 1     % Immature Granulocytes 07/11/2024 0     NRBCs per 100 WBC 07/11/2024 0     Absolute Neutrophils 07/11/2024 9.9 (H)     Absolute Lymphocytes  07/11/2024 0.4 (L)     Absolute Monocytes 07/11/2024 1.1     Absolute Eosinophils 07/11/2024 0.0     Absolute Basophils 07/11/2024 0.1     Absolute Immature Granul* 07/11/2024 0.1     Absolute NRBCs 07/11/2024 0.0     Troponin T, High Sensiti* 07/11/2024 174 (HH)     Potassium 07/11/2024 5.5 (H)     Ventricular Rate 07/12/2024 117     Atrial Rate 07/12/2024 117     AL Interval 07/12/2024 166     QRS Duration 07/12/2024 92     QT 07/12/2024 330     QTc 07/12/2024 460     P Axis 07/12/2024 52     R AXIS 07/12/2024 45     T [place] 07/12/2024 108     Interpretation ECG 07/12/2024                      Value:Sinus tachycardia  Possible Left atrial enlargement  Left ventricular hypertrophy  ST & T wave abnormality, consider anterolateral ischemia  Abnormal ECG  When compared with ECG of 11-JUL-2024 08:11,  T wave inversion now evident in Anterolateral leads  Confirmed by BETHANY KANG [facility] LOC: ([id number]) on 7/12/2024 12:47:31 PM      Sodium 07/12/2024 140     Potassium 07/12/2024 5.1     Chloride 07/12/2024 78 (L)     Carbon Dioxide (CO2) 07/12/2024 30 (H)     Anion Gap 07/12/2024 32 (H)     Urea Nitrogen 07/12/2024 81.6 (H)     Creatinine 07/12/2024 7.18 (H)     GFR Estimate 07/12/2024 6 (L)     Calcium 07/12/2024 10.3 (H)     Glucose 07/12/2024 98     WBC Count 07/12/2024 15.2 (H)     RBC Count 07/12/2024 3.08 (L)     Hemoglobin 07/12/2024 10.6 (L)     Hematocrit 07/12/2024 32.5 (L)     MCV 07/12/2024 106 (H)     MCH 07/12/2024 34.4 (H)     MCHC 07/12/2024 32.6     RDW 07/12/2024 17.7 (H)     Platelet Count 07/12/2024 460 (H)     Protein Total 07/12/2024 7.9     Albumin 07/12/2024 4.1     Bilirubin Total 07/12/2024 0.4     Alkaline Phosphatase 07/12/2024 157 (H)     AST 07/12/2024 22     ALT 07/12/2024 11     Bilirubin Direct 07/12/2024 0.21     Lactic Acid, Initial 07/12/2024 1.2     Sodium 07/13/2024 145     Potassium 07/13/2024 4.4     Chloride 07/13/2024 86 (L)     Carbon Dioxide (CO2) 07/13/2024 31 (H)     Anion Gap  07/13/2024 28 (H)     Urea Nitrogen 07/13/2024 60.4 (H)     Creatinine 07/13/2024 5.51 (H)     GFR Estimate 07/13/2024 8 (L)     Calcium 07/13/2024 9.2     Glucose 07/13/2024 102 (H)     WBC Count 07/13/2024 13.5 (H)     RBC Count 07/13/2024 3.08 (L)     Hemoglobin 07/13/2024 10.4 (L)     Hematocrit 07/13/2024 33.4 (L)     MCV 07/13/2024 108 (H)     MCH 07/13/2024 33.8 (H)     MCHC 07/13/2024 31.1 (L)     RDW 07/13/2024 17.8 (H)     Platelet Count 07/13/2024 431     Magnesium 07/13/2024 2.6 (H)     Phosphorus 07/13/2024 8.6 (H)     Sodium 07/14/2024 144     Potassium 07/14/2024 4.6     Chloride 07/14/2024 84 (L)     Carbon Dioxide (CO2) 07/14/2024 30 (H)     Anion Gap 07/14/2024 30 (H)     Urea Nitrogen 07/14/2024 83.0 (H)     Creatinine 07/14/2024 6.61 (H)     GFR Estimate 07/14/2024 6 (L)     Calcium 07/14/2024 8.4 (L)     Glucose 07/14/2024 63 (L)     WBC Count 07/14/2024 17.4 (H)     RBC Count 07/14/2024 2.95 (L)     Hemoglobin 07/14/2024 10.0 (L)     Hematocrit 07/14/2024 32.5 (L)     MCV 07/14/2024 110 (H)     MCH 07/14/2024 33.9 (H)     MCHC 07/14/2024 30.8 (L)     RDW 07/14/2024 17.7 (H)     Platelet Count 07/14/2024 396     Magnesium 07/14/2024 2.5 (H)     Phosphorus 07/14/2024 9.8 (H)     Sodium 07/15/2024 137     Potassium 07/15/2024 4.6     Chloride 07/15/2024 84 (L)     Carbon Dioxide (CO2) 07/15/2024 28     Anion Gap 07/15/2024 25 (H)     Urea Nitrogen 07/15/2024 90.9 (H)     Creatinine 07/15/2024 7.03 (H)     GFR Estimate 07/15/2024 6 (L)     Calcium 07/15/2024 7.8 (L)     Glucose 07/15/2024 91     WBC Count 07/15/2024 16.5 (H)     RBC Count 07/15/2024 2.71 (L)     Hemoglobin 07/15/2024 9.2 (L)     Hematocrit 07/15/2024 29.1 (L)     MCV 07/15/2024 107 (H)     MCH 07/15/2024 33.9 (H)     MCHC 07/15/2024 31.6     RDW 07/15/2024 17.6 (H)     Platelet Count 07/15/2024 340     Magnesium 07/15/2024 2.4 (H)     Phosphorus 07/15/2024 10.8 (H)     Sodium 07/16/2024 133 (L)     Potassium 07/16/2024 4.7      Chloride 07/16/2024 83 (L)     Carbon Dioxide (CO2) 07/16/2024 25     Anion Gap 07/16/2024 25 (H)     Urea Nitrogen 07/16/2024 99.9 (H)     Creatinine 07/16/2024 7.42 (H)     GFR Estimate 07/16/2024 6 (L)     Calcium 07/16/2024 8.3 (L)     Glucose 07/16/2024 97     WBC Count 07/16/2024 11.7 (H)     RBC Count 07/16/2024 2.68 (L)     Hemoglobin 07/16/2024 8.8 (L)     Hematocrit 07/16/2024 28.3 (L)     MCV 07/16/2024 106 (H)     MCH 07/16/2024 32.8     MCHC 07/16/2024 31.1 (L)     RDW 07/16/2024 17.5 (H)     Platelet Count 07/16/2024 311     Magnesium 07/16/2024 2.3     Phosphorus 07/16/2024 9.1 (H)         Dilcia Hightower PA-C  [facility]  [address] [place] [address]  [address]  [place] [address]

## 2024-07-17 NOTE — PROGRESS NOTES
[facility]    Progress Note - Hospitalist Service       Date of Admission:  7/11/2024    Assessment & Plan   Serene Tipton is a 68 year old female w/PMHx significant for NSTEMI s/p PCI (Apr 2024), ESRD on HD T/Th/Sa, PCKD s/p BL nephrectomy, CHF, tobacco use disorder, HTN, JAMIL, and HLD admitted on 7/11/2024 for abdominal pain with concern for SBO. Working to advance diet and gain strength with PT.     SBO  Gallbladder distention, mild CBD dilation   Hx b/l nephrectomy with large midline laparatomy   Abdominal pain, nausea - improved  Leukocytosis, suspect reactive  Hx of SBO/ileus in 7/2023. Presented with 5 days of worsening abdominal pain and emesis on 7/11. Elevated Alk Phos to 157 though other LFTs normal. CT abd showed moderate small bowel dilatation suggestive of ileus vs SBO and mild-mod GB distention. RUQ US also showed biliary duct dilatation and GB distension. Gastrografin challenge attempted though patient took PO and had emesis after so was difficult to appreciate on xray. MRCP 7/12 showed mild dilation of CBD with slight tapering towards ampulla. Gallbladder again appeared distended with trace sludge, no stones or evidence of cholecystitis. Multiple dilated loops in LUQ consistent with ongoing SBO. Repeat gastrografin challenge on 7/14 suggestive of resolution of SBO. NG removed 7/15. Was advancing diet until recurrence of n/v on 7/16 then downgraded diet to NPO for additional bowel rest. Now again advancing diet as tolerated.   - prn antiemetics   - prn tylenol, oxycodone, dilaudid   - Surgery following   - recommend conservative cares as laparotomy would be challenging for this patient   - avoid opioids if able    - full liquid diet, advance slowly as tolerated   - increase activity and ambulation with PT    ESRD on T/T/Sat HD  AGMA  Hyperkalemia - resolved  Hypercalcemia - resolved  Hyperphosphatemia - resolved  Secondary renal hyperparathyroidism  Started dialysis  in 2020. Dialyzes Tu/Th/Sat at [facility]. Follows with Dr. Armstrong. Of note patient is oliguric s/p bilateral nephrectomies for PCKD. Dialysis 7/12 though shortened run due to abdominal pain, nausea, vomiting. Received 1L IVF on 7/14 as below wt goal, had poor intake and high NG output.   - Nephrology following  - dialyzed 7/16  - held losartan for soft BP; can restart if SBP >130  - renal diet  - hold off on K binders  - daily BMP    Malnutrition, severe  Cachexia, severe  Has been losing weight due to abdominal pain and poor intake. Monitoring for signs of refeeding syndrome as suspect she will be higher risk.  - Nutrition following   - daily renal multivitamin and iron supplement when able to tolerate full PO diet  - daily BMP, Mg, Phos  - PT/OT/CM    Insomnia  Patient was requesting Ambien overnight on 7/13. Discussed that risks outweigh benefits.   - melatonin at bed time    Low back pain, chronic  Likely secondary to history of spinal stenosis of lumbar region.   - lidocaine patch q24h   - oxycodone 10 mg q4h prn though avoid if able     Anemia of chronic disease - stable  Hgb remains in 9-10s this admission.   - daily CBC  - PETE and Fe with dialysis    CAD  Hx of NSTEMI s/p PCI  HLD  Admitted in 6/2024 for chest pain. Repeat angio showed no stent thrombosis or restenosis. Denies current chest pain. EKG on 7/11 with sinus tach without ischemic changes.  - PTA DAPT, statin    HFrEF  HTN  Chronically high BNP. ECHO 5/2024 with LVEF of 25-30%. EF on Lexiscan in 6/2024 was 31%.   - judicious use of IVF  - PTA metoprolol 25, holding PTA losartan for softer pressures    Hypothyroidism - PTA Synthroid 50 mcg  JAMIL - PTA Zoloft 150, nortriptyline 75, prn Ativan        Diet: Snacks/Supplements Adult: Gelatein Plus; With Meals  Full Liquid Diet    DVT Prophylaxis: Pneumatic Compression Devices  Pool Catheter: Not present  Fluids: NPO  Lines: None     Cardiac Monitoring: None  Code Status: Full Code   "    Clinically Significant Risk Factors             # Anion Gap Metabolic Acidosis: Highest Anion Gap = 25 mmol/L in last 2 days, will monitor and treat as appropriate        # Hypertension: Noted on problem list  # Chronic heart failure with reduced ejection fraction: last echo with EF <40%               # Cachexia: Estimated body mass index is 15.19 kg/m  as calculated from the following:    Height as of this encounter: 1.499 m (4' 11\").    Weight as of this encounter: 34.1 kg (75 lb 3.2 oz).   # Severe Malnutrition: based on nutrition assessment      # Financial/Environmental Concerns: none         Disposition Plan      Expected Discharge Date: 07/18/2024      Destination: home          The patient's care was discussed with the Attending Physician, Dr. Huffman .    Estrellita Medina MD  Hospitalist Service  [facility]  Securely message with [employer] (more info)  Text page via [facility] Paging/Directory   ______________________________________________________________________    Interval History     Nursing notes reviewed. Developed nausea and 1 episode of emesis in afternoon of 7/16. Also with right sided and central abdominal pain and tenderness to palpation. Discussed with surgery and made NPO for further bowel rest.     Tolerating sips of water well. No additional nausea or emesis. Abdominal pain down to a 5/10. T pump did not seem to help pain. Using lidocaine patches and oxycodone for chronic back pain. Passing flatus. No bowel movements.     Has been going for walks and moving around her room. Did not work with PT yesterday though plans to today.     Physical Exam   Vital Signs: Temp: 98.2  F (36.8  C) Temp src: Oral BP: 113/58 Pulse: 107   Resp: 16 SpO2: 93 % O2 Device: None (Room air)    Weight: 75 lbs 3.2 oz    Constitutional: pleasant cachectic appearing woman, no acute distress  HEENT: Normocephalic, atraumatic. EOM intact.  Respiratory: no respiratory distress, CTAB. "   Cardiovascular: RRR, normal S1 and S2, no peripheral edema   GI: abdomen is soft, mild tenderness to palpation diffusely with guarding; bowel sounds present. No rebound tenderness.   MSK: diffuse muscle wasting  Skin: left arm with AV fistula  Psych: A&Ox4. Mood and affect normal.    Medical Decision Making   Please see A&P for additional details of medical decision making.      Data   ------------------------- PAST 24 HR DATA REVIEWED -----------------------------------------------    I have personally reviewed the following data over the past 24 hrs:    14.3 (H)  \   9.0 (L)   / 282     136 97 (L) 36.1 (H) /  71   3.6 26 3.67 (H) \       Imaging results reviewed over the past 24 hrs:   No results found for this or any previous visit (from the past 24 hour(s)).

## 2024-07-17 NOTE — PROGRESS NOTES
RENAL PROGRESS NOTE    HPI: 68 YOF with hx of ESRD on IHD at [facility] under care of Dr Armstrong TTS, hx of CAD sp PCI 4/2024, HFrEF 20-25%, HTN, emphysema , and hypothyroidism , admitted with abdominal pain , and vomiting, found to have SBO. Nephrology following for ESRD managemnet     ASSESSMENT & PLAN:     ESRD: [place] in [place] @  [facility] [place] the care of DR Armstrong. RX:  TTS schedule, 3 hour TT,   EDW 35kg, / ACCESS: left upper extremity AVF. Some intra-dialytic HoTN noted. Transplant inactive d/t recent coronary PCI's in April, severe LV dysfunction. s/p bilateral nephrectomies for PCKD  -TTS schedule while here. Ok to discharge from renal stand point.  We will plan to dialyze next on Thursday if still inpatient.    Hyperkalemia:Chronic mild hyperK, dialysis with K2. Improved, serum potassium is wnl at 4.7 this am. Renal diet     CKD/MBD: Secondary renal hyperparathyroidism: PTA on binders/phosplo w/meals. Hold binders given recurrent SBO. On Renal diet     Volume:She is  below current renal target wt, poor intake and had emesis, some fluids given on HD . S/p IVF. Anuric. Tolerating diet now. Cont. Strict I/O, Daily wts     HTN:Labile, some HoTN on dialysis. PTA Losartan 12.5 (started last week per cards), Metop 25 daily, SBP sl escalated, cont home meds, prns      SBO: s/p NG tube placed, underwent Gastrografin challenge and subsequently clinically improved. NG tube removed. Tolerating advanced diet. S/p BM.      Emphysema: 02 as needed at baseline. On RA     H/O CAD: s/p PCI of RCA in 4/2024, severe LVEF 20-25%, chest pressure. H/o SARAH 4/2024, trops elev but stable for her, likely d/t ESRD, has followed with cards in the past, She had an NM lexiscan stress test that was non-diagnostic for ischemia, then had repeat coronary angio on 6/27, which did not show any stent thrombosis or restenosi last 7/3 cards note concurred trop likely d/t renal dz/volume management per [facility]. On ASA,  statin, Plavix and BB, ARB started 7/3     Hypothyroidism: On replacement     ACD: received PETE/Iron at OP HD clinic.  Received 100mcg of Mircera on 07/20. Hg down trending to 8.8 yesterday and EPO 10K given 7/16. HG 9 today. No s/s of bleed       Malnutrition: Has been losing wt, d/t abd pain and poor intake, enc pos supps . Ensure at home PTA per pt.      HLD: on Statin       SUBJECTIVE:   Pt sitting up in bed, appears comfortable  Has family at bedside, visiting, pt eating soft foods  Pt reports passing gas  PT report feeling much better, tolerating HD without issues  Denies n, v, sob, fever, rash, ha feeling dizzy or CP  Discussed labs/plan   Renal okay with discharge if medically cleared.   Will plan for HD tomorrow if still here.       OBJECTIVE:  Physical Exam   Temp: 98.2  F (36.8  C) Temp src: Oral BP: 113/58 Pulse: 107   Resp: 16 SpO2: 93 % O2 Device: None (Room air) Oxygen Delivery: 2 LPM  Vitals:    07/16/24 0715 07/16/24 0729 07/17/24 0900   Weight: 34.8 kg (76 lb 11.5 oz) 34.2 kg (75 lb 6.4 oz) 34.1 kg (75 lb 3.2 oz)     Vital Signs with Ranges  Temp:  [98.1  F (36.7  C)-98.6  F (37  C)] 98.2  F (36.8  C)  Pulse:  [107-109] 107  Resp:  [16-24] 16  BP: (113-133)/(58-74) 113/58  SpO2:  [89 %-94 %] 93 %  I/O last 3 completed shifts:  In: 340 [P.O.:340]  Out: 200 [Emesis/NG output:200]      Patient Vitals for the past 72 hrs:   Weight   07/17/24 0900 34.1 kg (75 lb 3.2 oz)   07/16/24 0729 34.2 kg (75 lb 6.4 oz)   07/16/24 0715 34.8 kg (76 lb 11.5 oz)     Intake/Output Summary (Last 24 hours) at 7/17/2024 1201  Last data filed at 7/17/2024 0200  Gross per 24 hour   Intake 100 ml   Output 200 ml   Net -100 ml       PHYSICAL EXAM:  GEN: NAD, thin, frail, + cachexia  CV: RRR  Lung: clear and equal, on RA  Ab: soft and NT  Ext: no edema and well perfused  Skin: no rash  Neuro: NFD  Psych: pleasent  Access: JESSI GOMEZ        LABORATORY STUDIES:     Recent Labs   Lab 07/17/24  0620 07/16/24  0549 07/15/24  0614  07/14/24  0551 07/13/24  0554 07/12/24  0644   WBC 14.3* 11.7* 16.5* 17.4* 13.5* 15.2*   RBC 2.67* 2.68* 2.71* 2.95* 3.08* 3.08*   HGB 9.0* 8.8* 9.2* 10.0* 10.4* 10.6*   HCT 29.1* 28.3* 29.1* 32.5* 33.4* 32.5*    311 340 396 431 460*       Basic Metabolic Panel:  Recent Labs   Lab 07/17/24  0620 07/16/24  0549 07/15/24  0614 07/14/24  0551 07/13/24  0554 07/12/24  0644    133* 137 144 145 140   POTASSIUM 3.6 4.7 4.6 4.6 4.4 5.1   CHLORIDE 97* 83* 84* 84* 86* 78*   CO2 26 25 28 30* 31* 30*   BUN 36.1* 99.9* 90.9* 83.0* 60.4* 81.6*   CR 3.67* 7.42* 7.03* 6.61* 5.51* 7.18*   GLC 71 97 91 63* 102* 98   GAVIN 8.7* 8.3* 7.8* 8.4* 9.2 10.3*       INR  Recent Labs   Lab 07/11/24  0840   INR 1.20*        Recent Labs   Lab Test 07/17/24  0620 07/16/24  0549 07/12/24  0644 07/11/24  0840 07/14/23  0503 07/13/23  1239   INR  --   --   --  1.20*  --  1.70*   WBC 14.3* 11.7*   < > 11.4*   < > 13.0*   HGB 9.0* 8.8*   < > 10.2*   < > 8.4*    311   < > 358   < > 320    < > = values in this interval not displayed.       Personally reviewed current labs    Mitzi PALMA-BC  [facility]  [phone]

## 2024-07-17 NOTE — PLAN OF CARE
Shift from 0300 to 1930-    Problem: Nausea and Vomiting  Goal: Nausea and Vomiting Relief  Intervention: Prevent and Manage Nausea and Vomiting  Recent Flowsheet Documentation  Taken 7/16/2024 1622 by Leah Levin RN  Fluid/Electrolyte Management: fluids provided  Nausea/Vomiting Interventions:   antiemetic   sips of clear liquids given  Environmental Support: calm environment promoted  Taken 7/16/2024 1351 by Leah Levin RN  Nausea/Vomiting Interventions: antiemetic  Taken 7/16/2024 1200 by Leah Levin RN  Fluid/Electrolyte Management: fluids provided  Environmental Support: calm environment promoted  Taken 7/16/2024 0720 by Leah Levin RN  Fluid/Electrolyte Management: fluids provided  Environmental Support: calm environment promoted     Problem: Pain Chronic (Persistent)  Goal: Optimal Pain Control and Function  Intervention: Develop Pain Management Plan  Recent Flowsheet Documentation  Taken 7/16/2024 1813 by Leah Levin RN  Pain Management Interventions:   emotional support   heat applied  Taken 7/16/2024 1741 by Leah Levin RN  Pain Management Interventions:   medication (see MAR)   emotional support   heat applied  Taken 7/16/2024 1541 by Leah Levin RN  Pain Management Interventions:   emotional support   heat applied  Taken 7/16/2024 1240 by Leah Levin RN  Pain Management Interventions: emotional support  Taken 7/16/2024 1157 by Leah Levin RN  Pain Management Interventions:   emotional support   medication (see MAR)  Taken 7/16/2024 0900 by Leah Levin RN  Pain Management Interventions: emotional support  Taken 7/16/2024 0802 by Leah Levin RN  Pain Management Interventions:   emotional support   medication (see MAR)  Taken 7/16/2024 0741 by Leah Levin RN  Pain Management Interventions: emotional support  Intervention: Manage Persistent Pain  Recent Flowsheet Documentation  Taken 7/16/2024 1622 by Leah Levin RN  Sleep/Rest Enhancement: consistent  schedule promoted  Medication Review/Management: medications reviewed  Taken 7/16/2024 1200 by Leah Levin RN  Medication Review/Management: medications reviewed  Taken 7/16/2024 0720 by Leah Levin RN  Medication Review/Management: medications reviewed  Intervention: Optimize Psychosocial Wellbeing  Recent Flowsheet Documentation  Taken 7/16/2024 1622 by Leah Levin RN  Supportive Measures: active listening utilized  Taken 7/16/2024 1200 by Leah Levin RN  Supportive Measures: active listening utilized  Taken 7/16/2024 0720 by Leah Levin RN  Supportive Measures: active listening utilized     Goal Outcome Evaluation:    Patient went for dialysis today. Tolerated except felt a little dizzy when came back. Updated resident.    Pt was passing flatus all day but after breakfast she felt very full.   She declined lunch and by mid afternoon she was nauseated. Given zofran and vomited. Updated surgery and resident.     Diet changed to NPO except ice/sips/meds.    Pain intermittently throughout the day. Given oxycodone 3 times. See MAR. Pain decreased.    Walked twice. Needed a lot of teaching.   Refused ambulate until eves. Nausea improved.

## 2024-07-17 NOTE — PLAN OF CARE
Shift from 0700 to 2330-    Problem: Pain Chronic (Persistent)  Goal: Optimal Pain Control and Function  Outcome: Progressing  Intervention: Develop Pain Management Plan  Recent Flowsheet Documentation  Taken 7/17/2024 1120 by Leah Levin RN  Pain Management Interventions: emotional support  Taken 7/17/2024 1024 by Leah Levin RN  Pain Management Interventions:   emotional support   medication (see MAR)  Taken 7/17/2024 0822 by Leah Levin RN  Pain Management Interventions: emotional support  Intervention: Manage Persistent Pain  Recent Flowsheet Documentation  Taken 7/17/2024 0844 by Leah Levin RN  Sleep/Rest Enhancement: relaxation techniques promoted  Medication Review/Management:   medications reviewed   high-risk medications identified  Intervention: Optimize Psychosocial Wellbeing  Recent Flowsheet Documentation  Taken 7/17/2024 0844 by Leah Levin RN  Supportive Measures: active listening utilized     Problem: Nausea and Vomiting  Goal: Nausea and Vomiting Relief  Outcome: Progressing  Intervention: Prevent and Manage Nausea and Vomiting  Recent Flowsheet Documentation  Taken 7/17/2024 0844 by Leah Levin RN  Environmental Support: calm environment promoted         Goal Outcome Evaluation:    Patient went for dialysis yesterday and will be dialysis tomorrow. Seen by nephrology.     Pt was passing flatus all day. Feels better today. Pt advanced to clears and tolerated. Then advanced to full liquid. Denied nausea.    Pain intermittently throughout the day. Given oxycodone. See MAR. Pain decreased.     Walked twice. Seeing PT also.

## 2024-07-17 NOTE — PLAN OF CARE
Problem: Nausea and Vomiting  Goal: Nausea and Vomiting Relief  Outcome: Progressing     Problem: Pain Chronic (Persistent)  Goal: Optimal Pain Control and Function  Outcome: Progressing   Goal Outcome Evaluation:    Patient is A/O x4. VSS on RA.  Walked hallway once NOC shift; passing gas; denies nausea; chronic back pain   Hpi Title: Evaluation of Skin Lesions

## 2024-07-17 NOTE — PROGRESS NOTES
07/17/24 1530   Appointment Info   Signing Clinician's Name / Credentials (PT) Joellen Reyez PT   Living Environment   People in Home alone   Current Living Arrangements independent living facility  (apt.)   Home Accessibility no concerns   Living Environment Comments Sister and friends can assist.   Self-Care   Usual Activity Tolerance good   Current Activity Tolerance moderate   Equipment Currently Used at Home crutches;tub bench  (4WW, FWW, canes crutches.  GB in shower and chair.)   Fall history within last six months no   Activity/Exercise/Self-Care Comment Indep with all mobility without AD, Indep ADLs and some assist with the laundry.   General Information   Onset of Illness/Injury or Date of Surgery 07/11/24   Referring Physician Stephanie Reyez DO   Patient/Family Therapy Goals Statement (PT) Pt wants to go home   Pertinent History of Current Problem (include personal factors and/or comorbidities that impact the POC) 68 year old female w/PMHx significant for NSTEMI s/p PCI (Apr 2024), ESRD on HD T/Th/Sa, PCKD s/p BL nephrectomy, CHF, tobacco use disorder, HTN, JAMIL, and HLD admitted on 7/11/2024 for abdominal pain with concern for SBO.   Existing Precautions/Restrictions fall  (NG tube removed this am.  Pt did refuse bed alarm on.)   Weight-Bearing Status - LLE weight-bearing as tolerated   Weight-Bearing Status - RLE weight-bearing as tolerated   Cognition   Orientation Status (Cognition) oriented x 4   Pain Assessment   Patient Currently in Pain   (Pt c/o spine and back pain and said she has bilat bad shoulders.)   Posture    Posture Comments good posture   Range of Motion (ROM)   Range of Motion ROM deficits secondary to pain   ROM Comment Pt c/o L LE siatica pain.   Strength (Manual Muscle Testing)   Strength Comments Pt is able to WB bilat LE s with mobility. Bilat LE strength 4/5   Bed Mobility   Comment, (Bed Mobility) Supine<>sit indep with HOB elevated.  Pt did not use the rail.   Transfers    Comment, (Transfers) Sit>stand with no AD with supervision.  No LOB. Cues for hand placement.   Gait/Stairs (Locomotion)   Martin Level (Gait)   (SBA)   Assistive Device (Gait) other (see comments)  (none.  Pt did not want to use any AD)   Distance in Feet (Gait) 20'   Pattern (Gait) step-through;swing-through   Deviations/Abnormal Patterns (Gait) gait speed decreased  (Slightly unstead at first but did improve as she walked more.)   Balance   Balance Comments SBA without AD   Sensory Examination   Sensory Perception WNL   Sensory Perception Comments Pt could feel light touch bilat LEs   Clinical Impression   Criteria for Skilled Therapeutic Intervention Yes, treatment indicated   PT Diagnosis (PT) Impaired functional mobility   Influenced by the following impairments dec bal, dec endurance.  weakness   Functional limitations due to impairments transfers and gait.   Clinical Presentation (PT Evaluation Complexity) stable   Clinical Presentation Rationale Pt presents medically diagnosed.   Clinical Decision Making (Complexity) low complexity   Planned Therapy Interventions (PT) gait training;home exercise program;strengthening;transfer training   Risk & Benefits of therapy have been explained evaluation/treatment results reviewed;care plan/treatment goals reviewed;risks/benefits reviewed;patient;participants voiced agreement with care plan   Clinical Impression Comments PT did recommend the pt use a 4WW at home if she does get weaker.   PT Total Evaluation Time   PT Eval, Low Complexity Minutes (97784) 15   Physical Therapy Goals   PT Frequency Daily   PT Predicted Duration/Target Date for Goal Attainment 07/24/24   PT Goals Transfers;Gait;PT Goal 1   PT: Transfers Modified independent   PT: Gait Independent;Greater than 200 feet   PT: Goal 1 Indep with HEP.   Interventions   Interventions Quick Adds Gait Training;Therapeutic Procedure   Gait Training   Gait Training Minutes (04984) 13   Symptoms Noted  During/After Treatment (Gait Training) increased pain;fatigue   Treatment Detail/Skilled Intervention Pt walked slowly and did not want to use any AD.  Pt did take smaller steps. No LOB.   Distance in Feet 220   Hinds Level (Gait Training) stand-by assist   Physical Assistance Level (Gait Training) verbal cues;1 person assist   Weight Bearing (Gait Training) weight-bearing as tolerated   Assistive Device (Gait Training) other (see comments)  (none)   Pattern Analysis (Gait Training) swing-through gait   Gait Analysis Deviations decreased [place];decreased step length  (NBOS)   Impairments (Gait Analysis/Training) balance impaired;strength decreased   PT Discharge Planning   PT Plan gait without AD, LE ex, transfers HEP.   PT Discharge Recommendation (DC Rec) home with assist   PT Rationale for DC Rec She should be able to dc to home with help from friends and sister.  She does not want to have any home PT.  Pt said she does know LE ex.  Pt did not want to walk with any Ad today but PT did recommed she ambulate with her 4WW if she should get any weaker at home.   PT Brief overview of current status PT eval, indep with bed mobility, transfers with supervision and ambulated w/o AD with SBA.   PT Equipment Needed at Discharge walker, rolling  (Pt does have a 4WW and a FWW and is recommended to use it if she has increased weakness.)

## 2024-07-17 NOTE — PROGRESS NOTES
General Surgery Progress Note:    Hospital Day # 6    ASSESSMENT:   1. Pain of upper abdomen        Serene Tipton is a 68 year old female admitted with SBO. She recently passed her gastrografin challenge on 07/14. NGT removed 2 days ago and had been tolerating regular diet with ROBF, however with recurrent nausea, vomiting and pain yesterday. Diet had been downgraded to NPO overnight without further emesis episodes. Reports persistent generalized abdominal pain, nausea with flatus but without a BM since 07/15.     PLAN:   -Clear liquid diet as tolerated  -Continue to increase activity to promote bowel function  -Avoid narcotics if able  -In the setting of recurrent persistent vomiting, will consider NGT replacement  -Patient with overall complicated previous surgical hx making operative intervention higher risk with high recurrence rate, will proceed with continuing non operative mgmt with observation  -Surgery to continue to follow      SUBJECTIVE:   Serene Tipton is reporting experiencing recurrent symptomatology that started yesterday afternoon after dialysis with emesis episode x 1 and generalized abdominal pain. She endorses flatus but without BM today or yesterday. Denies nausea at this time with last PO intake yesterday in the setting of diet downgrade following n/v.     Patient Vitals for the past 24 hrs:   BP Temp Temp src Pulse Resp SpO2 Weight   07/17/24 0900 -- -- -- -- -- -- 34.1 kg (75 lb 3.2 oz)   07/17/24 0718 113/58 98.2  F (36.8  C) Oral 107 16 93 % --   07/16/24 2351 121/66 98.1  F (36.7  C) Oral 108 16 92 % --   07/16/24 1521 -- -- -- -- -- 94 % --   07/16/24 1520 133/74 98.6  F (37  C) Oral 109 24 (!) 89 % --       Physical Exam:  General: patient seen resting in bed, no acute distress  Resp: no respiratory distress, breathing comfortably on room air.  Abdomen: Soft, mildly distended with generalized TTP greatest is left abdomen with voluntary guarding that is non reproducible. No  rebound tenderness appreciated.   Extremities: warm and well perfused    Admission on 07/11/2024   Component Date Value    INR 07/11/2024 1.20 (H)     Sodium 07/11/2024 134 (L)     Potassium 07/11/2024 5.4 (H)     Carbon Dioxide (CO2) 07/11/2024 26     Anion Gap 07/11/2024 21 (H)     Urea Nitrogen 07/11/2024 57.2 (H)     Creatinine 07/11/2024 5.33 (H)     GFR Estimate 07/11/2024 8 (L)     Calcium 07/11/2024 9.8     Chloride 07/11/2024 87 (L)     Glucose 07/11/2024 103 (H)     Alkaline Phosphatase 07/11/2024 131     AST 07/11/2024 20     ALT 07/11/2024 10     Protein Total 07/11/2024 6.6     Albumin 07/11/2024 3.6     Bilirubin Total 07/11/2024 0.4     Lipase 07/11/2024 7 (L)     Lactic Acid, Initial 07/11/2024 1.5     Troponin T, High Sensiti* 07/11/2024 165 (HH)     Magnesium 07/11/2024 2.1     N terminal Pro BNP Inpat* 07/11/2024 >70,000 (H)     Culture 07/11/2024 No Growth     Culture 07/11/2024 No Growth     Phosphorus 07/11/2024 6.2 (H)     WBC Count 07/11/2024 11.4 (H)     RBC Count 07/11/2024 2.94 (L)     Hemoglobin 07/11/2024 10.2 (L)     Hematocrit 07/11/2024 32.3 (L)     MCV 07/11/2024 110 (H)     MCH 07/11/2024 34.7 (H)     MCHC 07/11/2024 31.6     RDW 07/11/2024 18.1 (H)     Platelet Count 07/11/2024 358     % Neutrophils 07/11/2024 86     % Lymphocytes 07/11/2024 3     % Monocytes 07/11/2024 9     % Eosinophils 07/11/2024 0     % Basophils 07/11/2024 1     % Immature Granulocytes 07/11/2024 0     NRBCs per 100 WBC 07/11/2024 0     Absolute Neutrophils 07/11/2024 9.9 (H)     Absolute Lymphocytes 07/11/2024 0.4 (L)     Absolute Monocytes 07/11/2024 1.1     Absolute Eosinophils 07/11/2024 0.0     Absolute Basophils 07/11/2024 0.1     Absolute Immature Granul* 07/11/2024 0.1     Absolute NRBCs 07/11/2024 0.0     Troponin T, High Sensiti* 07/11/2024 174 (HH)     Potassium 07/11/2024 5.5 (H)     Ventricular Rate 07/12/2024 117     Atrial Rate 07/12/2024 117     HI Interval 07/12/2024 166     QRS Duration  07/12/2024 92     QT 07/12/2024 330     QTc 07/12/2024 460     P Axis 07/12/2024 52     R AXIS 07/12/2024 45     T [place] 07/12/2024 108     Interpretation ECG 07/12/2024                      Value:Sinus tachycardia  Possible Left atrial enlargement  Left ventricular hypertrophy  ST & T wave abnormality, consider anterolateral ischemia  Abnormal ECG  When compared with ECG of 11-JUL-2024 08:11,  T wave inversion now evident in Anterolateral leads  Confirmed by BETHANY KANG [facility] LOC: ([id number]) on 7/12/2024 12:47:31 PM      Sodium 07/12/2024 140     Potassium 07/12/2024 5.1     Chloride 07/12/2024 78 (L)     Carbon Dioxide (CO2) 07/12/2024 30 (H)     Anion Gap 07/12/2024 32 (H)     Urea Nitrogen 07/12/2024 81.6 (H)     Creatinine 07/12/2024 7.18 (H)     GFR Estimate 07/12/2024 6 (L)     Calcium 07/12/2024 10.3 (H)     Glucose 07/12/2024 98     WBC Count 07/12/2024 15.2 (H)     RBC Count 07/12/2024 3.08 (L)     Hemoglobin 07/12/2024 10.6 (L)     Hematocrit 07/12/2024 32.5 (L)     MCV 07/12/2024 106 (H)     MCH 07/12/2024 34.4 (H)     MCHC 07/12/2024 32.6     RDW 07/12/2024 17.7 (H)     Platelet Count 07/12/2024 460 (H)     Protein Total 07/12/2024 7.9     Albumin 07/12/2024 4.1     Bilirubin Total 07/12/2024 0.4     Alkaline Phosphatase 07/12/2024 157 (H)     AST 07/12/2024 22     ALT 07/12/2024 11     Bilirubin Direct 07/12/2024 0.21     Lactic Acid, Initial 07/12/2024 1.2     Sodium 07/13/2024 145     Potassium 07/13/2024 4.4     Chloride 07/13/2024 86 (L)     Carbon Dioxide (CO2) 07/13/2024 31 (H)     Anion Gap 07/13/2024 28 (H)     Urea Nitrogen 07/13/2024 60.4 (H)     Creatinine 07/13/2024 5.51 (H)     GFR Estimate 07/13/2024 8 (L)     Calcium 07/13/2024 9.2     Glucose 07/13/2024 102 (H)     WBC Count 07/13/2024 13.5 (H)     RBC Count 07/13/2024 3.08 (L)     Hemoglobin 07/13/2024 10.4 (L)     Hematocrit 07/13/2024 33.4 (L)     MCV 07/13/2024 108 (H)     MCH 07/13/2024 33.8 (H)     MCHC 07/13/2024 31.1 (L)     RDW  07/13/2024 17.8 (H)     Platelet Count 07/13/2024 431     Magnesium 07/13/2024 2.6 (H)     Phosphorus 07/13/2024 8.6 (H)     Sodium 07/14/2024 144     Potassium 07/14/2024 4.6     Chloride 07/14/2024 84 (L)     Carbon Dioxide (CO2) 07/14/2024 30 (H)     Anion Gap 07/14/2024 30 (H)     Urea Nitrogen 07/14/2024 83.0 (H)     Creatinine 07/14/2024 6.61 (H)     GFR Estimate 07/14/2024 6 (L)     Calcium 07/14/2024 8.4 (L)     Glucose 07/14/2024 63 (L)     WBC Count 07/14/2024 17.4 (H)     RBC Count 07/14/2024 2.95 (L)     Hemoglobin 07/14/2024 10.0 (L)     Hematocrit 07/14/2024 32.5 (L)     MCV 07/14/2024 110 (H)     MCH 07/14/2024 33.9 (H)     MCHC 07/14/2024 30.8 (L)     RDW 07/14/2024 17.7 (H)     Platelet Count 07/14/2024 396     Magnesium 07/14/2024 2.5 (H)     Phosphorus 07/14/2024 9.8 (H)     Sodium 07/15/2024 137     Potassium 07/15/2024 4.6     Chloride 07/15/2024 84 (L)     Carbon Dioxide (CO2) 07/15/2024 28     Anion Gap 07/15/2024 25 (H)     Urea Nitrogen 07/15/2024 90.9 (H)     Creatinine 07/15/2024 7.03 (H)     GFR Estimate 07/15/2024 6 (L)     Calcium 07/15/2024 7.8 (L)     Glucose 07/15/2024 91     WBC Count 07/15/2024 16.5 (H)     RBC Count 07/15/2024 2.71 (L)     Hemoglobin 07/15/2024 9.2 (L)     Hematocrit 07/15/2024 29.1 (L)     MCV 07/15/2024 107 (H)     MCH 07/15/2024 33.9 (H)     MCHC 07/15/2024 31.6     RDW 07/15/2024 17.6 (H)     Platelet Count 07/15/2024 340     Magnesium 07/15/2024 2.4 (H)     Phosphorus 07/15/2024 10.8 (H)     Sodium 07/16/2024 133 (L)     Potassium 07/16/2024 4.7     Chloride 07/16/2024 83 (L)     Carbon Dioxide (CO2) 07/16/2024 25     Anion Gap 07/16/2024 25 (H)     Urea Nitrogen 07/16/2024 99.9 (H)     Creatinine 07/16/2024 7.42 (H)     GFR Estimate 07/16/2024 6 (L)     Calcium 07/16/2024 8.3 (L)     Glucose 07/16/2024 97     WBC Count 07/16/2024 11.7 (H)     RBC Count 07/16/2024 2.68 (L)     Hemoglobin 07/16/2024 8.8 (L)     Hematocrit 07/16/2024 28.3 (L)     MCV 07/16/2024  106 (H)     MCH 07/16/2024 32.8     MCHC 07/16/2024 31.1 (L)     RDW 07/16/2024 17.5 (H)     Platelet Count 07/16/2024 311     Magnesium 07/16/2024 2.3     Phosphorus 07/16/2024 9.1 (H)     Sodium 07/17/2024 136     Potassium 07/17/2024 3.6     Chloride 07/17/2024 97 (L)     Carbon Dioxide (CO2) 07/17/2024 26     Anion Gap 07/17/2024 13     Urea Nitrogen 07/17/2024 36.1 (H)     Creatinine 07/17/2024 3.67 (H)     GFR Estimate 07/17/2024 13 (L)     Calcium 07/17/2024 8.7 (L)     Glucose 07/17/2024 71     WBC Count 07/17/2024 14.3 (H)     RBC Count 07/17/2024 2.67 (L)     Hemoglobin 07/17/2024 9.0 (L)     Hematocrit 07/17/2024 29.1 (L)     MCV 07/17/2024 109 (H)     MCH 07/17/2024 33.7 (H)     MCHC 07/17/2024 30.9 (L)     RDW 07/17/2024 17.5 (H)     Platelet Count 07/17/2024 282     Magnesium 07/17/2024 1.9     Phosphorus 07/17/2024 4.3         Ryann Lopez PA-C  [facility]  [address]  [address]  [place] [address]

## 2024-07-17 NOTE — CONSULTS
"SPIRITUAL HEALTH SERVICES CONSULT NOTE  [facility]; P2    Saw pt Serene MEDLEY Bashirbenny per Spiritual Care Consult (due to LOS)    Patient/Family Understanding of Illness and Goals of Care - Alvarez was sitting on the side of her bed. She engaged easily in conversation, sharing that she came in for nausea and found out that she has adhesions in her abdomen. She believes this is due to scar tissue that formed after she had both of her kidneys removed. Alvarez says that she also had a stent placed earlier this year - she believes that her cardiac health has declined after being on dialysis for several years. Alvarez is hopeful that she can discharge to home tomorrow. She is waiting for her bowels to wake up and says that she would like some Lactulose. Notified her nurse of her request.     Distress and Loss - Alvarez has lived with chronic health issues and says \"At times I have felt like giving up.\"     Strengths, Coping, and Resources - Alvarez identifies support from friends, neighbors and families. She is grateful for a handful of really good friends whom she describes as \"dragging her at times\" to help her. Alvarez says that she has to pay very close to her diet and bowel regimen and that she does alright when she stays on her schedule.     Meaning, Beliefs, and Spirituality - Alvarez is Buddhism. She is not connected to a Rastafari at this time. She says that she has been praying \"Not my will Lord, but yours\" for several years. She welcomes prayers on her behalf.     Plan of Care: Spiritual care staff will remain available for further follow-up as requested by patient, family or staff.      Dilcia Chapman M.Div.      Office: [phone] (for non-urgent requests)  Please Vocera or page through [facility] for time-sensitive requests      "

## 2024-07-18 NOTE — PROGRESS NOTES
RENAL PROGRESS NOTE    HPI: 68 YOF [place] [facility] [place], hx of CAD sp PCI 4/2024, HFrEF 20-25%, HTN, emphysema , and hypothyroidism , admitted with abdominal pain , and vomiting, found to have SBO. Nephrology following for ESRD managemnet     ASSESSMENT & PLAN:     ESRD: On in center HD @  [facility], Under the care of DR Armstrong. RX:  TTS schedule, 3 hour TT,   EDW 35kg, / ACCESS: left upper extremity AVF. Some intra-dialytic HoTN noted. Transplant inactive d/t recent coronary PCI's in April, severe LV dysfunction. s/p bilateral nephrectomies for PCKD  -TTS schedule while here. Ok to discharge from renal stand point.  We will plan to dialyze next on Thursday if still inpatient.    Hyperkalemia:Chronic mild hyperK, dialysis with K2. Improved, serum potassium is wnl at 4.7 this am. Renal diet     CKD/MBD: Secondary renal hyperparathyroidism: PTA on binders/phosplo w/meals. Hold binders given recurrent SBO. On Renal diet     Volume:She is  below current renal target wt, poor intake and had emesis, some fluids given on HD . S/p IVF. Anuric. Tolerating diet now. Cont. Strict I/O, Daily wts     HTN:Labile, some HoTN on dialysis. PTA Losartan 12.5 (started last week per cards), Metop 25 daily, SBP sl escalated, cont home meds, prns      SBO: s/p NG tube placed, underwent Gastrografin challenge and subsequently clinically improved. NG tube removed. Tolerating advanced diet. S/p BM.      Emphysema: 02 as needed at baseline. On RA     H/O CAD: s/p PCI of RCA in 4/2024, severe LVEF 20-25%, chest pressure. H/o SARAH 4/2024, trops elev but stable for her, likely d/t ESRD, has followed with cards in the past, She had an NM lexiscan stress test that was non-diagnostic for ischemia, then had repeat coronary angio on 6/27, which did not show any stent thrombosis or restenosi last 7/3 cards note concurred trop likely d/t renal dz/volume management per [facility]. On ASA,  statin, Plavix and BB, ARB started 7/3     Hypothyroidism: On replacement     ACD: received PETE/Iron at OP HD clinic.  Received 100mcg of Mircera on 07/20. Hg down trending to 8.8 yesterday and EPO 10K given 7/16. HG 9 today. No s/s of bleed       Malnutrition: Has been losing wt, d/t abd pain and poor intake, enc pos supps . Ensure at home PTA per pt.      HLD: on Statin       SUBJECTIVE:   Pt sitting up in bed, saw pt on dialysis tolerating therapy well, no issues  Pt toelrating diet, finished malt-o meal on HD, wanting eggs  Pt reports passing gas, loose stools overnight  PT report feeling much better, tolerating HD without issues  Denies n, v, sob, fever, rash, ha feeling dizzy or CP  Discussed labs/plan   Renal okay with discharge when medically cleared.   Will plan for HD tomorrow if still here.       OBJECTIVE:  Physical Exam   Temp: 98.1  F (36.7  C) Temp src: Temporal BP: 134/77 Pulse: 104   Resp: 14 SpO2: (!) 84 % O2 Device: None (Room air)    Vitals:    07/16/24 0715 07/16/24 0729 07/17/24 0900   Weight: 34.8 kg (76 lb 11.5 oz) 34.2 kg (75 lb 6.4 oz) 34.1 kg (75 lb 3.2 oz)     Vital Signs with Ranges  Temp:  [98.1  F (36.7  C)-98.7  F (37.1  C)] 98.1  F (36.7  C)  Pulse:  [104-109] 104  Resp:  [14-20] 14  BP: (125-140)/(73-85) 134/77  SpO2:  [84 %-99 %] 84 %  I/O last 3 completed shifts:  In: 720 [P.O.:720]  Out: -       Patient Vitals for the past 72 hrs:   Weight   07/17/24 0900 34.1 kg (75 lb 3.2 oz)   07/16/24 0729 34.2 kg (75 lb 6.4 oz)   07/16/24 0715 34.8 kg (76 lb 11.5 oz)     Intake/Output Summary (Last 24 hours) at 7/17/2024 1201  Last data filed at 7/17/2024 0200  Gross per 24 hour   Intake 100 ml   Output 200 ml   Net -100 ml       PHYSICAL EXAM:  GEN: NAD, thin, frail, + cachexia  CV: RRR  Lung: clear and equal, on RA  Ab: soft and NT  Ext: no edema and well perfused  Skin: no rash  Neuro: NFD  Psych: pleasent  Access: JESSI GOMEZ        LABORATORY STUDIES:     Recent Labs   Lab 07/18/24  6005  07/17/24  0620 07/16/24  0549 07/15/24  0614 07/14/24  0551 07/13/24  0554   WBC 13.5* 14.3* 11.7* 16.5* 17.4* 13.5*   RBC 2.81* 2.67* 2.68* 2.71* 2.95* 3.08*   HGB 9.4* 9.0* 8.8* 9.2* 10.0* 10.4*   HCT 30.6* 29.1* 28.3* 29.1* 32.5* 33.4*    282 311 340 396 431       Basic Metabolic Panel:  Recent Labs   Lab 07/17/24  0620 07/16/24  0549 07/15/24  0614 07/14/24  0551 07/13/24  0554 07/12/24  0644    133* 137 144 145 140   POTASSIUM 3.6 4.7 4.6 4.6 4.4 5.1   CHLORIDE 97* 83* 84* 84* 86* 78*   CO2 26 25 28 30* 31* 30*   BUN 36.1* 99.9* 90.9* 83.0* 60.4* 81.6*   CR 3.67* 7.42* 7.03* 6.61* 5.51* 7.18*   GLC 71 97 91 63* 102* 98   GAVIN 8.7* 8.3* 7.8* 8.4* 9.2 10.3*       INR  No lab results found in last 7 days.       Recent Labs   Lab Test 07/18/24  0547 07/17/24  0620 07/12/24  0644 07/11/24  0840 07/14/23  0503 07/13/23  1239   INR  --   --   --  1.20*  --  1.70*   WBC 13.5* 14.3*   < > 11.4*   < > 13.0*   HGB 9.4* 9.0*   < > 10.2*   < > 8.4*    282   < > 358   < > 320    < > = values in this interval not displayed.       Personally reviewed current labs    Mitzi PALMA-BC  [facility]  [phone]

## 2024-07-18 NOTE — PLAN OF CARE
Pt A/Ox4. VSS on RA. Returned from dialysis 1230. C/o chronic low back pain and abdominal tenderness; Tylenol, oxycodone, and lidopatch administered. Denies nausea & vomiting. Tolerating regular diet. No loose stools this shift.     Discharge education and AVS reviewed w/ and provided to patient. Questions answered appropriately. Sister will provide transportation at discharge. All personal belongings with patient.    Problem: Adult Inpatient Plan of Care  Goal: Optimal Comfort and Wellbeing  7/18/2024 1409 by Mauricio Borrego RN  Outcome: Adequate for Care Transition  7/18/2024 1308 by Mauricio Borrego RN  Outcome: Progressing  Intervention: Monitor Pain and Promote Comfort  Recent Flowsheet Documentation  Taken 7/18/2024 1255 by Mauricio Borrego RN  Pain Management Interventions: medication (see MAR)     Problem: Nausea and Vomiting  Goal: Nausea and Vomiting Relief  7/18/2024 1409 by Mauricio Borrego RN  Outcome: Adequate for Care Transition  7/18/2024 1308 by Mauricio Borrego RN  Outcome: Progressing     Problem: Intestinal Obstruction  Goal: Fluid and Electrolyte Balance  7/18/2024 1409 by Mauricio Borrego RN  Outcome: Adequate for Care Transition  7/18/2024 1308 by Mauricio Borrego RN  Outcome: Progressing

## 2024-07-18 NOTE — PROGRESS NOTES
General Surgery Progress Note:    Hospital Day # 7    ASSESSMENT:   1. Pain of upper abdomen        Serene Tipton is a 68 year old female admitted for SBO who had recently passed rai gastrografin challenge on 07/14 with NGT removed 3 days ago with diet upgrade at that time. She had c/f recurrent abdominal pain, nausea and vomiting on 07/16 with slow diet upgrade for which she has tolerated since with ROBF, abdominal pain improvement. Her leukocytosis is down trending without hyperlactemia. She is HDS, AF.     PLAN:   -Upgrade to regular diet  -In the setting of clinical improvement, surgery to s/o pending toleration of regular diet  -Continue to avoid narcotics  -Recommend activity/ambulation to promote bowel function  -Please page with any questions or concerns      SUBJECTIVE:   Serene Tipton is reporting feeling overall well today with near resolved abdominal pain. She denies nausea or vomiting with her full liquid diet having cream of wheat, jello and juice. Endorses current appetite with eggs for which she is about to have. Endorses multiple BM overnight without blood with endorsing flatus. Denies fever, chills.     Patient Vitals for the past 24 hrs:   BP Temp Temp src Pulse Resp SpO2   07/18/24 1245 136/67 98.3  F (36.8  C) Oral 119 16 94 %   07/18/24 1130 125/62 98.3  F (36.8  C) Temporal 106 17 99 %   07/18/24 1127 131/72 -- -- 106 19 99 %   07/18/24 1115 126/69 -- -- 108 21 96 %   07/18/24 1100 124/70 -- -- 108 17 98 %   07/18/24 1045 126/66 -- -- 107 20 99 %   07/18/24 1030 128/73 -- -- 111 (!) 35 98 %   07/18/24 1015 131/69 -- -- 109 17 96 %   07/18/24 1000 137/72 -- -- 109 15 (!) 62 %   07/18/24 0945 125/65 -- -- 108 15 96 %   07/18/24 0930 133/63 -- -- 108 17 95 %   07/18/24 0915 133/81 -- -- 110 18 96 %   07/18/24 0900 131/83 -- -- 109 25 (!) 86 %   07/18/24 0845 135/79 -- -- 106 15 (!) 86 %   07/18/24 0830 134/77 -- -- 104 14 (!) 84 %   07/18/24 0826 138/73 -- -- 105 15 (!) 88 %    07/18/24 0824 -- -- -- 104 14 (!) 88 %   07/18/24 0815 128/78 98.1  F (36.7  C) Temporal 106 -- 99 %   07/18/24 0758 133/85 98.1  F (36.7  C) Oral 105 20 97 %   07/18/24 0002 125/85 98.1  F (36.7  C) Oral 105 20 98 %   07/17/24 1622 (!) 140/73 98.7  F (37.1  C) Oral 109 20 98 %       Physical Exam:  General: patient seen resting in bed, no acute distress  Resp: no respiratory distress, breathing comfortably on room air  Abdomen: soft non distended with mild right abdominal TTP without rebound or guarding.   Extremities: warm and well perfused    No results displayed because visit has over 200 results.         Ryann Lopez PA-C  [facility] General Surgery  [address]  [address]  [place] [address]

## 2024-07-18 NOTE — PLAN OF CARE
Problem: Pain Chronic (Persistent)  Goal: Optimal Pain Control and Function  Outcome: Progressing  Intervention: Optimize Psychosocial Wellbeing  Recent Flowsheet Documentation  Taken 7/17/2024 1945 by Samantha Madrid RN  Supportive Measures: active listening utilized     Problem: Nausea and Vomiting  Goal: Nausea and Vomiting Relief  Outcome: Progressing  Intervention: Prevent and Manage Nausea and Vomiting  Recent Flowsheet Documentation  Taken 7/17/2024 1945 by Samantha Madrid RN  Environmental Support: calm environment promoted   Goal Outcome Evaluation:    Patient is A/O x4.  VSS on RA. Pt reports chronic back pain relieved by prn oxy. Denies nausea, passes gas, 5 loose stools NOC shift per pt report, senna and lactulose held. Voiding independently.  Up with walker and sby assist. FL diet, tolerating well. Plan to discharge pending lab results

## 2024-07-18 NOTE — PROGRESS NOTES
CLINICAL NUTRITION SERVICES - BRIEF NOTE    EVALUATION OF THE PROGRESS TOWARD GOALS   Diet: Full Liquid  Nutrition Support: Gel plus BID with meals  Intake: variable, 0-75% of meals per flowsheets       NEW FINDINGS   Was on a FL diet on 7/16, issues with nausea-was given zofran then vomited, diet changed to NPO, advanced to CL then FL on 7/17    Abdominal discomfort, 5 loose stools NOC shift per RN note    Not in room, at dialysis    INTERVENTIONS  Implementation  Continue supplements as ordered  Monitor continued diet advancement    Addendum  Message requesting to see pt who has questions about her diet. Met with pt, asking about certain foods and if she could or couldn't eat them. Answered questions and reviewed low fiber diet. Pt thankful for information.

## 2024-07-18 NOTE — DISCHARGE SUMMARY
"[facility]  Discharge Summary - Medicine & Pediatrics       Date of Admission:  7/11/2024  Date of Discharge:  7/18/2024  Discharging Provider: Dr. Huffman, Dr. Medina  Discharge Service: Hospitalist Service    Discharge Diagnoses   Small bowel obstruction  Gallbladder distension, mild CBD dilation  History of bilateral nephrectomy with large midline laparotomy  Abdominal pain, nausea, vomiting - resolved  Leukocytosis, likely reactive  ESRD on Tu/Th/Sat HD  Anion gap metabolic acidosis  Hyperkalemia-resolved  Hypercalcemia-resolved  Hyperphosphatemia-resolved  Secondary renal hyperparathyroidism  Malnutrition, severe  Cachexia, severe  Insomnia  Chronic low back pain  Anemia of chronic disease-stable  Coronary artery disease  History of NSTEMI s/p PCI  HLD  HFrEF  Hypertension  Hypothyroidism  JAMIL    Clinically Significant Risk Factors     # Cachexia: Estimated body mass index is 15.19 kg/m  as calculated from the following:    Height as of this encounter: 1.499 m (4' 11\").    Weight as of this encounter: 34.1 kg (75 lb 3.2 oz).    # Severe Malnutrition: based on nutrition assessment      Follow-ups Needed After Discharge   Follow-up Appointments      Follow up with primary care provider, Ramos Lowry, within 7 days for   hospital follow- up.  We held your losartan due to soft blood pressures.   Consider possible need and restart if necessary. No follow up labs or test   are needed.      Unresulted Labs Ordered in the Past 30 Days of this Admission       No orders found from 6/11/2024 to 7/12/2024.        All have resulted.     Discharge Disposition   Discharged to home  Condition at discharge: Stable    Hospital Course   Serene Tipton was admitted on 7/11/2024 for abdominal pain, nausea, and vomiting. CT abdomen in ED showed ileus versus distal SBO and gallbladder distention. Surgery was consulted. Initial gastrografin challenge difficult to interpret due to p.o. intake and " subsequent vomiting. MRCP showed mild dilation of the CBD though no stones or evidence of cholecystitis. Repeat Gastrografin challenge on 7/14 suggestive of resolution of SBO.  NG removed and advanced diet as tolerated. Also with softer BP during admission (likely due to poor po intake in setting of nausea and vomiting) so losartan was held.     The following problems were addressed during her hospitalization:    SBO  Gallbladder distention, mild CBD dilation   Hx b/l nephrectomy with large midline laparatomy   Abdominal pain, nausea - resolved  Leukocytosis, likely reactive - resolved  Hx of SBO/ileus in 7/2023. Presented on 7/11/24 with 5d of worsening abdominal pain and emesis. Elevated Alk Phos to 157 though other LFTs normal. CT abd showed moderate small bowel dilatation suggestive of ileus vs SBO and mild-mod GB distention. SBO likely in the setting of adhesions from prior midline laparotomy as seen by stranding on CT. RUQ US also showed biliary duct dilatation and GB distension. Gastrografin challenge attempted though patient took PO and had emesis after so was difficult to appreciate on xray. MRCP 7/12 showed mild dilation of CBD with slight tapering towards ampulla. Gallbladder again appeared distended with trace sludge, no stones or evidence of cholecystitis. Multiple dilated loops in LUQ consistent with ongoing SBO.  NG placed. Repeat gastrografin challenge on 7/14 suggestive of resolution of SBO. Was advancing diet until recurrence of n/v on 7/16 then downgraded diet to NPO for additional bowel rest. On discharge was tolerating advanced diet well.   - simethicone 80 every 6 hours prn      ESRD on T/T/Sat HD  Anion gap metabolic acidosis  Hyperkalemia - resolved  Hypercalcemia - resolved  Hyperphosphatemia - resolved  Secondary renal hyperparathyroidism  Started dialysis in 2020. Dialyzes Tu/Th/Sat at [facility] under care of Dr. Armstrong. Patient is oliguric s/p bilateral nephrectomies for PCKD.  Dialysis 7/12 shortened due to n/v. Received 1L IVF on 7/14 as below wt goal, had poor intake and high NG output. Otherwise continued typical dialysis schedule.     Malnutrition, severe  Cachexia, severe  Has been losing weight due to abdominal pain and poor intake. Nutrition consulted. Recommended renal multivitamin and iron supplement. Also discussed renal diet, eating many small meals and modified fiber diet to prevent further obstruction.      Low back pain, chronic  Likely secondary to history of spinal stenosis of lumbar region. Managed with lidocaine patches and prn oxycodone 10 mg.      Anemia of chronic disease - stable  Hgb remains in 9-10s this admission. Continued PETE and Fe with dialysis     HFrEF  HTN  Chronically high BNP. ECHO 5/2024 with LVEF of 25-30%. EF on Lexiscan in 6/2024 was 31%. Continued PTA metoprolol.   - held PTA losartan for soft pressures; per nephrology may restart if systolic BP >130    Consultations This Hospital Stay   SURGERY GENERAL IP CONSULT  GASTROENTEROLOGY IP CONSULT  CARE MANAGEMENT / SOCIAL WORK IP CONSULT  PHYSICAL THERAPY ADULT IP CONSULT  OCCUPATIONAL THERAPY ADULT IP CONSULT  SPIRITUAL HEALTH SERVICES IP CONSULT    Code Status   Prior       The patient was discussed with Dr. Huffman.     Estrellita Medina MD  [place] [facility] [address] [place] [address]  Phone: [phone]  Fax: [phone]  ______________________________________________________________________    Physical Exam   Vital Signs: Temp: 98.4  F (36.9  C) Temp src: Oral BP: 135/73 Pulse: 113   Resp: 16 SpO2: 99 % O2 Device: None (Room air)    Weight: 75 lbs 3.2 oz    Constitutional: pleasant cachectic-appearing woman, joking around and in no acute distress  HEENT: Normocephalic, atraumatic. EOM intact.  Respiratory: no respiratory distress, CTAB.   Cardiovascular: RRR, normal S1 and S2, no peripheral edema   GI: abdomen is soft, no  tenderness to palpation, bowel sounds present in all quadrants  MSK: diffuse muscle wasting  Skin: left arm with AV fistula  Psych: A&Ox4. Mood and affect normal.      Primary Care Physician   Ramos Lowry    Discharge Orders      Reason for your hospital stay    Nausea and vomiting found to have a small bowel obstruction     Activity    Your activity upon discharge: activity as tolerated     Follow-up and recommended labs and tests     Follow up with primary care provider, Ramos Lowry, within 7 days for hospital follow- up.  We held your losartan due to soft blood pressures. Consider possible need and restart if necessary. No follow up labs or test are needed.     Diet    Follow this diet upon discharge: Orders Placed This Encounter      Snacks/Supplements Adult: Gelatein Plus; With Meals      Regular Diet Adult       Significant Results and Procedures   Most Recent 3 CBC's:  Recent Labs   Lab Test 07/18/24  0547 07/17/24  0620 07/16/24  0549   WBC 13.5* 14.3* 11.7*   HGB 9.4* 9.0* 8.8*   * 109* 106*    282 311     Most Recent 3 BMP's:  Recent Labs   Lab Test 07/18/24  0547 07/17/24  0620 07/16/24  0549    136 133*   POTASSIUM 3.9 3.6 4.7   CHLORIDE 93* 97* 83*   CO2 26 26 25   BUN 46.9* 36.1* 99.9*   CR 4.78* 3.67* 7.42*   ANIONGAP 18* 13 25*   GAVIN 8.8 8.7* 8.3*   GLC 86 71 97     Most Recent 2 LFT's:  Recent Labs   Lab Test 07/12/24  0644 07/11/24  0840   AST 22 20   ALT 11 10   ALKPHOS 157* 131   BILITOTAL 0.4 0.4       Discharge Medications   Discharge Medication List as of 7/18/2024  2:48 PM        START taking these medications    Details   simethicone (MYLICON) 80 MG chewable tablet Take 1 tablet (80 mg) by mouth every 6 hours as needed for cramping, Disp-30 tablet, R-0, E-Prescribe           CONTINUE these medications which have NOT CHANGED    Details   artificial saliva (BIOTENE MT) SOLN solution Swish and spit 2 sprays in mouth as needed for dry mouth, Historical      aspirin  (ASA) 81 MG chewable tablet Take 81 mg by mouth daily, Historical      atorvastatin (LIPITOR) 80 MG tablet Take 1 tablet (80 mg) by mouth daily, Disp-90 tablet, R-3, E-Prescribe      B Complex-C (SUPER B COMPLEX PO) Take 1 tablet by mouth daily, Historical      calcium acetate (CALPHRON) 667 MG TABS tablet Take 1-2 tablets by mouth 3 times daily (with meals), Historical      cholecalciferol (VITAMIN D3) 25 mcg (1000 units) capsule Take 1 capsule by mouth daily at 2 pm, Historical      clopidogrel (PLAVIX) 75 MG tablet Take 1 tablet (75 mg) by mouth daily Dose to start tomorrow., Disp-90 tablet, R-3, E-Prescribe      cyclobenzaprine (FLEXERIL) 5 MG tablet Take 5 mg by mouth 2 times daily as needed for muscle spasms, Historical      Epoetin Adam (EPOGEN IJ) Inject 1,000 Units into the vein three times a week At dialysis., Historical      GLUCOSAMINE-CHONDROITIN-VIT D3 PO Take 2 capsules by mouth daily, Historical      Iron Sucrose (VENOFER IV) Inject 100 mg into the vein once a week At dialysis, Historical      lactulose (CHRONULAC) 10 GM/15ML solution Take 15 mLs (10 g) by mouth 2 times daily as needed for constipation, Disp-900 mL, R-0, E-Prescribe      levothyroxine (SYNTHROID/LEVOTHROID) 50 MCG tablet Take 50 mcg by mouth daily, Historical      LORazepam (ATIVAN) 1 MG tablet Take 1 mg by mouth daily as needed for anxiety, Historical      metoprolol succinate ER (TOPROL XL) 25 MG 24 hr tablet Take 1 tablet (25 mg) by mouth daily, Disp-30 tablet, R-0, E-Prescribe      nitroGLYcerin (NITROSTAT) 0.4 MG sublingual tablet For chest pain place 1 tablet under the tongue every 5 minutes for 3 doses. If symptoms persist 5 minutes after 1st dose call 911., Disp-30 tablet, R-0, E-Prescribe      nortriptyline (PAMELOR) 75 MG capsule Take 75 mg by mouth At Bedtime, Historical      Nutritional Supplements (NEPRO) LIQD Take 1-2 Cans by mouth daily, Historical      omeprazole (PRILOSEC) 20 MG DR capsule Take 20 mg by mouth 2 times  daily as needed, Historical      oxyCODONE IR (ROXICODONE) 10 MG tablet Take 10 mg by mouth every 4 hours as needed for pain, Historical      SENNA-docusate sodium (SENNA S) 8.6-50 MG tablet Take 2 tablets by mouth 2 times daily Hold for loose stools., Disp-120 tablet, R-0, E-Prescribe      sertraline (ZOLOFT) 100 MG tablet Take 150 mg by mouth At Bedtime, Historical           STOP taking these medications       losartan (COZAAR) 25 MG tablet Comments:   Reason for Stopping:             Allergies   Allergies   Allergen Reactions    Penicillins Other (See Comments) and Shortness Of Breath     Breathing problem.  breathing      No Clinical Screening - See Comments      PN: LW CM1: Contrast Intercapsular - Nonionic Reaction :    Nsaids Other (See Comments)     Kidney damage    Carvedilol GI Disturbance     Nausea and vomiting    Ciprofloxacin Muscle Pain (Myalgia)    Floxacillin (Flucloxacillin) Muscle Pain (Myalgia) and Rash    Levofloxacin Muscle Pain (Myalgia) and Rash    Morphine Nausea and Vomiting    Sulfa Antibiotics Itching

## 2024-07-18 NOTE — PLAN OF CARE
Physical Therapy Discharge Summary    Reason for therapy discharge:    Discharged to home. With family to assist.     Progress towards therapy goal(s). See goals on Care Plan in [facility] electronic health record for goal details.  Goals partially met.  Barriers to achieving goals:   Pt is indep with HEP.  Pt did not want any PT today.  .    Therapy recommendation(s):    Continue home exercise program.

## 2024-07-18 NOTE — PROGRESS NOTES
Care Management Discharge Note    Discharge Date: 07/18/2024       Discharge Disposition:  Home    Discharge Services:  none      Additional Information:  Pt discharging home.     OMAR Sandoval

## 2024-07-18 NOTE — PROCEDURES
Potassium   Date Value Ref Range Status   07/17/2024 3.6 3.4 - 5.3 mmol/L Final   07/25/2023 4.7 3.5 - 5.0 mmol/L Final   05/19/2021 3.9 3.4 - 5.3 mmol/L Final     Hemoglobin   Date Value Ref Range Status   07/18/2024 9.4 (L) 11.7 - 15.7 g/dL Final   05/19/2021 13.0 11.7 - 15.7 g/dL Final     Creatinine   Date Value Ref Range Status   07/17/2024 3.67 (H) 0.51 - 0.95 mg/dL Final   05/19/2021 3.43 (H) 0.52 - 1.04 mg/dL Final     Urea Nitrogen   Date Value Ref Range Status   07/17/2024 36.1 (H) 8.0 - 23.0 mg/dL Final   07/25/2023 27 (H) 8 - 22 mg/dL Final   05/19/2021 27 7 - 30 mg/dL Final     Sodium   Date Value Ref Range Status   07/17/2024 136 135 - 145 mmol/L Final   05/19/2021 136 133 - 144 mmol/L Final     INR   Date Value Ref Range Status   07/11/2024 1.20 (H) 0.85 - 1.15 Final   05/19/2021 1.02 0.86 - 1.14 Final       DIALYSIS PROCEDURE NOTE  Hepatitis status of previous patient on machine log was checked and verified ok to use with this patients hepatitis status.  Patient dialyzed for 3 hrs. on a K3 bath with a net fluid removal of  0L.  A BFR of 400 ml/min was obtained via a AVF using 15 gauge needles.      The treatment plan was discussed with Dr. Hernandez during the treatment.    Total heparin received during the treatment: 0 units.   Needle cannulation sites held x 15 min.       Meds  given: none   Complications: tolerated treatment well      Person educated: patient. Knowledge base good. Barriers to learning: none. Educated on procedure via verbal mode.      ICEBOAT? Timeout performed pre-treatment  I: Patient was identified using 2 identifiers  C:  Consent Signed Yes  E: Equipment preventative maintenance is current and dialysis delivery system OK to use  B: Hepatitis B Surface Antigen: negative; Draw Date: 6/26/24      Hepatitis B Surface Antibody: immune; Draw Date: 6/25/24  O: Dialysis orders present and complete prior to treatment  A: Vascular access verified and assessed prior to treatment  T:  Treatment was performed at a clinically appropriate time  ?: Patient was allowed to ask questions and address concerns prior to treatment  See Adult Hemodialysis flowsheet in EPIC for further details and post assessment.  Machine water alarm in place and functioning. Transducer pods intact and checked every 15min.   Pt returned via bed.  Chlorine/Chloramine water system checked every 4 hours.  Outpatient Dialysis at [facility]    Patient repositioned every 2 hours during the treatment.  Post treatment report given to Mauricio Borrego RN regarding 0L of fluid removed, last BP of 125/62, and patient pain rating of 0/10.

## 2024-08-12 NOTE — TELEPHONE ENCOUNTER
Health Call Center    Phone Message    May a detailed message be left on voicemail: yes     Reason for Call: Medication Question or concern regarding medication   Prescription Clarification  Name of Medication: clopidogrel (PLAVIX) 75 MG tablet   Prescribing Provider: another provider saw Dr. Begum on 7/3   Pharmacy:    [id number] [address]     What on the order needs clarification? Pt would like to know if she needs to continue this medication or not? If so she will need a refill of medication. Thank you       Action Taken: Other: cardiology     Travel Screening: Not Applicable    Thank you!  [facility]       Date of Service:

## 2024-08-12 NOTE — TELEPHONE ENCOUNTER
Dr Begum, This patient is asking when it will be ok for her clopidogrel to be held if needed for transplant surgery if she were to get the call. She is asking if 6 months is enough.  -sea    ======  PC to patient to discuss clopidogrel. She had a PCI on 4/30 and is asking if she needs to continue this med, and states [facility] recommends clopidogrel for 4 months post. Reviewed recommendations from our providers for clopidogrel for 1 year post, and instructed her to continue taking. She is asking how long before she can stop taking if needed, in the event she were to get the call for her transplant. Will fwd to Dr Begum and call with his response. No further questions.  -sea

## 2024-08-13 NOTE — TELEPHONE ENCOUNTER
----- Message -----  From: Thad Begum MD  Sent: 8/12/2024   5:10 PM CDT  To: Enma Fuchs RN    Yes, she can have renal transplant after taking Plavix 6 months.      ==  Attempted to reach patient. Not reach on home or mobile numbers and unable to leave voicemail. Will try again later. -ejb      ==  Spoke with patient via phone. Updated with Dr. Begum's response above. Understanding verbalized. -ejb

## 2024-08-21 NOTE — TELEPHONE ENCOUNTER
Spoke with patient and explained need to wait full 6 months to not interrupt scheduled antiplatelet post stent placement for her safety and best practice for other specialties needing to obtain cardiology clearance before procedures can be completed. Pt verbalized understanding.-kylah

## 2024-08-21 NOTE — TELEPHONE ENCOUNTER
Health Call Center    Phone Message    May a detailed message be left on voicemail: yes     Reason for Call: Other: PT supposed to have an endoscopy w/[facility] soon.  She had a stent put in April 30 2024.  Pt wants to know if she has to wait the full 6 months before she has the endoscopy?  They do anticipate finding something and will need to remove it.   Please contact pt to advise.      Action Taken: Message routed to:  [facility] cardio    Travel Screening: Not Applicable    Thank you!  [facility]       Date of Service:

## 2024-09-17 ENCOUNTER — TELEPHONE (OUTPATIENT)
Dept: CARDIOLOGY | Facility: CLINIC | Age: 69
End: 2024-09-17
Payer: COMMERCIAL

## 2024-09-17 NOTE — TELEPHONE ENCOUNTER
Health Call Center    Phone Message    May a detailed message be left on voicemail: yes     Reason for Call: Other: Rex from [facility] called requesting a 5 day hold and bridge order for Serene's Plavix to start on 10/24 prior to an ERCP/EUS she will be having with them. Please reach out to [facility] to discuss. Thank you!     Action Taken: Other: Cardiology    Travel Screening: Not Applicable    Thank you!  [facility]       Date of Service:

## 2024-09-26 NOTE — TELEPHONE ENCOUNTER
Notified [facility] of recommendations-cullen    From: Thad Begum MD  Sent: 9/25/2024   5:24 PM CDT  To: Evelina Tabares    It is okay to hold the Plavix for 5 days prior to procedure on October 24.  Thanks  Carlos

## 2024-10-02 PROBLEM — I10 HYPERTENSION: Status: RESOLVED | Noted: 2020-01-22 | Resolved: 2024-10-02

## 2024-10-02 PROBLEM — N18.9 CHRONIC KIDNEY DISEASE, UNSPECIFIED: Status: RESOLVED | Noted: 2023-07-25 | Resolved: 2024-10-02

## 2024-10-07 ENCOUNTER — POST MORTEM DOCUMENTATION (OUTPATIENT)
Dept: TRANSPLANT | Facility: CLINIC | Age: 69
End: 2024-10-07
Payer: COMMERCIAL

## 2024-10-07 NOTE — PROGRESS NOTES
Received notification on 10/4/2024 at  of patient's death from interface [facility] registry  Place of death was reported as unknown.  Graft status at the time of death was reported as Unknown.  TIS verification is: Complete  The Transplant Office has been notified that patient is . The Post Mortem Encounter has been completed. Notifications have been sent to the Care team, Donor team, Immunology lab, Transplant Financial , and Social Work.   Instructions have been sent to cancel pending appointments, discontinue pending orders, and send a sympathy card to the family.

## (undated) DEVICE — INTRO SHEATH 4FRX10CM PINNACLE RSS402

## (undated) DEVICE — CATH BALLOON NC EMERGE 3.00X12MM H7493926712300

## (undated) DEVICE — DRAPE IOBAN INCISE 13X13" 6640EZ

## (undated) DEVICE — STPL POWERED ECHELON VASC 35MM PVE35A

## (undated) DEVICE — CATH ANGIO INFINITI 3DRC 4FRX100CM 538476

## (undated) DEVICE — CLIP ENDO HEMO-LOC PURPLE LG 544240

## (undated) DEVICE — CLIP HORIZON MED BLUE 002200

## (undated) DEVICE — CATH ANGIO INFINITI JL5 4FRX100CM 538422

## (undated) DEVICE — DRAPE SHEET REV FOLD 3/4 9349

## (undated) DEVICE — SU SILK 1 TIE 6X30" A307H

## (undated) DEVICE — INTRO MICRO MINI STICK 4FR STIFF NITINOL 45-753

## (undated) DEVICE — SU FIBERWIRE 2 38" T-8 NDL  AR-7206

## (undated) DEVICE — PAD HIP POSITIONING MCGUIRE 707-CPM

## (undated) DEVICE — SU PROLENE 6-0 RB-2DA 30" 8711H

## (undated) DEVICE — GLOVE BIOGEL PI MICRO SZ 7.5 48575

## (undated) DEVICE — MANIFOLD KIT ANGIO AUTOMATED 014613

## (undated) DEVICE — ELECTRODE DEFIB CADENCE 22550R

## (undated) DEVICE — KIT HAND CONTROL ACIST 014644 AR-P54

## (undated) DEVICE — CEMENT PRESSURIZER FEMORAL CANAL SM

## (undated) DEVICE — CATH TRAY FOLEY SURESTEP 16FR W/URNE MTR STLK LATEX A303316A

## (undated) DEVICE — SUCTION TIP YANKAUER STR K87

## (undated) DEVICE — ADH SKIN CLOSURE PREMIERPRO EXOFIN 1.0ML 3470

## (undated) DEVICE — CLIP HORIZON SM RED WIDE SLOT 001201

## (undated) DEVICE — GLOVE SURG PI ULTRA TOUCH M SZ 8 LF

## (undated) DEVICE — CATH BALLOON NC EMERGE 4.00X12MM H7493926712400

## (undated) DEVICE — BONE CLEANING TIP INTERPULSE  0210-010-000

## (undated) DEVICE — DRAPE IOBAN INCISE 23X17" 6650EZ

## (undated) DEVICE — GLOVE UNDER INDICATOR PI SZ 8.5 LF 41685

## (undated) DEVICE — SU SILK 2-0 TIE 12X30" A305H

## (undated) DEVICE — SPONGE LAP 18X18" X8435

## (undated) DEVICE — SURGICEL ABSORBABLE HEMOSTAT SNOW 4"X4" 2083

## (undated) DEVICE — CATH GUIDELINER 8FR 5573

## (undated) DEVICE — GLOVE BIOGEL PI ULTRATOUCH G SZ 8.5 42185

## (undated) DEVICE — SUCTION MANIFOLD NEPTUNE 2 SYS 1 PORT 702-025-000

## (undated) DEVICE — CUSTOM PACK TOTAL HIP SOP5BTHHEA

## (undated) DEVICE — GUIDEWIRE FORTE FLOPPY J TOP 34949-05J

## (undated) DEVICE — SOL WATER IRRIG 1000ML BOTTLE 2F7114

## (undated) DEVICE — PREP CHLORAPREP 26ML TINTED HI-LITE ORANGE 930815

## (undated) DEVICE — SUCTION MANIFOLD NEPTUNE 2 SYS 4 PORT 0702-020-000

## (undated) DEVICE — SU SILK 4-0 TIE 12X30" A303H

## (undated) DEVICE — DRAPE IOBAN INCISE 36X23" 6651EZ

## (undated) DEVICE — CATH ANGIO INFINITI JR4 4FRX100CM 538421

## (undated) DEVICE — GOWN SURGICAL SMARTGOWN 2XL 89075

## (undated) DEVICE — SUTURE VICRYL+ 2-0 27IN CT-1 UND VCP259H

## (undated) DEVICE — CATH BALLOON NC EMERGE 2.00X12MM H7493926712200

## (undated) DEVICE — SUCTION IRR SYSTEM W/O TIP INTERPULSE HANDPIECE 0210-100-000

## (undated) DEVICE — LINEN TOWEL PACK X6 WHITE 5487

## (undated) DEVICE — DEVICE INFLATION SYR W/ HEMOSTASIS VALVE 12IN EXT IN4904

## (undated) DEVICE — SU SILK 3-0 SH CR 8X18" C013D

## (undated) DEVICE — TAMPON SUCTION FEMORAL CANAL 110037

## (undated) DEVICE — ESU LIGASURE IMPACT OPEN SEALER/DVDR CVD LG JAW LF4418

## (undated) DEVICE — CUSTOM PACK CORONARY SAN5BCRHEA

## (undated) DEVICE — SOL NACL 0.9% IRRIG 1000ML BOTTLE 2F7124

## (undated) DEVICE — WIPES FOLEY CARE SURESTEP PROVON DFC100

## (undated) DEVICE — BLADE SAW SAGITTAL STRK WIDE 25.4X85X1.2MM 2108-151-000

## (undated) DEVICE — Device

## (undated) DEVICE — GLOVE BIOGEL INDICATOR 7.5 LF 41675

## (undated) DEVICE — POSITIONER ABDUCTION PILLOW FOAM MED FP-ABDUCTM

## (undated) DEVICE — SYR ANGIOGRAPHY MULTIUSE KIT ACIST 014612

## (undated) DEVICE — DRSG AQUACEL AG HYDROFIBER  3.5X10" 422605

## (undated) DEVICE — RAD CLOSURE ANGIOSEAL 8FR  610131

## (undated) DEVICE — CLIP HORIZON LG ORANGE 004200

## (undated) DEVICE — SUTURE VICRYL+ 0 27IN CT-1 UND VCP260H

## (undated) DEVICE — CATH BALLOON SHOCKWAVE CV2+ IVL CATH 2.5X12MM C2PIVL2512

## (undated) DEVICE — ESU PENCIL SMOKE EVAC W/ROCKER SWITCH 0703-047-000

## (undated) DEVICE — BRUSH FEMORAL CANAL NARROW 110033

## (undated) DEVICE — CATH BALLOON NC EMERGE 2.50X12MM H7493926712250

## (undated) DEVICE — SU VICRYL 3-0 SH 27" UND J416H

## (undated) DEVICE — HOLDER LIMB VELCRO OR 0814-1533

## (undated) DEVICE — CATH ANGIO INFINITI JL4 4FRX100CM 538420

## (undated) DEVICE — GUIDEWIRE VASCULAR STRAIGHT PTCA .014IN X 180CM

## (undated) DEVICE — LINEN TOWEL PACK X30 5481

## (undated) DEVICE — STPL SKIN 35W 6.9MM  PXW35

## (undated) DEVICE — SU SILK 3-0 TIE 12X30" A304H

## (undated) DEVICE — CATH GUIDELINER 6FR 5571

## (undated) DEVICE — VALVE HEMOSTASIS .096" COPILOT MECH 1003331

## (undated) DEVICE — BNDG ABDOMINAL BINDER 9X45-62" 79-89071

## (undated) DEVICE — PLATE GROUNDING ADULT W/CORD 9165L

## (undated) DEVICE — A3 SUPPLIES- SEE NURSING INFO PAGE

## (undated) DEVICE — SOL NACL 0.9% INJ 1000ML BAG 2B1324X

## (undated) DEVICE — SUTURE VICRYL+ 1 27IN CT-1 UND VCP261H

## (undated) DEVICE — SU PROLENE 5-0 RB-2DA 30" 8710H

## (undated) DEVICE — SU SILK 0 TIE 6X30" A306H

## (undated) DEVICE — SU MONOCRYL 4-0 PS-2 27" UND Y426H

## (undated) DEVICE — LINEN GOWN XLG 5407

## (undated) DEVICE — INTRO SHEATH 8FRX10CM PINNACLE RSS802

## (undated) DEVICE — RX VISTASEAL FIBRIN SEALANT W/THROMBIN 10ML VST10

## (undated) DEVICE — INTRO MICRO MINI STICK 4FR STD NITINOL

## (undated) DEVICE — RETRIVER SUTURE LASSO HOFFEE BLUE 710000

## (undated) RX ORDER — FENTANYL CITRATE-0.9 % NACL/PF 10 MCG/ML
PLASTIC BAG, INJECTION (ML) INTRAVENOUS
Status: DISPENSED
Start: 2023-02-01

## (undated) RX ORDER — FENTANYL CITRATE 50 UG/ML
INJECTION, SOLUTION INTRAMUSCULAR; INTRAVENOUS
Status: DISPENSED
Start: 2023-02-01

## (undated) RX ORDER — DEXTROSE, SODIUM CHLORIDE, SODIUM LACTATE, POTASSIUM CHLORIDE, AND CALCIUM CHLORIDE 5; .6; .31; .03; .02 G/100ML; G/100ML; G/100ML; G/100ML; G/100ML
INJECTION, SOLUTION INTRAVENOUS
Status: DISPENSED
Start: 2023-02-01

## (undated) RX ORDER — PROPOFOL 10 MG/ML
INJECTION, EMULSION INTRAVENOUS
Status: DISPENSED
Start: 2023-02-01

## (undated) RX ORDER — PROPOFOL 10 MG/ML
INJECTION, EMULSION INTRAVENOUS
Status: DISPENSED
Start: 2023-01-01

## (undated) RX ORDER — FENTANYL CITRATE 50 UG/ML
INJECTION, SOLUTION INTRAMUSCULAR; INTRAVENOUS
Status: DISPENSED
Start: 2024-01-01

## (undated) RX ORDER — AMINOPHYLLINE 25 MG/ML
INJECTION, SOLUTION INTRAVENOUS
Status: DISPENSED
Start: 2021-06-07

## (undated) RX ORDER — ONDANSETRON 2 MG/ML
INJECTION INTRAMUSCULAR; INTRAVENOUS
Status: DISPENSED
Start: 2023-01-01

## (undated) RX ORDER — ALBUMIN (HUMAN) 12.5 G/50ML
SOLUTION INTRAVENOUS
Status: DISPENSED
Start: 2024-01-01

## (undated) RX ORDER — TRANEXAMIC ACID 100 MG/ML
INJECTION, SOLUTION INTRAVENOUS
Status: DISPENSED
Start: 2023-01-01

## (undated) RX ORDER — ACETAMINOPHEN 325 MG/1
TABLET ORAL
Status: DISPENSED
Start: 2023-02-01

## (undated) RX ORDER — EPHEDRINE SULFATE 50 MG/ML
INJECTION, SOLUTION INTRAMUSCULAR; INTRAVENOUS; SUBCUTANEOUS
Status: DISPENSED
Start: 2023-02-01

## (undated) RX ORDER — DEXAMETHASONE SODIUM PHOSPHATE 4 MG/ML
INJECTION, SOLUTION INTRA-ARTICULAR; INTRALESIONAL; INTRAMUSCULAR; INTRAVENOUS; SOFT TISSUE
Status: DISPENSED
Start: 2023-02-01

## (undated) RX ORDER — DEXAMETHASONE SODIUM PHOSPHATE 4 MG/ML
INJECTION, SOLUTION INTRA-ARTICULAR; INTRALESIONAL; INTRAMUSCULAR; INTRAVENOUS; SOFT TISSUE
Status: DISPENSED
Start: 2023-01-01

## (undated) RX ORDER — REGADENOSON 0.08 MG/ML
INJECTION, SOLUTION INTRAVENOUS
Status: DISPENSED
Start: 2021-06-07

## (undated) RX ORDER — HYDROMORPHONE HYDROCHLORIDE 1 MG/ML
INJECTION, SOLUTION INTRAMUSCULAR; INTRAVENOUS; SUBCUTANEOUS
Status: DISPENSED
Start: 2023-02-01

## (undated) RX ORDER — ONDANSETRON 2 MG/ML
INJECTION INTRAMUSCULAR; INTRAVENOUS
Status: DISPENSED
Start: 2023-02-01